# Patient Record
Sex: MALE | Race: ASIAN | NOT HISPANIC OR LATINO | Employment: UNEMPLOYED | URBAN - METROPOLITAN AREA
[De-identification: names, ages, dates, MRNs, and addresses within clinical notes are randomized per-mention and may not be internally consistent; named-entity substitution may affect disease eponyms.]

---

## 2017-01-05 ENCOUNTER — OFFICE VISIT (OUTPATIENT)
Dept: PALLIATIVE MEDICINE | Facility: CLINIC | Age: 52
End: 2017-01-05
Payer: COMMERCIAL

## 2017-01-05 VITALS
SYSTOLIC BLOOD PRESSURE: 137 MMHG | DIASTOLIC BLOOD PRESSURE: 88 MMHG | WEIGHT: 175 LBS | BODY MASS INDEX: 32 KG/M2 | HEART RATE: 60 BPM

## 2017-01-05 DIAGNOSIS — Z51.81 ENCOUNTER FOR THERAPEUTIC DRUG MONITORING: ICD-10-CM

## 2017-01-05 DIAGNOSIS — M06.9 RHEUMATOID ARTHRITIS INVOLVING MULTIPLE SITES, UNSPECIFIED RHEUMATOID FACTOR PRESENCE: ICD-10-CM

## 2017-01-05 DIAGNOSIS — M47.819 FACET ARTHROPATHY: Primary | ICD-10-CM

## 2017-01-05 PROCEDURE — 80307 DRUG TEST PRSMV CHEM ANLYZR: CPT | Performed by: PSYCHIATRY & NEUROLOGY

## 2017-01-05 PROCEDURE — 99000 SPECIMEN HANDLING OFFICE-LAB: CPT | Performed by: PSYCHIATRY & NEUROLOGY

## 2017-01-05 PROCEDURE — 99214 OFFICE O/P EST MOD 30 MIN: CPT | Performed by: PSYCHIATRY & NEUROLOGY

## 2017-01-05 RX ORDER — OXYCODONE HYDROCHLORIDE 5 MG/1
5-10 TABLET ORAL EVERY 4 HOURS PRN
Qty: 150 TABLET | Refills: 0 | Status: SHIPPED | OUTPATIENT
Start: 2017-01-05 | End: 2017-02-07

## 2017-01-05 ASSESSMENT — PAIN SCALES - GENERAL: PAINLEVEL: SEVERE PAIN (7)

## 2017-01-05 NOTE — NURSING NOTE
"    Chief Complaint   Patient presents with     Pain       Initial Wt 79.379 kg (175 lb) Estimated body mass index is 32 kg/(m^2) as calculated from the following:    Height as of 10/25/16: 1.575 m (5' 2\").    Weight as of this encounter: 79.379 kg (175 lb).  BP completed using cuff size: regular    "

## 2017-01-05 NOTE — PROGRESS NOTES
[facility]    Date of visit: 1/5/2017      Chief complaint:   Chief Complaint   Patient presents with     Pain       Interval history:  Lamont Daniels was last seen by me on 7/18/16    Recommendations/plan at the last visit included:  1. Physical Therapy:  Continue pool therapy  2. Clinical Health Psychologist to address issues of relaxation, behavioral change, coping style, and other factors important to improvement.  YES  3. Diagnostic Studies:  none  4. Medication Management:  No changes.  5. Further procedures recommended: can be scheduled for another facet joint injection.  6. Recommendations to PCP:  None  7. Follow up: 2-3 months      Since his last visit, Lamont Daniels reports:  -he is having problems with standing up straight in his back.  -he feels that the pain is the same area, but he can have pain move up and down.  When moves up it can into the shoulder blade, and into the posterior leg L>R  -feels that the injection was helpful but wore off.  -he was started on Humira and he does feel that it helps some  -he is taking the Oxycodone, usually 2-4 tabs/day.  Doesn't feel that 1 tab is helpful, takes 2.  Pain goes down 2-3 numbers  Lasts >2-3 hours      MRI 8/6/14  IMPRESSION  Impression: Multilevel degenerative changes. Small disc protrusion at  L3-4 results in in mild spinal canal narrowing. Moderate left and mild  right neuroforaminal narrowing at L5-S1.      Pain scores:  Pain intensity on average is 6 on a scale of 0-10.     Current pain treatments  Ibuprofen 800 mg- using 3-4 times per week, helpful  Gabapentin 600mg 3 times daily  Baclofen 10mg daily- was prescribed by PCP, doesn't use daily.  Oxycodone 5 mg : takes 2-4/day- ran out last week  meloxicam- hasn't started this    Previous medication treatments included:  Codeine, oxycodone, hydrocodone- given for other issues- helpful  Cymbalta 60mg daily- given for mental health- was given by Dr. Acosta- not been  higher  Baclofen 10mg at bedtime- not helpful, no side effects  Meloxicam 7.5 daily- not helpful  Robaxin 500 mg 1 tablet, 1-3 times a day depending on the day    Other treatments have included:  Lamont Daniels has not been seen at a pain clinic in the past.    PT: has done for various reasons, most recently the low back.  Acupuncture: as done a couple of needles with adjustment  TENs Unit: no  Injections: no    Side Effects: Dry mouth    Medications:  Current Outpatient Prescriptions   Medication Sig Dispense Refill     oxyCODONE (ROXICODONE) 5 MG IR tablet Take 1-2 tablets (5-10 mg) by mouth every 4 hours as needed for moderate to severe pain . Max of 6 tabs per day.  One month supply. 150 tablet 0     allopurinol (ZYLOPRIM) 300 MG tablet TAKE ONE TABLET BY MOUTH ONCE DAILY 90 tablet 3     meloxicam (MOBIC) 7.5 MG tablet TAKE ONE TABLET BY MOUTH ONCE DAILY WITH  BREAKFAST 30 tablet 0     zolpidem (AMBIEN) 5 MG tablet TAKE ONE TABLET BY MOUTH AT BEDTIME AS NEEDED FOR SLEEP 30 tablet 0     evolocumab (REPATHA) 140 MG/ML prefilled autoinjector Inject 1 mL (140 mg) Subcutaneous every 14 days 2 mL 11     baclofen (LIORESAL) 10 MG tablet TAKE ONE TABLET BY MOUTH AT BEDTIME 90 tablet 3     meclizine (ANTIVERT) 25 MG tablet TAKE ONE TABLET BY MOUTH EVERY 6 HOURS AS NEEDED FOR  DIZZINESS 30 tablet 0     clonazePAM (KLONOPIN) 1 MG tablet TAKE ONE-HALF TO ONE TABLET BY MOUTH THREE TIMES DAILY AS NEEDED FOR ANXIETY 90 tablet 0     predniSONE (DELTASONE) 5 MG tablet Take 1 tablet (5 mg) by mouth daily 30 tablet 3     sulfaSALAzine ER (AZULFIDINE EN) 500 MG EC tablet Take 2 tablets (1,000 mg) by mouth 2 times daily 120 tablet 3     adalimumab (HUMIRA PEN) 40 MG/0.8ML pen kit Inject 0.8 mLs (40 mg) Subcutaneous every 14 days 2 each 12     fluvoxaMINE (LUVOX) 50 MG tablet TAKE ONE TABLET BY MOUTH AT BEDTIME 30 tablet 3     metoprolol (TOPROL-XL) 25 MG 24 hr tablet Take 1 tablet (25 mg) by mouth daily 45 tablet 1     methylphenidate  (RITALIN) 10 MG tablet Take 1 tablet morning and noon 60 tablet 0     ezetimibe (ZETIA) 10 MG tablet Take 1 tablet (10 mg) by mouth daily 90 tablet 3     clopidogrel (PLAVIX) 75 MG tablet Take 1 tablet (75 mg) by mouth daily 90 tablet 3     atorvastatin (LIPITOR) 80 MG tablet Take 1 tablet (80 mg) by mouth daily for cholesterol. 90 tablet 3     gemfibrozil (LOPID) 600 MG tablet Take 1 tablet (600 mg) by mouth 2 times daily 180 tablet 3     nitroglycerin (NITROSTAT) 0.4 MG SL tablet Place 1 tablet (0.4 mg) under the tongue every 5 minutes as needed 30 tablet 1     gabapentin (NEURONTIN) 300 MG capsule TAKE TWO CAPSULES BY MOUTH THREE TIMES DAILY 540 capsule 3     aspirin EC 81 MG EC tablet Take 1 tablet (81 mg) by mouth daily (*) 30 tablet 1     hydrocortisone (WESTCORT) 0.2 % cream Use twice daily on the eats for active rash for up to 2 weeks in a row, then take 2 weeks off. 45 g 0     BusPIRone HCl 30 MG TABS TAKE ONE TABLET BY MOUTH TWICE DAILY 180 tablet 3     pantoprazole (PROTONIX) 40 MG enteric coated tablet TAKE ONE TABLET BY MOUTH ONCE DAILY FOR STOMACH 90 tablet 3     tenofovir (VIREAD) 300 MG tablet Take 1 tablet (300 mg) by mouth daily 90 tablet 3     colchicine (COLCRYS) 0.6 MG tablet Take 2 tablets at the first sign of flare, take 1 additional tablet one hour later. 30 tablet 11     Cholecalciferol (VITAMIN D) 2000 UNITS tablet TAKE ONE TABLET BY MOUTH ONCE DAILY 100 tablet 1     triamcinolone (KENALOG) 0.1 % ointment Apply sparingly to the ears and groin twice daily until clear, then once daily until clear. Do not apply to the face. 80 g 1     ketoconazole (NIZORAL) 2 % cream Apply topically 2 times daily Apply to back of scalp and to groin twice daily until rash is clear 30 g 11     meclizine (ANTIVERT) 25 MG tablet TAKE ONE TABLET BY MOUTH EVERY 6 HOURS AS NEEDED FOR DIZZINESS 30 tablet 3     melatonin 3 MG tablet Take 1 tablet (3 mg) by mouth nightly as needed for sleep       order for DME Equipment  being ordered: single end cane 1 Units 0     ORDER FOR DME 1.  CPAP pressure 11 cm/H20 with heated humidity.   2.  Provide mask to fit and CPAP supplies.   3.  Length of need lifetime.   4.  If needed please provide a chin strap              Medical History: any changes in medical history since they were last seen? No    Review of Systems:  The 14 system ROS was reviewed from the intake questionnaire:  fatigue, SOB, back pain  Any bowel or bladder problems:  Mood: depression, suicidal ideation but not active and no intent/plan.  This is chronic for him.  Contracts for safety.    Physical Exam:  Blood pressure 137/88, pulse 60, weight 79.379 kg (175 lb).  General: mildly distressed, awake and alert  Gait: Antalgic and very slow  MSK exam: pain with range of motion of the lumbar spine  Limited range of motion.         Assessment:   1. Chronic low back pain, with strong facet and myofascial features  2. Rheumatoid arthritis  3. Peripheral neuropathy  4. Depression  5. Hep B - currently being treated  6. Gout      Plan:   1. Physical Therapy:  Finished pool therapy, likely had more sessions  2. Clinical Health Psychologist to address issues of relaxation, behavioral change, coping style, and other factors important to improvement.  Will need to discuss at upcoming appointments  3. Diagnostic Studies:  UDS and opioid agreement today  4. Medication Management:    1.  Discussed with him my concerns that he doesn't call for refills or pursue care.  He ran out of meds over a week ago, but didn't call for refills as he would be seen today.  This has happened in the past.   2. Oxycodone refill. He has not been taking to the extent that he can  3. Wants to be able to take baclofen 10mg BID, but I don't feel comfortable with that as he needs to keep that on uninterrupted and if he is not good about doing his refills, this is a problem.  See below  5. Further procedures recommended: can be scheduled for another facet joint  injection.- ok to stay on the Plavix- can be set up in my urgent slot next week.  6. Recommendations to PCP:  I am wondering if there is an option for him to get help with filling meds.  He is consistently running out of meds and not asking for refills on time.  This is affecting his care and function.  He states his PCA doesn't have time to do this.  Are there other options? Could he have monthly pharmacist visit or is there in home help?  I am not willing to prescribe the baclofen until I know there is something in place to make sure he is getting appropriate refills.  7. Follow up: 2-3 months.  Again, I reitterated the importance of calling and getting scheduled immediately.  He can schedule at .    Total time spent was 30 minutes, and more than 50% of face to face time was spent in counseling and/or coordination of care regarding medications, procedures..    Lian Loo MD  [facility]

## 2017-01-05 NOTE — Clinical Note
[facility]    01/05/2017    Patient: Lamont Daniels  YOB: 1965  Medical Record Number: [id number]    Controlled Substance Agreement  I understand that my care provider has prescribed controlled substances (narcotics, tranquilizers, and/or stimulants) to help manage my condition(s).  I am taking this medicine to help me function or work.  I know that this is strong medicine.  It could have serious side effects and even cause a dependency on the drug.  If I stop these medicines suddenly, I could have severe withdrawal symptoms.    The risks, benefits, and side effects of these medication(s) were explained to me.  I agree that:  1. I will take part in other treatments as advised by my provider.  This may be psychiatry or counseling, physical therapy, behavioral therapy, group treatment, or a referral to a pain clinic.  I will reduce or stop my medicine when my provider tells me to do so.   2. I will take my medicines as prescribed.  I will not change the dose or schedule unless my provider tells me to.  There will be no refills if I  run out early.   I may be contacted at any time without warning and asked to complete a drug test or pill count.   3. I will keep all my appointments at the clinic.  If I miss appointments or fail to follow instructions, my provider may stop my medicine.  4. I will not ask other providers to prescribe controlled substances. And I will not accept controlled substances from other people. If I need another prescribed controlled substance for a new reason, I will notify my provider within one business day.  5. If I enroll in the [place] Medical Marijuana program, I will tell my provider.  I will also sign an agreement to share my medical records with my provider.  6. I will use one pharmacy to fill all of my controlled substance prescriptions.  If my prescription is mailed to my pharmacy, it may take 5 to 7 days for my medicine to be ready.  7. I  understand that my provider, clinic care team, and pharmacy can track controlled substance prescriptions from other providers through a central database (prescription monitoring program).  8. I will bring in my list of medications (or my medicine bottles) each time I come to the clinic.  Page 1 of 2      [facility]    01/05/2017    Patient: Lamont Daniels  YOB: 1965  Medical Record Number: [id number]    9. Refills of controlled substances will be made only during office hours.  It is up to me to make sure that I do not run out of my medicines on weekends or holidays.    10. I am responsible for my prescriptions.  If the medicine is lost or stolen, it will not be replaced.   I also agree not to share these medicines with anyone.  11. I agree to not use ANY illegal or recreational drugs.  This includes marijuana, cocaine, bath salts or other drugs.  I agree not to use alcohol unless my provider says I may.  I agree to give urine samples whenever asked.  If I fail to give a urine sample, the provider may stop my medicine.     12. I will tell my nurse or provider right away if I become pregnant or have a new medical problem treated outside of [facility].  13. I understand that this medicine can affect my thinking and judgment.  It may be unsafe for me to drive, use machinery and do dangerous tasks.  I will not do any of these things until I know how the medicine affects me.  If my dose changes, I will wait to see how it affects me.  I will contact my provider if I have concerns about medicine side effects.  I understand that if I do not follow any of the conditions above, my prescriptions or treatment may be stopped.    I agree that my provider, clinic care team, and pharmacy may work with any city, state or federal law enforcement agency that investigates the misuse, sale, or other diversion of my controlled medicine. I will allow my provider to discuss my care with or share a  copy of this agreement with any other treating provider, pharmacy or emergency room where I receive care.  I agree to give up (waive) any right of privacy or confidentiality with respect to these authorizations.   I have read this agreement and have asked questions about anything I did not understand.   ___________________________________    ___________________________  Patient Signature                                                             Date and Time  ___________________________________     ____________________________  Witness                                                                              Date and Time  ___________________________________  Dorothea Loo MD  Page 2 of 2  Opioid Pain Medicines (also known as Narcotics)  What You Need to Know      What are opioids?   Opioids are pain medicines that must be prescribed by a doctor. Examples are:     morphine (MS Contin, Codi)    oxycodone (Oxycontin)    oxycodone and acetaminophen (Percocet)    hydrocodone and acetaminophen (Vicodin, Norco)     fentanyl patch (Duragesic)     hydromorphone (Dilaudid)     methadone     What do opioids do well?   Opioids are best for short-term pain after a surgery or injury. They also work well for cancer pain. Unlike other pain medicines, they do not cause liver or kidney failure or ulcers. They may help some people with long-lasting (chronic) pain.     What do opioids NOT do well?   Opioids never get rid of pain entirely, and they do not work well for most patients with chronic pain. Opioids do not reduce swelling, one of the causes of pain. They also don t work well for nerve pain.     Side effects  Talk to your doctor before you start or decide to keep taking one of these medicines. Side effects include:    Lowers your breathing rate enough that it could cause death    Death due to taking more than the prescribed dose    Serious lifelong opioid use      Dependence is not the same as addiction.  Addiction is when people keep using a substance that harms their body, their mind or their relations with others. If you have a history of drug or alcohol abuse, taking opioids can cause a relapse.  Over time, opioids don t work as well. Most people will need higher and higher doses. The higher the dose, the more serious the side effects. We don t know the long-term effects of opioids.   People who have used opioids for a long time have a lower quality of life, worse depression, higher levels of pain and more visits to doctors.  Overdose from prescription drugs is the second leading cause of death in the [place] The risk of overdose rises when opioids are taken with other drugs such as:    Medicines used for anxiety and panic attacks (such as lorazepam, alprazolam, clonazepam    Other sedatives    Alcohol    Illegal drugs such as heroin  Never share your opioids with others. Be sure to store opioids in a secure place, locked if possible.Young children can easily swallow them and overdose.     Are there other ways to manage pain?  Ways to help reduce pain:    Exercise every day.    Treat health problems that may be causing pain.    Treat mental health problems like depression and anxiety.     Worse depression symptoms; Less pleasure in things you usually enjoy    Feeling tired or sluggish    Slower thoughts or cloudy thinking    Being more sensitive to pain over time; Pain is harder to control.    Trouble sleeping or restless sleep    Changes in hormone levels (for example, less testosterone).     Changes in sex drive or ability to have sex    Long lasting nausea and constipation    Trouble breathing while asleep; This is worse with lung problems like COPD or sleep apnea.    Unsafe driving    Getting sick more often    Itching    Feeling dizzy    Dry mouth    Sweating    Trouble emptying the bladder (peeing). This is worse if you have an enlarged prostate or get urinary tract infections (UTIs).    What else should I  know about opioids?  When someone takes opioids for too long or too often, they become dependent. This means that if you stop or reduce the medicine too quickly, you will have withdrawal symptoms.          Practice good sleep habits.  Try to go to bed and get up at the same time every day.    Stop smoking.  Tobacco use can make pain worse.    Do things that you enjoy.    Find a way to work through pain without drugs.  Try deep breathing, meditation, visual imagery and aromatherapy.    Ask your doctor to help you create a plan to manage your pain.

## 2017-01-05 NOTE — PATIENT INSTRUCTIONS
1. Call to set up SI joint injection- I will put you on for 1/11 at 11:15  2. Do not combine the ibuprofen with meloxicam  3. Make sure you have removed Robaxin (methocarbamol) from your medication list.  4. Baclofen- if you take this regularly, do not stop abruptly.  5. Opioid agreement today  6. Stop at get a urine drug screen done at the lab on the 1st floor  7. You will get a prescription of oxycodone.  You will need to call when you need refills.    Nurse Triage line:  [phone]   Call this number with any questions or concerns. You may leave a detailed message anytime. Calls are typically returned Monday through Friday between 8 AM and 4:30 PM. We usually get back to you within 2 business days depending on the issue/request.       Medication refills:    For non-narcotic medications, call your pharmacy directly to request a refill. The pharmacy will contact the [facility] for authorization. Please allow 3-4 days for these refills to be processed.     For narcotic refills, call the nurse triage line or send a [email or web address] message. Please contact us 7-10 days before your refill is due. The message MUST include the name of the specific medication(s) requested and how you would like to receive the prescription(s). The options are as follows:    Pain Clinic staff can mail the prescription to your pharmacy. Please tell us the name of the pharmacy.    You may pick the prescription up at the Pain Clinic (tell us the location) or during a clinic visit with your pain provider    Pain Clinic staff can deliver the prescription to the [facility] in the clinic building. Please tell us the location.      Scheduling number: [phone].  Call this number to schedule or change appointments.    We believe regular attendance is key to your success in our program.    Any time you are unable to keep your appointment we ask that you call us at least 24 hours in advance to let us know. This will allow us to offer  the appointment time to another patient.

## 2017-01-05 NOTE — MR AVS SNAPSHOT
After Visit Summary   1/5/2017    Lamont Daniels    MRN: [id number]           Patient Information     Date Of Birth          1965        Visit Information        Provider Department      1/5/2017 3:30 PM Dorothea Loo MD [facility]        Today's Diagnoses     Encounter for therapeutic drug monitoring    -  1     Facet arthropathy (H)           Care Instructions    1. Call to set up SI joint injection- I will put you on for 1/11 at 11:15  2. Do not combine the ibuprofen with meloxicam  3. Make sure you have removed Robaxin (methocarbamol) from your medication list.  4. Baclofen- if you take this regularly, do not stop abruptly.  5. Opioid agreement today  6. Stop at get a urine drug screen done at the lab on the 1st floor  7. You will get a prescription of oxycodone.  You will need to call when you need refills.    Nurse Triage line:  [phone]   Call this number with any questions or concerns. You may leave a detailed message anytime. Calls are typically returned Monday through Friday between 8 AM and 4:30 PM. We usually get back to you within 2 business days depending on the issue/request.       Medication refills:    For non-narcotic medications, call your pharmacy directly to request a refill. The pharmacy will contact the [facility] for authorization. Please allow 3-4 days for these refills to be processed.     For narcotic refills, call the nurse triage line or send a Sanguine message. Please contact us 7-10 days before your refill is due. The message MUST include the name of the specific medication(s) requested and how you would like to receive the prescription(s). The options are as follows:    Pain Clinic staff can mail the prescription to your pharmacy. Please tell us the name of the pharmacy.    You may pick the prescription up at the Pain Clinic (tell us the location) or during a clinic visit with your pain provider    Pain Clinic staff can deliver the  prescription to the [place] pharmacy in the clinic building. Please tell us the location.      Scheduling number: [phone].  Call this number to schedule or change appointments.    We believe regular attendance is key to your success in our program.    Any time you are unable to keep your appointment we ask that you call us at least 24 hours in advance to let us know. This will allow us to offer the appointment time to another patient.             Follow-ups after your visit        Your next 10 appointments already scheduled     Jan 17, 2017  3:00 PM   [facility] Sebast[facility]án [facility]    [address] [facility] [address]   [phone]            Jan 19, 2017  4:00 PM   [facility] Visit with Drea Laboy MD   [facility]    [address] [place] [id number]   [phone]            Apr 14, 2017  2:00 PM   Return Visit with Fly Travis MD   [facility]    [address] [place] [address]   [phone]            Aug 11, 2017  1:00 PM   Return Visit with Fly Travis MD   [facility]    [address] [place] [address]   [phone]              Future tests that were ordered for you today     Open Future Orders        Priority Expected Expires Ordered    Drug Screen Comprehensive , Urine with Reported Meds (Pain Care Package) Routine 1/5/2017 1/5/2018 1/5/2017            Who to contact     If you have questions or need follow up information about today's clinic visit or your schedule please contact [facility] directly at [phone].  Normal or non-critical lab and imaging results will be communicated to you by MyChart, letter or phone within 4 business days after the clinic has received the results. If you do  not hear from us within 7 days, please contact the clinic through [facility] or phone. If you have a critical or abnormal lab result, we will notify you by phone as soon as possible.  Submit refill requests through [facility] or call your pharmacy and they will forward the refill request to us. Please allow 3 business days for your refill to be completed.          Additional Information About Your Visit        [employer] Information     [facility] gives you secure access to your electronic health record. If you see a primary care provider, you can also send messages to your care team and make appointments. If you have questions, please call your primary care clinic.  If you do not have a primary care provider, please call [phone] and they will assist you.        Care EveryWhere ID     This is your Care EveryWhere ID. This could be used by other organizations to access your [place] medical records  [id number]        Your Vitals Were     Pulse                   60            Blood Pressure from Last 3 Encounters:   01/05/17 137/88   10/25/16 128/85   10/25/16 121/81    Weight from Last 3 Encounters:   01/05/17 79.379 kg (175 lb)   10/25/16 80.468 kg (177 lb 6.4 oz)   10/25/16 80.468 kg (177 lb 6.4 oz)                 Today's Medication Changes          These changes are accurate as of: 1/5/17  3:53 PM.  If you have any questions, ask your nurse or doctor.               Stop taking these medicines if you haven't already. Please contact your care team if you have questions.     methocarbamol 500 MG tablet   Commonly known as:  ROBAXIN                Where to get your medicines      Some of these will need a paper prescription and others can be bought over the counter.  Ask your nurse if you have questions.     Bring a paper prescription for each of these medications    - oxyCODONE 5 MG IR tablet             Primary Care Provider Office Phone # Fax #    Daivd Chavez [phone]       [facility]  [address]  [facility] [address]        Thank you!     Thank you for choosing [facility]  for your care. Our goal is always to provide you with excellent care. Hearing back from our patients is one way we can continue to improve our services. Please take a few minutes to complete the written survey that you may receive in the mail after your visit with us. Thank you!             Your Updated Medication List - Protect others around you: Learn how to safely use, store and throw away your medicines at www.disposemymeds.org.          This list is accurate as of: 1/5/17  3:53 PM.  Always use your most recent med list.                   Brand Name Dispense Instructions for use    adalimumab 40 MG/0.8ML pen kit    HUMIRA PEN    2 each    Inject 0.8 mLs (40 mg) Subcutaneous every 14 days       allopurinol 300 MG tablet    ZYLOPRIM    90 tablet    TAKE ONE TABLET BY MOUTH ONCE DAILY       aspirin 81 MG EC tablet     30 tablet    Take 1 tablet (81 mg) by mouth daily (*)       atorvastatin 80 MG tablet    LIPITOR    90 tablet    Take 1 tablet (80 mg) by mouth daily for cholesterol.       baclofen 10 MG tablet    LIORESAL    90 tablet    TAKE ONE TABLET BY MOUTH AT BEDTIME       BusPIRone HCl 30 MG Tabs     180 tablet    TAKE ONE TABLET BY MOUTH TWICE DAILY       clonazePAM 1 MG tablet    klonoPIN    90 tablet    TAKE ONE-HALF TO ONE TABLET BY MOUTH THREE TIMES DAILY AS NEEDED FOR ANXIETY       clopidogrel 75 MG tablet    PLAVIX    90 tablet    Take 1 tablet (75 mg) by mouth daily       colchicine 0.6 MG tablet    COLCRYS    30 tablet    Take 2 tablets at the first sign of flare, take 1 additional tablet one hour later.       evolocumab 140 MG/ML prefilled autoinjector    REPATHA    2 mL    Inject 1 mL (140 mg) Subcutaneous every 14 days       ezetimibe 10 MG tablet    ZETIA    90 tablet    Take 1 tablet (10 mg) by mouth daily       fluvoxaMINE 50 MG tablet    LUVOX    30 tablet    TAKE ONE TABLET BY MOUTH  AT BEDTIME       gabapentin 300 MG capsule    NEURONTIN    540 capsule    TAKE TWO CAPSULES BY MOUTH THREE TIMES DAILY       gemfibrozil 600 MG tablet    LOPID    180 tablet    Take 1 tablet (600 mg) by mouth 2 times daily       hydrocortisone 0.2 % cream    WESTCORT    45 g    Use twice daily on the eats for active rash for up to 2 weeks in a row, then take 2 weeks off.       ketoconazole 2 % cream    NIZORAL    30 g    Apply topically 2 times daily Apply to back of scalp and to groin twice daily until rash is clear       * meclizine 25 MG tablet    ANTIVERT    30 tablet    TAKE ONE TABLET BY MOUTH EVERY 6 HOURS AS NEEDED FOR DIZZINESS       * meclizine 25 MG tablet    ANTIVERT    30 tablet    TAKE ONE TABLET BY MOUTH EVERY 6 HOURS AS NEEDED FOR  DIZZINESS       melatonin 3 MG tablet      Take 1 tablet (3 mg) by mouth nightly as needed for sleep       meloxicam 7.5 MG tablet    MOBIC    30 tablet    TAKE ONE TABLET BY MOUTH ONCE DAILY WITH  BREAKFAST       methylphenidate 10 MG tablet    RITALIN    60 tablet    Take 1 tablet morning and noon       metoprolol 25 MG 24 hr tablet    TOPROL-XL    45 tablet    Take 1 tablet (25 mg) by mouth daily       nitroglycerin 0.4 MG sublingual tablet    NITROSTAT    30 tablet    Place 1 tablet (0.4 mg) under the tongue every 5 minutes as needed       order for DME      1.  CPAP pressure 11 cm/H20 with heated humidity.  2.  Provide mask to fit and CPAP supplies.  3.  Length of need lifetime.  4.  If needed please provide a chin strap       order for DME     1 Units    Equipment being ordered: single end cane       oxyCODONE 5 MG IR tablet    ROXICODONE    150 tablet    Take 1-2 tablets (5-10 mg) by mouth every 4 hours as needed for moderate to severe pain . Max of 6 tabs per day.  One month supply.       pantoprazole 40 MG EC tablet    PROTONIX    90 tablet    TAKE ONE TABLET BY MOUTH ONCE DAILY FOR STOMACH       predniSONE 5 MG tablet    DELTASONE    30 tablet    Take 1 tablet (5  mg) by mouth daily       sulfaSALAzine  MG EC tablet    AZULFIDINE EN    120 tablet    Take 2 tablets (1,000 mg) by mouth 2 times daily       tenofovir 300 MG tablet    VIREAD    90 tablet    Take 1 tablet (300 mg) by mouth daily       triamcinolone 0.1 % ointment    KENALOG    80 g    Apply sparingly to the ears and groin twice daily until clear, then once daily until clear. Do not apply to the face.       vitamin D 2000 UNITS tablet     100 tablet    TAKE ONE TABLET BY MOUTH ONCE DAILY       zolpidem 5 MG tablet    AMBIEN    30 tablet    TAKE ONE TABLET BY MOUTH AT BEDTIME AS NEEDED FOR SLEEP       * Notice:  This list has 2 medication(s) that are the same as other medications prescribed for you. Read the directions carefully, and ask your doctor or other care provider to review them with you.

## 2017-01-06 ENCOUNTER — TELEPHONE (OUTPATIENT)
Dept: PALLIATIVE MEDICINE | Facility: CLINIC | Age: 52
End: 2017-01-06

## 2017-01-06 NOTE — TELEPHONE ENCOUNTER
Phone not accepting incoming calls. Sent patient Verical message to schedule Lumbar Facet Joint injection. Per Dr. Loo, ok to do on Plavix.     Autumn Andrew    [facility]

## 2017-01-09 ENCOUNTER — TELEPHONE (OUTPATIENT)
Dept: PSYCHIATRY | Facility: CLINIC | Age: 52
End: 2017-01-09

## 2017-01-09 NOTE — TELEPHONE ENCOUNTER
Pre-screening questions for Radiology Injections:    Injection to be done at which interventional clinic site? [facility]    Procedure ordered by Dr. Loo    Procedure ordered? Lumbar Facet Joint Injection    What insurance would patient like us to bill for this procedure? [employer]      Worker's comp-Any injection DO NOT SCHEDULE and route to Yuridia Vuong.      [employer] insurance - If scheduling an SI joint injection DO NOT SCHEDULE and route Yuridia Vuong.    [employer]- MBB's must be scheduled at LEAST two weeks apart      [employer] - Any injection besides hip/shoulder/knee joint DO NOT SCHEDULE and route to Yuridia Vuong. She will obtain PA and call pt back to schedule procedure or notify pt of denial.     Is an  needed? No     Patient has a drive home? (mandatory) YES:      Is patient taking any blood thinners (plavix, coumadin, jantoven, warfarin, heparin, pradaxa or dabigatran )? Yes - plavix - per KN ok to schedule without hold  (If so, do not schedule, contact RN and/or MD)     Is patient taking any aspirin products? No   (If more than 325mg/day do not schedule; Contact RN/MD. For all non-cervical interventional procedures if patient is taking MORE than 325mg/day, limit aspirin to 81-325mg/day x 1 week. No hold required day of procedure.  For CERVICAL procedures, hold all aspirin products for 6 days.)      Does the patient have a bleeding or clotting disorder? No   (If yes, okay to schedule, but contact RN/MD).  **For any patients with platelet count <100, must be forwarded to provider**    Is patient diabetic?  No  If YES, have them bring their glucometer.    Does patient have an active infection or treated for one within the past week? No     Is patient currently taking any antibiotics?  No   For patients on chronic, preventative, or prophylactic antibiotics, procedures can be scheduled.   For patients on antibiotics for active or recent infection:  Drs.  Annalisa Serna [place]-antibiotic course must have been completed for 4 days  Felipa Jeffries-antibiotic course must have been completed for 7 days    Is patient currently taking any steroid medications? (i.e. Prednisone, Medrol)  No   For patients on steroid medications:  Annalisa De León [place]-steroid course must have been completed for 4 days  Felipa Jeffries-steroid course must have been completed for 7 days  Review with patient:  If you are started on any steroids or antibiotics between now and your appointment, you must contact us because it may affect our ability to perform your procedure     Is patient actively being treated for cancer or immunocompromised, including the spleen having been removed? No  **For Dr. Benz patients without spleens should have the chart sent to her**  (If YES, do NOT schedule and route to RN)    Are you able to get on and off an exam table with minimal or no assistance? Yes  (If NO, do NOT schedule and route to RN)  Are you able to roll over and lay on your stomach with minimal or no assistance? Yes  (If NO, do NOT schedule and route to RN)         Any allergies to contrast dye, iodine, shellfish, or numbing and steroid medications? No  (If so, inform nursing and note in scheduling comments.)    Allergies: Citalopram and Tramadol      Any chance of pregnancy?Not Applicable    Has the patient had a flu shot or any other vaccinations within 7 days before or after the procedure.  No       Does patient have an MRI/CT?  YES:   (SI joint, hip injections, lumbar sympathetic blocks, and stellate ganglion blocks do not require an MRI)    If so, was it done at [facility]? Yes      If not, where was it done? N/A     Was the MRI done w/in the last 3 years?  Yes   If MRI was not done at [facility], [facility] or [facility] do NOT schedule. Route to nursing.  (If pt has disc the injection can be scheduled but pt has to bring disc to appt. If they show up w/out disc the injection  cannot be done)    Reminders (please tell patient if applicable):       Instructed pt to arrive 30 minutes early for IV start if this is for a cervical procedure, ALL sympathetic (stellate ganglion, hypogastric, or lumbar sympathetic block) and all sedation procedures (RFA, spinal cord stimulation trials).  Not Applicable    -IVs are not routinely placed for Fang and [place] cervical case       If NPO for sedation, informed patient that it is okay to take medications with sips of water (except if they are to hold blood thinners).  Not Applicable   *DO take blood pressure medication if it is prescribed*      If this is for a cervical MELISSA, informed patient that aspirin needs to be held for 6 days.   Not Applicable      Do not schedule procedures requiring IV placement in the first appointment of the day or first appointment after lunch         For patients 85 or older we recommend having an adult stay w/ them for the remainder of the day.         Does the patient have any questions?  NO      Yuridia Vuong  [facility]

## 2017-01-09 NOTE — TELEPHONE ENCOUNTER
Reason for call: Client states he needs a Ritalin refill.  Please call with outcome.    Can we leave a detailed message: Yes.

## 2017-01-10 LAB — PAIN DRUG SCR UR W RPTD MEDS: NORMAL

## 2017-01-10 NOTE — TELEPHONE ENCOUNTER
He was given courtesy refills x 3 months in August 2016 and was asked to schedule but did not come in. Patient has not been seen since last summer. He is overdue for a med check by several months. No refills of controlled substance, Ritalin, unless seen in clinic. He may ask his PCP or schedule with Dr. Acosta.    Will ask scheduling to call him to make an appointment.     From 7/21/2016 med check  DSM-5 DIAGNOSIS:  296.33 - Major Depressive Disorder, Recurrent, severe   300.02 - Generalized Anxiety Disorder   300.21 - Panic Disorder with Agoraphobia   ASHD, hep B     Assessment:  His lack of improvement is discouraging. Perhaps a stimulant with help. Recent cardiology work up was ok.    [employer]es[employer] testing; most of antidepressant RXs ok.     Treatment Plan:  Continue current dose of Buspar   Trial of Ritalin (methylphenidate) 5 mg morning and noon. Call for 10 mg as needed (will need to mail it)    Continue Trintellix (vortioxetine) 20 mg per day    Use as much trazodone as tolerated    Klonopin dose per Dr Chavez.   Safety plan was reviewed; to the ER as needed or call after hours crisis line; [phone]  Education and counseling was done regarding use of medications, psychotherapy options  Call for a follow up appointment with Dr. Acosta in 10-12 weeks or so; [phone].        Signed:           Wiley Acosta M.D.

## 2017-01-10 NOTE — PROGRESS NOTES
Pre procedure Diagnosis: facet arthropathy   Post procedure Diagnosis: Same  Procedure performed: Bilateral L4-5 and L5-S1 facet joint injections  Anesthesia: none  Complications: None  Operators: Lian Loo MD and Jaci Paul MD     Indications:   Lamont Daniels is a 50 year old male patient of Dr. Loo who presents for bilateral L4-5 and L5-S1 intra-articular facet joint steroid injections.  They have a history of chronic low back pain.  Exam shows pain along low back with extension and rotation, as well as tenderness over b/l lower paraspinals and they have tried conservative treatment including medications, PT, and acupuncture. We had discussed at his appointment that we could do on Plavix as we aren't going deep into the epidural space, but we did discuss that there are some risks associated with that.  Due to his level of pain and distress, he wanted to proceed and understands the risks.    Options/alternatives, benefits and risks were discussed with the patient including bleeding, infection, flared pain, tissue trauma, exposure to radiation, reaction to medications including seizure, spinal cord injury, paralysis, weakness, numbness and headache.   Questions were answered to his satisfaction and he agrees to proceed. Voluntary informed consent was obtained and signed.     Vitals were reviewed: Yes  Allergies were reviewed:  Yes   Medications were reviewed:  Yes   Pre-procedure pain score: 6/10    Procedure:  After getting informed consent, patient was brought into the procedure suite and was placed in a prone position on the procedure table.   A Pause for the Cause was performed.  Patient was prepped and draped in sterile fashion.     Under AP fluoroscopic guidance the L4-5 and L5-S1 facet joints on the right side were identified, and the C-arm was rotated obliquely to the affected side to open the joint space. A total of 2 ml of 1% lidocaine was injected at the needle entry point and needle tract. Then a 22  gauge 3.5 inch quincke type spinal needle was inserted and advanced under fluoroscopic guidance targeting the superior articular pillar of each joint. Once the needle made a contact with SAP, it was rotated and was then advanced into the joint.    AP fluoroscopic views were obtained to confirm the needle placement. Then,  Omnipaque 300 contrast dye was injected after negative aspiration for heme and CSF in each joint, confirming appropriate placement. The same procedure was repeated on the left side.    A total of 2ml of Omnipaque was used and 8 mL was wasted.     The injection was then accomplished using a solution containing 2.5ml of 0.5% bupivacaine mixed with 10mg of dexamethasone, divided between the 4 joints. The needles were removed.     Hemostasis was achieved, the area was cleaned, and bandaids were placed when appropriate.  The patient tolerated the procedure well, and was taken to the recovery room.    Images were saved to PACS.    Post-procedure pain score: 6/10  Follow-up includes:   -f/u phone call in one week  -f/u with Dr. Cinda Loo MD  [facility]

## 2017-01-11 ENCOUNTER — RADIOLOGY INJECTION OFFICE VISIT (OUTPATIENT)
Dept: PALLIATIVE MEDICINE | Facility: CLINIC | Age: 52
End: 2017-01-11
Payer: COMMERCIAL

## 2017-01-11 ENCOUNTER — TELEPHONE (OUTPATIENT)
Dept: NURSING | Facility: CLINIC | Age: 52
End: 2017-01-11

## 2017-01-11 ENCOUNTER — RADIANT APPOINTMENT (OUTPATIENT)
Dept: RADIOLOGY | Facility: CLINIC | Age: 52
End: 2017-01-11
Attending: PSYCHIATRY & NEUROLOGY
Payer: COMMERCIAL

## 2017-01-11 VITALS — HEART RATE: 56 BPM | SYSTOLIC BLOOD PRESSURE: 131 MMHG | DIASTOLIC BLOOD PRESSURE: 88 MMHG | OXYGEN SATURATION: 99 %

## 2017-01-11 DIAGNOSIS — M47.819 FACET ARTHROPATHY: Primary | ICD-10-CM

## 2017-01-11 DIAGNOSIS — M47.816 FACET ARTHROPATHY, LUMBAR: ICD-10-CM

## 2017-01-11 PROCEDURE — 64494 INJ PARAVERT F JNT L/S 2 LEV: CPT | Mod: 50 | Performed by: PSYCHIATRY & NEUROLOGY

## 2017-01-11 PROCEDURE — 64493 INJ PARAVERT F JNT L/S 1 LEV: CPT | Mod: 50 | Performed by: PSYCHIATRY & NEUROLOGY

## 2017-01-11 ASSESSMENT — PAIN SCALES - GENERAL: PAINLEVEL: MODERATE PAIN (5)

## 2017-01-11 NOTE — TELEPHONE ENCOUNTER
Cornelia left message to call [employer] at [phone]. Spoke to pharmacist Armida who reviewed the package insert for Repatha and said that it does list urticaria 0.4 %. No mention of warm feeling. She will continue to research and if she finds anymore information she will call back.   Will route message to Dr Travis for recommendations.

## 2017-01-11 NOTE — MR AVS SNAPSHOT
After Visit Summary   1/11/2017    Lamont Daniels    MRN: [id number]           Patient Information     Date Of Birth          1965        Visit Information        Provider Department      1/11/2017 11:15 AM Dorothea Loo MD [facility]        Care Instructions    [facility]   Procedure Discharge Instructions    Today you saw:      Dr. Dorothea Loo      You had an: lumbar facet joint injection   Medications used:  Lidocaine   Bupivacaine   Dexamethasone   Omnipaque            Be cautious when walking. Numbness and/or weakness in the lower extremities may occur up to 6-8 hours after the procedure due to effect of the local anesthetic    Do not drive for 6 hours. The effect of the local anesthetic could slow your reflexes.     You may resume your regular activities after 24 hours    Avoid strenuous activity for the first 24 hours    You may shower, however avoid swimming, tub baths or hot tubs for 24 hours following your procedure    You may have a mild to moderate increase in pain for several days following the injection.    It may take up to 14 days for the steroid medication to start working although you may feel the effect as early as a few days after the procedure.       You may use ice packs for 10-15 minutes, 3 to 4 times a day at the injection site for comfort    Do not use heat to painful areas for 6 to 8 hours. This will give the local anesthetic time to wear off and prevent you from accidentally burning your skin.     You may use anti-inflammatory medications (such as Ibuprofen or Aleve or Advil) or Tylenol for pain control if necessary    If you were fasting, you may resume your normal diet and medications after the procedure    If you have diabetes, check your blood sugar more frequently than usual as your blood sugar may be higher than normal for 10-14 days following a steroid injection. Contact your doctor who manages your diabetes if your  blood sugar is higher than usual    If you experience any of the following, call the pain center nursing line during work hours at [phone] or the after hours provider line at [phone]:  -Fever over 100 degree F  -Swelling, bleeding, redness, drainage, warmth at the injection site  -Progressive weakness or numbness in your legs or arms  -Loss of bowel or bladder function  -Unusual headache that is not relieved by Tylenol  -Unusual new onset of pain that is not improving      Phone #s:  Appointment line: [phone];  Nurse line: [phone]            Follow-ups after your visit        Your next 10 appointments already scheduled     Jan 17, 2017 11:00 AM   Return Visit with Wiley Acosta MD   [facility] ([facility])    [address] [place] [address] [phone]            Jan 17, 2017  3:00 PM   Return Visit with Sebastián Jenkins MD   [facility] ([facility])    [address] [place] [id number] [phone]            Jan 19, 2017  4:00 PM   Return Visit with Drea Laboy MD   [facility] ([facility]    [address] [facility] [id number] [phone]            Apr 06, 2017  3:30 PM   Return Visit with Dorothea Loo MD   [facility] ([facility] [id number] [phone]            Apr 14, 2017  2:00 PM   Return Visit with Fly Travis MD   [facility] ([facility]    [address] [facility] [id number] [phone]            Aug 11, 2017  1:00 PM   Return Visit with Fly Travis MD   [facility] ([facility])    [address] [facility] [address] [phone]              Who to contact     If you have questions or need  follow up information about today's clinic visit or your schedule please contact [facility] directly at [phone].  Normal or non-critical lab and imaging results will be communicated to you by MyChart, letter or phone within 4 business days after the clinic has received the results. If you do not hear from us within 7 days, please contact the clinic through AccurIChart or phone. If you have a critical or abnormal lab result, we will notify you by phone as soon as possible.  Submit refill requests through [employer] or call your pharmacy and they will forward the refill request to us. Please allow 3 business days for your refill to be completed.          Additional Information About Your Visit        AccurICharShaker Information     [employer] gives you secure access to your electronic health record. If you see a primary care provider, you can also send messages to your care team and make appointments. If you have questions, please call your primary care clinic.  If you do not have a primary care provider, please call [phone] and they will assist you.        Care EveryWhere ID     This is your Care EveryWhere ID. This could be used by other organizations to access your [facility] medical records  [id number]        Your Vitals Were     Pulse                   59            Blood Pressure from Last 3 Encounters:   01/11/17 120/84   01/05/17 137/88   10/25/16 128/85    Weight from Last 3 Encounters:   01/05/17 79.379 kg (175 lb)   10/25/16 80.468 kg (177 lb 6.4 oz)   10/25/16 80.468 kg (177 lb 6.4 oz)              Today, you had the following     No orders found for display       Primary Care Provider Office Phone # Fax #    David Chavez [phone]       [facility] [address]  [facility] [address]        Thank you!     Thank you for choosing [facility]  for your care. Our goal is always to provide you with excellent care. Hearing back from our patients is one way we  can continue to improve our services. Please take a few minutes to complete the written survey that you may receive in the mail after your visit with us. Thank you!             Your Updated Medication List - Protect others around you: Learn how to safely use, store and throw away your medicines at www.disposemymeds.org.          This list is accurate as of: 1/11/17 11:32 AM.  Always use your most recent med list.                   Brand Name Dispense Instructions for use    adalimumab 40 MG/0.8ML pen kit    HUMIRA PEN    2 each    Inject 0.8 mLs (40 mg) Subcutaneous every 14 days       allopurinol 300 MG tablet    ZYLOPRIM    90 tablet    TAKE ONE TABLET BY MOUTH ONCE DAILY       aspirin 81 MG EC tablet     30 tablet    Take 1 tablet (81 mg) by mouth daily (*)       atorvastatin 80 MG tablet    LIPITOR    90 tablet    Take 1 tablet (80 mg) by mouth daily for cholesterol.       baclofen 10 MG tablet    LIORESAL    90 tablet    TAKE ONE TABLET BY MOUTH AT BEDTIME       BusPIRone HCl 30 MG Tabs     180 tablet    TAKE ONE TABLET BY MOUTH TWICE DAILY       clonazePAM 1 MG tablet    klonoPIN    90 tablet    TAKE ONE-HALF TO ONE TABLET BY MOUTH THREE TIMES DAILY AS NEEDED FOR ANXIETY       clopidogrel 75 MG tablet    PLAVIX    90 tablet    Take 1 tablet (75 mg) by mouth daily       colchicine 0.6 MG tablet    COLCRYS    30 tablet    Take 2 tablets at the first sign of flare, take 1 additional tablet one hour later.       evolocumab 140 MG/ML prefilled autoinjector    REPATHA    2 mL    Inject 1 mL (140 mg) Subcutaneous every 14 days       ezetimibe 10 MG tablet    ZETIA    90 tablet    Take 1 tablet (10 mg) by mouth daily       fluvoxaMINE 50 MG tablet    LUVOX    30 tablet    TAKE ONE TABLET BY MOUTH AT BEDTIME       gabapentin 300 MG capsule    NEURONTIN    540 capsule    TAKE TWO CAPSULES BY MOUTH THREE TIMES DAILY       gemfibrozil 600 MG tablet    LOPID    180 tablet    Take 1 tablet (600 mg) by mouth 2 times daily        hydrocortisone 0.2 % cream    WESTCORT    45 g    Use twice daily on the eats for active rash for up to 2 weeks in a row, then take 2 weeks off.       ketoconazole 2 % cream    NIZORAL    30 g    Apply topically 2 times daily Apply to back of scalp and to groin twice daily until rash is clear       * meclizine 25 MG tablet    ANTIVERT    30 tablet    TAKE ONE TABLET BY MOUTH EVERY 6 HOURS AS NEEDED FOR DIZZINESS       * meclizine 25 MG tablet    ANTIVERT    30 tablet    TAKE ONE TABLET BY MOUTH EVERY 6 HOURS AS NEEDED FOR  DIZZINESS       melatonin 3 MG tablet      Take 1 tablet (3 mg) by mouth nightly as needed for sleep       meloxicam 7.5 MG tablet    MOBIC    30 tablet    TAKE ONE TABLET BY MOUTH ONCE DAILY WITH  BREAKFAST       methylphenidate 10 MG tablet    RITALIN    60 tablet    Take 1 tablet morning and noon       metoprolol 25 MG 24 hr tablet    TOPROL-XL    45 tablet    Take 1 tablet (25 mg) by mouth daily       nitroglycerin 0.4 MG sublingual tablet    NITROSTAT    30 tablet    Place 1 tablet (0.4 mg) under the tongue every 5 minutes as needed       order for DME      1.  CPAP pressure 11 cm/H20 with heated humidity.  2.  Provide mask to fit and CPAP supplies.  3.  Length of need lifetime.  4.  If needed please provide a chin strap       order for DME     1 Units    Equipment being ordered: single end cane       oxyCODONE 5 MG IR tablet    ROXICODONE    150 tablet    Take 1-2 tablets (5-10 mg) by mouth every 4 hours as needed for moderate to severe pain . Max of 6 tabs per day.  One month supply.       pantoprazole 40 MG EC tablet    PROTONIX    90 tablet    TAKE ONE TABLET BY MOUTH ONCE DAILY FOR STOMACH       predniSONE 5 MG tablet    DELTASONE    30 tablet    Take 1 tablet (5 mg) by mouth daily       sulfaSALAzine  MG EC tablet    AZULFIDINE EN    120 tablet    Take 2 tablets (1,000 mg) by mouth 2 times daily       tenofovir 300 MG tablet    VIREAD    90 tablet    Take 1 tablet (300 mg) by  mouth daily       triamcinolone 0.1 % ointment    KENALOG    80 g    Apply sparingly to the ears and groin twice daily until clear, then once daily until clear. Do not apply to the face.       vitamin D 2000 UNITS tablet     100 tablet    TAKE ONE TABLET BY MOUTH ONCE DAILY       zolpidem 5 MG tablet    AMBIEN    30 tablet    TAKE ONE TABLET BY MOUTH AT BEDTIME AS NEEDED FOR SLEEP       * Notice:  This list has 2 medication(s) that are the same as other medications prescribed for you. Read the directions carefully, and ask your doctor or other care provider to review them with you.

## 2017-01-11 NOTE — NURSING NOTE
Discharge Information    IV Discontiued Time:  NA    Amount of Fluid Infused:  NA    Discharge Criteria = When patient returns to baseline or as per MD order    Consciousness:  Pt is fully awake    Circulation:  BP +/- 20% of pre-procedure level    Respiration:  Patient is able to breathe deeply    O2 Sat:  Patient is able to maintain O2 Sat >92% on room air    Activity:  Moves 4 extremities on command    Ambulation:  Patient is able to stand and walk or stand and pivot into wheelchair    Dressing:  Clean/dry or No Dressing    Notes:   Discharge instructions and AVS given to patient    Patient meets criteria for discharge?  YES    Admitted to PCU?  No    Responsible adult present to accompany patient home?  Yes    Signature/Title:    isa bhatia RN Care Coordinator  [facility]

## 2017-01-11 NOTE — TELEPHONE ENCOUNTER
Patient called back. States he did receive the Repatha. He feels competent to give himself an injection because he takes the Humira injections. He understood that the injections are every 14 days. Asked him if he had any questions. He states that for about two days after the injection his entire body felt warm and itchy. Feels well now. Advised him that I would call the nurse associated with Repatha and call him back with their comments and thoughts.   Left message for Cornelia with [facility] at [phone]. She is out of town until Thurs and I asked that she call back.

## 2017-01-11 NOTE — NURSING NOTE
"Chief Complaint   Patient presents with     Pain     Low back pain        Initial /84 mmHg  Pulse 59 Estimated body mass index is 32.00 kg/(m^2) as calculated from the following:    Height as of 10/25/16: 1.575 m (5' 2\").    Weight as of 1/5/17: 79.379 kg (175 lb).  BP completed using cuff size: regular    Injection intake:    If this procedure is requiring IV sedation has patient been NPO for 6  Hours? NA    Is patient on coumadin, plavix or other prescribed blood thinner?   Yes -   Plavix    If patient is on coumadin was it held for 5 days?   NA    If patient is on plavix was it held for 7 days?    No      Does patient take aspirin?  yes    If this is for a cervical procedure and patient is on aspirin has it been held for 6 days?   No, held it for 5 days.    Any allergies to contrast dye, iodine, steroid and/or numbing medications?  NO    Is patient currently taking antibiotics or have an active infection?  NO    Does patient have a ? Yes       Is patient pregnant or breastfeeding?  Not Applicable    Are the vital signs normal?  Yes    Amberly Chawla MA      "

## 2017-01-11 NOTE — PATIENT INSTRUCTIONS
[facility]   Procedure Discharge Instructions    Today you saw:      Dr. Dorothea Loo      You had an: lumbar facet joint injection   Medications used:  Lidocaine   Bupivacaine   Dexamethasone   Omnipaque            Be cautious when walking. Numbness and/or weakness in the lower extremities may occur up to 6-8 hours after the procedure due to effect of the local anesthetic    Do not drive for 6 hours. The effect of the local anesthetic could slow your reflexes.     You may resume your regular activities after 24 hours    Avoid strenuous activity for the first 24 hours    You may shower, however avoid swimming, tub baths or hot tubs for 24 hours following your procedure    You may have a mild to moderate increase in pain for several days following the injection.    It may take up to 14 days for the steroid medication to start working although you may feel the effect as early as a few days after the procedure.       You may use ice packs for 10-15 minutes, 3 to 4 times a day at the injection site for comfort    Do not use heat to painful areas for 6 to 8 hours. This will give the local anesthetic time to wear off and prevent you from accidentally burning your skin.     You may use anti-inflammatory medications (such as Ibuprofen or Aleve or Advil) or Tylenol for pain control if necessary    If you were fasting, you may resume your normal diet and medications after the procedure    If you have diabetes, check your blood sugar more frequently than usual as your blood sugar may be higher than normal for 10-14 days following a steroid injection. Contact your doctor who manages your diabetes if your blood sugar is higher than usual    If you experience any of the following, call the pain center nursing line during work hours at [phone] or the after hours provider line at [phone]:  -Fever over 100 degree F  -Swelling, bleeding, redness, drainage, warmth at the injection site  -Progressive  weakness or numbness in your legs or arms  -Loss of bowel or bladder function  -Unusual headache that is not relieved by Tylenol  -Unusual new onset of pain that is not improving      Phone #s:  Appointment line: [phone];  Nurse line: [phone]

## 2017-01-13 ENCOUNTER — TELEPHONE (OUTPATIENT)
Dept: NURSING | Facility: CLINIC | Age: 52
End: 2017-01-13

## 2017-01-13 DIAGNOSIS — Z95.1 S/P CABG (CORONARY ARTERY BYPASS GRAFT): ICD-10-CM

## 2017-01-13 DIAGNOSIS — E78.5 HYPERLIPIDEMIA LDL GOAL <70: Primary | ICD-10-CM

## 2017-01-13 DIAGNOSIS — I25.810 CORONARY ATHEROSCLEROSIS OF AUTOLOGOUS VEIN BYPASS GRAFT: ICD-10-CM

## 2017-01-17 ENCOUNTER — OFFICE VISIT (OUTPATIENT)
Dept: RHEUMATOLOGY | Facility: CLINIC | Age: 52
End: 2017-01-17
Payer: COMMERCIAL

## 2017-01-17 VITALS
SYSTOLIC BLOOD PRESSURE: 123 MMHG | HEART RATE: 83 BPM | OXYGEN SATURATION: 96 % | BODY MASS INDEX: 33.34 KG/M2 | TEMPERATURE: 98.1 F | WEIGHT: 181.2 LBS | DIASTOLIC BLOOD PRESSURE: 76 MMHG | HEIGHT: 62 IN

## 2017-01-17 DIAGNOSIS — Z79.899 HIGH RISK MEDICATIONS (NOT ANTICOAGULANTS) LONG-TERM USE: ICD-10-CM

## 2017-01-17 DIAGNOSIS — M06.09 RHEUMATOID ARTHRITIS OF MULTIPLE SITES WITH NEGATIVE RHEUMATOID FACTOR (H): Primary | ICD-10-CM

## 2017-01-17 LAB
ALBUMIN SERPL-MCNC: 3.9 G/DL (ref 3.4–5)
ALP SERPL-CCNC: 71 U/L (ref 40–150)
ALT SERPL W P-5'-P-CCNC: 41 U/L (ref 0–70)
AST SERPL W P-5'-P-CCNC: 27 U/L (ref 0–45)
BILIRUB DIRECT SERPL-MCNC: 0.1 MG/DL (ref 0–0.2)
BILIRUB SERPL-MCNC: 0.6 MG/DL (ref 0.2–1.3)
CREAT SERPL-MCNC: 1.01 MG/DL (ref 0.66–1.25)
CRP SERPL-MCNC: <2.9 MG/L (ref 0–8)
DIFFERENTIAL METHOD BLD: NORMAL
EOSINOPHIL # BLD AUTO: 0.1 10E9/L (ref 0–0.7)
EOSINOPHIL NFR BLD AUTO: 2 %
ERYTHROCYTE [DISTWIDTH] IN BLOOD BY AUTOMATED COUNT: 13.5 % (ref 10–15)
ERYTHROCYTE [SEDIMENTATION RATE] IN BLOOD BY WESTERGREN METHOD: 9 MM/H (ref 0–20)
GFR SERPL CREATININE-BSD FRML MDRD: 78 ML/MIN/1.7M2
HCT VFR BLD AUTO: 45.6 % (ref 40–53)
HGB BLD-MCNC: 15.6 G/DL (ref 13.3–17.7)
LYMPHOCYTES # BLD AUTO: 3.6 10E9/L (ref 0.8–5.3)
LYMPHOCYTES NFR BLD AUTO: 54 %
MCH RBC QN AUTO: 30.5 PG (ref 26.5–33)
MCHC RBC AUTO-ENTMCNC: 34.2 G/DL (ref 31.5–36.5)
MCV RBC AUTO: 89 FL (ref 78–100)
MONOCYTES # BLD AUTO: 0.7 10E9/L (ref 0–1.3)
MONOCYTES NFR BLD AUTO: 11 %
NEUTROPHILS # BLD AUTO: 2.2 10E9/L (ref 1.6–8.3)
NEUTROPHILS NFR BLD AUTO: 33 %
PLATELET # BLD AUTO: 231 10E9/L (ref 150–450)
PROT SERPL-MCNC: 7 G/DL (ref 6.8–8.8)
RBC # BLD AUTO: 5.11 10E12/L (ref 4.4–5.9)
VARIANT LYMPHS BLD QL SMEAR: PRESENT
WBC # BLD AUTO: 6.6 10E9/L (ref 4–11)

## 2017-01-17 PROCEDURE — 85652 RBC SED RATE AUTOMATED: CPT | Performed by: INTERNAL MEDICINE

## 2017-01-17 PROCEDURE — 99214 OFFICE O/P EST MOD 30 MIN: CPT | Performed by: INTERNAL MEDICINE

## 2017-01-17 PROCEDURE — 86140 C-REACTIVE PROTEIN: CPT | Performed by: INTERNAL MEDICINE

## 2017-01-17 PROCEDURE — 36415 COLL VENOUS BLD VENIPUNCTURE: CPT | Performed by: INTERNAL MEDICINE

## 2017-01-17 PROCEDURE — 85025 COMPLETE CBC W/AUTO DIFF WBC: CPT | Performed by: INTERNAL MEDICINE

## 2017-01-17 PROCEDURE — 82565 ASSAY OF CREATININE: CPT | Performed by: INTERNAL MEDICINE

## 2017-01-17 PROCEDURE — 80076 HEPATIC FUNCTION PANEL: CPT | Performed by: INTERNAL MEDICINE

## 2017-01-17 RX ORDER — SULFASALAZINE 500 MG/1
1000 TABLET, DELAYED RELEASE ORAL 2 TIMES DAILY
Qty: 120 TABLET | Refills: 3 | Status: SHIPPED | OUTPATIENT
Start: 2017-01-17 | End: 2017-04-18

## 2017-01-17 RX ORDER — PREDNISONE 5 MG/1
5 TABLET ORAL DAILY
Qty: 30 TABLET | Refills: 3 | Status: SHIPPED | OUTPATIENT
Start: 2017-01-17 | End: 2017-04-18

## 2017-01-17 NOTE — Clinical Note
GUIDOI: Mr. Daniels's RA is still active, so I am changing medications.  Overall much better though.    Thanks, Sebastián

## 2017-01-17 NOTE — PROGRESS NOTES
Rheumatology Clinic Visit      Lamont Daniels MRN# [id number]   YOB: 1965 Age: 51 year old      Date of visit: 1/17/17   PCP: Dr. David Chavez  Hepatologist: Dr. Drea Laboy     Chief Complaint   Patient presents with:  Arthritis: RA, patient states he is still having joint pain      Assessment and Plan   1.  Seropositive nonerosive rheumatoid arthritis (RF negative, CCP low positive): Note that he does have low back pain but without evidence of SI joint irregularity on x-ray.  Initially with morning stiffness all day, gelling phenomenon, and synovitis.   Previously on hydroxychloroquine. Currently on SSZ 1000mg BID,  prednisone 5mg daily, and Humira 40mg SQ every 14 days.   Treatment has been in coordination with Dr. Laboy (hepatology) who is treating for hepatitis B. Doing well today except for synovitis of the bilateral wrists/elbows.  Moderate disease activity today.  Therefore, discontinue Humira and start Enbrel.  If no improvement within 3-6 months, then may consider Orencia.  - Continue sulfasalazine 1000mg BID  - Continue prednisone 5 mg daily  - Discontinue Humira 40 mg SQ every 14 days  - Start Enbrel 50 mg SQ every 7 days  - Labs today and 2-3 days prior to the next rheumatology clinic visit: CBC, Creatinine, Hepatic Panel, ESR, CRP    # Etanercept (Enbrel) Risks and Benefits: The risks and benefits of etanercept were discussed in detail and the patient verbalized understanding.  The risks discussed include, but are not limited to, the risk for hypersensitivity, anaphylaxis, anaphylactoid reactions, an increased risk for serious infections leading to hospitalization or death, a possible increased risk for lymphoma and other malignancies, a possible worsening of demyelinating diseases, a possible worsening of heart failure, possible reactivation of hepatitis B, and possible reactivation of latent tuberculosis.  Subcutaneous injections may result in injection site reactions and/or pain at the site  "of injection.  It was discussed that the medication would need to be discontinued if a serious infection develops.  It was discussed that live vaccinations should not be received while using etanercept or within 30 days prior to starting etanercept.  I encouraged reviewing the package insert and asking any questions about the medication.      2. Lower back pain: Lumbar spine MRI in August 2014 showed multilevel degenerative changes and a small disc protrusion at L3/4 with mild spinal canal narrowing; also moderate left and mild right neural foraminal narrowing at L5-S1. He had a nerve conduction study in 2014 that was normal. He has been worked up by neurology in the past without significant neurologic findings. HLA-B27 negative, SI joint x-ray negative. Unlikely to be inflammatory in nature and is now being seen by the pain management clinic.     3. Hepatitis B: Managed by Dr. Laboy (hepatology) and is currently on tenofovir.    4. Hepatic Steatosis: Based on recent liver biopsy.  Seeing Dr. Laboy (hepatology).  Encouraged weight loss.    5. Positive tuberculosis test:   This is noted here for historical significance.  He was evaluated by Dr. Anthony Mendoza, infectious disease. The following telephone note was documented by Dr. Mendoza: \"I tried calling th pt, finally we have the records from [place] . It appears he was treated for latent TB with INH for 1 year in 1980/1981 .Later in 1981 April he was admitted at [facility] in [place], [place] with rt sided pneumonia, TB was suspected but he improved with treatment with Penicillin only. Later he was seen  ( likely ID specialist), and was treated with 6 weeks course of Rifampin and Ethambutol pending sputum AFB culture. His AFB cx were negative based on the report we have.\"  \"He recently had QFT -TB test which was reported positive and his CXR was clear. Given his documented hx of completion of treatment for latent TB as above , I do not see any need " "for further treatment.His tests may remain positive irrespective of treatment status.From my perspective he can be started on DMARDs/Biological as deemed necessary.\"       6. Gout: On allopurinol and managed by PCP.    7. Bone Health: 2015 vitamin D normal.    8. Vaccinations:   - Influenza: encouraged yearly vaccination  - [id number]: up to date  - [id number]: up to date    Mr. Daniels verbalized agreement with and understanding of the rational for the diagnosis and treatment plan.  All questions were answered to best of my ability and the patient's satisfaction. Mr. Daniels was advised to contact the clinic with any questions that may arise after the clinic visit.      Thank you for involving me in the care of the patient    Return to clinic: 3 months      HPI   Lamont Daniels is a 51 year old male with a medical history significant for hypertension, hyperlipidemia, gout, coronary artery disease s/p 6vCABG, vitamin D deficiency, hepatitis B, and RA who presents for f/u of RA.    Today, he reports that he continues to do well with Humira but he has been having worsening pain and swelling of his wrists and elbows in the past 2 months. Hands continue to do well. Morning stiffness for about 2 hours. Pain and stiffness improve with activity and worsened with inactivity. Positive gelling phenomenon. Is able to do his daily activities better since starting RA treatment, especially since starting Humira.    Denies fevers, chills, nausea, vomiting, constipation, diarrhea. No abdominal pain. No current chest pain/pressure now but recently had a cardiac stent placed; no palpitations.  Chronic shortness of breath since cardiac procedures and says that he has been worked up by his cardiologist since then and his PCP this morning. No oral or nasal sores. No rash. No LE swelling.  Mild dry mouth; he has good dentition and does not use frequent sips of water. No dry eyes. No eye pain or redness.     Tobacco:  None   EtOH:  None  Drugs:  " None  Occupation: Retired   Originally from [facility], then lived just north of [place], [place], then lived in [place], [place], then moved to [place] for family    ROS   GEN: No fevers, chills, night sweats; + fatigue   SKIN: No itching, rashes, sores  HEENT: No epistaxis. No oral or nasal ulcers.  CV: See HPI  PULM: See HPI  GI: No nausea, vomiting, constipation, diarrhea. No blood in stool. No abdominal pain.  : No blood in urine.  MSK: See HPI.  NEURO: See HPI  ENDO: No heat/cold intolerance.  EXT: No LE swelling    Active Problem List     Patient Active Problem List   Diagnosis     Coronary atherosclerosis of native coronary artery     Major depressive disorder, recurrent episode, moderate (H)     Anxiety     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)     Hepatitis B infection     Vitamin D deficiency     S/P CABG (coronary artery bypass graft)     Malrotation of intestine     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     Hyperlipidemia LDL goal <70     Insomnia     Gout     Suicidal ideation     Essential hypertension     Rheumatoid arthritis involving multiple sites, unspecified rheumatoid factor presence (H)     High risk medications (not anticoagulants) long-term use     Midline low back pain without sciatica     Bilateral low back pain with sciatica, sciatica laterality unspecified     Elevated liver enzymes     On corticosteroid therapy     Essential hypertension with goal blood pressure less than 140/90     Insomnia, unspecified type     Past Medical History     Past Medical History   Diagnosis Date     HTN (hypertension)      Hyperlipidemia LDL goal < 100      CAD (coronary artery disease)      sees cardiologist Dr. Yang, has had stents and cabg     Moderate major depression (H)      Anxiety      Hepatitis B > 10 years     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) 9/19/2011     Uses CPAP     Vitamin D deficiency      Malrotation of intestine      Congestive heart failure, unspecified 2008     Rheumatoid arthritis flare (H)  1012     Steatohepatitis 12/15     liver biopsy     Gout      Tuberculosis age 13     INH x 9 months      Past Surgical History     Past Surgical History   Procedure Laterality Date     Bypass graft artery coronary  2008     6 vessels     Hc coronary stent percut, ea addtl vessel  2008     3 months after CABG     Colonoscopy  2/8/2013     Procedure: COLONOSCOPY;  Colonoscopy, blood in stool;  Surgeon: Duane, William Charles, MD;  Location: [facility] OR     Tonsillectomy  2010     Nasal/sinus polypectomy  2010     Uvulopalatopharyngoplasty  2010     Abdomen surgery  2014     Gi surgery  2014     Orthopedic surgery  2012     Thoracic surgery  1989     tb     Biopsy  2015     Head & neck surgery  2011     Vascular surgery  2008     Allergy     Allergies   Allergen Reactions     Citalopram Itching     Tramadol Itching     Current Medication List     Current Outpatient Prescriptions   Medication Sig     oxyCODONE (ROXICODONE) 5 MG IR tablet Take 1-2 tablets (5-10 mg) by mouth every 4 hours as needed for moderate to severe pain . Max of 6 tabs per day.  One month supply.     allopurinol (ZYLOPRIM) 300 MG tablet TAKE ONE TABLET BY MOUTH ONCE DAILY     meloxicam (MOBIC) 7.5 MG tablet TAKE ONE TABLET BY MOUTH ONCE DAILY WITH  BREAKFAST     zolpidem (AMBIEN) 5 MG tablet TAKE ONE TABLET BY MOUTH AT BEDTIME AS NEEDED FOR SLEEP     evolocumab (REPATHA) 140 MG/ML prefilled autoinjector Inject 1 mL (140 mg) Subcutaneous every 14 days     baclofen (LIORESAL) 10 MG tablet TAKE ONE TABLET BY MOUTH AT BEDTIME     meclizine (ANTIVERT) 25 MG tablet TAKE ONE TABLET BY MOUTH EVERY 6 HOURS AS NEEDED FOR  DIZZINESS     clonazePAM (KLONOPIN) 1 MG tablet TAKE ONE-HALF TO ONE TABLET BY MOUTH THREE TIMES DAILY AS NEEDED FOR ANXIETY     predniSONE (DELTASONE) 5 MG tablet Take 1 tablet (5 mg) by mouth daily     sulfaSALAzine ER (AZULFIDINE EN) 500 MG EC tablet Take 2 tablets (1,000 mg) by mouth 2 times daily     adalimumab (HUMIRA PEN) 40 MG/0.8ML pen  kit Inject 0.8 mLs (40 mg) Subcutaneous every 14 days     fluvoxaMINE (LUVOX) 50 MG tablet TAKE ONE TABLET BY MOUTH AT BEDTIME     metoprolol (TOPROL-XL) 25 MG 24 hr tablet Take 1 tablet (25 mg) by mouth daily     methylphenidate (RITALIN) 10 MG tablet Take 1 tablet morning and noon     ezetimibe (ZETIA) 10 MG tablet Take 1 tablet (10 mg) by mouth daily     clopidogrel (PLAVIX) 75 MG tablet Take 1 tablet (75 mg) by mouth daily     atorvastatin (LIPITOR) 80 MG tablet Take 1 tablet (80 mg) by mouth daily for cholesterol.     gemfibrozil (LOPID) 600 MG tablet Take 1 tablet (600 mg) by mouth 2 times daily     nitroglycerin (NITROSTAT) 0.4 MG SL tablet Place 1 tablet (0.4 mg) under the tongue every 5 minutes as needed     gabapentin (NEURONTIN) 300 MG capsule TAKE TWO CAPSULES BY MOUTH THREE TIMES DAILY     aspirin EC 81 MG EC tablet Take 1 tablet (81 mg) by mouth daily (*)     hydrocortisone (WESTCORT) 0.2 % cream Use twice daily on the eats for active rash for up to 2 weeks in a row, then take 2 weeks off.     BusPIRone HCl 30 MG TABS TAKE ONE TABLET BY MOUTH TWICE DAILY     pantoprazole (PROTONIX) 40 MG enteric coated tablet TAKE ONE TABLET BY MOUTH ONCE DAILY FOR STOMACH     tenofovir (VIREAD) 300 MG tablet Take 1 tablet (300 mg) by mouth daily     colchicine (COLCRYS) 0.6 MG tablet Take 2 tablets at the first sign of flare, take 1 additional tablet one hour later.     order for DME Equipment being ordered: single end cane     Cholecalciferol (VITAMIN D) 2000 UNITS tablet TAKE ONE TABLET BY MOUTH ONCE DAILY     triamcinolone (KENALOG) 0.1 % ointment Apply sparingly to the ears and groin twice daily until clear, then once daily until clear. Do not apply to the face.     ketoconazole (NIZORAL) 2 % cream Apply topically 2 times daily Apply to back of scalp and to groin twice daily until rash is clear     meclizine (ANTIVERT) 25 MG tablet TAKE ONE TABLET BY MOUTH EVERY 6 HOURS AS NEEDED FOR DIZZINESS     melatonin 3 MG  "tablet Take 1 tablet (3 mg) by mouth nightly as needed for sleep     ORDER FOR DME 1.  CPAP pressure 11 cm/H20 with heated humidity.   2.  Provide mask to fit and CPAP supplies.   3.  Length of need lifetime.   4.  If needed please provide a chin strap          No current facility-administered medications for this visit.     Facility-Administered Medications Ordered in Other Visits   Medication     gadobutrol (GADAVIST) injection 10 mL       Social History   See HPI    Family History     Family History   Problem Relation Age of Onset     C.A.D. Father      Hypertension Mother      Depression No family hx of      Autoimmune Disease No family hx of      CEREBROVASCULAR DISEASE No family hx of      Thyroid Disease No family hx of      Glaucoma No family hx of      Macular Degeneration No family hx of      CANCER Paternal Grandfather      DIABETES Mother      Coronary Artery Disease Father      Hypertension Father      Hypertension Maternal Grandfather      Other Cancer Other      Depression Mother      Depression Father      MENTAL ILLNESS Mother      No family history of autoimmune disease  No family history of psoriasis     No change in family history since the previous clinic visit.     Physical Exam     Temp Readings from Last 3 Encounters:   01/17/17 98.1  F (36.7  C) Oral   10/25/16 97.2  F (36.2  C) Oral   09/28/16 97.7  F (36.5  C) Oral     BP Readings from Last 5 Encounters:   01/17/17 123/76   01/11/17 131/88   01/05/17 137/88   10/25/16 128/85   10/25/16 121/81     Pulse Readings from Last 1 Encounters:   01/17/17 83     Resp Readings from Last 1 Encounters:   10/13/16 16     Estimated body mass index is 33.13 kg/(m^2) as calculated from the following:    Height as of this encounter: 1.575 m (5' 2\").    Weight as of this encounter: 82.192 kg (181 lb 3.2 oz).    GEN: NAD, obese; using a cane to walk  HEENT: MMM. No oral lesions. Anicteric, noninjected sclera  CV: S1, S2. RRR. No m/r/g.  PULM: CTA bilaterally. " No w/c.  MSK: MCPs and PIPs without swelling or tenderness to palpation. Synovial swelling and tenderness to palpation of the bilateral wrists and elbows. Shoulders mildly tender to palpation but without swelling. Hips nontender to direct palpation. Knees, ankles, and feet without swelling or tenderness to palpation. Negative MTP squeeze bilaterally.   NEURO: UE and LE strengths 5/5 and equal bilaterally.   SKIN: No rash. No tophi  EXT: No LE edema  PSYCH: Alert. Appropriate.    Labs   RF/CCP  Recent Labs   Lab Test  11/04/15   1547  08/12/14   1414   CCPABY  27*   --    RHF   --   <20     NIKKI/RNP/Sm/SSA/SSB/dsDNA  Recent Labs   Lab Test  07/07/14   0800   ARACELI  <1.0  Interpretation:  Negative       HLA-B27  Recent Labs   Lab Test 11/04/15   [id number]  SSOP   B1  B27 Neg     CBC  Recent Labs   Lab Test  10/25/16   0851  10/05/16   0954  07/19/16   1128   WBC  11.8*  11.8*  7.2   RBC  5.03  4.98  4.87   HGB  15.2  15.0  14.9   HCT  45.1  44.0  45.0   MCV  90  88  92   RDW  15.6*  14.9  13.5   PLT  267  268  269   MCH  30.2  30.1  30.6   MCHC  33.7  34.1  33.1   NEUTROPHIL  48.8  47.4  45.3   LYMPH  38.0  41.5  38.9   MONOCYTE  11.7  9.7  13.4   EOSINOPHIL  1.0  0.9  1.7   BASOPHIL  0.5  0.5  0.7   ANEU  5.8  5.6  3.3   ALYM  4.5  4.9  2.8   PRACHI  1.4*  1.1  1.0   AEOS  0.1  0.1  0.1   ABAS  0.1  0.1  0.1     CMP  Recent Labs   Lab Test  12/01/16   1429  10/25/16   0851  10/05/16   0954  08/12/16   1451  07/19/16   1128   04/27/16   1448   NA   --   143  144   --    --    --   140   POTASSIUM   --   3.6  3.6   --    --    --   3.8   CHLORIDE   --   110*  111*   --    --    --   108   CO2   --   25  27   --    --    --   24   ANIONGAP   --   8  6   --    --    --   8   GLC   --   78  88  146*   --    --   116*   BUN   --   17  17   --    --    --   13   CR   --   1.01  1.00   --   1.08   < >  1.11   GFRESTIMATED   --   78  79   --   72   < >  70   GFRESTBLACK   --   >90   GFR Calc    >90    American GFR Calc     --   87   < >  85   HEMANT   --   8.8  8.9   --    --    --   8.9   BILITOTAL  0.5  0.3  0.4   --   0.3   < >   --    ALBUMIN  4.0  3.8  3.5   --   4.0   < >   --    PROTTOTAL  7.4  7.3  7.4   --   7.6   < >   --    ALKPHOS  78  82  82   --   86   < >   --    AST  22  30  46*   --   16   < >   --    ALT  41  86*  199*   --   24   < >   --     < > = values in this interval not displayed.     HgA1c  Recent Labs   Lab Test  08/12/16   1451  04/22/16   1318  01/14/16   1541   A1C  5.9  6.0  6.4*     Uric Acid  Recent Labs   Lab Test  11/04/15   1547  08/14/15   1138  09/10/14   0934  08/22/14   1430  08/12/14   1414   URIC  6.1  4.4  6.1  4.7  8.3*     Calcium/VitaminD  Recent Labs   Lab Test  10/25/16   0851  10/05/16   0954  04/27/16   1448   11/04/15   1547   04/01/13   1624  11/13/12   0842   HEMANT  8.8  8.9  8.9   < >   --    < >  9.5  9.5   VITDT   --    --    --    --   43   --   34  19*    < > = values in this interval not displayed.     ESR/CRP  Recent Labs   Lab Test  07/19/16   1128  11/11/15   1616  11/04/15   1547   SED  10  10  11   CRP  <2.9  <2.9  <2.9     CK/Aldolase  Recent Labs   Lab Test  07/07/14   0800  04/01/13   1624  11/13/12   0842   CKT  142  146  144     TSH/T4  Recent Labs   Lab Test  10/25/16   0851  04/22/16   1318  12/09/14   0801   TSH  3.34  1.38  3.62     Lipid Panel  Recent Labs   Lab Test  10/25/16   0851  06/10/16   0850  06/29/15   1405  05/22/14   0848  03/24/14   0936   CHOL  244*  218*  219*  169  195   TRIG  100  323*  372*  379*  301*   HDL  90  36*  33*  33*  39*   LDL  134*  117*  112  60  95   VLDL   --    --   74*  76*  60*   CHOLHDLRATIO   --    --   6.6*  5.1*  5.0   NHDL  154*  182*   --    --    --      Hepatitis B  Recent Labs   Lab Test  12/01/16   1429  10/05/16   0954  01/29/16   1541   04/23/13   0812  01/30/13   0906   AUSAB   --   0.45   --    --    --    --    HBCAB   --    --    --    --   Positive*   --    HEPBANG   --   Reactive*   --    --    Positive*  Positive*   HBQLOG  <1.3  5.1*  Not Calculated   < >   --    --     < > = values in this interval not displayed.     Hepatitis C  Recent Labs   Lab Test  04/07/16   1538  04/23/13   0812   HCVAB  Nonreactive   Assay performance characteristics have not been established for newborns,   infants, and children    Negative     Tuberculosis Screening  Recent Labs   Lab Test  04/07/16   1538   TBRSLT  Positive   The magnitude of the measured IFN-gamma level cannot be correlated to stage or   degree of infection, level of immune responsiveness, or likelihood for   progression to active disease.  *   TBAGN  >10.00  This is a qualitative test.  The TB antigen IU/mL value is required for   documentation on certain government reporting forms but this value should not   be used to monitor disease progression or response to therapy.   Diagnosing or excluding tuberculosis disease, and assessing the probability of   LTBI, require a combination of epidemiological, historical, medical and   diagnostic findings that should be taken into account when interpreting   QuantiFERON TB results.       HIV Screening  Recent Labs   Lab Test  11/04/15   1547   HIAGAB  Nonreactive   HIV-1 p24 Ag & HIV-1/HIV-2 Ab Not Detected       PATH  Recent Labs   Lab Test  12/21/15   1135  12/01/15   1000   PATH  Patient Name: ZOË HARPER  MR#: [id number]  Specimen #: [id number]  Collected: 12/21/2015  Received: 12/21/2015  Reported: 12/22/2015 14:09  Ordering Phy(s): TYRESE DUVALL    SPECIMEN(S):  Liver needle biopsy    FINAL DIAGNOSIS:  Liver, needle biopsy  - Steatohepatitis with mild nonbridging portal fibrosis  - See microscopic description regarding hepatitis B    I have personally reviewed all specimens and or slides, including the  listed special stains, and used them with my medical judgement to  determine the final diagnosis.    Electronically signed out by:    Anshu Billy M.D., [facility]    CLINICAL HISTORY:  49-year-old male with a  "history of hepatitis B.  On December 12  transaminases, alkaline phosphatase and bilirubin were normal.  On July 31 she had very low levels of HBV DNA detected (less than 20  international units per milliliter)    GROSS:  The specimen is received in formalin with proper patient identification,  labeled \"liver tissue\".  The specimen consists of three  tan needle core  biopsies measuring 0.3, 1.2 and 2.0 cm in length and each 0.1 cm in  diameter.  The specimen is wrapped and is entirely submitted in cassette  1. (Dictated by: Haseeb Smith 12/21/2015 12:43 PM)    MICROSCOPIC:  Examination of the trichrome stain demonstrates some fibrous widening of  portal tracts, but without bridging fibrosis or architectural  distortion.  The reticulin stain confirms these findings.  There is no  evidence of regeneration.  The hematoxylin and eosin stained slides  demonstrates a mild patchy portal infiltrate of lymphocytes without  piecemeal necrosis.  The hepatocellular lobule demonstrates  macrovesicular fatty change involving approximately 40% of the  hepatocytes, with ballooning degeneration and hence with features of  steatohepatitis.  Fatty change is not a typical feature of hepatitis B.  Given the presence of steatohepatitis, grading of the patient's  hepatitis B is somewhat questionable, since the portal infiltrate seen  in this case could  be due to steatohepatitis as well as to hepatitis B.  If the inflammation was due to hepatitis B, it would be grade 1, and  given the portal fibrosis it would be stage 1. (Dictated by: Anshu Billy MD 12/22/2015 12:41 PM)    CPT Codes:  A: 48682-PH9, 74478-TLNM, 61191-FUZQ    TESTING LAB LOCATION:  [facility], [address]   [address] [phone]    COLLECTION SITE:  Client: [facility]  Location: [facility] ()    Patient Name: ZOË HARPER  MR#: [id number]  Specimen #: " "[id number]  Collected: 12/1/2015  Received: 12/1/2015  Reported: 12/2/2015 14:40  Ordering Phy(s): HORTENSIA UNDERWOOD    SPECIMEN(S):  Liver needle biopsy    FINAL DIAGNOSIS:  Liver, needle biopsy  - Nondiagnostic specimen  - Skeletal muscle only; liver not included with specimen    I have personally reviewed all specimens and or slides, including the  listed special stains, and used them with my medical judgement to  determine the final diagnosis.    Electronically signed out by:  Anshu Billy M.D., [facility]    CLINICAL HISTORY:  50-year-old male with hepatitis B and multiple other medical problems.  On July 31 he had very low titer of hepatitis B DNA, less than 20  international unit / milliliter.  On November 11 his AST was 34, ALT 94,  alkaline phosphatase 94 and total bilirubin 0.4.    GROSS:  The specimen is received in formalin with proper patient identification,  labeled \"liver\" and consists of two tan-pink tissue fragments m easuring  0.1 and 0.2 cm in greatest dimensions.  The specimen is entirely  submitted in cassette 1. (Dictated by: Haseeb Smith 12/1/2015 11:50  AM)    MICROSCOPIC:  Examination reveals the specimen to consist entirely of skeletal muscle  with no liver tissue.  It is therefore a nondiagnostic specimen.    CPT Codes:  A: 81636-CB6, 56972-WUNZ, 63163-QJPV    TESTING LAB LOCATION:  [facility], [address]   [address]  [phone]    COLLECTION SITE:  Client: [facility]  Location: [facility] (B)           \"HAND BILATERAL THREE OR MORE VIEWS 11/4/2015 4:55 PM   HISTORY: Pain in unspecified joint.  COMPARISON: None.  IMPRESSION  IMPRESSION: Normal.  DANIS ANGLIN MD\"    \"FOOT BILATERAL THREE OR MORE VIEWS 11/4/2015 4:55 PM   HISTORY: Pain in unspecified joint.  COMPARISON: 8/21/2012.  IMPRESSION  IMPRESSION: Small traction spurs are seen off the Achilles tendon and  plantar " "fascial attachment sites bilaterally. Joint spaces are  preserved. Osseous structures are intact. Incidental note is made of  some surgical staples in the soft tissues of the medial distal right  lower extremity.  DANIS ANGLIN MD\"      Immunization History     Immunization History   Administered Date(s) Administered     Influenza (IIV3) 10/11/2011     Influenza Vaccine IM 3yrs+ 4 Valent IIV4 09/27/2015, 09/08/2016     Influenza Vaccine, 3 YRS +, IM (QUADRIVALENT W/PRESERVATIVES) 11/17/2014     Pneumococcal (PCV 13) 04/07/2016     Pneumococcal 23 valent 12/12/2014     TDAP (ADACEL AGES 11-64) 08/30/2011          Chart documentation done in part with Dragon Voice recognition Software. Although reviewed after completion, some word and grammatical error may remain.    Sebastián Jenkins MD  "

## 2017-01-17 NOTE — NURSING NOTE
"Chief Complaint   Patient presents with     Arthritis     RA, patient states he is still having joint pain       Initial /76 mmHg  Pulse 83  Temp(Src) 98.1  F (36.7  C) (Oral)  Ht 1.575 m (5' 2\")  Wt 82.192 kg (181 lb 3.2 oz)  BMI 33.13 kg/m2  SpO2 96% Estimated body mass index is 33.13 kg/(m^2) as calculated from the following:    Height as of this encounter: 1.575 m (5' 2\").    Weight as of this encounter: 82.192 kg (181 lb 3.2 oz).  BP completed using cuff size: regular         RAPID3 (0-30) Cumulative Score            RAPID3 Weighted Score (divide #4 by 3 and that is the weighted score)             "

## 2017-01-17 NOTE — MR AVS SNAPSHOT
After Visit Summary   1/17/2017    Lamont Daniels    MRN: [id number]           Patient Information     Date Of Birth          1965        Visit Information        Provider Department      1/17/2017 3:00 PM Sebastián Jenkins MD [facility]        Today's Diagnoses     Rheumatoid arthritis of multiple sites with negative rheumatoid factor (H)    -  1     High risk medications (not anticoagulants) long-term use           Care Instructions    Continue prednisone  Continue sulfasalazine    Stop Humira    Start Enbrel        Follow-ups after your visit        Your next 10 appointments already scheduled     Jan 19, 2017  4:00 PM   Return Visit with Drea Laboy MD   [facility] ([facility])    [address] [address] [place] [address]   [phone]            Jan 25, 2017 11:20 AM   Return Visit with Wiley Acosta MD   [facility] ([facility])    [address] [place] [address]   [phone]            Apr 06, 2017  3:30 PM   Return Visit with Dorothea Loo MD   [facility] ([facility])    [address] [place] [address]   [phone]            Apr 14, 2017  2:00 PM   Return Visit with Fly Travis MD   [facility] ([facility])    [address] [place] [address]   [phone]            Apr 18, 2017  3:00 PM   Return Visit with Sebastián Jenkins MD   [facility] ([facility])    [address] [facility] [address]   [phone]              Future tests that were ordered for you today     Open Future Orders        Priority Expected Expires Ordered    Hepatic panel Routine 4/13/2017 5/2/2017 1/17/2017    Creatinine Routine 4/13/2017 5/2/2017 1/17/2017    CBC with platelets differential Routine 4/13/2017  "5/2/2017 1/17/2017    Erythrocyte sedimentation rate auto Routine 4/13/2017 5/2/2017 1/17/2017    CRP inflammation Routine 4/13/2017 5/2/2017 1/17/2017            Who to contact     If you have questions or need follow up information about today's clinic visit or your schedule please contact [facility]] [facility] [place] directly at [phone].  Normal or non-critical lab and imaging results will be communicated to you by [employer], letter or phone within 4 business days after the clinic has received the results. If you do not hear from us within 7 days, please contact the clinic through Golf121t or phone. If you have a critical or abnormal lab result, we will notify you by phone as soon as possible.  Submit refill requests through Unity[employer] or call your pharmacy and they will forward the refill request to us. Please allow 3 business days for your refill to be completed.          Additional Information About Your Visit        [email or web address] Information     [employer] gives you secure access to your electronic health record. If you see a primary care provider, you can also send messages to your care team and make appointments. If you have questions, please call your primary care clinic.  If you do not have a primary care provider, please call [phone] and they will assist you.        Care EveryWhere ID     This is your Care EveryWhere ID. This could be used by other organizations to access your [place] medical records  [id number]        Your Vitals Were     Pulse Temperature Height BMI (Body Mass Index) Pulse Oximetry       83 98.1  F (36.7  C) (Oral) 1.575 m (5' 2\") 33.13 kg/m2 96%        Blood Pressure from Last 3 Encounters:   01/17/17 123/76   01/11/17 131/88   01/05/17 137/88    Weight from Last 3 Encounters:   01/17/17 82.192 kg (181 lb 3.2 oz)   01/05/17 79.379 kg (175 lb)   10/25/16 80.468 kg (177 lb 6.4 oz)              We Performed the Following     CBC with platelets differential     Creatinine     CRP " inflammation     Erythrocyte sedimentation rate auto     Hepatic panel          Today's Medication Changes          These changes are accurate as of: 1/17/17  3:21 PM.  If you have any questions, ask your nurse or doctor.               Start taking these medicines.        Dose/Directions    Etanercept 50 MG/ML autoinjector   Commonly known as:  ENBREL SURECLICK   Used for:  Rheumatoid arthritis of multiple sites with negative rheumatoid factor (H), High risk medications (not anticoagulants) long-term use   Started by:  Sebastián Jenkins MD        Dose:  50 mg   Inject 50 mg Subcutaneous once a week   Quantity:  1 kit   Refills:  6         Stop taking these medicines if you haven't already. Please contact your care team if you have questions.     adalimumab 40 MG/0.8ML pen kit   Commonly known as:  HUMIRA PEN   Stopped by:  Sebastián Jenkins MD                Where to get your medicines      These medications were sent to [facility] [address] [place] [address]  [address] [facility] [address]    Hours:  Mon-Fri 8:30am-5:00pm Toll Free [phone] Phone:  [phone]    - Etanercept 50 MG/ML autoinjector      These medications were sent to [facility] [address] [place] [address] [address] [facility]  [address] [facility] [address]     Phone:  [phone]    - predniSONE 5 MG tablet  - sulfaSALAzine  MG EC tablet             Primary Care Provider Office Phone # Fax #    David Chavez [phone]       [address] [address]        Thank you!     Thank you for choosing [facility]  for your care. Our goal is always to provide you with excellent care. Hearing back from our patients is one way we can continue to improve our services. Please take a few minutes to complete the written survey that you may receive in the mail after your visit with us. Thank you!             Your  Updated Medication List - Protect others around you: Learn how to safely use, store and throw away your medicines at www.disposemymeds.org.          This list is accurate as of: 1/17/17  3:21 PM.  Always use your most recent med list.                   Brand Name Dispense Instructions for use    allopurinol 300 MG tablet    ZYLOPRIM    90 tablet    TAKE ONE TABLET BY MOUTH ONCE DAILY       aspirin 81 MG EC tablet     30 tablet    Take 1 tablet (81 mg) by mouth daily (*)       atorvastatin 80 MG tablet    LIPITOR    90 tablet    Take 1 tablet (80 mg) by mouth daily for cholesterol.       baclofen 10 MG tablet    LIORESAL    90 tablet    TAKE ONE TABLET BY MOUTH AT BEDTIME       BusPIRone HCl 30 MG Tabs     180 tablet    TAKE ONE TABLET BY MOUTH TWICE DAILY       clonazePAM 1 MG tablet    klonoPIN    90 tablet    TAKE ONE-HALF TO ONE TABLET BY MOUTH THREE TIMES DAILY AS NEEDED FOR ANXIETY       clopidogrel 75 MG tablet    PLAVIX    90 tablet    Take 1 tablet (75 mg) by mouth daily       colchicine 0.6 MG tablet    COLCRYS    30 tablet    Take 2 tablets at the first sign of flare, take 1 additional tablet one hour later.       Etanercept 50 MG/ML autoinjector    ENBREL SURECLICK    1 kit    Inject 50 mg Subcutaneous once a week       evolocumab 140 MG/ML prefilled autoinjector    REPATHA    2 mL    Inject 1 mL (140 mg) Subcutaneous every 14 days       ezetimibe 10 MG tablet    ZETIA    90 tablet    Take 1 tablet (10 mg) by mouth daily       fluvoxaMINE 50 MG tablet    LUVOX    30 tablet    TAKE ONE TABLET BY MOUTH AT BEDTIME       gabapentin 300 MG capsule    NEURONTIN    540 capsule    TAKE TWO CAPSULES BY MOUTH THREE TIMES DAILY       gemfibrozil 600 MG tablet    LOPID    180 tablet    Take 1 tablet (600 mg) by mouth 2 times daily       hydrocortisone 0.2 % cream    WESTCORT    45 g    Use twice daily on the eats for active rash for up to 2 weeks in a row, then take 2 weeks off.       ketoconazole 2 % cream     NIZORAL    30 g    Apply topically 2 times daily Apply to back of scalp and to groin twice daily until rash is clear       * meclizine 25 MG tablet    ANTIVERT    30 tablet    TAKE ONE TABLET BY MOUTH EVERY 6 HOURS AS NEEDED FOR DIZZINESS       * meclizine 25 MG tablet    ANTIVERT    30 tablet    TAKE ONE TABLET BY MOUTH EVERY 6 HOURS AS NEEDED FOR  DIZZINESS       melatonin 3 MG tablet      Take 1 tablet (3 mg) by mouth nightly as needed for sleep       meloxicam 7.5 MG tablet    MOBIC    30 tablet    TAKE ONE TABLET BY MOUTH ONCE DAILY WITH  BREAKFAST       methylphenidate 10 MG tablet    RITALIN    60 tablet    Take 1 tablet morning and noon       metoprolol 25 MG 24 hr tablet    TOPROL-XL    45 tablet    Take 1 tablet (25 mg) by mouth daily       nitroglycerin 0.4 MG sublingual tablet    NITROSTAT    30 tablet    Place 1 tablet (0.4 mg) under the tongue every 5 minutes as needed       order for DME      1.  CPAP pressure 11 cm/H20 with heated humidity.  2.  Provide mask to fit and CPAP supplies.  3.  Length of need lifetime.  4.  If needed please provide a chin strap       order for DME     1 Units    Equipment being ordered: single end cane       oxyCODONE 5 MG IR tablet    ROXICODONE    150 tablet    Take 1-2 tablets (5-10 mg) by mouth every 4 hours as needed for moderate to severe pain . Max of 6 tabs per day.  One month supply.       pantoprazole 40 MG EC tablet    PROTONIX    90 tablet    TAKE ONE TABLET BY MOUTH ONCE DAILY FOR STOMACH       predniSONE 5 MG tablet    DELTASONE    30 tablet    Take 1 tablet (5 mg) by mouth daily       sulfaSALAzine  MG EC tablet    AZULFIDINE EN    120 tablet    Take 2 tablets (1,000 mg) by mouth 2 times daily       tenofovir 300 MG tablet    VIREAD    90 tablet    Take 1 tablet (300 mg) by mouth daily       triamcinolone 0.1 % ointment    KENALOG    80 g    Apply sparingly to the ears and groin twice daily until clear, then once daily until clear. Do not apply to the  face.       vitamin D 2000 UNITS tablet     100 tablet    TAKE ONE TABLET BY MOUTH ONCE DAILY       zolpidem 5 MG tablet    AMBIEN    30 tablet    TAKE ONE TABLET BY MOUTH AT BEDTIME AS NEEDED FOR SLEEP       * Notice:  This list has 2 medication(s) that are the same as other medications prescribed for you. Read the directions carefully, and ask your doctor or other care provider to review them with you.

## 2017-01-19 ENCOUNTER — TELEPHONE (OUTPATIENT)
Dept: NURSING | Facility: CLINIC | Age: 52
End: 2017-01-19

## 2017-01-19 NOTE — TELEPHONE ENCOUNTER
Called and spoke to patient. Asked how second injection went. He said fine. He denied warm feeling and itchy feeling that he got with first injection. He states he felt nothing this time. Told him that is good news. Asked him to call if he develops any problems.

## 2017-01-25 ENCOUNTER — OFFICE VISIT (OUTPATIENT)
Dept: PSYCHIATRY | Facility: CLINIC | Age: 52
End: 2017-01-25
Payer: COMMERCIAL

## 2017-01-25 DIAGNOSIS — F43.21 ADJUSTMENT DISORDER WITH DEPRESSED MOOD: ICD-10-CM

## 2017-01-25 DIAGNOSIS — F33.2 SEVERE EPISODE OF RECURRENT MAJOR DEPRESSIVE DISORDER, WITHOUT PSYCHOTIC FEATURES (H): Primary | ICD-10-CM

## 2017-01-25 PROCEDURE — 99214 OFFICE O/P EST MOD 30 MIN: CPT | Performed by: PSYCHIATRY & NEUROLOGY

## 2017-01-25 RX ORDER — MIRTAZAPINE 15 MG/1
15 TABLET, FILM COATED ORAL AT BEDTIME
Qty: 30 TABLET | Refills: 5 | Status: SHIPPED | OUTPATIENT
Start: 2017-01-25 | End: 2017-04-25

## 2017-01-25 ASSESSMENT — ANXIETY QUESTIONNAIRES
6. BECOMING EASILY ANNOYED OR IRRITABLE: NEARLY EVERY DAY
3. WORRYING TOO MUCH ABOUT DIFFERENT THINGS: MORE THAN HALF THE DAYS
2. NOT BEING ABLE TO STOP OR CONTROL WORRYING: MORE THAN HALF THE DAYS
5. BEING SO RESTLESS THAT IT IS HARD TO SIT STILL: MORE THAN HALF THE DAYS
1. FEELING NERVOUS, ANXIOUS, OR ON EDGE: MORE THAN HALF THE DAYS
GAD7 TOTAL SCORE: 16
7. FEELING AFRAID AS IF SOMETHING AWFUL MIGHT HAPPEN: MORE THAN HALF THE DAYS

## 2017-01-25 ASSESSMENT — PATIENT HEALTH QUESTIONNAIRE - PHQ9: 5. POOR APPETITE OR OVEREATING: NEARLY EVERY DAY

## 2017-01-25 NOTE — MR AVS SNAPSHOT
MRN:[id number]                      After Visit Summary   1/25/2017    Lamont Daniels    MRN: [id number]           Visit Information        Provider Department      1/25/2017 11:20 AM Wiley Acosta MD [facility]        Your next 10 appointments already scheduled     Apr 06, 2017  3:30 PM   Return Visit with Dorothea Loo MD   [facility] ([facility])    [address] [facility] [address] [phone]            Apr 14, 2017  2:00 PM   Return Visit with Fly Travis MD   [facility] ([facility])    [address] [place] [id number] [phone]            Apr 18, 2017  3:00 PM   Return Visit with Sebastián Jenkins MD   [facility] ([facility])    [address] [facility] [address] [phone]              Care Instructions        Treatment Plan:  Continue current dose of Buspar   Stop Luvox   Trial of Remeron (mirtazepine) 15 mg at bedtime   Safety plan was reviewed; to the ER as needed or call after hours crisis line; [phone]  Education and counseling was done regarding use of medications, psychotherapy options  Call for a follow up appointment with Dr. Acosta in 10-12 weeks or so; [phone].         [email or web address] Information     [employer] gives you secure access to your electronic health record. If you see a primary care provider, you can also send messages to your care team and make appointments. If you have questions, please call your primary care clinic.  If you do not have a primary care provider, please call [phone] and they will assist you.        Care EveryWhere ID     This is your Care EveryWhere ID. This could be used by other organizations to access your [place] medical records  [id number]

## 2017-01-25 NOTE — PROGRESS NOTES
Psychiatric Progress Note    Name: Lamont Daniels  Date: January 25, 2017   Length of Visit: 30 minutes  MRN: [id number]      Current Outpatient Prescriptions   Medication Sig     sulfaSALAzine ER (AZULFIDINE EN) 500 MG EC tablet Take 2 tablets (1,000 mg) by mouth 2 times daily     Etanercept (ENBREL SURECLICK) 50 MG/ML autoinjector Inject 50 mg Subcutaneous once a week     predniSONE (DELTASONE) 5 MG tablet Take 1 tablet (5 mg) by mouth daily     oxyCODONE (ROXICODONE) 5 MG IR tablet Take 1-2 tablets (5-10 mg) by mouth every 4 hours as needed for moderate to severe pain . Max of 6 tabs per day.  One month supply.     allopurinol (ZYLOPRIM) 300 MG tablet TAKE ONE TABLET BY MOUTH ONCE DAILY     meloxicam (MOBIC) 7.5 MG tablet TAKE ONE TABLET BY MOUTH ONCE DAILY WITH  BREAKFAST     zolpidem (AMBIEN) 5 MG tablet TAKE ONE TABLET BY MOUTH AT BEDTIME AS NEEDED FOR SLEEP     evolocumab (REPATHA) 140 MG/ML prefilled autoinjector Inject 1 mL (140 mg) Subcutaneous every 14 days     baclofen (LIORESAL) 10 MG tablet TAKE ONE TABLET BY MOUTH AT BEDTIME     meclizine (ANTIVERT) 25 MG tablet TAKE ONE TABLET BY MOUTH EVERY 6 HOURS AS NEEDED FOR  DIZZINESS     clonazePAM (KLONOPIN) 1 MG tablet TAKE ONE-HALF TO ONE TABLET BY MOUTH THREE TIMES DAILY AS NEEDED FOR ANXIETY     fluvoxaMINE (LUVOX) 50 MG tablet TAKE ONE TABLET BY MOUTH AT BEDTIME     metoprolol (TOPROL-XL) 25 MG 24 hr tablet Take 1 tablet (25 mg) by mouth daily     methylphenidate (RITALIN) 10 MG tablet Take 1 tablet morning and noon     ezetimibe (ZETIA) 10 MG tablet Take 1 tablet (10 mg) by mouth daily     clopidogrel (PLAVIX) 75 MG tablet Take 1 tablet (75 mg) by mouth daily     atorvastatin (LIPITOR) 80 MG tablet Take 1 tablet (80 mg) by mouth daily for cholesterol.     gemfibrozil (LOPID) 600 MG tablet Take 1 tablet (600 mg) by mouth 2 times daily     nitroglycerin (NITROSTAT) 0.4 MG SL tablet Place 1 tablet (0.4 mg) under the tongue every 5 minutes as needed      gabapentin (NEURONTIN) 300 MG capsule TAKE TWO CAPSULES BY MOUTH THREE TIMES DAILY     aspirin EC 81 MG EC tablet Take 1 tablet (81 mg) by mouth daily (*)     hydrocortisone (WESTCORT) 0.2 % cream Use twice daily on the eats for active rash for up to 2 weeks in a row, then take 2 weeks off.     BusPIRone HCl 30 MG TABS TAKE ONE TABLET BY MOUTH TWICE DAILY     pantoprazole (PROTONIX) 40 MG enteric coated tablet TAKE ONE TABLET BY MOUTH ONCE DAILY FOR STOMACH     tenofovir (VIREAD) 300 MG tablet Take 1 tablet (300 mg) by mouth daily     colchicine (COLCRYS) 0.6 MG tablet Take 2 tablets at the first sign of flare, take 1 additional tablet one hour later.     order for DME Equipment being ordered: single end cane     Cholecalciferol (VITAMIN D) 2000 UNITS tablet TAKE ONE TABLET BY MOUTH ONCE DAILY     triamcinolone (KENALOG) 0.1 % ointment Apply sparingly to the ears and groin twice daily until clear, then once daily until clear. Do not apply to the face.     ketoconazole (NIZORAL) 2 % cream Apply topically 2 times daily Apply to back of scalp and to groin twice daily until rash is clear     meclizine (ANTIVERT) 25 MG tablet TAKE ONE TABLET BY MOUTH EVERY 6 HOURS AS NEEDED FOR DIZZINESS     melatonin 3 MG tablet Take 1 tablet (3 mg) by mouth nightly as needed for sleep     ORDER FOR DME 1.  CPAP pressure 11 cm/H20 with heated humidity.   2.  Provide mask to fit and CPAP supplies.   3.  Length of need lifetime.   4.  If needed please provide a chin strap          No current facility-administered medications for this visit.     Facility-Administered Medications Ordered in Other Visits   Medication     gadobutrol (GADAVIST) injection 10 mL     From eval 10/17/12  Past medication trials: (patient was presented with a list to review all currently available antidepressants, mood stabilizers, tranquilizers, hypnotics and antipsychotics)  New Antidepressants:  Zoloft (sertraline), Celexa (citalopram), Cymbalta (duloxetine) and  Cymbalta and Celexa were not effective. On Zoloft now but he still feel depressed.  Sedatives/Hypnotics:  Ambien (zolpidem) and OTC: Benadryl (diphenhydramine)  Tranquilizers:  Xanax (alprazolam) and this is very effective for panic. Having trouble getting a RX for this.  Also has failed Cymbalta and Wellbutrin, Trintellix     Therapist:  Counselor at [facility] (they speak his native language)    PHQ-9:  20 vs 19 vs 21 again     THONG-7:  15 vs 16 vs 19 again vs 18 ( on 3/13/2014)      Interim History:  He is worried about getting short of breath while walking; wondering if it is related to anxiety since cardiologist says his breathing is ok.   Continues to have a lot of somatic concerns.   Ran out of Ritalin in Oct; not sure that is was helping.   Was changed from Paxil (dry mouth) to Luvox in Sept.   Stopped trazodone due to bad dreams     Lorraine Adorno, RN at 1/10/2017  2:59 PM      Status: Signed         Expand All Collapse All    He was given courtesy refills x 3 months in August 2016 and was asked to schedule but did not come in. Patient has not been seen since last summer. He is overdue for a med check by several months. No refills of controlled substance, Ritalin, unless seen in clinic. He may ask his PCP or schedule with Dr. Acosta.     Will ask scheduling to call him to make an appointment.            Assessment: from last visit 7/21/16  His lack of improvement is discouraging. Perhaps a stimulant with help. Recent cardiology work up was ok.   Genesight testing; most of antidepressant RXs ok.     Treatment Plan:  Continue current dose of Buspar   Trial of Ritalin (methylphenidate) 5 mg morning and noon. Call for 10 mg as needed (will need to mail it)   Continue Trintellix (vortioxetine) 20 mg per day   Use as much trazodone as tolerated   Klonopin dose per Dr Chavez.   Safety plan was reviewed; to the ER as needed or call after hours crisis line; [phone]  Education and counseling was done regarding use  "of medications, psychotherapy options  Call for a follow up appointment with Dr. Acosta in 10-12 weeks or so; [phone].      Past Medical History   Diagnosis Date     HTN (hypertension)      Hyperlipidemia LDL goal < 100      CAD (coronary artery disease)      sees cardiologist Dr. Yang, has had stents and cabg     Moderate major depression (H)      Anxiety      Hepatitis B > 10 years     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) 9/19/2011     Uses CPAP     Vitamin D deficiency      Malrotation of intestine      Congestive heart failure, unspecified 2008     Rheumatoid arthritis flare (H) 1012     Steatohepatitis 12/15     liver biopsy     Gout      Tuberculosis age 13     INH x 9 months        10 point ROS is negative except for HAs, aches all over (mostly joints), some shakes       Mental Status Assessment:  Appearance:  Well groomed    Behavior/relationship to examiner/demeanor:  Cooperative and pleasant  Motor activity:  Normal  Gait:  Slowed, using a cane   Speech:  Very quiet, slowed  Mood (subjective report):  \"sad\"  Affect (objective appearance):  Restricted   Thought Process (Associations):  Logical, linear and goal directed  Thought content:  No evidence of suicidal or homicidal ideation,          no overt psychosis and                    patient does not appear to be responding to internal stimuli  Oriented to person, place, date/time   Attention Span and concentration: Intact   Memory:  Short-term memory fair and Long-term memory; fair  Language:  Fluent   Fund of Knowledge/Intelligence:  Average  Use of language: Intact   Abstraction:  Normal  Insight:  Adequate  Judgment:  Adequate for safety    DSM-5 DIAGNOSIS:  296.33 - Major Depressive Disorder, Recurrent, severe   300.02 - Generalized Anxiety Disorder   300.21 - Panic Disorder with Agoraphobia   ASHD, hep B     Assessment:  His non response to meds continues to be frustrating. I would consider nortriptyline, but he has a dry mouth.  I don't think Fetzima would be " tolerated due to GI problems.   Luvox has a lot of drug-drug interactions; should find something else.   Perhaps Remeron would help, but have concerns about wt gain. Plan B will be the Fetzima.      Treatment Plan:  Continue current dose of Buspar   Stop Luvox   Trial of Remeron (mirtazepine) 15 mg at bedtime   Safety plan was reviewed; to the ER as needed or call after hours crisis line; [phone]  Education and counseling was done regarding use of medications, psychotherapy options  Call for a follow up appointment with Dr. Acosta in 10-12 weeks or so; [phone].        Signed: Wiley Acosta M.D.

## 2017-01-25 NOTE — PATIENT INSTRUCTIONS
Treatment Plan:  Continue current dose of Buspar   Stop Luvox   Trial of Remeron (mirtazepine) 15 mg at bedtime   Safety plan was reviewed; to the ER as needed or call after hours crisis line; [phone]  Education and counseling was done regarding use of medications, psychotherapy options  Call for a follow up appointment with Dr. Acosta in 10-12 weeks or so; [phone].

## 2017-01-26 ENCOUNTER — TELEPHONE (OUTPATIENT)
Dept: RHEUMATOLOGY | Facility: CLINIC | Age: 52
End: 2017-01-26

## 2017-01-26 ASSESSMENT — PATIENT HEALTH QUESTIONNAIRE - PHQ9: SUM OF ALL RESPONSES TO PHQ QUESTIONS 1-9: 20

## 2017-01-26 ASSESSMENT — ANXIETY QUESTIONNAIRES: GAD7 TOTAL SCORE: 16

## 2017-01-26 NOTE — TELEPHONE ENCOUNTER
[facility] Prior Authorization Team   Phone: [phone]  Fax: [phone]    Prior Authorization Approval    Authorization Effective Date: 1/18/2017  Authorization Expiration Date: 1/18/2018  Medication: ENBREL 50MG WEEKLY - Approved  Approved Dose/Quantity: weekly  Reference #: [id number]   Insurance Company: [employer] Clinical Review - Phone [phone] Fax [phone]  Expected CoPay:       CoPay Card Available:      Foundation Assistance Needed:    Which Pharmacy is filling the prescription (Not needed for infusion/clinic administered): [facility] - [place], [place] - [facility] [address]  Pharmacy Notified: Yes  Patient Notified: Yes

## 2017-01-26 NOTE — TELEPHONE ENCOUNTER
[facility] Prior Authorization Team   Phone: [phone]  Fax: [phone]    PA Initiation    Medication: ENBREL 50MG WEEKLY  Insurance Company: [id number] Clinical Review - Phone [phone] Fax [phone]  Pharmacy Filling the Rx: [facility] - [place], [place] - [facility] [address]  Filling Pharmacy Phone: [phone]  Filling Pharmacy Fax: [phone]  Start Date: 1/26/2017    SUBMITTED ENBREL PA TO PLAN VIA [facility] KEY#[id number].

## 2017-02-03 DIAGNOSIS — R42 VERTIGO: ICD-10-CM

## 2017-02-03 DIAGNOSIS — F41.9 ANXIETY HYPERVENTILATION: ICD-10-CM

## 2017-02-03 DIAGNOSIS — M54.5 LOW BACK PAIN, UNSPECIFIED BACK PAIN LATERALITY, UNSPECIFIED CHRONICITY, WITH SCIATICA PRESENCE UNSPECIFIED: Primary | ICD-10-CM

## 2017-02-03 DIAGNOSIS — R42 DIZZINESS: ICD-10-CM

## 2017-02-03 DIAGNOSIS — F45.8 ANXIETY HYPERVENTILATION: ICD-10-CM

## 2017-02-03 DIAGNOSIS — G47.00 INSOMNIA, UNSPECIFIED: Primary | ICD-10-CM

## 2017-02-03 RX ORDER — CLONAZEPAM 1 MG/1
TABLET ORAL
Qty: 90 TABLET | Refills: 0 | Status: SHIPPED | OUTPATIENT
Start: 2017-02-03 | End: 2017-03-02

## 2017-02-03 RX ORDER — MECLIZINE HYDROCHLORIDE 25 MG/1
TABLET ORAL
Qty: 30 TABLET | Refills: 0 | Status: SHIPPED | OUTPATIENT
Start: 2017-02-03 | End: 2017-03-02

## 2017-02-03 RX ORDER — ZOLPIDEM TARTRATE 5 MG/1
TABLET ORAL
Qty: 30 TABLET | Refills: 0 | Status: SHIPPED | OUTPATIENT
Start: 2017-02-03 | End: 2017-03-02

## 2017-02-03 NOTE — TELEPHONE ENCOUNTER
zolpidem (AMBIEN) 5 MG tablet      Last Written Prescription Date:  12/30/16  Last Fill Quantity: 30,   # refills: 0  Last Office Visit with [facility], [facility] or [facility] prescribing provider: 10/25/16    Future Office visit:    Next 5 appointments (look out 90 days)     Feb 07, 2017  4:20 PM   Office Visit with David Chavez MD   [facility]    [address] [facility] [address]   [phone]            Apr 06, 2017  3:30 PM   Return Visit with Dorothea Loo MD   [facility] [place] ([facility] [place])    [address] [facility]  [place] [address] [phone]            Apr 14, 2017  2:00 PM   Return Visit with Fly Travis MD   [facility]    [address] [place] [address]   [phone]            Apr 18, 2017  3:00 PM   Return Visit with Sebastián Jenkins MD   [facility] ([facility]    [address] [facility] [address]   [phone]                   Routing refill request to provider for review/approval because:  Drug not on the [facility], [facility] or [facility] refill protocol or controlled substance        meclizine (ANTIVERT) 25 MG tablet      Last Written Prescription Date: 10/25/16  Last Fill Quantity: 90,  # refills: 0   Last Office Visit with [facility], [facility] or [facility] prescribing provider: 10/25/16                                         Next 5 appointments (look out 90 days)     Feb 07, 2017  4:20 PM   Office Visit with David Chavez MD   [facility]    [address] [facility] [address]   [phone]            Apr 06, 2017  3:30 PM   Return Visit with Dorothea Loo MD   [facility] [place] ([facility] [place])    [address] [facility]  [place] [address] [phone]            Apr 14, 2017   2:00 PM   [facility]    [address] [place] [address]   [phone]            Apr 18, 2017  3:00 PM   Return Visit with Sebastián Jenkins MD   [facility] ([facility]    [address] [facility] [address]   [phone]                    clonazePAM (KLONOPIN) 1 MG tablet      Last Written Prescription Date:  11/30/16  Last Fill Quantity: 30,   # refills: 0  Last Office Visit with [facility], [facility] or [facility] prescribing provider: 10/25/16    Future Office visit:    Next 5 appointments (look out 90 days)     Feb 07, 2017  4:20 PM   Office Visit with David Chavez MD   [facility] ([facility])    [address] [facility] [address]   [phone]            Apr 06, 2017  3:30 PM   Return Visit with Dorothea Loo MD   [facility] ([facility] [place])    [address] [place] [address]   [phone]            Apr 14, 2017  2:00 PM   [facility] with Fly [facility] MD   [facility]    [address] [place] [address]   [phone]            Apr 18, 2017  3:00 PM   Return Visit with Sebastián Jenkins MD   [facility] ([facility])    [address] [facility] [address]   [phone]                   Routing refill request to provider for review/approval because:  Drug not on the [facility], [facility] or [facility] refill protocol or controlled substance      Melissa Barnes  [facility]

## 2017-02-03 NOTE — TELEPHONE ENCOUNTER
meloxicam (MOBIC) 7.5 MG tablet      Last Written Prescription Date: 01/03/17  Last Quantity: 30, # refills: 0  Last Office Visit with [facility], [facility] or [facility] prescribing provider: 10/25/16     Next 5 appointments (look out 90 days)     Feb 07, 2017  4:20 PM   Office Visit with David Chavez MD   [facility]    [address] [facility] [address]   [phone]            Apr 06, 2017  3:30 PM   Return Visit with Dorothea Loo MD   [facility] ([facility])    [address] [place] [address]   [phone]            Apr 14, 2017  2:00 PM   Return Visit with Fly Travis MD   [facility]    [address] [place] [address]   [phone]            Apr 18, 2017  3:00 PM   Return Visit with Sebastián Jenkins MD   [facility])    [address] [facility] [address]   [phone]                   CREATININE   Date Value Ref Range Status   01/17/2017 1.01 0.66 - 1.25 mg/dL Final     AST       27   1/17/2017  ALT       41   1/17/2017  BP Readings from Last 3 Encounters:   01/17/17 123/76   01/11/17 131/88   01/05/17 137/88         Melissa Barnes  [facility]

## 2017-02-07 ENCOUNTER — OFFICE VISIT (OUTPATIENT)
Dept: FAMILY MEDICINE | Facility: CLINIC | Age: 52
End: 2017-02-07
Payer: COMMERCIAL

## 2017-02-07 VITALS
HEART RATE: 62 BPM | BODY MASS INDEX: 33.86 KG/M2 | HEIGHT: 62 IN | OXYGEN SATURATION: 98 % | TEMPERATURE: 97.7 F | WEIGHT: 184 LBS | SYSTOLIC BLOOD PRESSURE: 128 MMHG | DIASTOLIC BLOOD PRESSURE: 84 MMHG

## 2017-02-07 DIAGNOSIS — M47.819 FACET ARTHROPATHY: Primary | ICD-10-CM

## 2017-02-07 DIAGNOSIS — R07.89 RIGHT-SIDED CHEST WALL PAIN: Primary | ICD-10-CM

## 2017-02-07 PROCEDURE — 99214 OFFICE O/P EST MOD 30 MIN: CPT | Performed by: FAMILY MEDICINE

## 2017-02-07 RX ORDER — PREDNISONE 20 MG/1
TABLET ORAL
Qty: 20 TABLET | Refills: 0 | Status: SHIPPED
Start: 2017-02-07 | End: 2017-03-14

## 2017-02-07 RX ORDER — MELOXICAM 7.5 MG/1
TABLET ORAL
Qty: 30 TABLET | Refills: 1 | Status: SHIPPED | OUTPATIENT
Start: 2017-02-07 | End: 2017-05-25

## 2017-02-07 RX ORDER — OXYCODONE HYDROCHLORIDE 5 MG/1
5-10 TABLET ORAL EVERY 4 HOURS PRN
Qty: 150 TABLET | Refills: 0 | Status: SHIPPED | OUTPATIENT
Start: 2017-02-07 | End: 2017-03-03

## 2017-02-07 NOTE — TELEPHONE ENCOUNTER
Script was mailed to the [facility].    ROBERTO BarksdaleN, RN  Care Coordinator  [facility] [place]

## 2017-02-07 NOTE — PROGRESS NOTES
"  SUBJECTIVE:                                                    Lamont Daniels is a 51 year old male who presents to clinic today for the following health issues:      ED/UC Followup:    Facility:  [facility]  Date of visit: 1/27/17  Reason for visit: RUQ abdominal pain/chest   Current Status: improved       Problem list and histories reviewed & adjusted, as indicated.  Additional history: as documented    Problem list, Medication list, Allergies, and Medical/Social/Surgical histories reviewed in [facility] and updated as appropriate.    ROS:  Constitutional, HEENT, cardiovascular, pulmonary, GI, , musculoskeletal, neuro, skin, endocrine and psych systems are negative, except as otherwise noted.    OBJECTIVE:                                                    /84 mmHg  Pulse 62  Temp(Src) 97.7  F (36.5  C) (Oral)  Ht 5' 2\" (1.575 m)  Wt 184 lb (83.462 kg)  BMI 33.65 kg/m2  SpO2 98%  Body mass index is 33.65 kg/(m^2).  GENERAL: healthy, alert and no distress  NECK: no adenopathy, no asymmetry, masses, or scars and thyroid normal to palpation  RESP: lungs clear to auscultation - no rales, rhonchi or wheezes  CV: regular rate and rhythm, normal S1 S2, no S3 or S4, no murmur, click or rub, no peripheral edema and peripheral pulses strong  ABDOMEN: soft, nontender, no hepatosplenomegaly, no masses and bowel sounds normal  MS: tenderness along RSB last 1/3rd and mild tenderness of right lower ribs  Diagnostic Test Results:  none      ASSESSMENT/PLAN:                                                      1. Right-sided chest wall pain  MS pain on exam. Patient given higher prednisone dose taper and taper down to usual 5 mg dose to hopefully help with pain. nsaids contraindicated with patient. May use ice/heat. RTC if worsens.  - predniSONE (DELTASONE) 20 MG tablet; Take 3 tabs (60 mg) by mouth daily x 3 days, 2 tabs (40 mg) daily x 3 days, 1 tab (20 mg) daily x 3 days, then 1/2 tab (10 mg) x 3 days, then take " regular 5 mg dose daily therafter.  Dispense: 20 tablet; Refill: 0    See Patient Instructions    David Chavez MD, MD  [facility]

## 2017-02-07 NOTE — TELEPHONE ENCOUNTER
Signed Prescriptions:                        Disp   Refills    oxyCODONE (ROXICODONE) 5 MG IR tablet      150 ta*0        Sig: Take 1-2 tablets (5-10 mg) by mouth every 4 hours as           needed for moderate to severe pain . Max of 6           tabs per day.  One month supply.  Authorizing Provider: KAYLA CHUN MD  [facility]

## 2017-02-07 NOTE — TELEPHONE ENCOUNTER
Patient called in for refill.  Difficult to understand information.    Will ask MA/Nursing team to prep for refill, find last fill day    Lian Loo MD  [facility]

## 2017-02-07 NOTE — TELEPHONE ENCOUNTER
Received call from patient requesting refill(s) of oxyCODONE (ROXICODONE) 5 MG IR tablet    Last picked up from pharmacy on 1/5/17    Pt last seen by prescribing provider on 1/5/17  Next appt scheduled for 4/6/17    Last urine drug screen date 1/5/17  Current opioid agreement on file (completed within the last year) Yes Date of opioid agreement: 1/5/17    Processing (pick one and delete the others):  Mail to [id number] - MARIA ISABEL SIM -    Sascha Isaac MA  [facility]    Will route to nursing pool for review and preparation of prescription(s).

## 2017-02-07 NOTE — TELEPHONE ENCOUNTER
Prescription approved per [facility] Refill Protocol.  Melina Carrizales RN

## 2017-02-07 NOTE — MR AVS SNAPSHOT
After Visit Summary   2/7/2017    Lamont Daniels    MRN: [id number]           Patient Information     Date Of Birth          1965        Visit Information        Provider Department      2/7/2017 4:20 PM David Chavez MD [facility]        Today's Diagnoses     Right-sided chest wall pain    -  1       Care Instructions    How to contact your care team providers:    [facility]  [phone]  Pharmacy [phone]    NATE Laura Dr., Dr., Dr., PA-C    Team RN: Raghu GIBBS and Lorraine MAGALLANES    Clinic hours  M-Th 7am-7pm   Fri 7am-5pm.   Urgent care M-F 11am-9pm,   Sat/sun 9am-5pm.  Pharmacy M-F 8:00am-8pm Sat/sun 9am-5pm.     All password changes, disabled accounts, or ID changes in [facility]MyHealth will be done by our Access Services Department.   If you need help with your account or password, call: [phone]. Clinic staff no longer has the ability to change passwords.                       Follow-ups after your visit        Your next 10 appointments already scheduled     Apr 06, 2017  3:30 PM   Return Visit with Dorothea Loo MD   [facility] ([facility])    [address] [place] [id number] [phone]            Apr 14, 2017  2:00 PM   Return Visit with Fly Travis MD   [facility]    [address] [place] [address] [phone]            Apr 18, 2017  3:00 PM   Return Visit with Sebastián Jenkins MD   [facility] ([facility])    [address] [facility] [address] [phone]              Who to contact     If you have questions or need follow up information about today's clinic visit or your schedule please contact [facility] directly at [phone].  Normal or  "non-critical lab and imaging results will be communicated to you by MyChart, letter or phone within 4 business days after the clinic has received the results. If you do not hear from us within 7 days, please contact the clinic through [facility] or phone. If you have a critical or abnormal lab result, we will notify you by phone as soon as possible.  Submit refill requests through [employer] or call your pharmacy and they will forward the refill request to us. Please allow 3 business days for your refill to be completed.          Additional Information About Your Visit        [employer] Information     [employer] gives you secure access to your electronic health record. If you see a primary care provider, you can also send messages to your care team and make appointments. If you have questions, please call your primary care clinic.  If you do not have a primary care provider, please call [phone] and they will assist you.        Care EveryWhere ID     This is your Care EveryWhere ID. This could be used by other organizations to access your [facility] medical records  [id number]        Your Vitals Were     Pulse Temperature Height BMI (Body Mass Index) Pulse Oximetry       62 97.7  F (36.5  C) (Oral) 5' 2\" (1.575 m) 33.65 kg/m2 98%        Blood Pressure from Last 3 Encounters:   02/07/17 128/84   01/17/17 123/76   01/11/17 131/88    Weight from Last 3 Encounters:   02/07/17 184 lb (83.462 kg)   01/17/17 181 lb 3.2 oz (82.192 kg)   01/05/17 175 lb (79.379 kg)              We Performed the Following     DEPRESSION ACTION PLAN (DAP)          Today's Medication Changes          These changes are accurate as of: 2/7/17  4:51 PM.  If you have any questions, ask your nurse or doctor.               These medicines have changed or have updated prescriptions.        Dose/Directions    * predniSONE 5 MG tablet   Commonly known as:  DELTASONE   This may have changed:  Another medication with the same name was added. Make sure you " understand how and when to take each.   Used for:  Rheumatoid arthritis of multiple sites with negative rheumatoid factor (H), High risk medications (not anticoagulants) long-term use   Changed by:  Sebastián Jenkins MD        Dose:  5 mg   Take 1 tablet (5 mg) by mouth daily   Quantity:  30 tablet   Refills:  3       * predniSONE 20 MG tablet   Commonly known as:  DELTASONE   This may have changed:  You were already taking a medication with the same name, and this prescription was added. Make sure you understand how and when to take each.   Used for:  Right-sided chest wall pain   Changed by:  David Chavez MD        Take 3 tabs (60 mg) by mouth daily x 3 days, 2 tabs (40 mg) daily x 3 days, 1 tab (20 mg) daily x 3 days, then 1/2 tab (10 mg) x 3 days, then take regular 5 mg dose daily therafter.   Quantity:  20 tablet   Refills:  0       * Notice:  This list has 2 medication(s) that are the same as other medications prescribed for you. Read the directions carefully, and ask your doctor or other care provider to review them with you.         Where to get your medicines      These medications were sent to [facility] [address] [place] [address] [address]  [address] [address]     Phone:  [phone]    - predniSONE 20 MG tablet      Some of these will need a paper prescription and others can be bought over the counter.  Ask your nurse if you have questions.     Bring a paper prescription for each of these medications    - oxyCODONE 5 MG IR tablet             Primary Care Provider Office Phone # Fax #    David Chavez [phone]       [place] [address] [facility] [address]        Thank you!     Thank you for choosing [facility]  for your care. Our goal is always to provide you with excellent care. Hearing back from our patients is one way we can continue to improve our services. Please take a few minutes to complete the  written survey that you may receive in the mail after your visit with us. Thank you!             Your Updated Medication List - Protect others around you: Learn how to safely use, store and throw away your medicines at www.disposemymeds.org.          This list is accurate as of: 2/7/17  4:51 PM.  Always use your most recent med list.                   Brand Name Dispense Instructions for use    allopurinol 300 MG tablet    ZYLOPRIM    90 tablet    TAKE ONE TABLET BY MOUTH ONCE DAILY       aspirin 81 MG EC tablet     30 tablet    Take 1 tablet (81 mg) by mouth daily (*)       atorvastatin 80 MG tablet    LIPITOR    90 tablet    Take 1 tablet (80 mg) by mouth daily for cholesterol.       baclofen 10 MG tablet    LIORESAL    90 tablet    TAKE ONE TABLET BY MOUTH AT BEDTIME       BusPIRone HCl 30 MG Tabs     180 tablet    TAKE ONE TABLET BY MOUTH TWICE DAILY       clonazePAM 1 MG tablet    klonoPIN    90 tablet    TAKE ONE-HALF TO ONE TABLET BY MOUTH THREE TIMES DAILY AS NEEDED FOR ANXIETY       clopidogrel 75 MG tablet    PLAVIX    90 tablet    Take 1 tablet (75 mg) by mouth daily       colchicine 0.6 MG tablet    COLCRYS    30 tablet    Take 2 tablets at the first sign of flare, take 1 additional tablet one hour later.       Etanercept 50 MG/ML autoinjector    ENBREL SURECLICK    1 kit    Inject 50 mg Subcutaneous once a week       evolocumab 140 MG/ML prefilled autoinjector    REPATHA    2 mL    Inject 1 mL (140 mg) Subcutaneous every 14 days       ezetimibe 10 MG tablet    ZETIA    90 tablet    Take 1 tablet (10 mg) by mouth daily       gabapentin 300 MG capsule    NEURONTIN    540 capsule    TAKE TWO CAPSULES BY MOUTH THREE TIMES DAILY       gemfibrozil 600 MG tablet    LOPID    180 tablet    Take 1 tablet (600 mg) by mouth 2 times daily       hydrocortisone 0.2 % cream    WESTCORT    45 g    Use twice daily on the eats for active rash for up to 2 weeks in a row, then take 2 weeks off.       ketoconazole 2 % cream     NIZORAL    30 g    Apply topically 2 times daily Apply to back of scalp and to groin twice daily until rash is clear       meclizine 25 MG tablet    ANTIVERT    30 tablet    TAKE ONE TABLET BY MOUTH EVERY 6 HOURS AS NEEDED FOR DIZZINESS       melatonin 3 MG tablet      Take 1 tablet (3 mg) by mouth nightly as needed for sleep       meloxicam 7.5 MG tablet    MOBIC    30 tablet    TAKE ONE TABLET BY MOUTH ONCE DAILY WITH  BREAKFAST       metoprolol 25 MG 24 hr tablet    TOPROL-XL    45 tablet    Take 1 tablet (25 mg) by mouth daily       mirtazapine 15 MG tablet    REMERON    30 tablet    Take 1 tablet (15 mg) by mouth At Bedtime       nitroglycerin 0.4 MG sublingual tablet    NITROSTAT    30 tablet    Place 1 tablet (0.4 mg) under the tongue every 5 minutes as needed       order for DME      1.  CPAP pressure 11 cm/H20 with heated humidity.  2.  Provide mask to fit and CPAP supplies.  3.  Length of need lifetime.  4.  If needed please provide a chin strap       order for DME     1 Units    Equipment being ordered: single end cane       oxyCODONE 5 MG IR tablet    ROXICODONE    150 tablet    Take 1-2 tablets (5-10 mg) by mouth every 4 hours as needed for moderate to severe pain . Max of 6 tabs per day.  One month supply.       pantoprazole 40 MG EC tablet    PROTONIX    90 tablet    TAKE ONE TABLET BY MOUTH ONCE DAILY FOR STOMACH       * predniSONE 5 MG tablet    DELTASONE    30 tablet    Take 1 tablet (5 mg) by mouth daily       * predniSONE 20 MG tablet    DELTASONE    20 tablet    Take 3 tabs (60 mg) by mouth daily x 3 days, 2 tabs (40 mg) daily x 3 days, 1 tab (20 mg) daily x 3 days, then 1/2 tab (10 mg) x 3 days, then take regular 5 mg dose daily therafter.       sulfaSALAzine  MG EC tablet    AZULFIDINE EN    120 tablet    Take 2 tablets (1,000 mg) by mouth 2 times daily       tenofovir 300 MG tablet    VIREAD    90 tablet    Take 1 tablet (300 mg) by mouth daily       triamcinolone 0.1 % ointment     KENALOG    80 g    Apply sparingly to the ears and groin twice daily until clear, then once daily until clear. Do not apply to the face.       vitamin D 2000 UNITS tablet     100 tablet    TAKE ONE TABLET BY MOUTH ONCE DAILY       zolpidem 5 MG tablet    AMBIEN    30 tablet    TAKE ONE TABLET BY MOUTH AT BEDTIME AS NEEDED FOR SLEEP       * Notice:  This list has 2 medication(s) that are the same as other medications prescribed for you. Read the directions carefully, and ask your doctor or other care provider to review them with you.

## 2017-02-07 NOTE — TELEPHONE ENCOUNTER
Medication refill information reviewed.     Due date for Oxycodone is past due    Prescriptions prepped for review.     Will route to provider.     Dorinda Casanova RN, [facility]  [facility] Clinic Care Coordinator

## 2017-02-07 NOTE — NURSING NOTE
"Chief Complaint   Patient presents with     Hospital F/U     RUQ abdominal pain       Initial /84 mmHg  Pulse 62  Temp(Src) 97.7  F (36.5  C) (Oral)  Ht 5' 2\" (1.575 m)  Wt 184 lb (83.462 kg)  BMI 33.65 kg/m2  SpO2 98% Estimated body mass index is 33.65 kg/(m^2) as calculated from the following:    Height as of this encounter: 5' 2\" (1.575 m).    Weight as of this encounter: 184 lb (83.462 kg).  Medication Reconciliation: complete     Anita Maldonado MA      "

## 2017-02-07 NOTE — Clinical Note
My Depression Action Plan  Name: Lamont Daniels   Date of Birth 1965  Date: 2/7/2017    My doctor: David Chavez   My clinic: [address] [address] [phone]          GREEN    ZONE   Good Control    What it looks like:     Things are going generally well. You have normal up s and down s. You may even feel depressed from time to time, but bad moods usually last less than a day.   What you need to do:  1. Continue to care for yourself (see self care plan)  2. Check your depression survival kit and update it as needed  3. Follow your physician s recommendations including any medication.  4. Do not stop taking medication unless you consult with your physician first.           YELLOW         ZONE Getting Worse    What it looks like:     Depression is starting to interfere with your life.     It may be hard to get out of bed; you may be starting to isolate yourself from others.    Symptoms of depression are starting to last most all day and this has happened for several days.     You may have suicidal thoughts but they are not constant.   What you need to do:     1. Call your care team, your response to treatment will improve if you keep your care team informed of your progress. Yellow periods are signs an adjustment may need to be made.     2. Continue your self-care, even if you have to fake it!    3. Talk to someone in your support network    4. Open up your depression survival kit           RED    ZONE Medical Alert - Get Help    What it looks like:     Depression is seriously interfering with your life.     You may experience these or other symptoms: You can t get out of bed most days, can t work or engage in other necessary activities, you have trouble taking care of basic hygiene, or basic responsibilities, thoughts of suicide or death that will not go away, self-injurious behavior.     What you need to do:  1. Call your care team and  request a same-day appointment. If they are not available (weekends or after hours) call your local crisis line, emergency room or 911.      Electronically signed by: Anita Maldonado, February 7, 2017    Depression Self Care Plan / Survival Kit    Self-Care for Depression  Here s the deal. Your body and mind are really not as separate as most people think.  What you do and think affects how you feel and how you feel influences what you do and think. This means if you do things that people who feel good do, it will help you feel better.  Sometimes this is all it takes.  There is also a place for medication and therapy depending on how severe your depression is, so be sure to consult with your medical provider and/ or Behavioral Health Consultant if your symptoms are worsening or not improving.     In order to better manage my stress, I will:    Exercise  Get some form of exercise, every day. This will help reduce pain and release endorphins, the  feel good  chemicals in your brain. This is almost as good as taking antidepressants!  This is not the same as joining a gym and then never going! (they count on that by the way ) It can be as simple as just going for a walk or doing some gardening, anything that will get you moving.      Hygiene   Maintain good hygiene (Get out of bed in the morning, Make your bed, Brush your teeth, Take a shower, and Get dressed like you were going to work, even if you are unemployed).  If your clothes don't fit try to get ones that do.    Diet  I will strive to eat foods that are good for me, drink plenty of water, and avoid excessive sugar, caffeine, alcohol, and other mood-altering substances.  Some foods that are helpful in depression are: complex carbohydrates, B vitamins, flaxseed, fish or fish oil, fresh fruits and vegetables.    Psychotherapy  I agree to participate in Individual Therapy (if recommended).    Medication  If prescribed medications, I agree to take them.  Missing doses can  result in serious side effects.  I understand that drinking alcohol, or other illicit drug use, may cause potential side effects.  I will not stop my medication abruptly without first discussing it with my provider.    Staying Connected With Others  I will stay in touch with my friends, family members, and my primary care provider/team.    Use your imagination  Be creative.  We all have a creative side; it doesn t matter if it s oil painting, sand castles, or mud pies! This will also kick up the endorphins.    Witness Beauty  (AKA stop and smell the roses) Take a look outside, even in mid-winter. Notice colors, textures. Watch the squirrels and birds.     Service to others  Be of service to others.  There is always someone else in need.  By helping others we can  get out of ourselves  and remember the really important things.  This also provides opportunities for practicing all the other parts of the program.    Humor  Laugh and be silly!  Adjust your TV habits for less news and crime-drama and more comedy.    Control your stress  Try breathing deep, massage therapy, biofeedback, and meditation. Find time to relax each day.     My support system    Clinic Contact:  Phone number:    Contact 1:  Phone number:    Contact 2:  Phone number:    Yarsanism/:  Phone number:    Therapist:  Phone number:    Local crisis center:    Phone number:    Other community support:  Phone number:

## 2017-02-07 NOTE — PATIENT INSTRUCTIONS
How to contact your care team providers:    [facility]  [phone]  Pharmacy [phone]    NATE Laura Dr., Dr., Dr., PA-C    Team RN: Raghu MAGALLANES    Clinic hours  M-Th 7am-7pm   Fri 7am-5pm.   Urgent care M-F 11am-9pm,   Sat/sun 9am-5pm.  Pharmacy M-F 8:00am-8pm Sat/sun 9am-5pm.     All password changes, disabled accounts, or ID changes in [facility] will be done by our Access Services Department.   If you need help with your account or password, call: [phone]. Clinic staff no longer has the ability to change passwords.

## 2017-03-02 DIAGNOSIS — R42 VERTIGO: ICD-10-CM

## 2017-03-02 DIAGNOSIS — G47.00 INSOMNIA, UNSPECIFIED: ICD-10-CM

## 2017-03-02 DIAGNOSIS — F41.9 ANXIETY HYPERVENTILATION: ICD-10-CM

## 2017-03-02 DIAGNOSIS — F45.8 ANXIETY HYPERVENTILATION: ICD-10-CM

## 2017-03-02 DIAGNOSIS — M10.9 GOUT WITHOUT TOPHUS: ICD-10-CM

## 2017-03-02 DIAGNOSIS — I10 ESSENTIAL HYPERTENSION WITH GOAL BLOOD PRESSURE LESS THAN 140/90: ICD-10-CM

## 2017-03-03 DIAGNOSIS — M47.819 FACET ARTHROPATHY: ICD-10-CM

## 2017-03-03 RX ORDER — ZOLPIDEM TARTRATE 5 MG/1
TABLET ORAL
Qty: 30 TABLET | Refills: 5 | Status: SHIPPED | OUTPATIENT
Start: 2017-03-03 | End: 2017-10-21

## 2017-03-03 RX ORDER — METOPROLOL TARTRATE 25 MG/1
TABLET, FILM COATED ORAL
Qty: 180 TABLET | Refills: 0 | Status: SHIPPED | OUTPATIENT
Start: 2017-03-03 | End: 2017-03-14

## 2017-03-03 RX ORDER — CLONAZEPAM 1 MG/1
TABLET ORAL
Qty: 90 TABLET | Refills: 0 | Status: SHIPPED | OUTPATIENT
Start: 2017-03-03 | End: 2017-04-05

## 2017-03-03 RX ORDER — COLCHICINE 0.6 MG/1
TABLET ORAL
Qty: 30 TABLET | Refills: 0 | Status: SHIPPED | OUTPATIENT
Start: 2017-03-03 | End: 2017-04-05

## 2017-03-03 RX ORDER — OXYCODONE HYDROCHLORIDE 5 MG/1
5-10 TABLET ORAL EVERY 4 HOURS PRN
Qty: 150 TABLET | Refills: 0 | Status: SHIPPED | OUTPATIENT
Start: 2017-03-03 | End: 2017-04-27

## 2017-03-03 RX ORDER — MECLIZINE HYDROCHLORIDE 25 MG/1
TABLET ORAL
Qty: 30 TABLET | Refills: 0 | Status: SHIPPED | OUTPATIENT
Start: 2017-03-03 | End: 2017-04-05

## 2017-03-03 NOTE — TELEPHONE ENCOUNTER
Signed Prescriptions:                        Disp   Refills    oxyCODONE (ROXICODONE) 5 MG IR tablet      150 ta*0        Sig: Take 1-2 tablets (5-10 mg) by mouth every 4 hours as           needed for moderate to severe pain . Max of 6           tabs per day.  May fill on/after 3/9/17 to start           taking on/after 3/11/17. One month supply.  Authorizing Provider: KAYLA CHUN MD  [facility]

## 2017-03-03 NOTE — TELEPHONE ENCOUNTER
Mailed Rx to the [id number] - [place], [place] - [address]  Sascha Isaac MA  [facility]

## 2017-03-03 NOTE — TELEPHONE ENCOUNTER
Received call from patient requesting refill(s) of oxycodone     Last picked up from pharmacy on 2/9/17  Due date 3/11/17 based on last  date    Pt last seen on 1/5/17  Next appt scheduled for 4/6/17    Last urine drug screen 1/5/17  Current opioid agreement on file Yes Date: 1/5/17    Processing (pick one):  Mail to [facility] pharmacy     Will facilitate refill.    ROBERTO KarimiN, RN-BC  Patient Care Supervisor/Care Coordinator  [facility] [place]

## 2017-03-03 NOTE — TELEPHONE ENCOUNTER
2 written rx faxed to [facility] pharmacy at fax # [phone].  Renard Donohue,  [facility]

## 2017-03-03 NOTE — TELEPHONE ENCOUNTER
Call came in at 6:07 pm 3/2/17.    Pt requesting that the Oxycodone be refilled and sent to his pharmacy. Will route to MA pool to initiate process.  Mirtha mora rn

## 2017-03-03 NOTE — TELEPHONE ENCOUNTER
zolpidem (AMBIEN) 5 MG tablet      Last Written Prescription Date:  2/3/17  Last Fill Quantity: 30,   # refills: 0  Last Office Visit with [facility], [facility] or [facility] prescribing provider: 2/7/17  Future Office visit:    Next 5 appointments (look out 90 days)     Mar 20, 2017  2:45 PM CDT   Return Visit with Ricardo Rae MD, [facility] NURSE ONLY   [facility]    [address] [place] [address]   [phone]            Apr 06, 2017  3:30 PM CDT   Return Visit with Dorothea Loo MD   [facility] ([facility])    [address] [facility] [id number] [phone]            Apr 14, 2017  2:00 PM CDT   Return Visit with Fly Travis MD   [facility] ([facility])    [address] [place] [address]   [phone]            Apr 18, 2017  3:00 PM CDT   Return Visit with Sebastián Jenkins MD   [facility] ([address] [id number] [phone]                   Routing refill request to provider for review/approval because:  Drug not on the [facility], [facility] or [facility] refill protocol or controlled substance      clonazePAM (KLONOPIN) 1 MG tablet      Last Written Prescription Date:  2/3/17  Last Fill Quantity: 90,   # refills: 0  Last Office Visit with [facility], [facility] or [facility] prescribing provider: 2/7/17  Future Office visit:    Next 5 appointments (look out 90 days)     Mar 20, 2017  2:45 PM CDT   Return Visit with Ricardo Rae MD, [facility] O[facility] NURSE ONLY   [facility] ([facility])    [address] [place] [address]   [phone]            Apr 06, 2017  3:30 PM CDT   Return Visit with Dorothea Loo MD   [facility] ([facility])    [address] [facility]  [place]  [address]   [phone]            Apr 14, 2017  2:00 PM CDT   Return Visit with Fly Travis MD   [facility]    [address] [place] [address]   [phone]            Apr 18, 2017  3:00 PM CDT   [facility] with [facility]   [facility]    [address] [facility] [address]   [phone]                   Routing refill request to provider for review/approval because:  Drug not on the [facility], [facility] or [facility] refill protocol or controlled substance      colchicine (COLCRYS) 0.6 MG tablet       Last Written Prescription Date: 2/24/16  Last Fill Quantity: 30, # refills: 11  Last Office Visit with [facility], [facility] or [facility] prescribing provider:  2/7/17   Next 5 appointments (look out 90 days)     Mar 20, 2017  2:45 PM CDT   Return Visit with Ricardo Rae MD, [facility] NURSE ONLY   [facility])    [address] [place] [address]   [phone]            Apr 06, 2017  3:30 PM CDT   Return Visit with Dorothea Loo MD   [facility] ([facility])    [address] [place] [address]   [phone]            Apr 14, 2017  2:00 PM CDT   Return Visit with Fly Travis MD   [facility]    [address] [place] [address]   [phone]            Apr 18, 2017  3:00 PM CDT   Return Visit with Sebastián Jenkins MD   [facility]    [address] [facility] [address]   [phone]                   Uric Acid   Date Value Ref Range Status   11/04/2015 6.1 3.5 - 7.2 mg/dL Final   ]  Creatinine   Date Value Ref Range Status   01/17/2017 1.01 0.66 - 1.25 mg/dL Final   ]  Lab Results   Component Value Date    WBC 6.6  01/17/2017     Lab Results   Component Value Date    RBC 5.11 01/17/2017     Lab Results   Component Value Date    HGB 15.6 01/17/2017     Lab Results   Component Value Date    HCT 45.6 01/17/2017     No components found for: MCT  Lab Results   Component Value Date    MCV 89 01/17/2017     Lab Results   Component Value Date    MCH 30.5 01/17/2017     Lab Results   Component Value Date    MCHC 34.2 01/17/2017     Lab Results   Component Value Date    RDW 13.5 01/17/2017     Lab Results   Component Value Date     01/17/2017     02/22/2008     Lab Results   Component Value Date    AST 27 01/17/2017     Lab Results   Component Value Date    ALT 41 01/17/2017       meclizine (ANTIVERT) 25 MG tablet      Last Written Prescription Date: 2/3/17  Last Fill Quantity: 30,  # refills: 1   Last Office Visit with [facility] or [facility] prescribing provider: 2/7/17                                         Next 5 appointments (look out 90 days)     Mar 20, 2017  2:45 PM CDT   Return Visit with Ricardo Rae MD, [facility] NURSE ONLY   [facility]    [address] [place] [address]   [phone]            Apr 06, 2017  3:30 PM CDT   Return Visit with Dorothea Loo MD   [facility] ([facility])    [address] [place] [address]   [phone]            Apr 14, 2017  2:00 PM CDT   Return Visit with Fly Travis MD   [facility]    [address] [place] [address]   [phone]            Apr 18, 2017  3:00 PM CDT   Return Visit with Sebastián Jenkins MD   [facility]    [address] [facility] [address]   [phone]                    metoprolol (TOPROL-XL) 25 MG 24 hr tablet      Last Written Prescription Date: 9/28/16  Last Fill Quantity: 45, #  refills: 1    Last Office Visit with [facility], [facility] or [facility] prescribing provider:  2/7/17   Future Office Visit:    Next 5 appointments (look out 90 days)     Mar 20, 2017  2:45 PM CDT   Return Visit with Ricardo Rae MD,  OPHTH NURSE ONLY   [facility] ([facility])    [address] [place] [address]   [phone]            Apr 06, 2017  3:30 PM CDT   Return Visit with Dorothea Loo MD   [facility])    [address] [facility] [address]   [phone]            Apr 14, 2017  2:00 PM CDT   Return Visit with Fly Travis MD   [facility] ([facility])    [address] [place] [address]   [phone]            Apr 18, 2017  3:00 PM CDT   Return Visit with Sebastián Jenkins MD   [facility] ([facility])    [address] [facility] [address]   [phone]                    BP Readings from Last 3 Encounters:   02/07/17 128/84   01/17/17 123/76   01/11/17 131/88           Costa Faarax  [facility]

## 2017-03-14 ENCOUNTER — OFFICE VISIT (OUTPATIENT)
Dept: FAMILY MEDICINE | Facility: CLINIC | Age: 52
End: 2017-03-14
Payer: COMMERCIAL

## 2017-03-14 VITALS
BODY MASS INDEX: 31.91 KG/M2 | HEIGHT: 62 IN | DIASTOLIC BLOOD PRESSURE: 82 MMHG | OXYGEN SATURATION: 100 % | WEIGHT: 173.4 LBS | TEMPERATURE: 96.3 F | SYSTOLIC BLOOD PRESSURE: 128 MMHG | HEART RATE: 66 BPM

## 2017-03-14 DIAGNOSIS — I25.10 ATHEROSCLEROSIS OF NATIVE CORONARY ARTERY OF NATIVE HEART WITHOUT ANGINA PECTORIS: ICD-10-CM

## 2017-03-14 DIAGNOSIS — N40.1 BENIGN PROSTATIC HYPERPLASIA WITH LOWER URINARY TRACT SYMPTOMS, UNSPECIFIED MORPHOLOGY: Primary | ICD-10-CM

## 2017-03-14 DIAGNOSIS — M62.830 SPASM OF BACK MUSCLES: ICD-10-CM

## 2017-03-14 LAB — PSA SERPL-ACNC: 0.42 UG/L (ref 0–4)

## 2017-03-14 PROCEDURE — 99214 OFFICE O/P EST MOD 30 MIN: CPT | Performed by: FAMILY MEDICINE

## 2017-03-14 PROCEDURE — G0103 PSA SCREENING: HCPCS | Performed by: FAMILY MEDICINE

## 2017-03-14 RX ORDER — METOPROLOL SUCCINATE 25 MG/1
25 TABLET, EXTENDED RELEASE ORAL DAILY
Qty: 90 TABLET | Refills: 3 | Status: SHIPPED | OUTPATIENT
Start: 2017-03-14 | End: 2018-03-27

## 2017-03-14 RX ORDER — TAMSULOSIN HYDROCHLORIDE 0.4 MG/1
0.4 CAPSULE ORAL DAILY
Qty: 90 CAPSULE | Refills: 3 | Status: SHIPPED | OUTPATIENT
Start: 2017-03-14 | End: 2017-10-03

## 2017-03-14 RX ORDER — BACLOFEN 10 MG/1
10 TABLET ORAL 2 TIMES DAILY PRN
Qty: 180 TABLET | Refills: 3 | Status: SHIPPED | OUTPATIENT
Start: 2017-03-14 | End: 2018-03-13

## 2017-03-14 ASSESSMENT — ANXIETY QUESTIONNAIRES
GAD7 TOTAL SCORE: 19
IF YOU CHECKED OFF ANY PROBLEMS ON THIS QUESTIONNAIRE, HOW DIFFICULT HAVE THESE PROBLEMS MADE IT FOR YOU TO DO YOUR WORK, TAKE CARE OF THINGS AT HOME, OR GET ALONG WITH OTHER PEOPLE: EXTREMELY DIFFICULT
1. FEELING NERVOUS, ANXIOUS, OR ON EDGE: NEARLY EVERY DAY
3. WORRYING TOO MUCH ABOUT DIFFERENT THINGS: MORE THAN HALF THE DAYS
6. BECOMING EASILY ANNOYED OR IRRITABLE: NEARLY EVERY DAY
7. FEELING AFRAID AS IF SOMETHING AWFUL MIGHT HAPPEN: NEARLY EVERY DAY
5. BEING SO RESTLESS THAT IT IS HARD TO SIT STILL: MORE THAN HALF THE DAYS
2. NOT BEING ABLE TO STOP OR CONTROL WORRYING: NEARLY EVERY DAY

## 2017-03-14 ASSESSMENT — PATIENT HEALTH QUESTIONNAIRE - PHQ9: 5. POOR APPETITE OR OVEREATING: NEARLY EVERY DAY

## 2017-03-14 ASSESSMENT — PAIN SCALES - GENERAL: PAINLEVEL: MODERATE PAIN (4)

## 2017-03-14 NOTE — PROGRESS NOTES
SUBJECTIVE:                                                    Lamont Daniels is a 51 year old male who presents to clinic today for the following health issues:      Genitourinary - Male     Onset: one year    Description:   Dysuria (painful urination): no   Hematuria (blood in urine): no   Frequency: no   Are you urinating at night : no   Hesitancy (delay in urine): no   Retention (unable to empty): YES  Decrease in urinary flow: YES  Incontinence: YES    Progression of Symptoms:  worsening    Accompanying Signs & Symptoms:  Fever: no   Back/Flank pain: YES  Urethral discharge: no   Testicle lumps/masses/pain: no   Nausea and/or vomiting: no   Abdominal pain: no    History:   History of frequent UTI's: no   History of kidney stones: YES- years ago  History of hernias: no   Personal or Family history of Prostate problems: no  Sexually active: NA     Precipitating factors:   None    Alleviating factors:  None.         Therapies Tried and outcome: OTC prostate med ( name unknown); Outcome - no relief.      Problem list and histories reviewed & adjusted, as indicated.  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     Hyperlipidemia LDL goal <70     Insomnia     Gout     Suicidal ideation     Essential hypertension     Rheumatoid arthritis involving multiple sites, unspecified rheumatoid factor presence (H)     High risk medications (not anticoagulants) long-term use     Midline low back pain without sciatica     Bilateral low back pain with sciatica, sciatica laterality unspecified     Elevated liver enzymes     On corticosteroid therapy     Essential  hypertension with goal blood pressure less than 140/90     Insomnia, unspecified type     Rheumatoid arthritis of multiple sites with negative rheumatoid factor (H)     Benign prostatic hyperplasia with lower urinary tract symptoms, unspecified morphology     Past Surgical History   Procedure Laterality Date     Bypass graft artery coronary  2008     6 vessels     Hc coronary stent beba guerratl vessel  2008     3 months after CABG     Colonoscopy  2/8/2013     Procedure: COLONOSCOPY;  Colonoscopy, blood in stool;  Surgeon: Duane, William Charles, MD;  Location: [facility]     Tonsillectomy  2010     Nasal/sinus polypectomy  2010     Uvulopalatopharyngoplasty  2010     Abdomen surgery  2014     Gi surgery  2014     Orthopedic surgery  2012     Thoracic surgery  1989     tb     Biopsy  2015     Head & neck surgery  2011     Vascular surgery  2008       Social History   Substance Use Topics     Smoking status: Never Smoker     Smokeless tobacco: Never Used     Alcohol use 0.0 oz/week     0 Standard drinks or equivalent per week      Comment: Drinks bber once a year.      Family History   Problem Relation Age of Onset     C.A.D. Father      Hypertension Mother      Depression No family hx of      Autoimmune Disease No family hx of      CEREBROVASCULAR DISEASE No family hx of      Thyroid Disease No family hx of      Glaucoma No family hx of      Macular Degeneration No family hx of      CANCER Paternal Grandfather      DIABETES Mother      Coronary Artery Disease Father      Hypertension Father      Hypertension Maternal Grandfather      Other Cancer Other      Depression Mother      Depression Father      MENTAL ILLNESS Mother            Reviewed and updated as needed this visit by clinical staff  Tobacco       Reviewed and updated as needed this visit by Provider         ROS:  Constitutional, HEENT, cardiovascular, pulmonary, GI, , musculoskeletal, neuro, skin, endocrine and psych systems are negative, except as  "otherwise noted.    OBJECTIVE:                                                    /82 (BP Location: Left arm, Patient Position: Chair, Cuff Size: Adult Large)  Pulse 66  Temp 96.3  F (35.7  C) (Oral)  Ht 5' 2\" (1.575 m)  Wt 173 lb 6.4 oz (78.7 kg)  SpO2 100%  BMI 31.72 kg/m2  Body mass index is 31.72 kg/(m^2).  GENERAL: healthy, alert and no distress  NECK: no adenopathy, no asymmetry, masses, or scars and thyroid normal to palpation  RESP: lungs clear to auscultation - no rales, rhonchi or wheezes  CV: regular rate and rhythm, normal S1 S2, no S3 or S4, no murmur, click or rub, no peripheral edema and peripheral pulses strong  ABDOMEN: soft, nontender, no hepatosplenomegaly, no masses and bowel sounds normal  MS: no gross musculoskeletal defects noted, no edema    Diagnostic Test Results:  none      ASSESSMENT/PLAN:                                                      1. Benign prostatic hyperplasia with lower urinary tract symptoms, unspecified morphology  Likely bph by history. Check PSA. Trial Flomax daily. Recheck symptoms/signs in 6 months.  - tamsulosin (FLOMAX) 0.4 MG capsule; Take 1 capsule (0.4 mg) by mouth daily  Dispense: 90 capsule; Refill: 3  - PSA, screen    2. Spasm of back muscles  Needed refills.  - baclofen (LIORESAL) 10 MG tablet; Take 1 tablet (10 mg) by mouth 2 times daily as needed for muscle spasms  Dispense: 180 tablet; Refill: 3    3. Atherosclerosis of native coronary artery of native heart without angina pectoris  Needed refills.  - metoprolol (TOPROL-XL) 25 MG 24 hr tablet; Take 1 tablet (25 mg) by mouth daily  Dispense: 90 tablet; Refill: 3    See Patient Instructions    David Chavez MD, MD  [facility]  "

## 2017-03-14 NOTE — MR AVS SNAPSHOT
After Visit Summary   3/14/2017    Lamont Daniels    MRN: [id number]           Patient Information     Date Of Birth          1965        Visit Information        Provider Department      3/14/2017 4:00 PM David Chavez MD [facility]        Today's Diagnoses     Benign prostatic hyperplasia with lower urinary tract symptoms, unspecified morphology    -  1    Spasm of back muscles        Atherosclerosis of native coronary artery of native heart without angina pectoris           Follow-ups after your visit        Your next 10 appointments already scheduled     Mar 20, 2017  2:45 PM CDT   Return Visit with Ricardo Rae MD, [facility] NURSE ONLY   [facility] ([facility])    [address] [place] [address]   [phone]            Apr 06, 2017  3:30 PM CDT   Return Visit with Dorothea Loo MD   [facility] ([facility] ([facility])    [address] [place] [id number] [phone]            Apr 14, 2017  2:00 PM CDT   Return Visit with Fly Travis MD   [facility] ([facility])    [address] [place] [address]   [phone]            Apr 18, 2017  3:00 PM CDT   [facility] Visit with Sebastián Jenkins MD   [facility]    [address] [facility] [id number] [phone]              Who to contact     If you have questions or need follow up information about today's clinic visit or your schedule please contact [facility] directly at [phone].  Normal or non-critical lab and imaging results will be communicated to you by MyChart, letter or phone within 4 business days after the clinic has received the results. If you do not hear from us within 7 days, please contact the clinic through MyChart or phone. If you have a critical or  "abnormal lab result, we will notify you by phone as soon as possible.  Submit refill requests through [employer] or call your pharmacy and they will forward the refill request to us. Please allow 3 business days for your refill to be completed.          Additional Information About Your Visit        TV Compass[employer] Information     [employer] gives you secure access to your electronic health record. If you see a primary care provider, you can also send messages to your care team and make appointments. If you have questions, please call your primary care clinic.  If you do not have a primary care provider, please call [phone] and they will assist you.        Care EveryWhere ID     This is your Care EveryWhere ID. This could be used by other organizations to access your [facility] medical records  [id number]        Your Vitals Were     Pulse Temperature Height Pulse Oximetry BMI (Body Mass Index)       66 96.3  F (35.7  C) (Oral) 5' 2\" (1.575 m) 100% 31.72 kg/m2        Blood Pressure from Last 3 Encounters:   03/14/17 128/82   02/07/17 128/84   01/17/17 123/76    Weight from Last 3 Encounters:   03/14/17 173 lb 6.4 oz (78.7 kg)   02/07/17 184 lb (83.5 kg)   01/17/17 181 lb 3.2 oz (82.2 kg)              We Performed the Following     PSA, screen          Today's Medication Changes          These changes are accurate as of: 3/14/17  4:19 PM.  If you have any questions, ask your nurse or doctor.               Start taking these medicines.        Dose/Directions    tamsulosin 0.4 MG capsule   Commonly known as:  FLOMAX   Used for:  Benign prostatic hyperplasia with lower urinary tract symptoms, unspecified morphology   Started by:  David Chavez MD        Dose:  0.4 mg   Take 1 capsule (0.4 mg) by mouth daily   Quantity:  90 capsule   Refills:  3         These medicines have changed or have updated prescriptions.        Dose/Directions    baclofen 10 MG tablet   Commonly known as:  LIORESAL   This may have changed:  See the new " instructions.   Used for:  Spasm of back muscles   Changed by:  David Chavez MD        Dose:  10 mg   Take 1 tablet (10 mg) by mouth 2 times daily as needed for muscle spasms   Quantity:  180 tablet   Refills:  3         Stop taking these medicines if you haven't already. Please contact your care team if you have questions.     metoprolol 25 MG tablet   Commonly known as:  LOPRESSOR   Stopped by:  David Chavez MD                Where to get your medicines      These medications were sent to [facility] [address] [place] [address] [address] [facility]  [address] [facility] [address]     Phone:  [phone]     baclofen 10 MG tablet    metoprolol 25 MG 24 hr tablet    tamsulosin 0.4 MG capsule                Primary Care Provider Office Phone # Fax #    David Chavez [phone]       [facility] [address]  [facility] [address]        Thank you!     Thank you for choosing [facility]  for your care. Our goal is always to provide you with excellent care. Hearing back from our patients is one way we can continue to improve our services. Please take a few minutes to complete the written survey that you may receive in the mail after your visit with us. Thank you!             Your Updated Medication List - Protect others around you: Learn how to safely use, store and throw away your medicines at www.disposemymeds.org.          This list is accurate as of: 3/14/17  4:19 PM.  Always use your most recent med list.                   Brand Name Dispense Instructions for use    allopurinol 300 MG tablet    ZYLOPRIM    90 tablet    TAKE ONE TABLET BY MOUTH ONCE DAILY       aspirin 81 MG EC tablet     30 tablet    Take 1 tablet (81 mg) by mouth daily (*)       atorvastatin 80 MG tablet    LIPITOR    90 tablet    Take 1 tablet (80 mg) by mouth daily for cholesterol.       baclofen 10 MG tablet    LIORESAL    180 tablet    Take 1 tablet (10 mg) by mouth 2 times  daily as needed for muscle spasms       BusPIRone HCl 30 MG Tabs     180 tablet    TAKE ONE TABLET BY MOUTH TWICE DAILY       clonazePAM 1 MG tablet    klonoPIN    90 tablet    TAKE ONE-HALF TO ONE TABLET BY MOUTH THREE TIMES DAILY AS NEEDED FOR ANXIETY       clopidogrel 75 MG tablet    PLAVIX    90 tablet    Take 1 tablet (75 mg) by mouth daily       colchicine 0.6 MG tablet     30 tablet    TAKE 2 TABLETS BY MOUTH AT THE FIRST SIGN OF FLARE,TAKE 1 ADDITIONAL TABLET ONE HOUR LATER.       Etanercept 50 MG/ML autoinjector    ENBREL SURECLICK    1 kit    Inject 50 mg Subcutaneous once a week       evolocumab 140 MG/ML prefilled autoinjector    REPATHA    2 mL    Inject 1 mL (140 mg) Subcutaneous every 14 days       ezetimibe 10 MG tablet    ZETIA    90 tablet    Take 1 tablet (10 mg) by mouth daily       gabapentin 300 MG capsule    NEURONTIN    540 capsule    TAKE TWO CAPSULES BY MOUTH THREE TIMES DAILY       gemfibrozil 600 MG tablet    LOPID    180 tablet    Take 1 tablet (600 mg) by mouth 2 times daily       hydrocortisone 0.2 % cream    WESTCORT    45 g    Use twice daily on the eats for active rash for up to 2 weeks in a row, then take 2 weeks off.       ketoconazole 2 % cream    NIZORAL    30 g    Apply topically 2 times daily Apply to back of scalp and to groin twice daily until rash is clear       meclizine 25 MG tablet    ANTIVERT    30 tablet    TAKE ONE TABLET BY MOUTH EVERY 6 HOURS AS NEEDED FOR DIZZINESS       melatonin 3 MG tablet      Take 1 tablet (3 mg) by mouth nightly as needed for sleep       meloxicam 7.5 MG tablet    MOBIC    30 tablet    TAKE ONE TABLET BY MOUTH ONCE DAILY WITH  BREAKFAST       metoprolol 25 MG 24 hr tablet    TOPROL-XL    90 tablet    Take 1 tablet (25 mg) by mouth daily       mirtazapine 15 MG tablet    REMERON    30 tablet    Take 1 tablet (15 mg) by mouth At Bedtime       nitroglycerin 0.4 MG sublingual tablet    NITROSTAT    30 tablet    Place 1 tablet (0.4 mg) under the  tongue every 5 minutes as needed       order for DME      1.  CPAP pressure 11 cm/H20 with heated humidity.  2.  Provide mask to fit and CPAP supplies.  3.  Length of need lifetime.  4.  If needed please provide a chin strap       order for DME     1 Units    Equipment being ordered: single end cane       oxyCODONE 5 MG IR tablet    ROXICODONE    150 tablet    Take 1-2 tablets (5-10 mg) by mouth every 4 hours as needed for moderate to severe pain . Max of 6 tabs per day.  May fill on/after 3/9/17 to start taking on/after 3/11/17. One month supply.       pantoprazole 40 MG EC tablet    PROTONIX    90 tablet    TAKE ONE TABLET BY MOUTH ONCE DAILY FOR STOMACH       predniSONE 5 MG tablet    DELTASONE    30 tablet    Take 1 tablet (5 mg) by mouth daily       sulfaSALAzine  MG EC tablet    AZULFIDINE EN    120 tablet    Take 2 tablets (1,000 mg) by mouth 2 times daily       tamsulosin 0.4 MG capsule    FLOMAX    90 capsule    Take 1 capsule (0.4 mg) by mouth daily       tenofovir 300 MG tablet    VIREAD    90 tablet    Take 1 tablet (300 mg) by mouth daily       triamcinolone 0.1 % ointment    KENALOG    80 g    Apply sparingly to the ears and groin twice daily until clear, then once daily until clear. Do not apply to the face.       vitamin D 2000 UNITS tablet     100 tablet    TAKE ONE TABLET BY MOUTH ONCE DAILY       zolpidem 5 MG tablet    AMBIEN    30 tablet    TAKE ONE TABLET BY MOUTH AT BEDTIME AS NEEDED FOR SLEEP

## 2017-03-14 NOTE — LETTER
[facility]  [address] [address]  [phone]             March 15, 2017    Lamont Daniels  [address] [address]            Dear Lamont,     Your prostate test was normal. Enclosed are the results.  Results for orders placed or performed in visit on 03/14/17   PSA, screen   Result Value Ref Range    PSA 0.42 0 - 4 ug/L       Sincerely,   David Chavez MD/bala

## 2017-03-15 ASSESSMENT — ANXIETY QUESTIONNAIRES: GAD7 TOTAL SCORE: 19

## 2017-03-15 ASSESSMENT — PATIENT HEALTH QUESTIONNAIRE - PHQ9: SUM OF ALL RESPONSES TO PHQ QUESTIONS 1-9: 21

## 2017-03-20 ENCOUNTER — OFFICE VISIT (OUTPATIENT)
Dept: OPHTHALMOLOGY | Facility: CLINIC | Age: 52
End: 2017-03-20
Payer: COMMERCIAL

## 2017-03-20 DIAGNOSIS — H40.003 GLAUCOMA SUSPECT OF BOTH EYES: Primary | ICD-10-CM

## 2017-03-20 DIAGNOSIS — H02.66 XANTHELASMA OF EYELID, BILATERAL: ICD-10-CM

## 2017-03-20 DIAGNOSIS — H52.13 MYOPIA OF BOTH EYES: ICD-10-CM

## 2017-03-20 DIAGNOSIS — H02.833 DERMATOCHALASIS OF EYELIDS OF BOTH EYES: ICD-10-CM

## 2017-03-20 DIAGNOSIS — H02.63 XANTHELASMA OF EYELID, BILATERAL: ICD-10-CM

## 2017-03-20 DIAGNOSIS — H02.836 DERMATOCHALASIS OF EYELIDS OF BOTH EYES: ICD-10-CM

## 2017-03-20 PROCEDURE — 92014 COMPRE OPH EXAM EST PT 1/>: CPT | Performed by: OPHTHALMOLOGY

## 2017-03-20 ASSESSMENT — VISUAL ACUITY
OD_CC+: -1
OS_CC: 20/25
OS_CC: 20/15
METHOD: SNELLEN - LINEAR
OD_CC: 20/30
OD_CC: 20/20
CORRECTION_TYPE: GLASSES

## 2017-03-20 ASSESSMENT — REFRACTION_MANIFEST
OS_CYLINDER: +0.25
OS_ADD: +2.00
OD_ADD: +2.00
OS_AXIS: 110
OD_AXIS: 015
OD_CYLINDER: +0.25
OS_SPHERE: -4.50
OD_SPHERE: -4.75

## 2017-03-20 ASSESSMENT — CUP TO DISC RATIO
OS_RATIO: 0.45
OD_RATIO: 0.45

## 2017-03-20 ASSESSMENT — REFRACTION_WEARINGRX
OD_ADD: +1.75
OS_SPHERE: -4.50
OS_CYLINDER: +0.50
OD_AXIS: 014
OS_ADD: +1.75
OS_AXIS: 168
SPECS_TYPE: BIFOCAL
OD_CYLINDER: +0.25
OD_SPHERE: -4.50

## 2017-03-20 ASSESSMENT — SLIT LAMP EXAM - LIDS
COMMENTS: NORMAL
COMMENTS: NORMAL

## 2017-03-20 ASSESSMENT — CONF VISUAL FIELD
OS_NORMAL: 1
OD_NORMAL: 1

## 2017-03-20 ASSESSMENT — EXTERNAL EXAM - RIGHT EYE: OD_EXAM: NORMAL

## 2017-03-20 ASSESSMENT — TONOMETRY
OD_IOP_MMHG: 16
IOP_METHOD: TONOPEN
OS_IOP_MMHG: 14

## 2017-03-20 ASSESSMENT — EXTERNAL EXAM - LEFT EYE: OS_EXAM: NORMAL

## 2017-03-20 NOTE — PROGRESS NOTES
Assessment & Plan   Lamont Daniels is a 51 year old male who presents with:   Review of systems for the eyes was negative other than the pertinent positives and negatives noted in the HPI.  History is obtained from the patient.    Glaucoma suspect of both eyes - Both Eyes  OCT RNFL today shows borderline dropout OD and inferior and nasal dropoout (red) OS.  Recommended Diopsys exam in [place].    Myopia of both eyes - Both Eyes  Observe  Xanthelasma of eyelid, bilateral  Observe  Dermatochalasis of eyelids of both eye  Observe      Return in 1 year for annual exam.    Documentation for today's encounter was performed by Willow SANDHU  Acting as a scribe in my presence. I have reviewed and verified that it is an accurate recording of today's encounter.    Attending Physician Attestation:  Complete documentation of historical and exam elements from today's encounter can be found in the full encounter summary report (not reduplicated in this progress note).  I personally obtained the chief complaint(s) and history of present illness.  I confirmed and edited as necessary the review of systems, past medical/surgical history, family history, social history, and examination findings as documented by others; and I examined the patient myself.  I personally reviewed the relevant tests, images, and reports as documented above.  I formulated and edited as necessary the assessment and plan and discussed the findings and management plan with the patient and family. - Ricardo Rae MD

## 2017-03-20 NOTE — MR AVS SNAPSHOT
After Visit Summary   3/20/2017    Lamont Daniels    MRN: [id number]           Patient Information     Date Of Birth          1965        Visit Information        Provider Department      3/20/2017 2:45 PM Ricardo Rae MD; [facility] NURSE ONLY [facility]        Today's Diagnoses     Glaucoma suspect of both eyes - Both Eyes    -  1    Myopia of both eyes - Both Eyes        Xanthelasma of eyelid, bilateral        Dermatochalasis of eyelids of both eyes           Follow-ups after your visit        Your next 10 appointments already scheduled     Apr 06, 2017  3:30 PM CDT   Return Visit with Dorothea Loo MD   [facility] ([facility])    [address] [facility] [address] [phone]            Apr 14, 2017  2:00 PM CDT   Return Visit with Fly Travis MD   [facility] ([facility])    [address] [place] [id number] [phone]            Apr 18, 2017  3:00 PM CDT   [facility] Visit with Sebastián Jenkins [facility]   [facility]    [address] [facility] [address] [phone]              Who to contact     If you have questions or need follow up information about today's clinic visit or your schedule please contact [facility] directly at [phone].  Normal or non-critical lab and imaging results will be communicated to you by MyChart, letter or phone within 4 business days after the clinic has received the results. If you do not hear from us within 7 days, please contact the clinic through MyChart or phone. If you have a critical or abnormal lab result, we will notify you by phone as soon as possible.  Submit refill requests through [employer] or call your pharmacy and they will forward the refill request to us. Please allow 3 business days for your refill to be completed.           Additional Information About Your Visit        [employer] Information     [employer] gives you secure access to your electronic health record. If you see a primary care provider, you can also send messages to your care team and make appointments. If you have questions, please call your primary care clinic.  If you do not have a primary care provider, please call [phone] and they will assist you.      [employer] is an electronic gateway that provides easy, online access to your medical records. With [employer], you can request a clinic appointment, read your test results, renew a prescription or communicate with your care team.     To access your existing account, please contact your [facility] or call [phone] for assistance.        Care EveryWhere ID     This is your Care EveryWhere ID. This could be used by other organizations to access your [facility] medical records  [id number]         Blood Pressure from Last 3 Encounters:   03/14/17 128/82   02/07/17 128/84   01/17/17 123/76    Weight from Last 3 Encounters:   03/14/17 78.7 kg (173 lb 6.4 oz)   02/07/17 83.5 kg (184 lb)   01/17/17 82.2 kg (181 lb 3.2 oz)              We Performed the Following     EYE EXAM, EST PATIENT,COMPREHESV        Primary Care Provider Office Phone # Fax #    David Chavez [phone]       [facility] [address] [facility] [address]        Thank you!     Thank you for choosing [facility]  for your care. Our goal is always to provide you with excellent care. Hearing back from our patients is one way we can continue to improve our services. Please take a few minutes to complete the written survey that you may receive in the mail after your visit with us. Thank you!             Your Updated Medication List - Protect others around you: Learn how to safely use, store and throw away your medicines at www.disposemymeds.org.          This list is accurate as  of: 3/20/17  4:04 PM.  Always use your most recent med list.                   Brand Name Dispense Instructions for use    allopurinol 300 MG tablet    ZYLOPRIM    90 tablet    TAKE ONE TABLET BY MOUTH ONCE DAILY       aspirin 81 MG EC tablet     30 tablet    Take 1 tablet (81 mg) by mouth daily (*)       atorvastatin 80 MG tablet    LIPITOR    90 tablet    Take 1 tablet (80 mg) by mouth daily for cholesterol.       baclofen 10 MG tablet    LIORESAL    180 tablet    Take 1 tablet (10 mg) by mouth 2 times daily as needed for muscle spasms       BusPIRone HCl 30 MG Tabs     180 tablet    TAKE ONE TABLET BY MOUTH TWICE DAILY       clonazePAM 1 MG tablet    klonoPIN    90 tablet    TAKE ONE-HALF TO ONE TABLET BY MOUTH THREE TIMES DAILY AS NEEDED FOR ANXIETY       clopidogrel 75 MG tablet    PLAVIX    90 tablet    Take 1 tablet (75 mg) by mouth daily       colchicine 0.6 MG tablet     30 tablet    TAKE 2 TABLETS BY MOUTH AT THE FIRST SIGN OF FLARE,TAKE 1 ADDITIONAL TABLET ONE HOUR LATER.       Etanercept 50 MG/ML autoinjector    ENBREL SURECLICK    1 kit    Inject 50 mg Subcutaneous once a week       evolocumab 140 MG/ML prefilled autoinjector    REPATHA    2 mL    Inject 1 mL (140 mg) Subcutaneous every 14 days       ezetimibe 10 MG tablet    ZETIA    90 tablet    Take 1 tablet (10 mg) by mouth daily       gabapentin 300 MG capsule    NEURONTIN    540 capsule    TAKE TWO CAPSULES BY MOUTH THREE TIMES DAILY       gemfibrozil 600 MG tablet    LOPID    180 tablet    Take 1 tablet (600 mg) by mouth 2 times daily       hydrocortisone 0.2 % cream    WESTCORT    45 g    Use twice daily on the eats for active rash for up to 2 weeks in a row, then take 2 weeks off.       ketoconazole 2 % cream    NIZORAL    30 g    Apply topically 2 times daily Apply to back of scalp and to groin twice daily until rash is clear       meclizine 25 MG tablet    ANTIVERT    30 tablet    TAKE ONE TABLET BY MOUTH EVERY 6 HOURS AS NEEDED FOR DIZZINESS        melatonin 3 MG tablet      Take 1 tablet (3 mg) by mouth nightly as needed for sleep       meloxicam 7.5 MG tablet    MOBIC    30 tablet    TAKE ONE TABLET BY MOUTH ONCE DAILY WITH  BREAKFAST       metoprolol 25 MG 24 hr tablet    TOPROL-XL    90 tablet    Take 1 tablet (25 mg) by mouth daily       mirtazapine 15 MG tablet    REMERON    30 tablet    Take 1 tablet (15 mg) by mouth At Bedtime       nitroglycerin 0.4 MG sublingual tablet    NITROSTAT    30 tablet    Place 1 tablet (0.4 mg) under the tongue every 5 minutes as needed       order for DME      1.  CPAP pressure 11 cm/H20 with heated humidity.  2.  Provide mask to fit and CPAP supplies.  3.  Length of need lifetime.  4.  If needed please provide a chin strap       order for DME     1 Units    Equipment being ordered: single end cane       oxyCODONE 5 MG IR tablet    ROXICODONE    150 tablet    Take 1-2 tablets (5-10 mg) by mouth every 4 hours as needed for moderate to severe pain . Max of 6 tabs per day.  May fill on/after 3/9/17 to start taking on/after 3/11/17. One month supply.       pantoprazole 40 MG EC tablet    PROTONIX    90 tablet    TAKE ONE TABLET BY MOUTH ONCE DAILY FOR STOMACH       predniSONE 5 MG tablet    DELTASONE    30 tablet    Take 1 tablet (5 mg) by mouth daily       sulfaSALAzine  MG EC tablet    AZULFIDINE EN    120 tablet    Take 2 tablets (1,000 mg) by mouth 2 times daily       tamsulosin 0.4 MG capsule    FLOMAX    90 capsule    Take 1 capsule (0.4 mg) by mouth daily       tenofovir 300 MG tablet    VIREAD    90 tablet    Take 1 tablet (300 mg) by mouth daily       triamcinolone 0.1 % ointment    KENALOG    80 g    Apply sparingly to the ears and groin twice daily until clear, then once daily until clear. Do not apply to the face.       vitamin D 2000 UNITS tablet     100 tablet    TAKE ONE TABLET BY MOUTH ONCE DAILY       zolpidem 5 MG tablet    AMBIEN    30 tablet    TAKE ONE TABLET BY MOUTH AT BEDTIME AS NEEDED FOR  SLEEP

## 2017-03-20 NOTE — NURSING NOTE
Patient presents with:  COMPREHENSIVE EYE EXAM      Referring Provider:  ESTABLISHED PATIENT  No address on file    HPI    Last Eye Exam:  3/7/16   Informant(s):  EMR   Affected eye(s):  Both   Symptoms:     Blurred vision   Decreased vision   Floaters   Tearing         Do you have eye pain now?:  No      Comments:  Stopped Plaquenil 6 months ago.  Noticed [facility] changes 6 months ago.  Has been feeling pressure in OU x 2 months. Notices in the morning and gets better by the afternoon.

## 2017-04-05 DIAGNOSIS — I25.10 ATHEROSCLEROSIS OF NATIVE CORONARY ARTERY OF NATIVE HEART, ANGINA PRESENCE UNSPECIFIED: ICD-10-CM

## 2017-04-05 DIAGNOSIS — F41.9 ANXIETY: ICD-10-CM

## 2017-04-05 DIAGNOSIS — R42 VERTIGO: ICD-10-CM

## 2017-04-05 DIAGNOSIS — M10.9 GOUT WITHOUT TOPHUS: ICD-10-CM

## 2017-04-05 DIAGNOSIS — F45.8 ANXIETY HYPERVENTILATION: ICD-10-CM

## 2017-04-05 DIAGNOSIS — E78.5 HYPERLIPIDEMIA LDL GOAL <100: ICD-10-CM

## 2017-04-05 DIAGNOSIS — F41.9 ANXIETY HYPERVENTILATION: ICD-10-CM

## 2017-04-05 DIAGNOSIS — F33.1 MAJOR DEPRESSIVE DISORDER, RECURRENT EPISODE, MODERATE (H): ICD-10-CM

## 2017-04-06 NOTE — TELEPHONE ENCOUNTER
meclizine (ANTIVERT) 25 MG tablet      Last Written Prescription Date: 3/3/17  Last Fill Quantity: 30,  # refills: 0   Last Office Visit with [facility], [facility] or [facility] prescribing provider: 3/14/17                                         Next 5 appointments (look out 90 days)     Apr 06, 2017  3:30 PM CDT   Return Visit with Dorothea Loo MD   [facility] ([facility])    [address] [facility] [address]   [phone]            Apr 14, 2017  2:00 PM CDT   Return Visit with Fly Travis MD   [facility] ([facility])    [address] [place] [address] [phone]            Apr 18, 2017  3:00 PM CDT   Return Visit with Sebastián Jenkins MD   [facility] ([facility])    [address] [facility] [id number] [phone]                  colchicine 0.6 MG tablet       Last Written Prescription Date: 3/3/17  Last Fill Quantity: 30, # refills: 0  Last Office Visit with G, [facility] or  Health prescribing provider:  3/14/17   Next 5 appointments (look out 90 days)     Apr 06, 2017  3:30 PM CDT   Return Visit with Dorothea Loo MD   [facility] ([facility])    [address] [facility] [address]   [phone]            Apr 14, 2017  2:00 PM CDT   Return Visit with Fly Travis MD   [facility] ([facility])    [address] [place] [address] [phone]            Apr 18, 2017  3:00 PM CDT   Return Visit with Sebastián Jenkins MD   [facility] ([facility])    [address] [facility] [address]   [phone]                   Uric Acid   Date Value Ref Range Status   11/04/2015 6.1 3.5 - 7.2 mg/dL Final   ]  Creatinine   Date Value Ref Range Status   01/17/2017 1.01 0.66 - 1.25 mg/dL  Final   ]  Lab Results   Component Value Date    WBC 6.6 01/17/2017     Lab Results   Component Value Date    RBC 5.11 01/17/2017     Lab Results   Component Value Date    HGB 15.6 01/17/2017     Lab Results   Component Value Date    HCT 45.6 01/17/2017     No components found for: MCT  Lab Results   Component Value Date    MCV 89 01/17/2017     Lab Results   Component Value Date    MCH 30.5 01/17/2017     Lab Results   Component Value Date    MCHC 34.2 01/17/2017     Lab Results   Component Value Date    RDW 13.5 01/17/2017     Lab Results   Component Value Date     01/17/2017     Lab Results   Component Value Date    AST 27 01/17/2017     Lab Results   Component Value Date    ALT 41 01/17/2017       clonazePAM (KLONOPIN) 1 MG tablet      Last Written Prescription Date:  3/3/17  Last Fill Quantity: 90,   # refills: 0  Last Office Visit with [facility] or  Health prescribing provider: 3/14/17  Future Office visit:    Next 5 appointments (look out 90 days)     Apr 06, 2017  3:30 PM CDT   Return Visit with Dorothea Loo MD   [facility] ([facility])    [address] [place] [address]   [phone]            Apr 14, 2017  2:00 PM CDT   Return Visit with Fly Travis MD   [facility] ([facility])    [address] [place] [address]   [phone]            Apr 18, 2017  3:00 PM CDT   Return Visit with Sebastián Jenkins MD   [facility] ([facility])    [address] [facility] [address]   [phone]                   Routing refill request to provider for review/approval because:  Drug not on the [facility] or  Health refill protocol or controlled substance      fluvoxaMINE (LUVOX) 50 MG tablet (Discontinued)     Last Written Prescription Date: 10/21/16  Last Fill Quantity: 30, # refills: 3  Last Office Visit with [facility] primary care provider:   3/14/17   Next 5 appointments (look out 90 days)     Apr 06, 2017  3:30 PM CDT   Return Visit with Dorothea Loo MD   [facility] ([facility])    [address] [facility] [place] [address]   [phone]            Apr 14, 2017  2:00 PM CDT   Return Visit with Fly Travis MD   [facility] ([facility])    [address] [place] [address]   [phone]            Apr 18, 2017  3:00 PM CDT   Return Visit with Sebastián Jenkins MD   [facility] ([facility])    [address] [facility] [address] [phone]                   Last PHQ-9 score on record=   PHQ-9 SCORE 3/14/2017   Total Score 21       atorvastatin (LIPITOR) 80 MG tablet     Last Written Prescription Date: 8/4/16  Last Fill Quantity: 90, # refills: 3  Last Office Visit with G, P or [facility] prescribing provider: 3/14/17  Next 5 appointments (look out 90 days)     Apr 06, 2017  3:30 PM CDT   Return Visit with Dorothea Loo MD   [facility] ([facility])    [address] [facility] [place] [address]   [phone]            Apr 14, 2017  2:00 PM CDT   Return Visit with Fly Travis MD   [facility] ([facility])    [address] [place] [address]   [phone]            Apr 18, 2017  3:00 PM CDT   Return Visit with Sebastián Jenkins MD   [facility] ([facility])    [address] [facility] [address] [phone]                   Lab Results   Component Value Date    CHOL 244 10/25/2016     Lab Results   Component Value Date    HDL 90 10/25/2016     Lab Results   Component Value Date     10/25/2016     Lab Results   Component Value Date    TRIG 100 10/25/2016     Lab Results   Component Value Date    CHOLHDLRATIO 6.6  06/29/2015             Costa Faarax  [facility]

## 2017-04-07 ENCOUNTER — TELEPHONE (OUTPATIENT)
Dept: PSYCHIATRY | Facility: CLINIC | Age: 52
End: 2017-04-07

## 2017-04-07 NOTE — TELEPHONE ENCOUNTER
Routing refill request to provider for review/approval because:  Drug not on the [facility] refill protocol     BANDAR Levin, Clinical RN Latoya Rider.

## 2017-04-07 NOTE — TELEPHONE ENCOUNTER
Pt's last visit in January. Disability forms rec'd. Will attempt to get pt schedule for most updated information.     Treatment plan from last visit.  Treatment Plan:  Continue current dose of Buspar   Stop Luvox   Trial of Remeron (mirtazepine) 15 mg at bedtime   Safety plan was reviewed; to the ER as needed or call after hours crisis line; [phone]  Education and counseling was done regarding use of medications, psychotherapy options  Call for a follow up appointment with Dr. Acosta in 10-12 weeks or so; [phone].           Signed: Wiley Acosta M.D.

## 2017-04-10 ENCOUNTER — TELEPHONE (OUTPATIENT)
Dept: CARDIOLOGY | Facility: CLINIC | Age: 52
End: 2017-04-10

## 2017-04-10 NOTE — TELEPHONE ENCOUNTER
Called pt to schedule fasting labs per Dr. Travis prior to appointment with Dr. Travis this Friday 04/14/2017. Also need to know if pt still has box the Repatha injection came in. If so, need to ask pt to bring to clinic at time of appointment with Dr. Travis. Pt did not answer. Left detailed message for pt and asked for him to return call to clinic.  Kinsey Chi, CMA

## 2017-04-11 ENCOUNTER — TELEPHONE (OUTPATIENT)
Dept: FAMILY MEDICINE | Facility: CLINIC | Age: 52
End: 2017-04-11

## 2017-04-11 RX ORDER — ATORVASTATIN CALCIUM 80 MG/1
TABLET, FILM COATED ORAL
Qty: 90 TABLET | Refills: 0 | Status: SHIPPED | OUTPATIENT
Start: 2017-04-11 | End: 2017-07-05

## 2017-04-11 RX ORDER — CLONAZEPAM 1 MG/1
TABLET ORAL
Qty: 90 TABLET | Refills: 0 | OUTPATIENT
Start: 2017-04-11

## 2017-04-11 RX ORDER — MECLIZINE HYDROCHLORIDE 25 MG/1
TABLET ORAL
Qty: 30 TABLET | Refills: 0 | Status: SHIPPED | OUTPATIENT
Start: 2017-04-11 | End: 2017-04-26

## 2017-04-11 RX ORDER — COLCHICINE 0.6 MG/1
TABLET ORAL
Qty: 30 TABLET | Refills: 0 | Status: SHIPPED | OUTPATIENT
Start: 2017-04-11 | End: 2017-04-26

## 2017-04-11 RX ORDER — FLUVOXAMINE MALEATE 50 MG
TABLET ORAL
Qty: 30 TABLET | Refills: 0 | OUTPATIENT
Start: 2017-04-11

## 2017-04-11 RX ORDER — CLONAZEPAM 1 MG/1
TABLET ORAL
Qty: 90 TABLET | Refills: 0 | Status: SHIPPED | OUTPATIENT
Start: 2017-04-11 | End: 2017-04-26

## 2017-04-11 NOTE — TELEPHONE ENCOUNTER
Reason for Call:  Other call back    Detailed comments: Charlene from [facility] pharmacy in [place] is requesting a call back from care team in regards to some questions they have from electronic prescriptions that were sent over. Please call Charlene or Jarret    Phone Number Patient can be reached at: [phone]    Best Time: anytime    Can we leave a detailed message on this number? Not Applicable    Call taken on 4/11/2017 at 12:40 PM by Tash Muro

## 2017-04-11 NOTE — TELEPHONE ENCOUNTER
Patient is on gemfibrozil.  There is a Medication interaction between Gemfibrozil and Lipitor.    Also, pharmacy is wondering if Colchicine 0.6 mg is to be taken daily or only with flares.    Routing to provider. Please advise.  Rachelle Mendoza RN

## 2017-04-11 NOTE — TELEPHONE ENCOUNTER
Faxed RX April 11, 2017 to fax number [phone]    Right Fax confirmed at 2:00 PM    DIAMOND Treadwell MA

## 2017-04-12 NOTE — TELEPHONE ENCOUNTER
".Reason for Call:  reporting a drug interaction between gemfibrozil and a \"statin\"    Detailed comments: please call to discuss     Phone Number Patient can be reached at:   phone number:  [phone] Marcelo    Best Time: anytime    Can we leave a detailed message on this number? Not Applicable    Call taken on 4/12/2017 at 8:08 AM by Usha Morel      "

## 2017-04-12 NOTE — TELEPHONE ENCOUNTER
Please let pharmacy know that it is okay to continue with both the gemfibrozil and atorvastatin. Aware of possible interaction. Patient asymptomatic and labs routinely monitored.    David Chavez MD

## 2017-04-12 NOTE — TELEPHONE ENCOUNTER
Second attempt to call pt. Pt did not answer. Left detailed message for pt to schedule/ return call to clinic.  Kinsey Chi, CMA

## 2017-04-13 NOTE — TELEPHONE ENCOUNTER
Third attempt made to pt. Pt did not answer. Left detailed message for pt to return call.  Kinsey Chi, CMA

## 2017-04-14 ENCOUNTER — OFFICE VISIT (OUTPATIENT)
Dept: CARDIOLOGY | Facility: CLINIC | Age: 52
End: 2017-04-14
Payer: COMMERCIAL

## 2017-04-14 VITALS
WEIGHT: 171.1 LBS | OXYGEN SATURATION: 99 % | BODY MASS INDEX: 31.29 KG/M2 | HEART RATE: 70 BPM | SYSTOLIC BLOOD PRESSURE: 131 MMHG | DIASTOLIC BLOOD PRESSURE: 80 MMHG

## 2017-04-14 DIAGNOSIS — E78.01 FAMILIAL HYPERCHOLESTEROLEMIA: Primary | ICD-10-CM

## 2017-04-14 PROCEDURE — 99214 OFFICE O/P EST MOD 30 MIN: CPT | Mod: GC | Performed by: INTERNAL MEDICINE

## 2017-04-14 ASSESSMENT — PAIN SCALES - GENERAL: PAINLEVEL: MODERATE PAIN (5)

## 2017-04-14 NOTE — NURSING NOTE
"Lamont Daniels's goals for this visit include:   Chief Complaint   Patient presents with     RECHECK     CAD       He requests these members of his care team be copied on today's visit information: PCP    PCP: David Chavez    Referring Provider:  No referring provider defined for this encounter.    Chief Complaint   Patient presents with     RECHECK     CAD       Initial /80 (BP Location: Left arm, Patient Position: Chair, Cuff Size: Adult Regular)  Pulse 70  Wt 77.6 kg (171 lb 1.6 oz)  SpO2 99%  BMI 31.29 kg/m2 Estimated body mass index is 31.29 kg/(m^2) as calculated from the following:    Height as of 3/14/17: 1.575 m (5' 2\").    Weight as of this encounter: 77.6 kg (171 lb 1.6 oz).  Medication Reconciliation: complete         Medication Refills: none      Kinsey Chi CMA      "

## 2017-04-14 NOTE — PROGRESS NOTES
[facility] Cardiology Consultation:    Assessment and Plan:     1. Premature CAD, CABG in 2008, PCI to Cx in '08, repeat PCI to Cx stent 2016 (patent sequential LIMA-D3-LAD, sequential SVG-RV marginal-PDA, occluded radial graft to PL branches), normal LV fxn: Cont secondary prevention with asa. Plan to continue plavix long term for now given aggressive ISR and low bleeding risk.      2. Dyspnea: stable. Echo and PFT's are normal, its likely related to anxiety.   3. Dyslipidemia, likely heterozygous FH, metabolic syndrome: On lipitor 80, gemfibrozil, zetia 10. Started Repatha for uncontrolled dyspilidemia despite maximal rx. Instructed on correct dosing regimen. Check FLP next week.   4. JEROD on CPAP    RTC 6 mos.     [facility] Cardiovascular Division    I have seen and examined the patient, reviewed labs and tests. I have discussed my findings and treatment recommendations with the house staff and/or Cardiology fellow and agree with their assessment and plan as outlined in the note.    Fly Travis MD    Cardiac Imaging and Prevention  [facility]  Pager: [phone]    HPI:  Doing well. No intercurrent illness or cardiac episodes. We were able to appeal and get repatha approved. He had an injection site reaction with first dose, but it resolved with subsequent doses. Unfortunately he has been taking it every week, instead of every 2 weeks as instructed.   Feels anxiety is main issue. Dyspnea is at baseline.   1 NTG last month, chest pain is occasional. Denies any orthopnea, pnd, palpitations, syncope/presyncope or edema.    EXAM:  /80 (BP Location: Left arm, Patient Position: Chair, Cuff Size: Adult Regular)  Pulse 70  Wt 77.6 kg (171 lb 1.6 oz)  SpO2 99%  BMI 31.29 kg/m2  GEN/CONSTITUIONAL: Appears comfortable, in no apparent distress   EYES: No icterus  ENT/MOUTH: Normal  JVP:  Not visible  RESPIRATORY: Clear to auscultation  bilaterally   CARDIOVASCULAR: Regular S1 and S2, no murmurs, rubs, or gallops.   ABDOMEN: Soft, non-tender, positive bowel sounds   NEUROLOGIC: Grossly non-focal   PSYCHIATRIC: Normal affect  EXT: No cyanosis, clubbing, edema. Normal pedal pulses.  Skin: No petechiae, purpura or rash    PAST MEDICAL HISTORY:  Past Medical History:   Diagnosis Date     Anxiety      CAD (coronary artery disease)     sees cardiologist Dr. Yang, has had stents and cabg     Congestive heart failure, unspecified 2008     Gout      Hepatitis B > 10 years     HTN (hypertension)      Hyperlipidemia LDL goal < 100      Malrotation of intestine      Moderate major depression (H)      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) 9/19/2011    Uses CPAP     Rheumatoid arthritis flare (H) 1012     Steatohepatitis 12/15    liver biopsy     Tuberculosis age 13    INH x 9 months      Vitamin D deficiency        CURRENT MEDICATIONS:  Current Outpatient Prescriptions   Medication     meclizine (ANTIVERT) 25 MG tablet     colchicine 0.6 MG tablet     clonazePAM (KLONOPIN) 1 MG tablet     atorvastatin (LIPITOR) 80 MG tablet     baclofen (LIORESAL) 10 MG tablet     tamsulosin (FLOMAX) 0.4 MG capsule     metoprolol (TOPROL-XL) 25 MG 24 hr tablet     zolpidem (AMBIEN) 5 MG tablet     oxyCODONE (ROXICODONE) 5 MG IR tablet     meloxicam (MOBIC) 7.5 MG tablet     mirtazapine (REMERON) 15 MG tablet     sulfaSALAzine ER (AZULFIDINE EN) 500 MG EC tablet     Etanercept (ENBREL SURECLICK) 50 MG/ML autoinjector     predniSONE (DELTASONE) 5 MG tablet     allopurinol (ZYLOPRIM) 300 MG tablet     evolocumab (REPATHA) 140 MG/ML prefilled autoinjector     ezetimibe (ZETIA) 10 MG tablet     clopidogrel (PLAVIX) 75 MG tablet     atorvastatin (LIPITOR) 80 MG tablet     gemfibrozil (LOPID) 600 MG tablet     gabapentin (NEURONTIN) 300 MG capsule     aspirin EC 81 MG EC tablet     hydrocortisone (WESTCORT) 0.2 % cream     BusPIRone HCl 30 MG TABS     pantoprazole (PROTONIX) 40 MG enteric coated  tablet     tenofovir (VIREAD) 300 MG tablet     Cholecalciferol (VITAMIN D) 2000 UNITS tablet     triamcinolone (KENALOG) 0.1 % ointment     ketoconazole (NIZORAL) 2 % cream     melatonin 3 MG tablet     nitroglycerin (NITROSTAT) 0.4 MG SL tablet     order for DME     ORDER FOR DME     No current facility-administered medications for this visit.      Facility-Administered Medications Ordered in Other Visits   Medication     gadobutrol (GADAVIST) injection 10 mL       PAST SURGICAL HISTORY:  Past Surgical History:   Procedure Laterality Date     ABDOMEN SURGERY  2014     BIOPSY  2015     BYPASS GRAFT ARTERY CORONARY  2008    6 vessels     COLONOSCOPY  2/8/2013    Procedure: COLONOSCOPY;  Colonoscopy, blood in stool;  Surgeon: Duane, William Charles, MD;  Location: [facility] OR     GI SURGERY  2014     HC CORONARY STENT CIERA SOTO VESSEL  2008    3 months after CABG     HEAD & NECK SURGERY  2011     NASAL/SINUS POLYPECTOMY  2010     ORTHOPEDIC SURGERY  2012     THORACIC SURGERY  1989    tb     TONSILLECTOMY  2010     UVULOPALATOPHARYNGOPLASTY  2010     VASCULAR SURGERY  2008       ALLERGIES     Allergies   Allergen Reactions     Citalopram Itching     Tramadol Itching       FAMILY HISTORY:  Family History   Problem Relation Age of Onset     C.A.D. Father      Coronary Artery Disease Father      Hypertension Father      Depression Father      Hypertension Mother      DIABETES Mother      Depression Mother      MENTAL ILLNESS Mother      Hypertension Maternal Grandfather      CANCER Paternal Grandfather      Other Cancer Other      Autoimmune Disease No family hx of      CEREBROVASCULAR DISEASE No family hx of      Thyroid Disease No family hx of      Glaucoma No family hx of      Macular Degeneration No family hx of        SOCIAL HISTORY:  Social History     Social History     Marital status:      Spouse name: N/A     Number of children: 5     Years of education: 14     Occupational History     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 SSD applied for     Social History Main Topics     Smoking status: Never Smoker     Smokeless tobacco: Never Used     Alcohol use 0.0 oz/week     0 Standard drinks or equivalent per week      Comment: Drinks bber once a year.      Drug use: No     Sexual activity: Yes     Partners: Female     Other Topics Concern     Parent/Sibling W/ Cabg, Mi Or Angioplasty Before 65f 55m? Yes     father     Social History Narrative    . No current SO.         Has 5 children. Youngest child does live with him.         H/o AA degree and previously worked as a [employer]. Currently unemployed.         Denies tobacco use.     Alcohol use: 2x/week with minimal amount of alcohol per time.     Drug use: denies       ROS:   Constitutional: No fever, chills, or sweats. No weight gain/loss   ENT: No visual disturbance, ear ache, epistaxis, sore throat  Allergies/Immunologic: Negative.   Respiratory: No cough, hemoptysia  Cardiovascular: As per HPI  GI: No nausea, vomiting, hematemesis, melena, or hematochezia  : No urinary frequency, dysuria, or hematuria  Integument: Negative  Psychiatric: Negative  Neuro: Negative  Endocrinology: Negative   Musculoskeletal: Negative    ADDITIONAL COMMENTS:     I reviewed the patient's medications:     I reviewed the patient's pertinent clinical laboratory studies:     I reviewed the patient's pertinent imaging studies:   I reviewed the patient's ECG:

## 2017-04-14 NOTE — PATIENT INSTRUCTIONS
The following is a summary of your office visit:    Nurse contact information: Sarah Arce RN  Cardiology Care Coordinator  [phone] Phone  [phone] Fax    Appointments made today: Fasting Lab Appointment in [place] and 6 Month Follow Up with Dr. Travis.        If you have had any blood work, imaging or other testing completed we will be in touch within 1-2 weeks regarding the results. If you have any questions, concerns or need to schedule a follow up, please contact us at [phone]. If you are needing refills please contact your pharmacy. For urgent after hour care please call the [facility] at [phone] or the [facility] at [phone] and ask to speak to the cardiologist on call.    It was a pleasure meeting with you today. Please let us know if there is anything else we can do for you so that we can be sure you are leaving completely satisfied with your care experience.     Your Cardiology Team at [facility]  RN Care Coordinator: Sarah  CMA: Kinsey

## 2017-04-17 ENCOUNTER — TELEPHONE (OUTPATIENT)
Dept: CARDIOLOGY | Facility: CLINIC | Age: 52
End: 2017-04-17

## 2017-04-17 NOTE — TELEPHONE ENCOUNTER
[facility] Call Center    Phone Message    Name of Caller: Lamont    Phone Number: Home number on file [phone] (home)    Best time to return call: ANY    May a detailed message be left on voicemail: yes    Relation to patient: Self    Reason for Call: Other: Patient wants to speak to Sarah Arce regarding injection and medication     Action Taken: Message routed to:  Adult Clinics: Cardiology p [id number]

## 2017-04-18 ENCOUNTER — OFFICE VISIT (OUTPATIENT)
Dept: RHEUMATOLOGY | Facility: CLINIC | Age: 52
End: 2017-04-18
Payer: COMMERCIAL

## 2017-04-18 ENCOUNTER — RADIANT APPOINTMENT (OUTPATIENT)
Dept: GENERAL RADIOLOGY | Facility: CLINIC | Age: 52
End: 2017-04-18
Attending: INTERNAL MEDICINE
Payer: COMMERCIAL

## 2017-04-18 VITALS
HEART RATE: 65 BPM | HEIGHT: 62 IN | BODY MASS INDEX: 30.4 KG/M2 | OXYGEN SATURATION: 99 % | WEIGHT: 165.2 LBS | SYSTOLIC BLOOD PRESSURE: 120 MMHG | DIASTOLIC BLOOD PRESSURE: 84 MMHG

## 2017-04-18 DIAGNOSIS — M06.09 RHEUMATOID ARTHRITIS OF MULTIPLE SITES WITH NEGATIVE RHEUMATOID FACTOR (H): Primary | ICD-10-CM

## 2017-04-18 DIAGNOSIS — M06.09 RHEUMATOID ARTHRITIS OF MULTIPLE SITES WITH NEGATIVE RHEUMATOID FACTOR (H): ICD-10-CM

## 2017-04-18 DIAGNOSIS — Z79.899 HIGH RISK MEDICATIONS (NOT ANTICOAGULANTS) LONG-TERM USE: ICD-10-CM

## 2017-04-18 DIAGNOSIS — E55.9 VITAMIN D DEFICIENCY: ICD-10-CM

## 2017-04-18 LAB
ALBUMIN SERPL-MCNC: 4.3 G/DL (ref 3.4–5)
ALP SERPL-CCNC: 115 U/L (ref 40–150)
ALT SERPL W P-5'-P-CCNC: 44 U/L (ref 0–70)
AST SERPL W P-5'-P-CCNC: 21 U/L (ref 0–45)
BASOPHILS # BLD AUTO: 0 10E9/L (ref 0–0.2)
BASOPHILS NFR BLD AUTO: 0.4 %
BILIRUB DIRECT SERPL-MCNC: 0.1 MG/DL (ref 0–0.2)
BILIRUB SERPL-MCNC: 0.4 MG/DL (ref 0.2–1.3)
CREAT SERPL-MCNC: 0.84 MG/DL (ref 0.66–1.25)
CRP SERPL-MCNC: <2.9 MG/L (ref 0–8)
DIFFERENTIAL METHOD BLD: NORMAL
EOSINOPHIL # BLD AUTO: 0.1 10E9/L (ref 0–0.7)
EOSINOPHIL NFR BLD AUTO: 1 %
ERYTHROCYTE [DISTWIDTH] IN BLOOD BY AUTOMATED COUNT: 14.2 % (ref 10–15)
ERYTHROCYTE [SEDIMENTATION RATE] IN BLOOD BY WESTERGREN METHOD: 13 MM/H (ref 0–20)
GFR SERPL CREATININE-BSD FRML MDRD: NORMAL ML/MIN/1.7M2
HCT VFR BLD AUTO: 44.2 % (ref 40–53)
HGB BLD-MCNC: 15.1 G/DL (ref 13.3–17.7)
LYMPHOCYTES # BLD AUTO: 2.1 10E9/L (ref 0.8–5.3)
LYMPHOCYTES NFR BLD AUTO: 27.7 %
MCH RBC QN AUTO: 30.3 PG (ref 26.5–33)
MCHC RBC AUTO-ENTMCNC: 34.2 G/DL (ref 31.5–36.5)
MCV RBC AUTO: 89 FL (ref 78–100)
MONOCYTES # BLD AUTO: 0.8 10E9/L (ref 0–1.3)
MONOCYTES NFR BLD AUTO: 10.6 %
NEUTROPHILS # BLD AUTO: 4.6 10E9/L (ref 1.6–8.3)
NEUTROPHILS NFR BLD AUTO: 60.3 %
PLATELET # BLD AUTO: 245 10E9/L (ref 150–450)
PROT SERPL-MCNC: 7.9 G/DL (ref 6.8–8.8)
RBC # BLD AUTO: 4.98 10E12/L (ref 4.4–5.9)
WBC # BLD AUTO: 7.7 10E9/L (ref 4–11)

## 2017-04-18 PROCEDURE — 99214 OFFICE O/P EST MOD 30 MIN: CPT | Performed by: INTERNAL MEDICINE

## 2017-04-18 PROCEDURE — 85025 COMPLETE CBC W/AUTO DIFF WBC: CPT | Performed by: INTERNAL MEDICINE

## 2017-04-18 PROCEDURE — 86140 C-REACTIVE PROTEIN: CPT | Performed by: INTERNAL MEDICINE

## 2017-04-18 PROCEDURE — 80076 HEPATIC FUNCTION PANEL: CPT | Performed by: INTERNAL MEDICINE

## 2017-04-18 PROCEDURE — 82565 ASSAY OF CREATININE: CPT | Performed by: INTERNAL MEDICINE

## 2017-04-18 PROCEDURE — 71020 XR CHEST 2 VW: CPT

## 2017-04-18 PROCEDURE — 85652 RBC SED RATE AUTOMATED: CPT | Performed by: INTERNAL MEDICINE

## 2017-04-18 PROCEDURE — 36415 COLL VENOUS BLD VENIPUNCTURE: CPT | Performed by: INTERNAL MEDICINE

## 2017-04-18 RX ORDER — SULFASALAZINE 500 MG/1
1000 TABLET, DELAYED RELEASE ORAL 2 TIMES DAILY
Qty: 120 TABLET | Refills: 3 | Status: SHIPPED | OUTPATIENT
Start: 2017-04-18 | End: 2017-07-18

## 2017-04-18 RX ORDER — PREDNISONE 5 MG/1
5 TABLET ORAL DAILY
Qty: 30 TABLET | Refills: 3 | Status: SHIPPED | OUTPATIENT
Start: 2017-04-18 | End: 2017-07-18

## 2017-04-18 NOTE — NURSING NOTE
"Chief Complaint   Patient presents with     Arthritis     RA, patient states he does not feel any better. Still having pain       Initial /84 (BP Location: Right arm, Patient Position: Chair, Cuff Size: Adult Regular)  Pulse 65  Ht 1.575 m (5' 2\")  Wt 74.9 kg (165 lb 3.2 oz)  SpO2 99%  BMI 30.22 kg/m2 Estimated body mass index is 30.22 kg/(m^2) as calculated from the following:    Height as of this encounter: 1.575 m (5' 2\").    Weight as of this encounter: 74.9 kg (165 lb 3.2 oz).  BP completed using cuff size: regular         RAPID3 (0-30) Cumulative Score            RAPID3 Weighted Score (divide #4 by 3 and that is the weighted score)           "

## 2017-04-18 NOTE — MR AVS SNAPSHOT
After Visit Summary   4/18/2017    Lamont Daniels    MRN: [id number]           Patient Information     Date Of Birth          1965        Visit Information        Provider Department      4/18/2017 3:00 PM Sebastián Jenkins MD [facility] [address]        Today's Diagnoses     Rheumatoid arthritis of multiple sites with negative rheumatoid factor (H)    -  1    High risk medications (not anticoagulants) long-term use        Vitamin D deficiency           Follow-ups after your visit        Your next 10 appointments already scheduled     Apr 22, 2017  9:30 AM CDT   LAB with BK LAB   [facility] [address] ([facility] [address])    [address] [facility] [id number] [phone]           Patient must bring picture ID.  Patient should be prepared to give a urine specimen  Please do not eat 10-12 hours before your appointment if you are coming in fasting for labs on lipids, cholesterol, or glucose (sugar).  Pregnant women should follow their Care Team instructions. Water with medications is okay. Do not drink coffee or other fluids.   If you have concerns about taking  your medications, please ask at office or if scheduling via [employer], send a message by clicking on Secure Messaging, Message Your Care Team.            Apr 25, 2017  2:00 PM CDT   Return Visit with Wiley Acosta MD   [facility] ([facility])    [address] [place] [address]   [phone]            May 23, 2017  3:00 PM CDT   Return Sleep Patient with Milan Huynh MD   [facility] ([facility])    [address] [address] [id number] [phone]            Jun 21, 2017  3:00 PM CDT   Return Visit with Dorothea Loo MD   [facility] [place] ([facility] [place])    [id number] [place] [address]  [place] [address]    [phone]            Jul 13, 2017  1:00 PM CDT   LAB with BK LAB   [facility] ([facility])    [address] [facility] [id number] [phone]           Patient must bring picture ID.  Patient should be prepared to give a urine specimen  Please do not eat 10-12 hours before your appointment if you are coming in fasting for labs on lipids, cholesterol, or glucose (sugar).  Pregnant women should follow their Care Team instructions. Water with medications is okay. Do not drink coffee or other fluids.   If you have concerns about taking  your medications, please ask at office or if scheduling via [employer], send a message by clicking on Secure Messaging, Message Your Care Team.            Jul 18, 2017  1:00 PM CDT   Return Visit with Sebastián Jenkins MD   [facility] ([facility])    [address] [facility] [id number] [phone]            Oct 20, 2017  3:00 PM CDT   Return Visit with Fly Travis MD   [facility]    [address] [place] [id number] [phone]              Future tests that were ordered for you today     Open Future Orders        Priority Expected Expires Ordered    Vitamin D Deficiency Routine 7/13/2017 8/1/2017 4/18/2017    XR Chest 2 Views Routine 4/18/2017 4/18/2018 4/18/2017    CBC with platelets differential Routine 7/13/2017 8/1/2017 4/18/2017    Creatinine Routine 7/13/2017 8/1/2017 4/18/2017    CRP inflammation Routine 7/13/2017 8/1/2017 4/18/2017    Erythrocyte sedimentation rate auto Routine 7/13/2017 8/1/2017 4/18/2017    Hepatic panel Routine 7/13/2017 8/1/2017 4/18/2017            Who to contact     If you have questions or need follow up information about today's clinic visit or your schedule please contact [facility] directly at [phone].  Normal or non-critical lab and  "imaging results will be communicated to you by MyChart, letter or phone within 4 business days after the clinic has received the results. If you do not hear from us within 7 days, please contact the clinic through Bion[employer] or phone. If you have a critical or abnormal lab result, we will notify you by phone as soon as possible.  Submit refill requests through Bionym or call your pharmacy and they will forward the refill request to us. Please allow 3 business days for your refill to be completed.          Additional Information About Your Visit        MediaTroveharwebme Information     [employer] gives you secure access to your electronic health record. If you see a primary care provider, you can also send messages to your care team and make appointments. If you have questions, please call your primary care clinic.  If you do not have a primary care provider, please call [phone] and they will assist you.        Care EveryWhere ID     This is your Care EveryWhere ID. This could be used by other organizations to access your [place] medical records  [id number]        Your Vitals Were     Pulse Height Pulse Oximetry BMI (Body Mass Index)          65 1.575 m (5' 2\") 99% 30.22 kg/m2         Blood Pressure from Last 3 Encounters:   04/18/17 120/84   04/14/17 131/80   03/14/17 128/82    Weight from Last 3 Encounters:   04/18/17 74.9 kg (165 lb 3.2 oz)   04/14/17 77.6 kg (171 lb 1.6 oz)   03/14/17 78.7 kg (173 lb 6.4 oz)              We Performed the Following     CBC with platelets differential     Creatinine     CRP inflammation     Erythrocyte sedimentation rate auto     Hepatic panel          Today's Medication Changes          These changes are accurate as of: 4/18/17  3:35 PM.  If you have any questions, ask your nurse or doctor.               Start taking these medicines.        Dose/Directions    Abatacept 125 MG/ML Soaj   Used for:  Rheumatoid arthritis of multiple sites with negative rheumatoid factor (H)   Started by:  " Sebastián Jenkins MD        Dose:  125 mg   Inject 125 mg Subcutaneous every 7 days   Quantity:  4 Syringe   Refills:  3         Stop taking these medicines if you haven't already. Please contact your care team if you have questions.     Etanercept 50 MG/ML autoinjector   Commonly known as:  ENBREL SURECLICK   Stopped by:  Sebastián Jenkins MD                Where to get your medicines      These medications were sent to [facility] - [facility] [address]  [address] [facility] [address]    Hours:  Mon-Fri 8:30am-5:00pm Toll Free [phone] Phone:  [phone]     Abatacept 125 MG/ML Soaj         These medications [facility] sent to [facility] [address] [place] [address] [address] [facility]  [address] [facility] [address]     Phone:  [phone]     predniSONE 5 MG tablet    sulfaSALAzine  MG EC tablet                Primary Care Provider Office Phone # Fax #    David Chavez [phone]       [facility] [address]  [facility] [address]        Thank you!     Thank you for choosing [facility]  for your care. Our goal is always to provide you with excellent care. Hearing back from our patients is one way we can continue to improve our services. Please take a few minutes to complete the written survey that you may receive in the mail after your visit with us. Thank you!             Your Updated Medication List - Protect others around you: Learn how to safely use, store and throw away your medicines at www.disposemymeds.org.          This list is accurate as of: 4/18/17  3:35 PM.  Always use your most recent med list.                   Brand Name Dispense Instructions for use    Abatacept 125 MG/ML Soaj     4 Syringe    Inject 125 mg Subcutaneous every 7 days       allopurinol 300 MG tablet    ZYLOPRIM    90 tablet    TAKE ONE TABLET BY MOUTH ONCE DAILY       aspirin 81 MG EC tablet     30  tablet    Take 1 tablet (81 mg) by mouth daily (*)       * atorvastatin 80 MG tablet    LIPITOR    90 tablet    Take 1 tablet (80 mg) by mouth daily for cholesterol.       * atorvastatin 80 MG tablet    LIPITOR    90 tablet    TAKE ONE TABLET (80 MG) BY MOUTH ONCE DAILY FOR CHOLESTEROL       baclofen 10 MG tablet    LIORESAL    180 tablet    Take 1 tablet (10 mg) by mouth 2 times daily as needed for muscle spasms       BusPIRone HCl 30 MG Tabs     180 tablet    TAKE ONE TABLET BY MOUTH TWICE DAILY       clonazePAM 1 MG tablet    klonoPIN    90 tablet    TAKE ONE-HALF TO ONE TABLET BY MOUTH THREE TIMES DAILY AS NEEDED FOR ANXIETY       clopidogrel 75 MG tablet    PLAVIX    90 tablet    Take 1 tablet (75 mg) by mouth daily       colchicine 0.6 MG tablet     30 tablet    TAKE TWO TABLETS BY MOUTH AT THE FIRST SIGN OF FLARE, TAKE ONE ADDITIONAL TABLET ONE HOUR LATER.       evolocumab 140 MG/ML prefilled autoinjector    REPATHA    2 mL    Inject 1 mL (140 mg) Subcutaneous every 14 days       ezetimibe 10 MG tablet    ZETIA    90 tablet    Take 1 tablet (10 mg) by mouth daily       gabapentin 300 MG capsule    NEURONTIN    540 capsule    TAKE TWO CAPSULES BY MOUTH THREE TIMES DAILY       gemfibrozil 600 MG tablet    LOPID    180 tablet    Take 1 tablet (600 mg) by mouth 2 times daily       hydrocortisone 0.2 % cream    WESTCORT    45 g    Use twice daily on the eats for active rash for up to 2 weeks in a row, then take 2 weeks off.       ketoconazole 2 % cream    NIZORAL    30 g    Apply topically 2 times daily Apply to back of scalp and to groin twice daily until rash is clear       meclizine 25 MG tablet    ANTIVERT    30 tablet    TAKE ONE TABLET BY MOUTH EVERY 6 HOURS AS NEEDED FOR DIZZINESS       melatonin 3 MG tablet      Take 1 tablet (3 mg) by mouth nightly as needed for sleep       meloxicam 7.5 MG tablet    MOBIC    30 tablet    TAKE ONE TABLET BY MOUTH ONCE DAILY WITH  BREAKFAST       metoprolol 25 MG 24 hr  tablet    TOPROL-XL    90 tablet    Take 1 tablet (25 mg) by mouth daily       mirtazapine 15 MG tablet    REMERON    30 tablet    Take 1 tablet (15 mg) by mouth At Bedtime       nitroglycerin 0.4 MG sublingual tablet    NITROSTAT    30 tablet    Place 1 tablet (0.4 mg) under the tongue every 5 minutes as needed       order for DME      1.  CPAP pressure 11 cm/H20 with heated humidity.  2.  Provide mask to fit and CPAP supplies.  3.  Length of need lifetime.  4.  If needed please provide a chin strap       order for DME     1 Units    Equipment being ordered: single end cane       oxyCODONE 5 MG IR tablet    ROXICODONE    150 tablet    Take 1-2 tablets (5-10 mg) by mouth every 4 hours as needed for moderate to severe pain . Max of 6 tabs per day.  May fill on/after 3/9/17 to start taking on/after 3/11/17. One month supply.       pantoprazole 40 MG EC tablet    PROTONIX    90 tablet    TAKE ONE TABLET BY MOUTH ONCE DAILY FOR STOMACH       predniSONE 5 MG tablet    DELTASONE    30 tablet    Take 1 tablet (5 mg) by mouth daily       sulfaSALAzine  MG EC tablet    AZULFIDINE EN    120 tablet    Take 2 tablets (1,000 mg) by mouth 2 times daily       tamsulosin 0.4 MG capsule    FLOMAX    90 capsule    Take 1 capsule (0.4 mg) by mouth daily       tenofovir 300 MG tablet    VIREAD    90 tablet    Take 1 tablet (300 mg) by mouth daily       triamcinolone 0.1 % ointment    KENALOG    80 g    Apply sparingly to the ears and groin twice daily until clear, then once daily until clear. Do not apply to the face.       vitamin D 2000 UNITS tablet     100 tablet    TAKE ONE TABLET BY MOUTH ONCE DAILY       zolpidem 5 MG tablet    AMBIEN    30 tablet    TAKE ONE TABLET BY MOUTH AT BEDTIME AS NEEDED FOR SLEEP       * Notice:  This list has 2 medication(s) that are the same as other medications prescribed for you. Read the directions carefully, and ask your doctor or other care provider to review them with you.

## 2017-04-18 NOTE — PROGRESS NOTES
Rheumatology Clinic Visit      Lamont Daniels MRN# [id number]   YOB: 1965 Age: 51 year old      Date of visit: 4/18/17   PCP: Dr. David Chavez  Hepatologist: Dr. Drea Laboy     Chief Complaint   Patient presents with:  Arthritis: RA, patient states he does not feel any better. Still having pain      Assessment and Plan   1.  Seropositive nonerosive rheumatoid arthritis (RF negative, CCP low positive): Note that he does have low back pain but without evidence of SI joint irregularity on x-ray.  Initially with morning stiffness all day, gelling phenomenon, and synovitis.   Previously on hydroxychloroquine, Humira (partially effective). Currently on SSZ 1000mg BID,  prednisone 5mg daily, and Enbrel.   Treatment has been in coordination with Dr. Laboy (hepatology) who is treating for hepatitis B.  He is doing okay today - much better than prior to TNF inhibitor therapy but still with significant pain.  We reviewed the option of staying on Enbrel for an additional 3 months, for a total of 6 months, versus changing therapies and because he has not noticed any improvement since changing to Enbrel he prefers to change to Orencia.  Therefore, discontinue Enbrel and start Orencia.    - Continue sulfasalazine 1000mg BID  - Continue prednisone 5 mg daily  - Discontinue Enbrel 50 mg SQ every 7 days  - Start Orencia SQ every 7 days  - Chest X-rays today  - Labs today and 2-3 days prior to the next rheumatology clinic visit: CBC, Creatinine, Hepatic Panel, ESR, CRP    # Abatacept (Orencia) Risks and Benefits: The risks and benefits of abatacept were discussed in detail and the patient verbalized understanding.  The risks discussed include, but are not limited to, the risk for hypersensitivity, anaphylaxis, anaphylactoid reactions, an increased risk for serious infections leading to hospitalization or death, and an increased risk for more frequent respiratory adverse events in patients with COPD.  If subcutaneous injections  "are used, then injection site reactions and/or pain may occur at the site of injection.  The most common side effects were discussed that include headache, upper respiratory tract infections, nasopharyngitis, and nausea.  It was discussed that the medication would need to be discontinued if a serious infection develops.  It was discussed that live vaccinations should not be received while using abatacept or within 30 days prior to starting abatacept.  I encouraged reviewing the package insert and asking any questions about the medication.      2. Lower back pain: Lumbar spine MRI in August 2014 showed multilevel degenerative changes and a small disc protrusion at L3/4 with mild spinal canal narrowing; also moderate left and mild right neural foraminal narrowing at L5-S1. He had a nerve conduction study in 2014 that was normal. He has been worked up by neurology in the past without significant neurologic findings. HLA-B27 negative, SI joint x-ray negative. Unlikely to be inflammatory in nature and is now being seen by the pain management clinic.     3. Hepatitis B: Managed by Dr. Laboy (hepatology) and is currently on tenofovir.    4. Hepatic Steatosis: Based on recent liver biopsy.  Seeing Dr. Laboy (hepatology).  Encouraged weight loss.    5. Positive tuberculosis test:   This is noted here for historical significance.  He was evaluated by Dr. Anthony Mendoza, infectious disease. The following telephone note was documented by Dr. Mendoza: \"I tried calling th pt, finally we have the records from [place] . It appears he was treated for latent TB with INH for 1 year in 1980/1981 .Later in 1981 April he was admitted at [facility] in [place], [place] with rt sided pneumonia, TB was suspected but he improved with treatment with Penicillin only. Later he was seen  ( likely ID specialist), and was treated with 6 weeks course of Rifampin and Ethambutol pending sputum AFB culture. His AFB cx were negative based " "on the report we have.\"  \"He recently had QFT -TB test which was reported positive and his CXR was clear. Given his documented hx of completion of treatment for latent TB as above , I do not see any need for further treatment. His tests may remain positive irrespective of treatment status.From my perspective he can be started on DMARDs/Biological as deemed necessary.\"       6. Gout: On allopurinol and managed by PCP.    7. Bone Health: 2015 vitamin D normal.  Recheck vitamin D with next labs.  Currently on vitamin D 2000 Units daily.   - Labs 2-3 days prior to the next rheumatology clinic visit: vitamin D    8. Vaccinations:   - Influenza: encouraged yearly vaccination  - [id number]: up to date  - [id number]: up to date    Mr. Daniels verbalized agreement with and understanding of the rational for the diagnosis and treatment plan.  All questions were answered to best of my ability and the patient's satisfaction. Mr. Daniels was advised to contact the clinic with any questions that may arise after the clinic visit.      Thank you for involving me in the care of the patient    Return to clinic: 3 months      HPI   Lamont Daniels is a 51 year old male with a medical history significant for hypertension, hyperlipidemia, gout, coronary artery disease s/p 6vCABG, vitamin D deficiency, hepatitis B, and RA who presents for f/u of RA.    Today, he reports that he has noticed no difference between Humira and Enbrel. Morning stiffness lasts all day but is worse for the first 2 hours. Hands have significantly less swelling and he is able to move them again, but they are painful. Pain and stiffness improve with activity. Positive gelling phenomenon.    Denies fevers, chills, nausea, vomiting, constipation, diarrhea. No abdominal pain. No current chest pain/pressure now but recently had a cardiac stent placed; no palpitations.  Chronic shortness of breath since cardiac procedures and says that he has been worked up by his cardiologist since " then and his PCP this morning. No oral or nasal sores. No rash. No LE swelling.  Mild dry mouth; he has good dentition and does not use frequent sips of water. No dry eyes. No eye pain or redness.     Tobacco:  None   EtOH:  None  Drugs:  None  Occupation: Retired   Originally from [facility], then lived just north of [place], [place], then lived in [place], [place], then moved to [place] for family    ROS   GEN: No fevers, chills, night sweats; + fatigue   SKIN: No itching, rashes, sores  HEENT: No epistaxis. No oral or nasal ulcers.  CV: See HPI  PULM: See HPI  GI: No nausea, vomiting, constipation, diarrhea. No blood in stool. No abdominal pain.  : No blood in urine.  MSK: See HPI.  NEURO: See HPI  ENDO: No heat/cold intolerance.  EXT: No LE swelling    Active Problem List     Patient Active Problem List   Diagnosis     Coronary atherosclerosis of native coronary artery     Major depressive disorder, recurrent episode, moderate (H)     Anxiety     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)     Hepatitis B infection     Vitamin D deficiency     S/P CABG (coronary artery bypass graft)     Malrotation of intestine     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     Hyperlipidemia LDL goal <70     Insomnia     Gout     Suicidal ideation     Essential hypertension     Rheumatoid arthritis involving multiple sites, unspecified rheumatoid factor presence (H)     High risk medications (not anticoagulants) long-term use     Midline low back pain without sciatica     Bilateral low back pain with sciatica, sciatica laterality unspecified     Elevated liver enzymes     On corticosteroid therapy     Essential hypertension with goal blood pressure less than 140/90     Insomnia, unspecified type     Rheumatoid arthritis of multiple sites with negative rheumatoid factor (H)     Benign prostatic hyperplasia with lower urinary tract symptoms, unspecified morphology     Past Medical History     Past Medical History:   Diagnosis Date      Anxiety      CAD (coronary artery disease)     sees cardiologist Dr. Yang, has had stents and cabg     Congestive heart failure, unspecified 2008     Gout      Hepatitis B > 10 years     HTN (hypertension)      Hyperlipidemia LDL goal < 100      Malrotation of intestine      Moderate major depression (H)      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) 9/19/2011    Uses CPAP     Rheumatoid arthritis flare (H) 1012     Steatohepatitis 12/15    liver biopsy     Tuberculosis age 13    INH x 9 months      Vitamin D deficiency      Past Surgical History     Past Surgical History:   Procedure Laterality Date     ABDOMEN SURGERY  2014     BIOPSY  2015     BYPASS GRAFT ARTERY CORONARY  2008    6 vessels     COLONOSCOPY  2/8/2013    Procedure: COLONOSCOPY;  Colonoscopy, blood in stool;  Surgeon: Duane, William Charles, MD;  Location: [facility] OR     GI SURGERY  2014     HC CORONARY STENT PERCUT, EA ADDTL VESSEL  2008    3 months after CABG     HEAD & NECK SURGERY  2011     NASAL/SINUS POLYPECTOMY  2010     ORTHOPEDIC SURGERY  2012     THORACIC SURGERY  1989    tb     TONSILLECTOMY  2010     UVULOPALATOPHARYNGOPLASTY  2010     VASCULAR SURGERY  2008     Allergy     Allergies   Allergen Reactions     Citalopram Itching     Tramadol Itching     Current Medication List     Current Outpatient Prescriptions   Medication Sig     meclizine (ANTIVERT) 25 MG tablet TAKE ONE TABLET BY MOUTH EVERY 6 HOURS AS NEEDED FOR DIZZINESS     colchicine 0.6 MG tablet TAKE TWO TABLETS BY MOUTH AT THE FIRST SIGN OF FLARE, TAKE ONE ADDITIONAL TABLET ONE HOUR LATER.     clonazePAM (KLONOPIN) 1 MG tablet TAKE ONE-HALF TO ONE TABLET BY MOUTH THREE TIMES DAILY AS NEEDED FOR ANXIETY     atorvastatin (LIPITOR) 80 MG tablet TAKE ONE TABLET (80 MG) BY MOUTH ONCE DAILY FOR CHOLESTEROL     baclofen (LIORESAL) 10 MG tablet Take 1 tablet (10 mg) by mouth 2 times daily as needed for muscle spasms     tamsulosin (FLOMAX) 0.4 MG capsule Take 1 capsule (0.4 mg) by mouth daily     metoprolol  (TOPROL-XL) 25 MG 24 hr tablet Take 1 tablet (25 mg) by mouth daily     zolpidem (AMBIEN) 5 MG tablet TAKE ONE TABLET BY MOUTH AT BEDTIME AS NEEDED FOR SLEEP     oxyCODONE (ROXICODONE) 5 MG IR tablet Take 1-2 tablets (5-10 mg) by mouth every 4 hours as needed for moderate to severe pain . Max of 6 tabs per day.  May fill on/after 3/9/17 to start taking on/after 3/11/17. One month supply.     meloxicam (MOBIC) 7.5 MG tablet TAKE ONE TABLET BY MOUTH ONCE DAILY WITH  BREAKFAST     mirtazapine (REMERON) 15 MG tablet Take 1 tablet (15 mg) by mouth At Bedtime     sulfaSALAzine ER (AZULFIDINE EN) 500 MG EC tablet Take 2 tablets (1,000 mg) by mouth 2 times daily     Etanercept (ENBREL SURECLICK) 50 MG/ML autoinjector Inject 50 mg Subcutaneous once a week     predniSONE (DELTASONE) 5 MG tablet Take 1 tablet (5 mg) by mouth daily     allopurinol (ZYLOPRIM) 300 MG tablet TAKE ONE TABLET BY MOUTH ONCE DAILY     evolocumab (REPATHA) 140 MG/ML prefilled autoinjector Inject 1 mL (140 mg) Subcutaneous every 14 days     ezetimibe (ZETIA) 10 MG tablet Take 1 tablet (10 mg) by mouth daily     clopidogrel (PLAVIX) 75 MG tablet Take 1 tablet (75 mg) by mouth daily     atorvastatin (LIPITOR) 80 MG tablet Take 1 tablet (80 mg) by mouth daily for cholesterol.     gemfibrozil (LOPID) 600 MG tablet Take 1 tablet (600 mg) by mouth 2 times daily     nitroglycerin (NITROSTAT) 0.4 MG SL tablet Place 1 tablet (0.4 mg) under the tongue every 5 minutes as needed     gabapentin (NEURONTIN) 300 MG capsule TAKE TWO CAPSULES BY MOUTH THREE TIMES DAILY     aspirin EC 81 MG EC tablet Take 1 tablet (81 mg) by mouth daily (*)     hydrocortisone (WESTCORT) 0.2 % cream Use twice daily on the eats for active rash for up to 2 weeks in a row, then take 2 weeks off.     BusPIRone HCl 30 MG TABS TAKE ONE TABLET BY MOUTH TWICE DAILY     pantoprazole (PROTONIX) 40 MG enteric coated tablet TAKE ONE TABLET BY MOUTH ONCE DAILY FOR STOMACH     tenofovir (VIREAD) 300 MG  tablet Take 1 tablet (300 mg) by mouth daily     Cholecalciferol (VITAMIN D) 2000 UNITS tablet TAKE ONE TABLET BY MOUTH ONCE DAILY     triamcinolone (KENALOG) 0.1 % ointment Apply sparingly to the ears and groin twice daily until clear, then once daily until clear. Do not apply to the face.     ketoconazole (NIZORAL) 2 % cream Apply topically 2 times daily Apply to back of scalp and to groin twice daily until rash is clear     melatonin 3 MG tablet Take 1 tablet (3 mg) by mouth nightly as needed for sleep     order for DME Equipment being ordered: single end cane (Patient not taking: Reported on 4/14/2017)     ORDER FOR DME 1.  CPAP pressure 11 cm/H20 with heated humidity.   2.  Provide mask to fit and CPAP supplies.   3.  Length of need lifetime.   4.  If needed please provide a chin strap      (Patient not taking: Reported on 4/14/2017)     No current facility-administered medications for this visit.      Facility-Administered Medications Ordered in Other Visits   Medication     gadobutrol (GADAVIST) injection 10 mL       Social History   See HPI    Family History     Family History   Problem Relation Age of Onset     C.A.D. Father      Coronary Artery Disease Father      Hypertension Father      Depression Father      Hypertension Mother      DIABETES Mother      Depression Mother      MENTAL ILLNESS Mother      Hypertension Maternal Grandfather      CANCER Paternal Grandfather      Other Cancer Other      Autoimmune Disease No family hx of      CEREBROVASCULAR DISEASE No family hx of      Thyroid Disease No family hx of      Glaucoma No family hx of      Macular Degeneration No family hx of      No family history of autoimmune disease  No family history of psoriasis     No change in family history since the previous clinic visit.     Physical Exam     Temp Readings from Last 3 Encounters:   03/14/17 96.3  F (35.7  C) (Oral)   02/07/17 97.7  F (36.5  C) (Oral)   01/17/17 98.1  F (36.7  C) (Oral)     BP Readings  "from Last 5 Encounters:   04/18/17 120/84   04/14/17 131/80   03/14/17 128/82   02/07/17 128/84   01/17/17 123/76     Pulse Readings from Last 1 Encounters:   04/18/17 65     Resp Readings from Last 1 Encounters:   10/13/16 16     Estimated body mass index is 30.22 kg/(m^2) as calculated from the following:    Height as of this encounter: 1.575 m (5' 2\").    Weight as of this encounter: 74.9 kg (165 lb 3.2 oz).    GEN: NAD, obese; using a cane to walk  HEENT: MMM. No oral lesions. Anicteric, noninjected sclera  CV: S1, S2. RRR. No m/r/g.  PULM: CTA bilaterally. No w/c.  MSK: MCPs and PIPs tender to palpation. Synovial swelling and tenderness to palpation of the bilateral wrists and elbows. Shoulders mildly tender to palpation but without swelling. Hips nontender to direct palpation. Knees, ankles, and feet without swelling or tenderness to palpation. Negative MTP squeeze bilaterally.   NEURO: UE and LE strengths 5/5 and equal bilaterally.   SKIN: No rash. No tophi  EXT: No LE edema  PSYCH: Alert. Appropriate.    Labs   RF/CCP  Recent Labs   Lab Test  11/04/15   1547  08/12/14   1414   CCPABY  27*   --    RHF   --   <20     NIKKI/RNP/Sm/SSA/SSB/dsDNA  Recent Labs   Lab Test  07/07/14   0800   ARACELI  <1.0  Interpretation:  Negative       HLA-B27  Recent Labs   Lab Test  11/04/15   0000   [id number]  SSOP   B1  B27 Neg     CBC  Recent Labs   Lab Test  01/17/17   1531  10/25/16   0851  10/05/16   0954  07/19/16   1128   WBC  6.6  11.8*  11.8*  7.2   RBC  5.11  5.03  4.98  4.87   HGB  15.6  15.2  15.0  14.9   HCT  45.6  45.1  44.0  45.0   MCV  89  90  88  92   RDW  13.5  15.6*  14.9  13.5   PLT  231  267  268  269   MCH  30.5  30.2  30.1  30.6   MCHC  34.2  33.7  34.1  33.1   NEUTROPHIL  33.0  48.8  47.4  45.3   LYMPH  54.0  38.0  41.5  38.9   MONOCYTE  11.0  11.7  9.7  13.4   EOSINOPHIL  2.0  1.0  0.9  1.7   BASOPHIL   --   0.5  0.5  0.7   ANEU  2.2  5.8  5.6  3.3   ALYM  3.6  4.5  4.9  2.8   PRACHI  0.7  1.4*  1.1  1.0 "   AEOS  0.1  0.1  0.1  0.1   ABAS   --   0.1  0.1  0.1     CMP  Recent Labs   Lab Test  01/17/17   1531  12/01/16   1429  10/25/16   0851  10/05/16   0954  08/12/16   1451   04/27/16   1448   NA   --    --   143  144   --    --   140   POTASSIUM   --    --   3.6  3.6   --    --   3.8   CHLORIDE   --    --   110*  111*   --    --   108   CO2   --    --   25  27   --    --   24   ANIONGAP   --    --   8  6   --    --   8   GLC   --    --   78  88  146*   --   116*   BUN   --    --   17  17   --    --   13   CR  1.01   --   1.01  1.00   --    < >  1.11   GFRESTIMATED  78   --   78  79   --    < >  70   GFRESTBLACK  >90   GFR Calc     --   >90   GFR Calc    >90   GFR Calc     --    < >  85   HEMANT   --    --   8.8  8.9   --    --   8.9   BILITOTAL  0.6  0.5  0.3  0.4   --    < >   --    ALBUMIN  3.9  4.0  3.8  3.5   --    < >   --    PROTTOTAL  7.0  7.4  7.3  7.4   --    < >   --    ALKPHOS  71  78  82  82   --    < >   --    AST  27  22  30  46*   --    < >   --    ALT  41  41  86*  199*   --    < >   --     < > = values in this interval not displayed.     HgA1c  Recent Labs   Lab Test  08/12/16   1451  04/22/16   1318  01/14/16   1541   A1C  5.9  6.0  6.4*     Uric Acid  Recent Labs   Lab Test  11/04/15   1547  08/14/15   1138  09/10/14   0934  08/22/14   1430  08/12/14   1414   URIC  6.1  4.4  6.1  4.7  8.3*     Iron Studies  Recent Labs   Lab Test  06/10/14   1037   VERONICA  50     Calcium/VitaminD  Recent Labs   Lab Test  10/25/16   0851  10/05/16   0954  04/27/16   1448   11/04/15   1547   04/01/13   1624  11/13/12   0842   HEMANT  8.8  8.9  8.9   < >   --    < >  9.5  9.5   VITDT   --    --    --    --   43   --   34  19*    < > = values in this interval not displayed.     ESR/CRP  Recent Labs   Lab Test  01/17/17   1531  07/19/16   1128  11/11/15   1616   SED  9  10  10   CRP  <2.9  <2.9  <2.9     CK/Aldolase  Recent Labs   Lab Test  07/07/14   0800  04/01/13   1624   11/13/12   0842   CKT  142  146  144     TSH/T4  Recent Labs   Lab Test  10/25/16   0851  04/22/16   1318  12/09/14   0801   TSH  3.34  1.38  3.62     Lipid Panel  Recent Labs   Lab Test  10/25/16   0851  06/10/16   0850  06/29/15   1405  05/22/14   0848  03/24/14   0936   CHOL  244*  218*  219*  169  195   TRIG  100  323*  372*  379*  301*   HDL  90  36*  33*  33*  39*   LDL  134*  117*  112  60  95   VLDL   --    --   74*  76*  60*   CHOLHDLRATIO   --    --   6.6*  5.1*  5.0   NHDL  154*  182*   --    --    --      Hepatitis B  Recent Labs   Lab Test  12/01/16   1429  10/05/16   0954  01/29/16   1541   04/23/13   0812  01/30/13   0906   AUSAB   --   0.45   --    --    --    --    HBCAB   --    --    --    --   Positive*   --    HEPBANG   --   Reactive*   --    --   Positive*  Positive*   HBQLOG  <1.3  5.1*  Not Calculated   < >   --    --     < > = values in this interval not displayed.     Hepatitis C  Recent Labs   Lab Test  04/07/16   1538  04/23/13   0812   HCVAB  Nonreactive   Assay performance characteristics have not been established for newborns,   infants, and children    Negative     Tuberculosis Screening  Recent Labs   Lab Test  04/07/16   1538   TBRSLT  Positive   The magnitude of the measured IFN-gamma level cannot be correlated to stage or   degree of infection, level of immune responsiveness, or likelihood for   progression to active disease.  *   TBAGN  >10.00  This is a qualitative test.  The TB antigen IU/mL value is required for   documentation on certain government reporting forms but this value should not   be used to monitor disease progression or response to therapy.   Diagnosing or excluding tuberculosis disease, and assessing the probability of   LTBI, require a combination of epidemiological, historical, medical and   diagnostic findings that should be taken into account when interpreting   QuantiFERON TB results.       HIV Screening  Recent Labs   Lab Test  11/04/15   1547   HIAGAB   "Nonreactive   HIV-1 p24 Ag & HIV-1/HIV-2 Ab Not Detected       PATH  Recent Labs   Lab Test  12/21/15   1135  12/01/15   1000   PATH  Patient Name: ZOË HARPER  MR#: [id number]  Specimen #: [id number]  Collected: 12/21/2015  Received: 12/21/2015  Reported: 12/22/2015 14:09  Ordering Phy(s): TYRESE DUVALL    SPECIMEN(S):  Liver needle biopsy    FINAL DIAGNOSIS:  Liver, needle biopsy  - Steatohepatitis with mild nonbridging portal fibrosis  - See microscopic description regarding hepatitis B    I have personally reviewed all specimens and or slides, including the  listed special stains, and used them with my medical judgement to  determine the final diagnosis.    Electronically signed out by:    Anshu Billy M.D., [facility]    CLINICAL HISTORY:  49-year-old male with a history of hepatitis B.  On December 12  transaminases, alkaline phosphatase and bilirubin were normal.  On July 31 she had very low levels of HBV DNA detected (less than 20  international units per milliliter)    GROSS:  The specimen is received in formalin with proper patient identification,  labeled \"liver tissue\".  The specimen consists of three  tan needle core  biopsies measuring 0.3, 1.2 and 2.0 cm in length and each 0.1 cm in  diameter.  The specimen is wrapped and is entirely submitted in cassette  1. (Dictated by: Haseeb Smith 12/21/2015 12:43 PM)    MICROSCOPIC:  Examination of the trichrome stain demonstrates some fibrous widening of  portal tracts, but without bridging fibrosis or architectural  distortion.  The reticulin stain confirms these findings.  There is no  evidence of regeneration.  The hematoxylin and eosin stained slides  demonstrates a mild patchy portal infiltrate of lymphocytes without  piecemeal necrosis.  The hepatocellular lobule demonstrates  macrovesicular fatty change involving approximately 40% of the  hepatocytes, with ballooning degeneration and hence with features of  steatohepatitis.  Fatty change is not a " "typical feature of hepatitis B.  Given the presence of steatohepatitis, grading of the patient's  hepatitis B is somewhat questionable, since the portal infiltrate seen  in this case could  be due to steatohepatitis as well as to hepatitis B.  If the inflammation was due to hepatitis B, it would be grade 1, and  given the portal fibrosis it would be stage 1. (Dictated by: Anshu Billy MD 12/22/2015 12:41 PM)    CPT Codes:  A: 55714-MM8, 77623-WTOM, 45271-SXOZ    TESTING LAB LOCATION:  [address]   [address]  [phone]    COLLECTION SITE:  Client: [facility]  Location: [facility] ()    Patient Name: ZOË HARPER  MR#: [id number]  Specimen #: [id number]  Collected: 12/1/2015  Received: 12/1/2015  Reported: 12/2/2015 14:40  Ordering Phy(s): HORTENSIA UNDERWOOD    SPECIMEN(S):  Liver needle biopsy    FINAL DIAGNOSIS:  Liver, needle biopsy  - Nondiagnostic specimen  - Skeletal muscle only; liver not included with specimen    I have personally reviewed all specimens and or slides, including the  listed special stains, and used them with my medical judgement to  determine the final diagnosis.    Electronically signed out by:  Anshu Billy M.D., [facility]    CLINICAL HISTORY:  50-year-old male with hepatitis B and multiple other medical problems.  On July 31 he had very low titer of hepatitis B DNA, less than 20  international unit / milliliter.  On November 11 his AST was 34, ALT 94,  alkaline phosphatase 94 and total bilirubin 0.4.    GROSS:  The specimen is received in formalin with proper patient identification,  labeled \"liver\" and consists of two tan-pink tissue fragments m easuring  0.1 and 0.2 cm in greatest dimensions.  The specimen is entirely  submitted in cassette 1. (Dictated by: Haseeb Smith 12/1/2015 11:50  AM)    MICROSCOPIC:  Examination reveals the specimen to consist entirely " "of skeletal muscle  with no liver tissue.  It is therefore a nondiagnostic specimen.    CPT Codes:  A: 57438-BJ2, 41838-DUWE, 47145-MOCW    TESTING LAB LOCATION:  [facility], [address]   [address]  [phone]    COLLECTION SITE:  Client: [facility] [place]  Location: [facility] (B)       \"HAND BILATERAL THREE OR MORE VIEWS 11/4/2015 4:55 PM   HISTORY: Pain in unspecified joint.  COMPARISON: None.  IMPRESSION  IMPRESSION: Normal.  DANIS ANGLIN MD\"    \"FOOT BILATERAL THREE OR MORE VIEWS 11/4/2015 4:55 PM   HISTORY: Pain in unspecified joint.  COMPARISON: 8/21/2012.  IMPRESSION  IMPRESSION: Small traction spurs are seen off the Achilles tendon and  plantar fascial attachment sites bilaterally. Joint spaces are  preserved. Osseous structures are intact. Incidental note is made of  some surgical staples in the soft tissues of the medial distal right  lower extremity.  DANIS ANGLIN MD\"      Immunization History     Immunization History   Administered Date(s) Administered     Influenza (IIV3) 10/11/2011     Influenza Vaccine IM 3yrs+ 4 Valent IIV4 09/27/2015, 09/08/2016     Influenza Vaccine, 3 YRS +, IM (QUADRIVALENT W/PRESERVATIVES) 11/17/2014     Pneumococcal (PCV 13) 04/07/2016     Pneumococcal 23 valent 12/12/2014     TDAP Vaccine (Adacel) 08/30/2011          Chart documentation done in part with Dragon Voice recognition Software. Although reviewed after completion, some word and grammatical error may remain.    Sebastián Jenkins MD  "

## 2017-04-19 ENCOUNTER — TELEPHONE (OUTPATIENT)
Dept: RHEUMATOLOGY | Facility: CLINIC | Age: 52
End: 2017-04-19

## 2017-04-19 NOTE — TELEPHONE ENCOUNTER
[facility] Prior Authorization Team   Phone: [phone]  Fax: [phone]    Prior Authorization Approval    Authorization Effective Date: 4/25/2017  Authorization Expiration Date: 4/25/2018  Medication: Orencia clickject 125mg/ml-Approved  Approved Dose/Quantity: 4/28ds  Reference #: [id number]   Insurance Company: [employer] - Phone [phone] Fax [phone]  Expected CoPay: 0     CoPay Card Available:    n/a  Foundation Assistance Needed: no    Which Pharmacy is filling the prescription (Not needed for infusion/clinic administered): [facility] [address] [facility]  Pharmacy Notified: Yes  Patient Notified: Yes      PA Initiation    Medication: Orencia clickject 125mg/ml  Insurance Company: [employer] - Phone [phone] Fax [phone]  Pharmacy Filling the Rx: [facility] - [address]  Filling Pharmacy Phone: [phone]  Filling Pharmacy Fax: [phone]  Start Date: 4/19/2017

## 2017-04-20 NOTE — TELEPHONE ENCOUNTER
Called and spoke to patient. He states he is getting his delivery of Repatha late and he is not able to take it on time. He will have his lipid panel checked this Saturday.  Spoke with [facility]. They do have directions of every other week injections. Gets one month supply at a time which is two injections.  Delivered yesterday. Patient called on 3/21 and it was delivered on 3/28. Patient is expected to call and order , it is unable to be mailed automatically. They are able to deliver within a few days of him ordering.  He should call and order on May 11, and every 24 days after that per his [facility] coverage.   Called and gave patient the following dates that he should call and order the Repatha:  May 11  Gely 5  Gely 29  July 24  Aug 17  Sept 11  Oct 5  Oct 30  Nov 23  Dec 18

## 2017-04-21 NOTE — PROGRESS NOTES
"MyChart message sent:  \"Mr. Daniels,    Labs and x-rays are normal.    Have a great weekend!    Sincerely,  Sebastián Jenkins MD  4/21/2017 6:43 AM\""

## 2017-04-22 DIAGNOSIS — I25.810 CORONARY ATHEROSCLEROSIS OF AUTOLOGOUS VEIN BYPASS GRAFT: ICD-10-CM

## 2017-04-22 DIAGNOSIS — Z95.1 S/P CABG (CORONARY ARTERY BYPASS GRAFT): ICD-10-CM

## 2017-04-22 DIAGNOSIS — E78.5 HYPERLIPIDEMIA LDL GOAL <70: ICD-10-CM

## 2017-04-22 PROCEDURE — 36415 COLL VENOUS BLD VENIPUNCTURE: CPT | Performed by: INTERNAL MEDICINE

## 2017-04-22 PROCEDURE — 80061 LIPID PANEL: CPT | Performed by: INTERNAL MEDICINE

## 2017-04-24 ENCOUNTER — TELEPHONE (OUTPATIENT)
Dept: CARDIOLOGY | Facility: CLINIC | Age: 52
End: 2017-04-24

## 2017-04-24 DIAGNOSIS — E78.5 HYPERLIPIDEMIA LDL GOAL <70: Primary | ICD-10-CM

## 2017-04-24 DIAGNOSIS — I25.810 CORONARY ATHEROSCLEROSIS OF AUTOLOGOUS VEIN BYPASS GRAFT: ICD-10-CM

## 2017-04-24 DIAGNOSIS — Z95.1 S/P CABG (CORONARY ARTERY BYPASS GRAFT): ICD-10-CM

## 2017-04-24 LAB
CHOLEST SERPL-MCNC: 125 MG/DL
HDLC SERPL-MCNC: 69 MG/DL
LDLC SERPL CALC-MCNC: 32 MG/DL
NONHDLC SERPL-MCNC: 56 MG/DL
TRIGL SERPL-MCNC: 121 MG/DL

## 2017-04-24 NOTE — NURSING NOTE
Received result note from Dr. Travis recommending repeating lab in 2 months. Input order for future lab per Dr. Travis's recommendations and result sent to Cardiology pool to schedule patient.    Juanita COLE RN, BSN  Pulmonary Care Coordinator

## 2017-04-25 ENCOUNTER — OFFICE VISIT (OUTPATIENT)
Dept: PSYCHIATRY | Facility: CLINIC | Age: 52
End: 2017-04-25
Payer: COMMERCIAL

## 2017-04-25 DIAGNOSIS — F33.2 SEVERE EPISODE OF RECURRENT MAJOR DEPRESSIVE DISORDER, WITHOUT PSYCHOTIC FEATURES (H): ICD-10-CM

## 2017-04-25 DIAGNOSIS — F33.1 MAJOR DEPRESSIVE DISORDER, RECURRENT EPISODE, MODERATE (H): Primary | ICD-10-CM

## 2017-04-25 PROCEDURE — 99214 OFFICE O/P EST MOD 30 MIN: CPT | Performed by: PSYCHIATRY & NEUROLOGY

## 2017-04-25 RX ORDER — MIRTAZAPINE 15 MG/1
15 TABLET, FILM COATED ORAL AT BEDTIME
Qty: 30 TABLET | Refills: 5 | Status: SHIPPED | OUTPATIENT
Start: 2017-04-25 | End: 2018-01-10

## 2017-04-25 RX ORDER — BUSPIRONE HYDROCHLORIDE 30 MG/1
1 TABLET ORAL 2 TIMES DAILY
Qty: 180 TABLET | Refills: 5 | Status: SHIPPED | OUTPATIENT
Start: 2017-04-25 | End: 2018-01-10

## 2017-04-25 ASSESSMENT — PATIENT HEALTH QUESTIONNAIRE - PHQ9: 5. POOR APPETITE OR OVEREATING: MORE THAN HALF THE DAYS

## 2017-04-25 ASSESSMENT — ANXIETY QUESTIONNAIRES
3. WORRYING TOO MUCH ABOUT DIFFERENT THINGS: MORE THAN HALF THE DAYS
7. FEELING AFRAID AS IF SOMETHING AWFUL MIGHT HAPPEN: MORE THAN HALF THE DAYS
5. BEING SO RESTLESS THAT IT IS HARD TO SIT STILL: NEARLY EVERY DAY
1. FEELING NERVOUS, ANXIOUS, OR ON EDGE: NEARLY EVERY DAY
GAD7 TOTAL SCORE: 18
6. BECOMING EASILY ANNOYED OR IRRITABLE: NEARLY EVERY DAY
2. NOT BEING ABLE TO STOP OR CONTROL WORRYING: NEARLY EVERY DAY

## 2017-04-25 NOTE — MR AVS SNAPSHOT
MRN:[id number]                      After Visit Summary   4/25/2017    Lamont Daniels    MRN: [id number]           Visit Information        Provider Department      4/25/2017 2:00 PM Wiley Acosta MD [facility]        Your next 10 appointments already scheduled     May 23, 2017  3:00 PM CDT   Return Sleep Patient with Milan Huynh MD   [facility] ([facility])    [address] [address]   [phone]            Jun 21, 2017  3:00 PM CDT   Return Visit with Dorothea Loo MD   [facility] ([facility])    [address] [facility] [id number] [phone]            Jul 13, 2017  1:00 PM CDT   LAB with [facility]   [facility] ([facility]    [address] [facility] [id number] [phone]           Patient must bring picture ID.  Patient should be prepared to give a urine specimen  Please do not eat 10-12 hours before your appointment if you are coming in fasting for labs on lipids, cholesterol, or glucose (sugar).  Pregnant women should follow their Care Team instructions. Water with medications is okay. Do not drink coffee or other fluids.   If you have concerns about taking  your medications, please ask at office or if scheduling via [employer]hart, send a message by clicking on Secure Messaging, Message Your Care Team.            Jul 18, 2017  1:00 PM CDT   Return Visit with Sebastián Jenkins MD   [facility]    [address] [facility] [address]   [phone]            Oct 20, 2017  3:00 PM CDT   Return Visit with Fly Travis MD   [facility] ([facility])    [address] [place] [address]   [phone]              Care Instructions           Treatment Plan:  Continue current dose of Buspar (buspirone); 30 mg twice a day    Also, continue Remeron (mirtazepine) 15 mg at bedtime   Safety plan was reviewed; to the ER as needed or call after hours crisis line; [phone]  Education and counseling was done regarding use of medications, psychotherapy options  Follow up with David Chavez for medication management and refills.           [facility] Information     [employer] gives you secure access to your electronic health record. If you see a primary care provider, you can also send messages to your care team and make appointments. If you have questions, please call your primary care clinic.  If you do not have a primary care provider, please call [phone] and they will assist you.        Care EveryWhere ID     This is your Care EveryWhere ID. This could be used by other organizations to access your [place] medical records  [id number]

## 2017-04-25 NOTE — PATIENT INSTRUCTIONS
Treatment Plan:  Continue current dose of Buspar (buspirone); 30 mg twice a day    Also, continue Remeron (mirtazepine) 15 mg at bedtime   Safety plan was reviewed; to the ER as needed or call after hours crisis line; [phone]  Education and counseling was done regarding use of medications, psychotherapy options  Follow up with David Chavez for medication management and refills.

## 2017-04-25 NOTE — PROGRESS NOTES
Psychiatric Progress Note    Name: Lamont Daniels  Date: April 25, 2017   Length of Visit: 30 minutes  MRN: [id number]      Current Outpatient Prescriptions   Medication Sig     sulfaSALAzine ER (AZULFIDINE EN) 500 MG EC tablet Take 2 tablets (1,000 mg) by mouth 2 times daily     predniSONE (DELTASONE) 5 MG tablet Take 1 tablet (5 mg) by mouth daily     Abatacept 125 MG/ML SOAJ Inject 125 mg Subcutaneous every 7 days     meclizine (ANTIVERT) 25 MG tablet TAKE ONE TABLET BY MOUTH EVERY 6 HOURS AS NEEDED FOR DIZZINESS     colchicine 0.6 MG tablet TAKE TWO TABLETS BY MOUTH AT THE FIRST SIGN OF FLARE, TAKE ONE ADDITIONAL TABLET ONE HOUR LATER.     clonazePAM (KLONOPIN) 1 MG tablet TAKE ONE-HALF TO ONE TABLET BY MOUTH THREE TIMES DAILY AS NEEDED FOR ANXIETY     atorvastatin (LIPITOR) 80 MG tablet TAKE ONE TABLET (80 MG) BY MOUTH ONCE DAILY FOR CHOLESTEROL     baclofen (LIORESAL) 10 MG tablet Take 1 tablet (10 mg) by mouth 2 times daily as needed for muscle spasms     tamsulosin (FLOMAX) 0.4 MG capsule Take 1 capsule (0.4 mg) by mouth daily     metoprolol (TOPROL-XL) 25 MG 24 hr tablet Take 1 tablet (25 mg) by mouth daily     zolpidem (AMBIEN) 5 MG tablet TAKE ONE TABLET BY MOUTH AT BEDTIME AS NEEDED FOR SLEEP     oxyCODONE (ROXICODONE) 5 MG IR tablet Take 1-2 tablets (5-10 mg) by mouth every 4 hours as needed for moderate to severe pain . Max of 6 tabs per day.  May fill on/after 3/9/17 to start taking on/after 3/11/17. One month supply.     meloxicam (MOBIC) 7.5 MG tablet TAKE ONE TABLET BY MOUTH ONCE DAILY WITH  BREAKFAST     mirtazapine (REMERON) 15 MG tablet Take 1 tablet (15 mg) by mouth At Bedtime     allopurinol (ZYLOPRIM) 300 MG tablet TAKE ONE TABLET BY MOUTH ONCE DAILY     evolocumab (REPATHA) 140 MG/ML prefilled autoinjector Inject 1 mL (140 mg) Subcutaneous every 14 days     ezetimibe (ZETIA) 10 MG tablet Take 1 tablet (10 mg) by mouth daily     clopidogrel (PLAVIX) 75 MG tablet Take 1 tablet (75 mg) by mouth  daily     atorvastatin (LIPITOR) 80 MG tablet Take 1 tablet (80 mg) by mouth daily for cholesterol.     gemfibrozil (LOPID) 600 MG tablet Take 1 tablet (600 mg) by mouth 2 times daily     nitroglycerin (NITROSTAT) 0.4 MG SL tablet Place 1 tablet (0.4 mg) under the tongue every 5 minutes as needed     gabapentin (NEURONTIN) 300 MG capsule TAKE TWO CAPSULES BY MOUTH THREE TIMES DAILY     aspirin EC 81 MG EC tablet Take 1 tablet (81 mg) by mouth daily (*)     hydrocortisone (WESTCORT) 0.2 % cream Use twice daily on the eats for active rash for up to 2 weeks in a row, then take 2 weeks off.     BusPIRone HCl 30 MG TABS TAKE ONE TABLET BY MOUTH TWICE DAILY     pantoprazole (PROTONIX) 40 MG enteric coated tablet TAKE ONE TABLET BY MOUTH ONCE DAILY FOR STOMACH     tenofovir (VIREAD) 300 MG tablet Take 1 tablet (300 mg) by mouth daily     order for DME Equipment being ordered: single end cane (Patient not taking: Reported on 4/14/2017)     Cholecalciferol (VITAMIN D) 2000 UNITS tablet TAKE ONE TABLET BY MOUTH ONCE DAILY     triamcinolone (KENALOG) 0.1 % ointment Apply sparingly to the ears and groin twice daily until clear, then once daily until clear. Do not apply to the face.     ketoconazole (NIZORAL) 2 % cream Apply topically 2 times daily Apply to back of scalp and to groin twice daily until rash is clear     melatonin 3 MG tablet Take 1 tablet (3 mg) by mouth nightly as needed for sleep     ORDER FOR DME 1.  CPAP pressure 11 cm/H20 with heated humidity.   2.  Provide mask to fit and CPAP supplies.   3.  Length of need lifetime.   4.  If needed please provide a chin strap      (Patient not taking: Reported on 4/14/2017)     No current facility-administered medications for this visit.      Facility-Administered Medications Ordered in Other Visits   Medication     gadobutrol (GADAVIST) injection 10 mL     From eval 10/17/12  Past medication trials: (patient was presented with a list to review all currently available  "antidepressants, mood stabilizers, tranquilizers, hypnotics and antipsychotics)  New Antidepressants:  Zoloft (sertraline), Celexa (citalopram), Cymbalta (duloxetine) and Cymbalta and Celexa were not effective. On Zoloft now but he still feel depressed.  Sedatives/Hypnotics:  Ambien (zolpidem) and OTC: Benadryl (diphenhydramine)  Tranquilizers:  Xanax (alprazolam) and this is very effective for panic. Having trouble getting a RX for this.  Also has failed Cymbalta and Wellbutrin, Trintellix     Therapist:  Counselor at [facility] (they speak his native language)    PHQ-9:  PHQ-9 SCORE 1/25/2017 3/14/2017 4/25/2017   Total Score - - -   Total Score 20 21 21        THONG-7:  THONG-7 SCORE 1/25/2017 3/14/2017 4/25/2017   Total Score - - -   Total Score 16 19 18   Total Score - - -        Interim History:  In part, he comes in to help me complete disability forms. Has another SSDI hearing May 12.   Still feeling very depressed and having difficulty functioning; needs PCA to help him with bathing, and can only tolerate once a week. Due to difficulty bending he cannot  things around the house, doesn't feel up to doing it regardless. Spends most of his time in bed, rarely socializes; \"I don't know anybody\", and too much of a struggle to go out.   Remeron added last visit; he doesn't know if it helps (doesn't really remember it).     Assessment:  His non response to meds continues to be frustrating. I would consider nortriptyline, but he has a dry mouth.  I don't think Fetzima would be tolerated due to GI problems.   Luvox has a lot of drug-drug interactions; should find something else.   Perhaps Remeron would help, but have concerns about wt gain. Plan B will be the Fetzima.      Treatment Plan:  Continue current dose of Buspar   Stop Luvox   Trial of Remeron (mirtazepine) 15 mg at bedtime   Safety plan was reviewed; to the ER as needed or call after hours crisis line; [phone]  Education and counseling was done " "regarding use of medications, psychotherapy options  Call for a follow up appointment with Dr. Acosta in 10-12 weeks or so; [phone].      Past Medical History:   Diagnosis Date     Anxiety      CAD (coronary artery disease)     sees cardiologist Dr. Yang, has had stents and cabg     Congestive heart failure, unspecified 2008     Gout      Hepatitis B > 10 years     HTN (hypertension)      Hyperlipidemia LDL goal < 100      Malrotation of intestine      Moderate major depression (H)      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) 9/19/2011    Uses CPAP     Rheumatoid arthritis flare (H) 1012     Steatohepatitis 12/15    liver biopsy     Tuberculosis age 13    INH x 9 months      Vitamin D deficiency        10 point ROS is negative except for HAs, aches all over (mostly joints), some shakes, exertional dyspnea        Mental Status Assessment:  Appearance:  Well groomed    Behavior/relationship to examiner/demeanor:  Cooperative  Motor activity:  Normal  Gait:  Slowed, using a cane   Speech:  Very quiet, slowed  Mood (subjective report):  \"sad\"  Affect (objective appearance):  Restricted   Thought Process (Associations):  Logical, linear and goal directed  Thought content:  No evidence of suicidal or homicidal ideation,          no overt psychosis and                    patient does not appear to be responding to internal stimuli  Oriented to person, place, date/time   Attention Span and concentration: Intact   Memory:  Short-term memory fair and Long-term memory; fair  Language:  Fluent   Fund of Knowledge/Intelligence:  Average  Use of language: Intact   Abstraction:  Thompson   Insight:  Adequate  Judgment:  Adequate for safety    DSM-5 DIAGNOSIS:  296.33 - Major Depressive Disorder, Recurrent, severe   300.02 - Generalized Anxiety Disorder   300.21 - Panic Disorder with Agoraphobia   ASHD, hep B     Assessment:  I expect that he is about as good as we can get him. Can now follow with Dr Chavez, but I am available as needed via Epic. "     Treatment Plan:  Continue current dose of Buspar (buspirone); 30 mg twice a day    Also, continue Remeron (mirtazepine) 15 mg at bedtime   Safety plan was reviewed; to the ER as needed or call after hours crisis line; [phone]  Education and counseling was done regarding use of medications, psychotherapy options  Follow up with David Chavez for medication management and refills.          Signed: Wiley Acosta M.D.

## 2017-04-26 ENCOUNTER — TELEPHONE (OUTPATIENT)
Dept: FAMILY MEDICINE | Facility: CLINIC | Age: 52
End: 2017-04-26

## 2017-04-26 DIAGNOSIS — F41.9 ANXIETY HYPERVENTILATION: ICD-10-CM

## 2017-04-26 DIAGNOSIS — B18.1 CHRONIC VIRAL HEPATITIS B WITHOUT DELTA AGENT AND WITHOUT COMA (H): ICD-10-CM

## 2017-04-26 DIAGNOSIS — F45.8 ANXIETY HYPERVENTILATION: ICD-10-CM

## 2017-04-26 DIAGNOSIS — M10.9 GOUT WITHOUT TOPHUS: ICD-10-CM

## 2017-04-26 DIAGNOSIS — R42 VERTIGO: ICD-10-CM

## 2017-04-26 DIAGNOSIS — M47.819 FACET ARTHROPATHY: ICD-10-CM

## 2017-04-26 RX ORDER — CLONAZEPAM 1 MG/1
TABLET ORAL
Qty: 90 TABLET | Refills: 0 | Status: SHIPPED | OUTPATIENT
Start: 2017-04-26 | End: 2017-10-21

## 2017-04-26 RX ORDER — TENOFOVIR DISOPROXIL FUMARATE 300 MG
TABLET ORAL
Qty: 90 TABLET | Refills: 0 | Status: SHIPPED | OUTPATIENT
Start: 2017-04-26 | End: 2017-05-23

## 2017-04-26 RX ORDER — MECLIZINE HYDROCHLORIDE 25 MG/1
TABLET ORAL
Qty: 30 TABLET | Refills: 0 | Status: SHIPPED | OUTPATIENT
Start: 2017-04-26 | End: 2017-05-04

## 2017-04-26 RX ORDER — COLCHICINE 0.6 MG/1
TABLET ORAL
Qty: 30 TABLET | Refills: 0 | Status: SHIPPED | OUTPATIENT
Start: 2017-04-26 | End: 2017-05-02

## 2017-04-26 ASSESSMENT — PATIENT HEALTH QUESTIONNAIRE - PHQ9: SUM OF ALL RESPONSES TO PHQ QUESTIONS 1-9: 21

## 2017-04-26 ASSESSMENT — ANXIETY QUESTIONNAIRES: GAD7 TOTAL SCORE: 18

## 2017-04-26 NOTE — TELEPHONE ENCOUNTER
Received call from patient requesting refill(s) of oxyCODONE (ROXICODONE) 5 MG IR tablet    Last picked up from pharmacy on 3/10/17    Pt last seen by prescribing provider on 4/6/17  Next appt scheduled for 6/21/17    Last urine drug screen date 1/5/17  Current opioid agreement on file (completed within the last year) Yes Date of opioid agreement: 1/5/17    Processing (pick one and delete the others):  Mail to [facility] [address] [place] [address] [facility]  Sascha Isaac MA  Pain Management Center    Will route to nursing [place] for review and preparation of prescription(s).

## 2017-04-26 NOTE — TELEPHONE ENCOUNTER
Dr. Chavez completed form ([employer] [facility]-Social Security Disability [employer]) for pt and the form has been faxed back to fax # [phone].  Renard Donohue,  For Teams Comfort and Heart

## 2017-04-26 NOTE — TELEPHONE ENCOUNTER
Written rx for clonazepam is faxed to [facility] pharmacy in [place].  Renard Donohue,  For Teams Comfort and Heart

## 2017-04-26 NOTE — TELEPHONE ENCOUNTER
colchicine 0.6 MG tablet       Last Written Prescription Date: 4/11/17  Last Fill Quantity: 30, # refills: 0  Last Office Visit with [facility], [facility] or [facility] prescribing provider:  3/14/17   Next 5 appointments (look out 90 days)     Jun 21, 2017  3:00 PM CDT   Return Visit with Dorothea Loo MD   [facility] ([facility])    [address] [facility] [address]   [phone]            Jul 18, 2017  1:00 PM CDT   Return Visit with Sebastián Jenkins MD   [facility] ([facility])    [address] [facility] [address]   [phone]                   clonazePAM (KLONOPIN) 1 MG tablet      Last Written Prescription Date:  4/11/17  Last Fill Quantity: 30,   # refills: 0  Last Office Visit with [facility], [facility] or [facility] prescribing provider: 3/14/17  Future Office visit:    Next 5 appointments (look out 90 days)     Jun 21, 2017  3:00 PM CDT   Return Visit with Dorothea Loo MD   [facility] ([facility])    [address] [facility] [address]   [phone]            Jul 18, 2017  1:00 PM CDT   Return Visit with Sebastián Jenkins MD   [facility] ([facility])    [address] [facility] [id number] [phone]                   Routing refill request to provider for review/approval because:  Drug not on the [facility], [facility] or [facility] refill protocol or controlled substance    Uric Acid   Date Value Ref Range Status   11/04/2015 6.1 3.5 - 7.2 mg/dL Final   ]  Creatinine   Date Value Ref Range Status   04/18/2017 0.84 0.66 - 1.25 mg/dL Final   ]  Lab Results   Component Value Date    WBC 7.7 04/18/2017     Lab Results   Component Value Date    RBC 4.98 04/18/2017     Lab Results   Component Value Date    HGB 15.1 04/18/2017     Lab Results   Component Value Date    HCT 44.2 04/18/2017     No components found for:  MCT  Lab Results   Component Value Date    MCV 89 04/18/2017     Lab Results   Component Value Date    MCH 30.3 04/18/2017     Lab Results   Component Value Date    MCHC 34.2 04/18/2017     Lab Results   Component Value Date    RDW 14.2 04/18/2017     Lab Results   Component Value Date     04/18/2017     Lab Results   Component Value Date    AST 21 04/18/2017     Lab Results   Component Value Date    ALT 44 04/18/2017         meclizine (ANTIVERT) 25 MG tablet      Last Written Prescription Date: 4/11/17  Last Fill Quantity: 30,  # refills: 0   Last Office Visit with [facility] or [facility] prescribing provider: 3/14/17                                         Next 5 appointments (look out 90 days)     Jun 21, 2017  3:00 PM CDT   Return Visit with Dorothea Loo MD   [facility] ([facility])    [address] [place] [address]   [phone]            Jul 18, 2017  1:00 PM CDT   Return Visit with Sebastián Jenkins MD   [facility] ([facility])    [address] [facility] [address]   [phone]                        Costa Faarax  [facility]

## 2017-04-26 NOTE — TELEPHONE ENCOUNTER
VM left today at 12:47 pm    Reason for call: Medication   If this is a refill request, has the caller requested the refill from the pharmacy already? N/A  Will the patient be using a [facility]? No  Name of the pharmacy and phone number for the current request: [facility] pharmacy in [place]    Name of the medication requested: Oxycodone      Phone Number Pt can be reached at: Home number on file [phone] (home)  Best Time: NA  Can we leave a detailed message on this number? YES'    Silvana RODRIGUEZ    [facility]

## 2017-04-27 ENCOUNTER — TELEPHONE (OUTPATIENT)
Dept: FAMILY MEDICINE | Facility: CLINIC | Age: 52
End: 2017-04-27

## 2017-04-27 NOTE — TELEPHONE ENCOUNTER
Medication refill information reviewed.     Last due:  May fill on/after 3/9/17 to start  taking on/after 3/11/17. One month supply.    Due date:  anytime    Weaning instructions:  N/A    Prescriptions prepped for review.     Maureen Nielsen RN-BSN  [facility]

## 2017-04-27 NOTE — TELEPHONE ENCOUNTER
Plan does not cover colchicine 0.6 MG tablet.  Please call [phone] to initiate Prior Auth or change med.      ID# [id number]      [facility]

## 2017-04-28 RX ORDER — OXYCODONE HYDROCHLORIDE 5 MG/1
5-10 TABLET ORAL EVERY 4 HOURS PRN
Qty: 150 TABLET | Refills: 0 | Status: SHIPPED | OUTPATIENT
Start: 2017-04-28 | End: 2017-05-31

## 2017-04-28 NOTE — TELEPHONE ENCOUNTER
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    Prior Authorization Starts (date): 4/28/2017    Insurance Plan: [employer] [place]  Patient ID: [id number]  Phone: [phone]  Route it to the team comfort  Postponed for 3 business days as protocol.  Saira Nj

## 2017-04-28 NOTE — TELEPHONE ENCOUNTER
Signed Prescriptions:                        Disp   Refills    oxyCODONE (ROXICODONE) 5 MG IR tablet      150 ta*0        Sig: Take 1-2 tablets (5-10 mg) by mouth every 4 hours as           needed for moderate to severe pain . Max of 6           tabs per day.  One month supply.  Authorizing Provider: KAYLA CHUN MD  [facility]

## 2017-04-28 NOTE — TELEPHONE ENCOUNTER
Mailed Rx to the [facility] pharmacy in [facility]. Pt is informed     Sascha Isaac MA  Pain Management Center

## 2017-05-02 NOTE — TELEPHONE ENCOUNTER
Prior Authorization for colchicine 0.6 MG tablet was denied on May 2, 2017.  If denied preferred formulary alternatives: probenecid-colchicine 500 mg-0.5mg tablet.   PA reference #: [id number]  Pharmacy was notified by plan  Patient will be notified by the insurance plan. Letter sent by insurance company and letter forwarded to Dr. Chavez.    If PA denied forward the msg to the order physicians.     To file an appeal please states the medical necessity in your return MSG.   Saira Nj

## 2017-05-02 NOTE — TELEPHONE ENCOUNTER
Noted that colchicine not covered. Will give other medications for flares if needed.    David Chavez MD

## 2017-05-04 ENCOUNTER — RADIANT APPOINTMENT (OUTPATIENT)
Dept: GENERAL RADIOLOGY | Facility: CLINIC | Age: 52
End: 2017-05-04
Attending: FAMILY MEDICINE
Payer: COMMERCIAL

## 2017-05-04 ENCOUNTER — OFFICE VISIT (OUTPATIENT)
Dept: FAMILY MEDICINE | Facility: CLINIC | Age: 52
End: 2017-05-04
Payer: COMMERCIAL

## 2017-05-04 VITALS
DIASTOLIC BLOOD PRESSURE: 76 MMHG | SYSTOLIC BLOOD PRESSURE: 124 MMHG | TEMPERATURE: 96.6 F | HEART RATE: 71 BPM | BODY MASS INDEX: 31.83 KG/M2 | WEIGHT: 173 LBS | HEIGHT: 62 IN | OXYGEN SATURATION: 96 %

## 2017-05-04 DIAGNOSIS — R42 VERTIGO: ICD-10-CM

## 2017-05-04 DIAGNOSIS — M25.561 ACUTE PAIN OF RIGHT KNEE: ICD-10-CM

## 2017-05-04 DIAGNOSIS — M25.561 ACUTE PAIN OF RIGHT KNEE: Primary | ICD-10-CM

## 2017-05-04 PROCEDURE — 99213 OFFICE O/P EST LOW 20 MIN: CPT | Performed by: FAMILY MEDICINE

## 2017-05-04 PROCEDURE — 73562 X-RAY EXAM OF KNEE 3: CPT | Mod: RT

## 2017-05-04 RX ORDER — MECLIZINE HYDROCHLORIDE 25 MG/1
TABLET ORAL
Qty: 30 TABLET | Refills: 0 | Status: SHIPPED | OUTPATIENT
Start: 2017-05-04 | End: 2017-06-01

## 2017-05-04 RX ORDER — CLONAZEPAM 1 MG/1
TABLET ORAL
Qty: 90 TABLET | Refills: 0 | Status: CANCELLED | OUTPATIENT
Start: 2017-05-04

## 2017-05-04 ASSESSMENT — PAIN SCALES - GENERAL: PAINLEVEL: SEVERE PAIN (6)

## 2017-05-04 NOTE — PATIENT INSTRUCTIONS
How to contact your care team providers:    Team Heart/Comfort  [phone]  Pharmacy [phone]    NATE [address] Dr., Dr., PA-C    Team RN: Raghu MAGALLANES    Clinic hours  M-Th 7am-7pm   Fri 7am-5pm.   Urgent care M-F 11am-9pm,   Sat/sun 9am-5pm.  Pharmacy M-F 8:00am-8pm Sat/sun 9am-5pm.     All password changes, disabled accounts, or ID changes in [employer]MyHealth will be done by our Access Services Department.   If you need help with your account or password, call: [phone]. Clinic staff no longer has the ability to change passwords.

## 2017-05-04 NOTE — PROGRESS NOTES
SUBJECTIVE:                                                    Lamont Daniels is a 51 year old male who presents to clinic today for the following health issues:      Joint Pain     Onset: 5/2/17    Description:   Location: right knee  Character: Sharp    Intensity: severe    Progression of Symptoms: same    Accompanying Signs & Symptoms:  Other symptoms: swelling   History:   Previous similar pain: no       Precipitating factors:   Trauma or overuse: no     Alleviating factors:  Improved by: nothing       Therapies Tried and outcome: None.    Problem list and histories reviewed & adjusted, as indicated.  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     Hyperlipidemia LDL goal <70     Insomnia     Gout     Suicidal ideation     Essential hypertension     Rheumatoid arthritis involving multiple sites, unspecified rheumatoid factor presence (H)     High risk medications (not anticoagulants) long-term use     Midline low back pain without sciatica     Bilateral low back pain with sciatica, sciatica laterality unspecified     Elevated liver enzymes     On corticosteroid therapy     Essential hypertension with goal blood pressure less than 140/90     Insomnia, unspecified type     Rheumatoid arthritis of multiple sites with negative rheumatoid factor (H)     Benign prostatic hyperplasia with lower urinary tract symptoms, unspecified morphology     Past Surgical History:   Procedure Laterality Date     ABDOMEN SURGERY  2014     BIOPSY  2015     BYPASS GRAFT ARTERY CORONARY  2008    6 vessels     COLONOSCOPY  2/8/2013    Procedure: COLONOSCOPY;  Colonoscopy,  "blood in stool;  Surgeon: Duane, William Charles, MD;  Location: [facility]     GI SURGERY  2014     HC CORONARY STENT BRITTANY, CIERA ADDTL VESSEL  2008    3 months after CABG     HEAD & NECK SURGERY  2011     NASAL/SINUS POLYPECTOMY  2010     ORTHOPEDIC SURGERY  2012     THORACIC SURGERY  1989    tb     TONSILLECTOMY  2010     UVULOPALATOPHARYNGOPLASTY  2010     VASCULAR SURGERY  2008       Social History   Substance Use Topics     Smoking status: Never Smoker     Smokeless tobacco: Never Used     Alcohol use No     Family History   Problem Relation Age of Onset     C.A.D. Father      Coronary Artery Disease Father      Hypertension Father      Depression Father      Hypertension Mother      DIABETES Mother      Depression Mother      MENTAL ILLNESS Mother      Hypertension Maternal Grandfather      CANCER Paternal Grandfather      Other Cancer Other      Autoimmune Disease No family hx of      CEREBROVASCULAR DISEASE No family hx of      Thyroid Disease No family hx of      Glaucoma No family hx of      Macular Degeneration No family hx of            Reviewed and updated as needed this visit by clinical staff  Tobacco  Allergies  Meds       Reviewed and updated as needed this visit by Provider         ROS:  Constitutional, HEENT, cardiovascular, pulmonary, GI, , musculoskeletal, neuro, skin, endocrine and psych systems are negative, except as otherwise noted.    OBJECTIVE:                                                    /76 (BP Location: Left arm, Patient Position: Chair, Cuff Size: Adult Large)  Pulse 71  Temp 96.6  F (35.9  C) (Oral)  Ht 5' 2\" (1.575 m)  Wt 173 lb (78.5 kg)  SpO2 96%  BMI 31.64 kg/m2  Body mass index is 31.64 kg/(m^2).  GENERAL: healthy, alert and no distress  NECK: no adenopathy, no asymmetry, masses, or scars and thyroid normal to palpation  RESP: lungs clear to auscultation - no rales, rhonchi or wheezes  CV: regular rate and rhythm, normal S1 S2, no S3 or S4, no murmur, click or " rub, no peripheral edema and peripheral pulses strong  ABDOMEN: soft, nontender, no hepatosplenomegaly, no masses and bowel sounds normal  MS: right knee: negative Valentino, negative anterior/posterior drawer, mild tenderness along distal quads at insertion points  Diagnostic Test Results:  none      ASSESSMENT/PLAN:                                                      1. Acute pain of right knee  Quad tendonitis. No internal knee findings on exam. Patellachondromalacia. Referred to Sports Medicine for further evaluation and recommendations.  - XR Knee Right 3 Views; Future  - ORTHO  REFERRAL    2. Vertigo  Needed refills.  - meclizine (ANTIVERT) 25 MG tablet; TAKE ONE TABLET BY MOUTH EVERY 6 HOURS AS NEEDED FOR  DIZZINESS.  Dispense: 30 tablet; Refill: 0    See Patient Instructions    David Chavez MD, MD  [facility]

## 2017-05-04 NOTE — MR AVS SNAPSHOT
After Visit Summary   5/4/2017    Lamont Daniels    MRN: [id number]           Patient Information     Date Of Birth          1965        Visit Information        Provider Department      5/4/2017 3:00 PM David Chavez MD [facility]        Today's Diagnoses     Acute pain of right knee    -  1    Vertigo          Care Instructions    How to contact your care team providers:    [facility]  [phone]  Pharmacy [phone]    NATE Laura Dr., Dr., Dr., PA-C    Team RN: Raghu GIBBS and Lorraine MAGALLANES    Clinic hours  M-Th 7am-7pm   Fri 7am-5pm.   Urgent care M-F 11am-9pm,   Sat/sun 9am-5pm.  Pharmacy M-F 8:00am-8pm Sat/sun 9am-5pm.     All password changes, disabled accounts, or ID changes in [employer]/MyHealth will be done by our Access Services Department.   If you need help with your account or password, call: [phone]. Clinic staff no longer has the ability to change passwords.                       Follow-ups after your visit        Additional Services     ORTHO  REFERRAL       [facility] is referring you to the Orthopedic  Services at [facility].       The  Representative will assist you in the coordination of your Orthopedic and Musculoskeletal Care as prescribed by your physician.    The  Representative will call you within 1 business day to help schedule your appointment, or you may contact the  Representative at:    All areas ~ [phone]     Type of Referral : Non Surgical       Timeframe requested: Routine    Coverage of these services is subject to the terms and limitations of your health insurance plan.  Please call member services at your health plan with any benefit or coverage questions.      If X-rays, CT or MRI's have been performed, please contact the facility where they were done  to arrange for , prior to your scheduled appointment.  Please bring this referral request to your appointment and present it to your specialist.                  Your next 10 appointments already scheduled     May 23, 2017  3:00 PM CDT   Return Sleep Patient with Milan Huynh MD   [facility] ([facility])    [address] [address] [address]   [phone]            Jun 21, 2017  3:00 PM CDT   Return Visit with Dorothea Loo MD   [facility] ([facility])    [address] [facility] [id number] [phone]            Jul 13, 2017  1:00 PM CDT   [facility]    [address] [facility] [address]   [phone]           Patient must bring picture ID.  Patient should be prepared to give a urine specimen  Please do not eat 10-12 hours before your appointment if you are coming in fasting for labs on lipids, cholesterol, or glucose (sugar).  Pregnant women should follow their Care Team instructions. Water with medications is okay. Do not drink coffee or other fluids.   If you have concerns about taking  your medications, please ask at office or if scheduling via [employer], send a message by clicking on Secure Messaging, Message Your Care Team.            Jul 18, 2017  1:00 PM CDT   [facility]    [address] [facility] [address]   [phone]            Oct 20, 2017  3:00 PM CDT   [facility] Visit with Fly Travis MD   [facility]    [address] [address] [place] [id number] [phone]              Who to contact     If you have questions or need follow up information about today's clinic visit or your schedule please  "contact [facility] directly at [phone].  Normal or non-critical lab and imaging results will be communicated to you by MyChart, letter or phone within 4 business days after the clinic has received the results. If you do not hear from us within 7 days, please contact the clinic through [employer] or phone. If you have a critical or abnormal lab result, we will notify you by phone as soon as possible.  Submit refill requests through [employer] or call your pharmacy and they will forward the refill request to us. Please allow 3 business days for your refill to be completed.          Additional Information About Your Visit        [employer] Information     [employer] gives you secure access to your electronic health record. If you see a primary care provider, you can also send messages to your care team and make appointments. If you have questions, please call your primary care clinic.  If you do not have a primary care provider, please call [phone] and they will assist you.        Care EveryWhere ID     This is your Care EveryWhere ID. This could be used by other organizations to access your [facility] medical records  [id number]        Your Vitals Were     Pulse Temperature Height Pulse Oximetry BMI (Body Mass Index)       71 96.6  F (35.9  C) (Oral) 5' 2\" (1.575 m) 96% 31.64 kg/m2        Blood Pressure from Last 3 Encounters:   05/04/17 124/76   04/18/17 120/84   04/14/17 131/80    Weight from Last 3 Encounters:   05/04/17 173 lb (78.5 kg)   04/18/17 165 lb 3.2 oz (74.9 kg)   04/14/17 171 lb 1.6 oz (77.6 kg)              We Performed the Following     ORTHO  REFERRAL          Today's Medication Changes          These changes are accurate as of: 5/4/17  3:16 PM.  If you have any questions, ask your nurse or doctor.               These medicines have changed or have updated prescriptions.        Dose/Directions    meclizine 25 MG tablet   Commonly known as:  ANTIVERT   This may have changed:  " See the new instructions.   Used for:  Vertigo   Changed by:  David Chavez MD        TAKE ONE TABLET BY MOUTH EVERY 6 HOURS AS NEEDED FOR  DIZZINESS.   Quantity:  30 tablet   Refills:  0            Where to get your medicines      These medications were sent to [facility] [place], [place] - [address]  [address] [facility] [address]     Phone:  [phone]     meclizine 25 MG tablet                Primary Care Provider Office Phone # Fax #    David Chavez [phone]       [facility] [address]  [facility] [address]        Thank you!     Thank you for choosing [facility]  for your care. Our goal is always to provide you with excellent care. Hearing back from our patients is one way we can continue to improve our services. Please take a few minutes to complete the written survey that you may receive in the mail after your visit with us. Thank you!             Your Updated Medication List - Protect others around you: Learn how to safely use, store and throw away your medicines at www.disposemymeds.org.          This list is accurate as of: 5/4/17  3:16 PM.  Always use your most recent med list.                   Brand Name Dispense Instructions for use    Abatacept 125 MG/ML Soaj     4 Syringe    Inject 125 mg Subcutaneous every 7 days       allopurinol 300 MG tablet    ZYLOPRIM    90 tablet    TAKE ONE TABLET BY MOUTH ONCE DAILY       aspirin 81 MG EC tablet     30 tablet    Take 1 tablet (81 mg) by mouth daily (*)       atorvastatin 80 MG tablet    LIPITOR    90 tablet    TAKE ONE TABLET (80 MG) BY MOUTH ONCE DAILY FOR CHOLESTEROL       baclofen 10 MG tablet    LIORESAL    180 tablet    Take 1 tablet (10 mg) by mouth 2 times daily as needed for muscle spasms       BusPIRone HCl 30 MG Tabs     180 tablet    Take 1 tablet by mouth 2 times daily       clonazePAM 1 MG tablet    klonoPIN    90 tablet    TAKE ONE-HALF TO ONE  TABLET BY MOUTH THREE TIMES DAILY AS NEEDED FOR ANXIETY       clopidogrel 75 MG tablet    PLAVIX    90 tablet    Take 1 tablet (75 mg) by mouth daily       evolocumab 140 MG/ML prefilled autoinjector    REPATHA    2 mL    Inject 1 mL (140 mg) Subcutaneous every 14 days       ezetimibe 10 MG tablet    ZETIA    90 tablet    Take 1 tablet (10 mg) by mouth daily       gabapentin 300 MG capsule    NEURONTIN    540 capsule    TAKE TWO CAPSULES BY MOUTH THREE TIMES DAILY       gemfibrozil 600 MG tablet    LOPID    180 tablet    Take 1 tablet (600 mg) by mouth 2 times daily       hydrocortisone 0.2 % cream    WESTCORT    45 g    Use twice daily on the eats for active rash for up to 2 weeks in a row, then take 2 weeks off.       ketoconazole 2 % cream    NIZORAL    30 g    Apply topically 2 times daily Apply to back of scalp and to groin twice daily until rash is clear       meclizine 25 MG tablet    ANTIVERT    30 tablet    TAKE ONE TABLET BY MOUTH EVERY 6 HOURS AS NEEDED FOR  DIZZINESS.       melatonin 3 MG tablet      Take 1 tablet (3 mg) by mouth nightly as needed for sleep       meloxicam 7.5 MG tablet    MOBIC    30 tablet    TAKE ONE TABLET BY MOUTH ONCE DAILY WITH  BREAKFAST       metoprolol 25 MG 24 hr tablet    TOPROL-XL    90 tablet    Take 1 tablet (25 mg) by mouth daily       mirtazapine 15 MG tablet    REMERON    30 tablet    Take 1 tablet (15 mg) by mouth At Bedtime       nitroglycerin 0.4 MG sublingual tablet    NITROSTAT    30 tablet    Place 1 tablet (0.4 mg) under the tongue every 5 minutes as needed       order for DME      1.  CPAP pressure 11 cm/H20 with heated humidity.  2.  Provide mask to fit and CPAP supplies.  3.  Length of need lifetime.  4.  If needed please provide a chin strap       order for DME     1 Units    Equipment being ordered: single end cane       oxyCODONE 5 MG IR tablet    ROXICODONE    150 tablet    Take 1-2 tablets (5-10 mg) by mouth every 4 hours as needed for moderate to severe  pain . Max of 6 tabs per day.  One month supply.       pantoprazole 40 MG EC tablet    PROTONIX    90 tablet    TAKE ONE TABLET BY MOUTH ONCE DAILY FOR STOMACH       predniSONE 5 MG tablet    DELTASONE    30 tablet    Take 1 tablet (5 mg) by mouth daily       sulfaSALAzine  MG EC tablet    AZULFIDINE EN    120 tablet    Take 2 tablets (1,000 mg) by mouth 2 times daily       tamsulosin 0.4 MG capsule    FLOMAX    90 capsule    Take 1 capsule (0.4 mg) by mouth daily       triamcinolone 0.1 % ointment    KENALOG    80 g    Apply sparingly to the ears and groin twice daily until clear, then once daily until clear. Do not apply to the face.       VIREAD 300 MG tablet   Generic drug:  tenofovir     90 tablet    TAKE ONE TABLET (300MG) BY MOUTH ONCE DAILY       vitamin D 2000 UNITS tablet     100 tablet    TAKE ONE TABLET BY MOUTH ONCE DAILY       zolpidem 5 MG tablet    AMBIEN    30 tablet    TAKE ONE TABLET BY MOUTH AT BEDTIME AS NEEDED FOR SLEEP

## 2017-05-23 ENCOUNTER — MYC REFILL (OUTPATIENT)
Dept: GASTROENTEROLOGY | Facility: CLINIC | Age: 52
End: 2017-05-23

## 2017-05-23 ENCOUNTER — OFFICE VISIT (OUTPATIENT)
Dept: SLEEP MEDICINE | Facility: CLINIC | Age: 52
End: 2017-05-23
Payer: COMMERCIAL

## 2017-05-23 VITALS
OXYGEN SATURATION: 98 % | WEIGHT: 172 LBS | HEART RATE: 69 BPM | BODY MASS INDEX: 31.65 KG/M2 | DIASTOLIC BLOOD PRESSURE: 87 MMHG | HEIGHT: 62 IN | SYSTOLIC BLOOD PRESSURE: 132 MMHG

## 2017-05-23 DIAGNOSIS — B18.1 CHRONIC VIRAL HEPATITIS B WITHOUT DELTA AGENT AND WITHOUT COMA (H): ICD-10-CM

## 2017-05-23 DIAGNOSIS — G47.33 OSA (OBSTRUCTIVE SLEEP APNEA): Primary | ICD-10-CM

## 2017-05-23 PROCEDURE — 99214 OFFICE O/P EST MOD 30 MIN: CPT | Performed by: INTERNAL MEDICINE

## 2017-05-23 NOTE — MR AVS SNAPSHOT
After Visit Summary   5/23/2017    Lamont Daniels    MRN: [id number]           Patient Information     Date Of Birth          1965        Visit Information        Provider Department      5/23/2017 3:00 PM Milan Huynh MD [facility]        Today's Diagnoses     JEROD (obstructive sleep apnea)    -  1      Care Instructions      Your BMI is Body mass index is 31.46 kg/(m^2).  Weight management is a personal decision.  If you are interested in exploring weight loss strategies, the following discussion covers the approaches that may be successful. Body mass index (BMI) is one way to tell whether you are at a healthy weight, overweight, or obese. It measures your weight in relation to your height.  A BMI of 18.5 to 24.9 is in the healthy range. A person with a BMI of 25 to 29.9 is considered overweight, and someone with a BMI of 30 or greater is considered obese. More than two-thirds of American adults are considered overweight or obese.  Being overweight or obese increases the risk for further weight gain. Excess weight may lead to heart disease and diabetes.  Creating and following plans for healthy eating and physical activity may help you improve your health.  Weight control is part of healthy lifestyle and includes exercise, emotional health, and healthy eating habits. Careful eating habits lifelong are the mainstay of weight control. Though there are significant health benefits from weight loss, long-term weight loss with diet alone may be very difficult to achieve- studies show long-term success with dietary management in less than 10% of people. Attaining a healthy weight may be especially difficult to achieve in those with severe obesity. In some cases, medications, devices and surgical management might be considered.  What can you do?  If you are overweight or obese and are interested in methods for weight loss, you should discuss this with your provider.      Consider reducing daily calorie intake by 500 calories.     Keep a food journal.     Avoiding skipping meals, consider cutting portions instead.    Diet combined with exercise helps maintain muscle while optimizing fat loss. Strength training is particularly important for building and maintaining muscle mass. Exercise helps reduce stress, increase energy, and improves fitness. Increasing exercise without diet control, however, may not burn enough calories to loose weight.       Start walking three days a week 10-20 minutes at a time    Work towards walking thirty minutes five days a week     Eventually, increase the speed of your walking for 1-2 minutes at time    In addition, we recommend that you review healthy lifestyles and methods for weight loss available through the National Institutes of Health patient information sites:  http://win.niddk.nih.gov/publications/index.htm    And look into health and wellness programs that may be available through your health insurance provider, employer, local community center, or [employer] club.    Weight management plan: Patient was referred to their PCP to discuss a diet and exercise plan.            Follow-ups after your visit        Follow-up notes from your care team     Return in 1 month (on 6/23/2017) for PAP follow up.      Your next 10 appointments already scheduled     Jun 21, 2017  3:00 PM CDT   Return Visit with Dorothea Loo MD   [facility] ([facility])    [address] [id number] [phone]            Jun 27, 2017  2:00 PM CDT   Return Sleep Patient with Milan Huynh MD   [facility] ([facility])    [address] [address]   [phone]            Jul 13, 2017  1:00 PM CDT   LAB with BK LAB   [facility]    [address] [facility]  [id number] [phone]           Patient must bring picture ID.  Patient should be prepared to give a urine specimen  Please do not eat 10-12 hours before your appointment if you are coming in fasting for labs on lipids, cholesterol, or glucose (sugar).  Pregnant women should follow their Care Team instructions. Water with medications is okay. Do not drink coffee or other fluids.   If you have concerns about taking  your medications, please ask at office or if scheduling via LUMOback, send a message by clicking on Secure Messaging, Message Your Care Team.            Jul 18, 2017  1:00 PM CDT   Return Visit with Sebastián Jenkins MD   [facility] ([facility])    [address] [facility] [id number] [phone]            Oct 20, 2017  3:00 PM CDT   Return Visit with Fly Travis MD   [facility]    [address] [place] [id number] [phone]              Who to contact     If you have questions or need follow up information about today's clinic visit or your schedule please contact [facility] directly at [phone].  Normal or non-critical lab and imaging results will be communicated to you by [employer], letter or phone within 4 business days after the clinic has received the results. If you do not hear from us within 7 days, please contact the clinic through [employer] or phone. If you have a critical or abnormal lab result, we will notify you by phone as soon as possible.  Submit refill requests through LUMOback or call your pharmacy and they will forward the refill request to us. Please allow 3 business days for your refill to be completed.          Additional Information About Your Visit        LUMOback Information     LUMOback gives you secure access to your electronic health record. If you see a primary care provider, you can also send messages to your care team and make  "appointments. If you have questions, please call your primary care clinic.  If you do not have a primary care provider, please call [phone] and they will assist you.        Care EveryWhere ID     This is your Care EveryWhere ID. This could be used by other organizations to access your [place] medical records  [id number]        Your Vitals Were     Pulse Height Pulse Oximetry BMI (Body Mass Index)          69 1.575 m (5' 2\") 98% 31.46 kg/m2         Blood Pressure from Last 3 Encounters:   05/23/17 132/87   05/04/17 124/76   04/18/17 120/84    Weight from Last 3 Encounters:   05/23/17 78 kg (172 lb)   05/04/17 78.5 kg (173 lb)   04/18/17 74.9 kg (165 lb 3.2 oz)              We Performed the Following     [facility]        Primary Care Provider Office Phone # Fax #    David Chavez [phone]       [facility] [address] [facility] [address]        Thank you!     Thank you for choosing [facility]  for your care. Our goal is always to provide you with excellent care. Hearing back from our patients is one way we can continue to improve our services. Please take a few minutes to complete the written survey that you may receive in the mail after your visit with us. Thank you!             Your Updated Medication List - Protect others around you: Learn how to safely use, store and throw away your medicines at www.disposemymeds.org.          This list is accurate as of: 5/23/17  3:51 PM.  Always use your most recent med list.                   Brand Name Dispense Instructions for use    Abatacept 125 MG/ML Soaj     4 Syringe    Inject 125 mg Subcutaneous every 7 days       allopurinol 300 MG tablet    ZYLOPRIM    90 tablet    TAKE ONE TABLET BY MOUTH ONCE DAILY       aspirin 81 MG EC tablet     30 tablet    Take 1 tablet (81 mg) by mouth daily (*)       atorvastatin 80 MG tablet    LIPITOR    90 tablet    TAKE ONE TABLET (80 MG) BY MOUTH ONCE DAILY FOR " CHOLESTEROL       baclofen 10 MG tablet    LIORESAL    180 tablet    Take 1 tablet (10 mg) by mouth 2 times daily as needed for muscle spasms       BusPIRone HCl 30 MG Tabs     180 tablet    Take 1 tablet by mouth 2 times daily       clonazePAM 1 MG tablet    klonoPIN    90 tablet    TAKE ONE-HALF TO ONE TABLET BY MOUTH THREE TIMES DAILY AS NEEDED FOR ANXIETY       clopidogrel 75 MG tablet    PLAVIX    90 tablet    Take 1 tablet (75 mg) by mouth daily       evolocumab 140 MG/ML prefilled autoinjector    REPATHA    2 mL    Inject 1 mL (140 mg) Subcutaneous every 14 days       ezetimibe 10 MG tablet    ZETIA    90 tablet    Take 1 tablet (10 mg) by mouth daily       gabapentin 300 MG capsule    NEURONTIN    540 capsule    TAKE TWO CAPSULES BY MOUTH THREE TIMES DAILY       gemfibrozil 600 MG tablet    LOPID    180 tablet    Take 1 tablet (600 mg) by mouth 2 times daily       hydrocortisone 0.2 % cream    WESTCORT    45 g    Use twice daily on the eats for active rash for up to 2 weeks in a row, then take 2 weeks off.       ketoconazole 2 % cream    NIZORAL    30 g    Apply topically 2 times daily Apply to back of scalp and to groin twice daily until rash is clear       meclizine 25 MG tablet    ANTIVERT    30 tablet    TAKE ONE TABLET BY MOUTH EVERY 6 HOURS AS NEEDED FOR  DIZZINESS.       melatonin 3 MG tablet      Take 1 tablet (3 mg) by mouth nightly as needed for sleep       meloxicam 7.5 MG tablet    MOBIC    30 tablet    TAKE ONE TABLET BY MOUTH ONCE DAILY WITH  BREAKFAST       metoprolol 25 MG 24 hr tablet    TOPROL-XL    90 tablet    Take 1 tablet (25 mg) by mouth daily       mirtazapine 15 MG tablet    REMERON    30 tablet    Take 1 tablet (15 mg) by mouth At Bedtime       nitroglycerin 0.4 MG sublingual tablet    NITROSTAT    30 tablet    Place 1 tablet (0.4 mg) under the tongue every 5 minutes as needed       order for DME      1.  CPAP pressure 11 cm/H20 with heated humidity.  2.  Provide mask to fit and  CPAP supplies.  3.  Length of need lifetime.  4.  If needed please provide a chin strap       order for DME     1 Units    Equipment being ordered: single end cane       oxyCODONE 5 MG IR tablet    ROXICODONE    150 tablet    Take 1-2 tablets (5-10 mg) by mouth every 4 hours as needed for moderate to severe pain . Max of 6 tabs per day.  One month supply.       pantoprazole 40 MG EC tablet    PROTONIX    90 tablet    TAKE ONE TABLET BY MOUTH ONCE DAILY FOR STOMACH       predniSONE 5 MG tablet    DELTASONE    30 tablet    Take 1 tablet (5 mg) by mouth daily       sulfaSALAzine  MG EC tablet    AZULFIDINE EN    120 tablet    Take 2 tablets (1,000 mg) by mouth 2 times daily       tamsulosin 0.4 MG capsule    FLOMAX    90 capsule    Take 1 capsule (0.4 mg) by mouth daily       triamcinolone 0.1 % ointment    KENALOG    80 g    Apply sparingly to the ears and groin twice daily until clear, then once daily until clear. Do not apply to the face.       VIREAD 300 MG tablet   Generic drug:  tenofovir     90 tablet    TAKE ONE TABLET (300MG) BY MOUTH ONCE DAILY       vitamin D 2000 UNITS tablet     100 tablet    TAKE ONE TABLET BY MOUTH ONCE DAILY       zolpidem 5 MG tablet    AMBIEN    30 tablet    TAKE ONE TABLET BY MOUTH AT BEDTIME AS NEEDED FOR SLEEP

## 2017-05-23 NOTE — PROGRESS NOTES
Obstructive Sleep Apnea- PAP Follow-Up Visit:    Chief Complaint   Patient presents with     CPAP Follow Up       Lamont Daniels comes in today for follow-up of their severe sleep apnea, managed with CPAP.     Overall, the patient rates their experience with PAP as 5 (0 poor, 10 great). The mask is comfortable. The mask is leaking, 2-3 nights per week. They are not snoring with the mask on. They are having gasp arousals.  They are having significant oral/nasal dryness. The pressure settings are comfortable.     Patient uses full-face mask.    Bedtime is typically 11pm. Usually it takes about 120 minutes to fall asleep with the mask on. Wake time is typically 11am.  Patient is using PAP therapy 6 hours per night. The patient is usually getting 8 hours of sleep per night.    Patient does feel rested in the morning.    [place] Sleepiness Scale: 7/24    ResMed   CPAP 12 cmH2O download:  30 total days of use. 0 nonuse days. 97% days with >4 hours use.  Average use 7 hours 14 minutes per day. Median Leak 16.2 L/min. 95%ile Leak 35.4 L/min. AHI 5.2; Primary residual disease is central apneas.    Mask is slipping down off chin at night.  Frustrated about fit.          Past medical/surgical history, family history, social history, medications and allergies were reviewed.      Problem List:  Patient Active Problem List    Diagnosis Date Noted     Benign prostatic hyperplasia with lower urinary tract symptoms, unspecified morphology 03/14/2017     Priority: Medium     Rheumatoid arthritis of multiple sites with negative rheumatoid factor (H) 01/17/2017     Priority: Medium     Insomnia, unspecified type 11/22/2016     Priority: Medium     Essential hypertension with goal blood pressure less than 140/90 05/19/2016     Priority: Medium     On corticosteroid therapy 04/22/2016     Priority: Medium     Elevated liver enzymes 11/30/2015     Priority: Medium     Bilateral low back pain with sciatica, sciatica laterality unspecified  "11/17/2015     Priority: Medium     Rheumatoid arthritis involving multiple sites, unspecified rheumatoid factor presence (H) 11/11/2015     Priority: Medium     High risk medications (not anticoagulants) long-term use 11/11/2015     Priority: Medium     Midline low back pain without sciatica 11/11/2015     Priority: Medium     Essential hypertension 10/22/2015     Priority: Medium     Suicidal ideation 12/08/2014     Gout 08/22/2014     Problem list name updated by automated process. 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  The total sleep time was 129.5 minutes.  During the diagnostic portion of the study the sleep latency was 30.2 minutes after taking Ambien 10 mg.  No REM sleep observed. Sleep Efficiency was 67.2%.  Wake after sleep onset was 27.5.   The patient spent 23.2% of total sleep time in Stage N1, 72.6% in Stage N2, 4.2% in Stages N3 and 0.0% in REM.         Respiration:     Sustained Sleep Associated Hypoventilation -was not monitored.    Sleep Associated Hypoxemia - was present.  Baseline oxygen saturation was 93.6%.  The lowest oxygen saturation was 75.4%.  Snoring was reported as loud.     Events - During the diagnostic portion of the study, the polysomnogram revealed a presence of 5 obstructive apneas resulting in an Apnea index of 30.1 events per hour.  There were 21 hypopneas resulting in a Hypopnea index of 9.7 events per hour.  The combined Apnea/Hypopnea Index was 39.8 events per hour.  The REM " AHI was n/a.  The RERA index was 7.9 per hour.   The RDI was 47.7  .  47.7   7.9 39.8     Treatment PSG  Sleep Architecture:  At 1:22 AM the patient was placed on CPAP treatment and was titrated at pressures ranging from 5 cm/H20 up to 11 cm/H20.  The total recording time of the treatment portion of the study was 334.5 minutes.  The total sleep time was 212.5 minutes.  During the treatment portion of the study the sleep latency was 3.5 minutes.  REM latency was 313.5 minutes.  Sleep Efficiency was 63.5%.  Sleep Maintenance Efficiency was 63.5%.  Total wake time was 122.5 minutes for a total wake percentage of 34.5%. the patient spent 17.2% of total sleep time in Stage N1, 53.2% in Stage N2, 24.9% in Stages N3 and N, and 4.7% in REM.       Respiration:  The optimal pressure was 11.0 with an AHI of 1.3.    Movement Activity:      Limb - During the diagnostic portion of the study, there were 3 limb movements recorded.  Of this total, 0 were classified as PLMs.  Of the PLMs, 0 were associated with arousals.  The Limb Movement index was 1.4 per hour while the PLM index was 0.0 per hour.    Behavior - None    Bruxism - None    Seizure - None      CARDIAC SUMMARY:   During the diagnostic portion of the study, the average pulse rate was 56.1 bpm.  The minimum pulse rate was 39.0 bpm while the maximum pulse rate was 77.0 bpm.    During the treatment portion of the study, the average pulse rate was 52.7 bpm.  The minimum pulse rate was 47.0 bpm while the maximum pulse rate was 73.0 bpm.   Assessment:    Severe JEROD (AHI 40) with adequate positive airway pressure titration including REM supine.    History of UPPP.  Recommendations:    CPAP pressure 11 cmH2O with clinical follow-up within 3 - 4 weeks including compliance measures.    Consider a chin strap       Coronary atherosclerosis of native coronary artery      Major depressive disorder, recurrent episode, moderate (H)      Anxiety         /87  Pulse 69  Ht 1.575 m  "(5' 2\")  Wt 78 kg (172 lb)  SpO2 98%  BMI 31.46 kg/m2    Impression/Plan:  Doing great on CPAP from compliance but not from comfort.  Given mask fit issues, would consider chin strap then changing to nasal mask then lowering pressure.   Alternatively, could try Fitlife full CPAP mask to relieve forehead pressure but would still deal with leak issues.  Last option is to just slightly lower pressure and repeat download in 1 month to see if residual RAZ improves without significant change in AHI.    He prefers last option    Severe Sleep apnea. Tolerating PAP well. Daytime symptoms are stable..     Lamont Daniels will follow up in about 1 month(s).     Twenty-five minutes spent with patient, all of which were spent face-to-face counseling, consulting, coordinating plan of care.            CC:  David Chavez,     "

## 2017-05-23 NOTE — PATIENT INSTRUCTIONS

## 2017-05-23 NOTE — NURSING NOTE
"Chief Complaint   Patient presents with     CPAP Follow Up       Initial /87  Pulse 69  Ht 1.575 m (5' 2\")  Wt 78 kg (172 lb)  SpO2 98%  BMI 31.46 kg/m2 Estimated body mass index is 31.46 kg/(m^2) as calculated from the following:    Height as of this encounter: 1.575 m (5' 2\").    Weight as of this encounter: 78 kg (172 lb).  Medication Reconciliation: complete    "

## 2017-05-24 NOTE — TELEPHONE ENCOUNTER
Message from [employer]:  Nesha Denise LPN Wed May 24, 2017 8:13 AM        ----- Message -----   From: Lamont Daniels   Sent: 2017 5:28 PM   To: Hepatology Nurses-  Subject: Medication Renewal Request     Original authorizing provider: MD Lamont Brown would like a refill of the following medications:  VIREAD 300 MG tablet [Drea Laboy MD]    Preferred pharmacy: [facility] [address] [place] [address]    Comment:  I had been out for about 5 days now. Could you please renew because the last one was .

## 2017-05-25 ENCOUNTER — MYC REFILL (OUTPATIENT)
Dept: FAMILY MEDICINE | Facility: CLINIC | Age: 52
End: 2017-05-25

## 2017-05-25 DIAGNOSIS — M54.5 LOW BACK PAIN, UNSPECIFIED BACK PAIN LATERALITY, UNSPECIFIED CHRONICITY, WITH SCIATICA PRESENCE UNSPECIFIED: ICD-10-CM

## 2017-05-26 RX ORDER — TENOFOVIR DISOPROXIL FUMARATE 300 MG/1
300 TABLET, FILM COATED ORAL DAILY
Qty: 90 TABLET | Refills: 3 | Status: SHIPPED | OUTPATIENT
Start: 2017-05-26 | End: 2017-06-08

## 2017-05-26 NOTE — TELEPHONE ENCOUNTER
Message from [employer]:  Original authorizing provider: MD Lamont Agustin would like a refill of the following medications:  meloxicam (MOBIC) 7.5 MG tablet [Noam Wood MD]    Preferred pharmacy: [address] [facility]    Comment:

## 2017-05-26 NOTE — TELEPHONE ENCOUNTER
meloxicam (MOBIC) 7.5 MG tablet      Last Written Prescription Date: 2/7/17  Last Quantity: 30, # refills: 1  Last Office Visit with [facility], [facility] or [facility] prescribing provider: 5/4/17  Next 5 appointments (look out 90 days)     Jun 21, 2017  3:00 PM CDT   Return Visit with Dorothea Loo MD   [facility] ([facility])    [id number] [place] [id number] [phone]            Jul 18, 2017  1:00 PM CDT   [facility] Visit with Sebastián Jenkins MD   [facility]    [address] [facility] [id number] [phone]                   Creatinine   Date Value Ref Range Status   04/18/2017 0.84 0.66 - 1.25 mg/dL Final     Lab Results   Component Value Date    AST 21 04/18/2017     Lab Results   Component Value Date    ALT 44 04/18/2017     BP Readings from Last 3 Encounters:   05/23/17 132/87   05/04/17 124/76   04/18/17 120/84         Sally Mcarthur  [facility]

## 2017-05-30 DIAGNOSIS — M47.819 FACET ARTHROPATHY: ICD-10-CM

## 2017-05-30 RX ORDER — MELOXICAM 7.5 MG/1
TABLET ORAL
Qty: 30 TABLET | Refills: 2 | Status: SHIPPED | OUTPATIENT
Start: 2017-05-30 | End: 2017-12-11

## 2017-05-30 NOTE — TELEPHONE ENCOUNTER
Prescription approved per [facility].    Joselyn Valladares RN, [facility]

## 2017-05-30 NOTE — TELEPHONE ENCOUNTER
Pt LM at 1424 on 5/30 --     Reason for call:  Medication   If this is a refill request, has the caller requested the refill from the pharmacy already? N/A  Will the patient be using a [facility]? N/A  Name of the pharmacy and phone number for the current request: N/A    Name of the medication requested: oxyCODONE (ROXICODONE) 5 MG IR tablet    Other request: N/A    Phone number to reach patient:  Home number on file [phone] (home)    Best Time:  N/A    Can we leave a detailed message on this number?  YES

## 2017-05-31 RX ORDER — OXYCODONE HYDROCHLORIDE 5 MG/1
5-10 TABLET ORAL EVERY 4 HOURS PRN
Qty: 150 TABLET | Refills: 0 | Status: SHIPPED | OUTPATIENT
Start: 2017-05-31 | End: 2017-06-21

## 2017-05-31 NOTE — TELEPHONE ENCOUNTER
Received call from patient requesting refill(s) of oxyCODONE (ROXICODONE) 5 MG IR tablet    Last picked up from pharmacy on 5/4/17    Pt last seen by prescribing provider on 1/11/17  Next appt scheduled for 6/21/17    Last urine drug screen date 1/5/17  Current opioid agreement on file (completed within the last year) Yes Date of opioid agreement: 1/5/17    Processing (pick one and delete the others):  Mail to [facility] [address] [place] [address] [phone] [facility]  Sascha Isaac MA  [facility]    Will route to nursing [place] for review and preparation of prescription(s).

## 2017-05-31 NOTE — TELEPHONE ENCOUNTER
Medication refill information reviewed.     Due date for oxycodone 5 mg is 6/3/17 based on last  date. Rx was written on 4/28/17.      Prescription prepped for review.     Will route to provider.     KELLY Karimi, RN-BC  Patient Care Supervisor/Care Coordinator  [facility]

## 2017-05-31 NOTE — TELEPHONE ENCOUNTER
Left detail message at [phone] (home). He must come to next appointment scheduled in 6/21/17 to get further refills.  Script for oxycodone was signed and mailed out to [facility] pharmacy.       Amberly Chawla MA

## 2017-05-31 NOTE — TELEPHONE ENCOUNTER
Mr. Hendrickson no showed last appointment.   Therefore he must come to next appointment scheduled in June to get further refills.    Signed Prescriptions:                        Disp   Refills    oxyCODONE (ROXICODONE) 5 MG IR tablet      150 ta*0        Sig: Take 1-2 tablets (5-10 mg) by mouth every 4 hours as           needed for moderate to severe pain . Max of 6           tabs per day.  One month supply. May fill           on/after 6/1 to start taking on/after 6/3  Authorizing Provider: KAYLA CHUN MD  [facility]

## 2017-06-01 DIAGNOSIS — R42 VERTIGO: ICD-10-CM

## 2017-06-01 DIAGNOSIS — K21.9 GASTROESOPHAGEAL REFLUX DISEASE WITHOUT ESOPHAGITIS: ICD-10-CM

## 2017-06-01 NOTE — TELEPHONE ENCOUNTER
meclizine (ANTIVERT) 25 MG tablet      Last Written Prescription Date: 5/4/17  Last Fill Quantity: 30,  # refills: 0   Last Office Visit with G, UMP or  Health prescribing provider: 5/4/17                                         Next 5 appointments (look out 90 days)     Jun 08, 2017  3:40 PM CDT   [facility]    [id number] [place] [address]   [phone]            Jun 21, 2017  3:00 PM CDT   Return Visit with Dorothea Loo MD   [facility] ([facility])    [address] [id number] [phone]            Jul 18, 2017  1:00 PM CDT   Return Visit with Sebastián Jenkins MD   [facility] ([facility])    [address] [id number] [phone]                  pantoprazole (PROTONIX) 40 MG enteric coated tablet      Last Written Prescription Date: 6/1/16  Last Fill Quantity: 90,  # refills: 3   Last Office Visit with G, UMP or  Health prescribing provider: 5/4/17                                         Next 5 appointments (look out 90 days)     Jun 08, 2017  3:40 PM CDT   [facility] Visit with [facility]   [facility]    [id number] [place] [address]   [phone]            Jun 21, 2017  3:00 PM CDT   [facility] Visit with Dor[facility] Loo MD   [facility]    [address] [address] [phone]            Jul 18, 2017  1:00 PM CDT   Return Visit with Sebastián Jenkins MD   [facility] ([facility])    [address] [id number] [phone]                        Costa Faarax  [facility]

## 2017-06-06 RX ORDER — PANTOPRAZOLE SODIUM 40 MG/1
TABLET, DELAYED RELEASE ORAL
Qty: 90 TABLET | Refills: 3 | Status: SHIPPED | OUTPATIENT
Start: 2017-06-06 | End: 2018-03-20

## 2017-06-06 RX ORDER — MECLIZINE HYDROCHLORIDE 25 MG/1
TABLET ORAL
Qty: 30 TABLET | Refills: 10 | Status: SHIPPED | OUTPATIENT
Start: 2017-06-06 | End: 2017-10-21

## 2017-06-06 NOTE — TELEPHONE ENCOUNTER
Prescription approved per [facility] Refill Protocol.  Lorraine Francis RN

## 2017-06-07 ENCOUNTER — TELEPHONE (OUTPATIENT)
Dept: CARDIOLOGY | Facility: CLINIC | Age: 52
End: 2017-06-07

## 2017-06-08 ENCOUNTER — TELEPHONE (OUTPATIENT)
Dept: FAMILY MEDICINE | Facility: CLINIC | Age: 52
End: 2017-06-08

## 2017-06-08 ENCOUNTER — OFFICE VISIT (OUTPATIENT)
Dept: GASTROENTEROLOGY | Facility: CLINIC | Age: 52
End: 2017-06-08
Payer: COMMERCIAL

## 2017-06-08 VITALS
DIASTOLIC BLOOD PRESSURE: 88 MMHG | SYSTOLIC BLOOD PRESSURE: 129 MMHG | BODY MASS INDEX: 29.09 KG/M2 | HEART RATE: 56 BPM | HEIGHT: 64 IN | WEIGHT: 170.4 LBS

## 2017-06-08 DIAGNOSIS — M06.9 RHEUMATOID ARTHRITIS INVOLVING MULTIPLE SITES, UNSPECIFIED RHEUMATOID FACTOR PRESENCE: Primary | ICD-10-CM

## 2017-06-08 DIAGNOSIS — B18.1 CHRONIC VIRAL HEPATITIS B WITHOUT DELTA AGENT AND WITHOUT COMA (H): ICD-10-CM

## 2017-06-08 PROCEDURE — 99213 OFFICE O/P EST LOW 20 MIN: CPT | Performed by: INTERNAL MEDICINE

## 2017-06-08 RX ORDER — TENOFOVIR DISOPROXIL FUMARATE 300 MG/1
300 TABLET, FILM COATED ORAL DAILY
Qty: 90 TABLET | Refills: 3 | Status: SHIPPED | OUTPATIENT
Start: 2017-06-08 | End: 2017-06-15

## 2017-06-08 NOTE — TELEPHONE ENCOUNTER
New pharmacy for patient.    [facility] is requesting a new script for Fast Clix Lancets Qty 204, test BID.    I do not see this is patient's med lists.        [facility]

## 2017-06-08 NOTE — PROGRESS NOTES
Hepatology Clinic note  Lamont Daniels   Date of Birth 1965  Date of Service 6/8/2017         Impression/plan:   Lamont Daniels is a 51 year old male with HTN, HLD, obesity, CAD, and RA, who presents for follow up and management of both chronic HBV infection and biopsy-proven steatohepatitis, is maintained on Tenofovir.    Overall, his liver enzymes and function appear to have improved with better control of HLD.   HBV DNA is below detection level while on tenofovir.   Has minimal fibrosis on biopsy.   Will continue current plan will reassess liver function and screen for HCC in 6 months.     Drea Laboy MD  [facility] Hepatology clinic    -----------------------------------------------------       HPI:   Lamont Daniels is a 51 year old male with HTN, HLD, obesity, CAD, and RA, who presents for follow up and management of both chronic HBV infection and biopsy-proven steatohepatitis, is maintained on Tenofovir.    Please refer to prior clinic note for details of initial presentation.   He's been relatively stable since last visit. Is having some bloating and RUQ pain.   In 4/2017 discontinued Enbrel and start Orencia for uncontrolled RA, in addition has been started on Repatha for HLD with significant improvement.   Continues to have intermittent body aches and joint aches.   Otherwise, denies chest pain, shortness of breath, fevers, chills, bleeding, jaundice, confusion, falls, rash, pruritis. Has stable appetite and bowel habits.         Past Medical History:     Past Medical History:   Diagnosis Date     Anxiety      CAD (coronary artery disease)     sees cardiologist Dr. Yang, has had stents and cabg     Congestive heart failure, unspecified 2008     Gout      Hepatitis B > 10 years     HTN (hypertension)      Hyperlipidemia LDL goal < 100      Malrotation of intestine      Moderate major depression (H)      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) 9/19/2011    Uses CPAP     Rheumatoid arthritis flare (H) 1012      Steatohepatitis 12/15    liver biopsy     Tuberculosis age 13    INH x 9 months      Vitamin D deficiency             Past Surgical History:     Past Surgical History:   Procedure Laterality Date     ABDOMEN SURGERY  2014     BIOPSY  2015     BYPASS GRAFT ARTERY CORONARY  2008    6 vessels     COLONOSCOPY  2/8/2013    Procedure: COLONOSCOPY;  Colonoscopy, blood in stool;  Surgeon: Duane, William Charles, MD;  Location: [facility] OR     GI SURGERY  2014     HC CORONARY STENT BRITTANY, CIERA ADDTL VESSEL  2008    3 months after CABG     HEAD & NECK SURGERY  2011     NASAL/SINUS POLYPECTOMY  2010     ORTHOPEDIC SURGERY  2012     THORACIC SURGERY  1989    tb     TONSILLECTOMY  2010     UVULOPALATOPHARYNGOPLASTY  2010     VASCULAR SURGERY  2008            Medications:     Current Outpatient Prescriptions   Medication     order for DME     meclizine (ANTIVERT) 25 MG tablet     pantoprazole (PROTONIX) 40 MG EC tablet     oxyCODONE (ROXICODONE) 5 MG IR tablet     meloxicam (MOBIC) 7.5 MG tablet     tenofovir (VIREAD) 300 MG tablet     clonazePAM (KLONOPIN) 1 MG tablet     BusPIRone HCl 30 MG TABS     mirtazapine (REMERON) 15 MG tablet     sulfaSALAzine ER (AZULFIDINE EN) 500 MG EC tablet     predniSONE (DELTASONE) 5 MG tablet     Abatacept 125 MG/ML SOAJ     atorvastatin (LIPITOR) 80 MG tablet     baclofen (LIORESAL) 10 MG tablet     tamsulosin (FLOMAX) 0.4 MG capsule     metoprolol (TOPROL-XL) 25 MG 24 hr tablet     zolpidem (AMBIEN) 5 MG tablet     allopurinol (ZYLOPRIM) 300 MG tablet     evolocumab (REPATHA) 140 MG/ML prefilled autoinjector     ezetimibe (ZETIA) 10 MG tablet     clopidogrel (PLAVIX) 75 MG tablet     gemfibrozil (LOPID) 600 MG tablet     nitroglycerin (NITROSTAT) 0.4 MG SL tablet     gabapentin (NEURONTIN) 300 MG capsule     aspirin EC 81 MG EC tablet     hydrocortisone (WESTCORT) 0.2 % cream     order for DME     Cholecalciferol (VITAMIN D) 2000 UNITS tablet     triamcinolone (KENALOG) 0.1 % ointment      ketoconazole (NIZORAL) 2 % cream     melatonin 3 MG tablet     ORDER FOR DME     No current facility-administered medications for this visit.      Facility-Administered Medications Ordered in Other Visits   Medication     gadobutrol (GADAVIST) injection 10 mL            Allergies:     Allergies   Allergen Reactions     Citalopram Itching     Tramadol Itching            Social History:     Social History     Social History     Marital status:      Spouse name: N/A     Number of children: 5     Years of education: 14     Occupational History      Unemployed     2-2011. On long term disability. SSD applied for     Social History Main Topics     Smoking status: Never Smoker     Smokeless tobacco: Never Used     Alcohol use No     Drug use: No     Sexual activity: Yes     Partners: Female     Other Topics Concern     Parent/Sibling W/ Cabg, Mi Or Angioplasty Before 65f 55m? Yes     father     Social History Narrative    . No current SO.         Has 5 children. Youngest child does live with him.         H/o AA degree and previously worked as a [employer]. Currently unemployed.         Denies tobacco use.     Alcohol use: 2x/week with minimal amount of alcohol per time.     Drug use: denies            Family History:     Family History   Problem Relation Age of Onset     C.A.D. Father      Coronary Artery Disease Father      Hypertension Father      Depression Father      Hypertension Mother      DIABETES Mother      Depression Mother      MENTAL ILLNESS Mother      Hypertension Maternal Grandfather      CANCER Paternal Grandfather      Other Cancer Other      Autoimmune Disease No family hx of      CEREBROVASCULAR DISEASE No family hx of      Thyroid Disease No family hx of      Glaucoma No family hx of      Macular Degeneration No family hx of               Review of Systems:   10 points ROS was obtained and highlighted in the HPI, otherwise negative.          Physical Exam:   VS:  BP  "129/88 (BP Location: Left arm, Patient Position: Chair, Cuff Size: Adult Regular)  Pulse 56  Ht 1.615 m (5' 3.58\")  Wt 77.3 kg (170 lb 6.4 oz)  BMI 29.63 kg/m2    Gen: A&Ox3, NAD  HEENT: non-icteric , oropharynx clear  CV: RRR  Lung: CTAB  Abd: soft, NT, ND, spleen is not enlarged, liver is not palpable.   Ext: no edema, intact pulses.   Skin: stigmata of chronic liver disease.   Neuro: no focal deficits, grossly intact, no asterixis   Psych: appropriate mood and affects         Data:   Reviewed in person and significant for:  Lab Results   Component Value Date    WBC 7.7 04/18/2017     Lab Results   Component Value Date    RBC 4.98 04/18/2017     Lab Results   Component Value Date    HGB 15.1 04/18/2017     Lab Results   Component Value Date    HCT 44.2 04/18/2017     No components found for: MCT  Lab Results   Component Value Date    MCV 89 04/18/2017     Lab Results   Component Value Date    MCH 30.3 04/18/2017     Lab Results   Component Value Date    MCHC 34.2 04/18/2017     Lab Results   Component Value Date    RDW 14.2 04/18/2017     Lab Results   Component Value Date     04/18/2017     Last Basic Metabolic Panel:  Lab Results   Component Value Date     10/25/2016      Lab Results   Component Value Date    POTASSIUM 3.6 10/25/2016     Lab Results   Component Value Date    CHLORIDE 110 10/25/2016     Lab Results   Component Value Date    HEMANT 8.8 10/25/2016     Lab Results   Component Value Date    CO2 25 10/25/2016     Lab Results   Component Value Date    BUN 17 10/25/2016     Lab Results   Component Value Date    CR 0.84 04/18/2017     Lab Results   Component Value Date    GLC 78 10/25/2016     Liver Function Studies -   Recent Labs   Lab Test  04/18/17   1539   PROTTOTAL  7.9   ALBUMIN  4.3   BILITOTAL  0.4   ALKPHOS  115   AST  21   ALT  44       "

## 2017-06-08 NOTE — NURSING NOTE
"Lamont Daniels's goals for this visit include:   Chief Complaint   Patient presents with     RECHECK     follow up       He requests these members of his care team be copied on today's visit information: yes-pcp    PCP: David Chavez    Referring Provider:  ESTABLISHED PATIENT  No address on file    Chief Complaint   Patient presents with     RECHECK     follow up       Initial /88 (BP Location: Left arm, Patient Position: Chair, Cuff Size: Adult Regular)  Pulse 56  Ht 1.615 m (5' 3.58\")  Wt 77.3 kg (170 lb 6.4 oz)  BMI 29.63 kg/m2 Estimated body mass index is 29.63 kg/(m^2) as calculated from the following:    Height as of this encounter: 1.615 m (5' 3.58\").    Weight as of this encounter: 77.3 kg (170 lb 6.4 oz).  Medication Reconciliation: complete    "

## 2017-06-09 DIAGNOSIS — Z95.1 S/P CABG (CORONARY ARTERY BYPASS GRAFT): ICD-10-CM

## 2017-06-09 DIAGNOSIS — E78.5 HYPERLIPIDEMIA LDL GOAL <70: ICD-10-CM

## 2017-06-09 DIAGNOSIS — I25.810 CORONARY ATHEROSCLEROSIS OF AUTOLOGOUS VEIN BYPASS GRAFT: ICD-10-CM

## 2017-06-09 LAB
CHOLEST SERPL-MCNC: 118 MG/DL
HDLC SERPL-MCNC: 63 MG/DL
LDLC SERPL CALC-MCNC: 35 MG/DL
NONHDLC SERPL-MCNC: 55 MG/DL
TRIGL SERPL-MCNC: 102 MG/DL

## 2017-06-09 PROCEDURE — 80061 LIPID PANEL: CPT | Performed by: INTERNAL MEDICINE

## 2017-06-09 PROCEDURE — 36415 COLL VENOUS BLD VENIPUNCTURE: CPT | Performed by: INTERNAL MEDICINE

## 2017-06-12 ENCOUNTER — RADIANT APPOINTMENT (OUTPATIENT)
Dept: ULTRASOUND IMAGING | Facility: CLINIC | Age: 52
End: 2017-06-12
Attending: INTERNAL MEDICINE
Payer: COMMERCIAL

## 2017-06-12 DIAGNOSIS — B18.1 CHRONIC VIRAL HEPATITIS B WITHOUT DELTA AGENT AND WITHOUT COMA (H): ICD-10-CM

## 2017-06-12 PROCEDURE — 76700 US EXAM ABDOM COMPLETE: CPT

## 2017-06-12 NOTE — TELEPHONE ENCOUNTER
Pharmacy is now also requesting Accu-chek test strips.     New pharmacy for patient.     [facility] is requesting a new script for Fast Clix Lancets [id number] . test BID.     I do not see this is patient's med lists.      [facility]

## 2017-06-15 ENCOUNTER — TELEPHONE (OUTPATIENT)
Dept: FAMILY MEDICINE | Facility: CLINIC | Age: 52
End: 2017-06-15

## 2017-06-15 DIAGNOSIS — B18.1 CHRONIC VIRAL HEPATITIS B WITHOUT DELTA AGENT AND WITHOUT COMA (H): ICD-10-CM

## 2017-06-15 DIAGNOSIS — M06.9 RHEUMATOID ARTHRITIS INVOLVING MULTIPLE SITES, UNSPECIFIED RHEUMATOID FACTOR PRESENCE: Primary | ICD-10-CM

## 2017-06-15 RX ORDER — TENOFOVIR DISOPROXIL FUMARATE 300 MG/1
300 TABLET, FILM COATED ORAL DAILY
Qty: 90 TABLET | Refills: 3 | Status: SHIPPED | OUTPATIENT
Start: 2017-06-15 | End: 2018-05-31

## 2017-06-15 NOTE — TELEPHONE ENCOUNTER
Reason for Call:  Medication or medication refill:    Do you use a [facility]?  Name of the pharmacy and phone number for the current request:     [facility] [phone]         Name of the medication requested: Pharmacy received 2 scripts for lancets and they  need test strips script    Other request:     Can we leave a detailed message on this number? YES    Phone number patient can be reached at:     Best Time: any    Call taken on 6/15/2017 at 8:50 AM by Donna Parish

## 2017-06-15 NOTE — TELEPHONE ENCOUNTER
Routing refill request to provider for review/approval because:  Drug not active on patient's medication list  Pended medication for provider to review  Lorraine Francis RN

## 2017-06-15 NOTE — TELEPHONE ENCOUNTER
Reason for Call:  Medication or medication refill:    Do you use a [facility]?  Name of the pharmacy and phone number for the current request:    [facility] pharmacy [phone]         Name of the medication requested:   Received prescription for the test strips, but insurance does not cover this.    Needed is a script for One Touch lancets, and another script for One Touch Meter. Please do not put both of these on the same prescription, each one needs its own script.    Other request:     Can we leave a detailed message on this number? YES    Phone number patient can be reached at:     Best Time:     Call taken on 6/15/2017 at 2:37 PM by Donna Parish

## 2017-06-16 NOTE — TELEPHONE ENCOUNTER
2 rx's is faxed to [facility] at fax # [phone].  Renard Donohue,  [facility]

## 2017-06-21 ENCOUNTER — OFFICE VISIT (OUTPATIENT)
Dept: PALLIATIVE MEDICINE | Facility: CLINIC | Age: 52
End: 2017-06-21
Payer: COMMERCIAL

## 2017-06-21 VITALS
BODY MASS INDEX: 29.74 KG/M2 | SYSTOLIC BLOOD PRESSURE: 113 MMHG | HEART RATE: 72 BPM | DIASTOLIC BLOOD PRESSURE: 74 MMHG | WEIGHT: 171 LBS

## 2017-06-21 DIAGNOSIS — G89.29 CHRONIC BILATERAL LOW BACK PAIN WITHOUT SCIATICA: ICD-10-CM

## 2017-06-21 DIAGNOSIS — M47.819 FACET ARTHROPATHY: ICD-10-CM

## 2017-06-21 DIAGNOSIS — M54.50 CHRONIC BILATERAL LOW BACK PAIN WITHOUT SCIATICA: ICD-10-CM

## 2017-06-21 DIAGNOSIS — G47.33 OSA (OBSTRUCTIVE SLEEP APNEA): ICD-10-CM

## 2017-06-21 DIAGNOSIS — M06.9 RHEUMATOID ARTHRITIS INVOLVING MULTIPLE SITES, UNSPECIFIED RHEUMATOID FACTOR PRESENCE: Primary | ICD-10-CM

## 2017-06-21 PROCEDURE — 99214 OFFICE O/P EST MOD 30 MIN: CPT | Performed by: PSYCHIATRY & NEUROLOGY

## 2017-06-21 RX ORDER — OXYCODONE HYDROCHLORIDE 5 MG/1
5-10 TABLET ORAL EVERY 4 HOURS PRN
Qty: 180 TABLET | Refills: 0 | Status: SHIPPED | OUTPATIENT
Start: 2017-06-21 | End: 2017-08-10

## 2017-06-21 ASSESSMENT — PAIN SCALES - GENERAL: PAINLEVEL: SEVERE PAIN (7)

## 2017-06-21 NOTE — PROGRESS NOTES
[facility]    Date of visit: 6/21/2017     Chief complaint:   Chief Complaint   Patient presents with     Pain       Interval history:  Lamont Daniels was last seen by me on 1/5/17    Recommendations/plan at the last visit included:  1. Physical Therapy:  Finished pool therapy, likely had more sessions  2. Clinical Health Psychologist to address issues of relaxation, behavioral change, coping style, and other factors important to improvement.  Will need to discuss at upcoming appointments  3. Diagnostic Studies:  UDS and opioid agreement today  4. Medication Management:    1.  Discussed with him my concerns that he doesn't call for refills or pursue care.  He ran out of meds over a week ago, but didn't call for refills as he would be seen today.  This has happened in the past.   2. Oxycodone refill. He has not been taking to the extent that he can  3. Wants to be able to take baclofen 10mg BID, but I don't feel comfortable with that as he needs to keep that on uninterrupted and if he is not good about doing his refills, this is a problem.  See below  5. Further procedures recommended: can be scheduled for another facet joint injection.- ok to stay on the Plavix- can be set up in my urgent slot next week.  6. Recommendations to PCP:  I am wondering if there is an option for him to get help with filling meds.  He is consistently running out of meds and not asking for refills on time.  This is affecting his care and function.  He states his PCA doesn't have time to do this.  Are there other options? Could he have monthly pharmacist visit or is there in home help?  I am not willing to prescribe the baclofen until I know there is something in place to make sure he is getting appropriate refills.  7. Follow up: 2-3 months.  Again, I reitterated the importance of calling and getting scheduled immediately.  He can schedule at .    Since his last visit, Lamont Daniels  reports:  -Still having pain in low back  -Facet injections helpful but only last one month; does not wish to repeat them  -Per patient, Has been out of Percocet for 2 months now; did not call or [facility] to let us know (this is not the case, as med was picked up on 5/4 and was a 30 day supply.  We verified that he did NOT  his June script)  -States he did not know he can contact via [facility]  -Usually taking anywhere between 2-6 pills/day  -Pain is well controlled as long as he is taking Percocet  -Switched pharmacy to [facility] recently  -going to see sleep medicine soon, uses CPAP    Pain scores:  Pain intensity on average is 5 on a scale of 0-10.     Current pain treatments  Ibuprofen 800 mg- using 3-4 times per week, helpful  Gabapentin 600mg 3 times daily  Baclofen 10mg daily- was prescribed by PCP, doesn't use daily.  Oxycodone 5 mg : takes 2-4/day- ran out 2 months ago; 6 pills max/day  meloxicam- hasn't started this    Previous medication treatments included:  Codeine, oxycodone, hydrocodone- given for other issues- helpful  Cymbalta 60mg daily- given for mental health- was given by Dr. Acosta- not been higher  Baclofen 10mg at bedtime- not helpful, no side effects  Meloxicam 7.5 daily- not helpful  Robaxin 500 mg 1 tablet, 1-3 times a day depending on the day    Other treatments have included:  Lamont Daniels has not been seen at a pain clinic in the past.    PT: has done for various reasons, most recently the low back.  Acupuncture: as done a couple of needles with adjustment  TENs Unit: no  Injections: no    Side Effects: Dry mouth, constipation    Medications:  Current Outpatient Prescriptions   Medication Sig Dispense Refill     order for DME Equipment being ordered: test strips as covered by insurance. USE TO TEST BLOOD SUGARS TWICE DAILY OR AS DIRECTED. 200 each 3     tenofovir (VIREAD) 300 MG tablet Take 1 tablet (300 mg) by mouth daily 90 tablet 3     order for DME OneTouch glucometer. 1 each 0      order for DME OneTouch lancets testing twice daily. 200 each 3     meclizine (ANTIVERT) 25 MG tablet TAKE ONE TABLET BY MOUTH EVERY 6 HOURS AS NEEDED FOR  DIZZINESS 30 tablet 10     pantoprazole (PROTONIX) 40 MG EC tablet TAKE ONE TABLET BY MOUTH ONCE DAILY FOR  STOMACH. 90 tablet 3     oxyCODONE (ROXICODONE) 5 MG IR tablet Take 1-2 tablets (5-10 mg) by mouth every 4 hours as needed for moderate to severe pain . Max of 6 tabs per day.  One month supply. May fill on/after 6/1 to start taking on/after 6/3 150 tablet 0     meloxicam (MOBIC) 7.5 MG tablet TAKE ONE TABLET BY MOUTH ONCE DAILY WITH  BREAKFAST 30 tablet 2     clonazePAM (KLONOPIN) 1 MG tablet TAKE ONE-HALF TO ONE TABLET BY MOUTH THREE TIMES DAILY AS NEEDED FOR ANXIETY 90 tablet 0     BusPIRone HCl 30 MG TABS Take 1 tablet by mouth 2 times daily 180 tablet 5     mirtazapine (REMERON) 15 MG tablet Take 1 tablet (15 mg) by mouth At Bedtime 30 tablet 5     sulfaSALAzine ER (AZULFIDINE EN) 500 MG EC tablet Take 2 tablets (1,000 mg) by mouth 2 times daily 120 tablet 3     predniSONE (DELTASONE) 5 MG tablet Take 1 tablet (5 mg) by mouth daily 30 tablet 3     Abatacept 125 MG/ML SOAJ Inject 125 mg Subcutaneous every 7 days 4 Syringe 3     atorvastatin (LIPITOR) 80 MG tablet TAKE ONE TABLET (80 MG) BY MOUTH ONCE DAILY FOR CHOLESTEROL 90 tablet 0     baclofen (LIORESAL) 10 MG tablet Take 1 tablet (10 mg) by mouth 2 times daily as needed for muscle spasms 180 tablet 3     tamsulosin (FLOMAX) 0.4 MG capsule Take 1 capsule (0.4 mg) by mouth daily 90 capsule 3     metoprolol (TOPROL-XL) 25 MG 24 hr tablet Take 1 tablet (25 mg) by mouth daily 90 tablet 3     zolpidem (AMBIEN) 5 MG tablet TAKE ONE TABLET BY MOUTH AT BEDTIME AS NEEDED FOR SLEEP 30 tablet 5     allopurinol (ZYLOPRIM) 300 MG tablet TAKE ONE TABLET BY MOUTH ONCE DAILY 90 tablet 3     evolocumab (REPATHA) 140 MG/ML prefilled autoinjector Inject 1 mL (140 mg) Subcutaneous every 14 days 2 mL 11     ezetimibe  (ZETIA) 10 MG tablet Take 1 tablet (10 mg) by mouth daily 90 tablet 3     clopidogrel (PLAVIX) 75 MG tablet Take 1 tablet (75 mg) by mouth daily 90 tablet 3     gemfibrozil (LOPID) 600 MG tablet Take 1 tablet (600 mg) by mouth 2 times daily 180 tablet 3     nitroglycerin (NITROSTAT) 0.4 MG SL tablet Place 1 tablet (0.4 mg) under the tongue every 5 minutes as needed 30 tablet 1     gabapentin (NEURONTIN) 300 MG capsule TAKE TWO CAPSULES BY MOUTH THREE TIMES DAILY 540 capsule 3     aspirin EC 81 MG EC tablet Take 1 tablet (81 mg) by mouth daily (*) 30 tablet 1     hydrocortisone (WESTCORT) 0.2 % cream Use twice daily on the eats for active rash for up to 2 weeks in a row, then take 2 weeks off. 45 g 0     order for DME Equipment being ordered: single end cane 1 Units 0     Cholecalciferol (VITAMIN D) 2000 UNITS tablet TAKE ONE TABLET BY MOUTH ONCE DAILY 100 tablet 1     triamcinolone (KENALOG) 0.1 % ointment Apply sparingly to the ears and groin twice daily until clear, then once daily until clear. Do not apply to the face. 80 g 1     ketoconazole (NIZORAL) 2 % cream Apply topically 2 times daily Apply to back of scalp and to groin twice daily until rash is clear 30 g 11     melatonin 3 MG tablet Take 1 tablet (3 mg) by mouth nightly as needed for sleep       ORDER FOR DME 1.  CPAP pressure 11 cm/H20 with heated humidity.   2.  Provide mask to fit and CPAP supplies.   3.  Length of need lifetime.   4.  If needed please provide a chin strap              Medical History: any changes in medical history since they were last seen? No    Review of Systems:  The 14 system ROS was reviewed from the intake questionnaire:  fatigue, SOB, back pain, joint pain, depression, anxiety, stress  Any bowel or bladder problems: None  Mood: depression, suicidal ideation but not active and no intent/plan.  This is chronic for him.  Contracts for safety.    Physical Exam:  Blood pressure 113/74, pulse 72, weight 77.6 kg (171 lb).  General:  mildly distressed, awake and alert  Gait: Antalgic and very slow  MSK exam: Deferred for conversation        Assessment:   1. Chronic low back pain, with strong facet and myofascial features  2. Rheumatoid arthritis  3. Peripheral neuropathy  4. Depression  5. Hep B - currently being treated  6. Gout  7. JEROD, currently treated with CPAP, central apnea ok      Plan:   1. Physical Therapy:  Finished pool therapy, likely had more sessions  2. Clinical Health Psychologist to address issues of relaxation, behavioral change, coping style, and other factors important to improvement.  Will need to discuss at upcoming appointments  3. Diagnostic Studies:  None  4. Medication Management:    1.  Discussed with him my concerns that he doesn't call for refills or pursue care.  He ran out of meds over a week ago, but didn't call for refills as he would be seen today.  This has happened in the past.   He states that he was out for 2.5 months, but the  dates show he has only been out shorter I am concerned about his inability to properly report, managed and seek out help for his meds.  2. Oxycodone 30 day script written; max 6 pills/day.  Told patient to call or [facility] 7-10 days before he is out of pills to get refill.    3. His PCA isn't doing meds.  If he is out earlier than 2 weeks ago, is it possible that meds are being taken and he is out longer than the 2 weeks.  The bottom line that I told patient is that I don't think he can effectively manage his meds, and I'm not sure about people in the home as well.  I would like to talk with his PCP to determine if he is a candidate for having his meds set up.  This is several months of trying to get regular, consistent use of meds that have not been possible.  4. New script provided today, RN called [facility] and had previous script discarded  5. Continue regular use of CPAP  5. Further procedures recommended: Can repeat facet joint injections, or try MBB if facet injections  aren't lasting long   6. Recommendations to PCP:  I am wondering if there is an option for him to get help with filling meds.  He is consistently running out of meds and not asking for refills on time.  This is affecting his care and function.  He states his PCA doesn't have time to do this and doesn't manage meds.  Are there other options? Could he have monthly pharmacist visit or is there in home help?     Sanju Sahni MD  Pain Medicine Fellow  [facility]    I saw and examined the patient with the Pain Fellow/Resident. I have reviewed and agree with the resident's note and plan of care and made changes and corrections directly to the body of the note.    TIME SPENT:  BY FELLOW/RESIDENT ALONE 15 MIN  BY MYSELF AND FELLOW/RESIDENT TOGETHER 15 MIN  BY MYSELF WITHOUT THE FELLOW/RESIDENT 15 MIN    These times included 15 minutes I spent counseling him about his diagnosis and treatment options and coordination of care with the primary team    Lian Loo MD  [facility]

## 2017-06-21 NOTE — PATIENT INSTRUCTIONS
1. I am giving you a new prescription for oxycodone.  2. You can take maximum of 6 tabs per day.  3. The script will last you 30 days.  You need to call or Mychart around day 20-22 to get a refill.  Maybe your PCA can put a reminder in your phone.  4. You need to lock up your meds  5. I am going to talk with Dr. Hernandez about if we can get someone to help you with meds.  6. Schedule a follow up in 2 months.    ----------------------------------------------------------------  Nurse Triage line:  [phone]   Call this number with any questions or concerns. You may leave a detailed message anytime. Calls are typically returned Monday through Friday between 8 AM and 4:30 PM. We usually get back to you within 2 business days depending on the issue/request.       Medication refills:    For non-narcotic medications, call your pharmacy directly to request a refill. The pharmacy will contact the [facility] for authorization. Please allow 3-4 days for these refills to be processed.     For narcotic refills, call the nurse triage line or send a [employer] message. Please contact us 7-10 days before your refill is due. The message MUST include the name of the specific medication(s) requested and how you would like to receive the prescription(s). The options are as follows:    Pain Clinic staff can mail the prescription to your pharmacy. Please tell us the name of the pharmacy.    You may pick the prescription up at the Pain Clinic (tell us the location) or during a clinic visit with your pain provider    Pain Clinic staff can deliver the prescription to the [facility] pharmacy in the clinic building. Please tell us the location.      Scheduling number: [phone].  Call this number to schedule or change appointments.    We believe regular attendance is key to your success in our program.    Any time you are unable to keep your appointment we ask that you call us at least 24 hours in advance to let us know. This will  allow us to offer the appointment time to another patient.

## 2017-06-21 NOTE — Clinical Note
See staff message I sent. I am concerned about management of meds.  Lian Loo MD [facility]

## 2017-06-21 NOTE — MR AVS SNAPSHOT
After Visit Summary   6/21/2017    Lamont Daniels    MRN: [id number]           Patient Information     Date Of Birth          1965        Visit Information        Provider Department      6/21/2017 3:00 PM Dorothea Loo MD [facility]        Today's Diagnoses     Facet arthropathy (H)          Care Instructions    1. I am giving you a new prescription for oxycodone.  2. You can take maximum of 6 tabs per day.  3. The script will last you 30 days.  You need to call or [employer] around day 20-22 to get a refill.  Maybe your PCA can put a reminder in your phone.  4. You need to lock up your meds  5. I am going to talk with Dr. Hernandez about if we can get someone to help you with meds.  6. Schedule a follow up in 2 months.    ----------------------------------------------------------------  Nurse Triage line:  [phone]   Call this number with any questions or concerns. You may leave a detailed message anytime. Calls are typically returned Monday through Friday between 8 AM and 4:30 PM. We usually get back to you within 2 business days depending on the issue/request.       Medication refills:    For non-narcotic medications, call your pharmacy directly to request a refill. The pharmacy will contact the [facility] for authorization. Please allow 3-4 days for these refills to be processed.     For narcotic refills, call the nurse triage line or send a [employer] message. Please contact us 7-10 days before your refill is due. The message MUST include the name of the specific medication(s) requested and how you would like to receive the prescription(s). The options are as follows:    Pain Clinic staff can mail the prescription to your pharmacy. Please tell us the name of the pharmacy.    You may pick the prescription up at the Pain Clinic (tell us the location) or during a clinic visit with your pain provider    Pain Clinic staff can deliver the prescription to the [place]  pharmacy in the clinic building. Please tell us the location.      Scheduling number: [phone].  Call this number to schedule or change appointments.    We believe regular attendance is key to your success in our program.    Any time you are unable to keep your appointment we ask that you call us at least 24 hours in advance to let us know. This will allow us to offer the appointment time to another patient.               Follow-ups after your visit        Your next 10 appointments already scheduled     Jun 27, 2017  2:00 PM CDT   Return Sleep Patient with Milan Huynh MD   [facility] ([facility])    [address] [address] [address]   [phone]            Jul 13, 2017  1:00 PM CDT   LAB with BK LAB   [facility] ([facility])    [address] [facility] [address]   [phone]           Patient must bring picture ID.  Patient should be prepared to give a urine specimen  Please do not eat 10-12 hours before your appointment if you are coming in fasting for labs on lipids, cholesterol, or glucose (sugar).  Pregnant women should follow their Care Team instructions. Water with medications is okay. Do not drink coffee or other fluids.   If you have concerns about taking  your medications, please ask at office or if scheduling via [email or web address], send a message by clicking on Secure Messaging, Message Your Care Team.            Jul 18, 2017  1:00 PM CDT   Return Visit with Sebastián Jenkins MD   [facility] ([facility])    [address] [facility] [address]   [phone]            Oct 20, 2017  3:00 PM CDT   Return Visit with Fly Travis MD   [facility] ([facility])    [address] [place] [address]   [phone]            Dec 04, 2017  9:00 AM CST    ABDOMEN  COMPLETE with MGUS1, MG [facility] ([facility])    [address] [place] [address] [phone]           Please bring a list of your medicines (including vitamins, minerals and over-the-counter drugs). Also, tell your doctor about any allergies you may have. Wear comfortable clothes and leave your valuables at home.  Adults: No eating or drinking for 8 hours before the exam. You may take medicine with a small sip of water.  Children: - Children 6+ years: No food or drink for 6 hours before exam. - Children 1-5 years: No food or drink for 4 hours before exam. - Infants, breast-fed: may have breast milk up to 2 hours before exam. - Infants, formula: may have bottle until 4 hours before exam.  Please call the Imaging Department at your exam site with any questions.            Dec 04, 2017  9:40 AM CST   LAB with LAB FIRST FLOOR [facility] ([facility])    [address] [address] [address] [phone]           Patient must bring picture ID.  Patient should be prepared to give a urine specimen  Please do not eat 10-12 hours before your appointment if you are coming in fasting for labs on lipids, cholesterol, or glucose (sugar).  Pregnant women should follow their Care Team instructions. Water with medications is okay. Do not drink coffee or other fluids.   If you have concerns about taking  your medications, please ask at office or if scheduling via [employer], send a message by clicking on Secure Messaging, Message Your Care Team.            Dec 14, 2017  4:00 PM CST   Return Visit with Drea Laboy MD   [facility] ([facility])    [address] [facility] [address] [phone]              Who to contact     If you have questions or need follow up information about today's clinic visit or your schedule please contact [place] [facility] directly  at [phone].  Normal or non-critical lab and imaging results will be communicated to you by [employer], letter or phone within 4 business days after the clinic has received the results. If you do not hear from us within 7 days, please contact the clinic through [employer] or phone. If you have a critical or abnormal lab result, we will notify you by phone as soon as possible.  Submit refill requests through "[employer]" or call your pharmacy and they will forward the refill request to us. Please allow 3 business days for your refill to be completed.          Additional Information About Your Visit        [employer] Information     "[employer]" gives you secure access to your electronic health record. If you see a primary care provider, you can also send messages to your care team and make appointments. If you have questions, please call your primary care clinic.  If you do not have a primary care provider, please call [phone] and they will assist you.        Care EveryWhere ID     This is your Care EveryWhere ID. This could be used by other organizations to access your [place] medical records  [id number]        Your Vitals Were     Pulse BMI (Body Mass Index)                72 29.74 kg/m2           Blood Pressure from Last 3 Encounters:   06/21/17 113/74   06/08/17 129/88   05/23/17 132/87    Weight from Last 3 Encounters:   06/21/17 77.6 kg (171 lb)   06/08/17 77.3 kg (170 lb 6.4 oz)   05/23/17 78 kg (172 lb)              Today, you had the following     No orders found for display         Today's Medication Changes          These changes are accurate as of: 6/21/17  3:38 PM.  If you have any questions, ask your nurse or doctor.               These medicines have changed or have updated prescriptions.        Dose/Directions    oxyCODONE 5 MG IR tablet   Commonly known as:  ROXICODONE   This may have changed:  additional instructions   Used for:  Facet arthropathy (H)        Dose:  5-10 mg   Take 1-2 tablets (5-10 mg) by  mouth every 4 hours as needed for moderate to severe pain . Max of 6 tabs per day.  30 day supply. May filll to start on/after 6/21   Quantity:  180 tablet   Refills:  0            Where to get your medicines      Some of these will need a paper prescription and others can be bought over the counter.  Ask your nurse if you have questions.     Bring a paper prescription for each of these medications     oxyCODONE 5 MG IR tablet                Primary Care Provider Office Phone # Fax #    David Chavez [phone]       [facility] [address]  [facility] [address]        Equal Access to Services     [facility] : Hadii aad ku hadasho Soomaali, waaxda luqadaha, qaybta kaalmada adeegyada, marcelino katz . So [facility] [phone].    ATENCIÓN: Si habla español, tiene a muse disposición servicios gratuitos de asistencia lingüística. Llame al [phone].    We comply with applicable federal civil rights laws and [place] laws. We do not discriminate on the basis of race, color, national origin, age, disability sex, sexual orientation or gender identity.            Thank you!     Thank you for choosing [facility]  for your care. Our goal is always to provide you with excellent care. Hearing back from our patients is one way we can continue to improve our services. Please take a few minutes to complete the written survey that you may receive in the mail after your visit with us. Thank you!             Your Updated Medication List - Protect others around you: Learn how to safely use, store and throw away your medicines at www.disposemymeds.org.          This list is accurate as of: 6/21/17  3:38 PM.  Always use your most recent med list.                   Brand Name Dispense Instructions for use Diagnosis    Abatacept 125 MG/ML Soaj     4 Syringe    Inject 125 mg Subcutaneous every 7 days    Rheumatoid arthritis of multiple sites with negative rheumatoid  factor (H)       allopurinol 300 MG tablet    ZYLOPRIM    90 tablet    TAKE ONE TABLET BY MOUTH ONCE DAILY    Idiopathic gout, unspecified chronicity, unspecified site       aspirin 81 MG EC tablet     30 tablet    Take 1 tablet (81 mg) by mouth daily (*)    Coronary artery disease involving native coronary artery of native heart without angina pectoris       atorvastatin 80 MG tablet    LIPITOR    90 tablet    TAKE ONE TABLET (80 MG) BY MOUTH ONCE DAILY FOR CHOLESTEROL    Atherosclerosis of native coronary artery of native heart, angina presence unspecified, Hyperlipidemia LDL goal <100       baclofen 10 MG tablet    LIORESAL    180 tablet    Take 1 tablet (10 mg) by mouth 2 times daily as needed for muscle spasms    Spasm of back muscles       BusPIRone HCl 30 MG Tabs     180 tablet    Take 1 tablet by mouth 2 times daily    Major depressive disorder, recurrent episode, moderate (H)       clonazePAM 1 MG tablet    klonoPIN    90 tablet    TAKE ONE-HALF TO ONE TABLET BY MOUTH THREE TIMES DAILY AS NEEDED FOR ANXIETY    Anxiety hyperventilation       clopidogrel 75 MG tablet    PLAVIX    90 tablet    Take 1 tablet (75 mg) by mouth daily    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), Atherosclerosis of native coronary artery of native heart with angina pectoris (H)       evolocumab 140 MG/ML prefilled autoinjector    REPATHA    2 mL    Inject 1 mL (140 mg) Subcutaneous every 14 days    Hyperlipidemia LDL goal <70, S/P CABG (coronary artery bypass graft)       ezetimibe 10 MG tablet    ZETIA    90 tablet    Take 1 tablet (10 mg) by mouth daily    Coronary artery disease involving native coronary artery of native heart without angina pectoris       gabapentin 300 MG capsule    NEURONTIN    540 capsule    TAKE TWO CAPSULES BY MOUTH THREE TIMES DAILY    Lumbar radiculopathy       gemfibrozil 600 MG tablet    LOPID    180 tablet    Take 1 tablet (600 mg) by mouth 2 times daily     Hyperlipidemia LDL goal <70       hydrocortisone 0.2 % cream    WESTCORT    45 g    Use twice daily on the eats for active rash for up to 2 weeks in a row, then take 2 weeks off.    Dermatitis, seborrheic       ketoconazole 2 % cream    NIZORAL    30 g    Apply topically 2 times daily Apply to back of scalp and to groin twice daily until rash is clear    Dermatitis, seborrheic       meclizine 25 MG tablet    ANTIVERT    30 tablet    TAKE ONE TABLET BY MOUTH EVERY 6 HOURS AS NEEDED FOR  DIZZINESS    Vertigo       melatonin 3 MG tablet      Take 1 tablet (3 mg) by mouth nightly as needed for sleep    Delayed sleep phase syndrome       meloxicam 7.5 MG tablet    MOBIC    30 tablet    TAKE ONE TABLET BY MOUTH ONCE DAILY WITH  BREAKFAST    Low back pain, unspecified back pain laterality, unspecified chronicity, with sciatica presence unspecified       metoprolol 25 MG 24 hr tablet    TOPROL-XL    90 tablet    Take 1 tablet (25 mg) by mouth daily    Atherosclerosis of native coronary artery of native heart without angina pectoris       mirtazapine 15 MG tablet    REMERON    30 tablet    Take 1 tablet (15 mg) by mouth At Bedtime    Severe episode of recurrent major depressive disorder, without psychotic features (H)       nitroglycerin 0.4 MG sublingual tablet    NITROSTAT    30 tablet    Place 1 tablet (0.4 mg) under the tongue every 5 minutes as needed    Atherosclerosis of native coronary artery of native heart with angina pectoris (H)       order for DME      1.  CPAP pressure 11 cm/H20 with heated humidity.  2.  Provide mask to fit and CPAP supplies.  3.  Length of need lifetime.  4.  If needed please provide a chin strap    JEROD (obstructive sleep apnea), Anxiety, CAD (coronary artery disease), HTN (hypertension)       * order for DME     1 Units    Equipment being ordered: single end cane    Lumbar radiculopathy, Facet arthropathy (H), Chronic low back pain       * order for DME     200 each    Equipment being  ordered: test strips as covered by insurance. USE TO TEST BLOOD SUGARS TWICE DAILY OR AS DIRECTED.    Rheumatoid arthritis involving multiple sites, unspecified rheumatoid factor presence (H)       * order for DME     1 each    OneTouch glucometer.    Rheumatoid arthritis involving multiple sites, unspecified rheumatoid factor presence (H)       * order for DME     200 each    OneTouch lancets testing twice daily.    Rheumatoid arthritis involving multiple sites, unspecified rheumatoid factor presence (H)       oxyCODONE 5 MG IR tablet    ROXICODONE    180 tablet    Take 1-2 tablets (5-10 mg) by mouth every 4 hours as needed for moderate to severe pain . Max of 6 tabs per day.  30 day supply. May filll to start on/after 6/21    Facet arthropathy (H)       pantoprazole 40 MG EC tablet    PROTONIX    90 tablet    TAKE ONE TABLET BY MOUTH ONCE DAILY FOR  STOMACH.    Gastroesophageal reflux disease without esophagitis       predniSONE 5 MG tablet    DELTASONE    30 tablet    Take 1 tablet (5 mg) by mouth daily    Rheumatoid arthritis of multiple sites with negative rheumatoid factor (H), High risk medications (not anticoagulants) long-term use       sulfaSALAzine  MG EC tablet    AZULFIDINE EN    120 tablet    Take 2 tablets (1,000 mg) by mouth 2 times daily    Rheumatoid arthritis of multiple sites with negative rheumatoid factor (H), High risk medications (not anticoagulants) long-term use       tamsulosin 0.4 MG capsule    FLOMAX    90 capsule    Take 1 capsule (0.4 mg) by mouth daily    Benign prostatic hyperplasia with lower urinary tract symptoms, unspecified morphology       tenofovir 300 MG tablet    VIREAD    90 tablet    Take 1 tablet (300 mg) by mouth daily    Chronic viral hepatitis B without delta agent and without coma (H)       triamcinolone 0.1 % ointment    KENALOG    80 g    Apply sparingly to the ears and groin twice daily until clear, then once daily until clear. Do not apply to the face.     Dermatitis, seborrheic       vitamin D 2000 UNITS tablet     100 tablet    TAKE ONE TABLET BY MOUTH ONCE DAILY    Vitamin D deficiency       zolpidem 5 MG tablet    AMBIEN    30 tablet    TAKE ONE TABLET BY MOUTH AT BEDTIME AS NEEDED FOR SLEEP    Insomnia, unspecified       * Notice:  This list has 4 medication(s) that are the same as other medications prescribed for you. Read the directions carefully, and ask your doctor or other care provider to review them with you.

## 2017-06-21 NOTE — NURSING NOTE
"Chief Complaint   Patient presents with     Pain       Initial /74  Pulse 72  Wt 77.6 kg (171 lb)  BMI 29.74 kg/m2 Estimated body mass index is 29.74 kg/(m^2) as calculated from the following:    Height as of 6/8/17: 1.615 m (5' 3.58\").    Weight as of this encounter: 77.6 kg (171 lb).  Medication Reconciliation: complete     Luma Jama CMA (AAMA)      "

## 2017-06-27 ENCOUNTER — OFFICE VISIT (OUTPATIENT)
Dept: SLEEP MEDICINE | Facility: CLINIC | Age: 52
End: 2017-06-27
Payer: COMMERCIAL

## 2017-06-27 VITALS
DIASTOLIC BLOOD PRESSURE: 76 MMHG | WEIGHT: 171.8 LBS | BODY MASS INDEX: 31.62 KG/M2 | HEIGHT: 62 IN | OXYGEN SATURATION: 96 % | HEART RATE: 77 BPM | SYSTOLIC BLOOD PRESSURE: 111 MMHG

## 2017-06-27 DIAGNOSIS — G47.33 OSA (OBSTRUCTIVE SLEEP APNEA): Primary | ICD-10-CM

## 2017-06-27 DIAGNOSIS — G47.33 OSA (OBSTRUCTIVE SLEEP APNEA): ICD-10-CM

## 2017-06-27 PROCEDURE — 99214 OFFICE O/P EST MOD 30 MIN: CPT | Performed by: INTERNAL MEDICINE

## 2017-06-27 NOTE — PATIENT INSTRUCTIONS

## 2017-06-27 NOTE — MR AVS SNAPSHOT
After Visit Summary   6/27/2017    Lamont Daniels    MRN: [id number]           Patient Information     Date Of Birth          1965        Visit Information        Provider Department      6/27/2017 2:00 PM Milan Huynh MD [facility]        Today's Diagnoses     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)    -  1    JEROD (obstructive sleep apnea)          Care Instructions      Your BMI is Body mass index is 31.42 kg/(m^2).  Weight management is a personal decision.  If you are interested in exploring weight loss strategies, the following discussion covers the approaches that may be successful. Body mass index (BMI) is one way to tell whether you are at a healthy weight, overweight, or obese. It measures your weight in relation to your height.  A BMI of 18.5 to 24.9 is in the healthy range. A person with a BMI of 25 to 29.9 is considered overweight, and someone with a BMI of 30 or greater is considered obese. More than two-thirds of American adults are considered overweight or obese.  Being overweight or obese increases the risk for further weight gain. Excess weight may lead to heart disease and diabetes.  Creating and following plans for healthy eating and physical activity may help you improve your health.  Weight control is part of healthy lifestyle and includes exercise, emotional health, and healthy eating habits. Careful eating habits lifelong are the mainstay of weight control. Though there are significant health benefits from weight loss, long-term weight loss with diet alone may be very difficult to achieve- studies show long-term success with dietary management in less than 10% of people. Attaining a healthy weight may be especially difficult to achieve in those with severe obesity. In some cases, medications, devices and surgical management might be considered.  What can you do?  If you are overweight or obese and are interested in methods for weight loss, you should discuss this with  your provider.     Consider reducing daily calorie intake by 500 calories.     Keep a food journal.     Avoiding skipping meals, consider cutting portions instead.    Diet combined with exercise helps maintain muscle while optimizing fat loss. Strength training is particularly important for building and maintaining muscle mass. Exercise helps reduce stress, increase energy, and improves fitness. Increasing exercise without diet control, however, may not burn enough calories to loose weight.       Start walking three days a week 10-20 minutes at a time    Work towards walking thirty minutes five days a week     Eventually, increase the speed of your walking for 1-2 minutes at time    In addition, we recommend that you review healthy lifestyles and methods for weight loss available through the National Institutes of Health patient information sites:  http://win.niddk.nih.gov/publications/index.htm    And look into health and wellness programs that may be available through your health insurance provider, employer, local community center, or hermilo club.    Weight management plan: Patient was referred to their PCP to discuss a diet and exercise plan.              Follow-ups after your visit        Follow-up notes from your care team     Return in 1 year (on 6/27/2018) for PAP follow up.      Your next 10 appointments already scheduled     Jul 13, 2017  1:00 PM CDT   LAB with BK LAB   [facility] ([facility])    [address] [place] [id number] [phone]           Patient must bring picture ID.  Patient should be prepared to give a urine specimen  Please do not eat 10-12 hours before your appointment if you are coming in fasting for labs on lipids, cholesterol, or glucose (sugar).  Pregnant women should follow their Care Team instructions. Water with medications is okay. Do not drink coffee or other fluids.   If you have concerns about taking  your  medications, please ask at office or if scheduling via Theorem, send a message by clicking on Secure Messaging, Message Your Care Team.            Jul 18, 2017  1:00 PM CDT   Return Visit with Sebastián Jenkins MD   [facility] ([facility])    [address] [address]   [phone]            Sep 20, 2017  3:30 PM CDT   Return Visit with Dorothea oLo MD   [facility] ([facility] [place])    [address]  [place] [id number] [phone]            Oct 20, 2017  3:00 PM CDT   [facility] Visit with Fly Travis MD   [facility]    [address] [place] [id number] [phone]            Dec 04, 2017  9:00 AM CST   US ABDOMEN COMPLETE with MGUS1, [facility] ([facility])    [address] [place] [id number] [phone]           Please bring a list of your medicines (including vitamins, minerals and over-the-counter drugs). Also, tell your doctor about any allergies you may have. Wear comfortable clothes and leave your valuables at home.  Adults: No eating or drinking for 8 hours before the exam. You may take medicine with a small sip of water.  Children: - Children 6+ years: No food or drink for 6 hours before exam. - Children 1-5 years: No food or drink for 4 hours before exam. - Infants, breast-fed: may have breast milk up to 2 hours before exam. - Infants, formula: may have bottle until 4 hours before exam.  Please call the Imaging Department at your exam site with any questions.            Dec 04, 2017  9:40 AM CST   LAB with LAB FIRST FLOOR [facility] ([facility])    [address] [place] [id number] [phone]           Patient must bring picture ID.  Patient should be prepared to give a urine specimen   Please do not eat 10-12 hours before your appointment if you are coming in fasting for labs on lipids, cholesterol, or glucose (sugar).  Pregnant women should follow their Care Team instructions. Water with medications is okay. Do not drink coffee or other fluids.   If you have concerns about taking  your medications, please ask at office or if scheduling via [employer], send a message by clicking on Secure Messaging, Message Your Care Team.            Dec 14, 2017  4:00 PM CST   Return Visit with Drea Laboy MD   [facility] ([facility]    [address] [place] [address] [phone]              Who to contact     If you have questions or need follow up information about today's clinic visit or your schedule please contact [facility] directly at [phone].  Normal or non-critical lab and imaging results will be communicated to you by MyChart, letter or phone within 4 business days after the clinic has received the results. If you do not hear from us within 7 days, please contact the clinic through [employer] or phone. If you have a critical or abnormal lab result, we will notify you by phone as soon as possible.  Submit refill requests through [employer] or call your pharmacy and they will forward the refill request to us. Please allow 3 business days for your refill to be completed.          Additional Information About Your Visit        MyChart Information     [employer]kicker gives you secure access to your electronic health record. If you see a primary care provider, you can also send messages to your care team and make appointments. If you have questions, please call your primary care clinic.  If you do not have a primary care provider, please call [phone] and they will assist you.        Care EveryWhere ID     This is your Care EveryWhere ID. This could be used by other organizations to access your [facility] medical records  [id number]        Your  "Vitals Were     Pulse Height Pulse Oximetry BMI (Body Mass Index)          77 1.575 m (5' 2\") 96% 31.42 kg/m2         Blood Pressure from Last 3 Encounters:   06/27/17 111/76   06/21/17 113/74   06/08/17 129/88    Weight from Last 3 Encounters:   06/27/17 77.9 kg (171 lb 12.8 oz)   06/21/17 77.6 kg (171 lb)   06/08/17 77.3 kg (170 lb 6.4 oz)              Today, you had the following     No orders found for display       Primary Care Provider Office Phone # Fax #    David Chavez [phone]       [facility] [address]  [facility] [address]        Equal Access to Services     VICKEY HERRMANN : Hadii esther novak hadasho Soomaali, waaxda luqadaha, qaybta kaalmada adeegyada, waxay sajiin roberto katz . So [facility] [phone].    ATENCIÓN: Si habla español, tiene a muse disposición servicios gratuitos de asistencia lingüística. Llame al [phone].    We comply with applicable federal civil rights laws and [place] laws. We do not discriminate on the basis of race, color, national origin, age, disability sex, sexual orientation or gender identity.            Thank you!     Thank you for choosing [facility]  for your care. Our goal is always to provide you with excellent care. Hearing back from our patients is one way we can continue to improve our services. Please take a few minutes to complete the written survey that you may receive in the mail after your visit with us. Thank you!             Your Updated Medication List - Protect others around you: Learn how to safely use, store and throw away your medicines at www.disposemymeds.org.          This list is accurate as of: 6/27/17  2:32 PM.  Always use your most recent med list.                   Brand Name Dispense Instructions for use Diagnosis    Abatacept 125 MG/ML Soaj     4 Syringe    Inject 125 mg Subcutaneous every 7 days    Rheumatoid arthritis of multiple sites with negative rheumatoid factor (H)       " allopurinol 300 MG tablet    ZYLOPRIM    90 tablet    TAKE ONE TABLET BY MOUTH ONCE DAILY    Idiopathic gout, unspecified chronicity, unspecified site       aspirin 81 MG EC tablet     30 tablet    Take 1 tablet (81 mg) by mouth daily (*)    Coronary artery disease involving native coronary artery of native heart without angina pectoris       atorvastatin 80 MG tablet    LIPITOR    90 tablet    TAKE ONE TABLET (80 MG) BY MOUTH ONCE DAILY FOR CHOLESTEROL    Atherosclerosis of native coronary artery of native heart, angina presence unspecified, Hyperlipidemia LDL goal <100       baclofen 10 MG tablet    LIORESAL    180 tablet    Take 1 tablet (10 mg) by mouth 2 times daily as needed for muscle spasms    Spasm of back muscles       BusPIRone HCl 30 MG Tabs     180 tablet    Take 1 tablet by mouth 2 times daily    Major depressive disorder, recurrent episode, moderate (H)       clonazePAM 1 MG tablet    klonoPIN    90 tablet    TAKE ONE-HALF TO ONE TABLET BY MOUTH THREE TIMES DAILY AS NEEDED FOR ANXIETY    Anxiety hyperventilation       clopidogrel 75 MG tablet    PLAVIX    90 tablet    Take 1 tablet (75 mg) by mouth daily    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), Atherosclerosis of native coronary artery of native heart with angina pectoris (H)       evolocumab 140 MG/ML prefilled autoinjector    REPATHA    2 mL    Inject 1 mL (140 mg) Subcutaneous every 14 days    Hyperlipidemia LDL goal <70, S/P CABG (coronary artery bypass graft)       ezetimibe 10 MG tablet    ZETIA    90 tablet    Take 1 tablet (10 mg) by mouth daily    Coronary artery disease involving native coronary artery of native heart without angina pectoris       gabapentin 300 MG capsule    NEURONTIN    540 capsule    TAKE TWO CAPSULES BY MOUTH THREE TIMES DAILY    Lumbar radiculopathy       gemfibrozil 600 MG tablet    LOPID    180 tablet    Take 1 tablet (600 mg) by mouth 2 times daily    Hyperlipidemia  LDL goal <70       meclizine 25 MG tablet    ANTIVERT    30 tablet    TAKE ONE TABLET BY MOUTH EVERY 6 HOURS AS NEEDED FOR  DIZZINESS    Vertigo       melatonin 3 MG tablet      Take 1 tablet (3 mg) by mouth nightly as needed for sleep    Delayed sleep phase syndrome       meloxicam 7.5 MG tablet    MOBIC    30 tablet    TAKE ONE TABLET BY MOUTH ONCE DAILY WITH  BREAKFAST    Low back pain, unspecified back pain laterality, unspecified chronicity, with sciatica presence unspecified       metoprolol 25 MG 24 hr tablet    TOPROL-XL    90 tablet    Take 1 tablet (25 mg) by mouth daily    Atherosclerosis of native coronary artery of native heart without angina pectoris       mirtazapine 15 MG tablet    REMERON    30 tablet    Take 1 tablet (15 mg) by mouth At Bedtime    Severe episode of recurrent major depressive disorder, without psychotic features (H)       nitroglycerin 0.4 MG sublingual tablet    NITROSTAT    30 tablet    Place 1 tablet (0.4 mg) under the tongue every 5 minutes as needed    Atherosclerosis of native coronary artery of native heart with angina pectoris (H)       order for DME      1.  CPAP pressure 11 cm/H20 with heated humidity.  2.  Provide mask to fit and CPAP supplies.  3.  Length of need lifetime.  4.  If needed please provide a chin strap    JEROD (obstructive sleep apnea), Anxiety, CAD (coronary artery disease), HTN (hypertension)       * order for DME     1 Units    Equipment being ordered: single end cane    Lumbar radiculopathy, Facet arthropathy, Chronic low back pain       * order for DME     200 each    Equipment being ordered: test strips as covered by insurance. USE TO TEST BLOOD SUGARS TWICE DAILY OR AS DIRECTED.    Rheumatoid arthritis involving multiple sites, unspecified rheumatoid factor presence (H)       * order for DME     1 each    OneTouch glucometer.    Rheumatoid arthritis involving multiple sites, unspecified rheumatoid factor presence (H)       * order for DME     200 each     OneTouch lancets testing twice daily.    Rheumatoid arthritis involving multiple sites, unspecified rheumatoid factor presence (H)       oxyCODONE 5 MG IR tablet    ROXICODONE    180 tablet    Take 1-2 tablets (5-10 mg) by mouth every 4 hours as needed for moderate to severe pain . Max of 6 tabs per day.  30 day supply. May filll to start on/after 6/21    Facet arthropathy       pantoprazole 40 MG EC tablet    PROTONIX    90 tablet    TAKE ONE TABLET BY MOUTH ONCE DAILY FOR  STOMACH.    Gastroesophageal reflux disease without esophagitis       predniSONE 5 MG tablet    DELTASONE    30 tablet    Take 1 tablet (5 mg) by mouth daily    Rheumatoid arthritis of multiple sites with negative rheumatoid factor (H), High risk medications (not anticoagulants) long-term use       sulfaSALAzine  MG EC tablet    AZULFIDINE EN    120 tablet    Take 2 tablets (1,000 mg) by mouth 2 times daily    Rheumatoid arthritis of multiple sites with negative rheumatoid factor (H), High risk medications (not anticoagulants) long-term use       tamsulosin 0.4 MG capsule    FLOMAX    90 capsule    Take 1 capsule (0.4 mg) by mouth daily    Benign prostatic hyperplasia with lower urinary tract symptoms, unspecified morphology       tenofovir 300 MG tablet    VIREAD    90 tablet    Take 1 tablet (300 mg) by mouth daily    Chronic viral hepatitis B without delta agent and without coma (H)       vitamin D 2000 UNITS tablet     100 tablet    TAKE ONE TABLET BY MOUTH ONCE DAILY    Vitamin D deficiency       zolpidem 5 MG tablet    AMBIEN    30 tablet    TAKE ONE TABLET BY MOUTH AT BEDTIME AS NEEDED FOR SLEEP    Insomnia, unspecified       * Notice:  This list has 4 medication(s) that are the same as other medications prescribed for you. Read the directions carefully, and ask your doctor or other care provider to review them with you.

## 2017-06-27 NOTE — NURSING NOTE
"Chief Complaint   Patient presents with     CPAP Follow Up       Initial /76  Pulse 77  Ht 1.575 m (5' 2\")  Wt 77.9 kg (171 lb 12.8 oz)  SpO2 96%  BMI 31.42 kg/m2 Estimated body mass index is 31.42 kg/(m^2) as calculated from the following:    Height as of this encounter: 1.575 m (5' 2\").    Weight as of this encounter: 77.9 kg (171 lb 12.8 oz).  Medication Reconciliation: complete   ESS 11    Angelique Elkins, SAÚL      "

## 2017-06-27 NOTE — PROGRESS NOTES
Obstructive Sleep Apnea- PAP Follow-Up Visit:    Chief Complaint   Patient presents with     CPAP Follow Up       Lamont Daniels comes in today for follow-up of their severe sleep apnea, managed with CPAP.     At last visit, was having elevated Central Apnea Index at 12 cmH2O.  Discussed options and ended up lowering to 11 cmH2O.  RAZ decreased by > 60% and is now at goal.      Can't use nasal mask or pillows due to trouble breathing through nose.    Overall, the patient rates their experience with PAP as 7 (0 poor, 10 great). The mask is not comfortable. The mask is leaking, 2 nights per week. They are not snoring with the mask on. They are not having gasp arousals.  They are having significant oral/nasal dryness. The pressure settings are comfortable.     Patient uses full-face mask.    Bedtime is typically 11 pm. Usually it takes about 60 minutes to fall asleep with the mask on. Wake time is typically 9-10 am.  Patient is using PAP therapy 6 hours per night. The patient is usually getting 7-8 hours of sleep per night.    Patient does not feel rested in the morning.    [place] Sleepiness Scale: 11/24    ResMed   CPAP 11 cmH2O download:  28 total days of use. 2 nonuse days. 87% days with >4 hours use.  Average use 7 hours 53 minutes per day. Median Leak 15.7 L/min. 95%ile Leak 34.5 L/min. AHI 2.1.       Past medical/surgical history, family history, social history, medications and allergies were reviewed.      Problem List:  Patient Active Problem List    Diagnosis Date Noted     Benign prostatic hyperplasia with lower urinary tract symptoms, unspecified morphology 03/14/2017     Priority: Medium     Rheumatoid arthritis of multiple sites with negative rheumatoid factor (H) 01/17/2017     Priority: Medium     Insomnia, unspecified type 11/22/2016     Priority: Medium     Essential hypertension with goal blood pressure less than 140/90 05/19/2016     Priority: Medium     On corticosteroid therapy 04/22/2016      "Priority: Medium     Elevated liver enzymes 11/30/2015     Priority: Medium     Bilateral low back pain with sciatica, sciatica laterality unspecified 11/17/2015     Priority: Medium     Rheumatoid arthritis involving multiple sites, unspecified rheumatoid factor presence (H) 11/11/2015     Priority: Medium     High risk medications (not anticoagulants) long-term use 11/11/2015     Priority: Medium     Midline low back pain without sciatica 11/11/2015     Priority: Medium     Essential hypertension 10/22/2015     Priority: Medium     Suicidal ideation 12/08/2014     Priority: Medium     Gout 08/22/2014     Priority: Medium     Problem list name updated by automated process. 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  The total sleep time was 129.5 minutes.  During the diagnostic portion of the study the sleep latency was 30.2 minutes after taking Ambien 10 mg.  No REM sleep observed. Sleep Efficiency was 67.2%.  Wake after sleep onset was 27.5.   The patient spent 23.2% of total sleep time in Stage N1, 72.6% in Stage N2, 4.2% in Stages N3 and 0.0% in REM.         Respiration:     Sustained Sleep Associated Hypoventilation -was not monitored.    Sleep Associated Hypoxemia - was present.  Baseline oxygen saturation was 93.6%.  The lowest oxygen saturation was " 75.4%.  Snoring was reported as loud.     Events - During the diagnostic portion of the study, the polysomnogram revealed a presence of 5 obstructive apneas resulting in an Apnea index of 30.1 events per hour.  There were 21 hypopneas resulting in a Hypopnea index of 9.7 events per hour.  The combined Apnea/Hypopnea Index was 39.8 events per hour.  The REM AHI was n/a.  The RERA index was 7.9 per hour.   The RDI was 47.7  .  47.7   7.9 39.8     Treatment PSG  Sleep Architecture:  At 1:22 AM the patient was placed on CPAP treatment and was titrated at pressures ranging from 5 cm/H20 up to 11 cm/H20.  The total recording time of the treatment portion of the study was 334.5 minutes.  The total sleep time was 212.5 minutes.  During the treatment portion of the study the sleep latency was 3.5 minutes.  REM latency was 313.5 minutes.  Sleep Efficiency was 63.5%.  Sleep Maintenance Efficiency was 63.5%.  Total wake time was 122.5 minutes for a total wake percentage of 34.5%. the patient spent 17.2% of total sleep time in Stage N1, 53.2% in Stage N2, 24.9% in Stages N3 and N, and 4.7% in REM.       Respiration:  The optimal pressure was 11.0 with an AHI of 1.3.    Movement Activity:      Limb - During the diagnostic portion of the study, there were 3 limb movements recorded.  Of this total, 0 were classified as PLMs.  Of the PLMs, 0 were associated with arousals.  The Limb Movement index was 1.4 per hour while the PLM index was 0.0 per hour.    Behavior - None    Bruxism - None    Seizure - None      CARDIAC SUMMARY:   During the diagnostic portion of the study, the average pulse rate was 56.1 bpm.  The minimum pulse rate was 39.0 bpm while the maximum pulse rate was 77.0 bpm.    During the treatment portion of the study, the average pulse rate was 52.7 bpm.  The minimum pulse rate was 47.0 bpm while the maximum pulse rate was 73.0 bpm.   Assessment:    Severe JEROD (AHI 40) with adequate positive airway pressure titration  "including REM supine.    History of UPPP.  Recommendations:    CPAP pressure 11 cmH2O with clinical follow-up within 3 - 4 weeks including compliance measures.    Consider a chin strap       Coronary atherosclerosis of native coronary artery      Priority: Medium     Major depressive disorder, recurrent episode, moderate (H)      Priority: Medium     Anxiety      Priority: Medium        /76  Pulse 77  Ht 1.575 m (5' 2\")  Wt 77.9 kg (171 lb 12.8 oz)  SpO2 96%  BMI 31.42 kg/m2        Impression/Plan:  1. 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)  Severe Sleep apnea. Tolerating PAP well. Daytime symptoms are improved..   Still having issues with claustrophobia and mask comfort. Leak is elevated.  Dry mouth.  Options:  - Bilevel titration study  - Mask refit  - Alternative mask ([employer])    Patient will consider options and call with decision (if he would like to go with titration study).  Otherwise, f/u in 1 year.    Lamont Daniels will follow up in about 1 year(s).     Twenty-five minutes spent with patient, all of which were spent face-to-face counseling, consulting, coordinating plan of care.            CC:  David Chavez,     "

## 2017-06-30 ENCOUNTER — TELEPHONE (OUTPATIENT)
Dept: RHEUMATOLOGY | Facility: CLINIC | Age: 52
End: 2017-06-30

## 2017-06-30 NOTE — TELEPHONE ENCOUNTER
PA for Orencia submitted through insurance, covermymeds was not responding paper copy sent .  Lian Kirkpatrick CMA 6/30/2017 12:22 PM

## 2017-07-05 ENCOUNTER — MYC REFILL (OUTPATIENT)
Dept: FAMILY MEDICINE | Facility: CLINIC | Age: 52
End: 2017-07-05

## 2017-07-05 DIAGNOSIS — E78.5 HYPERLIPIDEMIA LDL GOAL <100: ICD-10-CM

## 2017-07-05 DIAGNOSIS — I25.10 ATHEROSCLEROSIS OF NATIVE CORONARY ARTERY OF NATIVE HEART, ANGINA PRESENCE UNSPECIFIED: ICD-10-CM

## 2017-07-05 DIAGNOSIS — M54.16 LUMBAR RADICULOPATHY: ICD-10-CM

## 2017-07-05 RX ORDER — GABAPENTIN 300 MG/1
600 CAPSULE ORAL 3 TIMES DAILY
Qty: 540 CAPSULE | Refills: 3 | Status: SHIPPED | OUTPATIENT
Start: 2017-07-05 | End: 2017-08-01

## 2017-07-05 NOTE — TELEPHONE ENCOUNTER
Message from [employer]:  Original authorizing provider: David Chavez MD, MD Lamont Daniels would like a refill of the following medications:  gabapentin (NEURONTIN) 300 MG capsule [David Chavez MD, MD]    Preferred pharmacy: [address] [place] [address]    Comment:  please refill.

## 2017-07-05 NOTE — TELEPHONE ENCOUNTER
Routing refill request to provider for review/approval because:  Drug not on the [facility] refill protocol   Melina Carrizales RN

## 2017-07-05 NOTE — TELEPHONE ENCOUNTER
atorvastatin (LIPITOR) 80 MG tablet     Last Written Prescription Date: 04/11/17  Last Fill Quantity: 90, # refills: 0  Last Office Visit with [facility], [facility] or [place] prescribing provider: 05/04/17  Next 5 appointments (look out 90 days)     Jul 18, 2017  1:00 PM CDT   [facility] Visit with Sebastián Jenkins MD   [facility]    [address] [facility] [address]   [phone]            Sep 20, 2017  3:30 PM CDT   Return Visit with Dorothea Loo MD   [facility] [place] ([facility])    [id number] [place] [address]  [place] [address] [phone]                   Lab Results   Component Value Date    CHOL 118 06/09/2017     Lab Results   Component Value Date    HDL 63 06/09/2017     Lab Results   Component Value Date    LDL 35 06/09/2017     Lab Results   Component Value Date    TRIG 102 06/09/2017     Lab Results   Component Value Date    CHOLHDLRATIO 6.6 06/29/2015           Melissa Barnes  [facility]

## 2017-07-08 NOTE — TELEPHONE ENCOUNTER
Routing refill request to provider for review/approval because:  Drug interaction warning  Melina Carrizales RN

## 2017-07-10 ENCOUNTER — MYC REFILL (OUTPATIENT)
Dept: FAMILY MEDICINE | Facility: CLINIC | Age: 52
End: 2017-07-10

## 2017-07-10 DIAGNOSIS — E78.5 HYPERLIPIDEMIA LDL GOAL <100: ICD-10-CM

## 2017-07-10 DIAGNOSIS — I25.10 ATHEROSCLEROSIS OF NATIVE CORONARY ARTERY OF NATIVE HEART, ANGINA PRESENCE UNSPECIFIED: ICD-10-CM

## 2017-07-10 RX ORDER — ATORVASTATIN CALCIUM 80 MG/1
TABLET, FILM COATED ORAL
Qty: 90 TABLET | Refills: 0 | Status: CANCELLED | OUTPATIENT
Start: 2017-07-10

## 2017-07-10 RX ORDER — ATORVASTATIN CALCIUM 80 MG/1
TABLET, FILM COATED ORAL
Qty: 90 TABLET | Refills: 1 | Status: SHIPPED | OUTPATIENT
Start: 2017-07-10 | End: 2018-06-18

## 2017-07-11 NOTE — TELEPHONE ENCOUNTER
[facility] Prior Authorization Team   Phone: [phone]  Fax: [phone]     Prior Authorization Approval     Authorization Effective Date: 4/25/2017  Authorization Expiration Date: 4/25/2018  Medication: Orencia clickject 125mg/ml-Approved  Approved Dose/Quantity: 4/28ds  Reference #: [id number]   Insurance Company: [employer] - Phone [phone] Fax [phone]  Expected CoPay: 0     CoPay Card Available:    n/a  Foundation Assistance Needed: no    Which Pharmacy is filling the prescription (Not needed for infusion/clinic administered): [facility] - [place], [place] - [facility] [address]  Pharmacy Notified: Yes  Patient Notified: Yes      closing encounter.  Helga Guerrier CMA  7/11/2017 10:51 AM

## 2017-07-13 DIAGNOSIS — M06.09 RHEUMATOID ARTHRITIS OF MULTIPLE SITES WITH NEGATIVE RHEUMATOID FACTOR (H): ICD-10-CM

## 2017-07-13 DIAGNOSIS — E55.9 VITAMIN D DEFICIENCY: ICD-10-CM

## 2017-07-13 DIAGNOSIS — Z79.899 HIGH RISK MEDICATIONS (NOT ANTICOAGULANTS) LONG-TERM USE: ICD-10-CM

## 2017-07-13 LAB
ALBUMIN SERPL-MCNC: 3.8 G/DL (ref 3.4–5)
ALP SERPL-CCNC: 98 U/L (ref 40–150)
ALT SERPL W P-5'-P-CCNC: 48 U/L (ref 0–70)
AST SERPL W P-5'-P-CCNC: 23 U/L (ref 0–45)
BASOPHILS # BLD AUTO: 0 10E9/L (ref 0–0.2)
BASOPHILS NFR BLD AUTO: 0.3 %
BILIRUB DIRECT SERPL-MCNC: 0.1 MG/DL (ref 0–0.2)
BILIRUB SERPL-MCNC: 0.5 MG/DL (ref 0.2–1.3)
CREAT SERPL-MCNC: 0.86 MG/DL (ref 0.66–1.25)
CRP SERPL-MCNC: <2.9 MG/L (ref 0–8)
DIFFERENTIAL METHOD BLD: NORMAL
EOSINOPHIL # BLD AUTO: 0.1 10E9/L (ref 0–0.7)
EOSINOPHIL NFR BLD AUTO: 1.4 %
ERYTHROCYTE [DISTWIDTH] IN BLOOD BY AUTOMATED COUNT: 14.3 % (ref 10–15)
ERYTHROCYTE [SEDIMENTATION RATE] IN BLOOD BY WESTERGREN METHOD: 10 MM/H (ref 0–20)
GFR SERPL CREATININE-BSD FRML MDRD: NORMAL ML/MIN/1.7M2
HCT VFR BLD AUTO: 44.8 % (ref 40–53)
HGB BLD-MCNC: 14.9 G/DL (ref 13.3–17.7)
LYMPHOCYTES # BLD AUTO: 3 10E9/L (ref 0.8–5.3)
LYMPHOCYTES NFR BLD AUTO: 37.7 %
MCH RBC QN AUTO: 30.6 PG (ref 26.5–33)
MCHC RBC AUTO-ENTMCNC: 33.3 G/DL (ref 31.5–36.5)
MCV RBC AUTO: 92 FL (ref 78–100)
MONOCYTES # BLD AUTO: 0.9 10E9/L (ref 0–1.3)
MONOCYTES NFR BLD AUTO: 11.9 %
NEUTROPHILS # BLD AUTO: 3.9 10E9/L (ref 1.6–8.3)
NEUTROPHILS NFR BLD AUTO: 48.7 %
PLATELET # BLD AUTO: 221 10E9/L (ref 150–450)
PROT SERPL-MCNC: 7.4 G/DL (ref 6.8–8.8)
RBC # BLD AUTO: 4.87 10E12/L (ref 4.4–5.9)
WBC # BLD AUTO: 7.9 10E9/L (ref 4–11)

## 2017-07-13 PROCEDURE — 36415 COLL VENOUS BLD VENIPUNCTURE: CPT | Performed by: INTERNAL MEDICINE

## 2017-07-13 PROCEDURE — 82565 ASSAY OF CREATININE: CPT | Performed by: INTERNAL MEDICINE

## 2017-07-13 PROCEDURE — 86140 C-REACTIVE PROTEIN: CPT | Performed by: INTERNAL MEDICINE

## 2017-07-13 PROCEDURE — 80076 HEPATIC FUNCTION PANEL: CPT | Performed by: INTERNAL MEDICINE

## 2017-07-13 PROCEDURE — 85652 RBC SED RATE AUTOMATED: CPT | Performed by: INTERNAL MEDICINE

## 2017-07-13 PROCEDURE — 85025 COMPLETE CBC W/AUTO DIFF WBC: CPT | Performed by: INTERNAL MEDICINE

## 2017-07-13 PROCEDURE — 82306 VITAMIN D 25 HYDROXY: CPT | Performed by: INTERNAL MEDICINE

## 2017-07-14 LAB — DEPRECATED CALCIDIOL+CALCIFEROL SERPL-MC: 34 UG/L (ref 20–75)

## 2017-07-18 ENCOUNTER — OFFICE VISIT (OUTPATIENT)
Dept: RHEUMATOLOGY | Facility: CLINIC | Age: 52
End: 2017-07-18
Payer: COMMERCIAL

## 2017-07-18 VITALS
OXYGEN SATURATION: 97 % | HEIGHT: 62 IN | BODY MASS INDEX: 31.8 KG/M2 | SYSTOLIC BLOOD PRESSURE: 118 MMHG | WEIGHT: 172.8 LBS | DIASTOLIC BLOOD PRESSURE: 78 MMHG | HEART RATE: 58 BPM | TEMPERATURE: 97 F

## 2017-07-18 DIAGNOSIS — R53.83 FATIGUE, UNSPECIFIED TYPE: ICD-10-CM

## 2017-07-18 DIAGNOSIS — M06.09 RHEUMATOID ARTHRITIS OF MULTIPLE SITES WITH NEGATIVE RHEUMATOID FACTOR (H): Primary | ICD-10-CM

## 2017-07-18 DIAGNOSIS — Z79.899 HIGH RISK MEDICATIONS (NOT ANTICOAGULANTS) LONG-TERM USE: ICD-10-CM

## 2017-07-18 PROCEDURE — 99213 OFFICE O/P EST LOW 20 MIN: CPT | Performed by: INTERNAL MEDICINE

## 2017-07-18 PROCEDURE — 86618 LYME DISEASE ANTIBODY: CPT | Performed by: INTERNAL MEDICINE

## 2017-07-18 PROCEDURE — 36415 COLL VENOUS BLD VENIPUNCTURE: CPT | Performed by: INTERNAL MEDICINE

## 2017-07-18 RX ORDER — SULFASALAZINE 500 MG/1
1000 TABLET, DELAYED RELEASE ORAL 2 TIMES DAILY
Qty: 120 TABLET | Refills: 3 | Status: SHIPPED | OUTPATIENT
Start: 2017-07-18 | End: 2017-10-17

## 2017-07-18 RX ORDER — PREDNISONE 5 MG/1
5 TABLET ORAL DAILY
Qty: 30 TABLET | Refills: 3 | Status: SHIPPED | OUTPATIENT
Start: 2017-07-18 | End: 2017-10-17

## 2017-07-18 NOTE — PROGRESS NOTES
Rheumatology Clinic Visit      Lamont Daniels MRN# [id number]   YOB: 1965 Age: 51 year old      Date of visit: 7/18/17   PCP: Dr. David Chavez  Hepatologist: Dr. Drea Laboy     Chief Complaint   Patient presents with:  Arthritis: RA, patient states he has aches and pain all over. Also very tired.      Assessment and Plan   1.  Seropositive nonerosive rheumatoid arthritis (RF negative, CCP low positive): Note that he does have low back pain but without evidence of SI joint irregularity on x-ray.  Initially with morning stiffness all day, gelling phenomenon, and synovitis.   Previously on hydroxychloroquine, Humira (partially effective), Enbrel (partially effective). Currently on SSZ 1000mg BID,  prednisone 5mg daily, and Orencia SQ.  Treatment has been in coordination with Dr. Laboy (hepatology) who is treating for hepatitis B.  RA appears to be well controlled based on physical exam, but he continues to have diffuse joint pain. There may be a component of chronic pain syndrome.    - Continue sulfasalazine 1000mg BID  - Continue prednisone 5 mg daily  - Continue Orencia SQ every 7 days  - Labs 2-3 days prior to the next rheumatology clinic visit: CBC, Creatinine, Hepatic Panel, ESR, CRP    2. Lower back pain: Lumbar spine MRI in August 2014 showed multilevel degenerative changes and a small disc protrusion at L3/4 with mild spinal canal narrowing; also moderate left and mild right neural foraminal narrowing at L5-S1. He had a nerve conduction study in 2014 that was normal. He has been worked up by neurology in the past without significant neurologic findings. HLA-B27 negative, SI joint x-ray negative. Unlikely to be inflammatory in nature and is now being seen by the pain management clinic.     3. Hepatitis B: Managed by Dr. Laboy (hepatology) and is currently on tenofovir.    4. Hepatic Steatosis: Based on recent liver biopsy.  Seeing Dr. Laboy (hepatology).  Encouraged weight loss.    5. Positive  "tuberculosis test:   This is noted here for historical significance.  He was evaluated by Dr. Anthony Mendoza, infectious disease. The following telephone note was documented by Dr. Mendoza: \"I tried calling th pt, finally we have the records from [place] . It appears he was treated for latent TB with INH for 1 year in 1980/1981 .Later in 1981 April he was admitted at [facility] in [place], [place] with rt sided pneumonia, TB was suspected but he improved with treatment with Penicillin only. Later he was seen  ( likely ID specialist), and was treated with 6 weeks course of Rifampin and Ethambutol pending sputum AFB culture. His AFB cx were negative based on the report we have.\"  \"He recently had QFT -TB test which was reported positive and his CXR was clear. Given his documented hx of completion of treatment for latent TB as above , I do not see any need for further treatment. His tests may remain positive irrespective of treatment status. From my perspective he can be started on DMARDs/Biological as deemed necessary.\"       6. Gout: On allopurinol and managed by PCP.    7. Bone Health: 2015 vitamin D normal.    - Continue vitamin D 2000 Units daily.     8. Fatigue: Chronic. Reportedly present for more than 10 years. His rheumatoid arthritis appears to be fairly well controlled. Hepatitis B is treated and his most recent viral detection was negative. Chronic sleep issues and not always able to wear his CPAP machine because of associated dryness; he is following with a sleep specialist. Check Lyme antibody screen today. He is also following with a psychiatrist for depression management. He is going to speak with his PCP about this issue as well.  - Labs today: Lyme antibody screen    Mr. Daniels verbalized agreement with and understanding of the rational for the diagnosis and treatment plan.  All questions were answered to best of my ability and the patient's satisfaction. Mr. Daniels was advised to contact the " clinic with any questions that may arise after the clinic visit.      Thank you for involving me in the care of the patient    Return to clinic: 3 months      HPI   Lamont Daniels is a 51 year old male with a medical history significant for hypertension, hyperlipidemia, gout, coronary artery disease s/p 6vCABG, vitamin D deficiency, hepatitis B, and RA who presents for f/u of RA.    Today, he reports that there has been no change since discontinuing Enbrel and starting around see, with regard to his joint pain. Morning stiffness for no more than 1 hour. He reports that all joints hurt. He also reports having terrible fatigue that has been present for over 10 years but has not improved. He is following with a psychiatrist for depression management and review of that note shows that he has been resistant to many therapies. He is following with hepatology for hepatitis B treatment. He follows with the sleep specialist for sleep apnea.    Denies fevers, chills, nausea, vomiting, constipation, diarrhea. No abdominal pain. Denies chest pain/pressure, palpitations, or shortness of breath.  No oral or nasal sores. No rash. No LE swelling.  Mild dry mouth; he has good dentition and does not use frequent sips of water. No dry eyes. No eye pain or redness.     Tobacco:  None   EtOH:  None  Drugs:  None  Occupation: Retired   Originally from [facility], then lived just north of [place], [place], then lived in [place], [place], then moved to [place] for family    ROS   GEN: No fevers, chills, night sweats; + fatigue   SKIN: No itching, rashes, sores  HEENT: No epistaxis. No oral or nasal ulcers.  CV: See HPI  PULM: See HPI  GI: No nausea, vomiting, constipation, diarrhea. No blood in stool. No abdominal pain.  : No blood in urine.  MSK: See HPI.  NEURO: See HPI  ENDO: No heat/cold intolerance.  EXT: No LE swelling    Active Problem List     Patient Active Problem List   Diagnosis     Coronary atherosclerosis of native coronary  artery     Major depressive disorder, recurrent episode, moderate (H)     Anxiety     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)     Hepatitis B infection     Vitamin D deficiency     S/P CABG (coronary artery bypass graft)     Malrotation of intestine     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     Hyperlipidemia LDL goal <70     Insomnia     Gout     Suicidal ideation     Essential hypertension     Rheumatoid arthritis involving multiple sites, unspecified rheumatoid factor presence (H)     High risk medications (not anticoagulants) long-term use     Midline low back pain without sciatica     Bilateral low back pain with sciatica, sciatica laterality unspecified     Elevated liver enzymes     On corticosteroid therapy     Essential hypertension with goal blood pressure less than 140/90     Insomnia, unspecified type     Rheumatoid arthritis of multiple sites with negative rheumatoid factor (H)     Benign prostatic hyperplasia with lower urinary tract symptoms, unspecified morphology     Past Medical History     Past Medical History:   Diagnosis Date     Anxiety      CAD (coronary artery disease)     sees cardiologist Dr. Yang, has had stents and cabg     Congestive heart failure, unspecified 2008     Gout      Hepatitis B > 10 years     HTN (hypertension)      Hyperlipidemia LDL goal < 100      Malrotation of intestine      Moderate major depression (H)      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) 9/19/2011    Uses CPAP     Rheumatoid arthritis flare (H) 1012     Steatohepatitis 12/15    liver biopsy     Tuberculosis age 13    INH x 9 months      Vitamin D deficiency      Past Surgical History     Past Surgical History:   Procedure Laterality Date     ABDOMEN SURGERY  2014     BIOPSY  2015     BYPASS GRAFT ARTERY CORONARY  2008    6 vessels     COLONOSCOPY  2/8/2013    Procedure: COLONOSCOPY;  Colonoscopy, blood in stool;  Surgeon: Duane, William Charles, MD;  Location:  OR     GI SURGERY  2014      CORONARY STENT CIERA SOTO  VESSEL  2008    3 months after CABG     HEAD & NECK SURGERY  2011     NASAL/SINUS POLYPECTOMY  2010     ORTHOPEDIC SURGERY  2012     THORACIC SURGERY  1989    tb     TONSILLECTOMY  2010     UVULOPALATOPHARYNGOPLASTY  2010     VASCULAR SURGERY  2008     Allergy     Allergies   Allergen Reactions     Citalopram Itching     Tramadol Itching     Current Medication List     Current Outpatient Prescriptions   Medication Sig     atorvastatin (LIPITOR) 80 MG tablet TAKE 1 TABLET BY MOUTH 1 TIME DAILY FOR CHOLESTEROL     gabapentin (NEURONTIN) 300 MG capsule Take 2 capsules (600 mg) by mouth 3 times daily     oxyCODONE (ROXICODONE) 5 MG IR tablet Take 1-2 tablets (5-10 mg) by mouth every 4 hours as needed for moderate to severe pain . Max of 6 tabs per day.  30 day supply. May filll to start on/after 6/21     tenofovir (VIREAD) 300 MG tablet Take 1 tablet (300 mg) by mouth daily     meclizine (ANTIVERT) 25 MG tablet TAKE ONE TABLET BY MOUTH EVERY 6 HOURS AS NEEDED FOR  DIZZINESS     pantoprazole (PROTONIX) 40 MG EC tablet TAKE ONE TABLET BY MOUTH ONCE DAILY FOR  STOMACH.     meloxicam (MOBIC) 7.5 MG tablet TAKE ONE TABLET BY MOUTH ONCE DAILY WITH  BREAKFAST     clonazePAM (KLONOPIN) 1 MG tablet TAKE ONE-HALF TO ONE TABLET BY MOUTH THREE TIMES DAILY AS NEEDED FOR ANXIETY     BusPIRone HCl 30 MG TABS Take 1 tablet by mouth 2 times daily     mirtazapine (REMERON) 15 MG tablet Take 1 tablet (15 mg) by mouth At Bedtime     sulfaSALAzine ER (AZULFIDINE EN) 500 MG EC tablet Take 2 tablets (1,000 mg) by mouth 2 times daily     predniSONE (DELTASONE) 5 MG tablet Take 1 tablet (5 mg) by mouth daily     Abatacept 125 MG/ML SOAJ Inject 125 mg Subcutaneous every 7 days     baclofen (LIORESAL) 10 MG tablet Take 1 tablet (10 mg) by mouth 2 times daily as needed for muscle spasms     tamsulosin (FLOMAX) 0.4 MG capsule Take 1 capsule (0.4 mg) by mouth daily     metoprolol (TOPROL-XL) 25 MG 24 hr tablet Take 1 tablet (25 mg) by mouth daily      zolpidem (AMBIEN) 5 MG tablet TAKE ONE TABLET BY MOUTH AT BEDTIME AS NEEDED FOR SLEEP     allopurinol (ZYLOPRIM) 300 MG tablet TAKE ONE TABLET BY MOUTH ONCE DAILY     evolocumab (REPATHA) 140 MG/ML prefilled autoinjector Inject 1 mL (140 mg) Subcutaneous every 14 days     ezetimibe (ZETIA) 10 MG tablet Take 1 tablet (10 mg) by mouth daily     clopidogrel (PLAVIX) 75 MG tablet Take 1 tablet (75 mg) by mouth daily     gemfibrozil (LOPID) 600 MG tablet Take 1 tablet (600 mg) by mouth 2 times daily     nitroglycerin (NITROSTAT) 0.4 MG SL tablet Place 1 tablet (0.4 mg) under the tongue every 5 minutes as needed     aspirin EC 81 MG EC tablet Take 1 tablet (81 mg) by mouth daily (*)     Cholecalciferol (VITAMIN D) 2000 UNITS tablet TAKE ONE TABLET BY MOUTH ONCE DAILY     melatonin 3 MG tablet Take 1 tablet (3 mg) by mouth nightly as needed for sleep     order for DME Equipment being ordered: test strips as covered by insurance. USE TO TEST BLOOD SUGARS TWICE DAILY OR AS DIRECTED.     order for DME OneTouch glucometer.     order for DME OneTouch lancets testing twice daily.     order for DME Equipment being ordered: single end cane     ORDER FOR DME 1.  CPAP pressure 11 cm/H20 with heated humidity.   2.  Provide mask to fit and CPAP supplies.   3.  Length of need lifetime.   4.  If needed please provide a chin strap          No current facility-administered medications for this visit.      Facility-Administered Medications Ordered in Other Visits   Medication     gadobutrol (GADAVIST) injection 10 mL       Social History   See HPI    Family History     Family History   Problem Relation Age of Onset     C.A.D. Father      Coronary Artery Disease Father      Hypertension Father      Depression Father      Hypertension Mother      DIABETES Mother      Depression Mother      MENTAL ILLNESS Mother      Hypertension Maternal Grandfather      CANCER Paternal Grandfather      Other Cancer Other      Autoimmune Disease No  "family hx of      CEREBROVASCULAR DISEASE No family hx of      Thyroid Disease No family hx of      Glaucoma No family hx of      Macular Degeneration No family hx of      No family history of autoimmune disease  No family history of psoriasis     No change in family history since the previous clinic visit.     Physical Exam     Temp Readings from Last 3 Encounters:   07/18/17 97  F (36.1  C) (Oral)   05/04/17 96.6  F (35.9  C) (Oral)   03/14/17 96.3  F (35.7  C) (Oral)     BP Readings from Last 5 Encounters:   07/18/17 118/78   06/27/17 111/76   06/21/17 113/74   06/08/17 129/88   05/23/17 132/87     Pulse Readings from Last 1 Encounters:   07/18/17 58     Resp Readings from Last 1 Encounters:   10/13/16 16     Estimated body mass index is 31.61 kg/(m^2) as calculated from the following:    Height as of this encounter: 1.575 m (5' 2\").    Weight as of this encounter: 78.4 kg (172 lb 12.8 oz).    GEN: NAD, obese; using a cane to walk  HEENT: MMM. No oral lesions. Anicteric, noninjected sclera  CV: S1, S2. RRR. No m/r/g.  PULM: CTA bilaterally. No w/c.  MSK: Tenderness to palpation of the bilateral second-fourth MCPs and second-fourth PIPs, but the joint spaces are well defined, no synovial swelling. Wrists without swelling or tenderness to palpation. Right elbow mildly tender to palpation on the medial aspect. No swelling or increased warmth of the right elbow. Left elbow without swelling or tenderness to palpation. Shoulders tender to palpation in the most lateral aspect but not with range of motion; no swelling or increased warmth. Hips nontender to direct palpation. Knees, ankles, and feet without swelling or tenderness to palpation. Negative MTP squeeze bilaterally.   NEURO: UE and LE strengths 5/5 and equal bilaterally.   SKIN: No rash. No tophi  EXT: No LE edema  PSYCH: Alert. Appropriate.    Labs   RF/CCP  Recent Labs   Lab Test  11/04/15   1547  08/12/14   1414   CCPABY  27*   --    RHF   --   <20 "     CBC  Recent Labs   Lab Test  07/13/17   1246  04/18/17   1539  01/17/17   1531  10/25/16   0851   WBC  7.9  7.7  6.6  11.8*   RBC  4.87  4.98  5.11  5.03   HGB  14.9  15.1  15.6  15.2   HCT  44.8  44.2  45.6  45.1   MCV  92  89  89  90   RDW  14.3  14.2  13.5  15.6*   PLT  221  245  231  267   MCH  30.6  30.3  30.5  30.2   MCHC  33.3  34.2  34.2  33.7   NEUTROPHIL  48.7  60.3  33.0  48.8   LYMPH  37.7  27.7  54.0  38.0   MONOCYTE  11.9  10.6  11.0  11.7   EOSINOPHIL  1.4  1.0  2.0  1.0   BASOPHIL  0.3  0.4   --   0.5   ANEU  3.9  4.6  2.2  5.8   ALYM  3.0  2.1  3.6  4.5   PRACHI  0.9  0.8  0.7  1.4*   AEOS  0.1  0.1  0.1  0.1   ABAS  0.0  0.0   --   0.1     CMP  Recent Labs   Lab Test  07/13/17   1246  04/18/17   1539  01/17/17   1531   10/25/16   0851  10/05/16   0954  08/12/16   1451   04/27/16   1448   NA   --    --    --    --   143  144   --    --   140   POTASSIUM   --    --    --    --   3.6  3.6   --    --   3.8   CHLORIDE   --    --    --    --   110*  111*   --    --   108   CO2   --    --    --    --   25  27   --    --   24   ANIONGAP   --    --    --    --   8  6   --    --   8   GLC   --    --    --    --   78  88  146*   --   116*   BUN   --    --    --    --   17  17   --    --   13   CR  0.86  0.84  1.01   --   1.01  1.00   --    < >  1.11   GFRESTIMATED  >90  Non  GFR Calc    >90  Non  GFR Calc    78   --   78  79   --    < >  70   GFRESTBLACK  >90   GFR Calc    >90   GFR Calc    >90   GFR Calc     --   >90   GFR Calc    >90   GFR Calc     --    < >  85   HEMANT   --    --    --    --   8.8  8.9   --    --   8.9   BILITOTAL  0.5  0.4  0.6   < >  0.3  0.4   --    < >   --    ALBUMIN  3.8  4.3  3.9   < >  3.8  3.5   --    < >   --    PROTTOTAL  7.4  7.9  7.0   < >  7.3  7.4   --    < >   --    ALKPHOS  98  115  71   < >  82  82   --    < >   --    AST  23  21  27   < >  30  46*   --    < >    --    ALT  48  44  41   < >  86*  199*   --    < >   --     < > = values in this interval not displayed.     Calcium/VitaminD  Recent Labs   Lab Test  07/13/17   1246  10/25/16   0851  10/05/16   0954  04/27/16   1448   11/04/15   1547   04/01/13   1624   HEMANT   --   8.8  8.9  8.9   < >   --    < >  9.5   VITDT  34   --    --    --    --   43   --   34    < > = values in this interval not displayed.     ESR/CRP  Recent Labs   Lab Test  07/13/17   1246  04/18/17   1539  01/17/17   1531   SED  10  13  9   CRP  <2.9  <2.9  <2.9     Lipid Panel  Recent Labs   Lab Test  06/09/17   0751  04/22/17   0919  10/25/16   0851   06/29/15   1405  05/22/14   0848  03/24/14   0936   CHOL  118  125  244*   < >  219*  169  195   TRIG  102  121  100   < >  372*  379*  301*   HDL  63  69  90   < >  33*  33*  39*   LDL  35  32  134*   < >  112  60  95   VLDL   --    --    --    --   74*  76*  60*   CHOLHDLRATIO   --    --    --    --   6.6*  5.1*  5.0   NHDL  55  56  154*   < >   --    --    --     < > = values in this interval not displayed.     Hepatitis B  Recent Labs   Lab Test  12/01/16   1429  10/05/16   0954  01/29/16   1541   04/23/13   0812  01/30/13   0906   AUSAB   --   0.45   --    --    --    --    HBCAB   --    --    --    --   Positive*   --    HEPBANG   --   Reactive*   --    --   Positive*  Positive*   HBQLOG  <1.3  5.1*  Not Calculated   < >   --    --     < > = values in this interval not displayed.     Hepatitis C  Recent Labs   Lab Test  04/07/16   1538  04/23/13   0812   HCVAB  Nonreactive   Assay performance characteristics have not been established for newborns,   infants, and children    Negative     Tuberculosis Screening  Recent Labs   Lab Test  04/07/16   1538   TBRSLT  Positive   The magnitude of the measured IFN-gamma level cannot be correlated to stage or   degree of infection, level of immune responsiveness, or likelihood for   progression to active disease.  *   TBAGN  >10.00  This is a qualitative test.   "The TB antigen IU/mL value is required for   documentation on certain government reporting forms but this value should not   be used to monitor disease progression or response to therapy.   Diagnosing or excluding tuberculosis disease, and assessing the probability of   LTBI, require a combination of epidemiological, historical, medical and   diagnostic findings that should be taken into account when interpreting   QuantiFERON TB results.       HIV Screening  Recent Labs   Lab Test  11/04/15   1547   HIAGAB  Nonreactive   HIV-1 p24 Ag & HIV-1/HIV-2 Ab Not Detected        \"HAND BILATERAL THREE OR MORE VIEWS 11/4/2015 4:55 PM   HISTORY: Pain in unspecified joint.  COMPARISON: None.  IMPRESSION  IMPRESSION: Normal.  DANIS ANGLIN MD\"    \"FOOT BILATERAL THREE OR MORE VIEWS 11/4/2015 4:55 PM   HISTORY: Pain in unspecified joint.  COMPARISON: 8/21/2012.  IMPRESSION  IMPRESSION: Small traction spurs are seen off the Achilles tendon and  plantar fascial attachment sites bilaterally. Joint spaces are  preserved. Osseous structures are intact. Incidental note is made of  some surgical staples in the soft tissues of the medial distal right  lower extremity.  DANIS ANGLIN MD\"      Immunization History     Immunization History   Administered Date(s) Administered     Influenza (IIV3) 10/11/2011     Influenza Vaccine IM 3yrs+ 4 Valent IIV4 09/27/2015, 09/08/2016     Influenza Vaccine, 3 YRS +, IM (QUADRIVALENT W/PRESERVATIVES) 11/17/2014     Pneumococcal (PCV 13) 04/07/2016     Pneumococcal 23 valent 12/12/2014     TDAP Vaccine (Adacel) 08/30/2011          Chart documentation done in part with Dragon Voice recognition Software. Although reviewed after completion, some word and grammatical error may remain.    Sebastián Jenkins MD  "

## 2017-07-18 NOTE — NURSING NOTE
"Chief Complaint   Patient presents with     Arthritis     RA, patient states he has aches and pain all over. Also very tired.       Initial /78 (BP Location: Right arm, Patient Position: Chair, Cuff Size: Adult Regular)  Pulse 58  Temp 97  F (36.1  C) (Oral)  Ht 1.575 m (5' 2\")  Wt 78.4 kg (172 lb 12.8 oz)  SpO2 97%  BMI 31.61 kg/m2 Estimated body mass index is 31.61 kg/(m^2) as calculated from the following:    Height as of this encounter: 1.575 m (5' 2\").    Weight as of this encounter: 78.4 kg (172 lb 12.8 oz).  BP completed using cuff size: regular         RAPID3 (0-30) Cumulative Score            RAPID3 Weighted Score (divide #4 by 3 and that is the weighted score)           "

## 2017-07-18 NOTE — MR AVS SNAPSHOT
After Visit Summary   2017    Lamont Daniels    MRN: [id number]           Patient Information     Date Of Birth          1965        Visit Information        Provider Department      2017 1:00 PM Sebastián Jenkins MD [facility]        Today's Diagnoses     Rheumatoid arthritis of multiple sites with negative rheumatoid factor (H)    -  1    High risk medications (not anticoagulants) long-term use        Fatigue, unspecified type          Care Instructions      Dr. Jenknis s Rheumatology Clinics  Locations Clinic Hours Telephone Number   [facility]  [address] [facility]  [place] [address]     Wednesday: 7:20AM - 4:00PM  Thursday:     7:20AM - 4:00PM     Friday:          7:20AM - 11:00AM       To schedule an appointment with  Dr. Jenkins,  please contact  Specialty [phone]       [facility]  [address] [place] [address] [address]  [place] [address]       Monday:       7:20AM - 4:00PM      [address] [place] [address]       Tuesday:      7:20AM - 4:00PM          Thank you!    Helga Guerrier CMA              Follow-ups after your visit        Your next 10 appointments already scheduled     Sep 20, 2017  3:30 PM CDT   Return Visit with Dorothea Loo MD   [facility] ([facility])    [address] [place] [id number] [phone]            Oct 13, 2017  3:00 PM CDT   LAB with BK LAB   [facility] ([facility])    [address] [facility] [id number] [phone]           Patient must bring picture ID.  Patient should be prepared to give a urine specimen  Please do not eat 10-12 hours before your appointment if you are coming in fasting for labs on lipids, cholesterol, or glucose (sugar).  Pregnant women should follow their Care Team instructions. Water with medications is okay. Do not drink coffee or other  fluids.   If you have concerns about taking  your medications, please ask at office or if scheduling via [employer], send a message by clicking on Secure Messaging, Message Your Care Team.            Oct 17, 2017  3:00 PM CDT   [facility]    [address] [facility] [address]   [phone]            Oct 20, 2017  3:00 PM CDT   [facility] with Fly Travis MD   [facility]    [address] [place] [address]   [phone]            Dec 04, 2017  9:00 AM CST   US ABDOMEN COMPLETE with MGUS1, [facility])    [address] [place] [id number] [phone]           Please bring a list of your medicines (including vitamins, minerals and over-the-counter drugs). Also, tell your doctor about any allergies you may have. Wear comfortable clothes and leave your valuables at home.  Adults: No eating or drinking for 8 hours before the exam. You may take medicine with a small sip of water.  Children: - Children 6+ years: No food or drink for 6 hours before exam. - Children 1-5 years: No food or drink for 4 hours before exam. - Infants, breast-fed: may have breast milk up to 2 hours before exam. - Infants, formula: may have bottle until 4 hours before exam.  Please call the Imaging Department at your exam site with any questions.            Dec 04, 2017  9:40 AM CST   LAB with LAB FIRST FLOOR [facility] ([facility])    [address] [place] [address] [phone]           Patient must bring picture ID.  Patient should be prepared to give a urine specimen  Please do not eat 10-12 hours before your appointment if you are coming in fasting for labs on lipids, cholesterol, or glucose (sugar).  Pregnant women should  follow their Care Team instructions. Water with medications is okay. Do not drink coffee or other fluids.   If you have concerns about taking  your medications, please ask at office or if scheduling via [employer], send a message by clicking on Secure Messaging, Message Your Care Team.            Dec 14, 2017  4:00 PM CST   Return Visit with Drea Laboy MD   [facility] ([facility])    [address] [place] [address] [phone]              Future tests that were ordered for you today     Open Future Orders        Priority Expected Expires Ordered    CBC with platelets differential Routine 10/12/2017 10/31/2017 7/18/2017    Creatinine Routine 10/12/2017 10/31/2017 7/18/2017    CRP inflammation Routine 10/12/2017 10/31/2017 7/18/2017    Erythrocyte sedimentation rate auto Routine 10/12/2017 10/31/2017 7/18/2017    Hepatic panel Routine 10/12/2017 10/31/2017 7/18/2017            Who to contact     If you have questions or need follow up information about today's clinic visit or your schedule please contact [facility] directly at [phone].  Normal or non-critical lab and imaging results will be communicated to you by [employer], letter or phone within 4 business days after the clinic has received the results. If you do not hear from us within 7 days, please contact the clinic through [employer] or phone. If you have a critical or abnormal lab result, we will notify you by phone as soon as possible.  Submit refill requests through [employer] or call your pharmacy and they will forward the refill request to us. Please allow 3 business days for your refill to be completed.          Additional Information About Your Visit        [employer] Information     [employer] gives you secure access to your electronic health record. If you see a primary care provider, you can also send messages to your care team and make appointments. If you have questions, please call  "your primary care clinic.  If you do not have a primary care provider, please call [phone] and they will assist you.        Care EveryWhere ID     This is your Care EveryWhere ID. This could be used by other organizations to access your [facility] medical records  [id number]        Your Vitals Were     Pulse Temperature Height Pulse Oximetry BMI (Body Mass Index)       58 97  F (36.1  C) (Oral) 1.575 m (5' 2\") 97% 31.61 kg/m2        Blood Pressure from Last 3 Encounters:   07/18/17 118/78   06/27/17 111/76   06/21/17 113/74    Weight from Last 3 Encounters:   07/18/17 78.4 kg (172 lb 12.8 oz)   06/27/17 77.9 kg (171 lb 12.8 oz)   06/21/17 77.6 kg (171 lb)              We Performed the Following     Lyme Disease Cherelle with reflex to WB Serum          Where to get your medicines      These medications were sent to [place] [address] [place] [facility] - [address] [address]     Phone:  [phone]     predniSONE 5 MG tablet    sulfaSALAzine  MG EC tablet         These medications were sent to [facility] - [address]  [address] [facility] [address]    Hours:  Mon-Fri 8:30am-5:00pm Toll Free [phone] Phone:  [phone]     Abatacept 125 MG/ML So          Primary Care Provider Office Phone # Fax #    David Chavez [phone]       [facility] [address]  [facility] [address]        Equal Access to Services     CLAUDETTE HERRMANN : Maddy Disla, chastity quinn, yesi kelleyalmakingston hanson, marcelino olivera. So [facility] [phone].    ATENCIÓN: Si habla español, tiene a muse disposición servicios gratuitos de asistencia lingüística. Llame al [phone].    We comply with applicable federal civil rights laws and [place] laws. We do not discriminate on the basis of race, color, national origin, age, disability sex, sexual orientation " or gender identity.            Thank you!     Thank you for choosing [facility]  for your care. Our goal is always to provide you with excellent care. Hearing back from our patients is one way we can continue to improve our services. Please take a few minutes to complete the written survey that you may receive in the mail after your visit with us. Thank you!             Your Updated Medication List - Protect others around you: Learn how to safely use, store and throw away your medicines at www.disposemymeds.org.          This list is accurate as of: 7/18/17  1:25 PM.  Always use your most recent med list.                   Brand Name Dispense Instructions for use Diagnosis    Abatacept 125 MG/ML Soaj     4 Syringe    Inject 125 mg Subcutaneous every 7 days    Rheumatoid arthritis of multiple sites with negative rheumatoid factor (H)       allopurinol 300 MG tablet    ZYLOPRIM    90 tablet    TAKE ONE TABLET BY MOUTH ONCE DAILY    Idiopathic gout, unspecified chronicity, unspecified site       aspirin 81 MG EC tablet     30 tablet    Take 1 tablet (81 mg) by mouth daily (*)    Coronary artery disease involving native coronary artery of native heart without angina pectoris       atorvastatin 80 MG tablet    LIPITOR    90 tablet    TAKE 1 TABLET BY MOUTH 1 TIME DAILY FOR CHOLESTEROL    Atherosclerosis of native coronary artery of native heart, angina presence unspecified, Hyperlipidemia LDL goal <100       baclofen 10 MG tablet    LIORESAL    180 tablet    Take 1 tablet (10 mg) by mouth 2 times daily as needed for muscle spasms    Spasm of back muscles       BusPIRone HCl 30 MG Tabs     180 tablet    Take 1 tablet by mouth 2 times daily    Major depressive disorder, recurrent episode, moderate (H)       clonazePAM 1 MG tablet    klonoPIN    90 tablet    TAKE ONE-HALF TO ONE TABLET BY MOUTH THREE TIMES DAILY AS NEEDED FOR ANXIETY    Anxiety hyperventilation       clopidogrel 75 MG tablet    PLAVIX     90 tablet    Take 1 tablet (75 mg) by mouth daily    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), Atherosclerosis of native coronary artery of native heart with angina pectoris (H)       evolocumab 140 MG/ML prefilled autoinjector    REPATHA    2 mL    Inject 1 mL (140 mg) Subcutaneous every 14 days    Hyperlipidemia LDL goal <70, S/P CABG (coronary artery bypass graft)       ezetimibe 10 MG tablet    ZETIA    90 tablet    Take 1 tablet (10 mg) by mouth daily    Coronary artery disease involving native coronary artery of native heart without angina pectoris       gabapentin 300 MG capsule    NEURONTIN    540 capsule    Take 2 capsules (600 mg) by mouth 3 times daily    Lumbar radiculopathy       gemfibrozil 600 MG tablet    LOPID    180 tablet    Take 1 tablet (600 mg) by mouth 2 times daily    Hyperlipidemia LDL goal <70       meclizine 25 MG tablet    ANTIVERT    30 tablet    TAKE ONE TABLET BY MOUTH EVERY 6 HOURS AS NEEDED FOR  DIZZINESS    Vertigo       melatonin 3 MG tablet      Take 1 tablet (3 mg) by mouth nightly as needed for sleep    Delayed sleep phase syndrome       meloxicam 7.5 MG tablet    MOBIC    30 tablet    TAKE ONE TABLET BY MOUTH ONCE DAILY WITH  BREAKFAST    Low back pain, unspecified back pain laterality, unspecified chronicity, with sciatica presence unspecified       metoprolol 25 MG 24 hr tablet    TOPROL-XL    90 tablet    Take 1 tablet (25 mg) by mouth daily    Atherosclerosis of native coronary artery of native heart without angina pectoris       mirtazapine 15 MG tablet    REMERON    30 tablet    Take 1 tablet (15 mg) by mouth At Bedtime    Severe episode of recurrent major depressive disorder, without psychotic features (H)       nitroGLYcerin 0.4 MG sublingual tablet    NITROSTAT    30 tablet    Place 1 tablet (0.4 mg) under the tongue every 5 minutes as needed    Atherosclerosis of native coronary artery of native heart with angina  pectoris (H)       order for DME      1.  CPAP pressure 11 cm/H20 with heated humidity.  2.  Provide mask to fit and CPAP supplies.  3.  Length of need lifetime.  4.  If needed please provide a chin strap    JEROD (obstructive sleep apnea), Anxiety, CAD (coronary artery disease), HTN (hypertension)       * order for DME     1 Units    Equipment being ordered: single end cane    Lumbar radiculopathy, Facet arthropathy, Chronic low back pain       * order for DME     200 each    Equipment being ordered: test strips as covered by insurance. USE TO TEST BLOOD SUGARS TWICE DAILY OR AS DIRECTED.    Rheumatoid arthritis involving multiple sites, unspecified rheumatoid factor presence (H)       * order for DME     1 each    OneTouch glucometer.    Rheumatoid arthritis involving multiple sites, unspecified rheumatoid factor presence (H)       * order for DME     200 each    OneTouch lancets testing twice daily.    Rheumatoid arthritis involving multiple sites, unspecified rheumatoid factor presence (H)       oxyCODONE 5 MG IR tablet    ROXICODONE    180 tablet    Take 1-2 tablets (5-10 mg) by mouth every 4 hours as needed for moderate to severe pain . Max of 6 tabs per day.  30 day supply. May filll to start on/after 6/21    Facet arthropathy       pantoprazole 40 MG EC tablet    PROTONIX    90 tablet    TAKE ONE TABLET BY MOUTH ONCE DAILY FOR  STOMACH.    Gastroesophageal reflux disease without esophagitis       predniSONE 5 MG tablet    DELTASONE    30 tablet    Take 1 tablet (5 mg) by mouth daily    Rheumatoid arthritis of multiple sites with negative rheumatoid factor (H), High risk medications (not anticoagulants) long-term use       sulfaSALAzine  MG EC tablet    AZULFIDINE EN    120 tablet    Take 2 tablets (1,000 mg) by mouth 2 times daily    Rheumatoid arthritis of multiple sites with negative rheumatoid factor (H), High risk medications (not anticoagulants) long-term use       tamsulosin 0.4 MG capsule     FLOMAX    90 capsule    Take 1 capsule (0.4 mg) by mouth daily    Benign prostatic hyperplasia with lower urinary tract symptoms, unspecified morphology       tenofovir 300 MG tablet    VIREAD    90 tablet    Take 1 tablet (300 mg) by mouth daily    Chronic viral hepatitis B without delta agent and without coma (H)       vitamin D 2000 UNITS tablet     100 tablet    TAKE ONE TABLET BY MOUTH ONCE DAILY    Vitamin D deficiency       zolpidem 5 MG tablet    AMBIEN    30 tablet    TAKE ONE TABLET BY MOUTH AT BEDTIME AS NEEDED FOR SLEEP    Insomnia, unspecified       * Notice:  This list has 4 medication(s) that are the same as other medications prescribed for you. Read the directions carefully, and ask your doctor or other care provider to review them with you.

## 2017-07-18 NOTE — PATIENT INSTRUCTIONS
Dr. Jenkins s Rheumatology Clinics  Locations Clinic Hours Telephone Number   [facility] [place]  [address] [facility] [address]  [place] [address]     Wednesday: 7:20AM - 4:00PM  Thursday:     7:20AM - 4:00PM     Friday:          7:20AM - 11:00AM       To schedule an appointment with  Dr. Jenkins,  please contact  Specialty [phone]       [facility] [place]  [address] [facility] [address]  [place] [address]       Monday:       7:20AM - 4:00PM      [facility] [place]  [address]  [place] [address]       Tuesday:      7:20AM - 4:00PM          Thank you!    Helga Guerrier CMA

## 2017-07-19 LAB — B BURGDOR IGG+IGM SER QL: 0.19 (ref 0–0.89)

## 2017-07-25 ENCOUNTER — MYC REFILL (OUTPATIENT)
Dept: PALLIATIVE MEDICINE | Facility: CLINIC | Age: 52
End: 2017-07-25

## 2017-07-25 DIAGNOSIS — M47.819 FACET ARTHROPATHY: ICD-10-CM

## 2017-07-25 RX ORDER — OXYCODONE HYDROCHLORIDE 5 MG/1
5-10 TABLET ORAL EVERY 4 HOURS PRN
Qty: 180 TABLET | Refills: 0 | Status: CANCELLED | OUTPATIENT
Start: 2017-07-25

## 2017-07-25 NOTE — PROGRESS NOTES
"[employer] message sent:  \"Mr. Daniels,    Lyme antibody screen was negative.    Sincerely,  Sebastián Jenkins MD  7/25/2017 4:46 PM\""

## 2017-07-26 NOTE — TELEPHONE ENCOUNTER
Routed to the nursing pool and to the MA pool to process refill(s).    Maureen Nielsen, RN-BSN  [facility]

## 2017-07-26 NOTE — TELEPHONE ENCOUNTER
Message from [employer]:  Original authorizing provider: Dorothea Loo MD    Lamont IGNACIOLilian Daniels would like a refill of the following medications:  oxyCODONE (ROXICODONE) 5 MG IR tablet [Dorothea Loo MD]    Preferred pharmacy: [address]    Comment:  could you please refill.

## 2017-08-01 ENCOUNTER — OFFICE VISIT (OUTPATIENT)
Dept: FAMILY MEDICINE | Facility: CLINIC | Age: 52
End: 2017-08-01
Payer: COMMERCIAL

## 2017-08-01 VITALS
SYSTOLIC BLOOD PRESSURE: 122 MMHG | OXYGEN SATURATION: 100 % | WEIGHT: 168.3 LBS | HEART RATE: 73 BPM | TEMPERATURE: 97.7 F | BODY MASS INDEX: 30.78 KG/M2 | DIASTOLIC BLOOD PRESSURE: 83 MMHG

## 2017-08-01 DIAGNOSIS — R42 DIZZINESS: Primary | ICD-10-CM

## 2017-08-01 PROCEDURE — 99214 OFFICE O/P EST MOD 30 MIN: CPT | Performed by: FAMILY MEDICINE

## 2017-08-01 NOTE — MR AVS SNAPSHOT
After Visit Summary   8/1/2017    Lamont Daniels    MRN: [id number]           Patient Information     Date Of Birth          1965        Visit Information        Provider Department      8/1/2017 3:20 PM David Chavez MD [facility]        Today's Diagnoses     Dizziness    -  1       Follow-ups after your visit        Your next 10 appointments already scheduled     Sep 20, 2017  3:30 PM CDT   Return Visit with Dorothea Loo MD   [facility] ([facility])    [address] [facility] [id number] [phone]            Oct 13, 2017  3:00 PM CDT   LAB with BK LAB   [facility] ([facility])    [address] [facility] [id number] [phone]           Patient must bring picture ID. Patient should be prepared to give a urine specimen  Please do not eat 10-12 hours before your appointment if you are coming in fasting for labs on lipids, cholesterol, or glucose (sugar). Pregnant women should follow their Care Team instructions. Water with medications is okay. Do not drink coffee or other fluids. If you have concerns about taking  your medications, please ask at office or if scheduling via DataTor[employer]th[employer], send a message by clicking on Secure Messaging, Message Your Care Team.            Oct 17, 2017  3:00 PM CDT   Return Visit with Sebastián Jenkins MD   [facility] ([facility])    [address] [facility] [address] [phone]            Oct 20, 2017  3:00 PM CDT   Return Visit with Fly Travis MD   [facility] ([facility])    [address] [facility] [address] [phone]            Dec 04, 2017  9:00 AM CST   US ABDOMEN COMPLETE with MGUS1 MG US TECH   [facility] ([facility])    [address] [facility] [address]    [phone]           Please bring a list of your medicines (including vitamins, minerals and over-the-counter drugs). Also, tell your doctor about any allergies you may have. Wear comfortable clothes and leave your valuables at home.  Adults: No eating or drinking for 8 hours before the exam. You may take medicine with a small sip of water.  Children: - Children 6+ years: No food or drink for 6 hours before exam. - Children 1-5 years: No food or drink for 4 hours before exam. - Infants, breast-fed: may have breast milk up to 2 hours before exam. - Infants, formula: may have bottle until 4 hours before exam.  Please call the Imaging Department at your exam site with any questions.            Dec 04, 2017  9:40 AM CST   LAB with LAB FIRST FLOOR [facility] ([facility])    [address] [place] [address] [phone]           Patient must bring picture ID. Patient should be prepared to give a urine specimen  Please do not eat 10-12 hours before your appointment if you are coming in fasting for labs on lipids, cholesterol, or glucose (sugar). Pregnant women should follow their Care Team instructions. Water with medications is okay. Do not drink coffee or other fluids. If you have concerns about taking  your medications, please ask at office or if scheduling via beSUCCESS, send a message by clicking on Secure Messaging, Message Your Care Team.            Dec 14, 2017  4:00 PM CST   Return Visit with Drea Laboy MD   [facility] ([facility])    [address] [place] [address] [phone]              Who to contact     If you have questions or need follow up information about today's clinic visit or your schedule please contact [facility] directly at [phone].  Normal or non-critical lab and imaging results will be communicated to you by MyChart, letter or phone within 4 business  days after the clinic has received the results. If you do not hear from us within 7 days, please contact the clinic through [email or web address] or phone. If you have a critical or abnormal lab result, we will notify you by phone as soon as possible.  Submit refill requests through [email or web address] or call your pharmacy and they will forward the refill request to us. Please allow 3 business days for your refill to be completed.          Additional Information About Your Visit        [email or web address] Information     [email or web address] gives you secure access to your electronic health record. If you see a primary care provider, you can also send messages to your care team and make appointments. If you have questions, please call your primary care clinic.  If you do not have a primary care provider, please call [phone] and they will assist you.        Care EveryWhere ID     This is your Care EveryWhere ID. This could be used by other organizations to access your [facility] medical records  [id number]        Your Vitals Were     Pulse Temperature Pulse Oximetry BMI (Body Mass Index)          73 97.7  F (36.5  C) (Oral) 100% 30.78 kg/m2         Blood Pressure from Last 3 Encounters:   08/01/17 122/83   07/18/17 118/78   06/27/17 111/76    Weight from Last 3 Encounters:   08/01/17 168 lb 4.8 oz (76.3 kg)   07/18/17 172 lb 12.8 oz (78.4 kg)   06/27/17 171 lb 12.8 oz (77.9 kg)              Today, you had the following     No orders found for display         Today's Medication Changes          These changes are accurate as of: 8/1/17  3:46 PM.  If you have any questions, ask your nurse or doctor.               Stop taking these medicines if you haven't already. Please contact your care team if you have questions.     gabapentin 300 MG capsule   Commonly known as:  NEURONTIN   Stopped by:  David Chavez MD                    Primary Care Provider Office Phone # Fax #    David Chavez [phone]       [facility] [address]  [place]  [facility] [address]        Equal Access to Services     [facility]: Hadii esther ku hadallieo Solinnea, waaxda luqadaha, qaybta kaalmada valentin, marcelino olivera. So [facility] [phone].    ATENCIÓN: Si habla español, tiene a muse disposición servicios gratuitos de asistencia lingüística. Cy al [phone].    We comply with applicable federal civil rights laws and [place] laws. We do not discriminate on the basis of race, color, national origin, age, disability sex, sexual orientation or gender identity.            Thank you!     Thank you for choosing [facility]  for your care. Our goal is always to provide you with excellent care. Hearing back from our patients is one way we can continue to improve our services. Please take a few minutes to complete the written survey that you may receive in the mail after your visit with us. Thank you!             Your Updated Medication List - Protect others around you: Learn how to safely use, store and throw away your medicines at www.disposemymeds.org.          This list is accurate as of: 8/1/17  3:46 PM.  Always use your most recent med list.                   Brand Name Dispense Instructions for use Diagnosis    Abatacept 125 MG/ML Soaj     4 Syringe    Inject 125 mg Subcutaneous every 7 days    Rheumatoid arthritis of multiple sites with negative rheumatoid factor (H)       allopurinol 300 MG tablet    ZYLOPRIM    90 tablet    TAKE ONE TABLET BY MOUTH ONCE DAILY    Idiopathic gout, unspecified chronicity, unspecified site       aspirin 81 MG EC tablet     30 tablet    Take 1 tablet (81 mg) by mouth daily (*)    Coronary artery disease involving native coronary artery of native heart without angina pectoris       atorvastatin 80 MG tablet    LIPITOR    90 tablet    TAKE 1 TABLET BY MOUTH 1 TIME DAILY FOR CHOLESTEROL    Atherosclerosis of native coronary artery of native heart, angina presence unspecified, Hyperlipidemia LDL goal  <100       baclofen 10 MG tablet    LIORESAL    180 tablet    Take 1 tablet (10 mg) by mouth 2 times daily as needed for muscle spasms    Spasm of back muscles       BusPIRone HCl 30 MG Tabs     180 tablet    Take 1 tablet by mouth 2 times daily    Major depressive disorder, recurrent episode, moderate (H)       clonazePAM 1 MG tablet    klonoPIN    90 tablet    TAKE ONE-HALF TO ONE TABLET BY MOUTH THREE TIMES DAILY AS NEEDED FOR ANXIETY    Anxiety hyperventilation       clopidogrel 75 MG tablet    PLAVIX    90 tablet    Take 1 tablet (75 mg) by mouth daily    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), Atherosclerosis of native coronary artery of native heart with angina pectoris (H)       evolocumab 140 MG/ML prefilled autoinjector    REPATHA    2 mL    Inject 1 mL (140 mg) Subcutaneous every 14 days    Hyperlipidemia LDL goal <70, S/P CABG (coronary artery bypass graft)       ezetimibe 10 MG tablet    ZETIA    90 tablet    Take 1 tablet (10 mg) by mouth daily    Coronary artery disease involving native coronary artery of native heart without angina pectoris       gemfibrozil 600 MG tablet    LOPID    180 tablet    Take 1 tablet (600 mg) by mouth 2 times daily    Hyperlipidemia LDL goal <70       meclizine 25 MG tablet    ANTIVERT    30 tablet    TAKE ONE TABLET BY MOUTH EVERY 6 HOURS AS NEEDED FOR  DIZZINESS    Vertigo       melatonin 3 MG tablet      Take 1 tablet (3 mg) by mouth nightly as needed for sleep    Delayed sleep phase syndrome       meloxicam 7.5 MG tablet    MOBIC    30 tablet    TAKE ONE TABLET BY MOUTH ONCE DAILY WITH  BREAKFAST    Low back pain, unspecified back pain laterality, unspecified chronicity, with sciatica presence unspecified       metoprolol 25 MG 24 hr tablet    TOPROL-XL    90 tablet    Take 1 tablet (25 mg) by mouth daily    Atherosclerosis of native coronary artery of native heart without angina pectoris       mirtazapine 15 MG tablet     REMERON    30 tablet    Take 1 tablet (15 mg) by mouth At Bedtime    Severe episode of recurrent major depressive disorder, without psychotic features (H)       nitroGLYcerin 0.4 MG sublingual tablet    NITROSTAT    30 tablet    Place 1 tablet (0.4 mg) under the tongue every 5 minutes as needed    Atherosclerosis of native coronary artery of native heart with angina pectoris (H)       order for DME      1.  CPAP pressure 11 cm/H20 with heated humidity.  2.  Provide mask to fit and CPAP supplies.  3.  Length of need lifetime.  4.  If needed please provide a chin strap    JEROD (obstructive sleep apnea), Anxiety, CAD (coronary artery disease), HTN (hypertension)       * order for DME     1 Units    Equipment being ordered: single end cane    Lumbar radiculopathy, Facet arthropathy, Chronic low back pain       * order for DME     200 each    Equipment being ordered: test strips as covered by insurance. USE TO TEST BLOOD SUGARS TWICE DAILY OR AS DIRECTED.    Rheumatoid arthritis involving multiple sites, unspecified rheumatoid factor presence (H)       * order for DME     1 each    OneTouch glucometer.    Rheumatoid arthritis involving multiple sites, unspecified rheumatoid factor presence (H)       * order for DME     200 each    OneTouch lancets testing twice daily.    Rheumatoid arthritis involving multiple sites, unspecified rheumatoid factor presence (H)       oxyCODONE 5 MG IR tablet    ROXICODONE    180 tablet    Take 1-2 tablets (5-10 mg) by mouth every 4 hours as needed for moderate to severe pain . Max of 6 tabs per day.  30 day supply. May filll to start on/after 6/21    Facet arthropathy       pantoprazole 40 MG EC tablet    PROTONIX    90 tablet    TAKE ONE TABLET BY MOUTH ONCE DAILY FOR  STOMACH.    Gastroesophageal reflux disease without esophagitis       predniSONE 5 MG tablet    DELTASONE    30 tablet    Take 1 tablet (5 mg) by mouth daily    Rheumatoid arthritis of multiple sites with negative  rheumatoid factor (H), High risk medications (not anticoagulants) long-term use       sulfaSALAzine  MG EC tablet    AZULFIDINE EN    120 tablet    Take 2 tablets (1,000 mg) by mouth 2 times daily    Rheumatoid arthritis of multiple sites with negative rheumatoid factor (H), High risk medications (not anticoagulants) long-term use       tamsulosin 0.4 MG capsule    FLOMAX    90 capsule    Take 1 capsule (0.4 mg) by mouth daily    Benign prostatic hyperplasia with lower urinary tract symptoms, unspecified morphology       tenofovir 300 MG tablet    VIREAD    90 tablet    Take 1 tablet (300 mg) by mouth daily    Chronic viral hepatitis B without delta agent and without coma (H)       vitamin D 2000 UNITS tablet     100 tablet    TAKE ONE TABLET BY MOUTH ONCE DAILY    Vitamin D deficiency       zolpidem 5 MG tablet    AMBIEN    30 tablet    TAKE ONE TABLET BY MOUTH AT BEDTIME AS NEEDED FOR SLEEP    Insomnia, unspecified       * Notice:  This list has 4 medication(s) that are the same as other medications prescribed for you. Read the directions carefully, and ask your doctor or other care provider to review them with you.

## 2017-08-01 NOTE — PROGRESS NOTES
SUBJECTIVE:                                                    Lamont Daniels is a 51 year old male who presents to clinic today for the following health issues:      Dizziness      Duration: 2-3 months    Description   Feeling faint:  YES  Feeling like the surroundings are moving: no   Loss of consciousness or falls: unsure    Intensity:  moderate    Accompanying signs and symptoms:   Nausea/vomitting: no   Palpitations: no   Weakness in arms or legs: YES- legs  Vision or speech changes: YES- vision  Ringing in ears (Tinnitus): no   Hearing loss related to dizziness: no   Other (fevers/chills/sweating/dyspnea): no     History (similar episodes/head trauma/previous evaluation/recent bleeding): feels similar to when stents were placed    Precipitating or alleviating factors (new meds/chemicals): None  Worse with activity/head movement: Only with walking, driving    Therapies tried and outcome: meclizine in the morning helps      Problem list and histories reviewed & adjusted, as indicated.  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     Hyperlipidemia LDL goal <70     Insomnia     Gout     Suicidal ideation     Essential hypertension     Rheumatoid arthritis involving multiple sites, unspecified rheumatoid factor presence (H)     High risk medications (not anticoagulants) long-term use     Midline low back pain without sciatica     Bilateral low back pain with sciatica, sciatica laterality unspecified     Elevated liver enzymes     On corticosteroid therapy     Essential hypertension with goal blood pressure less than 140/90     Insomnia, unspecified  type     Rheumatoid arthritis of multiple sites with negative rheumatoid factor (H)     Benign prostatic hyperplasia with lower urinary tract symptoms, unspecified morphology     Past Surgical History:   Procedure Laterality Date     ABDOMEN SURGERY  2014     BIOPSY  2015     BYPASS GRAFT ARTERY CORONARY  2008    6 vessels     COLONOSCOPY  2/8/2013    Procedure: COLONOSCOPY;  Colonoscopy, blood in stool;  Surgeon: Duane, William Charles, MD;  Location: [facility] OR     GI SURGERY  2014     HC CORONARY STENT PERCUT, EA ADDTL VESSEL  2008    3 months after CABG     HEAD & NECK SURGERY  2011     NASAL/SINUS POLYPECTOMY  2010     ORTHOPEDIC SURGERY  2012     THORACIC SURGERY  1989    tb     TONSILLECTOMY  2010     UVULOPALATOPHARYNGOPLASTY  2010     VASCULAR SURGERY  2008       Social History   Substance Use Topics     Smoking status: Never Smoker     Smokeless tobacco: Never Used     Alcohol use No     Family History   Problem Relation Age of Onset     C.A.D. Father      Coronary Artery Disease Father      Hypertension Father      Depression Father      Hypertension Mother      DIABETES Mother      Depression Mother      MENTAL ILLNESS Mother      Hypertension Maternal Grandfather      CANCER Paternal Grandfather      Other Cancer Other      Autoimmune Disease No family hx of      CEREBROVASCULAR DISEASE No family hx of      Thyroid Disease No family hx of      Glaucoma No family hx of      Macular Degeneration No family hx of              Reviewed and updated as needed this visit by clinical staffTobacco  Allergies  Med Hx  Surg Hx  Fam Hx  Soc Hx      Reviewed and updated as needed this visit by Provider         ROS:  Constitutional, HEENT, cardiovascular, pulmonary, GI, , musculoskeletal, neuro, skin, endocrine and psych systems are negative, except as otherwise noted.      OBJECTIVE:   /83 (BP Location: Left arm, Patient Position: Sitting, Cuff Size: Adult Regular)  Pulse 73  Temp 97.7  F (36.5  C) (Oral)   Wt 168 lb 4.8 oz (76.3 kg)  SpO2 100%  BMI 30.78 kg/m2  Body mass index is 30.78 kg/(m^2).  GENERAL: healthy, alert and no distress  NECK: no adenopathy, no asymmetry, masses, or scars and thyroid normal to palpation  RESP: lungs clear to auscultation - no rales, rhonchi or wheezes  CV: regular rate and rhythm, normal S1 S2, no S3 or S4, no murmur, click or rub, no peripheral edema and peripheral pulses strong  ABDOMEN: soft, nontender, no hepatosplenomegaly, no masses and bowel sounds normal  MS: no gross musculoskeletal defects noted, no edema    Diagnostic Test Results:  none     ASSESSMENT/PLAN:       1. Dizziness  Ongoing. Wondering if this if secondary to gabapentin. Will try to wean off gabapentin. RTC in 1 month for recheck.      See Patient Instructions    David Chavez MD, MD  [facility]

## 2017-08-01 NOTE — NURSING NOTE
"Chief Complaint   Patient presents with     Dizziness       Initial /83 (BP Location: Left arm, Patient Position: Sitting, Cuff Size: Adult Regular)  Pulse 73  Temp 97.7  F (36.5  C) (Oral)  Wt 168 lb 4.8 oz (76.3 kg)  SpO2 100%  BMI 30.78 kg/m2 Estimated body mass index is 30.78 kg/(m^2) as calculated from the following:    Height as of 7/18/17: 5' 2\" (1.575 m).    Weight as of this encounter: 168 lb 4.8 oz (76.3 kg).  Medication Reconciliation: complete       Silvana Pierce MA  3:36 PM 8/1/2017    "

## 2017-08-10 ENCOUNTER — MYC REFILL (OUTPATIENT)
Dept: PALLIATIVE MEDICINE | Facility: CLINIC | Age: 52
End: 2017-08-10

## 2017-08-10 DIAGNOSIS — M47.819 FACET ARTHROPATHY: ICD-10-CM

## 2017-08-11 RX ORDER — OXYCODONE HYDROCHLORIDE 5 MG/1
5-10 TABLET ORAL EVERY 4 HOURS PRN
Qty: 180 TABLET | Refills: 0 | Status: SHIPPED | OUTPATIENT
Start: 2017-08-11 | End: 2017-08-22

## 2017-08-11 NOTE — TELEPHONE ENCOUNTER
Mailed Rx to the [facility] in [place]. Pt is informed VM    Sascha Isaac MA  [facility]

## 2017-08-11 NOTE — TELEPHONE ENCOUNTER
Medication refill information reviewed.     Due date for oxyCODONE (ROXICODONE) 5 MG IR tablet is anytime     Prescriptions prepped for review.     Will route to provider.

## 2017-08-11 NOTE — TELEPHONE ENCOUNTER
Received call from patient requesting refill(s) of oxyCODONE (ROXICODONE) 5 MG IR tablet    Last picked up from pharmacy on 6/25/17    Pt last seen by prescribing provider on 6/21/17  Next appt scheduled for 9/20/17    Last urine drug screen date 1/5/17  Current opioid agreement on file (completed within the last year) Yes Date of opioid agreement: 1/5/17    Mail to [facility] [address] [facility] [place] - [address] [place] [address]  [place] [address]  Phone: [phone] Fax: [phone]    Sascha Isaac MA  [facility]    Will route to nursing pool for review and preparation of prescription(s).

## 2017-08-11 NOTE — TELEPHONE ENCOUNTER
Received call from patient requesting refill(s) of oxyCODONE (ROXICODONE) 5 MG IR tablet    Last picked up from pharmacy on 6/25/17    Pt last seen by prescribing provider on 6/21/17  Next appt scheduled for 9/20/17    Last urine drug screen date 1/5/17  Current opioid agreement on file (completed within the last year) Yes Date of opioid agreement: 1/5/17    Mail to [facility] [address] [facility] - [place] - [address] [place] [address]  [place] [address]  Phone: [phone] Fax: [phone]    Sascha Isaac MA  [facility]    Will route to nursing pool for review and preparation of prescription(s).

## 2017-08-11 NOTE — TELEPHONE ENCOUNTER
Signed Prescriptions:                        Disp   Refills    oxyCODONE (ROXICODONE) 5 MG IR tablet      180 ta*0        Sig: Take 1-2 tablets (5-10 mg) by mouth every 4 hours as           needed for moderate to severe pain . Max of 6           tabs per day.  30 day supply. May filll to start           on/after 8/11/17  Authorizing Provider: KAYLA CHUN MD  [facility]

## 2017-08-11 NOTE — TELEPHONE ENCOUNTER
Message from VulevÃƒÂºhart:  Original authorizing provider: Dorothea Loo MD    Lamont CROW Daniels would like a refill of the following medications:  oxyCODONE (ROXICODONE) 5 MG IR tablet [Dorothea Loo MD]    Preferred pharmacy: [address]    Comment:  this is a second request for a refill. thanks

## 2017-08-15 DIAGNOSIS — I25.10 CORONARY ARTERY DISEASE INVOLVING NATIVE CORONARY ARTERY OF NATIVE HEART WITHOUT ANGINA PECTORIS: ICD-10-CM

## 2017-08-16 RX ORDER — EZETIMIBE 10 MG/1
10 TABLET ORAL DAILY
Qty: 90 TABLET | Refills: 3 | Status: SHIPPED | OUTPATIENT
Start: 2017-08-16 | End: 2017-09-17

## 2017-08-16 NOTE — TELEPHONE ENCOUNTER
Faxed refill request.   Medication requested: ezetimibe  Pharmacy Requested: [facility] [place]   Pt's last office visit: 4/14/17  Next scheduled office visit: 10/20/17  Component      Latest Ref Rng & Units 6/9/2017   Cholesterol      <200 mg/dL 118   Triglycerides      <150 mg/dL 102   HDL Cholesterol      >39 mg/dL 63   LDL Cholesterol Calculated      <100 mg/dL 35   Non HDL Cholesterol      <130 mg/dL 55     Refill authorized per protocol  Sarah Arce RN  Cardiology Care Coordinator  [facility]  Phone: [phone]

## 2017-08-17 ENCOUNTER — OFFICE VISIT (OUTPATIENT)
Dept: FAMILY MEDICINE | Facility: CLINIC | Age: 52
End: 2017-08-17
Payer: COMMERCIAL

## 2017-08-17 VITALS
BODY MASS INDEX: 30.73 KG/M2 | TEMPERATURE: 97.4 F | SYSTOLIC BLOOD PRESSURE: 120 MMHG | WEIGHT: 168 LBS | OXYGEN SATURATION: 97 % | DIASTOLIC BLOOD PRESSURE: 87 MMHG | HEART RATE: 71 BPM

## 2017-08-17 DIAGNOSIS — M54.16 LUMBAR RADICULOPATHY: ICD-10-CM

## 2017-08-17 DIAGNOSIS — R42 DIZZINESS: ICD-10-CM

## 2017-08-17 DIAGNOSIS — R09.81 NASAL CONGESTION: Primary | ICD-10-CM

## 2017-08-17 PROCEDURE — 99214 OFFICE O/P EST MOD 30 MIN: CPT | Performed by: FAMILY MEDICINE

## 2017-08-17 RX ORDER — GABAPENTIN 300 MG/1
600 CAPSULE ORAL 3 TIMES DAILY
Qty: 540 CAPSULE | Refills: 3 | COMMUNITY
Start: 2017-07-05 | End: 2018-10-18

## 2017-08-17 RX ORDER — FLUTICASONE PROPIONATE 50 MCG
2 SPRAY, SUSPENSION (ML) NASAL DAILY
Qty: 1 BOTTLE | Refills: 11 | Status: SHIPPED | OUTPATIENT
Start: 2017-08-17 | End: 2019-07-02

## 2017-08-17 NOTE — PROGRESS NOTES
SUBJECTIVE:   Lamont Daniels is a 51 year old male who presents to clinic today for the following health issues:      Dizziness  Onset: 2 to 3 months - pt saw Dr. Chavez on 8/1/2017     Description:   Do you feel faint:  No  Does it feel like the surroundings (bed, room) are moving: no   Unsteady/off balance: YES  Have you passed out or fallen: no     Intensity: severe, 8/10    Progression of Symptoms:  worsening and constant    Accompanying Signs & Symptoms:  Heart palpitations: no   Nausea, vomiting: no   Weakness in legs: YES this is chronic known condition  Fatigue: YES  Vision or speech changes: YES - slurred speech  Ringing in ears (Tinnitus): no   Hearing Loss: no     History:   Head trauma/concussion hx: no   Previous similar symptoms: no had previous dizziness but that was improved with meclizine and this is not.  Recent bleeding history: no     Precipitating factors:   Worse with activity or head movement: YES - with walking  Any new medications (BP?): no   Alcohol/drug abuse/withdrawal: no     Alleviating factors:   Does staying in a fixed position give relief:  no     Therapies Tried and outcome: na    Has cardiologist and recent testing was normal per patient.  History of chronic nasal congestion with history of pharyngeal and nasal surgery with continued poor results.    Radicular bilateral leg pain worse since stopping gabapentin.    Problem list and histories reviewed & adjusted, as indicated.  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     Hyperlipidemia LDL goal <70     Insomnia     Gout     Suicidal ideation     Essential hypertension      Rheumatoid arthritis involving multiple sites, unspecified rheumatoid factor presence (H)     High risk medications (not anticoagulants) long-term use     Midline low back pain without sciatica     Bilateral low back pain with sciatica, sciatica laterality unspecified     Elevated liver enzymes     On corticosteroid therapy     Essential hypertension with goal blood pressure less than 140/90     Insomnia, unspecified type     Rheumatoid arthritis of multiple sites with negative rheumatoid factor (H)     Benign prostatic hyperplasia with lower urinary tract symptoms, unspecified morphology     Past Surgical History:   Procedure Laterality Date     ABDOMEN SURGERY  2014     BIOPSY  2015     BYPASS GRAFT ARTERY CORONARY  2008    6 vessels     COLONOSCOPY  2/8/2013    Procedure: COLONOSCOPY;  Colonoscopy, blood in stool;  Surgeon: Duane, William Charles, MD;  Location: [facility] OR     GI SURGERY  2014     HC CORONARY STENT PERCUT, EA ADDTL VESSEL  2008    3 months after CABG     HEAD & NECK SURGERY  2011     NASAL/SINUS POLYPECTOMY  2010     ORTHOPEDIC SURGERY  2012     THORACIC SURGERY  1989    tb     TONSILLECTOMY  2010     UVULOPALATOPHARYNGOPLASTY  2010     VASCULAR SURGERY  2008       Social History   Substance Use Topics     Smoking status: Never Smoker     Smokeless tobacco: Never Used     Alcohol use No     Family History   Problem Relation Age of Onset     C.A.D. Father      Coronary Artery Disease Father      Hypertension Father      Depression Father      Hypertension Mother      DIABETES Mother      Depression Mother      MENTAL ILLNESS Mother      Hypertension Maternal Grandfather      CANCER Paternal Grandfather      Other Cancer Paternal Grandfather      Other Cancer Other      Autoimmune Disease No family hx of      CEREBROVASCULAR DISEASE No family hx of      Thyroid Disease No family hx of      Glaucoma No family hx of      Macular Degeneration No family hx of              Reviewed and updated as needed  this visit by clinical staff     Reviewed and updated as needed this visit by Provider         ROS:  Constitutional, HEENT, cardiovascular, pulmonary, GI, , musculoskeletal, neuro, skin, endocrine and psych systems are negative, except as otherwise noted.      OBJECTIVE:   /87 (BP Location: Right arm, Patient Position: Chair, Cuff Size: Adult Regular)  Pulse 71  Temp 97.4  F (36.3  C) (Oral)  Wt 168 lb (76.2 kg)  SpO2 97%  BMI 30.73 kg/m2  Body mass index is 30.73 kg/(m^2).  GENERAL: healthy, alert and no distress  EYES: Eyes grossly normal to inspection, PERRL and conjunctivae and sclerae normal  HENT: normal cephalic/atraumatic, ear canals and TM's normal, nasal mucosa edematous , rhinorrhea clear, oropharynx clear and oral mucous membranes moist  NECK: no adenopathy, no asymmetry, masses, or scars and thyroid normal to palpation  RESP: lungs clear to auscultation - no rales, rhonchi or wheezes  CV: regular rate and rhythm, normal S1 S2, no S3 or S4, no murmur, click or rub, no peripheral edema and peripheral pulses strong  ABDOMEN: soft, nontender, no hepatosplenomegaly, no masses and bowel sounds normal  MS: ambulating with cane and limp  NEURO: no new focal deficits  PSYCH: mentation appears normal and affect normal/bright      ASSESSMENT/PLAN:     1. Nasal congestion  Suspect this is contributing to dizziness - trial of flonase and call if not improved in 3 - 5 days for next steps and would recommend ENT visit.  - OTOLARYNGOLOGY REFERRAL  - fluticasone (FLONASE) 50 MCG/ACT spray; Spray 2 sprays into both nostrils daily  Dispense: 1 Bottle; Refill: 11    2. Dizziness  As above  - OTOLARYNGOLOGY REFERRAL  - fluticasone (FLONASE) 50 MCG/ACT spray; Spray 2 sprays into both nostrils daily  Dispense: 1 Bottle; Refill: 11    3. Lumbar radiculopathy  Restart gabapentin.  - gabapentin (NEURONTIN) 300 MG capsule; Take 2 capsules (600 mg) by mouth 3 times daily  Dispense: 540 capsule; Refill: 3    The uses  and side effects, including black box warnings as appropriate, were discussed in detail.  All patient questions were answered.  The patient was instructed to call immediately if any side effects developed.     Zainab Hernandez MD  [facility]

## 2017-08-17 NOTE — MR AVS SNAPSHOT
After Visit Summary   8/17/2017    Lamont Daniels    MRN: [id number]           Patient Information     Date Of Birth          1965        Visit Information        Provider Department      8/17/2017 2:00 PM Zainab Bernard MD [facility]        Today's Diagnoses     Nasal congestion    -  1    Dizziness        Lumbar radiculopathy          Care Instructions    Based on your medical history and these are the current health maintenance or preventive care services that you are due for (some may have been done at this visit)  There are no preventive care reminders to display for this patient.      At [facility], we strive to deliver an exceptional experience to you, every time we see you.    If you receive a survey in the mail, please send us back your thoughts. We really do value your feedback.    Your care team's suggested websites for health information:  Www.[employer] [place].[employer] : Up to date and easily searchable information on multiple topics.  Www.medlineplus.gov : medication info, interactive tutorials, watch real surgeries online  Www.familydoctor.org : good info from the Academy of Family Physicians  Www.cdc.gov : public health info, travel advisories, epidemics (H1N1)  Www.aap.org : children's health info, normal development, vaccinations  Www.health.[facility].[place].us : [place] dept of health, public health issues in [place], N1N1    How to contact your care team:   Angel Velez/Raymundo [phone]         Pharmacy [phone]    Dr. Coreas, Niki Lucas PA-C, Dr. Ochoa, Danitza LEMUS CNP, Roma Coronado PA-C, Dr. Loya, and MARIAH Rand CNP    Team RNs: Joselyn & Janelle      Clinic hours  M-Th 7 am-7 pm   Fri 7 am-5 pm.   Urgent care M-F 11 am-9 pm,   Sat/Sun 9 am-5 pm.  Pharmacy M-Th 8 am-8 pm Fri 8 am-6 pm  Sat/Sun 9 am-5 pm.     All password changes, disabled accounts, or ID changes in IDInteracthart/MyHealth will be done by our Access  Services Department.    If you need help with your account or password, call: [phone]. Clinic staff no longer has the ability to change passwords.               Follow-ups after your visit        Additional Services     OTOLARYNGOLOGY REFERRAL       Your provider has referred you to: FM[facility]: [facility] [phone]   [email or web address]  [facility] [phone]   [email or web address]    Please be aware that coverage of these services is subject to the terms and limitations of your health insurance plan.  Call member services at your health plan with any benefit or coverage questions.      Please bring the following with you to your appointment:    (1) Any X-Rays, CTs or MRIs which have been performed.  Contact the facility where they were done to arrange for  prior to your scheduled appointment.   (2) List of current medications  (3) This referral request   (4) Any documents/labs given to you for this referral                  Your next 10 appointments already scheduled     Sep 20, 2017  3:30 PM CDT   Return Visit with Dorothea Loo MD   [facility] ([facility])    [address] [place] [id number] [phone]            Oct 13, 2017  3:00 PM CDT   LAB with BK LAB   [facility] ([facility])    [address] [place] [id number] [phone]           Patient must bring picture ID. Patient should be prepared to give a urine specimen  Please do not eat 10-12 hours before your appointment if you are coming in fasting for labs on lipids, cholesterol, or glucose (sugar). Pregnant women should follow their Care Team instructions. Water with medications is okay. Do not drink coffee or other fluids. If you have concerns about taking  your medications, please ask at office or if  scheduling via [employer], send a message by clicking on Secure Messaging, Message Your Care Team.            Oct 17, 2017  3:00 PM CDT   Return Visit with Sebastián Jenkins MD   [facility] ([facility])    [address] [facility] [address]   [phone]            Oct 20, 2017  3:00 PM CDT   Return Visit with Fly Travis MD   [facility] ([facility])    [id number] [place] [id number] [phone]            Dec 04, 2017  9:00 AM CST   US ABDOMEN COMPLETE with MGUS1, [facility] TECH   [facility])    [address] [place] [id number] [phone]           Please bring a list of your medicines (including vitamins, minerals and over-the-counter drugs). Also, tell your doctor about any allergies you may have. Wear comfortable clothes and leave your valuables at home.  Adults: No eating or drinking for 8 hours before the exam. You may take medicine with a small sip of water.  Children: - Children 6+ years: No food or drink for 6 hours before exam. - Children 1-5 years: No food or drink for 4 hours before exam. - Infants, breast-fed: may have breast milk up to 2 hours before exam. - Infants, formula: may have bottle until 4 hours before exam.  Please call the Imaging Department at your exam site with any questions.            Dec 04, 2017  9:40 AM CST   LAB with LAB FIRST FLOOR [facility] ([facility])    [address] [place] [id number] [phone]           Patient must bring picture ID. Patient should be prepared to give a urine specimen  Please do not eat 10-12 hours before your appointment if you are coming in fasting for labs on lipids, cholesterol, or glucose (sugar). Pregnant women should follow their Care Team instructions. Water with medications is okay. Do not drink coffee or  other fluids. If you have concerns about taking  your medications, please ask at office or if scheduling via [employer], send a message by clicking on Secure Messaging, Message Your Care Team.            Dec 14, 2017  4:00 PM CST   Return Visit with Drea Laboy MD   [facility] ([facility])    [address] [place] [address] [phone]              Who to contact     If you have questions or need follow up information about today's clinic visit or your schedule please contact [facility] directly at [phone].  Normal or non-critical lab and imaging results will be communicated to you by MyChart, letter or phone within 4 business days after the clinic has received the results. If you do not hear from us within 7 days, please contact the clinic through [employer] or phone. If you have a critical or abnormal lab result, we will notify you by phone as soon as possible.  Submit refill requests through [employer] or call your pharmacy and they will forward the refill request to us. Please allow 3 business days for your refill to be completed.          Additional Information About Your Visit        [employer] Information     [employer] gives you secure access to your electronic health record. If you see a primary care provider, you can also send messages to your care team and make appointments. If you have questions, please call your primary care clinic.  If you do not have a primary care provider, please call [phone] and they will assist you.        Care EveryWhere ID     This is your Care EveryWhere ID. This could be used by other organizations to access your [place] medical records  [id number]        Your Vitals Were     Pulse Temperature Pulse Oximetry BMI (Body Mass Index)          71 97.4  F (36.3  C) (Oral) 97% 30.73 kg/m2         Blood Pressure from Last 3 Encounters:   08/17/17 120/87   08/01/17 122/83   07/18/17 118/78    Weight from Last 3  Encounters:   08/17/17 168 lb (76.2 kg)   08/01/17 168 lb 4.8 oz (76.3 kg)   07/18/17 172 lb 12.8 oz (78.4 kg)              We Performed the Following     OTOLARYNGOLOGY REFERRAL          Today's Medication Changes          These changes are accurate as of: 8/17/17  2:17 PM.  If you have any questions, ask your nurse or doctor.               Start taking these medicines.        Dose/Directions    fluticasone 50 MCG/ACT spray   Commonly known as:  FLONASE   Used for:  Nasal congestion, Dizziness   Started by:  Zainab Bernard MD        Dose:  2 spray   Spray 2 sprays into both nostrils daily   Quantity:  1 Bottle   Refills:  11            Where to get your medicines      These medications were sent to [place] [address] [place] [facility] [address] [address]     Phone:  [phone]     fluticasone 50 MCG/ACT spray                Primary Care Provider Office Phone # Fax #    David Chavez [phone]       [address]  [facility] [address]        Equal Access to Services     [facility] AH: Hadii aad ku hadasho Soomaali, waaxda luqadaha, qaybta kaalmada adeegyada, waxay sajiin hayadriennen celestina katz . So [facility] [phone].    ATENCIÓN: Si habla español, tiene a muse disposición servicios gratuitos de asistencia lingüística. Llame al [phone].    We comply with applicable federal civil rights laws and [place] laws. We do not discriminate on the basis of race, color, national origin, age, disability sex, sexual orientation or gender identity.            Thank you!     Thank you for choosing [facility]  for your care. Our goal is always to provide you with excellent care. Hearing back from our patients is one way we can continue to improve our services. Please take a few minutes to complete the written survey that you may receive in the mail after your visit with us. Thank you!             Your Updated Medication  List - Protect others around you: Learn how to safely use, store and throw away your medicines at www.disposemymeds.org.          This list is accurate as of: 8/17/17  2:17 PM.  Always use your most recent med list.                   Brand Name Dispense Instructions for use Diagnosis    Abatacept 125 MG/ML Soaj     4 Syringe    Inject 125 mg Subcutaneous every 7 days    Rheumatoid arthritis of multiple sites with negative rheumatoid factor (H)       allopurinol 300 MG tablet    ZYLOPRIM    90 tablet    TAKE ONE TABLET BY MOUTH ONCE DAILY    Idiopathic gout, unspecified chronicity, unspecified site       aspirin 81 MG EC tablet     30 tablet    Take 1 tablet (81 mg) by mouth daily (*)    Coronary artery disease involving native coronary artery of native heart without angina pectoris       atorvastatin 80 MG tablet    LIPITOR    90 tablet    TAKE 1 TABLET BY MOUTH 1 TIME DAILY FOR CHOLESTEROL    Atherosclerosis of native coronary artery of native heart, angina presence unspecified, Hyperlipidemia LDL goal <100       baclofen 10 MG tablet    LIORESAL    180 tablet    Take 1 tablet (10 mg) by mouth 2 times daily as needed for muscle spasms    Spasm of back muscles       BusPIRone HCl 30 MG Tabs     180 tablet    Take 1 tablet by mouth 2 times daily    Major depressive disorder, recurrent episode, moderate (H)       clonazePAM 1 MG tablet    klonoPIN    90 tablet    TAKE ONE-HALF TO ONE TABLET BY MOUTH THREE TIMES DAILY AS NEEDED FOR ANXIETY    Anxiety hyperventilation       clopidogrel 75 MG tablet    PLAVIX    90 tablet    Take 1 tablet (75 mg) by mouth daily    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), Atherosclerosis of native coronary artery of native heart with angina pectoris (H)       evolocumab 140 MG/ML prefilled autoinjector    REPATHA    2 mL    Inject 1 mL (140 mg) Subcutaneous every 14 days    Hyperlipidemia LDL goal <70, S/P CABG (coronary artery bypass  graft)       ezetimibe 10 MG tablet    ZETIA    90 tablet    Take 1 tablet (10 mg) by mouth daily    Coronary artery disease involving native coronary artery of native heart without angina pectoris       fluticasone 50 MCG/ACT spray    FLONASE    1 Bottle    Spray 2 sprays into both nostrils daily    Nasal congestion, Dizziness       gabapentin 300 MG capsule    NEURONTIN    540 capsule    Take 2 capsules (600 mg) by mouth 3 times daily    Lumbar radiculopathy       gemfibrozil 600 MG tablet    LOPID    180 tablet    Take 1 tablet (600 mg) by mouth 2 times daily    Hyperlipidemia LDL goal <70       meclizine 25 MG tablet    ANTIVERT    30 tablet    TAKE ONE TABLET BY MOUTH EVERY 6 HOURS AS NEEDED FOR  DIZZINESS    Vertigo       melatonin 3 MG tablet      Take 1 tablet (3 mg) by mouth nightly as needed for sleep    Delayed sleep phase syndrome       meloxicam 7.5 MG tablet    MOBIC    30 tablet    TAKE ONE TABLET BY MOUTH ONCE DAILY WITH  BREAKFAST    Low back pain, unspecified back pain laterality, unspecified chronicity, with sciatica presence unspecified       metoprolol 25 MG 24 hr tablet    TOPROL-XL    90 tablet    Take 1 tablet (25 mg) by mouth daily    Atherosclerosis of native coronary artery of native heart without angina pectoris       mirtazapine 15 MG tablet    REMERON    30 tablet    Take 1 tablet (15 mg) by mouth At Bedtime    Severe episode of recurrent major depressive disorder, without psychotic features (H)       nitroGLYcerin 0.4 MG sublingual tablet    NITROSTAT    30 tablet    Place 1 tablet (0.4 mg) under the tongue every 5 minutes as needed    Atherosclerosis of native coronary artery of native heart with angina pectoris (H)       order for DME      1.  CPAP pressure 11 cm/H20 with heated humidity.  2.  Provide mask to fit and CPAP supplies.  3.  Length of need lifetime.  4.  If needed please provide a chin strap    JEROD (obstructive sleep apnea), Anxiety, CAD (coronary artery disease), HTN  (hypertension)       * order for DME     1 Units    Equipment being ordered: single end cane    Lumbar radiculopathy, Facet arthropathy, Chronic low back pain       * order for DME     200 each    Equipment being ordered: test strips as covered by insurance. USE TO TEST BLOOD SUGARS TWICE DAILY OR AS DIRECTED.    Rheumatoid arthritis involving multiple sites, unspecified rheumatoid factor presence (H)       * order for DME     1 each    OneTouch glucometer.    Rheumatoid arthritis involving multiple sites, unspecified rheumatoid factor presence (H)       * order for DME     200 each    OneTouch lancets testing twice daily.    Rheumatoid arthritis involving multiple sites, unspecified rheumatoid factor presence (H)       oxyCODONE 5 MG IR tablet    ROXICODONE    180 tablet    Take 1-2 tablets (5-10 mg) by mouth every 4 hours as needed for moderate to severe pain . Max of 6 tabs per day.  30 day supply. May filll to start on/after 8/11/17    Facet arthropathy       pantoprazole 40 MG EC tablet    PROTONIX    90 tablet    TAKE ONE TABLET BY MOUTH ONCE DAILY FOR  STOMACH.    Gastroesophageal reflux disease without esophagitis       predniSONE 5 MG tablet    DELTASONE    30 tablet    Take 1 tablet (5 mg) by mouth daily    Rheumatoid arthritis of multiple sites with negative rheumatoid factor (H), High risk medications (not anticoagulants) long-term use       sulfaSALAzine  MG EC tablet    AZULFIDINE EN    120 tablet    Take 2 tablets (1,000 mg) by mouth 2 times daily    Rheumatoid arthritis of multiple sites with negative rheumatoid factor (H), High risk medications (not anticoagulants) long-term use       tamsulosin 0.4 MG capsule    FLOMAX    90 capsule    Take 1 capsule (0.4 mg) by mouth daily    Benign prostatic hyperplasia with lower urinary tract symptoms, unspecified morphology       tenofovir 300 MG tablet    VIREAD    90 tablet    Take 1 tablet (300 mg) by mouth daily    Chronic viral hepatitis B without  delta agent and without coma (H)       vitamin D 2000 UNITS tablet     100 tablet    TAKE ONE TABLET BY MOUTH ONCE DAILY    Vitamin D deficiency       zolpidem 5 MG tablet    AMBIEN    30 tablet    TAKE ONE TABLET BY MOUTH AT BEDTIME AS NEEDED FOR SLEEP    Insomnia, unspecified       * Notice:  This list has 4 medication(s) that are the same as other medications prescribed for you. Read the directions carefully, and ask your doctor or other care provider to review them with you.

## 2017-08-17 NOTE — PATIENT INSTRUCTIONS
Based on your medical history and these are the current health maintenance or preventive care services that you are due for (some may have been done at this visit)  There are no preventive care reminders to display for this patient.      At [facility], we strive to deliver an exceptional experience to you, every time we see you.    If you receive a survey in the mail, please send us back your thoughts. We really do value your feedback.    Your care team's suggested websites for health information:  Www.[place].org : Up to date and easily searchable information on multiple topics.  Www.medlineplus.gov : medication info, interactive tutorials, watch real surgeries online  Www.familydoctor.org : good info from the Academy of Family Physicians  Www.cdc.gov : public health info, travel advisories, epidemics (H1N1)  Www.aap.org : children's health info, normal development, vaccinations  Www.health.[facility].[place].us : [place] dept of health, public health issues in [place], N1N1    How to contact your care team:   Team [facility] [phone]         Pharmacy [phone]    Dr. Coreas, Niki Lucas PA-C, Dr. Ochoa, Danitza LEMUS CNP, Roma Coronado PA-C, Dr. Loya, and MARIAH Rand CNP    Team RNs: Joselyn Luis      Clinic hours  M-Th 7 am-7 pm   Fri 7 am-5 pm.   Urgent care M-F 11 am-9 pm,   Sat/Sun 9 am-5 pm.  Pharmacy M-Th 8 am-8 pm Fri 8 am-6 pm  Sat/Sun 9 am-5 pm.     All password changes, disabled accounts, or ID changes in [employer]/MyHealth will be done by our Access Services Department.    If you need help with your account or password, call: [phone]. Clinic staff no longer has the ability to change passwords.

## 2017-08-17 NOTE — NURSING NOTE
"Chief Complaint   Patient presents with     Dizziness       Initial /87 (BP Location: Right arm, Patient Position: Chair, Cuff Size: Adult Regular)  Pulse 71  Temp 97.4  F (36.3  C) (Oral)  Wt 168 lb (76.2 kg)  SpO2 97%  BMI 30.73 kg/m2 Estimated body mass index is 30.73 kg/(m^2) as calculated from the following:    Height as of 7/18/17: 5' 2\" (1.575 m).    Weight as of this encounter: 168 lb (76.2 kg).  Medication Reconciliation: complete   An,CMA (AMAA)      "

## 2017-08-21 ENCOUNTER — MYC MEDICAL ADVICE (OUTPATIENT)
Dept: PALLIATIVE MEDICINE | Facility: CLINIC | Age: 52
End: 2017-08-21

## 2017-08-21 DIAGNOSIS — M47.819 FACET ARTHROPATHY: ICD-10-CM

## 2017-08-22 RX ORDER — OXYCODONE HYDROCHLORIDE 5 MG/1
5-10 TABLET ORAL EVERY 4 HOURS PRN
Qty: 180 TABLET | Refills: 0 | Status: SHIPPED | OUTPATIENT
Start: 2017-08-22 | End: 2017-09-20

## 2017-08-22 NOTE — TELEPHONE ENCOUNTER
My chart message from pt:    Dear Doctor Cinda,   [facility], they told me that they have not received any script from your office.  Someone call me last week and told me that she will mail out the script.  it is frustrating not getting med. it has been almost a month.    Called over to [employer] and they checked the mail again today for me. They stated that there was nothing in the system and nothing in the mail today.     Patient originally called in RX request on 7/25/2017 not sure why this request was not finished correctly. Pt sent second request on 8/10/2017. Rx was mailed out on 8/11/2017. It is now 8/22/2017 and RX still has not been received from pharmacy. Will send this to provider and see if we can  out a new RX to pt's pharmacy today.     Dorinda Casanova RN, [facility]  [facility] Clinic Care Coordinator

## 2017-08-22 NOTE — TELEPHONE ENCOUNTER
Script for oxycodone was signed and kept at  [place]  for  to go [facility] [employer].  Track number [id number]. Close encounter.       Amberly Chawla MA

## 2017-08-22 NOTE — TELEPHONE ENCOUNTER
Signed Prescriptions:                        Disp   Refills    oxyCODONE (ROXICODONE) 5 MG IR tablet      180 ta*0        Sig: Take 1-2 tablets (5-10 mg) by mouth every 4 hours as           needed for moderate to severe pain . Max of 6           tabs per day.  30 day supply. May filll to start           on/after 8/11/17  Authorizing Provider: GRANT MAR    Reviewed and signed at the request of Dr Loo.  Printed and processed by MA as requested.    Grant Garcia MD  [facility]

## 2017-08-22 NOTE — TELEPHONE ENCOUNTER
Will ask Dr. Susanna Garcia to sign in [place] to  more efficiently to Aury Seals as I am in [place].    Script prepped and appropriate.    Please apologize to patient.  Remind him to call regularly if something not working appropriate.    Pending Prescriptions:                       Disp   Refills    oxyCODONE (ROXICODONE) 5 MG IR tablet     180 ta*0            Sig: Take 1-2 tablets (5-10 mg) by mouth every 4 hours           as needed for moderate to severe pain . Max of 6           tabs per day.  30 day supply. May filll to start           on/after 8/11/17      Lian Loo MD  [facility]

## 2017-09-05 ENCOUNTER — OFFICE VISIT (OUTPATIENT)
Dept: FAMILY MEDICINE | Facility: CLINIC | Age: 52
End: 2017-09-05
Payer: COMMERCIAL

## 2017-09-05 VITALS
HEIGHT: 62 IN | WEIGHT: 168 LBS | HEART RATE: 62 BPM | BODY MASS INDEX: 30.91 KG/M2 | SYSTOLIC BLOOD PRESSURE: 112 MMHG | DIASTOLIC BLOOD PRESSURE: 78 MMHG | OXYGEN SATURATION: 99 % | TEMPERATURE: 96.5 F

## 2017-09-05 DIAGNOSIS — M10.9 GOUT WITHOUT TOPHUS: ICD-10-CM

## 2017-09-05 DIAGNOSIS — R42 DIZZINESS: Primary | ICD-10-CM

## 2017-09-05 PROCEDURE — 99213 OFFICE O/P EST LOW 20 MIN: CPT | Performed by: FAMILY MEDICINE

## 2017-09-05 RX ORDER — COLCHICINE 0.6 MG/1
TABLET ORAL
Qty: 30 TABLET | Refills: 3 | Status: SHIPPED | OUTPATIENT
Start: 2017-09-05 | End: 2018-03-27

## 2017-09-05 RX ORDER — COLCHICINE 0.6 MG/1
TABLET ORAL
Qty: 30 TABLET | Refills: 0 | Status: CANCELLED | OUTPATIENT
Start: 2017-09-05

## 2017-09-05 ASSESSMENT — PAIN SCALES - GENERAL: PAINLEVEL: MODERATE PAIN (4)

## 2017-09-05 NOTE — NURSING NOTE
"Chief Complaint   Patient presents with     Dizziness     follow up       Initial /78 (BP Location: Left arm, Patient Position: Chair, Cuff Size: Adult Large)  Pulse 62  Temp 96.5  F (35.8  C) (Oral)  Ht 5' 2\" (1.575 m)  Wt 168 lb (76.2 kg)  SpO2 99%  BMI 30.73 kg/m2 Estimated body mass index is 30.73 kg/(m^2) as calculated from the following:    Height as of this encounter: 5' 2\" (1.575 m).    Weight as of this encounter: 168 lb (76.2 kg).  Medication Reconciliation: complete     Anita Maldonado MA      "

## 2017-09-05 NOTE — PATIENT INSTRUCTIONS
How to contact your care team providers:    Team [facility]Comfort  [phone]  Pharmacy [phone]    NATE Broussard Dr., Dr., PA-C, Dr., PA-C    Team RN: Raghu MAGALLANES    Clinic hours  M-Th 7am-7pm   Fri 7am-5pm.   Urgent care M-F 11am-9pm,   Sat/sun 9am-5pm.  Pharmacy M-F 8:00am-8pm Sat/sun 9am-5pm.     All password changes, disabled accounts, or ID changes in [employer]/MyHealth will be done by our Access Services Department.   If you need help with your account or password, call: [phone]. Clinic staff no longer has the ability to change passwords.

## 2017-09-05 NOTE — MR AVS SNAPSHOT
After Visit Summary   9/5/2017    Lamont Daniels    MRN: [id number]           Patient Information     Date Of Birth          1965        Visit Information        Provider Department      9/5/2017 2:20 PM David Chavez MD [facility]        Today's Diagnoses     Dizziness    -  1    Gout without tophus          Care Instructions    How to contact your care team providers:    [facility]Comfort  [phone]  Pharmacy [phone]    NATE Broussard Dr., Dr., PA-C, Dr., PA-C    Team RN: Raghu GIBBS and Lorraine MAGALLANES    Clinic hours  M-Th 7am-7pm   Fri 7am-5pm.   Urgent care M-F 11am-9pm,   Sat/sun 9am-5pm.  Pharmacy M-F 8:00am-8pm Sat/sun 9am-5pm.     All password changes, disabled accounts, or ID changes in [employer]/MyHealth will be done by our Access Services Department.   If you need help with your account or password, call: [phone]. Clinic staff no longer has the ability to change passwords.                         Follow-ups after your visit        Additional Services     NEUROLOGY ADULT REFERRAL       Your provider has referred you for the following:   Consult at [facility] with Dr. Rice [phone]   [email or web address]    Please be aware that coverage of these services is subject to the terms and limitations of your health insurance plan.  Call member services at your health plan with any benefit or coverage questions.      Please bring the following with you to your appointment:    (1) Any X-Rays, CTs or MRIs which have been performed.  Contact the facility where they were done to arrange for  prior to your scheduled appointment.    (2) List of current medications  (3) This referral request   (4) Any documents/labs given to you for this referral                   Your next 10 appointments already scheduled     Sep 20, 2017  3:30 PM CDT   Return Visit with Dorothea Loo MD   [facility] [place] ([facility])    [address] [facility]  [place] [address] [phone]            Oct 13, 2017  3:00 PM CDT   LAB with BK LAB   [facility])    [address] [facility] [id number] [phone]           Patient must bring picture ID. Patient should be prepared to give a urine specimen  Please do not eat 10-12 hours before your appointment if you are coming in fasting for labs on lipids, cholesterol, or glucose (sugar). Pregnant women should follow their Care Team instructions. Water with medications is okay. Do not drink coffee or other fluids. If you have concerns about taking  your medications, please ask at office or if scheduling via [employer], send a message by clicking on Secure Messaging, Message Your Care Team.            Oct 17, 2017  3:00 PM CDT   Return Visit with Sebastián Jenkins MD   [facility] ([facility])    [address] [facility] [id number] [phone]            Oct 20, 2017  3:00 PM CDT   Return Visit with Fly Travis MD   [facility])    [address] [place] [address]   [phone]            Dec 04, 2017  9:00 AM CST   US ABDOMEN COMPLETE with MGUS1, MG US TECH   [facility]    [address] [place] [address] [phone]           Please bring a list of your medicines (including vitamins, minerals and over-the-counter drugs). Also, tell your doctor about any allergies you may have. Wear comfortable clothes and leave your valuables at home.  Adults: No eating or drinking for 8 hours before the exam. You may take medicine with a small sip of water.   Children: - Children 6+ years: No food or drink for 6 hours before exam. - Children 1-5 years: No food or drink for 4 hours before exam. - Infants, breast-fed: may have breast milk up to 2 hours before exam. - Infants, formula: may have bottle until 4 hours before exam.  Please call the Imaging Department at your exam site with any questions.            Dec 04, 2017  9:40 AM CST   LAB with LAB FIRST FLOOR [facility] ([facility])    [address] [place] [address] [phone]           Patient must bring picture ID. Patient should be prepared to give a urine specimen  Please do not eat 10-12 hours before your appointment if you are coming in fasting for labs on lipids, cholesterol, or glucose (sugar). Pregnant women should follow their Care Team instructions. Water with medications is okay. Do not drink coffee or other fluids. If you have concerns about taking  your medications, please ask at office or if scheduling via [employer], send a message by clicking on Secure Messaging, Message Your Care Team.            Dec 14, 2017  4:00 PM CST   Return Visit with Drea Laboy MD   [facility])    [address] [place] [address] [phone]              Who to contact     If you have questions or need follow up information about today's clinic visit or your schedule please contact [facility] directly at [phone].  Normal or non-critical lab and imaging results will be communicated to you by MyChart, letter or phone within 4 business days after the clinic has received the results. If you do not hear from us within 7 days, please contact the clinic through [employer] or phone. If you have a critical or abnormal lab result, we will notify you by phone as soon as possible.  Submit refill requests through [employer] or call your pharmacy and they will forward the refill request to us.  "Please allow 3 business days for your refill to be completed.          Additional Information About Your Visit        [employer] Information     [employer] gives you secure access to your electronic health record. If you see a primary care provider, you can also send messages to your care team and make appointments. If you have questions, please call your primary care clinic.  If you do not have a primary care provider, please call [phone] and they will assist you.        Care EveryWhere ID     This is your Care EveryWhere ID. This could be used by other organizations to access your [place] medical records  [id number]        Your Vitals Were     Pulse Temperature Height Pulse Oximetry BMI (Body Mass Index)       62 96.5  F (35.8  C) (Oral) 5' 2\" (1.575 m) 99% 30.73 kg/m2        Blood Pressure from Last 3 Encounters:   09/05/17 112/78   08/17/17 120/87   08/01/17 122/83    Weight from Last 3 Encounters:   09/05/17 168 lb (76.2 kg)   08/17/17 168 lb (76.2 kg)   08/01/17 168 lb 4.8 oz (76.3 kg)              We Performed the Following     NEUROLOGY ADULT REFERRAL          Today's Medication Changes          These changes are accurate as of: 9/5/17  2:45 PM.  If you have any questions, ask your nurse or doctor.               Start taking these medicines.        Dose/Directions    colchicine 0.6 MG tablet   Commonly known as:  COLCRYS   Used for:  Gout without tophus   Started by:  David Chavez MD        TAKE TWO TABLETS BY MOUTH AT THE FIRST SIGN OF FLARE, TAKE ONE ADDITIONAL TABLET ONE HOUR LATER.   Quantity:  30 tablet   Refills:  3            Where to get your medicines      These medications were sent to [place] [address] [place] [facility] [address] [place] [address] [facility] [address]     Phone:  [phone]     colchicine 0.6 MG tablet                Primary Care Provider Office Phone # Fax #    David Chavez [phone] [address]  [place] [facility] [address]        Equal " Access to Services     [facility]: Hadii esther novak harjinder Solinnea, waaxda luqadaha, qaybta kaalmakingston hanson, marcelino olivera. So [facility] [phone].    ATENCIÓN: Si habla espjulius, tiene a muse disposición servicios gratuitos de asistencia lingüística. Llame al [phone].    We comply with applicable federal civil rights laws and [place] laws. We do not discriminate on the basis of race, color, national origin, age, disability sex, sexual orientation or gender identity.            Thank you!     Thank you for choosing [facility]  for your care. Our goal is always to provide you with excellent care. Hearing back from our patients is one way we can continue to improve our services. Please take a few minutes to complete the written survey that you may receive in the mail after your visit with us. Thank you!             Your Updated Medication List - Protect others around you: Learn how to safely use, store and throw away your medicines at www.disposemymeds.org.          This list is accurate as of: 9/5/17  2:45 PM.  Always use your most recent med list.                   Brand Name Dispense Instructions for use Diagnosis    Abatacept 125 MG/ML Soaj     4 Syringe    Inject 125 mg Subcutaneous every 7 days    Rheumatoid arthritis of multiple sites with negative rheumatoid factor (H)       allopurinol 300 MG tablet    ZYLOPRIM    90 tablet    TAKE ONE TABLET BY MOUTH ONCE DAILY    Idiopathic gout, unspecified chronicity, unspecified site       aspirin 81 MG EC tablet     30 tablet    Take 1 tablet (81 mg) by mouth daily (*)    Coronary artery disease involving native coronary artery of native heart without angina pectoris       atorvastatin 80 MG tablet    LIPITOR    90 tablet    TAKE 1 TABLET BY MOUTH 1 TIME DAILY FOR CHOLESTEROL    Atherosclerosis of native coronary artery of native heart, angina presence unspecified, Hyperlipidemia LDL goal <100       baclofen 10 MG  tablet    LIORESAL    180 tablet    Take 1 tablet (10 mg) by mouth 2 times daily as needed for muscle spasms    Spasm of back muscles       BusPIRone HCl 30 MG Tabs     180 tablet    Take 1 tablet by mouth 2 times daily    Major depressive disorder, recurrent episode, moderate (H)       clonazePAM 1 MG tablet    klonoPIN    90 tablet    TAKE ONE-HALF TO ONE TABLET BY MOUTH THREE TIMES DAILY AS NEEDED FOR ANXIETY    Anxiety hyperventilation       clopidogrel 75 MG tablet    PLAVIX    90 tablet    Take 1 tablet (75 mg) by mouth daily    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    Gout without tophus       evolocumab 140 MG/ML prefilled autoinjector    REPATHA    2 mL    Inject 1 mL (140 mg) Subcutaneous every 14 days    Hyperlipidemia LDL goal <70, S/P CABG (coronary artery bypass graft)       ezetimibe 10 MG tablet    ZETIA    90 tablet    Take 1 tablet (10 mg) by mouth daily    Coronary artery disease involving native coronary artery of native heart without angina pectoris       fluticasone 50 MCG/ACT spray    FLONASE    1 Bottle    Spray 2 sprays into both nostrils daily    Nasal congestion, Dizziness       gabapentin 300 MG capsule    NEURONTIN    540 capsule    Take 2 capsules (600 mg) by mouth 3 times daily    Lumbar radiculopathy       gemfibrozil 600 MG tablet    LOPID    180 tablet    Take 1 tablet (600 mg) by mouth 2 times daily    Hyperlipidemia LDL goal <70       meclizine 25 MG tablet    ANTIVERT    30 tablet    TAKE ONE TABLET BY MOUTH EVERY 6 HOURS AS NEEDED FOR  DIZZINESS    Vertigo       melatonin 3 MG tablet      Take 1 tablet (3 mg) by mouth nightly as needed for sleep    Delayed sleep phase syndrome       meloxicam  7.5 MG tablet    MOBIC    30 tablet    TAKE ONE TABLET BY MOUTH ONCE DAILY WITH  BREAKFAST    Low back pain, unspecified back pain laterality, unspecified chronicity, with sciatica presence unspecified       metoprolol 25 MG 24 hr tablet    TOPROL-XL    90 tablet    Take 1 tablet (25 mg) by mouth daily    Atherosclerosis of native coronary artery of native heart without angina pectoris       mirtazapine 15 MG tablet    REMERON    30 tablet    Take 1 tablet (15 mg) by mouth At Bedtime    Severe episode of recurrent major depressive disorder, without psychotic features (H)       nitroGLYcerin 0.4 MG sublingual tablet    NITROSTAT    30 tablet    Place 1 tablet (0.4 mg) under the tongue every 5 minutes as needed    Atherosclerosis of native coronary artery of native heart with angina pectoris (H)       order for DME      1.  CPAP pressure 11 cm/H20 with heated humidity.  2.  Provide mask to fit and CPAP supplies.  3.  Length of need lifetime.  4.  If needed please provide a chin strap    JEROD (obstructive sleep apnea), Anxiety, CAD (coronary artery disease), HTN (hypertension)       * order for DME     1 Units    Equipment being ordered: single end cane    Lumbar radiculopathy, Facet arthropathy, Chronic low back pain       * order for DME     200 each    Equipment being ordered: test strips as covered by insurance. USE TO TEST BLOOD SUGARS TWICE DAILY OR AS DIRECTED.    Rheumatoid arthritis involving multiple sites, unspecified rheumatoid factor presence (H)       * order for DME     1 each    OneTouch glucometer.    Rheumatoid arthritis involving multiple sites, unspecified rheumatoid factor presence (H)       * order for DME     200 each    OneTouch lancets testing twice daily.    Rheumatoid arthritis involving multiple sites, unspecified rheumatoid factor presence (H)       oxyCODONE 5 MG IR tablet    ROXICODONE    180 tablet    Take 1-2 tablets (5-10 mg) by mouth every 4 hours as needed for moderate to severe pain  . Max of 6 tabs per day.  30 day supply. May filll to start on/after 8/11/17    Facet arthropathy       pantoprazole 40 MG EC tablet    PROTONIX    90 tablet    TAKE ONE TABLET BY MOUTH ONCE DAILY FOR  STOMACH.    Gastroesophageal reflux disease without esophagitis       predniSONE 5 MG tablet    DELTASONE    30 tablet    Take 1 tablet (5 mg) by mouth daily    Rheumatoid arthritis of multiple sites with negative rheumatoid factor (H), High risk medications (not anticoagulants) long-term use       sulfaSALAzine  MG EC tablet    AZULFIDINE EN    120 tablet    Take 2 tablets (1,000 mg) by mouth 2 times daily    Rheumatoid arthritis of multiple sites with negative rheumatoid factor (H), High risk medications (not anticoagulants) long-term use       tamsulosin 0.4 MG capsule    FLOMAX    90 capsule    Take 1 capsule (0.4 mg) by mouth daily    Benign prostatic hyperplasia with lower urinary tract symptoms, unspecified morphology       tenofovir 300 MG tablet    VIREAD    90 tablet    Take 1 tablet (300 mg) by mouth daily    Chronic viral hepatitis B without delta agent and without coma (H)       vitamin D 2000 UNITS tablet     100 tablet    TAKE ONE TABLET BY MOUTH ONCE DAILY    Vitamin D deficiency       zolpidem 5 MG tablet    AMBIEN    30 tablet    TAKE ONE TABLET BY MOUTH AT BEDTIME AS NEEDED FOR SLEEP    Insomnia, unspecified       * Notice:  This list has 4 medication(s) that are the same as other medications prescribed for you. Read the directions carefully, and ask your doctor or other care provider to review them with you.

## 2017-09-05 NOTE — PROGRESS NOTES
SUBJECTIVE:   Lamont Daniels is a 51 year old male who presents to clinic today for the following health issues:      Concern - Dizziness Follow up  Onset: 3-4 months    Description:   Follow up dizziness, no improvement.    Intensity: moderate    Progression of Symptoms:  same    Accompanying Signs & Symptoms:  Burning thru out body, sweats-  New symptoms    Previous history of similar problem:   no    Precipitating factors:   Worsened by: getting/standing up    Alleviating factors:  Improved by: none.    Therapies Tried and outcome: nasal spray- no relief, weaning off gabapentin- no relief, burning in feet when stopped.      Problem list and histories reviewed & adjusted, as indicated.  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     Hyperlipidemia LDL goal <70     Insomnia     Gout     Suicidal ideation     Essential hypertension     Rheumatoid arthritis involving multiple sites, unspecified rheumatoid factor presence (H)     High risk medications (not anticoagulants) long-term use     Midline low back pain without sciatica     Bilateral low back pain with sciatica, sciatica laterality unspecified     Elevated liver enzymes     On corticosteroid therapy     Essential hypertension with goal blood pressure less than 140/90     Insomnia, unspecified type     Rheumatoid arthritis of multiple sites with negative rheumatoid factor (H)     Benign prostatic hyperplasia with lower urinary tract symptoms, unspecified morphology     Past Surgical History:   Procedure Laterality Date     ABDOMEN SURGERY  2014     BIOPSY  2015     BYPASS GRAFT ARTERY CORONARY   "2008    6 vessels     COLONOSCOPY  2/8/2013    Procedure: COLONOSCOPY;  Colonoscopy, blood in stool;  Surgeon: Duane, William Charles, MD;  Location: [facility] OR     GI SURGERY  2014     HC CORONARY STENT CIERA SOTO VESSEL  2008    3 months after CABG     HEAD & NECK SURGERY  2011     NASAL/SINUS POLYPECTOMY  2010     ORTHOPEDIC SURGERY  2012     THORACIC SURGERY  1989    tb     TONSILLECTOMY  2010     UVULOPALATOPHARYNGOPLASTY  2010     VASCULAR SURGERY  2008       Social History   Substance Use Topics     Smoking status: Never Smoker     Smokeless tobacco: Never Used     Alcohol use No     Family History   Problem Relation Age of Onset     C.A.D. Father      Coronary Artery Disease Father      Hypertension Father      Depression Father      Hypertension Mother      DIABETES Mother      Depression Mother      MENTAL ILLNESS Mother      Hypertension Maternal Grandfather      CANCER Paternal Grandfather      Other Cancer Paternal Grandfather      Other Cancer Other      Autoimmune Disease No family hx of      CEREBROVASCULAR DISEASE No family hx of      Thyroid Disease No family hx of      Glaucoma No family hx of      Macular Degeneration No family hx of              Reviewed and updated as needed this visit by clinical staffTobacco  Allergies  Meds       Reviewed and updated as needed this visit by Provider         ROS:  Constitutional, HEENT, cardiovascular, pulmonary, GI, , musculoskeletal, neuro, skin, endocrine and psych systems are negative, except as otherwise noted.      OBJECTIVE:   /78 (BP Location: Left arm, Patient Position: Chair, Cuff Size: Adult Large)  Pulse 62  Temp 96.5  F (35.8  C) (Oral)  Ht 5' 2\" (1.575 m)  Wt 168 lb (76.2 kg)  SpO2 99%  BMI 30.73 kg/m2  Body mass index is 30.73 kg/(m^2).  GENERAL: healthy, alert and no distress  NECK: no adenopathy, no asymmetry, masses, or scars and thyroid normal to palpation  RESP: lungs clear to auscultation - no rales, rhonchi or " "wheezes  CV: regular rate and rhythm, normal S1 S2, no S3 or S4, no murmur, click or rub, no peripheral edema and peripheral pulses strong  ABDOMEN: soft, nontender, no hepatosplenomegaly, no masses and bowel sounds normal  MS: no gross musculoskeletal defects noted, no edema    Diagnostic Test Results:  none     ASSESSMENT/PLAN:       1. Dizziness  Unclear etiology? Thought it was related to gabapentin. Trial weaning off gabapentin but continues to have \"unbalanced feeling\" when walking. Restart gabapentin. No vertigo symptoms. Referred to Neurology for further evaluation and recommendations.  - NEUROLOGY ADULT REFERRAL    2. Gout without tophus  Stable.  - colchicine (COLCRYS) 0.6 MG tablet; TAKE TWO TABLETS BY MOUTH AT THE FIRST SIGN OF FLARE, TAKE ONE ADDITIONAL TABLET ONE HOUR LATER.  Dispense: 30 tablet; Refill: 3    See Patient Instructions    David Chavez MD, MD  [facility]  "

## 2017-09-06 ENCOUNTER — TELEPHONE (OUTPATIENT)
Dept: RHEUMATOLOGY | Facility: CLINIC | Age: 52
End: 2017-09-06

## 2017-09-06 NOTE — TELEPHONE ENCOUNTER
[facility] Prior Authorization Team   Phone: [phone]  Fax: [phone]    PA Initiation    Medication: Orencia clickjet  Insurance Company: [employer] - Phone [phone] Fax [phone]  [facility] the Rx[facility] - [place], [place] - [address]  Filling Pharmacy Phone: [phone]  Filling Pharmacy Fax: [phone]  Start Date: 9/6/2017

## 2017-09-11 NOTE — TELEPHONE ENCOUNTER
[facility] Prior Authorization Team   Phone: [phone]  Fax: [phone]    Prior Authorization Approval    Authorization Effective Date: 9/7/2017  Authorization Expiration Date: 9/8/2018  Medication: Orencia ikerjet-Approved  Approved Dose/Quantity: 4/28ds  Reference #: [id number]   Insurance Company: [employer] - Phone [phone] Fax [phone]  Expected CoPay:     $0  CoPay Card Available:    n/a  Foundation Assistance Needed:    Which Pharmacy is filling the prescription (Not needed for infusion/clinic administered): [facility] - [place], [place] - [facility] [address]  Pharmacy Notified: Yes  Patient Notified: Yes

## 2017-09-14 ENCOUNTER — MYC REFILL (OUTPATIENT)
Dept: FAMILY MEDICINE | Facility: CLINIC | Age: 52
End: 2017-09-14

## 2017-09-14 DIAGNOSIS — I25.119 ATHEROSCLEROSIS OF NATIVE CORONARY ARTERY OF NATIVE HEART WITH ANGINA PECTORIS (H): ICD-10-CM

## 2017-09-14 NOTE — TELEPHONE ENCOUNTER
nitroglycerin (NITROSTAT) 0.4 MG SL tablet      Last Written Prescription Date: 8/3/16  Last Fill Quantity: 30,  # refills: 1   Last Office Visit with [facility], [facility] or [facility] prescribing provider: 9/5/17                                         Next 5 appointments (look out 90 days)     Sep 15, 2017  2:00 PM CDT   [facility] with Suresh Rice MD   [facility]    [address] [facility] [address]   [phone]            Sep 20, 2017  3:30 PM CDT   Return Visit with Dorothea Loo MD   [facility] ([facility])    [id number] [place] [id number] [phone]            Oct 17, 2017  3:00 PM CDT   Return Visit with Sebastián Jenkins MD   [facility]    [address] [facility] [address]   [phone]            Oct 20, 2017  3:00 PM CDT   Return Visit with Fly Travis MD   [facility])    [address] [address] [place] [id number] [phone]

## 2017-09-14 NOTE — TELEPHONE ENCOUNTER
Message from [employer]:  Original authorizing provider: David Chavez MD, MD Lamont Daniels would like a refill of the following medications:  nitroglycerin (NITROSTAT) 0.4 MG SL tablet [David Chavez MD, MD]    Preferred pharmacy: [facility] [address] [address]    Comment:  please fill

## 2017-09-15 ENCOUNTER — OFFICE VISIT (OUTPATIENT)
Dept: NEUROLOGY | Facility: CLINIC | Age: 52
End: 2017-09-15
Payer: COMMERCIAL

## 2017-09-15 VITALS
HEART RATE: 72 BPM | SYSTOLIC BLOOD PRESSURE: 114 MMHG | WEIGHT: 171 LBS | HEIGHT: 62 IN | BODY MASS INDEX: 31.47 KG/M2 | DIASTOLIC BLOOD PRESSURE: 74 MMHG

## 2017-09-15 DIAGNOSIS — R42 DIZZINESS: Primary | ICD-10-CM

## 2017-09-15 DIAGNOSIS — H53.40 LOSS OF PART OF VISUAL FIELD: ICD-10-CM

## 2017-09-15 PROCEDURE — 99215 OFFICE O/P EST HI 40 MIN: CPT | Performed by: PSYCHIATRY & NEUROLOGY

## 2017-09-15 NOTE — NURSING NOTE
"Chief Complaint   Patient presents with     RECHECK     Dizziness follow up, symptoms has been getting worse for the past two months. Dizziness occurs every day, ground feels like it's moving when walking.       Initial /74 (BP Location: Right arm, Patient Position: Sitting, Cuff Size: Adult Regular)  Pulse 72  Ht 1.575 m (5' 2\")  Wt 77.6 kg (171 lb)  BMI 31.28 kg/m2 Estimated body mass index is 31.28 kg/(m^2) as calculated from the following:    Height as of this encounter: 1.575 m (5' 2\").    Weight as of this encounter: 77.6 kg (171 lb).  Medication Reconciliation: complete   Maki Snyder MA      "

## 2017-09-15 NOTE — NURSING NOTE
Orthostatic Vitals     BP Pulse Position Site Cuff Size Time Date    98/64 60 Supine Right Arm Regular 03:09 PM 9/15/2017    102/66 66 Standing Right Arm Regular 03:10 PM 9/15/2017    Maki Snyder MA

## 2017-09-15 NOTE — PATIENT INSTRUCTIONS
AFTER VISIT SUMMARY (AVS)    [facility]: [phone]    Orders Placed This Encounter   Procedures     MR Brain w/o & w Contrast     Sit for few minutes before standing    Avoid dehydration    Diagnostic possibilities reviewed and reasons for work-up explained    Preventive Neurology: Encouraged to keep physically and mentally active with particular emphasis on daily stretching exercises, walking, and healthy eating.    Additional  recommendations after the work-up    To call us for follow-up appointment after the work-up

## 2017-09-15 NOTE — NURSING NOTE
IMAGING: None  BLOOD TEST RESULTS: Not recommended on previous visit  Referral: ROBERT  Old records: ROBERT  EEG: ROBERT Snyder MA

## 2017-09-15 NOTE — PROGRESS NOTES
ESTABLISHED PATIENT NEUROLOGY NOTE    LOCATION: [facility]  DATE OF VISIT: 9/15/2017  NAME: Mr.Kou IGNACIO Daniels  : 1965 (51 year old)  MR #: [id number]    PRIMARY/REFERRING PROVIDER: David Chavez MD    REASON FOR VISIT: Dizziness    HISTORY OF PRESENT ILLNESS: 51-year-old man with the symptoms of dizziness for last 2 months.  Gradual onset.  He relates that to his primary care provider Dr. Chavez was concerned if gabapentin was giving him dizziness.  He reduced gabapentin and stopped it completely.  There is no improvement in dizziness and then again restarted gabapentin.  Detailed description regarding dizziness is that it feels to him that ground below shifting. He is wobbly while walking.  Denies any spinning sensation.  He does feel lightheaded.  He relates that dizziness is present all the time.  He is fine while sitting on a chair.  Standing tends to bring on lightheadedness and dizziness. Denies feeling faint and he has not fainted.  No associated symptoms of chest pain and palpitation, shortness of breath. History of feet paresthesias related to his long-standing diabetes. No history of ear symptoms.  He does have a history of obstructive sleep apnea and had throat surgery for it.  Seen previously since  for multiple symptoms. Last visit in 2016.     Recent Diagnostic Studies:   Imagin  MRI of the Brain without and with contrast     History: Disturbance of skin sensation     Comparison: None available     Technique: Axial T2-weighted images with fat saturation and axial  T1-weighted images were obtained through the skull base without  intravenous contrast.  Axial diffusion-weighted with ADC map and  sagittal T1-weighted images of the whole brain were also obtained  without intravenous contrast. Following intravenous administration of  gadolinium, axial and coronal T1-weighted images with fat saturation  were obtained with focus on the skull base.     Findings:There  are no foci of restricted diffusion to suggest acute  infarction. Susceptibility weighted images reveal evidence for  hemorrhage. Ventricular system is not dilated out of proportion to the  cortical sulci. No extra-axial fluid collections. No mass or mass  effect. There are scattered nonspecific foci of T2 hyperintensity  throughout the periventricular deep white matter.     Contrast-enhanced images reveal no enhancing intracranial lesions and  no pathologic enhancement of the meninges.     Orbits are grossly unremarkable. Visualized paranasal sinuses and  mastoid air cells are clear.     IMPRESSION  Impression:  There are a few scattered nonspecific T2 hyperintense  foci throughout the deep white matter, potentially representing  chronic small vessel ischemic disease.     I have personally reviewed the examination and initial interpretation  and I agree with the findings.     MARY LOU BOYER MD      Allergies   Allergen Reactions     Citalopram Itching     Tramadol Itching       Current Outpatient Prescriptions on File Prior to Visit:  colchicine (COLCRYS) 0.6 MG tablet TAKE TWO TABLETS BY MOUTH AT THE FIRST SIGN OF FLARE, TAKE ONE ADDITIONAL TABLET ONE HOUR LATER.   oxyCODONE (ROXICODONE) 5 MG IR tablet Take 1-2 tablets (5-10 mg) by mouth every 4 hours as needed for moderate to severe pain . Max of 6 tabs per day.  30 day supply. May filll to start on/after 8/11/17   gabapentin (NEURONTIN) 300 MG capsule Take 2 capsules (600 mg) by mouth 3 times daily   fluticasone (FLONASE) 50 MCG/ACT spray Spray 2 sprays into both nostrils daily   ezetimibe (ZETIA) 10 MG tablet Take 1 tablet (10 mg) by mouth daily   Abatacept 125 MG/ML SOAJ Inject 125 mg Subcutaneous every 7 days   predniSONE (DELTASONE) 5 MG tablet Take 1 tablet (5 mg) by mouth daily   sulfaSALAzine ER (AZULFIDINE EN) 500 MG EC tablet Take 2 tablets (1,000 mg) by mouth 2 times daily   order for DME Equipment being ordered: test strips as covered by insurance.  USE TO TEST BLOOD SUGARS TWICE DAILY OR AS DIRECTED.   tenofovir (VIREAD) 300 MG tablet Take 1 tablet (300 mg) by mouth daily   order for DME OneTouch glucometer.   order for DME OneTouch lancets testing twice daily.   meclizine (ANTIVERT) 25 MG tablet TAKE ONE TABLET BY MOUTH EVERY 6 HOURS AS NEEDED FOR  DIZZINESS   pantoprazole (PROTONIX) 40 MG EC tablet TAKE ONE TABLET BY MOUTH ONCE DAILY FOR  STOMACH.   meloxicam (MOBIC) 7.5 MG tablet TAKE ONE TABLET BY MOUTH ONCE DAILY WITH  BREAKFAST   clonazePAM (KLONOPIN) 1 MG tablet TAKE ONE-HALF TO ONE TABLET BY MOUTH THREE TIMES DAILY AS NEEDED FOR ANXIETY   BusPIRone HCl 30 MG TABS Take 1 tablet by mouth 2 times daily   mirtazapine (REMERON) 15 MG tablet Take 1 tablet (15 mg) by mouth At Bedtime   baclofen (LIORESAL) 10 MG tablet Take 1 tablet (10 mg) by mouth 2 times daily as needed for muscle spasms   tamsulosin (FLOMAX) 0.4 MG capsule Take 1 capsule (0.4 mg) by mouth daily   metoprolol (TOPROL-XL) 25 MG 24 hr tablet Take 1 tablet (25 mg) by mouth daily   zolpidem (AMBIEN) 5 MG tablet TAKE ONE TABLET BY MOUTH AT BEDTIME AS NEEDED FOR SLEEP   allopurinol (ZYLOPRIM) 300 MG tablet TAKE ONE TABLET BY MOUTH ONCE DAILY   evolocumab (REPATHA) 140 MG/ML prefilled autoinjector Inject 1 mL (140 mg) Subcutaneous every 14 days   clopidogrel (PLAVIX) 75 MG tablet Take 1 tablet (75 mg) by mouth daily   gemfibrozil (LOPID) 600 MG tablet Take 1 tablet (600 mg) by mouth 2 times daily   nitroglycerin (NITROSTAT) 0.4 MG SL tablet Place 1 tablet (0.4 mg) under the tongue every 5 minutes as needed   aspirin EC 81 MG EC tablet Take 1 tablet (81 mg) by mouth daily (*)   order for DME Equipment being ordered: single end cane   Cholecalciferol (VITAMIN D) 2000 UNITS tablet TAKE ONE TABLET BY MOUTH ONCE DAILY   melatonin 3 MG tablet Take 1 tablet (3 mg) by mouth nightly as needed for sleep   ORDER FOR DME 1.  CPAP pressure 11 cm/H20 with heated humidity. 2.  Provide mask to fit and CPAP  "supplies. 3.  Length of need lifetime. 4.  If needed please provide a chin strap    atorvastatin (LIPITOR) 80 MG tablet TAKE 1 TABLET BY MOUTH 1 TIME DAILY FOR CHOLESTEROL     Current Facility-Administered Medications on File Prior to Visit:  gadobutrol (GADAVIST) injection 10 mL     Past Medical History:   Diagnosis Date     Anxiety      CAD (coronary artery disease)     sees cardiologist Dr. Yang, has had stents and cabg     Congestive heart failure, unspecified 2008     Depressive disorder 2008     Gout      Hepatitis B > 10 years     HTN (hypertension)      Hyperlipidemia LDL goal < 100      Malrotation of intestine      Moderate major depression (H)      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) 9/19/2011    Uses CPAP     Rheumatoid arthritis flare (H) 1012     Steatohepatitis 12/15    liver biopsy     Tuberculosis age 13    INH x 9 months      Vitamin D deficiency      Past Surgical History:   Procedure Laterality Date     ABDOMEN SURGERY  2014     BIOPSY  2015     BYPASS GRAFT ARTERY CORONARY  2008    6 vessels     COLONOSCOPY  2/8/2013    Procedure: COLONOSCOPY;  Colonoscopy, blood in stool;  Surgeon: Duane, William Charles, MD;  Location: [facility] OR     GI SURGERY  2014     HC CORONARY STENT PERCUT, EA ADDTL VESSEL  2008    3 months after CABG     HEAD & NECK SURGERY  2011     NASAL/SINUS POLYPECTOMY  2010     ORTHOPEDIC SURGERY  2012     THORACIC SURGERY  1989    tb     TONSILLECTOMY  2010     UVULOPALATOPHARYNGOPLASTY  2010     VASCULAR SURGERY  2008     PERSONAL & SOCIAL HISTORY Reviewed and documented in [facility]  GENERAL EXAMINATION: /74 (BP Location: Right arm, Patient Position: Sitting, Cuff Size: Adult Regular)  Pulse 72  Ht 1.575 m (5' 2\")  Wt 77.6 kg (171 lb)  BMI 31.28 kg/m2    Limited Neurological Examination:  He had difficulty with field of vision, especially in the upward direction in all quadrants while field of vision tested with confrontation method  Eye movements normal in all directions of gaze  Pupils " equal and symmetrical both eyes  Fundus normal disc margins  Normal strength in both upper and lower limbs although he does have some give way or poor effort  Reflexes equal and symmetrical with right ankle jerk absent  Plantars downgoing  Walks hesitantly and cautiously  Difficulty walking on heels and toes, no definite weakness of ankle muscles  Unsteady with tandem walking    IMPRESSION:   Encounter Diagnoses   Name Primary?     Dizziness Yes     Loss of part of visual field      COMMENTS: Not certain about the definite cause of his dizziness/lightheadedness at present.  I'm also not certain about his difficulty with seeing objects in the upper half of his field of vision on both sides, in both right and left quadrants.  Poor effort while testing the strength of muscles.  Arranged for him to have MRI of the head to look for any structural abnormalities or any changes compared to previous studies of 2014.  We will monitor his symptoms and neurological findings.  Will consider referral to ophthalmologist if his field of vision defect is persisting.    PLANS:   Patient Instructions     AFTER VISIT SUMMARY (AVS)    [facility]: [phone]    Orders Placed This Encounter   Procedures     MR Brain w/o & w Contrast     Sit for few minutes before standing    Avoid dehydration    Diagnostic possibilities reviewed and reasons for work-up explained    Preventive Neurology: Encouraged to keep physically and mentally active with particular emphasis on daily stretching exercises, walking, and healthy eating.    Additional  recommendations after the work-up    To call us for follow-up appointment after the work-up      Thanks to David Chavez MD  for allowing me to participate in Mr. Daniels's care. Please feel free to call me with any questions or concerns.     Total Time: Time with patient 40 minutes, greater than 50% of which was counseling and coordination of care.    *Chart documentation was completed in part  with Dragon voice-recognition software. Even though reviewed, some grammatical, spelling, and word errors may remain.       Suresh Rice MD, [facility]  Neurologist    Cc: David Chavez MD

## 2017-09-15 NOTE — MR AVS SNAPSHOT
After Visit Summary   9/15/2017    Lamont Daniels    MRN: [id number]           Patient Information     Date Of Birth          1965        Visit Information        Provider Department      9/15/2017 2:00 PM Suresh Rice MD [facility]        Today's Diagnoses     Dizziness    -  1    Loss of part of visual field          Care Instructions    AFTER VISIT SUMMARY (AVS)    [facility] CENTER: [phone]    Orders Placed This Encounter   Procedures     MR Brain w/o & w Contrast     Sit for few minutes before standing    Avoid dehydration    Diagnostic possibilities reviewed and reasons for work-up explained    Preventive Neurology: Encouraged to keep physically and mentally active with particular emphasis on daily stretching exercises, walking, and healthy eating.    Additional  recommendations after the work-up    To call us for follow-up appointment after the work-up                        Follow-ups after your visit        Your next 10 appointments already scheduled     Sep 20, 2017  3:30 PM CDT   Return Visit with Dorothea Loo MD   [facility] ([facility])    [address] [place] [id number] [phone]            Oct 13, 2017  3:00 PM CDT   LAB with BK LAB   [facility] ([facility])    [address] [facility] [id number] [phone]           Patient must bring picture ID. Patient should be prepared to give a urine specimen  Please do not eat 10-12 hours before your appointment if you are coming in fasting for labs on lipids, cholesterol, or glucose (sugar). Pregnant women should follow their Care Team instructions. Water with medications is okay. Do not drink coffee or other fluids. If you have concerns about taking  your medications, please ask at office or if scheduling via LaComunity, send a message by clicking on Secure Messaging, Message  Your Care Team.            Oct 17, 2017  3:00 PM CDT   Return Visit with Sebastián Jenkins MD   [facility] ([facility])    [address] [facility] [id number] [phone]            Oct 20, 2017  3:00 PM CDT   Return Visit with Fly Travis MD   [facility])    [id number] [place] [id number] [phone]            Dec 04, 2017  9:00 AM CST   US ABDOMEN COMPLETE with MGUS1, MG US TECH   [facility] ([facility])    [address] [place] [id number] [phone]           Please bring a list of your medicines (including vitamins, minerals and over-the-counter drugs). Also, tell your doctor about any allergies you may have. Wear comfortable clothes and leave your valuables at home.  Adults: No eating or drinking for 8 hours before the exam. You may take medicine with a small sip of water.  Children: - Children 6+ years: No food or drink for 6 hours before exam. - Children 1-5 years: No food or drink for 4 hours before exam. - Infants, breast-fed: may have breast milk up to 2 hours before exam. - Infants, formula: may have bottle until 4 hours before exam.  Please call the Imaging Department at your exam site with any questions.            Dec 04, 2017  9:40 AM CST   LAB with LAB FIRST FLOOR [facility] ([facility])    [address] [place] [id number] [phone]           Patient must bring picture ID. Patient should be prepared to give a urine specimen  Please do not eat 10-12 hours before your appointment if you are coming in fasting for labs on lipids, cholesterol, or glucose (sugar). Pregnant women should follow their Care Team instructions. Water with medications is okay. Do not drink coffee or other fluids. If you have concerns about taking  your medications, please ask at  "office or if scheduling via Tilera, send a message by clicking on Secure Messaging, Message Your Care Team.            Dec 14, 2017  4:00 PM CST   Return Visit with Drea Laboy MD   [facility] ([facility])    [address] [place] [id number] [phone]              Future tests that were ordered for you today     Open Future Orders        Priority Expected Expires Ordered    MR Brain w/o & w Contrast Routine  9/15/2018 9/15/2017            Who to contact     If you have questions or need follow up information about today's clinic visit or your schedule please contact [facility] directly at [phone].  Normal or non-critical lab and imaging results will be communicated to you by Vesselhart, letter or phone within 4 business days after the clinic has received the results. If you do not hear from us within 7 days, please contact the clinic through Vesselhart or phone. If you have a critical or abnormal lab result, we will notify you by phone as soon as possible.  Submit refill requests through Tilera or call your pharmacy and they will forward the refill request to us. Please allow 3 business days for your refill to be completed.          Additional Information About Your Visit        Tilera Information     Tilera gives you secure access to your electronic health record. If you see a primary care provider, you can also send messages to your care team and make appointments. If you have questions, please call your primary care clinic.  If you do not have a primary care provider, please call [phone] and they will assist you.        Care EveryWhere ID     This is your Care EveryWhere ID. This could be used by other organizations to access your [facility] medical records  [id number]        Your Vitals Were     Pulse Height BMI (Body Mass Index)             72 5' 2\" (1.575 m) 31.28 kg/m2          Blood Pressure from Last 3 Encounters: "   09/15/17 114/74   09/05/17 112/78   08/17/17 120/87    Weight from Last 3 Encounters:   09/15/17 171 lb (77.6 kg)   09/05/17 168 lb (76.2 kg)   08/17/17 168 lb (76.2 kg)               Primary Care Provider Office Phone # Fax #    David Chavez [phone]       [address]  [facility] [address]        Equal Access to Services     [facility] MONTEZ : Hadii aad ku hadasho Soomaali, waaxda luqadaha, qaybta kaalmada adeegyada, waxay idiin hayaan adeeg kharash lamarques . So [facility] [phone].    ATENCIÓN: Si habla español, tiene a muse disposición servicios gratuitos de asistencia lingüística. Llame al [phone].    We comply with applicable federal civil rights laws and [place] laws. We do not discriminate on the basis of race, color, national origin, age, disability sex, sexual orientation or gender identity.            Thank you!     Thank you for choosing [facility]  for your care. Our goal is always to provide you with excellent care. Hearing back from our patients is one way we can continue to improve our services. Please take a few minutes to complete the written survey that you may receive in the mail after your visit with us. Thank you!             Your Updated Medication List - Protect others around you: Learn how to safely use, store and throw away your medicines at www.disposemymeds.org.          This list is accurate as of: 9/15/17  3:14 PM.  Always use your most recent med list.                   Brand Name Dispense Instructions for use Diagnosis    Abatacept 125 MG/ML Soaj     4 Syringe    Inject 125 mg Subcutaneous every 7 days    Rheumatoid arthritis of multiple sites with negative rheumatoid factor (H)       allopurinol 300 MG tablet    ZYLOPRIM    90 tablet    TAKE ONE TABLET BY MOUTH ONCE DAILY    Idiopathic gout, unspecified chronicity, unspecified site       aspirin 81 MG EC tablet     30 tablet    Take 1 tablet (81 mg) by mouth daily (*)    Coronary artery  disease involving native coronary artery of native heart without angina pectoris       atorvastatin 80 MG tablet    LIPITOR    90 tablet    TAKE 1 TABLET BY MOUTH 1 TIME DAILY FOR CHOLESTEROL    Atherosclerosis of native coronary artery of native heart, angina presence unspecified, Hyperlipidemia LDL goal <100       baclofen 10 MG tablet    LIORESAL    180 tablet    Take 1 tablet (10 mg) by mouth 2 times daily as needed for muscle spasms    Spasm of back muscles       BusPIRone HCl 30 MG Tabs     180 tablet    Take 1 tablet by mouth 2 times daily    Major depressive disorder, recurrent episode, moderate (H)       clonazePAM 1 MG tablet    klonoPIN    90 tablet    TAKE ONE-HALF TO ONE TABLET BY MOUTH THREE TIMES DAILY AS NEEDED FOR ANXIETY    Anxiety hyperventilation       clopidogrel 75 MG tablet    PLAVIX    90 tablet    Take 1 tablet (75 mg) by mouth daily    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    Gout without tophus       evolocumab 140 MG/ML prefilled autoinjector    REPATHA    2 mL    Inject 1 mL (140 mg) Subcutaneous every 14 days    Hyperlipidemia LDL goal <70, S/P CABG (coronary artery bypass graft)       ezetimibe 10 MG tablet    ZETIA    90 tablet    Take 1 tablet (10 mg) by mouth daily    Coronary artery disease involving native coronary artery of native heart without angina pectoris       fluticasone 50 MCG/ACT spray    FLONASE    1 Bottle    Spray 2 sprays into both nostrils daily    Nasal congestion, Dizziness       gabapentin 300 MG capsule    NEURONTIN    540 capsule    Take 2 capsules (600 mg) by mouth 3 times daily    Lumbar radiculopathy       gemfibrozil 600 MG tablet    LOPID    180 tablet    Take 1 tablet  (600 mg) by mouth 2 times daily    Hyperlipidemia LDL goal <70       meclizine 25 MG tablet    ANTIVERT    30 tablet    TAKE ONE TABLET BY MOUTH EVERY 6 HOURS AS NEEDED FOR  DIZZINESS    Vertigo       melatonin 3 MG tablet      Take 1 tablet (3 mg) by mouth nightly as needed for sleep    Delayed sleep phase syndrome       meloxicam 7.5 MG tablet    MOBIC    30 tablet    TAKE ONE TABLET BY MOUTH ONCE DAILY WITH  BREAKFAST    Low back pain, unspecified back pain laterality, unspecified chronicity, with sciatica presence unspecified       metoprolol 25 MG 24 hr tablet    TOPROL-XL    90 tablet    Take 1 tablet (25 mg) by mouth daily    Atherosclerosis of native coronary artery of native heart without angina pectoris       mirtazapine 15 MG tablet    REMERON    30 tablet    Take 1 tablet (15 mg) by mouth At Bedtime    Severe episode of recurrent major depressive disorder, without psychotic features (H)       nitroGLYcerin 0.4 MG sublingual tablet    NITROSTAT    30 tablet    Place 1 tablet (0.4 mg) under the tongue every 5 minutes as needed    Atherosclerosis of native coronary artery of native heart with angina pectoris (H)       order for DME      1.  CPAP pressure 11 cm/H20 with heated humidity.  2.  Provide mask to fit and CPAP supplies.  3.  Length of need lifetime.  4.  If needed please provide a chin strap    JEROD (obstructive sleep apnea), Anxiety, CAD (coronary artery disease), HTN (hypertension)       * order for DME     1 Units    Equipment being ordered: single end cane    Lumbar radiculopathy, Facet arthropathy, Chronic low back pain       * order for DME     200 each    Equipment being ordered: test strips as covered by insurance. USE TO TEST BLOOD SUGARS TWICE DAILY OR AS DIRECTED.    Rheumatoid arthritis involving multiple sites, unspecified rheumatoid factor presence (H)       * order for DME     1 each    OneTouch glucometer.    Rheumatoid arthritis involving multiple sites, unspecified rheumatoid  factor presence (H)       * order for DME     200 each    OneTouch lancets testing twice daily.    Rheumatoid arthritis involving multiple sites, unspecified rheumatoid factor presence (H)       oxyCODONE 5 MG IR tablet    ROXICODONE    180 tablet    Take 1-2 tablets (5-10 mg) by mouth every 4 hours as needed for moderate to severe pain . Max of 6 tabs per day.  30 day supply. May filll to start on/after 8/11/17    Facet arthropathy       pantoprazole 40 MG EC tablet    PROTONIX    90 tablet    TAKE ONE TABLET BY MOUTH ONCE DAILY FOR  STOMACH.    Gastroesophageal reflux disease without esophagitis       predniSONE 5 MG tablet    DELTASONE    30 tablet    Take 1 tablet (5 mg) by mouth daily    Rheumatoid arthritis of multiple sites with negative rheumatoid factor (H), High risk medications (not anticoagulants) long-term use       sulfaSALAzine  MG EC tablet    AZULFIDINE EN    120 tablet    Take 2 tablets (1,000 mg) by mouth 2 times daily    Rheumatoid arthritis of multiple sites with negative rheumatoid factor (H), High risk medications (not anticoagulants) long-term use       tamsulosin 0.4 MG capsule    FLOMAX    90 capsule    Take 1 capsule (0.4 mg) by mouth daily    Benign prostatic hyperplasia with lower urinary tract symptoms, unspecified morphology       tenofovir 300 MG tablet    VIREAD    90 tablet    Take 1 tablet (300 mg) by mouth daily    Chronic viral hepatitis B without delta agent and without coma (H)       vitamin D 2000 UNITS tablet     100 tablet    TAKE ONE TABLET BY MOUTH ONCE DAILY    Vitamin D deficiency       zolpidem 5 MG tablet    AMBIEN    30 tablet    TAKE ONE TABLET BY MOUTH AT BEDTIME AS NEEDED FOR SLEEP    Insomnia, unspecified       * Notice:  This list has 4 medication(s) that are the same as other medications prescribed for you. Read the directions carefully, and ask your doctor or other care provider to review them with you.

## 2017-09-17 ENCOUNTER — MYC REFILL (OUTPATIENT)
Dept: CARDIOLOGY | Facility: CLINIC | Age: 52
End: 2017-09-17

## 2017-09-17 DIAGNOSIS — I25.10 CORONARY ARTERY DISEASE INVOLVING NATIVE CORONARY ARTERY OF NATIVE HEART WITHOUT ANGINA PECTORIS: ICD-10-CM

## 2017-09-18 RX ORDER — NITROGLYCERIN 0.4 MG/1
0.4 TABLET SUBLINGUAL EVERY 5 MIN PRN
Qty: 30 TABLET | Refills: 1 | Status: SHIPPED | OUTPATIENT
Start: 2017-09-18 | End: 2018-04-04

## 2017-09-18 RX ORDER — EZETIMIBE 10 MG/1
10 TABLET ORAL DAILY
Qty: 90 TABLET | Refills: 3 | Status: SHIPPED | OUTPATIENT
Start: 2017-09-18 | End: 2018-09-12 | Stop reason: ALTCHOICE

## 2017-09-18 NOTE — TELEPHONE ENCOUNTER
Prescription approved per [facility] Refill Protocol.  Rachelle Germain RN.

## 2017-09-20 ENCOUNTER — OFFICE VISIT (OUTPATIENT)
Dept: PALLIATIVE MEDICINE | Facility: CLINIC | Age: 52
End: 2017-09-20
Payer: COMMERCIAL

## 2017-09-20 VITALS
WEIGHT: 172 LBS | DIASTOLIC BLOOD PRESSURE: 85 MMHG | BODY MASS INDEX: 31.46 KG/M2 | SYSTOLIC BLOOD PRESSURE: 127 MMHG | HEART RATE: 67 BPM

## 2017-09-20 DIAGNOSIS — M06.9 RHEUMATOID ARTHRITIS INVOLVING MULTIPLE SITES, UNSPECIFIED RHEUMATOID FACTOR PRESENCE: Primary | ICD-10-CM

## 2017-09-20 DIAGNOSIS — M47.819 FACET ARTHROPATHY: ICD-10-CM

## 2017-09-20 PROCEDURE — 99214 OFFICE O/P EST MOD 30 MIN: CPT | Performed by: PSYCHIATRY & NEUROLOGY

## 2017-09-20 RX ORDER — OXYCODONE HYDROCHLORIDE 5 MG/1
5-10 TABLET ORAL EVERY 4 HOURS PRN
Qty: 180 TABLET | Refills: 0 | Status: SHIPPED | OUTPATIENT
Start: 2017-09-20 | End: 2017-10-11

## 2017-09-20 ASSESSMENT — PAIN SCALES - GENERAL: PAINLEVEL: SEVERE PAIN (7)

## 2017-09-20 NOTE — PATIENT INSTRUCTIONS
1. Do you use any ibuprofen, naprosyn, or other NSAIDS, as you are already taking meloxicam  I would be willing to try voltaren gel though, as limited absorption    2. Continue oxycodone - call 7-10 days in advance  3. Schedule follow up in 3 months.      ----------------------------------------------------------------  Nurse Triage line:  [phone]   Call this number with any questions or concerns. You may leave a detailed message anytime. Calls are typically returned Monday through Friday between 8 AM and 4:30 PM. We usually get back to you within 2 business days depending on the issue/request.       Medication refills:    For non-narcotic medications, call your pharmacy directly to request a refill. The pharmacy will contact the [facility] for authorization. Please allow 3-4 days for these refills to be processed.     For narcotic refills, call the nurse triage line or send a STAR FESTIVAL message. Please contact us 7-10 days before your refill is due. The message MUST include the name of the specific medication(s) requested and how you would like to receive the prescription(s). The options are as follows:    [facility] staff can mail the prescription to your pharmacy. Please tell us the name of the pharmacy.    You may pick the prescription up at the Pain Clinic (tell us the location) or during a clinic visit with your pain provider    Pain Clinic staff can deliver the prescription to the [facility] pharmacy in the clinic building. Please tell us the location.      Scheduling number: [phone].  Call this number to schedule or change appointments.    We believe regular attendance is key to your success in our program.    Any time you are unable to keep your appointment we ask that you call us at least 24 hours in advance to let us know. This will allow us to offer the appointment time to another patient.

## 2017-09-20 NOTE — PROGRESS NOTES
[facility]    Date of visit: 9/20/2017     Chief complaint:   Chief Complaint   Patient presents with     Pain       Interval history:  Lamont Daniels was last seen by me on 6/21/17    Recommendations/plan at the last visit included:  1. Physical Therapy:  Finished pool therapy, likely had more sessions  2. Clinical Health Psychologist to address issues of relaxation, behavioral change, coping style, and other factors important to improvement.  Will need to discuss at upcoming appointments  3. Diagnostic Studies:  None  4. Medication Management:    1.  Discussed with him my concerns that he doesn't call for refills or pursue care.  He ran out of meds over a week ago, but didn't call for refills as he would be seen today.  This has happened in the past.   He states that he was out for 2.5 months, but the  dates show he has only been out shorter I am concerned about his inability to properly report, managed and seek out help for his meds.  2. Oxycodone 30 day script written; max 6 pills/day.  Told patient to call or Mychart 7-10 days before he is out of pills to get refill.    3. His PCA isn't doing meds.  If he is out earlier than 2 weeks ago, is it possible that meds are being taken and he is out longer than the 2 weeks.  The bottom line that I told patient is that I don't think he can effectively manage his meds, and I'm not sure about people in the home as well.  I would like to talk with his PCP to determine if he is a candidate for having his meds set up.  This is several months of trying to get regular, consistent use of meds that have not been possible.  4. New script provided today, RN called [facility] and had previous script discarded  5. Continue regular use of CPAP  5. Further procedures recommended: Can repeat facet joint injections, or try MBB if facet injections aren't lasting long   6. Recommendations to PCP:  I am wondering if there is an option for him to  get help with filling meds.  He is consistently running out of meds and not asking for refills on time.  This is affecting his care and function.  He states his PCA doesn't have time to do this and doesn't manage meds.  Are there other options? Could he have monthly pharmacist visit or is there in home help?     Since his last visit, Lamont Daniels reports:  -Still having pain in low back rates it a 6/10 on average.  -Continues to have diffuse joint pain due to rheumatoid arthritis. He states that this pain is better compared to the past. His rheumatologist switched him from enbrel to orencia in April.  - His last prescription of oxycodone was written on 8/22. He usually takes 4-6 tablets per day. He hasn't been finding this very effective. He usually takes tylenol or ibuprofen along with this and this typically helps more.  -He continues to use gabapentin and the burning sensation in his feet have improved significantly and made this manageable.  -Continues to use a CPAP machine, no changes made with sleep medicine last time he saw them. He still takes ambien nightly.  -No injections recently because he never had prolonged benefit with the procedures.    Pain scores:  Pain intensity on average is 6 on a scale of 0-10.     Current pain treatments  Acetaminophen 250mg 2 tablets: 1-2 times daily  Gabapentin 600mg 3 times daily  Baclofen 10mg daily- was prescribed by PCP, doesn't use daily.  Oxycodone 5 mg : takes 4-6/day  meloxicam- 7.5mg in the morning    Previous medication treatments included:  Codeine, oxycodone, hydrocodone- given for other issues- helpful  Cymbalta 60mg daily- given for mental health- was given by Dr. Acosta- not been higher  Baclofen 10mg at bedtime- not helpful, no side effects  Robaxin 500 mg 1 tablet, 1-3 times a day depending on the day  Ibuprofen 800 mg- using 3-4 times per week, helpful but started meloxicam    Other treatments have included:  Lamont Daniels has not been seen at a pain clinic in the  past.    PT: has done for various reasons, most recently the low back.  Acupuncture: as done a couple of needles with adjustment  TENs Unit: no  Injections: no    Side Effects: None    Medications:  Current Outpatient Prescriptions   Medication Sig Dispense Refill     diclofenac (VOLTAREN) 1 % GEL topical gel Apply 2-4 grams to 2-3 joints, up to four times daily as needed using enclosed dosing card.  Max of 32g/day 200 g 1     nitroGLYcerin (NITROSTAT) 0.4 MG sublingual tablet Place 1 tablet (0.4 mg) under the tongue every 5 minutes as needed 30 tablet 1     ezetimibe (ZETIA) 10 MG tablet Take 1 tablet (10 mg) by mouth daily 90 tablet 3     colchicine (COLCRYS) 0.6 MG tablet TAKE TWO TABLETS BY MOUTH AT THE FIRST SIGN OF FLARE, TAKE ONE ADDITIONAL TABLET ONE HOUR LATER. 30 tablet 3     gabapentin (NEURONTIN) 300 MG capsule Take 2 capsules (600 mg) by mouth 3 times daily 540 capsule 3     fluticasone (FLONASE) 50 MCG/ACT spray Spray 2 sprays into both nostrils daily 1 Bottle 11     atorvastatin (LIPITOR) 80 MG tablet TAKE 1 TABLET BY MOUTH 1 TIME DAILY FOR CHOLESTEROL 90 tablet 1     order for DME Equipment being ordered: test strips as covered by insurance. USE TO TEST BLOOD SUGARS TWICE DAILY OR AS DIRECTED. 200 each 3     tenofovir (VIREAD) 300 MG tablet Take 1 tablet (300 mg) by mouth daily 90 tablet 3     order for DME OneTouch glucometer. 1 each 0     order for DME OneTouch lancets testing twice daily. 200 each 3     pantoprazole (PROTONIX) 40 MG EC tablet TAKE ONE TABLET BY MOUTH ONCE DAILY FOR  STOMACH. 90 tablet 3     BusPIRone HCl 30 MG TABS Take 1 tablet by mouth 2 times daily 180 tablet 5     mirtazapine (REMERON) 15 MG tablet Take 1 tablet (15 mg) by mouth At Bedtime 30 tablet 5     baclofen (LIORESAL) 10 MG tablet Take 1 tablet (10 mg) by mouth 2 times daily as needed for muscle spasms 180 tablet 3     metoprolol (TOPROL-XL) 25 MG 24 hr tablet Take 1 tablet (25 mg) by mouth daily 90 tablet 3      allopurinol (ZYLOPRIM) 300 MG tablet TAKE ONE TABLET BY MOUTH ONCE DAILY 90 tablet 3     aspirin EC 81 MG EC tablet Take 1 tablet (81 mg) by mouth daily (*) 30 tablet 1     order for DME Equipment being ordered: single end cane 1 Units 0     Cholecalciferol (VITAMIN D) 2000 UNITS tablet TAKE ONE TABLET BY MOUTH ONCE DAILY 100 tablet 1     melatonin 3 MG tablet Take 1 tablet (3 mg) by mouth nightly as needed for sleep       ORDER FOR DME 1.  CPAP pressure 11 cm/H20 with heated humidity.   2.  Provide mask to fit and CPAP supplies.   3.  Length of need lifetime.   4.  If needed please provide a chin strap            gemfibrozil (LOPID) 600 MG tablet Take 1 tablet (600 mg) by mouth 2 times daily 180 tablet 3     blood glucose monitoring (ONE TOUCH ULTRA 2) meter device kit TEST 2 TIMES DAILY. 1 kit 0     clonazePAM (KLONOPIN) 1 MG tablet TAKE ONE-HALF TO ONE TABLET BY MOUTH THREE TIMES DAILY AS NEEDED FOR ANXIETY 90 tablet 0     oxyCODONE IR (ROXICODONE) 5 MG tablet Take 1-2 tablets (5-10 mg) by mouth every 4 hours as needed for moderate to severe pain . Max of 6 tabs per day.  30 day supply. May fill on 12/18/17 to start on/after 12/20/17 180 tablet 0     clopidogrel (PLAVIX) 75 MG tablet Take 1 tablet (75 mg) by mouth daily 90 tablet 3     meloxicam (MOBIC) 7.5 MG tablet TAKE 1 TABLET BY MOUTH 1 TIME DAILY WITH BREAKFAST 30 tablet 1     triamcinolone (KENALOG) 0.1 % ointment Apply sparingly to affected area three times daily for 14 days. 80 g 3     evolocumab (REPATHA) 140 MG/ML prefilled autoinjector Inject 1 mL (140 mg) Subcutaneous every 14 days 2 mL 11     zolpidem (AMBIEN) 5 MG tablet TAKE ONE TABLET BY MOUTH AT BEDTIME AS NEEDED FOR SLEEP 30 tablet 5     meclizine (ANTIVERT) 25 MG tablet TAKE ONE TABLET BY MOUTH EVERY 6 HOURS AS NEEDED FOR  DIZZINESS 30 tablet 10     Abatacept 125 MG/ML SOAJ Inject 125 mg Subcutaneous every 7 days 4 Syringe 3     predniSONE (DELTASONE) 5 MG tablet Take 1 tablet (5 mg) by mouth  daily 30 tablet 3     sulfaSALAzine ER (AZULFIDINE EN) 500 MG EC tablet Take 2 tablets (1,000 mg) by mouth 2 times daily 120 tablet 3     hydroxychloroquine (PLAQUENIL) 200 MG tablet Take 1 tablet (200 mg) by mouth daily 30 tablet 3     tamsulosin (FLOMAX) 0.4 MG capsule Take 1 capsule (0.4 mg) by mouth daily 90 capsule 3       Medical History: any changes in medical history since they were last seen? No    Review of Systems:  The 14 system ROS was reviewed from the intake questionnaire:  fatigue, back pain, joint pain, stiffness, depression, anxiety, stress  Any bowel or bladder problems: None  Mood: depression, suicidal ideation but not active and no intent/plan.  This is chronic for him.  Contracts for safety.    Physical Exam:  Blood pressure 127/85, pulse 67, weight 78 kg (172 lb).  General: mildly distressed, awake and alert  Gait: Antalgic and very slow  MSK exam: Deferred for conversation        Assessment:   1. Chronic low back pain, with strong facet and myofascial features  2. Rheumatoid arthritis  3. Peripheral neuropathy  4. Depression  5. Hep B - currently being treated  6. Gout  7. JEROD, currently treated with CPAP, central apnea ok      Plan:   1. Physical Therapy:  Finished pool therapy, likely had more sessions  2. Clinical Health Psychologist to address issues of relaxation, behavioral change, coping style, and other factors important to improvement.  Will need to discuss at upcoming appointments  3. Diagnostic Studies:  None  4. Medication Management:    1. Discussed that he should not take iburpofen since he is on meloxicam and should avoid other NSAIDs as well.  2. Oxycodone 30 day script written; max 6 pills/day.  Told patient to call or Mychart 7-10 days before he is out of pills to get refill. Again discussed compliance with prescriptions and proper management of his medications and refills.  3. Continue regular use of CPAP  5. Further procedures recommended: None at this  time.  6. Recommendations to PCP:  Continue to reinforce to Mr. Daniels that he should diligently manage his prescriptions and call for refills on time. If possible have his PCA manage his medications or have an in-home pharmacy consult if this is feasible.    Miguel Angel Bloom DO  Pain Medicine Fellow  [facility]    I saw and examined the patient with the Pain Fellow/Resident. I have reviewed and agree with the resident's note and plan of care and made changes and corrections directly to the body of the note.    TIME SPENT:  BY FELLOW/RESIDENT ALONE 15 MIN  BY MYSELF AND FELLOW/RESIDENT TOGETHER 10 MIN  BY MYSELF WITHOUT THE FELLOW/RESIDENT 15 MIN    These times included 15 minutes I spent counseling him about his diagnosis and treatment options and coordination of care with the primary team    Lian Loo MD  [facility]

## 2017-09-20 NOTE — MR AVS SNAPSHOT
After Visit Summary   9/20/2017    Lamont Daniels    MRN: [id number]           Patient Information     Date Of Birth          1965        Visit Information        Provider Department      9/20/2017 3:30 PM Dorothea Loo MD [facility]        Today's Diagnoses     Rheumatoid arthritis involving multiple sites, unspecified rheumatoid factor presence (H)    -  1    Facet arthropathy          Care Instructions    1. Do you use any ibuprofen, naprosyn, or other NSAIDS, as you are already taking meloxicam  I would be willing to try voltaren gel though, as limited absorption    2. Continue oxycodone - call 7-10 days in advance  3. Schedule follow up in 3 months.      ----------------------------------------------------------------  Nurse Triage line:  [phone]   Call this number with any questions or concerns. You may leave a detailed message anytime. Calls are typically returned Monday through Friday between 8 AM and 4:30 PM. We usually get back to you within 2 business days depending on the issue/request.       Medication refills:    For non-narcotic medications, call your pharmacy directly to request a refill. The pharmacy will contact the [facility] for authorization. Please allow 3-4 days for these refills to be processed.     For narcotic refills, call the nurse triage line or send a [employer] message. Please contact us 7-10 days before your refill is due. The message MUST include the name of the specific medication(s) requested and how you would like to receive the prescription(s). The options are as follows:    Pain Clinic staff can mail the prescription to your pharmacy. Please tell us the name of the pharmacy.    You may pick the prescription up at the Pain Clinic (tell us the location) or during a clinic visit with your pain provider    Pain Clinic staff can deliver the prescription to the [facility] pharmacy in the clinic building. Please tell us the location.       Scheduling number: [phone].  Call this number to schedule or change appointments.    We believe regular attendance is key to your success in our program.    Any time you are unable to keep your appointment we ask that you call us at least 24 hours in advance to let us know. This will allow us to offer the appointment time to another patient.               Follow-ups after your visit        Your next 10 appointments already scheduled     Sep 22, 2017  4:15 PM CDT   MR BRAIN W/O & W CONTRAST with MGMR1   [facility]    [address] [place] [address] [phone]           Take your medicines as usual, unless your doctor tells you not to. Bring a list of your current medicines to your exam (including vitamins, minerals and over-the-counter drugs).  You will be given intravenous contrast for this exam. To prepare:   The day before your exam, drink extra fluids at least six 8-ounce glasses (unless your doctor tells you to restrict your fluids).   Have a blood test (creatinine test) within 30 days of your exam. Go to your clinic or Diagnostic Imaging Department for this test.  The MRI machine uses a strong magnet. Please wear clothes without metal (snaps, zippers). A sweatsuit works well, or we may give you a hospital gown.  Please remove any body piercings and hair extensions before you arrive. You will also remove watches, jewelry, hairpins, wallets, dentures, partial dental plates and hearing aids. You may wear contact lenses, and you may be able to wear your rings. We have a safe place to keep your personal items, but it is safer to leave them at home.   **IMPORTANT** THE INSTRUCTIONS BELOW ARE ONLY FOR THOSE PATIENTS WHO HAVE BEEN TOLD THEY WILL RECEIVE SEDATION OR GENERAL ANESTHESIA DURING THEIR MRI PROCEDURE:  IF YOU WILL RECEIVE SEDATION (take medicine to help you relax during your exam):   You must get the medicine from your doctor before  you arrive. Bring the medicine to the exam. Do not take it at home.   Arrive one hour early. Bring someone who can take you home after the test. Your medicine will make you sleepy. After the exam, you may not drive, take a bus or take a taxi by yourself.   No eating 8 hours before your exam. You may have clear liquids up until 4 hours before your exam. (Clear liquids include water, clear tea, black coffee and fruit juice without pulp.)  IF YOU WILL RECEIVE ANESTHESIA (be asleep for your exam):   Arrive 1 1/2 hours early. Bring someone who can take you home after the test. You may not drive, take a bus or take a taxi by yourself.   No eating 8 hours before your exam. You may have clear liquids up until 4 hours before your exam. (Clear liquids include water, clear tea, black coffee and fruit juice without pulp.)  Please call the Imaging Department at your exam site with any questions.            Oct 13, 2017  3:00 PM CDT   [facility] with BK LAB   [facility]    [address] [facility] [id number] [phone]           Patient must bring picture ID. Patient should be prepared to give a urine specimen  Please do not eat 10-12 hours before your appointment if you are coming in fasting for labs on lipids, cholesterol, or glucose (sugar). Pregnant women should follow their Care Team instructions. Water with medications is okay. Do not drink coffee or other fluids. If you have concerns about taking  your medications, please ask at office or if scheduling via [employer], send a message by clicking on Secure Messaging, Message Your Care Team.            Oct 17, 2017  3:00 PM CDT   [facility] with Sebast[facility]án Jenkins [facility]   [facility]    [address] [facility] [id number] [phone]            Oct 20, 2017  3:00 PM CDT   Return Visit with Fly Travis MD   [facility]  ([facility])    [address] [facility] [address]   [phone]            Dec 04, 2017  9:00 AM CST   US ABDOMEN COMPLETE with MGUS1, MG [facility] ([facility])    [address] [place] [id number] [phone]           Please bring a list of your medicines (including vitamins, minerals and over-the-counter drugs). Also, tell your doctor about any allergies you may have. Wear comfortable clothes and leave your valuables at home.  Adults: No eating or drinking for 8 hours before the exam. You may take medicine with a small sip of water.  Children: - Children 6+ years: No food or drink for 6 hours before exam. - Children 1-5 years: No food or drink for 4 hours before exam. - Infants, breast-fed: may have breast milk up to 2 hours before exam. - Infants, formula: may have bottle until 4 hours before exam.  Please call the Imaging Department at your exam site with any questions.            Dec 04, 2017  9:40 AM CST   LAB with LAB FIRST FLOOR [facility] ([facility])    [address] [place] [id number] [phone]           Patient must bring picture ID. Patient should be prepared to give a urine specimen  Please do not eat 10-12 hours before your appointment if you are coming in fasting for labs on lipids, cholesterol, or glucose (sugar). Pregnant women should follow their Care Team instructions. Water with medications is okay. Do not drink coffee or other fluids. If you have concerns about taking  your medications, please ask at office or if scheduling via [employer], send a message by clicking on Secure Messaging, Message Your Care Team.            Dec 14, 2017  4:00 PM CST   [facility] Visit with Drea Laboy MD   [facility]    [address] [place] [phone]              Who to contact     If  you have questions or need follow up information about today's clinic visit or your schedule please contact [facility] directly at [phone].  Normal or non-critical lab and imaging results will be communicated to you by MyChart, letter or phone within 4 business days after the clinic has received the results. If you do not hear from us within 7 days, please contact the clinic through [employer] or phone. If you have a critical or abnormal lab result, we will notify you by phone as soon as possible.  Submit refill requests through [employer] or call your pharmacy and they will forward the refill request to us. Please allow 3 business days for your refill to be completed.          Additional Information About Your Visit        [employer] Board of Addiction Medicine (ABAM) Information     [employer] gives you secure access to your electronic health record. If you see a primary care provider, you can also send messages to your care team and make appointments. If you have questions, please call your primary care clinic.  If you do not have a primary care provider, please call [phone] and they will assist you.        Care EveryWhere ID     This is your Care EveryWhere ID. This could be used by other organizations to access your [facility] medical records  [id number]        Your Vitals Were     Pulse BMI (Body Mass Index)                67 31.46 kg/m2           Blood Pressure from Last 3 Encounters:   09/20/17 127/85   09/15/17 114/74   09/05/17 112/78    Weight from Last 3 Encounters:   09/20/17 78 kg (172 lb)   09/15/17 77.6 kg (171 lb)   09/05/17 76.2 kg (168 lb)              Today, you had the following     No orders found for display         Today's Medication Changes          These changes are accurate as of: 9/20/17  3:58 PM.  If you have any questions, ask your nurse or doctor.               Start taking these medicines.        Dose/Directions    diclofenac 1 % Gel topical gel   Commonly known as:  VOLTAREN   Used for:  Facet arthropathy,  Rheumatoid arthritis involving multiple sites, unspecified rheumatoid factor presence (H)        Apply 4 grams to knees or 2 grams to hands four times daily using enclosed dosing card.   Quantity:  100 g   Refills:  1         These medicines have changed or have updated prescriptions.        Dose/Directions    oxyCODONE 5 MG IR tablet   Commonly known as:  ROXICODONE   This may have changed:  additional instructions   Used for:  Facet arthropathy        Dose:  5-10 mg   Take 1-2 tablets (5-10 mg) by mouth every 4 hours as needed for moderate to severe pain . Max of 6 tabs per day.  30 day supply. May fill now to start on/after 9/21   Quantity:  180 tablet   Refills:  0            Where to get your medicines      These medications were sent to [place] [address] [place] [facility] [place] [id number] [place] [address] [facility] [address]     Phone:  [phone]     diclofenac 1 % Gel topical gel         Some of these will need a paper prescription and others can be bought over the counter.  Ask your nurse if you have questions.     Bring a paper prescription for each of these medications     oxyCODONE 5 MG IR tablet                Primary Care Provider Office Phone # Fax #    David Chavez [phone]       [address]  [facility] [address]        Equal Access to Services     VICKEY HERRMANN AH: Hadsirena novak hadasho Soomaali, waaxda luqadaha, qaybta kaalmada adeegyada, marcelino olivera. So [facility] [phone].    ATENCIÓN: Si habla español, tiene a muse disposición servicios gratuitos de asistencia lingüística. Llame al [phone].    We comply with applicable federal civil rights laws and [place] laws. We do not discriminate on the basis of race, color, national origin, age, disability sex, sexual orientation or gender identity.            Thank you!     Thank you for choosing [facility]  for your care. Our goal is always to provide you with excellent care.  Hearing back from our patients is one way we can continue to improve our services. Please take a few minutes to complete the written survey that you may receive in the mail after your visit with us. Thank you!             Your Updated Medication List - Protect others around you: Learn how to safely use, store and throw away your medicines at www.disposemymeds.org.          This list is accurate as of: 9/20/17  3:58 PM.  Always use your most recent med list.                   Brand Name Dispense Instructions for use Diagnosis    Abatacept 125 MG/ML Soaj     4 Syringe    Inject 125 mg Subcutaneous every 7 days    Rheumatoid arthritis of multiple sites with negative rheumatoid factor (H)       allopurinol 300 MG tablet    ZYLOPRIM    90 tablet    TAKE ONE TABLET BY MOUTH ONCE DAILY    Idiopathic gout, unspecified chronicity, unspecified site       aspirin 81 MG EC tablet     30 tablet    Take 1 tablet (81 mg) by mouth daily (*)    Coronary artery disease involving native coronary artery of native heart without angina pectoris       atorvastatin 80 MG tablet    LIPITOR    90 tablet    TAKE 1 TABLET BY MOUTH 1 TIME DAILY FOR CHOLESTEROL    Atherosclerosis of native coronary artery of native heart, angina presence unspecified, Hyperlipidemia LDL goal <100       baclofen 10 MG tablet    LIORESAL    180 tablet    Take 1 tablet (10 mg) by mouth 2 times daily as needed for muscle spasms    Spasm of back muscles       BusPIRone HCl 30 MG Tabs     180 tablet    Take 1 tablet by mouth 2 times daily    Major depressive disorder, recurrent episode, moderate (H)       clonazePAM 1 MG tablet    klonoPIN    90 tablet    TAKE ONE-HALF TO ONE TABLET BY MOUTH THREE TIMES DAILY AS NEEDED FOR ANXIETY    Anxiety hyperventilation       clopidogrel 75 MG tablet    PLAVIX    90 tablet    Take 1 tablet (75 mg) by mouth daily    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    Gout without tophus       diclofenac 1 % Gel topical gel    VOLTAREN    100 g    Apply 4 grams to knees or 2 grams to hands four times daily using enclosed dosing card.    Facet arthropathy, Rheumatoid arthritis involving multiple sites, unspecified rheumatoid factor presence (H)       evolocumab 140 MG/ML prefilled autoinjector    REPATHA    2 mL    Inject 1 mL (140 mg) Subcutaneous every 14 days    Hyperlipidemia LDL goal <70, S/P CABG (coronary artery bypass graft)       ezetimibe 10 MG tablet    ZETIA    90 tablet    Take 1 tablet (10 mg) by mouth daily    Coronary artery disease involving native coronary artery of native heart without angina pectoris       fluticasone 50 MCG/ACT spray    FLONASE    1 Bottle    Spray 2 sprays into both nostrils daily    Nasal congestion, Dizziness       gabapentin 300 MG capsule    NEURONTIN    540 capsule    Take 2 capsules (600 mg) by mouth 3 times daily    Lumbar radiculopathy       gemfibrozil 600 MG tablet    LOPID    180 tablet    Take 1 tablet (600 mg) by mouth 2 times daily    Hyperlipidemia LDL goal <70       meclizine 25 MG tablet    ANTIVERT    30 tablet    TAKE ONE TABLET BY MOUTH EVERY 6 HOURS AS NEEDED FOR  DIZZINESS    Vertigo       melatonin 3 MG tablet      Take 1 tablet (3 mg) by mouth nightly as needed for sleep    Delayed sleep phase syndrome       meloxicam 7.5 MG tablet    MOBIC    30 tablet    TAKE ONE TABLET BY MOUTH ONCE DAILY WITH  BREAKFAST    Low back pain, unspecified back pain laterality, unspecified chronicity, with sciatica presence unspecified       metoprolol 25 MG 24 hr tablet    TOPROL-XL    90 tablet    Take 1 tablet (25 mg) by mouth daily    Atherosclerosis of native coronary artery of native heart without angina pectoris        mirtazapine 15 MG tablet    REMERON    30 tablet    Take 1 tablet (15 mg) by mouth At Bedtime    Severe episode of recurrent major depressive disorder, without psychotic features (H)       nitroGLYcerin 0.4 MG sublingual tablet    NITROSTAT    30 tablet    Place 1 tablet (0.4 mg) under the tongue every 5 minutes as needed    Atherosclerosis of native coronary artery of native heart with angina pectoris (H)       order for DME      1.  CPAP pressure 11 cm/H20 with heated humidity.  2.  Provide mask to fit and CPAP supplies.  3.  Length of need lifetime.  4.  If needed please provide a chin strap    JEROD (obstructive sleep apnea), Anxiety, CAD (coronary artery disease), HTN (hypertension)       * order for DME     1 Units    Equipment being ordered: single end cane    Lumbar radiculopathy, Facet arthropathy, Chronic low back pain       * order for DME     200 each    Equipment being ordered: test strips as covered by insurance. USE TO TEST BLOOD SUGARS TWICE DAILY OR AS DIRECTED.    Rheumatoid arthritis involving multiple sites, unspecified rheumatoid factor presence (H)       * order for DME     1 each    OneTouch glucometer.    Rheumatoid arthritis involving multiple sites, unspecified rheumatoid factor presence (H)       * order for DME     200 each    OneTouch lancets testing twice daily.    Rheumatoid arthritis involving multiple sites, unspecified rheumatoid factor presence (H)       oxyCODONE 5 MG IR tablet    ROXICODONE    180 tablet    Take 1-2 tablets (5-10 mg) by mouth every 4 hours as needed for moderate to severe pain . Max of 6 tabs per day.  30 day supply. May fill now to start on/after 9/21    Facet arthropathy       pantoprazole 40 MG EC tablet    PROTONIX    90 tablet    TAKE ONE TABLET BY MOUTH ONCE DAILY FOR  STOMACH.    Gastroesophageal reflux disease without esophagitis       predniSONE 5 MG tablet    DELTASONE    30 tablet    Take 1 tablet (5 mg) by mouth daily    Rheumatoid arthritis of multiple  sites with negative rheumatoid factor (H), High risk medications (not anticoagulants) long-term use       sulfaSALAzine  MG EC tablet    AZULFIDINE EN    120 tablet    Take 2 tablets (1,000 mg) by mouth 2 times daily    Rheumatoid arthritis of multiple sites with negative rheumatoid factor (H), High risk medications (not anticoagulants) long-term use       tamsulosin 0.4 MG capsule    FLOMAX    90 capsule    Take 1 capsule (0.4 mg) by mouth daily    Benign prostatic hyperplasia with lower urinary tract symptoms, unspecified morphology       tenofovir 300 MG tablet    VIREAD    90 tablet    Take 1 tablet (300 mg) by mouth daily    Chronic viral hepatitis B without delta agent and without coma (H)       vitamin D 2000 UNITS tablet     100 tablet    TAKE ONE TABLET BY MOUTH ONCE DAILY    Vitamin D deficiency       zolpidem 5 MG tablet    AMBIEN    30 tablet    TAKE ONE TABLET BY MOUTH AT BEDTIME AS NEEDED FOR SLEEP    Insomnia, unspecified       * Notice:  This list has 4 medication(s) that are the same as other medications prescribed for you. Read the directions carefully, and ask your doctor or other care provider to review them with you.

## 2017-09-20 NOTE — NURSING NOTE
"Chief Complaint   Patient presents with     Pain       Initial /85  Pulse 67  Wt 78 kg (172 lb)  BMI 31.46 kg/m2 Estimated body mass index is 31.46 kg/(m^2) as calculated from the following:    Height as of 9/15/17: 1.575 m (5' 2\").    Weight as of this encounter: 78 kg (172 lb).  Medication Reconciliation: incomplete     Amberly Chawla MA        "

## 2017-09-22 ENCOUNTER — RADIANT APPOINTMENT (OUTPATIENT)
Dept: MRI IMAGING | Facility: CLINIC | Age: 52
End: 2017-09-22
Attending: PSYCHIATRY & NEUROLOGY
Payer: COMMERCIAL

## 2017-09-22 DIAGNOSIS — H53.40 LOSS OF PART OF VISUAL FIELD: ICD-10-CM

## 2017-09-22 DIAGNOSIS — R42 DIZZINESS: ICD-10-CM

## 2017-09-22 PROCEDURE — 70551 MRI BRAIN STEM W/O DYE: CPT | Performed by: RADIOLOGY

## 2017-09-24 NOTE — PROGRESS NOTES
Dennis Mr. Daniels,     MRI brain does not show any acute abnormality.  Remains unchanged from the previous study.  Follow-up as suggested during your recent office visit.    Thanks,       Suresh Rice MD, [facility]  Neurologist

## 2017-10-03 ENCOUNTER — MYC REFILL (OUTPATIENT)
Dept: FAMILY MEDICINE | Facility: CLINIC | Age: 52
End: 2017-10-03

## 2017-10-03 DIAGNOSIS — R39.12 BENIGN PROSTATIC HYPERPLASIA WITH WEAK URINARY STREAM: Primary | ICD-10-CM

## 2017-10-03 DIAGNOSIS — N40.1 BENIGN PROSTATIC HYPERPLASIA WITH WEAK URINARY STREAM: Primary | ICD-10-CM

## 2017-10-03 NOTE — TELEPHONE ENCOUNTER
tamsulosin         Last Written Prescription Date: 03/14/17  Last Fill Quantity: 90, # refills: 3    Last Office Visit with [facility], [facility] or [facility] prescribing provider:  09/05/17 HO   Future Office Visit:    Next 5 appointments (look out 90 days)     Oct 17, 2017  3:00 PM CDT   Return Visit with Sebastián Jenkins MD   [facility] ([facility])    [address] [facility] [address]   [phone]            Oct 20, 2017  3:00 PM CDT   Return Visit with Fly Travis MD   [facility]    [address] [place] [address]   [phone]            Dec 14, 2017  4:00 PM CST   Return Visit with Drea Laboy MD   [facility])    [address] [place] [address]   [phone]                  BP Readings from Last 3 Encounters:   09/20/17 127/85   09/15/17 114/74   09/05/17 112/78

## 2017-10-03 NOTE — TELEPHONE ENCOUNTER
Message from [employer]:  Original authorizing provider: David Chavez MD, MD Lamont Daniels would like a refill of the following medications:  tamsulosin (FLOMAX) 0.4 MG capsule [David Chavez MD, MD]    Preferred pharmacy: [place] [address] [place] [address]    Comment:  could you please filled.

## 2017-10-06 RX ORDER — TAMSULOSIN HYDROCHLORIDE 0.4 MG/1
0.4 CAPSULE ORAL DAILY
Qty: 90 CAPSULE | Refills: 3 | Status: SHIPPED | OUTPATIENT
Start: 2017-10-06 | End: 2018-11-01

## 2017-10-06 NOTE — TELEPHONE ENCOUNTER
Flomax - Routing refill request to provider for review/approval because:  Diagnosis code used previously no longer in use.  Rachelle Germain RN.

## 2017-10-08 ENCOUNTER — MYC REFILL (OUTPATIENT)
Dept: CARDIOLOGY | Facility: CLINIC | Age: 52
End: 2017-10-08

## 2017-10-08 DIAGNOSIS — E78.5 HYPERLIPIDEMIA LDL GOAL <70: ICD-10-CM

## 2017-10-09 RX ORDER — GEMFIBROZIL 600 MG/1
600 TABLET, FILM COATED ORAL 2 TIMES DAILY
Qty: 180 TABLET | Refills: 3 | Status: SHIPPED | OUTPATIENT
Start: 2017-10-09 | End: 2017-12-26

## 2017-10-09 NOTE — TELEPHONE ENCOUNTER
Message from [employer]:  Original authorizing provider: MD Lamont Gallegos IGNACIOLilian Daniels would like a refill of the following medications:  gemfibrozil (LOPID) 600 MG tablet [Fly Travis MD]    Preferred pharmacy: [address]    Comment:  ran out of refills. please fill. isabella

## 2017-10-09 NOTE — TELEPHONE ENCOUNTER
Faxed refill request.   Medication requested: gemfibrozil  Ph[facility]ed[facility] [place]   Pt's last office visit: 4/14/17  Next scheduled office visit: 10/20/17  Refill authorized per protocol  Sarah Arce RN  Cardiology Care Coordinator  [facility]  Phone: [phone]

## 2017-10-11 ENCOUNTER — MYC REFILL (OUTPATIENT)
Dept: PALLIATIVE MEDICINE | Facility: CLINIC | Age: 52
End: 2017-10-11

## 2017-10-11 DIAGNOSIS — M47.819 FACET ARTHROPATHY: ICD-10-CM

## 2017-10-12 RX ORDER — OXYCODONE HYDROCHLORIDE 5 MG/1
5-10 TABLET ORAL EVERY 4 HOURS PRN
Qty: 180 TABLET | Refills: 0 | Status: SHIPPED | OUTPATIENT
Start: 2017-10-12 | End: 2017-11-15

## 2017-10-12 NOTE — TELEPHONE ENCOUNTER
Message from [employer]:  Original authorizing provider: MD Lamont Ch IGNACIOLilian Daniels would like a refill of the following medications:  oxyCODONE (ROXICODONE) 5 MG IR tablet [Dorothea Loo MD]    Preferred pharmacy: [address]    Comment:  Please refill for this month. Thanks.

## 2017-10-12 NOTE — TELEPHONE ENCOUNTER
LVM. Script was signed and will be mailed out on 10/13/17. Mailing from [facility] to [facility] [place] [facility] [place].       Amberly Chawla MA

## 2017-10-12 NOTE — TELEPHONE ENCOUNTER
Signed Prescriptions:                        Disp   Refills    oxyCODONE (ROXICODONE) 5 MG IR tablet      180 ta*0        Sig: Take 1-2 tablets (5-10 mg) by mouth every 4 hours as           needed for moderate to severe pain . Max of 6           tabs per day.  30 day supply. May fill on           10/19/17 to start on/after 10/21/17  Authorizing Provider: KAYLA CHUN MD  [facility]

## 2017-10-12 NOTE — TELEPHONE ENCOUNTER
Received call from patient requesting refill(s) of oxyCODONE (ROXICODONE) 5 MG IR tablet    Last picked up from pharmacy on 9/20/17    Pt last seen by prescribing provider on 9/20/17  Next appt scheduled for 1/10/18    Last urine drug screen date 1/5/17  Current opioid agreement on file (completed within the last year) Yes Date of opioid agreement: 1/5/17    Mail to [facility] [id number] [place] [facility] [place] - [address] [place] [address]  [place] [address]  Phone: [phone] Fax: [phone]      Sascha Isaac MA  [facility]    Will route to nursing pool for review and preparation of prescription(s).

## 2017-10-12 NOTE — TELEPHONE ENCOUNTER
Medication refill information reviewed.     Due date for oxyCODONE (ROXICODONE) 5 MG IR tablet is 10/21/17     Prescriptions prepped for review.     Will route to provider.     ROBERTO BarksdaleN, RN  Care Coordinator  [facility] [place]

## 2017-10-14 DIAGNOSIS — M06.09 RHEUMATOID ARTHRITIS OF MULTIPLE SITES WITH NEGATIVE RHEUMATOID FACTOR (H): ICD-10-CM

## 2017-10-14 DIAGNOSIS — Z79.899 HIGH RISK MEDICATIONS (NOT ANTICOAGULANTS) LONG-TERM USE: ICD-10-CM

## 2017-10-14 LAB
BASOPHILS # BLD AUTO: 0 10E9/L (ref 0–0.2)
BASOPHILS NFR BLD AUTO: 0.4 %
DIFFERENTIAL METHOD BLD: NORMAL
EOSINOPHIL # BLD AUTO: 0.2 10E9/L (ref 0–0.7)
EOSINOPHIL NFR BLD AUTO: 2.1 %
ERYTHROCYTE [DISTWIDTH] IN BLOOD BY AUTOMATED COUNT: 14.4 % (ref 10–15)
ERYTHROCYTE [SEDIMENTATION RATE] IN BLOOD BY WESTERGREN METHOD: 17 MM/H (ref 0–20)
HCT VFR BLD AUTO: 42.1 % (ref 40–53)
HGB BLD-MCNC: 14.1 G/DL (ref 13.3–17.7)
LYMPHOCYTES # BLD AUTO: 3.5 10E9/L (ref 0.8–5.3)
LYMPHOCYTES NFR BLD AUTO: 33.7 %
MCH RBC QN AUTO: 30.1 PG (ref 26.5–33)
MCHC RBC AUTO-ENTMCNC: 33.5 G/DL (ref 31.5–36.5)
MCV RBC AUTO: 90 FL (ref 78–100)
MONOCYTES # BLD AUTO: 1.3 10E9/L (ref 0–1.3)
MONOCYTES NFR BLD AUTO: 12.4 %
NEUTROPHILS # BLD AUTO: 5.3 10E9/L (ref 1.6–8.3)
NEUTROPHILS NFR BLD AUTO: 51.4 %
PLATELET # BLD AUTO: 282 10E9/L (ref 150–450)
RBC # BLD AUTO: 4.68 10E12/L (ref 4.4–5.9)
WBC # BLD AUTO: 10.3 10E9/L (ref 4–11)

## 2017-10-14 PROCEDURE — 82565 ASSAY OF CREATININE: CPT | Performed by: INTERNAL MEDICINE

## 2017-10-14 PROCEDURE — 85025 COMPLETE CBC W/AUTO DIFF WBC: CPT | Performed by: INTERNAL MEDICINE

## 2017-10-14 PROCEDURE — 85652 RBC SED RATE AUTOMATED: CPT | Performed by: INTERNAL MEDICINE

## 2017-10-14 PROCEDURE — 86140 C-REACTIVE PROTEIN: CPT | Performed by: INTERNAL MEDICINE

## 2017-10-14 PROCEDURE — 80076 HEPATIC FUNCTION PANEL: CPT | Performed by: INTERNAL MEDICINE

## 2017-10-14 PROCEDURE — 36415 COLL VENOUS BLD VENIPUNCTURE: CPT | Performed by: INTERNAL MEDICINE

## 2017-10-16 DIAGNOSIS — M06.09 RHEUMATOID ARTHRITIS OF MULTIPLE SITES WITH NEGATIVE RHEUMATOID FACTOR (H): ICD-10-CM

## 2017-10-16 DIAGNOSIS — Z79.899 HIGH RISK MEDICATIONS (NOT ANTICOAGULANTS) LONG-TERM USE: ICD-10-CM

## 2017-10-16 LAB
ALBUMIN SERPL-MCNC: 3.9 G/DL (ref 3.4–5)
ALP SERPL-CCNC: 113 U/L (ref 40–150)
ALT SERPL W P-5'-P-CCNC: 38 U/L (ref 0–70)
AST SERPL W P-5'-P-CCNC: 18 U/L (ref 0–45)
BILIRUB DIRECT SERPL-MCNC: <0.1 MG/DL (ref 0–0.2)
BILIRUB SERPL-MCNC: 0.2 MG/DL (ref 0.2–1.3)
CREAT SERPL-MCNC: 0.99 MG/DL (ref 0.66–1.25)
CRP SERPL-MCNC: <2.9 MG/L (ref 0–8)
GFR SERPL CREATININE-BSD FRML MDRD: 79 ML/MIN/1.7M2
PROT SERPL-MCNC: 7.4 G/DL (ref 6.8–8.8)

## 2017-10-16 RX ORDER — PREDNISONE 5 MG/1
5 TABLET ORAL DAILY
Qty: 30 TABLET | Refills: 3 | Status: CANCELLED | OUTPATIENT
Start: 2017-10-16

## 2017-10-16 RX ORDER — SULFASALAZINE 500 MG/1
1000 TABLET, DELAYED RELEASE ORAL 2 TIMES DAILY
Qty: 120 TABLET | Refills: 3 | Status: CANCELLED | OUTPATIENT
Start: 2017-10-16

## 2017-10-16 NOTE — TELEPHONE ENCOUNTER
Patient has an appointment on 10/17/2018. Prescriptions will be renewed then.  Helga Guerrier CMA  10/16/2017 10:12 AM

## 2017-10-16 NOTE — TELEPHONE ENCOUNTER
sulfaSALAzine ER (AZULFIDINE EN) 500 MG EC tablet      Last Written Prescription Date:  07/18/17  Last Fill Quantity: 120,   # refills: 3  Last Office Visit with [facility], [facility] or [facility] prescribing provider: 09/05/17    Future Office visit:    Next 5 appointments (look out 90 days)     Oct 17, 2017  3:00 PM CDT   Return Visit with Sebastián Jenkins MD   [facility] ([facility])    [address] [address]   [phone]            Oct 20, 2017  3:00 PM CDT   Return Visit with Fly Travis MD   [facility])    [address] [place] [id number] [phone]            Dec 14, 2017  4:00 PM CST   Return Visit with Drea Laboy MD   [facility])    [address] [place] [address] [phone]            Luis Antonio 10, 2018  3:00 PM CST   Return Visit with Dorothea Loo MD   [facility] ([address] [id number] [phone]                   Routing refill request to provider for review/approval because:  Drug not on the [facility], [facility] or [facility] refill protocol or controlled substance        predniSONE (DELTASONE) 5 MG tablet      Last Written Prescription Date:  07/18/17  Last Fill Quantity: 30,   # refills: 3  Last Office Visit with [facility], [facility] or [facility] prescribing provider: 09/05/17  Future Office visit:    Next 5 appointments (look out 90 days)     Oct 17, 2017  3:00 PM CDT   Return Visit with Sebastián Jenkins MD   [facility] ([facility])    [address] [facility] [address]   [phone]            Oct 20, 2017  3:00 PM CDT   Return Visit with Fly Travis MD   [facility] ([facility])    [address] [place] [address]    [phone]            Dec 14, 2017  4:00 PM CST   Return Visit with Drea Laboy MD   [facility])    [address] [place] [address] [phone]            Luis Antonio 10, 2018  3:00 PM CST   Return Visit with Dorothea Loo MD   [facility] ([facility])    [address] [facility] [id number] [phone]                   Routing refill request to provider for review/approval because:  Drug not on the [facility], [facility] or  Health refill protocol or controlled substance        Melissa Barnes  [facility]

## 2017-10-17 ENCOUNTER — OFFICE VISIT (OUTPATIENT)
Dept: RHEUMATOLOGY | Facility: CLINIC | Age: 52
End: 2017-10-17
Payer: COMMERCIAL

## 2017-10-17 VITALS
HEIGHT: 62 IN | SYSTOLIC BLOOD PRESSURE: 120 MMHG | BODY MASS INDEX: 31.5 KG/M2 | HEART RATE: 59 BPM | DIASTOLIC BLOOD PRESSURE: 78 MMHG | TEMPERATURE: 97.5 F | OXYGEN SATURATION: 98 % | WEIGHT: 171.2 LBS

## 2017-10-17 DIAGNOSIS — M06.09 RHEUMATOID ARTHRITIS OF MULTIPLE SITES WITH NEGATIVE RHEUMATOID FACTOR (H): Primary | ICD-10-CM

## 2017-10-17 DIAGNOSIS — Z79.899 HIGH RISK MEDICATIONS (NOT ANTICOAGULANTS) LONG-TERM USE: ICD-10-CM

## 2017-10-17 PROCEDURE — 99213 OFFICE O/P EST LOW 20 MIN: CPT | Performed by: INTERNAL MEDICINE

## 2017-10-17 RX ORDER — HYDROXYCHLOROQUINE SULFATE 200 MG/1
200 TABLET, FILM COATED ORAL DAILY
Qty: 30 TABLET | Refills: 3 | Status: SHIPPED | OUTPATIENT
Start: 2017-10-17 | End: 2018-02-26

## 2017-10-17 RX ORDER — PREDNISONE 5 MG/1
5 TABLET ORAL DAILY
Qty: 30 TABLET | Refills: 3 | Status: SHIPPED | OUTPATIENT
Start: 2017-10-17 | End: 2018-02-19

## 2017-10-17 RX ORDER — SULFASALAZINE 500 MG/1
1000 TABLET, DELAYED RELEASE ORAL 2 TIMES DAILY
Qty: 120 TABLET | Refills: 3 | Status: SHIPPED | OUTPATIENT
Start: 2017-10-17 | End: 2018-02-20

## 2017-10-17 NOTE — MR AVS SNAPSHOT
After Visit Summary   10/17/2017    Lamont Daniels    MRN: [id number]           Patient Information     Date Of Birth          1965        Visit Information        Provider Department      10/17/2017 3:00 PM Sebastián Jenkins MD [facility]        Today's Diagnoses     Rheumatoid arthritis of multiple sites with negative rheumatoid factor (H)    -  1    High risk medications (not anticoagulants) long-term use          Care Instructions    Dr. Jenkins s Rheumatology Clinics  Locations Clinic Hours Telephone Number     [place]  [address]  [place] [address]     Wednesday: 7:20AM - 4:00PM  Thursday:     7:20AM - 4:00PM     Friday:          7:20AM - 11:00AM       To schedule an appointment with  Dr. Jenkins,  please contact  Specialty [phone]       [place]  [address]  [place] [address]       Monday:       7:20AM - 4:00PM        [facility]  [address] [place] [address]       Tuesday:      7:20AM - 4:00PM          Thank you!    Helga Guerrier CMA              Follow-ups after your visit        Your next 10 appointments already scheduled     Oct 20, 2017  3:00 PM CDT   Return Visit with Fly Travis MD   [facility][note 3[facility]    [address] [place] [address]   [phone]            Dec 04, 2017  9:00 AM CST   US ABDOMEN COMPLETE with MGUS1, MG US TECH   [facility][note 3])    [address] [place] [id number] [phone]           Please bring a list of your medicines (including vitamins, minerals and over-the-counter drugs). Also, tell your doctor about any allergies you may have. Wear comfortable clothes and leave your valuables at home.  Adults: No eating or drinking for 8 hours before the exam. You may take medicine with a small sip of water.  Children: - Children 6+ years:  No food or drink for 6 hours before exam. - Children 1-5 years: No food or drink for 4 hours before exam. - Infants, breast-fed: may have breast milk up to 2 hours before exam. - Infants, formula: may have bottle until 4 hours before exam.  Please call the Imaging Department at your exam site with any questions.            Dec 04, 2017  9:40 AM CST   LAB with LAB FIRST FLOOR [facility] ([facility])    [address] [place] [address] [phone]           Patient must bring picture ID. Patient should be prepared to give a urine specimen  Please do not eat 10-12 hours before your appointment if you are coming in fasting for labs on lipids, cholesterol, or glucose (sugar). Pregnant women should follow their Care Team instructions. Water with medications is okay. Do not drink coffee or other fluids. If you have concerns about taking  your medications, please ask at office or if scheduling via [employer], send a message by clicking on Secure Messaging, Message Your Care Team.            Dec 14, 2017  4:00 PM CST   Return Visit with Drea Laboy MD   [facility]    [address] [place] [address] [phone]            Luis Antonio 10, 2018  3:00 PM CST   Return Visit with Dorothea Loo MD   [facility] ([facility])    [address] [place] [id number] [phone]            Jan 11, 2018  3:00 PM CST   LAB with BK LAB   [facility] ([facility])    [address] [facility] [id number] [phone]           Patient must bring picture ID. Patient should be prepared to give a urine specimen  Please do not eat 10-12 hours before your appointment if you are coming in fasting for labs on lipids, cholesterol, or glucose (sugar). Pregnant women should follow their Care Team instructions. Water with  medications is okay. Do not drink coffee or other fluids. If you have concerns about taking  your medications, please ask at office or if scheduling via [facility], send a message by clicking on Secure Messaging, Message Your Care Team.            Jan 16, 2018  2:40 PM CST   Return Visit with Sebastián Jenkins MD   [facility])    [address] [place] [address]   [phone]              Future tests that were ordered for you today     Open Future Orders        Priority Expected Expires Ordered    CBC with platelets differential Routine 1/11/2018 1/30/2018 10/17/2017    Creatinine Routine 1/11/2018 1/30/2018 10/17/2017    CRP inflammation Routine 1/11/2018 1/30/2018 10/17/2017    Erythrocyte sedimentation rate auto Routine 1/11/2018 1/30/2018 10/17/2017    Hepatic panel Routine 1/11/2018 1/30/2018 10/17/2017            Who to contact     If you have questions or need follow up information about today's clinic visit or your schedule please contact [facility] directly at [phone].  Normal or non-critical lab and imaging results will be communicated to you by Y[employer]art, letter or phone within 4 business days after the clinic has received the results. If you do not hear from us within 7 days, please contact the clinic through [employer] or phone. If you have a critical or abnormal lab result, we will notify you by phone as soon as possible.  Submit refill requests through [facility] or call your pharmacy and they will forward the refill request to us. Please allow 3 business days for your refill to be completed.          Additional Information About Your Visit        [facility] Information     [facility] gives you secure access to your electronic health record. If you see a primary care provider, you can also send messages to your care team and make appointments. If you have questions, please call your primary care clinic.  If you do not have  "a primary care provider, please call [phone] and they will assist you.        Care EveryWhere ID     This is your Care EveryWhere ID. This could be used by other organizations to access your [place] medical records  [id number]        Your Vitals Were     Pulse Temperature Height Pulse Oximetry BMI (Body Mass Index)       59 97.5  F (36.4  C) (Oral) 1.575 m (5' 2\") 98% 31.31 kg/m2        Blood Pressure from Last 3 Encounters:   10/17/17 120/78   09/20/17 127/85   09/15/17 114/74    Weight from Last 3 Encounters:   10/17/17 77.7 kg (171 lb 3.2 oz)   09/20/17 78 kg (172 lb)   09/15/17 77.6 kg (171 lb)                 Today's Medication Changes          These changes are accurate as of: 10/17/17  3:18 PM.  If you have any questions, ask your nurse or doctor.               Start taking these medicines.        Dose/Directions    hydroxychloroquine 200 MG tablet   Commonly known as:  PLAQUENIL   Used for:  Rheumatoid arthritis of multiple sites with negative rheumatoid factor (H), High risk medications (not anticoagulants) long-term use   Started by:  Sebastián Jenkins MD        Dose:  200 mg   Take 1 tablet (200 mg) by mouth daily   Quantity:  30 tablet   Refills:  3            Where to get your medicines      These medications were sent to [place] [address] [place] [facility] - [address] [address]     Phone:  [phone]     hydroxychloroquine 200 MG tablet    predniSONE 5 MG tablet    sulfaSALAzine  MG EC tablet         Call your pharmacy to confirm that your medication is ready for pickup. It may take up to 24 hours for them to receive the prescription. If the prescription is not ready within 3 business days, please contact your clinic or your provider.     We will let you know when these medications are ready. If you don't hear back within 3 business days, please contact us.     Abatacept 125 MG/ML Timmy                Primary Care Provider Office Phone # Fax #    " David Chavez [phone]       [address]  [facility] [address]        Equal Access to Services     VICKEY HERRMANN : Hadsirena aad ku hadmarva Disla, wavikada normqyessica, qashirata kaalmada valentin, marcelino sajimarie hanson lasunshinecharles olivera. So [facility] [phone].    ATENCIÓN: Si habla español, tiene a muse disposición servicios gratuitos de asistencia lingüística. Llame al [phone].    We comply with applicable federal civil rights laws and [place] laws. We do not discriminate on the basis of race, color, national origin, age, disability, sex, sexual orientation, or gender identity.            Thank you!     Thank you for choosing [facility]  for your care. Our goal is always to provide you with excellent care. Hearing back from our patients is one way we can continue to improve our services. Please take a few minutes to complete the written survey that you may receive in the mail after your visit with us. Thank you!             Your Updated Medication List - Protect others around you: Learn how to safely use, store and throw away your medicines at www.disposemymeds.org.          This list is accurate as of: 10/17/17  3:18 PM.  Always use your most recent med list.                   Brand Name Dispense Instructions for use Diagnosis    Abatacept 125 MG/ML Soaj     4 Syringe    Inject 125 mg Subcutaneous every 7 days    Rheumatoid arthritis of multiple sites with negative rheumatoid factor (H)       allopurinol 300 MG tablet    ZYLOPRIM    90 tablet    TAKE ONE TABLET BY MOUTH ONCE DAILY    Idiopathic gout, unspecified chronicity, unspecified site       aspirin 81 MG EC tablet     30 tablet    Take 1 tablet (81 mg) by mouth daily (*)    Coronary artery disease involving native coronary artery of native heart without angina pectoris       atorvastatin 80 MG tablet    LIPITOR    90 tablet    TAKE 1 TABLET BY MOUTH 1 TIME DAILY FOR CHOLESTEROL    Atherosclerosis of native coronary  artery of native heart, angina presence unspecified, Hyperlipidemia LDL goal <100       baclofen 10 MG tablet    LIORESAL    180 tablet    Take 1 tablet (10 mg) by mouth 2 times daily as needed for muscle spasms    Spasm of back muscles       BusPIRone HCl 30 MG Tabs     180 tablet    Take 1 tablet by mouth 2 times daily    Major depressive disorder, recurrent episode, moderate (H)       clonazePAM 1 MG tablet    klonoPIN    90 tablet    TAKE ONE-HALF TO ONE TABLET BY MOUTH THREE TIMES DAILY AS NEEDED FOR ANXIETY    Anxiety hyperventilation       clopidogrel 75 MG tablet    PLAVIX    90 tablet    Take 1 tablet (75 mg) by mouth daily    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    Gout without tophus       diclofenac 1 % Gel topical gel    VOLTAREN    200 g    Apply 2-4 grams to 2-3 joints, up to four times daily as needed using enclosed dosing card.  Max of 32g/day    Facet arthropathy, Rheumatoid arthritis involving multiple sites, unspecified rheumatoid factor presence (H)       evolocumab 140 MG/ML prefilled autoinjector    REPATHA    2 mL    Inject 1 mL (140 mg) Subcutaneous every 14 days    Hyperlipidemia LDL goal <70, S/P CABG (coronary artery bypass graft)       ezetimibe 10 MG tablet    ZETIA    90 tablet    Take 1 tablet (10 mg) by mouth daily    Coronary artery disease involving native coronary artery of native heart without angina pectoris       fluticasone 50 MCG/ACT spray    FLONASE    1 Bottle    Spray 2 sprays into both nostrils daily    Nasal congestion, Dizziness       gabapentin 300 MG capsule    NEURONTIN    540 capsule    Take 2 capsules (600 mg) by mouth 3 times daily    Lumbar radiculopathy       gemfibrozil 600  MG tablet    LOPID    180 tablet    Take 1 tablet (600 mg) by mouth 2 times daily    Hyperlipidemia LDL goal <70       hydroxychloroquine 200 MG tablet    PLAQUENIL    30 tablet    Take 1 tablet (200 mg) by mouth daily    Rheumatoid arthritis of multiple sites with negative rheumatoid factor (H), High risk medications (not anticoagulants) long-term use       meclizine 25 MG tablet    ANTIVERT    30 tablet    TAKE ONE TABLET BY MOUTH EVERY 6 HOURS AS NEEDED FOR  DIZZINESS    Vertigo       melatonin 3 MG tablet      Take 1 tablet (3 mg) by mouth nightly as needed for sleep    Delayed sleep phase syndrome       meloxicam 7.5 MG tablet    MOBIC    30 tablet    TAKE ONE TABLET BY MOUTH ONCE DAILY WITH  BREAKFAST    Low back pain, unspecified back pain laterality, unspecified chronicity, with sciatica presence unspecified       metoprolol 25 MG 24 hr tablet    TOPROL-XL    90 tablet    Take 1 tablet (25 mg) by mouth daily    Atherosclerosis of native coronary artery of native heart without angina pectoris       mirtazapine 15 MG tablet    REMERON    30 tablet    Take 1 tablet (15 mg) by mouth At Bedtime    Severe episode of recurrent major depressive disorder, without psychotic features (H)       nitroGLYcerin 0.4 MG sublingual tablet    NITROSTAT    30 tablet    Place 1 tablet (0.4 mg) under the tongue every 5 minutes as needed    Atherosclerosis of native coronary artery of native heart with angina pectoris (H)       order for DME      1.  CPAP pressure 11 cm/H20 with heated humidity.  2.  Provide mask to fit and CPAP supplies.  3.  Length of need lifetime.  4.  If needed please provide a chin strap    JEROD (obstructive sleep apnea), Anxiety, CAD (coronary artery disease), HTN (hypertension)       * order for DME     1 Units    Equipment being ordered: single end cane    Lumbar radiculopathy, Facet arthropathy, Chronic low back pain       * order for DME     200 each    Equipment being ordered: test strips as covered by  insurance. USE TO TEST BLOOD SUGARS TWICE DAILY OR AS DIRECTED.    Rheumatoid arthritis involving multiple sites, unspecified rheumatoid factor presence (H)       * order for DME     1 each    OneTouch glucometer.    Rheumatoid arthritis involving multiple sites, unspecified rheumatoid factor presence (H)       * order for DME     200 each    OneTouch lancets testing twice daily.    Rheumatoid arthritis involving multiple sites, unspecified rheumatoid factor presence (H)       oxyCODONE 5 MG IR tablet    ROXICODONE    180 tablet    Take 1-2 tablets (5-10 mg) by mouth every 4 hours as needed for moderate to severe pain . Max of 6 tabs per day.  30 day supply. May fill on 10/19/17 to start on/after 10/21/17    Facet arthropathy       pantoprazole 40 MG EC tablet    PROTONIX    90 tablet    TAKE ONE TABLET BY MOUTH ONCE DAILY FOR  STOMACH.    Gastroesophageal reflux disease without esophagitis       predniSONE 5 MG tablet    DELTASONE    30 tablet    Take 1 tablet (5 mg) by mouth daily    Rheumatoid arthritis of multiple sites with negative rheumatoid factor (H), High risk medications (not anticoagulants) long-term use       sulfaSALAzine  MG EC tablet    AZULFIDINE EN    120 tablet    Take 2 tablets (1,000 mg) by mouth 2 times daily    Rheumatoid arthritis of multiple sites with negative rheumatoid factor (H), High risk medications (not anticoagulants) long-term use       tamsulosin 0.4 MG capsule    FLOMAX    90 capsule    Take 1 capsule (0.4 mg) by mouth daily    Benign prostatic hyperplasia with weak urinary stream       tenofovir 300 MG tablet    VIREAD    90 tablet    Take 1 tablet (300 mg) by mouth daily    Chronic viral hepatitis B without delta agent and without coma (H)       vitamin D 2000 UNITS tablet     100 tablet    TAKE ONE TABLET BY MOUTH ONCE DAILY    Vitamin D deficiency       zolpidem 5 MG tablet    AMBIEN    30 tablet    TAKE ONE TABLET BY MOUTH AT BEDTIME AS NEEDED FOR SLEEP    Insomnia,  unspecified       * Notice:  This list has 4 medication(s) that are the same as other medications prescribed for you. Read the directions carefully, and ask your doctor or other care provider to review them with you.

## 2017-10-17 NOTE — PROGRESS NOTES
Rheumatology Clinic Visit      Lamont Daniels MRN# [id number]   YOB: 1965 Age: 51 year old      Date of visit: 10/17/17   PCP: Dr. David Chavez  Hepatologist: Dr. Drea Laboy     Chief Complaint   Patient presents with:  Arthritis: RA, patient states he is feeling a little better, not as much pain and stiffness      Assessment and Plan   1.  Seropositive nonerosive rheumatoid arthritis (RF negative, CCP low positive): Note that he does have low back pain but without evidence of SI joint irregularity on x-ray.  Initially with morning stiffness all day, gelling phenomenon, and synovitis.   Previously on hydroxychloroquine, Humira (partially effective), Enbrel (partially effective). Currently on SSZ 1000mg BID,  prednisone 5mg daily, and Orencia SQ (initially Rx'd 4/18/2017).  Treatment has been in coordination with Dr. Laboy (hepatology) who is treating for hepatitis B.  RA was previously well controlled, but he has mild synovitis on exam today. He tolerated hydroxychloroquine well in the past and will add this to his current medication regimen.  - Continue sulfasalazine 1000mg BID  - Continue prednisone 5 mg daily  - Continue Orencia SQ every 7 days  - Start hydroxychloroquine 200 mg daily; I advised him to resume his ophthalmologic toxicity monitoring exams  - Labs 2-3 days prior to the next rheumatology clinic visit: CBC, Creatinine, Hepatic Panel, ESR, CRP    # Hydroxychloroquine (Plaquenil) Risks and Benefits:  The risks and benefits of hydroxychloroquine were discussed in detail and the patient verbalized understanding; the patient also verbalized agreement to get the required ophthalmologic toxicity monitoring.  The risks discussed include, but are not limited to, the risk for hypersensitivity, anaphylaxis, anaphylactoid reactions, irreversible retinal damage, rare hematologic reactions, and rare cardiomyopathy.   I encouraged reviewing the package insert and asking any questions about the medication.   "    2. Lower back pain: Lumbar spine MRI in August 2014 showed multilevel degenerative changes and a small disc protrusion at L3/4 with mild spinal canal narrowing; also moderate left and mild right neural foraminal narrowing at L5-S1. He had a nerve conduction study in 2014 that was normal. He has been worked up by neurology in the past without significant neurologic findings. HLA-B27 negative, SI joint x-ray negative. Unlikely to be inflammatory in nature and is now being seen by the pain management clinic.     3. Hepatitis B: Managed by Dr. Laboy (hepatology) and is currently on tenofovir.    4. Hepatic Steatosis: Based on recent liver biopsy.  Seeing Dr. Laboy (hepatology).      5. Positive tuberculosis test:   This is noted here for historical significance.  He was evaluated by Dr. Anthony Mendoza, infectious disease. The following telephone note was documented by Dr. Mendoza: \"I tried calling th pt, finally we have the records from [place] . It appears he was treated for latent TB with INH for 1 year in 1980/1981 .Later in 1981 April he was admitted at [facility] in [place], [place] with rt sided pneumonia, TB was suspected but he improved with treatment with Penicillin only. Later he was seen  ( likely ID specialist), and was treated with 6 weeks course of Rifampin and Ethambutol pending sputum AFB culture. His AFB cx were negative based on the report we have.\"  \"He recently had QFT -TB test which was reported positive and his CXR was clear. Given his documented hx of completion of treatment for latent TB as above , I do not see any need for further treatment. His tests may remain positive irrespective of treatment status. From my perspective he can be started on DMARDs/Biological as deemed necessary.\"       6. Gout: On allopurinol and managed by PCP.    7. Bone Health: 2015 vitamin D normal.    - Continue vitamin D 2000 Units daily.     Mr. Daniels verbalized agreement with and understanding of the " rational for the diagnosis and treatment plan.  All questions were answered to best of my ability and the patient's satisfaction. Mr. Daniels was advised to contact the clinic with any questions that may arise after the clinic visit.      Thank you for involving me in the care of the patient    Return to clinic: 3 months      HPI   Lamont Daniels is a 51 year old male with a medical history significant for hypertension, hyperlipidemia, gout, coronary artery disease s/p 6vCABG, vitamin D deficiency, hepatitis B, and RA who presents for f/u of RA.    Today, Mr. Daniels reports doing better than he was last time. Morning stiffness for approximately one hour, but he says that the main issue is gelling phenomenon with any inactivity. The most affected joints including his lower back that is being addressed by the pain clinic, but also in his fingers. His bilateral PIPs are the most affected. He reports that activity improves his hand pain. Activity worsens his back pain. Fatigue has improved slightly.     Denies fevers, chills, nausea, vomiting, constipation, diarrhea. No abdominal pain. Denies chest pain/pressure, palpitations, or shortness of breath.  No oral or nasal sores. No rash. No LE swelling.  Mild dry mouth; he has good dentition and does not feel like he needs to use frequent sips of water. No dry eyes. No eye pain or redness.     Tobacco:  None   EtOH:  None  Drugs:  None  Occupation: Retired   Originally from [facility], then lived just [place], [place], then lived in [place], [place], then moved to [place] for family    ROS   GEN: No fevers, chills, night sweats; + fatigue   SKIN: No itching, rashes, sores  HEENT: No epistaxis. No oral or nasal ulcers.  CV: See HPI  PULM: See HPI  GI: No nausea, vomiting, constipation, diarrhea. No blood in stool. No abdominal pain.  : No blood in urine.  MSK: See HPI.  NEURO: See HPI  EXT: No LE swelling  PSYCH: Negative    Active Problem List     Patient Active Problem  List   Diagnosis     Coronary atherosclerosis of native coronary artery     Major depressive disorder, recurrent episode, moderate (H)     Anxiety     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)     Hepatitis B infection     Vitamin D deficiency     S/P CABG (coronary artery bypass graft)     Malrotation of intestine     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     Hyperlipidemia LDL goal <70     Insomnia     Gout     Suicidal ideation     Essential hypertension     Rheumatoid arthritis involving multiple sites, unspecified rheumatoid factor presence (H)     High risk medications (not anticoagulants) long-term use     Midline low back pain without sciatica     Bilateral low back pain with sciatica, sciatica laterality unspecified     Elevated liver enzymes     On corticosteroid therapy     Essential hypertension with goal blood pressure less than 140/90     Insomnia, unspecified type     Rheumatoid arthritis of multiple sites with negative rheumatoid factor (H)     Benign prostatic hyperplasia with lower urinary tract symptoms, unspecified morphology     Past Medical History     Past Medical History:   Diagnosis Date     Anxiety      CAD (coronary artery disease)     sees cardiologist Dr. Yang, has had stents and cabg     Congestive heart failure, unspecified 2008     Depressive disorder 2008     Gout      Hepatitis B > 10 years     HTN (hypertension)      Hyperlipidemia LDL goal < 100      Malrotation of intestine      Moderate major depression (H)      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) 9/19/2011    Uses CPAP     Rheumatoid arthritis flare (H) 1012     Steatohepatitis 12/15    liver biopsy     Tuberculosis age 13    INH x 9 months      Vitamin D deficiency      Past Surgical History     Past Surgical History:   Procedure Laterality Date     ABDOMEN SURGERY  2014     BIOPSY  2015     BYPASS GRAFT ARTERY CORONARY  2008    6 vessels     COLONOSCOPY  2/8/2013    Procedure: COLONOSCOPY;  Colonoscopy, blood in stool;  Surgeon: Duane,  Jewel Ny MD;  Location: [facility] OR     GI SURGERY  2014      CORONARY STENT CIERA SOTO ADDTL VESSEL  2008    3 months after CABG     HEAD & NECK SURGERY  2011     NASAL/SINUS POLYPECTOMY  2010     ORTHOPEDIC SURGERY  2012     THORACIC SURGERY  1989    tb     TONSILLECTOMY  2010     UVULOPALATOPHARYNGOPLASTY  2010     VASCULAR SURGERY  2008     Allergy     Allergies   Allergen Reactions     Citalopram Itching     Tramadol Itching     Current Medication List     Current Outpatient Prescriptions   Medication Sig     oxyCODONE (ROXICODONE) 5 MG IR tablet Take 1-2 tablets (5-10 mg) by mouth every 4 hours as needed for moderate to severe pain . Max of 6 tabs per day.  30 day supply. May fill on 10/19/17 to start on/after 10/21/17     gemfibrozil (LOPID) 600 MG tablet Take 1 tablet (600 mg) by mouth 2 times daily     tamsulosin (FLOMAX) 0.4 MG capsule Take 1 capsule (0.4 mg) by mouth daily     diclofenac (VOLTAREN) 1 % GEL topical gel Apply 2-4 grams to 2-3 joints, up to four times daily as needed using enclosed dosing card.  Max of 32g/day     nitroGLYcerin (NITROSTAT) 0.4 MG sublingual tablet Place 1 tablet (0.4 mg) under the tongue every 5 minutes as needed     ezetimibe (ZETIA) 10 MG tablet Take 1 tablet (10 mg) by mouth daily     colchicine (COLCRYS) 0.6 MG tablet TAKE TWO TABLETS BY MOUTH AT THE FIRST SIGN OF FLARE, TAKE ONE ADDITIONAL TABLET ONE HOUR LATER.     gabapentin (NEURONTIN) 300 MG capsule Take 2 capsules (600 mg) by mouth 3 times daily     fluticasone (FLONASE) 50 MCG/ACT spray Spray 2 sprays into both nostrils daily     Abatacept 125 MG/ML SOAJ Inject 125 mg Subcutaneous every 7 days     predniSONE (DELTASONE) 5 MG tablet Take 1 tablet (5 mg) by mouth daily     sulfaSALAzine ER (AZULFIDINE EN) 500 MG EC tablet Take 2 tablets (1,000 mg) by mouth 2 times daily     atorvastatin (LIPITOR) 80 MG tablet TAKE 1 TABLET BY MOUTH 1 TIME DAILY FOR CHOLESTEROL     order for DME Equipment being ordered: test  strips as covered by insurance. USE TO TEST BLOOD SUGARS TWICE DAILY OR AS DIRECTED.     tenofovir (VIREAD) 300 MG tablet Take 1 tablet (300 mg) by mouth daily     order for DME OneTouch glucometer.     order for DME OneTouch lancets testing twice daily.     meclizine (ANTIVERT) 25 MG tablet TAKE ONE TABLET BY MOUTH EVERY 6 HOURS AS NEEDED FOR  DIZZINESS     pantoprazole (PROTONIX) 40 MG EC tablet TAKE ONE TABLET BY MOUTH ONCE DAILY FOR  STOMACH.     meloxicam (MOBIC) 7.5 MG tablet TAKE ONE TABLET BY MOUTH ONCE DAILY WITH  BREAKFAST     clonazePAM (KLONOPIN) 1 MG tablet TAKE ONE-HALF TO ONE TABLET BY MOUTH THREE TIMES DAILY AS NEEDED FOR ANXIETY     BusPIRone HCl 30 MG TABS Take 1 tablet by mouth 2 times daily     mirtazapine (REMERON) 15 MG tablet Take 1 tablet (15 mg) by mouth At Bedtime     baclofen (LIORESAL) 10 MG tablet Take 1 tablet (10 mg) by mouth 2 times daily as needed for muscle spasms     metoprolol (TOPROL-XL) 25 MG 24 hr tablet Take 1 tablet (25 mg) by mouth daily     zolpidem (AMBIEN) 5 MG tablet TAKE ONE TABLET BY MOUTH AT BEDTIME AS NEEDED FOR SLEEP     allopurinol (ZYLOPRIM) 300 MG tablet TAKE ONE TABLET BY MOUTH ONCE DAILY     evolocumab (REPATHA) 140 MG/ML prefilled autoinjector Inject 1 mL (140 mg) Subcutaneous every 14 days     clopidogrel (PLAVIX) 75 MG tablet Take 1 tablet (75 mg) by mouth daily     aspirin EC 81 MG EC tablet Take 1 tablet (81 mg) by mouth daily (*)     order for DME Equipment being ordered: single end cane     Cholecalciferol (VITAMIN D) 2000 UNITS tablet TAKE ONE TABLET BY MOUTH ONCE DAILY     melatonin 3 MG tablet Take 1 tablet (3 mg) by mouth nightly as needed for sleep     ORDER FOR DME 1.  CPAP pressure 11 cm/H20 with heated humidity.   2.  Provide mask to fit and CPAP supplies.   3.  Length of need lifetime.   4.  If needed please provide a chin strap          No current facility-administered medications for this visit.      Facility-Administered Medications Ordered  "in Other Visits   Medication     gadobutrol (GADAVIST) injection 10 mL       Social History   See HPI    Family History     Family History   Problem Relation Age of Onset     C.A.D. Father      Coronary Artery Disease Father      Hypertension Father      Depression Father      Hypertension Mother      DIABETES Mother      Depression Mother      MENTAL ILLNESS Mother      Hypertension Maternal Grandfather      CANCER Paternal Grandfather      Other Cancer Paternal Grandfather      Other Cancer Other      Autoimmune Disease No family hx of      CEREBROVASCULAR DISEASE No family hx of      Thyroid Disease No family hx of      Glaucoma No family hx of      Macular Degeneration No family hx of      No family history of autoimmune disease  No family history of psoriasis     No change in family history since the previous clinic visit.     Physical Exam     Temp Readings from Last 3 Encounters:   10/17/17 97.5  F (36.4  C) (Oral)   09/05/17 96.5  F (35.8  C) (Oral)   08/17/17 97.4  F (36.3  C) (Oral)     BP Readings from Last 5 Encounters:   10/17/17 120/78   09/20/17 127/85   09/15/17 114/74   09/05/17 112/78   08/17/17 120/87     Pulse Readings from Last 1 Encounters:   10/17/17 59     Resp Readings from Last 1 Encounters:   10/13/16 16     Estimated body mass index is 31.31 kg/(m^2) as calculated from the following:    Height as of this encounter: 1.575 m (5' 2\").    Weight as of this encounter: 77.7 kg (171 lb 3.2 oz).    GEN: NAD, obese; using a cane to walk  HEENT: MMM. No oral lesions. Anicteric, noninjected sclera  CV: S1, S2. RRR. No m/r/g.  PULM: CTA bilaterally. No w/c.  MSK: Tenderness to palpation without synovial swelling of the bilateral fourth-fifth MCPs. Tenderness to palpation with mild swelling of the bilateral fourth-fifth PIPs. Wrists mildly swollen but without tenderness to palpation or increased warmth. Elbows, shoulders, knees, ankles, and MTPs without swelling or tenderness to palpation. Negative " MTP squeeze.    NEURO: UE and LE strengths 5/5 and equal bilaterally.   SKIN: No rash. No tophi  EXT: No LE edema  PSYCH: Alert. Appropriate.    Labs   RF/CCP  Recent Labs   Lab Test  11/04/15   1547  08/12/14   1414   CCPABY  27*   --    RHF   --   <20     CBC  Recent Labs   Lab Test  10/14/17   0950  07/13/17   1246  04/18/17   1539   WBC  10.3  7.9  7.7   RBC  4.68  4.87  4.98   HGB  14.1  14.9  15.1   HCT  42.1  44.8  44.2   MCV  90  92  89   RDW  14.4  14.3  14.2   PLT  282  221  245   MCH  30.1  30.6  30.3   MCHC  33.5  33.3  34.2   NEUTROPHIL  51.4  48.7  60.3   LYMPH  33.7  37.7  27.7   MONOCYTE  12.4  11.9  10.6   EOSINOPHIL  2.1  1.4  1.0   BASOPHIL  0.4  0.3  0.4   ANEU  5.3  3.9  4.6   ALYM  3.5  3.0  2.1   PRACIH  1.3  0.9  0.8   AEOS  0.2  0.1  0.1   ABAS  0.0  0.0  0.0     CMP  Recent Labs   Lab Test  10/14/17   0950  07/13/17   1246  04/18/17   1539   10/25/16   0851  10/05/16   0954  08/12/16   1451   04/27/16   1448   NA   --    --    --    --   143  144   --    --   140   POTASSIUM   --    --    --    --   3.6  3.6   --    --   3.8   CHLORIDE   --    --    --    --   110*  111*   --    --   108   CO2   --    --    --    --   25  27   --    --   24   ANIONGAP   --    --    --    --   8  6   --    --   8   GLC   --    --    --    --   78  88  146*   --   116*   BUN   --    --    --    --   17  17   --    --   13   CR  0.99  0.86  0.84   < >  1.01  1.00   --    < >  1.11   GFRESTIMATED  79  >90  Non  GFR Calc    >90  Non  GFR Calc     < >  78  79   --    < >  70   GFRESTBLACK  >90  >90  African American GFR Calc    >90   GFR Calc     < >  >90   GFR Calc    >90   GFR Calc     --    < >  85   HEMANT   --    --    --    --   8.8  8.9   --    --   8.9   BILITOTAL  0.2  0.5  0.4   < >  0.3  0.4   --    < >   --    ALBUMIN  3.9  3.8  4.3   < >  3.8  3.5   --    < >   --    PROTTOTAL  7.4  7.4  7.9   < >  7.3  7.4   --    < >   --     ALKPHOS  113  98  115   < >  82  82   --    < >   --    AST  18  23  21   < >  30  46*   --    < >   --    ALT  38  48  44   < >  86*  199*   --    < >   --     < > = values in this interval not displayed.     Calcium/VitaminD  Recent Labs   Lab Test  07/13/17   1246  10/25/16   0851  10/05/16   0954  04/27/16   1448   11/04/15   1547   04/01/13   1624   HEMANT   --   8.8  8.9  8.9   < >   --    < >  9.5   VITDT  34   --    --    --    --   43   --   34    < > = values in this interval not displayed.     ESR/CRP  Recent Labs   Lab Test  10/14/17   0950  07/13/17   1246  04/18/17   1539   SED  17  10  13   CRP  <2.9  <2.9  <2.9     Lipid Panel  Recent Labs   Lab Test  06/09/17   0751  04/22/17   0919  10/25/16   0851   06/29/15   1405  05/22/14   0848  03/24/14   0936   CHOL  118  125  244*   < >  219*  169  195   TRIG  102  121  100   < >  372*  379*  301*   HDL  63  69  90   < >  33*  33*  39*   LDL  35  32  134*   < >  112  60  95   VLDL   --    --    --    --   74*  76*  60*   CHOLHDLRATIO   --    --    --    --   6.6*  5.1*  5.0   NHDL  55  56  154*   < >   --    --    --     < > = values in this interval not displayed.     Hepatitis B  Recent Labs   Lab Test  12/01/16   1429  10/05/16   0954  01/29/16   1541   04/23/13   0812  01/30/13   0906   AUSAB   --   0.45   --    --    --    --    HBCAB   --    --    --    --   Positive*   --    HEPBANG   --   Reactive*   --    --   Positive*  Positive*   HBQLOG  <1.3  5.1*  Not Calculated   < >   --    --     < > = values in this interval not displayed.     Hepatitis C  Recent Labs   Lab Test  04/07/16   1538  04/23/13   0812   HCVAB  Nonreactive   Assay performance characteristics have not been established for newborns,   infants, and children    Negative     Lyme ab screening  Recent Labs   Lab Test  07/18/17   1330   LYMEGM  0.19     Tuberculosis Screening  Recent Labs   Lab Test  04/07/16   1538   TBRSLT  Positive   The magnitude of the measured IFN-gamma level  "cannot be correlated to stage or   degree of infection, level of immune responsiveness, or likelihood for   progression to active disease.  *   TBAGN  >10.00  This is a qualitative test.  The TB antigen IU/mL value is required for   documentation on certain government reporting forms but this value should not   be used to monitor disease progression or response to therapy.   Diagnosing or excluding tuberculosis disease, and assessing the probability of   LTBI, require a combination of epidemiological, historical, medical and   diagnostic findings that should be taken into account when interpreting   QuantiFERON TB results.       HIV Screening  Recent Labs   Lab Test  11/04/15   1547   HIAGAB  Nonreactive   HIV-1 p24 Ag & HIV-1/HIV-2 Ab Not Detected         \"HAND BILATERAL THREE OR MORE VIEWS 11/4/2015 4:55 PM   HISTORY: Pain in unspecified joint.  COMPARISON: None.  IMPRESSION  IMPRESSION: Normal.  DANIS NAGLIN MD\"    \"FOOT BILATERAL THREE OR MORE VIEWS 11/4/2015 4:55 PM   HISTORY: Pain in unspecified joint.  COMPARISON: 8/21/2012.  IMPRESSION  IMPRESSION: Small traction spurs are seen off the Achilles tendon and  plantar fascial attachment sites bilaterally. Joint spaces are  preserved. Osseous structures are intact. Incidental note is made of  some surgical staples in the soft tissues of the medial distal right  lower extremity.  DANIS ANGLIN MD\"      Immunization History     Immunization History   Administered Date(s) Administered     Influenza (IIV3) 10/11/2011     Influenza Vaccine IM 3yrs+ 4 Valent IIV4 09/27/2015, 09/08/2016     Influenza Vaccine, 3 YRS +, IM (QUADRIVALENT W/PRESERVATIVES) 11/17/2014     Pneumococcal (PCV 13) 04/07/2016     Pneumococcal 23 valent 12/12/2014     TDAP Vaccine (Adacel) 08/30/2011          Chart documentation done in part with Dragon Voice recognition Software. Although reviewed after completion, some word and grammatical error may remain.    Sebastián Jenkins MD  "

## 2017-10-17 NOTE — PATIENT INSTRUCTIONS
Dr. Jenkins s Rheumatology Clinics  Locations Clinic Hours Telephone Number     Kristi Donnelly  [address]  [place] [address]     Wednesday: 7:20AM - 4:00PM  Thursday:     7:20AM - 4:00PM     Friday:          7:20AM - 11:00AM       To schedule an appointment with  Dr. Jenkins,  please contact  Specialty [phone]       Kristi [place]  [address]  [place] [address]       Monday:       7:20AM - 4:00PM        Kristi Rider  [address]  [place] [address]       Tuesday:      7:20AM - 4:00PM          Thank you!    Helga Guerrier CMA

## 2017-10-17 NOTE — NURSING NOTE
"Chief Complaint   Patient presents with     Arthritis     RA, patient states he is feeling a little better, not as much pain and stiffness       Initial /78 (BP Location: Left arm, Patient Position: Chair, Cuff Size: Adult Regular)  Pulse 59  Temp 97.5  F (36.4  C) (Oral)  Ht 1.575 m (5' 2\")  Wt 77.7 kg (171 lb 3.2 oz)  SpO2 98%  BMI 31.31 kg/m2 Estimated body mass index is 31.31 kg/(m^2) as calculated from the following:    Height as of this encounter: 1.575 m (5' 2\").    Weight as of this encounter: 77.7 kg (171 lb 3.2 oz).  BP completed using cuff size: small regular         RAPID3 (0-30) Cumulative Score            RAPID3 Weighted Score (divide #4 by 3 and that is the weighted score)           "

## 2017-10-20 ENCOUNTER — OFFICE VISIT (OUTPATIENT)
Dept: CARDIOLOGY | Facility: CLINIC | Age: 52
End: 2017-10-20
Payer: COMMERCIAL

## 2017-10-20 VITALS
BODY MASS INDEX: 31.11 KG/M2 | HEART RATE: 57 BPM | DIASTOLIC BLOOD PRESSURE: 82 MMHG | OXYGEN SATURATION: 98 % | SYSTOLIC BLOOD PRESSURE: 123 MMHG | WEIGHT: 170.1 LBS

## 2017-10-20 DIAGNOSIS — E78.01 FAMILIAL HYPERCHOLESTEROLEMIA: Primary | ICD-10-CM

## 2017-10-20 PROCEDURE — 99214 OFFICE O/P EST MOD 30 MIN: CPT | Mod: GC | Performed by: INTERNAL MEDICINE

## 2017-10-20 ASSESSMENT — PAIN SCALES - GENERAL: PAINLEVEL: SEVERE PAIN (6)

## 2017-10-20 NOTE — NURSING NOTE
"Lamont Daniels's goals for this visit include:   Chief Complaint   Patient presents with     RECHECK     Follow up hypercholesterolemia       He requests these members of his care team be copied on today's visit information: PCP    PCP: David Chavez    Referring Provider:  No referring provider defined for this encounter.    Chief Complaint   Patient presents with     RECHECK     Follow up hypercholesterolemia       Initial /82 (BP Location: Left arm, Patient Position: Chair, Cuff Size: Adult Regular)  Pulse 57  Wt 77.2 kg (170 lb 1.6 oz)  SpO2 98%  BMI 31.11 kg/m2 Estimated body mass index is 31.11 kg/(m^2) as calculated from the following:    Height as of 10/17/17: 1.575 m (5' 2\").    Weight as of this encounter: 77.2 kg (170 lb 1.6 oz).  Medication Reconciliation: complete       Medication Refills: Yes, Repatha, plavix, and ASA      Kinsey Chi CMA        "

## 2017-10-20 NOTE — PATIENT INSTRUCTIONS
The following is a summary of your office visit:    Medications started today:none    Medications stopped today:none    Medication dose change: none    Nurse contact information: Sarah Arce RN  Cardiology Care Coordinator  [phone] Phone  [phone] Fax    Appointments made today: Follow up in June 2018 with NON fasting labs    Patient instructions: none      If you have had any blood work, imaging or other testing completed we will be in touch within 1-2 weeks regarding the results. If you have any questions, concerns or need to schedule a follow up, please contact us at [phone]. If you are needing refills please contact your pharmacy. For urgent after hour care please call the [facility] at [phone] or the [facility] at [phone] and ask to speak to the cardiologist on call.    It was a pleasure meeting with you today. Please let us know if there is anything else we can do for you so that we can be sure you are leaving completely satisfied with your care experience.     Your Cardiology Team at [facility]  RN Care Coordinator: Sarah  CMA: Kinsey

## 2017-10-20 NOTE — MR AVS SNAPSHOT
After Visit Summary   10/20/2017    Lamont Daniels    MRN: [id number]           Patient Information     Date Of Birth          1965        Visit Information        Provider Department      10/20/2017 3:00 PM Fly Travis MD [facility]        Today's Diagnoses     Familial hypercholesterolemia    -  1      Care Instructions      The following is a summary of your office visit:    Medications started today:none    Medications stopped today:none    Medication dose change: none    Nurse contact information: Sarah Arce RN  Cardiology Care Coordinator  [phone] Phone  [phone] Fax    Appointments made today: Follow up in June 2018 with NON fasting labs    Patient instructions: none      If you have had any blood work, imaging or other testing completed we will be in touch within 1-2 weeks regarding the results. If you have any questions, concerns or need to schedule a follow up, please contact us at [phone]. If you are needing refills please contact your pharmacy. For urgent after hour care please call the [facility] at [phone] or the [facility] at [phone] and ask to speak to the cardiologist on call.    It was a pleasure meeting with you today. Please let us know if there is anything else we can do for you so that we can be sure you are leaving completely satisfied with your care experience.     Your Cardiology Team at [facility]  RN Care Coordinator: Sarah Euceda                    Follow-ups after your visit        Your next 10 appointments already scheduled     Dec 04, 2017  9:00 AM CST   US ABDOMEN COMPLETE with [facility], [facility] US TECH   [facility]    [id number] [place] [address] [phone]           Please bring a list of your medicines (including vitamins, minerals and over-the-counter drugs). Also, tell your doctor  about any allergies you may have. Wear comfortable clothes and leave your valuables at home.  Adults: No eating or drinking for 8 hours before the exam. You may take medicine with a small sip of water.  Children: - Children 6+ years: No food or drink for 6 hours before exam. - Children 1-5 years: No food or drink for 4 hours before exam. - Infants, breast-fed: may have breast milk up to 2 hours before exam. - Infants, formula: may have bottle until 4 hours before exam.  Please call the Imaging Department at your exam site with any questions.            Dec 04, 2017  9:40 AM CST   LAB with LAB FIRST FLOOR [facility] ([facility])    [address] [place] [id number] [phone]           Patient must bring picture ID. Patient should be prepared to give a urine specimen  Please do not eat 10-12 hours before your appointment if you are coming in fasting for labs on lipids, cholesterol, or glucose (sugar). Pregnant women should follow their Care Team instructions. Water with medications is okay. Do not drink coffee or other fluids. If you have concerns about taking  your medications, please ask at office or if scheduling via Hyperic, send a message by clicking on Secure Messaging, Message Your Care Team.            Dec 14, 2017  4:00 PM CST   Return Visit with Drea Laboy MD   [facility] ([facility])    [address] [place] [id number] [phone]            Luis Antonio 10, 2018  3:00 PM CST   Return Visit with Dorothea Loo MD   [facility] [place] ([facility] [place])    [id number] [place] [address]  [place] [id number] [phone]            Jan 11, 2018  3:00 PM CST   LAB with BK LAB   [facility] ([facility])    [address] [facility] [id number] [phone]           Patient must bring picture ID. Patient should be prepared  to give a urine specimen  Please do not eat 10-12 hours before your appointment if you are coming in fasting for labs on lipids, cholesterol, or glucose (sugar). Pregnant women should follow their Care Team instructions. Water with medications is okay. Do not drink coffee or other fluids. If you have concerns about taking  your medications, please ask at office or if scheduling via Whereoscope, send a message by clicking on Secure Messaging, Message Your Care Team.            Jan 16, 2018  2:40 PM CST   Return Visit with Sebastián Jenkins MD   [facility] ([facility])    [address] [facility] [address]   [phone]            Jun 22, 2018  3:00 PM CDT   LAB with LAB FIRST FLOOR [facility])    [address] [place] [id number] [phone]           Patient must bring picture ID. Patient should be prepared to give a urine specimen  Please do not eat 10-12 hours before your appointment if you are coming in fasting for labs on lipids, cholesterol, or glucose (sugar). Pregnant women should follow their Care Team instructions. Water with medications is okay. Do not drink coffee or other fluids. If you have concerns about taking  your medications, please ask at office or if scheduling via Whereoscope, send a message by clicking on Secure Messaging, Message Your Care Team.            Jun 22, 2018  3:30 PM CDT   Return Visit with Fly Travis MD   [facility])    [address] [place] [id number] [phone]              Future tests that were ordered for you today     Open Future Orders        Priority Expected Expires Ordered    Lipid panel reflex to direct LDL Routine  10/20/2018 10/20/2017            Who to contact     If you have questions or need follow up information about today's clinic visit or your schedule please contact [facility] directly at [phone].  Normal or non-critical lab and imaging results will be communicated to you by Zubiehart, letter or phone within 4 business days after the clinic has received the results. If you do not hear from us within 7 days, please contact the clinic through Zubiehart or phone. If you have a critical or abnormal lab result, we will notify you by phone as soon as possible.  Submit refill requests through [email or web address] or call your pharmacy and they will forward the refill request to us. Please allow 3 business days for your refill to be completed.          Additional Information About Your Visit        [email or web address] Information     [email or web address] gives you secure access to your electronic health record. If you see a primary care provider, you can also send messages to your care team and make appointments. If you have questions, please call your primary care clinic.  If you do not have a primary care provider, please call [phone] and they will assist you.      [email or web address] is an electronic gateway that provides easy, online access to your medical records. With [email or web address], you can request a clinic appointment, read your test results, renew a prescription or communicate with your care team.     To access your existing account, please contact your [facility] or call [phone] for assistance.        Care EveryWhere ID     This is your Care EveryWhere ID. This could be used by other organizations to access your [place] medical records  [id number]        Your Vitals Were     Pulse Pulse Oximetry BMI (Body Mass Index)             57 98% 31.11 kg/m2          Blood Pressure from Last 3 Encounters:   10/20/17 123/82   10/17/17 120/78   09/20/17 127/85    Weight from Last 3 Encounters:   10/20/17 77.2 kg (170 lb 1.6 oz)   10/17/17 77.7 kg (171 lb 3.2 oz)   09/20/17 78 kg (172 lb)               Primary Care Provider Office Phone # Fax #    David Chavez [phone]        [address]  [place] [address]        Equal Access to Services     VALENTINCLAUDETTE MONTEZ : Hadii aad ku hadallieo Soomaali, waaxda luqadaha, qaybta kaalmada celestinamahadkingston, marcelino guzmansosapati katz . So [facility] [phone].    ATENCIÓN: Si habla español, tiene a muse disposición servicios gratuitos de asistencia lingüística. Llame al [phone].    We comply with applicable federal civil rights laws and [place] laws. We do not discriminate on the basis of race, color, national origin, age, disability, sex, sexual orientation, or gender identity.            Thank you!     Thank you for choosing [facility]  for your care. Our goal is always to provide you with excellent care. Hearing back from our patients is one way we can continue to improve our services. Please take a few minutes to complete the written survey that you may receive in the mail after your visit with us. Thank you!             Your Updated Medication List - Protect others around you: Learn how to safely use, store and throw away your medicines at www.disposemymeds.org.          This list is accurate as of: 10/20/17  3:35 PM.  Always use your most recent med list.                   Brand Name Dispense Instructions for use Diagnosis    Abatacept 125 MG/ML Soaj     4 Syringe    Inject 125 mg Subcutaneous every 7 days    Rheumatoid arthritis of multiple sites with negative rheumatoid factor (H)       allopurinol 300 MG tablet    ZYLOPRIM    90 tablet    TAKE ONE TABLET BY MOUTH ONCE DAILY    Idiopathic gout, unspecified chronicity, unspecified site       aspirin 81 MG EC tablet     30 tablet    Take 1 tablet (81 mg) by mouth daily (*)    Coronary artery disease involving native coronary artery of native heart without angina pectoris       atorvastatin 80 MG tablet    LIPITOR    90 tablet    TAKE 1 TABLET BY MOUTH 1 TIME DAILY FOR CHOLESTEROL    Atherosclerosis of native coronary artery of native heart, angina presence  unspecified, Hyperlipidemia LDL goal <100       baclofen 10 MG tablet    LIORESAL    180 tablet    Take 1 tablet (10 mg) by mouth 2 times daily as needed for muscle spasms    Spasm of back muscles       BusPIRone HCl 30 MG Tabs     180 tablet    Take 1 tablet by mouth 2 times daily    Major depressive disorder, recurrent episode, moderate (H)       clonazePAM 1 MG tablet    klonoPIN    90 tablet    TAKE ONE-HALF TO ONE TABLET BY MOUTH THREE TIMES DAILY AS NEEDED FOR ANXIETY    Anxiety hyperventilation       clopidogrel 75 MG tablet    PLAVIX    90 tablet    Take 1 tablet (75 mg) by mouth daily    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    Gout without tophus       diclofenac 1 % Gel topical gel    VOLTAREN    200 g    Apply 2-4 grams to 2-3 joints, up to four times daily as needed using enclosed dosing card.  Max of 32g/day    Facet arthropathy, Rheumatoid arthritis involving multiple sites, unspecified rheumatoid factor presence (H)       evolocumab 140 MG/ML prefilled autoinjector    REPATHA    2 mL    Inject 1 mL (140 mg) Subcutaneous every 14 days    Hyperlipidemia LDL goal <70, S/P CABG (coronary artery bypass graft)       ezetimibe 10 MG tablet    ZETIA    90 tablet    Take 1 tablet (10 mg) by mouth daily    Coronary artery disease involving native coronary artery of native heart without angina pectoris       fluticasone 50 MCG/ACT spray    FLONASE    1 Bottle    Spray 2 sprays into both nostrils daily    Nasal congestion, Dizziness       gabapentin 300 MG capsule    NEURONTIN    540 capsule    Take 2 capsules (600 mg) by mouth 3 times daily    Lumbar radiculopathy       gemfibrozil 600 MG tablet    LOPID    180 tablet    Take  1 tablet (600 mg) by mouth 2 times daily    Hyperlipidemia LDL goal <70       hydroxychloroquine 200 MG tablet    PLAQUENIL    30 tablet    Take 1 tablet (200 mg) by mouth daily    Rheumatoid arthritis of multiple sites with negative rheumatoid factor (H), High risk medications (not anticoagulants) long-term use       meclizine 25 MG tablet    ANTIVERT    30 tablet    TAKE ONE TABLET BY MOUTH EVERY 6 HOURS AS NEEDED FOR  DIZZINESS    Vertigo       melatonin 3 MG tablet      Take 1 tablet (3 mg) by mouth nightly as needed for sleep    Delayed sleep phase syndrome       meloxicam 7.5 MG tablet    MOBIC    30 tablet    TAKE ONE TABLET BY MOUTH ONCE DAILY WITH  BREAKFAST    Low back pain, unspecified back pain laterality, unspecified chronicity, with sciatica presence unspecified       metoprolol 25 MG 24 hr tablet    TOPROL-XL    90 tablet    Take 1 tablet (25 mg) by mouth daily    Atherosclerosis of native coronary artery of native heart without angina pectoris       mirtazapine 15 MG tablet    REMERON    30 tablet    Take 1 tablet (15 mg) by mouth At Bedtime    Severe episode of recurrent major depressive disorder, without psychotic features (H)       nitroGLYcerin 0.4 MG sublingual tablet    NITROSTAT    30 tablet    Place 1 tablet (0.4 mg) under the tongue every 5 minutes as needed    Atherosclerosis of native coronary artery of native heart with angina pectoris (H)       order for DME      1.  CPAP pressure 11 cm/H20 with heated humidity.  2.  Provide mask to fit and CPAP supplies.  3.  Length of need lifetime.  4.  If needed please provide a chin strap    JEROD (obstructive sleep apnea), Anxiety, CAD (coronary artery disease), HTN (hypertension)       * order for DME     1 Units    Equipment being ordered: single end cane    Lumbar radiculopathy, Facet arthropathy, Chronic low back pain       * order for DME     200 each    Equipment being ordered: test strips as covered by insurance. USE TO TEST BLOOD SUGARS TWICE  DAILY OR AS DIRECTED.    Rheumatoid arthritis involving multiple sites, unspecified rheumatoid factor presence (H)       * order for DME     1 each    OneTouch glucometer.    Rheumatoid arthritis involving multiple sites, unspecified rheumatoid factor presence (H)       * order for DME     200 each    OneTouch lancets testing twice daily.    Rheumatoid arthritis involving multiple sites, unspecified rheumatoid factor presence (H)       oxyCODONE 5 MG IR tablet    ROXICODONE    180 tablet    Take 1-2 tablets (5-10 mg) by mouth every 4 hours as needed for moderate to severe pain . Max of 6 tabs per day.  30 day supply. May fill on 10/19/17 to start on/after 10/21/17    Facet arthropathy       pantoprazole 40 MG EC tablet    PROTONIX    90 tablet    TAKE ONE TABLET BY MOUTH ONCE DAILY FOR  STOMACH.    Gastroesophageal reflux disease without esophagitis       predniSONE 5 MG tablet    DELTASONE    30 tablet    Take 1 tablet (5 mg) by mouth daily    Rheumatoid arthritis of multiple sites with negative rheumatoid factor (H), High risk medications (not anticoagulants) long-term use       sulfaSALAzine  MG EC tablet    AZULFIDINE EN    120 tablet    Take 2 tablets (1,000 mg) by mouth 2 times daily    Rheumatoid arthritis of multiple sites with negative rheumatoid factor (H), High risk medications (not anticoagulants) long-term use       tamsulosin 0.4 MG capsule    FLOMAX    90 capsule    Take 1 capsule (0.4 mg) by mouth daily    Benign prostatic hyperplasia with weak urinary stream       tenofovir 300 MG tablet    VIREAD    90 tablet    Take 1 tablet (300 mg) by mouth daily    Chronic viral hepatitis B without delta agent and without coma (H)       vitamin D 2000 UNITS tablet     100 tablet    TAKE ONE TABLET BY MOUTH ONCE DAILY    Vitamin D deficiency       zolpidem 5 MG tablet    AMBIEN    30 tablet    TAKE ONE TABLET BY MOUTH AT BEDTIME AS NEEDED FOR SLEEP    Insomnia, unspecified       * Notice:  This list has 4  medication(s) that are the same as other medications prescribed for you. Read the directions carefully, and ask your doctor or other care provider to review them with you.

## 2017-10-20 NOTE — PROGRESS NOTES
[facility] Cardiology Consultation:    Assessment and Plan:     1. Premature CAD, CABG in 2008, PCI to Cx in '08, repeat PCI to Cx stent 2016 (patent sequential LIMA-D3-LAD, sequential SVG-RV marginal-PDA, occluded radial graft to PL branches), normal LV fxn: Cont secondary prevention with asa. Plan to continue plavix long term for now given aggressive ISR and low bleeding risk.      2. Dyspnea: stable. Echo and PFT's have been checked and were normal; its likely related to anxiety.   3. Dyslipidemia, likely heterozygous FH, metabolic syndrome: On lipitor 80, gemfibrozil, zetia 10 and also now on Repatha for uncontrolled dyspilidemia despite maximal rx. Lipid profile has improved significantly since initiating Repatha- will continue and recheck in about 6 months  4. JEROD on CPAP    RTC 6 mos. Lipid panel at that time.    Sakina Jenkins    [facility] Cardiovascular Division    I have seen and examined the patient, reviewed labs and tests. I have discussed my findings and treatment recommendations with the house staff and/or Cardiology fellow and agree with their assessment and plan as outlined in the note.    Fly Travis MD    Cardiac Imaging and Prevention  [facility]  Pager: [phone]    HPI:    Lamont Daniels is a 51 year-old male with complex medical history including significant HL with early CAD s/p CABG '08, PCI to Cx in '08, repeat PCI to Cx stent 2016, RA and anxiety who presents for follow-up. He feels that overall he has been 'stable' with regard to his medical problems. His anxiety has been fairly under control on buspar and klonipin. Denies trouble with his cardiac medications.   He has been troubled by difficulty with dizziness, with episodes occurring on a daily basis- usually with standing or with turning his head. He saw neurology last month and had an MRI brain for concurrent evaluation of visual field losses, which turned out to be normal. The  patient thinks that orthostatic vitals were obtained at that visit, and to his knowledge were unremarkable. The feeling of unsteadiness/dizziness that occurs with standing, typically resolves on its own within a few seconds.  He reports that symptoms of dyspnea and occasional chest pain are at baseline. He is limited significantly by joint pain and neuropathy, but is able to get around his house. Denies any orthopnea, pnd, palpitations. No trouble with taking his medications. He has been getting weekly Repatha injections.    EXAM:  /82 (BP Location: Left arm, Patient Position: Chair, Cuff Size: Adult Regular)  Pulse 57  Wt 77.2 kg (170 lb 1.6 oz)  SpO2 98%  BMI 31.11 kg/m2  GEN/CONSTITUIONAL: Appears comfortable, in no apparent distress   EYES: No icterus  ENT/MOUTH: Normal  JVP:  Not visible  RESPIRATORY: Clear to auscultation bilaterally   CARDIOVASCULAR: Regular S1 and S2, no murmurs, rubs, or gallops.   ABDOMEN: Soft, non-tender, positive bowel sounds   NEUROLOGIC: Grossly non-focal   PSYCHIATRIC: Normal affect  EXT: No cyanosis, clubbing, edema. Normal pedal pulses.  Skin: No petechiae, purpura or rash    PAST MEDICAL HISTORY:  Past Medical History:   Diagnosis Date     Anxiety      CAD (coronary artery disease)     sees cardiologist Dr. Yang, has had stents and cabg     Congestive heart failure, unspecified 2008     Depressive disorder 2008     Gout      Hepatitis B > 10 years     HTN (hypertension)      Hyperlipidemia LDL goal < 100      Malrotation of intestine      Moderate major depression (H)      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) 9/19/2011    Uses CPAP     Rheumatoid arthritis flare (H) 1012     Steatohepatitis 12/15    liver biopsy     Tuberculosis age 13    INH x 9 months      Vitamin D deficiency        CURRENT MEDICATIONS:  Current Outpatient Prescriptions   Medication     Abatacept 125 MG/ML SOAJ     predniSONE (DELTASONE) 5 MG tablet     sulfaSALAzine ER (AZULFIDINE EN) 500 MG EC tablet      hydroxychloroquine (PLAQUENIL) 200 MG tablet     oxyCODONE (ROXICODONE) 5 MG IR tablet     gemfibrozil (LOPID) 600 MG tablet     tamsulosin (FLOMAX) 0.4 MG capsule     diclofenac (VOLTAREN) 1 % GEL topical gel     nitroGLYcerin (NITROSTAT) 0.4 MG sublingual tablet     ezetimibe (ZETIA) 10 MG tablet     colchicine (COLCRYS) 0.6 MG tablet     gabapentin (NEURONTIN) 300 MG capsule     fluticasone (FLONASE) 50 MCG/ACT spray     atorvastatin (LIPITOR) 80 MG tablet     tenofovir (VIREAD) 300 MG tablet     meclizine (ANTIVERT) 25 MG tablet     pantoprazole (PROTONIX) 40 MG EC tablet     meloxicam (MOBIC) 7.5 MG tablet     clonazePAM (KLONOPIN) 1 MG tablet     BusPIRone HCl 30 MG TABS     mirtazapine (REMERON) 15 MG tablet     baclofen (LIORESAL) 10 MG tablet     metoprolol (TOPROL-XL) 25 MG 24 hr tablet     zolpidem (AMBIEN) 5 MG tablet     allopurinol (ZYLOPRIM) 300 MG tablet     evolocumab (REPATHA) 140 MG/ML prefilled autoinjector     clopidogrel (PLAVIX) 75 MG tablet     aspirin EC 81 MG EC tablet     Cholecalciferol (VITAMIN D) 2000 UNITS tablet     melatonin 3 MG tablet     order for DME     order for DME     order for DME     order for DME     ORDER FOR DME     No current facility-administered medications for this visit.      Facility-Administered Medications Ordered in Other Visits   Medication     gadobutrol (GADAVIST) injection 10 mL       PAST SURGICAL HISTORY:  Past Surgical History:   Procedure Laterality Date     ABDOMEN SURGERY  2014     BIOPSY  2015     BYPASS GRAFT ARTERY CORONARY  2008    6 vessels     COLONOSCOPY  2/8/2013    Procedure: COLONOSCOPY;  Colonoscopy, blood in stool;  Surgeon: Duane, William Charles, MD;  Location: [facility] OR     GI SURGERY  2014     HC CORONARY STENT CIERA SOTO ADDTL VESSEL  2008    3 months after CABG     HEAD & NECK SURGERY  2011     NASAL/SINUS POLYPECTOMY  2010     ORTHOPEDIC SURGERY  2012     THORACIC SURGERY  1989    tb     TONSILLECTOMY  2010     UVULOPALATOPHARYNGOPLASTY   2010     VASCULAR SURGERY  2008       ALLERGIES     Allergies   Allergen Reactions     Citalopram Itching     Tramadol Itching       FAMILY HISTORY:  Family History   Problem Relation Age of Onset     C.A.D. Father      Coronary Artery Disease Father      Hypertension Father      Depression Father      Hypertension Mother      DIABETES Mother      Depression Mother      MENTAL ILLNESS Mother      Hypertension Maternal Grandfather      CANCER Paternal Grandfather      Other Cancer Paternal Grandfather      Other Cancer Other      Autoimmune Disease No family hx of      CEREBROVASCULAR DISEASE No family hx of      Thyroid Disease No family hx of      Glaucoma No family hx of      Macular Degeneration No family hx of    -oldest of 8 siblings     SOCIAL HISTORY:  Social History     Social History     Marital status:      Spouse name: N/A     Number of children: 5     Years of education: 14     Occupational History      Unemployed     2-2011. On long term disability. SSD applied for     Social History Main Topics     Smoking status: Never Smoker     Smokeless tobacco: Never Used     Alcohol use No     Drug use: No     Sexual activity: Yes     Partners: Female     Other Topics Concern     Parent/Sibling W/ Cabg, Mi Or Angioplasty Before 65f 55m? Yes     father     Social History Narrative    . No current SO.         Has 5 children. Youngest child does live with him.         H/o AA degree and previously worked as a [employer]. Currently unemployed.         Denies tobacco use.     Alcohol use: 2x/week with minimal amount of alcohol per time.     Drug use: denies       ROS:   Constitutional: No fever, chills, or sweats. No weight gain/loss   ENT: No visual disturbance, ear ache, epistaxis, sore throat  Allergies/Immunologic: Negative.   Respiratory: No cough, hemoptysia  Cardiovascular: As per HPI  GI: No nausea, vomiting, hematemesis, melena, or hematochezia  : No urinary frequency,  dysuria, or hematuria  Integument: Negative  Psychiatric: Negative  Neuro: Negative  Endocrinology: Negative   Musculoskeletal: Negative    ADDITIONAL COMMENTS:     I reviewed the patient's medications:     I reviewed the patient's pertinent clinical laboratory studies:     I reviewed the patient's pertinent imaging studies:   I reviewed the patient's ECG:

## 2017-10-21 ENCOUNTER — MYC REFILL (OUTPATIENT)
Dept: FAMILY MEDICINE | Facility: CLINIC | Age: 52
End: 2017-10-21

## 2017-10-21 DIAGNOSIS — R42 VERTIGO: ICD-10-CM

## 2017-10-21 DIAGNOSIS — G47.00 INSOMNIA, UNSPECIFIED TYPE: ICD-10-CM

## 2017-10-21 DIAGNOSIS — F45.8 ANXIETY HYPERVENTILATION: ICD-10-CM

## 2017-10-21 DIAGNOSIS — F41.9 ANXIETY HYPERVENTILATION: ICD-10-CM

## 2017-10-23 NOTE — TELEPHONE ENCOUNTER
[facility]  Original authorizing provider: David Chavez MD, MD Lamont Daniels would like a refill of the following medications:  zolpidem (AMBIEN) 5 MG tablet [David Chavez MD, MD]  clonazePAM (KLONOPIN) 1 MG tablet [David Chavez MD, MD]  meclizine (ANTIVERT) 25 MG tablet [David Chavez MD, MD]    Preferred pharmacy: [address]    Comment:  Please fill these meds please

## 2017-10-24 RX ORDER — CLONAZEPAM 1 MG/1
TABLET ORAL
Qty: 90 TABLET | Refills: 0 | Status: SHIPPED | OUTPATIENT
Start: 2017-10-24 | End: 2017-12-14

## 2017-10-24 RX ORDER — ZOLPIDEM TARTRATE 5 MG/1
TABLET ORAL
Qty: 30 TABLET | Refills: 5 | Status: SHIPPED | OUTPATIENT
Start: 2017-10-24 | End: 2018-04-25

## 2017-10-24 RX ORDER — MECLIZINE HYDROCHLORIDE 25 MG/1
TABLET ORAL
Qty: 30 TABLET | Refills: 10 | Status: SHIPPED | OUTPATIENT
Start: 2017-10-24 | End: 2018-07-26

## 2017-10-24 NOTE — TELEPHONE ENCOUNTER
2 Written rx faxed to the pharmacy, Pharmacy will contact pt when medication is ready for pickup.  Renard Donohue,  For [facility]

## 2017-11-07 DIAGNOSIS — E78.5 HYPERLIPIDEMIA LDL GOAL <70: ICD-10-CM

## 2017-11-07 DIAGNOSIS — Z95.1 S/P CABG (CORONARY ARTERY BYPASS GRAFT): ICD-10-CM

## 2017-11-07 NOTE — TELEPHONE ENCOUNTER
Writer received a refill request from: [facility] in [place] on [address].     Medication: Repatha  Si mg/ML SOAJ 140 - 1 auto injector every other week  Date last written: 2016  Dispensed amount: 2  Refills:   Date last dispensed: 10/09/2017      Pt's last office visit: 10/20/2017  Next scheduled office visit: 2018

## 2017-11-08 ENCOUNTER — TELEPHONE (OUTPATIENT)
Dept: PHARMACY | Facility: OTHER | Age: 52
End: 2017-11-08

## 2017-11-08 ENCOUNTER — MYC MEDICAL ADVICE (OUTPATIENT)
Dept: FAMILY MEDICINE | Facility: CLINIC | Age: 52
End: 2017-11-08

## 2017-11-08 NOTE — TELEPHONE ENCOUNTER
PA Initiation    Medication: Repatha  Ins[facility] - Phone [phone] Fax [phone]  Pharmacy Filling the Rx: [facility] - [place], [place] - [facility] [address]  Filling Pharmacy Phone: [phone]  Filling Pharmacy Fax: [phone]  Start Date: 11/8/2017

## 2017-11-08 NOTE — TELEPHONE ENCOUNTER
Forwarded to Dr Travis to review and sign. Medication is not on refill protocol.      A prior authorization is also needed, will route to the PA team as well.     Prior Authorization Specialty Medication Request    Medication/Dose: Repatha 140 MG/ML prefilled autoinjector    Diagnosis and ICD:   Hyperlipidemia LDL goal <70 [E78.5]  S/P CABG (coronary artery bypass graft) [Z95.1]     New/Renewal/Insurance Change PA: Renewal    Important Lab Values:   Component      Latest Ref Rng & Units 6/9/2017   Cholesterol      <200 mg/dL 118   Triglycerides      <150 mg/dL 102   HDL Cholesterol      >39 mg/dL 63   LDL Cholesterol Calculated      <100 mg/dL 35   Non HDL Cholesterol      <130 mg/dL 55       Previously Tried and Failed Therapies: Pravastatin, Crestor tried and failed. Currently taking Atorvastatin, Gemfibrozil and Exetimibe.     Rationale: for residual dyslipidemia in this very high risk individual with progressive CAD, and on maximal statin therapy with 2 additional lipid agents      If you received a fax notification from an outside Pharmacy;  Pharmacy Name: [facility] on [facility]  Pharmacy #:  Pharmacy Fax:    Joselyn Tyson RN

## 2017-11-15 ENCOUNTER — MYC REFILL (OUTPATIENT)
Dept: PALLIATIVE MEDICINE | Facility: CLINIC | Age: 52
End: 2017-11-15

## 2017-11-15 DIAGNOSIS — M47.819 FACET ARTHROPATHY: ICD-10-CM

## 2017-11-15 NOTE — LETTER
[facility]    11/16/17    Patient: Lamont Daniels  YOB: 1965  Medical Record Number: [id number]                                                                  Controlled Substance Agreement  I understand that my care provider has prescribed controlled substances (narcotics, tranquilizers, and/or stimulants) to help manage my condition(s).  I am taking this medicine to help me function or work.  I know that this is strong medicine.  It could have serious side effects and even cause a dependency on the drug.  If I stop these medicines suddenly, I could have severe withdrawal symptoms.    The risks, benefits, and side effects of these medication(s) were explained to me.  I agree that:  1. I will take part in other treatments as advised by my provider.  This may be psychiatry or counseling, physical therapy, behavioral therapy, group treatment, or a referral to a pain clinic.  I will reduce or stop my medicine when my provider tells me to do so.   2. I will take my medicines as prescribed.  I will not change the dose or schedule unless my provider tells me to.  There will be no refills if I  run out early.   I may be contacted at any time without warning and asked to complete a drug test or pill count.   3. I will keep all my appointments at the clinic.  If I miss appointments or fail to follow instructions, my provider may stop my medicine.  4. I will not ask other providers to prescribe controlled substances. And I will not accept controlled substances from other people. If I need another prescribed controlled substance for a new reason, I will notify my provider within one business day.  5. If I enroll in the [place] Medical Marijuana program, I will tell my provider.  I will also sign an agreement to share my medical records with my provider.  6. I will use one pharmacy to fill all of my controlled substance prescriptions.  If my prescription is mailed to my pharmacy, it  may take 5 to 7 days for my medicine to be ready.  7. I understand that my provider, clinic care team, and pharmacy can track controlled substance prescriptions from other providers through a central database (prescription monitoring program).  8. I will bring in my list of medications (or my medicine bottles) each time I come to the clinic.  REV- 04/2016                                                                                                                                            Page 1 of 2      [facility]    11/16/17    Patient: Lamont Daniels  YOB: 1965  Medical Record Number: [id number]    9. Refills of controlled substances will be made only during office hours.  It is up to me to make sure that I do not run out of my medicines on weekends or holidays.    10. I am responsible for my prescriptions.  If the medicine is lost or stolen, it will not be replaced.   I also agree not to share these medicines with anyone.  11. I agree to not use ANY illegal or recreational drugs.  This includes marijuana, cocaine, bath salts or other drugs.  I agree not to use alcohol unless my provider says I may.  I agree to give urine samples whenever asked.  If I fail to give a urine sample, the provider may stop my medicine.     12. I will tell my nurse or provider right away if I become pregnant or have a new medical problem treated outside of [facility]3].  13. I understand that this medicine can affect my thinking and judgment.  It may be unsafe for me to drive, use machinery and do dangerous tasks.  I will not do any of these things until I know how the medicine affects me.  If my dose changes, I will wait to see how it affects me.  I will contact my provider if I have concerns about medicine side effects.  I understand that if I do not follow any of the conditions above, my prescriptions or treatment may be stopped.    I agree that my provider, clinic care team, and  pharmacy may work with any city, state or federal law enforcement agency that investigates the misuse, sale, or other diversion of my controlled medicine. I will allow my provider to discuss my care with or share a copy of this agreement with any other treating provider, pharmacy or emergency room where I receive care.  I agree to give up (waive) any right of privacy or confidentiality with respect to these authorizations.   I have read this agreement and have asked questions about anything I did not understand.   ___________________________________    ___________________________  Patient Signature                                                           Date and Time  ___________________________________     ____________________________  Witness                                                                            Date and Time  ___________________________________  Dorothea Loo MD  REV-  04/2016                                                                                                                                                                 Page 2 of 2

## 2017-11-16 RX ORDER — OXYCODONE HYDROCHLORIDE 5 MG/1
5-10 TABLET ORAL EVERY 4 HOURS PRN
Qty: 180 TABLET | Refills: 0 | Status: SHIPPED | OUTPATIENT
Start: 2017-11-16 | End: 2017-12-14

## 2017-11-16 NOTE — TELEPHONE ENCOUNTER
Patient requesting refill(s) of oxyCODONE (ROXICODONE) 5 MG IR tablet     Last picked up from pharmacy on 10/23/17     Pt last seen by prescribing provider on 9/20/17  Next appt scheduled for 1/10/18    Last urine drug screen date 1/5/17  Current opioid agreement on file (completed within the last year) Yes Date of opioid agreement: 1/13/17    Processing (pick one)    Mail to [facility] in [place].    Will route to nursing pool for review and preparation of prescription(s).

## 2017-11-16 NOTE — TELEPHONE ENCOUNTER
Medication refill information reviewed.     Due date for oxyCODONE (ROXICODONE) 5 MG IR tablet  is 11/20/17     Prescriptions prepped for review.     Will route to provider.

## 2017-11-16 NOTE — TELEPHONE ENCOUNTER
Message from [facility]:  Original authorizing provider: Dorothea Loo MD    Lamont IGNACIOLilian Daniels would like a refill of the following medications:  oxyCODONE (ROXICODONE) 5 MG IR tablet [Dorothea Loo MD]    Preferred pharmacy: [address]    Comment:  please this month refill.

## 2017-11-16 NOTE — TELEPHONE ENCOUNTER
Routed to MA pool to gather required information for opioid refill.      Joselyn Jha  BSN-RN Care Coordinator  [facility]

## 2017-11-16 NOTE — TELEPHONE ENCOUNTER
Signed Prescriptions:                        Disp   Refills    oxyCODONE IR (ROXICODONE) 5 MG tablet      180 ta*0        Sig: Take 1-2 tablets (5-10 mg) by mouth every 4 hours as           needed for moderate to severe pain . Max of 6           tabs per day.  30 day supply. May fill on           11/18/17 to start on/after 11/20/17  Authorizing Provider: KAYLA CHUN MD  [facility]

## 2017-11-17 NOTE — TELEPHONE ENCOUNTER
Script for oxycodone was signed and mailed out to [facility] in [id number]. Left voice message.     Amberly Chawla MA

## 2017-12-04 ENCOUNTER — RADIANT APPOINTMENT (OUTPATIENT)
Dept: ULTRASOUND IMAGING | Facility: CLINIC | Age: 52
End: 2017-12-04
Attending: INTERNAL MEDICINE
Payer: COMMERCIAL

## 2017-12-04 DIAGNOSIS — E78.01 FAMILIAL HYPERCHOLESTEROLEMIA: ICD-10-CM

## 2017-12-04 DIAGNOSIS — B18.1 CHRONIC VIRAL HEPATITIS B WITHOUT DELTA AGENT AND WITHOUT COMA (H): ICD-10-CM

## 2017-12-04 LAB
ALBUMIN SERPL-MCNC: 3.8 G/DL (ref 3.4–5)
ALP SERPL-CCNC: 82 U/L (ref 40–150)
ALT SERPL W P-5'-P-CCNC: 33 U/L (ref 0–70)
ANION GAP SERPL CALCULATED.3IONS-SCNC: 8 MMOL/L (ref 3–14)
AST SERPL W P-5'-P-CCNC: 17 U/L (ref 0–45)
BILIRUB DIRECT SERPL-MCNC: <0.1 MG/DL (ref 0–0.2)
BILIRUB SERPL-MCNC: 0.4 MG/DL (ref 0.2–1.3)
BUN SERPL-MCNC: 25 MG/DL (ref 7–30)
CALCIUM SERPL-MCNC: 8.8 MG/DL (ref 8.5–10.1)
CHLORIDE SERPL-SCNC: 111 MMOL/L (ref 94–109)
CHOLEST SERPL-MCNC: 108 MG/DL
CO2 SERPL-SCNC: 25 MMOL/L (ref 20–32)
CREAT SERPL-MCNC: 0.87 MG/DL (ref 0.66–1.25)
ERYTHROCYTE [DISTWIDTH] IN BLOOD BY AUTOMATED COUNT: 13.9 % (ref 10–15)
GFR SERPL CREATININE-BSD FRML MDRD: >90 ML/MIN/1.7M2
GLUCOSE SERPL-MCNC: 81 MG/DL (ref 70–99)
HCT VFR BLD AUTO: 42 % (ref 40–53)
HDLC SERPL-MCNC: 73 MG/DL
HGB BLD-MCNC: 13.9 G/DL (ref 13.3–17.7)
INR PPP: 1.02 (ref 0.86–1.14)
LDLC SERPL CALC-MCNC: 18 MG/DL
MCH RBC QN AUTO: 29.8 PG (ref 26.5–33)
MCHC RBC AUTO-ENTMCNC: 33.1 G/DL (ref 31.5–36.5)
MCV RBC AUTO: 90 FL (ref 78–100)
NONHDLC SERPL-MCNC: 35 MG/DL
PLATELET # BLD AUTO: 253 10E9/L (ref 150–450)
POTASSIUM SERPL-SCNC: 3.6 MMOL/L (ref 3.4–5.3)
PROT SERPL-MCNC: 7.4 G/DL (ref 6.8–8.8)
RBC # BLD AUTO: 4.67 10E12/L (ref 4.4–5.9)
SODIUM SERPL-SCNC: 144 MMOL/L (ref 133–144)
TRIGL SERPL-MCNC: 85 MG/DL
WBC # BLD AUTO: 7.8 10E9/L (ref 4–11)

## 2017-12-04 PROCEDURE — 80061 LIPID PANEL: CPT | Performed by: INTERNAL MEDICINE

## 2017-12-04 PROCEDURE — 80048 BASIC METABOLIC PNL TOTAL CA: CPT | Performed by: INTERNAL MEDICINE

## 2017-12-04 PROCEDURE — 85610 PROTHROMBIN TIME: CPT | Performed by: INTERNAL MEDICINE

## 2017-12-04 PROCEDURE — 80076 HEPATIC FUNCTION PANEL: CPT | Performed by: INTERNAL MEDICINE

## 2017-12-04 PROCEDURE — 87517 HEPATITIS B DNA QUANT: CPT | Performed by: INTERNAL MEDICINE

## 2017-12-04 PROCEDURE — 85027 COMPLETE CBC AUTOMATED: CPT | Performed by: INTERNAL MEDICINE

## 2017-12-04 PROCEDURE — 36415 COLL VENOUS BLD VENIPUNCTURE: CPT | Performed by: INTERNAL MEDICINE

## 2017-12-04 PROCEDURE — 76700 US EXAM ABDOM COMPLETE: CPT | Performed by: RADIOLOGY

## 2017-12-05 LAB
HBV DNA SERPL NAA+PROBE-ACNC: NORMAL [IU]/ML
HBV DNA SERPL NAA+PROBE-LOG IU: NORMAL {LOG_IU}/ML

## 2017-12-07 ENCOUNTER — OFFICE VISIT (OUTPATIENT)
Dept: FAMILY MEDICINE | Facility: CLINIC | Age: 52
End: 2017-12-07
Payer: COMMERCIAL

## 2017-12-07 VITALS
SYSTOLIC BLOOD PRESSURE: 124 MMHG | BODY MASS INDEX: 31.47 KG/M2 | DIASTOLIC BLOOD PRESSURE: 83 MMHG | HEART RATE: 70 BPM | OXYGEN SATURATION: 96 % | WEIGHT: 171 LBS | HEIGHT: 62 IN

## 2017-12-07 DIAGNOSIS — R42 VERTIGO: Primary | ICD-10-CM

## 2017-12-07 DIAGNOSIS — R21 RASH: ICD-10-CM

## 2017-12-07 PROCEDURE — 99213 OFFICE O/P EST LOW 20 MIN: CPT | Performed by: FAMILY MEDICINE

## 2017-12-07 RX ORDER — TRIAMCINOLONE ACETONIDE 1 MG/G
OINTMENT TOPICAL
Qty: 80 G | Refills: 3 | Status: SHIPPED | OUTPATIENT
Start: 2017-12-07 | End: 2018-04-04

## 2017-12-07 ASSESSMENT — PAIN SCALES - GENERAL: PAINLEVEL: MODERATE PAIN (5)

## 2017-12-07 NOTE — PROGRESS NOTES
SUBJECTIVE:   Lamont Daniels is a 52 year old male who presents to clinic today for the following health issues:      Concern - Follow up dizziness and rash on back of head  Onset: years    Description:   Follow up dizziness- not better. Would also like rx refill for triamcinolone ointment for itchy rash back of head, ears and groin area.    Intensity: moderate    Progression of Symptoms:  same    Accompanying Signs & Symptoms:  Rash itchy.     Previous history of similar problem:   no    Precipitating factors:   Worsened by: None    Alleviating factors:  Improved by: None. Triamcinolone help with rash.    Therapies Tried and outcome: meclizine- mild relief for dizziness.      Problem list and histories reviewed & adjusted, as indicated.  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     Hyperlipidemia LDL goal <70     Insomnia     Gout     Suicidal ideation     Essential hypertension     Rheumatoid arthritis involving multiple sites, unspecified rheumatoid factor presence (H)     High risk medications (not anticoagulants) long-term use     Midline low back pain without sciatica     Bilateral low back pain with sciatica, sciatica laterality unspecified     Elevated liver enzymes     On corticosteroid therapy     Essential hypertension with goal blood pressure less than 140/90     Insomnia, unspecified type     Rheumatoid arthritis of multiple sites with negative rheumatoid factor (H)     Benign prostatic hyperplasia with lower urinary tract symptoms, unspecified morphology     Past Surgical History:   Procedure Laterality Date     ABDOMEN SURGERY  2014  "    BIOPSY  2015     BYPASS GRAFT ARTERY CORONARY  2008    6 vessels     COLONOSCOPY  2/8/2013    Procedure: COLONOSCOPY;  Colonoscopy, blood in stool;  Surgeon: Duane, William Charles, MD;  Location: [facility] OR     GI SURGERY  2014      CORONARY STENT CIERA SOTO VESSEL  2008    3 months after CABG     HEAD & NECK SURGERY  2011     NASAL/SINUS POLYPECTOMY  2010     ORTHOPEDIC SURGERY  2012     THORACIC SURGERY  1989    tb     TONSILLECTOMY  2010     UVULOPALATOPHARYNGOPLASTY  2010     VASCULAR SURGERY  2008       Social History   Substance Use Topics     Smoking status: Never Smoker     Smokeless tobacco: Never Used     Alcohol use No     Family History   Problem Relation Age of Onset     C.A.D. Father      Coronary Artery Disease Father      Hypertension Father      Depression Father      Hypertension Mother      DIABETES Mother      Depression Mother      MENTAL ILLNESS Mother      Hypertension Maternal Grandfather      CANCER Paternal Grandfather      Other Cancer Paternal Grandfather      Other Cancer Other      Autoimmune Disease No family hx of      CEREBROVASCULAR DISEASE No family hx of      Thyroid Disease No family hx of      Glaucoma No family hx of      Macular Degeneration No family hx of              Reviewed and updated as needed this visit by clinical staffTobacco  Allergies       Reviewed and updated as needed this visit by Provider         ROS:  Constitutional, HEENT, cardiovascular, pulmonary, GI, , musculoskeletal, neuro, skin, endocrine and psych systems are negative, except as otherwise noted.      OBJECTIVE:   /83 (BP Location: Left arm, Patient Position: Chair, Cuff Size: Adult Large)  Pulse 70  Ht 5' 2\" (1.575 m)  Wt 171 lb (77.6 kg)  SpO2 96%  BMI 31.28 kg/m2  Body mass index is 31.28 kg/(m^2).  GENERAL: healthy, alert and no distress  NECK: no adenopathy, no asymmetry, masses, or scars and thyroid normal to palpation  RESP: lungs clear to auscultation - no rales, rhonchi " or wheezes  CV: regular rate and rhythm, normal S1 S2, no S3 or S4, no murmur, click or rub, no peripheral edema and peripheral pulses strong  ABDOMEN: soft, nontender, no hepatosplenomegaly, no masses and bowel sounds normal  MS: no gross musculoskeletal defects noted, no edema    Diagnostic Test Results:  none     ASSESSMENT/PLAN:       1. Vertigo  Referred to ENT for evaluation and recommendations. Recent MRI brain, no concerning findings. Labs wnl's. Cardiac w/u negative. Possible polypharmacy causing spells.   - OTOLARYNGOLOGY REFERRAL    2. Rash  Needed refills.  - triamcinolone (KENALOG) 0.1 % ointment; Apply sparingly to affected area three times daily for 14 days.  Dispense: 80 g; Refill: 3    See Patient Instructions    David Chavez MD, MD  [facility]

## 2017-12-07 NOTE — NURSING NOTE
"Chief Complaint   Patient presents with     Derm Problem     request refill on triamcinolone oint.     Dizziness     recheck- discuss increasing dose of meclizine       Initial /83 (BP Location: Left arm, Patient Position: Chair, Cuff Size: Adult Large)  Pulse 70  Ht 5' 2\" (1.575 m)  Wt 171 lb (77.6 kg)  SpO2 96%  BMI 31.28 kg/m2 Estimated body mass index is 31.28 kg/(m^2) as calculated from the following:    Height as of this encounter: 5' 2\" (1.575 m).    Weight as of this encounter: 171 lb (77.6 kg).  Medication Reconciliation: complete     Anita Maldonado MA      "

## 2017-12-07 NOTE — MR AVS SNAPSHOT
After Visit Summary   12/7/2017    Lamont Daniels    MRN: [id number]           Patient Information     Date Of Birth          1965        Visit Information        Provider Department      12/7/2017 2:40 PM David Chavez MD [facility]        Today's Diagnoses     Vertigo    -  1    Rash          Care Instructions    At [facility], we strive to deliver an exceptional experience to you, every time we see you.  If you receive a survey in the mail, please send us back your thoughts. We really do value your feedback.    Based on your medical history, these are the current health maintenance/preventive care services that you are due for (some may have been done at this visit.)  Health Maintenance Due   Topic Date Due     INFLUENZA VACCINE (SYSTEM ASSIGNED)  09/01/2017     PHQ-9 Q6 MONTHS  10/25/2017         Suggested websites for health information:  Www.WindPipe : Up to date and easily searchable information on multiple topics.  Www.JollyDeck.gov : medication info, interactive tutorials, watch real surgeries online  Www.familydoctor.org : good info from the Academy of Family Physicians  Www.cdc.gov : public health info, travel advisories, epidemics (H1N1)  Www.aap.org : children's health info, normal development, vaccinations  Www.health.state.[place].us : [place] dept of health, public health issues in [place], N1N1    Your care team:                            Family Medicine Internal Medicine   MD Noam Dorado MD Shantel Branch-Fleming, MD Katya Georgiev PA-C Nam Ho, MD Pediatrics   NATE Andre, PAVEL LEMUS CNP   MD Tiffany Sanchez MD Deborah Mielke, MD Kim Thein, APRN CNP      Clinic hours: Monday - Thursday 7 am-7 pm; Fridays 7 am-5 pm.   Urgent care: Monday - Friday 11 am-9 pm; Saturday and Sunday 9 am-5 pm.  Pharmacy : Monday -Thursday 8 am-8 pm; Friday 8 am-6 pm; Saturday and Sunday 9  am-5 pm.     Clinic: [phone]   Pharmacy: [phone]            Follow-ups after your visit        Additional Services     OTOLARYNGOLOGY REFERRAL       Your provider has referred you to: [facility]: [facility] [phone]   [email or web address]    Please be aware that coverage of these services is subject to the terms and limitations of your health insurance plan.  Call member services at your health plan with any benefit or coverage questions.      Please bring the following with you to your appointment:    (1) Any X-Rays, CTs or MRIs which have been performed.  Contact the facility where they were done to arrange for  prior to your scheduled appointment.   (2) List of current medications  (3) This referral request   (4) Any documents/labs given to you for this referral                  Your next 10 appointments already scheduled     Dec 14, 2017  4:00 PM CST   [facility] Visit with Drea Laboy MD   [facility]    [address] [place] [id number] [phone]            Luis Antonio 10, 2018  3:00 PM CST   Return Visit with Dorothea Loo MD   [facility] ([facility])    [address] [place] [phone]            Jan 11, 2018  3:00 PM CST   LAB with BK LAB   [facility]    [address] [place] [phone]           Please do not eat 10-12 hours before your appointment if you are coming in fasting for labs on lipids, cholesterol, or glucose (sugar). This does not apply to pregnant women. Water, hot tea and black coffee (with nothing added) are okay. Do not drink other fluids, diet soda or chew gum.            Jan 16, 2018  2:40 PM CST   Return Visit with Sebastián Jenkins MD   [facility] ([facility])    [address] [facility] [address]   [phone]            Jun 22, 2018  3:00 PM CDT   LAB with LAB FIRST FLOOR [facility] ([facility])    [address] [place] [phone]           Please do not eat 10-12 hours before your appointment if you are coming in fasting for labs on lipids, cholesterol, or glucose (sugar). This does not apply to pregnant women. Water, hot tea and black coffee (with nothing added) are okay. Do not drink other fluids, diet soda or chew gum.            Jun 22, 2018  3:30 PM CDT   Return Visit with Fly Travis MD   [facility] ([facility])    [address] [place] [phone]              Who to contact     If you have questions or need follow up information about today's clinic visit or your schedule please contact [facility] directly at [phone].  Normal or non-critical lab and imaging results will be communicated to you by ExoYouhart, letter or phone within 4 business days after the clinic has received the results. If you do not hear from us within 7 days, please contact the clinic through GateRockett or phone. If you have a critical or abnormal lab result, we will notify you by phone as soon as possible.  Submit refill requests through Query Hunter or call your pharmacy and they will forward the refill request to us. Please allow 3 business days for your refill to be completed.          Additional Information About Your Visit        ExoYouhart Information     Query Hunter gives you secure access to your electronic health record. If you see a primary care provider, you can also send messages to your care team and make appointments. If you have questions, please call your primary care clinic.  If you do not have a primary care provider, please call [phone] and they will assist you.        Care EveryWhere ID      "This is your Care EveryWhere ID. This could be used by other organizations to access your [place] medical records  [id number]        Your Vitals Were     Pulse Height Pulse Oximetry BMI (Body Mass Index)          70 5' 2\" (1.575 m) 96% 31.28 kg/m2         Blood Pressure from Last 3 Encounters:   12/07/17 124/83   10/20/17 123/82   10/17/17 120/78    Weight from Last 3 Encounters:   12/07/17 171 lb (77.6 kg)   10/20/17 170 lb 1.6 oz (77.2 kg)   10/17/17 171 lb 3.2 oz (77.7 kg)              We Performed the Following     OTOLARYNGOLOGY REFERRAL          Today's Medication Changes          These changes are accurate as of: 12/7/17  2:41 PM.  If you have any questions, ask your nurse or doctor.               Start taking these medicines.        Dose/Directions    triamcinolone 0.1 % ointment   Commonly known as:  KENALOG   Used for:  Rash   Started by:  David Chavez MD        Apply sparingly to affected area three times daily for 14 days.   Quantity:  80 g   Refills:  3            Where to get your medicines      These medications were sent to [place] [address] [place] [address] [address]     Phone:  [phone]     triamcinolone 0.1 % ointment                Primary Care Provider Office Phone # Fax #    David Chavez [phone] [address]  [facility] [address]        Equal Access to Services     [facility]: Hadii esther novak hadasho Soomaali, waaxda luqadaha, qaybta kaalmada adeegyada, marcelino olivera. So [facility] [phone].    ATENCIÓN: Si habla español, tiene a muse disposición servicios gratuitos de asistencia lingüística. Llame al [phone].    We comply with applicable federal civil rights laws and [place] laws. We do not discriminate on the basis of race, color, national origin, age, disability, sex, sexual orientation, or gender identity.            Thank you!     Thank you for choosing [facility] " DEREK JOHNSON  for your care. Our goal is always to provide you with excellent care. Hearing back from our patients is one way we can continue to improve our services. Please take a few minutes to complete the written survey that you may receive in the mail after your visit with us. Thank you!             Your Updated Medication List - Protect others around you: Learn how to safely use, store and throw away your medicines at www.disposemymeds.org.          This list is accurate as of: 12/7/17  2:41 PM.  Always use your most recent med list.                   Brand Name Dispense Instructions for use Diagnosis    Abatacept 125 MG/ML Soaj auto-injector    ORENCIA    4 Syringe    Inject 125 mg Subcutaneous every 7 days    Rheumatoid arthritis of multiple sites with negative rheumatoid factor (H)       allopurinol 300 MG tablet    ZYLOPRIM    90 tablet    TAKE ONE TABLET BY MOUTH ONCE DAILY    Idiopathic gout, unspecified chronicity, unspecified site       aspirin 81 MG EC tablet     30 tablet    Take 1 tablet (81 mg) by mouth daily (*)    Coronary artery disease involving native coronary artery of native heart without angina pectoris       atorvastatin 80 MG tablet    LIPITOR    90 tablet    TAKE 1 TABLET BY MOUTH 1 TIME DAILY FOR CHOLESTEROL    Atherosclerosis of native coronary artery of native heart, angina presence unspecified, Hyperlipidemia LDL goal <100       baclofen 10 MG tablet    LIORESAL    180 tablet    Take 1 tablet (10 mg) by mouth 2 times daily as needed for muscle spasms    Spasm of back muscles       BusPIRone HCl 30 MG Tabs     180 tablet    Take 1 tablet by mouth 2 times daily    Major depressive disorder, recurrent episode, moderate (H)       clonazePAM 1 MG tablet    klonoPIN    90 tablet    TAKE ONE-HALF TO ONE TABLET BY MOUTH THREE TIMES DAILY AS NEEDED FOR ANXIETY    Anxiety hyperventilation       clopidogrel 75 MG tablet    PLAVIX    90 tablet    Take 1 tablet (75 mg) by mouth daily     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    Gout without tophus       diclofenac 1 % Gel topical gel    VOLTAREN    200 g    Apply 2-4 grams to 2-3 joints, up to four times daily as needed using enclosed dosing card.  Max of 32g/day    Facet arthropathy, Rheumatoid arthritis involving multiple sites, unspecified rheumatoid factor presence (H)       evolocumab 140 MG/ML prefilled autoinjector    REPATHA    2 mL    Inject 1 mL (140 mg) Subcutaneous every 14 days    Hyperlipidemia LDL goal <70, S/P CABG (coronary artery bypass graft)       ezetimibe 10 MG tablet    ZETIA    90 tablet    Take 1 tablet (10 mg) by mouth daily    Coronary artery disease involving native coronary artery of native heart without angina pectoris       fluticasone 50 MCG/ACT spray    FLONASE    1 Bottle    Spray 2 sprays into both nostrils daily    Nasal congestion, Dizziness       gabapentin 300 MG capsule    NEURONTIN    540 capsule    Take 2 capsules (600 mg) by mouth 3 times daily    Lumbar radiculopathy       gemfibrozil 600 MG tablet    LOPID    180 tablet    Take 1 tablet (600 mg) by mouth 2 times daily    Hyperlipidemia LDL goal <70       hydroxychloroquine 200 MG tablet    PLAQUENIL    30 tablet    Take 1 tablet (200 mg) by mouth daily    Rheumatoid arthritis of multiple sites with negative rheumatoid factor (H), High risk medications (not anticoagulants) long-term use       meclizine 25 MG tablet    ANTIVERT    30 tablet    TAKE ONE TABLET BY MOUTH EVERY 6 HOURS AS NEEDED FOR  DIZZINESS    Vertigo       melatonin 3 MG tablet      Take 1 tablet (3 mg) by mouth nightly as needed for sleep    Delayed sleep phase syndrome       meloxicam 7.5 MG tablet     MOBIC    30 tablet    TAKE ONE TABLET BY MOUTH ONCE DAILY WITH  BREAKFAST    Low back pain, unspecified back pain laterality, unspecified chronicity, with sciatica presence unspecified       metoprolol 25 MG 24 hr tablet    TOPROL-XL    90 tablet    Take 1 tablet (25 mg) by mouth daily    Atherosclerosis of native coronary artery of native heart without angina pectoris       mirtazapine 15 MG tablet    REMERON    30 tablet    Take 1 tablet (15 mg) by mouth At Bedtime    Severe episode of recurrent major depressive disorder, without psychotic features (H)       nitroGLYcerin 0.4 MG sublingual tablet    NITROSTAT    30 tablet    Place 1 tablet (0.4 mg) under the tongue every 5 minutes as needed    Atherosclerosis of native coronary artery of native heart with angina pectoris (H)       order for DME      1.  CPAP pressure 11 cm/H20 with heated humidity.  2.  Provide mask to fit and CPAP supplies.  3.  Length of need lifetime.  4.  If needed please provide a chin strap    JEROD (obstructive sleep apnea), Anxiety, CAD (coronary artery disease), HTN (hypertension)       * order for DME     1 Units    Equipment being ordered: single end cane    Lumbar radiculopathy, Facet arthropathy, Chronic low back pain       * order for DME     200 each    Equipment being ordered: test strips as covered by insurance. USE TO TEST BLOOD SUGARS TWICE DAILY OR AS DIRECTED.    Rheumatoid arthritis involving multiple sites, unspecified rheumatoid factor presence (H)       * order for DME     1 each    OneTouch glucometer.    Rheumatoid arthritis involving multiple sites, unspecified rheumatoid factor presence (H)       * order for DME     200 each    OneTouch lancets testing twice daily.    Rheumatoid arthritis involving multiple sites, unspecified rheumatoid factor presence (H)       oxyCODONE IR 5 MG tablet    ROXICODONE    180 tablet    Take 1-2 tablets (5-10 mg) by mouth every 4 hours as needed for moderate to severe pain . Max of 6 tabs  per day.  30 day supply. May fill on 11/18/17 to start on/after 11/20/17    Facet arthropathy       pantoprazole 40 MG EC tablet    PROTONIX    90 tablet    TAKE ONE TABLET BY MOUTH ONCE DAILY FOR  STOMACH.    Gastroesophageal reflux disease without esophagitis       predniSONE 5 MG tablet    DELTASONE    30 tablet    Take 1 tablet (5 mg) by mouth daily    Rheumatoid arthritis of multiple sites with negative rheumatoid factor (H), High risk medications (not anticoagulants) long-term use       sulfaSALAzine  MG EC tablet    AZULFIDINE EN    120 tablet    Take 2 tablets (1,000 mg) by mouth 2 times daily    Rheumatoid arthritis of multiple sites with negative rheumatoid factor (H), High risk medications (not anticoagulants) long-term use       tamsulosin 0.4 MG capsule    FLOMAX    90 capsule    Take 1 capsule (0.4 mg) by mouth daily    Benign prostatic hyperplasia with weak urinary stream       tenofovir 300 MG tablet    VIREAD    90 tablet    Take 1 tablet (300 mg) by mouth daily    Chronic viral hepatitis B without delta agent and without coma (H)       triamcinolone 0.1 % ointment    KENALOG    80 g    Apply sparingly to affected area three times daily for 14 days.    Rash       vitamin D 2000 UNITS tablet     100 tablet    TAKE ONE TABLET BY MOUTH ONCE DAILY    Vitamin D deficiency       zolpidem 5 MG tablet    AMBIEN    30 tablet    TAKE ONE TABLET BY MOUTH AT BEDTIME AS NEEDED FOR SLEEP    Insomnia, unspecified type       * Notice:  This list has 4 medication(s) that are the same as other medications prescribed for you. Read the directions carefully, and ask your doctor or other care provider to review them with you.

## 2017-12-07 NOTE — PATIENT INSTRUCTIONS
At [facility], we strive to deliver an exceptional experience to you, every time we see you.  If you receive a survey in the mail, please send us back your thoughts. We really do value your feedback.    Based on your medical history, these are the current health maintenance/preventive care services that you are due for (some may have been done at this visit.)  Health Maintenance Due   Topic Date Due     INFLUENZA VACCINE (SYSTEM ASSIGNED)  09/01/2017     PHQ-9 Q6 MONTHS  10/25/2017         Suggested websites for health information:  Www.YesWeAd.Qbaka : Up to date and easily searchable information on multiple topics.  Www.Clearstream.TV.gov : medication info, interactive tutorials, watch real surgeries online  Www.familydoctor.org : good info from the Academy of Family Physicians  Www.cdc.gov : public health info, travel advisories, epidemics (H1N1)  Www.aap.org : children's health info, normal development, vaccinations  Www.health.[facility].[place].us : [place] dept of health, public health issues in [place], N1N1    Your care team:                            Family Medicine Internal Medicine   MD Noam Dorado MD Shantel Branch-Fleming, MD Katya Georgiev PA-C Nam Ho, MD Pediatrics   NATE Andre, PAVEL Huggins APRN CNP   MD Tiffany Sanchez MD Deborah Mielke, MD Kim Thein, APRN CNP      Clinic hours: Monday - Thursday 7 am-7 pm; Fridays 7 am-5 pm.   Urgent care: Monday - Friday 11 am-9 pm; Saturday and Sunday 9 am-5 pm.  Pharmacy : Monday -Thursday 8 am-8 pm; Friday 8 am-6 pm; Saturday and Sunday 9 am-5 pm.     Clinic: [phone]   Pharmacy: [phone]

## 2017-12-11 DIAGNOSIS — M54.5 LOW BACK PAIN, UNSPECIFIED BACK PAIN LATERALITY, UNSPECIFIED CHRONICITY, WITH SCIATICA PRESENCE UNSPECIFIED: ICD-10-CM

## 2017-12-12 NOTE — TELEPHONE ENCOUNTER
Requested Prescriptions   Pending Prescriptions Disp Refills     meloxicam (MOBIC) 7.5 MG tablet [Pharmacy Med Name: MELOXICAM 7.5 MG TABLET] 30 tablet 1    Last Written Prescription Date:  5/30/17  Last Fill Quantity: 30,  # refills: 2   Last Office Visit with FMRAO, OBED or [facility] prescribing provider:  12/7/2017     Future Office Visit:    Next 5 appointments (look out 90 days)     Dec 14, 2017  4:00 PM CST   Return Visit with Drea Laboy MD   [email or web address] ([email or web address]    [address] [address] [place] [address]   [phone]            Luis Antonio 10, 2018  3:00 PM CST   Return Visit with Dorothea Loo MD   [facility] ([facility])    [id number] [place] [phone]   [phone]            Jan 16, 2018  2:40 PM CST   [facility] with Sebast[facility]án Jenkins MD   [facility]    [address] [facility] [address]   [phone]                  Sig: TAKE 1 TABLET BY MOUTH 1 TIME DAILY WITH BREAKFAST    NSAID Medications Passed    12/11/2017  5:38 PM       Passed - Blood pressure under 140/90    BP Readings from Last 3 Encounters:   12/07/17 124/83   10/20/17 123/82   10/17/17 120/78                Passed - Normal ALT on file in past 12 months    Recent Labs   Lab Test  12/04/17   0924   ALT  33            Passed - Normal AST on file in past 12 months    Recent Labs   Lab Test  12/04/17   0924   AST  17            Passed - Recent or future visit with authorizing provider's specialty    Patient had office visit in the last year or has a visit in the next 30 days with authorizing provider.  See chart review.              Passed - Patient is age 6-64 years       Passed - Normal CBC on file in past 12 months    Recent Labs   Lab Test  12/04/17   0924   WBC  7.8   RBC  4.67   HGB  13.9   HCT  42.0   PLT  253            Passed - Normal serum creatinine on file in  past 12 months    Recent Labs   Lab Test  12/04/17   0924   06/24/13   1322   CR  0.87   < >   --    CRPOC   --    --   1.1    < > = values in this interval not displayed.

## 2017-12-13 DIAGNOSIS — Z79.52 ON CORTICOSTEROID THERAPY: Primary | ICD-10-CM

## 2017-12-13 NOTE — TELEPHONE ENCOUNTER
Requested Prescriptions   Pending Prescriptions Disp Refills     blood glucose monitoring (ONE TOUCH ULTRA 2) meter device kit [Pharmacy Med Name: ONE TOUCH ULTRA 2 GLUCOSE SYS]  Last Written Prescription Date:  06/15/17  Last Fill Quantity: 1,  # refills: 0   Last Office Visit with ANA PAULA, OBED or [facility] prescribing provider:  12/07/17   Future Office Visit:    Next 5 appointments (look out 90 days)     Dec 14, 2017  4:00 PM CST   Return Visit with Drea Laboy MD   [facility]    [address] [place] [phone]   [phone]            Luis Antonio 10, 2018  3:00 PM CST   Return Visit with Dorothea Loo MD   [facility] ([facility])    [address] [place] [phone]            Jan 16, 2018  2:40 PM CST   Return Visit with Sebastián Jenkins MD   [facility] ([facility])    [address] [facility] [address]   [phone]                1 kit 0     Sig: TEST 2 TIMES DAILY.    Diabetic Supplies Protocol Passed    12/13/2017 11:00 AM       Passed - Patient is 18 years of age or older       Passed - Patient has had appt within past 6 mos    IV to PO - Antibiotics     None

## 2017-12-14 ENCOUNTER — MYC REFILL (OUTPATIENT)
Dept: FAMILY MEDICINE | Facility: CLINIC | Age: 52
End: 2017-12-14

## 2017-12-14 ENCOUNTER — MYC REFILL (OUTPATIENT)
Dept: PALLIATIVE MEDICINE | Facility: CLINIC | Age: 52
End: 2017-12-14

## 2017-12-14 DIAGNOSIS — I25.719 ATHEROSCLEROSIS OF AUTOLOGOUS VEIN CORONARY ARTERY BYPASS GRAFT WITH ANGINA PECTORIS (H): ICD-10-CM

## 2017-12-14 DIAGNOSIS — I25.119 ATHEROSCLEROSIS OF NATIVE CORONARY ARTERY OF NATIVE HEART WITH ANGINA PECTORIS (H): ICD-10-CM

## 2017-12-14 DIAGNOSIS — F45.8 ANXIETY HYPERVENTILATION: ICD-10-CM

## 2017-12-14 DIAGNOSIS — F41.9 ANXIETY HYPERVENTILATION: ICD-10-CM

## 2017-12-14 DIAGNOSIS — M47.819 FACET ARTHROPATHY: ICD-10-CM

## 2017-12-14 RX ORDER — CLOPIDOGREL BISULFATE 75 MG/1
75 TABLET ORAL DAILY
Qty: 90 TABLET | Refills: 3 | Status: CANCELLED | OUTPATIENT
Start: 2017-12-14

## 2017-12-14 RX ORDER — MELOXICAM 7.5 MG/1
TABLET ORAL
Qty: 30 TABLET | Refills: 1 | Status: SHIPPED | OUTPATIENT
Start: 2017-12-14 | End: 2018-02-02

## 2017-12-15 NOTE — TELEPHONE ENCOUNTER
Requested Prescriptions   Pending Prescriptions Disp Refills     clonazePAM (KLONOPIN) 1 MG tablet    Last Written Prescription Date:  10/24/17  Last Fill Quantity: 90,  # refills: 0   Last Office Visit with [facility], [facility] or [facility] prescribing provider:  10/7/17   Future Office Visit:    Next 5 appointments (look out 90 days)     Jan 02, 2018 12:30 PM CST   Return Visit with Jon Story   [facility] ([facility])    [address] [facility] [address]   [phone]            Luis Antonio 10, 2018  3:00 PM CST   Return Visit with Dorothea Loo MD   [facility] ([facility])    [address] [place] [id number] [phone]            Jan 16, 2018  2:40 PM CST   Return Visit with Sebastián Jenkins MD   [facility] ([facility])    [address] [facility] [address]   [phone]                  90 tablet 0     Sig: TAKE ONE-HALF TO ONE TABLET BY MOUTH THREE TIMES DAILY AS NEEDED FOR ANXIETY    There is no refill protocol information for this order              Costa Faarax  [facility]

## 2017-12-15 NOTE — TELEPHONE ENCOUNTER
Received call from patient requesting refill(s) of oxyCODONE IR (ROXICODONE) 5 MG tablet    Last picked up from pharmacy on 11//2/17    Pt last seen by prescribing provider on 09/20/17  Next appt scheduled for 01/10/18    Last urine drug screen date 01/05/17- note added to next appt  Current opioid agreement on file (completed within the last year) Yes Date of opioid agreement: 01/11/17- note added to next appt    Processing (pick one and delete the others):      Mail to [facility] pharmacy   [address] [place] [id number]    Will route to nursing pool for review and preparation of prescription(s).

## 2017-12-15 NOTE — TELEPHONE ENCOUNTER
Message from [employer]:  Original authorizing provider: Dorothea Loo MD    Lamont Daniels would like a refill of the following medications:  oxyCODONE IR (ROXICODONE) 5 MG tablet [Dorothea Loo MD]    Preferred pharmacy: [address]    Comment:  Please fill this month meds.    Medication renewals requested in this message routed to other providers:  clonazePAM (KLONOPIN) 1 MG tablet [David Chavez MD, MD]

## 2017-12-15 NOTE — TELEPHONE ENCOUNTER
Routed to the nursing pool and to the MA pool to process refill(s).    Maureen Nielsen, RN-BSN  [facility]

## 2017-12-15 NOTE — TELEPHONE ENCOUNTER
Message from [employer]:  Original authorizing provider: David Chavez MD, MD Lamont Daniels would like a refill of the following medications:  clonazePAM (KLONOPIN) 1 MG tablet [David Chavez MD, MD]    Preferred pharmacy: [address]    Comment:  Please fill this month meds.    Medication renewals requested in this message routed to other providers:  oxyCODONE IR (ROXICODONE) 5 MG tablet [Dorothea Loo MD]

## 2017-12-15 NOTE — TELEPHONE ENCOUNTER
Medication refill information reviewed.     Due date for oxyCODONE IR (ROXICODONE) 5 MG tablet is 12/20/17     Prescriptions prepped for review.     Will route to provider.

## 2017-12-17 ENCOUNTER — MYC REFILL (OUTPATIENT)
Dept: CARDIOLOGY | Facility: CLINIC | Age: 52
End: 2017-12-17

## 2017-12-17 DIAGNOSIS — I25.119 ATHEROSCLEROSIS OF NATIVE CORONARY ARTERY OF NATIVE HEART WITH ANGINA PECTORIS (H): ICD-10-CM

## 2017-12-17 DIAGNOSIS — I25.719 ATHEROSCLEROSIS OF AUTOLOGOUS VEIN CORONARY ARTERY BYPASS GRAFT WITH ANGINA PECTORIS (H): ICD-10-CM

## 2017-12-18 RX ORDER — CLOPIDOGREL BISULFATE 75 MG/1
75 TABLET ORAL DAILY
Qty: 90 TABLET | Refills: 3 | Status: SHIPPED | OUTPATIENT
Start: 2017-12-18 | End: 2018-02-02

## 2017-12-18 RX ORDER — OXYCODONE HYDROCHLORIDE 5 MG/1
5-10 TABLET ORAL EVERY 4 HOURS PRN
Qty: 180 TABLET | Refills: 0 | Status: SHIPPED | OUTPATIENT
Start: 2017-12-18 | End: 2018-01-10

## 2017-12-18 NOTE — TELEPHONE ENCOUNTER
Signed Prescriptions:                        Disp   Refills    oxyCODONE IR (ROXICODONE) 5 MG tablet      180 ta*0        Sig: Take 1-2 tablets (5-10 mg) by mouth every 4 hours as           needed for moderate to severe pain . Max of 6           tabs per day.  30 day supply. May fill on           12/18/17 to start on/after 12/20/17  Authorizing Provider: KAYLA CHUN MD  [facility]

## 2017-12-18 NOTE — TELEPHONE ENCOUNTER
SHERI. Script for Oxycodone was signed and mailed out to-    [facility] pharmacy   [address] [place] [id number]      Amberly Chawla MA

## 2017-12-18 NOTE — TELEPHONE ENCOUNTER
Message from [employer]:  Original authorizing provider: MD Lamont Gallegos IGNACIOLilian Daniels would like a refill of the following medications:  clopidogrel (PLAVIX) 75 MG tablet [Fly Travis MD]    Preferred pharmacy: [place] [address] [place] [facility], [place] - [address] [place] [address]    Comment:  please fill 6 times until next doctor appt.

## 2017-12-18 NOTE — TELEPHONE ENCOUNTER
Refill request.   Medication requested: clopidogrel  Pharmacy Requested[facility] [address]   Pt's last office visit: 10/20/17  Next scheduled office visit: 6/22/18  Component      Latest Ref Rng & Units 12/4/2017   WBC      4.0 - 11.0 10e9/L 7.8   RBC Count      4.4 - 5.9 10e12/L 4.67   Hemoglobin      13.3 - 17.7 g/dL 13.9   Hematocrit      40.0 - 53.0 % 42.0   MCV      78 - 100 fl 90   MCH      26.5 - 33.0 pg 29.8   MCHC      31.5 - 36.5 g/dL 33.1   RDW      10.0 - 15.0 % 13.9   Platelet Count      150 - 450 10e9/L 253   Cholesterol      <200 mg/dL 108   Triglycerides      <150 mg/dL 85   HDL Cholesterol      >39 mg/dL 73   LDL Cholesterol Calculated      <100 mg/dL 18   Non HDL Cholesterol      <130 mg/dL 35       Refill authorized per protocol  Sarah Arce RN  Cardiology Care Coordinator  [facility]  Phone: [phone]

## 2017-12-19 RX ORDER — CLONAZEPAM 1 MG/1
TABLET ORAL
Qty: 90 TABLET | Refills: 0 | Status: SHIPPED | OUTPATIENT
Start: 2017-12-19 | End: 2018-02-24

## 2017-12-19 RX ORDER — BLOOD-GLUCOSE METER
EACH MISCELLANEOUS
Qty: 1 KIT | Refills: 0 | Status: SHIPPED | OUTPATIENT
Start: 2017-12-19 | End: 2018-04-23

## 2017-12-19 NOTE — TELEPHONE ENCOUNTER
Provider to advise. I don't see a diagnosis to associate this to.    Leisa Mack RN   [facility]

## 2017-12-19 NOTE — TELEPHONE ENCOUNTER
Written rx faxed to the pharmacy, Pharmacy will contact pt when medication is ready for pickup.  Renard Donohue,  [facility]

## 2017-12-26 ENCOUNTER — MYC REFILL (OUTPATIENT)
Dept: CARDIOLOGY | Facility: CLINIC | Age: 52
End: 2017-12-26

## 2017-12-26 ENCOUNTER — MYC REFILL (OUTPATIENT)
Dept: FAMILY MEDICINE | Facility: CLINIC | Age: 52
End: 2017-12-26

## 2017-12-26 DIAGNOSIS — E78.5 HYPERLIPIDEMIA LDL GOAL <70: ICD-10-CM

## 2017-12-26 DIAGNOSIS — K21.9 GASTROESOPHAGEAL REFLUX DISEASE WITHOUT ESOPHAGITIS: ICD-10-CM

## 2017-12-26 RX ORDER — GEMFIBROZIL 600 MG/1
600 TABLET, FILM COATED ORAL 2 TIMES DAILY
Qty: 180 TABLET | Refills: 3 | Status: SHIPPED | OUTPATIENT
Start: 2017-12-26 | End: 2018-05-10

## 2017-12-26 NOTE — TELEPHONE ENCOUNTER
Message from [facility]:  Original authorizing provider: Fly Travis MD    Lamont Daniels would like a refill of the following medications:  gemfibrozil (LOPID) 600 MG tablet [Fly Travis MD]    Preferred pharmacy: [address]    Comment:      Medication renewals requested in this message routed to other providers:  pantoprazole (PROTONIX) 40 MG EC tablet [David Chavez MD, MD]

## 2017-12-26 NOTE — TELEPHONE ENCOUNTER
Requested Prescriptions   Pending Prescriptions Disp Refills     pantoprazole (PROTONIX) 40 MG EC tablet    Last Written Prescription Date:  6/6/17  Last Fill Quantity: 90,  # refills: 3   Last Office Visit with [facility], P or [facility] prescribing provider:  12/7/17   Future Office Visit:    Next 5 appointments (look out 90 days)     Jan 02, 2018 12:30 PM CST   [facility] Jon Story   [facility]    [address] [facility] [address]   [phone]            Luis Antonio 10, 2018  3:00 PM CST   Return Visit with Dorothea Loo MD   [facility] ([facility])    [id number] [place] [address]   [phone]            Feb 20, 2018  8:40 AM CST   Return Visit with Sebastián Jenkins MD   [facility] ([facility])    [address] [facility] [address]   [phone]                  90 tablet 3    PPI Protocol Passed    12/26/2017 11:17 AM       Passed - Not on Clopidogrel (unless Pantoprazole ordered)       Passed - No diagnosis of osteoporosis on record       Passed - Recent or future visit with authorizing provider's specialty    Patient had office visit in the last year or has a visit in the next 30 days with authorizing provider.  See chart review.              Passed - Patient is age 18 or older              Costa Faarax  [facility]

## 2017-12-26 NOTE — TELEPHONE ENCOUNTER
Pending Prescriptions:                       Disp   Refills    gemfibrozil (LOPID) 600 MG tablet         180 ta*3            Sig: Take 1 tablet (600 mg) by mouth 2 times daily

## 2017-12-26 NOTE — TELEPHONE ENCOUNTER
Message from [facility]:  Original authorizing provider: David Chavez MD, MD Lamont Daniels would like a refill of the following medications:  pantoprazole (PROTONIX) 40 MG EC tablet [David Chavez MD, MD]    Preferred pharmacy: [address]    Comment:      Medication renewals requested in this message routed to other providers:  gemfibrozil (LOPID) 600 MG tablet [Fly Travis MD]

## 2017-12-27 ENCOUNTER — TELEPHONE (OUTPATIENT)
Dept: PALLIATIVE MEDICINE | Facility: CLINIC | Age: 52
End: 2017-12-27

## 2017-12-27 NOTE — TELEPHONE ENCOUNTER
Received fax request from [facility] requesting refill(s) for diclofenac (VOLTAREN) 1 % GEL topical gel    Last refilled on 11/19/17    Pt last seen on 9/20/17  Next appt scheduled for 1/10/18    Will facilitate refill.      Silvana RODRIGUEZ    [facility]

## 2017-12-29 RX ORDER — PANTOPRAZOLE SODIUM 40 MG/1
TABLET, DELAYED RELEASE ORAL
Qty: 90 TABLET | Refills: 3 | OUTPATIENT
Start: 2017-12-29

## 2018-01-09 NOTE — PROGRESS NOTES
[facility]    Date of visit: 1/10/2018    Chief complaint:   Chief Complaint   Patient presents with     Pain       Interval history:  Lamont Daniels was last seen by me on 9/20/2017    Recommendations/plan at the last visit included:  1. Physical Therapy:  Finished pool therapy, likely had more sessions  2. Clinical Health Psychologist to address issues of relaxation, behavioral change, coping style, and other factors important to improvement.  Will need to discuss at upcoming appointments  3. Diagnostic Studies:  None  4. Medication Management:    1. Discussed that he should not take iburpofen since he is on meloxicam and should avoid other NSAIDs as well.  2. Oxycodone 30 day script written; max 6 pills/day.  Told patient to call or Mychart 7-10 days before he is out of pills to get refill. Again discussed compliance with prescriptions and proper management of his medications and refills.  3. Continue regular use of CPAP  5. Further procedures recommended: None at this time.  6. Recommendations to PCP:  Continue to reinforce to Mr. Daniels that he should diligently manage his prescriptions and call for refills on time. If possible have his PCA manage his medications or have an in-home pharmacy consult if this is feasible.    Since his last visit, Lamont Daniels reports:  -does feel that the meds help and has been using more  -he is wondering about going up on the dose.  -he has been taking ENbrel.  Not sure how helpful  -he is wearing his CPAP all of the time, except when sick    Pain scores:  Pain intensity on average is 6 on a scale of 0-10.     Current pain treatments  Acetaminophen 250mg 2 tablets: 1-2 times daily  Gabapentin 600mg 3 times daily  Baclofen 10mg BID prn (more consistently in the pm)- was prescribed by PCP, doesn't use daily.  Oxycodone 5 mg : takes 6/day  meloxicam- 7.5mg in the morning    Previous medication treatments included:  Codeine, oxycodone, hydrocodone-  given for other issues- helpful  Cymbalta 60mg daily- given for mental health- was given by Dr. Acosta- not been higher  Baclofen 10mg at bedtime- not helpful, no side effects  Robaxin 500 mg 1 tablet, 1-3 times a day depending on the day  Ibuprofen 800 mg- using 3-4 times per week, helpful but started meloxicam    Other treatments have included:  Lamont Daniels has not been seen at a pain clinic in the past.    PT: has done for various reasons, most recently the low back.  Acupuncture: as done a couple of needles with adjustment  TENs Unit: no  Injections: no    Side Effects: none    Medications:  Current Outpatient Prescriptions   Medication Sig Dispense Refill     diclofenac (VOLTAREN) 1 % GEL topical gel Apply 2-4 grams to 2-3 joints, up to four times daily as needed using enclosed dosing card.  Max of 32g/day 300 g 11     oxyCODONE IR (ROXICODONE) 5 MG tablet Take 1-2 tablets (5-10 mg) by mouth every 4 hours as needed for moderate to severe pain . Max of 6 tabs per day.  30 day supply. May fill on 1/17/18 to start on/after 1/19/18 180 tablet 0     gemfibrozil (LOPID) 600 MG tablet Take 1 tablet (600 mg) by mouth 2 times daily 180 tablet 3     blood glucose monitoring (ONE TOUCH ULTRA 2) meter device kit TEST 2 TIMES DAILY. 1 kit 0     clonazePAM (KLONOPIN) 1 MG tablet TAKE ONE-HALF TO ONE TABLET BY MOUTH THREE TIMES DAILY AS NEEDED FOR ANXIETY 90 tablet 0     clopidogrel (PLAVIX) 75 MG tablet Take 1 tablet (75 mg) by mouth daily 90 tablet 3     meloxicam (MOBIC) 7.5 MG tablet TAKE 1 TABLET BY MOUTH 1 TIME DAILY WITH BREAKFAST 30 tablet 1     triamcinolone (KENALOG) 0.1 % ointment Apply sparingly to affected area three times daily for 14 days. 80 g 3     evolocumab (REPATHA) 140 MG/ML prefilled autoinjector Inject 1 mL (140 mg) Subcutaneous every 14 days 2 mL 11     zolpidem (AMBIEN) 5 MG tablet TAKE ONE TABLET BY MOUTH AT BEDTIME AS NEEDED FOR SLEEP 30 tablet 5     meclizine (ANTIVERT) 25 MG tablet TAKE ONE TABLET BY  MOUTH EVERY 6 HOURS AS NEEDED FOR  DIZZINESS 30 tablet 10     Abatacept 125 MG/ML SOAJ Inject 125 mg Subcutaneous every 7 days 4 Syringe 3     sulfaSALAzine ER (AZULFIDINE EN) 500 MG EC tablet Take 2 tablets (1,000 mg) by mouth 2 times daily 120 tablet 3     hydroxychloroquine (PLAQUENIL) 200 MG tablet Take 1 tablet (200 mg) by mouth daily 30 tablet 3     tamsulosin (FLOMAX) 0.4 MG capsule Take 1 capsule (0.4 mg) by mouth daily 90 capsule 3     nitroGLYcerin (NITROSTAT) 0.4 MG sublingual tablet Place 1 tablet (0.4 mg) under the tongue every 5 minutes as needed 30 tablet 1     ezetimibe (ZETIA) 10 MG tablet Take 1 tablet (10 mg) by mouth daily 90 tablet 3     colchicine (COLCRYS) 0.6 MG tablet TAKE TWO TABLETS BY MOUTH AT THE FIRST SIGN OF FLARE, TAKE ONE ADDITIONAL TABLET ONE HOUR LATER. 30 tablet 3     fluticasone (FLONASE) 50 MCG/ACT spray Spray 2 sprays into both nostrils daily 1 Bottle 11     atorvastatin (LIPITOR) 80 MG tablet TAKE 1 TABLET BY MOUTH 1 TIME DAILY FOR CHOLESTEROL 90 tablet 1     tenofovir (VIREAD) 300 MG tablet Take 1 tablet (300 mg) by mouth daily 90 tablet 3     pantoprazole (PROTONIX) 40 MG EC tablet TAKE ONE TABLET BY MOUTH ONCE DAILY FOR  STOMACH. 90 tablet 3     baclofen (LIORESAL) 10 MG tablet Take 1 tablet (10 mg) by mouth 2 times daily as needed for muscle spasms 180 tablet 3     metoprolol (TOPROL-XL) 25 MG 24 hr tablet Take 1 tablet (25 mg) by mouth daily 90 tablet 3     allopurinol (ZYLOPRIM) 300 MG tablet TAKE ONE TABLET BY MOUTH ONCE DAILY 90 tablet 3     aspirin EC 81 MG EC tablet Take 1 tablet (81 mg) by mouth daily (*) 30 tablet 1     Cholecalciferol (VITAMIN D) 2000 UNITS tablet TAKE ONE TABLET BY MOUTH ONCE DAILY 100 tablet 1     melatonin 3 MG tablet Take 1 tablet (3 mg) by mouth nightly as needed for sleep       BusPIRone HCl 30 MG TABS Take 1 tablet by mouth 2 times daily 180 tablet 5     mirtazapine (REMERON) 15 MG tablet Take 1 tablet (15 mg) by mouth At Bedtime 30 tablet  5     predniSONE (DELTASONE) 5 MG tablet Take 1 tablet (5 mg) by mouth daily 30 tablet 3     gabapentin (NEURONTIN) 300 MG capsule Take 2 capsules (600 mg) by mouth 3 times daily 540 capsule 3     order for DME Equipment being ordered: test strips as covered by insurance. USE TO TEST BLOOD SUGARS TWICE DAILY OR AS DIRECTED. 200 each 3     order for DME OneTouch glucometer. 1 each 0     order for DME OneTouch lancets testing twice daily. 200 each 3     order for DME Equipment being ordered: single end cane 1 Units 0     ORDER FOR DME 1.  CPAP pressure 11 cm/H20 with heated humidity.   2.  Provide mask to fit and CPAP supplies.   3.  Length of need lifetime.   4.  If needed please provide a chin strap              Medical History: any changes in medical history since they were last seen? no    Review of Systems:  The 14 system ROS was reviewed from the intake questionnaire:  fatigue, back pain, joint pain, stiffness, depression, anxiety, stress  Any bowel or bladder problems: None  Mood: depression, suicidal ideation but not active and no intent/plan.  This is chronic for him.  Contracts for safety.    Physical Exam:  Blood pressure 110/76, pulse 71, weight 79.4 kg (175 lb).  General: mildly distressed, awake and alert  Gait: Antalgic and very slow  MSK exam: Deferred for conversation    Assessment:   1. Chronic low back pain, with strong facet and myofascial features  2. Rheumatoid arthritis  3. Peripheral neuropathy  4. Depression  5. Hep B - currently being treated  6. Gout  7. JEROD, currently treated with CPAP, central apnea ok      Plan:   1. Physical Therapy:  Finished pool therapy, likely had more sessions  2. Clinical Health Psychologist to address issues of relaxation, behavioral change, coping style, and other factors important to improvement.  Will need to discuss at upcoming appointments  3. Diagnostic Studies:  UDS  4. Medication Management:    1. I am concerned about going up on the medication or adding  in long-acting in the setting of his JEROD.  I will reach out to sleep doctor about it, but my concern is when he is not using CPAP and risks that has.  2. Continue regular use of CPAP  5. Further procedures recommended: None at this time.  6. Recommendations to PCP:  2 months    Total time spent was 25 minutes, and more than 50% of face to face time was spent in counseling and/or coordination of care regarding medications.    Lian Loo MD  [facility]

## 2018-01-10 ENCOUNTER — OFFICE VISIT (OUTPATIENT)
Dept: PALLIATIVE MEDICINE | Facility: CLINIC | Age: 53
End: 2018-01-10
Payer: COMMERCIAL

## 2018-01-10 ENCOUNTER — MYC REFILL (OUTPATIENT)
Dept: PSYCHIATRY | Facility: CLINIC | Age: 53
End: 2018-01-10

## 2018-01-10 VITALS
HEART RATE: 71 BPM | DIASTOLIC BLOOD PRESSURE: 76 MMHG | WEIGHT: 175 LBS | BODY MASS INDEX: 32.01 KG/M2 | SYSTOLIC BLOOD PRESSURE: 110 MMHG

## 2018-01-10 DIAGNOSIS — M06.9 RHEUMATOID ARTHRITIS INVOLVING MULTIPLE SITES, UNSPECIFIED RHEUMATOID FACTOR PRESENCE: ICD-10-CM

## 2018-01-10 DIAGNOSIS — Z79.891 ENCOUNTER FOR LONG-TERM OPIATE ANALGESIC USE: Primary | ICD-10-CM

## 2018-01-10 DIAGNOSIS — Z79.891 ENCOUNTER FOR LONG-TERM OPIATE ANALGESIC USE: ICD-10-CM

## 2018-01-10 DIAGNOSIS — F33.1 MAJOR DEPRESSIVE DISORDER, RECURRENT EPISODE, MODERATE (H): ICD-10-CM

## 2018-01-10 DIAGNOSIS — M47.819 FACET ARTHROPATHY: ICD-10-CM

## 2018-01-10 DIAGNOSIS — F33.2 SEVERE EPISODE OF RECURRENT MAJOR DEPRESSIVE DISORDER, WITHOUT PSYCHOTIC FEATURES (H): ICD-10-CM

## 2018-01-10 PROCEDURE — 99214 OFFICE O/P EST MOD 30 MIN: CPT | Performed by: PSYCHIATRY & NEUROLOGY

## 2018-01-10 PROCEDURE — 80307 DRUG TEST PRSMV CHEM ANLYZR: CPT | Mod: 90 | Performed by: PSYCHIATRY & NEUROLOGY

## 2018-01-10 PROCEDURE — 99000 SPECIMEN HANDLING OFFICE-LAB: CPT | Performed by: PSYCHIATRY & NEUROLOGY

## 2018-01-10 RX ORDER — OXYCODONE HYDROCHLORIDE 5 MG/1
5-10 TABLET ORAL EVERY 4 HOURS PRN
Qty: 180 TABLET | Refills: 0 | Status: SHIPPED | OUTPATIENT
Start: 2018-01-10 | End: 2018-02-21

## 2018-01-10 ASSESSMENT — PAIN SCALES - GENERAL: PAINLEVEL: SEVERE PAIN (6)

## 2018-01-10 NOTE — PATIENT INSTRUCTIONS
1. Stop downstairs today for urine drug screen  2. You will continue the oxycodone max of 6 tabs per day  3. I will reach out to your sleep doctor but I have concerns about your sleep apnea  4. Wear your CPAP every single day    ----------------------------------------------------------------  Nurse Triage line:  [phone]   Call this number with any questions or concerns. You may leave a detailed message anytime. Calls are typically returned Monday through Friday between 8 AM and 4:30 PM. We usually get back to you within 2 business days depending on the issue/request.       Medication refills:    For non-narcotic medications, call your pharmacy directly to request a refill. The pharmacy will contact the [facility] for authorization. Please allow 3-4 days for these refills to be processed.     For narcotic refills, call the nurse triage line or send a [employer] message. Please contact us 7-10 days before your refill is due. The message MUST include the name of the specific medication(s) requested and how you would like to receive the prescription(s). The options are as follows:    Pain Clinic staff can mail the prescription to your pharmacy. Please tell us the name of the pharmacy.    You may pick the prescription up at the Pain Clinic (tell us the location) or during a clinic visit with your pain provider    Pain Clinic staff can deliver the prescription to the [facility] pharmacy in the clinic building. Please tell us the location.      Scheduling number: [phone].  Call this number to schedule or change appointments.    We believe regular attendance is key to your success in our program.    Any time you are unable to keep your appointment we ask that you call us at least 24 hours in advance to let us know. This will allow us to offer the appointment time to another patient.

## 2018-01-10 NOTE — MR AVS SNAPSHOT
After Visit Summary   1/10/2018    Lamont Daniels    MRN: [id number]           Patient Information     Date Of Birth          1965        Visit Information        Provider Department      1/10/2018 3:00 PM Dorothea Loo MD [facility]        Today's Diagnoses     Encounter for long-term opiate analgesic use    -  1    Facet arthropathy        Rheumatoid arthritis involving multiple sites, unspecified rheumatoid factor presence (H)          Care Instructions    1. Stop downstairs today for urine drug screen  2. You will continue the oxycodone max of 6 tabs per day  3. I will reach out to your sleep doctor but I have concerns about your sleep apnea  4. Wear your CPAP every single day    ----------------------------------------------------------------  Nurse Triage line:  [phone]   Call this number with any questions or concerns. You may leave a detailed message anytime. Calls are typically returned Monday through Friday between 8 AM and 4:30 PM. We usually get back to you within 2 business days depending on the issue/request.       Medication refills:    For non-narcotic medications, call your pharmacy directly to request a refill. The pharmacy will contact the [facility] for authorization. Please allow 3-4 days for these refills to be processed.     For narcotic refills, call the nurse triage line or send a [employer] message. Please contact us 7-10 days before your refill is due. The message MUST include the name of the specific medication(s) requested and how you would like to receive the prescription(s). The options are as follows:    Pain Clinic staff can mail the prescription to your pharmacy. Please tell us the name of the pharmacy.    You may pick the prescription up at the Pain Clinic (tell us the location) or during a clinic visit with your pain provider    Pain Clinic staff can deliver the prescription to the [facility] pharmacy in the clinic building.  Please tell us the location.      Scheduling number: [phone].  Call this number to schedule or change appointments.    We believe regular attendance is key to your success in our program.    Any time you are unable to keep your appointment we ask that you call us at least 24 hours in advance to let us know. This will allow us to offer the appointment time to another patient.               Follow-ups after your visit        Your next 10 appointments already scheduled     Jan 16, 2018 12:45 PM CST   New Visit with Eris Mcgarry MD   [facility] ([facility])    [address] [address]   [phone]            Feb 13, 2018  3:00 PM CST   LAB with BK LAB   [facility] ([facility])    [address] [place] [address]   [phone]           Please do not eat 10-12 hours before your appointment if you are coming in fasting for labs on lipids, cholesterol, or glucose (sugar). This does not apply to pregnant women. Water, hot tea and black coffee (with nothing added) are okay. Do not drink other fluids, diet soda or chew gum.            Feb 20, 2018  8:40 AM CST   Return Visit with Sebastián Jenkins MD   [facility] ([facility])    [address] [address]   [phone]            Apr 12, 2018  4:00 PM CDT   Return Visit with Drea Laboy MD   [facility])    [address] [facility] [address]   [phone]            Jun 22, 2018  3:00 PM CDT   LAB with LAB FIRST FLOOR [facility])    [address] [facility] [address]   [phone]           Please do not eat 10-12 hours before your appointment if you are coming in fasting for labs on lipids, cholesterol, or glucose (sugar). This does not apply  to pregnant women. Water, hot tea and black coffee (with nothing added) are okay. Do not drink other fluids, diet soda or chew gum.            Jun 22, 2018  3:30 PM CDT   [facility]    [address] [address] [place] [address] [phone]              Future tests that were ordered for you today     Open Future Orders        Priority Expected Expires Ordered    Drug Screen Comprehensive , Urine with Reported Meds (Pain Care Package) Routine  1/10/2019 1/10/2018            Who to contact     If you have questions or need follow up information about today's clinic visit or your schedule please contact [facility] directly at [phone].  Normal or non-critical lab and imaging results will be communicated to you by MyChart, letter or phone within 4 business days after the clinic has received the results. If you do not hear from us within 7 days, please contact the clinic through [employer] or phone. If you have a critical or abnormal lab result, we will notify you by phone as soon as possible.  Submit refill requests through [employer] or call your pharmacy and they will forward the refill request to us. Please allow 3 business days for your refill to be completed.          Additional Information About Your Visit        [employer] Information     [employer] gives you secure access to your electronic health record. If you see a primary care provider, you can also send messages to your care team and make appointments. If you have questions, please call your primary care clinic.  If you do not have a primary care provider, please call [phone] and they will assist you.        Care EveryWhere ID     This is your Care EveryWhere ID. This could be used by other organizations to access your [facility] medical records  [id number]        Your Vitals Were     Pulse BMI (Body Mass Index)                71 32.01 kg/m2            Blood Pressure from Last 3 Encounters:   01/10/18 110/76   12/07/17 124/83   10/20/17 123/82    Weight from Last 3 Encounters:   01/10/18 79.4 kg (175 lb)   12/07/17 77.6 kg (171 lb)   10/20/17 77.2 kg (170 lb 1.6 oz)                 Today's Medication Changes          These changes are accurate as of: 1/10/18  3:32 PM.  If you have any questions, ask your nurse or doctor.               These medicines have changed or have updated prescriptions.        Dose/Directions    oxyCODONE IR 5 MG tablet   Commonly known as:  ROXICODONE   This may have changed:  additional instructions   Used for:  Facet arthropathy        Dose:  5-10 mg   Take 1-2 tablets (5-10 mg) by mouth every 4 hours as needed for moderate to severe pain . Max of 6 tabs per day.  30 day supply. May fill on 1/17/18 to start on/after 1/19/18   Quantity:  180 tablet   Refills:  0            Where to get your medicines      These medications were sent to [place] [address] [place] [facility] - [address] [address]     Phone:  [phone]     diclofenac 1 % Gel topical gel         Some of these will need a paper prescription and others can be bought over the counter.  Ask your nurse if you have questions.     Bring a paper prescription for each of these medications     oxyCODONE IR 5 MG tablet                Primary Care Provider Office Phone # Fax #    David Chavez [phone]       [address]  [facility] [address]        Equal Access to Services     [facility] : Hadsirena mccoyo Solinnea, waaxda luqadaha, qaybta kaalmada valentin, marcelino idimarie olivera. So [facility] [phone].    ATENCIÓN: Si habla español, tiene a muse disposición servicios gratuitos de asistencia lingüística. Llame al [phone].    We comply with applicable federal civil rights laws and [place] laws. We do not discriminate on the basis of race, color, national origin, age, disability, sex,  sexual orientation, or gender identity.            Thank you!     Thank you for choosing [facility]  for your care. Our goal is always to provide you with excellent care. Hearing back from our patients is one way we can continue to improve our services. Please take a few minutes to complete the written survey that you may receive in the mail after your visit with us. Thank you!             Your Updated Medication List - Protect others around you: Learn how to safely use, store and throw away your medicines at www.disposemymeds.org.          This list is accurate as of: 1/10/18  3:32 PM.  Always use your most recent med list.                   Brand Name Dispense Instructions for use Diagnosis    Abatacept 125 MG/ML Soaj auto-injector    ORENCIA    4 Syringe    Inject 125 mg Subcutaneous every 7 days    Rheumatoid arthritis of multiple sites with negative rheumatoid factor (H)       allopurinol 300 MG tablet    ZYLOPRIM    90 tablet    TAKE ONE TABLET BY MOUTH ONCE DAILY    Idiopathic gout, unspecified chronicity, unspecified site       aspirin 81 MG EC tablet     30 tablet    Take 1 tablet (81 mg) by mouth daily (*)    Coronary artery disease involving native coronary artery of native heart without angina pectoris       atorvastatin 80 MG tablet    LIPITOR    90 tablet    TAKE 1 TABLET BY MOUTH 1 TIME DAILY FOR CHOLESTEROL    Atherosclerosis of native coronary artery of native heart, angina presence unspecified, Hyperlipidemia LDL goal <100       baclofen 10 MG tablet    LIORESAL    180 tablet    Take 1 tablet (10 mg) by mouth 2 times daily as needed for muscle spasms    Spasm of back muscles       blood glucose monitoring meter device kit     1 kit    TEST 2 TIMES DAILY.    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    Gout without tophus       diclofenac 1 % Gel topical gel    VOLTAREN    300 g    Apply 2-4 grams to 2-3 joints, up to four times daily as needed using enclosed dosing card.  Max of 32g/day    Facet arthropathy, Rheumatoid arthritis involving multiple sites, unspecified rheumatoid factor presence (H)       evolocumab 140 MG/ML prefilled autoinjector    REPATHA    2 mL    Inject 1 mL (140 mg) Subcutaneous every 14 days    Hyperlipidemia LDL goal <70, S/P CABG (coronary artery bypass graft)       ezetimibe 10 MG tablet    ZETIA    90 tablet    Take 1 tablet (10 mg) by mouth daily    Coronary artery disease involving native coronary artery of native heart without angina pectoris       fluticasone 50 MCG/ACT spray    FLONASE    1 Bottle    Spray 2 sprays into both nostrils daily    Nasal congestion, Dizziness       gabapentin 300 MG capsule    NEURONTIN    540 capsule    Take 2 capsules (600 mg) by mouth 3 times daily    Lumbar radiculopathy       gemfibrozil 600 MG tablet    LOPID    180 tablet    Take 1 tablet (600 mg) by mouth 2 times daily    Hyperlipidemia LDL goal <70       hydroxychloroquine 200 MG tablet    PLAQUENIL    30 tablet    Take 1 tablet (200 mg) by mouth daily    Rheumatoid arthritis of multiple sites with negative rheumatoid factor (H), High risk medications (not anticoagulants) long-term use       meclizine 25 MG tablet    ANTIVERT    30 tablet    TAKE ONE TABLET BY MOUTH EVERY  6 HOURS AS NEEDED FOR  DIZZINESS    Vertigo       melatonin 3 MG tablet      Take 1 tablet (3 mg) by mouth nightly as needed for sleep    Delayed sleep phase syndrome       meloxicam 7.5 MG tablet    MOBIC    30 tablet    TAKE 1 TABLET BY MOUTH 1 TIME DAILY WITH BREAKFAST    Low back pain, unspecified back pain laterality, unspecified chronicity, with sciatica presence unspecified       metoprolol 25 MG 24 hr tablet    TOPROL-XL    90 tablet    Take 1 tablet (25 mg) by mouth daily    Atherosclerosis of native coronary artery of native heart without angina pectoris       mirtazapine 15 MG tablet    REMERON    30 tablet    Take 1 tablet (15 mg) by mouth At Bedtime    Severe episode of recurrent major depressive disorder, without psychotic features (H)       nitroGLYcerin 0.4 MG sublingual tablet    NITROSTAT    30 tablet    Place 1 tablet (0.4 mg) under the tongue every 5 minutes as needed    Atherosclerosis of native coronary artery of native heart with angina pectoris (H)       order for DME      1.  CPAP pressure 11 cm/H20 with heated humidity.  2.  Provide mask to fit and CPAP supplies.  3.  Length of need lifetime.  4.  If needed please provide a chin strap    JEROD (obstructive sleep apnea), Anxiety, CAD (coronary artery disease), HTN (hypertension)       * order for DME     1 Units    Equipment being ordered: single end cane    Lumbar radiculopathy, Facet arthropathy, Chronic low back pain       * order for DME     200 each    Equipment being ordered: test strips as covered by insurance. USE TO TEST BLOOD SUGARS TWICE DAILY OR AS DIRECTED.    Rheumatoid arthritis involving multiple sites, unspecified rheumatoid factor presence (H)       * order for DME     1 each    OneTouch glucometer.    Rheumatoid arthritis involving multiple sites, unspecified rheumatoid factor presence (H)       * order for DME     200 each    OneTouch lancets testing twice daily.    Rheumatoid arthritis involving multiple sites, unspecified  rheumatoid factor presence (H)       oxyCODONE IR 5 MG tablet    ROXICODONE    180 tablet    Take 1-2 tablets (5-10 mg) by mouth every 4 hours as needed for moderate to severe pain . Max of 6 tabs per day.  30 day supply. May fill on 1/17/18 to start on/after 1/19/18    Facet arthropathy       pantoprazole 40 MG EC tablet    PROTONIX    90 tablet    TAKE ONE TABLET BY MOUTH ONCE DAILY FOR  STOMACH.    Gastroesophageal reflux disease without esophagitis       predniSONE 5 MG tablet    DELTASONE    30 tablet    Take 1 tablet (5 mg) by mouth daily    Rheumatoid arthritis of multiple sites with negative rheumatoid factor (H), High risk medications (not anticoagulants) long-term use       sulfaSALAzine  MG EC tablet    AZULFIDINE EN    120 tablet    Take 2 tablets (1,000 mg) by mouth 2 times daily    Rheumatoid arthritis of multiple sites with negative rheumatoid factor (H), High risk medications (not anticoagulants) long-term use       tamsulosin 0.4 MG capsule    FLOMAX    90 capsule    Take 1 capsule (0.4 mg) by mouth daily    Benign prostatic hyperplasia with weak urinary stream       tenofovir 300 MG tablet    VIREAD    90 tablet    Take 1 tablet (300 mg) by mouth daily    Chronic viral hepatitis B without delta agent and without coma (H)       triamcinolone 0.1 % ointment    KENALOG    80 g    Apply sparingly to affected area three times daily for 14 days.    Rash       vitamin D 2000 UNITS tablet     100 tablet    TAKE ONE TABLET BY MOUTH ONCE DAILY    Vitamin D deficiency       zolpidem 5 MG tablet    AMBIEN    30 tablet    TAKE ONE TABLET BY MOUTH AT BEDTIME AS NEEDED FOR SLEEP    Insomnia, unspecified type       * Notice:  This list has 4 medication(s) that are the same as other medications prescribed for you. Read the directions carefully, and ask your doctor or other care provider to review them with you.

## 2018-01-10 NOTE — LETTER
[facility]    01/10/18    Patient: Lamont Daniels  YOB: 1965  Medical Record Number: [id number]                                                                  Controlled Substance Agreement  I understand that my care provider has prescribed controlled substances (narcotics, tranquilizers, and/or stimulants) to help manage my condition(s).  I am taking this medicine to help me function or work.  I know that this is strong medicine.  It could have serious side effects and even cause a dependency on the drug.  If I stop these medicines suddenly, I could have severe withdrawal symptoms.    The risks, benefits, and side effects of these medication(s) were explained to me.  I agree that:  1. I will take part in other treatments as advised by my provider.  This may be psychiatry or counseling, physical therapy, behavioral therapy, group treatment, or a referral to a pain clinic.  I will reduce or stop my medicine when my provider tells me to do so.   2. I will take my medicines as prescribed.  I will not change the dose or schedule unless my provider tells me to.  There will be no refills if I  run out early.   I may be contacted at any time without warning and asked to complete a drug test or pill count.   3. I will keep all my appointments at the clinic.  If I miss appointments or fail to follow instructions, my provider may stop my medicine.  4. I will not ask other providers to prescribe controlled substances. And I will not accept controlled substances from other people. If I need another prescribed controlled substance for a new reason, I will notify my provider within one business day.  5. If I enroll in the [place] Medical Marijuana program, I will tell my provider.  I will also sign an agreement to share my medical records with my provider.  6. I will use one pharmacy to fill all of my controlled substance prescriptions.  If my prescription is mailed to my pharmacy, it  may take 5 to 7 days for my medicine to be ready.  7. I understand that my provider, clinic care team, and pharmacy can track controlled substance prescriptions from other providers through a central database (prescription monitoring program).  8. I will bring in my list of medications (or my medicine bottles) each time I come to the clinic.  REV- 04/2016                                                                                                                                            Page 1 of 2      [facility]    01/10/18    Patient: Lamont Daniels  YOB: 1965  Medical Record Number: [id number]    9. Refills of controlled substances will be made only during office hours.  It is up to me to make sure that I do not run out of my medicines on weekends or holidays.    10. I am responsible for my prescriptions.  If the medicine is lost or stolen, it will not be replaced.   I also agree not to share these medicines with anyone.  11. I agree to not use ANY illegal or recreational drugs.  This includes marijuana, cocaine, bath salts or other drugs.  I agree not to use alcohol unless my provider says I may.  I agree to give urine samples whenever asked.  If I fail to give a urine sample, the provider may stop my medicine.     12. I will tell my nurse or provider right away if I become pregnant or have a new medical problem treated outside of [facility]  13. I understand that this medicine can affect my thinking and judgment.  It may be unsafe for me to drive, use machinery and do dangerous tasks.  I will not do any of these things until I know how the medicine affects me.  If my dose changes, I will wait to see how it affects me.  I will contact my provider if I have concerns about medicine side effects.  I understand that if I do not follow any of the conditions above, my prescriptions or treatment may be stopped.    I agree that my provider, clinic care team, and  pharmacy may work with any city, state or federal law enforcement agency that investigates the misuse, sale, or other diversion of my controlled medicine. I will allow my provider to discuss my care with or share a copy of this agreement with any other treating provider, pharmacy or emergency room where I receive care.  I agree to give up (waive) any right of privacy or confidentiality with respect to these authorizations.   I have read this agreement and have asked questions about anything I did not understand.   ___________________________________    ___________________________  Patient Signature                                                           Date and Time  ___________________________________     ____________________________  Witness                                                                            Date and Time  ___________________________________  Dorothea Loo MD  REV-  04/2016                                                                                                                                                                 Page 2 of 2  Opioid Pain Medicines (also known as Narcotics)  What You Need to Know      What are opioids?   Opioids are pain medicines that must be prescribed by a doctor. Examples are:     morphine (MS Contin, Codi)    oxycodone (Oxycontin)    oxycodone and acetaminophen (Percocet)    hydrocodone and acetaminophen (Vicodin, Norco)     fentanyl patch (Duragesic)     hydromorphone (Dilaudid)     methadone     What do opioids do well?   Opioids are best for short-term pain after a surgery or injury. They also work well for cancer pain. Unlike other pain medicines, they do not cause liver or kidney failure or ulcers. They may help some people with long-lasting (chronic) pain.     What do opioids NOT do well?   Opioids never get rid of pain entirely, and they do not work well for most patients with chronic pain. Opioids do not reduce swelling, one of  the causes of pain. They also don t work well for nerve pain.     Side effects  Talk to your doctor before you start or decide to keep taking one of these medicines. Side effects include:    Lowers your breathing rate enough that it could cause death    Death due to taking more than the prescribed dose    Serious lifelong opioid use      Dependence is not the same as addiction. Addiction is when people keep using a substance that harms their body, their mind or their relations with others. If you have a history of drug or alcohol abuse, taking opioids can cause a relapse.  Over time, opioids don t work as well. Most people will need higher and higher doses. The higher the dose, the more serious the side effects. We don t know the long-term effects of opioids.   People who have used opioids for a long time have a lower quality of life, worse depression, higher levels of pain and more visits to doctors.  Overdose from prescription drugs is the second leading cause of death in the [place] The risk of overdose rises when opioids are taken with other drugs such as:    Medicines used for anxiety and panic attacks (such as lorazepam, alprazolam, clonazepam    Other sedatives    Alcohol    Illegal drugs such as heroin  Never share your opioids with others. Be sure to store opioids in a secure place, locked if possible.Young children can easily swallow them and overdose.     Are there other ways to manage pain?  Ways to help reduce pain:    Exercise every day.    Treat health problems that may be causing pain.    Treat mental health problems like depression and anxiety.     Worse depression symptoms; Less pleasure in things you usually enjoy    Feeling tired or sluggish    Slower thoughts or cloudy thinking    Being more sensitive to pain over time; Pain is harder to control.    Trouble sleeping or restless sleep    Changes in hormone levels (for example, less testosterone).     Changes in sex drive or ability to have  sex    Long lasting nausea and constipation    Trouble breathing while asleep; This is worse with lung problems like COPD or sleep apnea.    Unsafe driving    Getting sick more often    Itching    Feeling dizzy    Dry mouth    Sweating    Trouble emptying the bladder (peeing). This is worse if you have an enlarged prostate or get urinary tract infections (UTIs).    What else should I know about opioids?  When someone takes opioids for too long or too often, they become dependent. This means that if you stop or reduce the medicine too quickly, you will have withdrawal symptoms.          Practice good sleep habits.  Try to go to bed and get up at the same time every day.    Stop smoking.  Tobacco use can make pain worse.    Do things that you enjoy.    Find a way to work through pain without drugs.  Try deep breathing, meditation, visual imagery and aromatherapy.    Ask your doctor to help you create a plan to manage your pain.

## 2018-01-10 NOTE — NURSING NOTE
"Chief Complaint   Patient presents with     Pain       Initial /76  Pulse 71  Wt 79.4 kg (175 lb)  BMI 32.01 kg/m2 Estimated body mass index is 32.01 kg/(m^2) as calculated from the following:    Height as of 12/7/17: 1.575 m (5' 2\").    Weight as of this encounter: 79.4 kg (175 lb).  Medication Reconciliation: tony Jama CMA (AAMA)      "

## 2018-01-11 RX ORDER — MIRTAZAPINE 15 MG/1
15 TABLET, FILM COATED ORAL AT BEDTIME
Qty: 30 TABLET | Refills: 5 | Status: SHIPPED | OUTPATIENT
Start: 2018-01-11 | End: 2018-05-10

## 2018-01-11 RX ORDER — BUSPIRONE HYDROCHLORIDE 30 MG/1
1 TABLET ORAL 2 TIMES DAILY
Qty: 180 TABLET | Refills: 5 | Status: SHIPPED | OUTPATIENT
Start: 2018-01-11 | End: 2019-01-17

## 2018-01-11 NOTE — TELEPHONE ENCOUNTER
Message from [employer]:  Original authorizing provider: MD Lamont Rivera would like a refill of the following medications:  BusPIRone HCl 30 MG TABS [Wiley Acosta MD]  mirtazapine (REMERON) 15 MG tablet [Wiley Acosta MD]    Preferred pharmacy: [place] [id number] [place] [facility] [address] [place] [address]    Comment:  please fill

## 2018-01-11 NOTE — TELEPHONE ENCOUNTER
Will forward refill requested to pt's PCP David Chavez          Last office visit with Dr. Acosta on 4/25/17, see OV Note below as care was referred back to PCP.    Assessment:  I expect that he is about as good as we can get him. Can now follow with Dr Chavez, but I am available as needed via Epic.      Treatment Plan:  Continue current dose of Buspar (buspirone); 30 mg twice a day    Also, continue Remeron (mirtazepine) 15 mg at bedtime   Safety plan was reviewed; to the ER as needed or call after hours crisis line; [phone]  Education and counseling was done regarding use of medications, psychotherapy options  Follow up with David Chavez for medication management and refills.          Arnold Pete RN

## 2018-01-16 ENCOUNTER — OFFICE VISIT (OUTPATIENT)
Dept: AUDIOLOGY | Facility: CLINIC | Age: 53
End: 2018-01-16
Payer: COMMERCIAL

## 2018-01-16 ENCOUNTER — OFFICE VISIT (OUTPATIENT)
Dept: OTOLARYNGOLOGY | Facility: CLINIC | Age: 53
End: 2018-01-16
Payer: COMMERCIAL

## 2018-01-16 VITALS — WEIGHT: 175 LBS | BODY MASS INDEX: 32.2 KG/M2 | HEIGHT: 62 IN | TEMPERATURE: 98 F

## 2018-01-16 DIAGNOSIS — H69.93 DISORDER OF BOTH EUSTACHIAN TUBES: ICD-10-CM

## 2018-01-16 DIAGNOSIS — R42 DIZZINESS: Primary | ICD-10-CM

## 2018-01-16 DIAGNOSIS — R42 DIZZINESS AND GIDDINESS: Primary | ICD-10-CM

## 2018-01-16 LAB — PAIN DRUG SCR UR W RPTD MEDS: NORMAL

## 2018-01-16 PROCEDURE — 99207 ZZC NO CHARGE LOS: CPT | Performed by: AUDIOLOGIST

## 2018-01-16 PROCEDURE — 99203 OFFICE O/P NEW LOW 30 MIN: CPT | Performed by: OTOLARYNGOLOGY

## 2018-01-16 PROCEDURE — 92567 TYMPANOMETRY: CPT | Performed by: AUDIOLOGIST

## 2018-01-16 PROCEDURE — 92557 COMPREHENSIVE HEARING TEST: CPT | Performed by: AUDIOLOGIST

## 2018-01-16 NOTE — PROGRESS NOTES
AUDIOLOGY REPORT:    Patient was referred to Audiology from ENT by Dr. Mcgarry for a hearing examination. Patient reports experiencing dizziness for about one year. He has been seen by his primary care provider as well as a neurologist. Patient reports that he underwent an MRI when he was seen by Neurology, and results were reportedly normal. Patient reports bilateral aural pressure which started at around the same time as the dizziness. He denies hearing loss, tinnitus, and otorrhea.     Testing:    Otoscopy:   Otoscopic exam indicates ears are clear of cerumen bilaterally     Tympanograms:    RIGHT: normal eardrum mobility     LEFT:   normal eardrum mobility    Thresholds:   Pure Tone Thresholds assessed using conventional audiometry with good  reliability from 250-8000 Hz bilaterally using circumaural headphones     RIGHT:  normal hearing sensitivity through 4000 Hz sloping to a mild likely sensorineural hearing loss at 7428-6272 Hz     LEFT:    normal hearing sensitivity at all frequencies tested     Speech Reception Threshold:    RIGHT: 20 dB HL    LEFT:   15 dB HL    Word Recognition Score:     RIGHT: 100% at 50 dB HL using NU-6 recorded word list.    LEFT:   100% at 50 dB HL using NU-6 recorded word list.    Discussed results with the patient.     Patient was returned to ENT for follow up.       Graeme Desai M.A., CCC-A  Licensed Audiologist #[id number]    Leticia Lizama, CCC-A  Licensed Audiologist #[id number]  1/16/2018

## 2018-01-16 NOTE — LETTER
"    1/16/2018         RE: Lamont Daniels  [address]  [facility] [id number]        Dear Colleague,    Thank you for referring your patient, Lamont Daniels, to the [facility]. Please see a copy of my visit note below.    History of Present Illness - Lamont Daniels is a 52 year old male here to see me for the first time for dizziness.    He tells me that this started just about a year ago.  WHenever he would stand up too quickly, his head would feel heavy, \"like his head was going to fall forward.\"  He tells me that this feeling would last for quite a long time.  But if he sits still, there are no symptoms, but as soon as he moves, or moves his head in either direction, it comes back.    There have been a few times when there is a spinning feeling, but the vast majority of the time, it is this heaviness in his head that makes him feel unsteady.  When he feels this way, there is no fullness or ringing in the ears.  And he has not noted any changes in his hearing over the last year.    NO history of any ear disease or any ear surgery.    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     Hyperlipidemia LDL goal <70     Insomnia     Gout     Suicidal ideation     Essential hypertension     Rheumatoid arthritis involving multiple sites, unspecified rheumatoid factor presence (H)     High risk medications (not anticoagulants) long-term use     Midline low back pain without sciatica     Bilateral low back pain with sciatica, sciatica laterality unspecified     Elevated liver enzymes     On corticosteroid therapy     Essential hypertension with goal " blood pressure less than 140/90     Insomnia, unspecified type     Rheumatoid arthritis of multiple sites with negative rheumatoid factor (H)     Benign prostatic hyperplasia with lower urinary tract symptoms, unspecified morphology       Current Medications -   Current Outpatient Prescriptions:      BusPIRone HCl 30 MG TABS, Take 1 tablet by mouth 2 times daily, Disp: 180 tablet, Rfl: 5     mirtazapine (REMERON) 15 MG tablet, Take 1 tablet (15 mg) by mouth At Bedtime, Disp: 30 tablet, Rfl: 5     diclofenac (VOLTAREN) 1 % GEL topical gel, Apply 2-4 grams to 2-3 joints, up to four times daily as needed using enclosed dosing card.  Max of 32g/day, Disp: 300 g, Rfl: 11     oxyCODONE IR (ROXICODONE) 5 MG tablet, Take 1-2 tablets (5-10 mg) by mouth every 4 hours as needed for moderate to severe pain . Max of 6 tabs per day.  30 day supply. May fill on 1/17/18 to start on/after 1/19/18, Disp: 180 tablet, Rfl: 0     gemfibrozil (LOPID) 600 MG tablet, Take 1 tablet (600 mg) by mouth 2 times daily, Disp: 180 tablet, Rfl: 3     blood glucose monitoring (ONE TOUCH ULTRA 2) meter device kit, TEST 2 TIMES DAILY., Disp: 1 kit, Rfl: 0     clonazePAM (KLONOPIN) 1 MG tablet, TAKE ONE-HALF TO ONE TABLET BY MOUTH THREE TIMES DAILY AS NEEDED FOR ANXIETY, Disp: 90 tablet, Rfl: 0     clopidogrel (PLAVIX) 75 MG tablet, Take 1 tablet (75 mg) by mouth daily, Disp: 90 tablet, Rfl: 3     meloxicam (MOBIC) 7.5 MG tablet, TAKE 1 TABLET BY MOUTH 1 TIME DAILY WITH BREAKFAST, Disp: 30 tablet, Rfl: 1     triamcinolone (KENALOG) 0.1 % ointment, Apply sparingly to affected area three times daily for 14 days., Disp: 80 g, Rfl: 3     evolocumab (REPATHA) 140 MG/ML prefilled autoinjector, Inject 1 mL (140 mg) Subcutaneous every 14 days, Disp: 2 mL, Rfl: 11     zolpidem (AMBIEN) 5 MG tablet, TAKE ONE TABLET BY MOUTH AT BEDTIME AS NEEDED FOR SLEEP, Disp: 30 tablet, Rfl: 5     meclizine (ANTIVERT) 25 MG tablet, TAKE ONE TABLET BY MOUTH EVERY 6 HOURS AS  NEEDED FOR  DIZZINESS, Disp: 30 tablet, Rfl: 10     Abatacept 125 MG/ML SOAJ, Inject 125 mg Subcutaneous every 7 days, Disp: 4 Syringe, Rfl: 3     predniSONE (DELTASONE) 5 MG tablet, Take 1 tablet (5 mg) by mouth daily, Disp: 30 tablet, Rfl: 3     sulfaSALAzine ER (AZULFIDINE EN) 500 MG EC tablet, Take 2 tablets (1,000 mg) by mouth 2 times daily, Disp: 120 tablet, Rfl: 3     hydroxychloroquine (PLAQUENIL) 200 MG tablet, Take 1 tablet (200 mg) by mouth daily, Disp: 30 tablet, Rfl: 3     tamsulosin (FLOMAX) 0.4 MG capsule, Take 1 capsule (0.4 mg) by mouth daily, Disp: 90 capsule, Rfl: 3     nitroGLYcerin (NITROSTAT) 0.4 MG sublingual tablet, Place 1 tablet (0.4 mg) under the tongue every 5 minutes as needed, Disp: 30 tablet, Rfl: 1     ezetimibe (ZETIA) 10 MG tablet, Take 1 tablet (10 mg) by mouth daily, Disp: 90 tablet, Rfl: 3     colchicine (COLCRYS) 0.6 MG tablet, TAKE TWO TABLETS BY MOUTH AT THE FIRST SIGN OF FLARE, TAKE ONE ADDITIONAL TABLET ONE HOUR LATER., Disp: 30 tablet, Rfl: 3     gabapentin (NEURONTIN) 300 MG capsule, Take 2 capsules (600 mg) by mouth 3 times daily, Disp: 540 capsule, Rfl: 3     fluticasone (FLONASE) 50 MCG/ACT spray, Spray 2 sprays into both nostrils daily, Disp: 1 Bottle, Rfl: 11     atorvastatin (LIPITOR) 80 MG tablet, TAKE 1 TABLET BY MOUTH 1 TIME DAILY FOR CHOLESTEROL, Disp: 90 tablet, Rfl: 1     order for DME, Equipment being ordered: test strips as covered by insurance. USE TO TEST BLOOD SUGARS TWICE DAILY OR AS DIRECTED., Disp: 200 each, Rfl: 3     tenofovir (VIREAD) 300 MG tablet, Take 1 tablet (300 mg) by mouth daily, Disp: 90 tablet, Rfl: 3     order for DME, OneTouch glucometer., Disp: 1 each, Rfl: 0     order for DME, OneTouch lancets testing twice daily., Disp: 200 each, Rfl: 3     pantoprazole (PROTONIX) 40 MG EC tablet, TAKE ONE TABLET BY MOUTH ONCE DAILY FOR  STOMACH., Disp: 90 tablet, Rfl: 3     baclofen (LIORESAL) 10 MG tablet, Take 1 tablet (10 mg) by mouth 2 times daily  as needed for muscle spasms, Disp: 180 tablet, Rfl: 3     metoprolol (TOPROL-XL) 25 MG 24 hr tablet, Take 1 tablet (25 mg) by mouth daily, Disp: 90 tablet, Rfl: 3     allopurinol (ZYLOPRIM) 300 MG tablet, TAKE ONE TABLET BY MOUTH ONCE DAILY, Disp: 90 tablet, Rfl: 3     aspirin EC 81 MG EC tablet, Take 1 tablet (81 mg) by mouth daily (*), Disp: 30 tablet, Rfl: 1     order for DME, Equipment being ordered: single end cane, Disp: 1 Units, Rfl: 0     Cholecalciferol (VITAMIN D) 2000 UNITS tablet, TAKE ONE TABLET BY MOUTH ONCE DAILY, Disp: 100 tablet, Rfl: 1     melatonin 3 MG tablet, Take 1 tablet (3 mg) by mouth nightly as needed for sleep, Disp: , Rfl:      ORDER FOR DME, 1.  CPAP pressure 11 cm/H20 with heated humidity.  2.  Provide mask to fit and CPAP supplies.  3.  Length of need lifetime.  4.  If needed please provide a chin strap   , Disp: , Rfl:   No current facility-administered medications for this visit.     Facility-Administered Medications Ordered in Other Visits:      gadobutrol (GADAVIST) injection 10 mL, 10 mL, Intravenous, Once, Karlos Rushing MD    Allergies -   Allergies   Allergen Reactions     Citalopram Itching     Tramadol Itching       Social History -   Social History     Social History     Marital status:      Spouse name: N/A     Number of children: 5     Years of education: 14     Occupational History      Unemployed     2-2011. On long term disability. SSD applied for     Social History Main Topics     Smoking status: Never Smoker     Smokeless tobacco: Never Used     Alcohol use No     Drug use: No     Sexual activity: Yes     Partners: Female     Other Topics Concern     Parent/Sibling W/ Cabg, Mi Or Angioplasty Before 65f 55m? Yes     father     Social History Narrative    . No current SO.         Has 5 children. Youngest child does live with him.         H/o AA degree and previously worked as a . Currently unemployed.         Denies tobacco  "use.     Alcohol use: 2x/week with minimal amount of alcohol per time.     Drug use: denies       Family History -   Family History   Problem Relation Age of Onset     C.A.D. Father      Coronary Artery Disease Father      Hypertension Father      Depression Father      Hypertension Mother      DIABETES Mother      Depression Mother      MENTAL ILLNESS Mother      Hypertension Maternal Grandfather      CANCER Paternal Grandfather      Other Cancer Paternal Grandfather      Other Cancer Other      Autoimmune Disease No family hx of      CEREBROVASCULAR DISEASE No family hx of      Thyroid Disease No family hx of      Glaucoma No family hx of      Macular Degeneration No family hx of        Review of Systems - As per HPI and PMHx, otherwise 10+ system review of the head and neck, and general constitution is negative.    Physical Exam  B/P: Data Unavailable, T: 98, P: Data Unavailable, R: Data Unavailable  Vitals: Temp 98  F (36.7  C) (Temporal)  Ht 1.575 m (5' 2\")  Wt 79.4 kg (175 lb)  BMI 32.01 kg/m2  BMI= Body mass index is 32.01 kg/(m^2).    General - The patient is well nourished and well developed, and appears to have good nutritional status.  Alert and oriented to person and place, answers questions and cooperates with examination appropriately.   Head and Face - Normocephalic and atraumatic, with no gross asymmetry noted of the contour of the facial features.  The facial nerve is intact, with strong symmetric movements.  Voice and Breathing - The patient was breathing comfortably without the use of accessory muscles. There was no wheezing, stridor, or stertor.  The patients voice was clear and strong, and had appropriate pitch and quality.  Ears - The tympanic membranes are normal in appearance, bony landmarks are intact.  No retraction, perforation, or masses.  Normal mobility on valsalva maneuver, with no reports of dizziness on insuflation.  No fluid or purulence was seen in the external canal or the " middle ear. No evidence of infection of the middle ear or external canal, cerumen was normal in appearance.  Eyes - Extraocular movements intact, and the pupils were reactive to light.  Sclera were not icteric or injected, conjunctiva were pink and moist.  Mouth - Examination of the oral cavity showed pink, healthy oral mucosa. No lesions or ulcerations noted.  The tongue was mobile and midline, and the dentition were in good condition.    Throat - The walls of the oropharynx were smooth, pink, moist, symmetric, and had no lesions or ulcerations.  The tonsillar pillars and soft palate were symmetric.  The uvula was midline on elevation.    Neck - Normal midline excursion of the laryngotracheal complex during swallowing.  Full range of motion on passive movement.  Palpation of the occipital, submental, submandibular, internal jugular chain, and supraclavicular nodes did not demonstrate any abnormal lymph nodes or masses.  The carotid pulse was palpable bilaterally.  Palpation of the thyroid was soft and smooth, with no nodules or goiter appreciated.  The trachea was mobile and midline.  Nose - External contour is symmetric, no gross deflection or scars.  Nasal mucosa is pink and moist with no abnormal mucus.  The septum was midline and non-obstructive, turbinates of normal size and position.  No polyps, masses, or purulence noted on examination.  ABNORMAL Balance evaluation - The patient was able to stand independently in the room, and had slight swaying with heels together and eyes closed.  On standing heel to toe, however, the patient had a general non-directional swaying with eyes closed.  Rapid eye movements and head thrusting while seated did not cause any dizziness or sense of movement.    Audiologic Studies - An audiogram and tympanogram were performed today as part of the evaluation and personally reviewed. The tympanogram shows a normal Type A curve, with normal canal volume and middle ear pressure.  There  is no sign of eustachian tube dysfunction or middle ear effusion.  The audiogram was also normal.  The sensorineural hearing was age-appropriate, with no evidence of conductive hearing loss or significant asymmetry.      A/P - Lamont Daniels is a 52 year old male  (R42) Dizziness and giddiness  (primary encounter diagnosis)    Given the history and exam, my impression is that this is not a peripheral vestibular dizziness.  More likely to be a postural issue, due to his inflammatory disease.  He is extremely stiff in his entire axial skeleton and his gait is notably slow and limited.  Alternatively, this might be a manifeestation of a vaso vagal response.    I spent time discussing my thoughts, as well as options.  I discussed the recommendation of formal Balance Testing, to better evaluate if this is vestibular vs a central imbalance.  That would also serve to make a plan for balance therapy.    Overall, he seems quite discouraged by his overall situation and health.  He tells me that he will think about the options I gave him and let me know.      Again, thank you for allowing me to participate in the care of your patient.        Sincerely,        Eris Mcgarry MD

## 2018-01-16 NOTE — MR AVS SNAPSHOT
After Visit Summary   1/16/2018    Lamont Daniels    MRN: [id number]           Patient Information     Date Of Birth          1965        Visit Information        Provider Department      1/16/2018 1:00 PM Lubna Hemphill AuD [facility]        Today's Diagnoses     Dizziness    -  1    Disorder of both eustachian tubes           Follow-ups after your visit        Your next 10 appointments already scheduled     Jan 16, 2018  1:00 PM CST   Return Visit with Jon Story   [facility] ([facility])    [address] [address]   [phone]            Feb 13, 2018  3:00 PM CST   LAB with BK LAB   [facility] ([facility])    [address] [address]   [phone]           Please do not eat 10-12 hours before your appointment if you are coming in fasting for labs on lipids, cholesterol, or glucose (sugar). This does not apply to pregnant women. Water, hot tea and black coffee (with nothing added) are okay. Do not drink other fluids, diet soda or chew gum.            Feb 20, 2018  8:40 AM CST   Return Visit with Sebastián Jenkins MD   [facility]    [address] [facility] [address]   [phone]            Apr 11, 2018  3:00 PM CDT   Return Visit with Dorothea Loo MD   [facility] ([facility])    [address] [address]   [phone]            Apr 12, 2018  4:00 PM CDT   Return Visit with Drea Laboy MD   [facility])    [id number] [place] [address] [phone]            Jun 22, 2018  3:00 PM CDT   LAB with LAB FIRST FLOOR [facility] ([facility])    [address] [place] [address] [place]  [id number] [phone]           Please do not eat 10-12 hours before your appointment if you are coming in fasting for labs on lipids, cholesterol, or glucose (sugar). This does not apply to pregnant women. Water, hot tea and black coffee (with nothing added) are okay. Do not drink other fluids, diet soda or chew gum.            Jun 22, 2018  3:30 PM CDT   Return Visit with Fly Travis MD   [facility] ([facility])    [address] [place] [id number] [phone]              Who to contact     If you have questions or need follow up information about today's clinic visit or your schedule please contact [facility] directly at [phone].  Normal or non-critical lab and imaging results will be communicated to you by MyChart, letter or phone within 4 business days after the clinic has received the results. If you do not hear from us within 7 days, please contact the clinic through [employer] or phone. If you have a critical or abnormal lab result, we will notify you by phone as soon as possible.  Submit refill requests through [employer] or call your pharmacy and they will forward the refill request to us. Please allow 3 business days for your refill to be completed.          Additional Information About Your Visit        [employer] Information     [employer] gives you secure access to your electronic health record. If you see a primary care provider, you can also send messages to your care team and make appointments. If you have questions, please call your primary care clinic.  If you do not have a primary care provider, please call [phone] and they will assist you.        Care EveryWhere ID     This is your Care EveryWhere ID. This could be used by other organizations to access your [place] medical records  [id number]         Blood Pressure from Last 3 Encounters:   01/10/18 110/76   12/07/17 124/83   10/20/17 123/82    Weight from  Last 3 Encounters:   01/16/18 175 lb (79.4 kg)   01/10/18 175 lb (79.4 kg)   12/07/17 171 lb (77.6 kg)              We Performed the Following     AUDIOGRAM/TYMPANOGRAM - INTERFACE     COMPREHENSIVE HEARING TEST     TYMPANOMETRY        Primary Care Provider Office Phone # Fax #    David Chavez [phone]       [address]  [facility] [address]        Equal Access to Services     [facility]: Hadii aad ku hadasho Soomaali, waaxda luqadaha, qaybta kaalmada adeegyada, waxay idiin hayaan adeeg kharash la'aan . So [facility] [phone].    ATENCIÓN: Si habla español, tiene a muse disposición servicios gratuitos de asistencia lingüística. [facility] [phone].    We comply with applicable federal civil rights laws and [place] laws. We do not discriminate on the basis of race, color, national origin, age, disability, sex, sexual orientation, or gender identity.            Thank you!     Thank you for choosing [facility]  for your care. Our goal is always to provide you with excellent care. Hearing back from our patients is one way we can continue to improve our services. Please take a few minutes to complete the written survey that you may receive in the mail after your visit with us. Thank you!             Your Updated Medication List - Protect others around you: Learn how to safely use, store and throw away your medicines at www.disposemymeds.org.          This list is accurate as of: 1/16/18 12:53 PM.  Always use your most recent med list.                   Brand Name Dispense Instructions for use Diagnosis    Abatacept 125 MG/ML Soaj auto-injector    ORENCIA    4 Syringe    Inject 125 mg Subcutaneous every 7 days    Rheumatoid arthritis of multiple sites with negative rheumatoid factor (H)       allopurinol 300 MG tablet    ZYLOPRIM    90 tablet    TAKE ONE TABLET BY MOUTH ONCE DAILY    Idiopathic gout, unspecified chronicity, unspecified site       aspirin 81 MG EC tablet      30 tablet    Take 1 tablet (81 mg) by mouth daily (*)    Coronary artery disease involving native coronary artery of native heart without angina pectoris       atorvastatin 80 MG tablet    LIPITOR    90 tablet    TAKE 1 TABLET BY MOUTH 1 TIME DAILY FOR CHOLESTEROL    Atherosclerosis of native coronary artery of native heart, angina presence unspecified, Hyperlipidemia LDL goal <100       baclofen 10 MG tablet    LIORESAL    180 tablet    Take 1 tablet (10 mg) by mouth 2 times daily as needed for muscle spasms    Spasm of back muscles       blood glucose monitoring meter device kit     1 kit    TEST 2 TIMES DAILY.    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    Gout without tophus       diclofenac 1 % Gel topical gel    VOLTAREN    300 g    Apply 2-4 grams to 2-3 joints, up to four times daily as needed using enclosed dosing card.  Max of 32g/day    Facet arthropathy, Rheumatoid arthritis involving multiple sites, unspecified rheumatoid factor presence (H)       evolocumab 140 MG/ML prefilled autoinjector    REPATHA    2 mL    Inject 1 mL (140 mg) Subcutaneous every 14 days    Hyperlipidemia LDL goal <70, S/P CABG (coronary artery bypass graft)       ezetimibe 10 MG tablet    ZETIA    90 tablet    Take 1 tablet  (10 mg) by mouth daily    Coronary artery disease involving native coronary artery of native heart without angina pectoris       fluticasone 50 MCG/ACT spray    FLONASE    1 Bottle    Spray 2 sprays into both nostrils daily    Nasal congestion, Dizziness       gabapentin 300 MG capsule    NEURONTIN    540 capsule    Take 2 capsules (600 mg) by mouth 3 times daily    Lumbar radiculopathy       gemfibrozil 600 MG tablet    LOPID    180 tablet    Take 1 tablet (600 mg) by mouth 2 times daily    Hyperlipidemia LDL goal <70       hydroxychloroquine 200 MG tablet    PLAQUENIL    30 tablet    Take 1 tablet (200 mg) by mouth daily    Rheumatoid arthritis of multiple sites with negative rheumatoid factor (H), High risk medications (not anticoagulants) long-term use       meclizine 25 MG tablet    ANTIVERT    30 tablet    TAKE ONE TABLET BY MOUTH EVERY 6 HOURS AS NEEDED FOR  DIZZINESS    Vertigo       melatonin 3 MG tablet      Take 1 tablet (3 mg) by mouth nightly as needed for sleep    Delayed sleep phase syndrome       meloxicam 7.5 MG tablet    MOBIC    30 tablet    TAKE 1 TABLET BY MOUTH 1 TIME DAILY WITH BREAKFAST    Low back pain, unspecified back pain laterality, unspecified chronicity, with sciatica presence unspecified       metoprolol succinate 25 MG 24 hr tablet    TOPROL-XL    90 tablet    Take 1 tablet (25 mg) by mouth daily    Atherosclerosis of native coronary artery of native heart without angina pectoris       mirtazapine 15 MG tablet    REMERON    30 tablet    Take 1 tablet (15 mg) by mouth At Bedtime    Severe episode of recurrent major depressive disorder, without psychotic features (H)       nitroGLYcerin 0.4 MG sublingual tablet    NITROSTAT    30 tablet    Place 1 tablet (0.4 mg) under the tongue every 5 minutes as needed    Atherosclerosis of native coronary artery of native heart with angina pectoris (H)       order for DME      1.  CPAP pressure 11 cm/H20 with heated humidity.  2.  Provide mask to  fit and CPAP supplies.  3.  Length of need lifetime.  4.  If needed please provide a chin strap    JEROD (obstructive sleep apnea), Anxiety, CAD (coronary artery disease), HTN (hypertension)       * order for DME     1 Units    Equipment being ordered: single end cane    Lumbar radiculopathy, Facet arthropathy, Chronic low back pain       * order for DME     200 each    Equipment being ordered: test strips as covered by insurance. USE TO TEST BLOOD SUGARS TWICE DAILY OR AS DIRECTED.    Rheumatoid arthritis involving multiple sites, unspecified rheumatoid factor presence (H)       * order for DME     1 each    OneTouch glucometer.    Rheumatoid arthritis involving multiple sites, unspecified rheumatoid factor presence (H)       * order for DME     200 each    OneTouch lancets testing twice daily.    Rheumatoid arthritis involving multiple sites, unspecified rheumatoid factor presence (H)       oxyCODONE IR 5 MG tablet    ROXICODONE    180 tablet    Take 1-2 tablets (5-10 mg) by mouth every 4 hours as needed for moderate to severe pain . Max of 6 tabs per day.  30 day supply. May fill on 1/17/18 to start on/after 1/19/18    Facet arthropathy       pantoprazole 40 MG EC tablet    PROTONIX    90 tablet    TAKE ONE TABLET BY MOUTH ONCE DAILY FOR  STOMACH.    Gastroesophageal reflux disease without esophagitis       predniSONE 5 MG tablet    DELTASONE    30 tablet    Take 1 tablet (5 mg) by mouth daily    Rheumatoid arthritis of multiple sites with negative rheumatoid factor (H), High risk medications (not anticoagulants) long-term use       sulfaSALAzine  MG EC tablet    AZULFIDINE EN    120 tablet    Take 2 tablets (1,000 mg) by mouth 2 times daily    Rheumatoid arthritis of multiple sites with negative rheumatoid factor (H), High risk medications (not anticoagulants) long-term use       tamsulosin 0.4 MG capsule    FLOMAX    90 capsule    Take 1 capsule (0.4 mg) by mouth daily    Benign prostatic hyperplasia with  weak urinary stream       tenofovir 300 MG tablet    VIREAD    90 tablet    Take 1 tablet (300 mg) by mouth daily    Chronic viral hepatitis B without delta agent and without coma (H)       triamcinolone 0.1 % ointment    KENALOG    80 g    Apply sparingly to affected area three times daily for 14 days.    Rash       vitamin D 2000 UNITS tablet     100 tablet    TAKE ONE TABLET BY MOUTH ONCE DAILY    Vitamin D deficiency       zolpidem 5 MG tablet    AMBIEN    30 tablet    TAKE ONE TABLET BY MOUTH AT BEDTIME AS NEEDED FOR SLEEP    Insomnia, unspecified type       * Notice:  This list has 4 medication(s) that are the same as other medications prescribed for you. Read the directions carefully, and ask your doctor or other care provider to review them with you.

## 2018-01-16 NOTE — PROGRESS NOTES
"History of Present Illness - Lamont Daniels is a 52 year old male here to see me for the first time for dizziness.    He tells me that this started just about a year ago.  WHenever he would stand up too quickly, his head would feel heavy, \"like his head was going to fall forward.\"  He tells me that this feeling would last for quite a long time.  But if he sits still, there are no symptoms, but as soon as he moves, or moves his head in either direction, it comes back.    There have been a few times when there is a spinning feeling, but the vast majority of the time, it is this heaviness in his head that makes him feel unsteady.  When he feels this way, there is no fullness or ringing in the ears.  And he has not noted any changes in his hearing over the last year.    NO history of any ear disease or any ear surgery.    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     Hyperlipidemia LDL goal <70     Insomnia     Gout     Suicidal ideation     Essential hypertension     Rheumatoid arthritis involving multiple sites, unspecified rheumatoid factor presence (H)     High risk medications (not anticoagulants) long-term use     Midline low back pain without sciatica     Bilateral low back pain with sciatica, sciatica laterality unspecified     Elevated liver enzymes     On corticosteroid therapy     Essential hypertension with goal blood pressure less than 140/90     Insomnia, unspecified type     Rheumatoid arthritis of multiple sites with negative rheumatoid factor (H)     Benign prostatic hyperplasia with lower urinary tract symptoms, unspecified morphology       Current " Medications -   Current Outpatient Prescriptions:      BusPIRone HCl 30 MG TABS, Take 1 tablet by mouth 2 times daily, Disp: 180 tablet, Rfl: 5     mirtazapine (REMERON) 15 MG tablet, Take 1 tablet (15 mg) by mouth At Bedtime, Disp: 30 tablet, Rfl: 5     diclofenac (VOLTAREN) 1 % GEL topical gel, Apply 2-4 grams to 2-3 joints, up to four times daily as needed using enclosed dosing card.  Max of 32g/day, Disp: 300 g, Rfl: 11     oxyCODONE IR (ROXICODONE) 5 MG tablet, Take 1-2 tablets (5-10 mg) by mouth every 4 hours as needed for moderate to severe pain . Max of 6 tabs per day.  30 day supply. May fill on 1/17/18 to start on/after 1/19/18, Disp: 180 tablet, Rfl: 0     gemfibrozil (LOPID) 600 MG tablet, Take 1 tablet (600 mg) by mouth 2 times daily, Disp: 180 tablet, Rfl: 3     blood glucose monitoring (ONE TOUCH ULTRA 2) meter device kit, TEST 2 TIMES DAILY., Disp: 1 kit, Rfl: 0     clonazePAM (KLONOPIN) 1 MG tablet, TAKE ONE-HALF TO ONE TABLET BY MOUTH THREE TIMES DAILY AS NEEDED FOR ANXIETY, Disp: 90 tablet, Rfl: 0     clopidogrel (PLAVIX) 75 MG tablet, Take 1 tablet (75 mg) by mouth daily, Disp: 90 tablet, Rfl: 3     meloxicam (MOBIC) 7.5 MG tablet, TAKE 1 TABLET BY MOUTH 1 TIME DAILY WITH BREAKFAST, Disp: 30 tablet, Rfl: 1     triamcinolone (KENALOG) 0.1 % ointment, Apply sparingly to affected area three times daily for 14 days., Disp: 80 g, Rfl: 3     evolocumab (REPATHA) 140 MG/ML prefilled autoinjector, Inject 1 mL (140 mg) Subcutaneous every 14 days, Disp: 2 mL, Rfl: 11     zolpidem (AMBIEN) 5 MG tablet, TAKE ONE TABLET BY MOUTH AT BEDTIME AS NEEDED FOR SLEEP, Disp: 30 tablet, Rfl: 5     meclizine (ANTIVERT) 25 MG tablet, TAKE ONE TABLET BY MOUTH EVERY 6 HOURS AS NEEDED FOR  DIZZINESS, Disp: 30 tablet, Rfl: 10     Abatacept 125 MG/ML SOAJ, Inject 125 mg Subcutaneous every 7 days, Disp: 4 Syringe, Rfl: 3     predniSONE (DELTASONE) 5 MG tablet, Take 1 tablet (5 mg) by mouth daily, Disp: 30 tablet, Rfl: 3      sulfaSALAzine ER (AZULFIDINE EN) 500 MG EC tablet, Take 2 tablets (1,000 mg) by mouth 2 times daily, Disp: 120 tablet, Rfl: 3     hydroxychloroquine (PLAQUENIL) 200 MG tablet, Take 1 tablet (200 mg) by mouth daily, Disp: 30 tablet, Rfl: 3     tamsulosin (FLOMAX) 0.4 MG capsule, Take 1 capsule (0.4 mg) by mouth daily, Disp: 90 capsule, Rfl: 3     nitroGLYcerin (NITROSTAT) 0.4 MG sublingual tablet, Place 1 tablet (0.4 mg) under the tongue every 5 minutes as needed, Disp: 30 tablet, Rfl: 1     ezetimibe (ZETIA) 10 MG tablet, Take 1 tablet (10 mg) by mouth daily, Disp: 90 tablet, Rfl: 3     colchicine (COLCRYS) 0.6 MG tablet, TAKE TWO TABLETS BY MOUTH AT THE FIRST SIGN OF FLARE, TAKE ONE ADDITIONAL TABLET ONE HOUR LATER., Disp: 30 tablet, Rfl: 3     gabapentin (NEURONTIN) 300 MG capsule, Take 2 capsules (600 mg) by mouth 3 times daily, Disp: 540 capsule, Rfl: 3     fluticasone (FLONASE) 50 MCG/ACT spray, Spray 2 sprays into both nostrils daily, Disp: 1 Bottle, Rfl: 11     atorvastatin (LIPITOR) 80 MG tablet, TAKE 1 TABLET BY MOUTH 1 TIME DAILY FOR CHOLESTEROL, Disp: 90 tablet, Rfl: 1     order for DME, Equipment being ordered: test strips as covered by insurance. USE TO TEST BLOOD SUGARS TWICE DAILY OR AS DIRECTED., Disp: 200 each, Rfl: 3     tenofovir (VIREAD) 300 MG tablet, Take 1 tablet (300 mg) by mouth daily, Disp: 90 tablet, Rfl: 3     order for DME, OneTouch glucometer., Disp: 1 each, Rfl: 0     order for DME, OneTouch lancets testing twice daily., Disp: 200 each, Rfl: 3     pantoprazole (PROTONIX) 40 MG EC tablet, TAKE ONE TABLET BY MOUTH ONCE DAILY FOR  STOMACH., Disp: 90 tablet, Rfl: 3     baclofen (LIORESAL) 10 MG tablet, Take 1 tablet (10 mg) by mouth 2 times daily as needed for muscle spasms, Disp: 180 tablet, Rfl: 3     metoprolol (TOPROL-XL) 25 MG 24 hr tablet, Take 1 tablet (25 mg) by mouth daily, Disp: 90 tablet, Rfl: 3     allopurinol (ZYLOPRIM) 300 MG tablet, TAKE ONE TABLET BY MOUTH ONCE DAILY, Disp:  90 tablet, Rfl: 3     aspirin EC 81 MG EC tablet, Take 1 tablet (81 mg) by mouth daily (*), Disp: 30 tablet, Rfl: 1     order for DME, Equipment being ordered: single end cane, Disp: 1 Units, Rfl: 0     Cholecalciferol (VITAMIN D) 2000 UNITS tablet, TAKE ONE TABLET BY MOUTH ONCE DAILY, Disp: 100 tablet, Rfl: 1     melatonin 3 MG tablet, Take 1 tablet (3 mg) by mouth nightly as needed for sleep, Disp: , Rfl:      ORDER FOR DME, 1.  CPAP pressure 11 cm/H20 with heated humidity.  2.  Provide mask to fit and CPAP supplies.  3.  Length of need lifetime.  4.  If needed please provide a chin strap   , Disp: , Rfl:   No current facility-administered medications for this visit.     Facility-Administered Medications Ordered in Other Visits:      gadobutrol (GADAVIST) injection 10 mL, 10 mL, Intravenous, Once, Karlos Rushing MD    Allergies -   Allergies   Allergen Reactions     Citalopram Itching     Tramadol Itching       Social History -   Social History     Social History     Marital status:      Spouse name: N/A     Number of children: 5     Years of education: 14     Occupational History      Unemployed     2-2011. On long term disability. SSD applied for     Social History Main Topics     Smoking status: Never Smoker     Smokeless tobacco: Never Used     Alcohol use No     Drug use: No     Sexual activity: Yes     Partners: Female     Other Topics Concern     Parent/Sibling W/ Cabg, Mi Or Angioplasty Before 65f 55m? Yes     father     Social History Narrative    . No current SO.         Has 5 children. Youngest child does live with him.         H/o AA degree and previously worked as a [employer]. Currently unemployed.         Denies tobacco use.     Alcohol use: 2x/week with minimal amount of alcohol per time.     Drug use: denies       Family History -   Family History   Problem Relation Age of Onset     C.A.D. Father      Coronary Artery Disease Father      Hypertension Father       "Depression Father      Hypertension Mother      DIABETES Mother      Depression Mother      MENTAL ILLNESS Mother      Hypertension Maternal Grandfather      CANCER Paternal Grandfather      Other Cancer Paternal Grandfather      Other Cancer Other      Autoimmune Disease No family hx of      CEREBROVASCULAR DISEASE No family hx of      Thyroid Disease No family hx of      Glaucoma No family hx of      Macular Degeneration No family hx of        Review of Systems - As per HPI and PMHx, otherwise 10+ system review of the head and neck, and general constitution is negative.    Physical Exam  B/P: Data Unavailable, T: 98, P: Data Unavailable, R: Data Unavailable  Vitals: Temp 98  F (36.7  C) (Temporal)  Ht 1.575 m (5' 2\")  Wt 79.4 kg (175 lb)  BMI 32.01 kg/m2  BMI= Body mass index is 32.01 kg/(m^2).    General - The patient is well nourished and well developed, and appears to have good nutritional status.  Alert and oriented to person and place, answers questions and cooperates with examination appropriately.   Head and Face - Normocephalic and atraumatic, with no gross asymmetry noted of the contour of the facial features.  The facial nerve is intact, with strong symmetric movements.  Voice and Breathing - The patient was breathing comfortably without the use of accessory muscles. There was no wheezing, stridor, or stertor.  The patients voice was clear and strong, and had appropriate pitch and quality.  Ears - The tympanic membranes are normal in appearance, bony landmarks are intact.  No retraction, perforation, or masses.  Normal mobility on valsalva maneuver, with no reports of dizziness on insuflation.  No fluid or purulence was seen in the external canal or the middle ear. No evidence of infection of the middle ear or external canal, cerumen was normal in appearance.  Eyes - Extraocular movements intact, and the pupils were reactive to light.  Sclera were not icteric or injected, conjunctiva were pink and " moist.  Mouth - Examination of the oral cavity showed pink, healthy oral mucosa. No lesions or ulcerations noted.  The tongue was mobile and midline, and the dentition were in good condition.    Throat - The walls of the oropharynx were smooth, pink, moist, symmetric, and had no lesions or ulcerations.  The tonsillar pillars and soft palate were symmetric.  The uvula was midline on elevation.    Neck - Normal midline excursion of the laryngotracheal complex during swallowing.  Full range of motion on passive movement.  Palpation of the occipital, submental, submandibular, internal jugular chain, and supraclavicular nodes did not demonstrate any abnormal lymph nodes or masses.  The carotid pulse was palpable bilaterally.  Palpation of the thyroid was soft and smooth, with no nodules or goiter appreciated.  The trachea was mobile and midline.  Nose - External contour is symmetric, no gross deflection or scars.  Nasal mucosa is pink and moist with no abnormal mucus.  The septum was midline and non-obstructive, turbinates of normal size and position.  No polyps, masses, or purulence noted on examination.  ABNORMAL Balance evaluation - The patient was able to stand independently in the room, and had slight swaying with heels together and eyes closed.  On standing heel to toe, however, the patient had a general non-directional swaying with eyes closed.  Rapid eye movements and head thrusting while seated did not cause any dizziness or sense of movement.    Audiologic Studies - An audiogram and tympanogram were performed today as part of the evaluation and personally reviewed. The tympanogram shows a normal Type A curve, with normal canal volume and middle ear pressure.  There is no sign of eustachian tube dysfunction or middle ear effusion.  The audiogram was also normal.  The sensorineural hearing was age-appropriate, with no evidence of conductive hearing loss or significant asymmetry.      A/P - Lamontromina Daniels is a 52 year  old male  (R42) Dizziness and giddiness  (primary encounter diagnosis)    Given the history and exam, my impression is that this is not a peripheral vestibular dizziness.  More likely to be a postural issue, due to his inflammatory disease.  He is extremely stiff in his entire axial skeleton and his gait is notably slow and limited.  Alternatively, this might be a manifeestation of a vaso vagal response.    I spent time discussing my thoughts, as well as options.  I discussed the recommendation of formal Balance Testing, to better evaluate if this is vestibular vs a central imbalance.  That would also serve to make a plan for balance therapy.    Overall, he seems quite discouraged by his overall situation and health.  He tells me that he will think about the options I gave him and let me know.

## 2018-01-16 NOTE — PATIENT INSTRUCTIONS
Scheduling Information  To schedule your CT/MRI scan, please contact [facility] at [phone] OR [facility] at [phone]    To schedule your Surgery, please contact our Specialty Schedulers at [phone]      ENT Clinic Locations Clinic Hours Telephone Number     [facility]  [address] [place] [address]   Monday:           1:00pm -- 5:00pm    Friday:              8:00am - 12:00pm   To schedule/reschedule an appointment with   Dr. Mcgarry,   please contact our   Specialty Scheduling Department at:     [phone]       [facility]  [address] [place] [address] Tuesday:          8:00am -- 2:00pm         Urgent Care Locations Clinic Hours Telephone Numbers     [facility]  [address] [place] [address]     Monday-Friday:     11:00am - 9:00pm    Saturday-Sunday:  9:00am - 5:00pm   [phone]     [facility]  [address] [place] [address]     Monday-Friday:      5:00pm - 9:00pm     Saturday-Sunday:  9:00am - 5:00pm   [phone]

## 2018-01-16 NOTE — MR AVS SNAPSHOT
After Visit Summary   1/16/2018    Lamont Daniels    MRN: [id number]           Patient Information     Date Of Birth          1965        Visit Information        Provider Department      1/16/2018 12:45 PM Eris Mcgarry MD [facility]        Today's Diagnoses     Dizziness and giddiness    -  1      Care Instructions    Scheduling Information  To schedule your CT/MRI scan, please contact [facility] at [phone] OR [facility] at [phone]    To schedule your Surgery, please contact our Specialty Schedulers at [phone]      ENT Clinic Locations Clinic Hours Telephone Number     [facility]  [address]  [place] [address]   Monday:           1:00pm -- 5:00pm    Friday:              8:00am - 12:00pm   To schedule/reschedule an appointment with   Dr. Mcgarry,   please contact our   Specialty Scheduling Department at:     [phone]       [facility]  [address] [address] Tuesday:          8:00am -- 2:00pm         Urgent Care Locations Clinic Hours Telephone Numbers     [facility]  [address] [place] [address]     Monday-Friday:     11:00am - 9:00pm    Saturday-Sunday:  9:00am - 5:00pm   [phone]     [facility]  [address] [place] [address]     Monday-Friday:      5:00pm - 9:00pm     Saturday-Sunday:  9:00am - 5:00pm   [phone]                 Follow-ups after your visit        Your next 10 appointments already scheduled     Feb 13, 2018  3:00 PM CST   LAB with BK LAB   [facility] ([facility])    [address] [id number] [phone]           Please do not eat 10-12 hours before your appointment if you are coming in fasting for labs on lipids, cholesterol, or glucose (sugar). This does not apply to pregnant women. Water, hot tea and black coffee (with nothing added) are okay. Do not  drink other fluids, diet soda or chew gum.            Feb 20, 2018  8:40 AM CST   Return Visit with Sebastián Jenkins MD   [facility] ([facility])    [address] [facility] [address] [facility] [id number] [phone]            Apr 11, 2018  3:00 PM CDT   Return Visit with Dorothea Loo MD   [facility] ([facility])    [address] [facility] [address] [phone]            Apr 12, 2018  4:00 PM CDT   Return Visit with Drea Laboy MD   [facility] ([facility])    [address] [place] [id number] [phone]            Jun 22, 2018  3:00 PM CDT   LAB with LAB FIRST FLOOR [facility] ([facility])    [address] [place] [id number] [phone]           Please do not eat 10-12 hours before your appointment if you are coming in fasting for labs on lipids, cholesterol, or glucose (sugar). This does not apply to pregnant women. Water, hot tea and black coffee (with nothing added) are okay. Do not drink other fluids, diet soda or chew gum.            Jun 22, 2018  3:30 PM CDT   Return Visit with Fly Travis MD   [facility] ([facility])    [address] [place] [phone]              Who to contact     If you have questions or need follow up information about today's clinic visit or your schedule please contact [facility] directly at [phone].  Normal or non-critical lab and imaging results will be communicated to you by MyChart, letter or phone within 4 business days after the clinic has received the results. If you do not hear from us within 7 days, please contact the clinic through MyChart or phone. If you have a critical or abnormal lab result, we will notify you by phone as soon as possible.  Submit refill  "requests through [employer] or call your pharmacy and they will forward the refill request to us. Please allow 3 business days for your refill to be completed.          Additional Information About Your Visit        [employer] Information     [employer] gives you secure access to your electronic health record. If you see a primary care provider, you can also send messages to your care team and make appointments. If you have questions, please call your primary care clinic.  If you do not have a primary care provider, please call [phone] and they will assist you.        Care EveryWhere ID     This is your Care EveryWhere ID. This could be used by other organizations to access your [facility] medical records  [id number]        Your Vitals Were     Temperature Height BMI (Body Mass Index)             98  F (36.7  C) (Temporal) 1.575 m (5' 2\") 32.01 kg/m2          Blood Pressure from Last 3 Encounters:   01/10/18 110/76   12/07/17 124/83   10/20/17 123/82    Weight from Last 3 Encounters:   01/16/18 79.4 kg (175 lb)   01/10/18 79.4 kg (175 lb)   12/07/17 77.6 kg (171 lb)              Today, you had the following     No orders found for display       Primary Care Provider Office Phone # Fax #    David Chavez [phone]       [address]  [facility] [address]        Equal Access to Services     [facility] : Hadii esther ku hadasho Soomaali, waaxda luqadaha, qaybta kaalmada estelleegyada, marcelino katz . So [facility] [phone].    ATENCIÓN: Si habla español, tiene a muse disposición servicios gratuitos de asistencia lingüística. Cy al [phone].    We comply with applicable federal civil rights laws and [place] laws. We do not discriminate on the basis of race, color, national origin, age, disability, sex, sexual orientation, or gender identity.            Thank you!     Thank you for choosing [facility]  for your care. Our goal is always to provide you with " excellent care. Hearing back from our patients is one way we can continue to improve our services. Please take a few minutes to complete the written survey that you may receive in the mail after your visit with us. Thank you!             Your Updated Medication List - Protect others around you: Learn how to safely use, store and throw away your medicines at www.disposemymeds.org.          This list is accurate as of: 1/16/18 11:59 PM.  Always use your most recent med list.                   Brand Name Dispense Instructions for use Diagnosis    Abatacept 125 MG/ML Soaj auto-injector    ORENCIA    4 Syringe    Inject 125 mg Subcutaneous every 7 days    Rheumatoid arthritis of multiple sites with negative rheumatoid factor (H)       allopurinol 300 MG tablet    ZYLOPRIM    90 tablet    TAKE ONE TABLET BY MOUTH ONCE DAILY    Idiopathic gout, unspecified chronicity, unspecified site       aspirin 81 MG EC tablet     30 tablet    Take 1 tablet (81 mg) by mouth daily (*)    Coronary artery disease involving native coronary artery of native heart without angina pectoris       atorvastatin 80 MG tablet    LIPITOR    90 tablet    TAKE 1 TABLET BY MOUTH 1 TIME DAILY FOR CHOLESTEROL    Atherosclerosis of native coronary artery of native heart, angina presence unspecified, Hyperlipidemia LDL goal <100       baclofen 10 MG tablet    LIORESAL    180 tablet    Take 1 tablet (10 mg) by mouth 2 times daily as needed for muscle spasms    Spasm of back muscles       blood glucose monitoring meter device kit     1 kit    TEST 2 TIMES DAILY.    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    Gout without tophus       diclofenac 1 % Gel topical gel    VOLTAREN    300 g    Apply 2-4 grams to 2-3 joints, up to four times daily as needed using enclosed dosing card.  Max of 32g/day    Facet arthropathy, Rheumatoid arthritis involving multiple sites, unspecified rheumatoid factor presence (H)       evolocumab 140 MG/ML prefilled autoinjector    REPATHA    2 mL    Inject 1 mL (140 mg) Subcutaneous every 14 days    Hyperlipidemia LDL goal <70, S/P CABG (coronary artery bypass graft)       ezetimibe 10 MG tablet    ZETIA    90 tablet    Take 1 tablet (10 mg) by mouth daily    Coronary artery disease involving native coronary artery of native heart without angina pectoris       fluticasone 50 MCG/ACT spray    FLONASE    1 Bottle    Spray 2 sprays into both nostrils daily    Nasal congestion, Dizziness       gabapentin 300 MG capsule    NEURONTIN    540 capsule    Take 2 capsules (600 mg) by mouth 3 times daily    Lumbar radiculopathy       gemfibrozil 600 MG tablet    LOPID    180 tablet    Take 1 tablet (600 mg) by mouth 2 times daily    Hyperlipidemia LDL goal <70       hydroxychloroquine 200 MG tablet    PLAQUENIL    30 tablet    Take 1 tablet (200 mg) by mouth daily    Rheumatoid arthritis of multiple sites with negative rheumatoid factor (H), High risk medications (not anticoagulants) long-term use       meclizine 25 MG tablet    ANTIVERT    30 tablet    TAKE ONE TABLET BY MOUTH EVERY 6 HOURS AS NEEDED FOR  DIZZINESS    Vertigo       melatonin 3 MG tablet      Take 1 tablet (3 mg) by mouth nightly as needed for sleep    Delayed sleep phase syndrome        meloxicam 7.5 MG tablet    MOBIC    30 tablet    TAKE 1 TABLET BY MOUTH 1 TIME DAILY WITH BREAKFAST    Low back pain, unspecified back pain laterality, unspecified chronicity, with sciatica presence unspecified       metoprolol succinate 25 MG 24 hr tablet    TOPROL-XL    90 tablet    Take 1 tablet (25 mg) by mouth daily    Atherosclerosis of native coronary artery of native heart without angina pectoris       mirtazapine 15 MG tablet    REMERON    30 tablet    Take 1 tablet (15 mg) by mouth At Bedtime    Severe episode of recurrent major depressive disorder, without psychotic features (H)       nitroGLYcerin 0.4 MG sublingual tablet    NITROSTAT    30 tablet    Place 1 tablet (0.4 mg) under the tongue every 5 minutes as needed    Atherosclerosis of native coronary artery of native heart with angina pectoris (H)       order for DME      1.  CPAP pressure 11 cm/H20 with heated humidity.  2.  Provide mask to fit and CPAP supplies.  3.  Length of need lifetime.  4.  If needed please provide a chin strap    JEROD (obstructive sleep apnea), Anxiety, CAD (coronary artery disease), HTN (hypertension)       * order for DME     1 Units    Equipment being ordered: single end cane    Lumbar radiculopathy, Facet arthropathy, Chronic low back pain       * order for DME     200 each    Equipment being ordered: test strips as covered by insurance. USE TO TEST BLOOD SUGARS TWICE DAILY OR AS DIRECTED.    Rheumatoid arthritis involving multiple sites, unspecified rheumatoid factor presence (H)       * order for DME     1 each    OneTouch glucometer.    Rheumatoid arthritis involving multiple sites, unspecified rheumatoid factor presence (H)       * order for DME     200 each    OneTouch lancets testing twice daily.    Rheumatoid arthritis involving multiple sites, unspecified rheumatoid factor presence (H)       oxyCODONE IR 5 MG tablet    ROXICODONE    180 tablet    Take 1-2 tablets (5-10 mg) by mouth every 4 hours as needed for  moderate to severe pain . Max of 6 tabs per day.  30 day supply. May fill on 1/17/18 to start on/after 1/19/18    Facet arthropathy       pantoprazole 40 MG EC tablet    PROTONIX    90 tablet    TAKE ONE TABLET BY MOUTH ONCE DAILY FOR  STOMACH.    Gastroesophageal reflux disease without esophagitis       predniSONE 5 MG tablet    DELTASONE    30 tablet    Take 1 tablet (5 mg) by mouth daily    Rheumatoid arthritis of multiple sites with negative rheumatoid factor (H), High risk medications (not anticoagulants) long-term use       sulfaSALAzine  MG EC tablet    AZULFIDINE EN    120 tablet    Take 2 tablets (1,000 mg) by mouth 2 times daily    Rheumatoid arthritis of multiple sites with negative rheumatoid factor (H), High risk medications (not anticoagulants) long-term use       tamsulosin 0.4 MG capsule    FLOMAX    90 capsule    Take 1 capsule (0.4 mg) by mouth daily    Benign prostatic hyperplasia with weak urinary stream       tenofovir 300 MG tablet    VIREAD    90 tablet    Take 1 tablet (300 mg) by mouth daily    Chronic viral hepatitis B without delta agent and without coma (H)       triamcinolone 0.1 % ointment    KENALOG    80 g    Apply sparingly to affected area three times daily for 14 days.    Rash       vitamin D 2000 UNITS tablet     100 tablet    TAKE ONE TABLET BY MOUTH ONCE DAILY    Vitamin D deficiency       zolpidem 5 MG tablet    AMBIEN    30 tablet    TAKE ONE TABLET BY MOUTH AT BEDTIME AS NEEDED FOR SLEEP    Insomnia, unspecified type       * Notice:  This list has 4 medication(s) that are the same as other medications prescribed for you. Read the directions carefully, and ask your doctor or other care provider to review them with you.

## 2018-01-18 ENCOUNTER — MYC REFILL (OUTPATIENT)
Dept: FAMILY MEDICINE | Facility: CLINIC | Age: 53
End: 2018-01-18

## 2018-01-18 DIAGNOSIS — K21.9 GASTROESOPHAGEAL REFLUX DISEASE WITHOUT ESOPHAGITIS: ICD-10-CM

## 2018-01-18 DIAGNOSIS — M54.5 LOW BACK PAIN, UNSPECIFIED BACK PAIN LATERALITY, UNSPECIFIED CHRONICITY, WITH SCIATICA PRESENCE UNSPECIFIED: ICD-10-CM

## 2018-01-19 RX ORDER — PANTOPRAZOLE SODIUM 40 MG/1
TABLET, DELAYED RELEASE ORAL
Qty: 90 TABLET | Refills: 3 | OUTPATIENT
Start: 2018-01-19

## 2018-01-19 RX ORDER — MELOXICAM 7.5 MG/1
TABLET ORAL
Qty: 30 TABLET | Refills: 1 | OUTPATIENT
Start: 2018-01-19

## 2018-01-19 NOTE — TELEPHONE ENCOUNTER
Message from [facility]:  Original authorizing provider: David Chavez MD, MD Lamont Daniels would like a refill of the following medications:  pantoprazole (PROTONIX) 40 MG EC tablet [David Chavez MD, MD]    Preferred pharmacy: [address]    Comment:  please fill    Medication renewals requested in this message routed to other providers:  meloxicam (MOBIC) 7.5 MG tablet [Noam Wood MD]

## 2018-01-19 NOTE — TELEPHONE ENCOUNTER
Message from [employer]:  Original authorizing provider: MD Lamont Agustin IGNACIOLilian Daniels would like a refill of the following medications:  meloxicam (MOBIC) 7.5 MG tablet [Noam Wood MD]    Preferred pharmacy: [address]    Comment:  please fill    Medication renewals requested in this message routed to other providers:  pantoprazole (PROTONIX) 40 MG EC tablet [David Chavez MD, MD]

## 2018-02-02 ENCOUNTER — MYC MEDICAL ADVICE (OUTPATIENT)
Dept: PALLIATIVE MEDICINE | Facility: CLINIC | Age: 53
End: 2018-02-02

## 2018-02-02 ENCOUNTER — MYC REFILL (OUTPATIENT)
Dept: CARDIOLOGY | Facility: CLINIC | Age: 53
End: 2018-02-02

## 2018-02-02 ENCOUNTER — MYC REFILL (OUTPATIENT)
Dept: FAMILY MEDICINE | Facility: CLINIC | Age: 53
End: 2018-02-02

## 2018-02-02 DIAGNOSIS — M47.819 FACET ARTHROPATHY: ICD-10-CM

## 2018-02-02 DIAGNOSIS — I25.119 ATHEROSCLEROSIS OF NATIVE CORONARY ARTERY OF NATIVE HEART WITH ANGINA PECTORIS (H): ICD-10-CM

## 2018-02-02 DIAGNOSIS — M54.5 LOW BACK PAIN, UNSPECIFIED BACK PAIN LATERALITY, UNSPECIFIED CHRONICITY, WITH SCIATICA PRESENCE UNSPECIFIED: ICD-10-CM

## 2018-02-02 DIAGNOSIS — I25.719 ATHEROSCLEROSIS OF AUTOLOGOUS VEIN CORONARY ARTERY BYPASS GRAFT WITH ANGINA PECTORIS (H): ICD-10-CM

## 2018-02-02 RX ORDER — CLOPIDOGREL BISULFATE 75 MG/1
75 TABLET ORAL DAILY
Qty: 90 TABLET | Refills: 3 | Status: SHIPPED | OUTPATIENT
Start: 2018-02-02 | End: 2019-02-22

## 2018-02-02 NOTE — TELEPHONE ENCOUNTER
Message from [employer]:  Original authorizing provider: MD Lamont Agustin would like a refill of the following medications:  meloxicam (MOBIC) 7.5 MG tablet [Noam Wood MD]    Preferred pharmacy: [address]    Comment:  could you please refill because I could not  the meds.

## 2018-02-02 NOTE — TELEPHONE ENCOUNTER
Lamont,  I was reaching out to your sleep doctor, not Dr. Chavez.  I thought I sent a note, but I can't find it-- so I sent another one.  I may have sent the chart before.  I will let you know when I hear back.    Lian Loo MD  [facility]

## 2018-02-02 NOTE — TELEPHONE ENCOUNTER
"Requested Prescriptions   Pending Prescriptions Disp Refills     meloxicam (MOBIC) 7.5 MG tablet  Last Written Prescription Date:  12/14/17  Last Fill Quantity: 30,  # refills: 01   Last Office Visit with ANA PAULA, OB[facility] or [facility] prescribing provider:  12/07/17   Future Office Visit:    Next 5 appointments (look out 90 days)     Feb 20, 2018  8:40 AM CST   Return Visit with Sebastián Jenkins MD   [facility] ([facility])    [address] [facility] [address]   [phone]            Apr 11, 2018  3:00 PM CDT   Return Visit with Dorothea Loo MD   [facility] ([facility])    [address] [place] [id number] [phone]            Apr 12, 2018  4:00 PM CDT   Return Visit with Drea Laboy MD   [facility] ([facility])    [address] [place] [address] [phone]                30 tablet 1    NSAID Medications Passed    2/2/2018  7:26 AM       Passed - Blood pressure under 140/90    BP Readings from Last 3 Encounters:   01/10/18 110/76   12/07/17 124/83   10/20/17 123/82                Passed - Normal ALT on file in past 12 months    Recent Labs   Lab Test  12/04/17   0924   ALT  33            Passed - Normal AST on file in past 12 months    Recent Labs   Lab Test  12/04/17   0924   AST  17            Passed - Recent or future visit with authorizing provider's specialty    Patient had office visit in the last year or has a visit in the next 30 days with authorizing provider.  See \"Patient Info\" tab in inbasket, or \"Choose Columns\" in Meds & Orders section of the refill encounter.            Passed - Patient is age 6-64 years       Passed - Normal CBC on file in past 12 months    Recent Labs   Lab Test  12/04/17   0924   WBC  7.8   RBC  4.67   HGB  13.9   HCT  42.0   PLT  253            Passed - Normal serum creatinine on file in past 12 months    Recent " Labs   Lab Test  12/04/17   0924   06/24/13   1322   CR  0.87   < >   --    CRPOC   --    --   1.1    < > = values in this interval not displayed.

## 2018-02-02 NOTE — TELEPHONE ENCOUNTER
Message from [email or web address]:  Original authorizing provider: MD Lamont Gallegos IGNACIOLilian Daniels would like a refill of the following medications:  clopidogrel (PLAVIX) 75 MG tablet [Fly Travis MD]    Preferred pharmacy: [address], [place] - [address]    Comment:  could you please refill at least 3x

## 2018-02-06 RX ORDER — MELOXICAM 7.5 MG/1
7.5 TABLET ORAL DAILY
Qty: 30 TABLET | Refills: 3 | Status: SHIPPED | OUTPATIENT
Start: 2018-02-06 | End: 2018-06-29

## 2018-02-06 NOTE — TELEPHONE ENCOUNTER
"Requested Prescriptions   Signed Prescriptions Disp Refills     meloxicam (MOBIC) 7.5 MG tablet 30 tablet 3     Sig: Take 1 tablet (7.5 mg) by mouth daily    NSAID Medications Passed    2/2/2018 10:29 AM       Passed - Blood pressure under 140/90    BP Readings from Last 3 Encounters:   01/10/18 110/76   12/07/17 124/83   10/20/17 123/82                Passed - Normal ALT on file in past 12 months    Recent Labs   Lab Test  12/04/17   0924   ALT  33            Passed - Normal AST on file in past 12 months    Recent Labs   Lab Test  12/04/17   0924   AST  17            Passed - Recent or future visit with authorizing provider's specialty    Patient had office visit in the last year or has a visit in the next 30 days with authorizing provider.  See \"Patient Info\" tab in inbasket, or \"Choose Columns\" in Meds & Orders section of the refill encounter.            Passed - Patient is age 6-64 years       Passed - Normal CBC on file in past 12 months    Recent Labs   Lab Test  12/04/17   0924   WBC  7.8   RBC  4.67   HGB  13.9   HCT  42.0   PLT  253            Passed - Normal serum creatinine on file in past 12 months    Recent Labs   Lab Test  12/04/17   0924   06/24/13   1322   CR  0.87   < >   --    CRPOC   --    --   1.1    < > = values in this interval not displayed.             Prescription approved per [facility] Refill Protocol.    Shreya Rascon RN, BSN    "

## 2018-02-09 ENCOUNTER — MYC REFILL (OUTPATIENT)
Dept: FAMILY MEDICINE | Facility: CLINIC | Age: 53
End: 2018-02-09

## 2018-02-09 DIAGNOSIS — F33.1 MAJOR DEPRESSIVE DISORDER, RECURRENT EPISODE, MODERATE (H): ICD-10-CM

## 2018-02-09 RX ORDER — BUSPIRONE HYDROCHLORIDE 30 MG/1
1 TABLET ORAL 2 TIMES DAILY
Qty: 180 TABLET | Refills: 5 | Status: CANCELLED | OUTPATIENT
Start: 2018-02-09

## 2018-02-09 NOTE — TELEPHONE ENCOUNTER
Message from [facility]:  Original authorizing provider: David Chavez MD, MD Lamont Daniels would like a refill of the following medications:  BusPIRone HCl 30 MG TABS [David Chavez MD, MD]    Preferred pharmacy: [facility] [id number] [place] [address]    Comment:

## 2018-02-12 NOTE — TELEPHONE ENCOUNTER
Lamont,  I have heard back from your sleep doctor.  Opioid medications do have the risk of further increasing your sleep apnea problems.  They would need to see you in the sleep clinic to see how well your CPAP is working with the central sleep apnea.  There is a newer device since you got your sleep study that could work better, but would require a new sleep study.  Overall, the thought was you seeing the sleep clinic and perhaps having another sleep study may be beneficial from a risk perspective given your opioid use.    Are you willing to set up an appointment with your sleep doctor?    Dorothea Loo MD

## 2018-02-13 DIAGNOSIS — M06.09 RHEUMATOID ARTHRITIS OF MULTIPLE SITES WITH NEGATIVE RHEUMATOID FACTOR (H): ICD-10-CM

## 2018-02-13 DIAGNOSIS — Z79.899 HIGH RISK MEDICATIONS (NOT ANTICOAGULANTS) LONG-TERM USE: ICD-10-CM

## 2018-02-13 LAB
ALBUMIN SERPL-MCNC: 3.8 G/DL (ref 3.4–5)
ALP SERPL-CCNC: 76 U/L (ref 40–150)
ALT SERPL W P-5'-P-CCNC: 60 U/L (ref 0–70)
AST SERPL W P-5'-P-CCNC: 26 U/L (ref 0–45)
BASOPHILS # BLD AUTO: 0 10E9/L (ref 0–0.2)
BASOPHILS NFR BLD AUTO: 0.5 %
BILIRUB DIRECT SERPL-MCNC: 0.1 MG/DL (ref 0–0.2)
BILIRUB SERPL-MCNC: 0.6 MG/DL (ref 0.2–1.3)
CREAT SERPL-MCNC: 0.89 MG/DL (ref 0.66–1.25)
CRP SERPL-MCNC: <2.9 MG/L (ref 0–8)
DIFFERENTIAL METHOD BLD: NORMAL
EOSINOPHIL # BLD AUTO: 0.1 10E9/L (ref 0–0.7)
EOSINOPHIL NFR BLD AUTO: 2 %
ERYTHROCYTE [DISTWIDTH] IN BLOOD BY AUTOMATED COUNT: 14.2 % (ref 10–15)
ERYTHROCYTE [SEDIMENTATION RATE] IN BLOOD BY WESTERGREN METHOD: 12 MM/H (ref 0–20)
GFR SERPL CREATININE-BSD FRML MDRD: 89 ML/MIN/1.7M2
HCT VFR BLD AUTO: 43.6 % (ref 40–53)
HGB BLD-MCNC: 14.3 G/DL (ref 13.3–17.7)
LYMPHOCYTES # BLD AUTO: 2.1 10E9/L (ref 0.8–5.3)
LYMPHOCYTES NFR BLD AUTO: 32.7 %
MCH RBC QN AUTO: 29.2 PG (ref 26.5–33)
MCHC RBC AUTO-ENTMCNC: 32.8 G/DL (ref 31.5–36.5)
MCV RBC AUTO: 89 FL (ref 78–100)
MONOCYTES # BLD AUTO: 0.7 10E9/L (ref 0–1.3)
MONOCYTES NFR BLD AUTO: 10.3 %
NEUTROPHILS # BLD AUTO: 3.5 10E9/L (ref 1.6–8.3)
NEUTROPHILS NFR BLD AUTO: 54.5 %
PLATELET # BLD AUTO: 217 10E9/L (ref 150–450)
PROT SERPL-MCNC: 7.3 G/DL (ref 6.8–8.8)
RBC # BLD AUTO: 4.89 10E12/L (ref 4.4–5.9)
WBC # BLD AUTO: 6.5 10E9/L (ref 4–11)

## 2018-02-13 PROCEDURE — 36415 COLL VENOUS BLD VENIPUNCTURE: CPT | Performed by: INTERNAL MEDICINE

## 2018-02-13 PROCEDURE — 85025 COMPLETE CBC W/AUTO DIFF WBC: CPT | Performed by: INTERNAL MEDICINE

## 2018-02-13 PROCEDURE — 86140 C-REACTIVE PROTEIN: CPT | Performed by: INTERNAL MEDICINE

## 2018-02-13 PROCEDURE — 82565 ASSAY OF CREATININE: CPT | Performed by: INTERNAL MEDICINE

## 2018-02-13 PROCEDURE — 85652 RBC SED RATE AUTOMATED: CPT | Performed by: INTERNAL MEDICINE

## 2018-02-13 PROCEDURE — 80076 HEPATIC FUNCTION PANEL: CPT | Performed by: INTERNAL MEDICINE

## 2018-02-14 NOTE — TELEPHONE ENCOUNTER
Notified pt via [employer] message regarding refill request and that prescription was sent on 1/11/18 to pharmacy, good for 90 days with refills x 5 by Dr. Chavez.    Licha Gordon, RN  [facility]'

## 2018-02-19 DIAGNOSIS — Z79.899 HIGH RISK MEDICATIONS (NOT ANTICOAGULANTS) LONG-TERM USE: ICD-10-CM

## 2018-02-19 DIAGNOSIS — M06.09 RHEUMATOID ARTHRITIS OF MULTIPLE SITES WITH NEGATIVE RHEUMATOID FACTOR (H): ICD-10-CM

## 2018-02-19 RX ORDER — PREDNISONE 5 MG/1
5 TABLET ORAL DAILY
Qty: 30 TABLET | Refills: 3 | Status: SHIPPED | OUTPATIENT
Start: 2018-02-19 | End: 2018-03-20

## 2018-02-19 NOTE — TELEPHONE ENCOUNTER
Routing refill request to provider for review/approval because:  Drug not on the [facility] refill protocol       Requested Prescriptions   Pending Prescriptions Disp Refills     predniSONE (DELTASONE) 5 MG tablet  Last Written Prescription Date:  10/17/17  Last Fill Quantity: 30,  # refills: 3   Last office visit: 10/17/2017 with prescribing providerFuture Office Visit:   Next 5 appointments (look out 90 days)     Feb 20, 2018  8:40 AM CST   [facility] with Sebastián Jenkins MD   [facility]    [address] [place] [address]   [phone]            Apr 11, 2018  3:00 PM CDT   Return Visit with Dorothea Loo MD   [facility] ([facility] [address] [address] [phone]            Apr 12, 2018  4:00 PM CDT   Return Visit with Drea Laboy MD   [facility] ([facility])    [address] [place] [address]   [phone]                  30 tablet 3     Sig: Take 1 tablet (5 mg) by mouth daily    There is no refill protocol information for this order        Nicki Bautista RN - BC

## 2018-02-20 DIAGNOSIS — M06.09 RHEUMATOID ARTHRITIS OF MULTIPLE SITES WITH NEGATIVE RHEUMATOID FACTOR (H): ICD-10-CM

## 2018-02-20 DIAGNOSIS — Z79.899 HIGH RISK MEDICATIONS (NOT ANTICOAGULANTS) LONG-TERM USE: ICD-10-CM

## 2018-02-21 RX ORDER — OXYCODONE HYDROCHLORIDE 5 MG/1
5-10 TABLET ORAL EVERY 4 HOURS PRN
Qty: 180 TABLET | Refills: 0 | Status: SHIPPED | OUTPATIENT
Start: 2018-02-21 | End: 2018-04-11

## 2018-02-21 NOTE — TELEPHONE ENCOUNTER
Routing refill request to provider for review/approval because:  Drug not on the [facility] refill protocol       Requested Prescriptions   Pending Prescriptions Disp Refills     sulfaSALAzine ER (AZULFIDINE EN) 500 MG EC tablet  Last Written Prescription Date:  10/17/17  Last Fill Quantity: 120,  # refills: 3   Last office visit: 10/17/2017 with prescribing provider:     Future Office Visit:   Next 5 appointments (look out 90 days)     Mar 20, 2018  1:00 PM CDT   Return Visit with Sebastián Jenkins MD   [facility] ([facility])    [address] [facility] [address]   [phone]            Apr 11, 2018  3:00 PM CDT   Return Visit with Dorothea Loo MD   [facility] ([facility])    [address] [place] [id number] [phone]            Apr 12, 2018  4:00 PM CDT   Return Visit with Drea Laboy MD   [facility] ([facility])    [address] [place] [address] [phone]                  120 tablet 3     Sig: Take 2 tablets (1,000 mg) by mouth 2 times daily    There is no refill protocol information for this order        Nicki Bautista RN - BC

## 2018-02-21 NOTE — TELEPHONE ENCOUNTER
Called patient to finalize plans before I am out of office.  Patient has concern about sleep referral, as he sleeps from 4am until about 1.  So his hours as not consistent with sleep study.    I would like him to talk to PCP about weaning off of the clonazepam.  Given his risk with JEROD and opioids, he cannot also be on a benzo.  I will route this as well.    Patient started last oxycodone script on/after 2/6.  New script signed for 3/8.    Signed Prescriptions:                        Disp   Refills    oxyCODONE IR (ROXICODONE) 5 MG tablet      180 ta*0        Sig: Take 1-2 tablets (5-10 mg) by mouth every 4 hours as           needed for moderate to severe pain . Max of 6           tabs per day.  30 day supply. May fill on 3/6 to           start on/after 3/8/18  Authorizing Provider: KAYLA CHUN MD on 2/21/2018 at 5:30 PM    Ok for MA  close after sending script. Script in [facility]PI cart in [facility].

## 2018-02-22 NOTE — TELEPHONE ENCOUNTER
Spoke with patient and he would like Rx mailed to [facility] pharmacy, [facility]. Mailed from [facility] on 02/22/18.

## 2018-02-23 RX ORDER — SULFASALAZINE 500 MG/1
1000 TABLET, DELAYED RELEASE ORAL 2 TIMES DAILY
Qty: 120 TABLET | Refills: 3 | Status: SHIPPED | OUTPATIENT
Start: 2018-02-23 | End: 2018-07-02

## 2018-02-24 DIAGNOSIS — F45.8 ANXIETY HYPERVENTILATION: ICD-10-CM

## 2018-02-24 DIAGNOSIS — F41.9 ANXIETY HYPERVENTILATION: ICD-10-CM

## 2018-02-24 DIAGNOSIS — M10.00 IDIOPATHIC GOUT, UNSPECIFIED CHRONICITY, UNSPECIFIED SITE: ICD-10-CM

## 2018-02-24 NOTE — TELEPHONE ENCOUNTER
Requested Prescriptions   Pending Prescriptions Disp Refills     clonazePAM (KLONOPIN) 1 MG tablet [Pharmacy Med Name: CLONAZEPAM 1 MG TABLET] 90 tablet 0    Last Written Prescription Date:  12/19/17  Last Fill Quantity: 90,  # refills: 0   Last Office Visit with [facility] or [facility] prescribing provider:  12/7/2017     Future Office Visit:    Next 5 appointments (look out 90 days)     Feb 28, 2018  3:00 PM CST   Office Visit with David Chavez MD   [facility]    [address] [facility] [address]   [phone]            Mar 20, 2018  1:00 PM CDT   Return Visit with Sebastián Jenkins MD   [facility] ([facility]    [address] [facility] [address]   [phone]            Apr 11, 2018  3:00 PM CDT   Return Visit with Dorothea Loo MD   [facility] ([facility] [address] [address]   [phone]            Apr 12, 2018  4:00 PM CDT   Return Visit with Drea Laboy MD   [facility]    [address] [place] [address]   [phone]                  Sig: TAKE ONE-HALF TO ONE TABLET BY MOUTH 3 TIMES DAILY AS NEEDED FOR ANXIETY    There is no refill protocol information for this order        allopurinol (ZYLOPRIM) 300 MG tablet [Pharmacy Med Name: ALLOPURINOL 300 MG TABLET] 210 tablet 0    Last Written Prescription Date:  1/3/17  Last Fill Quantity: 90,  # refills: 3   Last Office Visit with [facility] or [facility] prescribing provider:  12/7/2017     Future Office Visit:    Next 5 appointments (look out 90 days)     Feb 28, 2018  3:00 PM CST   Office Visit with David Chavez MD   [facility]    [address] [facility] [address]   [phone]            Mar 20, 2018  1:00 PM CDT   Return Visit  "with Sebastián Jenkins MD   [facility] ([facility])    [address] [facility] [address] [phone]            Apr 11, 2018  3:00 PM CDT   Return Visit with Dorothea Loo MD   [facility] ([facility] [place])    [address]  [place] [phone] [phone]            Apr 12, 2018  4:00 PM CDT   Return Visit with Drea Laboy MD   [facility]    [address] [place] [address] [phone]                  Sig: TAKE 1 TABLET BY MOUTH 1 TIME DAILY    Gout Agents Protocol Failed    2/24/2018  9:37 AM       Failed - Uric acid greater than or equal to 6 on file in past 12 months    Recent Labs   Lab Test  11/04/15   1547   URIC  6.1     If level is 6mg/dL or greater, ok to refill one time and refer to provider.          Passed - CBC on file in past 12 months    Recent Labs   Lab Test  02/13/18   1451   WBC  6.5   RBC  4.89   HGB  14.3   HCT  43.6   PLT  217            Passed - ALT on file in past 12 months    Recent Labs   Lab Test  02/13/18   1451   ALT  60            Passed - Recent or future visit with authorizing provider's specialty    Patient had office visit in the last year or has a visit in the next 30 days with authorizing provider.  See \"Patient Info\" tab in inbasket, or \"Choose Columns\" in Meds & Orders section of the refill encounter.            Passed - Patient is age 18 or older       Passed - Normal serum creatinine on file in the past 12 months    Recent Labs   Lab Test  02/13/18   1451   06/24/13   1322   CR  0.89   < >   --    CRPOC   --    --   1.1    < > = values in this interval not displayed.               "

## 2018-02-26 DIAGNOSIS — M06.09 RHEUMATOID ARTHRITIS OF MULTIPLE SITES WITH NEGATIVE RHEUMATOID FACTOR (H): ICD-10-CM

## 2018-02-26 DIAGNOSIS — Z79.899 HIGH RISK MEDICATIONS (NOT ANTICOAGULANTS) LONG-TERM USE: ICD-10-CM

## 2018-02-26 NOTE — TELEPHONE ENCOUNTER
Routing refill request to provider for review/approval because:  Drug not on the [facility] refill protocol       Requested Prescriptions   Pending Prescriptions Disp Refills     hydroxychloroquine (PLAQUENIL) 200 MG tablet 30 tablet 3     Sig: Take 1 tablet (200 mg) by mouth daily    There is no refill protocol information for this order        Nicki Bautista RN - BC

## 2018-02-27 RX ORDER — CLONAZEPAM 1 MG/1
TABLET ORAL
Qty: 90 TABLET | Refills: 0 | Status: SHIPPED | OUTPATIENT
Start: 2018-02-27 | End: 2018-04-23

## 2018-02-27 RX ORDER — ALLOPURINOL 300 MG/1
TABLET ORAL
Qty: 210 TABLET | Refills: 0 | Status: SHIPPED | OUTPATIENT
Start: 2018-02-27 | End: 2018-12-15

## 2018-02-27 NOTE — TELEPHONE ENCOUNTER
Routing refill request to provider for review/approval because:  At this time sending failed protocol to provider - Allopurinol   Drug not on the [facility] refill protocol - Clonazepam  Lorraine Francis RN

## 2018-02-28 ENCOUNTER — OFFICE VISIT (OUTPATIENT)
Dept: FAMILY MEDICINE | Facility: CLINIC | Age: 53
End: 2018-02-28
Payer: COMMERCIAL

## 2018-02-28 VITALS
DIASTOLIC BLOOD PRESSURE: 80 MMHG | SYSTOLIC BLOOD PRESSURE: 113 MMHG | BODY MASS INDEX: 32.68 KG/M2 | TEMPERATURE: 97.5 F | HEART RATE: 59 BPM | WEIGHT: 177.6 LBS | RESPIRATION RATE: 14 BRPM | HEIGHT: 62 IN | OXYGEN SATURATION: 99 %

## 2018-02-28 DIAGNOSIS — R42 DIZZINESS: Primary | ICD-10-CM

## 2018-02-28 PROCEDURE — 99213 OFFICE O/P EST LOW 20 MIN: CPT | Performed by: FAMILY MEDICINE

## 2018-02-28 RX ORDER — HYDROXYCHLOROQUINE SULFATE 200 MG/1
200 TABLET, FILM COATED ORAL DAILY
Qty: 30 TABLET | Refills: 3 | Status: SHIPPED | OUTPATIENT
Start: 2018-02-28 | End: 2018-07-02

## 2018-02-28 ASSESSMENT — PAIN SCALES - GENERAL: PAINLEVEL: NO PAIN (0)

## 2018-02-28 NOTE — MR AVS SNAPSHOT
After Visit Summary   2/28/2018    Lamont Daniels    MRN: [id number]           Patient Information     Date Of Birth          1965        Visit Information        Provider Department      2/28/2018 3:00 PM David Chavez MD [facility]        Today's Diagnoses     Dizziness    -  1       Follow-ups after your visit        Your next 10 appointments already scheduled     Mar 20, 2018  1:00 PM CDT   [facility]    [address] [facility] [id number] [phone]            Apr 11, 2018  3:00 PM CDT   Return Visit with Dorothea Loo MD   [facility] ([facility])    [address] [place] [phone] [phone]            Apr 12, 2018  4:00 PM CDT   Return Visit with Drea Laboy MD   [facility])    [address] [place] [phone] [phone]            Jun 22, 2018  3:00 PM CDT   LAB with LAB FIRST FLOOR [facility])    [address] [place] [phone] [phone]           Please do not eat 10-12 hours before your appointment if you are coming in fasting for labs on lipids, cholesterol, or glucose (sugar). This does not apply to pregnant women. Water, hot tea and black coffee (with nothing added) are okay. Do not drink other fluids, diet soda or chew gum.            Jun 22, 2018  3:30 PM CDT   Return Visit with Fly Travis MD   [facility])    [address] [place] [phone] [phone]              Who to contact     If you have questions or need follow up information about today's clinic visit or your schedule please contact [facility] directly at [phone].  Normal or non-critical lab  "and imaging results will be communicated to you by MyChart, letter or phone within 4 business days after the clinic has received the results. If you do not hear from us within 7 days, please contact the clinic through Akitat or phone. If you have a critical or abnormal lab result, we will notify you by phone as soon as possible.  Submit refill requests through Solaiemes or call your pharmacy and they will forward the refill request to us. Please allow 3 business days for your refill to be completed.          Additional Information About Your Visit        [employer] Information     [employer] gives you secure access to your electronic health record. If you see a primary care provider, you can also send messages to your care team and make appointments. If you have questions, please call your primary care clinic.  If you do not have a primary care provider, please call [phone] and they will assist you.        Care EveryWhere ID     This is your Care EveryWhere ID. This could be used by other organizations to access your [facility] medical records  [id number]        Your Vitals Were     Pulse Temperature Respirations Height Pulse Oximetry BMI (Body Mass Index)    59 97.5  F (36.4  C) (Oral) 14 5' 2\" (1.575 m) 99% 32.48 kg/m2       Blood Pressure from Last 3 Encounters:   02/28/18 113/80   01/10/18 110/76   12/07/17 124/83    Weight from Last 3 Encounters:   02/28/18 177 lb 9.6 oz (80.6 kg)   01/16/18 175 lb (79.4 kg)   01/10/18 175 lb (79.4 kg)              Today, you had the following     No orders found for display       Primary Care Provider Office Phone # Fax #    David Chavez [phone]       [address]  [facility] [address]        Equal Access to Services     CLAUDETTE HERRMANN AH: Hadii esther grande Solinnea, wavikada luqadaha, qaybta kaalmada adeegyada, marcelino olivera. So [facility] [phone].    ATENCIÓN: Si habla español, tiene a muse disposición servicios gratuitos de " asistencia lingüística. Cy al [phone].    We comply with applicable federal civil rights laws and [place] laws. We do not discriminate on the basis of race, color, national origin, age, disability, sex, sexual orientation, or gender identity.            Thank you!     Thank you for choosing [facility]  for your care. Our goal is always to provide you with excellent care. Hearing back from our patients is one way we can continue to improve our services. Please take a few minutes to complete the written survey that you may receive in the mail after your visit with us. Thank you!             Your Updated Medication List - Protect others around you: Learn how to safely use, store and throw away your medicines at www.disposemymeds.org.          This list is accurate as of 2/28/18  3:19 PM.  Always use your most recent med list.                   Brand Name Dispense Instructions for use Diagnosis    Abatacept 125 MG/ML Soaj auto-injector    ORENCIA    4 Syringe    Inject 125 mg Subcutaneous every 7 days    Rheumatoid arthritis of multiple sites with negative rheumatoid factor (H)       allopurinol 300 MG tablet    ZYLOPRIM    210 tablet    TAKE 1 TABLET BY MOUTH 1 TIME DAILY    Idiopathic gout, unspecified chronicity, unspecified site       aspirin 81 MG EC tablet     30 tablet    Take 1 tablet (81 mg) by mouth daily (*)    Coronary artery disease involving native coronary artery of native heart without angina pectoris       atorvastatin 80 MG tablet    LIPITOR    90 tablet    TAKE 1 TABLET BY MOUTH 1 TIME DAILY FOR CHOLESTEROL    Atherosclerosis of native coronary artery of native heart, angina presence unspecified, Hyperlipidemia LDL goal <100       baclofen 10 MG tablet    LIORESAL    180 tablet    Take 1 tablet (10 mg) by mouth 2 times daily as needed for muscle spasms    Spasm of back muscles       blood glucose monitoring meter device kit     1 kit    TEST 2 TIMES DAILY.    On  corticosteroid therapy       BusPIRone HCl 30 MG Tabs     180 tablet    Take 1 tablet by mouth 2 times daily    Major depressive disorder, recurrent episode, moderate (H)       clonazePAM 1 MG tablet    klonoPIN    90 tablet    TAKE ONE-HALF TO ONE TABLET BY MOUTH 3 TIMES DAILY AS NEEDED FOR ANXIETY    Anxiety hyperventilation       clopidogrel 75 MG tablet    PLAVIX    90 tablet    Take 1 tablet (75 mg) by mouth daily    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    Gout without tophus       diclofenac 1 % Gel topical gel    VOLTAREN    300 g    Apply 2-4 grams to 2-3 joints, up to four times daily as needed using enclosed dosing card.  Max of 32g/day    Facet arthropathy, Rheumatoid arthritis involving multiple sites, unspecified rheumatoid factor presence (H)       evolocumab 140 MG/ML prefilled autoinjector    REPATHA    2 mL    Inject 1 mL (140 mg) Subcutaneous every 14 days    Hyperlipidemia LDL goal <70, S/P CABG (coronary artery bypass graft)       ezetimibe 10 MG tablet    ZETIA    90 tablet    Take 1 tablet (10 mg) by mouth daily    Coronary artery disease involving native coronary artery of native heart without angina pectoris       fluticasone 50 MCG/ACT spray    FLONASE    1 Bottle    Spray 2 sprays into both nostrils daily    Nasal congestion, Dizziness       gabapentin 300 MG capsule    NEURONTIN    540 capsule    Take 2 capsules (600 mg) by mouth 3 times daily    Lumbar radiculopathy       gemfibrozil 600 MG tablet    LOPID    180 tablet    Take 1 tablet (600 mg) by mouth 2 times daily    Hyperlipidemia LDL goal <70       hydroxychloroquine 200 MG tablet    PLAQUENIL    30 tablet    Take 1 tablet (200 mg) by mouth daily     Rheumatoid arthritis of multiple sites with negative rheumatoid factor (H), High risk medications (not anticoagulants) long-term use       meclizine 25 MG tablet    ANTIVERT    30 tablet    TAKE ONE TABLET BY MOUTH EVERY 6 HOURS AS NEEDED FOR  DIZZINESS    Vertigo       melatonin 3 MG tablet      Take 1 tablet (3 mg) by mouth nightly as needed for sleep    Delayed sleep phase syndrome       meloxicam 7.5 MG tablet    MOBIC    30 tablet    Take 1 tablet (7.5 mg) by mouth daily    Low back pain, unspecified back pain laterality, unspecified chronicity, with sciatica presence unspecified       metoprolol succinate 25 MG 24 hr tablet    TOPROL-XL    90 tablet    Take 1 tablet (25 mg) by mouth daily    Atherosclerosis of native coronary artery of native heart without angina pectoris       mirtazapine 15 MG tablet    REMERON    30 tablet    Take 1 tablet (15 mg) by mouth At Bedtime    Severe episode of recurrent major depressive disorder, without psychotic features (H)       nitroGLYcerin 0.4 MG sublingual tablet    NITROSTAT    30 tablet    Place 1 tablet (0.4 mg) under the tongue every 5 minutes as needed    Atherosclerosis of native coronary artery of native heart with angina pectoris (H)       order for DME      1.  CPAP pressure 11 cm/H20 with heated humidity.  2.  Provide mask to fit and CPAP supplies.  3.  Length of need lifetime.  4.  If needed please provide a chin strap    JEROD (obstructive sleep apnea), Anxiety, CAD (coronary artery disease), HTN (hypertension)       * order for DME     1 Units    Equipment being ordered: single end cane    Lumbar radiculopathy, Facet arthropathy, Chronic low back pain       * order for DME     200 each    Equipment being ordered: test strips as covered by insurance. USE TO TEST BLOOD SUGARS TWICE DAILY OR AS DIRECTED.    Rheumatoid arthritis involving multiple sites, unspecified rheumatoid factor presence (H)       * order for DME     1 each    OneTouch glucometer.     Rheumatoid arthritis involving multiple sites, unspecified rheumatoid factor presence (H)       * order for DME     200 each    OneTouch lancets testing twice daily.    Rheumatoid arthritis involving multiple sites, unspecified rheumatoid factor presence (H)       oxyCODONE IR 5 MG tablet    ROXICODONE    180 tablet    Take 1-2 tablets (5-10 mg) by mouth every 4 hours as needed for moderate to severe pain . Max of 6 tabs per day.  30 day supply. May fill on 3/6 to start on/after 3/8/18    Facet arthropathy       pantoprazole 40 MG EC tablet    PROTONIX    90 tablet    TAKE ONE TABLET BY MOUTH ONCE DAILY FOR  STOMACH.    Gastroesophageal reflux disease without esophagitis       predniSONE 5 MG tablet    DELTASONE    30 tablet    Take 1 tablet (5 mg) by mouth daily    Rheumatoid arthritis of multiple sites with negative rheumatoid factor (H), High risk medications (not anticoagulants) long-term use       sulfaSALAzine  MG EC tablet    AZULFIDINE EN    120 tablet    Take 2 tablets (1,000 mg) by mouth 2 times daily    Rheumatoid arthritis of multiple sites with negative rheumatoid factor (H), High risk medications (not anticoagulants) long-term use       tamsulosin 0.4 MG capsule    FLOMAX    90 capsule    Take 1 capsule (0.4 mg) by mouth daily    Benign prostatic hyperplasia with weak urinary stream       tenofovir 300 MG tablet    VIREAD    90 tablet    Take 1 tablet (300 mg) by mouth daily    Chronic viral hepatitis B without delta agent and without coma (H)       triamcinolone 0.1 % ointment    KENALOG    80 g    Apply sparingly to affected area three times daily for 14 days.    Rash       vitamin D 2000 UNITS tablet     100 tablet    TAKE ONE TABLET BY MOUTH ONCE DAILY    Vitamin D deficiency       zolpidem 5 MG tablet    AMBIEN    30 tablet    TAKE ONE TABLET BY MOUTH AT BEDTIME AS NEEDED FOR SLEEP    Insomnia, unspecified type       * Notice:  This list has 4 medication(s) that are the same as other  medications prescribed for you. Read the directions carefully, and ask your doctor or other care provider to review them with you.
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
## 2018-03-13 DIAGNOSIS — M62.830 SPASM OF BACK MUSCLES: ICD-10-CM

## 2018-03-13 NOTE — TELEPHONE ENCOUNTER
Requested Prescriptions   Pending Prescriptions Disp Refills     baclofen (LIORESAL) 10 MG tablet [Pharmacy Med Name: BACLOFEN 10 MG TABLET]    Last Written Prescription Date:  3/14/17  Last Fill Quantity: 180,  # refills: 3   Last Office Visit with ANA PAULA, [facility] or [facility] prescribing provider:  2/28/18   Future Office Visit:    Next 5 appointments (look out 90 days)     Mar 20, 2018  1:00 PM CDT   Return Visit with Sebastián Jenkins MD   [facility]    [address] [facility] [address]   [phone]            Apr 11, 2018  3:00 PM CDT   Return Visit with Dorothea Loo MD   [facility] ([facility] [address] [id number] [phone]            Apr 12, 2018  4:00 PM CDT   [facility] Visit with D[facility] MD   [facility]    [address] [place] [address] [phone]                  180 tablet 2     Sig: TAKE 1 TABLET BY MOUTH 2 TIMES DAILY AS NEEDED FOR MUSCLE SPASM    There is no refill protocol information for this order              Costa Faarax  [facility]

## 2018-03-14 DIAGNOSIS — M06.09 RHEUMATOID ARTHRITIS OF MULTIPLE SITES WITH NEGATIVE RHEUMATOID FACTOR (H): ICD-10-CM

## 2018-03-14 NOTE — TELEPHONE ENCOUNTER
Routing refill request to provider for review/approval because:  Drug not on the [facility] refill protocol   Requested Prescriptions   Pending Prescriptions Disp Refills     Abatacept (ORENCIA) 125 MG/ML SOAJ auto-injector 4 Syringe 3     Sig: Inject 1 mL (125 mg) Subcutaneous every 7 days    There is no refill protocol information for this order        Nori Boston RN

## 2018-03-15 RX ORDER — BACLOFEN 10 MG/1
TABLET ORAL
Qty: 180 TABLET | Refills: 2 | Status: SHIPPED | OUTPATIENT
Start: 2018-03-15 | End: 2019-01-04

## 2018-03-15 NOTE — TELEPHONE ENCOUNTER
Requested Prescriptions   Pending Prescriptions Disp Refills     baclofen (LIORESAL) 10 MG tablet [Pharmacy Med Name: BACLOFEN 10 MG TABLET] 180 tablet 2     Sig: TAKE 1 TABLET BY MOUTH 2 TIMES DAILY AS NEEDED FOR MUSCLE SPASM    There is no refill protocol information for this order        Routing refill request to provider for review/approval because:  Drug not on the [facility] refill protocol     Shreya Rascon RN, BSN

## 2018-03-20 ENCOUNTER — OFFICE VISIT (OUTPATIENT)
Dept: RHEUMATOLOGY | Facility: CLINIC | Age: 53
End: 2018-03-20
Payer: COMMERCIAL

## 2018-03-20 ENCOUNTER — MYC REFILL (OUTPATIENT)
Dept: FAMILY MEDICINE | Facility: CLINIC | Age: 53
End: 2018-03-20

## 2018-03-20 VITALS
HEIGHT: 62 IN | WEIGHT: 174.2 LBS | HEART RATE: 65 BPM | BODY MASS INDEX: 32.06 KG/M2 | OXYGEN SATURATION: 97 % | SYSTOLIC BLOOD PRESSURE: 118 MMHG | DIASTOLIC BLOOD PRESSURE: 79 MMHG

## 2018-03-20 DIAGNOSIS — K21.9 GASTROESOPHAGEAL REFLUX DISEASE WITHOUT ESOPHAGITIS: ICD-10-CM

## 2018-03-20 DIAGNOSIS — Z79.899 HIGH RISK MEDICATIONS (NOT ANTICOAGULANTS) LONG-TERM USE: ICD-10-CM

## 2018-03-20 DIAGNOSIS — M06.09 RHEUMATOID ARTHRITIS OF MULTIPLE SITES WITH NEGATIVE RHEUMATOID FACTOR (H): Primary | ICD-10-CM

## 2018-03-20 PROCEDURE — 99213 OFFICE O/P EST LOW 20 MIN: CPT | Performed by: INTERNAL MEDICINE

## 2018-03-20 RX ORDER — PREDNISONE 1 MG/1
TABLET ORAL
Qty: 270 TABLET | Refills: 0 | Status: SHIPPED | OUTPATIENT
Start: 2018-03-20 | End: 2018-09-27

## 2018-03-20 NOTE — TELEPHONE ENCOUNTER
Message from shoutrt:  Original authorizing provider: David Chavez MD, MD Lamont Daniels would like a refill of the following medications:  pantoprazole (PROTONIX) 40 MG EC tablet [David Chavez MD, MD]    Preferred pharmacy: [address]    Comment:  could you please refill, I think this a second request.

## 2018-03-20 NOTE — PROGRESS NOTES
Rheumatology Clinic Visit      Lamont Daniels MRN# [id number]   YOB: 1965 Age: 52 year old      Date of visit: 3/20/18   PCP: Dr. David Chavez  Hepatologist: Dr. Drea Laboy     Chief Complaint   Patient presents with:  Arthritis: RA, patient states he is feeling the same      Assessment and Plan   1.  Seropositive nonerosive rheumatoid arthritis (RF negative, CCP low positive): Note that he does have low back pain but without evidence of SI joint irregularity on x-ray.  Initially with morning stiffness all day, gelling phenomenon, and synovitis.   Previously on Humira (partially effective), Enbrel (partially effective). Currently on SSZ 1000mg BID,  prednisone 5mg daily, and Orencia SQ (initially Rx'd 4/18/2017), and HCQ 200mg daily.  Treatment has been in coordination with Dr. Laboy (hepatology) who is treating for hepatitis B. Doing well today and will reduce prednisone as noted below.  - Continue sulfasalazine 1000mg BID  - Reduce prednisone to 4 mg daily x30 days, then 3 mg daily ×30 days, then 2 mg daily thereafter  - Continue Orencia SQ every 7 days  - Continue hydroxychloroquine 200 mg daily (he plans to have his eye exam this month or next month)  - Labs 2-3 days prior to the next rheumatology clinic visit: CBC, Creatinine, Hepatic Panel, ESR, CRP    2. Lower back pain: Lumbar spine MRI in August 2014 showed multilevel degenerative changes and a small disc protrusion at L3/4 with mild spinal canal narrowing; also moderate left and mild right neural foraminal narrowing at L5-S1. He had a nerve conduction study in 2014 that was normal. He has been worked up by neurology in the past without significant neurologic findings. HLA-B27 negative, SI joint x-ray negative. Now following in the pain management clinic.     3. Hepatitis B: Managed by Dr. Laboy (hepatology) and is currently on tenofovir.    4. Hepatic Steatosis: Based on recent liver biopsy.  Seeing Dr. Laboy (hepatology).      5. Positive  "tuberculosis test:   This is noted here for historical significance.  He was evaluated by Dr. Anthony Mendoza, infectious disease. The following telephone note was documented by Dr. Mendoza: \"I tried calling th pt, finally we have the records from [place] . It appears he was treated for latent TB with INH for 1 year in 1980/1981 .Later in 1981 April he was admitted at [facility] [place] [place] in [place], [place] with rt sided pneumonia, TB was suspected but he improved with treatment with Penicillin only. Later he was seen  ( likely ID specialist), and was treated with 6 weeks course of Rifampin and Ethambutol pending sputum AFB culture. His AFB cx were negative based on the report we have.\"  \"He recently had QFT -TB test which was reported positive and his CXR was clear. Given his documented hx of completion of treatment for latent TB as above , I do not see any need for further treatment. His tests may remain positive irrespective of treatment status. From my perspective he can be started on DMARDs/Biological as deemed necessary.\"       6. Gout: On allopurinol and managed by PCP.    Mr. Daniels verbalized agreement with and understanding of the rational for the diagnosis and treatment plan.  All questions were answered to best of my ability and the patient's satisfaction. Mr. Daniels was advised to contact the clinic with any questions that may arise after the clinic visit.      Thank you for involving me in the care of the patient    Return to clinic: 3 months      HPI   Lamont aDniels is a 52 year old male with a medical history significant for hypertension, hyperlipidemia, gout, coronary artery disease s/p 6vCABG, vitamin D deficiency, hepatitis B, and RA who presents for f/u of RA.    Today, Mr. Daniels reports that he is doing about the same as it was before.  He says that he has no peripheral joint pain.  Back pain is his biggest issue and he feels like he would do better with higher narcotic dose but the pain management clinic " does not want to give this to him because of the opioid epidemic, per Mr. Daniels.  Morning stiffness for no more than 45-60 minutes.  Positive gelling phenomenon that resolves within seconds.  Fatigue is about the same.     Denies fevers, chills, nausea, vomiting, constipation, diarrhea. No abdominal pain. Denies chest pain/pressure, palpitations, or shortness of breath.  No oral or nasal sores. No rash. No LE swelling.  Mild dry mouth; he has good dentition and does not feel like he needs to use frequent sips of water; unchanged since last evaluation. No dry eyes. No eye pain or redness.     Tobacco:  None   EtOH:  None  Drugs:  None  Occupation: Retired   Originally from [facility], then lived [place] north of [place], [place], then lived in [place], [place], then moved to [place] for family    ROS   GEN: No fevers, chills, night sweats; + fatigue   SKIN: No itching, rashes, sores  HEENT: No epistaxis. No oral or nasal ulcers.  CV: See HPI  PULM: See HPI  GI: No nausea, vomiting, constipation, diarrhea. No blood in stool. No abdominal pain.  : No blood in urine.  MSK: See HPI.  NEURO: See HPI  EXT: No LE swelling  PSYCH: Negative    Active Problem List     Patient Active Problem List   Diagnosis     Coronary atherosclerosis of native coronary artery     Major depressive disorder, recurrent episode, moderate (H)     Anxiety     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)     Hepatitis B infection     Vitamin D deficiency     S/P CABG (coronary artery bypass graft)     Malrotation of intestine     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     Hyperlipidemia LDL goal <70     Insomnia     Gout     Suicidal ideation     Essential hypertension     Rheumatoid arthritis involving multiple sites, unspecified rheumatoid factor presence (H)     High risk medications (not anticoagulants) long-term use     Midline low back pain without sciatica     Bilateral low back pain with sciatica, sciatica laterality unspecified     Elevated liver  enzymes     On corticosteroid therapy     Essential hypertension with goal blood pressure less than 140/90     Insomnia, unspecified type     Rheumatoid arthritis of multiple sites with negative rheumatoid factor (H)     Benign prostatic hyperplasia with lower urinary tract symptoms, unspecified morphology     Past Medical History     Past Medical History:   Diagnosis Date     Anxiety      CAD (coronary artery disease)     sees cardiologist Dr. Yang, has had stents and cabg     Congestive heart failure, unspecified 2008     Depressive disorder 2008     Gout      Hepatitis B > 10 years     HTN (hypertension)      Hyperlipidemia LDL goal < 100      Malrotation of intestine      Moderate major depression (H)      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) 9/19/2011    Uses CPAP     Rheumatoid arthritis flare (H) 1012     Steatohepatitis 12/15    liver biopsy     Tuberculosis age 13    INH x 9 months      Vitamin D deficiency      Past Surgical History     Past Surgical History:   Procedure Laterality Date     ABDOMEN SURGERY  2014     BIOPSY  2015     BYPASS GRAFT ARTERY CORONARY  2008    6 vessels     COLONOSCOPY  2/8/2013    Procedure: COLONOSCOPY;  Colonoscopy, blood in stool;  Surgeon: Duane, William Charles, MD;  Location: [facility] OR     GI SURGERY  2014     HC CORONARY STENT PERCUT, EA ADDTL VESSEL  2008    3 months after CABG     HEAD & NECK SURGERY  2011     NASAL/SINUS POLYPECTOMY  2010     ORTHOPEDIC SURGERY  2012     THORACIC SURGERY  1989    tb     TONSILLECTOMY  2010     UVULOPALATOPHARYNGOPLASTY  2010     VASCULAR SURGERY  2008     Allergy     Allergies   Allergen Reactions     Citalopram Itching     Tramadol Itching     Current Medication List     Current Outpatient Prescriptions   Medication Sig     baclofen (LIORESAL) 10 MG tablet TAKE 1 TABLET BY MOUTH 2 TIMES DAILY AS NEEDED FOR MUSCLE SPASM     Abatacept (ORENCIA) 125 MG/ML SOAJ auto-injector Inject 1 mL (125 mg) Subcutaneous every 7 days     hydroxychloroquine (PLAQUENIL)  200 MG tablet Take 1 tablet (200 mg) by mouth daily     clonazePAM (KLONOPIN) 1 MG tablet TAKE ONE-HALF TO ONE TABLET BY MOUTH 3 TIMES DAILY AS NEEDED FOR ANXIETY     allopurinol (ZYLOPRIM) 300 MG tablet TAKE 1 TABLET BY MOUTH 1 TIME DAILY     sulfaSALAzine ER (AZULFIDINE EN) 500 MG EC tablet Take 2 tablets (1,000 mg) by mouth 2 times daily     oxyCODONE IR (ROXICODONE) 5 MG tablet Take 1-2 tablets (5-10 mg) by mouth every 4 hours as needed for moderate to severe pain . Max of 6 tabs per day.  30 day supply. May fill on 3/6 to start on/after 3/8/18     predniSONE (DELTASONE) 5 MG tablet Take 1 tablet (5 mg) by mouth daily     meloxicam (MOBIC) 7.5 MG tablet Take 1 tablet (7.5 mg) by mouth daily     clopidogrel (PLAVIX) 75 MG tablet Take 1 tablet (75 mg) by mouth daily     BusPIRone HCl 30 MG TABS Take 1 tablet by mouth 2 times daily     mirtazapine (REMERON) 15 MG tablet Take 1 tablet (15 mg) by mouth At Bedtime     diclofenac (VOLTAREN) 1 % GEL topical gel Apply 2-4 grams to 2-3 joints, up to four times daily as needed using enclosed dosing card.  Max of 32g/day     gemfibrozil (LOPID) 600 MG tablet Take 1 tablet (600 mg) by mouth 2 times daily     triamcinolone (KENALOG) 0.1 % ointment Apply sparingly to affected area three times daily for 14 days.     evolocumab (REPATHA) 140 MG/ML prefilled autoinjector Inject 1 mL (140 mg) Subcutaneous every 14 days     zolpidem (AMBIEN) 5 MG tablet TAKE ONE TABLET BY MOUTH AT BEDTIME AS NEEDED FOR SLEEP     meclizine (ANTIVERT) 25 MG tablet TAKE ONE TABLET BY MOUTH EVERY 6 HOURS AS NEEDED FOR  DIZZINESS     tamsulosin (FLOMAX) 0.4 MG capsule Take 1 capsule (0.4 mg) by mouth daily     ezetimibe (ZETIA) 10 MG tablet Take 1 tablet (10 mg) by mouth daily     colchicine (COLCRYS) 0.6 MG tablet TAKE TWO TABLETS BY MOUTH AT THE FIRST SIGN OF FLARE, TAKE ONE ADDITIONAL TABLET ONE HOUR LATER.     gabapentin (NEURONTIN) 300 MG capsule Take 2 capsules (600 mg) by mouth 3 times daily      fluticasone (FLONASE) 50 MCG/ACT spray Spray 2 sprays into both nostrils daily     atorvastatin (LIPITOR) 80 MG tablet TAKE 1 TABLET BY MOUTH 1 TIME DAILY FOR CHOLESTEROL     tenofovir (VIREAD) 300 MG tablet Take 1 tablet (300 mg) by mouth daily     pantoprazole (PROTONIX) 40 MG EC tablet TAKE ONE TABLET BY MOUTH ONCE DAILY FOR  STOMACH.     metoprolol (TOPROL-XL) 25 MG 24 hr tablet Take 1 tablet (25 mg) by mouth daily     aspirin EC 81 MG EC tablet Take 1 tablet (81 mg) by mouth daily (*)     Cholecalciferol (VITAMIN D) 2000 UNITS tablet TAKE ONE TABLET BY MOUTH ONCE DAILY     melatonin 3 MG tablet Take 1 tablet (3 mg) by mouth nightly as needed for sleep     blood glucose monitoring (ONE TOUCH ULTRA 2) meter device kit TEST 2 TIMES DAILY.     nitroGLYcerin (NITROSTAT) 0.4 MG sublingual tablet Place 1 tablet (0.4 mg) under the tongue every 5 minutes as needed     order for DME Equipment being ordered: test strips as covered by insurance. USE TO TEST BLOOD SUGARS TWICE DAILY OR AS DIRECTED.     order for DME OneTouch glucometer.     order for DME OneTouch lancets testing twice daily.     order for DME Equipment being ordered: single end cane     ORDER FOR DME 1.  CPAP pressure 11 cm/H20 with heated humidity.   2.  Provide mask to fit and CPAP supplies.   3.  Length of need lifetime.   4.  If needed please provide a chin strap          No current facility-administered medications for this visit.      Facility-Administered Medications Ordered in Other Visits   Medication     gadobutrol (GADAVIST) injection 10 mL       Social History   See HPI    Family History     Family History   Problem Relation Age of Onset     C.A.D. Father      Coronary Artery Disease Father      Hypertension Father      Depression Father      Hypertension Mother      DIABETES Mother      Depression Mother      MENTAL ILLNESS Mother      Hypertension Maternal Grandfather      CANCER Paternal Grandfather      Other Cancer Paternal Grandfather       "Other Cancer Other      Autoimmune Disease No family hx of      CEREBROVASCULAR DISEASE No family hx of      Thyroid Disease No family hx of      Glaucoma No family hx of      Macular Degeneration No family hx of      No family history of autoimmune disease  No family history of psoriasis     No change in family history since the previous clinic visit.     Physical Exam     Temp Readings from Last 3 Encounters:   02/28/18 97.5  F (36.4  C) (Oral)   01/16/18 98  F (36.7  C) (Temporal)   10/17/17 97.5  F (36.4  C) (Oral)     BP Readings from Last 5 Encounters:   03/20/18 118/79   02/28/18 113/80   01/10/18 110/76   12/07/17 124/83   10/20/17 123/82     Pulse Readings from Last 1 Encounters:   03/20/18 65     Resp Readings from Last 1 Encounters:   02/28/18 14     Estimated body mass index is 31.86 kg/(m^2) as calculated from the following:    Height as of this encounter: 1.575 m (5' 2\").    Weight as of this encounter: 79 kg (174 lb 3.2 oz).    GEN: NAD, obese; using a cane with ambulation  HEENT: MMM. No oral lesions. Anicteric, noninjected sclera  CV: S1, S2. RRR. No m/r/g.  PULM: CTA bilaterally. No w/c.  MSK: MCPs, PIPs, wrists, elbows, shoulders, knees, ankles, and MTPs without swelling or tenderness to palpation. Negative MTP squeeze.  Negative MCP squeeze.  Hips nontender to palpation.   NEURO: UE and LE strengths 5/5 and equal bilaterally.   SKIN: No rash.  EXT: No LE edema  PSYCH: Alert. Appropriate.    Labs   RF/CCP  Recent Labs   Lab Test  11/04/15   1547  08/12/14   1414   CCPABY  27*   --    RHF   --   <20     CBC  Recent Labs   Lab Test  02/13/18   1451  12/04/17   0924  10/14/17   0950  07/13/17   1246   WBC  6.5  7.8  10.3  7.9   RBC  4.89  4.67  4.68  4.87   HGB  14.3  13.9  14.1  14.9   HCT  43.6  42.0  42.1  44.8   MCV  89  90  90  92   RDW  14.2  13.9  14.4  14.3   PLT  217  253  282  221   MCH  29.2  29.8  30.1  30.6   MCHC  32.8  33.1  33.5  33.3   NEUTROPHIL  54.5   --   51.4  48.7   LYMPH  32.7 "   --   33.7  37.7   MONOCYTE  10.3   --   12.4  11.9   EOSINOPHIL  2.0   --   2.1  1.4   BASOPHIL  0.5   --   0.4  0.3   ANEU  3.5   --   5.3  3.9   ALYM  2.1   --   3.5  3.0   PRACHI  0.7   --   1.3  0.9   AEOS  0.1   --   0.2  0.1   ABAS  0.0   --   0.0  0.0     CMP  Recent Labs   Lab Test  02/13/18   1451  12/04/17   0924  10/14/17   0950   10/25/16   0851  10/05/16   0954   NA   --   144   --    --   143  144   POTASSIUM   --   3.6   --    --   3.6  3.6   CHLORIDE   --   111*   --    --   110*  111*   CO2   --   25   --    --   25  27   ANIONGAP   --   8   --    --   8  6   GLC   --   81   --    --   78  88   BUN   --   25   --    --   17  17   CR  0.89  0.87  0.99   < >  1.01  1.00   GFRESTIMATED  89  >90  79   < >  78  79   GFRESTBLACK  >90  >90  >90   < >  >90   GFR Calc    >90   GFR Calc     HEMANT   --   8.8   --    --   8.8  8.9   BILITOTAL  0.6  0.4  0.2   < >  0.3  0.4   ALBUMIN  3.8  3.8  3.9   < >  3.8  3.5   PROTTOTAL  7.3  7.4  7.4   < >  7.3  7.4   ALKPHOS  76  82  113   < >  82  82   AST  26  17  18   < >  30  46*   ALT  60  33  38   < >  86*  199*    < > = values in this interval not displayed.     Calcium/VitaminD  Recent Labs   Lab Test  12/04/17   0924  07/13/17   1246  10/25/16   0851  10/05/16   0954   11/04/15   1547   04/01/13   1624   HEMANT  8.8   --   8.8  8.9   < >   --    < >  9.5   VITDT   --   34   --    --    --   43   --   34    < > = values in this interval not displayed.     ESR/CRP  Recent Labs   Lab Test  02/13/18   1451  10/14/17   0950  07/13/17   1246   SED  12  17  10   CRP  <2.9  <2.9  <2.9     Hepatitis B  Recent Labs   Lab Test  12/04/17   0924  12/01/16   1429  10/05/16   0954   04/23/13   0812  01/30/13   0906  01/23/12   1150   AUSAB   --    --   0.45   --    --    --    --    HBCAB   --    --    --    --   Positive*   --    --    HBCM   --    --    --    --    --    --   Negative   HEPBANG   --    --   Reactive*   --   Positive*  Positive*   "Positive*   HBQLOG  Not Calculated  <1.3  5.1*   < >   --    --    --     < > = values in this interval not displayed.     Hepatitis C  Recent Labs   Lab Test  04/07/16   1538  04/23/13   0812   HCVAB  Nonreactive   Assay performance characteristics have not been established for newborns,   infants, and children    Negative     Lyme ab screening  Recent Labs   Lab Test  07/18/17   1330   LYMEGM  0.19     Tuberculosis Screening  Recent Labs   Lab Test  04/07/16   1538   TBRSLT  Positive   The magnitude of the measured IFN-gamma level cannot be correlated to stage or   degree of infection, level of immune responsiveness, or likelihood for   progression to active disease.  *   TBAGN  >10.00  This is a qualitative test.  The TB antigen IU/mL value is required for   documentation on certain government reporting forms but this value should not   be used to monitor disease progression or response to therapy.   Diagnosing or excluding tuberculosis disease, and assessing the probability of   LTBI, require a combination of epidemiological, historical, medical and   diagnostic findings that should be taken into account when interpreting   QuantiFERON TB results.       HIV Screening  Recent Labs   Lab Test  11/04/15   1547   HIAGAB  Nonreactive   HIV-1 p24 Ag & HIV-1/HIV-2 Ab Not Detected         \"HAND BILATERAL THREE OR MORE VIEWS 11/4/2015 4:55 PM   HISTORY: Pain in unspecified joint.  COMPARISON: None.  IMPRESSION  IMPRESSION: Normal.  DANIS ANGLIN MD\"    \"FOOT BILATERAL THREE OR MORE VIEWS 11/4/2015 4:55 PM   HISTORY: Pain in unspecified joint.  COMPARISON: 8/21/2012.  IMPRESSION  IMPRESSION: Small traction spurs are seen off the Achilles tendon and  plantar fascial attachment sites bilaterally. Joint spaces are  preserved. Osseous structures are intact. Incidental note is made of  some surgical staples in the soft tissues of the medial distal right  lower extremity.  DANIS ANGLIN MD\"      Immunization History "     Immunization History   Administered Date(s) Administered     Influenza (IIV3) PF 10/11/2011, 09/20/2017     Influenza Vaccine IM 3yrs+ 4 Valent IIV4 09/27/2015, 09/08/2016     Influenza Vaccine, 3 YRS +, IM (QUADRIVALENT W/PRESERVATIVES) 11/17/2014     Pneumo Conj 13-V (2010&after) 04/07/2016     Pneumococcal 23 valent 12/12/2014     TDAP Vaccine (Adacel) 08/30/2011          Chart documentation done in part with Dragon Voice recognition Software. Although reviewed after completion, some word and grammatical error may remain.    Sebastián Jenkins MD

## 2018-03-20 NOTE — MR AVS SNAPSHOT
After Visit Summary   3/20/2018    Lamont Daniels    MRN: [id number]           Patient Information     Date Of Birth          1965        Visit Information        Provider Department      3/20/2018 1:00 PM Sebastián Jenkins MD [facility]        Today's Diagnoses     Rheumatoid arthritis of multiple sites with negative rheumatoid factor (H)    -  1    High risk medications (not anticoagulants) long-term use          Care Instructions    Rheumatology    Dr. Sebastián Jenkins         [facility]   (Monday)  [address]  [place] [address]       [facility]   (Tuesday)  [address]  [place] [address]    [facility]   (Wed., Thurs., and Friday)  [address] [facility]  [place] [address]    Phone number: [phone]  Thank you for choosing [facility].  Helga Guerrier CMA            Follow-ups after your visit        Your next 10 appointments already scheduled     Apr 11, 2018  3:00 PM CDT   Return Visit with Dorothea Loo MD   [facility] ([facility])    [address] [facility] [id number] [phone]            Apr 12, 2018  4:00 PM CDT   Return Visit with Drea Laboy MD   [facility])    [address] [place] [address] [phone]            Jun 21, 2018  3:00 PM CDT   LAB with B[facility] LAB   [facility]    [address] [facility] [id number] [phone]           Please do not eat 10-12 hours before your appointment if you are coming in fasting for labs on lipids, cholesterol, or glucose (sugar). This does not apply to pregnant women. Water, hot tea and black coffee (with nothing added) are okay. Do not drink other fluids, diet soda or chew gum.            Jun 22, 2018  3:00 PM CDT   LAB with LAB FIRST FLOOR [facility] ([facility])    [address] [place] [address]   [phone]           Please do not eat 10-12 hours before your appointment if you are coming in fasting for labs on lipids, cholesterol, or glucose (sugar). This does not apply to pregnant women. Water, hot tea and black coffee (with nothing added) are okay. Do not drink other fluids, diet soda or chew gum.            Jun 22, 2018  3:30 PM CDT   Return Visit with Fly Travis MD   [facility]    [address] [place] [address]   [phone]            Jun 26, 2018  1:00 PM CDT   Return Visit with Sebastián Jenkins MD   [facility] ([facility])    [address] [facility] [id number] [phone]              Future tests that were ordered for you today     Open Future Orders        Priority Expected Expires Ordered    CBC with platelets differential Routine 6/14/2018 7/3/2018 3/20/2018    Creatinine Routine 6/14/2018 7/3/2018 3/20/2018    Erythrocyte sedimentation rate auto Routine 6/14/2018 7/3/2018 3/20/2018    CRP inflammation Routine 6/14/2018 7/3/2018 3/20/2018    Hepatic panel Routine 6/14/2018 7/3/2018 3/20/2018            Who to contact     If you have questions or need follow up information about today's clinic visit or your schedule please contact [facility] directly at [phone].  Normal or non-critical lab and imaging results will be communicated to you by MyChart, letter or phone within 4 business days after the clinic has received the results. If you do not hear from us within 7 days, please contact the clinic through MyChart or phone. If you have a critical or abnormal lab result, we will notify you by phone as soon as possible.  Submit refill requests through [facility] [facility] or call your pharmacy and they will forward the refill request to us. Please allow 3 business days for your refill to be  "completed.          Additional Information About Your Visit        [employer] Information     [employer] gives you secure access to your electronic health record. If you see a primary care provider, you can also send messages to your care team and make appointments. If you have questions, please call your primary care clinic.  If you do not have a primary care provider, please call [phone] and they will assist you.        Care EveryWhere ID     This is your Care EveryWhere ID. This could be used by other organizations to access your [facility] medical records  [id number]        Your Vitals Were     Pulse Height Pulse Oximetry BMI (Body Mass Index)          65 1.575 m (5' 2\") 97% 31.86 kg/m2         Blood Pressure from Last 3 Encounters:   03/20/18 118/79   02/28/18 113/80   01/10/18 110/76    Weight from Last 3 Encounters:   03/20/18 79 kg (174 lb 3.2 oz)   02/28/18 80.6 kg (177 lb 9.6 oz)   01/16/18 79.4 kg (175 lb)                 Today's Medication Changes          These changes are accurate as of 3/20/18  1:17 PM.  If you have any questions, ask your nurse or doctor.               These medicines have changed or have updated prescriptions.        Dose/Directions    predniSONE 1 MG tablet   Commonly known as:  DELTASONE   This may have changed:    - medication strength  - how much to take  - how to take this  - when to take this  - additional instructions   Used for:  Rheumatoid arthritis of multiple sites with negative rheumatoid factor (H), High risk medications (not anticoagulants) long-term use   Changed by:  Sebastián Jenkins MD        Prednisone 4mg daily x30days, then 3mg daily for 30days, then 2mg daily thereafter.   Quantity:  270 tablet   Refills:  0            Where to get your medicines      These medications were sent to [place] [id number] [facility] [place] - [address] [place] [address]     Phone:  [phone]     predniSONE 1 MG tablet                Primary Care " Provider Office Phone # Fax #    David Chavez [phone]       [address]  [facility] [address]        Equal Access to Services     VICKEY HERRMANN : Hadii aad ku hadalliemarquis Somayali, waaxda luqadaha, qaybta kaalmada adeegyada, marcelino mejia estelledavid papati olivera. So [facility] [phone].    ATENCIÓN: Si habla español, tiene a muse disposición servicios gratuitos de asistencia lingüística. Llame al [phone].    We comply with applicable federal civil rights laws and [place] laws. We do not discriminate on the basis of race, color, national origin, age, disability, sex, sexual orientation, or gender identity.            Thank you!     Thank you for choosing [facility]  for your care. Our goal is always to provide you with excellent care. Hearing back from our patients is one way we can continue to improve our services. Please take a few minutes to complete the written survey that you may receive in the mail after your visit with us. Thank you!             Your Updated Medication List - Protect others around you: Learn how to safely use, store and throw away your medicines at www.disposemymeds.org.          This list is accurate as of 3/20/18  1:17 PM.  Always use your most recent med list.                   Brand Name Dispense Instructions for use Diagnosis    Abatacept 125 MG/ML Soaj auto-injector    ORENCIA    4 Syringe    Inject 1 mL (125 mg) Subcutaneous every 7 days    Rheumatoid arthritis of multiple sites with negative rheumatoid factor (H)       allopurinol 300 MG tablet    ZYLOPRIM    210 tablet    TAKE 1 TABLET BY MOUTH 1 TIME DAILY    Idiopathic gout, unspecified chronicity, unspecified site       aspirin 81 MG EC tablet     30 tablet    Take 1 tablet (81 mg) by mouth daily (*)    Coronary artery disease involving native coronary artery of native heart without angina pectoris       atorvastatin 80 MG tablet    LIPITOR    90 tablet    TAKE 1 TABLET BY MOUTH 1 TIME  DAILY FOR CHOLESTEROL    Atherosclerosis of native coronary artery of native heart, angina presence unspecified, Hyperlipidemia LDL goal <100       baclofen 10 MG tablet    LIORESAL    180 tablet    TAKE 1 TABLET BY MOUTH 2 TIMES DAILY AS NEEDED FOR MUSCLE SPASM    Spasm of back muscles       blood glucose monitoring meter device kit     1 kit    TEST 2 TIMES DAILY.    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    Gout without tophus       diclofenac 1 % Gel topical gel    VOLTAREN    300 g    Apply 2-4 grams to 2-3 joints, up to four times daily as needed using enclosed dosing card.  Max of 32g/day    Facet arthropathy, Rheumatoid arthritis involving multiple sites, unspecified rheumatoid factor presence (H)       evolocumab 140 MG/ML prefilled autoinjector    REPATHA    2 mL    Inject 1 mL (140 mg) Subcutaneous every 14 days    Hyperlipidemia LDL goal <70, S/P CABG (coronary artery bypass graft)       ezetimibe 10 MG tablet    ZETIA    90 tablet    Take 1 tablet (10 mg) by mouth daily    Coronary artery disease involving native coronary artery of native heart without angina pectoris       fluticasone 50 MCG/ACT spray    FLONASE    1 Bottle    Spray 2 sprays into both nostrils daily    Nasal congestion, Dizziness        gabapentin 300 MG capsule    NEURONTIN    540 capsule    Take 2 capsules (600 mg) by mouth 3 times daily    Lumbar radiculopathy       gemfibrozil 600 MG tablet    LOPID    180 tablet    Take 1 tablet (600 mg) by mouth 2 times daily    Hyperlipidemia LDL goal <70       hydroxychloroquine 200 MG tablet    PLAQUENIL    30 tablet    Take 1 tablet (200 mg) by mouth daily    Rheumatoid arthritis of multiple sites with negative rheumatoid factor (H), High risk medications (not anticoagulants) long-term use       meclizine 25 MG tablet    ANTIVERT    30 tablet    TAKE ONE TABLET BY MOUTH EVERY 6 HOURS AS NEEDED FOR  DIZZINESS    Vertigo       melatonin 3 MG tablet      Take 1 tablet (3 mg) by mouth nightly as needed for sleep    Delayed sleep phase syndrome       meloxicam 7.5 MG tablet    MOBIC    30 tablet    Take 1 tablet (7.5 mg) by mouth daily    Low back pain, unspecified back pain laterality, unspecified chronicity, with sciatica presence unspecified       metoprolol succinate 25 MG 24 hr tablet    TOPROL-XL    90 tablet    Take 1 tablet (25 mg) by mouth daily    Atherosclerosis of native coronary artery of native heart without angina pectoris       mirtazapine 15 MG tablet    REMERON    30 tablet    Take 1 tablet (15 mg) by mouth At Bedtime    Severe episode of recurrent major depressive disorder, without psychotic features (H)       nitroGLYcerin 0.4 MG sublingual tablet    NITROSTAT    30 tablet    Place 1 tablet (0.4 mg) under the tongue every 5 minutes as needed    Atherosclerosis of native coronary artery of native heart with angina pectoris (H)       order for DME      1.  CPAP pressure 11 cm/H20 with heated humidity.  2.  Provide mask to fit and CPAP supplies.  3.  Length of need lifetime.  4.  If needed please provide a chin strap    JEROD (obstructive sleep apnea), Anxiety, CAD (coronary artery disease), HTN (hypertension)       * order for DME     1 Units    Equipment being ordered: single end cane     Lumbar radiculopathy, Facet arthropathy, Chronic low back pain       * order for DME     200 each    Equipment being ordered: test strips as covered by insurance. USE TO TEST BLOOD SUGARS TWICE DAILY OR AS DIRECTED.    Rheumatoid arthritis involving multiple sites, unspecified rheumatoid factor presence (H)       * order for DME     1 each    OneTouch glucometer.    Rheumatoid arthritis involving multiple sites, unspecified rheumatoid factor presence (H)       * order for DME     200 each    OneTouch lancets testing twice daily.    Rheumatoid arthritis involving multiple sites, unspecified rheumatoid factor presence (H)       oxyCODONE IR 5 MG tablet    ROXICODONE    180 tablet    Take 1-2 tablets (5-10 mg) by mouth every 4 hours as needed for moderate to severe pain . Max of 6 tabs per day.  30 day supply. May fill on 3/6 to start on/after 3/8/18    Facet arthropathy       pantoprazole 40 MG EC tablet    PROTONIX    90 tablet    TAKE ONE TABLET BY MOUTH ONCE DAILY FOR  STOMACH.    Gastroesophageal reflux disease without esophagitis       predniSONE 1 MG tablet    DELTASONE    270 tablet    Prednisone 4mg daily x30days, then 3mg daily for 30days, then 2mg daily thereafter.    Rheumatoid arthritis of multiple sites with negative rheumatoid factor (H), High risk medications (not anticoagulants) long-term use       sulfaSALAzine  MG EC tablet    AZULFIDINE EN    120 tablet    Take 2 tablets (1,000 mg) by mouth 2 times daily    Rheumatoid arthritis of multiple sites with negative rheumatoid factor (H), High risk medications (not anticoagulants) long-term use       tamsulosin 0.4 MG capsule    FLOMAX    90 capsule    Take 1 capsule (0.4 mg) by mouth daily    Benign prostatic hyperplasia with weak urinary stream       tenofovir 300 MG tablet    VIREAD    90 tablet    Take 1 tablet (300 mg) by mouth daily    Chronic viral hepatitis B without delta agent and without coma (H)       triamcinolone 0.1 % ointment     KENALOG    80 g    Apply sparingly to affected area three times daily for 14 days.    Rash       vitamin D 2000 UNITS tablet     100 tablet    TAKE ONE TABLET BY MOUTH ONCE DAILY    Vitamin D deficiency       zolpidem 5 MG tablet    AMBIEN    30 tablet    TAKE ONE TABLET BY MOUTH AT BEDTIME AS NEEDED FOR SLEEP    Insomnia, unspecified type       * Notice:  This list has 4 medication(s) that are the same as other medications prescribed for you. Read the directions carefully, and ask your doctor or other care provider to review them with you.

## 2018-03-20 NOTE — TELEPHONE ENCOUNTER
"Requested Prescriptions   Pending Prescriptions Disp Refills     pantoprazole (PROTONIX) 40 MG EC tablet    Last Written Prescription Date:  6/6/17  Last Fill Quantity: 90,  # refills: 3   Last Office Visit with FMRAO, [facility] or [facility] prescribing provider:  2/28/18   Future Office Visit:    Next 5 appointments (look out 90 days)     Apr 11, 2018  3:00 PM CDT   Return Visit with Dorothea Loo MD   [facility] ([facility])    [address] [facility] [address] [phone]            Apr 12, 2018  4:00 PM CDT   Return Visit with Drea Laboy MD   [facility] ([facility])    [address] [place] [address] [phone]                  90 tablet 3    PPI Protocol Passed    3/20/2018  1:49 PM       Passed - Not on Clopidogrel (unless Pantoprazole ordered)       Passed - No diagnosis of osteoporosis on record       Passed - Recent (12 mo) or future (30 days) visit within the authorizing provider's specialty    Patient had office visit in the last 12 months or has a visit in the next 30 days with authorizing provider or within the authorizing provider's specialty.  See \"Patient Info\" tab in inbasket, or \"Choose Columns\" in Meds & Orders section of the refill encounter.           Passed - Patient is age 18 or older              Costa Faarax  [facility]  "

## 2018-03-20 NOTE — NURSING NOTE
"Chief Complaint   Patient presents with     Arthritis     RA, patient states he is feeling the same       Initial /79 (BP Location: Left arm, Patient Position: Chair, Cuff Size: Adult Regular)  Pulse 65  Ht 1.575 m (5' 2\")  Wt 79 kg (174 lb 3.2 oz)  SpO2 97%  BMI 31.86 kg/m2 Estimated body mass index is 31.86 kg/(m^2) as calculated from the following:    Height as of this encounter: 1.575 m (5' 2\").    Weight as of this encounter: 79 kg (174 lb 3.2 oz).  BP completed using cuff size: regular         RAPID3 (0-30) Cumulative Score  20.3          RAPID3 Weighted Score (divide #4 by 3 and that is the weighted score)  6.8         "

## 2018-03-20 NOTE — PATIENT INSTRUCTIONS
Rheumatology    Dr. Sebastián Jenkins         [facility]   (Monday)  [address]  [place] [address]       Health system   (Tuesday)  [address]  [place] [address]    [facility]   (Wed., Thurs., and Friday)  [address] [place] [address]    Phone number: [phone]  Thank you for choosing [facility].  Helga Guerrier CMA

## 2018-03-21 RX ORDER — PANTOPRAZOLE SODIUM 40 MG/1
40 TABLET, DELAYED RELEASE ORAL DAILY
Qty: 90 TABLET | Refills: 3 | Status: SHIPPED | OUTPATIENT
Start: 2018-03-21 | End: 2019-03-20

## 2018-03-21 NOTE — TELEPHONE ENCOUNTER
The following prescription appears to be discontinued in the medication list. Please verify.    Rachelle Mendoza RN

## 2018-03-27 DIAGNOSIS — M10.9 GOUT WITHOUT TOPHUS: ICD-10-CM

## 2018-03-27 DIAGNOSIS — I25.10 ATHEROSCLEROSIS OF NATIVE CORONARY ARTERY OF NATIVE HEART WITHOUT ANGINA PECTORIS: ICD-10-CM

## 2018-03-27 NOTE — TELEPHONE ENCOUNTER
"Requested Prescriptions   Pending Prescriptions Disp Refills     COLCRYS 0.6 MG tablet [Pharmacy Med Name: COLCRYS 0.6 MG TABLET]    Last Written Prescription Date:  9/5/17  Last Fill Quantity: 30,  # refills: 3   Last Office Visit with ANA PAULA, OB[facility] or [facility] prescribing provider:  2/28/18   Future Office Visit:    Next 5 appointments (look out 90 days)     Apr 11, 2018  3:00 PM CDT   Return Visit with Dorothea Loo MD   [facility] ([facility])    [address] [facility] [address]   [phone]            Apr 12, 2018  4:00 PM CDT   Return Visit with Drea Laboy MD   [facility])    [address] [place] [id number] [phone]            Jun 22, 2018  3:30 PM CDT   Return Visit with Fly Travis MD   [facility])    [address] [place] [id number] [phone]                  30 tablet 3     Sig: TAKE 2 TABLETS BY MOUTH AT THE FIRST SIGN OF FLARE, TAKE 1 ADDITIONAL TABLET ONE HOUR LATER.    Gout Agents Protocol Failed    3/27/2018 11:01 AM       Failed - Uric acid greater than or equal to 6 on file in past 12 months    Recent Labs   Lab Test  11/04/15   1547   URIC  6.1     If level is 6mg/dL or greater, ok to refill one time and refer to provider.          Passed - CBC on file in past 12 months    Recent Labs   Lab Test  02/13/18   1451   WBC  6.5   RBC  4.89   HGB  14.3   HCT  43.6   PLT  217            Passed - ALT on file in past 12 months    Recent Labs   Lab Test  02/13/18   1451   ALT  60            Passed - Recent (12 mo) or future (30 days) visit within the authorizing provider's specialty    Patient had office visit in the last 12 months or has a visit in the next 30 days with authorizing provider or within the authorizing provider's specialty.  See \"Patient Info\" tab in inbasket, or \"Choose Columns\" in Meds & " "Orders section of the refill encounter.           Passed - Patient is age 18 or older       Passed - Normal serum creatinine on file in the past 12 months    Recent Labs   Lab Test  02/13/18   1451   06/24/13   1322   CR  0.89   < >   --    CRPOC   --    --   1.1    < > = values in this interval not displayed.             metoprolol succinate (TOPROL-XL) 25 MG 24 hr tablet [Pharmacy Med Name: METOPROLOL SUCC ER 25 MG TAB]    Last Written Prescription Date:  3/14/17  Last Fill Quantity: 90,  # refills: 3   Last Office Visit with FMG, OBED or [facility] prescribing provider:  2/28/18   Future Office Visit:    Next 5 appointments (look out 90 days)     Apr 11, 2018  3:00 PM CDT   Return Visit with Dorothea Loo MD   [facility] ([facility])    [address] [facility] [address]   [phone]            Apr 12, 2018  4:00 PM CDT   Return Visit with Drea Laboy MD   [facility])    [address] [place] [address]   [phone]            Jun 22, 2018  3:30 PM CDT   Return Visit with Fly Travis MD   [facility] ([facility])    [address] [place] [address]   [phone]                  90 tablet 1     Sig: TAKE 1 TABLET BY MOUTH 1 TIME DAILY    Beta-Blockers Protocol Passed    3/27/2018 11:01 AM       Passed - Blood pressure under 140/90 in past 12 months    BP Readings from Last 3 Encounters:   03/20/18 118/79   02/28/18 113/80   01/10/18 110/76                Passed - Patient is age 6 or older       Passed - Recent (12 mo) or future (30 days) visit within the authorizing provider's specialty    Patient had office visit in the last 12 months or has a visit in the next 30 days with authorizing provider or within the authorizing provider's specialty.  See \"Patient Info\" tab in inbasket, or \"Choose Columns\" in Meds & Orders section of the " refill encounter.                  Costa Faarax  [facility]

## 2018-03-29 ENCOUNTER — TELEPHONE (OUTPATIENT)
Dept: FAMILY MEDICINE | Facility: CLINIC | Age: 53
End: 2018-03-29

## 2018-03-29 DIAGNOSIS — M10.9 GOUT WITHOUT TOPHUS: ICD-10-CM

## 2018-03-29 RX ORDER — COLCHICINE 0.6 MG/1
TABLET, FILM COATED ORAL
Qty: 30 TABLET | Refills: 3 | Status: SHIPPED | OUTPATIENT
Start: 2018-03-29 | End: 2018-06-29

## 2018-03-29 RX ORDER — COLCHICINE 0.6 MG/1
TABLET, FILM COATED ORAL
Qty: 30 TABLET | Refills: 3 | Status: SHIPPED | OUTPATIENT
Start: 2018-03-29 | End: 2018-03-29

## 2018-03-29 RX ORDER — METOPROLOL SUCCINATE 25 MG/1
TABLET, EXTENDED RELEASE ORAL
Qty: 90 TABLET | Refills: 1 | Status: SHIPPED | OUTPATIENT
Start: 2018-03-29 | End: 2018-06-22

## 2018-03-29 NOTE — TELEPHONE ENCOUNTER
Routing refill request to provider for review/approval because:  Protocol failed.  Kellie Nicholas RN

## 2018-04-04 DIAGNOSIS — I25.119 ATHEROSCLEROSIS OF NATIVE CORONARY ARTERY OF NATIVE HEART WITH ANGINA PECTORIS (H): ICD-10-CM

## 2018-04-04 DIAGNOSIS — R21 RASH: ICD-10-CM

## 2018-04-04 NOTE — TELEPHONE ENCOUNTER
Requested Prescriptions   Pending Prescriptions Disp Refills     triamcinolone (KENALOG) 0.1 % ointment [Pharmacy Med Name: TRIAMCINOLONE 0.1% OINTMENT]    Last Written Prescription Date:  12/7/17  Last Fill Quantity: 80 g,  # refills: 3   Last Office Visit with [facility], [facility] or [facility] prescribing provider:  2/28/18   Future Office Visit:    Next 5 appointments (look out 90 days)     Apr 11, 2018  3:00 PM CDT   Return Visit with Dorothea Loo MD   [facility] ([facility])    [address] [address]   [phone]            Apr 12, 2018  4:00 PM CDT   Return Visit with Drea Laboy MD   [facility]    [address] [place] [address]   [phone]            May 15, 2018  7:30 AM CDT   Return Visit with Ricardo Rae MD, [facility] NURSE ONLY   [facility])    [address] [place] [address]   [phone]            Jun 22, 2018  3:30 PM CDT   Return Visit with Fly Travis MD   [facility])    [address] [place] [address]   [phone]            Jun 26, 2018  1:00 PM CDT   [facility]    [address] [address] [facility] [address]   [phone]                  80 g 3     Sig: APPLY SPARINGLY TO AFFECTED AREA THREE TIMES DAILY FOR 14 DAYS.    Topical Steroid Protocol Passed    4/4/2018  3:24 PM       Passed - Patient is age 6 or older       Passed - Authorizing prescriber's most recent note related to this medication read.       Passed - High potency steroid not ordered       Passed - Recent (12 mo) or future (30 days) visit within the authorizing provider's specialty    Patient had office visit in the last 12 months or has a visit in the next  "30 days with authorizing provider or within the authorizing provider's specialty.  See \"Patient Info\" tab in inbasket, or \"Choose Columns\" in Meds & Orders section of the refill encounter.            nitroGLYcerin (NITROSTAT) 0.4 MG sublingual tablet [Pharmacy Med Name: NITROGLYCERIN 0.4 MG TABLET SL]    Last Written Prescription Date:  9/18/17  Last Fill Quantity: 30,  # refills: 1   Last Office Visit with G, P or [facility] prescribing provider:  2/28/18   Future Office Visit:    Next 5 appointments (look out 90 days)     Apr 11, 2018  3:00 PM CDT   Return Visit with Dorothea Loo MD   [facility] ([facility])    [address] [place] [address]   [phone]            Apr 12, 2018  4:00 PM CDT   Return Visit with Drea Laboy MD   [facility]    [address] [place] [address] [phone]            May 15, 2018  7:30 AM CDT   Return Visit with Ricardo Rae MD, [facility] NURSE ONLY   [facility] ([facility])    [address] [place] [address]   [phone]            Jun 22, 2018  3:30 PM CDT   Return Visit with Fly Travis MD   [facility] ([facility])    [address] [place] [address]   [phone]            Jun 26, 2018  1:00 PM CDT   Return Visit with Sebastián Jenkins MD   [facility] ([facility])    [address] [facility] [id number] [phone]                  25 tablet 1     Sig: PLACE 1 TABLET UNDER THE TONGUE EVERY 5 MINUTES AS NEEDED    Nitrates Failed    4/4/2018  3:24 PM       Failed - Sublingual nitro order needs review    If refill exceeds 1 bottle per month, please forward request to provider.          Passed - Blood pressure under 140/90 in past 12 months    BP Readings from Last 3 " "Encounters:   03/20/18 118/79   02/28/18 113/80   01/10/18 110/76                Passed - Pt is not on erectile dysfunction medications       Passed - Recent (12 mo) or future (30 days) visit within the authorizing provider's specialty    Patient had office visit in the last 12 months or has a visit in the next 30 days with authorizing provider or within the authorizing provider's specialty.  See \"Patient Info\" tab in inbasket, or \"Choose Columns\" in Meds & Orders section of the refill encounter.           Passed - Patient is age 18 or older              C[facility] Faarax  [facility]  "

## 2018-04-10 RX ORDER — TRIAMCINOLONE ACETONIDE 1 MG/G
OINTMENT TOPICAL
Qty: 80 G | Refills: 0 | Status: SHIPPED | OUTPATIENT
Start: 2018-04-10 | End: 2018-04-23

## 2018-04-10 RX ORDER — NITROGLYCERIN 0.4 MG/1
TABLET SUBLINGUAL
Qty: 25 TABLET | Refills: 1 | Status: SHIPPED | OUTPATIENT
Start: 2018-04-10 | End: 2020-08-21

## 2018-04-11 ENCOUNTER — OFFICE VISIT (OUTPATIENT)
Dept: PALLIATIVE MEDICINE | Facility: CLINIC | Age: 53
End: 2018-04-11
Payer: COMMERCIAL

## 2018-04-11 VITALS
SYSTOLIC BLOOD PRESSURE: 123 MMHG | WEIGHT: 180 LBS | RESPIRATION RATE: 16 BRPM | DIASTOLIC BLOOD PRESSURE: 77 MMHG | BODY MASS INDEX: 32.92 KG/M2 | HEART RATE: 62 BPM

## 2018-04-11 DIAGNOSIS — M47.819 FACET ARTHROPATHY: ICD-10-CM

## 2018-04-11 DIAGNOSIS — M06.9 RHEUMATOID ARTHRITIS INVOLVING MULTIPLE SITES, UNSPECIFIED RHEUMATOID FACTOR PRESENCE: Primary | ICD-10-CM

## 2018-04-11 DIAGNOSIS — G47.31 CENTRAL SLEEP APNEA: ICD-10-CM

## 2018-04-11 DIAGNOSIS — G47.33 OSA (OBSTRUCTIVE SLEEP APNEA): ICD-10-CM

## 2018-04-11 PROCEDURE — 99214 OFFICE O/P EST MOD 30 MIN: CPT | Performed by: PSYCHIATRY & NEUROLOGY

## 2018-04-11 RX ORDER — OXYCODONE HYDROCHLORIDE 5 MG/1
5-10 TABLET ORAL EVERY 4 HOURS PRN
Qty: 180 TABLET | Refills: 0 | Status: SHIPPED | OUTPATIENT
Start: 2018-04-11 | End: 2018-05-10

## 2018-04-11 ASSESSMENT — PAIN SCALES - GENERAL: PAINLEVEL: SEVERE PAIN (7)

## 2018-04-11 NOTE — PATIENT INSTRUCTIONS
1. I am going to reach out to your sleep doctor to see if we can get you an appointment that matches your sleep schedule  2. Continue to wear the CPAP consistently  3. I have prescribed narcan/naloxone-- you need to show your children how to use it.  4. Continue meds as you are doing.  5. Consider medial branch block and radiofrequency ablation for the facet joints (this is where burn the nerves going to the joints)  6. Schedule in 3 months    ----------------------------------------------------------------  Nurse Triage line:  [phone]   Call this number with any questions or concerns. You may leave a detailed message anytime. Calls are typically returned Monday through Friday between 8 AM and 4:30 PM. We usually get back to you within 2 business days depending on the issue/request.       Medication refills:    For non-narcotic medications, call your pharmacy directly to request a refill. The pharmacy will contact the [facility] for authorization. Please allow 3-4 days for these refills to be processed.     For narcotic refills, call the nurse triage line or send a [employer] message. Please contact us 7-10 days before your refill is due. The message MUST include the name of the specific medication(s) requested and how you would like to receive the prescription(s). The options are as follows:    Pain Clinic staff can mail the prescription to your pharmacy. Please tell us the name of the pharmacy.    You may pick the prescription up at the Pain Clinic (tell us the location) or during a clinic visit with your pain provider    Pain Clinic staff can deliver the prescription to the [place] pharmacy in the clinic building. Please tell us the location.      Scheduling number: [phone].  Call this number to schedule or change appointments.    We believe regular attendance is key to your success in our program.    Any time you are unable to keep your appointment we ask that you call us at least 24 hours in  advance to let us know. This will allow us to offer the appointment time to another patient.

## 2018-04-11 NOTE — MR AVS SNAPSHOT
After Visit Summary   4/11/2018    Lamont Daniels    MRN: [id number]           Patient Information     Date Of Birth          1965        Visit Information        Provider Department      4/11/2018 3:00 PM Dorothea Loo MD [facility]        Today's Diagnoses     Rheumatoid arthritis involving multiple sites, unspecified rheumatoid factor presence (H)    -  1    Facet arthropathy          Care Instructions    1. I am going to reach out to your sleep doctor to see if we can get you an appointment that matches your sleep schedule  2. Continue to wear the CPAP consistently  3. I have prescribed narcan/naloxone-- you need to show your children how to use it.  4. Continue meds as you are doing.  5. Consider medial branch block and radiofrequency ablation for the facet joints (this is where burn the nerves going to the joints)  6. Schedule in 3 months    ----------------------------------------------------------------  Nurse Triage line:  [phone]   Call this number with any questions or concerns. You may leave a detailed message anytime. Calls are typically returned Monday through Friday between 8 AM and 4:30 PM. We usually get back to you within 2 business days depending on the issue/request.       Medication refills:    For non-narcotic medications, call your pharmacy directly to request a refill. The pharmacy will contact the [facility] for authorization. Please allow 3-4 days for these refills to be processed.     For narcotic refills, call the nurse triage line or send a [employer] message. Please contact us 7-10 days before your refill is due. The message MUST include the name of the specific medication(s) requested and how you would like to receive the prescription(s). The options are as follows:    Pain Clinic staff can mail the prescription to your pharmacy. Please tell us the name of the pharmacy.    You may pick the prescription up at the Pain Clinic (tell us  the location) or during a clinic visit with your pain provider    Pain Clinic staff can deliver the prescription to the [place] pharmacy in the clinic building. Please tell us the location.      Scheduling number: [phone].  Call this number to schedule or change appointments.    We believe regular attendance is key to your success in our program.    Any time you are unable to keep your appointment we ask that you call us at least 24 hours in advance to let us know. This will allow us to offer the appointment time to another patient.               Follow-ups after your visit        Your next 10 appointments already scheduled     Apr 12, 2018  4:00 PM CDT   [facility] Visit with Drea Laboy MD   [facility]    [address] [place] [address]   [phone]            May 15, 2018  7:30 AM CDT   Return Visit with Ricardo Rae MD, [facility] NURSE ONLY   [facility] ([facility])    [address] [place] [address]   [phone]            Jun 22, 2018  3:00 PM CDT   LAB with LAB FIRST FLOOR [facility] ([facility])    [address] [place] [address]   [phone]           Please do not eat 10-12 hours before your appointment if you are coming in fasting for labs on lipids, cholesterol, or glucose (sugar). This does not apply to pregnant women. Water, hot tea and black coffee (with nothing added) are okay. Do not drink other fluids, diet soda or chew gum.            Jun 22, 2018  3:30 PM CDT   Return Visit with Fly Travis MD   [facility]    [address] [place] [address]   [phone]            Jun 26, 2018  1:00 PM CDT   Return Visit with Sebastián Jenkins MD   [facility]    [address] [place] [address]  [place] [id number] [phone]              Who to contact     If you have questions or need follow up information about today's clinic visit or your schedule please contact [facility] directly at [phone].  Normal or non-critical lab and imaging results will be communicated to you by [employer], letter or phone within 4 business days after the clinic has received the results. If you do not hear from us within 7 days, please contact the clinic through [employer] or phone. If you have a critical or abnormal lab result, we will notify you by phone as soon as possible.  Submit refill requests through [facility] or call your pharmacy and they will forward the refill request to us. Please allow 3 business days for your refill to be completed.          Additional Information About Your Visit        [employer] Information     [facility] gives you secure access to your electronic health record. If you see a primary care provider, you can also send messages to your care team and make appointments. If you have questions, please call your primary care clinic.  If you do not have a primary care provider, please call [phone] and they will assist you.        Care EveryWhere ID     This is your Care EveryWhere ID. This could be used by other organizations to access your [place] medical records  [id number]        Your Vitals Were     Pulse Respirations BMI (Body Mass Index)             62 16 32.92 kg/m2          Blood Pressure from Last 3 Encounters:   04/11/18 123/77   03/20/18 118/79   02/28/18 113/80    Weight from Last 3 Encounters:   04/11/18 81.6 kg (180 lb)   03/20/18 79 kg (174 lb 3.2 oz)   02/28/18 80.6 kg (177 lb 9.6 oz)              Today, you had the following     No orders found for display         Today's Medication Changes          These changes are accurate as of 4/11/18  3:16 PM.  If you have any questions, ask your nurse or doctor.               Start taking these medicines.        Dose/Directions     naloxone nasal spray   Commonly known as:  NARCAN   Used for:  Rheumatoid arthritis involving multiple sites, unspecified rheumatoid factor presence (H)   Started by:  Dorothea Loo MD        Dose:  4 mg   Spray 1 spray (4 mg) into one nostril alternating nostrils as needed for opioid reversal every 2-3 minutes until assistance arrives   Quantity:  0.2 mL   Refills:  0         These medicines have changed or have updated prescriptions.        Dose/Directions    oxyCODONE IR 5 MG tablet   Commonly known as:  ROXICODONE   This may have changed:  additional instructions   Used for:  Facet arthropathy   Changed by:  Dorothea Loo MD        Dose:  5-10 mg   Take 1-2 tablets (5-10 mg) by mouth every 4 hours as needed for moderate to severe pain . Max of 6 tabs per day.  30 day supply. May fill on/after 4/11   Quantity:  180 tablet   Refills:  0            Where to get your medicines      These medications were sent to [place] [address] [place] [address] [address]     Phone:  [phone]     naloxone nasal spray         Some of these will need a paper prescription and others can be bought over the counter.  Ask your nurse if you have questions.     Bring a paper prescription for each of these medications     oxyCODONE IR 5 MG tablet                Primary Care Provider Office Phone # Fax #    David Chavez [phone]       [address]  [facility] [address]        Equal Access to Services     [facility] AH: Hadii esther mccoyo Somayali, waaxda luqadaha, qaybta kaalmada adeegyada, marcelino mejia adedavid olivera. So [facility] [phone].    ATENCIÓN: Si habla español, tiene a muse disposición servicios gratuitos de asistencia lingüística. Llame al [phone].    We comply with applicable federal civil rights laws and [place] laws. We do not discriminate on the basis of race, color, national origin, age, disability, sex,  sexual orientation, or gender identity.            Thank you!     Thank you for choosing [facility]  for your care. Our goal is always to provide you with excellent care. Hearing back from our patients is one way we can continue to improve our services. Please take a few minutes to complete the written survey that you may receive in the mail after your visit with us. Thank you!             Your Updated Medication List - Protect others around you: Learn how to safely use, store and throw away your medicines at www.disposemymeds.org.          This list is accurate as of 4/11/18  3:16 PM.  Always use your most recent med list.                   Brand Name Dispense Instructions for use Diagnosis    Abatacept 125 MG/ML Soaj auto-injector    ORENCIA    4 Syringe    Inject 1 mL (125 mg) Subcutaneous every 7 days    Rheumatoid arthritis of multiple sites with negative rheumatoid factor (H)       allopurinol 300 MG tablet    ZYLOPRIM    210 tablet    TAKE 1 TABLET BY MOUTH 1 TIME DAILY    Idiopathic gout, unspecified chronicity, unspecified site       aspirin 81 MG EC tablet     30 tablet    Take 1 tablet (81 mg) by mouth daily (*)    Coronary artery disease involving native coronary artery of native heart without angina pectoris       atorvastatin 80 MG tablet    LIPITOR    90 tablet    TAKE 1 TABLET BY MOUTH 1 TIME DAILY FOR CHOLESTEROL    Atherosclerosis of native coronary artery of native heart, angina presence unspecified, Hyperlipidemia LDL goal <100       baclofen 10 MG tablet    LIORESAL    180 tablet    TAKE 1 TABLET BY MOUTH 2 TIMES DAILY AS NEEDED FOR MUSCLE SPASM    Spasm of back muscles       blood glucose monitoring meter device kit     1 kit    TEST 2 TIMES DAILY.    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    Gout without tophus       diclofenac 1 % Gel topical gel    VOLTAREN    300 g    Apply 2-4 grams to 2-3 joints, up to four times daily as needed using enclosed dosing card.  Max of 32g/day    Facet arthropathy, Rheumatoid arthritis involving multiple sites, unspecified rheumatoid factor presence (H)       evolocumab 140 MG/ML prefilled autoinjector    REPATHA    2 mL    Inject 1 mL (140 mg) Subcutaneous every 14 days    Hyperlipidemia LDL goal <70, S/P CABG (coronary artery bypass graft)       ezetimibe 10 MG tablet    ZETIA    90 tablet    Take 1 tablet (10 mg) by mouth daily    Coronary artery disease involving native coronary artery of native heart without angina pectoris       fluticasone 50 MCG/ACT spray    FLONASE    1 Bottle    Spray 2 sprays into both nostrils daily    Nasal congestion, Dizziness       gabapentin 300 MG capsule    NEURONTIN    540 capsule    Take 2 capsules (600 mg) by mouth 3 times daily    Lumbar radiculopathy       gemfibrozil 600 MG tablet    LOPID    180 tablet    Take 1 tablet (600 mg) by mouth 2 times daily    Hyperlipidemia LDL goal <70       hydroxychloroquine 200 MG tablet    PLAQUENIL    30 tablet    Take 1 tablet (200 mg) by mouth daily    Rheumatoid arthritis of multiple sites with negative rheumatoid factor (H), High risk medications (not anticoagulants) long-term use       meclizine 25 MG tablet    ANTIVERT    30 tablet    TAKE ONE TABLET BY MOUTH  EVERY 6 HOURS AS NEEDED FOR  DIZZINESS    Vertigo       melatonin 3 MG tablet      Take 1 tablet (3 mg) by mouth nightly as needed for sleep    Delayed sleep phase syndrome       meloxicam 7.5 MG tablet    MOBIC    30 tablet    Take 1 tablet (7.5 mg) by mouth daily    Low back pain, unspecified back pain laterality, unspecified chronicity, with sciatica presence unspecified       metoprolol succinate 25 MG 24 hr tablet    TOPROL-XL    90 tablet    TAKE 1 TABLET BY MOUTH 1 TIME DAILY    Atherosclerosis of native coronary artery of native heart without angina pectoris       mirtazapine 15 MG tablet    REMERON    30 tablet    Take 1 tablet (15 mg) by mouth At Bedtime    Severe episode of recurrent major depressive disorder, without psychotic features (H)       naloxone nasal spray    NARCAN    0.2 mL    Spray 1 spray (4 mg) into one nostril alternating nostrils as needed for opioid reversal every 2-3 minutes until assistance arrives    Rheumatoid arthritis involving multiple sites, unspecified rheumatoid factor presence (H)       nitroGLYcerin 0.4 MG sublingual tablet    NITROSTAT    25 tablet    PLACE 1 TABLET UNDER THE TONGUE EVERY 5 MINUTES AS NEEDED    Atherosclerosis of native coronary artery of native heart with angina pectoris (H)       order for DME      1.  CPAP pressure 11 cm/H20 with heated humidity.  2.  Provide mask to fit and CPAP supplies.  3.  Length of need lifetime.  4.  If needed please provide a chin strap    JEROD (obstructive sleep apnea), Anxiety, CAD (coronary artery disease), HTN (hypertension)       * order for DME     1 Units    Equipment being ordered: single end cane    Lumbar radiculopathy, Facet arthropathy, Chronic low back pain       * order for DME     200 each    Equipment being ordered: test strips as covered by insurance. USE TO TEST BLOOD SUGARS TWICE DAILY OR AS DIRECTED.    Rheumatoid arthritis involving multiple sites, unspecified rheumatoid factor presence (H)       * order for  DME     1 each    OneTouch glucometer.    Rheumatoid arthritis involving multiple sites, unspecified rheumatoid factor presence (H)       * order for DME     200 each    OneTouch lancets testing twice daily.    Rheumatoid arthritis involving multiple sites, unspecified rheumatoid factor presence (H)       oxyCODONE IR 5 MG tablet    ROXICODONE    180 tablet    Take 1-2 tablets (5-10 mg) by mouth every 4 hours as needed for moderate to severe pain . Max of 6 tabs per day.  30 day supply. May fill on/after 4/11    Facet arthropathy       pantoprazole 40 MG EC tablet    PROTONIX    90 tablet    Take 1 tablet (40 mg) by mouth daily    Gastroesophageal reflux disease without esophagitis       predniSONE 1 MG tablet    DELTASONE    270 tablet    Prednisone 4mg daily x30days, then 3mg daily for 30days, then 2mg daily thereafter.    Rheumatoid arthritis of multiple sites with negative rheumatoid factor (H), High risk medications (not anticoagulants) long-term use       sulfaSALAzine  MG EC tablet    AZULFIDINE EN    120 tablet    Take 2 tablets (1,000 mg) by mouth 2 times daily    Rheumatoid arthritis of multiple sites with negative rheumatoid factor (H), High risk medications (not anticoagulants) long-term use       tamsulosin 0.4 MG capsule    FLOMAX    90 capsule    Take 1 capsule (0.4 mg) by mouth daily    Benign prostatic hyperplasia with weak urinary stream       tenofovir 300 MG tablet    VIREAD    90 tablet    Take 1 tablet (300 mg) by mouth daily    Chronic viral hepatitis B without delta agent and without coma (H)       triamcinolone 0.1 % ointment    KENALOG    80 g    APPLY SPARINGLY TO AFFECTED AREA THREE TIMES DAILY FOR 14 DAYS.    Rash       vitamin D 2000 units tablet     100 tablet    TAKE ONE TABLET BY MOUTH ONCE DAILY    Vitamin D deficiency       zolpidem 5 MG tablet    AMBIEN    30 tablet    TAKE ONE TABLET BY MOUTH AT BEDTIME AS NEEDED FOR SLEEP    Insomnia, unspecified type       * Notice:  This  list has 4 medication(s) that are the same as other medications prescribed for you. Read the directions carefully, and ask your doctor or other care provider to review them with you.

## 2018-04-11 NOTE — PROGRESS NOTES
[facility]    Date of visit: 4/11/2018    Chief complaint:   Chief Complaint   Patient presents with     Pain       Interval history:  Lamont Daniels was last seen by me on 1/10/18    Recommendations/plan at the last visit included:  1. Physical Therapy:  Finished pool therapy, likely had more sessions  2. Clinical Health Psychologist to address issues of relaxation, behavioral change, coping style, and other factors important to improvement.  Will need to discuss at upcoming appointments  3. Diagnostic Studies:  UDS  4. Medication Management:    1. I am concerned about going up on the medication or adding in long-acting in the setting of his JEROD.  I will reach out to sleep doctor about it, but my concern is when he is not using CPAP and risks that has.  2. Continue regular use of CPAP  5. Further procedures recommended: None at this time.  6. Recommendations to PCP:  2 months    Since his last visit, Lamont Daniels reports:  -continues to have a lot of pain when he wakes up.  -he falls asleep in the early morning hours, and sleeps somewhat during the day  -no new changes  -wears CPAP consistently  -he doesn't feel that he can do a sleep study given his hours of sleeping    Pain scores:  Pain intensity on average is 7 on a scale of 0-10.     Current pain treatments  Acetaminophen 250mg 2 tablets: 1-2 times daily  Gabapentin 600mg 3 times daily  Baclofen 10mg BID prn (more consistently in the pm)- was prescribed by PCP, doesn't use daily.  Oxycodone 5 mg : takes 6/day  meloxicam- 7.5mg in the morning  voltaren gel- elbow, back    Previous medication treatments included:  Codeine, oxycodone, hydrocodone- given for other issues- helpful  Cymbalta 60mg daily- given for mental health- was given by Dr. Acosta- not been higher  Baclofen 10mg at bedtime- not helpful, no side effects  Robaxin 500 mg 1 tablet, 1-3 times a day depending on the day  Ibuprofen 800 mg- using 3-4 times per week,  helpful but started meloxicam    Other treatments have included:  Lamont Daniels has not been seen at a pain clinic in the past.    PT: has done for various reasons, most recently the low back.  Acupuncture: as done a couple of needles with adjustment  TENs Unit: no  Injections: no    Side Effects: none    Medications:  Current Outpatient Prescriptions   Medication Sig Dispense Refill     triamcinolone (KENALOG) 0.1 % ointment APPLY SPARINGLY TO AFFECTED AREA THREE TIMES DAILY FOR 14 DAYS. 80 g 0     nitroGLYcerin (NITROSTAT) 0.4 MG sublingual tablet PLACE 1 TABLET UNDER THE TONGUE EVERY 5 MINUTES AS NEEDED 25 tablet 1     metoprolol succinate (TOPROL-XL) 25 MG 24 hr tablet TAKE 1 TABLET BY MOUTH 1 TIME DAILY 90 tablet 1     COLCRYS 0.6 MG tablet TAKE 2 TABLETS BY MOUTH AT THE FIRST SIGN OF FLARE, TAKE 1 ADDITIONAL TABLET ONE HOUR LATER. 30 tablet 3     pantoprazole (PROTONIX) 40 MG EC tablet Take 1 tablet (40 mg) by mouth daily 90 tablet 3     predniSONE (DELTASONE) 1 MG tablet Prednisone 4mg daily x30days, then 3mg daily for 30days, then 2mg daily thereafter. 270 tablet 0     baclofen (LIORESAL) 10 MG tablet TAKE 1 TABLET BY MOUTH 2 TIMES DAILY AS NEEDED FOR MUSCLE SPASM 180 tablet 2     Abatacept (ORENCIA) 125 MG/ML SOAJ auto-injector Inject 1 mL (125 mg) Subcutaneous every 7 days 4 Syringe 3     hydroxychloroquine (PLAQUENIL) 200 MG tablet Take 1 tablet (200 mg) by mouth daily 30 tablet 3     clonazePAM (KLONOPIN) 1 MG tablet TAKE ONE-HALF TO ONE TABLET BY MOUTH 3 TIMES DAILY AS NEEDED FOR ANXIETY 90 tablet 0     allopurinol (ZYLOPRIM) 300 MG tablet TAKE 1 TABLET BY MOUTH 1 TIME DAILY 210 tablet 0     sulfaSALAzine ER (AZULFIDINE EN) 500 MG EC tablet Take 2 tablets (1,000 mg) by mouth 2 times daily 120 tablet 3     oxyCODONE IR (ROXICODONE) 5 MG tablet Take 1-2 tablets (5-10 mg) by mouth every 4 hours as needed for moderate to severe pain . Max of 6 tabs per day.  30 day supply. May fill on 3/6 to start on/after  3/8/18 180 tablet 0     meloxicam (MOBIC) 7.5 MG tablet Take 1 tablet (7.5 mg) by mouth daily 30 tablet 3     clopidogrel (PLAVIX) 75 MG tablet Take 1 tablet (75 mg) by mouth daily 90 tablet 3     BusPIRone HCl 30 MG TABS Take 1 tablet by mouth 2 times daily 180 tablet 5     mirtazapine (REMERON) 15 MG tablet Take 1 tablet (15 mg) by mouth At Bedtime 30 tablet 5     diclofenac (VOLTAREN) 1 % GEL topical gel Apply 2-4 grams to 2-3 joints, up to four times daily as needed using enclosed dosing card.  Max of 32g/day 300 g 11     gemfibrozil (LOPID) 600 MG tablet Take 1 tablet (600 mg) by mouth 2 times daily 180 tablet 3     blood glucose monitoring (ONE TOUCH ULTRA 2) meter device kit TEST 2 TIMES DAILY. 1 kit 0     evolocumab (REPATHA) 140 MG/ML prefilled autoinjector Inject 1 mL (140 mg) Subcutaneous every 14 days 2 mL 11     zolpidem (AMBIEN) 5 MG tablet TAKE ONE TABLET BY MOUTH AT BEDTIME AS NEEDED FOR SLEEP 30 tablet 5     meclizine (ANTIVERT) 25 MG tablet TAKE ONE TABLET BY MOUTH EVERY 6 HOURS AS NEEDED FOR  DIZZINESS 30 tablet 10     tamsulosin (FLOMAX) 0.4 MG capsule Take 1 capsule (0.4 mg) by mouth daily 90 capsule 3     ezetimibe (ZETIA) 10 MG tablet Take 1 tablet (10 mg) by mouth daily 90 tablet 3     gabapentin (NEURONTIN) 300 MG capsule Take 2 capsules (600 mg) by mouth 3 times daily 540 capsule 3     fluticasone (FLONASE) 50 MCG/ACT spray Spray 2 sprays into both nostrils daily 1 Bottle 11     atorvastatin (LIPITOR) 80 MG tablet TAKE 1 TABLET BY MOUTH 1 TIME DAILY FOR CHOLESTEROL 90 tablet 1     order for DME Equipment being ordered: test strips as covered by insurance. USE TO TEST BLOOD SUGARS TWICE DAILY OR AS DIRECTED. 200 each 3     tenofovir (VIREAD) 300 MG tablet Take 1 tablet (300 mg) by mouth daily 90 tablet 3     order for DME OneTouch glucometer. 1 each 0     order for DME OneTouch lancets testing twice daily. 200 each 3     aspirin EC 81 MG EC tablet Take 1 tablet (81 mg) by mouth daily (*) 30  tablet 1     order for DME Equipment being ordered: single end cane 1 Units 0     Cholecalciferol (VITAMIN D) 2000 UNITS tablet TAKE ONE TABLET BY MOUTH ONCE DAILY 100 tablet 1     melatonin 3 MG tablet Take 1 tablet (3 mg) by mouth nightly as needed for sleep       ORDER FOR DME 1.  CPAP pressure 11 cm/H20 with heated humidity.   2.  Provide mask to fit and CPAP supplies.   3.  Length of need lifetime.   4.  If needed please provide a chin strap              Medical History: any changes in medical history since they were last seen? no    Review of Systems:  The 14 system ROS was reviewed from the intake questionnaire:  Gout, back pain, stiffnes, fatigue, headache  Any bowel or bladder problems: None  Mood: depression, anxiety    Physical Exam:  Blood pressure 123/77, pulse 62, resp. rate 16, weight 81.6 kg (180 lb).  General: mildly distressed, awake and alert  Gait: Antalgic and very slow  MSK exam: slow sit to stand with pain behaviors.   Pain with range of motion of lumbar spine in any direction.  Pain with extension/rotation and extension more than other directions.    Assessment:   1. Chronic low back pain, with strong facet and myofascial features  2. Rheumatoid arthritis  3. Peripheral neuropathy  4. Depression  5. Hep B - currently being treated  6. Gout  7. JEROD, currently treated with CPAP, central apnea       Plan:   1. Physical Therapy:  Finished pool therapy, likely had more sessions  2. Clinical Health Psychologist to address issues of relaxation, behavioral change, coping style, and other factors important to improvement.  Will need to discuss at upcoming appointments  3. Diagnostic Studies: none  4. Will reach out to sleep doctor to see if they can do a sleep study that matches his sleep schedule.  In the meantime, he will consistently use CPAP  5. Medication Management:    1. I am not willing to use long-acting given his current meds- continue oxycodone.  He understands risks given his current use  of short acting opioids as well.  2. Would consider butrans patch  3. Narcan provided- discussed importance of family knowing how to use it.  6. Further procedures recommended: discussed medial branch block and radiofrequency ablation, he is going to consider at this time  7. Recommendations to PCP:  3 months    Total time spent was 30 minutes, and more than 50% of face to face time was spent in counseling and/or coordination of care regarding medications.    Lian Loo MD  [facility]

## 2018-04-12 ENCOUNTER — OFFICE VISIT (OUTPATIENT)
Dept: GASTROENTEROLOGY | Facility: CLINIC | Age: 53
End: 2018-04-12
Payer: COMMERCIAL

## 2018-04-12 VITALS
WEIGHT: 178.1 LBS | DIASTOLIC BLOOD PRESSURE: 81 MMHG | SYSTOLIC BLOOD PRESSURE: 116 MMHG | HEIGHT: 62 IN | HEART RATE: 68 BPM | BODY MASS INDEX: 32.77 KG/M2 | OXYGEN SATURATION: 96 %

## 2018-04-12 DIAGNOSIS — K75.81 NASH (NONALCOHOLIC STEATOHEPATITIS): Primary | ICD-10-CM

## 2018-04-12 PROCEDURE — 99214 OFFICE O/P EST MOD 30 MIN: CPT | Performed by: INTERNAL MEDICINE

## 2018-04-12 ASSESSMENT — PAIN SCALES - GENERAL: PAINLEVEL: MODERATE PAIN (5)

## 2018-04-12 NOTE — NURSING NOTE
"Lamont Daniels's goals for this visit include:   Chief Complaint   Patient presents with     RECHECK     Hep B     He requests these members of his care team be copied on today's visit information: YES - PCP     Initial /81 (BP Location: Left arm, Patient Position: Chair, Cuff Size: Adult Regular)  Pulse 68  Ht 1.575 m (5' 2\")  Wt 80.8 kg (178 lb 1.6 oz)  SpO2 96%  BMI 32.57 kg/m2 Estimated body mass index is 32.57 kg/(m^2) as calculated from the following:    Height as of this encounter: 1.575 m (5' 2\").    Weight as of this encounter: 80.8 kg (178 lb 1.6 oz).  BP completed using cuff size: regular        "

## 2018-04-12 NOTE — PROGRESS NOTES
Hepatology Clinic note  Lamont Daniels   Date of Birth 1965  Date of Service 4/12/2018         Impression/plan:   Lamont Daniels is a 52 year old male with metabolic syndrome including hypertension hyperlipidemia obesity, rheumatoid arthritis, CAD, chronic hepatitis B, biopsy-proven Aldana.  Is chronically maintained on tenofovir.    Overall he continues to be stable without new problems.  Has mildly elevated ALT, but normal hepatic synthetic function.  His last viral load was undetectable on tenofovir.  He had no fibrosis on his liver biopsy.    - ALDANA, and not so much HBV, is likely the major risk factor for liver-related negative outcomes, as well as cardiovascular negative outcomes.  -Strongly encouraged him to invest in strategies that lead to sustained lifestyle changes, improve metabolism, weight reduction, and increased activity level.  One possibility is health coaching.    -Otherwise would continue current regimen as it is.   - Annual hepatology check ups, including liver panel and HBV DNA.     Drea Laboy MD  [facility]    -----------------------------------------------------       HPI:   Lamont Daniels is a 52 year old male with history of chronic hepatitis B maintained on tenofovir, as well as biopsy-proven Aldana, he presents for follow-up.    Please refer to prior clinic note for details of initial presentation.   Since last clinic visit he has been stable.    Tolerating Orencia well, which has helped some with the arthritis but still has breakthrough joint aches. Has been intermittently needing to increase prednisone dose due to joint issues.  Unfortunately has not been able to implement sustainable    And lasting lifestyle changes to lead to improved metabolic profile.  Has gained some weight, ~ 6 lbs in the last couple months months or so.     Otherwise, denies chest pain, shortness of breath, abdominal pain, nausea, vomiting fevers, chills, bleeding, jaundice,  confusion, falls, rash, pruritis, or abdominal distention. Has stable appetite and bowel habits.  Notes full adherence with medications         Past Medical History:     Past Medical History:   Diagnosis Date     Anxiety      CAD (coronary artery disease)     sees cardiologist Dr. Yang, has had stents and cabg     Congestive heart failure, unspecified 2008     Depressive disorder 2008     Gout      Hepatitis B > 10 years     HTN (hypertension)      Hyperlipidemia LDL goal < 100      Malrotation of intestine      Moderate major depression (H)      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) 9/19/2011    Uses CPAP     Rheumatoid arthritis flare (H) 1012     Steatohepatitis 12/15    liver biopsy     Tuberculosis age 13    INH x 9 months      Vitamin D deficiency             Past Surgical History:     Past Surgical History:   Procedure Laterality Date     ABDOMEN SURGERY  2014     BIOPSY  2015     BYPASS GRAFT ARTERY CORONARY  2008    6 vessels     COLONOSCOPY  2/8/2013    Procedure: COLONOSCOPY;  Colonoscopy, blood in stool;  Surgeon: Duane, William Charles, MD;  Location: [facility] OR     GI SURGERY  2014      CORONARY STENT CIERA SOTOTL VESSEL  2008    3 months after CABG     HEAD & NECK SURGERY  2011     NASAL/SINUS POLYPECTOMY  2010     ORTHOPEDIC SURGERY  2012     THORACIC SURGERY  1989    tb     TONSILLECTOMY  2010     UVULOPALATOPHARYNGOPLASTY  2010     VASCULAR SURGERY  2008            Medications:     Current Outpatient Prescriptions   Medication     naloxone (NARCAN) nasal spray     oxyCODONE IR (ROXICODONE) 5 MG tablet     triamcinolone (KENALOG) 0.1 % ointment     nitroGLYcerin (NITROSTAT) 0.4 MG sublingual tablet     metoprolol succinate (TOPROL-XL) 25 MG 24 hr tablet     COLCRYS 0.6 MG tablet     pantoprazole (PROTONIX) 40 MG EC tablet     predniSONE (DELTASONE) 1 MG tablet     baclofen (LIORESAL) 10 MG tablet     Abatacept (ORENCIA) 125 MG/ML SOAJ auto-injector     hydroxychloroquine (PLAQUENIL) 200 MG tablet     clonazePAM  (KLONOPIN) 1 MG tablet     allopurinol (ZYLOPRIM) 300 MG tablet     sulfaSALAzine ER (AZULFIDINE EN) 500 MG EC tablet     meloxicam (MOBIC) 7.5 MG tablet     clopidogrel (PLAVIX) 75 MG tablet     BusPIRone HCl 30 MG TABS     mirtazapine (REMERON) 15 MG tablet     diclofenac (VOLTAREN) 1 % GEL topical gel     gemfibrozil (LOPID) 600 MG tablet     blood glucose monitoring (ONE TOUCH ULTRA 2) meter device kit     evolocumab (REPATHA) 140 MG/ML prefilled autoinjector     zolpidem (AMBIEN) 5 MG tablet     meclizine (ANTIVERT) 25 MG tablet     tamsulosin (FLOMAX) 0.4 MG capsule     ezetimibe (ZETIA) 10 MG tablet     gabapentin (NEURONTIN) 300 MG capsule     fluticasone (FLONASE) 50 MCG/ACT spray     atorvastatin (LIPITOR) 80 MG tablet     order for DME     tenofovir (VIREAD) 300 MG tablet     order for DME     order for DME     aspirin EC 81 MG EC tablet     order for DME     Cholecalciferol (VITAMIN D) 2000 UNITS tablet     melatonin 3 MG tablet     ORDER FOR DME     No current facility-administered medications for this visit.      Facility-Administered Medications Ordered in Other Visits   Medication     gadobutrol (GADAVIST) injection 10 mL            Allergies:     Allergies   Allergen Reactions     Citalopram Itching     Tramadol Itching            Social History:     Social History     Social History     Marital status:      Spouse name: N/A     Number of children: 5     Years of education: 14     Occupational History      Unemployed     2-2011. On long term disability. SSD applied for     Social History Main Topics     Smoking status: Never Smoker     Smokeless tobacco: Never Used     Alcohol use No     Drug use: No     Sexual activity: Yes     Partners: Female     Other Topics Concern     Parent/Sibling W/ Cabg, Mi Or Angioplasty Before 65f 55m? Yes     father     Social History Narrative    . No current SO.         Has 5 children. Youngest child does live with him.         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         Denies tobacco use.     Alcohol use: 2x/week with minimal amount of alcohol per time.     Drug use: denies            Family History:     Family History   Problem Relation Age of Onset     C.A.D. Father      Coronary Artery Disease Father      Hypertension Father      Depression Father      Hypertension Mother      DIABETES Mother      Depression Mother      MENTAL ILLNESS Mother      Hypertension Maternal Grandfather      CANCER Paternal Grandfather      Other Cancer Paternal Grandfather      Other Cancer Other      Autoimmune Disease No family hx of      CEREBROVASCULAR DISEASE No family hx of      Thyroid Disease No family hx of      Glaucoma No family hx of      Macular Degeneration No family hx of               Review of Systems:   10 points ROS was obtained and highlighted in the HPI, otherwise negative.          Physical Exam:   VS:  /81 (BP Location: Left arm, Patient Position: Chair, Cuff Size: Adult Regular)  Pulse 68  Ht 1.575 m (5' 2\")  Wt 80.8 kg (178 lb 1.6 oz)  SpO2 96%  BMI 32.57 kg/m2      Gen: A&Ox3, NAD  HEENT: non-icteric, oropharynx clear  CV: RRR  Lung: CTAB  Abd: soft, NT, ND    Ext: no edema, intact pulses.   Skin: No stigmata of chronic liver disease.   Neuro: no focal deficits, grossly intact, no asterixis   Psych: appropriate mood and affects         Data:   Reviewed in person and significant for:  Lab Results   Component Value Date    WBC 6.5 02/13/2018     Lab Results   Component Value Date    RBC 4.89 02/13/2018     Lab Results   Component Value Date    HGB 14.3 02/13/2018     Lab Results   Component Value Date    HCT 43.6 02/13/2018     No components found for: MCT  Lab Results   Component Value Date    MCV 89 02/13/2018     Lab Results   Component Value Date    MCH 29.2 02/13/2018     Lab Results   Component Value Date    MCHC 32.8 02/13/2018     Lab Results   Component Value Date    RDW 14.2 " 02/13/2018     Lab Results   Component Value Date     02/13/2018     Last Basic Metabolic Panel:  Lab Results   Component Value Date     12/04/2017      Lab Results   Component Value Date    POTASSIUM 3.6 12/04/2017     Lab Results   Component Value Date    CHLORIDE 111 12/04/2017     Lab Results   Component Value Date    HEMANT 8.8 12/04/2017     Lab Results   Component Value Date    CO2 25 12/04/2017     Lab Results   Component Value Date    BUN 25 12/04/2017     Lab Results   Component Value Date    CR 0.89 02/13/2018     Lab Results   Component Value Date    GLC 81 12/04/2017     Liver Function Studies -   Recent Labs   Lab Test  02/13/18   1451   PROTTOTAL  7.3   ALBUMIN  3.8   BILITOTAL  0.6   ALKPHOS  76   AST  26   ALT  60

## 2018-04-23 DIAGNOSIS — F41.9 ANXIETY HYPERVENTILATION: ICD-10-CM

## 2018-04-23 DIAGNOSIS — F45.8 ANXIETY HYPERVENTILATION: ICD-10-CM

## 2018-04-23 DIAGNOSIS — R21 RASH: ICD-10-CM

## 2018-04-23 DIAGNOSIS — Z79.52 ON CORTICOSTEROID THERAPY: ICD-10-CM

## 2018-04-23 DIAGNOSIS — G47.00 INSOMNIA, UNSPECIFIED TYPE: ICD-10-CM

## 2018-04-23 NOTE — TELEPHONE ENCOUNTER
"Requested Prescriptions   Pending Prescriptions Disp Refills     triamcinolone (KENALOG) 0.1 % ointment [Pharmacy Med Name: TRIAMCINOLONE 0.1% OINTMENT] 80 g 0    Last Written Prescription Date:  4/10/18  Last Fill Quantity: 80,  # refills: 0   Last Office Visit with [facility], P or [facility] prescribing provider:  2/28/2018     Future Office Visit:    Next 5 appointments (look out 90 days)     May 15, 2018  7:30 AM CDT   Return Visit with Ricardo Rae MD, [facility] O[facility] NURSE ONLY   [facility] ([facility])    [address] [place] [address]   [phone]            Jun 22, 2018  3:30 PM CDT   Return Visit with Fly Travis MD   [facility] ([facility])    [address] [place] [address]   [phone]            Jun 26, 2018  1:00 PM CDT   Return Visit with Sebastián Jenkins MD   [facility]    [address] [facility] [address]   [phone]            Jul 11, 2018  2:30 PM CDT   Return Visit with Dorothea Loo MD   [facility] ([facility])    [address] [place] [address]   [phone]                  Sig: APPLY SPARINGLY TO AFFECTED AREA THREE TIMES DAILY FOR 14 DAYS.    Topical Steroid Protocol Passed    4/23/2018  4:00 PM       Passed - Patient is age 6 or older       Passed - Authorizing prescriber's most recent note related to this medication read.       Passed - High potency steroid not ordered       Passed - Recent (12 mo) or future (30 days) visit within the authorizing provider's specialty    Patient had office visit in the last 12 months or has a visit in the next 30 days with authorizing provider or within the authorizing provider's specialty.  See \"Patient Info\" tab in inbasket, or \"Choose Columns\" in Meds & Orders section of the refill encounter.            " clonazePAM (KLONOPIN) 1 MG tablet [Pharmacy Med Name: CLONAZEPAM 1 MG TABLET] 90 tablet 0    Last Written Prescription Date:  2/27/18  Last Fill Quantity: 90,  # refills: 0   Last Office Visit with [facility], [facility] or [facility] prescribing provider:  2/28/2018     Future Office Visit:    Next 5 appointments (look out 90 days)     May 15, 2018  7:30 AM CDT   Return Visit with Ricardo Rae MD, [facility] OPHTH NURSE ONLY   [facility] ([facility])    [address] [place] [address]   [phone]            Jun 22, 2018  3:30 PM CDT   Return Visit with Fly Travis MD   [facility])    [address] [place] [address]   [phone]            Jun 26, 2018  1:00 PM CDT   Return Visit with Sebastián Jenkins MD   [facility] ([facility])    [address] [place] [address]   [phone]            Jul 11, 2018  2:30 PM CDT   Return Visit with Dorothea Loo MD   [facility] ([address] [address]   [phone]                  Sig: TAKE ONE-HALF TO ONE TABLET BY MOUTH 3 TIMES DAILY AS NEEDED FOR ANXIETY    There is no refill protocol information for this order        blood glucose monitoring (ONE TOUCH ULTRA 2) meter device kit [Pharmacy Med Name: ONE TOUCH ULTRA 2 GLUCOSE SYS] 1 kit 0    Last Written Prescription Date:  12/19/17  Last Fill Quantity: 1,  # refills: 0     Last Office Visit with [facility], [facility] or [facility] prescribing provider:  2/28/2018     Future Office Visit:    Next 5 appointments (look out 90 days)     May 15, 2018  7:30 AM CDT   Return Visit with Ricardo Rae MD, [facility] OPHTH NURSE ONLY   [facility] ([facility])    [address] [place] [address]   [phone]            Jun 22, 2018  3:30 PM  "CDT   Return Visit with Fly Travis MD   [facility] ([facility])    [address] [facility] [address]   [phone]            Jun 26, 2018  1:00 PM CDT   Return Visit with Sebastián Jenkins MD   [facility] ([facility])    [address] [facility] [address]   [phone]            Jul 11, 2018  2:30 PM CDT   Return Visit with Dorothea Loo MD   [facility] ([facility])    [address] [facility] [address] [phone]                  Sig: TEST 2 TIMES DAILY.    Diabetic Supplies Protocol Passed    4/23/2018  4:00 PM       Passed - Patient is 18 years of age or older       Passed - Recent (6 mo) or future (30 days) visit within the authorizing provider's specialty    Patient had office visit in the last 6 months or has a visit in the next 30 days with authorizing provider.  See \"Patient Info\" tab in inbasket, or \"Choose Columns\" in Meds & Orders section of the refill encounter.              "

## 2018-04-25 NOTE — TELEPHONE ENCOUNTER
zolpidem (AMBIEN) 5 MG tablet      Last Written Prescription Date:  10/24/17  Last Fill Quantity: 30,   # refills: 5  Last Office Visit: 02/28/18  Future Office visit:    Next 5 appointments (look out 90 days)     May 15, 2018  7:30 AM CDT   Return Visit with Ricardo Rae MD, [facility] OPHTH NURSE ONLY   [facility] ([facility])    [address] [place] [address]   [phone]            Jun 22, 2018  3:30 PM CDT   Return Visit with Fly Travis MD   [facility] ([facility])    [address] [place] [address]   [phone]            Jun 26, 2018  1:00 PM CDT   Return Visit with Sebastián Jenkins MD   [facility] ([facility])    [address] [facility] [address]   [phone]            Jul 11, 2018  2:30 PM CDT   Return Visit with Dorothea Loo MD   [facility] [place] ([facility] [place])    [address] [facility]  [place] [address] [phone]                   Routing refill request to provider for review/approval because:  Drug not on the [facility], [facility] or [facility] refill protocol or controlled substance

## 2018-04-26 RX ORDER — ZOLPIDEM TARTRATE 5 MG/1
TABLET ORAL
Qty: 30 TABLET | Refills: 5 | Status: SHIPPED | OUTPATIENT
Start: 2018-04-26 | End: 2018-11-01

## 2018-04-26 RX ORDER — TRIAMCINOLONE ACETONIDE 1 MG/G
OINTMENT TOPICAL
Qty: 80 G | Refills: 0 | Status: SHIPPED | OUTPATIENT
Start: 2018-04-26 | End: 2018-06-21

## 2018-04-26 RX ORDER — CLONAZEPAM 1 MG/1
TABLET ORAL
Qty: 90 TABLET | Refills: 0 | Status: SHIPPED | OUTPATIENT
Start: 2018-04-26 | End: 2018-07-14

## 2018-04-26 RX ORDER — BLOOD-GLUCOSE METER
EACH MISCELLANEOUS
Qty: 1 KIT | Refills: 0 | Status: SHIPPED | OUTPATIENT
Start: 2018-04-26 | End: 2018-09-28

## 2018-04-26 NOTE — TELEPHONE ENCOUNTER
Routing refill request to provider for review/approval because:  Drug not on the [facility] refill protocol   Kellie Nicholas RN

## 2018-04-26 NOTE — TELEPHONE ENCOUNTER
Faxed Rxs for Klonopin and Ambien  To [facility], [place], [phone],  Right fax confirmed at 2:19 pm today.  Cintia Sorensen MA/  [employer]

## 2018-05-10 ENCOUNTER — MYC REFILL (OUTPATIENT)
Dept: FAMILY MEDICINE | Facility: CLINIC | Age: 53
End: 2018-05-10

## 2018-05-10 ENCOUNTER — MYC REFILL (OUTPATIENT)
Dept: CARDIOLOGY | Facility: CLINIC | Age: 53
End: 2018-05-10

## 2018-05-10 ENCOUNTER — MYC REFILL (OUTPATIENT)
Dept: PALLIATIVE MEDICINE | Facility: CLINIC | Age: 53
End: 2018-05-10

## 2018-05-10 DIAGNOSIS — M47.819 FACET ARTHROPATHY: ICD-10-CM

## 2018-05-10 DIAGNOSIS — E78.5 HYPERLIPIDEMIA LDL GOAL <70: ICD-10-CM

## 2018-05-10 DIAGNOSIS — F33.2 SEVERE EPISODE OF RECURRENT MAJOR DEPRESSIVE DISORDER, WITHOUT PSYCHOTIC FEATURES (H): ICD-10-CM

## 2018-05-10 RX ORDER — GEMFIBROZIL 600 MG/1
600 TABLET, FILM COATED ORAL 2 TIMES DAILY
Qty: 180 TABLET | Refills: 3 | Status: SHIPPED | OUTPATIENT
Start: 2018-05-10 | End: 2019-05-25

## 2018-05-10 NOTE — TELEPHONE ENCOUNTER
Message from [facility]:  Original authorizing provider: Fly Travis MD    Lamont Daniels would like a refill of the following medications:  gemfibrozil (LOPID) 600 MG tablet [Fly Travis MD]    Preferred pharmacy: [address]    Comment:      Medication renewals requested in this message routed to other providers:  oxyCODONE IR (ROXICODONE) 5 MG tablet [Dorothea Loo MD]  mirtazapine (REMERON) 15 MG tablet [David Chavez MD, MD]

## 2018-05-10 NOTE — TELEPHONE ENCOUNTER
"Requested Prescriptions   Pending Prescriptions Disp Refills     mirtazapine (REMERON) 15 MG tablet  Last Written Prescription Date:  01/11/18  Last Fill Quantity: 30,  # refills: 5   Last Office Visit with [facility], P or [facility] prescribing provider:  02/28/18   Future Office Visit:    Next 5 appointments (look out 90 days)     May 15, 2018  7:30 AM CDT   Return Visit with Ricardo Rae MD, [facility]URSE [facility]   [facility]    [address] [place] [address]   [phone]            Jun 22, 2018  3:30 PM CDT   Return Visit with Fly Travis MD   [facility]    [address] [place] [address]   [phone]            Jun 26, 2018  1:00 PM CDT   [facility] Visit with Sebastián Jenkins MD   [facility]    [address] [facility] [address]   [phone]            Jul 11, 2018  2:30 PM CDT   Return Visit with Dorothea Loo MD   [facility] [address] [address]   [phone]                30 tablet 5     Sig: Take 1 tablet (15 mg) by mouth At Bedtime    Atypical Antidepressants Protocol Failed    5/10/2018 11:20 AM       Failed - Patient has PHQ-9 score less than 5 in past 6 months.    Please review last PHQ-9 score.          Passed - Patient is age 18 or older       Passed - Recent (6 mo) or future (30 days) visit within the authorizing provider's specialty    Patient had office visit in the last 6 months or has a visit in the next 30 days with authorizing provider or within the authorizing provider's specialty.  See \"Patient Info\" tab in inbasket, or \"Choose Columns\" in Meds & Orders section of the refill encounter.              "

## 2018-05-10 NOTE — TELEPHONE ENCOUNTER
Received call from patient requesting refill(s) of oxyCODONE IR (ROXICODONE) 5 MG tablet    Last picked up from pharmacy on 4/12/18    Pt last seen by prescribing provider on 4/11/18  Next appt scheduled for      checked in the past 6 months? Yes If no, print current report and give to RN    Last urine drug screen date 1/10/18  Current opioid agreement on file (completed within the last year) Yes Date of opioid agreement: 1/10/18    Processing (pick one and delete the others):      [facility] [address] [facility] - [address] [address]  Phone: [phone] Fax: [phone]      Sascha Isaac MA  [facility]      Will route to nursing pool for review and preparation of prescription(s).

## 2018-05-10 NOTE — TELEPHONE ENCOUNTER
Message from [facility]:  Original authorizing provider: Dorothea Loo MD    Lamont Daniels would like a refill of the following medications:  oxyCODONE IR (ROXICODONE) 5 MG tablet [Dorothea Loo MD]    Preferred pharmacy: [address]    Comment:      Medication renewals requested in this message routed to other providers:  gemfibrozil (LOPID) 600 MG tablet [Fly Travis MD]  mirtazapine (REMERON) 15 MG tablet [David Chavez MD, MD]

## 2018-05-10 NOTE — TELEPHONE ENCOUNTER
Message from [employer]:  Original authorizing provider: David Chavez MD, MD Lamont Daniels would like a refill of the following medications:  mirtazapine (REMERON) 15 MG tablet [David Chavez MD, MD]    Preferred pharmacy: [address]    Comment:      Medication renewals requested in this message routed to other providers:  gemfibrozil (LOPID) 600 MG tablet [Fly Travis MD]  oxyCODONE IR (ROXICODONE) 5 MG tablet [Dorothea Loo MD]

## 2018-05-11 RX ORDER — OXYCODONE HYDROCHLORIDE 5 MG/1
5-10 TABLET ORAL EVERY 4 HOURS PRN
Qty: 180 TABLET | Refills: 0 | Status: SHIPPED | OUTPATIENT
Start: 2018-05-11 | End: 2018-06-21

## 2018-05-11 NOTE — TELEPHONE ENCOUNTER
Medication refill information reviewed. Called patient to be sure he had enough medication for this prescription to be mailed. Patients script occasionally lasts longer than 30 days and is always mailed.    Due date for  oxyCODONE IR (ROXICODONE) 5 MG tablet is 5/11/18     Prescriptions prepped for review.     Will route to provider.

## 2018-05-11 NOTE — TELEPHONE ENCOUNTER
Signed Prescriptions:                        Disp   Refills    oxyCODONE IR (ROXICODONE) 5 MG tablet      180 ta*0        Sig: Take 1-2 tablets (5-10 mg) by mouth every 4 hours as           needed for moderate to severe pain . Max of 6           tabs per day.  30 day supply. May fill on/after           5/11/18 to start on 5/12/18  Authorizing Provider: KAYLA CHUN MD  [facility]

## 2018-05-11 NOTE — TELEPHONE ENCOUNTER
Script mailed to pharmacy. Patient notified.      [facility] [address] [facility]   [address] [place] [address]  Phone: [phone] Fax: [phone]    KELLY Barksdale, RN  Care Coordinator  [facility]

## 2018-05-15 ENCOUNTER — OFFICE VISIT (OUTPATIENT)
Dept: OPHTHALMOLOGY | Facility: CLINIC | Age: 53
End: 2018-05-15
Payer: COMMERCIAL

## 2018-05-15 DIAGNOSIS — H52.13 MYOPIA WITH PRESBYOPIA OF BOTH EYES: ICD-10-CM

## 2018-05-15 DIAGNOSIS — H40.003 GLAUCOMA SUSPECT OF BOTH EYES: Primary | ICD-10-CM

## 2018-05-15 DIAGNOSIS — H02.66 XANTHELASMA OF EYELID, BILATERAL: ICD-10-CM

## 2018-05-15 DIAGNOSIS — H02.833 DERMATOCHALASIS OF EYELIDS OF BOTH EYES: ICD-10-CM

## 2018-05-15 DIAGNOSIS — H02.63 XANTHELASMA OF EYELID, BILATERAL: ICD-10-CM

## 2018-05-15 DIAGNOSIS — H02.836 DERMATOCHALASIS OF EYELIDS OF BOTH EYES: ICD-10-CM

## 2018-05-15 DIAGNOSIS — H52.4 MYOPIA WITH PRESBYOPIA OF BOTH EYES: ICD-10-CM

## 2018-05-15 PROCEDURE — 76514 ECHO EXAM OF EYE THICKNESS: CPT | Performed by: OPHTHALMOLOGY

## 2018-05-15 PROCEDURE — 92014 COMPRE OPH EXAM EST PT 1/>: CPT | Performed by: OPHTHALMOLOGY

## 2018-05-15 PROCEDURE — 92083 EXTENDED VISUAL FIELD XM: CPT | Performed by: OPHTHALMOLOGY

## 2018-05-15 PROCEDURE — 92133 CPTRZD OPH DX IMG PST SGM ON: CPT | Performed by: OPHTHALMOLOGY

## 2018-05-15 PROCEDURE — 92015 DETERMINE REFRACTIVE STATE: CPT | Performed by: OPHTHALMOLOGY

## 2018-05-15 RX ORDER — MIRTAZAPINE 15 MG/1
15 TABLET, FILM COATED ORAL AT BEDTIME
Qty: 30 TABLET | Refills: 5 | Status: SHIPPED | OUTPATIENT
Start: 2018-05-15 | End: 2018-11-28

## 2018-05-15 ASSESSMENT — REFRACTION_WEARINGRX
OD_CYLINDER: +0.25
OS_ADD: +1.75
OD_AXIS: 175
OD_ADD: +1.75
OS_CYLINDER: +0.25
SPECS_TYPE: BIFOCAL
OS_AXIS: 095
OD_SPHERE: -4.50
OS_SPHERE: -4.75

## 2018-05-15 ASSESSMENT — TONOMETRY
OS_IOP_MMHG: 17
OD_IOP_MMHG: 15
IOP_METHOD: TONOPEN

## 2018-05-15 ASSESSMENT — REFRACTION_MANIFEST
OD_CYLINDER: SPHERE
OS_SPHERE: -4.75
OD_ADD: +2.50
OS_ADD: +2.50
OD_SPHERE: -4.00
OS_CYLINDER: SPHERE

## 2018-05-15 ASSESSMENT — EXTERNAL EXAM - LEFT EYE: OS_EXAM: NORMAL

## 2018-05-15 ASSESSMENT — VISUAL ACUITY
CORRECTION_TYPE: GLASSES
OS_CC: 20/40
METHOD: SNELLEN - LINEAR
OD_CC: 20/60
OS_CC: 20/25
OD_CC: 20/20

## 2018-05-15 ASSESSMENT — CONF VISUAL FIELD
OS_NORMAL: 1
OD_NORMAL: 1

## 2018-05-15 ASSESSMENT — CUP TO DISC RATIO
OD_RATIO: 0.45
OS_RATIO: 0.45

## 2018-05-15 ASSESSMENT — PACHYMETRY
OD_CT(UM): 538
OS_CT(UM): 541

## 2018-05-15 ASSESSMENT — SLIT LAMP EXAM - LIDS
COMMENTS: NORMAL
COMMENTS: NORMAL

## 2018-05-15 ASSESSMENT — EXTERNAL EXAM - RIGHT EYE: OD_EXAM: NORMAL

## 2018-05-15 NOTE — MR AVS SNAPSHOT
After Visit Summary   5/15/2018    Lamont Daniels    MRN: [id number]           Patient Information     Date Of Birth          1965        Visit Information        Provider Department      5/15/2018 7:30 AM Ricardo Rae MD;  [facility] NURSE ONLY [facility]        Today's Diagnoses     Glaucoma suspect of both eyes - Both Eyes    -  1    Myopia with presbyopia of both eyes        Xanthelasma of eyelid, bilateral        Dermatochalasis of eyelids of both eyes           Follow-ups after your visit        Follow-up notes from your care team     Return in about 6 months (around 11/15/2018) for Plaquenil toxicity screening, Visual field and OCT.      Your next 10 appointments already scheduled     May 30, 2018  7:30 AM CDT   New Visit with Sandra Borja MD   [facility])    [address] [place] [address]   [phone]            Jun 22, 2018  3:00 PM CDT   LAB with LAB FIRST FLOOR [facility])    [address] [place] [address]   [phone]           Please do not eat 10-12 hours before your appointment if you are coming in fasting for labs on lipids, cholesterol, or glucose (sugar). This does not apply to pregnant women. Water, hot tea and black coffee (with nothing added) are okay. Do not drink other fluids, diet soda or chew gum.            Jun 22, 2018  3:30 PM CDT   [facility] Visit with Fly Travis MD   [facility]    [address] [place] [address]   [phone]            Jun 26, 2018  1:00 PM CDT   Return Visit with Sebastián Jenkins MD   [facility] ([facility])    [address] [place] [address]   [phone]            Jul 11, 2018  2:30 PM CDT   Return Visit with Dorothea Loo MD   [place]  [facility])    [address] [facility]  [place] [address] [phone]            Nov 12, 2018  7:30 AM CST   Return Visit with Ricardo Rae MD, [facility] NURSE ONLY   [facility])    [address] [place] [address] [phone]              Who to contact     If you have questions or need follow up information about today's clinic visit or your schedule please contact [facility] directly at [phone].  Normal or non-critical lab and imaging results will be communicated to you by [employer], letter or phone within 4 business days after the clinic has received the results. If you do not hear from us within 7 days, please contact the clinic through [employer] or phone. If you have a critical or abnormal lab result, we will notify you by phone as soon as possible.  Submit refill requests through [facility] or call your pharmacy and they will forward the refill request to us. Please allow 3 business days for your refill to be completed.          Additional Information About Your Visit        [facility] Information     [facility] gives you secure access to your electronic health record. If you see a primary care provider, you can also send messages to your care team and make appointments. If you have questions, please call your primary care clinic.  If you do not have a primary care provider, please call [phone] and they will assist you.      [facility] is an electronic gateway that provides easy, online access to your medical records. With [facility], you can request a clinic appointment, read your test results, renew a prescription or communicate with your care team.     To access your existing account, please contact your [facility] or call [phone] for assistance.        Care EveryWhere ID     This is your Care EveryWhere ID. This could be used by other  organizations to access your [facility] medical records  [id number]         Blood Pressure from Last 3 Encounters:   04/12/18 116/81   04/11/18 123/77   03/20/18 118/79    Weight from Last 3 Encounters:   04/12/18 80.8 kg (178 lb 1.6 oz)   04/11/18 81.6 kg (180 lb)   03/20/18 79 kg (174 lb 3.2 oz)              We Performed the Following     HVF 24-2 OU     SD-OCT Macula Optovue OU (both eyes)        Primary Care Provider Office Phone # Fax #    David Chavez [phone]       [address]  [facility] [address]        Equal Access to Services     CLAUDETTE HERRMANN : Hadii aad ku hadasho Somayali, waaxda luqadaha, qaybta kaalmada adeegyada, waxmarlin katz . So [facility] [phone].    ATENCIÓN: Si habla español, tiene a muse disposición servicios gratuitos de asistencia lingüística. [facility] [phone].    We comply with applicable federal civil rights laws and [place] laws. We do not discriminate on the basis of race, color, national origin, age, disability, sex, sexual orientation, or gender identity.            Thank you!     Thank you for choosing [facility]  for your care. Our goal is always to provide you with excellent care. Hearing back from our patients is one way we can continue to improve our services. Please take a few minutes to complete the written survey that you may receive in the mail after your visit with us. Thank you!             Your Updated Medication List - Protect others around you: Learn how to safely use, store and throw away your medicines at www.disposemymeds.org.          This list is accurate as of 5/15/18  8:06 AM.  Always use your most recent med list.                   Brand Name Dispense Instructions for use Diagnosis    Abatacept 125 MG/ML Soaj auto-injector    ORENCIA    4 Syringe    Inject 1 mL (125 mg) Subcutaneous every 7 days    Rheumatoid arthritis of multiple sites with negative rheumatoid factor (H)       allopurinol  300 MG tablet    ZYLOPRIM    210 tablet    TAKE 1 TABLET BY MOUTH 1 TIME DAILY    Idiopathic gout, unspecified chronicity, unspecified site       aspirin 81 MG EC tablet     30 tablet    Take 1 tablet (81 mg) by mouth daily (*)    Coronary artery disease involving native coronary artery of native heart without angina pectoris       atorvastatin 80 MG tablet    LIPITOR    90 tablet    TAKE 1 TABLET BY MOUTH 1 TIME DAILY FOR CHOLESTEROL    Atherosclerosis of native coronary artery of native heart, angina presence unspecified, Hyperlipidemia LDL goal <100       baclofen 10 MG tablet    LIORESAL    180 tablet    TAKE 1 TABLET BY MOUTH 2 TIMES DAILY AS NEEDED FOR MUSCLE SPASM    Spasm of back muscles       blood glucose monitoring meter device kit     1 kit    TEST 2 TIMES DAILY.    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    Gout without tophus       diclofenac 1 % Gel topical gel    VOLTAREN    300 g    Apply 2-4 grams to 2-3 joints, up to four times daily as needed using enclosed dosing card.  Max of 32g/day    Facet arthropathy, Rheumatoid arthritis involving multiple sites, unspecified rheumatoid factor presence (H)       evolocumab 140 MG/ML prefilled autoinjector    REPATHA    2 mL    Inject 1 mL (140  mg) Subcutaneous every 14 days    Hyperlipidemia LDL goal <70, S/P CABG (coronary artery bypass graft)       ezetimibe 10 MG tablet    ZETIA    90 tablet    Take 1 tablet (10 mg) by mouth daily    Coronary artery disease involving native coronary artery of native heart without angina pectoris       fluticasone 50 MCG/ACT spray    FLONASE    1 Bottle    Spray 2 sprays into both nostrils daily    Nasal congestion, Dizziness       gabapentin 300 MG capsule    NEURONTIN    540 capsule    Take 2 capsules (600 mg) by mouth 3 times daily    Lumbar radiculopathy       gemfibrozil 600 MG tablet    LOPID    180 tablet    Take 1 tablet (600 mg) by mouth 2 times daily    Hyperlipidemia LDL goal <70       hydroxychloroquine 200 MG tablet    PLAQUENIL    30 tablet    Take 1 tablet (200 mg) by mouth daily    Rheumatoid arthritis of multiple sites with negative rheumatoid factor (H), High risk medications (not anticoagulants) long-term use       meclizine 25 MG tablet    ANTIVERT    30 tablet    TAKE ONE TABLET BY MOUTH EVERY 6 HOURS AS NEEDED FOR  DIZZINESS    Vertigo       melatonin 3 MG tablet      Take 1 tablet (3 mg) by mouth nightly as needed for sleep    Delayed sleep phase syndrome       meloxicam 7.5 MG tablet    MOBIC    30 tablet    Take 1 tablet (7.5 mg) by mouth daily    Low back pain, unspecified back pain laterality, unspecified chronicity, with sciatica presence unspecified       metoprolol succinate 25 MG 24 hr tablet    TOPROL-XL    90 tablet    TAKE 1 TABLET BY MOUTH 1 TIME DAILY    Atherosclerosis of native coronary artery of native heart without angina pectoris       mirtazapine 15 MG tablet    REMERON    30 tablet    Take 1 tablet (15 mg) by mouth At Bedtime    Severe episode of recurrent major depressive disorder, without psychotic features (H)       naloxone nasal spray    NARCAN    0.2 mL    Spray 1 spray (4 mg) into one nostril alternating nostrils as needed for opioid reversal every 2-3 minutes until  assistance arrives    Rheumatoid arthritis involving multiple sites, unspecified rheumatoid factor presence (H)       nitroGLYcerin 0.4 MG sublingual tablet    NITROSTAT    25 tablet    PLACE 1 TABLET UNDER THE TONGUE EVERY 5 MINUTES AS NEEDED    Atherosclerosis of native coronary artery of native heart with angina pectoris (H)       order for DME      1.  CPAP pressure 11 cm/H20 with heated humidity.  2.  Provide mask to fit and CPAP supplies.  3.  Length of need lifetime.  4.  If needed please provide a chin strap    JEROD (obstructive sleep apnea), Anxiety, CAD (coronary artery disease), HTN (hypertension)       * order for DME     1 Units    Equipment being ordered: single end cane    Lumbar radiculopathy, Facet arthropathy, Chronic low back pain       * order for DME     200 each    Equipment being ordered: test strips as covered by insurance. USE TO TEST BLOOD SUGARS TWICE DAILY OR AS DIRECTED.    Rheumatoid arthritis involving multiple sites, unspecified rheumatoid factor presence (H)       * order for DME     1 each    OneTouch glucometer.    Rheumatoid arthritis involving multiple sites, unspecified rheumatoid factor presence (H)       * order for DME     200 each    OneTouch lancets testing twice daily.    Rheumatoid arthritis involving multiple sites, unspecified rheumatoid factor presence (H)       oxyCODONE IR 5 MG tablet    ROXICODONE    180 tablet    Take 1-2 tablets (5-10 mg) by mouth every 4 hours as needed for moderate to severe pain . Max of 6 tabs per day.  30 day supply. May fill on/after 5/11/18 to start on 5/12/18    Facet arthropathy       pantoprazole 40 MG EC tablet    PROTONIX    90 tablet    Take 1 tablet (40 mg) by mouth daily    Gastroesophageal reflux disease without esophagitis       predniSONE 1 MG tablet    DELTASONE    270 tablet    Prednisone 4mg daily x30days, then 3mg daily for 30days, then 2mg daily thereafter.    Rheumatoid arthritis of multiple sites with negative rheumatoid  factor (H), High risk medications (not anticoagulants) long-term use       sulfaSALAzine  MG EC tablet    AZULFIDINE EN    120 tablet    Take 2 tablets (1,000 mg) by mouth 2 times daily    Rheumatoid arthritis of multiple sites with negative rheumatoid factor (H), High risk medications (not anticoagulants) long-term use       tamsulosin 0.4 MG capsule    FLOMAX    90 capsule    Take 1 capsule (0.4 mg) by mouth daily    Benign prostatic hyperplasia with weak urinary stream       tenofovir 300 MG tablet    VIREAD    90 tablet    Take 1 tablet (300 mg) by mouth daily    Chronic viral hepatitis B without delta agent and without coma (H)       triamcinolone 0.1 % ointment    KENALOG    80 g    APPLY SPARINGLY TO AFFECTED AREA THREE TIMES DAILY FOR 14 DAYS.    Rash       vitamin D 2000 units tablet     100 tablet    TAKE ONE TABLET BY MOUTH ONCE DAILY    Vitamin D deficiency       zolpidem 5 MG tablet    AMBIEN    30 tablet    TAKE ONE TABLET BY MOUTH AT BEDTIME AS NEEDED FOR SLEEP    Insomnia, unspecified type       * Notice:  This list has 4 medication(s) that are the same as other medications prescribed for you. Read the directions carefully, and ask your doctor or other care provider to review them with you.

## 2018-05-15 NOTE — TELEPHONE ENCOUNTER
Routing refill request to provider for review/approval because:  Labs out of range:  PHQ9  Kellie Nicholas RN

## 2018-05-15 NOTE — PROGRESS NOTES
Assessment & Plan   Lamont Daniels is a 52 year old male who presents with:   Review of systems for the eyes was negative other than the pertinent positives and negatives noted in the HPI.  History is obtained from the patient.    Chief Complaint   Patient presents with     Glaucoma Follow Up     no drops, does feel a lot of pressure in the morning but goes away after while     Annual Eye Exam     date of last exam 3/20/17, did not get new glasses after exam last year and is having some trouble with near vision       Lab Results   Component Value Date    A1C 5.9 08/12/2016    A1C 6.0 04/22/2016    A1C 6.4 01/14/2016         Glaucoma suspect of both eyes - Both Eyes  - Stable exam today, continue yearly exams  - SD-OCT Macula Optovue OU (both eyes)  - HVF 24-2 OU  - US OPHTHALMIC DIAG, PACHYMETRY    Myopia with presbyopia of both eyes  - Rx per MR for glasses, increase reading add  - REFRACTION    Xanthelasma of eyelid, bilateral  - Refer to Oculoplastics for removal if desired    Dermatochalasis of eyelids of both eyes  - Refer to Oculoplastics for removal if desired    Dry eye  - Use ATs four or more times daily as needed    Return in 6 months  for plaquenil baseline testing HVF, OCT    Documentation for today's encounter was performed by Willow [employer]. [facility]. Acting as a scribe in my presence. I have reviewed and verified that it is an accurate recording of today's encounter.    Attending Physician Attestation:  Complete documentation of historical and exam elements from today's encounter can be found in the full encounter summary report (not reduplicated in this progress note).  I personally obtained the chief complaint(s) and history of present illness.  I confirmed and edited as necessary the review of systems, past medical/surgical history, family history, social history, and examination findings as documented by others; and I examined the patient myself.  I personally reviewed the relevant tests, images, and  reports as documented above.  I formulated and edited as necessary the assessment and plan and discussed the findings and management plan with the patient and family. - Ricardo Rae MD

## 2018-05-15 NOTE — NURSING NOTE
Patient presents with:  Glaucoma Follow Up: no drops, does feel a lot of pressure in the morning but goes away after while  Annual Eye Exam: date of last exam 3/20/17      Referring Provider:  David Chavez MD  [address] [place] [address]    [facility]    Last Eye Exam:  3/20/17   Informant(s):  [facility]   Symptoms:     Tearing         Do you have eye pain now?:  No      Comments:     Glaucoma Follow Up: no drops, does feel a lot of pressure in the morning but goes away after while  Annual Eye Exam: date of last exam 3/20/17                 Willow MCCULLOUGH. OSC

## 2018-05-17 ENCOUNTER — PRE VISIT (OUTPATIENT)
Dept: OPHTHALMOLOGY | Facility: CLINIC | Age: 53
End: 2018-05-17

## 2018-05-17 NOTE — TELEPHONE ENCOUNTER
For the evaluation of   Chief Complaint   Patient presents with     Previsit     appt w/ Dr Borja     Dermatochalasis Evaluation     refered by Dr Rae       When was your last eye exam w/ Dilation? 2 week(s)  Do you wear glasses? Yes Please bring them with you to your appointment.  Do you wear contacts? No  How many hours per day to you wear your lenses? not applicable  Please bring the boxes with you to your appointment.      HPI:  Location:   OU     Symptoms:   lid problem drooping  Pertinent Negatives:   Negative for vision changes or eye problems  Severity:   moderate  Duration:   years    Timing:   gradual onset  Other:     POH:  1- Glaucoma suspect  2- Myopia/ Presbyopia  3- Xanthelasma OU  4- Dry eye      Do you use any eye drops? Yes  For what condition(s)? Drye eye    Ocular Meds:  ATs       ROS:    Review Of Systems  Eyes: as above  Endocrine:  negative    Allergies:    Allergies   Allergen Reactions     Citalopram Itching     Tramadol Itching       Systemic Medications:   Current Outpatient Prescriptions   Medication     Abatacept (ORENCIA) 125 MG/ML SOAJ auto-injector     allopurinol (ZYLOPRIM) 300 MG tablet     aspirin EC 81 MG EC tablet     atorvastatin (LIPITOR) 80 MG tablet     baclofen (LIORESAL) 10 MG tablet     blood glucose monitoring (ONE TOUCH ULTRA 2) meter device kit     BusPIRone HCl 30 MG TABS     Cholecalciferol (VITAMIN D) 2000 UNITS tablet     clonazePAM (KLONOPIN) 1 MG tablet     clopidogrel (PLAVIX) 75 MG tablet     COLCRYS 0.6 MG tablet     diclofenac (VOLTAREN) 1 % GEL topical gel     evolocumab (REPATHA) 140 MG/ML prefilled autoinjector     ezetimibe (ZETIA) 10 MG tablet     fluticasone (FLONASE) 50 MCG/ACT spray     gabapentin (NEURONTIN) 300 MG capsule     gemfibrozil (LOPID) 600 MG tablet     hydroxychloroquine (PLAQUENIL) 200 MG tablet     meclizine (ANTIVERT) 25 MG tablet     melatonin 3 MG tablet     meloxicam (MOBIC) 7.5 MG tablet     metoprolol succinate  (TOPROL-XL) 25 MG 24 hr tablet     mirtazapine (REMERON) 15 MG tablet     naloxone (NARCAN) nasal spray     nitroGLYcerin (NITROSTAT) 0.4 MG sublingual tablet     ORDER FOR DME     order for DME     order for DME     order for DME     order for DME     oxyCODONE IR (ROXICODONE) 5 MG tablet     pantoprazole (PROTONIX) 40 MG EC tablet     predniSONE (DELTASONE) 1 MG tablet     sulfaSALAzine ER (AZULFIDINE EN) 500 MG EC tablet     tamsulosin (FLOMAX) 0.4 MG capsule     tenofovir (VIREAD) 300 MG tablet     triamcinolone (KENALOG) 0.1 % ointment     zolpidem (AMBIEN) 5 MG tablet     No current facility-administered medications for this visit.      Facility-Administered Medications Ordered in Other Visits   Medication     gadobutrol (GADAVIST) injection 10 mL       Family History   Problem Relation Age of Onset     C.A.D. Father      Coronary Artery Disease Father      Hypertension Father      Depression Father      Hypertension Mother      DIABETES Mother      Depression Mother      MENTAL ILLNESS Mother      Hypertension Maternal Grandfather      CANCER Paternal Grandfather      Other Cancer Paternal Grandfather      Other Cancer Other      Autoimmune Disease No family hx of      CEREBROVASCULAR DISEASE No family hx of      Thyroid Disease No family hx of      Glaucoma No family hx of      Macular Degeneration No family hx of        Social History   Substance Use Topics     Smoking status: Never Smoker     Smokeless tobacco: Never Used     Alcohol use No       Willow SANDHU OSC

## 2018-05-30 ENCOUNTER — OFFICE VISIT (OUTPATIENT)
Dept: OPHTHALMOLOGY | Facility: CLINIC | Age: 53
End: 2018-05-30
Payer: COMMERCIAL

## 2018-05-30 DIAGNOSIS — H02.66 XANTHELASMA OF EYELID, BILATERAL: ICD-10-CM

## 2018-05-30 DIAGNOSIS — H02.833 DERMATOCHALASIS OF EYELIDS OF BOTH EYES: Primary | ICD-10-CM

## 2018-05-30 DIAGNOSIS — H02.413 MECHANICAL PTOSIS OF EYELID OF BOTH EYES: ICD-10-CM

## 2018-05-30 DIAGNOSIS — H57.819 BROW PTOSIS: ICD-10-CM

## 2018-05-30 DIAGNOSIS — H02.836 DERMATOCHALASIS OF EYELIDS OF BOTH EYES: Primary | ICD-10-CM

## 2018-05-30 DIAGNOSIS — H02.63 XANTHELASMA OF EYELID, BILATERAL: ICD-10-CM

## 2018-05-30 PROCEDURE — 99213 OFFICE O/P EST LOW 20 MIN: CPT | Mod: GC | Performed by: OPHTHALMOLOGY

## 2018-05-30 PROCEDURE — 92081 LIMITED VISUAL FIELD XM: CPT | Performed by: OPHTHALMOLOGY

## 2018-05-30 PROCEDURE — 92285 EXTERNAL OCULAR PHOTOGRAPHY: CPT | Performed by: OPHTHALMOLOGY

## 2018-05-30 ASSESSMENT — EXTERNAL EXAM - LEFT EYE: OS_EXAM: BROW PTOSIS, WITH FRONTALIS RELAXED, BROW IS BELOW SUPERIOR ORBITAL RIM AND LATERALLY INLINE WITH UPPER LASHES

## 2018-05-30 ASSESSMENT — MARGIN REFLEX DISTANCE
OS_MRD1: 1
OD_MRD1: 1

## 2018-05-30 ASSESSMENT — LAGOPHTHALMOS
OD_LAGOPHTHALMOS: 0
OS_LAGOPHTHALMOS: 0

## 2018-05-30 ASSESSMENT — VISUAL ACUITY
OS_CC: 20/30
CORRECTION_TYPE: GLASSES
OD_CC: 20/25
METHOD: SNELLEN - LINEAR

## 2018-05-30 ASSESSMENT — SLIT LAMP EXAM - LIDS: COMMENTS: HEAVY DERMATOCHALASIS RESTING ON LASHES, TRUE PTOSIS

## 2018-05-30 ASSESSMENT — TONOMETRY
OS_IOP_MMHG: 16
OD_IOP_MMHG: 16
IOP_METHOD: ICARE

## 2018-05-30 ASSESSMENT — LEVATOR FUNCTION
OD_LEVATOR: 15
OS_LEVATOR: 15

## 2018-05-30 ASSESSMENT — EXTERNAL EXAM - RIGHT EYE: OD_EXAM: BROW PTOSIS, WITH FRONTALIS RELAXED, BROW IS BELOW SUPERIOR ORBITAL RIM AND LATERALLY INLINE WITH UPPER LASHES

## 2018-05-30 NOTE — NURSING NOTE
Patient presents with:  Dermatochalasis Evaluation: ref by dr negro Xanthelasma of eyelid, bilateral and  Dermatochalasis of eyelids of both eyes      Referring Provider:  Ricardo Negro MD  [facility]  [address] [place] [address]    HPI    Last Eye Exam:  5/1/15   Informant(s):  pt   Symptoms:     Decreased vision   Difficulty watching television   Difficulty with driving   Tearing      Frequency:  Constant       Do you have eye pain now?:  No      Comments:  Ref by Dr Negro for   Xanthelasma of eyelid, bilateral  Dermatochalasis of eyelids of both eyes    Pt states because of droopy eye lids it is difficult to see far away and peripherally. States that he does not see things approaching him from the side.  Pt feels pressure in the am when he wakes up, not sure if the pressure is coming from lid or inside eye  Pt uses art tears in am             Purnima Montiel, COA

## 2018-05-30 NOTE — MR AVS SNAPSHOT
After Visit Summary   5/30/2018    Lamont Daniels    MRN: [id number]           Patient Information     Date Of Birth          1965        Visit Information        Provider Department      5/30/2018 7:30 AM Sandra Borja MD [facility]        Today's Diagnoses     Dermatochalasis of eyelids of both eyes - Both Eyes    -  1    Mechanical ptosis of eyelid of both eyes - Both Eyes        Brow ptosis - Both Eyes        Xanthelasma of eyelid, bilateral           Follow-ups after your visit        Your next 10 appointments already scheduled     Jun 22, 2018  3:00 PM CDT   LAB with LAB FIRST FLOOR [facility] ([facility])    [id number] [place] [id number] [phone]           Please do not eat 10-12 hours before your appointment if you are coming in fasting for labs on lipids, cholesterol, or glucose (sugar). This does not apply to pregnant women. Water, hot tea and black coffee (with nothing added) are okay. Do not drink other fluids, diet soda or chew gum.            Jun 22, 2018  3:30 PM CDT   Return Visit with Fly Travis MD   [facility] ([facility])    [address] [place] [address]   [phone]            Jun 26, 2018  1:00 PM CDT   Return Visit with Sebastián Jenkins MD   [facility] ([facility])    [address] [address]   [phone]            Jul 11, 2018  2:30 PM CDT   Return Visit with Dorothea Loo MD   [facility] [place] ([facility] [place])    [address]  [place] [id number] [phone]            Nov 12, 2018  7:30 AM CST   Return Visit with Ricardo Rae MD,  OPH NURSE ONLY   [facility])    [address] [place] [address]   [phone]              Who to  contact     If you have questions or need follow up information about today's clinic visit or your schedule please contact [facility] directly at [phone].  Normal or non-critical lab and imaging results will be communicated to you by [employer], letter or phone within 4 business days after the clinic has received the results. If you do not hear from us within 7 days, please contact the clinic through [employer] or phone. If you have a critical or abnormal lab result, we will notify you by phone as soon as possible.  Submit refill requests through [employer] or call your pharmacy and they will forward the refill request to us. Please allow 3 business days for your refill to be completed.          Additional Information About Your Visit        [employer] Information     [employer] gives you secure access to your electronic health record. If you see a primary care provider, you can also send messages to your care team and make appointments. If you have questions, please call your primary care clinic.  If you do not have a primary care provider, please call [phone] and they will assist you.      [employer] is an electronic gateway that provides easy, online access to your medical records. With [employer], you can request a clinic appointment, read your test results, renew a prescription or communicate with your care team.     To access your existing account, please contact your [facility] or call [phone] for assistance.        Care EveryWhere ID     This is your Care EveryWhere ID. This could be used by other organizations to access your [place] medical records  [id number]         Blood Pressure from Last 3 Encounters:   04/12/18 116/81   04/11/18 123/77   03/20/18 118/79    Weight from Last 3 Encounters:   04/12/18 80.8 kg (178 lb 1.6 oz)   04/11/18 81.6 kg (180 lb)   03/20/18 79 kg (174 lb 3.2 oz)              We Performed the Following     External Photos OU (both eyes)      Kinetic Ptosis OU     Nurys-Operative Worksheet        Primary Care Provider Office Phone # Fax #    David Chavez [phone]       [address]  [facility] [address]        Equal Access to Services     VICKEY HERRMANN : Hadii esther ku hadallieo Soomaali, waaxda luqadaha, qaybta kaalmada adeegyada, waxmarlin sajiin tienn estelledavid hanson sterling olivera. So [facility] [phone].    ATENCIÓN: Si habla español, tiene a muse disposición servicios gratuitos de asistencia lingüística. Llame al [phone].    We comply with applicable federal civil rights laws and [place] laws. We do not discriminate on the basis of race, color, national origin, age, disability, sex, sexual orientation, or gender identity.            Thank you!     Thank you for choosing [facility]  for your care. Our goal is always to provide you with excellent care. Hearing back from our patients is one way we can continue to improve our services. Please take a few minutes to complete the written survey that you may receive in the mail after your visit with us. Thank you!             Your Updated Medication List - Protect others around you: Learn how to safely use, store and throw away your medicines at www.disposemymeds.org.          This list is accurate as of 5/30/18  8:50 AM.  Always use your most recent med list.                   Brand Name Dispense Instructions for use Diagnosis    Abatacept 125 MG/ML Soaj auto-injector    ORENCIA    4 Syringe    Inject 1 mL (125 mg) Subcutaneous every 7 days    Rheumatoid arthritis of multiple sites with negative rheumatoid factor (H)       allopurinol 300 MG tablet    ZYLOPRIM    210 tablet    TAKE 1 TABLET BY MOUTH 1 TIME DAILY    Idiopathic gout, unspecified chronicity, unspecified site       aspirin 81 MG EC tablet     30 tablet    Take 1 tablet (81 mg) by mouth daily (*)    Coronary artery disease involving native coronary artery of native heart without angina pectoris       atorvastatin  80 MG tablet    LIPITOR    90 tablet    TAKE 1 TABLET BY MOUTH 1 TIME DAILY FOR CHOLESTEROL    Atherosclerosis of native coronary artery of native heart, angina presence unspecified, Hyperlipidemia LDL goal <100       baclofen 10 MG tablet    LIORESAL    180 tablet    TAKE 1 TABLET BY MOUTH 2 TIMES DAILY AS NEEDED FOR MUSCLE SPASM    Spasm of back muscles       blood glucose monitoring meter device kit     1 kit    TEST 2 TIMES DAILY.    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    Gout without tophus       diclofenac 1 % Gel topical gel    VOLTAREN    300 g    Apply 2-4 grams to 2-3 joints, up to four times daily as needed using enclosed dosing card.  Max of 32g/day    Facet arthropathy, Rheumatoid arthritis involving multiple sites, unspecified rheumatoid factor presence (H)       evolocumab 140 MG/ML prefilled autoinjector    REPATHA    2 mL    Inject 1 mL (140 mg) Subcutaneous every 14 days    Hyperlipidemia LDL goal <70, S/P CABG (coronary artery bypass graft)       ezetimibe 10 MG tablet    ZETIA    90 tablet    Take 1 tablet (10 mg) by mouth daily    Coronary artery disease involving native coronary artery of native heart without angina pectoris       fluticasone 50 MCG/ACT spray    FLONASE    1  Bottle    Spray 2 sprays into both nostrils daily    Nasal congestion, Dizziness       gabapentin 300 MG capsule    NEURONTIN    540 capsule    Take 2 capsules (600 mg) by mouth 3 times daily    Lumbar radiculopathy       gemfibrozil 600 MG tablet    LOPID    180 tablet    Take 1 tablet (600 mg) by mouth 2 times daily    Hyperlipidemia LDL goal <70       hydroxychloroquine 200 MG tablet    PLAQUENIL    30 tablet    Take 1 tablet (200 mg) by mouth daily    Rheumatoid arthritis of multiple sites with negative rheumatoid factor (H), High risk medications (not anticoagulants) long-term use       meclizine 25 MG tablet    ANTIVERT    30 tablet    TAKE ONE TABLET BY MOUTH EVERY 6 HOURS AS NEEDED FOR  DIZZINESS    Vertigo       melatonin 3 MG tablet      Take 1 tablet (3 mg) by mouth nightly as needed for sleep    Delayed sleep phase syndrome       meloxicam 7.5 MG tablet    MOBIC    30 tablet    Take 1 tablet (7.5 mg) by mouth daily    Low back pain, unspecified back pain laterality, unspecified chronicity, with sciatica presence unspecified       metoprolol succinate 25 MG 24 hr tablet    TOPROL-XL    90 tablet    TAKE 1 TABLET BY MOUTH 1 TIME DAILY    Atherosclerosis of native coronary artery of native heart without angina pectoris       mirtazapine 15 MG tablet    REMERON    30 tablet    Take 1 tablet (15 mg) by mouth At Bedtime    Severe episode of recurrent major depressive disorder, without psychotic features (H)       naloxone nasal spray    NARCAN    0.2 mL    Spray 1 spray (4 mg) into one nostril alternating nostrils as needed for opioid reversal every 2-3 minutes until assistance arrives    Rheumatoid arthritis involving multiple sites, unspecified rheumatoid factor presence (H)       nitroGLYcerin 0.4 MG sublingual tablet    NITROSTAT    25 tablet    PLACE 1 TABLET UNDER THE TONGUE EVERY 5 MINUTES AS NEEDED    Atherosclerosis of native coronary artery of native heart with angina pectoris (H)       order for DME       1.  CPAP pressure 11 cm/H20 with heated humidity.  2.  Provide mask to fit and CPAP supplies.  3.  Length of need lifetime.  4.  If needed please provide a chin strap    JEROD (obstructive sleep apnea), Anxiety, CAD (coronary artery disease), HTN (hypertension)       * order for DME     1 Units    Equipment being ordered: single end cane    Lumbar radiculopathy, Facet arthropathy, Chronic low back pain       * order for DME     200 each    Equipment being ordered: test strips as covered by insurance. USE TO TEST BLOOD SUGARS TWICE DAILY OR AS DIRECTED.    Rheumatoid arthritis involving multiple sites, unspecified rheumatoid factor presence (H)       * order for DME     1 each    OneTouch glucometer.    Rheumatoid arthritis involving multiple sites, unspecified rheumatoid factor presence (H)       * order for DME     200 each    OneTouch lancets testing twice daily.    Rheumatoid arthritis involving multiple sites, unspecified rheumatoid factor presence (H)       oxyCODONE IR 5 MG tablet    ROXICODONE    180 tablet    Take 1-2 tablets (5-10 mg) by mouth every 4 hours as needed for moderate to severe pain . Max of 6 tabs per day.  30 day supply. May fill on/after 5/11/18 to start on 5/12/18    Facet arthropathy       pantoprazole 40 MG EC tablet    PROTONIX    90 tablet    Take 1 tablet (40 mg) by mouth daily    Gastroesophageal reflux disease without esophagitis       predniSONE 1 MG tablet    DELTASONE    270 tablet    Prednisone 4mg daily x30days, then 3mg daily for 30days, then 2mg daily thereafter.    Rheumatoid arthritis of multiple sites with negative rheumatoid factor (H), High risk medications (not anticoagulants) long-term use       sulfaSALAzine  MG EC tablet    AZULFIDINE EN    120 tablet    Take 2 tablets (1,000 mg) by mouth 2 times daily    Rheumatoid arthritis of multiple sites with negative rheumatoid factor (H), High risk medications (not anticoagulants) long-term use       tamsulosin 0.4 MG  capsule    FLOMAX    90 capsule    Take 1 capsule (0.4 mg) by mouth daily    Benign prostatic hyperplasia with weak urinary stream       tenofovir 300 MG tablet    VIREAD    90 tablet    Take 1 tablet (300 mg) by mouth daily    Chronic viral hepatitis B without delta agent and without coma (H)       triamcinolone 0.1 % ointment    KENALOG    80 g    APPLY SPARINGLY TO AFFECTED AREA THREE TIMES DAILY FOR 14 DAYS.    Rash       vitamin D 2000 units tablet     100 tablet    TAKE ONE TABLET BY MOUTH ONCE DAILY    Vitamin D deficiency       zolpidem 5 MG tablet    AMBIEN    30 tablet    TAKE ONE TABLET BY MOUTH AT BEDTIME AS NEEDED FOR SLEEP    Insomnia, unspecified type       * Notice:  This list has 4 medication(s) that are the same as other medications prescribed for you. Read the directions carefully, and ask your doctor or other care provider to review them with you.

## 2018-05-30 NOTE — PROGRESS NOTES
[facility] New Patient    Patient: Lamont Daniels MRN# [id number]   YOB: 1965 Age: 52 year old   Date of Visit: May 30, 2018    CC: Droopy eyelids obstructing vision.  Chief Complaints and History of Present Illnesses   Patient presents with     Dermatochalasis Evaluation     ref by dr negro Xanthelasma of eyelid, bilateral and  Dermatochalasis of eyelids of both eyes                 HPI:     Lamont Daniels is a 52 year old male who has noted gradual onset of droopy eyelids over the past years. The droopy eyelid is interfering with activities of daily living including driving, and reading. The patient denies double vision, variability of the eyelid position, or dry eye symptoms. The heavy upper eyelids are also interfering with his ability to perform glaucoma tests. He also has enlarging yellowish lesions on the inner corner of all four eyelids which have been gradually enlarging. He does have elevated cholesterol.     EXAM:   MRD1: 1, 1  Dermatochalasis with excess skin touching eyelashes   Brow ptosis with brow resting below superior orbital rim     VISUAL FIELD:  Right eye untaped:10 degrees Right eye taped:55 degrees  Left eye untaped:10 degrees Left eye taped:60 degrees    Assessment & Plan     Lamont Daniels is a 52 year old male with the following diagnoses:   1. Dermatochalasis of eyelids of both eyes - Both Eyes    2. Mechanical ptosis of eyelid of both eyes - Both Eyes    3. Brow ptosis - Both Eyes         Due to significant brow ptosis, blepharoplasty alone would be unsuccesfull in addressing the lateral hooding which is obstructing vision. Blepharoplasty alone would result in sewing very thin eyelid skin to thick sub-brow skin, and even with that, fail to address lateral hooding which is obstructing vision.    PLAN: Bilateral upper blepharoplasty (skin, lid crease), internal browpexy, peña muscle conjunctival resection ptosis 9 both eyes, excise enlarging lesions both upper eyelids.       ANTICOAGULATION:  Aspirin  Plavix    Can hold prior to surgery          PHOTOS DEMONSTRATE:  Significant dermatochalasis with lids resting on eyelashes and obstructing visual axis  Myogenic blepharoptosis  Brow ptosis with thicker brow skin and hairs below the lateral superior orbital rim    Anabelle Rubio MD  Ophthalmic Plastic and Reconstructive Surgery Fellow    Complete documentation of historical and exam elements from today's encounter can be found in the full encounter summary report (not reduplicated in this progress note). I personally obtained the chief complaint(s) and history of present illness.  I confirmed and edited as necessary the review of systems, past medical/surgical history, family history, social history, and examination findings as documented by others; and I examined the patient myself. I personally reviewed the relevant tests, images, and reports as documented above. I formulated and edited as necessary the assessment and plan and discussed the findings and management plan with the patient and family. - Sandra Borja MD

## 2018-05-31 DIAGNOSIS — B18.1 CHRONIC VIRAL HEPATITIS B WITHOUT DELTA AGENT AND WITHOUT COMA (H): ICD-10-CM

## 2018-06-01 ENCOUNTER — OFFICE VISIT (OUTPATIENT)
Dept: FAMILY MEDICINE | Facility: CLINIC | Age: 53
End: 2018-06-01
Payer: COMMERCIAL

## 2018-06-01 VITALS
SYSTOLIC BLOOD PRESSURE: 106 MMHG | WEIGHT: 175 LBS | OXYGEN SATURATION: 95 % | HEART RATE: 73 BPM | BODY MASS INDEX: 32.2 KG/M2 | TEMPERATURE: 98.6 F | HEIGHT: 62 IN | DIASTOLIC BLOOD PRESSURE: 72 MMHG

## 2018-06-01 DIAGNOSIS — Z01.818 PREOP GENERAL PHYSICAL EXAM: Primary | ICD-10-CM

## 2018-06-01 PROCEDURE — 99215 OFFICE O/P EST HI 40 MIN: CPT | Performed by: FAMILY MEDICINE

## 2018-06-01 PROCEDURE — 93000 ELECTROCARDIOGRAM COMPLETE: CPT | Performed by: FAMILY MEDICINE

## 2018-06-01 RX ORDER — TENOFOVIR DISOPROXIL FUMARATE 300 MG/1
TABLET, FILM COATED ORAL
Qty: 90 TABLET | Refills: 3 | Status: SHIPPED | OUTPATIENT
Start: 2018-06-01 | End: 2019-06-12

## 2018-06-01 ASSESSMENT — ANXIETY QUESTIONNAIRES
2. NOT BEING ABLE TO STOP OR CONTROL WORRYING: MORE THAN HALF THE DAYS
3. WORRYING TOO MUCH ABOUT DIFFERENT THINGS: NEARLY EVERY DAY
5. BEING SO RESTLESS THAT IT IS HARD TO SIT STILL: MORE THAN HALF THE DAYS
6. BECOMING EASILY ANNOYED OR IRRITABLE: MORE THAN HALF THE DAYS
1. FEELING NERVOUS, ANXIOUS, OR ON EDGE: MORE THAN HALF THE DAYS
IF YOU CHECKED OFF ANY PROBLEMS ON THIS QUESTIONNAIRE, HOW DIFFICULT HAVE THESE PROBLEMS MADE IT FOR YOU TO DO YOUR WORK, TAKE CARE OF THINGS AT HOME, OR GET ALONG WITH OTHER PEOPLE: EXTREMELY DIFFICULT
7. FEELING AFRAID AS IF SOMETHING AWFUL MIGHT HAPPEN: MORE THAN HALF THE DAYS
GAD7 TOTAL SCORE: 15

## 2018-06-01 ASSESSMENT — PATIENT HEALTH QUESTIONNAIRE - PHQ9: 5. POOR APPETITE OR OVEREATING: MORE THAN HALF THE DAYS

## 2018-06-01 ASSESSMENT — PAIN SCALES - GENERAL: PAINLEVEL: SEVERE PAIN (6)

## 2018-06-01 NOTE — LETTER
My Depression Action Plan  Name: Lamont Daniels   Date of Birth 1965  Date: 6/1/2018    My doctor: David Chavez   My clinic: [address] [address] [phone]          GREEN    ZONE   Good Control    What it looks like:     Things are going generally well. You have normal up s and down s. You may even feel depressed from time to time, but bad moods usually last less than a day.   What you need to do:  1. Continue to care for yourself (see self care plan)  2. Check your depression survival kit and update it as needed  3. Follow your physician s recommendations including any medication.  4. Do not stop taking medication unless you consult with your physician first.           YELLOW         ZONE Getting Worse    What it looks like:     Depression is starting to interfere with your life.     It may be hard to get out of bed; you may be starting to isolate yourself from others.    Symptoms of depression are starting to last most all day and this has happened for several days.     You may have suicidal thoughts but they are not constant.   What you need to do:     1. Call your care team, your response to treatment will improve if you keep your care team informed of your progress. Yellow periods are signs an adjustment may need to be made.     2. Continue your self-care, even if you have to fake it!    3. Talk to someone in your support network    4. Open up your depression survival kit           RED    ZONE Medical Alert - Get Help    What it looks like:     Depression is seriously interfering with your life.     You may experience these or other symptoms: You can t get out of bed most days, can t work or engage in other necessary activities, you have trouble taking care of basic hygiene, or basic responsibilities, thoughts of suicide or death that will not go away, self-injurious behavior.     What you need to do:  1. Call your care team and  request a same-day appointment. If they are not available (weekends or after hours) call your local crisis line, emergency room or 911.            Depression Self Care Plan / Survival Kit    Self-Care for Depression  Here s the deal. Your body and mind are really not as separate as most people think.  What you do and think affects how you feel and how you feel influences what you do and think. This means if you do things that people who feel good do, it will help you feel better.  Sometimes this is all it takes.  There is also a place for medication and therapy depending on how severe your depression is, so be sure to consult with your medical provider and/ or Behavioral Health Consultant if your symptoms are worsening or not improving.     In order to better manage my stress, I will:    Exercise  Get some form of exercise, every day. This will help reduce pain and release endorphins, the  feel good  chemicals in your brain. This is almost as good as taking antidepressants!  This is not the same as joining a gym and then never going! (they count on that by the way ) It can be as simple as just going for a walk or doing some gardening, anything that will get you moving.      Hygiene   Maintain good hygiene (Get out of bed in the morning, Make your bed, Brush your teeth, Take a shower, and Get dressed like you were going to work, even if you are unemployed).  If your clothes don't fit try to get ones that do.    Diet  I will strive to eat foods that are good for me, drink plenty of water, and avoid excessive sugar, caffeine, alcohol, and other mood-altering substances.  Some foods that are helpful in depression are: complex carbohydrates, B vitamins, flaxseed, fish or fish oil, fresh fruits and vegetables.    Psychotherapy  I agree to participate in Individual Therapy (if recommended).    Medication  If prescribed medications, I agree to take them.  Missing doses can result in serious side effects.  I understand that  drinking alcohol, or other illicit drug use, may cause potential side effects.  I will not stop my medication abruptly without first discussing it with my provider.    Staying Connected With Others  I will stay in touch with my friends, family members, and my primary care provider/team.    Use your imagination  Be creative.  We all have a creative side; it doesn t matter if it s oil painting, sand castles, or mud pies! This will also kick up the endorphins.    Witness Beauty  (AKA stop and smell the roses) Take a look outside, even in mid-winter. Notice colors, textures. Watch the squirrels and birds.     Service to others  Be of service to others.  There is always someone else in need.  By helping others we can  get out of ourselves  and remember the really important things.  This also provides opportunities for practicing all the other parts of the program.    Humor  Laugh and be silly!  Adjust your TV habits for less news and crime-drama and more comedy.    Control your stress  Try breathing deep, massage therapy, biofeedback, and meditation. Find time to relax each day.     My support system    Clinic Contact:  Phone number:    Contact 1:  Phone number:    Contact 2:  Phone number:    Yarsani/:  Phone number:    Therapist:  Phone number:    Local crisis center:    Phone number:    Other community support:  Phone number:

## 2018-06-01 NOTE — PROGRESS NOTES
[address] [address]  [phone]  Dept: [phone]    PRE-OP EVALUATION:  Today's date: 2018    Lamont Daniels (: 1965) presents for pre-operative evaluation assessment as requested by Dr. Borja .  He requires evaluation and anesthesia risk assessment prior to undergoing surgery/procedure for treatment of bilateral ptosis.    Proposed Surgery/ Procedure: COMBINED REPAIR PTOSIS WITH BLEPHAROPLASTY BILATERAL  Date of Surgery/ Procedure: 18  Time of Surgery/ Procedure: 12.:35 pm  Hospital/Surgical Facility:  [facility]   Fax number for surgical facility: does not have  Primary Physician: David Chavez  Type of Anesthesia Anticipated: Local with MAC    Patient has a Health Care Directive or Living Will:  NO    1. YES - DO YOU HAVE A HISTORY OF HEART ATTACK, STROKE, STENT, BYPASS OR SURGERY ON AN ARTERY IN THE HEAD, NECK, HEART OR LEG?   2. YES - DO YOU EVER HAVE ANY PAIN OR DISCOMFORT IN YOUR CHEST?   3. NO - Do you have a history of  Heart Failure?  4. YES - ARE YOUR TROUBLED BY SHORTNESS OF BREATH WHEN WALKING ON THE LEVEL, UP A SLIGHT HILL OR AT NIGHT?   5. NO - Do you currently have a cold, bronchitis or other respiratory infection?  6. NO - Do you have a cough, shortness of breath or wheezing?  7. YES - DO YOU SOMETIMES GET PAINS IN THE CALVES OF YOUR LEGS WHEN YOU WALK?   8. NO - Do you or anyone in your family have previous history of blood clots?  9. NO - Do you or does anyone in your family have a serious bleeding problem such as prolonged bleeding following surgeries or cuts?  10. NO - Have you ever had problems with anemia or been told to take iron pills?  11. NO - Have you had any abnormal blood loss such as black, tarry or bloody stools, or abnormal vaginal bleeding?  12. NO - Have you ever had a blood transfusion?  13. NO - Have you or any of your relatives ever had problems with anesthesia?  14. YES - DO YOU HAVE SLEEP  APNEA, EXCESSIVE SNORING OR DAYTIME DROWSINESS?   15. NO - Do you have any prosthetic heart valves?  16. NO - Do you have prosthetic joints?  17. NO - Is there any chance that you may be pregnant?      HPI:     HPI related to upcoming procedure: vision problems with bilateral eyelid ptosis      See problem list for active medical problems.  Problems all longstanding and stable, except as noted/documented.  See ROS for pertinent symptoms related to these conditions.                                                                                                                                                          .    MEDICAL HISTORY:     Patient Active Problem List    Diagnosis Date Noted     Benign prostatic hyperplasia with lower urinary tract symptoms, unspecified morphology 03/14/2017     Priority: Medium     Rheumatoid arthritis of multiple sites with negative rheumatoid factor (H) 01/17/2017     Priority: Medium     Insomnia, unspecified type 11/22/2016     Priority: Medium     Essential hypertension with goal blood pressure less than 140/90 05/19/2016     Priority: Medium     On corticosteroid therapy 04/22/2016     Priority: Medium     Elevated liver enzymes 11/30/2015     Priority: Medium     Bilateral low back pain with sciatica, sciatica laterality unspecified 11/17/2015     Priority: Medium     Rheumatoid arthritis involving multiple sites, unspecified rheumatoid factor presence (H) 11/11/2015     Priority: Medium     High risk medications (not anticoagulants) long-term use 11/11/2015     Priority: Medium     Midline low back pain without sciatica 11/11/2015     Priority: Medium     Essential hypertension 10/22/2015     Priority: Medium     Suicidal ideation 12/08/2014     Priority: Medium     Gout 08/22/2014     Priority: Medium     Problem list name updated by automated process. Provider to review       Insomnia 08/05/2014     Priority: Medium     Hyperlipidemia LDL goal <70 03/21/2014     Priority:  "Medium     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 08/05/2013     Priority: Medium     Malrotation of intestine 06/27/2013     Priority: Medium     S/P CABG (coronary artery bypass graft) 08/21/2012     Priority: Medium     Bypass 6  \" Coronary\" vessels at the age of 42        Vitamin D deficiency 05/14/2012     Priority: Medium     Hepatitis B infection 12/26/2011     Priority: Medium     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) 09/19/2011     Priority: Medium     Sleep Study 9/13/11  Sleep Architecture:  The total recording time of the diagnostic portion of the study was 192.7 minutes.  The total sleep time was 129.5 minutes.  During the diagnostic portion of the study the sleep latency was 30.2 minutes after taking Ambien 10 mg.  No REM sleep observed. Sleep Efficiency was 67.2%.  Wake after sleep onset was 27.5.   The patient spent 23.2% of total sleep time in Stage N1, 72.6% in Stage N2, 4.2% in Stages N3 and 0.0% in REM.         Respiration:     Sustained Sleep Associated Hypoventilation -was not monitored.    Sleep Associated Hypoxemia - was present.  Baseline oxygen saturation was 93.6%.  The lowest oxygen saturation was 75.4%.  Snoring was reported as loud.     Events - During the diagnostic portion of the study, the polysomnogram revealed a presence of 5 obstructive apneas resulting in an Apnea index of 30.1 events per hour.  There were 21 hypopneas resulting in a Hypopnea index of 9.7 events per hour.  The combined Apnea/Hypopnea Index was 39.8 events per hour.  The REM AHI was n/a.  The RERA index was 7.9 per hour.   The RDI was 47.7  .  47.7   7.9 39.8     Treatment PSG  Sleep Architecture:  At 1:22 AM the patient was placed on CPAP treatment and was titrated at pressures ranging from 5 cm/H20 up to 11 cm/H20.  The total recording time of the treatment portion of the study was 334.5 minutes.  The total sleep time was 212.5 minutes.  During the treatment portion of the study the sleep latency was 3.5 " minutes.  REM latency was 313.5 minutes.  Sleep Efficiency was 63.5%.  Sleep Maintenance Efficiency was 63.5%.  Total wake time was 122.5 minutes for a total wake percentage of 34.5%. the patient spent 17.2% of total sleep time in Stage N1, 53.2% in Stage N2, 24.9% in Stages N3 and N, and 4.7% in REM.       Respiration:  The optimal pressure was 11.0 with an AHI of 1.3.    Movement Activity:      Limb - During the diagnostic portion of the study, there were 3 limb movements recorded.  Of this total, 0 were classified as PLMs.  Of the PLMs, 0 were associated with arousals.  The Limb Movement index was 1.4 per hour while the PLM index was 0.0 per hour.    Behavior - None    Bruxism - None    Seizure - None      CARDIAC SUMMARY:   During the diagnostic portion of the study, the average pulse rate was 56.1 bpm.  The minimum pulse rate was 39.0 bpm while the maximum pulse rate was 77.0 bpm.    During the treatment portion of the study, the average pulse rate was 52.7 bpm.  The minimum pulse rate was 47.0 bpm while the maximum pulse rate was 73.0 bpm.   Assessment:    Severe JEROD (AHI 40) with adequate positive airway pressure titration including REM supine.    History of UPPP.  Recommendations:    CPAP pressure 11 cmH2O with clinical follow-up within 3 - 4 weeks including compliance measures.    Consider a chin strap       Coronary atherosclerosis of native coronary artery      Priority: Medium     Major depressive disorder, recurrent episode, moderate (H)      Priority: Medium     Anxiety      Priority: Medium      Past Medical History:   Diagnosis Date     Anxiety      CAD (coronary artery disease)     sees cardiologist Dr. Yang, has had stents and cabg     Congestive heart failure, unspecified 2008     Depressive disorder 2008     Gout      Hepatitis B > 10 years     HTN (hypertension)      Hyperlipidemia LDL goal < 100      Malrotation of intestine      Moderate major depression (H)      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)  9/19/2011    Uses CPAP     Rheumatoid arthritis flare (H) 1012     Steatohepatitis 12/15    liver biopsy     Tuberculosis age 13    INH x 9 months      Vitamin D deficiency      Past Surgical History:   Procedure Laterality Date     ABDOMEN SURGERY  2014     BIOPSY  2015     BYPASS GRAFT ARTERY CORONARY  2008    6 vessels     COLONOSCOPY  2/8/2013    Procedure: COLONOSCOPY;  Colonoscopy, blood in stool;  Surgeon: Duane, William Charles, MD;  Location: [facility] OR     GI SURGERY  2014     HC CORONARY STENT PERCUT, EA ADDTL VESSEL  2008    3 months after CABG     HEAD & NECK SURGERY  2011     NASAL/SINUS POLYPECTOMY  2010     ORTHOPEDIC SURGERY  2012     THORACIC SURGERY  1989    tb     TONSILLECTOMY  2010     UVULOPALATOPHARYNGOPLASTY  2010     VASCULAR SURGERY  2008     Current Outpatient Prescriptions   Medication Sig Dispense Refill     Abatacept (ORENCIA) 125 MG/ML SOAJ auto-injector Inject 1 mL (125 mg) Subcutaneous every 7 days 4 Syringe 3     allopurinol (ZYLOPRIM) 300 MG tablet TAKE 1 TABLET BY MOUTH 1 TIME DAILY 210 tablet 0     aspirin EC 81 MG EC tablet Take 1 tablet (81 mg) by mouth daily (*) 30 tablet 1     atorvastatin (LIPITOR) 80 MG tablet TAKE 1 TABLET BY MOUTH 1 TIME DAILY FOR CHOLESTEROL 90 tablet 1     baclofen (LIORESAL) 10 MG tablet TAKE 1 TABLET BY MOUTH 2 TIMES DAILY AS NEEDED FOR MUSCLE SPASM 180 tablet 2     blood glucose monitoring (ONE TOUCH ULTRA 2) meter device kit TEST 2 TIMES DAILY. 1 kit 0     BusPIRone HCl 30 MG TABS Take 1 tablet by mouth 2 times daily 180 tablet 5     Cholecalciferol (VITAMIN D) 2000 UNITS tablet TAKE ONE TABLET BY MOUTH ONCE DAILY 100 tablet 1     clonazePAM (KLONOPIN) 1 MG tablet TAKE ONE-HALF TO ONE TABLET BY MOUTH 3 TIMES DAILY AS NEEDED FOR ANXIETY 90 tablet 0     clopidogrel (PLAVIX) 75 MG tablet Take 1 tablet (75 mg) by mouth daily 90 tablet 3     COLCRYS 0.6 MG tablet TAKE 2 TABLETS BY MOUTH AT THE FIRST SIGN OF FLARE, TAKE 1 ADDITIONAL TABLET ONE HOUR LATER. 30  tablet 3     diclofenac (VOLTAREN) 1 % GEL topical gel Apply 2-4 grams to 2-3 joints, up to four times daily as needed using enclosed dosing card.  Max of 32g/day 300 g 11     evolocumab (REPATHA) 140 MG/ML prefilled autoinjector Inject 1 mL (140 mg) Subcutaneous every 14 days 2 mL 11     ezetimibe (ZETIA) 10 MG tablet Take 1 tablet (10 mg) by mouth daily 90 tablet 3     fluticasone (FLONASE) 50 MCG/ACT spray Spray 2 sprays into both nostrils daily 1 Bottle 11     gabapentin (NEURONTIN) 300 MG capsule Take 2 capsules (600 mg) by mouth 3 times daily 540 capsule 3     gemfibrozil (LOPID) 600 MG tablet Take 1 tablet (600 mg) by mouth 2 times daily 180 tablet 3     hydroxychloroquine (PLAQUENIL) 200 MG tablet Take 1 tablet (200 mg) by mouth daily 30 tablet 3     meclizine (ANTIVERT) 25 MG tablet TAKE ONE TABLET BY MOUTH EVERY 6 HOURS AS NEEDED FOR  DIZZINESS 30 tablet 10     melatonin 3 MG tablet Take 1 tablet (3 mg) by mouth nightly as needed for sleep       meloxicam (MOBIC) 7.5 MG tablet Take 1 tablet (7.5 mg) by mouth daily 30 tablet 3     metoprolol succinate (TOPROL-XL) 25 MG 24 hr tablet TAKE 1 TABLET BY MOUTH 1 TIME DAILY 90 tablet 1     mirtazapine (REMERON) 15 MG tablet Take 1 tablet (15 mg) by mouth At Bedtime 30 tablet 5     nitroGLYcerin (NITROSTAT) 0.4 MG sublingual tablet PLACE 1 TABLET UNDER THE TONGUE EVERY 5 MINUTES AS NEEDED 25 tablet 1     order for DME Equipment being ordered: test strips as covered by insurance. USE TO TEST BLOOD SUGARS TWICE DAILY OR AS DIRECTED. 200 each 3     order for DME OneTouch glucometer. 1 each 0     order for DME OneTouch lancets testing twice daily. 200 each 3     order for DME Equipment being ordered: single end cane 1 Units 0     ORDER FOR DME 1.  CPAP pressure 11 cm/H20 with heated humidity.   2.  Provide mask to fit and CPAP supplies.   3.  Length of need lifetime.   4.  If needed please provide a chin strap            oxyCODONE IR (ROXICODONE) 5 MG tablet Take 1-2  "tablets (5-10 mg) by mouth every 4 hours as needed for moderate to severe pain . Max of 6 tabs per day.  30 day supply. May fill on/after 5/11/18 to start on 5/12/18 180 tablet 0     pantoprazole (PROTONIX) 40 MG EC tablet Take 1 tablet (40 mg) by mouth daily 90 tablet 3     predniSONE (DELTASONE) 1 MG tablet Prednisone 4mg daily x30days, then 3mg daily for 30days, then 2mg daily thereafter. 270 tablet 0     sulfaSALAzine ER (AZULFIDINE EN) 500 MG EC tablet Take 2 tablets (1,000 mg) by mouth 2 times daily 120 tablet 3     tamsulosin (FLOMAX) 0.4 MG capsule Take 1 capsule (0.4 mg) by mouth daily 90 capsule 3     tenofovir (VIREAD) 300 MG tablet TAKE ONE TABLET BY MOUTH EVERY DAY 90 tablet 3     triamcinolone (KENALOG) 0.1 % ointment APPLY SPARINGLY TO AFFECTED AREA THREE TIMES DAILY FOR 14 DAYS. 80 g 0     zolpidem (AMBIEN) 5 MG tablet TAKE ONE TABLET BY MOUTH AT BEDTIME AS NEEDED FOR SLEEP 30 tablet 5     naloxone (NARCAN) nasal spray Spray 1 spray (4 mg) into one nostril alternating nostrils as needed for opioid reversal every 2-3 minutes until assistance arrives (Patient not taking: Reported on 6/1/2018) 0.2 mL 0     OTC products: None, except as noted above    Allergies   Allergen Reactions     Citalopram Itching     Tramadol Itching      Latex Allergy: NO    Social History   Substance Use Topics     Smoking status: Never Smoker     Smokeless tobacco: Never Used     Alcohol use No     History   Drug Use No       REVIEW OF SYSTEMS:   CONSTITUTIONAL: NEGATIVE for fever, chills, change in weight  ENT/MOUTH: NEGATIVE for ear, mouth and throat problems  RESP: NEGATIVE for significant cough or SOB  CV: NEGATIVE for chest pain, palpitations or peripheral edema    EXAM:   /72 (BP Location: Left arm, Patient Position: Chair, Cuff Size: Adult Large)  Pulse 73  Temp 98.6  F (37  C) (Oral)  Ht 5' 2\" (1.575 m)  Wt 175 lb (79.4 kg)  SpO2 95%  BMI 32.01 kg/m2  GENERAL APPEARANCE: healthy, alert and no " distress  HENT: ear canals and TM's normal and nose and mouth without ulcers or lesions  RESP: lungs clear to auscultation - no rales, rhonchi or wheezes  CV: regular rate and rhythm, normal S1 S2, no S3 or S4 and no murmur, click or rub   ABDOMEN: soft, nontender, no HSM or masses and bowel sounds normal  NEURO: Normal strength and tone, sensory exam grossly normal, mentation intact and speech normal    DIAGNOSTICS:   EKG: appears normal, NSR, no acute st-t changes c/w ischemia, q's III, avf.    Recent Labs   Lab Test  02/13/18   1451  12/04/17   0924   10/25/16   0851  10/05/16   0954  08/12/16   1451   04/22/16   1318   HGB  14.3  13.9   < >  15.2  15.0   --    < >   --    PLT  217  253   < >  267  268   --    < >   --    INR   --   1.02   --    --   0.96   --    --    --    NA   --   144   --   143  144   --    < >   --    POTASSIUM   --   3.6   --   3.6  3.6   --    < >   --    CR  0.89  0.87   < >  1.01  1.00   --    < >   --    A1C   --    --    --    --    --   5.9   --   6.0    < > = values in this interval not displayed.        IMPRESSION:   Reason for surgery/procedure: blepharoplasty  Diagnosis/reason for consult: preop clearance    The proposed surgical procedure is considered LOW risk.    REVISED CARDIAC RISK INDEX  The patient has the following serious cardiovascular risks for perioperative complications such as (MI, PE, VFib and 3  AV Block):  Coronary Artery Disease (MI, positive stress test, angina, Qs on EKG)  INTERPRETATION: 1 risks: Class II (low risk - 0.9% complication rate)    The patient has the following additional risks for perioperative complications:  No identified additional risks      ICD-10-CM    1. Preop general physical exam Z01.818 EKG 12-lead complete w/read - Clinics       RECOMMENDATIONS:     --Patient is to take all scheduled medications on the day of surgery EXCEPT for modifications listed below.    Anticoagulant or Antiplatelet Medication Use  ASPIRIN: Discontinue ASA 7-10  days prior to procedure to reduce bleeding risk.  It should be resumed post-operatively.  PLAVIX: No contraindication to stopping plavix.  Stop 7-10 days prior to surgery        APPROVAL GIVEN to proceed with proposed procedure, without further diagnostic evaluation       Signed Electronically by: David Chavez MD, MD    Copy of this evaluation report is provided to requesting physician.    [facility] Preop Guidelines    Revised Cardiac Risk Index

## 2018-06-01 NOTE — PATIENT INSTRUCTIONS
Before Your Surgery      Call your surgeon if there is any change in your health. This includes signs of a cold or flu (such as a sore throat, runny nose, cough, rash or fever).    Do not smoke, drink alcohol or take over the counter medicine (unless your surgeon or primary care doctor tells you to) for the 24 hours before and after surgery.    If you take prescribed drugs: Follow your doctor s orders about which medicines to take and which to stop until after surgery.    Eating and drinking prior to surgery: follow the instructions from your surgeon    Take a shower or bath the night before surgery. Use the soap your surgeon gave you to gently clean your skin. If you do not have soap from your surgeon, use your regular soap. Do not shave or scrub the surgery site.  Wear clean pajamas and have clean sheets on your bed.     At [facility], we strive to deliver an exceptional experience to you, every time we see you.  If you receive a survey in the mail, please send us back your thoughts. We really do value your feedback.    Based on your medical history, these are the current health maintenance/preventive care services that you are due for (some may have been done at this visit.)  Health Maintenance Due   Topic Date Due     PHQ-9 Q6 MONTHS  10/25/2017     DEPRESSION ACTION PLAN Q1 YR  02/07/2018         Suggested websites for health information:  Www.Whyd.org : Up to date and easily searchable information on multiple topics.  Www.medlineplus.gov : medication info, interactive tutorials, watch real surgeries online  Www.familydoctor.org : good info from the Academy of Family Physicians  Www.cdc.gov : public health info, travel advisories, epidemics (H1N1)  Www.aap.org : children's health info, normal development, vaccinations  Www.health.state.[place].us : [place] dept of health, public health issues in [place], N1N1    Your care team:                            Family Medicine Internal Medicine   Sivakumar  MD Noam Summers MD Shantel Branch-Fleming, MD Katya Georgiev PA-C Nam Ho, MD Pediatrics   NATE Andre, MD Tiffany Diggs CNP, MD Deborah Mielke, MD Kim Thein, APRN [facility]      Clinic hours: Monday - Thursday 7 am-7 pm; Fridays 7 am-5 pm.   Urgent care: Monday - Friday 11 am-9 pm; Saturday and Sunday 9 am-5 pm.  Pharmacy : Monday -Thursday 8 am-8 pm; Friday 8 am-6 pm; Saturday and Sunday 9 am-5 pm.     Clinic: [phone]   Pharmacy: [phone]

## 2018-06-01 NOTE — MR AVS SNAPSHOT
After Visit Summary   6/1/2018    Lamont Daniels    MRN: [id number]           Patient Information     Date Of Birth          1965        Visit Information        Provider Department      6/1/2018 11:20 AM David Chavez MD [facility]        Today's Diagnoses     Preop general physical exam    -  1      Care Instructions      Before Your Surgery      Call your surgeon if there is any change in your health. This includes signs of a cold or flu (such as a sore throat, runny nose, cough, rash or fever).    Do not smoke, drink alcohol or take over the counter medicine (unless your surgeon or primary care doctor tells you to) for the 24 hours before and after surgery.    If you take prescribed drugs: Follow your doctor s orders about which medicines to take and which to stop until after surgery.    Eating and drinking prior to surgery: follow the instructions from your surgeon    Take a shower or bath the night before surgery. Use the soap your surgeon gave you to gently clean your skin. If you do not have soap from your surgeon, use your regular soap. Do not shave or scrub the surgery site.  Wear clean pajamas and have clean sheets on your bed.     At [facility], we strive to deliver an exceptional experience to you, every time we see you.  If you receive a survey in the mail, please send us back your thoughts. We really do value your feedback.    Based on your medical history, these are the current health maintenance/preventive care services that you are due for (some may have been done at this visit.)  Health Maintenance Due   Topic Date Due     PHQ-9 Q6 MONTHS  10/25/2017     DEPRESSION ACTION PLAN Q1 YR  02/07/2018         Suggested websites for health information:  Www.[employer].org : Up to date and easily searchable information on multiple topics.  Www.medlineplus.gov : medication info, interactive tutorials, watch real surgeries online  Www.familydoctor.org :  good info from the Academy of Family Physicians  Www.cdc.gov : public health info, travel advisories, epidemics (H1N1)  Www.aap.org : children's health info, normal development, vaccinations  Www.health.[facility].[place].us : [place] dept of health, public health issues in [place], N1N1    Your care team:                            Family Medicine Internal Medicine   MD Noam Dorado MD Shantel Branch-Fleming, MD Katya Georgiev PA-C Nam Ho, MD Pediatrics   NATE Andre, PAVEL Huggins APRN MD Tiffany Beaver MD Deborah Mielke, MD Kim Thein, APRN CNP      Clinic hours: Monday - Thursday 7 am-7 pm; Fridays 7 am-5 pm.   Urgent care: Monday - Friday 11 am-9 pm; Saturday and Sunday 9 am-5 pm.  Pharmacy : Monday -Thursday 8 am-8 pm; Friday 8 am-6 pm; Saturday and Sunday 9 am-5 pm.     Clinic: [phone]   Pharmacy: [phone]            Follow-ups after your visit        Your next 10 appointments already scheduled     Jun 22, 2018  3:00 PM CDT   LAB with LAB FIRST FLOOR [facility] ([facility])    [address] [facility] [address] [phone]           Please do not eat 10-12 hours before your appointment if you are coming in fasting for labs on lipids, cholesterol, or glucose (sugar). This does not apply to pregnant women. Water, hot tea and black coffee (with nothing added) are okay. Do not drink other fluids, diet soda or chew gum.            Jun 22, 2018  3:30 PM CDT   Return Visit with Fly Travis MD   [facility] ([facility])    [address] [facility] [address] [phone]            Jun 25, 2018   Procedure with Sandra Borja MD   [facility] (--)    [address] [facility] [address] [phone]            Jun 26, 2018  1:00 PM CDT   Return Visit with Sebastián Jenkins MD   [facility]  [place] ([facility])    [address] [facility] [address]   [phone]            Jul 11, 2018  2:00 PM CDT   Return Visit with Sandra Borja MD   [facility] ([facility])    [address] [place] [address]   [phone]            Jul 11, 2018  2:30 PM CDT   Return Visit with Dorothea Loo MD   [facility] ([facility])    [address] [facility] [address]   [phone]            Nov 12, 2018  7:30 AM CST   Return Visit with Ricardo Rae MD, [facility] NURSE ONLY   [facility] ([facility])    [address] [place] [address] [phone]              Who to contact     If you have questions or need follow up information about today's clinic visit or your schedule please contact [facility] directly at [phone].  Normal or non-critical lab and imaging results will be communicated to you by MyChart, letter or phone within 4 business days after the clinic has received the results. If you do not hear from us within 7 days, please contact the clinic through [employer] or phone. If you have a critical or abnormal lab result, we will notify you by phone as soon as possible.  Submit refill requests through [employer] or call your pharmacy and they will forward the refill request to us. Please allow 3 business days for your refill to be completed.          Additional Information About Your Visit        [employer] Information     [employer] gives you secure access to your electronic health record. If you see a primary care provider, you can also send messages to your care team and make appointments. If you have questions, please call your primary care clinic.  If you do not have a primary care provider, please call [phone] and they will assist you.        Care EveryWhere ID     This is your Care  "EveryWhere ID. This could be used by other organizations to access your [facility] medical records  [id number]        Your Vitals Were     Pulse Temperature Height Pulse Oximetry BMI (Body Mass Index)       73 98.6  F (37  C) (Oral) 5' 2\" (1.575 m) 95% 32.01 kg/m2        Blood Pressure from Last 3 Encounters:   06/01/18 106/72   04/12/18 116/81   04/11/18 123/77    Weight from Last 3 Encounters:   06/01/18 175 lb (79.4 kg)   04/12/18 178 lb 1.6 oz (80.8 kg)   04/11/18 180 lb (81.6 kg)              We Performed the Following     DEPRESSION ACTION PLAN (DAP)     EKG 12-lead complete w/read - Clinics        Primary Care Provider Office Phone # Fax #    David Chavez [phone]       [address] [facility] [address]        Equal Access to Services     [facility] : Hadii aad ku hadasho Soomaali, waaxda luqadaha, qaybta kaalmada adeegyada, waxay idiin hayadriennen celestina katz . So [facility] [phone].    ATENCIÓN: Si habla español, tiene a muse disposición servicios gratuitos de asistencia lingüística. Llame al [phone].    We comply with applicable federal civil rights laws and [place] laws. We do not discriminate on the basis of race, color, national origin, age, disability, sex, sexual orientation, or gender identity.            Thank you!     Thank you for choosing [facility]  for your care. Our goal is always to provide you with excellent care. Hearing back from our patients is one way we can continue to improve our services. Please take a few minutes to complete the written survey that you may receive in the mail after your visit with us. Thank you!             Your Updated Medication List - Protect others around you: Learn how to safely use, store and throw away your medicines at www.disposemymeds.org.          This list is accurate as of 6/1/18 11:51 AM.  Always use your most recent med list.                   Brand Name Dispense Instructions for use Diagnosis    " Abatacept 125 MG/ML Soaj auto-injector    ORENCIA    4 Syringe    Inject 1 mL (125 mg) Subcutaneous every 7 days    Rheumatoid arthritis of multiple sites with negative rheumatoid factor (H)       allopurinol 300 MG tablet    ZYLOPRIM    210 tablet    TAKE 1 TABLET BY MOUTH 1 TIME DAILY    Idiopathic gout, unspecified chronicity, unspecified site       aspirin 81 MG EC tablet     30 tablet    Take 1 tablet (81 mg) by mouth daily (*)    Coronary artery disease involving native coronary artery of native heart without angina pectoris       atorvastatin 80 MG tablet    LIPITOR    90 tablet    TAKE 1 TABLET BY MOUTH 1 TIME DAILY FOR CHOLESTEROL    Atherosclerosis of native coronary artery of native heart, angina presence unspecified, Hyperlipidemia LDL goal <100       baclofen 10 MG tablet    LIORESAL    180 tablet    TAKE 1 TABLET BY MOUTH 2 TIMES DAILY AS NEEDED FOR MUSCLE SPASM    Spasm of back muscles       blood glucose monitoring meter device kit     1 kit    TEST 2 TIMES DAILY.    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    Gout without tophus       diclofenac 1 % Gel topical gel    VOLTAREN    300 g    Apply 2-4 grams to 2-3 joints, up to four times daily as needed using enclosed dosing card.  Max of  32g/day    Facet arthropathy, Rheumatoid arthritis involving multiple sites, unspecified rheumatoid factor presence (H)       evolocumab 140 MG/ML prefilled autoinjector    REPATHA    2 mL    Inject 1 mL (140 mg) Subcutaneous every 14 days    Hyperlipidemia LDL goal <70, S/P CABG (coronary artery bypass graft)       ezetimibe 10 MG tablet    ZETIA    90 tablet    Take 1 tablet (10 mg) by mouth daily    Coronary artery disease involving native coronary artery of native heart without angina pectoris       fluticasone 50 MCG/ACT spray    FLONASE    1 Bottle    Spray 2 sprays into both nostrils daily    Nasal congestion, Dizziness       gabapentin 300 MG capsule    NEURONTIN    540 capsule    Take 2 capsules (600 mg) by mouth 3 times daily    Lumbar radiculopathy       gemfibrozil 600 MG tablet    LOPID    180 tablet    Take 1 tablet (600 mg) by mouth 2 times daily    Hyperlipidemia LDL goal <70       hydroxychloroquine 200 MG tablet    PLAQUENIL    30 tablet    Take 1 tablet (200 mg) by mouth daily    Rheumatoid arthritis of multiple sites with negative rheumatoid factor (H), High risk medications (not anticoagulants) long-term use       meclizine 25 MG tablet    ANTIVERT    30 tablet    TAKE ONE TABLET BY MOUTH EVERY 6 HOURS AS NEEDED FOR  DIZZINESS    Vertigo       melatonin 3 MG tablet      Take 1 tablet (3 mg) by mouth nightly as needed for sleep    Delayed sleep phase syndrome       meloxicam 7.5 MG tablet    MOBIC    30 tablet    Take 1 tablet (7.5 mg) by mouth daily    Low back pain, unspecified back pain laterality, unspecified chronicity, with sciatica presence unspecified       metoprolol succinate 25 MG 24 hr tablet    TOPROL-XL    90 tablet    TAKE 1 TABLET BY MOUTH 1 TIME DAILY    Atherosclerosis of native coronary artery of native heart without angina pectoris       mirtazapine 15 MG tablet    REMERON    30 tablet    Take 1 tablet (15 mg) by mouth At Bedtime    Severe episode of recurrent major depressive  disorder, without psychotic features (H)       naloxone nasal spray    NARCAN    0.2 mL    Spray 1 spray (4 mg) into one nostril alternating nostrils as needed for opioid reversal every 2-3 minutes until assistance arrives    Rheumatoid arthritis involving multiple sites, unspecified rheumatoid factor presence (H)       nitroGLYcerin 0.4 MG sublingual tablet    NITROSTAT    25 tablet    PLACE 1 TABLET UNDER THE TONGUE EVERY 5 MINUTES AS NEEDED    Atherosclerosis of native coronary artery of native heart with angina pectoris (H)       order for DME      1.  CPAP pressure 11 cm/H20 with heated humidity.  2.  Provide mask to fit and CPAP supplies.  3.  Length of need lifetime.  4.  If needed please provide a chin strap    JEROD (obstructive sleep apnea), Anxiety, CAD (coronary artery disease), HTN (hypertension)       * order for DME     1 Units    Equipment being ordered: single end cane    Lumbar radiculopathy, Facet arthropathy, Chronic low back pain       * order for DME     200 each    Equipment being ordered: test strips as covered by insurance. USE TO TEST BLOOD SUGARS TWICE DAILY OR AS DIRECTED.    Rheumatoid arthritis involving multiple sites, unspecified rheumatoid factor presence (H)       * order for DME     1 each    OneTouch glucometer.    Rheumatoid arthritis involving multiple sites, unspecified rheumatoid factor presence (H)       * order for DME     200 each    OneTouch lancets testing twice daily.    Rheumatoid arthritis involving multiple sites, unspecified rheumatoid factor presence (H)       oxyCODONE IR 5 MG tablet    ROXICODONE    180 tablet    Take 1-2 tablets (5-10 mg) by mouth every 4 hours as needed for moderate to severe pain . Max of 6 tabs per day.  30 day supply. May fill on/after 5/11/18 to start on 5/12/18    Facet arthropathy       pantoprazole 40 MG EC tablet    PROTONIX    90 tablet    Take 1 tablet (40 mg) by mouth daily    Gastroesophageal reflux disease without esophagitis        predniSONE 1 MG tablet    DELTASONE    270 tablet    Prednisone 4mg daily x30days, then 3mg daily for 30days, then 2mg daily thereafter.    Rheumatoid arthritis of multiple sites with negative rheumatoid factor (H), High risk medications (not anticoagulants) long-term use       sulfaSALAzine  MG EC tablet    AZULFIDINE EN    120 tablet    Take 2 tablets (1,000 mg) by mouth 2 times daily    Rheumatoid arthritis of multiple sites with negative rheumatoid factor (H), High risk medications (not anticoagulants) long-term use       tamsulosin 0.4 MG capsule    FLOMAX    90 capsule    Take 1 capsule (0.4 mg) by mouth daily    Benign prostatic hyperplasia with weak urinary stream       tenofovir 300 MG tablet    VIREAD    90 tablet    TAKE ONE TABLET BY MOUTH EVERY DAY    Chronic viral hepatitis B without delta agent and without coma (H)       triamcinolone 0.1 % ointment    KENALOG    80 g    APPLY SPARINGLY TO AFFECTED AREA THREE TIMES DAILY FOR 14 DAYS.    Rash       vitamin D 2000 units tablet     100 tablet    TAKE ONE TABLET BY MOUTH ONCE DAILY    Vitamin D deficiency       zolpidem 5 MG tablet    AMBIEN    30 tablet    TAKE ONE TABLET BY MOUTH AT BEDTIME AS NEEDED FOR SLEEP    Insomnia, unspecified type       * Notice:  This list has 4 medication(s) that are the same as other medications prescribed for you. Read the directions carefully, and ask your doctor or other care provider to review them with you.

## 2018-06-02 ASSESSMENT — PATIENT HEALTH QUESTIONNAIRE - PHQ9: SUM OF ALL RESPONSES TO PHQ QUESTIONS 1-9: 18

## 2018-06-02 ASSESSMENT — ANXIETY QUESTIONNAIRES: GAD7 TOTAL SCORE: 15

## 2018-06-18 DIAGNOSIS — I25.10 ATHEROSCLEROSIS OF NATIVE CORONARY ARTERY OF NATIVE HEART, ANGINA PRESENCE UNSPECIFIED: ICD-10-CM

## 2018-06-18 DIAGNOSIS — E78.5 HYPERLIPIDEMIA LDL GOAL <100: ICD-10-CM

## 2018-06-18 NOTE — TELEPHONE ENCOUNTER
"Requested Prescriptions   Pending Prescriptions Disp Refills     atorvastatin (LIPITOR) 80 MG tablet [Pharmacy Med Name: ATORVASTATIN 80 MG TABLET]  Last Written Prescription Date:  07/10/17  Last Fill Quantity: 90,  # refills: 1   Last Office Visit with FMRAO, OBED or [facility] prescribing provider:  06/01/18   Future Office Visit:    Next 5 appointments (look out 90 days)     Jun 22, 2018  3:30 PM CDT   Return Visit with Fly Travis MD   [facility])    [address] [place] [address]   [phone]            Jun 26, 2018  1:00 PM CDT   Return Visit with Sebastián Jenkins MD   [facility]    [address] [place] [address]   [phone]            Jul 11, 2018  2:00 PM CDT   Return Visit with Sandra Borja MD   [facility] ([facility])    [address] [place] [address]   [phone]            Jul 11, 2018  2:30 PM CDT   Return Visit with Dorothea Loo MD   [facility] ([facility] [address] [address]   [phone]                90 tablet 0     Sig: TAKE 1 TABLET BY MOUTH 1 TIME DAILY    Statins Protocol Passed    6/18/2018 11:08 AM       Passed - LDL on file in past 12 months    Recent Labs   Lab Test  12/04/17   0924   LDL  18            Passed - No abnormal creatine kinase in past 12 months    Recent Labs   Lab Test  07/07/14   0800   CKT  142               Passed - Recent (12 mo) or future (30 days) visit within the authorizing provider's specialty    Patient had office visit in the last 12 months or has a visit in the next 30 days with authorizing provider or within the authorizing provider's specialty.  See \"Patient Info\" tab in inbasket, or \"Choose Columns\" in Meds & Orders section of the refill encounter.           Passed - Patient is " age 18 or older

## 2018-06-19 ENCOUNTER — DOCUMENTATION ONLY (OUTPATIENT)
Dept: LAB | Facility: CLINIC | Age: 53
End: 2018-06-19

## 2018-06-19 DIAGNOSIS — I25.810 CORONARY ATHEROSCLEROSIS OF AUTOLOGOUS VEIN BYPASS GRAFT: ICD-10-CM

## 2018-06-19 DIAGNOSIS — E78.5 HYPERLIPIDEMIA LDL GOAL <70: Primary | ICD-10-CM

## 2018-06-19 RX ORDER — ATORVASTATIN CALCIUM 80 MG/1
TABLET, FILM COATED ORAL
Qty: 90 TABLET | Refills: 0 | Status: SHIPPED | OUTPATIENT
Start: 2018-06-19 | End: 2018-10-26

## 2018-06-19 NOTE — TELEPHONE ENCOUNTER
Routing refill request to provider for review/approval because:  A break in medication  Anabel Andrew RN

## 2018-06-21 ENCOUNTER — MYC REFILL (OUTPATIENT)
Dept: FAMILY MEDICINE | Facility: CLINIC | Age: 53
End: 2018-06-21

## 2018-06-21 ENCOUNTER — MYC REFILL (OUTPATIENT)
Dept: PALLIATIVE MEDICINE | Facility: CLINIC | Age: 53
End: 2018-06-21

## 2018-06-21 DIAGNOSIS — R21 RASH: ICD-10-CM

## 2018-06-21 DIAGNOSIS — M47.819 FACET ARTHROPATHY: ICD-10-CM

## 2018-06-21 NOTE — TELEPHONE ENCOUNTER
Message from [facility]:  Original authorizing provider: David Chavez MD, MD Lamont Daniels would like a refill of the following medications:  triamcinolone (KENALOG) 0.1 % ointment [David Chavez MD, MD]    Preferred pharmacy: [address]    Comment:  please fill    Medication renewals requested in this message routed to other providers:  oxyCODONE IR (ROXICODONE) 5 MG tablet [Dorothea Loo MD]

## 2018-06-21 NOTE — TELEPHONE ENCOUNTER
"Requested Prescriptions   Pending Prescriptions Disp Refills     triamcinolone (KENALOG) 0.1 % ointment  Last Written Prescription Date:  04/26/18  Last Fill Quantity: 80g,  # refills: 0   Last Office Visit with G, P or [facility] prescribing provider:  06/01/18   Future Office Visit:    Next 5 appointments (look out 90 days)     Jun 22, 2018  3:30 PM CDT   [facility]    [address] [place] [address]   [phone]            Jun 26, 2018  1:00 PM CDT   Return Visit with Sebastián Jenkins MD   [facility] ([facility]    [address] [place] [address]   [phone]            Jul 11, 2018  2:00 PM CDT   Return Visit with Sandra Borja MD   [facility]    [address] [place] [address]   [phone]            Jul 11, 2018  2:30 PM CDT   Return Visit with Dorothea Loo MD   [facility] ([facility] [address] [address]   [phone]                80 g 0    Topical Steroid Protocol Passed    6/21/2018 11:07 AM       Passed - Patient is age 6 or older       Passed - Authorizing prescriber's most recent note related to this medication read.       Passed - High potency steroid not ordered       Passed - Recent (12 mo) or future (30 days) visit within the authorizing provider's specialty    Patient had office visit in the last 12 months or has a visit in the next 30 days with authorizing provider or within the authorizing provider's specialty.  See \"Patient Info\" tab in inbasket, or \"Choose Columns\" in Meds & Orders section of the refill encounter.              "

## 2018-06-22 ENCOUNTER — ANESTHESIA EVENT (OUTPATIENT)
Dept: SURGERY | Facility: AMBULATORY SURGERY CENTER | Age: 53
End: 2018-06-22

## 2018-06-22 ENCOUNTER — OFFICE VISIT (OUTPATIENT)
Dept: CARDIOLOGY | Facility: CLINIC | Age: 53
End: 2018-06-22
Payer: COMMERCIAL

## 2018-06-22 VITALS
WEIGHT: 174.6 LBS | HEART RATE: 66 BPM | OXYGEN SATURATION: 96 % | DIASTOLIC BLOOD PRESSURE: 71 MMHG | BODY MASS INDEX: 30.94 KG/M2 | SYSTOLIC BLOOD PRESSURE: 108 MMHG | HEIGHT: 63 IN

## 2018-06-22 DIAGNOSIS — Z79.899 HIGH RISK MEDICATIONS (NOT ANTICOAGULANTS) LONG-TERM USE: ICD-10-CM

## 2018-06-22 DIAGNOSIS — I25.810 CORONARY ARTERY DISEASE INVOLVING AUTOLOGOUS ARTERY CORONARY BYPASS GRAFT WITHOUT ANGINA PECTORIS: Primary | ICD-10-CM

## 2018-06-22 DIAGNOSIS — M06.09 RHEUMATOID ARTHRITIS OF MULTIPLE SITES WITH NEGATIVE RHEUMATOID FACTOR (H): ICD-10-CM

## 2018-06-22 DIAGNOSIS — E78.5 HYPERLIPIDEMIA LDL GOAL <70: ICD-10-CM

## 2018-06-22 DIAGNOSIS — I25.810 CORONARY ATHEROSCLEROSIS OF AUTOLOGOUS VEIN BYPASS GRAFT: ICD-10-CM

## 2018-06-22 LAB
ALBUMIN SERPL-MCNC: 3.9 G/DL (ref 3.4–5)
ALP SERPL-CCNC: 92 U/L (ref 40–150)
ALT SERPL W P-5'-P-CCNC: 35 U/L (ref 0–70)
AST SERPL W P-5'-P-CCNC: 27 U/L (ref 0–45)
BASOPHILS # BLD AUTO: 0 10E9/L (ref 0–0.2)
BASOPHILS NFR BLD AUTO: 0.5 %
BILIRUB DIRECT SERPL-MCNC: 0.1 MG/DL (ref 0–0.2)
BILIRUB SERPL-MCNC: 0.5 MG/DL (ref 0.2–1.3)
CHOLEST SERPL-MCNC: 129 MG/DL
CREAT SERPL-MCNC: 0.98 MG/DL (ref 0.66–1.25)
CRP SERPL-MCNC: <2.9 MG/L (ref 0–8)
DIFFERENTIAL METHOD BLD: NORMAL
EOSINOPHIL # BLD AUTO: 0.1 10E9/L (ref 0–0.7)
EOSINOPHIL NFR BLD AUTO: 1.8 %
ERYTHROCYTE [DISTWIDTH] IN BLOOD BY AUTOMATED COUNT: 13.8 % (ref 10–15)
ERYTHROCYTE [SEDIMENTATION RATE] IN BLOOD BY WESTERGREN METHOD: 13 MM/H (ref 0–20)
GFR SERPL CREATININE-BSD FRML MDRD: 80 ML/MIN/1.7M2
HCT VFR BLD AUTO: 43.4 % (ref 40–53)
HDLC SERPL-MCNC: 64 MG/DL
HGB BLD-MCNC: 14.4 G/DL (ref 13.3–17.7)
IMM GRANULOCYTES # BLD: 0 10E9/L (ref 0–0.4)
IMM GRANULOCYTES NFR BLD: 0.3 %
LDLC SERPL CALC-MCNC: 42 MG/DL
LYMPHOCYTES # BLD AUTO: 1.5 10E9/L (ref 0.8–5.3)
LYMPHOCYTES NFR BLD AUTO: 20.4 %
MCH RBC QN AUTO: 29.8 PG (ref 26.5–33)
MCHC RBC AUTO-ENTMCNC: 33.2 G/DL (ref 31.5–36.5)
MCV RBC AUTO: 90 FL (ref 78–100)
MONOCYTES # BLD AUTO: 0.7 10E9/L (ref 0–1.3)
MONOCYTES NFR BLD AUTO: 9.7 %
NEUTROPHILS # BLD AUTO: 4.9 10E9/L (ref 1.6–8.3)
NEUTROPHILS NFR BLD AUTO: 67.3 %
NONHDLC SERPL-MCNC: 65 MG/DL
PLATELET # BLD AUTO: 284 10E9/L (ref 150–450)
PROT SERPL-MCNC: 7.6 G/DL (ref 6.8–8.8)
RBC # BLD AUTO: 4.83 10E12/L (ref 4.4–5.9)
TRIGL SERPL-MCNC: 113 MG/DL
WBC # BLD AUTO: 7.3 10E9/L (ref 4–11)

## 2018-06-22 PROCEDURE — 85025 COMPLETE CBC W/AUTO DIFF WBC: CPT | Performed by: INTERNAL MEDICINE

## 2018-06-22 PROCEDURE — 80061 LIPID PANEL: CPT | Performed by: INTERNAL MEDICINE

## 2018-06-22 PROCEDURE — 80076 HEPATIC FUNCTION PANEL: CPT | Performed by: INTERNAL MEDICINE

## 2018-06-22 PROCEDURE — 99214 OFFICE O/P EST MOD 30 MIN: CPT | Performed by: INTERNAL MEDICINE

## 2018-06-22 PROCEDURE — 36415 COLL VENOUS BLD VENIPUNCTURE: CPT | Performed by: INTERNAL MEDICINE

## 2018-06-22 PROCEDURE — 86140 C-REACTIVE PROTEIN: CPT | Performed by: INTERNAL MEDICINE

## 2018-06-22 PROCEDURE — 85652 RBC SED RATE AUTOMATED: CPT | Performed by: INTERNAL MEDICINE

## 2018-06-22 PROCEDURE — 82565 ASSAY OF CREATININE: CPT | Performed by: INTERNAL MEDICINE

## 2018-06-22 RX ORDER — TRIAMCINOLONE ACETONIDE 1 MG/G
OINTMENT TOPICAL 3 TIMES DAILY
Qty: 80 G | Refills: 0 | Status: SHIPPED | OUTPATIENT
Start: 2018-06-22 | End: 2018-08-06

## 2018-06-22 NOTE — NURSING NOTE
"Lamont Daniels's goals for this visit include: F/U  He requests these members of his care team be copied on today's visit information: yes    PCP: David Chavez    Referring Provider:  David Chavez MD  [facility] [address]  [place] [address]    /71  Pulse 66  Ht 1.6 m (5' 3\")  Wt 79.2 kg (174 lb 9.7 oz)  SpO2 96%  BMI 30.93 kg/m2          "

## 2018-06-22 NOTE — MR AVS SNAPSHOT
After Visit Summary   6/22/2018    Lamont Daniels    MRN: [id number]           Patient Information     Date Of Birth          1965        Visit Information        Provider Department      6/22/2018 3:30 PM Fly Travis MD [facility]        Today's Diagnoses     Coronary artery disease involving autologous artery coronary bypass graft without angina pectoris    -  1      Care Instructions    Stop metoprolol.  Echo next available  Return in one year with FASTING LIPID PROFILE          Follow-ups after your visit        Your next 10 appointments already scheduled     Jun 25, 2018   Procedure with Sandra Borja MD   [facility] (--)    [address] [place], [place] [address] [address]   [phone]            Jun 26, 2018  1:00 PM CDT   Return Visit with Sebastián Jenkins MD   [facility]    [address] [facility] [id number] [phone]            Jun 28, 2018  1:30 PM CDT   Ech Complete with MGECHR1, MG ECHO TECH   [facility] ([facility])    [address] [place] [address] [phone]           1. Please bring or wear a comfortable two-piece outfit. 2. You may eat, drink and take your normal medicines. 3. For any questions that cannot be answered, please contact the ordering physician            Jul 11, 2018  2:00 PM CDT   Return Visit with Sandra Borja MD   [facility] ([facility])    [address] [place] [address]   [phone]            Jul 11, 2018  2:30 PM CDT   Return Visit with Dorothea Loo MD   [facility] [place] ([facility] [place])    [address] [place] [address]  [place] [address] [phone]            Nov 12, 2018  7:30 AM CST   Return Visit with Ricardo Rae MD, MG OPHTH NURSE ONLY   [facility]  [facility])    [address] [place] [address]   [phone]            Jun 21, 2019  8:30 AM CDT   LAB with LAB FIRST FLOOR [facility] ([facility])    [address] [place] [address]   [phone]           Please do not eat 10-12 hours before your appointment if you are coming in fasting for labs on lipids, cholesterol, or glucose (sugar). This does not apply to pregnant women. Water, hot tea and black coffee (with nothing added) are okay. Do not drink other fluids, diet soda or chew gum.            Jun 21, 2019  9:00 AM CDT   Return Visit with Fly Travis MD   [facility] ([facility])    [address] [place] [address]   [phone]              Future tests that were ordered for you today     Open Future Orders        Priority Expected Expires Ordered    Echocardiogram Complete Routine  6/22/2019 6/22/2018            Who to contact     If you have questions or need follow up information about today's clinic visit or your schedule please contact [facility] directly at [phone].  Normal or non-critical lab and imaging results will be communicated to you by ColosseoEAShart, letter or phone within 4 business days after the clinic has received the results. If you do not hear from us within 7 days, please contact the clinic through ColosseoEAShart or phone. If you have a critical or abnormal lab result, we will notify you by phone as soon as possible.  Submit refill requests through GetShopApp or call your pharmacy and they will forward the refill request to us. Please allow 3 business days for your refill to be completed.          Additional Information About Your Visit        GetShopApp Information     GetShopApp gives you secure access to your electronic health record. If you see a primary care provider, you can also send messages to your care team and make appointments. If  "you have questions, please call your primary care clinic.  If you do not have a primary care provider, please call [phone] and they will assist you.      Survios is an electronic gateway that provides easy, online access to your medical records. With Survios, you can request a clinic appointment, read your test results, renew a prescription or communicate with your care team.     To access your existing account, please contact your [facility] or call [phone] for assistance.        Care EveryWhere ID     This is your Care EveryWhere ID. This could be used by other organizations to access your [facility] medical records  [id number]        Your Vitals Were     Pulse Height Pulse Oximetry BMI (Body Mass Index)          66 1.6 m (5' 3\") 96% 30.93 kg/m2         Blood Pressure from Last 3 Encounters:   06/22/18 108/71   06/01/18 106/72   04/12/18 116/81    Weight from Last 3 Encounters:   06/22/18 79.2 kg (174 lb 9.7 oz)   06/01/18 79.4 kg (175 lb)   04/12/18 80.8 kg (178 lb 1.6 oz)                 Today's Medication Changes          These changes are accurate as of 6/22/18  3:34 PM.  If you have any questions, ask your nurse or doctor.               Stop taking these medicines if you haven't already. Please contact your care team if you have questions.     metoprolol succinate 25 MG 24 hr tablet   Commonly known as:  TOPROL-XL   Stopped by:  Fly Travis MD                    Primary Care Provider Office Phone # Fax #    David Chavez [phone] [address]  [facility] [address]        Equal Access to Services     [facility]: Hadii esther Disla, chastity quinn, qamarcelino gonsalez . So [facility] [phone].    ATENCIÓN: Si habla español, tiene a muse disposición servicios gratuitos de asistencia lingüística. Llame al [phone].    We comply with applicable federal civil rights laws " and [place] laws. We do not discriminate on the basis of race, color, national origin, age, disability, sex, sexual orientation, or gender identity.            Thank you!     Thank you for choosing [facility]  for your care. Our goal is always to provide you with excellent care. Hearing back from our patients is one way we can continue to improve our services. Please take a few minutes to complete the written survey that you may receive in the mail after your visit with us. Thank you!             Your Updated Medication List - Protect others around you: Learn how to safely use, store and throw away your medicines at www.disposemymeds.org.          This list is accurate as of 6/22/18  3:34 PM.  Always use your most recent med list.                   Brand Name Dispense Instructions for use Diagnosis    Abatacept 125 MG/ML Soaj auto-injector    ORENCIA    4 Syringe    Inject 1 mL (125 mg) Subcutaneous every 7 days    Rheumatoid arthritis of multiple sites with negative rheumatoid factor (H)       allopurinol 300 MG tablet    ZYLOPRIM    210 tablet    TAKE 1 TABLET BY MOUTH 1 TIME DAILY    Idiopathic gout, unspecified chronicity, unspecified site       aspirin 81 MG EC tablet     30 tablet    Take 1 tablet (81 mg) by mouth daily (*)    Coronary artery disease involving native coronary artery of native heart without angina pectoris       atorvastatin 80 MG tablet    LIPITOR    90 tablet    TAKE 1 TABLET BY MOUTH 1 TIME DAILY    Atherosclerosis of native coronary artery of native heart, angina presence unspecified, Hyperlipidemia LDL goal <100       baclofen 10 MG tablet    LIORESAL    180 tablet    TAKE 1 TABLET BY MOUTH 2 TIMES DAILY AS NEEDED FOR MUSCLE SPASM    Spasm of back muscles       blood glucose monitoring meter device kit     1 kit    TEST 2 TIMES DAILY.    On corticosteroid therapy       BusPIRone HCl 30 MG Tabs     180 tablet    Take 1 tablet by mouth 2 times daily    Major depressive  disorder, recurrent episode, moderate (H)       clonazePAM 1 MG tablet    klonoPIN    90 tablet    TAKE ONE-HALF TO ONE TABLET BY MOUTH 3 TIMES DAILY AS NEEDED FOR ANXIETY    Anxiety hyperventilation       clopidogrel 75 MG tablet    PLAVIX    90 tablet    Take 1 tablet (75 mg) by mouth daily    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    Gout without tophus       diclofenac 1 % Gel topical gel    VOLTAREN    300 g    Apply 2-4 grams to 2-3 joints, up to four times daily as needed using enclosed dosing card.  Max of 32g/day    Facet arthropathy, Rheumatoid arthritis involving multiple sites, unspecified rheumatoid factor presence (H)       evolocumab 140 MG/ML prefilled autoinjector    REPATHA    2 mL    Inject 1 mL (140 mg) Subcutaneous every 14 days    Hyperlipidemia LDL goal <70, S/P CABG (coronary artery bypass graft)       ezetimibe 10 MG tablet    ZETIA    90 tablet    Take 1 tablet (10 mg) by mouth daily    Coronary artery disease involving native coronary artery of native heart without angina pectoris       fluticasone 50 MCG/ACT spray    FLONASE    1 Bottle    Spray 2 sprays into both nostrils daily    Nasal congestion, Dizziness       gabapentin 300 MG capsule    NEURONTIN    540 capsule    Take 2 capsules (600 mg) by mouth 3 times daily    Lumbar radiculopathy       gemfibrozil 600 MG tablet    LOPID    180 tablet    Take 1 tablet (600 mg) by mouth 2 times daily    Hyperlipidemia LDL goal <70       hydroxychloroquine 200 MG tablet    PLAQUENIL    30 tablet    Take 1 tablet (200 mg) by mouth daily    Rheumatoid arthritis of multiple sites with negative rheumatoid factor (H), High risk medications (not  anticoagulants) long-term use       meclizine 25 MG tablet    ANTIVERT    30 tablet    TAKE ONE TABLET BY MOUTH EVERY 6 HOURS AS NEEDED FOR  DIZZINESS    Vertigo       melatonin 3 MG tablet      Take 1 tablet (3 mg) by mouth nightly as needed for sleep    Delayed sleep phase syndrome       meloxicam 7.5 MG tablet    MOBIC    30 tablet    Take 1 tablet (7.5 mg) by mouth daily    Low back pain, unspecified back pain laterality, unspecified chronicity, with sciatica presence unspecified       mirtazapine 15 MG tablet    REMERON    30 tablet    Take 1 tablet (15 mg) by mouth At Bedtime    Severe episode of recurrent major depressive disorder, without psychotic features (H)       naloxone nasal spray    NARCAN    0.2 mL    Spray 1 spray (4 mg) into one nostril alternating nostrils as needed for opioid reversal every 2-3 minutes until assistance arrives    Rheumatoid arthritis involving multiple sites, unspecified rheumatoid factor presence (H)       nitroGLYcerin 0.4 MG sublingual tablet    NITROSTAT    25 tablet    PLACE 1 TABLET UNDER THE TONGUE EVERY 5 MINUTES AS NEEDED    Atherosclerosis of native coronary artery of native heart with angina pectoris (H)       order for DME      1.  CPAP pressure 11 cm/H20 with heated humidity.  2.  Provide mask to fit and CPAP supplies.  3.  Length of need lifetime.  4.  If needed please provide a chin strap    JEROD (obstructive sleep apnea), Anxiety, CAD (coronary artery disease), HTN (hypertension)       * order for DME     1 Units    Equipment being ordered: single end cane    Lumbar radiculopathy, Facet arthropathy, Chronic low back pain       * order for DME     200 each    Equipment being ordered: test strips as covered by insurance. USE TO TEST BLOOD SUGARS TWICE DAILY OR AS DIRECTED.    Rheumatoid arthritis involving multiple sites, unspecified rheumatoid factor presence (H)       * order for DME     1 each    OneTouch glucometer.    Rheumatoid arthritis involving multiple  sites, unspecified rheumatoid factor presence (H)       * order for DME     200 each    OneTouch lancets testing twice daily.    Rheumatoid arthritis involving multiple sites, unspecified rheumatoid factor presence (H)       oxyCODONE IR 5 MG tablet    ROXICODONE    180 tablet    Take 1-2 tablets (5-10 mg) by mouth every 4 hours as needed for moderate to severe pain . Max of 6 tabs per day.  30 day supply. May fill on/after 5/11/18 to start on 5/12/18    Facet arthropathy       pantoprazole 40 MG EC tablet    PROTONIX    90 tablet    Take 1 tablet (40 mg) by mouth daily    Gastroesophageal reflux disease without esophagitis       predniSONE 1 MG tablet    DELTASONE    270 tablet    Prednisone 4mg daily x30days, then 3mg daily for 30days, then 2mg daily thereafter.    Rheumatoid arthritis of multiple sites with negative rheumatoid factor (H), High risk medications (not anticoagulants) long-term use       sulfaSALAzine  MG EC tablet    AZULFIDINE EN    120 tablet    Take 2 tablets (1,000 mg) by mouth 2 times daily    Rheumatoid arthritis of multiple sites with negative rheumatoid factor (H), High risk medications (not anticoagulants) long-term use       tamsulosin 0.4 MG capsule    FLOMAX    90 capsule    Take 1 capsule (0.4 mg) by mouth daily    Benign prostatic hyperplasia with weak urinary stream       tenofovir 300 MG tablet    VIREAD    90 tablet    TAKE ONE TABLET BY MOUTH EVERY DAY    Chronic viral hepatitis B without delta agent and without coma (H)       triamcinolone 0.1 % ointment    KENALOG    80 g    Apply topically 3 times daily    Rash       vitamin D 2000 units tablet     100 tablet    TAKE ONE TABLET BY MOUTH ONCE DAILY    Vitamin D deficiency       zolpidem 5 MG tablet    AMBIEN    30 tablet    TAKE ONE TABLET BY MOUTH AT BEDTIME AS NEEDED FOR SLEEP    Insomnia, unspecified type       * Notice:  This list has 4 medication(s) that are the same as other medications prescribed for you. Read the  directions carefully, and ask your doctor or other care provider to review them with you.

## 2018-06-22 NOTE — PROGRESS NOTES
"[facility] Cardiology Consultation:    Assessment and Plan:     1. Premature CAD, CABG in 2008, PCI to Cx in '08, repeat PCI to Cx stent 2016 (patent sequential LIMA-D3-LAD, sequential SVG-RV marginal-PDA, occluded radial graft to PL branches), normal LV fxn: Cont secondary prevention with asa. Plan to continue plavix long term for now given aggressive ISR and low bleeding risk.      2. Dyspnea: stable. Echo and PFT's have been checked and were normal; its likely related to anxiety.  Given new lower extremity edema, will check echo to assess LV function.  3. Dyslipidemia, likely heterozygous FH, metabolic syndrome: On lipitor 80, gemfibrozil, zetia 10 and Repatha for uncontrolled dyspilidemia despite maximal rx. Lipid profile has improved significantly since initiating Repatha- will continue.  Lipid profile checked today looks excellent with a LDL of 42.  Continue to recheck annually  4. JEROD on CPAP  5. Orthostatic dizziness: stop metoprolol for now.    RTC 1 yr with SHOBHA Travis MD    Cardiac Imaging and Prevention  [facility]  Pager: [phone]    HPI:    Lamont Daniels is a 51 year-old male with complex medical history including significant HL with early CAD s/p CABG '08, PCI to Cx in '08, repeat PCI to Cx stent 2016, RA and anxiety who presents for follow-up.  Denies trouble with his cardiac medications. No bleeding issues.   He reports that symptoms of dyspnea and occasional chest pain are at baseline. He is limited significantly by joint pain and neuropathy, but is able to get around his house. Denies any orthopnea, pnd, palpitations. He has been getting 2/month Repatha injections.  He can it continues to have orthostatic dizziness, no syncope.  Labs today show an LDL of 42.    EXAM:  /71  Pulse 66  Ht 1.6 m (5' 3\")  Wt 79.2 kg (174 lb 9.7 oz)  SpO2 96%  BMI 30.93 kg/m2  GEN/CONSTITUIONAL: Appears comfortable, in no apparent distress   EYES: No " icterus  ENT/MOUTH: Normal  JVP:  Not visible  RESPIRATORY: Clear to auscultation bilaterally   CARDIOVASCULAR: Regular S1 and S2, no murmurs, rubs, or gallops.   ABDOMEN: Soft, non-tender, positive bowel sounds   NEUROLOGIC: Grossly non-focal   PSYCHIATRIC: Normal affect  EXT: No cyanosis, clubbing, with 1+ LE edema. Normal pedal pulses.  Skin: No petechiae, purpura or rash    PAST MEDICAL HISTORY:  Past Medical History:   Diagnosis Date     Anxiety      CAD (coronary artery disease)     CABG, PCI     Congestive heart failure, unspecified 2008     Depressive disorder 2008     Gout      Hepatitis B > 10 years     HTN (hypertension)      Hyperlipidemia LDL goal < 100      Malrotation of intestine      Moderate major depression (H)      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) 9/19/2011    Uses CPAP     Rheumatoid arthritis flare (H) 1012     Steatohepatitis 12/15    liver biopsy     Tuberculosis age 13    INH x 9 months      Vitamin D deficiency        CURRENT MEDICATIONS:  Current Outpatient Prescriptions   Medication     Abatacept (ORENCIA) 125 MG/ML SOAJ auto-injector     allopurinol (ZYLOPRIM) 300 MG tablet     aspirin EC 81 MG EC tablet     atorvastatin (LIPITOR) 80 MG tablet     baclofen (LIORESAL) 10 MG tablet     blood glucose monitoring (ONE TOUCH ULTRA 2) meter device kit     BusPIRone HCl 30 MG TABS     Cholecalciferol (VITAMIN D) 2000 UNITS tablet     clonazePAM (KLONOPIN) 1 MG tablet     clopidogrel (PLAVIX) 75 MG tablet     COLCRYS 0.6 MG tablet     diclofenac (VOLTAREN) 1 % GEL topical gel     evolocumab (REPATHA) 140 MG/ML prefilled autoinjector     ezetimibe (ZETIA) 10 MG tablet     fluticasone (FLONASE) 50 MCG/ACT spray     gabapentin (NEURONTIN) 300 MG capsule     gemfibrozil (LOPID) 600 MG tablet     hydroxychloroquine (PLAQUENIL) 200 MG tablet     meclizine (ANTIVERT) 25 MG tablet     melatonin 3 MG tablet     meloxicam (MOBIC) 7.5 MG tablet     metoprolol succinate (TOPROL-XL) 25 MG 24 hr tablet     mirtazapine  (REMERON) 15 MG tablet     naloxone (NARCAN) nasal spray     nitroGLYcerin (NITROSTAT) 0.4 MG sublingual tablet     order for DME     order for DME     order for DME     order for DME     ORDER FOR DME     oxyCODONE IR (ROXICODONE) 5 MG tablet     pantoprazole (PROTONIX) 40 MG EC tablet     predniSONE (DELTASONE) 1 MG tablet     sulfaSALAzine ER (AZULFIDINE EN) 500 MG EC tablet     tamsulosin (FLOMAX) 0.4 MG capsule     tenofovir (VIREAD) 300 MG tablet     triamcinolone (KENALOG) 0.1 % ointment     zolpidem (AMBIEN) 5 MG tablet     No current facility-administered medications for this visit.      Facility-Administered Medications Ordered in Other Visits   Medication     gadobutrol (GADAVIST) injection 10 mL       PAST SURGICAL HISTORY:  Past Surgical History:   Procedure Laterality Date     ABDOMEN SURGERY  2014     BIOPSY  2015     BYPASS GRAFT ARTERY CORONARY  2008    6 vessels     COLONOSCOPY  2/8/2013    Procedure: COLONOSCOPY;  Colonoscopy, blood in stool;  Surgeon: Duane, William Charles, MD;  Location: [facility] OR     GI SURGERY  2014      CORONARY STENT CIERA SOTO VESSEL  2008    3 months after CABG     HEAD & NECK SURGERY  2011     NASAL/SINUS POLYPECTOMY  2010     ORTHOPEDIC SURGERY  2012     THORACIC SURGERY  1989    tb     TONSILLECTOMY  2010     UVULOPALATOPHARYNGOPLASTY  2010     VASCULAR SURGERY  2008       ALLERGIES     Allergies   Allergen Reactions     Citalopram Itching     Tramadol Itching       FAMILY HISTORY:  Family History   Problem Relation Age of Onset     C.A.D. Father      Coronary Artery Disease Father      Hypertension Father      Depression Father      Hypertension Mother      Diabetes Mother      Depression Mother      Mental Illness Mother      Hypertension Maternal Grandfather      Cancer Paternal Grandfather      Other Cancer Paternal Grandfather      Other Cancer Other      Autoimmune Disease No family hx of      Cerebrovascular Disease No family hx of      Thyroid Disease No  family hx of      Glaucoma No family hx of      Macular Degeneration No family hx of    -oldest of 8 siblings     SOCIAL HISTORY:  Social History     Social History     Marital status:      Spouse name: N/A     Number of children: 5     Years of education: 14     Occupational History      Unemployed     2-2011. On long term disability. SSD applied for     Social History Main Topics     Smoking status: Never Smoker     Smokeless tobacco: Never Used     Alcohol use No     Drug use: No     Sexual activity: Yes     Partners: Female     Other Topics Concern     Parent/Sibling W/ Cabg, Mi Or Angioplasty Before 65f 55m? Yes     father     Social History Narrative    . No current SO.         Has 5 children. Youngest child does live with him.         H/o AA degree and previously worked as a [employer]. Currently unemployed.         Denies tobacco use.     Alcohol use: 2x/week with minimal amount of alcohol per time.     Drug use: denies       ROS:   Constitutional: No fever, chills, or sweats. No weight gain/loss   ENT: No visual disturbance, ear ache, epistaxis, sore throat  Allergies/Immunologic: Negative.   Respiratory: No cough, hemoptysia  Cardiovascular: As per HPI  GI: No nausea, vomiting, hematemesis, melena, or hematochezia  : No urinary frequency, dysuria, or hematuria  Integument: Negative  Psychiatric: Negative  Neuro: Negative  Endocrinology: Negative   Musculoskeletal: Negative    ADDITIONAL COMMENTS:     I reviewed the patient's medications:     I reviewed the patient's pertinent clinical laboratory studies:     I reviewed the patient's pertinent imaging studies:   I reviewed the patient's ECG:

## 2018-06-22 NOTE — TELEPHONE ENCOUNTER
Prescription approved per [facility] Refill Protocol.  Kellie Nicholas RN

## 2018-06-25 ENCOUNTER — SURGERY (OUTPATIENT)
Age: 53
End: 2018-06-25
Payer: COMMERCIAL

## 2018-06-25 ENCOUNTER — ANESTHESIA (OUTPATIENT)
Dept: SURGERY | Facility: AMBULATORY SURGERY CENTER | Age: 53
End: 2018-06-25
Payer: COMMERCIAL

## 2018-06-25 ENCOUNTER — HOSPITAL ENCOUNTER (OUTPATIENT)
Facility: AMBULATORY SURGERY CENTER | Age: 53
Discharge: HOME | End: 2018-06-25
Attending: OPHTHALMOLOGY | Admitting: OPHTHALMOLOGY
Payer: COMMERCIAL

## 2018-06-25 VITALS
TEMPERATURE: 97.2 F | DIASTOLIC BLOOD PRESSURE: 75 MMHG | OXYGEN SATURATION: 97 % | RESPIRATION RATE: 16 BRPM | SYSTOLIC BLOOD PRESSURE: 120 MMHG

## 2018-06-25 DIAGNOSIS — Z98.890 POSTOPERATIVE EYE STATE: Primary | ICD-10-CM

## 2018-06-25 PROCEDURE — 67900 REPAIR BROW DEFECT: CPT | Mod: LT

## 2018-06-25 PROCEDURE — 15823 BLEPHARP UPR EYELID XCSV SKN: CPT | Mod: 50 | Performed by: OPHTHALMOLOGY

## 2018-06-25 PROCEDURE — 67908 REPAIR EYELID DEFECT: CPT | Mod: E1

## 2018-06-25 PROCEDURE — 67900 REPAIR BROW DEFECT: CPT | Mod: 50 | Performed by: OPHTHALMOLOGY

## 2018-06-25 PROCEDURE — 88305 TISSUE EXAM BY PATHOLOGIST: CPT | Performed by: OPHTHALMOLOGY

## 2018-06-25 PROCEDURE — 11442 EXC FACE-MM B9+MARG 1.1-2 CM: CPT | Mod: 51 | Performed by: OPHTHALMOLOGY

## 2018-06-25 PROCEDURE — G8907 PT DOC NO EVENTS ON DISCHARG: HCPCS

## 2018-06-25 PROCEDURE — 11442 EXC FACE-MM B9+MARG 1.1-2 CM: CPT

## 2018-06-25 PROCEDURE — G8918 PT W/O PREOP ORDER IV AB PRO: HCPCS

## 2018-06-25 RX ORDER — NALOXONE HYDROCHLORIDE 0.4 MG/ML
.1-.4 INJECTION, SOLUTION INTRAMUSCULAR; INTRAVENOUS; SUBCUTANEOUS
Status: DISCONTINUED | OUTPATIENT
Start: 2018-06-25 | End: 2018-06-26 | Stop reason: HOSPADM

## 2018-06-25 RX ORDER — LIDOCAINE 40 MG/G
CREAM TOPICAL
Status: DISCONTINUED | OUTPATIENT
Start: 2018-06-25 | End: 2018-06-26 | Stop reason: HOSPADM

## 2018-06-25 RX ORDER — SODIUM CHLORIDE, SODIUM LACTATE, POTASSIUM CHLORIDE, CALCIUM CHLORIDE 600; 310; 30; 20 MG/100ML; MG/100ML; MG/100ML; MG/100ML
INJECTION, SOLUTION INTRAVENOUS CONTINUOUS
Status: DISCONTINUED | OUTPATIENT
Start: 2018-06-25 | End: 2018-06-26 | Stop reason: HOSPADM

## 2018-06-25 RX ORDER — ONDANSETRON 4 MG/1
4 TABLET, ORALLY DISINTEGRATING ORAL EVERY 30 MIN PRN
Status: DISCONTINUED | OUTPATIENT
Start: 2018-06-25 | End: 2018-06-26 | Stop reason: HOSPADM

## 2018-06-25 RX ORDER — OXYCODONE HYDROCHLORIDE 5 MG/1
5-10 TABLET ORAL EVERY 4 HOURS PRN
Qty: 180 TABLET | Refills: 0 | Status: SHIPPED | OUTPATIENT
Start: 2018-06-25 | End: 2019-03-14

## 2018-06-25 RX ORDER — SODIUM CHLORIDE, SODIUM LACTATE, POTASSIUM CHLORIDE, CALCIUM CHLORIDE 600; 310; 30; 20 MG/100ML; MG/100ML; MG/100ML; MG/100ML
500 INJECTION, SOLUTION INTRAVENOUS CONTINUOUS
Status: DISCONTINUED | OUTPATIENT
Start: 2018-06-25 | End: 2018-06-26 | Stop reason: HOSPADM

## 2018-06-25 RX ORDER — PROPOFOL 10 MG/ML
INJECTION, EMULSION INTRAVENOUS PRN
Status: DISCONTINUED | OUTPATIENT
Start: 2018-06-25 | End: 2018-06-25

## 2018-06-25 RX ORDER — ONDANSETRON 2 MG/ML
4 INJECTION INTRAMUSCULAR; INTRAVENOUS EVERY 30 MIN PRN
Status: DISCONTINUED | OUTPATIENT
Start: 2018-06-25 | End: 2018-06-26 | Stop reason: HOSPADM

## 2018-06-25 RX ORDER — NEOMYCIN SULFATE, POLYMYXIN B SULFATE AND DEXAMETHASONE 3.5; 10000; 1 MG/ML; [USP'U]/ML; MG/ML
1-2 SUSPENSION/ DROPS OPHTHALMIC 3 TIMES DAILY
Qty: 1 BOTTLE | Refills: 0 | Status: SHIPPED | OUTPATIENT
Start: 2018-06-25 | End: 2019-05-14

## 2018-06-25 RX ORDER — FENTANYL CITRATE 50 UG/ML
25-50 INJECTION, SOLUTION INTRAMUSCULAR; INTRAVENOUS EVERY 5 MIN PRN
Status: DISCONTINUED | OUTPATIENT
Start: 2018-06-25 | End: 2018-06-26 | Stop reason: HOSPADM

## 2018-06-25 RX ORDER — LIDOCAINE HYDROCHLORIDE 20 MG/ML
INJECTION, SOLUTION INFILTRATION; PERINEURAL PRN
Status: DISCONTINUED | OUTPATIENT
Start: 2018-06-25 | End: 2018-06-25

## 2018-06-25 RX ORDER — ERYTHROMYCIN 5 MG/G
OINTMENT OPHTHALMIC
Qty: 3.5 G | Refills: 0 | Status: SHIPPED | OUTPATIENT
Start: 2018-06-25 | End: 2019-07-22

## 2018-06-25 RX ORDER — TETRACAINE HYDROCHLORIDE 5 MG/ML
SOLUTION OPHTHALMIC PRN
Status: DISCONTINUED | OUTPATIENT
Start: 2018-06-25 | End: 2018-06-25 | Stop reason: HOSPADM

## 2018-06-25 RX ORDER — PROPOFOL 10 MG/ML
INJECTION, EMULSION INTRAVENOUS CONTINUOUS PRN
Status: DISCONTINUED | OUTPATIENT
Start: 2018-06-25 | End: 2018-06-25

## 2018-06-25 RX ORDER — ERYTHROMYCIN 5 MG/G
OINTMENT OPHTHALMIC PRN
Status: DISCONTINUED | OUTPATIENT
Start: 2018-06-25 | End: 2018-06-25 | Stop reason: HOSPADM

## 2018-06-25 RX ADMIN — PROPOFOL 20 MG: 10 INJECTION, EMULSION INTRAVENOUS at 12:15

## 2018-06-25 RX ADMIN — PROPOFOL 50 MG: 10 INJECTION, EMULSION INTRAVENOUS at 12:11

## 2018-06-25 RX ADMIN — PROPOFOL 75 MCG/KG/MIN: 10 INJECTION, EMULSION INTRAVENOUS at 12:15

## 2018-06-25 RX ADMIN — PROPOFOL 30 MG: 10 INJECTION, EMULSION INTRAVENOUS at 12:12

## 2018-06-25 RX ADMIN — PROPOFOL 30 MG: 10 INJECTION, EMULSION INTRAVENOUS at 12:13

## 2018-06-25 RX ADMIN — PROPOFOL 30 MG: 10 INJECTION, EMULSION INTRAVENOUS at 12:34

## 2018-06-25 RX ADMIN — ERYTHROMYCIN 1 INCH: 5 OINTMENT OPHTHALMIC at 12:41

## 2018-06-25 RX ADMIN — Medication 10 ML: at 12:13

## 2018-06-25 RX ADMIN — SODIUM CHLORIDE, SODIUM LACTATE, POTASSIUM CHLORIDE, CALCIUM CHLORIDE 500 ML: 600; 310; 30; 20 INJECTION, SOLUTION INTRAVENOUS at 11:53

## 2018-06-25 RX ADMIN — TETRACAINE HYDROCHLORIDE 1 DROP: 5 SOLUTION OPHTHALMIC at 12:10

## 2018-06-25 RX ADMIN — LIDOCAINE HYDROCHLORIDE 40 MG: 20 INJECTION, SOLUTION INFILTRATION; PERINEURAL at 12:11

## 2018-06-25 ASSESSMENT — LIFESTYLE VARIABLES: TOBACCO_USE: 0

## 2018-06-25 ASSESSMENT — COPD QUESTIONNAIRES: COPD: 0

## 2018-06-25 NOTE — DISCHARGE INSTRUCTIONS
[facility]  Same-Day Surgery   Adult Discharge Orders & Instructions   For 24 hours after surgery  1. Get plenty of rest.  A responsible adult must stay with you for at least 24 hours after you leave the hospital.   2. Do not drive or use heavy equipment.  If you have weakness or tingling, don't drive or use heavy equipment until this feeling goes away.  3. Do not drink alcohol.  4. Avoid strenuous or risky activities.  Ask for help when climbing stairs.   5. You may feel lightheaded.  IF so, sit for a few minutes before standing.  Have someone help you get up.   6. If you have nausea (feel sick to your stomach): Drink only clear liquids such as apple juice, ginger ale, broth or 7-Up.  Rest may also help.  Be sure to drink enough fluids.  Move to a regular diet as you feel able.  7. You may have a slight fever. Call the doctor if your fever is over 100 F (37.7 C) (taken under the tongue) or lasts longer than 24 hours.  8. You may have a dry mouth, a sore throat, muscle aches or trouble sleeping.  These should go away after 24 hours.  9. Do not make important or legal decisions.   Call your doctor for any of the followin.  Signs of infection (fever, growing tenderness at the surgery site, a large amount of drainage or bleeding, severe pain, foul-smelling drainage, redness, swelling).    2. It has been over 8 to 10 hours since surgery and you are still not able to urinate (pass water).    3.  Headache for over 24 hours.      To contact Dr Borja call:  [phone] - Day  [phone] - After Hours, ask for the Opthomologist on call      Post-operative Instructions  Ophthalmic Plastic and Reconstructive Surgery    Sandra Borja M.D.     All instructions apply to the operated eye(s) or eyelid(s).    Wound care and personal care  ? If a patch or bandage has been placed, please leave this in place until seen by your physician. Ensure that the bandage does not get wet when you take  a shower.  ? Apply ice compresses 15 minutes on 15 minutes off while awake for 2 days, then switch to warm water compresses 4 times a day until seen by your physician. For warm packs you can place a cup of dry uncooked rice in a clean cotton sock. Then place sock in microwave 30 seconds to one minute. Next place the warm sock into a plastic bag and wrap the bag with clean warm wet washcloth and place over operated eye.    ? You may shower or wash your hair the day after surgery. Do not bathe or go swimming for 1 week to prevent contamination of your wounds.  ? Do not apply make-up to the eyes or eyelids for 2 weeks after surgery.  ? Expect some swelling, bruising, black eye (even into the lower eyelids and cheeks). Also expect serum caking, crusting and discharge from the eye and/or incisions. A small amount of surface bleeding is normal for the first 48 hours.  ? Your eye(s) and eyelid(s) may be painful and tender. This is normal after surgery.  Use the pain medication as prescribed. If your pain does not improve despite the  medication, contact the office.    Contact information and follow-up  ? Return to the Eye Clinic for a follow-up appointment with your physician as  scheduled. If no appointment has been scheduled:   - [facility] eye clinic: [phone] for an appointment with Dr. Borja within 1 to 2 weeks from your date of surgery.   -  [facility] eye clinic: [phone] for an appointment with Dr. Borja within 1 to 2 weeks from your date of surgery.     ? For severe pain, bleeding, or loss of vision, call the [facility] at [phone] or [facility] at [phone].     After hours or on weekends and holidays, call [phone] and ask to speak with the ophthalmologist on call.    An on call person can be reached after hours for concerns. The on call doctor should not call in medication refill requests after hours or on weekends,  so please plan accordingly. An effort has been made to provide adequate pain medications following every surgery, and refills will not be provided in most instances. Narcotic pain medications cannot be called in.     Activity restrictions and driving  ? Avoid heavy lifting, bending, exercise or strenuous activity for 1 week after surgery.  You may resume other activities and return to work as tolerated.  ? You may not resume driving until have you stopped using narcotic pain medications (such as Norco, Percocet, Tylenol #3).    Medications  ? Restart all your regular home medications and eye drops. If you take Plavix or Aspirin on a regular basis,   wait for 72 hours after your surgery before restarting these in order to decrease the risk of bleeding complications.  ? Avoid aspirin and aspirin-like medications (Motrin, Aleve, Ibuprofen, Bing-  [place] etc) for 72 hours to reduce the risk of bleeding. You may take Tylenol (acetaminophen) for pain.  ? In addition to your home medications, take the following post-operative medications as prescribed by your physician.    ? Apply antibiotic ointment to all sutures three times a day, and into the operated eye(s) at night.  ? Instill eye drops 3 times a day for 10 days.   ? Take pain pills as currently scheduled.    ? WARNING: All the prescription pain medications listed above contain Tylenol  (acetaminophen). You must not take more than 4,000 mg of acetaminophen per  24-hour period. This is equivalent to 6 tablets of Darvocet, 12 tablets of Norco, Percocet or Tylenol #3. If you take other over-the-counter medications containing acetaminophen, you must take the amount of acetaminophen into account and reduce the number of prescribed pain pills accordingly.  ? The prescribed medications may make you drowsy. You must not drive a car,  operate heavy machinery or drink alcohol while taking them.  ? The prescribed pain medications may cause constipation and nausea. Take  them  with some food to prevent a stomach upset. If you continue to experience nausea,  call your physician.

## 2018-06-25 NOTE — IP AVS SNAPSHOT
[facility]    [address] [place] [address]    Phone:  [phone]                                       After Visit Summary   6/25/2018    Lamont Daniels    MRN: [id number]           After Visit Summary Signature Page     I have received my discharge instructions, and my questions have been answered. I have discussed any challenges I see with this plan with the nurse or doctor.    ..........................................................................................................................................  Patient/Patient Representative Signature      ..........................................................................................................................................  Patient Representative Print Name and Relationship to Patient    ..................................................               ................................................  Date                                            Time    ..........................................................................................................................................  Reviewed by Signature/Title    ...................................................              ..............................................  Date                                                            Time

## 2018-06-25 NOTE — OP NOTE
Procedure Date: 06/25/2018      PREOPERATIVE DIAGNOSES:     1.  Both upper eyelid dermatochalasia.   2.  Bilateral brow ptosis.   3.  Bilateral upper eyelid ptosis.   4.  Both upper eyelid enlarging lesions.      POSTOPERATIVE DIAGNOSES:     1.  Both upper eyelid dermatochalasia.   2.  Bilateral brow ptosis.   3.  Bilateral upper eyelid ptosis.   4.  Both upper eyelid enlarging lesions.      PROCEDURES PERFORMED:     1.  Both upper eyelid blepharoplasty.   2.  Bilateral internal browpexy.   3.  Bilateral upper eyelid ptosis repair by Genao muscle conjunctival resection 8 mm bilaterally.   4.  Excision of both upper eyelid enlarging lesions.      ANESTHESIA:  Monitored anesthesia care with local infiltration of 50/50 mixture of 2% lidocaine with epinephrine and 0.5% Marcaine.      SURGEON:  Sandra Borja MD      ASSISTANT:  Kale Sofia MD       COMPLICATIONS:  None.      BLOOD LOSS:  3 mL.      SPECIMENS:  Both upper eyelid lesions sent in formalin to pathology.      INDICATIONS FOR PROCEDURE:  Mr. Daniels presented with both upper eyelid dermatochalasis, ptosis and brow ptosis obstructing his superior visual field and interfering with his activities of daily living.  We discussed the risks, benefits and alternatives to the proposed procedure and informed consent was obtained.  Additionally, he did have enlarging yellowish lesions of both upper eyelids.  It was decided that excisional biopsy was warranted for these as well.      DESCRIPTION OF PROCEDURE:  Mr. Daniels was brought to the operating room and placed supine on the operating table.  IV sedation was given.  The upper eyelid crease and excess skin was marked out with a marking pen and infiltrated with local anesthetic.  The desired brow arch was marked in the preoperative area.  Additionally, the enlarging eyelid lesions which were in the nasal upper eyelids bilaterally, were marked out in an ellipse fashion and also infiltrated with local anesthetic.         Attention was directed to the left side.  The skin was incised following the marked line around the blepharoplasty incision.  The eyelid lesion was also incised with a 15 blade.  The skin flap was excised from the upper eyelid with high-temperature cautery.  Hemostasis was obtained.  The lesion was excised and placed in formalin.  There was a deep dermal component to the lesion as well and this was excised as well.  Hemostasis was obtained.  The orbital septum was opened and the nasal and central fat pads were debulked.  Dissection was carried in the suborbicularis plane over the orbital rim in the region of the desired brow elevation.  Dissection allowed mobilization of the tail of the brow nicely.  A 4-0 Prolene suture was passed through the marking into the incision.  A suture was used to engage the periosteum in line with the marking stitch, 1 cm above the orbital rim.  The suture was then passed in a horizontal fashion through the orbicularis in the area of the marking stitch and the suture was internalized.  This was tied down nicely securing the tail of the brow in an improved position.  Next, a 4-0 silk suture was placed through the eyelid margin.  The eyelid was everted over Desmarres retractor.  A 6-0 silk suture was threaded half the desired distance from the superior tarsus.  The suture was used to elevate the conjunctiva and Genao muscle.  A Putterman clamp was placed over the elevated tissue.  A 6-0 plain gut suture was threaded 1 mm below the clamp in a horizontal serpentine fashion from lateral to medial and then medial to lateral.  The clamped tissue was excised with a 15 blade.  The suture was externalized and tied in a permanent fashion.  The 4-0 silk suture was removed.  The skin was closed with a running 6-0 plain gut suture.  The defect from the excised lesion was carried into the blepharoplasty incision and closed.  The final defect size was about 1 cm x 5 mm.  This was closed with  interrupted 6-0 plain gut sutures.  Erythromycin ophthalmic ointment was applied to the incision.        Attention was directed to the opposite side where an identical procedure was performed.  He tolerated the procedure well and left the operating room in stable condition.         MISA KU MD             D: 2018   T: 2018   MT: NORTH      Name:     ZOË HARPER   MRN:      [id number]        Account:        [id number]   :      1965           Procedure Date: 2018      Document: [id number]

## 2018-06-25 NOTE — IP AVS SNAPSHOT
MRN:[id number]                      After Visit Summary   6/25/2018    Lamont Daniels    MRN: [id number]           Thank you!     Thank you for choosing [facility] for your care. Our goal is always to provide you with excellent care. Hearing back from our patients is one way we can continue to improve our services. Please take a few minutes to complete the written survey that you may receive in the mail after you visit with us. Thank you!        Patient Information     Date Of Birth          1965        About your hospital stay     You were admitted on:  June 25, 2018 [place]  [facility]    You were discharged on:  June 25, 2018       Who to Call     For medical emergencies, please call 911.  For non-urgent questions about your medical care, please call your primary care provider or clinic, [phone]  For questions related to your surgery, please call your surgery clinic        Attending Provider     Provider Specialty    Sandra Borja MD Ophthalmology       Primary Care Provider Office Phone # Fax #    David Chavez [phone]      After Care Instructions     Discharge Medication Instructions       Do NOT take aspirin or medications containing NSAIDS for 72 hours after procedure.            Ice to affected area       Apply cold pack for 15 minutes on, 15 minutes off, for 48 hours while awake.                  Your next 10 appointments already scheduled     Jun 26, 2018  1:00 PM CDT   Return Visit with Sebastián Jenkins MD   [facility] ([facility])    [address] [facility] [address] [phone]            Jun 28, 2018  1:30 PM CDT   Ech Complete with MGECHR1, MG ECHO TECH   [facility] ([facility])    [address] [place] [address] [phone]           1. Please bring or wear a comfortable two-piece outfit. 2. You may eat,  drink and take your normal medicines. 3. For any questions that cannot be answered, please contact the ordering physician            Jul 11, 2018 10:00 AM CDT   Return Visit with Sandra Borja MD   [facility])    [address] [place] [address]   [phone]            Jul 11, 2018  2:30 PM CDT   Return Visit with Dorothea Loo MD   [facility] ([facility])    [address] [facility] [address]   [phone]            Nov 12, 2018  7:30 AM CST   Return Visit with Ricardo Rae MD, [facility] NURSE ONLY   [facility])    [address] [place] [address]   [phone]            Jun 21, 2019  8:30 AM CDT   LAB with LAB FIRST FLOOR [facility])    [address] [place] [address]   [phone]           Please do not eat 10-12 hours before your appointment if you are coming in fasting for labs on lipids, cholesterol, or glucose (sugar). This does not apply to pregnant women. Water, hot tea and black coffee (with nothing added) are okay. Do not drink other fluids, diet soda or chew gum.            Jun 21, 2019  9:00 AM CDT   Return Visit with Fly Travis MD   [facility])    [address] [place] [address]   [phone]              Further instructions from your care team       [facility]  Same-Day Surgery   Adult Discharge Orders & Instructions   For 24 hours after surgery  1. Get plenty of rest.  A responsible adult must stay with you for at least 24 hours after you leave the hospital.   2. Do not drive or use heavy equipment.  If you have weakness or tingling, don't drive or use heavy equipment until this feeling goes away.  3. Do not drink  alcohol.  4. Avoid strenuous or risky activities.  Ask for help when climbing stairs.   5. You may feel lightheaded.  IF so, sit for a few minutes before standing.  Have someone help you get up.   6. If you have nausea (feel sick to your stomach): Drink only clear liquids such as apple juice, ginger ale, broth or 7-Up.  Rest may also help.  Be sure to drink enough fluids.  Move to a regular diet as you feel able.  7. You may have a slight fever. Call the doctor if your fever is over 100 F (37.7 C) (taken under the tongue) or lasts longer than 24 hours.  8. You may have a dry mouth, a sore throat, muscle aches or trouble sleeping.  These should go away after 24 hours.  9. Do not make important or legal decisions.   Call your doctor for any of the followin.  Signs of infection (fever, growing tenderness at the surgery site, a large amount of drainage or bleeding, severe pain, foul-smelling drainage, redness, swelling).    2. It has been over 8 to 10 hours since surgery and you are still not able to urinate (pass water).    3.  Headache for over 24 hours.      To contact Dr Borja call:  [phone] - Day  [phone] - After Hours, ask for the Opthomologist on call      Post-operative Instructions  Ophthalmic Plastic and Reconstructive Surgery    Sandra Borja M.D.     All instructions apply to the operated eye(s) or eyelid(s).    Wound care and personal care  ? If a patch or bandage has been placed, please leave this in place until seen by your physician. Ensure that the bandage does not get wet when you take a shower.  ? Apply ice compresses 15 minutes on 15 minutes off while awake for 2 days, then switch to warm water compresses 4 times a day until seen by your physician. For warm packs you can place a cup of dry uncooked rice in a clean cotton sock. Then place sock in microwave 30 seconds to one minute. Next place the warm sock into a plastic bag and wrap the bag with clean warm wet washcloth and  place over operated eye.    ? You may shower or wash your hair the day after surgery. Do not bathe or go swimming for 1 week to prevent contamination of your wounds.  ? Do not apply make-up to the eyes or eyelids for 2 weeks after surgery.  ? Expect some swelling, bruising, black eye (even into the lower eyelids and cheeks). Also expect serum caking, crusting and discharge from the eye and/or incisions. A small amount of surface bleeding is normal for the first 48 hours.  ? Your eye(s) and eyelid(s) may be painful and tender. This is normal after surgery.  Use the pain medication as prescribed. If your pain does not improve despite the  medication, contact the office.    Contact information and follow-up  ? Return to the Eye Clinic for a follow-up appointment with your physician as  scheduled. If no appointment has been scheduled:   - [facility] eye clinic: [phone] for an appointment with Dr. Borja within 1 to 2 weeks from your date of surgery.   -  [facility] eye clinic: [phone] for an appointment with Dr. Borja within 1 to 2 weeks from your date of surgery.     ? For severe pain, bleeding, or loss of vision, call the [facility] at [phone] or [facility] at [phone].     After hours or on weekends and holidays, call [phone] and ask to speak with the ophthalmologist on call.    An on call person can be reached after hours for concerns. The on call doctor should not call in medication refill requests after hours or on weekends, so please plan accordingly. An effort has been made to provide adequate pain medications following every surgery, and refills will not be provided in most instances. Narcotic pain medications cannot be called in.     Activity restrictions and driving  ? Avoid heavy lifting, bending, exercise or strenuous activity for 1 week after surgery.  You may resume other activities and return to work as  tolerated.  ? You may not resume driving until have you stopped using narcotic pain medications (such as Norco, Percocet, Tylenol #3).    Medications  ? Restart all your regular home medications and eye drops. If you take Plavix or Aspirin on a regular basis,   wait for 72 hours after your surgery before restarting these in order to decrease the risk of bleeding complications.  ? Avoid aspirin and aspirin-like medications (Motrin, Aleve, Ibuprofen, Bing-  Ona etc) for 72 hours to reduce the risk of bleeding. You may take Tylenol (acetaminophen) for pain.  ? In addition to your home medications, take the following post-operative medications as prescribed by your physician.    ? Apply antibiotic ointment to all sutures three times a day, and into the operated eye(s) at night.  ? Instill eye drops 3 times a day for 10 days.   ? Take pain pills as currently scheduled.    ? WARNING: All the prescription pain medications listed above contain Tylenol  (acetaminophen). You must not take more than 4,000 mg of acetaminophen per  24-hour period. This is equivalent to 6 tablets of Darvocet, 12 tablets of Norco, Percocet or Tylenol #3. If you take other over-the-counter medications containing acetaminophen, you must take the amount of acetaminophen into account and reduce the number of prescribed pain pills accordingly.  ? The prescribed medications may make you drowsy. You must not drive a car,  operate heavy machinery or drink alcohol while taking them.  ? The prescribed pain medications may cause constipation and nausea. Take them  with some food to prevent a stomach upset. If you continue to experience nausea,  call your physician.    Pending Results     No orders found from 6/23/2018 to 6/26/2018.            Admission Information     Date & Time Provider Department Dept. Phone    6/25/2018 Sandra Borja MD [facility] [phone]      Your Vitals Were     Blood Pressure Temperature Respirations  Pulse Oximetry          105/59 97.5  F (36.4  C) (Temporal) 14 94%        [facility] Information     [facility] gives you secure access to your electronic health record. If you see a primary care provider, you can also send messages to your care team and make appointments. If you have questions, please call your primary care clinic.  If you do not have a primary care provider, please call [phone] and they will assist you.        Care EveryWhere ID     This is your Care EveryWhere ID. This could be used by other organizations to access your [facility] medical records  [id number]        Equal Access to Services     [facility]: Hadii esther Disla, wasilva quinn, yesi hanson, marcelino katz . So [facility] [phone].    ATENCIÓN: Si habla español, tiene a muse disposición servicios gratuitos de asistencia lingüística. Llame al [phone].    We comply with applicable federal civil rights laws and [place] laws. We do not discriminate on the basis of race, color, national origin, age, disability, sex, sexual orientation, or gender identity.               Review of your medicines      UNREVIEWED medicines. Ask your doctor about these medicines        Dose / Directions    Abatacept 125 MG/ML Soaj auto-injector   Commonly known as:  ORENCIA   Used for:  Rheumatoid arthritis of multiple sites with negative rheumatoid factor (H)        Dose:  125 mg   Inject 1 mL (125 mg) Subcutaneous every 7 days   Quantity:  4 Syringe   Refills:  3       allopurinol 300 MG tablet   Commonly known as:  ZYLOPRIM   Used for:  Idiopathic gout, unspecified chronicity, unspecified site        TAKE 1 TABLET BY MOUTH 1 TIME DAILY   Quantity:  210 tablet   Refills:  0       aspirin 81 MG EC tablet   Used for:  Coronary artery disease involving native coronary artery of native heart without angina pectoris        Dose:  81 mg   Take 1 tablet (81 mg) by mouth daily (*)   Quantity:  30 tablet    Refills:  1       atorvastatin 80 MG tablet   Commonly known as:  LIPITOR   Used for:  Atherosclerosis of native coronary artery of native heart, angina presence unspecified, Hyperlipidemia LDL goal <100        TAKE 1 TABLET BY MOUTH 1 TIME DAILY   Quantity:  90 tablet   Refills:  0       baclofen 10 MG tablet   Commonly known as:  LIORESAL   Used for:  Spasm of back muscles        TAKE 1 TABLET BY MOUTH 2 TIMES DAILY AS NEEDED FOR MUSCLE SPASM   Quantity:  180 tablet   Refills:  2       BusPIRone HCl 30 MG Tabs   Used for:  Major depressive disorder, recurrent episode, moderate (H)        Dose:  1 tablet   Take 1 tablet by mouth 2 times daily   Quantity:  180 tablet   Refills:  5       clonazePAM 1 MG tablet   Commonly known as:  klonoPIN   Used for:  Anxiety hyperventilation        TAKE ONE-HALF TO ONE TABLET BY MOUTH 3 TIMES DAILY AS NEEDED FOR ANXIETY   Quantity:  90 tablet   Refills:  0       clopidogrel 75 MG tablet   Commonly known as:  PLAVIX   Used for:  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   Quantity:  30 tablet   Refills:  3       diclofenac 1 % Gel topical gel   Commonly known as:  VOLTAREN   Used for:  Facet arthropathy, Rheumatoid arthritis involving multiple sites, unspecified rheumatoid factor presence (H)        Apply 2-4 grams to 2-3 joints, up to four times daily as needed using enclosed dosing card.  Max of 32g/day   Quantity:  300 g   Refills:  11       evolocumab 140 MG/ML prefilled autoinjector   Commonly known as:  REPATHA   Used for:  Hyperlipidemia LDL goal <70, S/P CABG  (coronary artery bypass graft)        Dose:  140 mg   Inject 1 mL (140 mg) Subcutaneous every 14 days   Quantity:  2 mL   Refills:  11       ezetimibe 10 MG tablet   Commonly known as:  ZETIA   Used for:  Coronary artery disease involving native coronary artery of native heart without angina pectoris        Dose:  10 mg   Take 1 tablet (10 mg) by mouth daily   Quantity:  90 tablet   Refills:  3       fluticasone 50 MCG/ACT spray   Commonly known as:  FLONASE   Used for:  Nasal congestion, Dizziness        Dose:  2 spray   Spray 2 sprays into both nostrils daily   Quantity:  1 Bottle   Refills:  11       gabapentin 300 MG capsule   Commonly known as:  NEURONTIN   Used for:  Lumbar radiculopathy        Dose:  600 mg   Take 2 capsules (600 mg) by mouth 3 times daily   Quantity:  540 capsule   Refills:  3       gemfibrozil 600 MG tablet   Commonly known as:  LOPID   Used for:  Hyperlipidemia LDL goal <70        Dose:  600 mg   Take 1 tablet (600 mg) by mouth 2 times daily   Quantity:  180 tablet   Refills:  3       hydroxychloroquine 200 MG tablet   Commonly known as:  PLAQUENIL   Used for:  Rheumatoid arthritis of multiple sites with negative rheumatoid factor (H), High risk medications (not anticoagulants) long-term use        Dose:  200 mg   Take 1 tablet (200 mg) by mouth daily   Quantity:  30 tablet   Refills:  3       meclizine 25 MG tablet   Commonly known as:  ANTIVERT   Used for:  Vertigo        TAKE ONE TABLET BY MOUTH EVERY 6 HOURS AS NEEDED FOR  DIZZINESS   Quantity:  30 tablet   Refills:  10       melatonin 3 MG tablet   Used for:  Delayed sleep phase syndrome        Dose:  3 mg   Take 1 tablet (3 mg) by mouth nightly as needed for sleep   Refills:  0       meloxicam 7.5 MG tablet   Commonly known as:  MOBIC   Used for:  Low back pain, unspecified back pain laterality, unspecified chronicity, with sciatica presence unspecified        Dose:  7.5 mg   Take 1 tablet (7.5 mg) by mouth daily   Quantity:  30  tablet   Refills:  3       mirtazapine 15 MG tablet   Commonly known as:  REMERON   Used for:  Severe episode of recurrent major depressive disorder, without psychotic features (H)        Dose:  15 mg   Take 1 tablet (15 mg) by mouth At Bedtime   Quantity:  30 tablet   Refills:  5       naloxone nasal spray   Commonly known as:  NARCAN   Used for:  Rheumatoid arthritis involving multiple sites, unspecified rheumatoid factor presence (H)        Dose:  4 mg   Spray 1 spray (4 mg) into one nostril alternating nostrils as needed for opioid reversal every 2-3 minutes until assistance arrives   Quantity:  0.2 mL   Refills:  0       nitroGLYcerin 0.4 MG sublingual tablet   Commonly known as:  NITROSTAT   Used for:  Atherosclerosis of native coronary artery of native heart with angina pectoris (H)        PLACE 1 TABLET UNDER THE TONGUE EVERY 5 MINUTES AS NEEDED   Quantity:  25 tablet   Refills:  1       oxyCODONE IR 5 MG tablet   Commonly known as:  ROXICODONE   Used for:  Facet arthropathy        Dose:  5-10 mg   Take 1-2 tablets (5-10 mg) by mouth every 4 hours as needed for moderate to severe pain . Max of 6 tabs per day.  30 day supply. May fill on/after 5/11/18 to start on 5/12/18   Quantity:  180 tablet   Refills:  0       pantoprazole 40 MG EC tablet   Commonly known as:  PROTONIX   Used for:  Gastroesophageal reflux disease without esophagitis        Dose:  40 mg   Take 1 tablet (40 mg) by mouth daily   Quantity:  90 tablet   Refills:  3       predniSONE 1 MG tablet   Commonly known as:  DELTASONE   Used for:  Rheumatoid arthritis of multiple sites with negative rheumatoid factor (H), High risk medications (not anticoagulants) long-term use        Prednisone 4mg daily x30days, then 3mg daily for 30days, then 2mg daily thereafter.   Quantity:  270 tablet   Refills:  0       sulfaSALAzine  MG EC tablet   Commonly known as:  AZULFIDINE EN   Used for:  Rheumatoid arthritis of multiple sites with negative  rheumatoid factor (H), High risk medications (not anticoagulants) long-term use        Dose:  1000 mg   Take 2 tablets (1,000 mg) by mouth 2 times daily   Quantity:  120 tablet   Refills:  3       tamsulosin 0.4 MG capsule   Commonly known as:  FLOMAX   Used for:  Benign prostatic hyperplasia with weak urinary stream        Dose:  0.4 mg   Take 1 capsule (0.4 mg) by mouth daily   Quantity:  90 capsule   Refills:  3       tenofovir 300 MG tablet   Commonly known as:  VIREAD   Used for:  Chronic viral hepatitis B without delta agent and without coma (H)        TAKE ONE TABLET BY MOUTH EVERY DAY   Quantity:  90 tablet   Refills:  3       triamcinolone 0.1 % ointment   Commonly known as:  KENALOG   Used for:  Rash        Apply topically 3 times daily   Quantity:  80 g   Refills:  0       TYLENOL PO        Refills:  0       vitamin D 2000 units tablet   Used for:  Vitamin D deficiency        TAKE ONE TABLET BY MOUTH ONCE DAILY   Quantity:  100 tablet   Refills:  1       zolpidem 5 MG tablet   Commonly known as:  AMBIEN   Used for:  Insomnia, unspecified type        TAKE ONE TABLET BY MOUTH AT BEDTIME AS NEEDED FOR SLEEP   Quantity:  30 tablet   Refills:  5         START taking        Dose / Directions    erythromycin ophthalmic ointment   Commonly known as:  ROMYCIN   Used for:  Postoperative eye state        Apply small amount to incision sites three times daily and to inner lower lid of operative eye(s) at bedtime for 7 days.   Quantity:  3.5 g   Refills:  0       neomycin-polymyxin-dexamethasone 3.5-40694-3.1 Susp ophthalmic susp   Commonly known as:  MAXITROL   Used for:  Postoperative eye state        Dose:  1-2 drop   Place 1-2 drops into both eyes 3 times daily   Quantity:  1 Bottle   Refills:  0         CONTINUE these medicines which have NOT CHANGED        Dose / Directions    blood glucose monitoring meter device kit   Used for:  On corticosteroid therapy        TEST 2 TIMES DAILY.   Quantity:  1 kit   Refills:   0       order for DME   Used for:  JEROD (obstructive sleep apnea), Anxiety, CAD (coronary artery disease), HTN (hypertension)        1.  CPAP pressure 11 cm/H20 with heated humidity.  2.  Provide mask to fit and CPAP supplies.  3.  Length of need lifetime.  4.  If needed please provide a chin strap   Refills:  0       * order for DME   Used for:  Lumbar radiculopathy, Facet arthropathy, Chronic low back pain        Equipment being ordered: single end cane   Quantity:  1 Units   Refills:  0       * order for DME   Used for:  Rheumatoid arthritis involving multiple sites, unspecified rheumatoid factor presence (H)        Equipment being ordered: test strips as covered by insurance. USE TO TEST BLOOD SUGARS TWICE DAILY OR AS DIRECTED.   Quantity:  200 each   Refills:  3       * order for DME   Used for:  Rheumatoid arthritis involving multiple sites, unspecified rheumatoid factor presence (H)        OneTouch glucometer.   Quantity:  1 each   Refills:  0       * order for DME   Used for:  Rheumatoid arthritis involving multiple sites, unspecified rheumatoid factor presence (H)        OneTouch lancets testing twice daily.   Quantity:  200 each   Refills:  3       * Notice:  This list has 4 medication(s) that are the same as other medications prescribed for you. Read the directions carefully, and ask your doctor or other care provider to review them with you.         Where to get your medicines      These medications were sent to [facility] [place], [place] - [address], [address]  [address], [address], [facility] [address]     Phone:  [phone]     erythromycin ophthalmic ointment    neomycin-polymyxin-dexamethasone 3.5-43720-2.1 Susp ophthalmic susp                Protect others around you: Learn how to safely use, store and throw away your medicines at www.disposemymeds.org.             Medication List: This is a list of all your medications and when to take them. Check marks  below indicate your daily home schedule. Keep this list as a reference.      Medications           Morning Afternoon Evening Bedtime As Needed    Abatacept 125 MG/ML Soaj auto-injector   Commonly known as:  ORENCIA   Inject 1 mL (125 mg) Subcutaneous every 7 days                                allopurinol 300 MG tablet   Commonly known as:  ZYLOPRIM   TAKE 1 TABLET BY MOUTH 1 TIME DAILY                                aspirin 81 MG EC tablet   Take 1 tablet (81 mg) by mouth daily (*)                                atorvastatin 80 MG tablet   Commonly known as:  LIPITOR   TAKE 1 TABLET BY MOUTH 1 TIME DAILY                                baclofen 10 MG tablet   Commonly known as:  LIORESAL   TAKE 1 TABLET BY MOUTH 2 TIMES DAILY AS NEEDED FOR MUSCLE SPASM                                blood glucose monitoring meter device kit   TEST 2 TIMES DAILY.                                BusPIRone HCl 30 MG Tabs   Take 1 tablet by mouth 2 times daily                                clonazePAM 1 MG tablet   Commonly known as:  klonoPIN   TAKE ONE-HALF TO ONE TABLET BY MOUTH 3 TIMES DAILY AS NEEDED FOR ANXIETY                                clopidogrel 75 MG tablet   Commonly known as:  PLAVIX   Take 1 tablet (75 mg) by mouth daily                                COLCRYS 0.6 MG tablet   TAKE 2 TABLETS BY MOUTH AT THE FIRST SIGN OF FLARE, TAKE 1 ADDITIONAL TABLET ONE HOUR LATER.   Generic drug:  colchicine                                diclofenac 1 % Gel topical gel   Commonly known as:  VOLTAREN   Apply 2-4 grams to 2-3 joints, up to four times daily as needed using enclosed dosing card.  Max of 32g/day                                erythromycin ophthalmic ointment   Commonly known as:  ROMYCIN   Apply small amount to incision sites three times daily and to inner lower lid of operative eye(s) at bedtime for 7 days.   Last time this was given:  1 inch on 6/25/2018 12:41 PM                                evolocumab 140 MG/ML  prefilled autoinjector   Commonly known as:  REPATHA   Inject 1 mL (140 mg) Subcutaneous every 14 days                                ezetimibe 10 MG tablet   Commonly known as:  ZETIA   Take 1 tablet (10 mg) by mouth daily                                fluticasone 50 MCG/ACT spray   Commonly known as:  FLONASE   Spray 2 sprays into both nostrils daily                                gabapentin 300 MG capsule   Commonly known as:  NEURONTIN   Take 2 capsules (600 mg) by mouth 3 times daily                                gemfibrozil 600 MG tablet   Commonly known as:  LOPID   Take 1 tablet (600 mg) by mouth 2 times daily                                hydroxychloroquine 200 MG tablet   Commonly known as:  PLAQUENIL   Take 1 tablet (200 mg) by mouth daily                                meclizine 25 MG tablet   Commonly known as:  ANTIVERT   TAKE ONE TABLET BY MOUTH EVERY 6 HOURS AS NEEDED FOR  DIZZINESS                                melatonin 3 MG tablet   Take 1 tablet (3 mg) by mouth nightly as needed for sleep                                meloxicam 7.5 MG tablet   Commonly known as:  MOBIC   Take 1 tablet (7.5 mg) by mouth daily                                mirtazapine 15 MG tablet   Commonly known as:  REMERON   Take 1 tablet (15 mg) by mouth At Bedtime                                naloxone nasal spray   Commonly known as:  NARCAN   Spray 1 spray (4 mg) into one nostril alternating nostrils as needed for opioid reversal every 2-3 minutes until assistance arrives                                neomycin-polymyxin-dexamethasone 3.5-49253-2.1 Susp ophthalmic susp   Commonly known as:  MAXITROL   Place 1-2 drops into both eyes 3 times daily                                nitroGLYcerin 0.4 MG sublingual tablet   Commonly known as:  NITROSTAT   PLACE 1 TABLET UNDER THE TONGUE EVERY 5 MINUTES AS NEEDED                                order for DME   1.  CPAP pressure 11 cm/H20 with heated humidity.  2.  Provide mask  to fit and CPAP supplies.  3.  Length of need lifetime.  4.  If needed please provide a chin strap                                * order for DME   Equipment being ordered: single end cane                                * order for DME   Equipment being ordered: test strips as covered by insurance. USE TO TEST BLOOD SUGARS TWICE DAILY OR AS DIRECTED.                                * order for DME   OneTouch glucometer.                                * order for DME   OneTouch lancets testing twice daily.                                oxyCODONE IR 5 MG tablet   Commonly known as:  ROXICODONE   Take 1-2 tablets (5-10 mg) by mouth every 4 hours as needed for moderate to severe pain . Max of 6 tabs per day.  30 day supply. May fill on/after 5/11/18 to start on 5/12/18                                pantoprazole 40 MG EC tablet   Commonly known as:  PROTONIX   Take 1 tablet (40 mg) by mouth daily                                predniSONE 1 MG tablet   Commonly known as:  DELTASONE   Prednisone 4mg daily x30days, then 3mg daily for 30days, then 2mg daily thereafter.                                sulfaSALAzine  MG EC tablet   Commonly known as:  AZULFIDINE EN   Take 2 tablets (1,000 mg) by mouth 2 times daily                                tamsulosin 0.4 MG capsule   Commonly known as:  FLOMAX   Take 1 capsule (0.4 mg) by mouth daily                                tenofovir 300 MG tablet   Commonly known as:  VIREAD   TAKE ONE TABLET BY MOUTH EVERY DAY                                triamcinolone 0.1 % ointment   Commonly known as:  KENALOG   Apply topically 3 times daily                                TYLENOL PO                                vitamin D 2000 units tablet   TAKE ONE TABLET BY MOUTH ONCE DAILY                                zolpidem 5 MG tablet   Commonly known as:  AMBIEN   TAKE ONE TABLET BY MOUTH AT BEDTIME AS NEEDED FOR SLEEP                                * Notice:  This list has 4 medication(s)  that are the same as other medications prescribed for you. Read the directions carefully, and ask your doctor or other care provider to review them with you.

## 2018-06-25 NOTE — ANESTHESIA CARE TRANSFER NOTE
Patient: Lamont Daniels    Procedure(s):  Bilateral upper eyelid blepharoplasty, ptosis repair and brow ptosis repair - Wound Class: I-Clean   - Wound Class: I-Clean    Diagnosis: Bilateral droopy eyelids  Diagnosis Additional Information: No value filed.    Anesthesia Type:   MAC     Note:  Airway :Room Air  Patient transferred to:Phase II  Comments: Patient awake, alert, and oriented. SpO2 96%.  No apparent anesthesia complications.       Vitals: (Last set prior to Anesthesia Care Transfer)    CRNA VITALS  6/25/2018 1237 - 6/25/2018 1311      6/25/2018             Pulse: 75    SpO2: 92 %                Electronically Signed By: MARIAH Silveira CRNA  June 25, 2018  1:11 PM

## 2018-06-25 NOTE — TELEPHONE ENCOUNTER
Medication refill information reviewed.     Due date for oxyCODONE IR (ROXICODONE) 5 MG tablet is anytime     Prescriptions prepped for review.     Will route to provider.

## 2018-06-25 NOTE — ANESTHESIA POSTPROCEDURE EVALUATION
Patient: Lamont Daniels    Procedure(s):  Bilateral upper eyelid blepharoplasty, ptosis repair and brow ptosis repair - Wound Class: I-Clean   - Wound Class: I-Clean    Diagnosis:Bilateral droopy eyelids  Diagnosis Additional Information: No value filed.    Anesthesia Type:  MAC    Note:  Anesthesia Post Evaluation    Patient location during evaluation: Phase 2  Patient participation: Able to fully participate in evaluation  Level of consciousness: awake and alert  Pain management: adequate  Airway patency: patent  Cardiovascular status: acceptable  Respiratory status: acceptable  Hydration status: acceptable  PONV: none     Anesthetic complications: None          Last vitals:  Vitals:    06/25/18 1309 06/25/18 1324 06/25/18 1334   BP: 105/59 109/74 120/75   Resp: 14 14 16   Temp: 97.5  F (36.4  C)  97.2  F (36.2  C)   SpO2: 94% 97% 97%         Electronically Signed By: Freddie Hurtado MD  June 25, 2018  3:06 PM

## 2018-06-25 NOTE — TELEPHONE ENCOUNTER
Received call from patient requesting refill(s) of oxyCODONE IR (ROXICODONE) 5 MG tablet    Last picked up from pharmacy on 5/14/18    Pt last seen by prescribing provider on 4/11/18  Next appt scheduled for 7/11/18     checked in the past 6 months? Yes If no, print current report and give to RN    Last urine drug screen date 1/10/18  Current opioid agreement on file (completed within the last year) Yes Date of opioid agreement: 1/10/18    Processing (pick one and delete the others):  Mail to       [facility] [address] [facility] - [address] [address]  Phone: [phone] Fax: [phone]    aSscha Isaac MA  [facility]    Will route to nursing pool for review and preparation of prescription(s).

## 2018-06-25 NOTE — ANESTHESIA PREPROCEDURE EVALUATION
Lamont Daniels is a 52 year old male with a PMH of  Bilateral droopy eyelids who is scheduled for Procedure(s):  Bilateral upper eyelid blepharoplasty, ptosis repair and brow ptosis repair - Wound Class: I-Clean   - Wound Class: I-Clean    NPO Status: Adequate.  > 6 hours solids, > 2 hours clear liquids.       Past Surgical History:   Procedure Laterality Date     ABDOMEN SURGERY  2014     BIOPSY  2015     BYPASS GRAFT ARTERY CORONARY  2008    6 vessels     COLONOSCOPY  2/8/2013    Procedure: COLONOSCOPY;  Colonoscopy, blood in stool;  Surgeon: Duane, William Charles, MD;  Location: [facility] OR     GI SURGERY  2014     HC CORONARY STENT CECILIAUT, CIERA ADDTL VESSEL  2008    3 months after CABG     HEAD & NECK SURGERY  2011     NASAL/SINUS POLYPECTOMY  2010     ORTHOPEDIC SURGERY  2012     THORACIC SURGERY  1989    tb     TONSILLECTOMY  2010     UVULOPALATOPHARYNGOPLASTY  2010     VASCULAR SURGERY  2008       Anesthesia Evaluation     . Pt has had prior anesthetic. Type: General    No history of anesthetic complications          ROS/MED HX    ENT/Pulmonary:     (+)sleep apnea, uses CPAP , . .   (-) tobacco use, asthma and COPD   Neurologic:      (-) CVA, TIA and Neuropathy   Cardiovascular:     (+) Dyslipidemia, hypertension--CAD, -CABG-date: 10 years ago, stent,last in 2016 distal LAD  . : . . . :. . Previous cardiac testing Echodate:results:7/2016:     Interpretation Summary     Left ventricular function, chamber size, wall motion, and wall thickness are  normal.The EF is 60-65%.  Right ventricular function, chamber size, wall motion, and thickness are  normal.  The inferior vena cava was normal in size with preserved respiratory  variability.  No findings to suggest CHF.date: results: date: results: date: results:         (-) angina, irregular heartbeat/palpitations and angina   METS/Exercise Tolerance:     Hematologic:        (-) anemia   Musculoskeletal:   (+) arthritis, , , -       GI/Hepatic:     (+) hepatitis type B,       (-) GERD and liver disease   Renal/Genitourinary:      (-) renal disease   Endo:      (-) Type I DM, Type II DM and thyroid disease   Psychiatric:         Infectious Disease:  - neg infectious disease ROS       Malignancy:         Other:                     Physical Exam  Normal systems: cardiovascular, pulmonary and dental    Airway   Mallampati: II  TM distance: >3 FB  Neck ROM: full    Dental     Cardiovascular   Rhythm and rate: regular and normal      Pulmonary    breath sounds clear to auscultation                    Anesthesia Plan      History & Physical Review  History and physical reviewed and following examination; no interval change.    ASA Status:  3 .        Plan for MAC (with GA backup) with Intravenous induction. Maintenance will be TIVA.  Reason for MAC:  Procedure to face, neck, head or breast  PONV prophylaxis:  Ondansetron       Postoperative Care  Postoperative pain management:  IV analgesics.      Consents  Anesthetic plan, risks, benefits and alternatives discussed with:  Patient (Including possibility of intraoperative awareness or recall.)..        Freddie Hurtado MD  12:06 PM June 25, 2018                       .

## 2018-06-25 NOTE — TELEPHONE ENCOUNTER
Message from [email or web address]:  Original authorizing provider: Dorothea Loo MD    Lamont Daniels would like a refill of the following medications:  oxyCODONE IR (ROXICODONE) 5 MG tablet [Dorothea Loo MD]    Preferred pharmacy: [address]    Comment:  please fill    Medication renewals requested in this message routed to other providers:  triamcinolone (KENALOG) 0.1 % ointment [David Chavez MD, MD]

## 2018-06-25 NOTE — BRIEF OP NOTE
[facility] Brief Operative Note    Pre-operative diagnosis: Bilateral droopy eyelids   Post-operative diagnosis Same   Procedure: Procedure(s):  Bilateral upper eyelid blepharoplasty, ptosis repair and brow ptosis repair - Wound Class: I-Clean   - Wound Class: I-Clean   Surgeon: Sandra Borja    Assistants(s):    Estimated blood loss: 3 mL   Specimens:   ID Type Source Tests Collected by Time Destination   A : Bilateral upper eyelid lesions Tissue Upper Eyelid SURGICAL PATHOLOGY EXAM Sandra Borja MD 6/25/2018 12:31 PM         Findings: As expected

## 2018-06-25 NOTE — TELEPHONE ENCOUNTER
Signed Prescriptions:                        Disp   Refills    oxyCODONE IR (ROXICODONE) 5 MG tablet      180 ta*0        Sig: Take 1-2 tablets (5-10 mg) by mouth every 4 hours as           needed for moderate to severe pain . Max of 6           tabs per day.  30 day supply. May fill on/after           6/25/18 to start on 6/25/18  Authorizing Provider: KAYLA CHUN MD  [facility]

## 2018-06-25 NOTE — TELEPHONE ENCOUNTER
Called patient. [facility]  [address] [place] [id number]    Amberly Chawla MA

## 2018-06-27 NOTE — PROGRESS NOTES
POD1  A telephone call was made to Lamont Daniels to make sure the post operative course has been uneventful and that all questions were answered. There was no answer and a telephone message was left.    [facility]    [facility]: [phone]

## 2018-06-28 LAB — COPATH REPORT: NORMAL

## 2018-06-29 DIAGNOSIS — M06.9 RHEUMATOID ARTHRITIS INVOLVING MULTIPLE SITES, UNSPECIFIED RHEUMATOID FACTOR PRESENCE: ICD-10-CM

## 2018-06-29 DIAGNOSIS — M54.5 LOW BACK PAIN, UNSPECIFIED BACK PAIN LATERALITY, UNSPECIFIED CHRONICITY, WITH SCIATICA PRESENCE UNSPECIFIED: ICD-10-CM

## 2018-06-29 DIAGNOSIS — M10.9 GOUT WITHOUT TOPHUS: ICD-10-CM

## 2018-06-29 NOTE — TELEPHONE ENCOUNTER
"Requested Prescriptions   Pending Prescriptions Disp Refills     COLCRYS 0.6 MG tablet [Pharmacy Med Name: COLCRYS 0.6 MG TABLET]  Last Written Prescription Date:  03/29/18  Last Fill Quantity: 30,  # refills: 3   Last Office Visit with FMRAO, OBED or [facility] prescribing provider:  06/01/18   Future Office Visit:    Next 5 appointments (look out 90 days)     Jul 11, 2018 10:00 AM CDT   Return Visit with Sandra Borja MD   [facility] ([facility])    [address] [place] [id number] [phone]            Jul 11, 2018  2:30 PM CDT   Return Visit with Dorothea Loo MD   [facility] ([facility])    [address] [place] [id number] [phone]                30 tablet 3     Sig: TAKE 2 TABLETS BY MOUTH AT THE FIRST SIGN OF FLARE, TAKE 1 ADDITIONAL TABLET ONE HOUR LATER.    Gout Agents Protocol Failed    6/29/2018  1:18 PM       Failed - Has Uric Acid on file in past 12 months and value is less than 6    Recent Labs   Lab Test  11/04/15   1547   URIC  6.1     If level is 6mg/dL or greater, ok to refill one time and refer to provider.          Passed - CBC on file in past 12 months    Recent Labs   Lab Test  06/22/18   1448   WBC  7.3   RBC  4.83   HGB  14.4   HCT  43.4   PLT  284       For [facility] ONLY: [id number] = WBC, [id number] = RBC         Passed - ALT on file in past 12 months    Recent Labs   Lab Test  06/22/18   1448   ALT  35            Passed - Recent (12 mo) or future (30 days) visit within the authorizing provider's specialty    Patient had office visit in the last 12 months or has a visit in the next 30 days with authorizing provider or within the authorizing provider's specialty.  See \"Patient Info\" tab in inbasket, or \"Choose Columns\" in Meds & Orders section of the refill encounter.           Passed - Patient is age 18 or older       Passed - Normal serum creatinine on file in the past 12 months    " "Recent Labs   Lab Test  06/22/18   1448   06/24/13   1322   CR  0.98   < >   --    CRPOC   --    --   1.1    < > = values in this interval not displayed.             meloxicam (MOBIC) 7.5 MG tablet [Pharmacy Med Name: MELOXICAM 7.5 MG TABLET]  Last Written Prescription Date:  02/06/18  Last Fill Quantity: 30,  # refills: 3   Last Office Visit with G, P or [facility] prescribing provider:  06/01/18   Future Office Visit:    Next 5 appointments (look out 90 days)     Jul 11, 2018 10:00 AM CDT   Return Visit with Sandra Borja MD   [facility] ([facility])    [address] [place] [id number] [phone]            Jul 11, 2018  2:30 PM CDT   Return Visit with Dorothea Loo MD   [facility] ([facility])    [id number] [place] [id number] [phone]                30 tablet 3     Sig: TAKE 1 TABLET (7.5 MG) BY MOUTH DAILY    NSAID Medications Passed    6/29/2018  1:18 PM       Passed - Blood pressure under 140/90 in past 12 months    BP Readings from Last 3 Encounters:   06/25/18 120/75   06/22/18 108/71   06/01/18 106/72                Passed - Normal ALT on file in past 12 months    Recent Labs   Lab Test  06/22/18   1448   ALT  35            Passed - Normal AST on file in past 12 months    Recent Labs   Lab Test  06/22/18   1448   AST  27            Passed - Recent (12 mo) or future (30 days) visit within the authorizing provider's specialty    Patient had office visit in the last 12 months or has a visit in the next 30 days with authorizing provider or within the authorizing provider's specialty.  See \"Patient Info\" tab in inbasket, or \"Choose Columns\" in Meds & Orders section of the refill encounter.           Passed - Patient is age 6-64 years       Passed - Normal CBC on file in past 12 months    Recent Labs   Lab Test  06/22/18   1448   WBC  7.3   RBC  4.83   HGB  14.4   HCT  43.4   PLT  284 "       For [facility] ONLY: [id number] = WBC, [id number] = RBC         Passed - Normal serum creatinine on file in past 12 months    Recent Labs   Lab Test  06/22/18   1448   06/24/13   1322   CR  0.98   < >   --    CRPOC   --    --   1.1    < > = values in this interval not displayed.

## 2018-06-29 NOTE — TELEPHONE ENCOUNTER
Requested Prescriptions   Pending Prescriptions Disp Refills     order for DME    Last Written Prescription Date:  6/15/17  Last Fill Quantity: 200,  # refills: 3   Last Office Visit with FMRAO, OBED or [facility] prescribing provider:  6/1/18   Future Office Visit:    Next 5 appointments (look out 90 days)     Jul 11, 2018 10:00 AM CDT   [facility]    [address] [place] [id number] [phone]            Jul 11, 2018  2:30 PM CDT   Return Visit with Dorothea Loo MD   [facility] ([facility])    [id number] [place] [phone]                  200 each 3     Sig: Equipment being ordered: test strips as covered by insurance. USE TO TEST BLOOD SUGARS TWICE DAILY OR AS DIRECTED.    There is no refill protocol information for this order              Costa Faarax  [facility]

## 2018-07-02 ENCOUNTER — RADIANT APPOINTMENT (OUTPATIENT)
Dept: CARDIOLOGY | Facility: CLINIC | Age: 53
End: 2018-07-02
Attending: INTERNAL MEDICINE
Payer: COMMERCIAL

## 2018-07-02 DIAGNOSIS — M06.09 RHEUMATOID ARTHRITIS OF MULTIPLE SITES WITH NEGATIVE RHEUMATOID FACTOR (H): ICD-10-CM

## 2018-07-02 DIAGNOSIS — I25.810 CORONARY ARTERY DISEASE INVOLVING AUTOLOGOUS ARTERY CORONARY BYPASS GRAFT WITHOUT ANGINA PECTORIS: ICD-10-CM

## 2018-07-02 DIAGNOSIS — Z79.899 HIGH RISK MEDICATIONS (NOT ANTICOAGULANTS) LONG-TERM USE: ICD-10-CM

## 2018-07-02 PROCEDURE — 93306 TTE W/DOPPLER COMPLETE: CPT

## 2018-07-02 RX ORDER — MELOXICAM 7.5 MG/1
TABLET ORAL
Qty: 30 TABLET | Refills: 3 | Status: SHIPPED | OUTPATIENT
Start: 2018-07-02 | End: 2018-10-18

## 2018-07-02 RX ORDER — COLCHICINE 0.6 MG/1
TABLET, FILM COATED ORAL
Qty: 30 TABLET | Refills: 0 | Status: SHIPPED | OUTPATIENT
Start: 2018-07-02 | End: 2018-07-14

## 2018-07-02 RX ORDER — HYDROXYCHLOROQUINE SULFATE 200 MG/1
200 TABLET, FILM COATED ORAL DAILY
Qty: 30 TABLET | Refills: 3 | Status: SHIPPED | OUTPATIENT
Start: 2018-07-02 | End: 2018-11-01

## 2018-07-02 RX ORDER — SULFASALAZINE 500 MG/1
1000 TABLET, DELAYED RELEASE ORAL 2 TIMES DAILY
Qty: 120 TABLET | Refills: 3 | Status: SHIPPED | OUTPATIENT
Start: 2018-07-02 | End: 2018-11-01

## 2018-07-02 NOTE — TELEPHONE ENCOUNTER
Approved on e month supply. Will defer to Dr Chavez if uric acid necessary for further refills  Robbin Trevino PA-C

## 2018-07-02 NOTE — TELEPHONE ENCOUNTER
Requested Prescriptions   Pending Prescriptions Disp Refills     sulfaSALAzine ER (AZULFIDINE EN) 500 MG EC tablet 120 tablet 3     Sig: Take 2 tablets (1,000 mg) by mouth 2 times daily    There is no refill protocol information for this order        Last Written Prescription Date:  2/23/2018  Last Fill Quantity: 120,  # refills: 3   Last office visit: 6/1/2018 with prescribing provider:  Scott   Future Office Visit:   Next 5 appointments (look out 90 days)     Jul 11, 2018 10:00 AM CDT   Return Visit with Sandra Borja MD   [facility]    [address] [place] [phone]   [phone]            Jul 11, 2018  2:30 PM CDT   Return Visit with Dorothea Loo MD   [facility] ([facility])    [address] [facility] [id number] [phone]

## 2018-07-02 NOTE — TELEPHONE ENCOUNTER
Requested Prescriptions   Pending Prescriptions Disp Refills     order for  each 3     Sig: Equipment being ordered: test strips as covered by insurance. USE TO TEST BLOOD SUGARS TWICE DAILY OR AS DIRECTED.    There is no refill protocol information for this order      Routing refill request to provider for review/approval because:  Drug not on the [facility] [place] refill protocol   Anabel Andrew RN

## 2018-07-02 NOTE — TELEPHONE ENCOUNTER
Routing refill request to provider for review/approval because:COLCRYS 0.6 MG tablet  Labs not current:  Uric acid level    Prescription approved per [facility] Refill Protocol-Mainor Andrew RN

## 2018-07-11 ENCOUNTER — OFFICE VISIT (OUTPATIENT)
Dept: OPHTHALMOLOGY | Facility: CLINIC | Age: 53
End: 2018-07-11
Payer: COMMERCIAL

## 2018-07-11 ENCOUNTER — OFFICE VISIT (OUTPATIENT)
Dept: PALLIATIVE MEDICINE | Facility: CLINIC | Age: 53
End: 2018-07-11
Payer: COMMERCIAL

## 2018-07-11 VITALS
DIASTOLIC BLOOD PRESSURE: 81 MMHG | SYSTOLIC BLOOD PRESSURE: 120 MMHG | HEART RATE: 76 BPM | BODY MASS INDEX: 32.39 KG/M2 | HEIGHT: 62 IN | WEIGHT: 176 LBS

## 2018-07-11 DIAGNOSIS — H02.836 DERMATOCHALASIS OF EYELIDS OF BOTH EYES: Primary | ICD-10-CM

## 2018-07-11 DIAGNOSIS — H02.833 DERMATOCHALASIS OF EYELIDS OF BOTH EYES: Primary | ICD-10-CM

## 2018-07-11 DIAGNOSIS — H02.413 MECHANICAL PTOSIS OF EYELID OF BOTH EYES: ICD-10-CM

## 2018-07-11 DIAGNOSIS — G47.33 OSA (OBSTRUCTIVE SLEEP APNEA): ICD-10-CM

## 2018-07-11 DIAGNOSIS — M06.9 RHEUMATOID ARTHRITIS INVOLVING MULTIPLE SITES, UNSPECIFIED RHEUMATOID FACTOR PRESENCE: Primary | ICD-10-CM

## 2018-07-11 DIAGNOSIS — H57.819 BROW PTOSIS: ICD-10-CM

## 2018-07-11 PROCEDURE — 99214 OFFICE O/P EST MOD 30 MIN: CPT | Performed by: PSYCHIATRY & NEUROLOGY

## 2018-07-11 PROCEDURE — 99024 POSTOP FOLLOW-UP VISIT: CPT | Performed by: OPHTHALMOLOGY

## 2018-07-11 RX ORDER — BUPRENORPHINE 10 UG/H
1 PATCH TRANSDERMAL
Qty: 4 PATCH | Refills: 1 | Status: SHIPPED | OUTPATIENT
Start: 2018-07-11 | End: 2018-08-04

## 2018-07-11 ASSESSMENT — TONOMETRY
IOP_METHOD: ICARE
OS_IOP_MMHG: 14
OD_IOP_MMHG: 13

## 2018-07-11 ASSESSMENT — VISUAL ACUITY
OS_CC: 20/25
CORRECTION_TYPE: GLASSES
OD_CC: 20/40
METHOD: SNELLEN - LINEAR

## 2018-07-11 ASSESSMENT — PAIN SCALES - GENERAL: PAINLEVEL: SEVERE PAIN (6)

## 2018-07-11 NOTE — NURSING NOTE
Patient presents with:  Post Op (Ophthalmology) Both Eyes: S/P Bilateral upper eyelid blepharoplasty, ptosis repair and brow ptosis repair 06/25/2018      Referring Provider:  No referring provider defined for this encounter.    HPI    Informant(s):  pt   Symptoms:              Comments:  S/P Bilateral upper eyelid blepharoplasty, ptosis repair and brow ptosis repair 06/25/2018  Patient states his vision is blurry and eyes are itchy  Using EEC elisabeth tid last dose 07/01/2018  WC bid             Purnima Montiel, COA

## 2018-07-11 NOTE — PROGRESS NOTES
[facility]    Date of visit: 7/11/18    Chief complaint:   Chief Complaint   Patient presents with     Pain       Interval history:  Lamont Daniels was last seen by me on 4/11/18    Recommendations/plan at the last visit included:  1. Physical Therapy:  Finished pool therapy, likely had more sessions  2. Clinical Health Psychologist to address issues of relaxation, behavioral change, coping style, and other factors important to improvement.  Will need to discuss at upcoming appointments  3. Diagnostic Studies: none  4. Will reach out to sleep doctor to see if they can do a sleep study that matches his sleep schedule.  In the meantime, he will consistently use CPAP  5. Medication Management:    1. I am not willing to use long-acting given his current meds- continue oxycodone.  He understands risks given his current use of short acting opioids as well.  2. Would consider butrans patch  3. Narcan provided- discussed importance of family knowing how to use it.  6. Further procedures recommended: discussed medial branch block and radiofrequency ablation, he is going to consider at this time  7. Recommendations to PCP:  3 months    Since his last visit, Lamont Daniels reports:  -he is willing to do a sleep study given they can support him atypical hours.  -no significant changes to his pain  -he is willing to change to Butrans    Pain scores:  Pain intensity on average is 7 on a scale of 0-10.     Current pain treatments  Acetaminophen 250mg 2 tablets: 1-2 times daily prn  Gabapentin 600mg 3 times daily  Baclofen 10mg BID prn (more consistently in the pm)- was prescribed by PCP, doesn't use daily.  Oxycodone 5 mg : takes 6/day  meloxicam- 7.5mg in the morning  voltaren gel- elbow, back    Previous medication treatments included:  Codeine, oxycodone, hydrocodone- given for other issues- helpful  Cymbalta 60mg daily- given for mental health- was given by Dr. Acosta- not been  higher  Baclofen 10mg at bedtime- not helpful, no side effects  Robaxin 500 mg 1 tablet, 1-3 times a day depending on the day  Ibuprofen 800 mg- using 3-4 times per week, helpful but started meloxicam    Other treatments have included:  Lamont Daniels has not been seen at a pain clinic in the past.    PT: has done for various reasons, most recently the low back.  Acupuncture: as done a couple of needles with adjustment  TENs Unit: no  Injections: no    Side Effects: none    Medications:  Current Outpatient Prescriptions   Medication Sig Dispense Refill     Acetaminophen (TYLENOL PO)        allopurinol (ZYLOPRIM) 300 MG tablet TAKE 1 TABLET BY MOUTH 1 TIME DAILY 210 tablet 0     aspirin EC 81 MG EC tablet Take 1 tablet (81 mg) by mouth daily (*) 30 tablet 1     atorvastatin (LIPITOR) 80 MG tablet TAKE 1 TABLET BY MOUTH 1 TIME DAILY 90 tablet 0     baclofen (LIORESAL) 10 MG tablet TAKE 1 TABLET BY MOUTH 2 TIMES DAILY AS NEEDED FOR MUSCLE SPASM 180 tablet 2     blood glucose monitoring (ONE TOUCH ULTRA 2) meter device kit TEST 2 TIMES DAILY. 1 kit 0     BusPIRone HCl 30 MG TABS Take 1 tablet by mouth 2 times daily 180 tablet 5     Cholecalciferol (VITAMIN D) 2000 UNITS tablet TAKE ONE TABLET BY MOUTH ONCE DAILY 100 tablet 1     clopidogrel (PLAVIX) 75 MG tablet Take 1 tablet (75 mg) by mouth daily 90 tablet 3     diclofenac (VOLTAREN) 1 % GEL topical gel Apply 2-4 grams to 2-3 joints, up to four times daily as needed using enclosed dosing card.  Max of 32g/day 300 g 11     erythromycin (ROMYCIN) ophthalmic ointment Apply small amount to incision sites three times daily and to inner lower lid of operative eye(s) at bedtime for 7 days. 3.5 g 0     evolocumab (REPATHA) 140 MG/ML prefilled autoinjector Inject 1 mL (140 mg) Subcutaneous every 14 days 2 mL 11     ezetimibe (ZETIA) 10 MG tablet Take 1 tablet (10 mg) by mouth daily 90 tablet 3     fluticasone (FLONASE) 50 MCG/ACT spray Spray 2 sprays into both nostrils daily 1  Bottle 11     gabapentin (NEURONTIN) 300 MG capsule Take 2 capsules (600 mg) by mouth 3 times daily 540 capsule 3     gemfibrozil (LOPID) 600 MG tablet Take 1 tablet (600 mg) by mouth 2 times daily 180 tablet 3     hydroxychloroquine (PLAQUENIL) 200 MG tablet Take 1 tablet (200 mg) by mouth daily 30 tablet 3     melatonin 3 MG tablet Take 1 tablet (3 mg) by mouth nightly as needed for sleep       meloxicam (MOBIC) 7.5 MG tablet TAKE 1 TABLET (7.5 MG) BY MOUTH DAILY 30 tablet 3     mirtazapine (REMERON) 15 MG tablet Take 1 tablet (15 mg) by mouth At Bedtime 30 tablet 5     naloxone (NARCAN) nasal spray Spray 1 spray (4 mg) into one nostril alternating nostrils as needed for opioid reversal every 2-3 minutes until assistance arrives 0.2 mL 0     neomycin-polymyxin-dexamethasone (MAXITROL) 3.5-56064-0.1 SUSP ophthalmic susp Place 1-2 drops into both eyes 3 times daily 1 Bottle 0     nitroGLYcerin (NITROSTAT) 0.4 MG sublingual tablet PLACE 1 TABLET UNDER THE TONGUE EVERY 5 MINUTES AS NEEDED 25 tablet 1     order for DME Equipment being ordered: test strips as covered by insurance. USE TO TEST BLOOD SUGARS TWICE DAILY OR AS DIRECTED. 200 each 3     order for DME OneTouch glucometer. 1 each 0     order for DME OneTouch lancets testing twice daily. 200 each 3     order for DME Equipment being ordered: single end cane 1 Units 0     ORDER FOR DME 1.  CPAP pressure 11 cm/H20 with heated humidity.   2.  Provide mask to fit and CPAP supplies.   3.  Length of need lifetime.   4.  If needed please provide a chin strap            oxyCODONE IR (ROXICODONE) 5 MG tablet Take 1-2 tablets (5-10 mg) by mouth every 4 hours as needed for moderate to severe pain . Max of 6 tabs per day.  30 day supply. May fill on/after 6/25/18 to start on 6/25/18 180 tablet 0     pantoprazole (PROTONIX) 40 MG EC tablet Take 1 tablet (40 mg) by mouth daily 90 tablet 3     predniSONE (DELTASONE) 1 MG tablet Prednisone 4mg daily x30days, then 3mg daily for  "30days, then 2mg daily thereafter. 270 tablet 0     sulfaSALAzine ER (AZULFIDINE EN) 500 MG EC tablet Take 2 tablets (1,000 mg) by mouth 2 times daily 120 tablet 3     tamsulosin (FLOMAX) 0.4 MG capsule Take 1 capsule (0.4 mg) by mouth daily 90 capsule 3     tenofovir (VIREAD) 300 MG tablet TAKE ONE TABLET BY MOUTH EVERY DAY 90 tablet 3     zolpidem (AMBIEN) 5 MG tablet TAKE ONE TABLET BY MOUTH AT BEDTIME AS NEEDED FOR SLEEP 30 tablet 5     Abatacept (ORENCIA) 125 MG/ML SOAJ auto-injector Inject 1 mL (125 mg) Subcutaneous every 7 days 4 Syringe 3     blood glucose monitoring (ONE TOUCH DELICA) lancets TEST 2 TIMES DAILY. 200 each 11     buprenorphine (BUTRANS) 15 MCG/HR Wk patch Place 1 patch onto the skin every 7 days Fill on/after 8/9 to start on/after 8/11 4 patch 0     clonazePAM (KLONOPIN) 1 MG tablet TAKE ONE -HALF TO ONE TABLET BY MOUTH 3 TIMES DAILY AS NEEDED 90 tablet 0     COLCRYS 0.6 MG tablet TAKE 2 TABLETS BY MOUTH AT THE FIRST SIGN OF FLARE, TAKE 1 ADDITIONAL TABLET ONE HOUR LATER. 30 tablet 0     gabapentin (NEURONTIN) 300 MG capsule TAKE TWO CAPSULES BY MOUTH 3 TIMES DAILY 540 capsule 2     meclizine (ANTIVERT) 25 MG tablet TAKE 1 TABLET BY MOUTH EVERY 6 HOURS AS NEEDED FOR DIZZINESS 30 tablet 10     triamcinolone (KENALOG) 0.1 % ointment APPLY TOPICALLY TO AFFECTED AREA(S) 3 TIMES DAILY 80 g 0       Medical History: any changes in medical history since they were last seen? no    Review of Systems:  The 14 system ROS was reviewed from the intake questionnaire:  Gout, back pain, stiffnes, fatigue, headache  Any bowel or bladder problems: None  Mood: depression, anxiety    Physical Exam:  Blood pressure 120/81, pulse 76, height 1.575 m (5' 2\"), weight 79.8 kg (176 lb).  General: mildly distressed, awake and alert  Gait: Antalgic and very slow  MSK exam: Deferred for conversation    Assessment:   1. Chronic low back pain, with strong facet and myofascial features  2. Rheumatoid arthritis  3. Peripheral " neuropathy  4. Depression  5. Hep B - currently being treated  6. Gout  7. JEROD, currently treated with CPAP, central apnea ok      Plan:   1. Physical Therapy:  Finished pool therapy, likely had more sessions  2. Clinical Health Psychologist to address issues of relaxation, behavioral change, coping style, and other factors important to improvement.  Will need to discuss at upcoming appointments  3. Diagnostic Studies:  UDS  4. Medication Management:    1. Stop oxycodone, start Butrans.  Risks and benefits were discussed.   2. Note will be sent to sleep doctor to let them know of change in the medication, as this could impact JEROD  5. Further procedures recommended: None at this time.  6. Recommendations to PCP:  3 months    Total time spent was 30 minutes, and more than 50% of face to face time was spent in counseling and/or coordination of care regarding medications.    Lian Loo MD  [facility]

## 2018-07-11 NOTE — PROGRESS NOTES
Lamont Daniels is status post excision of xanthelasma (path reviewed), both upper eyelid blepharoplasty ibp, and bilateral peña muscle conjunctival resection ptosis repair.  Incision(s) healing well.  The lid(s)  are  in excellent position.    I have recommended:  *bland lubricating ointment (eg vaseline or aquaphor) to the incision site BID.  * Massage along the incision 2-3 x daily.   * Warm soaks 3-4x daily until all edema and ecchymoses resolve  * Return to clinic in 2 months     Attending Physician Attestation:  Complete documentation of historical and exam elements from today's encounter can be found in the full encounter summary report (not reduplicated in this progress note).  I personally obtained the chief complaint(s) and history of present illness.  I confirmed and edited as necessary the review of systems, past medical/surgical history, family history, social history, and examination findings as documented by others; and I examined the patient myself.  I personally reviewed the relevant tests, images, and reports as documented above.  I formulated and edited as necessary the assessment and plan and discussed the findings and management plan with the patient and family. - Sandra Borja MD

## 2018-07-11 NOTE — MR AVS SNAPSHOT
After Visit Summary   7/11/2018    Lamont Daniels    MRN: [id number]           Patient Information     Date Of Birth          1965        Visit Information        Provider Department      7/11/2018 10:00 AM Sandra Borja MD [facility]        Today's Diagnoses     Dermatochalasis of eyelids of both eyes - Both Eyes    -  1    Mechanical ptosis of eyelid of both eyes - Both Eyes        Brow ptosis - Both Eyes           Follow-ups after your visit        Follow-up notes from your care team     Return in about 2 months (around 9/11/2018).      Your next 10 appointments already scheduled     Jul 11, 2018  2:30 PM CDT   Return Visit with Dorothea Loo MD   [facility] ([facility])    [address] [facility] [address]   [phone]            Sep 12, 2018 11:00 AM CDT   Return Visit with Sandra Borja MD   [facility])    [address] [place] [address]   [phone]            Nov 12, 2018  7:30 AM CST   Return Visit with Ricardo Rae MD, [facility] NURSE ONLY   [facility])    [address] [place] [address]   [phone]            Nov 13, 2018  8:20 AM CST   Return Visit with Sebastián Jenkins MD   [facility] ([facility])    [address] [facility] [address]   [phone]            Jun 21, 2019  8:30 AM CDT   LAB with LAB FIRST FLOOR [facility] ([facility])    [address] [place] [address]   [phone]           Please do not eat 10-12 hours before your appointment if you are coming in fasting for labs on lipids, cholesterol, or glucose (sugar). This does not apply to pregnant women. Water, hot tea and black coffee (with nothing added) are okay. Do not drink other  fluids, diet soda or chew gum.            Jun 21, 2019  9:00 AM CDT   Return Visit with Fly Travis MD   [facility]    [address] [place] [address] [phone]              Who to contact     If you have questions or need follow up information about today's clinic visit or your schedule please contact [facility] directly at [phone].  Normal or non-critical lab and imaging results will be communicated to you by Clearwirehart, letter or phone within 4 business days after the clinic has received the results. If you do not hear from us within 7 days, please contact the clinic through Clearwirehart or phone. If you have a critical or abnormal lab result, we will notify you by phone as soon as possible.  Submit refill requests through Agitar or call your pharmacy and they will forward the refill request to us. Please allow 3 business days for your refill to be completed.          Additional Information About Your Visit        Agitar Information     Agitar gives you secure access to your electronic health record. If you see a primary care provider, you can also send messages to your care team and make appointments. If you have questions, please call your primary care clinic.  If you do not have a primary care provider, please call [phone] and they will assist you.      Agitar is an electronic gateway that provides easy, online access to your medical records. With Agitar, you can request a clinic appointment, read your test results, renew a prescription or communicate with your care team.     To access your existing account, please contact your [facility] or call [phone] for assistance.        Care EveryWhere ID     This is your Care EveryWhere ID. This could be used by other organizations to access your [place] medical records  [id number]         Blood Pressure from Last 3 Encounters:    06/25/18 120/75   06/22/18 108/71   06/01/18 106/72    Weight from Last 3 Encounters:   06/22/18 79.2 kg (174 lb 9.7 oz)   06/01/18 79.4 kg (175 lb)   04/12/18 80.8 kg (178 lb 1.6 oz)              Today, you had the following     No orders found for display       Primary Care Provider Office Phone # Fax #    David Chavez [phone] [address] [place]  [place] [facility] [address]        Equal Access to Services     [facility]: Hadii aad ku hadasho Soomaali, waaxda luqadaha, qaybta kaalmada adeegyada, marcelino katz . So [facility] [phone].    ATENCIÓN: Si habla español, tiene a muse disposición servicios gratuitos de asistencia lingüística. Ll[facility] [phone].    We comply with applicable federal civil rights laws and [place] laws. We do not discriminate on the basis of race, color, national origin, age, disability, sex, sexual orientation, or gender identity.            Thank you!     Thank you for choosing [facility]  for your care. Our goal is always to provide you with excellent care. Hearing back from our patients is one way we can continue to improve our services. Please take a few minutes to complete the written survey that you may receive in the mail after your visit with us. Thank you!             Your Updated Medication List - Protect others around you: Learn how to safely use, store and throw away your medicines at www.disposemymeds.org.          This list is accurate as of 7/11/18 10:45 AM.  Always use your most recent med list.                   Brand Name Dispense Instructions for use Diagnosis    Abatacept 125 MG/ML Soaj auto-injector    ORENCIA    4 Syringe    Inject 1 mL (125 mg) Subcutaneous every 7 days    Rheumatoid arthritis of multiple sites with negative rheumatoid factor (H)       allopurinol 300 MG tablet    ZYLOPRIM    210 tablet    TAKE 1 TABLET BY MOUTH 1 TIME DAILY    Idiopathic gout, unspecified chronicity, unspecified site        aspirin 81 MG EC tablet     30 tablet    Take 1 tablet (81 mg) by mouth daily (*)    Coronary artery disease involving native coronary artery of native heart without angina pectoris       atorvastatin 80 MG tablet    LIPITOR    90 tablet    TAKE 1 TABLET BY MOUTH 1 TIME DAILY    Atherosclerosis of native coronary artery of native heart, angina presence unspecified, Hyperlipidemia LDL goal <100       baclofen 10 MG tablet    LIORESAL    180 tablet    TAKE 1 TABLET BY MOUTH 2 TIMES DAILY AS NEEDED FOR MUSCLE SPASM    Spasm of back muscles       blood glucose monitoring meter device kit     1 kit    TEST 2 TIMES DAILY.    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    Gout without tophus       diclofenac 1 % Gel topical gel    VOLTAREN    300 g    Apply 2-4 grams to 2-3 joints, up to four times daily as needed using enclosed dosing card.  Max of 32g/day    Facet arthropathy, Rheumatoid arthritis involving multiple sites, unspecified rheumatoid factor presence (H)       erythromycin ophthalmic ointment    ROMYCIN    3.5 g    Apply small amount to incision sites three times daily and to inner lower lid of operative eye(s) at bedtime for 7 days.    Postoperative eye state       evolocumab 140 MG/ML  prefilled autoinjector    REPATHA    2 mL    Inject 1 mL (140 mg) Subcutaneous every 14 days    Hyperlipidemia LDL goal <70, S/P CABG (coronary artery bypass graft)       ezetimibe 10 MG tablet    ZETIA    90 tablet    Take 1 tablet (10 mg) by mouth daily    Coronary artery disease involving native coronary artery of native heart without angina pectoris       fluticasone 50 MCG/ACT spray    FLONASE    1 Bottle    Spray 2 sprays into both nostrils daily    Nasal congestion, Dizziness       gabapentin 300 MG capsule    NEURONTIN    540 capsule    Take 2 capsules (600 mg) by mouth 3 times daily    Lumbar radiculopathy       gemfibrozil 600 MG tablet    LOPID    180 tablet    Take 1 tablet (600 mg) by mouth 2 times daily    Hyperlipidemia LDL goal <70       hydroxychloroquine 200 MG tablet    PLAQUENIL    30 tablet    Take 1 tablet (200 mg) by mouth daily    Rheumatoid arthritis of multiple sites with negative rheumatoid factor (H), High risk medications (not anticoagulants) long-term use       meclizine 25 MG tablet    ANTIVERT    30 tablet    TAKE ONE TABLET BY MOUTH EVERY 6 HOURS AS NEEDED FOR  DIZZINESS    Vertigo       melatonin 3 MG tablet      Take 1 tablet (3 mg) by mouth nightly as needed for sleep    Delayed sleep phase syndrome       meloxicam 7.5 MG tablet    MOBIC    30 tablet    TAKE 1 TABLET (7.5 MG) BY MOUTH DAILY    Low back pain, unspecified back pain laterality, unspecified chronicity, with sciatica presence unspecified       mirtazapine 15 MG tablet    REMERON    30 tablet    Take 1 tablet (15 mg) by mouth At Bedtime    Severe episode of recurrent major depressive disorder, without psychotic features (H)       naloxone nasal spray    NARCAN    0.2 mL    Spray 1 spray (4 mg) into one nostril alternating nostrils as needed for opioid reversal every 2-3 minutes until assistance arrives    Rheumatoid arthritis involving multiple sites, unspecified rheumatoid factor presence (H)        neomycin-polymyxin-dexamethasone 3.5-55068-5.1 Susp ophthalmic susp    MAXITROL    1 Bottle    Place 1-2 drops into both eyes 3 times daily    Postoperative eye state       nitroGLYcerin 0.4 MG sublingual tablet    NITROSTAT    25 tablet    PLACE 1 TABLET UNDER THE TONGUE EVERY 5 MINUTES AS NEEDED    Atherosclerosis of native coronary artery of native heart with angina pectoris (H)       order for DME      1.  CPAP pressure 11 cm/H20 with heated humidity.  2.  Provide mask to fit and CPAP supplies.  3.  Length of need lifetime.  4.  If needed please provide a chin strap    JEROD (obstructive sleep apnea), Anxiety, CAD (coronary artery disease), HTN (hypertension)       * order for DME     1 Units    Equipment being ordered: single end cane    Lumbar radiculopathy, Facet arthropathy, Chronic low back pain       * order for DME     1 each    OneTouch glucometer.    Rheumatoid arthritis involving multiple sites, unspecified rheumatoid factor presence (H)       * order for DME     200 each    OneTouch lancets testing twice daily.    Rheumatoid arthritis involving multiple sites, unspecified rheumatoid factor presence (H)       order for DME     200 each    Equipment being ordered: test strips as covered by insurance. USE TO TEST BLOOD SUGARS TWICE DAILY OR AS DIRECTED.    Rheumatoid arthritis involving multiple sites, unspecified rheumatoid factor presence (H)       oxyCODONE IR 5 MG tablet    ROXICODONE    180 tablet    Take 1-2 tablets (5-10 mg) by mouth every 4 hours as needed for moderate to severe pain . Max of 6 tabs per day.  30 day supply. May fill on/after 6/25/18 to start on 6/25/18    Facet arthropathy       pantoprazole 40 MG EC tablet    PROTONIX    90 tablet    Take 1 tablet (40 mg) by mouth daily    Gastroesophageal reflux disease without esophagitis       predniSONE 1 MG tablet    DELTASONE    270 tablet    Prednisone 4mg daily x30days, then 3mg daily for 30days, then 2mg daily thereafter.    Rheumatoid  arthritis of multiple sites with negative rheumatoid factor (H), High risk medications (not anticoagulants) long-term use       sulfaSALAzine  MG EC tablet    AZULFIDINE EN    120 tablet    Take 2 tablets (1,000 mg) by mouth 2 times daily    Rheumatoid arthritis of multiple sites with negative rheumatoid factor (H), High risk medications (not anticoagulants) long-term use       tamsulosin 0.4 MG capsule    FLOMAX    90 capsule    Take 1 capsule (0.4 mg) by mouth daily    Benign prostatic hyperplasia with weak urinary stream       tenofovir 300 MG tablet    VIREAD    90 tablet    TAKE ONE TABLET BY MOUTH EVERY DAY    Chronic viral hepatitis B without delta agent and without coma (H)       triamcinolone 0.1 % ointment    KENALOG    80 g    Apply topically 3 times daily    Rash       TYLENOL PO           vitamin D 2000 units tablet     100 tablet    TAKE ONE TABLET BY MOUTH ONCE DAILY    Vitamin D deficiency       zolpidem 5 MG tablet    AMBIEN    30 tablet    TAKE ONE TABLET BY MOUTH AT BEDTIME AS NEEDED FOR SLEEP    Insomnia, unspecified type       * Notice:  This list has 3 medication(s) that are the same as other medications prescribed for you. Read the directions carefully, and ask your doctor or other care provider to review them with you.

## 2018-07-11 NOTE — MR AVS SNAPSHOT
After Visit Summary   7/11/2018    Lamnot Daniels    MRN: [id number]           Patient Information     Date Of Birth          1965        Visit Information        Provider Department      7/11/2018 2:30 PM Dorothea Loo MD [facility] [place]    1. I am going to order a Butrans patch.  You would stop your oxycodone when you start the Butrans patch.  Start this medication before your oxycodone script is out, so that we have a backup plan.  Reach out to me if not helpful.  Lock up the oxycodone.  Please contact me after 1-2 weeks.    2. I will send a note to your sleep doctor.  If you haven't heard in 1-2 weeks, then reach out to them.   3. Schedule follow up in 3 months.    Administration of Butrans    Butrans is for transdermal use (on intact skin) only. Each Butrans patch is intended to be worn for 7 days  Instruct patients not to use Butrans if the pouch seal is broken or the patch is cut, damaged, or changed in any way and not to cut Butrans  Instruct patients to apply immediately after removal from the individually sealed pouch  Apply Butrans to the upper outer arm, upper chest, upper back or the side of the chest. These 4 sites (each present on both sides of the body) provide 8 possible application sites. Rotate Butrans among the 8 described skin sites. After Butrans removal, wait a minimum of 21 days before reapplying to the same skin site            For the use of 2 patches, patients should be instructed to remove their current patch, and apply the 2 new patches adjacent to one another at a different application site    Apply Butrans to a hairless or nearly hairless skin site. If none are available, the hair at the site should be clipped, not shaven. Do not apply Butrans to irritated skin. If the application site must be cleaned, clean the site with water only. Do not use soaps, alcohol, oils, lotions, or abrasive devices. Allow the skin to dry before  applying Butrans    Incidental exposure of the Butrans patch to water, such as while bathing or showering, is acceptable based on experience during clinical studies    If problems with adhesion of Butrans occur, the edges may be taped with first aid tape. If problems with lack of adhesion continue, the patch may be covered with waterproof or semipermeable adhesive dressings suitable for 7 days of wear. If Butrans falls off during the 7-day dosing interval, dispose of the transdermal system properly and place a new Butrans patch on at a different skin site. When changing the system, instruct patients to remove Butrans and dispose of it properly    If the buprenorphine-containing adhesive matrix accidentally contacts the skin, instruct patients or caregivers to wash the area with water and not to use soap, alcohol, or other solvents to remove the adhesive because they may enhance the absorption of the drug  See the Instructions for Use for step-by-step instructions for applying Butrans  Proper disposal    Patients should refer to the Instructions for Use for proper disposal of Butrans. Dispose of used and unused patches by following the instructions on the Patch-Disposal Unit that is packaged with the Butrans patches  Alternatively, patients can dispose of used patches by folding the adhesive side of the patch to itself, then flushing the patch down the toilet immediately upon removal. Unused patches should be removed from their pouches, the protective liners removed, the patches folded so that the adhesive side of the patch adheres to itself, and immediately flushed down the toilet  Patients should dispose of any patches remaining from a prescription as soon as they are no longer needed    Butrans is supplied in cartons containing 4 individually packaged systems and   a pouch containing 4 Patch-Disposal Units         Nurse line: [phone]  Appt line:  [phone]            Follow-ups after your visit        Your  next 10 appointments already scheduled     Sep 12, 2018 11:00 AM CDT   [facility]    [id number] [place] [address]   [phone]            Nov 12, 2018  7:30 AM CST   Return Visit with Ricardo Rae MD, [facility] NURSE ONLY   [facility]    [address] [place] [address]   [phone]            Nov 13, 2018  8:20 AM CST   [facility] with Sebastián Jenkins MD   [facility]    [address] [facility] [address]   [phone]            Jun 21, 2019  8:30 AM CDT   LAB with LAB FIRST FLOOR [facility] ([facility])    [address] [place] [address]   [phone]           Please do not eat 10-12 hours before your appointment if you are coming in fasting for labs on lipids, cholesterol, or glucose (sugar). This does not apply to pregnant women. Water, hot tea and black coffee (with nothing added) are okay. Do not drink other fluids, diet soda or chew gum.            Jun 21, 2019  9:00 AM CDT   Return Visit with Fly Travis MD   [facility]    [address] [place] [address]   [phone]              Who to contact     If you have questions or need follow up information about today's clinic visit or your schedule please contact [facility] directly at [phone].  Normal or non-critical lab and imaging results will be communicated to you by MyChart, letter or phone within 4 business days after the clinic has received the results. If you do not hear from us within 7 days, please contact the clinic through MyChart or phone. If you have a critical or abnormal lab result, we will notify you by phone as soon as possible.  Submit  "refill requests through [employer] or call your pharmacy and they will forward the refill request to us. Please allow 3 business days for your refill to be completed.          Additional Information About Your Visit        [facility] Information     [employer] gives you secure access to your electronic health record. If you see a primary care provider, you can also send messages to your care team and make appointments. If you have questions, please call your primary care clinic.  If you do not have a primary care provider, please call [phone] and they will assist you.        Care EveryWhere ID     This is your Care EveryWhere ID. This could be used by other organizations to access your [facility] medical records  [id number]        Your Vitals Were     Pulse Height BMI (Body Mass Index)             76 1.575 m (5' 2\") 32.19 kg/m2          Blood Pressure from Last 3 Encounters:   07/11/18 120/81   06/25/18 120/75   06/22/18 108/71    Weight from Last 3 Encounters:   07/11/18 79.8 kg (176 lb)   06/22/18 79.2 kg (174 lb 9.7 oz)   06/01/18 79.4 kg (175 lb)              Today, you had the following     No orders found for display       Primary Care Provider Office Phone # Fax #    David Chavez [phone]       [address]  [facility] [address]        Equal Access to Services     [facility] AH: Hadii aad ku hadasho Soomaali, waaxda luqadaha, qaybta kaalmada adeegyada, marcelino olivera. So [facility] [phone].    ATENCIÓN: Si habla español, tiene a muse disposición servicios gratuitos de asistencia lingüística. Llsofia al [phone].    We comply with applicable federal civil rights laws and [place] laws. We do not discriminate on the basis of race, color, national origin, age, disability, sex, sexual orientation, or gender identity.            Thank you!     Thank you for choosing [facility]  for your care. Our goal is always to provide you with excellent care. Hearing " back from our patients is one way we can continue to improve our services. Please take a few minutes to complete the written survey that you may receive in the mail after your visit with us. Thank you!             Your Updated Medication List - Protect others around you: Learn how to safely use, store and throw away your medicines at www.disposemymeds.org.          This list is accurate as of 7/11/18  2:59 PM.  Always use your most recent med list.                   Brand Name Dispense Instructions for use Diagnosis    Abatacept 125 MG/ML Soaj auto-injector    ORENCIA    4 Syringe    Inject 1 mL (125 mg) Subcutaneous every 7 days    Rheumatoid arthritis of multiple sites with negative rheumatoid factor (H)       allopurinol 300 MG tablet    ZYLOPRIM    210 tablet    TAKE 1 TABLET BY MOUTH 1 TIME DAILY    Idiopathic gout, unspecified chronicity, unspecified site       aspirin 81 MG EC tablet     30 tablet    Take 1 tablet (81 mg) by mouth daily (*)    Coronary artery disease involving native coronary artery of native heart without angina pectoris       atorvastatin 80 MG tablet    LIPITOR    90 tablet    TAKE 1 TABLET BY MOUTH 1 TIME DAILY    Atherosclerosis of native coronary artery of native heart, angina presence unspecified, Hyperlipidemia LDL goal <100       baclofen 10 MG tablet    LIORESAL    180 tablet    TAKE 1 TABLET BY MOUTH 2 TIMES DAILY AS NEEDED FOR MUSCLE SPASM    Spasm of back muscles       blood glucose monitoring meter device kit     1 kit    TEST 2 TIMES DAILY.    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    Gout without tophus       diclofenac 1 % Gel topical gel    VOLTAREN    300 g    Apply 2-4 grams to 2-3 joints, up to four times daily as needed using enclosed dosing card.  Max of 32g/day    Facet arthropathy, Rheumatoid arthritis involving multiple sites, unspecified rheumatoid factor presence (H)       erythromycin ophthalmic ointment    ROMYCIN    3.5 g    Apply small amount to incision sites three times daily and to inner lower lid of operative eye(s) at bedtime for 7 days.    Postoperative eye state       evolocumab 140 MG/ML prefilled autoinjector    REPATHA    2 mL    Inject 1 mL (140 mg) Subcutaneous every 14 days    Hyperlipidemia LDL goal <70, S/P CABG (coronary artery bypass graft)       ezetimibe 10 MG tablet    ZETIA    90 tablet    Take 1 tablet (10 mg) by mouth daily    Coronary artery disease involving native coronary artery of native heart without angina pectoris       fluticasone 50 MCG/ACT spray    FLONASE    1 Bottle    Spray 2 sprays into both nostrils daily    Nasal congestion, Dizziness       gabapentin 300 MG capsule    NEURONTIN    540 capsule    Take 2 capsules (600 mg) by mouth 3 times daily    Lumbar radiculopathy       gemfibrozil 600 MG tablet    LOPID    180 tablet    Take 1 tablet (600 mg) by mouth 2 times daily    Hyperlipidemia LDL goal <70       hydroxychloroquine 200 MG tablet    PLAQUENIL    30 tablet    Take 1 tablet (200 mg) by mouth daily    Rheumatoid arthritis of multiple sites with negative rheumatoid factor (H), High risk medications (not anticoagulants) long-term use       meclizine 25 MG tablet    ANTIVERT    30 tablet    TAKE ONE  TABLET BY MOUTH EVERY 6 HOURS AS NEEDED FOR  DIZZINESS    Vertigo       melatonin 3 MG tablet      Take 1 tablet (3 mg) by mouth nightly as needed for sleep    Delayed sleep phase syndrome       meloxicam 7.5 MG tablet    MOBIC    30 tablet    TAKE 1 TABLET (7.5 MG) BY MOUTH DAILY    Low back pain, unspecified back pain laterality, unspecified chronicity, with sciatica presence unspecified       mirtazapine 15 MG tablet    REMERON    30 tablet    Take 1 tablet (15 mg) by mouth At Bedtime    Severe episode of recurrent major depressive disorder, without psychotic features (H)       naloxone nasal spray    NARCAN    0.2 mL    Spray 1 spray (4 mg) into one nostril alternating nostrils as needed for opioid reversal every 2-3 minutes until assistance arrives    Rheumatoid arthritis involving multiple sites, unspecified rheumatoid factor presence (H)       neomycin-polymyxin-dexamethasone 3.5-55490-7.1 Susp ophthalmic susp    MAXITROL    1 Bottle    Place 1-2 drops into both eyes 3 times daily    Postoperative eye state       nitroGLYcerin 0.4 MG sublingual tablet    NITROSTAT    25 tablet    PLACE 1 TABLET UNDER THE TONGUE EVERY 5 MINUTES AS NEEDED    Atherosclerosis of native coronary artery of native heart with angina pectoris (H)       order for DME      1.  CPAP pressure 11 cm/H20 with heated humidity.  2.  Provide mask to fit and CPAP supplies.  3.  Length of need lifetime.  4.  If needed please provide a chin strap    JEROD (obstructive sleep apnea), Anxiety, CAD (coronary artery disease), HTN (hypertension)       * order for DME     1 Units    Equipment being ordered: single end cane    Lumbar radiculopathy, Facet arthropathy, Chronic low back pain       * order for DME     1 each    OneTouch glucometer.    Rheumatoid arthritis involving multiple sites, unspecified rheumatoid factor presence (H)       * order for DME     200 each    OneTouch lancets testing twice daily.    Rheumatoid arthritis involving multiple  sites, unspecified rheumatoid factor presence (H)       order for DME     200 each    Equipment being ordered: test strips as covered by insurance. USE TO TEST BLOOD SUGARS TWICE DAILY OR AS DIRECTED.    Rheumatoid arthritis involving multiple sites, unspecified rheumatoid factor presence (H)       oxyCODONE IR 5 MG tablet    ROXICODONE    180 tablet    Take 1-2 tablets (5-10 mg) by mouth every 4 hours as needed for moderate to severe pain . Max of 6 tabs per day.  30 day supply. May fill on/after 6/25/18 to start on 6/25/18    Facet arthropathy       pantoprazole 40 MG EC tablet    PROTONIX    90 tablet    Take 1 tablet (40 mg) by mouth daily    Gastroesophageal reflux disease without esophagitis       predniSONE 1 MG tablet    DELTASONE    270 tablet    Prednisone 4mg daily x30days, then 3mg daily for 30days, then 2mg daily thereafter.    Rheumatoid arthritis of multiple sites with negative rheumatoid factor (H), High risk medications (not anticoagulants) long-term use       sulfaSALAzine  MG EC tablet    AZULFIDINE EN    120 tablet    Take 2 tablets (1,000 mg) by mouth 2 times daily    Rheumatoid arthritis of multiple sites with negative rheumatoid factor (H), High risk medications (not anticoagulants) long-term use       tamsulosin 0.4 MG capsule    FLOMAX    90 capsule    Take 1 capsule (0.4 mg) by mouth daily    Benign prostatic hyperplasia with weak urinary stream       tenofovir 300 MG tablet    VIREAD    90 tablet    TAKE ONE TABLET BY MOUTH EVERY DAY    Chronic viral hepatitis B without delta agent and without coma (H)       triamcinolone 0.1 % ointment    KENALOG    80 g    Apply topically 3 times daily    Rash       TYLENOL PO           vitamin D 2000 units tablet     100 tablet    TAKE ONE TABLET BY MOUTH ONCE DAILY    Vitamin D deficiency       zolpidem 5 MG tablet    AMBIEN    30 tablet    TAKE ONE TABLET BY MOUTH AT BEDTIME AS NEEDED FOR SLEEP    Insomnia, unspecified type       * Notice:  This  list has 3 medication(s) that are the same as other medications prescribed for you. Read the directions carefully, and ask your doctor or other care provider to review them with you.

## 2018-07-11 NOTE — PATIENT INSTRUCTIONS
1. I am going to order a Butrans patch.  You would stop your oxycodone when you start the Butrans patch.  Start this medication before your oxycodone script is out, so that we have a backup plan.  Reach out to me if not helpful.  Lock up the oxycodone.  Please contact me after 1-2 weeks.    2. I will send a note to your sleep doctor.  If you haven't heard in 1-2 weeks, then reach out to them.   3. Schedule follow up in 3 months.    Administration of Butrans    Butrans is for transdermal use (on intact skin) only. Each Butrans patch is intended to be worn for 7 days  Instruct patients not to use Butrans if the pouch seal is broken or the patch is cut, damaged, or changed in any way and not to cut Butrans  Instruct patients to apply immediately after removal from the individually sealed pouch  Apply Butrans to the upper outer arm, upper chest, upper back or the side of the chest. These 4 sites (each present on both sides of the body) provide 8 possible application sites. Rotate Butrans among the 8 described skin sites. After Butrans removal, wait a minimum of 21 days before reapplying to the same skin site            For the use of 2 patches, patients should be instructed to remove their current patch, and apply the 2 new patches adjacent to one another at a different application site    Apply Butrans to a hairless or nearly hairless skin site. If none are available, the hair at the site should be clipped, not shaven. Do not apply Butrans to irritated skin. If the application site must be cleaned, clean the site with water only. Do not use soaps, alcohol, oils, lotions, or abrasive devices. Allow the skin to dry before applying Butrans    Incidental exposure of the Butrans patch to water, such as while bathing or showering, is acceptable based on experience during clinical studies    If problems with adhesion of Butrans occur, the edges may be taped with first aid tape. If problems with lack of adhesion continue, the  patch may be covered with waterproof or semipermeable adhesive dressings suitable for 7 days of wear. If Butrans falls off during the 7-day dosing interval, dispose of the transdermal system properly and place a new Butrans patch on at a different skin site. When changing the system, instruct patients to remove Butrans and dispose of it properly    If the buprenorphine-containing adhesive matrix accidentally contacts the skin, instruct patients or caregivers to wash the area with water and not to use soap, alcohol, or other solvents to remove the adhesive because they may enhance the absorption of the drug  See the Instructions for Use for step-by-step instructions for applying Butrans  Proper disposal    Patients should refer to the Instructions for Use for proper disposal of Butrans. Dispose of used and unused patches by following the instructions on the Patch-Disposal Unit that is packaged with the Butrans patches  Alternatively, patients can dispose of used patches by folding the adhesive side of the patch to itself, then flushing the patch down the toilet immediately upon removal. Unused patches should be removed from their pouches, the protective liners removed, the patches folded so that the adhesive side of the patch adheres to itself, and immediately flushed down the toilet  Patients should dispose of any patches remaining from a prescription as soon as they are no longer needed    Butrans is supplied in cartons containing 4 individually packaged systems and   a pouch containing 4 Patch-Disposal Units         Nurse line: [phone]  Appt line:  [phone]

## 2018-07-13 ENCOUNTER — MYC MEDICAL ADVICE (OUTPATIENT)
Dept: PALLIATIVE MEDICINE | Facility: CLINIC | Age: 53
End: 2018-07-13

## 2018-07-13 ENCOUNTER — TELEPHONE (OUTPATIENT)
Dept: SLEEP MEDICINE | Facility: CLINIC | Age: 53
End: 2018-07-13

## 2018-07-13 DIAGNOSIS — M06.09 RHEUMATOID ARTHRITIS OF MULTIPLE SITES WITH NEGATIVE RHEUMATOID FACTOR (H): ICD-10-CM

## 2018-07-13 DIAGNOSIS — G89.4 CHRONIC PAIN SYNDROME: ICD-10-CM

## 2018-07-13 DIAGNOSIS — Z79.52 ON CORTICOSTEROID THERAPY: ICD-10-CM

## 2018-07-13 DIAGNOSIS — G47.31 CSA (CENTRAL SLEEP APNEA): ICD-10-CM

## 2018-07-13 DIAGNOSIS — R74.8 ELEVATED LIVER ENZYMES: ICD-10-CM

## 2018-07-13 DIAGNOSIS — G47.33 OSA (OBSTRUCTIVE SLEEP APNEA): Primary | ICD-10-CM

## 2018-07-13 NOTE — TELEPHONE ENCOUNTER
Left message on machine to call back to schedule cpap f/u and titration study.  April St Sharma [facility] 7/13/2018 11:09 AM

## 2018-07-13 NOTE — TELEPHONE ENCOUNTER
Dr. Loo reached out about concerns over Obstructive Sleep Apnea and chronic long-acting opioids.    Discussed risks around worsening of known Central Sleep Apnea.    Regarding treatments if he has opioid-induced Central Sleep Apnea + Obstructive Sleep Apnea, there are two main devices used:  1. CPAP is sometimes effective despite central sleep apnea - the thought is that CPAP stabilizes the airway so small fluctuations in respiratory drive don't produce airway instability or arousals and recurrent central apneas are minimized.  Effective maybe around 55-60% of the time.  2. Adaptive Servo-Ventilation - This can be more effective than CPAP but still only has around a 70 - 75% success rate.     If we needed to switch to Adaptive Servo-Ventilation, the process would be the followin. Have him come in to sleep clinic and check how things are going, specifically check if he has a high number of central apneas.  2. Set up a sleep study to demonstrate central apneas on CPAP.  If present during the sleep study, they switch him over to Adaptive Servo-Ventilation.  3. If Adaptive Servo-Ventilation is effective we prescribe that type of device.      PLAN (PLEASE CALL PATIENT TO ARRANGE):  1. Schedule for follow up in mid-to-late August for CPAP check up (with detailed download).    2. Schedule titration Polysomnography for late August to early Sept (after CPAP f/u).  - If at f/u has significant Central Sleep Apnea can plan for Adaptive Servo-Ventilation titration  - If at f/u doesn't have significant Central Sleep Apnea can cancel titration Polysomnography.    Milan Huynh MD, Sleep Physician  2018

## 2018-07-13 NOTE — TELEPHONE ENCOUNTER
Medication:   Orencia  Last written on:   3/15/18  Quantity:   4 syringes    Refills:   3  [facility]    Last office visit:   3/20/18  Cancelled by patient: 6/26/18  Next office visit:   11/13/18  Last labs:   6/22/18    Helga Guerrier CMA  7/13/2018 7:42 AM

## 2018-07-13 NOTE — TELEPHONE ENCOUNTER
"Requested Prescriptions   Pending Prescriptions Disp Refills     blood glucose monitoring (ONE TOUCH DELICA) lancets [Pharmacy Med Name: ONE TOUCH DELICA 30G LANCETS] 200 each 2     Sig: TEST 2 TIMES DAILY.    Diabetic Supplies Protocol Passed    7/13/2018  3:16 PM       Passed - Patient is 18 years of age or older       Passed - Recent (6 mo) or future (30 days) visit within the authorizing provider's specialty    Patient had office visit in the last 6 months or has a visit in the next 30 days with authorizing provider.  See \"Patient Info\" tab in inbasket, or \"Choose Columns\" in Meds & Orders section of the refill encounter.            Prescription approved per [facility] Refill Protocol.    Shreya Rascon RN, BSN    "

## 2018-07-14 DIAGNOSIS — M10.9 GOUT WITHOUT TOPHUS: ICD-10-CM

## 2018-07-14 DIAGNOSIS — F45.8 ANXIETY HYPERVENTILATION: ICD-10-CM

## 2018-07-14 DIAGNOSIS — F41.9 ANXIETY HYPERVENTILATION: ICD-10-CM

## 2018-07-14 NOTE — TELEPHONE ENCOUNTER
Requested Prescriptions   Pending Prescriptions Disp Refills     clonazePAM (KLONOPIN) 1 MG tablet [Pharmacy Med Name: CLONAZEPAM 1 MG TABLET] 90 tablet 0     Sig: TAKE ONE -HALF TO ONE TABLET BY MOUTH 3 TIMES DAILY AS NEEDED    There is no refill protocol information for this order        Last Written Prescription Date:  4/26/18  Last Fill Quantity: 90,  # refills: 0   Last Office Visit with [facility] or [facility] prescribing provider:  6/1/2018     Future Office Visit:    Next 5 appointments (look out 90 days)     Sep 12, 2018 11:00 AM CDT   [facility] Visit with Sandra [facility] MD   [facility]    [address] [place] [address] [phone]            Oct 10, 2018  3:30 PM CDT   Return Visit with Dorothea Loo MD   [facility])    [address] [facility] [id number] [phone]

## 2018-07-16 NOTE — TELEPHONE ENCOUNTER
Routing refill request to provider for review/approval because:  Drug not on the [facility] refill protocol   Anabel Andrew RN

## 2018-07-17 RX ORDER — COLCHICINE 0.6 MG/1
TABLET, FILM COATED ORAL
Qty: 30 TABLET | Refills: 0 | Status: SHIPPED | OUTPATIENT
Start: 2018-07-17 | End: 2018-08-06

## 2018-07-17 RX ORDER — CLONAZEPAM 1 MG/1
TABLET ORAL
Qty: 90 TABLET | Refills: 0 | Status: SHIPPED | OUTPATIENT
Start: 2018-07-17 | End: 2018-09-12

## 2018-07-17 NOTE — TELEPHONE ENCOUNTER
"Routing refill request to provider for review/approval because:  Labs not current:  Uric acid      Requested Prescriptions   Pending Prescriptions Disp Refills     COLCRYS 0.6 MG tablet [Pharmacy Med Name: COLCRYS 0.6 MG TABLET] 30 tablet 0     Sig: TAKE 2 TABLETS BY MOUTH AT THE FIRST SIGN OF FLARE, TAKE 1 ADDITIONAL TABLET ONE HOUR LATER.    Gout Agents Protocol Failed    7/17/2018  2:18 PM       Failed - Has Uric Acid on file in past 12 months and value is less than 6    Recent Labs   Lab Test  11/04/15   1547   URIC  6.1     If level is 6mg/dL or greater, ok to refill one time and refer to provider.          Passed - CBC on file in past 12 months    Recent Labs   Lab Test  06/22/18   1448   WBC  7.3   RBC  4.83   HGB  14.4   HCT  43.4   PLT  284       For [facility] ONLY: [id number] = WBC, [id number] = RBC         Passed - ALT on file in past 12 months    Recent Labs   Lab Test  06/22/18   1448   ALT  35            Passed - Recent (12 mo) or future (30 days) visit within the authorizing provider's specialty    Patient had office visit in the last 12 months or has a visit in the next 30 days with authorizing provider or within the authorizing provider's specialty.  See \"Patient Info\" tab in inbasket, or \"Choose Columns\" in Meds & Orders section of the refill encounter.           Passed - Patient is age 18 or older       Passed - Normal serum creatinine on file in the past 12 months    Recent Labs   Lab Test  06/22/18   1448   06/24/13   1322   CR  0.98   < >   --    CRPOC   --    --   1.1    < > = values in this interval not displayed.             Shreya Rascon RN, BSN    "

## 2018-07-18 ENCOUNTER — MYC REFILL (OUTPATIENT)
Dept: FAMILY MEDICINE | Facility: CLINIC | Age: 53
End: 2018-07-18

## 2018-07-18 DIAGNOSIS — Z79.899 HIGH RISK MEDICATIONS (NOT ANTICOAGULANTS) LONG-TERM USE: ICD-10-CM

## 2018-07-18 DIAGNOSIS — M06.09 RHEUMATOID ARTHRITIS OF MULTIPLE SITES WITH NEGATIVE RHEUMATOID FACTOR (H): ICD-10-CM

## 2018-07-19 NOTE — TELEPHONE ENCOUNTER
Patient should have refills at this pharmacy    hydroxychloroquine (PLAQUENIL) 200 MG tablet 30 tablet 3 7/2/2018

## 2018-07-23 ENCOUNTER — TELEPHONE (OUTPATIENT)
Dept: CARDIOLOGY | Facility: CLINIC | Age: 53
End: 2018-07-23

## 2018-07-23 RX ORDER — HYDROXYCHLOROQUINE SULFATE 200 MG/1
200 TABLET, FILM COATED ORAL DAILY
Qty: 30 TABLET | Refills: 3
Start: 2018-07-23

## 2018-07-23 NOTE — TELEPHONE ENCOUNTER
30 day supply with 3 refills sent on 7/2/2018. Should have refills on file at pharmacy. Too soon to refill. Notified patient via [facility] to check with pharmacy.    Shreya Rascon RN, BSN

## 2018-07-23 NOTE — TELEPHONE ENCOUNTER
[facility] Prior Authorization Team   Phone: [phone]  Fax: [phone]    Prior Authorization Approval    Authorization Effective Date: 6/23/2018  Authorization Expiration Date: 7/23/2019  Medication: Repatha - PA Approved  Approved Dose/Quantity: 2 per 28 days  Reference #: Key: [id number]   Insurance Company: [employer] - Phone [phone] Fax [phone]  Expected CoPay: $0.00     CoPay Card Available: No   Foundation Assistance Needed: N/A  Which Pharmacy is filling the prescription (Not needed for infusion/clinic administered): [facility] - [place], [place] - [facility] [address]  Pharmacy Notified: Yes  Patient Notified: Yes

## 2018-07-23 NOTE — TELEPHONE ENCOUNTER
[facility] Prior Authorization Team   Phone: [phone]  Fax: [phone]    PA Initiation    Medication: Repatha - PA Pending  Insurance Company: [employer] - Phone [phone] Fax [phone]  Pharmacy Filling the Rx: [facility] - [place], [place] - [address]  Filling Pharmacy Phone: [phone]  Filling Pharmacy Fax: [phone]  Start Date: 7/23/2018

## 2018-07-26 DIAGNOSIS — R42 VERTIGO: ICD-10-CM

## 2018-07-26 NOTE — TELEPHONE ENCOUNTER
"Requested Prescriptions   Pending Prescriptions Disp Refills     meclizine (ANTIVERT) 25 MG tablet [Pharmacy Med Name: MECLIZINE 25 MG TABLET]    Last Written Prescription Date:  10/24/17  Last Fill Quantity: 30,  # refills: 10   Last Office Visit with [facility], [facility] or [facility] prescribing provider:  6/1/18   Future Office Visit:    Next 5 appointments (look out 90 days)     Sep 12, 2018 11:00 AM CDT   [facility] with [facility] MD   [facility]    [address] [place] [id number]   [phone]            Oct 10, 2018  3:30 PM CDT   Return Visit with Dorothea Loo MD   [facility] ([facility])    [address] [place] [id number] [phone]                  30 tablet 10     Sig: TAKE 1 TABLET BY MOUTH EVERY 6 HOURS AS NEEDED FOR DIZZINESS     Antivertigo/Antiemetic Agents Passed    7/26/2018  2:44 PM       Passed - Recent (12 mo) or future (30 days) visit within the authorizing provider's specialty    Patient had office visit in the last 12 months or has a visit in the next 30 days with authorizing provider or within the authorizing provider's specialty.  See \"Patient Info\" tab in inbasket, or \"Choose Columns\" in Meds & Orders section of the refill encounter.           Passed - Patient is 18 years of age or older              Costa Faarax  [facility]  "

## 2018-07-31 RX ORDER — MECLIZINE HYDROCHLORIDE 25 MG/1
TABLET ORAL
Qty: 30 TABLET | Refills: 10 | Status: SHIPPED | OUTPATIENT
Start: 2018-07-31 | End: 2019-03-31

## 2018-07-31 NOTE — TELEPHONE ENCOUNTER
Prescription approved per [facility] Refill Protocol.    Joselyn Valladares RN, [facility]

## 2018-08-02 ENCOUNTER — MYC REFILL (OUTPATIENT)
Dept: FAMILY MEDICINE | Facility: CLINIC | Age: 53
End: 2018-08-02

## 2018-08-02 DIAGNOSIS — F33.2 SEVERE EPISODE OF RECURRENT MAJOR DEPRESSIVE DISORDER, WITHOUT PSYCHOTIC FEATURES (H): ICD-10-CM

## 2018-08-02 RX ORDER — MIRTAZAPINE 15 MG/1
15 TABLET, FILM COATED ORAL AT BEDTIME
Qty: 30 TABLET | Refills: 5 | Status: CANCELLED | OUTPATIENT
Start: 2018-08-02

## 2018-08-04 ENCOUNTER — MYC REFILL (OUTPATIENT)
Dept: PALLIATIVE MEDICINE | Facility: CLINIC | Age: 53
End: 2018-08-04

## 2018-08-04 DIAGNOSIS — M06.9 RHEUMATOID ARTHRITIS INVOLVING MULTIPLE SITES, UNSPECIFIED RHEUMATOID FACTOR PRESENCE: ICD-10-CM

## 2018-08-06 DIAGNOSIS — R21 RASH: ICD-10-CM

## 2018-08-06 DIAGNOSIS — M10.9 GOUT WITHOUT TOPHUS: ICD-10-CM

## 2018-08-06 DIAGNOSIS — M54.16 LUMBAR RADICULOPATHY: ICD-10-CM

## 2018-08-06 RX ORDER — BUPRENORPHINE 15 UG/H
1 PATCH TRANSDERMAL
Qty: 4 PATCH | Refills: 0 | Status: SHIPPED | OUTPATIENT
Start: 2018-08-06 | End: 2018-08-31

## 2018-08-06 RX ORDER — BUPRENORPHINE 10 UG/H
1 PATCH TRANSDERMAL
Qty: 4 PATCH | Refills: 1 | Status: SHIPPED | OUTPATIENT
Start: 2018-08-06 | End: 2018-08-06

## 2018-08-06 NOTE — TELEPHONE ENCOUNTER
Medication Detail      Disp Refills Start End LISANDRO   mirtazapine (REMERON) 15 MG tablet 30 tablet 5 5/15/2018  No   Sig - Route: Take 1 tablet (15 mg) by mouth At Bedtime - Oral   Class: E-Prescribe   Order: [id number]   E-Prescribing Status: Receipt confirmed by pharmacy (5/15/2018 10:48 AM CDT)   Printout Tracking   External Result Report   Pharmacy   [facility] [id number] [place] [facility] - [address]     Per chart review, a 6 month supply sent in 05/2018, patient should have enough Rx until October 2018.     Team, please contact patient and instruct them to directly contact their pharmacy for refills.     Kim Diez RN

## 2018-08-06 NOTE — TELEPHONE ENCOUNTER
Received call from patient requesting refill(s) of buprenorphine (BUTRANS) 10 MCG/HR WK patch    Last picked up from pharmacy on 7/12/18    Pt last seen by prescribing provider on 7/11/18  Next appt scheduled for 10/10/18     checked in the past 6 months? Yes If no, print current report and give to RN    Last urine drug screen date 1/10/18  Current opioid agreement on file (completed within the last year) Yes Date of opioid agreement: 1/10/18    Processing (pick one and delete the others):    [id number] [address] [address]  Phone: [phone] Fax: [phone]    Sascha Isaac MA  [facility]      Will route to nursing pool for review and preparation of prescription(s).

## 2018-08-06 NOTE — TELEPHONE ENCOUNTER
"Requested Prescriptions   Pending Prescriptions Disp Refills     COLCRYS 0.6 MG tablet [Pharmacy Med Name: COLCRYS 0.6 MG TABLET] 30 tablet 0    Last Written Prescription Date:  7/17/18  Last Fill Quantity: 30,  # refills: 0   Last Office Visit with [facility], [facility] or [facility] prescribing provider:  6/1/2018     Future Office Visit:    Next 5 appointments (look out 90 days)     Sep 12, 2018 11:00 AM CDT   Return Visit with Sandra [facility] MD   [facility]    [address] [place] [address]   [phone]            Oct 10, 2018  3:30 PM CDT   Return Visit with Dorothea Loo MD   [facility] ([facility])    [address] [place] [id number] [phone]                  Sig: TAKE 2 TABLETS BY MOUTH AT THE FIRST SIGN OF FLARE, TAKE 1 ADDITIONAL TABLET ONE HOUR LATER.    Gout Agents Protocol Failed    8/6/2018  2:44 PM       Failed - Has Uric Acid on file in past 12 months and value is less than 6    Recent Labs   Lab Test  11/04/15   1547   URIC  6.1     If level is 6mg/dL or greater, ok to refill one time and refer to provider.          Passed - CBC on file in past 12 months    Recent Labs   Lab Test  06/22/18   1448   WBC  7.3   RBC  4.83   HGB  14.4   HCT  43.4   PLT  284       For [facility] ONLY: [id number] = WBC, [id number] = RBC         Passed - ALT on file in past 12 months    Recent Labs   Lab Test  06/22/18   1448   ALT  35            Passed - Recent (12 mo) or future (30 days) visit within the authorizing provider's specialty    Patient had office visit in the last 12 months or has a visit in the next 30 days with authorizing provider or within the authorizing provider's specialty.  See \"Patient Info\" tab in inbasket, or \"Choose Columns\" in Meds & Orders section of the refill encounter.           Passed - Patient is age 18 or older       Passed - Normal serum creatinine on file in the past 12 months    " "Recent Labs   Lab Test  06/22/18   1448   06/24/13   1322   CR  0.98   < >   --    CRPOC   --    --   1.1    < > = values in this interval not displayed.             triamcinolone (KENALOG) 0.1 % ointment [Pharmacy Med Name: TRIAMCINOLONE 0.1% OINTMENT] 80 g 0    Last Written Prescription Date:  6/22/18  Last Fill Quantity: 18g,  # refills: 0   Last Office Visit with [facility], [facility] or  [facility] prescribing provider:  6/1/2018     Future Office Visit:    Next 5 appointments (look out 90 days)     Sep 12, 2018 11:00 AM CDT   Return Visit with Sandra Borja MD   [facility])    [id number] [place] [id number] [phone]            Oct 10, 2018  3:30 PM CDT   Return Visit with Dorothea Loo MD   [facility] ([place] [facility]    [id number] [place] [id number] [phone]                  Sig: APPLY TOPICALLY TO AFFECTED AREA(S) 3 TIMES DAILY    Topical Steroids and Nonsteroidals Protocol Passed    8/6/2018  2:44 PM       Passed - Patient is age 6 or older       Passed - Authorizing prescriber's most recent note related to this medication read.    If refill request is for ophthalmic use, please forward request to provider for approval.         Passed - High potency steroid not ordered       Passed - Recent (12 mo) or future (30 days) visit within the authorizing provider's specialty    Patient had office visit in the last 12 months or has a visit in the next 30 days with authorizing provider or within the authorizing provider's specialty.  See \"Patient Info\" tab in inbasket, or \"Choose Columns\" in Meds & Orders section of the refill encounter.            gabapentin (NEURONTIN) 300 MG capsule [Pharmacy Med Name: GABAPENTIN 300 MG CAPSULE] 540 capsule 2    Last Written Prescription Date:  7/5/17  Last Fill Quantity: 540,  # refills: 3   Last Office Visit with [facility], [facility] or  [facility] prescribing provider:  " 6/1/2018     Future Office Visit:    Next 5 appointments (look out 90 days)     Sep 12, 2018 11:00 AM CDT   Return Visit with Sandra [facility]ja MD   [facility]    [id number] [place] [phone]   [phone]            Oct 10, 2018  3:30 PM CDT   Return Visit with Dorothea Loo MD   [facility] ([place] [facility])    [address] [facility] [address]   [phone]                  Sig: TAKE TWO CAPSULES BY MOUTH 3 TIMES DAILY    There is no refill protocol information for this order

## 2018-08-06 NOTE — TELEPHONE ENCOUNTER
Signed Prescriptions:                        Disp   Refills    buprenorphine (BUTRANS) 15 MCG/HR Wk patch 4 patch0        Sig: Place 1 patch onto the skin every 7 days Fill           on/after 8/9 to start on/after 8/11  Authorizing Provider: KAYLA CHUN    Changed dose as requested by patient.      Lian Chun MD  [facility]

## 2018-08-06 NOTE — TELEPHONE ENCOUNTER
Medication refill information reviewed. Please to Fax: [phone]    Due date for buprenorphine (BUTRANS) 10 MCG/HR WK patch is 8/11/18     Prescriptions prepped for review.     Will route to provider.

## 2018-08-06 NOTE — TELEPHONE ENCOUNTER
Message from [place]:  Original authorizing provider: Dorothea Loo MD    Lamont CROW Daniels would like a refill of the following medications:  buprenorphine (BUTRANS) 10 MCG/HR WK patch [Dorothea Loo MD]    Preferred pharmacy: [place] [address] [place] [address]    Comment:  could you please increase the dosage, I still have lots of pain. The next patch is due this Friday.

## 2018-08-07 NOTE — TELEPHONE ENCOUNTER
[facility]. Right[facility] confirmed. Patient was notified via my cart.     Amberly Chawla MA

## 2018-08-08 RX ORDER — GABAPENTIN 300 MG/1
CAPSULE ORAL
Qty: 540 CAPSULE | Refills: 2 | Status: SHIPPED | OUTPATIENT
Start: 2018-08-08 | End: 2019-03-11

## 2018-08-08 RX ORDER — COLCHICINE 0.6 MG/1
TABLET, FILM COATED ORAL
Qty: 30 TABLET | Refills: 0 | Status: SHIPPED | OUTPATIENT
Start: 2018-08-08 | End: 2018-10-01

## 2018-08-08 RX ORDER — TRIAMCINOLONE ACETONIDE 1 MG/G
OINTMENT TOPICAL
Qty: 80 G | Refills: 0 | Status: SHIPPED | OUTPATIENT
Start: 2018-08-08 | End: 2018-09-12

## 2018-08-21 ENCOUNTER — OFFICE VISIT (OUTPATIENT)
Dept: SLEEP MEDICINE | Facility: CLINIC | Age: 53
End: 2018-08-21
Payer: COMMERCIAL

## 2018-08-21 VITALS
HEART RATE: 64 BPM | HEIGHT: 62 IN | RESPIRATION RATE: 16 BRPM | OXYGEN SATURATION: 97 % | DIASTOLIC BLOOD PRESSURE: 83 MMHG | WEIGHT: 178.4 LBS | TEMPERATURE: 97.6 F | SYSTOLIC BLOOD PRESSURE: 129 MMHG | BODY MASS INDEX: 32.83 KG/M2

## 2018-08-21 DIAGNOSIS — G47.31 CSA (CENTRAL SLEEP APNEA): ICD-10-CM

## 2018-08-21 DIAGNOSIS — G89.4 CHRONIC PAIN SYNDROME: Primary | ICD-10-CM

## 2018-08-21 DIAGNOSIS — G47.33 OSA (OBSTRUCTIVE SLEEP APNEA): ICD-10-CM

## 2018-08-21 PROCEDURE — 99214 OFFICE O/P EST MOD 30 MIN: CPT | Mod: 24 | Performed by: INTERNAL MEDICINE

## 2018-08-21 NOTE — NURSING NOTE
"Chief Complaint   Patient presents with     CPAP Follow Up       Initial /83  Pulse 64  Temp 97.6  F (36.4  C) (Tympanic)  Resp 16  Ht 1.575 m (5' 2\")  Wt 80.9 kg (178 lb 6.4 oz)  SpO2 97%  BMI 32.63 kg/m2 Estimated body mass index is 32.63 kg/(m^2) as calculated from the following:    Height as of this encounter: 1.575 m (5' 2\").    Weight as of this encounter: 80.9 kg (178 lb 6.4 oz).    Medication Reconciliation: complete    ESS: 7    Liliana HernandezSpears  [facility] - Specialist      "

## 2018-08-21 NOTE — PROGRESS NOTES
[facility] to let patient know we canceled his sleep study on 9/5 per Dr. Huynh. Also he needs to make a cpap f/u appt  With Dr. Huynh in December.

## 2018-08-21 NOTE — PROGRESS NOTES
"Patient comes in today with ResMed S9 CPAP device fixed at CPAP 11 cmH2O pressure.  He has a history of severe Obstructive Sleep Apnea and chronic pain on management through pain clinic.     Overall, he rates the experience with PAP as 7 (0 poor, 10 great). The mask is comfortable.    The mask is leaking at his side of mouth.  The mask is leaking 1 nights per week.  He is not snoring with the mask on. He is not having gasp arousals.  He is having significant oral/nasal dryness. The pressure is comfortable.     His PAP interface is Full Face Mask.    Bedtime is typically 1200. Usually it takes about 30 min minutes to fall asleep with the mask on. Wake time is typically 1100.  Patient is using PAP therapy 6 hours per night. The patient is usually getting 8 hours of sleep per night.    He does not feel rested in the morning.    Total score - [place]: 7 (8/21/2018  9:48 AM)    I was able to get some information directly from the device itself.  In the last 30 days, he's used it every day and an average of 8.8 hours per day.  Last night he used machine for 6.2 hours, had \":o)\" mask fit, and residual AHI of 0.8 events per hour.      Was finally able to get download off machine -  ResMed   CPAP 11 cmH2O 90 day usage data:  86% of days with > 4 hours of use. 10/90 days with no use.   Average use 8 hours 47 minutes per day.   95%ile Leak 36.9 L/min.   AHI 2.6 events per hour.   Central Apnea Index 1.7 events per hour      Assessment and Plan:  1. JEROD (obstructive sleep apnea)  Obstructive sleep apnea appears well managed with current CPAP.  Keep replacing supplies regularly.    2. Chronic pain syndrome  Managed expertly by pain clinic and Dr. Loo.  Appears medication change was favorable in terms of respiratory problems.    3. CSA (central sleep apnea)  Appears resolved. RAZ < 5 per hour with Butrans compared to significant elevation last year on oxycodone.  No indication for further management changes.    Total time of " care was 25 minutes, with > 50% of time spent on counseling and coordination of care.       Milan Huynh MD, Sleep Physician  10:42 AM, 8/21/2018

## 2018-08-21 NOTE — Clinical Note
Dr. Loo,   Looks like the Butrans change did the trick!  No more Central Sleep Apnea so I would be in favor of cancelling the sleep study. Just wanted to confirm you were ok with the plan and that you didn't have additional concerns.   Milan

## 2018-08-21 NOTE — MR AVS SNAPSHOT
After Visit Summary   8/21/2018    Lamont Daniels    MRN: [id number]           Patient Information     Date Of Birth          1965        Visit Information        Provider Department      8/21/2018 10:00 AM Milan Huynh MD [facility]        Today's Diagnoses     Chronic pain syndrome    -  1    JEROD (obstructive sleep apnea)        CSA (central sleep apnea)          Care Instructions      Your BMI is Body mass index is 32.63 kg/(m^2).  Weight management is a personal decision.  If you are interested in exploring weight loss strategies, the following discussion covers the approaches that may be successful. Body mass index (BMI) is one way to tell whether you are at a healthy weight, overweight, or obese. It measures your weight in relation to your height.  A BMI of 18.5 to 24.9 is in the healthy range. A person with a BMI of 25 to 29.9 is considered overweight, and someone with a BMI of 30 or greater is considered obese. More than two-thirds of American adults are considered overweight or obese.  Being overweight or obese increases the risk for further weight gain. Excess weight may lead to heart disease and diabetes.  Creating and following plans for healthy eating and physical activity may help you improve your health.  Weight control is part of healthy lifestyle and includes exercise, emotional health, and healthy eating habits. Careful eating habits lifelong are the mainstay of weight control. Though there are significant health benefits from weight loss, long-term weight loss with diet alone may be very difficult to achieve- studies show long-term success with dietary management in less than 10% of people. Attaining a healthy weight may be especially difficult to achieve in those with severe obesity. In some cases, medications, devices and surgical management might be considered.  What can you do?  If you are overweight or obese and are interested in methods for  weight loss, you should discuss this with your provider.     Consider reducing daily calorie intake by 500 calories.     Keep a food journal.     Avoiding skipping meals, consider cutting portions instead.    Diet combined with exercise helps maintain muscle while optimizing fat loss. Strength training is particularly important for building and maintaining muscle mass. Exercise helps reduce stress, increase energy, and improves fitness. Increasing exercise without diet control, however, may not burn enough calories to loose weight.       Start walking three days a week 10-20 minutes at a time    Work towards walking thirty minutes five days a week     Eventually, increase the speed of your walking for 1-2 minutes at time    In addition, we recommend that you review healthy lifestyles and methods for weight loss available through the National Institutes of Health patient information sites:  http://win.niddk.nih.gov/publications/index.htm    And look into health and wellness programs that may be available through your health insurance provider, employer, local community center, or [employer]    Weight management plan: Patient was referred to their PCP to discuss a diet and exercise plan.              Follow-ups after your visit        Your next 10 appointments already scheduled     Sep 05, 2018  8:00 PM CDT   PSG Titration with BK BED 1   [facility] ([facility])    [address] [address]   [phone]            Sep 12, 2018 11:00 AM CDT   Return Visit with Sandra Borja MD   [facility]    [address] [place] [address]   [phone]            Oct 10, 2018  3:30 PM CDT   Return Visit with Dorothea Loo MD   [facility] ([facility])    [address] [facility] [address]   [phone]            Nov 12, 2018   7:30 AM CST   Return Visit with Ricardo Rae MD, [facility] NURSE ONLY   [facility] ([facility])    [address] [place] [address]   [phone]            Nov 13, 2018  8:20 AM CST   [facility]    [address] [facility] [address]   [phone]            Jun 21, 2019  8:30 AM CDT   LAB with LAB FIRST FLOOR Critical access hospital ([facility])    [address] [place] [address]   [phone]           Please do not eat 10-12 hours before your appointment if you are coming in fasting for labs on lipids, cholesterol, or glucose (sugar). This does not apply to pregnant women. Water, hot tea and black coffee (with nothing added) are okay. Do not drink other fluids, diet soda or chew gum.            Jun 21, 2019  9:00 AM CDT   Return Visit with Fly Travis MD   [facility] ([facility])    [address] [place] [address] [phone]              Who to contact     If you have questions or need follow up information about today's clinic visit or your schedule please contact [facility] directly at [phone].  Normal or non-critical lab and imaging results will be communicated to you by MyChart, letter or phone within 4 business days after the clinic has received the results. If you do not hear from us within 7 days, please contact the clinic through [facility] or phone. If you have a critical or abnormal lab result, we will notify you by phone as soon as possible.  Submit refill requests through [facility]SO[facility] or call your pharmacy and they will forward the refill request to us. Please allow 3 business days for your refill to be completed.          Additional Information About Your Visit        [facility] Information     TS[employer] gives you  "secure access to your electronic health record. If you see a primary care provider, you can also send messages to your care team and make appointments. If you have questions, please call your primary care clinic.  If you do not have a primary care provider, please call [phone] and they will assist you.        Care EveryWhere ID     This is your Care EveryWhere ID. This could be used by other organizations to access your [facility] medical records  [id number]        Your Vitals Were     Pulse Temperature Respirations Height Pulse Oximetry BMI (Body Mass Index)    64 97.6  F (36.4  C) (Tympanic) 16 1.575 m (5' 2\") 97% 32.63 kg/m2       Blood Pressure from Last 3 Encounters:   08/21/18 129/83   07/11/18 120/81   06/25/18 120/75    Weight from Last 3 Encounters:   08/21/18 80.9 kg (178 lb 6.4 oz)   07/11/18 79.8 kg (176 lb)   06/22/18 79.2 kg (174 lb 9.7 oz)              Today, you had the following     No orders found for display       Primary Care Provider Office Phone # Fax #    David Chavez [phone]       [address] [place]  [facility] [address]        Equal Access to Services     VICKEY HERRMANN : Hadii aad ku hadasho Soomaali, waaxda luqadaha, qaybta kaalmada adeegyada, waxay idiin hayadriennen celestina olivera. So [facility] [phone].    ATENCIÓN: Si habla español, tiene a muse disposición servicios gratuitos de asistencia lingüística. Llame al [phone].    We comply with applicable federal civil rights laws and [place] laws. We do not discriminate on the basis of race, color, national origin, age, disability, sex, sexual orientation, or gender identity.            Thank you!     Thank you for choosing [facility]  for your care. Our goal is always to provide you with excellent care. Hearing back from our patients is one way we can continue to improve our services. Please take a few minutes to complete the written survey that you may receive in the mail after your visit with " us. Thank you!             Your Updated Medication List - Protect others around you: Learn how to safely use, store and throw away your medicines at www.disposemymeds.org.          This list is accurate as of 8/21/18 10:48 AM.  Always use your most recent med list.                   Brand Name Dispense Instructions for use Diagnosis    Abatacept 125 MG/ML Soaj auto-injector    ORENCIA    4 Syringe    Inject 1 mL (125 mg) Subcutaneous every 7 days    Rheumatoid arthritis of multiple sites with negative rheumatoid factor (H)       allopurinol 300 MG tablet    ZYLOPRIM    210 tablet    TAKE 1 TABLET BY MOUTH 1 TIME DAILY    Idiopathic gout, unspecified chronicity, unspecified site       aspirin 81 MG EC tablet     30 tablet    Take 1 tablet (81 mg) by mouth daily (*)    Coronary artery disease involving native coronary artery of native heart without angina pectoris       atorvastatin 80 MG tablet    LIPITOR    90 tablet    TAKE 1 TABLET BY MOUTH 1 TIME DAILY    Atherosclerosis of native coronary artery of native heart, angina presence unspecified, Hyperlipidemia LDL goal <100       baclofen 10 MG tablet    LIORESAL    180 tablet    TAKE 1 TABLET BY MOUTH 2 TIMES DAILY AS NEEDED FOR MUSCLE SPASM    Spasm of back muscles       blood glucose monitoring lancets     200 each    TEST 2 TIMES DAILY.    On corticosteroid therapy, Elevated liver enzymes       blood glucose monitoring meter device kit     1 kit    TEST 2 TIMES DAILY.    On corticosteroid therapy       buprenorphine 15 MCG/HR Wk patch    BUTRANS    4 patch    Place 1 patch onto the skin every 7 days Fill on/after 8/9 to start on/after 8/11    Rheumatoid arthritis involving multiple sites, unspecified rheumatoid factor presence (H)       BusPIRone HCl 30 MG Tabs     180 tablet    Take 1 tablet by mouth 2 times daily    Major depressive disorder, recurrent episode, moderate (H)       clonazePAM 1 MG tablet    klonoPIN    90 tablet    TAKE ONE -HALF TO ONE TABLET BY  MOUTH 3 TIMES DAILY AS NEEDED    Anxiety hyperventilation       clopidogrel 75 MG tablet    PLAVIX    90 tablet    Take 1 tablet (75 mg) by mouth daily    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    Gout without tophus       diclofenac 1 % Gel topical gel    VOLTAREN    300 g    Apply 2-4 grams to 2-3 joints, up to four times daily as needed using enclosed dosing card.  Max of 32g/day    Facet arthropathy, Rheumatoid arthritis involving multiple sites, unspecified rheumatoid factor presence (H)       erythromycin ophthalmic ointment    ROMYCIN    3.5 g    Apply small amount to incision sites three times daily and to inner lower lid of operative eye(s) at bedtime for 7 days.    Postoperative eye state       evolocumab 140 MG/ML prefilled autoinjector    REPATHA    2 mL    Inject 1 mL (140 mg) Subcutaneous every 14 days    Hyperlipidemia LDL goal <70, S/P CABG (coronary artery bypass graft)       ezetimibe 10 MG tablet    ZETIA    90 tablet    Take 1 tablet (10 mg) by mouth daily    Coronary artery disease involving native coronary artery of native heart without angina pectoris       fluticasone 50 MCG/ACT spray    FLONASE    1 Bottle    Spray 2 sprays into both nostrils daily    Nasal congestion, Dizziness       * gabapentin 300 MG capsule    NEURONTIN    540 capsule    Take 2 capsules (600 mg) by mouth 3 times daily    Lumbar radiculopathy       * gabapentin 300 MG capsule    NEURONTIN    540 capsule    TAKE TWO CAPSULES BY MOUTH 3 TIMES DAILY    Lumbar radiculopathy       gemfibrozil 600 MG tablet    LOPID    180 tablet    Take 1 tablet (600 mg) by mouth 2 times daily    Hyperlipidemia LDL goal <70       hydroxychloroquine  200 MG tablet    PLAQUENIL    30 tablet    Take 1 tablet (200 mg) by mouth daily    Rheumatoid arthritis of multiple sites with negative rheumatoid factor (H), High risk medications (not anticoagulants) long-term use       meclizine 25 MG tablet    ANTIVERT    30 tablet    TAKE 1 TABLET BY MOUTH EVERY 6 HOURS AS NEEDED FOR DIZZINESS    Vertigo       melatonin 3 MG tablet      Take 1 tablet (3 mg) by mouth nightly as needed for sleep    Delayed sleep phase syndrome       meloxicam 7.5 MG tablet    MOBIC    30 tablet    TAKE 1 TABLET (7.5 MG) BY MOUTH DAILY    Low back pain, unspecified back pain laterality, unspecified chronicity, with sciatica presence unspecified       mirtazapine 15 MG tablet    REMERON    30 tablet    Take 1 tablet (15 mg) by mouth At Bedtime    Severe episode of recurrent major depressive disorder, without psychotic features (H)       naloxone nasal spray    NARCAN    0.2 mL    Spray 1 spray (4 mg) into one nostril alternating nostrils as needed for opioid reversal every 2-3 minutes until assistance arrives    Rheumatoid arthritis involving multiple sites, unspecified rheumatoid factor presence (H)       neomycin-polymyxin-dexamethasone 3.5-56103-0.1 Susp ophthalmic susp    MAXITROL    1 Bottle    Place 1-2 drops into both eyes 3 times daily    Postoperative eye state       nitroGLYcerin 0.4 MG sublingual tablet    NITROSTAT    25 tablet    PLACE 1 TABLET UNDER THE TONGUE EVERY 5 MINUTES AS NEEDED    Atherosclerosis of native coronary artery of native heart with angina pectoris (H)       order for DME      1.  CPAP pressure 11 cm/H20 with heated humidity.  2.  Provide mask to fit and CPAP supplies.  3.  Length of need lifetime.  4.  If needed please provide a chin strap    JEROD (obstructive sleep apnea), Anxiety, CAD (coronary artery disease), HTN (hypertension)       * order for DME     1 Units    Equipment being ordered: single end cane    Lumbar radiculopathy, Facet arthropathy, Chronic low  back pain       * order for DME     1 each    OneTouch glucometer.    Rheumatoid arthritis involving multiple sites, unspecified rheumatoid factor presence (H)       * order for DME     200 each    OneTouch lancets testing twice daily.    Rheumatoid arthritis involving multiple sites, unspecified rheumatoid factor presence (H)       order for DME     200 each    Equipment being ordered: test strips as covered by insurance. USE TO TEST BLOOD SUGARS TWICE DAILY OR AS DIRECTED.    Rheumatoid arthritis involving multiple sites, unspecified rheumatoid factor presence (H)       oxyCODONE IR 5 MG tablet    ROXICODONE    180 tablet    Take 1-2 tablets (5-10 mg) by mouth every 4 hours as needed for moderate to severe pain . Max of 6 tabs per day.  30 day supply. May fill on/after 6/25/18 to start on 6/25/18    Facet arthropathy       pantoprazole 40 MG EC tablet    PROTONIX    90 tablet    Take 1 tablet (40 mg) by mouth daily    Gastroesophageal reflux disease without esophagitis       predniSONE 1 MG tablet    DELTASONE    270 tablet    Prednisone 4mg daily x30days, then 3mg daily for 30days, then 2mg daily thereafter.    Rheumatoid arthritis of multiple sites with negative rheumatoid factor (H), High risk medications (not anticoagulants) long-term use       sulfaSALAzine  MG EC tablet    AZULFIDINE EN    120 tablet    Take 2 tablets (1,000 mg) by mouth 2 times daily    Rheumatoid arthritis of multiple sites with negative rheumatoid factor (H), High risk medications (not anticoagulants) long-term use       tamsulosin 0.4 MG capsule    FLOMAX    90 capsule    Take 1 capsule (0.4 mg) by mouth daily    Benign prostatic hyperplasia with weak urinary stream       tenofovir 300 MG tablet    VIREAD    90 tablet    TAKE ONE TABLET BY MOUTH EVERY DAY    Chronic viral hepatitis B without delta agent and without coma (H)       triamcinolone 0.1 % ointment    KENALOG    80 g    APPLY TOPICALLY TO AFFECTED AREA(S) 3 TIMES DAILY     Rash       TYLENOL PO           vitamin D 2000 units tablet     100 tablet    TAKE ONE TABLET BY MOUTH ONCE DAILY    Vitamin D deficiency       zolpidem 5 MG tablet    AMBIEN    30 tablet    TAKE ONE TABLET BY MOUTH AT BEDTIME AS NEEDED FOR SLEEP    Insomnia, unspecified type       * Notice:  This list has 5 medication(s) that are the same as other medications prescribed for you. Read the directions carefully, and ask your doctor or other care provider to review them with you.

## 2018-08-21 NOTE — PROGRESS NOTES
Doing well.  Will cancel Polysomnography for now.  Dr. Loo is increasing butrans dose so will need to set up follow up with PAP download in mid-December.  Milan Huynh MD, Sleep Physician  12:53 PM, 8/21/2018

## 2018-08-21 NOTE — PATIENT INSTRUCTIONS

## 2018-08-27 ENCOUNTER — TELEPHONE (OUTPATIENT)
Dept: RHEUMATOLOGY | Facility: CLINIC | Age: 53
End: 2018-08-27

## 2018-08-27 NOTE — TELEPHONE ENCOUNTER
[facility] Prior Authorization Team   Phone: [phone]  Fax: [phone]    PA Initiation    Medication: Orencia Clickject 125mg/ml  Insurance Company: [employer] [phone] Fax [phone]  Pharmacy Filling the Rx: [facility] - [address]  Filling Pharmacy Phone: [phone]  Filling Pharmacy Fax: [phone]  Start Date: 8/27/2018        Prior Authorization Approval    Authorization Effective Date: 7/28/2018  Authorization Expiration Date: 8/27/2019  Medication: Orencia Clickject 125mg/ml  Approved Dose/Quantity: UD  Reference #: [id number]   Insurance Company: [employer] [phone] Fax [phone]  Expected CoPay: *proactive* pa. unable to run test claim-refill too soon     CoPay Card Available:      Foundation Assistance Needed:    Which Pharmacy is filling the prescription (Not needed for infusion/clinic administered): [facility] - [address]  Pharmacy Notified: Yes  Patient Notified: Yes

## 2018-08-31 ENCOUNTER — MYC REFILL (OUTPATIENT)
Dept: PALLIATIVE MEDICINE | Facility: CLINIC | Age: 53
End: 2018-08-31

## 2018-08-31 DIAGNOSIS — M06.9 RHEUMATOID ARTHRITIS INVOLVING MULTIPLE SITES, UNSPECIFIED RHEUMATOID FACTOR PRESENCE: ICD-10-CM

## 2018-08-31 NOTE — TELEPHONE ENCOUNTER
Prior Authorization Approval    Authorization Effective Date: 7/28/2018  Authorization Expiration Date: 8/27/2019  Medication: Orencia Clickject 125mg/ml - APPROVED  Approved Dose/Quantity:   Reference #: [id number]   Insurance Company: [employer] - Phone [phone] Fax [phone]  Expected CoPay: *proactive* pa. unable to run test claim-refill too soon     CoPay Card Available:      Foundation Assistance Needed:    Which Pharmacy is filling the prescription (Not needed for infusion/clinic administered): [facility] - [place], [place] - [facility] [address]   Pharmacy Notified: Yes  Patient Notified: Yes

## 2018-08-31 NOTE — TELEPHONE ENCOUNTER
Message from [facility]:  Original authorizing provider: Dorothea Loo MD    Lamont IGNACIOLilian Daniels would like a refill of the following medications:  buprenorphine (BUTRANS) 15 MCG/HR Wk patch [Dorothea Loo MD]    Preferred pharmacy: [place] [id number] [place] [address]    Comment:  Please fill the patches for next month.

## 2018-08-31 NOTE — TELEPHONE ENCOUNTER
Medication refill information reviewed.     Due date for Butrans is 9/7/18 (28 day supplies)     Prescription prepped for review.     Will route to provider.     ROBERTO KarimiN, RN-BC  Patient Care Supervisor/Care Coordinator  [facility]

## 2018-08-31 NOTE — TELEPHONE ENCOUNTER
Message from MyChart:  Original authorizing provider: Dorothea Loo MD    Lamont CROW Daniels would like a refill of the following medications:  buprenorphine (BUTRANS) 15 MCG/HR Wk patch [Dorothea Loo MD]    Preferred pharmacy: [facility] [id number] [place] [address]    Comment:  Please fill the patches for next month.    Reanna MEJIAN-RN Care Coordinator  [facility]

## 2018-08-31 NOTE — TELEPHONE ENCOUNTER
Received call from patient requesting refill(s) of buprenorphine (BUTRANS) 15 MCG/HR Wk patch    Last picked up from pharmacy on 8/12/18    Pt last seen by prescribing provider on 7/11/18  Next appt scheduled for 10/10/18     checked in the past 6 months? Yes If no, print current report and give to RN    Last urine drug screen date 1/10/18  Current opioid agreement on file (completed within the last year) Yes Date of opioid agreement: 1/10/18    Processing (pick one and delete the others):  Fax to [facility] in [facility]     Sascha Isaac MA  [facility]    Will route to nursing pool for review and preparation of prescription(s).

## 2018-09-04 RX ORDER — BUPRENORPHINE 15 UG/H
1 PATCH TRANSDERMAL
Qty: 4 PATCH | Refills: 0 | Status: SHIPPED | OUTPATIENT
Start: 2018-09-04 | End: 2018-10-02

## 2018-09-04 NOTE — TELEPHONE ENCOUNTER
Faxed to pharmacy.    Angelique Bacon, ROBERTON, RN  Care Coordinator  [facility]

## 2018-09-04 NOTE — TELEPHONE ENCOUNTER
Forwarding to Dr. Lian Loo as this is her patient.    Ameena LEMUS RN CNP, FNP  [facility] [place]

## 2018-09-04 NOTE — TELEPHONE ENCOUNTER
Ok to be faxed    Signed Prescriptions:                        Disp   Refills    buprenorphine (BUTRANS) 15 MCG/HR Wk patch 4 patch0        Sig: Place 1 patch onto the skin every 7 days Fill           on/after 9/5 to start on/after 9/7/18  Authorizing Provider: KAYLA CHUN MD  [facility]

## 2018-09-06 ENCOUNTER — TELEPHONE (OUTPATIENT)
Dept: CARDIOLOGY | Facility: CLINIC | Age: 53
End: 2018-09-06

## 2018-09-06 NOTE — TELEPHONE ENCOUNTER
[facility] Prior Authorization Team   Phone: [phone]  Fax: [phone]    PA Initiation    Medication: Repatha - [place] Pending   Insurance Company: [place] Medicaid ([facility]) - Phone [phone] Fax [phone]  Pharmacy Filling the Rx: [facility] - [place], [place] - [address]  Filling Pharmacy Phone: [phone]  Filling Pharmacy Fax: [phone]  Start Date: 9/6/2018

## 2018-09-07 NOTE — TELEPHONE ENCOUNTER
Prior Authorization Approval    Authorization Effective Date: 9/6/2018  Authorization Expiration Date: 9/30/2018  Medication: Repatha - [place] Approved  Approved Dose/Quantity: 2ml per 28 days  Reference #: Key: [id number]   Insurance Company: [place] Medicaid ([facility]) - Phone [phone] Fax [phone]  Expected CoPay: $3.00     CoPay Card Available: No   Foundation Assistance Needed: N/A  Which Pharmacy is filling the prescription (Not needed for infusion/clinic administered): [facility] - [place], [place] - [facility] [address]  Pharmacy Notified: Yes  Patient Notified: Yes

## 2018-09-10 ENCOUNTER — TELEPHONE (OUTPATIENT)
Dept: RHEUMATOLOGY | Facility: CLINIC | Age: 53
End: 2018-09-10

## 2018-09-10 NOTE — TELEPHONE ENCOUNTER
[facility] Prior Authorization Team   Phone: [phone]  Fax: [phone]    PA Initiation    Medication: Orencia Clickject 125mg/ml   Insurance Company: [place] Medicaid ([facility]) - Phone [phone] Fax [phone]  Pharmacy Filling the Rx: [facility] - [place], [place] - [address]  Filling Pharmacy Phone: [phone]  Filling Pharmacy Fax: [phone]  Start Date: 9/10/2018

## 2018-09-10 NOTE — TELEPHONE ENCOUNTER
Prior Authorization Approval    Authorization Effective Date: 9/10/2018  Authorization Expiration Date: 9/30/2018  Medication: Orencia Clickject 125mg/ml   Approved Dose/Quantity: ud  Reference #: [id number]   Insurance Company: [place] Medicaid ([facility]) - Phone [phone] Fax [phone]  Expected CoPay: $3     CoPay Card Available: No   Foundation Assistance Needed:    Which Pharmacy is filling the prescription (Not needed for infusion/clinic administered): [facility] - [place] [address] [address]  Pharmacy Notified: Yes  Patient Notified: Yes

## 2018-09-11 ENCOUNTER — TELEPHONE (OUTPATIENT)
Dept: PALLIATIVE MEDICINE | Facility: CLINIC | Age: 53
End: 2018-09-11

## 2018-09-11 NOTE — TELEPHONE ENCOUNTER
Prior Authorization Retail Medication Request    Medication/Dose: buprenorphine (BUTRANS) 15 MCG/HR Wk patch  ICD code (if different than what is on RX):    Previously Tried and Failed:    Rationale:      Insurance Name:  [place] Medicaid ([facility])  Insurance ID:  [id number]      Pharmacy Information:    [id number]   [address] [place] [address]  Phone: [phone] Fax: [phone]

## 2018-09-12 ENCOUNTER — TELEPHONE (OUTPATIENT)
Dept: CARDIOLOGY | Facility: CLINIC | Age: 53
End: 2018-09-12

## 2018-09-12 ENCOUNTER — OFFICE VISIT (OUTPATIENT)
Dept: OPHTHALMOLOGY | Facility: CLINIC | Age: 53
End: 2018-09-12
Payer: MEDICAID

## 2018-09-12 DIAGNOSIS — F45.8 ANXIETY HYPERVENTILATION: ICD-10-CM

## 2018-09-12 DIAGNOSIS — H02.833 DERMATOCHALASIS OF EYELIDS OF BOTH EYES: Primary | ICD-10-CM

## 2018-09-12 DIAGNOSIS — H02.836 DERMATOCHALASIS OF EYELIDS OF BOTH EYES: Primary | ICD-10-CM

## 2018-09-12 DIAGNOSIS — I25.810 CORONARY ARTERY DISEASE INVOLVING AUTOLOGOUS ARTERY CORONARY BYPASS GRAFT WITHOUT ANGINA PECTORIS: Primary | ICD-10-CM

## 2018-09-12 DIAGNOSIS — F41.9 ANXIETY HYPERVENTILATION: ICD-10-CM

## 2018-09-12 DIAGNOSIS — R21 RASH: ICD-10-CM

## 2018-09-12 DIAGNOSIS — H57.819 BROW PTOSIS: ICD-10-CM

## 2018-09-12 PROCEDURE — 99024 POSTOP FOLLOW-UP VISIT: CPT | Performed by: OPHTHALMOLOGY

## 2018-09-12 RX ORDER — EZETIMIBE 10 MG/1
10 TABLET ORAL DAILY
Qty: 90 TABLET | Refills: 3 | Status: SHIPPED | OUTPATIENT
Start: 2018-09-12 | End: 2018-09-19

## 2018-09-12 NOTE — MR AVS SNAPSHOT
After Visit Summary   9/12/2018    Lamont Daniels    MRN: [id number]           Patient Information     Date Of Birth          1965        Visit Information        Provider Department      9/12/2018 11:00 AM Sandra Borja MD [facility]         Follow-ups after your visit        Your next 10 appointments already scheduled     Oct 10, 2018  3:30 PM CDT   Return Visit with Dorothea Loo MD   [facility] ([facility])    [address] [facility] [address]   [phone]            Oct 31, 2018 10:30 AM CDT   Return Visit with Sandra Borja MD   [facility])    [address] [place] [address]   [phone]            Nov 12, 2018  7:30 AM CST   Return Visit with Ricardo Rae MD, [facility] NURSE ONLY   [facility])    [address] [place] [address]   [phone]            Nov 13, 2018  8:20 AM CST   [facility]    [address] [facility] [address]   [phone]            Jun 21, 2019  8:30 AM CDT   LAB with LAB FIRST FLOOR [facility])    [address] [place] [address]   [phone]           Please do not eat 10-12 hours before your appointment if you are coming in fasting for labs on lipids, cholesterol, or glucose (sugar). This does not apply to pregnant women. Water, hot tea and black coffee (with nothing added) are okay. Do not drink other fluids, diet soda or chew gum.            Jun 21, 2019  9:00 AM CDT   Return Visit with Fly Travis MD   [facility])    [address] [place] [address]   [phone]              Who to contact      If you have questions or need follow up information about today's clinic visit or your schedule please contact [facility] directly at [phone].  Normal or non-critical lab and imaging results will be communicated to you by [employer] aka [employer], letter or phone within 4 business days after the clinic has received the results. If you do not hear from us within 7 days, please contact the clinic through [employer] aka [employer] or phone. If you have a critical or abnormal lab result, we will notify you by phone as soon as possible.  Submit refill requests through [employer] or call your pharmacy and they will forward the refill request to us. Please allow 3 business days for your refill to be completed.          Additional Information About Your Visit        [employer]itas Information     [employer]itas gives you secure access to your electronic health record. If you see a primary care provider, you can also send messages to your care team and make appointments. If you have questions, please call your primary care clinic.  If you do not have a primary care provider, please call [phone] and they will assist you.      [employer] is an electronic gateway that provides easy, online access to your medical records. With [employer], you can request a clinic appointment, read your test results, renew a prescription or communicate with your care team.     To access your existing account, please contact your [facility] or call [phone] for assistance.        Care EveryWhere ID     This is your Care EveryWhere ID. This could be used by other organizations to access your [place] medical records  [id number]         Blood Pressure from Last 3 Encounters:   08/21/18 129/83   07/11/18 120/81   06/25/18 120/75    Weight from Last 3 Encounters:   08/21/18 80.9 kg (178 lb 6.4 oz)   07/11/18 79.8 kg (176 lb)   06/22/18 79.2 kg (174 lb 9.7 oz)              Today, you had the following     No orders found for display        Primary Care Provider Office Phone # Fax #    David Chavez [phone]       [address]  [place] [address]        Equal Access to Services     VICKEY HERRMANN : Hadii aad ku hadallieo Soomaali, waaxda luqadaha, qaybta kaalmada adeegyada, marcelino greenyinka olivera. So [facility] [phone].    ATENCIÓN: Si habla español, tiene a muse disposición servicios gratuitos de asistencia lingüística. Llame al [phone].    We comply with applicable federal civil rights laws and [place] laws. We do not discriminate on the basis of race, color, national origin, age, disability, sex, sexual orientation, or gender identity.            Thank you!     Thank you for choosing [facility]  for your care. Our goal is always to provide you with excellent care. Hearing back from our patients is one way we can continue to improve our services. Please take a few minutes to complete the written survey that you may receive in the mail after your visit with us. Thank you!             Your Updated Medication List - Protect others around you: Learn how to safely use, store and throw away your medicines at www.disposemymeds.org.          This list is accurate as of 9/12/18 11:17 AM.  Always use your most recent med list.                   Brand Name Dispense Instructions for use Diagnosis    Abatacept 125 MG/ML Soaj auto-injector    ORENCIA    4 Syringe    Inject 1 mL (125 mg) Subcutaneous every 7 days    Rheumatoid arthritis of multiple sites with negative rheumatoid factor (H)       allopurinol 300 MG tablet    ZYLOPRIM    210 tablet    TAKE 1 TABLET BY MOUTH 1 TIME DAILY    Idiopathic gout, unspecified chronicity, unspecified site       aspirin 81 MG EC tablet     30 tablet    Take 1 tablet (81 mg) by mouth daily (*)    Coronary artery disease involving native coronary artery of native heart without angina pectoris       atorvastatin 80 MG tablet    LIPITOR    90 tablet    TAKE 1 TABLET BY  MOUTH 1 TIME DAILY    Atherosclerosis of native coronary artery of native heart, angina presence unspecified, Hyperlipidemia LDL goal <100       baclofen 10 MG tablet    LIORESAL    180 tablet    TAKE 1 TABLET BY MOUTH 2 TIMES DAILY AS NEEDED FOR MUSCLE SPASM    Spasm of back muscles       blood glucose monitoring lancets     200 each    TEST 2 TIMES DAILY.    On corticosteroid therapy, Elevated liver enzymes       blood glucose monitoring meter device kit     1 kit    TEST 2 TIMES DAILY.    On corticosteroid therapy       buprenorphine 15 MCG/HR Wk patch    BUTRANS    4 patch    Place 1 patch onto the skin every 7 days Fill on/after 9/5 to start on/after 9/7/18    Rheumatoid arthritis involving multiple sites, unspecified rheumatoid factor presence (H)       BusPIRone HCl 30 MG Tabs     180 tablet    Take 1 tablet by mouth 2 times daily    Major depressive disorder, recurrent episode, moderate (H)       clonazePAM 1 MG tablet    klonoPIN    90 tablet    TAKE ONE -HALF TO ONE TABLET BY MOUTH 3 TIMES DAILY AS NEEDED    Anxiety hyperventilation       clopidogrel 75 MG tablet    PLAVIX    90 tablet    Take 1 tablet (75 mg) by mouth daily    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    Gout without tophus       diclofenac 1 % Gel topical gel    VOLTAREN    300 g    Apply 2-4 grams to 2-3 joints, up to four times daily as needed using enclosed dosing card.  Max of 32g/day    Facet arthropathy, Rheumatoid arthritis involving multiple sites, unspecified rheumatoid factor presence (H)       erythromycin ophthalmic ointment    ROMYCIN    3.5 g    Apply small amount to incision sites three times daily and to inner lower lid of  operative eye(s) at bedtime for 7 days.    Postoperative eye state       evolocumab 140 MG/ML prefilled autoinjector    REPATHA    2 mL    Inject 1 mL (140 mg) Subcutaneous every 14 days    Hyperlipidemia LDL goal <70, S/P CABG (coronary artery bypass graft)       ezetimibe 10 MG tablet    ZETIA    90 tablet    Take 1 tablet (10 mg) by mouth daily    Coronary artery disease involving native coronary artery of native heart without angina pectoris       fluticasone 50 MCG/ACT spray    FLONASE    1 Bottle    Spray 2 sprays into both nostrils daily    Nasal congestion, Dizziness       * gabapentin 300 MG capsule    NEURONTIN    540 capsule    Take 2 capsules (600 mg) by mouth 3 times daily    Lumbar radiculopathy       * gabapentin 300 MG capsule    NEURONTIN    540 capsule    TAKE TWO CAPSULES BY MOUTH 3 TIMES DAILY    Lumbar radiculopathy       gemfibrozil 600 MG tablet    LOPID    180 tablet    Take 1 tablet (600 mg) by mouth 2 times daily    Hyperlipidemia LDL goal <70       hydroxychloroquine 200 MG tablet    PLAQUENIL    30 tablet    Take 1 tablet (200 mg) by mouth daily    Rheumatoid arthritis of multiple sites with negative rheumatoid factor (H), High risk medications (not anticoagulants) long-term use       meclizine 25 MG tablet    ANTIVERT    30 tablet    TAKE 1 TABLET BY MOUTH EVERY 6 HOURS AS NEEDED FOR DIZZINESS    Vertigo       melatonin 3 MG tablet      Take 1 tablet (3 mg) by mouth nightly as needed for sleep    Delayed sleep phase syndrome       meloxicam 7.5 MG tablet    MOBIC    30 tablet    TAKE 1 TABLET (7.5 MG) BY MOUTH DAILY    Low back pain, unspecified back pain laterality, unspecified chronicity, with sciatica presence unspecified       mirtazapine 15 MG tablet    REMERON    30 tablet    Take 1 tablet (15 mg) by mouth At Bedtime    Severe episode of recurrent major depressive disorder, without psychotic features (H)       naloxone nasal spray    NARCAN    0.2 mL    Spray 1 spray (4 mg) into  one nostril alternating nostrils as needed for opioid reversal every 2-3 minutes until assistance arrives    Rheumatoid arthritis involving multiple sites, unspecified rheumatoid factor presence (H)       neomycin-polymyxin-dexamethasone 3.5-69474-1.1 Susp ophthalmic susp    MAXITROL    1 Bottle    Place 1-2 drops into both eyes 3 times daily    Postoperative eye state       nitroGLYcerin 0.4 MG sublingual tablet    NITROSTAT    25 tablet    PLACE 1 TABLET UNDER THE TONGUE EVERY 5 MINUTES AS NEEDED    Atherosclerosis of native coronary artery of native heart with angina pectoris (H)       order for DME      1.  CPAP pressure 11 cm/H20 with heated humidity.  2.  Provide mask to fit and CPAP supplies.  3.  Length of need lifetime.  4.  If needed please provide a chin strap    JEROD (obstructive sleep apnea), Anxiety, CAD (coronary artery disease), HTN (hypertension)       * order for DME     1 Units    Equipment being ordered: single end cane    Lumbar radiculopathy, Facet arthropathy, Chronic low back pain       * order for DME     1 each    OneTouch glucometer.    Rheumatoid arthritis involving multiple sites, unspecified rheumatoid factor presence (H)       * order for DME     200 each    OneTouch lancets testing twice daily.    Rheumatoid arthritis involving multiple sites, unspecified rheumatoid factor presence (H)       order for DME     200 each    Equipment being ordered: test strips as covered by insurance. USE TO TEST BLOOD SUGARS TWICE DAILY OR AS DIRECTED.    Rheumatoid arthritis involving multiple sites, unspecified rheumatoid factor presence (H)       oxyCODONE IR 5 MG tablet    ROXICODONE    180 tablet    Take 1-2 tablets (5-10 mg) by mouth every 4 hours as needed for moderate to severe pain . Max of 6 tabs per day.  30 day supply. May fill on/after 6/25/18 to start on 6/25/18    Facet arthropathy       pantoprazole 40 MG EC tablet    PROTONIX    90 tablet    Take 1 tablet (40 mg) by mouth daily     Gastroesophageal reflux disease without esophagitis       predniSONE 1 MG tablet    DELTASONE    270 tablet    Prednisone 4mg daily x30days, then 3mg daily for 30days, then 2mg daily thereafter.    Rheumatoid arthritis of multiple sites with negative rheumatoid factor (H), High risk medications (not anticoagulants) long-term use       sulfaSALAzine  MG EC tablet    AZULFIDINE EN    120 tablet    Take 2 tablets (1,000 mg) by mouth 2 times daily    Rheumatoid arthritis of multiple sites with negative rheumatoid factor (H), High risk medications (not anticoagulants) long-term use       tamsulosin 0.4 MG capsule    FLOMAX    90 capsule    Take 1 capsule (0.4 mg) by mouth daily    Benign prostatic hyperplasia with weak urinary stream       tenofovir 300 MG tablet    VIREAD    90 tablet    TAKE ONE TABLET BY MOUTH EVERY DAY    Chronic viral hepatitis B without delta agent and without coma (H)       triamcinolone 0.1 % ointment    KENALOG    80 g    APPLY TOPICALLY TO AFFECTED AREA(S) 3 TIMES DAILY    Rash       TYLENOL PO           vitamin D 2000 units tablet     100 tablet    TAKE ONE TABLET BY MOUTH ONCE DAILY    Vitamin D deficiency       zolpidem 5 MG tablet    AMBIEN    30 tablet    TAKE ONE TABLET BY MOUTH AT BEDTIME AS NEEDED FOR SLEEP    Insomnia, unspecified type       * Notice:  This list has 5 medication(s) that are the same as other medications prescribed for you. Read the directions carefully, and ask your doctor or other care provider to review them with you.

## 2018-09-12 NOTE — TELEPHONE ENCOUNTER
Central Prior Authorization Team   Phone: [phone]    PA Initiation    Medication: Butrans 15 MCG/HR Patch  Insurance Company: [place] Medicaid ([facility]) - Phone [phone] Fax [phone]  Pharmacy Filling the Rx: [facility] [address] [place] [address] [address] [facility]  Filling Pharmacy Phone: [phone]  Filling Pharmacy Fax: [phone]  Start Date: 9/12/2018

## 2018-09-12 NOTE — TELEPHONE ENCOUNTER
Health Call Center    Phone Message    May a detailed message be left on voicemail: yes    Reason for Call: Medication Question or concern regarding medication   Prescription Clarification  Name of Medication: ezetimibe (ZETIA) 10 MG tablet [83949] (Order [id number])    Prescribing Provider: Dr. Travis   Pharmacy: [facility]   What on the order needs clarification? Ashley is calling to discuss ezetimibe (ZETIA) 10 MG tablet [[id number]] (Order [id number]). States insurance is requesting the actual Zetia brand. Verbal is okay. Please advise [phone]          Action Taken: Message routed to:  Adult Clinics: Cardiology p [phone]

## 2018-09-12 NOTE — TELEPHONE ENCOUNTER
Prior Authorization Approval    Authorization Effective Date: 9/10/2018  Authorization Expiration Date: 9/30/2018  Medication: Butrans 15 MCG/HR Patch-APPROVED  Approved Dose/Quantity:    Reference #: [id number]   Insurance Company: [place] Medicaid ([employer]) - Phone [phone] Fax [phone]  Expected CoPay:       CoPay Card Available:      Foundation Assistance Needed:    Which Pharmacy is filling the prescription (Not needed for infusion/clinic administered): [facility] [address] [place] [address] [address] [facility]  Pharmacy Notified: Yes  Patient Notified: Yes    APPROVED FOR ONE MONTH.  PATIENT WILL BE ON [facility] 10/1/2018.

## 2018-09-12 NOTE — TELEPHONE ENCOUNTER
Requested Prescriptions   Pending Prescriptions Disp Refills     clonazePAM (KLONOPIN) 1 MG tablet [Pharmacy Med Name: CLONAZEPAM 1 MG TABLET]        Last Written Prescription Date:  07/17/18  Last Fill Quantity: 90,   # refills: 0  Last Office Visit: 06/01/18-  Future Office visit:    Next 5 appointments (look out 90 days)     Oct 10, 2018  3:30 PM CDT   Return Visit [facility] Dor[facility]a Loo MD   [facility]    [address] [facility] [address]   [phone]            Oct 31, 2018 10:30 AM CDT   Return Visit with Sandra Borja MD   [facility]    [address] [place] [address]   [phone]            Nov 12, 2018  7:30 AM CST   Return Visit with Ricardo Rae MD, [facility] NURSE ONLY   [facility]    [address] [place] [address]   [phone]            Nov 13, 2018  8:20 AM CST   [facility] with Sebastián Jenkins MD   [facility]    [address] [place] [address]   [phone]                   Routing refill request to provider for review/approval because:  Drug not on the [facility] or [facility] refill protocol or controlled substance 90 tablet 0     Sig: TAKE 1/2 TO 1 TABLET BY MOUTH 3 TIMES DAILY AS NEEDED    There is no refill protocol information for this order        triamcinolone (KENALOG) 0.1 % ointment [Pharmacy Med Name: TRIAMCINOLONE 0.1% OINTMENT]  Last Written Prescription Date:  08/08/18  Last Fill Quantity: 80g,  # refills: 0   Last Office Visit with [facility], [facility] or [facility] prescribing provider:  06/01/18-   Future Office Visit:    Next 5 appointments (look out 90 days)     Oct 10, 2018  3:30 PM CDT   Return Visit with Dorothea Loo MD   [facility])    [address] [facility]  "[facility] [place] [address]   [phone]            Oct 31, 2018 10:30 AM CDT   Return Visit with Sandra Borja MD   [facility]    [address] [place] [address]   [phone]            Nov 12, 2018  7:30 AM CST   Return Visit with Ricardo Rae MD, [facility] NURSE ONLY   [facility] ([facility])    [address] [place] [address]   [phone]            Nov 13, 2018  8:20 AM CST   Return Visit with Sebastián Jenkins MD   [facility]    [address] [place] [address]   [phone]                80 g 0     Sig: APPLY TOPICALLY TO AFFECTED AREA(S) 3 TIMES DAILY    Topical Steroids and Nonsteroidals Protocol Passed    9/12/2018 12:02 PM       Passed - Patient is age 6 or older       Passed - Authorizing prescriber's most recent note related to this medication read.    If refill request is for ophthalmic use, please forward request to provider for approval.         Passed - High potency steroid not ordered       Passed - Recent (12 mo) or future (30 days) visit within the authorizing provider's specialty    Patient had office visit in the last 12 months or has a visit in the next 30 days with authorizing provider or within the authorizing provider's specialty.  See \"Patient Info\" tab in inbasket, or \"Choose Columns\" in Meds & Orders section of the refill encounter.              "

## 2018-09-12 NOTE — NURSING NOTE
Patient presents with:  Post Op (Ophthalmology) Both Eyes: patient states right lid is still drooping S/P Bilateral upper eyelid blepharoplasty, ptosis repair and brow ptosis repair DOS: 06/25/2018      Referring Provider:  No referring provider defined for this encounter.    HPI    Informant(s):  pt   Affected eye(s):  Right   Location:  Upper   Symptoms:              Comments:  S/P Bilateral upper eyelid blepharoplasty, ptosis repair and brow ptosis repair DOS: 06/25/2018  Patient concerned right upper lid is still drooping  Using warm compresses in the am and art tears denies use of elisabeth             Purnima Montiel, COA

## 2018-09-12 NOTE — PROGRESS NOTES
Chief Complaints and History of Present Illnesses   Patient presents with     Post Op (Ophthalmology) Both Eyes     patient states right lid is still drooping S/P Bilateral upper eyelid blepharoplasty, ptosis repair and brow ptosis repair DOS: 06/25/2018     Overall doing well post blepharoplasty and ptosis repair. Notes still having some obstruction of vision in superior field on the right more than left.     Assessment & Plan     Lamont Daniels is a 52 year old male with the following diagnoses:   1. Dermatochalasis of eyelids of both eyes - Both Eyes    2. Brow ptosis - Both Eyes       He has significant brow ptosis but no good place to hide an incision.   Still a bit inflamed use vaseline on the incision three times a day   return to clinic in 6 weeks if still overhanging obtain new visual field.     Also would like quote for excision of lower eyelid xanthelasma.          Attending Physician Attestation:  Complete documentation of historical and exam elements from today's encounter can be found in the full encounter summary report (not reduplicated in this progress note).  I personally obtained the chief complaint(s) and history of present illness.  I confirmed and edited as necessary the review of systems, past medical/surgical history, family history, social history, and examination findings as documented by others; and I examined the patient myself.  I personally reviewed the relevant tests, images, and reports as documented above.  I formulated and edited as necessary the assessment and plan and discussed the findings and management plan with the patient and family. - Sandra Borja MD

## 2018-09-13 RX ORDER — CLONAZEPAM 1 MG/1
TABLET ORAL
Qty: 90 TABLET | Refills: 0 | Status: SHIPPED | OUTPATIENT
Start: 2018-09-13 | End: 2018-10-26

## 2018-09-13 RX ORDER — TRIAMCINOLONE ACETONIDE 1 MG/G
OINTMENT TOPICAL
Qty: 80 G | Refills: 3 | Status: SHIPPED | OUTPATIENT
Start: 2018-09-13 | End: 2019-03-31

## 2018-09-13 NOTE — TELEPHONE ENCOUNTER
For clonazepam:  Routing refill request to provider for review/approval because:  Drug not on the [facility] refill protocol     For triamcinolone:  Prescription approved per [facility] Refill Protocol.    Shreya Rascon RN, BSN

## 2018-09-19 ENCOUNTER — MYC REFILL (OUTPATIENT)
Dept: CARDIOLOGY | Facility: CLINIC | Age: 53
End: 2018-09-19

## 2018-09-19 DIAGNOSIS — I25.810 CORONARY ARTERY DISEASE INVOLVING AUTOLOGOUS ARTERY CORONARY BYPASS GRAFT WITHOUT ANGINA PECTORIS: ICD-10-CM

## 2018-09-19 RX ORDER — EZETIMIBE 10 MG/1
10 TABLET ORAL DAILY
Qty: 90 TABLET | Refills: 3 | Status: SHIPPED | OUTPATIENT
Start: 2018-09-19 | End: 2019-03-31

## 2018-09-19 NOTE — TELEPHONE ENCOUNTER
Message from [employer]:  Original authorizing provider: MD Lamont Gallegos IGNACIOLilian Daniels would like a refill of the following medications:  ezetimibe (ZETIA) 10 MG tablet [Fly Travis MD]    Preferred pharmacy: [place] [address] [place] [address]    Comment:  could you please fill with brand name.

## 2018-09-24 NOTE — TELEPHONE ENCOUNTER
"[facility] is calling to request either a verbal consent allowing them to fill the \"Brand only\" Rx for ezetimibe (ZETIA) 10 MG tablet [[id number]] (Order [id number]) or to write on the Rx \"Brand Only\" and then refax for insurance coverage. Please advise.      "

## 2018-09-24 NOTE — TELEPHONE ENCOUNTER
The words Brand Only need to be used. They do not need a new Rx sent. Please advise [phone].

## 2018-09-28 ENCOUNTER — TELEPHONE (OUTPATIENT)
Dept: FAMILY MEDICINE | Facility: CLINIC | Age: 53
End: 2018-09-28

## 2018-09-28 DIAGNOSIS — R74.8 ELEVATED LIVER ENZYMES: ICD-10-CM

## 2018-09-28 DIAGNOSIS — Z79.52 ON CORTICOSTEROID THERAPY: ICD-10-CM

## 2018-09-28 NOTE — TELEPHONE ENCOUNTER
"Fax received from [employer] that prior auth is needed for the One Touch Ultra Blue Test Strips. Per Dr Meyer, patient may use other glucometer or test strips.    Called [facility] in [email or web address] and they tried running another brand. The \"Accuchek Compact\" is covered. Pharmacy will need a new Rx for glucometer, lancets and test strips.    Jeremiah Hendrickson CMA    "

## 2018-09-28 NOTE — TELEPHONE ENCOUNTER
Requested Prescriptions   Signed Prescriptions Disp Refills     blood glucose monitoring (NO BRAND SPECIFIED) test strip 100 strip 11     Sig: Use to test blood sugars 2 times daily or as directed    There is no refill protocol information for this order        blood glucose monitoring (NO BRAND SPECIFIED) meter device kit 1 kit 0     Sig: Use to test blood sugar 2 times daily or as directed.    There is no refill protocol information for this order        blood glucose (NO BRAND SPECIFIED) lancets standard 100 each 11     Sig: Use to test blood sugar 2 times daily or as directed.    There is no refill protocol information for this order        Prescription approved per [facility] Refill Protocol.    Shreya Rascon RN, BSN

## 2018-10-01 ENCOUNTER — MYC REFILL (OUTPATIENT)
Dept: PALLIATIVE MEDICINE | Facility: CLINIC | Age: 53
End: 2018-10-01

## 2018-10-01 DIAGNOSIS — M06.9 RHEUMATOID ARTHRITIS INVOLVING MULTIPLE SITES, UNSPECIFIED RHEUMATOID FACTOR PRESENCE: ICD-10-CM

## 2018-10-01 DIAGNOSIS — M10.9 GOUT WITHOUT TOPHUS: ICD-10-CM

## 2018-10-01 RX ORDER — BUPRENORPHINE 15 UG/H
1 PATCH TRANSDERMAL
Qty: 4 PATCH | Refills: 0 | Status: CANCELLED | OUTPATIENT
Start: 2018-10-01

## 2018-10-01 NOTE — TELEPHONE ENCOUNTER
Will route to MA pool for assistance with gathering opioid refill information.    Jenna Hunt, BSN, RN-BC  Patient Care Supervisor/Care Coordinator  [facility]

## 2018-10-02 RX ORDER — BUPRENORPHINE 15 UG/H
1 PATCH TRANSDERMAL
Qty: 4 PATCH | Refills: 0 | Status: SHIPPED | OUTPATIENT
Start: 2018-10-11 | End: 2018-10-10 | Stop reason: DRUGHIGH

## 2018-10-02 NOTE — TELEPHONE ENCOUNTER
Requested Prescriptions   Pending Prescriptions Disp Refills     COLCRYS 0.6 MG tablet [Pharmacy Med Name: COLCRYS 0.6 MG TABLET] 30 tablet 0    Last Written Prescription Date:  8/8/18  Last Fill Quantity: 30,  # refills: 0   Last Office Visit with FMRAO, OBED or [facility] prescribing provider:  6/1/2018     Future Office Visit:    Next 5 appointments (look out 90 days)     Oct 10, 2018  3:30 PM CDT   Return Visit with Dorothea Loo MD   [facility] ([facility])    [address] [facility] [address] [facility] [address] [address]   [phone]            Oct 31, 2018 10:30 AM CDT   Return Visit with Sandra Borja MD   [facility] ([facility])    [address] [place] [address]   [phone]            Nov 12, 2018  7:30 AM CST   Return Visit with Ricardo Rae MD, [facility] NURSE ONLY   [facility] ([facility])    [address] [place] [address]   [phone]            Nov 13, 2018  8:20 AM CST   Return Visit with Sebastián Jenkins MD   [facility] ([facility])    [address] [facility] [address]   [phone]                  Sig: TAKE 2 TABLETS BY MOUTH AT THE FIRST SIGN OF FLARE, TAKE 1 ADDITIONAL TABLET ONE HOUR LATER.    Gout Agents Protocol Failed    10/1/2018  4:06 PM       Failed - Has Uric Acid on file in past 12 months and value is less than 6    Recent Labs   Lab Test  11/04/15   1547   URIC  6.1     If level is 6mg/dL or greater, ok to refill one time and refer to provider.          Passed - CBC on file in past 12 months    Recent Labs   Lab Test  06/22/18   1448   WBC  7.3   RBC  4.83   HGB  14.4   HCT  43.4   PLT  284       For [facility] ONLY: LYSG187 = WBC, PDJD379 = RBC         Passed - ALT on file in past 12 months    Recent Labs   Lab Test  06/22/18   1448   ALT  35            Passed - Recent (12 mo) or future  "(30 days) visit within the authorizing provider's specialty    Patient had office visit in the last 12 months or has a visit in the next 30 days with authorizing provider or within the authorizing provider's specialty.  See \"Patient Info\" tab in inbasket, or \"Choose Columns\" in Meds & Orders section of the refill encounter.           Passed - Patient is age 18 or older       Passed - Normal serum creatinine on file in the past 12 months    Recent Labs   Lab Test  06/22/18   1448   06/24/13   1322   CR  0.98   < >   --    CRPOC   --    --   1.1    < > = values in this interval not displayed.               "

## 2018-10-02 NOTE — TELEPHONE ENCOUNTER
Signed Prescriptions:                        Disp   Refills    buprenorphine (BUTRANS) 15 MCG/HR Wk patch 4 patch0        Sig: Place 1 patch onto the skin every 7 days Fill           on/after 10/09/18 to start on/after 10/11/18  Authorizing Provider: KAYLA CHUN MD  [facility]

## 2018-10-02 NOTE — TELEPHONE ENCOUNTER
Routing to provider to review script prepped per below    Butrans 15mcg/hr, #4, refill:No (28day supply)  Sig:Fill on/after 10/09/18 to start on/after 10/11/18  Last picked up 09/13/18  Due: 10/11/18    Joselyn MEJIAN, RN Care Coordinator  [facility]

## 2018-10-03 DIAGNOSIS — Z95.1 S/P CABG (CORONARY ARTERY BYPASS GRAFT): ICD-10-CM

## 2018-10-03 DIAGNOSIS — E78.5 HYPERLIPIDEMIA LDL GOAL <70: ICD-10-CM

## 2018-10-03 RX ORDER — COLCHICINE 0.6 MG/1
TABLET, FILM COATED ORAL
Qty: 30 TABLET | Refills: 0 | Status: SHIPPED | OUTPATIENT
Start: 2018-10-03 | End: 2018-10-26

## 2018-10-03 NOTE — TELEPHONE ENCOUNTER
Routing refill request to provider for review/approval because:  Labs not current:  Uric acid     Joselyn Valladares RN, [facility]

## 2018-10-04 NOTE — TELEPHONE ENCOUNTER
Faxed refill request.   Medication requested: Repatha  Pharmacy Requested: [facility]   Pt's last office visit: 6/22/18  Next scheduled office visit: 6/21/19  Component      Latest Ref Rng & Units 6/22/2018   Cholesterol      <200 mg/dL 129   Triglycerides      <150 mg/dL 113   HDL Cholesterol      >39 mg/dL 64   LDL Cholesterol Calculated      <100 mg/dL 42   Non HDL Cholesterol      <130 mg/dL 65   Refill authorized per protocol  Sarah Arce RN  Cardiology Care Coordinator  [facility]  Phone: [phone]

## 2018-10-05 ENCOUNTER — TELEPHONE (OUTPATIENT)
Dept: RHEUMATOLOGY | Facility: CLINIC | Age: 53
End: 2018-10-05

## 2018-10-05 NOTE — TELEPHONE ENCOUNTER
[facility] Prior Authorization Team   Phone: [phone]  Fax: [phone]    PA Initiation    Medication: Orencia clickjeck 125mg/ml  Insurance Company: Jose - Phone [phone] Fax [phone]  [facility] [place], [place] - [address]  Filling Pharmacy Phone: [phone]  Filling Pharmacy Fax: [phone]  Start Date: 10/5/2018

## 2018-10-08 ENCOUNTER — TELEPHONE (OUTPATIENT)
Dept: CARDIOLOGY | Facility: CLINIC | Age: 53
End: 2018-10-08

## 2018-10-08 NOTE — TELEPHONE ENCOUNTER
[facility] Prior Authorization Team   Phone: [phone]  Fax: [phone]    PA Initiation    Medication: Repatha - PA Pending  Insurance Company: [employer] - Phone [phone] Fax [phone]  Pharmacy Filling the Rx: [facility] - [place], [place] - [address]  Filling Pharmacy Phone: [phone]  Filling Pharmacy Fax: [phone]  Start Date: 10/8/2018

## 2018-10-08 NOTE — TELEPHONE ENCOUNTER
Prior Authorization Approval    Authorization Effective Date: 12/30/2017  Authorization Expiration Date: 12/30/2018  Medication: Orencia clickjeck 125mg/ml  Approved Dose/Quantity: ud  Reference #: [id number]   Insurance Company: Jose - Phone [phone] Fax [phone]  Expected CoPay: $3.70     CoPay Card Available: No   Foundation Assistance Needed:    Which Pharmacy is filling the prescription (Not needed for infusion/clinic administered): [facility] - [place] [address]  Pharmacy Notified: Yes  Patient Notified: Yes

## 2018-10-08 NOTE — TELEPHONE ENCOUNTER
Prior Authorization Approval    Authorization Effective Date: 12/30/2017  Authorization Expiration Date: 12/31/2018  Medication: Repatha - PA Approved  Approved Dose/Quantity: 2ml per 28 days  Reference #: [id number]   Insurance Company: Jose - Phone [phone] Fax [phone]  Expected CoPay: $3.70     CoPay Card Available: No   Foundation Assistance Needed: N/A  Which Pharmacy is filling the prescription (Not needed for infusion/clinic administered): [facility] - [place] [address]  Pharmacy Notified: Yes  Patient Notified: Yes

## 2018-10-10 ENCOUNTER — OFFICE VISIT (OUTPATIENT)
Dept: PALLIATIVE MEDICINE | Facility: CLINIC | Age: 53
End: 2018-10-10
Payer: MEDICARE

## 2018-10-10 VITALS
WEIGHT: 176 LBS | HEIGHT: 62 IN | HEART RATE: 64 BPM | DIASTOLIC BLOOD PRESSURE: 87 MMHG | BODY MASS INDEX: 32.39 KG/M2 | SYSTOLIC BLOOD PRESSURE: 127 MMHG

## 2018-10-10 DIAGNOSIS — G47.33 OSA (OBSTRUCTIVE SLEEP APNEA): Primary | ICD-10-CM

## 2018-10-10 DIAGNOSIS — F41.9 ANXIETY: Chronic | ICD-10-CM

## 2018-10-10 DIAGNOSIS — M06.9 RHEUMATOID ARTHRITIS INVOLVING MULTIPLE SITES, UNSPECIFIED RHEUMATOID FACTOR PRESENCE: ICD-10-CM

## 2018-10-10 PROCEDURE — 99214 OFFICE O/P EST MOD 30 MIN: CPT | Performed by: PSYCHIATRY & NEUROLOGY

## 2018-10-10 RX ORDER — BUPRENORPHINE 20 UG/H
1 PATCH TRANSDERMAL
Qty: 4 PATCH | Refills: 2 | Status: SHIPPED | OUTPATIENT
Start: 2018-10-10 | End: 2019-02-04

## 2018-10-10 ASSESSMENT — PAIN SCALES - GENERAL: PAINLEVEL: SEVERE PAIN (6)

## 2018-10-10 NOTE — MR AVS SNAPSHOT
After Visit Summary   10/10/2018    Lamont Daniels    MRN: [id number]           Patient Information     Date Of Birth          1965        Visit Information        Provider Department      10/10/2018 3:30 PM Dorothea Loo MD [facility]        Today's Diagnoses     Rheumatoid arthritis involving multiple sites, unspecified rheumatoid factor presence (H)          Care Instructions    1. We are going to change the butrans patch dose to 20mcg/hr.  We will call and cancel the 15mcg/hr patch, and give you the 20mcg/hr.  I will give 2 refills  2. After 1 month, see your sleep doctor for an evaluation.  3. Update in 2-4 by phone or InContext Solutionst.  4. After being on the new dose for 3-4 weeks, then you can decrease or stop the meloxicam.  If it is helping and stopping the medicine makes your pain worse, then you can go back on and let me know.  We are trying to decrease your risk from medications.    5. Follow up in 3 months.      ----------------------------------------------------------------  Clinic Number:  [phone]   Call this number with any questions about your care and for scheduling assistance. Calls are returned Monday through Friday between 8 AM and 4:30 PM. We usually get back to you within 2 business days depending on the issue/request.       Medication refills:    For non-narcotic medications, call your pharmacy directly to request a refill. The pharmacy will contact the [facility] for authorization. Please allow 3-4 days for these refills to be processed.     For narcotic refills, call the clinic number or send a Pod Inns message. Please contact us 7-10 days before your refill is due. The message MUST include the name of the specific medication(s) requested and how you would like to receive the prescription(s). The options are as follows:    Pain Clinic staff can mail the prescription to your pharmacy. Please tell us the name of the pharmacy.    You may pick the  prescription up at the Pain Clinic (tell us the location) or during a clinic visit with your pain provider    Pain Clinic staff can deliver the prescription to the [place] pharmacy in the clinic building. Please tell us the location.      We believe regular attendance is key to your success in our program.    Any time you are unable to keep your appointment we ask that you call us at least 24 hours in advance to let us know. This will allow us to offer the appointment time to another patient.               Follow-ups after your visit        Your next 10 appointments already scheduled     Oct 31, 2018 10:30 AM CDT   Return Visit with Sandra Borja MD   [facility])    [address] [place] [address]   [phone]            Nov 12, 2018  7:30 AM CST   Return Visit with Ricardo Rae MD, [facility] NURSE ONLY   [facility])    [address] [place] [address]   [phone]            Nov 13, 2018  8:20 AM CST   Return Visit with Sebastián Jenkins MD   [facility] ([facility])    [address] [place] [address]   [phone]            Jun 21, 2019  8:30 AM CDT   LAB with LAB FIRST FLOOR [facility])    [address] [place] [address]   [phone]           Please do not eat 10-12 hours before your appointment if you are coming in fasting for labs on lipids, cholesterol, or glucose (sugar). This does not apply to pregnant women. Water, hot tea and black coffee (with nothing added) are okay. Do not drink other fluids, diet soda or chew gum.            Jun 21, 2019  9:00 AM CDT   Return Visit with Fly Travis MD   [facility])    [address] [place] [address]   [phone]             "  Who to contact     If you have questions or need follow up information about today's clinic visit or your schedule please contact [facility] directly at [phone].  Normal or non-critical lab and imaging results will be communicated to you by EdPuzzlehart, letter or phone within 4 business days after the clinic has received the results. If you do not hear from us within 7 days, please contact the clinic through EdPuzzlehart or phone. If you have a critical or abnormal lab result, we will notify you by phone as soon as possible.  Submit refill requests through Dokogeo or call your pharmacy and they will forward the refill request to us. Please allow 3 business days for your refill to be completed.          Additional Information About Your Visit        Dokogeo Information     Dokogeo gives you secure access to your electronic health record. If you see a primary care provider, you can also send messages to your care team and make appointments. If you have questions, please call your primary care clinic.  If you do not have a primary care provider, please call [phone] and they will assist you.        Care EveryWhere ID     This is your Care EveryWhere ID. This could be used by other organizations to access your [place] medical records  [id number]        Your Vitals Were     Pulse Height BMI (Body Mass Index)             64 1.575 m (5' 2\") 32.19 kg/m2          Blood Pressure from Last 3 Encounters:   10/10/18 127/87   08/21/18 129/83   07/11/18 120/81    Weight from Last 3 Encounters:   10/10/18 79.8 kg (176 lb)   08/21/18 80.9 kg (178 lb 6.4 oz)   07/11/18 79.8 kg (176 lb)              Today, you had the following     No orders found for display         Today's Medication Changes          These changes are accurate as of 10/10/18  3:46 PM.  If you have any questions, ask your nurse or doctor.               These medicines have changed or have updated prescriptions.        Dose/Directions    * " buprenorphine 20 MCG/HR WK patch   Commonly known as:  BUTRANS   This may have changed:  You were already taking a medication with the same name, and this prescription was added. Make sure you understand how and when to take each.   Used for:  Rheumatoid arthritis involving multiple sites, unspecified rheumatoid factor presence (H)   Changed by:  Dortohea Loo MD        Dose:  1 patch   Place 1 patch onto the skin every 7 days Fill on/after 10/10/18 to start on/after 10/11/18   Quantity:  4 patch   Refills:  2       * buprenorphine 15 MCG/HR Wk patch   Commonly known as:  BUTRANS   This may have changed:  Another medication with the same name was added. Make sure you understand how and when to take each.   Used for:  Rheumatoid arthritis involving multiple sites, unspecified rheumatoid factor presence (H)   Changed by:  Dorothea Loo MD        Dose:  1 patch   Start taking on:  10/11/2018   Place 1 patch onto the skin every 7 days Fill on/after 10/09/18 to start on/after 10/11/18   Quantity:  4 patch   Refills:  0       * Notice:  This list has 2 medication(s) that are the same as other medications prescribed for you. Read the directions carefully, and ask your doctor or other care provider to review them with you.         Where to get your medicines      Some of these will need a paper prescription and others can be bought over the counter.  Ask your nurse if you have questions.     Bring a paper prescription for each of these medications     buprenorphine 20 MCG/HR WK patch                Primary Care Provider Office Phone # Fax #    David Chavez [phone]       [address]  [facility] [address]        Equal Access to Services     [facility]: Hadii esther grande Solinnea, waaxda luqadaha, qaybta kaalmada marcelino hanson. So [facility] [phone].    ATENCIÓN: Si habla español, tiene a muse disposición servicios gratuitos de asistencia  lingüística. Cy al [phone].    We comply with applicable federal civil rights laws and [place] laws. We do not discriminate on the basis of race, color, national origin, age, disability, sex, sexual orientation, or gender identity.            Thank you!     Thank you for choosing [facility]  for your care. Our goal is always to provide you with excellent care. Hearing back from our patients is one way we can continue to improve our services. Please take a few minutes to complete the written survey that you may receive in the mail after your visit with us. Thank you!             Your Updated Medication List - Protect others around you: Learn how to safely use, store and throw away your medicines at www.disposemymeds.org.          This list is accurate as of 10/10/18  3:46 PM.  Always use your most recent med list.                   Brand Name Dispense Instructions for use Diagnosis    Abatacept 125 MG/ML Soaj auto-injector    ORENCIA    4 Syringe    Inject 1 mL (125 mg) Subcutaneous every 7 days    Rheumatoid arthritis of multiple sites with negative rheumatoid factor (H)       allopurinol 300 MG tablet    ZYLOPRIM    210 tablet    TAKE 1 TABLET BY MOUTH 1 TIME DAILY    Idiopathic gout, unspecified chronicity, unspecified site       aspirin 81 MG EC tablet     30 tablet    Take 1 tablet (81 mg) by mouth daily (*)    Coronary artery disease involving native coronary artery of native heart without angina pectoris       atorvastatin 80 MG tablet    LIPITOR    90 tablet    TAKE 1 TABLET BY MOUTH 1 TIME DAILY    Atherosclerosis of native coronary artery of native heart, angina presence unspecified, Hyperlipidemia LDL goal <100       baclofen 10 MG tablet    LIORESAL    180 tablet    TAKE 1 TABLET BY MOUTH 2 TIMES DAILY AS NEEDED FOR MUSCLE SPASM    Spasm of back muscles       blood glucose lancets standard    no brand specified    100 each    Use to test blood sugar 2 times daily or as directed.     On corticosteroid therapy, Elevated liver enzymes       blood glucose monitoring meter device kit    no brand specified    1 kit    Use to test blood sugar 2 times daily or as directed.    On corticosteroid therapy, Elevated liver enzymes       blood glucose monitoring test strip    no brand specified    100 strip    Use to test blood sugars 2 times daily or as directed    On corticosteroid therapy, Elevated liver enzymes       * buprenorphine 20 MCG/HR WK patch    BUTRANS    4 patch    Place 1 patch onto the skin every 7 days Fill on/after 10/10/18 to start on/after 10/11/18    Rheumatoid arthritis involving multiple sites, unspecified rheumatoid factor presence (H)       * buprenorphine 15 MCG/HR Wk patch   Start taking on:  10/11/2018    BUTRANS    4 patch    Place 1 patch onto the skin every 7 days Fill on/after 10/09/18 to start on/after 10/11/18    Rheumatoid arthritis involving multiple sites, unspecified rheumatoid factor presence (H)       BusPIRone HCl 30 MG Tabs     180 tablet    Take 1 tablet by mouth 2 times daily    Major depressive disorder, recurrent episode, moderate (H)       clonazePAM 1 MG tablet    klonoPIN    90 tablet    TAKE 1/2 TO 1 TABLET BY MOUTH 3 TIMES DAILY AS NEEDED    Anxiety hyperventilation       clopidogrel 75 MG tablet    PLAVIX    90 tablet    Take 1 tablet (75 mg) by mouth daily    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    Gout without tophus       diclofenac 1 % Gel topical gel    VOLTAREN    300 g    Apply 2-4 grams to 2-3 joints, up to four times daily as needed using enclosed dosing card.  Max of 32g/day    Facet arthropathy, Rheumatoid arthritis involving multiple sites,  unspecified rheumatoid factor presence (H)       erythromycin ophthalmic ointment    ROMYCIN    3.5 g    Apply small amount to incision sites three times daily and to inner lower lid of operative eye(s) at bedtime for 7 days.    Postoperative eye state       evolocumab 140 MG/ML prefilled autoinjector    REPATHA    2 mL    Inject 1 mL (140 mg) Subcutaneous every 14 days    Hyperlipidemia LDL goal <70, S/P CABG (coronary artery bypass graft)       ezetimibe 10 MG tablet    ZETIA    90 tablet    Take 1 tablet (10 mg) by mouth daily    Coronary artery disease involving autologous artery coronary bypass graft without angina pectoris       fluticasone 50 MCG/ACT spray    FLONASE    1 Bottle    Spray 2 sprays into both nostrils daily    Nasal congestion, Dizziness       * gabapentin 300 MG capsule    NEURONTIN    540 capsule    Take 2 capsules (600 mg) by mouth 3 times daily    Lumbar radiculopathy       * gabapentin 300 MG capsule    NEURONTIN    540 capsule    TAKE TWO CAPSULES BY MOUTH 3 TIMES DAILY    Lumbar radiculopathy       gemfibrozil 600 MG tablet    LOPID    180 tablet    Take 1 tablet (600 mg) by mouth 2 times daily    Hyperlipidemia LDL goal <70       hydroxychloroquine 200 MG tablet    PLAQUENIL    30 tablet    Take 1 tablet (200 mg) by mouth daily    Rheumatoid arthritis of multiple sites with negative rheumatoid factor (H), High risk medications (not anticoagulants) long-term use       meclizine 25 MG tablet    ANTIVERT    30 tablet    TAKE 1 TABLET BY MOUTH EVERY 6 HOURS AS NEEDED FOR DIZZINESS    Vertigo       melatonin 3 MG tablet      Take 1 tablet (3 mg) by mouth nightly as needed for sleep    Delayed sleep phase syndrome       meloxicam 7.5 MG tablet    MOBIC    30 tablet    TAKE 1 TABLET (7.5 MG) BY MOUTH DAILY    Low back pain, unspecified back pain laterality, unspecified chronicity, with sciatica presence unspecified       mirtazapine 15 MG tablet    REMERON    30 tablet    Take 1 tablet (15 mg)  by mouth At Bedtime    Severe episode of recurrent major depressive disorder, without psychotic features (H)       naloxone nasal spray    NARCAN    0.2 mL    Spray 1 spray (4 mg) into one nostril alternating nostrils as needed for opioid reversal every 2-3 minutes until assistance arrives    Rheumatoid arthritis involving multiple sites, unspecified rheumatoid factor presence (H)       neomycin-polymyxin-dexamethasone 3.5-00425-2.1 Susp ophthalmic susp    MAXITROL    1 Bottle    Place 1-2 drops into both eyes 3 times daily    Postoperative eye state       nitroGLYcerin 0.4 MG sublingual tablet    NITROSTAT    25 tablet    PLACE 1 TABLET UNDER THE TONGUE EVERY 5 MINUTES AS NEEDED    Atherosclerosis of native coronary artery of native heart with angina pectoris (H)       order for DME      1.  CPAP pressure 11 cm/H20 with heated humidity.  2.  Provide mask to fit and CPAP supplies.  3.  Length of need lifetime.  4.  If needed please provide a chin strap    JEROD (obstructive sleep apnea), Anxiety, CAD (coronary artery disease), HTN (hypertension)       order for DME     1 Units    Equipment being ordered: single end cane    Lumbar radiculopathy, Facet arthropathy, Chronic low back pain       oxyCODONE IR 5 MG tablet    ROXICODONE    180 tablet    Take 1-2 tablets (5-10 mg) by mouth every 4 hours as needed for moderate to severe pain . Max of 6 tabs per day.  30 day supply. May fill on/after 6/25/18 to start on 6/25/18    Facet arthropathy       pantoprazole 40 MG EC tablet    PROTONIX    90 tablet    Take 1 tablet (40 mg) by mouth daily    Gastroesophageal reflux disease without esophagitis       predniSONE 1 MG tablet    DELTASONE    180 tablet    Take 2 tablets (2 mg) by mouth daily    Rheumatoid arthritis of multiple sites with negative rheumatoid factor (H), High risk medications (not anticoagulants) long-term use       sulfaSALAzine  MG EC tablet    AZULFIDINE EN    120 tablet    Take 2 tablets (1,000 mg) by  mouth 2 times daily    Rheumatoid arthritis of multiple sites with negative rheumatoid factor (H), High risk medications (not anticoagulants) long-term use       tamsulosin 0.4 MG capsule    FLOMAX    90 capsule    Take 1 capsule (0.4 mg) by mouth daily    Benign prostatic hyperplasia with weak urinary stream       tenofovir 300 MG tablet    VIREAD    90 tablet    TAKE ONE TABLET BY MOUTH EVERY DAY    Chronic viral hepatitis B without delta agent and without coma (H)       triamcinolone 0.1 % ointment    KENALOG    80 g    APPLY TOPICALLY TO AFFECTED AREA(S) 3 TIMES DAILY    Rash       TYLENOL PO           vitamin D 2000 units tablet     100 tablet    TAKE ONE TABLET BY MOUTH ONCE DAILY    Vitamin D deficiency       zolpidem 5 MG tablet    AMBIEN    30 tablet    TAKE ONE TABLET BY MOUTH AT BEDTIME AS NEEDED FOR SLEEP    Insomnia, unspecified type       * Notice:  This list has 4 medication(s) that are the same as other medications prescribed for you. Read the directions carefully, and ask your doctor or other care provider to review them with you.

## 2018-10-10 NOTE — PROGRESS NOTES
[facility]    Date of visit: 10/11/18    Chief complaint:   Chief Complaint   Patient presents with     Pain       Interval history:  Lamont Daniels was last seen by me on 7/11/18    Recommendations/plan at the last visit included:  1. Physical Therapy:  Finished pool therapy, likely had more sessions  2. Clinical Health Psychologist to address issues of relaxation, behavioral change, coping style, and other factors important to improvement.  Will need to discuss at upcoming appointments  3. Diagnostic Studies:  UDS  4. Medication Management:    1. Stop oxycodone, start Butrans.  Risks and benefits were discussed.   2. Note will be sent to sleep doctor to let them know of change in the medication, as this could impact JEROD  5. Further procedures recommended: None at this time.  6. Recommendations to PCP:  3 months      Since his last visit, Lamont Daniels reports:  -overall, he feels that Butrans has been good for him  -likes having consistent medication flow  -still has 7/10 pain on average, and then one more severe day per week.  He will take acetaminophen (tylenol) for this.    Pain scores:  Pain intensity on average is 7 on a scale of 0-10.     Current pain treatments  butrans 15mcg/hr  Acetaminophen 250mg 2 tablets:taking 1-2 times per week.  Gabapentin 600mg 3 times daily  Baclofen 10mg BID prn (more consistently in the pm)- was prescribed by PCP, doesn't use daily.    meloxicam- 7.5mg in the morning- not sure how helpful  voltaren gel- elbow, back    Previous medication treatments included:  Codeine, oxycodone, hydrocodone- given for other issues- helpful  Cymbalta 60mg daily- given for mental health- was given by Dr. Acosta- not been higher  Baclofen 10mg at bedtime- not helpful, no side effects  Robaxin 500 mg 1 tablet, 1-3 times a day depending on the day  Ibuprofen 800 mg- using 3-4 times per week, helpful but started meloxicam  Oxycodone 5 mg : takes 6/day  Other  treatments have included:  Lamont Daniels has not been seen at a pain clinic in the past.    PT: has done for various reasons, most recently the low back.  Acupuncture: as done a couple of needles with adjustment  TENs Unit: no  Injections: no    Side Effects: none    Medications:  Current Outpatient Prescriptions   Medication Sig Dispense Refill     Abatacept (ORENCIA) 125 MG/ML SOAJ auto-injector Inject 1 mL (125 mg) Subcutaneous every 7 days 4 Syringe 3     Acetaminophen (TYLENOL PO)        allopurinol (ZYLOPRIM) 300 MG tablet TAKE 1 TABLET BY MOUTH 1 TIME DAILY 210 tablet 0     aspirin EC 81 MG EC tablet Take 1 tablet (81 mg) by mouth daily (*) 30 tablet 1     atorvastatin (LIPITOR) 80 MG tablet TAKE 1 TABLET BY MOUTH 1 TIME DAILY 90 tablet 0     baclofen (LIORESAL) 10 MG tablet TAKE 1 TABLET BY MOUTH 2 TIMES DAILY AS NEEDED FOR MUSCLE SPASM 180 tablet 2     blood glucose (NO BRAND SPECIFIED) lancets standard Use to test blood sugar 2 times daily or as directed. 100 each 11     blood glucose monitoring (NO BRAND SPECIFIED) meter device kit Use to test blood sugar 2 times daily or as directed. 1 kit 0     blood glucose monitoring (NO BRAND SPECIFIED) test strip Use to test blood sugars 2 times daily or as directed 100 strip 11     buprenorphine (BUTRANS) 20 MCG/HR WK patch Place 1 patch onto the skin every 7 days Fill on/after 10/10/18 to start on/after 10/11/18 4 patch 2     BusPIRone HCl 30 MG TABS Take 1 tablet by mouth 2 times daily 180 tablet 5     Cholecalciferol (VITAMIN D) 2000 UNITS tablet TAKE ONE TABLET BY MOUTH ONCE DAILY 100 tablet 1     clonazePAM (KLONOPIN) 1 MG tablet TAKE 1/2 TO 1 TABLET BY MOUTH 3 TIMES DAILY AS NEEDED 90 tablet 0     clopidogrel (PLAVIX) 75 MG tablet Take 1 tablet (75 mg) by mouth daily 90 tablet 3     COLCRYS 0.6 MG tablet TAKE 2 TABLETS BY MOUTH AT THE FIRST SIGN OF FLARE, TAKE 1 ADDITIONAL TABLET ONE HOUR LATER. 30 tablet 0     diclofenac (VOLTAREN) 1 % GEL topical gel Apply 2-4  grams to 2-3 joints, up to four times daily as needed using enclosed dosing card.  Max of 32g/day 300 g 11     erythromycin (ROMYCIN) ophthalmic ointment Apply small amount to incision sites three times daily and to inner lower lid of operative eye(s) at bedtime for 7 days. 3.5 g 0     evolocumab (REPATHA) 140 MG/ML prefilled autoinjector Inject 1 mL (140 mg) Subcutaneous every 14 days 2 mL 11     ezetimibe (ZETIA) 10 MG tablet Take 1 tablet (10 mg) by mouth daily 90 tablet 3     fluticasone (FLONASE) 50 MCG/ACT spray Spray 2 sprays into both nostrils daily 1 Bottle 11     gabapentin (NEURONTIN) 300 MG capsule TAKE TWO CAPSULES BY MOUTH 3 TIMES DAILY 540 capsule 2     gabapentin (NEURONTIN) 300 MG capsule Take 2 capsules (600 mg) by mouth 3 times daily 540 capsule 3     gemfibrozil (LOPID) 600 MG tablet Take 1 tablet (600 mg) by mouth 2 times daily 180 tablet 3     hydroxychloroquine (PLAQUENIL) 200 MG tablet Take 1 tablet (200 mg) by mouth daily 30 tablet 3     meclizine (ANTIVERT) 25 MG tablet TAKE 1 TABLET BY MOUTH EVERY 6 HOURS AS NEEDED FOR DIZZINESS 30 tablet 10     melatonin 3 MG tablet Take 1 tablet (3 mg) by mouth nightly as needed for sleep       meloxicam (MOBIC) 7.5 MG tablet TAKE 1 TABLET (7.5 MG) BY MOUTH DAILY 30 tablet 3     mirtazapine (REMERON) 15 MG tablet Take 1 tablet (15 mg) by mouth At Bedtime 30 tablet 5     naloxone (NARCAN) nasal spray Spray 1 spray (4 mg) into one nostril alternating nostrils as needed for opioid reversal every 2-3 minutes until assistance arrives 0.2 mL 0     neomycin-polymyxin-dexamethasone (MAXITROL) 3.5-31906-4.1 SUSP ophthalmic susp Place 1-2 drops into both eyes 3 times daily 1 Bottle 0     nitroGLYcerin (NITROSTAT) 0.4 MG sublingual tablet PLACE 1 TABLET UNDER THE TONGUE EVERY 5 MINUTES AS NEEDED 25 tablet 1     order for DME Equipment being ordered: single end cane 1 Units 0     ORDER FOR DME 1.  CPAP pressure 11 cm/H20 with heated humidity.   2.  Provide mask to  "fit and CPAP supplies.   3.  Length of need lifetime.   4.  If needed please provide a chin strap            oxyCODONE IR (ROXICODONE) 5 MG tablet Take 1-2 tablets (5-10 mg) by mouth every 4 hours as needed for moderate to severe pain . Max of 6 tabs per day.  30 day supply. May fill on/after 6/25/18 to start on 6/25/18 180 tablet 0     pantoprazole (PROTONIX) 40 MG EC tablet Take 1 tablet (40 mg) by mouth daily 90 tablet 3     predniSONE (DELTASONE) 1 MG tablet Take 2 tablets (2 mg) by mouth daily 180 tablet 0     sulfaSALAzine ER (AZULFIDINE EN) 500 MG EC tablet Take 2 tablets (1,000 mg) by mouth 2 times daily 120 tablet 3     tamsulosin (FLOMAX) 0.4 MG capsule Take 1 capsule (0.4 mg) by mouth daily 90 capsule 3     tenofovir (VIREAD) 300 MG tablet TAKE ONE TABLET BY MOUTH EVERY DAY 90 tablet 3     triamcinolone (KENALOG) 0.1 % ointment APPLY TOPICALLY TO AFFECTED AREA(S) 3 TIMES DAILY 80 g 3     zolpidem (AMBIEN) 5 MG tablet TAKE ONE TABLET BY MOUTH AT BEDTIME AS NEEDED FOR SLEEP 30 tablet 5       Medical History: any changes in medical history since they were last seen? eye lid surgery    Review of Systems:  The 14 system ROS was reviewed from the intake questionnaire:  none  Any bowel or bladder problems: None  Mood: depression, anxiety    Physical Exam:  Blood pressure 127/87, pulse 64, height 1.575 m (5' 2\"), weight 79.8 kg (176 lb).  General: mildly distressed, awake and alert  Gait: Antalgic and very slow  MSK exam:   Improved movement with less stiffness.  Better movement from sitting to standing. Continued restriction of range of motion     Assessment:   1. Chronic low back pain, with strong facet and myofascial features  2. Rheumatoid arthritis  3. Peripheral neuropathy  4. Depression  5. Hep B - currently being treated  6. Gout  7. JEROD, currently treated with CPAP, central apnea ok      Plan:   1. Physical Therapy:  Finished pool therapy  2. Clinical Health Psychologist to address issues of relaxation, " behavioral change, coping style, and other factors important to improvement.  Will need to discuss at upcoming appointments  3. Diagnostic Studies: none  4. Medication Management:    1. Still make final increase of Butrans to 20mcg/hr.  After being on this dose, will have him meet with sleep provider to see if his apnea remains improved from previous readings on oxycodone  2. Will call to cancel 15mcg/hr script at pharmacy  3. Risk is improved, but he is still on clonazepam donsistently.  Will send this note to PCP to see if any other options for managing his anxiety with a decrease in benzos.  Does have narcan.  4. As we are not sure meloxicam is helpful and does increase heart attack and stroke risk, will have him stop after 3-4 weeks of being on higher dose of medication.  5. Further procedures recommended: None at this time.  6. Recommendations to PCP:  3 months    Total time spent was 25 minutes, and more than 50% of face to face time was spent in counseling and/or coordination of care regarding medications.    Lian Loo MD  [facility]

## 2018-10-10 NOTE — PATIENT INSTRUCTIONS
1. We are going to change the butrans patch dose to 20mcg/hr.  We will call and cancel the 15mcg/hr patch, and give you the 20mcg/hr.  I will give 2 refills  2. After 1 month, see your sleep doctor for an evaluation.  3. Update in 2-4 by phone or [facility].  4. After being on the new dose for 3-4 weeks, then you can decrease or stop the meloxicam.  If it is helping and stopping the medicine makes your pain worse, then you can go back on and let me know.  We are trying to decrease your risk from medications.    5. Follow up in 3 months.      ----------------------------------------------------------------  Clinic Number:  [phone]   Call this number with any questions about your care and for scheduling assistance. Calls are returned Monday through Friday between 8 AM and 4:30 PM. We usually get back to you within 2 business days depending on the issue/request.       Medication refills:    For non-narcotic medications, call your pharmacy directly to request a refill. The pharmacy will contact the [facility] for authorization. Please allow 3-4 days for these refills to be processed.     For narcotic refills, call the clinic number or send a [employer] message. Please contact us 7-10 days before your refill is due. The message MUST include the name of the specific medication(s) requested and how you would like to receive the prescription(s). The options are as follows:    Pain Clinic staff can mail the prescription to your pharmacy. Please tell us the name of the pharmacy.    You may pick the prescription up at the Pain Clinic (tell us the location) or during a clinic visit with your pain provider    Pain Clinic staff can deliver the prescription to the [facility] in the clinic building. Please tell us the location.      We believe regular attendance is key to your success in our program.    Any time you are unable to keep your appointment we ask that you call us at least 24 hours in advance to let us  know. This will allow us to offer the appointment time to another patient.

## 2018-10-18 ENCOUNTER — OFFICE VISIT (OUTPATIENT)
Dept: FAMILY MEDICINE | Facility: CLINIC | Age: 53
End: 2018-10-18
Payer: MEDICARE

## 2018-10-18 VITALS
OXYGEN SATURATION: 96 % | SYSTOLIC BLOOD PRESSURE: 121 MMHG | HEIGHT: 62 IN | WEIGHT: 173 LBS | BODY MASS INDEX: 31.83 KG/M2 | DIASTOLIC BLOOD PRESSURE: 85 MMHG | TEMPERATURE: 97.5 F | HEART RATE: 69 BPM | RESPIRATION RATE: 16 BRPM

## 2018-10-18 DIAGNOSIS — F41.9 ANXIETY: Chronic | ICD-10-CM

## 2018-10-18 DIAGNOSIS — F33.1 MAJOR DEPRESSIVE DISORDER, RECURRENT EPISODE, MODERATE (H): Primary | ICD-10-CM

## 2018-10-18 PROCEDURE — 99214 OFFICE O/P EST MOD 30 MIN: CPT | Performed by: FAMILY MEDICINE

## 2018-10-18 RX ORDER — DESVENLAFAXINE 25 MG/1
25 TABLET, EXTENDED RELEASE ORAL DAILY
Qty: 30 TABLET | Refills: 1 | Status: SHIPPED | OUTPATIENT
Start: 2018-10-18 | End: 2018-12-15

## 2018-10-18 ASSESSMENT — PAIN SCALES - GENERAL: PAINLEVEL: NO PAIN (0)

## 2018-10-18 NOTE — MR AVS SNAPSHOT
After Visit Summary   10/18/2018    Lamont Daniels    MRN: [id number]           Patient Information     Date Of Birth          1965        Visit Information        Provider Department      10/18/2018 9:40 AM David Chavez MD [facility]        Today's Diagnoses     Anxiety    -  1      Care Instructions    At [facility], we strive to deliver an exceptional experience to you, every time we see you.  If you receive a survey in the mail, please send us back your thoughts. We really do value your feedback.    Based on your medical history, these are the current health maintenance/preventive care services that you are due for (some may have been done at this visit.)  Health Maintenance Due   Topic Date Due     INFLUENZA VACCINE (1) 09/01/2018         Suggested websites for health information:  Www.[employer] : Up to date and easily searchable information on multiple topics.  Www.[employer].gov : medication info, interactive tutorials, watch real surgeries online  Www.familydoctor.org : good info from the Academy of Family Physicians  Www.cdc.gov : public health info, travel advisories, epidemics (H1N1)  Www.aap.org : children's health info, normal development, vaccinations  Www.health.[facility].[place].us : [place] dept of health, public health issues in [place], N1N1    Your care team:                            Family Medicine Internal Medicine   MD Noam Dorado MD Shantel Branch-Fleming, MD Katya Georgiev PA-C Nam Ho, MD Pediatrics   Robbin Trevino PAISAK Lazaro, MD Tiffany Diggs CNP, MD Deborah Mielke, MD Kim Thein, APRN CNP      Clinic hours: Monday - Thursday 7 am-7 pm; Fridays 7 am-5 pm.   Urgent care: Monday - Friday 11 am-9 pm; Saturday and Sunday 9 am-5 pm.  Pharmacy : Monday -Thursday 8 am-8 pm; Friday 8 am-6 pm; Saturday and Sunday 9 am-5 pm.     Clinic: [phone]   Pharmacy: [phone]            Follow-ups after your visit        Follow-up notes from your care team     Return in about 4 weeks (around 11/15/2018) for depression/anxiety .      Your next 10 appointments already scheduled     Oct 18, 2018  9:40 AM CDT   Office Visit with David Chavez MD   [facility] ([facility])    [address] [facility] [address]   [phone]           Bring a current list of meds and any records pertaining to this visit. For Physicals, please bring immunization records and any forms needing to be filled out. Please arrive 10 minutes early to complete paperwork.            Oct 31, 2018 10:30 AM CDT   Return Visit with Sandra Borja MD   [facility]    [address] [place] [address]   [phone]            Nov 12, 2018  7:30 AM CST   Return Visit with Ricardo Rae MD, [facility] NURSE ONLY   [facility])    [address] [place] [address]   [phone]            Nov 13, 2018  8:20 AM CST   Return Visit with Sebastián Jenkins MD   [facility] ([facility])    [address] [place] [address]   [phone]            Jan 07, 2019 11:00 AM CST   Return Visit with Dorothea Loo MD   [facility] ([facility])    [address] [place] [address]   [phone]            Jun 21, 2019  8:30 AM CDT   LAB with LAB FIRST FLOOR [facility])    [address] [place] [address]   [phone]           Please do not eat 10-12 hours before your appointment if you are coming in fasting for labs on lipids, cholesterol, or glucose (sugar). This does not apply to pregnant women. Water, hot tea and black coffee (with nothing added) are okay. Do not  "drink other fluids, diet soda or chew gum.            Jun 21, 2019  9:00 AM CDT   Return Visit with Fly Travis MD   [facility]    [address] [place] [address] [phone]              Who to contact     If you have questions or need follow up information about today's clinic visit or your schedule please contact [facility] DEREK Lamar directly at [phone].  Normal or non-critical lab and imaging results will be communicated to you by [employer], letter or phone within 4 business days after the clinic has received the results. If you do not hear from us within 7 days, please contact the clinic through [employer] or phone. If you have a critical or abnormal lab result, we will notify you by phone as soon as possible.  Submit refill requests through [employer] or call your pharmacy and they will forward the refill request to us. Please allow 3 business days for your refill to be completed.          Additional Information About Your Visit        [employer] Information     [employer] gives you secure access to your electronic health record. If you see a primary care provider, you can also send messages to your care team and make appointments. If you have questions, please call your primary care clinic.  If you do not have a primary care provider, please call [phone] and they will assist you.        Care EveryWhere ID     This is your Care EveryWhere ID. This could be used by other organizations to access your [facility] medical records  [id number]        Your Vitals Were     Pulse Temperature Respirations Height Pulse Oximetry BMI (Body Mass Index)    69 97.5  F (36.4  C) (Oral) 16 5' 2\" (1.575 m) 96% 31.64 kg/m2       Blood Pressure from Last 3 Encounters:   10/18/18 121/85   10/10/18 127/87   08/21/18 129/83    Weight from Last 3 Encounters:   10/18/18 173 lb (78.5 kg)   10/10/18 176 lb (79.8 kg)   08/21/18 178 lb 6.4 oz (80.9 kg)    "           Today, you had the following     No orders found for display         Today's Medication Changes          These changes are accurate as of 10/18/18  9:31 AM.  If you have any questions, ask your nurse or doctor.               Start taking these medicines.        Dose/Directions    desvenlafaxine succinate 25 MG 24 hr tablet   Commonly known as:  PRISTIQ   Used for:  Anxiety   Started by:  David Chavez MD        Dose:  25 mg   Take 1 tablet (25 mg) by mouth daily   Quantity:  30 tablet   Refills:  1         Stop taking these medicines if you haven't already. Please contact your care team if you have questions.     meloxicam 7.5 MG tablet   Commonly known as:  MOBIC   Stopped by:  David Chavez MD                Where to get your medicines      These medications were sent to [place] [address] [place] [facility] [address] [address]     Phone:  [phone]     desvenlafaxine succinate 25 MG 24 hr tablet                Primary Care Provider Office Phone # Fax #    David Chavez [phone] [address]  [facility] [address]        Equal Access to Services     [facility]: Hadii aad ku hadasho Soomaali, waaxda luqadaha, qaybta kaalmada adeegyada, marcelino katz . So [facility] [phone].    ATENCIÓN: Si habla español, tiene a muse disposición servicios gratuitos de asistencia lingüística. [facility] [phone].    We comply with applicable federal civil rights laws and [place] laws. We do not discriminate on the basis of race, color, national origin, age, disability, sex, sexual orientation, or gender identity.            Thank you!     Thank you for choosing [facility]  for your care. Our goal is always to provide you with excellent care. Hearing back from our patients is one way we can continue to improve our services. Please take a few minutes to complete the written survey that you may receive in the  mail after your visit with us. Thank you!             Your Updated Medication List - Protect others around you: Learn how to safely use, store and throw away your medicines at www.disposemymeds.org.          This list is accurate as of 10/18/18  9:31 AM.  Always use your most recent med list.                   Brand Name Dispense Instructions for use Diagnosis    Abatacept 125 MG/ML Soaj auto-injector    ORENCIA    4 Syringe    Inject 1 mL (125 mg) Subcutaneous every 7 days    Rheumatoid arthritis of multiple sites with negative rheumatoid factor (H)       allopurinol 300 MG tablet    ZYLOPRIM    210 tablet    TAKE 1 TABLET BY MOUTH 1 TIME DAILY    Idiopathic gout, unspecified chronicity, unspecified site       aspirin 81 MG EC tablet     30 tablet    Take 1 tablet (81 mg) by mouth daily (*)    Coronary artery disease involving native coronary artery of native heart without angina pectoris       atorvastatin 80 MG tablet    LIPITOR    90 tablet    TAKE 1 TABLET BY MOUTH 1 TIME DAILY    Atherosclerosis of native coronary artery of native heart, angina presence unspecified, Hyperlipidemia LDL goal <100       baclofen 10 MG tablet    LIORESAL    180 tablet    TAKE 1 TABLET BY MOUTH 2 TIMES DAILY AS NEEDED FOR MUSCLE SPASM    Spasm of back muscles       blood glucose lancets standard    no brand specified    100 each    Use to test blood sugar 2 times daily or as directed.    On corticosteroid therapy, Elevated liver enzymes       blood glucose monitoring meter device kit    no brand specified    1 kit    Use to test blood sugar 2 times daily or as directed.    On corticosteroid therapy, Elevated liver enzymes       blood glucose monitoring test strip    no brand specified    100 strip    Use to test blood sugars 2 times daily or as directed    On corticosteroid therapy, Elevated liver enzymes       buprenorphine 20 MCG/HR WK patch    BUTRANS    4 patch    Place 1 patch onto the skin every 7 days Fill on/after 10/10/18  to start on/after 10/11/18    Rheumatoid arthritis involving multiple sites, unspecified rheumatoid factor presence (H)       BusPIRone HCl 30 MG Tabs     180 tablet    Take 1 tablet by mouth 2 times daily    Major depressive disorder, recurrent episode, moderate (H)       clonazePAM 1 MG tablet    klonoPIN    90 tablet    TAKE 1/2 TO 1 TABLET BY MOUTH 3 TIMES DAILY AS NEEDED    Anxiety hyperventilation       clopidogrel 75 MG tablet    PLAVIX    90 tablet    Take 1 tablet (75 mg) by mouth daily    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    Gout without tophus       desvenlafaxine succinate 25 MG 24 hr tablet    PRISTIQ    30 tablet    Take 1 tablet (25 mg) by mouth daily    Anxiety       diclofenac 1 % Gel topical gel    VOLTAREN    300 g    Apply 2-4 grams to 2-3 joints, up to four times daily as needed using enclosed dosing card.  Max of 32g/day    Facet arthropathy, Rheumatoid arthritis involving multiple sites, unspecified rheumatoid factor presence (H)       erythromycin ophthalmic ointment    ROMYCIN    3.5 g    Apply small amount to incision sites three times daily and to inner lower lid of operative eye(s) at bedtime for 7 days.    Postoperative eye state       evolocumab 140 MG/ML prefilled autoinjector    REPATHA    2 mL    Inject 1 mL (140 mg) Subcutaneous every 14 days    Hyperlipidemia LDL goal <70, S/P CABG (coronary artery bypass graft)       ezetimibe 10 MG tablet    ZETIA    90 tablet    Take 1 tablet (10 mg) by mouth daily    Coronary artery disease involving autologous artery coronary bypass graft without angina pectoris       fluticasone 50 MCG/ACT spray    FLONASE    1 Bottle    Spray 2 sprays into  both nostrils daily    Nasal congestion, Dizziness       gabapentin 300 MG capsule    NEURONTIN    540 capsule    TAKE TWO CAPSULES BY MOUTH 3 TIMES DAILY    Lumbar radiculopathy       gemfibrozil 600 MG tablet    LOPID    180 tablet    Take 1 tablet (600 mg) by mouth 2 times daily    Hyperlipidemia LDL goal <70       hydroxychloroquine 200 MG tablet    PLAQUENIL    30 tablet    Take 1 tablet (200 mg) by mouth daily    Rheumatoid arthritis of multiple sites with negative rheumatoid factor (H), High risk medications (not anticoagulants) long-term use       meclizine 25 MG tablet    ANTIVERT    30 tablet    TAKE 1 TABLET BY MOUTH EVERY 6 HOURS AS NEEDED FOR DIZZINESS    Vertigo       melatonin 3 MG tablet      Take 1 tablet (3 mg) by mouth nightly as needed for sleep    Delayed sleep phase syndrome       mirtazapine 15 MG tablet    REMERON    30 tablet    Take 1 tablet (15 mg) by mouth At Bedtime    Severe episode of recurrent major depressive disorder, without psychotic features (H)       naloxone nasal spray    NARCAN    0.2 mL    Spray 1 spray (4 mg) into one nostril alternating nostrils as needed for opioid reversal every 2-3 minutes until assistance arrives    Rheumatoid arthritis involving multiple sites, unspecified rheumatoid factor presence (H)       neomycin-polymyxin-dexamethasone 3.5-53700-8.1 Susp ophthalmic susp    MAXITROL    1 Bottle    Place 1-2 drops into both eyes 3 times daily    Postoperative eye state       nitroGLYcerin 0.4 MG sublingual tablet    NITROSTAT    25 tablet    PLACE 1 TABLET UNDER THE TONGUE EVERY 5 MINUTES AS NEEDED    Atherosclerosis of native coronary artery of native heart with angina pectoris (H)       order for DME      1.  CPAP pressure 11 cm/H20 with heated humidity.  2.  Provide mask to fit and CPAP supplies.  3.  Length of need lifetime.  4.  If needed please provide a chin strap    JEROD (obstructive sleep apnea), Anxiety, CAD (coronary artery disease), HTN (hypertension)        order for DME     1 Units    Equipment being ordered: single end cane    Lumbar radiculopathy, Facet arthropathy, Chronic low back pain       oxyCODONE IR 5 MG tablet    ROXICODONE    180 tablet    Take 1-2 tablets (5-10 mg) by mouth every 4 hours as needed for moderate to severe pain . Max of 6 tabs per day.  30 day supply. May fill on/after 6/25/18 to start on 6/25/18    Facet arthropathy       pantoprazole 40 MG EC tablet    PROTONIX    90 tablet    Take 1 tablet (40 mg) by mouth daily    Gastroesophageal reflux disease without esophagitis       predniSONE 1 MG tablet    DELTASONE    180 tablet    Take 2 tablets (2 mg) by mouth daily    Rheumatoid arthritis of multiple sites with negative rheumatoid factor (H), High risk medications (not anticoagulants) long-term use       sulfaSALAzine  MG EC tablet    AZULFIDINE EN    120 tablet    Take 2 tablets (1,000 mg) by mouth 2 times daily    Rheumatoid arthritis of multiple sites with negative rheumatoid factor (H), High risk medications (not anticoagulants) long-term use       tamsulosin 0.4 MG capsule    FLOMAX    90 capsule    Take 1 capsule (0.4 mg) by mouth daily    Benign prostatic hyperplasia with weak urinary stream       tenofovir 300 MG tablet    VIREAD    90 tablet    TAKE ONE TABLET BY MOUTH EVERY DAY    Chronic viral hepatitis B without delta agent and without coma (H)       triamcinolone 0.1 % ointment    KENALOG    80 g    APPLY TOPICALLY TO AFFECTED AREA(S) 3 TIMES DAILY    Rash       TYLENOL PO           vitamin D 2000 units tablet     100 tablet    TAKE ONE TABLET BY MOUTH ONCE DAILY    Vitamin D deficiency       zolpidem 5 MG tablet    AMBIEN    30 tablet    TAKE ONE TABLET BY MOUTH AT BEDTIME AS NEEDED FOR SLEEP    Insomnia, unspecified type

## 2018-10-18 NOTE — PROGRESS NOTES
SUBJECTIVE:   Lamont Daniels is a 52 year old male who presents to clinic today for the following health issues:      Depression Followup    Status since last visit: same    See PHQ-9 for current symptoms.  Other associated symptoms: None    Complicating factors:   Significant life event:  No   Current substance abuse:  None  Anxiety or Panic symptoms:  YES    PHQ 3/14/2017 4/25/2017 6/1/2018   PHQ-9 Total Score 21 21 18   Q9: Suicide Ideation More than half the days More than half the days More than half the days       PHQ-9  English  PHQ-9   Any Language  Suicide Assessment Five-step Evaluation and Treatment (SAFE-T)    Amount of exercise or physical activity: None    Problems taking medications regularly: No    Medication side effects: none    Diet: regular (no restrictions)    Problem list and histories reviewed & adjusted, as indicated.  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     Hyperlipidemia LDL goal <70     Insomnia     Gout     Suicidal ideation     Essential hypertension     Rheumatoid arthritis involving multiple sites, unspecified rheumatoid factor presence (H)     High risk medications (not anticoagulants) long-term use     Midline low back pain without sciatica     Bilateral low back pain with sciatica, sciatica laterality unspecified     Elevated liver enzymes     On corticosteroid therapy     Essential hypertension with goal blood pressure less than 140/90     Insomnia, unspecified type     Rheumatoid arthritis of multiple sites with negative rheumatoid factor (H)     Benign prostatic hyperplasia with lower urinary tract symptoms,  unspecified morphology     Past Surgical History:   Procedure Laterality Date     ABDOMEN SURGERY  2014     BIOPSY  2015     BYPASS GRAFT ARTERY CORONARY  2008    6 vessels     COLONOSCOPY  2/8/2013    Procedure: COLONOSCOPY;  Colonoscopy, blood in stool;  Surgeon: Duane, William Charles, MD;  Location:  OR     COMBINED REPAIR PTOSIS WITH BLEPHAROPLASTY BILATERAL Bilateral 6/25/2018    Procedure: COMBINED REPAIR PTOSIS WITH BLEPHAROPLASTY BILATERAL;  Bilateral upper eyelid blepharoplasty, ptosis repair and brow ptosis repair;  Surgeon: Sandra Borja MD;  Location:  OR     GI SURGERY  2014     HC CORONARY STENT PERCUT, EA ADDTL VESSEL  2008    3 months after CABG     HEAD & NECK SURGERY  2011     NASAL/SINUS POLYPECTOMY  2010     ORTHOPEDIC SURGERY  2012     REPAIR PTOSIS       REPAIR PTOSIS BROW BILATERAL Bilateral 6/25/2018    Procedure: REPAIR PTOSIS BROW BILATERAL;;  Surgeon: Sandra Borja MD;  Location:  OR     THORACIC SURGERY  1989    tb     TONSILLECTOMY  2010     UVULOPALATOPHARYNGOPLASTY  2010     VASCULAR SURGERY  2008       Social History   Substance Use Topics     Smoking status: Never Smoker     Smokeless tobacco: Never Used     Alcohol use No     Family History   Problem Relation Age of Onset     C.A.D. Father      Coronary Artery Disease Father      Hypertension Father      Depression Father      Hypertension Mother      Diabetes Mother      Depression Mother      Mental Illness Mother      Hypertension Maternal Grandfather      Cancer Paternal Grandfather      Other Cancer Paternal Grandfather      Other Cancer Other      Autoimmune Disease No family hx of      Cerebrovascular Disease No family hx of      Thyroid Disease No family hx of      Glaucoma No family hx of      Macular Degeneration No family hx of            Reviewed and updated as needed this visit by clinical staff  Tobacco  Allergies  Meds  Med Hx  Surg Hx  Fam Hx  Soc Hx      Reviewed and updated as needed this  "visit by Provider         ROS:  Constitutional, HEENT, cardiovascular, pulmonary, GI, , musculoskeletal, neuro, skin, endocrine and psych systems are negative, except as otherwise noted.    OBJECTIVE:     /85 (BP Location: Left arm, Patient Position: Chair, Cuff Size: Adult Large)  Pulse 69  Temp 97.5  F (36.4  C) (Oral)  Resp 16  Ht 5' 2\" (1.575 m)  Wt 173 lb (78.5 kg)  SpO2 96%  BMI 31.64 kg/m2  Body mass index is 31.64 kg/(m^2).  GENERAL: healthy, alert and no distress  NECK: no adenopathy, no asymmetry, masses, or scars and thyroid normal to palpation  RESP: lungs clear to auscultation - no rales, rhonchi or wheezes  CV: regular rate and rhythm, normal S1 S2, no S3 or S4, no murmur, click or rub, no peripheral edema and peripheral pulses strong  ABDOMEN: soft, nontender, no hepatosplenomegaly, no masses and bowel sounds normal  MS: no gross musculoskeletal defects noted, no edema    Diagnostic Test Results:  none     ASSESSMENT/PLAN:     1. Major depressive disorder, recurrent episode, moderate (H)  Not controlled. Trial Pristiq 25 mg daily. RTC in 1 month for recheck. Discussed potential side effects.  - desvenlafaxine succinate (PRISTIQ) 25 MG 24 hr tablet; Take 1 tablet (25 mg) by mouth daily  Dispense: 30 tablet; Refill: 1    2. Anxiety  Trial Pristiq.  - desvenlafaxine succinate (PRISTIQ) 25 MG 24 hr tablet; Take 1 tablet (25 mg) by mouth daily  Dispense: 30 tablet; Refill: 1    Regular exercise  See Patient Instructions    David Chavez MD, MD  [facility]  "

## 2018-10-18 NOTE — PATIENT INSTRUCTIONS
At [facility], we strive to deliver an exceptional experience to you, every time we see you.  If you receive a survey in the mail, please send us back your thoughts. We really do value your feedback.    Based on your medical history, these are the current health maintenance/preventive care services that you are due for (some may have been done at this visit.)  Health Maintenance Due   Topic Date Due     INFLUENZA VACCINE (1) 09/01/2018         Suggested websites for health information:  Www.[employer].org : Up to date and easily searchable information on multiple topics.  Www.medlineplus.gov : medication info, interactive tutorials, watch real surgeries online  Www.familydoctor.org : good info from the Academy of Family Physicians  Www.cdc.gov : public health info, travel advisories, epidemics (H1N1)  Www.aap.org : children's health info, normal development, vaccinations  Www.health.[facility].[place].us : [place] dept of health, public health issues in [place], N1N1    Your care team:                            Family Medicine Internal Medicine   MD Noam Dorado MD Shantel Branch-Fleming, MD Katya Georgiev PA-C Nam Ho, MD Pediatrics   NATE Andre, CNP MD Tiffany Perla CNP, MD Deborah Mielke, MD Kim Thein, APRN CNP      Clinic hours: Monday - Thursday 7 am-7 pm; Fridays 7 am-5 pm.   Urgent care: Monday - Friday 11 am-9 pm; Saturday and Sunday 9 am-5 pm.  Pharmacy : Monday -Thursday 8 am-8 pm; Friday 8 am-6 pm; Saturday and Sunday 9 am-5 pm.     Clinic: [phone]   Pharmacy: [phone]

## 2018-10-26 DIAGNOSIS — E78.5 HYPERLIPIDEMIA LDL GOAL <100: ICD-10-CM

## 2018-10-26 DIAGNOSIS — F41.9 ANXIETY HYPERVENTILATION: ICD-10-CM

## 2018-10-26 DIAGNOSIS — I25.10 ATHEROSCLEROSIS OF NATIVE CORONARY ARTERY OF NATIVE HEART, ANGINA PRESENCE UNSPECIFIED: ICD-10-CM

## 2018-10-26 DIAGNOSIS — F45.8 ANXIETY HYPERVENTILATION: ICD-10-CM

## 2018-10-26 DIAGNOSIS — M10.9 GOUT WITHOUT TOPHUS: ICD-10-CM

## 2018-10-27 NOTE — TELEPHONE ENCOUNTER
Requested Prescriptions   Pending Prescriptions Disp Refills     COLCRYS 0.6 MG tablet [Pharmacy Med Name: COLCRYS 0.6 MG TABLET] 30 tablet 0    Last Written Prescription Date:  10/3/18  Last Fill Quantity: 30,  # refills: 0   Last Office Visit with FMRAO, OBED or [facility] prescribing provider:  10/18/2018     Future Office Visit:    Next 5 appointments (look out 90 days)     Oct 31, 2018 10:30 AM CDT   Return Visit with Sandra Borja MD   [facility] ([facility])    [address] [place] [address]   [phone]            Nov 12, 2018  7:30 AM CST   Return Visit with Ricardo Rae MD, MG [facility] NURSE ONLY   [facility] ([facility])    [address] [place] [address]   [phone]            Nov 13, 2018  8:20 AM CST   Return Visit with Sebastián Jenkins MD   [facility] ([facility])    [address] [facility] [address]   [phone]            Jan 07, 2019 11:00 AM CST   Return Visit with Dorothea Loo MD   [facility] ([facility])    [address] [place] [id number] [phone]                  Sig: TAKE 2 TABLETS BY MOUTH AT THE FIRST SIGN OF FLARE, TAKE 1 ADDITIONAL TABLET ONE HOUR LATER.    Gout Agents Protocol Failed    10/26/2018  8:35 PM       Failed - Has Uric Acid on file in past 12 months and value is less than 6    Recent Labs   Lab Test  11/04/15   1547   URIC  6.1     If level is 6mg/dL or greater, ok to refill one time and refer to provider.          Passed - CBC on file in past 12 months    Recent Labs   Lab Test  06/22/18   1448   WBC  7.3   RBC  4.83   HGB  14.4   HCT  43.4   PLT  284                Passed - ALT on file in past 12 months    Recent Labs   Lab Test  06/22/18   1448   ALT  35            Passed - Recent (12 mo) or future (30 days) visit within the authorizing  "provider's specialty    Patient had office visit in the last 12 months or has a visit in the next 30 days with authorizing provider or within the authorizing provider's specialty.  See \"Patient Info\" tab in inbasket, or \"Choose Columns\" in Meds & Orders section of the refill encounter.             Passed - Patient is age 18 or older       Passed - Normal serum creatinine on file in the past 12 months    Recent Labs   Lab Test  06/22/18   1448   06/24/13   1322   CR  0.98   < >   --    CRPOC   --    --   1.1    < > = values in this interval not displayed.             clonazePAM (KLONOPIN) 1 MG tablet [Pharmacy Med Name: CLONAZEPAM 1 MG TABLET] 90 tablet 0    Last Written Prescription Date:  9/13/18  Last Fill Quantity: 90,  # refills: 0   Last Office Visit with G, P or [facility] prescribing provider:  10/18/2018     Future Office Visit:    Next 5 appointments (look out 90 days)     Oct 31, 2018 10:30 AM CDT   Return Visit with Sandra Borja MD   [facility])    [address] [place] [address]   [phone]            Nov 12, 2018  7:30 AM CST   Return Visit with Ricardo Rae MD, MG [facility] NURSE ONLY   [facility] ([facility])    [address] [place] [address]   [phone]            Nov 13, 2018  8:20 AM CST   Return Visit with Sebastián Jenkins MD   [facility] ([facility])    [address] [facility] [address]   [phone]            Jan 07, 2019 11:00 AM CST   Return Visit with Dorothea Loo MD   [facility] ([address] [id number] [phone]                  Sig: TAKE 0.5 TO 1 TABLET BY MOUTH 3 TIMES DAILY AS NEEDED    There is no refill protocol information for this order        atorvastatin (LIPITOR) 80 MG tablet [Pharmacy Med Name: " "ATORVASTATIN 80 MG TABLET] 90 tablet 0    Last Written Prescription Date:  6/19/18  Last Fill Quantity: 90,  # refills: 0   Last Office Visit with FMG, [facility] or [facility] prescribing provider:  10/18/2018     Future Office Visit:    Next 5 appointments (look out 90 days)     Oct 31, 2018 10:30 AM CDT   Return Visit with Sandra Borja MD   [facility] ([facility])    [address] [place] [address]   [phone]            Nov 12, 2018  7:30 AM CST   Return Visit with Ricardo Rae MD, MG [facility] NURSE ONLY   [facility] ([facility])    [address] [place] [address]   [phone]            Nov 13, 2018  8:20 AM CST   Return Visit with Sebastián Jenkins MD   [facility] ([facility])    [address] [facility] [address]   [phone]            Jan 07, 2019 11:00 AM CST   Return Visit with Dorothea Loo MD   [facility] ([facility])    [address] [place] [id number] [phone]                  Sig: TAKE 1 TABLET BY MOUTH 1 TIME DAILY    Statins Protocol Passed    10/26/2018  8:35 PM       Passed - LDL on file in past 12 months    Recent Labs   Lab Test  06/22/18   1448   LDL  42            Passed - No abnormal creatine kinase in past 12 months    Recent Labs   Lab Test  07/07/14   0800   CKT  142               Passed - Recent (12 mo) or future (30 days) visit within the authorizing provider's specialty    Patient had office visit in the last 12 months or has a visit in the next 30 days with authorizing provider or within the authorizing provider's specialty.  See \"Patient Info\" tab in inbasket, or \"Choose Columns\" in Meds & Orders section of the refill encounter.             Passed - Patient is age 18 or older          "

## 2018-10-29 NOTE — TELEPHONE ENCOUNTER
For clonazepam:  Routing refill request to provider for review/approval because:  Drug not on the [facility] refill protocol     For colcrys:  Routing refill request to provider for review/approval because:  Labs not current:  Uric acid    For atorvastatin:  Routing refill request to provider for review/approval because:  Drug interaction warning          Shreya Rascon RN, BSN

## 2018-10-30 RX ORDER — CLONAZEPAM 1 MG/1
TABLET ORAL
Qty: 90 TABLET | Refills: 0 | Status: SHIPPED | OUTPATIENT
Start: 2018-10-30 | End: 2019-01-04

## 2018-10-30 RX ORDER — ATORVASTATIN CALCIUM 80 MG/1
TABLET, FILM COATED ORAL
Qty: 90 TABLET | Refills: 1 | Status: SHIPPED | OUTPATIENT
Start: 2018-10-30 | End: 2019-04-27

## 2018-10-30 RX ORDER — COLCHICINE 0.6 MG/1
TABLET, FILM COATED ORAL
Qty: 30 TABLET | Refills: 0 | Status: SHIPPED | OUTPATIENT
Start: 2018-10-30 | End: 2018-11-27

## 2018-10-31 ENCOUNTER — OFFICE VISIT (OUTPATIENT)
Dept: OPHTHALMOLOGY | Facility: CLINIC | Age: 53
End: 2018-10-31
Payer: MEDICARE

## 2018-10-31 ENCOUNTER — HOSPITAL ENCOUNTER (OUTPATIENT)
Facility: AMBULATORY SURGERY CENTER | Age: 53
End: 2018-10-31
Attending: OPHTHALMOLOGY | Admitting: OPHTHALMOLOGY
Payer: MEDICARE

## 2018-10-31 DIAGNOSIS — H02.834 DERMATOCHALASIS OF BOTH UPPER EYELIDS: Primary | ICD-10-CM

## 2018-10-31 DIAGNOSIS — H57.819 BROW PTOSIS: ICD-10-CM

## 2018-10-31 DIAGNOSIS — H02.831 DERMATOCHALASIS OF BOTH UPPER EYELIDS: Primary | ICD-10-CM

## 2018-10-31 PROCEDURE — 99213 OFFICE O/P EST LOW 20 MIN: CPT | Performed by: OPHTHALMOLOGY

## 2018-10-31 PROCEDURE — 92081 LIMITED VISUAL FIELD XM: CPT | Performed by: OPHTHALMOLOGY

## 2018-10-31 ASSESSMENT — SLIT LAMP EXAM - LIDS
COMMENTS: HEAVY DERMATOCHALASIS RESTING ON LASHES
COMMENTS: HEAVY DERMATOCHALASIS RESTING ON LASHES

## 2018-10-31 ASSESSMENT — VISUAL ACUITY
OS_CC+: -1
OS_CC: 20/20
CORRECTION_TYPE: GLASSES
METHOD: SNELLEN - LINEAR
OD_CC: 20/30

## 2018-10-31 ASSESSMENT — EXTERNAL EXAM - RIGHT EYE: OD_EXAM: BROW PTOSIS, WITH FRONTALIS RELAXED, BROW IS BELOW SUPERIOR ORBITAL RIM AND LATERALLY INLINE WITH UPPER LASHES

## 2018-10-31 ASSESSMENT — EXTERNAL EXAM - LEFT EYE: OS_EXAM: BROW PTOSIS, WITH FRONTALIS RELAXED, BROW IS BELOW SUPERIOR ORBITAL RIM AND LATERALLY INLINE WITH UPPER LASHES

## 2018-10-31 NOTE — NURSING NOTE
Patient presents with:  Follow Up For: obstruction of vision and lid drooping after surgery S/P Bilateral upper eyelid blepharoplasty, ptosis repair and brow ptosis repair DOS: 06/25/2018 - patient notes that lids are still red and dry      Referring Provider:  No referring provider defined for this encounter.    HPI    Symptoms:              Comments:  uses vaseline on the incision prn and notes dryness and skin redness on lower lids                  Purnima Montiel, COA

## 2018-10-31 NOTE — PROGRESS NOTES
Patient: Lamont Daniels MRN# [id number]   YOB: 1965 Age: 52 year old   Date of Visit: Oct 31, 2018    CC: Droopy eyelids obstructing vision.  Chief Complaints and History of Present Illnesses   Patient presents with     Follow Up For     obstruction of vision and lid drooping after surgery S/P Bilateral upper eyelid blepharoplasty, ptosis repair and brow ptosis repair DOS: 06/25/2018 - patient notes that lids are still red and dry                 HPI:     Lamont Daniels is a 52 year old male who has noted gradual onset of droopy eyelids over the past years. He underwent blepharoplasty and ptosis repair, but has persistent heaviness which is obstructing his vision. He notes some improvement, but still finds himself manually elevating his lids to see better. The droopy eyelid is interfering with activities of daily living including driving, and reading. The patient denies double vision, variability of the eyelid position, or dry eye symptoms.     EXAM:     MRD1: 1 mm both eyes   Dermatochalasis with excess skin touching eyelashes but only 22 mm of skin between the brow and the lash line   Brow ptosis with brow resting below superior orbital rim being the biggest component    VISUAL FIELD:  Right eye untaped:0 degrees Right eye taped:60 degrees  Left eye untaped:0 degrees Left eye taped:60 degrees    Assessment & Plan     Lamont Daniels is a 52 year old male with the following diagnoses:   1. Dermatochalasis of both upper eyelids    2. Brow ptosis - Both Eyes       Bilateral direct brow ptosis repair, significantly more laterally.   Discussed what can be expected. He doesn't have great rhytides to hide the incisions, but he is ok with the scar if it improves his vision.     Due to significant brow ptosis, blepharoplasty alone would be unsuccesfull in addressing the lateral hooding which is obstructing vision. Blepharoplasty alone would result in sewing very thin eyelid skin to thick sub-brow skin, and even with  that, fail to address lateral hooding which is obstructing vision.      ANTICOAGULATION:  Will ask his cardiologist if ok to hold plavix (5 days) and aspirin (10 days) again.          PHOTOS DEMONSTRATE:       Significant dermatochalasis with lids resting on eyelashes and obstructing visual axis    Brow ptosis with thicker brow skin and hairs below the lateral superior orbital rim    Attending Physician Attestation:  Complete documentation of historical and exam elements from today's encounter can be found in the full encounter summary report (not reduplicated in this progress note).  I personally obtained the chief complaint(s) and history of present illness.  I confirmed and edited as necessary the review of systems, past medical/surgical history, family history, social history, and examination findings as documented by others; and I examined the patient myself.  I personally reviewed the relevant tests, images, and reports as documented above.  I formulated and edited as necessary the assessment and plan and discussed the findings and management plan with the patient and family. - Sandra Borja MD        Today with Lamont Daniels, I reviewed the indications, risks, benefits, and alternatives of the proposed surgical procedure including, but not limited to, failure obtain the desired result  and need for additional surgery, bleeding, infection, loss of vision, loss of the eye, and the remote possibility of permanent damage to any organ system or death with the use of anesthesia.  I provided multiple opportunities for the questions, answered all questions to the best of my ability, and confirmed that my answers and my discussion were understood.

## 2018-10-31 NOTE — MR AVS SNAPSHOT
After Visit Summary   10/31/2018    Lamont Daniels    MRN: [id number]           Patient Information     Date Of Birth          1965        Visit Information        Provider Department      10/31/2018 10:30 AM Sandra Borja MD [facility]        Today's Diagnoses     Dermatochalasis of both upper eyelids    -  1    Brow ptosis - Both Eyes           Follow-ups after your visit        Follow-up notes from your care team     Return for Surgery.      Your next 10 appointments already scheduled     Nov 12, 2018  7:30 AM CST   Return Visit with Ricardo Rae MD,  OPH NURSE ONLY   [facility])    [address] [place] [address]   [phone]            Nov 13, 2018  8:20 AM CST   Return Visit with Sebastián Jenkins MD   [facility] ([facility])    [address] [place] [address]   [phone]            Jan 07, 2019 11:00 AM CST   Return Visit with Dorothea Loo MD   [facility] ([facility])    [address] [place] [id number] [phone]            Jun 21, 2019  8:30 AM CDT   LAB with LAB FIRST FLOOR [facility] ([facility])    [address] [place] [address]   [phone]           Please do not eat 10-12 hours before your appointment if you are coming in fasting for labs on lipids, cholesterol, or glucose (sugar). This does not apply to pregnant women. Water, hot tea and black coffee (with nothing added) are okay. Do not drink other fluids, diet soda or chew gum.            Jun 21, 2019  9:00 AM CDT   Return Visit with Fly Travis MD   [facility] ([facility])    [address] [place] [address]   [phone]              Who to contact     If you have questions or need  follow up information about today's clinic visit or your schedule please contact [facility] directly at [phone].  Normal or non-critical lab and imaging results will be communicated to you by [employer], letter or phone within 4 business days after the clinic has received the results. If you do not hear from us within 7 days, please contact the clinic through [employer] or phone. If you have a critical or abnormal lab result, we will notify you by phone as soon as possible.  Submit refill requests through [employer] or call your pharmacy and they will forward the refill request to us. Please allow 3 business days for your refill to be completed.          Additional Information About Your Visit        [employer] Information     [employer] gives you secure access to your electronic health record. If you see a primary care provider, you can also send messages to your care team and make appointments. If you have questions, please call your primary care clinic.  If you do not have a primary care provider, please call [phone] and they will assist you.      [employer] is an electronic gateway that provides easy, online access to your medical records. With [employer], you can request a clinic appointment, read your test results, renew a prescription or communicate with your care team.     To access your existing account, please contact your [facility] or call [phone] for assistance.        Care EveryWhere ID     This is your Care EveryWhere ID. This could be used by other organizations to access your [facility] medical records  [id number]         Blood Pressure from Last 3 Encounters:   10/18/18 121/85   10/10/18 127/87   08/21/18 129/83    Weight from Last 3 Encounters:   10/18/18 78.5 kg (173 lb)   10/10/18 79.8 kg (176 lb)   08/21/18 80.9 kg (178 lb 6.4 oz)              We Performed the Following     Nurys-Operative Worksheet (Plastics)        Primary Care Provider Office Phone #  Fax #    David Chavez [phone]       [address]  [facility] [address]        Equal Access to Services     VICKEY HERRMANN : Hadii esther novak dariuso Somayali, waaxda luqadaha, qaybta kaalmada adedebra, marcelino sajiin hayaacharles mccabedavid hanson sterling olivera. [facility] [phone].    ATENCIÓN: Si habla español, tiene a muse disposición servicios gratuitos de asistencia lingüística. Llame al [phone].    We comply with applicable federal civil rights laws and [place] laws. We do not discriminate on the basis of race, color, national origin, age, disability, sex, sexual orientation, or gender identity.            Thank you!     Thank you for choosing [facility]  for your care. Our goal is always to provide you with excellent care. Hearing back from our patients is one way we can continue to improve our services. Please take a few minutes to complete the written survey that you may receive in the mail after your visit with us. Thank you!             Your Updated Medication List - Protect others around you: Learn how to safely use, store and throw away your medicines at www.disposemymeds.org.          This list is accurate as of 10/31/18 12:40 PM.  Always use your most recent med list.                   Brand Name Dispense Instructions for use Diagnosis    Abatacept 125 MG/ML Soaj auto-injector    ORENCIA    4 Syringe    Inject 1 mL (125 mg) Subcutaneous every 7 days    Rheumatoid arthritis of multiple sites with negative rheumatoid factor (H)       allopurinol 300 MG tablet    ZYLOPRIM    210 tablet    TAKE 1 TABLET BY MOUTH 1 TIME DAILY    Idiopathic gout, unspecified chronicity, unspecified site       aspirin 81 MG EC tablet     30 tablet    Take 1 tablet (81 mg) by mouth daily (*)    Coronary artery disease involving native coronary artery of native heart without angina pectoris       atorvastatin 80 MG tablet    LIPITOR    90 tablet    TAKE 1 TABLET BY MOUTH 1 TIME DAILY    Atherosclerosis  of native coronary artery of native heart, angina presence unspecified, Hyperlipidemia LDL goal <100       baclofen 10 MG tablet    LIORESAL    180 tablet    TAKE 1 TABLET BY MOUTH 2 TIMES DAILY AS NEEDED FOR MUSCLE SPASM    Spasm of back muscles       blood glucose lancets standard    no brand specified    100 each    Use to test blood sugar 2 times daily or as directed.    On corticosteroid therapy, Elevated liver enzymes       blood glucose monitoring meter device kit    no brand specified    1 kit    Use to test blood sugar 2 times daily or as directed.    On corticosteroid therapy, Elevated liver enzymes       blood glucose monitoring test strip    no brand specified    100 strip    Use to test blood sugars 2 times daily or as directed    On corticosteroid therapy, Elevated liver enzymes       buprenorphine 20 MCG/HR WK patch    BUTRANS    4 patch    Place 1 patch onto the skin every 7 days Fill on/after 10/10/18 to start on/after 10/11/18    Rheumatoid arthritis involving multiple sites, unspecified rheumatoid factor presence (H)       BusPIRone HCl 30 MG Tabs     180 tablet    Take 1 tablet by mouth 2 times daily    Major depressive disorder, recurrent episode, moderate (H)       clonazePAM 1 MG tablet    klonoPIN    90 tablet    TAKE 0.5 TO 1 TABLET BY MOUTH 3 TIMES DAILY AS NEEDED    Anxiety hyperventilation       clopidogrel 75 MG tablet    PLAVIX    90 tablet    Take 1 tablet (75 mg) by mouth daily    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    Gout without tophus       desvenlafaxine succinate 25 MG 24 hr tablet    PRISTIQ    30 tablet    Take 1 tablet (25 mg) by mouth daily    Anxiety, Major depressive  disorder, recurrent episode, moderate (H)       diclofenac 1 % Gel topical gel    VOLTAREN    300 g    Apply 2-4 grams to 2-3 joints, up to four times daily as needed using enclosed dosing card.  Max of 32g/day    Facet arthropathy, Rheumatoid arthritis involving multiple sites, unspecified rheumatoid factor presence (H)       erythromycin ophthalmic ointment    ROMYCIN    3.5 g    Apply small amount to incision sites three times daily and to inner lower lid of operative eye(s) at bedtime for 7 days.    Postoperative eye state       evolocumab 140 MG/ML prefilled autoinjector    REPATHA    2 mL    Inject 1 mL (140 mg) Subcutaneous every 14 days    Hyperlipidemia LDL goal <70, S/P CABG (coronary artery bypass graft)       ezetimibe 10 MG tablet    ZETIA    90 tablet    Take 1 tablet (10 mg) by mouth daily    Coronary artery disease involving autologous artery coronary bypass graft without angina pectoris       fluticasone 50 MCG/ACT spray    FLONASE    1 Bottle    Spray 2 sprays into both nostrils daily    Nasal congestion, Dizziness       gabapentin 300 MG capsule    NEURONTIN    540 capsule    TAKE TWO CAPSULES BY MOUTH 3 TIMES DAILY    Lumbar radiculopathy       gemfibrozil 600 MG tablet    LOPID    180 tablet    Take 1 tablet (600 mg) by mouth 2 times daily    Hyperlipidemia LDL goal <70       hydroxychloroquine 200 MG tablet    PLAQUENIL    30 tablet    Take 1 tablet (200 mg) by mouth daily    Rheumatoid arthritis of multiple sites with negative rheumatoid factor (H), High risk medications (not anticoagulants) long-term use       meclizine 25 MG tablet    ANTIVERT    30 tablet    TAKE 1 TABLET BY MOUTH EVERY 6 HOURS AS NEEDED FOR DIZZINESS    Vertigo       melatonin 3 MG tablet      Take 1 tablet (3 mg) by mouth nightly as needed for sleep    Delayed sleep phase syndrome       mirtazapine 15 MG tablet    REMERON    30 tablet    Take 1 tablet (15 mg) by mouth At Bedtime    Severe episode of recurrent major  depressive disorder, without psychotic features (H)       naloxone nasal spray    NARCAN    0.2 mL    Spray 1 spray (4 mg) into one nostril alternating nostrils as needed for opioid reversal every 2-3 minutes until assistance arrives    Rheumatoid arthritis involving multiple sites, unspecified rheumatoid factor presence (H)       neomycin-polymyxin-dexamethasone 3.5-25165-6.1 Susp ophthalmic susp    MAXITROL    1 Bottle    Place 1-2 drops into both eyes 3 times daily    Postoperative eye state       nitroGLYcerin 0.4 MG sublingual tablet    NITROSTAT    25 tablet    PLACE 1 TABLET UNDER THE TONGUE EVERY 5 MINUTES AS NEEDED    Atherosclerosis of native coronary artery of native heart with angina pectoris (H)       order for DME      1.  CPAP pressure 11 cm/H20 with heated humidity.  2.  Provide mask to fit and CPAP supplies.  3.  Length of need lifetime.  4.  If needed please provide a chin strap    JEROD (obstructive sleep apnea), Anxiety, CAD (coronary artery disease), HTN (hypertension)       order for DME     1 Units    Equipment being ordered: single end cane    Lumbar radiculopathy, Facet arthropathy, Chronic low back pain       oxyCODONE IR 5 MG tablet    ROXICODONE    180 tablet    Take 1-2 tablets (5-10 mg) by mouth every 4 hours as needed for moderate to severe pain . Max of 6 tabs per day.  30 day supply. May fill on/after 6/25/18 to start on 6/25/18    Facet arthropathy       pantoprazole 40 MG EC tablet    PROTONIX    90 tablet    Take 1 tablet (40 mg) by mouth daily    Gastroesophageal reflux disease without esophagitis       predniSONE 1 MG tablet    DELTASONE    180 tablet    Take 2 tablets (2 mg) by mouth daily    Rheumatoid arthritis of multiple sites with negative rheumatoid factor (H), High risk medications (not anticoagulants) long-term use       sulfaSALAzine  MG EC tablet    AZULFIDINE EN    120 tablet    Take 2 tablets (1,000 mg) by mouth 2 times daily    Rheumatoid arthritis of multiple  sites with negative rheumatoid factor (H), High risk medications (not anticoagulants) long-term use       tamsulosin 0.4 MG capsule    FLOMAX    90 capsule    Take 1 capsule (0.4 mg) by mouth daily    Benign prostatic hyperplasia with weak urinary stream       tenofovir 300 MG tablet    VIREAD    90 tablet    TAKE ONE TABLET BY MOUTH EVERY DAY    Chronic viral hepatitis B without delta agent and without coma (H)       triamcinolone 0.1 % ointment    KENALOG    80 g    APPLY TOPICALLY TO AFFECTED AREA(S) 3 TIMES DAILY    Rash       TYLENOL PO           vitamin D 2000 units tablet     100 tablet    TAKE ONE TABLET BY MOUTH ONCE DAILY    Vitamin D deficiency       zolpidem 5 MG tablet    AMBIEN    30 tablet    TAKE ONE TABLET BY MOUTH AT BEDTIME AS NEEDED FOR SLEEP    Insomnia, unspecified type

## 2018-11-01 DIAGNOSIS — N40.1 BENIGN PROSTATIC HYPERPLASIA WITH WEAK URINARY STREAM: ICD-10-CM

## 2018-11-01 DIAGNOSIS — F41.9 ANXIETY HYPERVENTILATION: ICD-10-CM

## 2018-11-01 DIAGNOSIS — M06.09 RHEUMATOID ARTHRITIS OF MULTIPLE SITES WITH NEGATIVE RHEUMATOID FACTOR (H): ICD-10-CM

## 2018-11-01 DIAGNOSIS — Z79.899 HIGH RISK MEDICATIONS (NOT ANTICOAGULANTS) LONG-TERM USE: ICD-10-CM

## 2018-11-01 DIAGNOSIS — G47.00 INSOMNIA, UNSPECIFIED TYPE: ICD-10-CM

## 2018-11-01 DIAGNOSIS — R39.12 BENIGN PROSTATIC HYPERPLASIA WITH WEAK URINARY STREAM: ICD-10-CM

## 2018-11-01 DIAGNOSIS — F45.8 ANXIETY HYPERVENTILATION: ICD-10-CM

## 2018-11-01 NOTE — TELEPHONE ENCOUNTER
Requested Prescriptions   Pending Prescriptions Disp Refills     hydroxychloroquine (PLAQUENIL) 200 MG tablet [Pharmacy Med Name: HYDROXYCHLOROQUINE 200 MG TAB]        Last Written Prescription Date:  07/02/18  Last Fill Quantity: 30,   # refills: 3  Last Office Visit: 10/18/18-  Future Office visit:    Next 5 appointments (look out 90 days)     Nov 12, 2018  7:30 AM CST   Return Visit with Ricardo Rae MD, [facility] OPHTH NURSE ONLY   [facility]    [address] [place] [address]   [phone]            Nov 13, 2018  8:20 AM CST   Return Visit with Sebastián Jenkins MD   [facility] ([address] [address]   [phone]            Jan 07, 2019 11:00 AM CST   Return Visit with Dorothea Loo MD   [facility] ([address] [address] [phone]                   Routing refill request to provider for review/approval because:  Drug not on the G, P or [facility] refill protocol or controlled substance 30 tablet 3     Sig: TAKE 1 TABLET (200 MG) BY MOUTH DAILY    There is no refill protocol information for this order        sulfaSALAzine ER (AZULFIDINE EN) 500 MG EC tablet [Pharmacy Med Name: SULFASALAZINE DR/ MG TAB]        Last Written Prescription Date:  07/02/18  Last Fill Quantity: 120,   # refills: 3  Last Office Visit: 10/18/18-  Future Office visit:    Next 5 appointments (look out 90 days)     Nov 12, 2018  7:30 AM CST   Return Visit with Ricardo Rae MD, MG OPHTH NURSE ONLY   [facility]    [address] [place] [address]   [phone]            Nov 13, 2018  8:20 AM CST   Return Visit with Sebastián Jenkins MD   [facility] ([facility])    [address] [facility]  [facility] [address]   [phone]            Jan 07, 2019 11:00 AM CST   Return Visit with Dorothea Loo MD   [facility] [place] ([facility] [place])    [address] [facility]  [place] [address]   [phone]                   Routing refill request to provider for review/approval because:  Drug not on the [facility], [facility] or  Health refill protocol or controlled substance 120 tablet 3     Sig: TAKE 2 TABLETS (1,000 MG) BY MOUTH 2 TIMES DAILY    There is no refill protocol information for this order

## 2018-11-01 NOTE — TELEPHONE ENCOUNTER
Requested Prescriptions   Pending Prescriptions Disp Refills     clonazePAM (KLONOPIN) 1 MG tablet [Pharmacy Med Name: CLONAZEPAM 1 MG TABLET]  Sent:  clonazePAM (KLONOPIN) 1 MG tablet 90 tablet 0 10/30/2018      90 tablet 0     Sig: TAKE 0.5 TO 1 TABLET BY MOUTH 3 TIMES DAILY AS NEEDED    There is no refill protocol information for this order        zolpidem (AMBIEN) 5 MG tablet [Pharmacy Med Name: ZOLPIDEM TARTRATE 5 MG TABLET]        Last Written Prescription Date:  04/26/18  Last Fill Quantity: 30,   # refills: 5  Last Office Visit: 10/18/18-  Future Office visit:    Next 5 appointments (look out 90 days)     Nov 12, 2018  7:30 AM CST   Return Visit with Ricardo Rae MD, [facility] NURSE ONLY   [facility] ([facility])    [address] [place] [address]   [phone]            Nov 13, 2018  8:20 AM CST   Return Visit with Sebastián Jenkins MD   [facility]    [address] [facility] [address]   [phone]            Jan 07, 2019 11:00 AM CST   Return Visit with Dorothea Loo MD   [facility] ([facility])    [address] [place] [address]   [phone]                   Routing refill request to provider for review/approval because:  Drug not on the G, P or [facility] refill protocol or controlled substance 30 tablet      Sig: TAKE 1 TALET BY MOUTH AT BEDTIME AS NEEDED FOR SLEEP.    There is no refill protocol information for this order

## 2018-11-01 NOTE — TELEPHONE ENCOUNTER
"Requested Prescriptions   Pending Prescriptions Disp Refills     tamsulosin (FLOMAX) 0.4 MG capsule [Pharmacy Med Name: TAMSULOSIN HCL 0.4 MG CAPSULE]  Last Written Prescription Date:  10/06/17  Last Fill Quantity: 90,  # refills: 3   Last Office Visit with ANA PAULA, [facility] or [facility] prescribing provider:  10/18/18-Ho   Future Office Visit:    Next 5 appointments (look out 90 days)     Nov 12, 2018  7:30 AM CST   Return Visit with Ricardo Rae MD, MG OPH NURSE ONLY   [facility]    [address] [place] [address]   [phone]            Nov 13, 2018  8:20 AM CST   Return Visit with Sebastián Jenkins MD   [facility] ([facility])    [address] [facility] [address]   [phone]            Jan 07, 2019 11:00 AM CST   Return Visit with Dorothea Loo MD   [facility] ([facility])    [address] [place] [address]   [phone]                90 capsule 2     Sig: TAKE ONE CAPSULE BY MOUTH EVERY DAY    Alpha Blockers Passed    11/1/2018  9:09 AM       Passed - Blood pressure under 140/90 in past 12 months    BP Readings from Last 3 Encounters:   10/18/18 121/85   10/10/18 127/87   08/21/18 129/83                Passed - Recent (12 mo) or future (30 days) visit within the authorizing provider's specialty    Patient had office visit in the last 12 months or has a visit in the next 30 days with authorizing provider or within the authorizing provider's specialty.  See \"Patient Info\" tab in inbasket, or \"Choose Columns\" in Meds & Orders section of the refill encounter.             Passed - Patient does not have Tadalafil, Vardenafil, or Sildenafil on their medication list       Passed - Patient is 18 years of age or older          "

## 2018-11-02 RX ORDER — ZOLPIDEM TARTRATE 5 MG/1
TABLET ORAL
Qty: 30 TABLET | Refills: 5 | Status: SHIPPED | OUTPATIENT
Start: 2018-11-02 | End: 2019-03-31

## 2018-11-02 RX ORDER — HYDROXYCHLOROQUINE SULFATE 200 MG/1
TABLET, FILM COATED ORAL
Qty: 30 TABLET | Refills: 3 | Status: SHIPPED | OUTPATIENT
Start: 2018-11-02 | End: 2018-12-10

## 2018-11-02 RX ORDER — TAMSULOSIN HYDROCHLORIDE 0.4 MG/1
CAPSULE ORAL
Qty: 90 CAPSULE | Refills: 3 | Status: SHIPPED | OUTPATIENT
Start: 2018-11-02 | End: 2019-10-08

## 2018-11-02 RX ORDER — SULFASALAZINE 500 MG/1
TABLET, DELAYED RELEASE ORAL
Qty: 120 TABLET | Refills: 3 | Status: SHIPPED | OUTPATIENT
Start: 2018-11-02 | End: 2018-12-10

## 2018-11-02 RX ORDER — CLONAZEPAM 1 MG/1
TABLET ORAL
Qty: 90 TABLET | Refills: 0 | OUTPATIENT
Start: 2018-11-02

## 2018-11-02 NOTE — TELEPHONE ENCOUNTER
Routing refill request to provider for review/approval because:  Drug not on the [facility] refill protocol   Lorraine Francis RN

## 2018-11-02 NOTE — TELEPHONE ENCOUNTER
Not due for clonazepam. Rx recently sent on 10/30/18.   Routing refill request to provider for review/approval because:  Drug not on the [facility] refill protocol - Constance Francis RN

## 2018-11-02 NOTE — TELEPHONE ENCOUNTER
Prescription approved per [facility] Refill Protocol.  Lorraine Francis RN

## 2018-11-03 DIAGNOSIS — F33.2 SEVERE EPISODE OF RECURRENT MAJOR DEPRESSIVE DISORDER, WITHOUT PSYCHOTIC FEATURES (H): ICD-10-CM

## 2018-11-03 NOTE — TELEPHONE ENCOUNTER
"Requested Prescriptions   Pending Prescriptions Disp Refills     mirtazapine (REMERON) 15 MG tablet [Pharmacy Med Name: MIRTAZAPINE 15 MG TABLET] 30 tablet 5    Last Written Prescription Date:  5/15/18  Last Fill Quantity: 30,  # refills: 5   Last Office Visit with [facility], [facility] or [facility] prescribing provider:  4/25/2017     Future Office Visit:    Next 5 appointments (look out 90 days)     Nov 12, 2018  7:30 AM CST   Return Visit with Ricardo Rae MD, [facility] NURSE ONLY   [facility]    [address] [place] [address]   [phone]            Nov 13, 2018  8:20 AM CST   Return Visit with Sebastián Jenkins MD   [facility])    [address] [facility] [address]   [phone]            Jan 07, 2019 11:00 AM CST   Return Visit with Dorothea Loo MD   [facility] ([facility])    [address] [place] [address]   [phone]                  Sig: TAKE 1 TABLET BY MOUTH AT BEDTIME    Atypical Antidepressants Protocol Failed    11/3/2018  1:33 AM       Failed - Patient has PHQ-9 score less than 5 in past 6 months.    Please review last PHQ-9 score.          Passed - Patient is age 18 or older       Passed - Recent (6 mo) or future (30 days) visit within the authorizing provider's specialty    Patient had office visit in the last 6 months or has a visit in the next 30 days with authorizing provider or within the authorizing provider's specialty.  See \"Patient Info\" tab in inbasket, or \"Choose Columns\" in Meds & Orders section of the refill encounter.              "

## 2018-11-05 NOTE — TELEPHONE ENCOUNTER
PHQ-9 SCORE 3/14/2017 4/25/2017 6/1/2018   Total Score - - -   Total Score 21 21 18     Routing refill request to provider for review/approval because:  PHQ-9 is 5 or more. Per protocol, RN cannot refill if PHQ-9 is over 4.      Shreya Rascon RN, BSN

## 2018-11-06 RX ORDER — MIRTAZAPINE 15 MG/1
TABLET, FILM COATED ORAL
Qty: 30 TABLET | Refills: 5 | Status: SHIPPED | OUTPATIENT
Start: 2018-11-06 | End: 2019-07-02

## 2018-11-08 ENCOUNTER — TELEPHONE (OUTPATIENT)
Dept: SLEEP MEDICINE | Facility: CLINIC | Age: 53
End: 2018-11-08

## 2018-11-08 NOTE — TELEPHONE ENCOUNTER
GRAHAMM to call back to set up appt with Dr. Huynh per Dr. ARACELIS Randle MA on 11/8/2018 at 11:30 AM

## 2018-11-09 DIAGNOSIS — F45.8 ANXIETY HYPERVENTILATION: ICD-10-CM

## 2018-11-09 DIAGNOSIS — F41.9 ANXIETY HYPERVENTILATION: ICD-10-CM

## 2018-11-09 NOTE — TELEPHONE ENCOUNTER
Requested Prescriptions   Pending Prescriptions Disp Refills     clonazePAM (KLONOPIN) 1 MG tablet [Pharmacy Med Name: CLONAZEPAM 1 MG TABLET]        Last Written Prescription Date:  10/30/18  Last Fill Quantity: 90,   # refills: 0  Last Office Visit: 10/18/18-  Future Office visit:    Next 5 appointments (look out 90 days)     Nov 12, 2018  7:30 AM CST   Return Visit with Ricardo Rae MD, MG OPHTH NURSE ONLY   [facility] ([facility])    [address] [place] [address]   [phone]            Nov 13, 2018  8:20 AM CST   Return Visit with Sebastián Jenkins MD   [facility]    [address] [facility] [address]   [phone]            Jan 07, 2019 11:00 AM CST   Return Visit with Dorothea Loo MD   [facility] [place] ([facility])    [address] [place] [id number] [phone]                   Routing refill request to provider for review/approval because:  Drug not on the G, P or [facility] refill protocol or controlled substance 90 tablet 0     Sig: TAKE 0.5 TO 1 TABLET BY MOUTH 3 TIMES DAILY AS NEEDED    There is no refill protocol information for this order

## 2018-11-12 ENCOUNTER — OFFICE VISIT (OUTPATIENT)
Dept: OPHTHALMOLOGY | Facility: CLINIC | Age: 53
End: 2018-11-12
Payer: MEDICARE

## 2018-11-12 DIAGNOSIS — M06.09 RHEUMATOID ARTHRITIS OF MULTIPLE SITES WITH NEGATIVE RHEUMATOID FACTOR (H): ICD-10-CM

## 2018-11-12 DIAGNOSIS — Z79.899 HIGH RISK MEDICATION USE: Primary | ICD-10-CM

## 2018-11-12 PROCEDURE — 92083 EXTENDED VISUAL FIELD XM: CPT | Performed by: OPHTHALMOLOGY

## 2018-11-12 PROCEDURE — 92014 COMPRE OPH EXAM EST PT 1/>: CPT | Performed by: OPHTHALMOLOGY

## 2018-11-12 PROCEDURE — 92134 CPTRZ OPH DX IMG PST SGM RTA: CPT | Performed by: OPHTHALMOLOGY

## 2018-11-12 ASSESSMENT — REFRACTION_WEARINGRX
OD_ADD: +1.75
OD_CYLINDER: +0.25
OD_AXIS: 175
OS_CYLINDER: +0.25
OD_SPHERE: -4.50
SPECS_TYPE: BIFOCAL
OS_SPHERE: -4.75
OS_AXIS: 095
OS_ADD: +1.75

## 2018-11-12 ASSESSMENT — PACHYMETRY
OD_CT(UM): 538
OS_CT(UM): 541

## 2018-11-12 ASSESSMENT — SLIT LAMP EXAM - LIDS
COMMENTS: NORMAL
COMMENTS: NORMAL

## 2018-11-12 ASSESSMENT — TONOMETRY
OD_IOP_MMHG: 14
IOP_METHOD: TONOPEN
OS_IOP_MMHG: 14

## 2018-11-12 ASSESSMENT — VISUAL ACUITY
OS_CC: 20/20
METHOD: SNELLEN - LINEAR
CORRECTION_TYPE: GLASSES
OD_CC: 20/25

## 2018-11-12 ASSESSMENT — CUP TO DISC RATIO
OS_RATIO: 0.45
OD_RATIO: 0.45

## 2018-11-12 ASSESSMENT — EXTERNAL EXAM - RIGHT EYE: OD_EXAM: NORMAL

## 2018-11-12 ASSESSMENT — EXTERNAL EXAM - LEFT EYE: OS_EXAM: NORMAL

## 2018-11-12 ASSESSMENT — CONF VISUAL FIELD
OS_NORMAL: 1
OD_NORMAL: 1

## 2018-11-12 NOTE — NURSING NOTE
Patient presents with:  Plaquenil: Pt currently taking 200 mg plaquinel per day.       Referring Provider:  No referring provider defined for this encounter.    HPI    Last Eye Exam:  5/15/18   Informant(s):  EMR   Symptoms:           Do you have eye pain now?:  No      Comments:  H/X glaucoma suspect.  Pt denies any gtts.               Meryl Ríos COT

## 2018-11-12 NOTE — TELEPHONE ENCOUNTER
Requested Prescriptions   Pending Prescriptions Disp Refills     Abatacept (ORENCIA) 125 MG/ML SOAJ auto-injector        Last Written Prescription Date:  07/16/18  Last Fill Quantity: 4,   # refills: 3  Last Office Visit: 10/08/18-  Future Office visit:    Next 5 appointments (look out 90 days)     Nov 13, 2018  8:20 AM CST   Return Visit with Sebastián Jenkins MD   [facility] ([facility])    [address] [place] [address]   [phone]            Jan 07, 2019 11:00 AM CST   Return Visit with Dorothea Loo MD   [facility] ([facility] [address] [address] [phone]                   Routing refill request to provider for review/approval because:  Drug not on the [facility], [facility] or  Health refill protocol or controlled substance 4 Syringe 3     Sig: Inject 1 mL (125 mg) Subcutaneous every 7 days    There is no refill protocol information for this order

## 2018-11-12 NOTE — PROGRESS NOTES
Assessment & Plan   Lamont Daniels is a 53 year old male who presents with:   Review of systems for the eyes was negative other than the pertinent positives and negatives noted in the HPI.    High risk medication use  - SD-OCT Macula Optovue OU (both eyes)  - HVF 10-2 OU  - EYE EXAM, EST PATIENT,COMPREHESV    Return in 6 months for repeat OCT macula      Attending Physician Attestation:  Complete documentation of historical and exam elements from today's encounter can be found in the full encounter summary report (not reduplicated in this progress note).  I personally obtained the chief complaint(s) and history of present illness.  I confirmed and edited as necessary the review of systems, past medical/surgical history, family history, social history, and examination findings as documented by others; and I examined the patient myself.  I personally reviewed the relevant tests, images, and reports as documented above.  I formulated and edited as necessary the assessment and plan and discussed the findings and management plan with the patient and family. - Ricardo Rae MD

## 2018-11-12 NOTE — MR AVS SNAPSHOT
After Visit Summary   11/12/2018    Lamont Daniels    MRN: [id number]           Patient Information     Date Of Birth          1965        Visit Information        Provider Department      11/12/2018 7:30 AM Ricardo Rae MD; MG OPHTH NURSE ONLY [facility]        Today's Diagnoses     High risk medication use    -  1       Follow-ups after your visit        Your next 10 appointments already scheduled     Nov 13, 2018  8:20 AM CST   Return Visit with Sebastián Jenkins MD   [facility] ([facility])    [address] [address]   [phone]            Dec 19, 2018   Procedure with Sandra Borja MD   [place] (--)    [address] [facility] [address]   [phone]            Jan 07, 2019 11:00 AM CST   Return Visit with Dorothea Loo MD   [facility] ([facility])    [address] [place] [address]   [phone]            May 14, 2019 10:00 AM CDT   Return Visit with Ricardo Rae MD, MG OPHTH NURSE ONLY   [facility])    [address] [facility] [address]   [phone]            Jun 21, 2019  8:30 AM CDT   LAB with LAB FIRST FLOOR [facility] ([facility])    [address] [facility] [address] [phone]           Please do not eat 10-12 hours before your appointment if you are coming in fasting for labs on lipids, cholesterol, or glucose (sugar). This does not apply to pregnant women. Water, hot tea and black coffee (with nothing added) are okay. Do not drink other fluids, diet soda or chew gum.            Jun 21, 2019  9:00 AM CDT   Return Visit with Fly Travis MD   [facility] ([facility])    [address] [facility] [address]    [phone]              Who to contact     If you have questions or need follow up information about today's clinic visit or your schedule please contact [facility] directly at [phone].  Normal or non-critical lab and imaging results will be communicated to you by Semmxhart, letter or phone within 4 business days after the clinic has received the results. If you do not hear from us within 7 days, please contact the clinic through MyChart or phone. If you have a critical or abnormal lab result, we will notify you by phone as soon as possible.  Submit refill requests through [employer] or call your pharmacy and they will forward the refill request to us. Please allow 3 business days for your refill to be completed.          Additional Information About Your Visit        [employer] Information     [employer] gives you secure access to your electronic health record. If you see a primary care provider, you can also send messages to your care team and make appointments. If you have questions, please call your primary care clinic.  If you do not have a primary care provider, please call [phone] and they will assist you.      [employer] is an electronic gateway that provides easy, online access to your medical records. With [employer], you can request a clinic appointment, read your test results, renew a prescription or communicate with your care team.     To access your existing account, please contact your [facility] or call [phone] for assistance.        Care EveryWhere ID     This is your Care EveryWhere ID. This could be used by other organizations to access your [facility] medical records  [id number]         Blood Pressure from Last 3 Encounters:   10/18/18 121/85   10/10/18 127/87   08/21/18 129/83    Weight from Last 3 Encounters:   10/18/18 78.5 kg (173 lb)   10/10/18 79.8 kg (176 lb)   08/21/18 80.9 kg (178 lb 6.4 oz)              We Performed the Following      HVF 10-2 OU     SD-OCT Macula Optovue OU (both eyes)        Primary Care Provider Office Phone # Fax #    David Chavez [phone] [address]  [facility] [address]        Equal Access to Services     VICKEY HERRMANN : Hadii aad ku hadasho Soomaali, waaxda luqadaha, qaybta kaalmada adeegyada, waxay idiin hayaan adeeg khjose lamarques . So [facility] [phone].    ATENCIÓN: Si habla español, tiene a muse disposición servicios gratuitos de asistencia lingüística. Llame al [phone].    We comply with applicable federal civil rights laws and [place] laws. We do not discriminate on the basis of race, color, national origin, age, disability, sex, sexual orientation, or gender identity.            Thank you!     Thank you for choosing [facility]  for your care. Our goal is always to provide you with excellent care. Hearing back from our patients is one way we can continue to improve our services. Please take a few minutes to complete the written survey that you may receive in the mail after your visit with us. Thank you!             Your Updated Medication List - Protect others around you: Learn how to safely use, store and throw away your medicines at www.disposemymeds.org.          This list is accurate as of 11/12/18  8:24 AM.  Always use your most recent med list.                   Brand Name Dispense Instructions for use Diagnosis    Abatacept 125 MG/ML Soaj auto-injector    ORENCIA    4 Syringe    Inject 1 mL (125 mg) Subcutaneous every 7 days    Rheumatoid arthritis of multiple sites with negative rheumatoid factor (H)       allopurinol 300 MG tablet    ZYLOPRIM    210 tablet    TAKE 1 TABLET BY MOUTH 1 TIME DAILY    Idiopathic gout, unspecified chronicity, unspecified site       aspirin 81 MG EC tablet     30 tablet    Take 1 tablet (81 mg) by mouth daily (*)    Coronary artery disease involving native coronary artery of native heart without angina pectoris        atorvastatin 80 MG tablet    LIPITOR    90 tablet    TAKE 1 TABLET BY MOUTH 1 TIME DAILY    Atherosclerosis of native coronary artery of native heart, angina presence unspecified, Hyperlipidemia LDL goal <100       baclofen 10 MG tablet    LIORESAL    180 tablet    TAKE 1 TABLET BY MOUTH 2 TIMES DAILY AS NEEDED FOR MUSCLE SPASM    Spasm of back muscles       blood glucose lancets standard    no brand specified    100 each    Use to test blood sugar 2 times daily or as directed.    On corticosteroid therapy, Elevated liver enzymes       blood glucose monitoring meter device kit    no brand specified    1 kit    Use to test blood sugar 2 times daily or as directed.    On corticosteroid therapy, Elevated liver enzymes       blood glucose monitoring test strip    no brand specified    100 strip    Use to test blood sugars 2 times daily or as directed    On corticosteroid therapy, Elevated liver enzymes       buprenorphine 20 MCG/HR WK patch    BUTRANS    4 patch    Place 1 patch onto the skin every 7 days Fill on/after 10/10/18 to start on/after 10/11/18    Rheumatoid arthritis involving multiple sites, unspecified rheumatoid factor presence (H)       BusPIRone HCl 30 MG Tabs     180 tablet    Take 1 tablet by mouth 2 times daily    Major depressive disorder, recurrent episode, moderate (H)       clonazePAM 1 MG tablet    klonoPIN    90 tablet    TAKE 0.5 TO 1 TABLET BY MOUTH 3 TIMES DAILY AS NEEDED    Anxiety hyperventilation       clopidogrel 75 MG tablet    PLAVIX    90 tablet    Take 1 tablet (75 mg) by mouth daily    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    Gout without tophus       desvenlafaxine succinate 25 MG 24 hr  tablet    PRISTIQ    30 tablet    Take 1 tablet (25 mg) by mouth daily    Anxiety, Major depressive disorder, recurrent episode, moderate (H)       diclofenac 1 % Gel topical gel    VOLTAREN    300 g    Apply 2-4 grams to 2-3 joints, up to four times daily as needed using enclosed dosing card.  Max of 32g/day    Facet arthropathy, Rheumatoid arthritis involving multiple sites, unspecified rheumatoid factor presence (H)       erythromycin ophthalmic ointment    ROMYCIN    3.5 g    Apply small amount to incision sites three times daily and to inner lower lid of operative eye(s) at bedtime for 7 days.    Postoperative eye state       evolocumab 140 MG/ML prefilled autoinjector    REPATHA    2 mL    Inject 1 mL (140 mg) Subcutaneous every 14 days    Hyperlipidemia LDL goal <70, S/P CABG (coronary artery bypass graft)       ezetimibe 10 MG tablet    ZETIA    90 tablet    Take 1 tablet (10 mg) by mouth daily    Coronary artery disease involving autologous artery coronary bypass graft without angina pectoris       fluticasone 50 MCG/ACT spray    FLONASE    1 Bottle    Spray 2 sprays into both nostrils daily    Nasal congestion, Dizziness       gabapentin 300 MG capsule    NEURONTIN    540 capsule    TAKE TWO CAPSULES BY MOUTH 3 TIMES DAILY    Lumbar radiculopathy       gemfibrozil 600 MG tablet    LOPID    180 tablet    Take 1 tablet (600 mg) by mouth 2 times daily    Hyperlipidemia LDL goal <70       hydroxychloroquine 200 MG tablet    PLAQUENIL    30 tablet    TAKE 1 TABLET (200 MG) BY MOUTH DAILY    Rheumatoid arthritis of multiple sites with negative rheumatoid factor (H), High risk medications (not anticoagulants) long-term use       meclizine 25 MG tablet    ANTIVERT    30 tablet    TAKE 1 TABLET BY MOUTH EVERY 6 HOURS AS NEEDED FOR DIZZINESS    Vertigo       melatonin 3 MG tablet      Take 1 tablet (3 mg) by mouth nightly as needed for sleep    Delayed sleep phase syndrome       * mirtazapine 15 MG tablet    REMERON     30 tablet    Take 1 tablet (15 mg) by mouth At Bedtime    Severe episode of recurrent major depressive disorder, without psychotic features (H)       * mirtazapine 15 MG tablet    REMERON    30 tablet    TAKE 1 TABLET BY MOUTH AT BEDTIME    Severe episode of recurrent major depressive disorder, without psychotic features (H)       naloxone nasal spray    NARCAN    0.2 mL    Spray 1 spray (4 mg) into one nostril alternating nostrils as needed for opioid reversal every 2-3 minutes until assistance arrives    Rheumatoid arthritis involving multiple sites, unspecified rheumatoid factor presence (H)       neomycin-polymyxin-dexamethasone [id number] Susp ophthalmic susp    MAXITROL    1 Bottle    Place 1-2 drops into both eyes 3 times daily    Postoperative eye state       nitroGLYcerin 0.4 MG sublingual tablet    NITROSTAT    25 tablet    PLACE 1 TABLET UNDER THE TONGUE EVERY 5 MINUTES AS NEEDED    Atherosclerosis of native coronary artery of native heart with angina pectoris (H)       order for DME      1.  CPAP pressure 11 cm/H20 with heated humidity.  2.  Provide mask to fit and CPAP supplies.  3.  Length of need lifetime.  4.  If needed please provide a chin strap    JEROD (obstructive sleep apnea), Anxiety, CAD (coronary artery disease), HTN (hypertension)       order for DME     1 Units    Equipment being ordered: single end cane    Lumbar radiculopathy, Facet arthropathy, Chronic low back pain       oxyCODONE IR 5 MG tablet    ROXICODONE    180 tablet    Take 1-2 tablets (5-10 mg) by mouth every 4 hours as needed for moderate to severe pain . Max of 6 tabs per day.  30 day supply. May fill on/after 6/25/18 to start on 6/25/18    Facet arthropathy       pantoprazole 40 MG EC tablet    PROTONIX    90 tablet    Take 1 tablet (40 mg) by mouth daily    Gastroesophageal reflux disease without esophagitis       predniSONE 1 MG tablet    DELTASONE    180 tablet    Take 2 tablets (2 mg) by mouth daily    Rheumatoid  arthritis of multiple sites with negative rheumatoid factor (H), High risk medications (not anticoagulants) long-term use       sulfaSALAzine  MG EC tablet    AZULFIDINE EN    120 tablet    TAKE 2 TABLETS (1,000 MG) BY MOUTH 2 TIMES DAILY    Rheumatoid arthritis of multiple sites with negative rheumatoid factor (H), High risk medications (not anticoagulants) long-term use       tamsulosin 0.4 MG capsule    FLOMAX    90 capsule    TAKE ONE CAPSULE BY MOUTH EVERY DAY    Benign prostatic hyperplasia with weak urinary stream       tenofovir 300 MG tablet    VIREAD    90 tablet    TAKE ONE TABLET BY MOUTH EVERY DAY    Chronic viral hepatitis B without delta agent and without coma (H)       triamcinolone 0.1 % ointment    KENALOG    80 g    APPLY TOPICALLY TO AFFECTED AREA(S) 3 TIMES DAILY    Rash       TYLENOL PO           vitamin D 2000 units tablet     100 tablet    TAKE ONE TABLET BY MOUTH ONCE DAILY    Vitamin D deficiency       zolpidem 5 MG tablet    AMBIEN    30 tablet    TAKE 1 TALET BY MOUTH AT BEDTIME AS NEEDED FOR SLEEP.    Insomnia, unspecified type       * Notice:  This list has 2 medication(s) that are the same as other medications prescribed for you. Read the directions carefully, and ask your doctor or other care provider to review them with you.

## 2018-11-13 RX ORDER — CLONAZEPAM 1 MG/1
TABLET ORAL
Qty: 90 TABLET | Refills: 0 | OUTPATIENT
Start: 2018-11-13

## 2018-11-20 ENCOUNTER — OFFICE VISIT (OUTPATIENT)
Dept: FAMILY MEDICINE | Facility: CLINIC | Age: 53
End: 2018-11-20
Payer: MEDICARE

## 2018-11-20 VITALS
TEMPERATURE: 98.4 F | HEIGHT: 62 IN | SYSTOLIC BLOOD PRESSURE: 130 MMHG | HEART RATE: 71 BPM | BODY MASS INDEX: 32.94 KG/M2 | WEIGHT: 179 LBS | OXYGEN SATURATION: 97 % | RESPIRATION RATE: 20 BRPM | DIASTOLIC BLOOD PRESSURE: 86 MMHG

## 2018-11-20 DIAGNOSIS — Z01.818 PREOP GENERAL PHYSICAL EXAM: Primary | ICD-10-CM

## 2018-11-20 LAB
ANION GAP SERPL CALCULATED.3IONS-SCNC: 6 MMOL/L (ref 3–14)
BUN SERPL-MCNC: 16 MG/DL (ref 7–30)
CALCIUM SERPL-MCNC: 9.4 MG/DL (ref 8.5–10.1)
CHLORIDE SERPL-SCNC: 110 MMOL/L (ref 94–109)
CO2 SERPL-SCNC: 25 MMOL/L (ref 20–32)
CREAT SERPL-MCNC: 0.92 MG/DL (ref 0.66–1.25)
ERYTHROCYTE [DISTWIDTH] IN BLOOD BY AUTOMATED COUNT: 14 % (ref 10–15)
GFR SERPL CREATININE-BSD FRML MDRD: 86 ML/MIN/1.7M2
GLUCOSE SERPL-MCNC: 82 MG/DL (ref 70–99)
HCT VFR BLD AUTO: 44.3 % (ref 40–53)
HGB BLD-MCNC: 14.8 G/DL (ref 13.3–17.7)
MCH RBC QN AUTO: 28.7 PG (ref 26.5–33)
MCHC RBC AUTO-ENTMCNC: 33.4 G/DL (ref 31.5–36.5)
MCV RBC AUTO: 86 FL (ref 78–100)
PLATELET # BLD AUTO: 285 10E9/L (ref 150–450)
POTASSIUM SERPL-SCNC: 3.7 MMOL/L (ref 3.4–5.3)
RBC # BLD AUTO: 5.15 10E12/L (ref 4.4–5.9)
SODIUM SERPL-SCNC: 141 MMOL/L (ref 133–144)
WBC # BLD AUTO: 10 10E9/L (ref 4–11)

## 2018-11-20 PROCEDURE — 85027 COMPLETE CBC AUTOMATED: CPT | Performed by: FAMILY MEDICINE

## 2018-11-20 PROCEDURE — 93000 ELECTROCARDIOGRAM COMPLETE: CPT | Performed by: FAMILY MEDICINE

## 2018-11-20 PROCEDURE — 80048 BASIC METABOLIC PNL TOTAL CA: CPT | Performed by: FAMILY MEDICINE

## 2018-11-20 PROCEDURE — 99214 OFFICE O/P EST MOD 30 MIN: CPT | Performed by: FAMILY MEDICINE

## 2018-11-20 PROCEDURE — 36415 COLL VENOUS BLD VENIPUNCTURE: CPT | Performed by: FAMILY MEDICINE

## 2018-11-20 ASSESSMENT — PAIN SCALES - GENERAL: PAINLEVEL: MILD PAIN (3)

## 2018-11-20 NOTE — PROGRESS NOTES
[address] [address]  [phone]  Dept: [phone]    PRE-OP EVALUATION:  Today's date: 2018    Lamont Daniels (: 1965) presents for pre-operative evaluation assessment as requested by Dr. Borja.  He requires evaluation and anesthesia risk assessment prior to undergoing surgery/procedure for treatment of bilateral dermatochalasis of upper eyelids.    Proposed Surgery/ Procedure: REPAIR PTOSIS BILATERAL UPPER EYELID  Date of Surgery/ Procedure: 18  Time of Surgery/ Procedure: 2 pm  Hospital/Surgical Facility: [facility]  Fax number for surgical facility: does not have  Primary Physician: David Chavez  Type of Anesthesia Anticipated: General    Patient has a Health Care Directive or Living Will:  NO    1. YES - DO YOU HAVE A HISTORY OF HEART ATTACK, STROKE, STENT, BYPASS OR SURGERY ON AN ARTERY IN THE HEAD, NECK, HEART OR LEG?   2. YES - DO YOU EVER HAVE ANY PAIN OR DISCOMFORT IN YOUR CHEST?   3. NO - Do you have a history of  Heart Failure?  4. YES - ARE YOUR TROUBLED BY SHORTNESS OF BREATH WHEN WALKING ON THE LEVEL, UP A SLIGHT HILL OR AT NIGHT?   5. NO - Do you currently have a cold, bronchitis or other respiratory infection?  6. NO - Do you have a cough, shortness of breath or wheezing?  7. NO - Do you sometimes get pains in the calves of your legs when you walk?  8. NO - Do you or anyone in your family have previous history of blood clots?  9. NO - Do you or does anyone in your family have a serious bleeding problem such as prolonged bleeding following surgeries or cuts?  10. YES - HAVE YOU EVER HAD PROBLEMS WITH ANEMIA OR BEEN TOLD TO TAKE IRON PILLS?   11. NO - Have you had any abnormal blood loss such as black, tarry or bloody stools, or abnormal vaginal bleeding?  12. NO - Have you ever had a blood transfusion?  13. NO - Have you or any of your relatives ever had problems with anesthesia?  14. YES - DO YOU HAVE  SLEEP APNEA, EXCESSIVE SNORING OR DAYTIME DROWSINESS?   15. NO - Do you have any prosthetic heart valves?  16. NO - Do you have prosthetic joints?  17. NO - Is there any chance that you may be pregnant?      HPI:     HPI related to upcoming procedure: h/o symptomatic upper lid ptosis bilaterally      See problem list for active medical problems.  Problems all longstanding and stable, except as noted/documented.  See ROS for pertinent symptoms related to these conditions.                                                                                                                                                          .    MEDICAL HISTORY:     Patient Active Problem List    Diagnosis Date Noted     Benign prostatic hyperplasia with lower urinary tract symptoms, unspecified morphology 03/14/2017     Priority: Medium     Rheumatoid arthritis of multiple sites with negative rheumatoid factor (H) 01/17/2017     Priority: Medium     Insomnia, unspecified type 11/22/2016     Priority: Medium     Essential hypertension with goal blood pressure less than 140/90 05/19/2016     Priority: Medium     On corticosteroid therapy 04/22/2016     Priority: Medium     Elevated liver enzymes 11/30/2015     Priority: Medium     Bilateral low back pain with sciatica, sciatica laterality unspecified 11/17/2015     Priority: Medium     Rheumatoid arthritis involving multiple sites, unspecified rheumatoid factor presence (H) 11/11/2015     Priority: Medium     High risk medications (not anticoagulants) long-term use 11/11/2015     Priority: Medium     Midline low back pain without sciatica 11/11/2015     Priority: Medium     Essential hypertension 10/22/2015     Priority: Medium     Suicidal ideation 12/08/2014     Priority: Medium     Gout 08/22/2014     Priority: Medium     Problem list name updated by automated process. Provider to review       Insomnia 08/05/2014     Priority: Medium     Hyperlipidemia LDL goal <70 03/21/2014      "Priority: Medium     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 08/05/2013     Priority: Medium     Malrotation of intestine 06/27/2013     Priority: Medium     S/P CABG (coronary artery bypass graft) 08/21/2012     Priority: Medium     Bypass 6  \" Coronary\" vessels at the age of 42        Vitamin D deficiency 05/14/2012     Priority: Medium     Hepatitis B infection 12/26/2011     Priority: Medium     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) 09/19/2011     Priority: Medium     Sleep Study 9/13/11  Sleep Architecture:  The total recording time of the diagnostic portion of the study was 192.7 minutes.  The total sleep time was 129.5 minutes.  During the diagnostic portion of the study the sleep latency was 30.2 minutes after taking Ambien 10 mg.  No REM sleep observed. Sleep Efficiency was 67.2%.  Wake after sleep onset was 27.5.   The patient spent 23.2% of total sleep time in Stage N1, 72.6% in Stage N2, 4.2% in Stages N3 and 0.0% in REM.         Respiration:     Sustained Sleep Associated Hypoventilation -was not monitored.    Sleep Associated Hypoxemia - was present.  Baseline oxygen saturation was 93.6%.  The lowest oxygen saturation was 75.4%.  Snoring was reported as loud.     Events - During the diagnostic portion of the study, the polysomnogram revealed a presence of 5 obstructive apneas resulting in an Apnea index of 30.1 events per hour.  There were 21 hypopneas resulting in a Hypopnea index of 9.7 events per hour.  The combined Apnea/Hypopnea Index was 39.8 events per hour.  The REM AHI was n/a.  The RERA index was 7.9 per hour.   The RDI was 47.7  .  47.7   7.9 39.8     Treatment PSG  Sleep Architecture:  At 1:22 AM the patient was placed on CPAP treatment and was titrated at pressures ranging from 5 cm/H20 up to 11 cm/H20.  The total recording time of the treatment portion of the study was 334.5 minutes.  The total sleep time was 212.5 minutes.  During the treatment portion of the study the sleep latency was " 3.5 minutes.  REM latency was 313.5 minutes.  Sleep Efficiency was 63.5%.  Sleep Maintenance Efficiency was 63.5%.  Total wake time was 122.5 minutes for a total wake percentage of 34.5%. the patient spent 17.2% of total sleep time in Stage N1, 53.2% in Stage N2, 24.9% in Stages N3 and N, and 4.7% in REM.       Respiration:  The optimal pressure was 11.0 with an AHI of 1.3.    Movement Activity:      Limb - During the diagnostic portion of the study, there were 3 limb movements recorded.  Of this total, 0 were classified as PLMs.  Of the PLMs, 0 were associated with arousals.  The Limb Movement index was 1.4 per hour while the PLM index was 0.0 per hour.    Behavior - None    Bruxism - None    Seizure - None      CARDIAC SUMMARY:   During the diagnostic portion of the study, the average pulse rate was 56.1 bpm.  The minimum pulse rate was 39.0 bpm while the maximum pulse rate was 77.0 bpm.    During the treatment portion of the study, the average pulse rate was 52.7 bpm.  The minimum pulse rate was 47.0 bpm while the maximum pulse rate was 73.0 bpm.   Assessment:    Severe JEROD (AHI 40) with adequate positive airway pressure titration including REM supine.    History of UPPP.  Recommendations:    CPAP pressure 11 cmH2O with clinical follow-up within 3 - 4 weeks including compliance measures.    Consider a chin strap       Coronary atherosclerosis of native coronary artery      Priority: Medium     Major depressive disorder, recurrent episode, moderate (H)      Priority: Medium     Anxiety      Priority: Medium      Past Medical History:   Diagnosis Date     Anxiety      CAD (coronary artery disease)     CABG, PCI     Congestive heart failure, unspecified 2008     Depressive disorder 2008     Gout      Hepatitis B > 10 years     HTN (hypertension)      Hyperlipidemia LDL goal < 100      Malrotation of intestine      Moderate major depression (H)      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) 9/19/2011    Uses CPAP     Rheumatoid arthritis  flare (H) 1012     Steatohepatitis 12/15    liver biopsy     Tuberculosis age 13    INH x 9 months      Vitamin D deficiency      Past Surgical History:   Procedure Laterality Date     ABDOMEN SURGERY  2014     BIOPSY  2015     BYPASS GRAFT ARTERY CORONARY  2008    6 vessels     COLONOSCOPY  2/8/2013    Procedure: COLONOSCOPY;  Colonoscopy, blood in stool;  Surgeon: Duane, William Charles, MD;  Location: [facility] OR     COMBINED REPAIR PTOSIS WITH BLEPHAROPLASTY BILATERAL Bilateral 6/25/2018    Procedure: COMBINED REPAIR PTOSIS WITH BLEPHAROPLASTY BILATERAL;  Bilateral upper eyelid blepharoplasty, ptosis repair and brow ptosis repair;  Surgeon: Sandra Borja MD;  Location: [facility] OR     GI SURGERY  2014     HC CORONARY STENT PERCUT, EA ADDTL VESSEL  2008    3 months after CABG     HEAD & NECK SURGERY  2011     NASAL/SINUS POLYPECTOMY  2010     ORTHOPEDIC SURGERY  2012     REPAIR PTOSIS       REPAIR PTOSIS BROW BILATERAL Bilateral 6/25/2018    Procedure: REPAIR PTOSIS BROW BILATERAL;;  Surgeon: Sandra Borja MD;  Location: [facility] OR     THORACIC SURGERY  1989    tb     TONSILLECTOMY  2010     UVULOPALATOPHARYNGOPLASTY  2010     VASCULAR SURGERY  2008     Current Outpatient Prescriptions   Medication Sig Dispense Refill     Abatacept (ORENCIA) 125 MG/ML SOAJ auto-injector Inject 1 mL (125 mg) Subcutaneous every 7 days 4 Syringe 3     Acetaminophen (TYLENOL PO)        allopurinol (ZYLOPRIM) 300 MG tablet TAKE 1 TABLET BY MOUTH 1 TIME DAILY 210 tablet 0     aspirin EC 81 MG EC tablet Take 1 tablet (81 mg) by mouth daily (*) 30 tablet 1     atorvastatin (LIPITOR) 80 MG tablet TAKE 1 TABLET BY MOUTH 1 TIME DAILY 90 tablet 1     baclofen (LIORESAL) 10 MG tablet TAKE 1 TABLET BY MOUTH 2 TIMES DAILY AS NEEDED FOR MUSCLE SPASM 180 tablet 2     blood glucose (NO BRAND SPECIFIED) lancets standard Use to test blood sugar 2 times daily or as directed. 100 each 11     blood glucose monitoring (NO BRAND SPECIFIED) meter device kit Use  to test blood sugar 2 times daily or as directed. 1 kit 0     blood glucose monitoring (NO BRAND SPECIFIED) test strip Use to test blood sugars 2 times daily or as directed 100 strip 11     buprenorphine (BUTRANS) 20 MCG/HR WK patch Place 1 patch onto the skin every 7 days Fill on/after 10/10/18 to start on/after 10/11/18 4 patch 2     BusPIRone HCl 30 MG TABS Take 1 tablet by mouth 2 times daily 180 tablet 5     Cholecalciferol (VITAMIN D) 2000 UNITS tablet TAKE ONE TABLET BY MOUTH ONCE DAILY 100 tablet 1     clonazePAM (KLONOPIN) 1 MG tablet TAKE 0.5 TO 1 TABLET BY MOUTH 3 TIMES DAILY AS NEEDED 90 tablet 0     clopidogrel (PLAVIX) 75 MG tablet Take 1 tablet (75 mg) by mouth daily 90 tablet 3     COLCRYS 0.6 MG tablet TAKE 2 TABLETS BY MOUTH AT THE FIRST SIGN OF FLARE, TAKE 1 ADDITIONAL TABLET ONE HOUR LATER. 30 tablet 0     desvenlafaxine succinate (PRISTIQ) 25 MG 24 hr tablet Take 1 tablet (25 mg) by mouth daily 30 tablet 1     diclofenac (VOLTAREN) 1 % GEL topical gel Apply 2-4 grams to 2-3 joints, up to four times daily as needed using enclosed dosing card.  Max of 32g/day 300 g 11     erythromycin (ROMYCIN) ophthalmic ointment Apply small amount to incision sites three times daily and to inner lower lid of operative eye(s) at bedtime for 7 days. 3.5 g 0     evolocumab (REPATHA) 140 MG/ML prefilled autoinjector Inject 1 mL (140 mg) Subcutaneous every 14 days 2 mL 11     ezetimibe (ZETIA) 10 MG tablet Take 1 tablet (10 mg) by mouth daily 90 tablet 3     fluticasone (FLONASE) 50 MCG/ACT spray Spray 2 sprays into both nostrils daily 1 Bottle 11     gabapentin (NEURONTIN) 300 MG capsule TAKE TWO CAPSULES BY MOUTH 3 TIMES DAILY 540 capsule 2     gemfibrozil (LOPID) 600 MG tablet Take 1 tablet (600 mg) by mouth 2 times daily 180 tablet 3     hydroxychloroquine (PLAQUENIL) 200 MG tablet TAKE 1 TABLET (200 MG) BY MOUTH DAILY 30 tablet 3     meclizine (ANTIVERT) 25 MG tablet TAKE 1 TABLET BY MOUTH EVERY 6 HOURS AS  NEEDED FOR DIZZINESS 30 tablet 10     melatonin 3 MG tablet Take 1 tablet (3 mg) by mouth nightly as needed for sleep       mirtazapine (REMERON) 15 MG tablet TAKE 1 TABLET BY MOUTH AT BEDTIME 30 tablet 5     mirtazapine (REMERON) 15 MG tablet Take 1 tablet (15 mg) by mouth At Bedtime 30 tablet 5     naloxone (NARCAN) nasal spray Spray 1 spray (4 mg) into one nostril alternating nostrils as needed for opioid reversal every 2-3 minutes until assistance arrives 0.2 mL 0     neomycin-polymyxin-dexamethasone (MAXITROL) 3.5-05711-7.1 SUSP ophthalmic susp Place 1-2 drops into both eyes 3 times daily 1 Bottle 0     nitroGLYcerin (NITROSTAT) 0.4 MG sublingual tablet PLACE 1 TABLET UNDER THE TONGUE EVERY 5 MINUTES AS NEEDED 25 tablet 1     order for DME Equipment being ordered: single end cane 1 Units 0     ORDER FOR DME 1.  CPAP pressure 11 cm/H20 with heated humidity.   2.  Provide mask to fit and CPAP supplies.   3.  Length of need lifetime.   4.  If needed please provide a chin strap            oxyCODONE IR (ROXICODONE) 5 MG tablet Take 1-2 tablets (5-10 mg) by mouth every 4 hours as needed for moderate to severe pain . Max of 6 tabs per day.  30 day supply. May fill on/after 6/25/18 to start on 6/25/18 180 tablet 0     pantoprazole (PROTONIX) 40 MG EC tablet Take 1 tablet (40 mg) by mouth daily 90 tablet 3     predniSONE (DELTASONE) 1 MG tablet Take 2 tablets (2 mg) by mouth daily 180 tablet 0     sulfaSALAzine ER (AZULFIDINE EN) 500 MG EC tablet TAKE 2 TABLETS (1,000 MG) BY MOUTH 2 TIMES DAILY 120 tablet 3     tamsulosin (FLOMAX) 0.4 MG capsule TAKE ONE CAPSULE BY MOUTH EVERY DAY 90 capsule 3     tenofovir (VIREAD) 300 MG tablet TAKE ONE TABLET BY MOUTH EVERY DAY 90 tablet 3     triamcinolone (KENALOG) 0.1 % ointment APPLY TOPICALLY TO AFFECTED AREA(S) 3 TIMES DAILY 80 g 3     zolpidem (AMBIEN) 5 MG tablet TAKE 1 TALET BY MOUTH AT BEDTIME AS NEEDED FOR SLEEP. 30 tablet 5     OTC products: None, except as noted  "above    Allergies   Allergen Reactions     Citalopram Itching     Tramadol Itching      Latex Allergy: NO    Social History   Substance Use Topics     Smoking status: Never Smoker     Smokeless tobacco: Never Used     Alcohol use No     History   Drug Use No       REVIEW OF SYSTEMS:   CONSTITUTIONAL: NEGATIVE for fever, chills, change in weight  ENT/MOUTH: NEGATIVE for ear, mouth and throat problems  RESP: NEGATIVE for significant cough or SOB  CV: NEGATIVE for chest pain, palpitations or peripheral edema    EXAM:   /86 (BP Location: Left arm, Patient Position: Chair, Cuff Size: Adult Large)  Pulse 71  Temp 98.4  F (36.9  C) (Oral)  Resp 20  Ht 5' 2\" (1.575 m)  Wt 179 lb (81.2 kg)  SpO2 97%  BMI 32.74 kg/m2  GENERAL APPEARANCE: healthy, alert and no distress  HENT: ear canals and TM's normal and nose and mouth without ulcers or lesions  RESP: lungs clear to auscultation - no rales, rhonchi or wheezes  CV: regular rate and rhythm, normal S1 S2, no S3 or S4 and no murmur, click or rub   ABDOMEN: soft, nontender, no HSM or masses and bowel sounds normal  NEURO: Normal strength and tone, sensory exam grossly normal, mentation intact and speech normal    DIAGNOSTICS:   EKG: NSR, no acute st-t changes c/w ischemia, q's III, avf.  CBC and bmp pending.    Recent Labs   Lab Test  06/22/18   1448  02/13/18   1451  12/04/17   0924   10/25/16   0851  10/05/16   0954  08/12/16   1451   04/22/16   1318   HGB  14.4  14.3  13.9   < >  15.2  15.0   --    < >   --    PLT  284  217  253   < >  267  268   --    < >   --    INR   --    --   1.02   --    --   0.96   --    --    --    NA   --    --   144   --   143  144   --    < >   --    POTASSIUM   --    --   3.6   --   3.6  3.6   --    < >   --    CR  0.98  0.89  0.87   < >  1.01  1.00   --    < >   --    A1C   --    --    --    --    --    --   5.9   --   6.0    < > = values in this interval not displayed.        IMPRESSION:   Reason for surgery/procedure: REPAIR PTOSIS " BILATERAL UPPER EYELID  Diagnosis/reason for consult: preop clearance    The proposed surgical procedure is considered LOW risk.    REVISED CARDIAC RISK INDEX  The patient has the following serious cardiovascular risks for perioperative complications such as (MI, PE, VFib and 3  AV Block):  Coronary Artery Disease (MI, positive stress test, angina, Qs on EKG)  INTERPRETATION: 1 risks: Class II (low risk - 0.9% complication rate)    The patient has the following additional risks for perioperative complications:  No identified additional risks      ICD-10-CM    1. Preop general physical exam Z01.818 EKG 12-lead complete w/read - Clinics     CBC with platelets     Basic metabolic panel       RECOMMENDATIONS:       --Patient is to take all scheduled medications on the day of surgery EXCEPT for modifications listed below.    Anticoagulant or Antiplatelet Medication Use  ASPIRIN: Discontinue ASA 7-10 days prior to procedure to reduce bleeding risk.  It should be resumed post-operatively.  PLAVIX: No contraindication to stopping plavix.  Stop 7-10 days prior to surgery        APPROVAL GIVEN to proceed with proposed procedure, without further diagnostic evaluation       Signed Electronically by: David Chavez MD, MD    Copy of this evaluation report is provided to requesting physician.    [facility] Preop Guidelines    Revised Cardiac Risk Index

## 2018-11-20 NOTE — MR AVS SNAPSHOT
After Visit Summary   11/20/2018    Lamont Daniels    MRN: [id number]           Patient Information     Date Of Birth          1965        Visit Information        Provider Department      11/20/2018 8:40 AM David Chavez MD [facility]        Today's Diagnoses     Preop general physical exam    -  1      Care Instructions      Before Your Surgery      Call your surgeon if there is any change in your health. This includes signs of a cold or flu (such as a sore throat, runny nose, cough, rash or fever).    Do not smoke, drink alcohol or take over the counter medicine (unless your surgeon or primary care doctor tells you to) for the 24 hours before and after surgery.    If you take prescribed drugs: Follow your doctor s orders about which medicines to take and which to stop until after surgery.    Eating and drinking prior to surgery: follow the instructions from your surgeon    Take a shower or bath the night before surgery. Use the soap your surgeon gave you to gently clean your skin. If you do not have soap from your surgeon, use your regular soap. Do not shave or scrub the surgery site.  Wear clean pajamas and have clean sheets on your bed.           Follow-ups after your visit        Follow-up notes from your care team     Return in about 3 months (around 2/20/2019).      Your next 10 appointments already scheduled     Nov 20, 2018  8:40 AM CST   Pre-Op physical with David Chavez MD   [facility] ([facility])    [address] [place] [address]   [phone]            Nov 28, 2018  9:20 AM CST   Return Sleep Patient with Russell Matthews MD   [facility] ([facility])    [address] [address]   [phone]            Dec 10, 2018 11:00 AM CST   Return Visit with Sebastián Jenkins MD   [facility] [place] ([facility] [place])    [address]  [facility]  [place] [address]   [phone]            Dec 19, 2018   Procedure with Sandra Borja MD   [facility] (--)    [address] [place] [address] [place] [address]   [phone]            Jan 07, 2019 11:00 AM CST   Return Visit with Dorothea Loo MD   [facility] ([facility])    [address] [facility]  [place] [address]   [phone]            May 14, 2019 10:00 AM CDT   Return Visit with Ricardo Rae MD, [facility] NURSE ONLY   [facility] ([facility])    [address] [place] [address] [phone]            Jun 21, 2019  8:30 AM CDT   LAB with LAB FIRST FLOOR [facility] ([facility])    [address] [place] [address] [phone]           Please do not eat 10-12 hours before your appointment if you are coming in fasting for labs on lipids, cholesterol, or glucose (sugar). This does not apply to pregnant women. Water, hot tea and black coffee (with nothing added) are okay. Do not drink other fluids, diet soda or chew gum.            Jun 21, 2019  9:00 AM CDT   Return Visit with Fly Travis MD   [facility] ([facility])    [address] [place] [address] [phone]              Who to contact     If you have questions or need follow up information about today's clinic visit or your schedule please contact [facility] directly at [phone].  Normal or non-critical lab and imaging results will be communicated to you by MyChart, letter or phone within 4 business days after the clinic has received the results. If you do not hear from us within 7 days, please contact the clinic through MyChart or phone. If you have a critical or abnormal lab result, we will notify you by phone as soon as possible.  Submit refill requests  "through [employer] or call your pharmacy and they will forward the refill request to us. Please allow 3 business days for your refill to be completed.          Additional Information About Your Visit        [employer] Information     [employer] gives you secure access to your electronic health record. If you see a primary care provider, you can also send messages to your care team and make appointments. If you have questions, please call your primary care clinic.  If you do not have a primary care provider, please call [phone] and they will assist you.        Care EveryWhere ID     This is your Care EveryWhere ID. This could be used by other organizations to access your [place] medical records  [id number]        Your Vitals Were     Pulse Temperature Respirations Height Pulse Oximetry BMI (Body Mass Index)    71 98.4  F (36.9  C) (Oral) 20 5' 2\" (1.575 m) 97% 32.74 kg/m2       Blood Pressure from Last 3 Encounters:   11/20/18 130/86   10/18/18 121/85   10/10/18 127/87    Weight from Last 3 Encounters:   11/20/18 179 lb (81.2 kg)   10/18/18 173 lb (78.5 kg)   10/10/18 176 lb (79.8 kg)              We Performed the Following     Basic metabolic panel     CBC with platelets     EKG 12-lead complete w/read - Clinics        Primary Care Provider Office Phone # Fax #    David Chavez [phone]       [address] [address] [facility] [address]        Equal Access to Services     [facility]: Hadii aad ku hadasho Soomaali, waaxda luqadaha, qaybta kaalmada adeegyada, waxay orlando mejia adedavid katz . So [facility] [phone].    ATENCIÓN: Si habla español, tiene a muse disposición servicios gratuitos de asistencia lingüística. Cy al [phone].    We comply with applicable federal civil rights laws and [place] laws. We do not discriminate on the basis of race, color, national origin, age, disability, sex, sexual orientation, or gender identity.            Thank you!     Thank you for choosing [facility]  for your care. Our goal is always to provide you with excellent care. Hearing back from our patients is one way we can continue to improve our services. Please take a few minutes to complete the written survey that you may receive in the mail after your visit with us. Thank you!             Your Updated Medication List - Protect others around you: Learn how to safely use, store and throw away your medicines at www.disposemymeds.org.          This list is accurate as of 11/20/18  8:35 AM.  Always use your most recent med list.                   Brand Name Dispense Instructions for use Diagnosis    Abatacept 125 MG/ML Soaj auto-injector    ORENCIA    4 Syringe    Inject 1 mL (125 mg) Subcutaneous every 7 days    Rheumatoid arthritis of multiple sites with negative rheumatoid factor (H)       allopurinol 300 MG tablet    ZYLOPRIM    210 tablet    TAKE 1 TABLET BY MOUTH 1 TIME DAILY    Idiopathic gout, unspecified chronicity, unspecified site       aspirin 81 MG EC tablet     30 tablet    Take 1 tablet (81 mg) by mouth daily (*)    Coronary artery disease involving native coronary artery of native heart without angina pectoris       atorvastatin 80 MG tablet    LIPITOR    90 tablet    TAKE 1 TABLET BY MOUTH 1 TIME DAILY    Atherosclerosis of native coronary artery of native heart, angina presence unspecified, Hyperlipidemia LDL goal <100       baclofen 10 MG tablet    LIORESAL    180 tablet    TAKE 1 TABLET BY MOUTH 2 TIMES DAILY AS NEEDED FOR MUSCLE SPASM    Spasm of back muscles       blood glucose lancets standard    no brand specified    100 each    Use to test blood sugar 2 times daily or as directed.    On corticosteroid therapy, Elevated liver enzymes       blood glucose monitoring meter device kit    no brand specified    1 kit    Use to test blood sugar 2 times daily or as directed.    On corticosteroid therapy, Elevated liver enzymes       blood glucose monitoring test strip    no brand  specified    100 strip    Use to test blood sugars 2 times daily or as directed    On corticosteroid therapy, Elevated liver enzymes       buprenorphine 20 MCG/HR WK patch    BUTRANS    4 patch    Place 1 patch onto the skin every 7 days Fill on/after 10/10/18 to start on/after 10/11/18    Rheumatoid arthritis involving multiple sites, unspecified rheumatoid factor presence (H)       busPIRone HCl 30 MG tablet    BUSPAR    180 tablet    Take 1 tablet by mouth 2 times daily    Major depressive disorder, recurrent episode, moderate (H)       clonazePAM 1 MG tablet    klonoPIN    90 tablet    TAKE 0.5 TO 1 TABLET BY MOUTH 3 TIMES DAILY AS NEEDED    Anxiety hyperventilation       clopidogrel 75 MG tablet    PLAVIX    90 tablet    Take 1 tablet (75 mg) by mouth daily    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    Gout without tophus       desvenlafaxine succinate 25 MG 24 hr tablet    PRISTIQ    30 tablet    Take 1 tablet (25 mg) by mouth daily    Anxiety, Major depressive disorder, recurrent episode, moderate (H)       diclofenac 1 % Gel topical gel    VOLTAREN    300 g    Apply 2-4 grams to 2-3 joints, up to four times daily as needed using enclosed dosing card.  Max of 32g/day    Facet arthropathy, Rheumatoid arthritis involving multiple sites, unspecified rheumatoid factor presence (H)       erythromycin ophthalmic ointment    ROMYCIN    3.5 g    Apply small amount to incision sites three times daily and to inner lower lid of operative eye(s) at bedtime for 7 days.    Postoperative eye state       evolocumab 140 MG/ML prefilled autoinjector    REPATHA    2 mL    Inject 1 mL (140 mg) Subcutaneous every 14 days     Hyperlipidemia LDL goal <70, S/P CABG (coronary artery bypass graft)       ezetimibe 10 MG tablet    ZETIA    90 tablet    Take 1 tablet (10 mg) by mouth daily    Coronary artery disease involving autologous artery coronary bypass graft without angina pectoris       fluticasone 50 MCG/ACT spray    FLONASE    1 Bottle    Spray 2 sprays into both nostrils daily    Nasal congestion, Dizziness       gabapentin 300 MG capsule    NEURONTIN    540 capsule    TAKE TWO CAPSULES BY MOUTH 3 TIMES DAILY    Lumbar radiculopathy       gemfibrozil 600 MG tablet    LOPID    180 tablet    Take 1 tablet (600 mg) by mouth 2 times daily    Hyperlipidemia LDL goal <70       hydroxychloroquine 200 MG tablet    PLAQUENIL    30 tablet    TAKE 1 TABLET (200 MG) BY MOUTH DAILY    Rheumatoid arthritis of multiple sites with negative rheumatoid factor (H), High risk medications (not anticoagulants) long-term use       meclizine 25 MG tablet    ANTIVERT    30 tablet    TAKE 1 TABLET BY MOUTH EVERY 6 HOURS AS NEEDED FOR DIZZINESS    Vertigo       melatonin 3 MG tablet      Take 1 tablet (3 mg) by mouth nightly as needed for sleep    Delayed sleep phase syndrome       * mirtazapine 15 MG tablet    REMERON    30 tablet    Take 1 tablet (15 mg) by mouth At Bedtime    Severe episode of recurrent major depressive disorder, without psychotic features (H)       * mirtazapine 15 MG tablet    REMERON    30 tablet    TAKE 1 TABLET BY MOUTH AT BEDTIME    Severe episode of recurrent major depressive disorder, without psychotic features (H)       naloxone 4 MG/0.1ML nasal spray    NARCAN    0.2 mL    Spray 1 spray (4 mg) into one nostril alternating nostrils as needed for opioid reversal every 2-3 minutes until assistance arrives    Rheumatoid arthritis involving multiple sites, unspecified rheumatoid factor presence (H)       neomycin-polymyxin-dexamethasone [id number] Susp ophthalmic susp    MAXITROL    1 Bottle    Place 1-2 drops into both eyes 3 times  daily    Postoperative eye state       nitroGLYcerin 0.4 MG sublingual tablet    NITROSTAT    25 tablet    PLACE 1 TABLET UNDER THE TONGUE EVERY 5 MINUTES AS NEEDED    Atherosclerosis of native coronary artery of native heart with angina pectoris (H)       order for DME      1.  CPAP pressure 11 cm/H20 with heated humidity.  2.  Provide mask to fit and CPAP supplies.  3.  Length of need lifetime.  4.  If needed please provide a chin strap    JEROD (obstructive sleep apnea), Anxiety, CAD (coronary artery disease), HTN (hypertension)       order for DME     1 Units    Equipment being ordered: single end cane    Lumbar radiculopathy, Facet arthropathy, Chronic low back pain       oxyCODONE IR 5 MG tablet    ROXICODONE    180 tablet    Take 1-2 tablets (5-10 mg) by mouth every 4 hours as needed for moderate to severe pain . Max of 6 tabs per day.  30 day supply. May fill on/after 6/25/18 to start on 6/25/18    Facet arthropathy       pantoprazole 40 MG EC tablet    PROTONIX    90 tablet    Take 1 tablet (40 mg) by mouth daily    Gastroesophageal reflux disease without esophagitis       predniSONE 1 MG tablet    DELTASONE    180 tablet    Take 2 tablets (2 mg) by mouth daily    Rheumatoid arthritis of multiple sites with negative rheumatoid factor (H), High risk medications (not anticoagulants) long-term use       sulfaSALAzine  MG EC tablet    AZULFIDINE EN    120 tablet    TAKE 2 TABLETS (1,000 MG) BY MOUTH 2 TIMES DAILY    Rheumatoid arthritis of multiple sites with negative rheumatoid factor (H), High risk medications (not anticoagulants) long-term use       tamsulosin 0.4 MG capsule    FLOMAX    90 capsule    TAKE ONE CAPSULE BY MOUTH EVERY DAY    Benign prostatic hyperplasia with weak urinary stream       tenofovir 300 MG tablet    VIREAD    90 tablet    TAKE ONE TABLET BY MOUTH EVERY DAY    Chronic viral hepatitis B without delta agent and without coma (H)       triamcinolone 0.1 % ointment    KENALOG    80 g     APPLY TOPICALLY TO AFFECTED AREA(S) 3 TIMES DAILY    Rash       TYLENOL PO           vitamin D3 2000 units tablet    CHOLECALCIFEROL    100 tablet    TAKE ONE TABLET BY MOUTH ONCE DAILY    Vitamin D deficiency       zolpidem 5 MG tablet    AMBIEN    30 tablet    TAKE 1 TALET BY MOUTH AT BEDTIME AS NEEDED FOR SLEEP.    Insomnia, unspecified type       * Notice:  This list has 2 medication(s) that are the same as other medications prescribed for you. Read the directions carefully, and ask your doctor or other care provider to review them with you.

## 2018-11-27 DIAGNOSIS — M10.9 GOUT WITHOUT TOPHUS: ICD-10-CM

## 2018-11-27 NOTE — TELEPHONE ENCOUNTER
"Requested Prescriptions   Pending Prescriptions Disp Refills     COLCRYS 0.6 MG tablet [Pharmacy Med Name: COLCRYS 0.6 MG TABLET]  Last Written Prescription Date:  10/30/18  Last Fill Quantity: 30,  # refills: 0   Last Office Visit with [facility], [facility] or [facility] prescribing provider:  11/20/18-Ho   Future Office Visit:    Next 5 appointments (look out 90 days)     Dec 10, 2018 11:00 AM CST   Return Visit with Sebastián Jenkins MD   [facility]    [address] [facility] [address]   [phone]            Jan 07, 2019 11:00 AM CST   Return Visit with Dorothea Loo MD   [facility] ([facility])    [address] [place] [address]   [phone]                30 tablet 0     Sig: TAKE 2 TABLETS BY MOUTH AT THE FIRST SIGN OF FLARE, TAKE 1 ADDITIONAL TABLET ONE HOUR LATER.    Gout Agents Protocol Failed    11/27/2018  1:56 AM       Failed - Has Uric Acid on file in past 12 months and value is less than 6    Recent Labs   Lab Test  11/04/15   1547   URIC  6.1     If level is 6mg/dL or greater, ok to refill one time and refer to provider.          Passed - CBC on file in past 12 months    Recent Labs   Lab Test  11/20/18   0844   WBC  10.0   RBC  5.15   HGB  14.8   HCT  44.3   PLT  285                Passed - ALT on file in past 12 months    Recent Labs   Lab Test  06/22/18   1448   ALT  35            Passed - Recent (12 mo) or future (30 days) visit within the authorizing provider's specialty    Patient had office visit in the last 12 months or has a visit in the next 30 days with authorizing provider or within the authorizing provider's specialty.  See \"Patient Info\" tab in inbasket, or \"Choose Columns\" in Meds & Orders section of the refill encounter.             Passed - Patient is age 18 or older       Passed - Normal serum creatinine on file in the past 12 months    Recent Labs   Lab Test  11/20/18   0844   " 06/24/13   1322   CR  0.92   < >   --    CRPOC   --    --   1.1    < > = values in this interval not displayed.

## 2018-11-28 ENCOUNTER — OFFICE VISIT (OUTPATIENT)
Dept: SLEEP MEDICINE | Facility: CLINIC | Age: 53
End: 2018-11-28
Payer: MEDICARE

## 2018-11-28 VITALS
OXYGEN SATURATION: 97 % | DIASTOLIC BLOOD PRESSURE: 89 MMHG | HEART RATE: 66 BPM | SYSTOLIC BLOOD PRESSURE: 130 MMHG | HEIGHT: 62 IN | WEIGHT: 182 LBS | BODY MASS INDEX: 33.49 KG/M2

## 2018-11-28 DIAGNOSIS — G47.33 OSA (OBSTRUCTIVE SLEEP APNEA): ICD-10-CM

## 2018-11-28 PROCEDURE — 99213 OFFICE O/P EST LOW 20 MIN: CPT | Performed by: INTERNAL MEDICINE

## 2018-11-28 RX ORDER — COLCHICINE 0.6 MG/1
TABLET, FILM COATED ORAL
Qty: 30 TABLET | Refills: 0 | Status: SHIPPED | OUTPATIENT
Start: 2018-11-28 | End: 2019-01-04

## 2018-11-28 NOTE — MR AVS SNAPSHOT
After Visit Summary   11/28/2018    Lamont Daniels    MRN: [id number]           Patient Information     Date Of Birth          1965        Visit Information        Provider Department      11/28/2018 10:30 AM Milan Huynh MD [facility]        Today's Diagnoses     JEROD (obstructive sleep apnea)          Care Instructions      Your BMI is Body mass index is 33.29 kg/(m^2).  Weight management is a personal decision.  If you are interested in exploring weight loss strategies, the following discussion covers the approaches that may be successful. Body mass index (BMI) is one way to tell whether you are at a healthy weight, overweight, or obese. It measures your weight in relation to your height.  A BMI of 18.5 to 24.9 is in the healthy range. A person with a BMI of 25 to 29.9 is considered overweight, and someone with a BMI of 30 or greater is considered obese. More than two-thirds of American adults are considered overweight or obese.  Being overweight or obese increases the risk for further weight gain. Excess weight may lead to heart disease and diabetes.  Creating and following plans for healthy eating and physical activity may help you improve your health.  Weight control is part of healthy lifestyle and includes exercise, emotional health, and healthy eating habits. Careful eating habits lifelong are the mainstay of weight control. Though there are significant health benefits from weight loss, long-term weight loss with diet alone may be very difficult to achieve- studies show long-term success with dietary management in less than 10% of people. Attaining a healthy weight may be especially difficult to achieve in those with severe obesity. In some cases, medications, devices and surgical management might be considered.  What can you do?  If you are overweight or obese and are interested in methods for weight loss, you should discuss this with your provider.     Consider  reducing daily calorie intake by 500 calories.     Keep a food journal.     Avoiding skipping meals, consider cutting portions instead.    Diet combined with exercise helps maintain muscle while optimizing fat loss. Strength training is particularly important for building and maintaining muscle mass. Exercise helps reduce stress, increase energy, and improves fitness. Increasing exercise without diet control, however, may not burn enough calories to loose weight.       Start walking three days a week 10-20 minutes at a time    Work towards walking thirty minutes five days a week     Eventually, increase the speed of your walking for 1-2 minutes at time    In addition, we recommend that you review healthy lifestyles and methods for weight loss available through the National Institutes of Health patient information sites:  http://win.niddk.nih.gov/publications/index.htm    And look into health and wellness programs that may be available through your health insurance provider, employer, local community center, or [employer] club.    Weight management plan: Patient was referred to their PCP to discuss a diet and exercise plan.              Follow-ups after your visit        Follow-up notes from your care team     Return in about 1 year (around 11/28/2019).      Your next 10 appointments already scheduled     Dec 10, 2018 11:00 AM CST   [facility]án [facility]    [address] [address]   [phone]            Dec 19, 2018   Procedure with Sandra Borja MD   [facility])    [address]  [place] [address]   [phone]            Jan 07, 2019 11:00 AM CST   Return Visit with Dorothea Loo MD   [facility] ([facility])    [address] [address]   [phone]            May 14, 2019 10:00 AM CDT   Return Visit with Ricardo De Santiago  MD [place] NURSE ONLY   [facility] ([facility])    [address] [place] [id number] [phone]            Jun 21, 2019  8:30 AM CDT   LAB with LAB FIRST FLOOR [facility] ([facility])    [address] [place] [id number] [phone]           Please do not eat 10-12 hours before your appointment if you are coming in fasting for labs on lipids, cholesterol, or glucose (sugar). This does not apply to pregnant women. Water, hot tea and black coffee (with nothing added) are okay. Do not drink other fluids, diet soda or chew gum.            Jun 21, 2019  9:00 AM CDT   Return Visit with Fly Travis MD   [facility] ([facility])    [address] [place] [id number] [phone]              Who to contact     If you have questions or need follow up information about today's clinic visit or your schedule please contact [facility] directly at [phone].  Normal or non-critical lab and imaging results will be communicated to you by MyChart, letter or phone within 4 business days after the clinic has received the results. If you do not hear from us within 7 days, please contact the clinic through [employer] or phone. If you have a critical or abnormal lab result, we will notify you by phone as soon as possible.  Submit refill requests through [employer] or call your pharmacy and they will forward the refill request to us. Please allow 3 business days for your refill to be completed.          Additional Information About Your Visit        [employer] Information     [employer] gives you secure access to your electronic health record. If you see a primary care provider, you can also send messages to your care team and make appointments. If you have questions, please call your primary care clinic.  If you do not have a primary care provider,  "please call [phone] and they will assist you.        Care EveryWhere ID     This is your Care EveryWhere ID. This could be used by other organizations to access your [facility] medical records  [id number]        Your Vitals Were     Pulse Height Pulse Oximetry BMI (Body Mass Index)          66 1.575 m (5' 2\") 97% 33.29 kg/m2         Blood Pressure from Last 3 Encounters:   11/28/18 130/89   11/20/18 130/86   10/18/18 121/85    Weight from Last 3 Encounters:   11/28/18 82.6 kg (182 lb)   11/20/18 81.2 kg (179 lb)   10/18/18 78.5 kg (173 lb)              Today, you had the following     No orders found for display         Today's Medication Changes          These changes are accurate as of 11/28/18 10:43 AM.  If you have any questions, ask your nurse or doctor.               These medicines have changed or have updated prescriptions.        Dose/Directions    mirtazapine 15 MG tablet   Commonly known as:  REMERON   This may have changed:  Another medication with the same name was removed. Continue taking this medication, and follow the directions you see here.   Used for:  Severe episode of recurrent major depressive disorder, without psychotic features (H)   Changed by:  Milan Huynh MD        TAKE 1 TABLET BY MOUTH AT BEDTIME   Quantity:  30 tablet   Refills:  5                Primary Care Provider Office Phone # Fax #    David Chavez [phone]       [address] [facility] [address]        Equal Access to Services     [facility] AH: Hadii aad ku hadasho Soomaali, waaxda luqadaha, qaybta kaalmada adeegyada, waxay orlando katz . So [facility] [phone].    ATENCIÓN: Si habla español, tiene a muse disposición servicios gratuitos de asistencia lingüística. Llame al [phone].    We comply with applicable federal civil rights laws and [place] laws. We do not discriminate on the basis of race, color, national origin, age, disability, sex, sexual orientation, " or gender identity.            Thank you!     Thank you for choosing [facility]  for your care. Our goal is always to provide you with excellent care. Hearing back from our patients is one way we can continue to improve our services. Please take a few minutes to complete the written survey that you may receive in the mail after your visit with us. Thank you!             Your Updated Medication List - Protect others around you: Learn how to safely use, store and throw away your medicines at www.disposemymeds.org.          This list is accurate as of 11/28/18 10:43 AM.  Always use your most recent med list.                   Brand Name Dispense Instructions for use Diagnosis    Abatacept 125 MG/ML Soaj auto-injector    ORENCIA    4 Syringe    Inject 1 mL (125 mg) Subcutaneous every 7 days    Rheumatoid arthritis of multiple sites with negative rheumatoid factor (H)       allopurinol 300 MG tablet    ZYLOPRIM    210 tablet    TAKE 1 TABLET BY MOUTH 1 TIME DAILY    Idiopathic gout, unspecified chronicity, unspecified site       aspirin 81 MG EC tablet    ASA    30 tablet    Take 1 tablet (81 mg) by mouth daily (*)    Coronary artery disease involving native coronary artery of native heart without angina pectoris       atorvastatin 80 MG tablet    LIPITOR    90 tablet    TAKE 1 TABLET BY MOUTH 1 TIME DAILY    Atherosclerosis of native coronary artery of native heart, angina presence unspecified, Hyperlipidemia LDL goal <100       baclofen 10 MG tablet    LIORESAL    180 tablet    TAKE 1 TABLET BY MOUTH 2 TIMES DAILY AS NEEDED FOR MUSCLE SPASM    Spasm of back muscles       blood glucose lancets standard    NO BRAND SPECIFIED    100 each    Use to test blood sugar 2 times daily or as directed.    On corticosteroid therapy, Elevated liver enzymes       blood glucose monitoring meter device kit    NO BRAND SPECIFIED    1 kit    Use to test blood sugar 2 times daily or as directed.    On corticosteroid  therapy, Elevated liver enzymes       blood glucose monitoring test strip    NO BRAND SPECIFIED    100 strip    Use to test blood sugars 2 times daily or as directed    On corticosteroid therapy, Elevated liver enzymes       buprenorphine 20 MCG/HR WK patch    BUTRANS    4 patch    Place 1 patch onto the skin every 7 days Fill on/after 10/10/18 to start on/after 10/11/18    Rheumatoid arthritis involving multiple sites, unspecified rheumatoid factor presence (H)       busPIRone HCl 30 MG tablet    BUSPAR    180 tablet    Take 1 tablet by mouth 2 times daily    Major depressive disorder, recurrent episode, moderate (H)       clonazePAM 1 MG tablet    klonoPIN    90 tablet    TAKE 0.5 TO 1 TABLET BY MOUTH 3 TIMES DAILY AS NEEDED    Anxiety hyperventilation       clopidogrel 75 MG tablet    PLAVIX    90 tablet    Take 1 tablet (75 mg) by mouth daily    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    Gout without tophus       desvenlafaxine succinate 25 MG 24 hr tablet    PRISTIQ    30 tablet    Take 1 tablet (25 mg) by mouth daily    Anxiety, Major depressive disorder, recurrent episode, moderate (H)       diclofenac 1 % topical gel    VOLTAREN    300 g    Apply 2-4 grams to 2-3 joints, up to four times daily as needed using enclosed dosing card.  Max of 32g/day    Facet arthropathy, Rheumatoid arthritis involving multiple sites, unspecified rheumatoid factor presence (H)       erythromycin 5 MG/GM ophthalmic ointment    ROMYCIN    3.5 g    Apply small amount to incision sites three times daily and to inner lower lid of operative eye(s) at bedtime for 7 days.    Postoperative eye state       evolocumab 140 MG/ML prefilled  autoinjector    REPATHA    2 mL    Inject 1 mL (140 mg) Subcutaneous every 14 days    Hyperlipidemia LDL goal <70, S/P CABG (coronary artery bypass graft)       ezetimibe 10 MG tablet    ZETIA    90 tablet    Take 1 tablet (10 mg) by mouth daily    Coronary artery disease involving autologous artery coronary bypass graft without angina pectoris       fluticasone 50 MCG/ACT nasal spray    FLONASE    1 Bottle    Spray 2 sprays into both nostrils daily    Nasal congestion, Dizziness       gabapentin 300 MG capsule    NEURONTIN    540 capsule    TAKE TWO CAPSULES BY MOUTH 3 TIMES DAILY    Lumbar radiculopathy       gemfibrozil 600 MG tablet    LOPID    180 tablet    Take 1 tablet (600 mg) by mouth 2 times daily    Hyperlipidemia LDL goal <70       hydroxychloroquine 200 MG tablet    PLAQUENIL    30 tablet    TAKE 1 TABLET (200 MG) BY MOUTH DAILY    Rheumatoid arthritis of multiple sites with negative rheumatoid factor (H), High risk medications (not anticoagulants) long-term use       meclizine 25 MG tablet    ANTIVERT    30 tablet    TAKE 1 TABLET BY MOUTH EVERY 6 HOURS AS NEEDED FOR DIZZINESS    Vertigo       melatonin 3 MG tablet      Take 1 tablet (3 mg) by mouth nightly as needed for sleep    Delayed sleep phase syndrome       mirtazapine 15 MG tablet    REMERON    30 tablet    TAKE 1 TABLET BY MOUTH AT BEDTIME    Severe episode of recurrent major depressive disorder, without psychotic features (H)       naloxone 4 MG/0.1ML nasal spray    NARCAN    0.2 mL    Spray 1 spray (4 mg) into one nostril alternating nostrils as needed for opioid reversal every 2-3 minutes until assistance arrives    Rheumatoid arthritis involving multiple sites, unspecified rheumatoid factor presence (H)       neomycin-polymyxin-dexamethasone [id number] Susp ophthalmic susp    MAXITROL    1 Bottle    Place 1-2 drops into both eyes 3 times daily    Postoperative eye state       nitroGLYcerin 0.4 MG sublingual tablet    NITROSTAT    25  tablet    PLACE 1 TABLET UNDER THE TONGUE EVERY 5 MINUTES AS NEEDED    Atherosclerosis of native coronary artery of native heart with angina pectoris (H)       order for DME      1.  CPAP pressure 11 cm/H20 with heated humidity.  2.  Provide mask to fit and CPAP supplies.  3.  Length of need lifetime.  4.  If needed please provide a chin strap    JEROD (obstructive sleep apnea), Anxiety, CAD (coronary artery disease), HTN (hypertension)       order for DME     1 Units    Equipment being ordered: single end cane    Lumbar radiculopathy, Facet arthropathy, Chronic low back pain       oxyCODONE 5 MG tablet    ROXICODONE    180 tablet    Take 1-2 tablets (5-10 mg) by mouth every 4 hours as needed for moderate to severe pain . Max of 6 tabs per day.  30 day supply. May fill on/after 6/25/18 to start on 6/25/18    Facet arthropathy       pantoprazole 40 MG EC tablet    PROTONIX    90 tablet    Take 1 tablet (40 mg) by mouth daily    Gastroesophageal reflux disease without esophagitis       predniSONE 1 MG tablet    DELTASONE    180 tablet    Take 2 tablets (2 mg) by mouth daily    Rheumatoid arthritis of multiple sites with negative rheumatoid factor (H), High risk medications (not anticoagulants) long-term use       sulfaSALAzine  MG EC tablet    AZULFIDINE EN    120 tablet    TAKE 2 TABLETS (1,000 MG) BY MOUTH 2 TIMES DAILY    Rheumatoid arthritis of multiple sites with negative rheumatoid factor (H), High risk medications (not anticoagulants) long-term use       tamsulosin 0.4 MG capsule    FLOMAX    90 capsule    TAKE ONE CAPSULE BY MOUTH EVERY DAY    Benign prostatic hyperplasia with weak urinary stream       tenofovir 300 MG tablet    VIREAD    90 tablet    TAKE ONE TABLET BY MOUTH EVERY DAY    Chronic viral hepatitis B without delta agent and without coma (H)       triamcinolone 0.1 % external ointment    KENALOG    80 g    APPLY TOPICALLY TO AFFECTED AREA(S) 3 TIMES DAILY    Rash       TYLENOL PO            vitamin D3 2000 units tablet    CHOLECALCIFEROL    100 tablet    TAKE ONE TABLET BY MOUTH ONCE DAILY    Vitamin D deficiency       zolpidem 5 MG tablet    AMBIEN    30 tablet    TAKE 1 TALET BY MOUTH AT BEDTIME AS NEEDED FOR SLEEP.    Insomnia, unspecified type

## 2018-11-28 NOTE — PATIENT INSTRUCTIONS

## 2018-11-28 NOTE — PROGRESS NOTES
Obstructive Sleep Apnea - PAP Follow-Up Visit:    Chief Complaint   Patient presents with     CPAP Follow Up     Lamont Daniels comes in today for follow-up of their severe sleep apnea, managed with CPAP.   History of severe Obstructive Sleep Apnea and had developed severe Central Sleep Apnea on Oxycodone.  Pain provider, Dr. Loo, transitioned patient to Butrans and central apneas improved as of 8/21/2018 visit.     He is now at full dose of Butrans as of mid-October with slight, but insignificant increase in residual AHI.    He is still on a heavily sedating regimen with Mirtazapine, Gabapentin, Clonazepam, Melatonin and Zolpidem at bedtime, with prn use of diphenhydramine.    No specialty comments available.    Overall, he rates the experience with PAP as 8 (0 poor, 10 great). The mask is comfortable.    The mask is leaking at his side to side.  The mask is leaking 7 nights per week.  He is not snoring with the mask on. He is not having gasp arousals.  He is having significant oral/nasal dryness. The pressure is comfortable.     His PAP interface is Full Face Mask.    Bedtime is typically 0130. Usually it takes about 30 min minutes to fall asleep with the mask on. Wake time is typically 1100.  Patient is using PAP therapy 7 hours per night. The patient is usually getting 8 hours of sleep per night.    He does feel rested in the morning.    Total score - [place]: 9 (11/28/2018 10:00 AM)    RHEA Total Score: 14    ResMed   CPAP 11 cmH2O 30 day usage data:  100% of days with > 4 hours of use. 0/30 days with no use.   Average use 8 hours 42 minutes per day.   95%ile Leak 26.2 L/min.   AHI 3.5 events per hour (up from 0.9/hr back in August).    Past medical/surgical history, family history, social history, medications and allergies were reviewed.      Problem List:  Patient Active Problem List    Diagnosis Date Noted     Benign prostatic hyperplasia with lower urinary tract symptoms, unspecified morphology 03/14/2017     " Priority: Medium     Rheumatoid arthritis of multiple sites with negative rheumatoid factor (H) 01/17/2017     Priority: Medium     Insomnia, unspecified type 11/22/2016     Priority: Medium     Essential hypertension with goal blood pressure less than 140/90 05/19/2016     Priority: Medium     On corticosteroid therapy 04/22/2016     Priority: Medium     Elevated liver enzymes 11/30/2015     Priority: Medium     Bilateral low back pain with sciatica, sciatica laterality unspecified 11/17/2015     Priority: Medium     Rheumatoid arthritis involving multiple sites, unspecified rheumatoid factor presence (H) 11/11/2015     Priority: Medium     High risk medications (not anticoagulants) long-term use 11/11/2015     Priority: Medium     Midline low back pain without sciatica 11/11/2015     Priority: Medium     Essential hypertension 10/22/2015     Priority: Medium     Suicidal ideation 12/08/2014     Priority: Medium     Gout 08/22/2014     Priority: Medium     Problem list name updated by automated process. 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  The total sleep time was 129.5 minutes.  During the diagnostic portion of the study the sleep latency was 30.2 minutes after taking Ambien 10 mg.  No REM sleep observed. Sleep Efficiency was 67.2%.  Wake after " sleep onset was 27.5.   The patient spent 23.2% of total sleep time in Stage N1, 72.6% in Stage N2, 4.2% in Stages N3 and 0.0% in REM.         Respiration:     Sustained Sleep Associated Hypoventilation -was not monitored.    Sleep Associated Hypoxemia - was present.  Baseline oxygen saturation was 93.6%.  The lowest oxygen saturation was 75.4%.  Snoring was reported as loud.     Events - During the diagnostic portion of the study, the polysomnogram revealed a presence of 5 obstructive apneas resulting in an Apnea index of 30.1 events per hour.  There were 21 hypopneas resulting in a Hypopnea index of 9.7 events per hour.  The combined Apnea/Hypopnea Index was 39.8 events per hour.  The REM AHI was n/a.  The RERA index was 7.9 per hour.   The RDI was 47.7  .  47.7   7.9 39.8     Treatment PSG  Sleep Architecture:  At 1:22 AM the patient was placed on CPAP treatment and was titrated at pressures ranging from 5 cm/H20 up to 11 cm/H20.  The total recording time of the treatment portion of the study was 334.5 minutes.  The total sleep time was 212.5 minutes.  During the treatment portion of the study the sleep latency was 3.5 minutes.  REM latency was 313.5 minutes.  Sleep Efficiency was 63.5%.  Sleep Maintenance Efficiency was 63.5%.  Total wake time was 122.5 minutes for a total wake percentage of 34.5%. the patient spent 17.2% of total sleep time in Stage N1, 53.2% in Stage N2, 24.9% in Stages N3 and N, and 4.7% in REM.       Respiration:  The optimal pressure was 11.0 with an AHI of 1.3.    Movement Activity:      Limb - During the diagnostic portion of the study, there were 3 limb movements recorded.  Of this total, 0 were classified as PLMs.  Of the PLMs, 0 were associated with arousals.  The Limb Movement index was 1.4 per hour while the PLM index was 0.0 per hour.    Behavior - None    Bruxism - None    Seizure - None      CARDIAC SUMMARY:   During the diagnostic portion of the study, the average pulse rate was  56.1 bpm.  The minimum pulse rate was 39.0 bpm while the maximum pulse rate was 77.0 bpm.    During the treatment portion of the study, the average pulse rate was 52.7 bpm.  The minimum pulse rate was 47.0 bpm while the maximum pulse rate was 73.0 bpm.   Assessment:    Severe JEROD (AHI 40) with adequate positive airway pressure titration including REM supine.    History of UPPP.  Recommendations:    CPAP pressure 11 cmH2O with clinical follow-up within 3 - 4 weeks including compliance measures.    Consider a chin strap       Coronary atherosclerosis of native coronary artery      Priority: Medium     Major depressive disorder, recurrent episode, moderate (H)      Priority: Medium     Anxiety      Priority: Medium        There were no vitals taken for this visit.    Impression/Plan:  1. JEROD (obstructive sleep apnea)   2. Chronic pain   Currently doing well on CPAP at fixed pressure for the treatment of Severe Sleep apnea. His central sleep apnea has resolved off Oxycodone with minimal residual events on butrans. Tolerating PAP well. Daytime symptoms are stable.     Given comorbid use of benzodiazepines, he is at significantly higher risk for respiratory compromise and was counseled as such.  Consider alternative anxiolytic therapy.    Continue present therapy and Lamont Daniels will follow up in about 1 year(s).     Fifteen minutes spent with patient, all of which were spent face-to-face counseling, consulting, coordinating plan of care.      CC:  David Chavez,

## 2018-11-28 NOTE — TELEPHONE ENCOUNTER
Routing refill request to provider for review/approval because:  Labs not current:  Uric acid level past due, per refill protocol    Licha Gordon RN  [facility]

## 2018-11-28 NOTE — NURSING NOTE
"Chief Complaint   Patient presents with     CPAP Follow Up       Initial /89  Pulse 66  Ht 1.575 m (5' 2\")  Wt 82.6 kg (182 lb)  SpO2 97%  BMI 33.29 kg/m2 Estimated body mass index is 33.29 kg/(m^2) as calculated from the following:    Height as of this encounter: 1.575 m (5' 2\").    Weight as of this encounter: 82.6 kg (182 lb).    Medication Reconciliation: complete      "

## 2018-11-28 NOTE — Clinical Note
Hello,   Looks like 20 of the butrans isn't causing any breathing problems so he is stable on this dose from a sleep apnea/breathing standpoint.  I agree with your concerns about comorbid benzo use and higher risk for respiratory compromise.     ThanksMilan

## 2018-12-07 ENCOUNTER — MYC REFILL (OUTPATIENT)
Dept: PALLIATIVE MEDICINE | Facility: CLINIC | Age: 53
End: 2018-12-07

## 2018-12-07 DIAGNOSIS — M06.9 RHEUMATOID ARTHRITIS INVOLVING MULTIPLE SITES, UNSPECIFIED RHEUMATOID FACTOR PRESENCE: ICD-10-CM

## 2018-12-07 NOTE — TELEPHONE ENCOUNTER
Will route to MA pool for assistance with gathering opioid refill information.    Patient included the following with his message that will need further investigation:     dear: Doctor Cinda   look like my insurance will not cover the patch anymore, could you write to them or could you refill Oxycontin.     Jenna Hunt, BSN, RN-BC  Patient Care Supervisor/Care Coordinator  [facility]

## 2018-12-07 NOTE — TELEPHONE ENCOUNTER
Message from [employer]:  Original authorizing provider: Dorothea Loo MD    Lamont CROW Daniels would like a refill of the following medications:  buprenorphine (BUTRANS) 20 MCG/HR WK patch [Dorothea Loo MD]    Preferred pharmacy: [address]    Comment:  dear: Doctor Cinda look like my insurance will not cover the patch anymore, could you write to them or could you refill Oxycontin.

## 2018-12-10 ENCOUNTER — OFFICE VISIT (OUTPATIENT)
Dept: RHEUMATOLOGY | Facility: CLINIC | Age: 53
End: 2018-12-10
Payer: MEDICARE

## 2018-12-10 VITALS
DIASTOLIC BLOOD PRESSURE: 84 MMHG | SYSTOLIC BLOOD PRESSURE: 125 MMHG | BODY MASS INDEX: 33.29 KG/M2 | OXYGEN SATURATION: 96 % | WEIGHT: 182 LBS | TEMPERATURE: 98.3 F | HEART RATE: 67 BPM

## 2018-12-10 DIAGNOSIS — Z79.899 HIGH RISK MEDICATIONS (NOT ANTICOAGULANTS) LONG-TERM USE: ICD-10-CM

## 2018-12-10 DIAGNOSIS — M06.09 RHEUMATOID ARTHRITIS OF MULTIPLE SITES WITH NEGATIVE RHEUMATOID FACTOR (H): Primary | ICD-10-CM

## 2018-12-10 LAB
ALBUMIN SERPL-MCNC: 4.1 G/DL (ref 3.4–5)
ALP SERPL-CCNC: 87 U/L (ref 40–150)
ALT SERPL W P-5'-P-CCNC: 43 U/L (ref 0–70)
AST SERPL W P-5'-P-CCNC: 27 U/L (ref 0–45)
BILIRUB DIRECT SERPL-MCNC: 0.1 MG/DL (ref 0–0.2)
BILIRUB SERPL-MCNC: 0.4 MG/DL (ref 0.2–1.3)
PROT SERPL-MCNC: 7.9 G/DL (ref 6.8–8.8)

## 2018-12-10 PROCEDURE — 99213 OFFICE O/P EST LOW 20 MIN: CPT | Performed by: INTERNAL MEDICINE

## 2018-12-10 PROCEDURE — 36415 COLL VENOUS BLD VENIPUNCTURE: CPT | Performed by: INTERNAL MEDICINE

## 2018-12-10 PROCEDURE — 80076 HEPATIC FUNCTION PANEL: CPT | Performed by: INTERNAL MEDICINE

## 2018-12-10 RX ORDER — HYDROXYCHLOROQUINE SULFATE 200 MG/1
200 TABLET, FILM COATED ORAL DAILY
Qty: 30 TABLET | Refills: 5 | Status: SHIPPED | OUTPATIENT
Start: 2018-12-10 | End: 2019-03-26

## 2018-12-10 RX ORDER — SULFASALAZINE 500 MG/1
1000 TABLET, DELAYED RELEASE ORAL 2 TIMES DAILY
Qty: 360 TABLET | Refills: 1 | Status: SHIPPED | OUTPATIENT
Start: 2018-12-10 | End: 2019-03-26

## 2018-12-10 ASSESSMENT — PAIN SCALES - GENERAL: PAINLEVEL: SEVERE PAIN (6)

## 2018-12-10 NOTE — PROGRESS NOTES
"Rheumatology Clinic Visit      Lamont Daniels MRN# [id number]   YOB: 1965 Age: 53 year old      Date of visit: 12/10/18   PCP: Dr. David Chavez  Hepatologist: Dr. Drea Laboy     Chief Complaint   Patient presents with:  Arthritis: 9 month f/u RA. \"so far so good\". stiffness and joint pain - bilateral knees/elbows/fingers -throughout the day    Assessment and Plan   1.  Seropositive nonerosive rheumatoid arthritis (RF negative, CCP low positive): Note that he does have low back pain but without evidence of SI joint irregularity on x-ray.  Initially with morning stiffness all day, gelling phenomenon, and synovitis.   Previously on Humira (partially effective), Enbrel (partially effective). MTX avoided per Dr. Laboy recommendation. Currently on SSZ 1000mg BID,  prednisone 5mg daily, and Orencia SQ (initially Rx'd 4/18/2017), and HCQ 200mg daily.  Treatment has been in coordination with Dr. Laboy (hepatology) who is treating for hepatitis B, but since Dr Laboy has left he will need a new hepatologist. Doing well today and will reduce prednisone as noted below.  - Continue sulfasalazine 1000mg BID  - Discontinue prednisone 2 mg daily   - Continue Orencia SQ every 7 days  - Continue hydroxychloroquine 200 mg daily (11/12/2018 eye exam by Dr. Rae at [facility]; planning to repeat in 6mo)  - Labs 2-3 days prior to the next rheumatology clinic visit: CBC, Creatinine, Hepatic Panel, ESR, CRP    2. Lower back pain: Lumbar spine MRI in August 2014 showed multilevel degenerative changes and a small disc protrusion at L3/4 with mild spinal canal narrowing; also moderate left and mild right neural foraminal narrowing at L5-S1. He had a nerve conduction study in 2014 that was normal. He has been worked up by neurology in the past without significant neurologic findings. HLA-B27 negative, SI joint x-ray negative. Now following in the pain management clinic.     3. Hepatitis B: Managed by Dr. Laboy (hepatology) and is " "currently on tenofovir. Advised that he establish with new provider since Dr. Laboy has left.     4. Hepatic Steatosis: Based on recent liver biopsy.  Seeing hepatology.      5. Positive tuberculosis test:   This is noted here for historical significance.  He was evaluated by Dr. Anthony Mendoza, infectious disease. The following telephone note was documented by Dr. Mendoza: \"I tried calling th pt, finally we have the records from [place] . It appears he was treated for latent TB with INH for 1 year in 1980/1981 .Later in 1981 April he was admitted at [facility] in [place], [place] with rt sided pneumonia, TB was suspected but he improved with treatment with Penicillin only. Later he was seen  ( likely ID specialist), and was treated with 6 weeks course of Rifampin and Ethambutol pending sputum AFB culture. His AFB cx were negative based on the report we have.\"  \"He recently had QFT -TB test which was reported positive and his CXR was clear. Given his documented hx of completion of treatment for latent TB as above , I do not see any need for further treatment. His tests may remain positive irrespective of treatment status. From my perspective he can be started on DMARDs/Biological as deemed necessary.\"       6. Gout: On allopurinol and managed by PCP.    7. Depression: loss of interest in activities; poor concentration; low energy.  Advised that he speak with Dr. Chavez (PCP) or his psychiatrist.     Mr. Daniels verbalized agreement with and understanding of the rational for the diagnosis and treatment plan.  All questions were answered to best of my ability and the patient's satisfaction. Mr. Daniels was advised to contact the clinic with any questions that may arise after the clinic visit.      Thank you for involving me in the care of the patient    Return to clinic: 3-4 months      HPI   Lamont Daniels is a 53 year old male with a medical history significant for hypertension, hyperlipidemia, gout, coronary artery " disease s/p 6vCABG, vitamin D deficiency, hepatitis B, and RA who presents for f/u of RA.    Last seen in March 2018.    Today, Mr. Daniels reports that he is doing okay.  Still with some joint pain in his fingers, elbows, and knees that is the same throughout the day.  Morning stiffness lasts throughout the day, but sometimes only lasting for about 30 minutes.  Joint pain sometimes improves with activity and sometimes worsens with inactivity.  Has noticed no change in joint symptoms with prednisone dose reduction.      Has lost interest in various activities that he used to find enjoyable so he says that he is mostly inactive throughout the day.  On depression medications but does not feel like they are helping.  Some difficulty concentrating.  Energy is poor.    Denies fevers, chills, nausea, vomiting, constipation, diarrhea. No abdominal pain. Denies chest pain/pressure, palpitations, or shortness of breath.  No oral or nasal sores. No rash. No LE swelling.  Mild dry mouth; he has good dentition and does not feel like he needs to use frequent sips of water; unchanged since last evaluation. No dry eyes. No eye pain or redness.     Tobacco:  None   EtOH:  None  Drugs:  None  Occupation: Retired   Originally from [facility], then lived [place] north of [place], [place], then lived in [place], [place], then moved to [place] for family    ROS   GEN: No fevers, chills, night sweats; + fatigue   SKIN: No itching, rashes, sores  HEENT: No epistaxis. No oral or nasal ulcers.  CV: See HPI  PULM: See HPI  GI: No nausea, vomiting, constipation, diarrhea. No blood in stool. No abdominal pain.  : No blood in urine.  MSK: See HPI.  NEURO: See HPI  EXT: No LE swelling  PSYCH: Negative    Active Problem List     Patient Active Problem List   Diagnosis     Coronary atherosclerosis of native coronary artery     Major depressive disorder, recurrent episode, moderate (H)     Anxiety     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)     Hepatitis B infection      Vitamin D deficiency     S/P CABG (coronary artery bypass graft)     Malrotation of intestine     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     Hyperlipidemia LDL goal <70     Insomnia     Gout     Suicidal ideation     Essential hypertension     Rheumatoid arthritis involving multiple sites, unspecified rheumatoid factor presence (H)     High risk medications (not anticoagulants) long-term use     Midline low back pain without sciatica     Bilateral low back pain with sciatica, sciatica laterality unspecified     Elevated liver enzymes     On corticosteroid therapy     Essential hypertension with goal blood pressure less than 140/90     Insomnia, unspecified type     Rheumatoid arthritis of multiple sites with negative rheumatoid factor (H)     Benign prostatic hyperplasia with lower urinary tract symptoms, unspecified morphology     Past Medical History     Past Medical History:   Diagnosis Date     Anxiety      CAD (coronary artery disease)     CABG, PCI     Congestive heart failure, unspecified 2008     Depressive disorder 2008     Gout      Hepatitis B > 10 years     HTN (hypertension)      Hyperlipidemia LDL goal < 100      Malrotation of intestine      Moderate major depression (H)      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) 9/19/2011    Uses CPAP     Rheumatoid arthritis flare (H) 1012     Steatohepatitis 12/15    liver biopsy     Tuberculosis age 13    INH x 9 months      Vitamin D deficiency      Past Surgical History     Past Surgical History:   Procedure Laterality Date     ABDOMEN SURGERY  2014     BIOPSY  2015     BYPASS GRAFT ARTERY CORONARY  2008    6 vessels     COLONOSCOPY  2/8/2013    Procedure: COLONOSCOPY;  Colonoscopy, blood in stool;  Surgeon: Duane, William Charles, MD;  Location: [facility] OR     COMBINED REPAIR PTOSIS WITH BLEPHAROPLASTY BILATERAL Bilateral 6/25/2018    Procedure: COMBINED REPAIR PTOSIS WITH BLEPHAROPLASTY BILATERAL;  Bilateral upper eyelid blepharoplasty, ptosis repair and brow  ptosis repair;  Surgeon: Sandra Borja MD;  Location: [facility] OR     GI SURGERY  2014     HC CORONARY STENT PERCUT, EA ADDTL VESSEL  2008    3 months after CABG     HEAD & NECK SURGERY  2011     NASAL/SINUS POLYPECTOMY  2010     ORTHOPEDIC SURGERY  2012     REPAIR PTOSIS       REPAIR PTOSIS BROW BILATERAL Bilateral 6/25/2018    Procedure: REPAIR PTOSIS BROW BILATERAL;;  Surgeon: Sandra Borja MD;  Location: [facility] OR     THORACIC SURGERY  1989    tb     TONSILLECTOMY  2010     UVULOPALATOPHARYNGOPLASTY  2010     VASCULAR SURGERY  2008     Allergy     Allergies   Allergen Reactions     Citalopram Itching     Tramadol Itching     Current Medication List     Current Outpatient Medications   Medication Sig     Abatacept (ORENCIA) 125 MG/ML SOAJ auto-injector Inject 1 mL (125 mg) Subcutaneous every 7 days     Acetaminophen (TYLENOL PO)      allopurinol (ZYLOPRIM) 300 MG tablet TAKE 1 TABLET BY MOUTH 1 TIME DAILY     aspirin EC 81 MG EC tablet Take 1 tablet (81 mg) by mouth daily (*)     atorvastatin (LIPITOR) 80 MG tablet TAKE 1 TABLET BY MOUTH 1 TIME DAILY     baclofen (LIORESAL) 10 MG tablet TAKE 1 TABLET BY MOUTH 2 TIMES DAILY AS NEEDED FOR MUSCLE SPASM     blood glucose (NO BRAND SPECIFIED) lancets standard Use to test blood sugar 2 times daily or as directed.     blood glucose monitoring (NO BRAND SPECIFIED) meter device kit Use to test blood sugar 2 times daily or as directed.     blood glucose monitoring (NO BRAND SPECIFIED) test strip Use to test blood sugars 2 times daily or as directed     buprenorphine (BUTRANS) 20 MCG/HR WK patch Place 1 patch onto the skin every 7 days Fill on/after 10/10/18 to start on/after 10/11/18     BusPIRone HCl 30 MG TABS Take 1 tablet by mouth 2 times daily     Cholecalciferol (VITAMIN D) 2000 UNITS tablet TAKE ONE TABLET BY MOUTH ONCE DAILY     clonazePAM (KLONOPIN) 1 MG tablet TAKE 0.5 TO 1 TABLET BY MOUTH 3 TIMES DAILY AS NEEDED     clopidogrel (PLAVIX) 75 MG tablet Take 1  tablet (75 mg) by mouth daily     COLCRYS 0.6 MG tablet TAKE 2 TABLETS BY MOUTH AT THE FIRST SIGN OF FLARE, TAKE 1 ADDITIONAL TABLET ONE HOUR LATER.     desvenlafaxine succinate (PRISTIQ) 25 MG 24 hr tablet Take 1 tablet (25 mg) by mouth daily     diclofenac (VOLTAREN) 1 % GEL topical gel Apply 2-4 grams to 2-3 joints, up to four times daily as needed using enclosed dosing card.  Max of 32g/day     evolocumab (REPATHA) 140 MG/ML prefilled autoinjector Inject 1 mL (140 mg) Subcutaneous every 14 days     ezetimibe (ZETIA) 10 MG tablet Take 1 tablet (10 mg) by mouth daily     fluticasone (FLONASE) 50 MCG/ACT spray Spray 2 sprays into both nostrils daily     gabapentin (NEURONTIN) 300 MG capsule TAKE TWO CAPSULES BY MOUTH 3 TIMES DAILY     gemfibrozil (LOPID) 600 MG tablet Take 1 tablet (600 mg) by mouth 2 times daily     hydroxychloroquine (PLAQUENIL) 200 MG tablet TAKE 1 TABLET (200 MG) BY MOUTH DAILY     meclizine (ANTIVERT) 25 MG tablet TAKE 1 TABLET BY MOUTH EVERY 6 HOURS AS NEEDED FOR DIZZINESS     melatonin 3 MG tablet Take 1 tablet (3 mg) by mouth nightly as needed for sleep     mirtazapine (REMERON) 15 MG tablet TAKE 1 TABLET BY MOUTH AT BEDTIME     naloxone (NARCAN) nasal spray Spray 1 spray (4 mg) into one nostril alternating nostrils as needed for opioid reversal every 2-3 minutes until assistance arrives     nitroGLYcerin (NITROSTAT) 0.4 MG sublingual tablet PLACE 1 TABLET UNDER THE TONGUE EVERY 5 MINUTES AS NEEDED     order for DME Equipment being ordered: single end cane     ORDER FOR DME 1.  CPAP pressure 11 cm/H20 with heated humidity.   2.  Provide mask to fit and CPAP supplies.   3.  Length of need lifetime.   4.  If needed please provide a chin strap          pantoprazole (PROTONIX) 40 MG EC tablet Take 1 tablet (40 mg) by mouth daily     predniSONE (DELTASONE) 1 MG tablet Take 2 tablets (2 mg) by mouth daily     sulfaSALAzine ER (AZULFIDINE EN) 500 MG EC tablet TAKE 2 TABLETS (1,000 MG) BY MOUTH 2  TIMES DAILY     tamsulosin (FLOMAX) 0.4 MG capsule TAKE ONE CAPSULE BY MOUTH EVERY DAY     tenofovir (VIREAD) 300 MG tablet TAKE ONE TABLET BY MOUTH EVERY DAY     triamcinolone (KENALOG) 0.1 % ointment APPLY TOPICALLY TO AFFECTED AREA(S) 3 TIMES DAILY     zolpidem (AMBIEN) 5 MG tablet TAKE 1 TALET BY MOUTH AT BEDTIME AS NEEDED FOR SLEEP.     erythromycin (ROMYCIN) ophthalmic ointment Apply small amount to incision sites three times daily and to inner lower lid of operative eye(s) at bedtime for 7 days. (Patient not taking: Reported on 12/10/2018)     neomycin-polymyxin-dexamethasone (MAXITROL) 3.5-55771-3.1 SUSP ophthalmic susp Place 1-2 drops into both eyes 3 times daily (Patient not taking: Reported on 12/10/2018)     oxyCODONE IR (ROXICODONE) 5 MG tablet Take 1-2 tablets (5-10 mg) by mouth every 4 hours as needed for moderate to severe pain . Max of 6 tabs per day.  30 day supply. May fill on/after 6/25/18 to start on 6/25/18 (Patient not taking: Reported on 12/10/2018)     No current facility-administered medications for this visit.      Facility-Administered Medications Ordered in Other Visits   Medication     gadobutrol (GADAVIST) injection 10 mL       Social History   See HPI    Family History     Family History   Problem Relation Age of Onset     C.A.D. Father      Coronary Artery Disease Father      Hypertension Father      Depression Father      Hypertension Mother      Diabetes Mother      Depression Mother      Mental Illness Mother      Hypertension Maternal Grandfather      Cancer Paternal Grandfather      Other Cancer Paternal Grandfather      Other Cancer Other      Autoimmune Disease No family hx of      Cerebrovascular Disease No family hx of      Thyroid Disease No family hx of      Glaucoma No family hx of      Macular Degeneration No family hx of      No family history of autoimmune disease  No family history of psoriasis     No change in family history since the previous clinic visit.  "    Physical Exam     Temp Readings from Last 3 Encounters:   12/10/18 98.3  F (36.8  C) (Oral)   11/20/18 98.4  F (36.9  C) (Oral)   10/18/18 97.5  F (36.4  C) (Oral)     BP Readings from Last 5 Encounters:   12/10/18 125/84   11/28/18 130/89   11/20/18 130/86   10/18/18 121/85   10/10/18 127/87     Pulse Readings from Last 1 Encounters:   12/10/18 67     Resp Readings from Last 1 Encounters:   11/20/18 20     Estimated body mass index is 33.29 kg/m  as calculated from the following:    Height as of 11/28/18: 1.575 m (5' 2\").    Weight as of this encounter: 82.6 kg (182 lb).    GEN: NAD; using a cane with ambulation  HEENT: MMM. No oral lesions. Anicteric, noninjected sclera  CV: S1, S2. RRR. No m/r/g.  PULM: CTA bilaterally. No w/c.  MSK: MCPs, PIPs, wrists, elbows, shoulders, knees, ankles, and MTPs without swelling or tenderness to palpation.  Hips nontender to palpation.   NEURO: UE and LE strengths 5/5 and equal bilaterally.   SKIN: No rash.  EXT: No LE edema  PSYCH: Alert. Appropriate.    Labs   RF/CCP  Recent Labs   Lab Test 11/04/15  1547 08/12/14  1414   CCPABY 27*  --    RHF  --  <20     CBC  Recent Labs   Lab Test 11/20/18  0844 06/22/18  1448 02/13/18  1451  10/14/17  0950   WBC 10.0 7.3 6.5   < > 10.3   RBC 5.15 4.83 4.89   < > 4.68   HGB 14.8 14.4 14.3   < > 14.1   HCT 44.3 43.4 43.6   < > 42.1   MCV 86 90 89   < > 90   RDW 14.0 13.8 14.2   < > 14.4    284 217   < > 282   MCH 28.7 29.8 29.2   < > 30.1   MCHC 33.4 33.2 32.8   < > 33.5   NEUTROPHIL  --  67.3 54.5  --  51.4   LYMPH  --  20.4 32.7  --  33.7   MONOCYTE  --  9.7 10.3  --  12.4   EOSINOPHIL  --  1.8 2.0  --  2.1   BASOPHIL  --  0.5 0.5  --  0.4   ANEU  --  4.9 3.5  --  5.3   ALYM  --  1.5 2.1  --  3.5   PRACHI  --  0.7 0.7  --  1.3   AEOS  --  0.1 0.1  --  0.2   ABAS  --  0.0 0.0  --  0.0    < > = values in this interval not displayed.     CMP  Recent Labs   Lab Test 11/20/18  0844 06/22/18  1448 02/13/18  1451 12/04/17  0924  " 10/25/16  0851     --   --  144  --  143   POTASSIUM 3.7  --   --  3.6  --  3.6   CHLORIDE 110*  --   --  111*  --  110*   CO2 25  --   --  25  --  25   ANIONGAP 6  --   --  8  --  8   GLC 82  --   --  81  --  78   BUN 16  --   --  25  --  17   CR 0.92 0.98 0.89 0.87   < > 1.01   GFRESTIMATED 86 80 89 >90   < > 78   GFRESTBLACK >90 >90 >90 >90   < > >90  African American GFR Calc     HEMANT 9.4  --   --  8.8  --  8.8   BILITOTAL  --  0.5 0.6 0.4   < > 0.3   ALBUMIN  --  3.9 3.8 3.8   < > 3.8   PROTTOTAL  --  7.6 7.3 7.4   < > 7.3   ALKPHOS  --  92 76 82   < > 82   AST  --  27 26 17   < > 30   ALT  --  35 60 33   < > 86*    < > = values in this interval not displayed.     Calcium/VitaminD  Recent Labs   Lab Test 11/20/18  0844 12/04/17  0924 07/13/17  1246 10/25/16  0851  11/04/15  1547  04/01/13  1624   HEMANT 9.4 8.8  --  8.8   < >  --    < > 9.5   VITDT  --   --  34  --   --  43  --  34    < > = values in this interval not displayed.     ESR/CRP  Recent Labs   Lab Test 06/22/18  1448 02/13/18  1451 10/14/17  0950   SED 13 12 17   CRP <2.9 <2.9 <2.9     Lipid Panel  Recent Labs   Lab Test 06/22/18  1448 12/04/17  0924 06/09/17  0751  06/29/15  1405 05/22/14  0848 03/24/14  0936   CHOL 129 108 118   < > 219* 169 195   TRIG 113 85 102   < > 372* 379* 301*   HDL 64 73 63   < > 33* 33* 39*   LDL 42 18 35   < > 112 60 95   VLDL  --   --   --   --  74* 76* 60*   CHOLHDLRATIO  --   --   --   --  6.6* 5.1* 5.0   NHDL 65 35 55   < >  --   --   --     < > = values in this interval not displayed.     Hepatitis B  Recent Labs   Lab Test 12/04/17  0924 12/01/16  1429 10/05/16  0954  04/23/13  0812 01/30/13  0906 01/23/12  1150   AUSAB  --   --  0.45  --   --   --   --    HBCAB  --   --   --   --  Positive*  --   --    HBCM  --   --   --   --   --   --  Negative   HEPBANG  --   --  Reactive*  --  Positive* Positive* Positive*   HBQLOG Not Calculated <1.3 5.1*   < >  --   --   --     < > = values in this interval not displayed.  "    Hepatitis C  Recent Labs   Lab Test 04/07/16  1538 04/23/13  0812   HCVAB Nonreactive   Assay performance characteristics have not been established for newborns,   infants, and children   Negative     Lyme ab screening  Recent Labs   Lab Test 07/18/17  1330   LYMEGM 0.19     Tuberculosis Screening  Recent Labs   Lab Test 04/07/16  1538   TBRSLT Positive   The magnitude of the measured IFN-gamma level cannot be correlated to stage or   degree of infection, level of immune responsiveness, or likelihood for   progression to active disease.  *   TBAGN >10.00  This is a qualitative test.  The TB antigen IU/mL value is required for   documentation on certain government reporting forms but this value should not   be used to monitor disease progression or response to therapy.   Diagnosing or excluding tuberculosis disease, and assessing the probability of   LTBI, require a combination of epidemiological, historical, medical and   diagnostic findings that should be taken into account when interpreting   QuantiFERON TB results.       HIV Screening  Recent Labs   Lab Test 11/04/15  1547   HIAGAB Nonreactive   HIV-1 p24 Ag & HIV-1/HIV-2 Ab Not Detected         \"HAND BILATERAL THREE OR MORE VIEWS 11/4/2015 4:55 PM   HISTORY: Pain in unspecified joint.  COMPARISON: None.  IMPRESSION  IMPRESSION: Normal.  DANIS ANGLIN MD\"    \"FOOT BILATERAL THREE OR MORE VIEWS 11/4/2015 4:55 PM   HISTORY: Pain in unspecified joint.  COMPARISON: 8/21/2012.  IMPRESSION  IMPRESSION: Small traction spurs are seen off the Achilles tendon and  plantar fascial attachment sites bilaterally. Joint spaces are  preserved. Osseous structures are intact. Incidental note is made of  some surgical staples in the soft tissues of the medial distal right  lower extremity.  DANIS ANGLIN MD\"      Immunization History     Immunization History   Administered Date(s) Administered     Influenza (IIV3) PF 10/11/2011, 09/20/2017, 09/01/2018     Influenza Vaccine IM " 3yrs+ 4 Valent IIV4 09/27/2015, 09/08/2016     Influenza Vaccine, 3 YRS +, IM (QUADRIVALENT W/PRESERVATIVES) 11/17/2014     Pneumo Conj 13-V (2010&after) 04/07/2016     Pneumococcal 23 valent 12/12/2014     TDAP Vaccine (Adacel) 08/30/2011          Chart documentation done in part with Dragon Voice recognition Software. Although reviewed after completion, some word and grammatical error may remain.    Sebastián Jenkins MD

## 2018-12-10 NOTE — PATIENT INSTRUCTIONS
Please speak with Dr. Chavez about adjusting depression medications. Depression is still affecting quality of life and may improve with changes of therapy.    Since Dr. Laboy (liver specialist) left, please call to make another appointment with hepatology to continue hepatitis B treatment.

## 2018-12-10 NOTE — TELEPHONE ENCOUNTER
Received call from patient requesting refill(s) of oxyCODONE IR (ROXICODONE) 5 MG tablet and buprenorphine (BUTRANS) 20 MCG/HR WK       Last picked up from pharmacy on 06/27/18 Oxycodone   11/09/18 Butrans    Pt last seen by prescribing provider on 10/10/18  Next appt scheduled for 01/07/19     checked in the past 6 months? Yes If no, print current report and give to RN    Last urine drug screen date 01/10/18  Current opioid agreement on file (completed within the last year) Yes Date of opioid agreement: 01/10/18    Processing (pick one and delete the others):      Mail to [facility] pharmacy       Will route to nursing pool for review and preparation of prescription(s).

## 2018-12-10 NOTE — TELEPHONE ENCOUNTER
Note from patient pasted below:  -----------------------------  Comment:  dear: Doctor Cinda look like my insurance will not cover the patch anymore, could you write to them or could you refill Oxycontin.  --------------------------------------    Spoke to pharmacy and because patient switched insurances he will need another PA for his butrans patch. Requested she fax over a request for PA right away. Left both MyChart and  requesting call back from patient to see where he is at in his regimen.   ------------------------------------    From: Angelique Bacon, RN      Created: 12/11/2018 11:02 AM        Dennis Miguel, I left you a voice mail requesting a call back at [phone] to gather more information. You can provide it here as well if its easier for you.    1. Do you still have a Butrans patch on? If not when did you stop using the patch?  2. If so when are you due to change it?   3. Do you have any patches left?    We will work to get your medication covered for you but I need to know how many days of medication you have left.    KELLY Barksdale, RN  Care Coordinator  [facility]        Medication refill information reviewed.     Due date for    buprenorphine (BUTRANS) 20 MCG/HR WK  is 12/10/18     KELLY Barksdale, RN  Care Coordinator  [facility]

## 2018-12-11 ENCOUNTER — TELEPHONE (OUTPATIENT)
Dept: PALLIATIVE MEDICINE | Facility: CLINIC | Age: 53
End: 2018-12-11

## 2018-12-11 DIAGNOSIS — M06.9 RHEUMATOID ARTHRITIS INVOLVING MULTIPLE SITES, UNSPECIFIED RHEUMATOID FACTOR PRESENCE: ICD-10-CM

## 2018-12-11 DIAGNOSIS — M47.819 FACET ARTHROPATHY: ICD-10-CM

## 2018-12-11 RX ORDER — BUPRENORPHINE 20 UG/H
1 PATCH TRANSDERMAL
Qty: 4 PATCH | Refills: 2 | Status: CANCELLED | OUTPATIENT
Start: 2018-12-11

## 2018-12-11 NOTE — TELEPHONE ENCOUNTER
Received fax request from [facility]  pharmacy requesting refill(s) for diclofenac (VOLTAREN) 1 % GEL topical gel    Last refilled on 10/11/18    Pt last seen on 10/0/18  Next appt scheduled for 1/7/19    Sascha Isaac MA  [facility]      Will facilitate refill.

## 2018-12-11 NOTE — TELEPHONE ENCOUNTER
This PA request was submitted to the insurance by the Central Prior Authorization Team.  If you have any questions in regards to this request, we can be reached at [phone].     Thanks    PA Initiation    Medication: buprenorphine (BUTRANS) 20 MCG/HR WK patch  Insurance Company: Jose - Phone [phone] Fax [phone]  Pharmacy Filling the Rx: [facility] [id number] [place] [address]  Filling Pharmacy Phone: [phone]  Filling Pharmacy Fax:    Start Date: 12/11/2018

## 2018-12-11 NOTE — TELEPHONE ENCOUNTER
Routing to Dr Loo as an FYI.    Angelique Bacon, ROBERTON, RN  Care Coordinator  [facility] [place]

## 2018-12-11 NOTE — TELEPHONE ENCOUNTER
Prior Authorization Specialty Medication Request    Medication/Dose: buprenorphine (BUTRANS) 20 MCG/HR WK patch  ICD code (if different than what is on RX):    Rheumatoid arthritis involving multiple sites, unspecified rheumatoid factor presence (H) [M06.9]   Previously Tried and Failed:      Important Lab Values:   Rationale:     Insurance Name: [employer]  Insurance ID: [id number]  Insurance Phone Number: [phone]    Pharmacy Information (if different than what is on RX)  Name:  [facility]  Phone:  [phone]    Sascha Isaac MA  [facility]

## 2018-12-11 NOTE — TELEPHONE ENCOUNTER
Signed Prescriptions:                        Disp   Refills    diclofenac (VOLTAREN) 1 % topical gel      300 g  11       Sig: Apply 2-4 grams to 2-3 joints, up to four times daily           as needed using enclosed dosing card.  Max of           32g/day  Authorizing Provider: KAYLA CHUN MD  [facility]

## 2018-12-13 NOTE — TELEPHONE ENCOUNTER
Prior Authorization Approval    Authorization Effective Date: 12/30/2017  Authorization Expiration Date: 12/31/2019  Medication: buprenorphine (BUTRANS) 20 MCG/HR WK patch - APPROVED   Approved Dose/Quantity:  Reference #:     Insurance Company: [employer] - Phone [phone] Fax [phone]  Expected CoPay:       CoPay Card Available:      Foundation Assistance Needed:    Which Pharmacy is filling the prescription (Not needed for infusion/clinic administered): [facility] [id number] [place] [address]  Pharmacy Notified: Yes  Patient Notified: Yes

## 2018-12-13 NOTE — TELEPHONE ENCOUNTER
Medication: buprenorphine (BUTRANS) 20 MCG/HR WK patch - APPROVED  Pharmacy will notify patient.    ROBERTO BarksdaleN, RN  Care Coordinator  [facility] [place]

## 2018-12-14 ENCOUNTER — TELEPHONE (OUTPATIENT)
Dept: RHEUMATOLOGY | Facility: CLINIC | Age: 53
End: 2018-12-14

## 2018-12-14 DIAGNOSIS — M06.09 RHEUMATOID ARTHRITIS OF MULTIPLE SITES WITH NEGATIVE RHEUMATOID FACTOR (H): ICD-10-CM

## 2018-12-14 DIAGNOSIS — Z79.899 HIGH RISK MEDICATIONS (NOT ANTICOAGULANTS) LONG-TERM USE: ICD-10-CM

## 2018-12-14 RX ORDER — PREDNISONE 1 MG/1
2 TABLET ORAL DAILY
Qty: 180 TABLET | Refills: 0 | Status: SHIPPED | OUTPATIENT
Start: 2018-12-14 | End: 2019-03-14

## 2018-12-14 NOTE — TELEPHONE ENCOUNTER
prednisone      Last Written Prescription Date:    Last Fill Quantity: ,   # refills:   Last Office Visit: 11/20/2018  Future Office visit:    Next 5 appointments (look out 90 days)    Jan 07, 2019 11:00 AM CST  Return Visit with Dorothea Loo MD  [facility] ([facility]) [address] [facility] [address]  [phone]   Jan 09, 2019  3:00 PM CST  Return Visit with Sandra Borja MD  [facility] [address] [place] [id number] [phone]           Routing refill request to provider for review/approval because:  Drug not active on patients list

## 2018-12-14 NOTE — TELEPHONE ENCOUNTER
Rheumatology team: Please call to notify Mr. Daniels that prednisone has been refilled.  Sebastián Jenkins MD  12/14/2018 11:54 AM

## 2018-12-14 NOTE — TELEPHONE ENCOUNTER
Called and informed patient that prednisone was refilled.    Jf Packer RN....12/14/2018 1:45 PM

## 2018-12-17 ENCOUNTER — TELEPHONE (OUTPATIENT)
Dept: CARDIOLOGY | Facility: OTHER | Age: 53
End: 2018-12-17

## 2018-12-17 NOTE — TELEPHONE ENCOUNTER
[facility] Prior Authorization Team   Phone: [phone]  Fax: [phone]    PA Initiation    Medication: Repatha - PA Pending   Insurance Company: [employer] - Phone [phone] Fax [phone]  Pharmacy Filling the Rx: [facility] - [place], [place] - [address]  Filling Pharmacy Phone: [phone]  Filling Pharmacy Fax: [phone]  Start Date: 12/17/2018

## 2018-12-20 NOTE — TELEPHONE ENCOUNTER
Prior Authorization already approved per insurnance    Medication: Repatha  Insurance Company: [employer] - Phone [phone] Fax [phone]  Expected CoPay:      Pharmacy Filling the Rx: [facility] - [place], [place] - [facility] [address]  Pharmacy Notified: Yes  Patient Notified: Yes

## 2018-12-28 RX ORDER — ACETAMINOPHEN 325 MG/1
975 TABLET ORAL ONCE
Status: CANCELLED | OUTPATIENT
Start: 2018-12-28 | End: 2018-12-28

## 2018-12-28 RX ORDER — PHYSOSTIGMINE SALICYLATE 1 MG/ML
1.2 INJECTION INTRAVENOUS
Status: CANCELLED | OUTPATIENT
Start: 2018-12-28

## 2018-12-28 RX ORDER — FENTANYL CITRATE 50 UG/ML
25-50 INJECTION, SOLUTION INTRAMUSCULAR; INTRAVENOUS
Status: CANCELLED | OUTPATIENT
Start: 2018-12-28

## 2018-12-28 RX ORDER — ONDANSETRON 2 MG/ML
4 INJECTION INTRAMUSCULAR; INTRAVENOUS EVERY 30 MIN PRN
Status: CANCELLED | OUTPATIENT
Start: 2018-12-28

## 2018-12-28 RX ORDER — ONDANSETRON 4 MG/1
4 TABLET, ORALLY DISINTEGRATING ORAL EVERY 30 MIN PRN
Status: CANCELLED | OUTPATIENT
Start: 2018-12-28

## 2018-12-28 RX ORDER — DEXAMETHASONE SODIUM PHOSPHATE 4 MG/ML
4 INJECTION, SOLUTION INTRA-ARTICULAR; INTRALESIONAL; INTRAMUSCULAR; INTRAVENOUS; SOFT TISSUE EVERY 10 MIN PRN
Status: CANCELLED | OUTPATIENT
Start: 2018-12-28

## 2018-12-28 RX ORDER — ALBUTEROL SULFATE 0.83 MG/ML
2.5 SOLUTION RESPIRATORY (INHALATION) EVERY 4 HOURS PRN
Status: CANCELLED | OUTPATIENT
Start: 2018-12-28

## 2018-12-28 RX ORDER — NALOXONE HYDROCHLORIDE 0.4 MG/ML
.1-.4 INJECTION, SOLUTION INTRAMUSCULAR; INTRAVENOUS; SUBCUTANEOUS
Status: CANCELLED | OUTPATIENT
Start: 2018-12-28 | End: 2018-12-29

## 2018-12-28 RX ORDER — MEPERIDINE HYDROCHLORIDE 25 MG/ML
12.5 INJECTION INTRAMUSCULAR; INTRAVENOUS; SUBCUTANEOUS
Status: CANCELLED | OUTPATIENT
Start: 2018-12-28

## 2018-12-28 RX ORDER — SODIUM CHLORIDE, SODIUM LACTATE, POTASSIUM CHLORIDE, CALCIUM CHLORIDE 600; 310; 30; 20 MG/100ML; MG/100ML; MG/100ML; MG/100ML
INJECTION, SOLUTION INTRAVENOUS CONTINUOUS
Status: CANCELLED | OUTPATIENT
Start: 2018-12-28

## 2018-12-28 RX ORDER — HYDRALAZINE HYDROCHLORIDE 20 MG/ML
2.5-5 INJECTION INTRAMUSCULAR; INTRAVENOUS EVERY 10 MIN PRN
Status: CANCELLED | OUTPATIENT
Start: 2018-12-28

## 2019-01-04 DIAGNOSIS — F45.8 ANXIETY HYPERVENTILATION: ICD-10-CM

## 2019-01-04 DIAGNOSIS — F41.9 ANXIETY HYPERVENTILATION: ICD-10-CM

## 2019-01-04 DIAGNOSIS — M62.830 SPASM OF BACK MUSCLES: ICD-10-CM

## 2019-01-04 DIAGNOSIS — M10.9 GOUT WITHOUT TOPHUS: ICD-10-CM

## 2019-01-04 RX ORDER — CLONAZEPAM 1 MG/1
TABLET ORAL
Qty: 90 TABLET | Refills: 0 | Status: SHIPPED | OUTPATIENT
Start: 2019-01-04 | End: 2019-03-14

## 2019-01-04 RX ORDER — BACLOFEN 10 MG/1
TABLET ORAL
Qty: 180 TABLET | Refills: 2 | Status: SHIPPED | OUTPATIENT
Start: 2019-01-04 | End: 2019-09-25

## 2019-01-04 RX ORDER — COLCHICINE 0.6 MG/1
TABLET, FILM COATED ORAL
Qty: 30 TABLET | Refills: 0 | Status: SHIPPED | OUTPATIENT
Start: 2019-01-04 | End: 2019-02-05

## 2019-01-04 NOTE — TELEPHONE ENCOUNTER
Routing refill request to provider for review/approval because:  Drug not on the [facility] refill protocol - Klonopin and Baclofen  Assess frequency of use - Jessika Francis RN

## 2019-01-04 NOTE — TELEPHONE ENCOUNTER
"Requested Prescriptions   Pending Prescriptions Disp Refills     COLCRYS 0.6 MG tablet [Pharmacy Med Name: COLCRYS 0.6 MG TABLET] 30 tablet 0    Last Written Prescription Date:  11/28/18  Last Fill Quantity: 30,  # refills: 0   Last Office Visit with [facility], [facility] or [facility] prescribing provider:  11/20/18-Ho   Future Office Visit:    Next 5 appointments (look out 90 days)    Jan 07, 2019 11:00 AM CST  Return Visit with Dorothea Loo MD  [facility] ([facility]) [address] [place] [address]  [phone]   Mar 26, 2019  9:20 AM CDT  Return Visit with Sebastián Jenkins MD  [facility] ([facility]) [address] [facility] [address]  [phone]        Sig: TAKE 2 TABLETS BY MOUTH AT THE FIRST SIGN OF FLARE, TAKE 1 ADDITIONAL TABLET ONE HOUR LATER.    Gout Agents Protocol Failed - 1/4/2019  3:17 AM       Failed - Has Uric Acid on file in past 12 months and value is less than 6    Recent Labs   Lab Test 11/04/15  1547   URIC 6.1     If level is 6mg/dL or greater, ok to refill one time and refer to provider.          Passed - CBC on file in past 12 months    Recent Labs   Lab Test 11/20/18  0844   WBC 10.0   RBC 5.15   HGB 14.8   HCT 44.3                   Passed - ALT on file in past 12 months    Recent Labs   Lab Test 12/10/18  1146   ALT 43            Passed - Recent (12 mo) or future (30 days) visit within the authorizing provider's specialty    Patient had office visit in the last 12 months or has a visit in the next 30 days with authorizing provider or within the authorizing provider's specialty.  See \"Patient Info\" tab in inbasket, or \"Choose Columns\" in Meds & Orders section of the refill encounter.             Passed - Patient is age 18 or older       Passed - Normal serum creatinine on file in the past 12 months    Recent Labs   Lab Test 11/20/18  0844  06/24/13  1322   CR 0.92   < >  --  "   CRPOC  --   --  1.1    < > = values in this interval not displayed.             baclofen (LIORESAL) 10 MG tablet [Pharmacy Med Name: BACLOFEN 10 MG TABLET]        Last Written Prescription Date:  03/15/18  Last Fill Quantity: 180,   # refills: 2  Last Office Visit: 11/20/18-Ho  Future Office visit:    Next 5 appointments (look out 90 days)    Jan 07, 2019 11:00 AM CST  Return Visit with Dorothea Loo MD  [facility] ([facility]) [address] [facility] [address]  [phone]   Mar 26, 2019  9:20 AM CDT  [facility]  [facility] [address] [facility] [id number] [phone]           Routing refill request to provider for review/approval because:  Drug not on the [facility], [facility] or [facility] refill protocol or controlled substance 180 tablet 2     Sig: TAKE 1 TABLET BY MOUTH 2 TIMES DAILY AS NEEDED FOR MUSCLE SPASM    There is no refill protocol information for this order        clonazePAM (KLONOPIN) 1 MG tablet [Pharmacy Med Name: CLONAZEPAM 1 MG TABLET]        Last Written Prescription Date:  10/30/18  Last Fill Quantity: 90,   # refills: 0  Last Office Visit: 11/20/18-  Future Office visit:    Next 5 appointments (look out 90 days)    Jan 07, 2019 11:00 AM CST  Return Visit with Dorothea Loo MD  [facility] ([facility]) [address] [facility] [address]  [phone]   Mar 26, 2019  9:20 AM CDT  Return Visit with Sebastián Jenkins MD  [facility] [address] [facility] [id number] [phone]           Routing refill request to provider for review/approval because:  Drug not on the [facility], [facility] or [facility] refill protocol or controlled substance 90 tablet 0     Sig: TAKE 0.5 TO 1 TABLET BY MOUTH 3 TIMES DAILY AS NEEDED    There is no refill  protocol information for this order

## 2019-01-17 DIAGNOSIS — F33.1 MAJOR DEPRESSIVE DISORDER, RECURRENT EPISODE, MODERATE (H): ICD-10-CM

## 2019-01-17 NOTE — TELEPHONE ENCOUNTER
"Requested Prescriptions   Pending Prescriptions Disp Refills     busPIRone HCl (BUSPAR) 30 MG tablet [Pharmacy Med Name: BUSPIRONE HCL 30 MG TABLET] 180 tablet 0      Last Written Prescription Date:  01/11/18  Last Fill Quantity: 180,  # refills: 5   Last Office Visit with [facility], [facility] or [facility] prescribing provider:  11/20/18-Ho   Future Office Visit:    Next 5 appointments (look out 90 days)    Mar 26, 2019  9:20 AM CDT  Return Visit with Sebastián Jenkins MD  [facility]) [address] [place] [id number] [phone]        Sig: TAKE 1 TABLET BY MOUTH 2 TIMES DAILY    Atypical Antidepressants Protocol Failed - 1/17/2019  3:50 AM       Failed - Patient has PHQ-9 score less than 5 in past 6 months.    Please review last PHQ-9 score.          Passed - Medication active on med list       Passed - Patient is age 18 or older       Passed - Recent (6 mo) or future (30 days) visit within the authorizing provider's specialty    Patient had office visit in the last 6 months or has a visit in the next 30 days with authorizing provider or within the authorizing provider's specialty.  See \"Patient Info\" tab in inbasket, or \"Choose Columns\" in Meds & Orders section of the refill encounter.              "

## 2019-01-18 RX ORDER — BUSPIRONE HYDROCHLORIDE 30 MG/1
TABLET ORAL 2 TIMES DAILY
Qty: 180 TABLET | Refills: 0 | Status: SHIPPED | OUTPATIENT
Start: 2019-01-18 | End: 2019-04-22

## 2019-01-18 NOTE — TELEPHONE ENCOUNTER
Routing refill request to provider for review/approval because:      Atypical Antidepressants Protocol Failed due to not having the following:   Patient has PHQ-9 score less than 5 in past 6 months.     Anabel Andrew RN

## 2019-02-04 ENCOUNTER — MYC REFILL (OUTPATIENT)
Dept: PALLIATIVE MEDICINE | Facility: CLINIC | Age: 54
End: 2019-02-04

## 2019-02-04 DIAGNOSIS — M06.9 RHEUMATOID ARTHRITIS INVOLVING MULTIPLE SITES, UNSPECIFIED RHEUMATOID FACTOR PRESENCE: ICD-10-CM

## 2019-02-04 RX ORDER — BUPRENORPHINE 20 UG/H
1 PATCH TRANSDERMAL
Qty: 4 PATCH | Refills: 2 | Status: SHIPPED | OUTPATIENT
Start: 2019-02-04 | End: 2019-04-04

## 2019-02-04 RX ORDER — BUPRENORPHINE 20 UG/H
1 PATCH TRANSDERMAL
Qty: 4 PATCH | Refills: 2 | Status: CANCELLED | OUTPATIENT
Start: 2019-02-04

## 2019-02-04 NOTE — TELEPHONE ENCOUNTER
Medication refill information reviewed.     Due date for buprenorphine (BUTRANS) 20 MCG/HR WK patch is 2/4/19     Prescriptions prepped for review.     Will route to provider.     Scarlett Pete RN  Care Coordinator   [facility] [place]

## 2019-02-04 NOTE — TELEPHONE ENCOUNTER
Received call from patient requesting refill(s) of buprenorphine (BUTRANS) 20 MCG/HR WK patch    Last picked up from pharmacy on 12/20/18  Pt last seen by prescribing provider on 10/10/18  Next appt scheduled for none     checked in the past 6 months? Yes If no, print current report and give to RN    Last urine drug screen date 1/10/18  Current opioid agreement on file (completed within the last year) Yes Date of opioid agreement: 1/10/18    Processing (pick one and delete the others):      Sascha Isaac MA  [facility]    Will route to nursing pool for review and preparation of prescription(s).

## 2019-02-04 NOTE — TELEPHONE ENCOUNTER
Script Eprescribed to pharmacy    Will send this to MA team to notify patient.    Signed Prescriptions:                        Disp   Refills    buprenorphine (BUTRANS) 20 MCG/HR WK patch 4 patch2        Sig: Place 1 patch onto the skin every 7 days Fill           on/after 2/4/19 to start on/after 2/4/19  Authorizing Provider: KAYLA CHUN MD  [facility]

## 2019-02-05 DIAGNOSIS — M10.9 GOUT WITHOUT TOPHUS: ICD-10-CM

## 2019-02-05 NOTE — TELEPHONE ENCOUNTER
"Requested Prescriptions   Pending Prescriptions Disp Refills     COLCRYS 0.6 MG tablet [Pharmacy Med Name: COLCRYS 0.6 MG TABLET] 30 tablet 0      Last Written Prescription Date:  01/04/19  Last Fill Quantity: 30,  # refills: 0   Last Office Visit with RAO, [facility] or [facility] prescribing provider:  11/20/18-Ho   Future Office Visit:    Next 5 appointments (look out 90 days)    Mar 26, 2019  9:20 AM CDT  [facility] [address] [place] [address] [phone]        Sig: TAKE 2 TABLETS BY MOUTH AT THE FIRST SIGN OF FLARE, TAKE 1 ADDITIONAL TABLET ONE HOUR LATER.    Gout Agents Protocol Failed - 2/5/2019  1:20 AM       Failed - Has Uric Acid on file in past 12 months and value is less than 6    Recent Labs   Lab Test 11/04/15  1547   URIC 6.1     If level is 6mg/dL or greater, ok to refill one time and refer to provider.          Passed - CBC on file in past 12 months    Recent Labs   Lab Test 11/20/18  0844   WBC 10.0   RBC 5.15   HGB 14.8   HCT 44.3                   Passed - ALT on file in past 12 months    Recent Labs   Lab Test 12/10/18  1146   ALT 43            Passed - Recent (12 mo) or future (30 days) visit within the authorizing provider's specialty    Patient had office visit in the last 12 months or has a visit in the next 30 days with authorizing provider or within the authorizing provider's specialty.  See \"Patient Info\" tab in inbasket, or \"Choose Columns\" in Meds & Orders section of the refill encounter.             Passed - Medication is active on med list       Passed - Patient is age 18 or older       Passed - Normal serum creatinine on file in the past 12 months    Recent Labs   Lab Test 11/20/18  0844  06/24/13  1322   CR 0.92   < >  --    CRPOC  --   --  1.1    < > = values in this interval not displayed.               "

## 2019-02-07 RX ORDER — COLCHICINE 0.6 MG/1
TABLET, FILM COATED ORAL
Qty: 30 TABLET | Refills: 0 | Status: SHIPPED | OUTPATIENT
Start: 2019-02-07 | End: 2019-03-15

## 2019-02-07 NOTE — TELEPHONE ENCOUNTER
Routing refill request to provider for review/approval because:  Labs not current:  Uric acid level not current, per protocol    Licha Gordon RN  [facility]

## 2019-02-22 DIAGNOSIS — I25.719 ATHEROSCLEROSIS OF AUTOLOGOUS VEIN CORONARY ARTERY BYPASS GRAFT WITH ANGINA PECTORIS (H): ICD-10-CM

## 2019-02-22 DIAGNOSIS — I25.119 ATHEROSCLEROSIS OF NATIVE CORONARY ARTERY OF NATIVE HEART WITH ANGINA PECTORIS (H): ICD-10-CM

## 2019-02-22 RX ORDER — CLOPIDOGREL BISULFATE 75 MG/1
75 TABLET ORAL DAILY
Qty: 90 TABLET | Refills: 3 | Status: SHIPPED | OUTPATIENT
Start: 2019-02-22 | End: 2020-02-06

## 2019-02-22 NOTE — TELEPHONE ENCOUNTER
Requested Prescriptions   Pending Prescriptions Disp Refills     clopidogrel (PLAVIX) 75 MG tablet 90 tablet 3     Sig: Take 1 tablet (75 mg) by mouth daily    There is no refill protocol information for this order        Last appt:6/22/18  Next appt:6/21/19  Ok to refill per protocol  VIVIANA Ordaz

## 2019-03-04 ENCOUNTER — TELEPHONE (OUTPATIENT)
Dept: GASTROENTEROLOGY | Facility: CLINIC | Age: 54
End: 2019-03-04

## 2019-03-04 NOTE — TELEPHONE ENCOUNTER
[facility] Call Center    Phone Message    May a detailed message be left on voicemail: yes    Reason for Call: Other: Lamont calling to schedule his 6 month follow up appointment.  He is a patient of Dr Laboy.  Please call him back with the name of an appropriate provider for scheduling     Action Taken: Message routed to:  [facility]

## 2019-03-11 ENCOUNTER — OFFICE VISIT (OUTPATIENT)
Dept: PALLIATIVE MEDICINE | Facility: CLINIC | Age: 54
End: 2019-03-11
Payer: MEDICARE

## 2019-03-11 ENCOUNTER — DOCUMENTATION ONLY (OUTPATIENT)
Dept: LAB | Facility: CLINIC | Age: 54
End: 2019-03-11

## 2019-03-11 VITALS — DIASTOLIC BLOOD PRESSURE: 85 MMHG | SYSTOLIC BLOOD PRESSURE: 123 MMHG | HEART RATE: 73 BPM

## 2019-03-11 DIAGNOSIS — G47.33 OSA (OBSTRUCTIVE SLEEP APNEA): ICD-10-CM

## 2019-03-11 DIAGNOSIS — Z79.899 HIGH RISK MEDICATIONS (NOT ANTICOAGULANTS) LONG-TERM USE: ICD-10-CM

## 2019-03-11 DIAGNOSIS — M06.09 RHEUMATOID ARTHRITIS OF MULTIPLE SITES WITH NEGATIVE RHEUMATOID FACTOR (H): ICD-10-CM

## 2019-03-11 DIAGNOSIS — F33.1 MAJOR DEPRESSIVE DISORDER, RECURRENT EPISODE, MODERATE (H): ICD-10-CM

## 2019-03-11 DIAGNOSIS — Z79.891 ENCOUNTER FOR MONITORING OPIOID MAINTENANCE THERAPY: ICD-10-CM

## 2019-03-11 DIAGNOSIS — B18.1 CHRONIC VIRAL HEPATITIS B WITHOUT DELTA AGENT AND WITHOUT COMA (H): Primary | ICD-10-CM

## 2019-03-11 DIAGNOSIS — M06.9 RHEUMATOID ARTHRITIS INVOLVING MULTIPLE SITES, UNSPECIFIED RHEUMATOID FACTOR PRESENCE: Primary | ICD-10-CM

## 2019-03-11 DIAGNOSIS — Z51.81 ENCOUNTER FOR MONITORING OPIOID MAINTENANCE THERAPY: ICD-10-CM

## 2019-03-11 LAB
ALBUMIN SERPL-MCNC: 4.2 G/DL (ref 3.4–5)
ALP SERPL-CCNC: 86 U/L (ref 40–150)
ALT SERPL W P-5'-P-CCNC: 48 U/L (ref 0–70)
AST SERPL W P-5'-P-CCNC: 28 U/L (ref 0–45)
BASOPHILS # BLD AUTO: 0 10E9/L (ref 0–0.2)
BASOPHILS NFR BLD AUTO: 0.4 %
BILIRUB DIRECT SERPL-MCNC: 0.2 MG/DL (ref 0–0.2)
BILIRUB SERPL-MCNC: 0.6 MG/DL (ref 0.2–1.3)
CREAT SERPL-MCNC: 0.98 MG/DL (ref 0.66–1.25)
CRP SERPL-MCNC: <2.9 MG/L (ref 0–8)
DIFFERENTIAL METHOD BLD: NORMAL
EOSINOPHIL # BLD AUTO: 0.1 10E9/L (ref 0–0.7)
EOSINOPHIL NFR BLD AUTO: 1.9 %
ERYTHROCYTE [DISTWIDTH] IN BLOOD BY AUTOMATED COUNT: 14.6 % (ref 10–15)
ERYTHROCYTE [SEDIMENTATION RATE] IN BLOOD BY WESTERGREN METHOD: 10 MM/H (ref 0–20)
GFR SERPL CREATININE-BSD FRML MDRD: 87 ML/MIN/{1.73_M2}
HCT VFR BLD AUTO: 48 % (ref 40–53)
HGB BLD-MCNC: 16.1 G/DL (ref 13.3–17.7)
LYMPHOCYTES # BLD AUTO: 3.3 10E9/L (ref 0.8–5.3)
LYMPHOCYTES NFR BLD AUTO: 45.7 %
MCH RBC QN AUTO: 29.3 PG (ref 26.5–33)
MCHC RBC AUTO-ENTMCNC: 33.5 G/DL (ref 31.5–36.5)
MCV RBC AUTO: 87 FL (ref 78–100)
MONOCYTES # BLD AUTO: 1 10E9/L (ref 0–1.3)
MONOCYTES NFR BLD AUTO: 13.4 %
NEUTROPHILS # BLD AUTO: 2.8 10E9/L (ref 1.6–8.3)
NEUTROPHILS NFR BLD AUTO: 38.6 %
PLATELET # BLD AUTO: 309 10E9/L (ref 150–450)
PROT SERPL-MCNC: 7.9 G/DL (ref 6.8–8.8)
RBC # BLD AUTO: 5.5 10E12/L (ref 4.4–5.9)
WBC # BLD AUTO: 7.2 10E9/L (ref 4–11)

## 2019-03-11 PROCEDURE — 36415 COLL VENOUS BLD VENIPUNCTURE: CPT | Performed by: INTERNAL MEDICINE

## 2019-03-11 PROCEDURE — 80307 DRUG TEST PRSMV CHEM ANLYZR: CPT | Mod: 90 | Performed by: PSYCHIATRY & NEUROLOGY

## 2019-03-11 PROCEDURE — 86140 C-REACTIVE PROTEIN: CPT | Performed by: INTERNAL MEDICINE

## 2019-03-11 PROCEDURE — 80076 HEPATIC FUNCTION PANEL: CPT | Performed by: INTERNAL MEDICINE

## 2019-03-11 PROCEDURE — 85652 RBC SED RATE AUTOMATED: CPT | Performed by: INTERNAL MEDICINE

## 2019-03-11 PROCEDURE — 85025 COMPLETE CBC W/AUTO DIFF WBC: CPT | Performed by: INTERNAL MEDICINE

## 2019-03-11 PROCEDURE — 82565 ASSAY OF CREATININE: CPT | Performed by: INTERNAL MEDICINE

## 2019-03-11 PROCEDURE — 99000 SPECIMEN HANDLING OFFICE-LAB: CPT | Performed by: PSYCHIATRY & NEUROLOGY

## 2019-03-11 PROCEDURE — 99213 OFFICE O/P EST LOW 20 MIN: CPT | Performed by: PSYCHIATRY & NEUROLOGY

## 2019-03-11 RX ORDER — GABAPENTIN 300 MG/1
600 CAPSULE ORAL 3 TIMES DAILY
Qty: 540 CAPSULE | Refills: 2 | Status: SHIPPED | OUTPATIENT
Start: 2019-03-11 | End: 2020-02-24

## 2019-03-11 ASSESSMENT — PAIN SCALES - GENERAL: PAINLEVEL: SEVERE PAIN (6)

## 2019-03-11 NOTE — PROGRESS NOTES
[facility]    Date of visit: 3/11/19    Chief complaint:   Chief Complaint   Patient presents with     Pain       Interval history:  Lamont Daniels was last seen by me on 10/11/18    Recommendations/plan at the last visit included:  1. Physical Therapy:  Finished pool therapy  2. Clinical Health Psychologist to address issues of relaxation, behavioral change, coping style, and other factors important to improvement.  Will need to discuss at upcoming appointments  3. Diagnostic Studies: none  4. Medication Management:    1. Still make final increase of Butrans to 20mcg/hr.  After being on this dose, will have him meet with sleep provider to see if his apnea remains improved from previous readings on oxycodone  2. Will call to cancel 15mcg/hr script at pharmacy  3. Risk is improved, but he is still on clonazepam donsistently.  Will send this note to PCP to see if any other options for managing his anxiety with a decrease in benzos.  Does have narcan.  4. As we are not sure meloxicam is helpful and does increase heart attack and stroke risk, will have him stop after 3-4 weeks of being on higher dose of medication.  5. Further procedures recommended: None at this time.  6. Recommendations to PCP:  3 months    Since his last visit, Lamont Daniels reports:  -Takes clonazepam 1 mg BID for anxiety. If he tried taking less like once daily, his anxiety worsens. Started originally by psychiatry. Now prescribed by primary.  -Takes zolpidem almost every night, but does not always find it helpful.  -Has naloxone spray.  -His pain is adequately managed with Butrans 20 mcg. He continues to have widespread joint pain, but it is tolerable.  -In process of finding a new hepatologist. The one he was seeing moved out of the area.    Pain scores:  Pain intensity on average is 6 on a scale of 0-10.     Current pain treatments  butrans 20mcg/hr  Acetaminophen 500 mg 2 tabs prn, averages about once a  week  Gabapentin 600mg 3 times daily  Baclofen 10mg BID   Diclofenac gel prn  Ambien 5 mg -not every night    Previous medication treatments included:  Codeine, oxycodone, hydrocodone- given for other issues- helpful  Cymbalta 60mg daily- given for mental health- was given by Dr. Acosta- not been higher  Baclofen 10mg at bedtime- not helpful, no side effects  Robaxin 500 mg 1 tablet, 1-3 times a day depending on the day  Ibuprofen 800 mg- using 3-4 times per week, helpful but started meloxicam  Meloxicam  Oxycodone 5 mg : takes 6/day    Other treatments have included:  Lamont Daniels has not been seen at a pain clinic in the past.    PT: has done for various reasons, most recently the low back.  Acupuncture: as done a couple of needles with adjustment  TENs Unit: no  Injections: no    Side Effects: None    Medications:  Current Outpatient Medications   Medication Sig Dispense Refill     Abatacept (ORENCIA) 125 MG/ML SOAJ auto-injector Inject 1 mL (125 mg) Subcutaneous every 7 days 4 Syringe 3     Acetaminophen (TYLENOL PO)        allopurinol (ZYLOPRIM) 300 MG tablet Take 1 tablet (300 mg) by mouth daily 90 tablet 3     aspirin EC 81 MG EC tablet Take 1 tablet (81 mg) by mouth daily (*) 30 tablet 1     atorvastatin (LIPITOR) 80 MG tablet TAKE 1 TABLET BY MOUTH 1 TIME DAILY 90 tablet 1     baclofen (LIORESAL) 10 MG tablet TAKE 1 TABLET BY MOUTH 2 TIMES DAILY AS NEEDED FOR MUSCLE SPASM 180 tablet 2     blood glucose (NO BRAND SPECIFIED) lancets standard Use to test blood sugar 2 times daily or as directed. 100 each 11     blood glucose monitoring (NO BRAND SPECIFIED) meter device kit Use to test blood sugar 2 times daily or as directed. 1 kit 0     blood glucose monitoring (NO BRAND SPECIFIED) test strip Use to test blood sugars 2 times daily or as directed 100 strip 11     buprenorphine (BUTRANS) 20 MCG/HR WK patch Place 1 patch onto the skin every 7 days Fill on/after 2/4/19 to start on/after 2/4/19 4 patch 2     busPIRone  HCl (BUSPAR) 30 MG tablet TAKE 1 TABLET BY MOUTH 2 TIMES DAILY 180 tablet 0     Cholecalciferol (VITAMIN D) 2000 UNITS tablet TAKE ONE TABLET BY MOUTH ONCE DAILY 100 tablet 1     clonazePAM (KLONOPIN) 1 MG tablet TAKE 0.5 TO 1 TABLET BY MOUTH 3 TIMES DAILY AS NEEDED 90 tablet 0     clopidogrel (PLAVIX) 75 MG tablet Take 1 tablet (75 mg) by mouth daily 90 tablet 3     COLCRYS 0.6 MG tablet TAKE 2 TABLETS BY MOUTH AT THE FIRST SIGN OF FLARE, TAKE 1 ADDITIONAL TABLET ONE HOUR LATER. 30 tablet 0     desvenlafaxine succinate (PRISTIQ) 25 MG 24 hr tablet TAKE 1 TABLET BY MOUTH EVERY DAY 90 tablet 1     diclofenac (VOLTAREN) 1 % topical gel Apply 2-4 grams to 2-3 joints, up to four times daily as needed using enclosed dosing card.  Max of 32g/day 300 g 11     evolocumab (REPATHA) 140 MG/ML prefilled autoinjector Inject 1 mL (140 mg) Subcutaneous every 14 days 2 mL 11     ezetimibe (ZETIA) 10 MG tablet Take 1 tablet (10 mg) by mouth daily 90 tablet 3     fluticasone (FLONASE) 50 MCG/ACT spray Spray 2 sprays into both nostrils daily 1 Bottle 11     gabapentin (NEURONTIN) 300 MG capsule TAKE TWO CAPSULES BY MOUTH 3 TIMES DAILY 540 capsule 2     gemfibrozil (LOPID) 600 MG tablet Take 1 tablet (600 mg) by mouth 2 times daily 180 tablet 3     hydroxychloroquine (PLAQUENIL) 200 MG tablet Take 1 tablet (200 mg) by mouth daily 30 tablet 5     meclizine (ANTIVERT) 25 MG tablet TAKE 1 TABLET BY MOUTH EVERY 6 HOURS AS NEEDED FOR DIZZINESS 30 tablet 10     melatonin 3 MG tablet Take 1 tablet (3 mg) by mouth nightly as needed for sleep       mirtazapine (REMERON) 15 MG tablet TAKE 1 TABLET BY MOUTH AT BEDTIME 30 tablet 5     naloxone (NARCAN) nasal spray Spray 1 spray (4 mg) into one nostril alternating nostrils as needed for opioid reversal every 2-3 minutes until assistance arrives 0.2 mL 0     nitroGLYcerin (NITROSTAT) 0.4 MG sublingual tablet PLACE 1 TABLET UNDER THE TONGUE EVERY 5 MINUTES AS NEEDED 25 tablet 1     order for DME  Equipment being ordered: single end cane 1 Units 0     ORDER FOR DME 1.  CPAP pressure 11 cm/H20 with heated humidity.   2.  Provide mask to fit and CPAP supplies.   3.  Length of need lifetime.   4.  If needed please provide a chin strap            pantoprazole (PROTONIX) 40 MG EC tablet Take 1 tablet (40 mg) by mouth daily 90 tablet 3     predniSONE (DELTASONE) 1 MG tablet Take 2 mg by mouth daily. 180 tablet 0     sulfaSALAzine ER (AZULFIDINE EN) 500 MG EC tablet Take 2 tablets (1,000 mg) by mouth 2 times daily 360 tablet 1     tamsulosin (FLOMAX) 0.4 MG capsule TAKE ONE CAPSULE BY MOUTH EVERY DAY 90 capsule 3     tenofovir (VIREAD) 300 MG tablet TAKE ONE TABLET BY MOUTH EVERY DAY 90 tablet 3     triamcinolone (KENALOG) 0.1 % ointment APPLY TOPICALLY TO AFFECTED AREA(S) 3 TIMES DAILY 80 g 3     zolpidem (AMBIEN) 5 MG tablet TAKE 1 TALET BY MOUTH AT BEDTIME AS NEEDED FOR SLEEP. 30 tablet 5     erythromycin (ROMYCIN) ophthalmic ointment Apply small amount to incision sites three times daily and to inner lower lid of operative eye(s) at bedtime for 7 days. (Patient not taking: Reported on 12/10/2018) 3.5 g 0     neomycin-polymyxin-dexamethasone (MAXITROL) 3.5-51634-3.1 SUSP ophthalmic susp Place 1-2 drops into both eyes 3 times daily (Patient not taking: Reported on 12/10/2018) 1 Bottle 0     oxyCODONE IR (ROXICODONE) 5 MG tablet Take 1-2 tablets (5-10 mg) by mouth every 4 hours as needed for moderate to severe pain . Max of 6 tabs per day.  30 day supply. May fill on/after 6/25/18 to start on 6/25/18 (Patient not taking: Reported on 12/10/2018) 180 tablet 0       Medical History: any changes in medical history since they were last seen? none    Review of Systems:  The 14 system ROS was reviewed from the intake questionnaire:  Joint pain  Any bowel or bladder problems: None  Mood: depression- stable.  Looking for new psychologist    Physical Exam:  Blood pressure 123/85, pulse 73.  General: mildly distressed, awake  and alert  Gait: Antalgic, slow, use of single point cane    MSK exam: Tenderness of MCPs and PIPs bilaterally.  No significant swelling at the joint, or redness.  Pain with range of motion of hands.  No clubbing.  Pain with sit to stand.    Assessment:   1. Chronic low back pain, with strong facet and myofascial features  2. Rheumatoid arthritis  3. Peripheral neuropathy  4. Depression, anxiety  5. Hep B - on meds, in search of new hepatologist  6. Gout  7. JEROD, currently treated with CPAP, central apnea ok    Plan:   1. Physical Therapy:  Completed pool therapy in past.  2. Clinical Health Psychologist to address issues of relaxation, behavioral change, coping style, and other factors important to improvement.  He is looking for a new therapist due to insurance changes.  3. Diagnostic Studies: UDS and opioid agreement today  4. Medication Management:    1. May continue Butrans 20 mcg/hr wk patches.  2. Risk is improved, and JEROD is better based on CPAP readings from sleep specialist, but he is still takes clonazepam 1 mg BID and also takes ambien.  He said he is willing to try a lower dose of clonazepam, so will send this to PCP to assist with this, as they are prescribing benzo.  This will help to further lower his risk. He has narcan at home. Reviewed proper usage of this with him today. Advised he needs to remind family of this.  3. Advised that if his sleep medication (Ambien) isn't very helpful, that I would not combine with other meds.    5. Further procedures recommended: None at this time.  We discussed back facet injections but he isn't wanting at this time  6. Recommendations to PCP:  See above re: clonazepam  7. Follow up: 3 months      Basilio Nogueira DO  Pain Medicine Fellow    I saw and examined the patient with the Pain Fellow/Resident. I have reviewed and agree with the resident's note and plan of care and made changes and corrections directly to the body of the note.    TIME SPENT:  BY FELLOW/RESIDENT  ALONE 15 MIN  BY MYSELF AND FELLOW/RESIDENT TOGETHER 0 MIN  BY MYSELF WITHOUT THE FELLOW/RESIDENT 15 MIN    These times included 10 minutes I spent counseling him about his diagnosis and treatment options and coordination of care with the primary team    Lian Loo MD  [facility]

## 2019-03-11 NOTE — PROGRESS NOTES
Patient has a lab appointment on 3/20/2019. Per the lab schedule scrubbing protocol they are not due for anything. Please review chart and send orders or let patient know appointment is not needed.    Thank you,  Cheri Frye

## 2019-03-11 NOTE — Clinical Note
Please review- hoping you will make changes to clonazepam dosing with next refill.Lian Loo MD[facility]

## 2019-03-11 NOTE — LETTER
[facility]  03/11/19    Patient: Lamont Daniels  YOB: 1965  Medical Record Number: [id number]                                                                  Opioid / Opioid Plus Controlled Substance Agreement    I understand that my care provider has prescribed an opioid (narcotic) controlled substance to help manage my condition(s). I am taking this medicine to help me function or work. I know this is strong medicine, and that it can cause serious side effects. Opioid medicine can be sedating, addicting and may cause a dependency on the drug. They can affect my ability to drive or think, and cause depression. They need to be taken exactly as prescribed. Combining opioids with certain medicines or chemicals (such as cocaine, sedatives and tranquilizers, sleeping pills, meth) can be dangerous or even fatal. Also, if I stop opioids suddenly, I may have severe withdrawal symptoms. Last, I understand that opioids do not work for all types of pain nor for all patients. If not helpful, I may be asked to stop them.        The risks, benefits, and side effects of these medicine(s) were explained to me. I agree that:    1. I will take part in other treatments as advised by my care team. This may be psychiatry or counseling, physical therapy, behavioral therapy, group treatment or a referral to a pain clinic. I will reduce or stop my medicine when my care team tells me to do so.  2. I will take my medicines as prescribed. I will not change the dose or schedule unless my care team tells me to. There will be no refills if I  run out early.   I may be contactedwithout warning and asked to complete a urine drug test or pill count at any time.   3. I will keep all my appointments, and understand this is part of the monitoring of opioids. My care team may require an office visit for EVERY opioid/controlled substance refill. If I miss appointments or don t follow instructions, my care team  may stop my medicine.  4. I will not ask other providers to prescribe controlled substances, and I will not accept controlled substances from other people. If I need another prescribed controlled substance for a new reason, I will tell my care team within 1 business day.  5. I will use one pharmacy to fill all of my controlled substance prescriptions, and it is up to me to make sure that I do not run out of my medicines on weekends or holidays. If my care team is willing to refill my opioid prescription without a visit, I must request refills only during office hours, refills may take up to 3 days to process, and it may take up to 5 to 7 days for my medicine to be mailed and ready at my pharmacy. Prescriptions will not be mailed anywhere except my pharmacy.        [id number]  Rev 12/18         Registration to scan to EHR                             Page 1 of 2               Controlled Substance Agreement Opioid        [facility]  03/11/19  Patient: Lamont Daniels  YOB: 1965  Medical Record Number: [id number]                                                                  6. I am responsible for my prescriptions. If the medicine/prescription is lost or stolen, it will not be replaced. I also agree not to share controlled substance medicines with anyone.  7. I agree to not use ANY illegal or recreational drugs. This includes marijuana, cocaine, bath salts or other drugs. I agree not to use alcohol unless my care team says I may.          I agree to give urine samples whenever asked. If I don t give a urine sample, the care team may stop my medicine.    8. If I enroll in the [place] Medical Marijuana program, I will tell my care team. I will also sign an agreement to share my medical records with my care team.   9. I will bring in my list of medicines (or my medicine bottles) each time I come to the clinic.   10. I will tell my care team right away if I become pregnant or have a new  medical problem treated outside of my regular clinic.  11. I understand that this medicine can affect my thinking and judgment. It may be unsafe for me to drive, use machinery and do dangerous tasks. I will not do any of these things until I know how the medicine affects me. If my dose changes, I will wait to see how it affects me. I will contact my care team if I have concerns about medicine side effects.    I understand that if I do not follow any of the conditions above, my prescriptions or treatment may be stopped.      I agree that my provider, clinic care team, and pharmacy may work with any city, state or federal law enforcement agency that investigates the misuse, sale, or other diversion of my controlled medicine. I will allow my provider to discuss my care with or share a copy of this agreement with any other treating provider, pharmacy or emergency room where I receive care. I agree to give up (waive) any right of privacy or confidentiality with respect to these consents.     I have read this agreement and have asked questions about anything I did not understand.      ________________________________________________________________________  Patient signature - Date/Time -  Lamont Daniels                                      ________________________________________________________________________  Witness signature                                                            ________________________________________________________________________  Provider signature - Dorothea Loo MD      [id number]  Rev 12/18         Registration to scan to EHR                         Page 2 of 2                   Controlled Substance Agreement Opioid           Page 1 of 2  Opioid Pain Medicines (also known as Narcotics)  What You Need to Know    What are opioids?   Opioids are pain medicines that must be prescribed by a doctor.  They are also known as narcotics.    Examples are:     morphine (MS Contin,  Codi)    oxycodone (Oxycontin)    oxycodone and acetaminophen (Percocet)    hydrocodone and acetaminophen (Vicodin, Norco)     fentanyl patch (Duragesic)     hydromorphone (Dilaudid)     methadone     What do opioids do well?   Opioids are best for short-term pain after a surgery or injury. They also work well for cancer pain. Unlike other pain medicines, they do not cause liver or kidney failure or ulcers. They may help some people with long-lasting (chronic) pain.     What do opioids NOT do well?   Opioids never get rid of pain entirely, and they do not work well for most patients with chronic pain. Opioids do not reduce swelling, one of the causes of pain. They also don t work well for nerve pain.                           For informational purposes only.  Not to replace the advice of your care provider.  Copyright [facility]. All right reserved. [employer] 076061-Ofx 02/18.      Page 2 of 2    Risks and side effects   Talk to your doctor before you start or decide to keep taking one of these medicines. Side effects include:    Lowering your breathing rate enough to cause death    Overdose, including death, especially if taking higher than prescribed doses    Long-term opioid use    Worse depression symptoms; less pleasure in things you usually enjoy    Feeling tired or sluggish    Slower thoughts or cloudy thinking    Being more sensitive to pain over time; pain is harder to control    Trouble sleeping or restless sleep    Changes in hormone levels (for example, less testosterone)    Changes in sex drive or ability to have sex    Constipation    Unsafe driving    Itching and sweating    Feeling dizzy    Nausea, vomiting and dry mouth    What else should I know about opioids?  When someone takes opioids for too long or too often, they become dependent. This means that if you stop or reduce the medicine too quickly, you will have withdrawal symptoms.    Dependence is not the same as addiction.  Addiction is when people keep using a substance that harms their body, their mind or their relations with others. If you have a history of drug or alcohol abuse, taking opioids can cause a relapse.    Over time, opioids don t work as well. Most people will need higher and higher doses. The higher the dose, the more serious the side effects. We don t know the long-term effects of opioids.      Prescribed opioids aren't the best way to manage chronic pain    Other ways to manage pain include:      Ibuprofen or acetaminophen.  You should always try this first.      Treat health problems that may be causing pain.      acupuncture or massage, deep breathing, meditation, visual imagery, aromatherapy.      Use heat or ice at the pain site      Physical therapy and exercise      Stop smoking      See a counselor or therapist                                                  People who have used opioids for a long time may have a lower quality of life, worse depression, higher levels of pain and more visits to doctors.    Never share your opioids with others. Be sure to store opioids in a secure place, locked if possible.Young children can easily swallow them and overdose.     You can overdose on opioids.  Signs of overdose include decrease or loss of consciousness, slowed breathing, trouble waking and blue lips.  If someone is worried about overdose, they should call 911.    If you are at risk for overdose, you may get naloxone (Narcan, a medicine that reverses the effects of opioids.  If you overdose, a friend or family member can give you Narcan while waiting for the ambulance.  They need to know the signs of overdose and how to give Narcan.    While you're taking opioids:    Don't use alcohol or street drugs. Taking them together can cause death.    Don't take any of these medicines unless your doctor says its okay.  Taking these with opioids can cause death.    Benzodiazepines (such as lorazepam         or  diazepam)    Muscle relaxers (such as cyclobenzaprine)    sleeping pills    other opioids    Safe disposal of opioids  Find your area drug take-back program, your pharmacy mail-back program, buy a special disposal bag (such as Deterra) from your pharmacy or flush them down the toilet.  Use the guidelines at:  www.fda.gov/drugs/resourcesforyou

## 2019-03-11 NOTE — PATIENT INSTRUCTIONS
1. Stop at the lab to do a drug screen.  2. We discussed the risk of ambient, as well as clonazepam.  Talk to your primary care doctor about lowering the dose.    ----------------------------------------------------------------  Clinic Number:  [phone]   Call this number with any questions about your care and for scheduling assistance. Calls are returned Monday through Friday between 8 AM and 4:30 PM. We usually get back to you within 2 business days depending on the issue/request.       Medication refills:    For non-narcotic medications, call your pharmacy directly to request a refill. The pharmacy will contact the [facility] for authorization. Please allow 3-4 days for these refills to be processed.     For narcotic refills, call the clinic number or send a WeShow message. Please contact us 7-10 days before your refill is due. The message MUST include the name of the specific medication(s) requested and how you would like to receive the prescription(s). The options are as follows:    Pain Clinic staff can mail the prescription to your pharmacy. Please tell us the name of the pharmacy.    You may pick the prescription up at the Pain Clinic (tell us the location) or during a clinic visit with your pain provider    Pain Clinic staff can deliver the prescription to the [facility] pharmacy in the clinic building. Please tell us the location.      We believe regular attendance is key to your success in our program.    Any time you are unable to keep your appointment we ask that you call us at least 24 hours in advance to let us know. This will allow us to offer the appointment time to another patient.

## 2019-03-12 NOTE — PROGRESS NOTES
Rheumatology team: Please call to notify Mr. Daniels that because he had labs done on 3/11/2019 he does not need another blood draw for me on 3/20/2019; and if that lab appointment is only for me then it may be cancelled.     Sebastián Jenkins MD  3/12/2019 5:38 PM

## 2019-03-13 NOTE — PROGRESS NOTES
Attempted to contact Pt, l/m in detail re: notifying Mr. Daniels that because he had labs done on 3/11/2019 he does not need another blood draw for Dr. Jenkins on 3/20/2019; and if that lab appointment is only for Dr. Jenkins then it may be cancelled.  Having Pt return call to clinic @ [phone] should he have additional questions or concerns.    Florencia Siegel CMA  3/13/2019  8:12 AM

## 2019-03-14 ENCOUNTER — OFFICE VISIT (OUTPATIENT)
Dept: FAMILY MEDICINE | Facility: CLINIC | Age: 54
End: 2019-03-14
Payer: MEDICARE

## 2019-03-14 VITALS
RESPIRATION RATE: 16 BRPM | SYSTOLIC BLOOD PRESSURE: 122 MMHG | HEART RATE: 72 BPM | OXYGEN SATURATION: 94 % | BODY MASS INDEX: 32.57 KG/M2 | HEIGHT: 62 IN | WEIGHT: 177 LBS | DIASTOLIC BLOOD PRESSURE: 82 MMHG | TEMPERATURE: 97.8 F

## 2019-03-14 DIAGNOSIS — F45.8 ANXIETY HYPERVENTILATION: ICD-10-CM

## 2019-03-14 DIAGNOSIS — F41.9 ANXIETY HYPERVENTILATION: ICD-10-CM

## 2019-03-14 DIAGNOSIS — I10 ESSENTIAL HYPERTENSION WITH GOAL BLOOD PRESSURE LESS THAN 140/90: Primary | ICD-10-CM

## 2019-03-14 DIAGNOSIS — E78.5 HYPERLIPIDEMIA LDL GOAL <70: ICD-10-CM

## 2019-03-14 LAB
CHOLEST SERPL-MCNC: 152 MG/DL
HDLC SERPL-MCNC: 51 MG/DL
LDLC SERPL CALC-MCNC: 76 MG/DL
NONHDLC SERPL-MCNC: 101 MG/DL
PAIN DRUG SCR UR W RPTD MEDS: NORMAL
TRIGL SERPL-MCNC: 126 MG/DL

## 2019-03-14 PROCEDURE — 36415 COLL VENOUS BLD VENIPUNCTURE: CPT | Performed by: FAMILY MEDICINE

## 2019-03-14 PROCEDURE — 99214 OFFICE O/P EST MOD 30 MIN: CPT | Performed by: FAMILY MEDICINE

## 2019-03-14 PROCEDURE — 80061 LIPID PANEL: CPT | Performed by: FAMILY MEDICINE

## 2019-03-14 RX ORDER — CLONAZEPAM 1 MG/1
.5-1 TABLET ORAL 2 TIMES DAILY PRN
Qty: 90 TABLET | Refills: 0 | Status: SHIPPED | OUTPATIENT
Start: 2019-03-14 | End: 2019-05-25

## 2019-03-14 ASSESSMENT — ANXIETY QUESTIONNAIRES
6. BECOMING EASILY ANNOYED OR IRRITABLE: MORE THAN HALF THE DAYS
GAD7 TOTAL SCORE: 10
IF YOU CHECKED OFF ANY PROBLEMS ON THIS QUESTIONNAIRE, HOW DIFFICULT HAVE THESE PROBLEMS MADE IT FOR YOU TO DO YOUR WORK, TAKE CARE OF THINGS AT HOME, OR GET ALONG WITH OTHER PEOPLE: VERY DIFFICULT
2. NOT BEING ABLE TO STOP OR CONTROL WORRYING: SEVERAL DAYS
7. FEELING AFRAID AS IF SOMETHING AWFUL MIGHT HAPPEN: SEVERAL DAYS
3. WORRYING TOO MUCH ABOUT DIFFERENT THINGS: MORE THAN HALF THE DAYS
1. FEELING NERVOUS, ANXIOUS, OR ON EDGE: MORE THAN HALF THE DAYS
5. BEING SO RESTLESS THAT IT IS HARD TO SIT STILL: SEVERAL DAYS

## 2019-03-14 ASSESSMENT — PAIN SCALES - GENERAL: PAINLEVEL: MODERATE PAIN (4)

## 2019-03-14 ASSESSMENT — PATIENT HEALTH QUESTIONNAIRE - PHQ9
SUM OF ALL RESPONSES TO PHQ QUESTIONS 1-9: 12
5. POOR APPETITE OR OVEREATING: SEVERAL DAYS

## 2019-03-14 ASSESSMENT — MIFFLIN-ST. JEOR: SCORE: 1527.12

## 2019-03-14 NOTE — PROGRESS NOTES
SUBJECTIVE:   Lamont Daniels is a 53 year old male who presents to clinic today for the following health issues:      Hyperlipidemia Follow-Up      Rate your low fat/cholesterol diet?: good    Taking statin?  Yes, muscle aches, possibly from other cause    Other lipid medications/supplements?:  none    Hypertension Follow-up      Outpatient blood pressures are not being checked.    Low Salt Diet: low salt      Amount of exercise or physical activity: None    Problems taking medications regularly: No    Medication side effects: none    Diet: low salt and low fat/cholesterol      Problem list and histories reviewed & adjusted, as indicated.  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     Hyperlipidemia LDL goal <70     Insomnia     Gout     Suicidal ideation     Essential hypertension     Rheumatoid arthritis involving multiple sites, unspecified rheumatoid factor presence (H)     High risk medications (not anticoagulants) long-term use     Midline low back pain without sciatica     Bilateral low back pain with sciatica, sciatica laterality unspecified     Elevated liver enzymes     On corticosteroid therapy     Essential hypertension with goal blood pressure less than 140/90     Insomnia, unspecified type     Rheumatoid arthritis of multiple sites with negative rheumatoid factor (H)     Benign prostatic hyperplasia with lower urinary tract symptoms, unspecified morphology     Past Surgical History:   Procedure Laterality Date     ABDOMEN SURGERY  2014     BIOPSY  2015     BYPASS GRAFT ARTERY CORONARY  2008    6 vessels     COLONOSCOPY  2/8/2013     Procedure: COLONOSCOPY;  Colonoscopy, blood in stool;  Surgeon: Duane, William Charles, MD;  Location: [facility] OR     COMBINED REPAIR PTOSIS WITH BLEPHAROPLASTY BILATERAL Bilateral 6/25/2018    Procedure: COMBINED REPAIR PTOSIS WITH BLEPHAROPLASTY BILATERAL;  Bilateral upper eyelid blepharoplasty, ptosis repair and brow ptosis repair;  Surgeon: Sandra Borja MD;  Location: [facility] OR     GI SURGERY  2014     HC CORONARY STENT PERCUT, EA ADDTL VESSEL  2008    3 months after CABG     HEAD & NECK SURGERY  2011     NASAL/SINUS POLYPECTOMY  2010     ORTHOPEDIC SURGERY  2012     REPAIR PTOSIS       REPAIR PTOSIS BROW BILATERAL Bilateral 6/25/2018    Procedure: REPAIR PTOSIS BROW BILATERAL;;  Surgeon: Sandra Borja MD;  Location: [facility] OR     THORACIC SURGERY  1989    tb     TONSILLECTOMY  2010     UVULOPALATOPHARYNGOPLASTY  2010     VASCULAR SURGERY  2008       Social History     Tobacco Use     Smoking status: Never Smoker     Smokeless tobacco: Never Used   Substance Use Topics     Alcohol use: No     Alcohol/week: 0.0 oz     Family History   Problem Relation Age of Onset     C.A.D. Father      Coronary Artery Disease Father      Hypertension Father      Depression Father      Hypertension Mother      Diabetes Mother      Depression Mother      Mental Illness Mother      Hypertension Maternal Grandfather      Cancer Paternal Grandfather      Other Cancer Paternal Grandfather      Other Cancer Other      Autoimmune Disease No family hx of      Cerebrovascular Disease No family hx of      Thyroid Disease No family hx of      Glaucoma No family hx of      Macular Degeneration No family hx of            Reviewed and updated as needed this visit by clinical staff  Tobacco  Allergies  Meds  Med Hx  Surg Hx  Fam Hx  Soc Hx      Reviewed and updated as needed this visit by Provider         ROS:  Constitutional, HEENT, cardiovascular, pulmonary, GI, , musculoskeletal, neuro, skin, endocrine and psych systems are negative,  "except as otherwise noted.    OBJECTIVE:     /82 (BP Location: Left arm, Patient Position: Chair, Cuff Size: Adult Large)   Pulse 72   Temp 97.8  F (36.6  C) (Oral)   Resp 16   Ht 1.575 m (5' 2\")   Wt 80.3 kg (177 lb)   SpO2 94%   BMI 32.37 kg/m    Body mass index is 32.37 kg/m .  GENERAL: healthy, alert and no distress  NECK: no adenopathy, no asymmetry, masses, or scars and thyroid normal to palpation  RESP: lungs clear to auscultation - no rales, rhonchi or wheezes  CV: regular rate and rhythm, normal S1 S2, no S3 or S4, no murmur, click or rub, no peripheral edema and peripheral pulses strong  ABDOMEN: soft, nontender, no hepatosplenomegaly, no masses and bowel sounds normal  MS: no gross musculoskeletal defects noted, no edema    Diagnostic Test Results:  none     ASSESSMENT/PLAN:     1. Essential hypertension with goal blood pressure less than 140/90  Controlled. Reviewed low salt diet. Continue with current medications. RTC in 6 months.    2. Hyperlipidemia LDL goal <70  Controlled. Recheck today.  - Lipid Profile (Chol, Trig, HDL, LDL calc)    3. Anxiety hyperventilation  Discussed potential interaction with opiates. Patient will use only for severe panic attacks.  - clonazePAM (KLONOPIN) 1 MG tablet; Take 0.5-1 tablets (0.5-1 mg) by mouth 2 times daily as needed for anxiety  Dispense: 90 tablet; Refill: 0    See Patient Instructions    David Chavez MD, MD  [facility]  "

## 2019-03-14 NOTE — PATIENT INSTRUCTIONS
At [facility], we strive to deliver an exceptional experience to you, every time we see you.  If you receive a survey in the mail, please send us back your thoughts. We really do value your feedback.    Based on your medical history, these are the current health maintenance/preventive care services that you are due for (some may have been done at this visit.)  Health Maintenance Due   Topic Date Due     ZOSTER IMMUNIZATION (1 of 2) 11/11/2015     PHQ-9 Q6 MONTHS  12/01/2018         Suggested websites for health information:  Www.[facility].org : Up to date and easily searchable information on multiple topics.  Www.MyCoop.gov : medication info, interactive tutorials, watch real surgeries online  Www.familydoctor.org : good info from the Academy of Family Physicians  Www.cdc.gov : public health info, travel advisories, epidemics (H1N1)  Www.aap.org : children's health info, normal development, vaccinations  Www.health.[facility].[place].us : [place] dept of health, public health issues in [place], N1N1    Your care team:                            Family Medicine Internal Medicine   MD Noam Dorado MD Shantel Branch-Fleming, MD Katya Georgiev PA-C Nam Ho, MD Pediatrics   NATE Andre, PAVEL LEMUS CNP   MD Tiffany Sanchez MD Deborah Mielke, MD Kim Thein, APRN CNP      Clinic hours: Monday - Thursday 7 am-7 pm; Fridays 7 am-5 pm.   Urgent care: Monday - Friday 11 am-9 pm; Saturday and Sunday 9 am-5 pm.  Pharmacy : Monday -Thursday 8 am-8 pm; Friday 8 am-6 pm; Saturday and Sunday 9 am-5 pm.     Clinic: [phone]   Pharmacy: [phone]

## 2019-03-15 DIAGNOSIS — M10.9 GOUT WITHOUT TOPHUS: ICD-10-CM

## 2019-03-15 ASSESSMENT — ANXIETY QUESTIONNAIRES: GAD7 TOTAL SCORE: 10

## 2019-03-16 NOTE — TELEPHONE ENCOUNTER
"Requested Prescriptions   Pending Prescriptions Disp Refills     COLCRYS 0.6 MG tablet [Pharmacy Med Name: COLCRYS 0.6 MG TABLET] 30 tablet 0    Last Written Prescription Date:  2/7/19  Last Fill Quantity: 30,  # refills: 0   Last Office Visit with ANA PAULA, [facility] or [facility] prescribing provider:  3/14/19   Future Office Visit:    Next 5 appointments (look out 90 days)    Mar 26, 2019  9:20 AM CDT  [facility] [address] [facility] [address]  [phone]   May 14, 2019 10:00 AM CDT  Return Visit with Ricardo Rae MD, [facility] NURSE ONLY  [facility]) [address] [address] [place] [address]  [phone]   Venancio 10, 2019  9:00 AM CDT  Return Visit with Dorothea Loo MD  [facility] ([facility]) [address] [place] [address]  [phone]          Sig: TAKE 2 TABLETS BY MOUTH AT THE FIRST SIGN OF FLARE, TAKE 1 ADDITIONAL TABLET ONE HOUR LATER.    Gout Agents Protocol Failed - 3/15/2019  4:47 PM       Failed - Has Uric Acid on file in past 12 months and value is less than 6    Recent Labs   Lab Test 11/04/15  1547   URIC 6.1     If level is 6mg/dL or greater, ok to refill one time and refer to provider.          Passed - CBC on file in past 12 months    Recent Labs   Lab Test 03/11/19  0841   WBC 7.2   RBC 5.50   HGB 16.1   HCT 48.0                   Passed - ALT on file in past 12 months    Recent Labs   Lab Test 03/11/19  0841   ALT 48            Passed - Recent (12 mo) or future (30 days) visit within the authorizing provider's specialty    Patient had office visit in the last 12 months or has a visit in the next 30 days with authorizing provider or within the authorizing provider's specialty.  See \"Patient Info\" tab in inbasket, or \"Choose Columns\" in Meds & Orders section of the " refill encounter.             Passed - Medication is active on med list       Passed - Patient is age 18 or older       Passed - Normal serum creatinine on file in the past 12 months    Recent Labs   Lab Test 03/11/19  0841  06/24/13  1322   CR 0.98   < >  --    CRPOC  --   --  1.1    < > = values in this interval not displayed.

## 2019-03-18 NOTE — TELEPHONE ENCOUNTER
Routing refill request to provider for review/approval because:    Does not have the following:Uric Acid on file in past 12 months and value is less than 6    Anabel Andrew RN

## 2019-03-19 ENCOUNTER — OFFICE VISIT (OUTPATIENT)
Dept: SLEEP MEDICINE | Facility: CLINIC | Age: 54
End: 2019-03-19
Payer: MEDICARE

## 2019-03-19 VITALS
SYSTOLIC BLOOD PRESSURE: 122 MMHG | WEIGHT: 178 LBS | BODY MASS INDEX: 32.76 KG/M2 | OXYGEN SATURATION: 96 % | HEART RATE: 65 BPM | HEIGHT: 62 IN | DIASTOLIC BLOOD PRESSURE: 87 MMHG

## 2019-03-19 DIAGNOSIS — G47.33 OSA (OBSTRUCTIVE SLEEP APNEA): ICD-10-CM

## 2019-03-19 PROCEDURE — 99213 OFFICE O/P EST LOW 20 MIN: CPT | Performed by: INTERNAL MEDICINE

## 2019-03-19 RX ORDER — COLCHICINE 0.6 MG/1
TABLET, FILM COATED ORAL
Qty: 30 TABLET | Refills: 0 | Status: SHIPPED | OUTPATIENT
Start: 2019-03-19 | End: 2020-08-21

## 2019-03-19 ASSESSMENT — MIFFLIN-ST. JEOR: SCORE: 1531.65

## 2019-03-19 NOTE — NURSING NOTE
"Chief Complaint   Patient presents with     CPAP Follow Up       Initial /87   Pulse 65   Ht 1.575 m (5' 2\")   Wt 80.7 kg (178 lb)   SpO2 96%   BMI 32.56 kg/m   Estimated body mass index is 32.56 kg/m  as calculated from the following:    Height as of this encounter: 1.575 m (5' 2\").    Weight as of this encounter: 80.7 kg (178 lb).    Medication Reconciliation: complete      "

## 2019-03-19 NOTE — PATIENT INSTRUCTIONS
Your BMI is Body mass index is 32.56 kg/m .  Weight management is a personal decision.  If you are interested in exploring weight loss strategies, the following discussion covers the approaches that may be successful. Body mass index (BMI) is one way to tell whether you are at a healthy weight, overweight, or obese. It measures your weight in relation to your height.  A BMI of 18.5 to 24.9 is in the healthy range. A person with a BMI of 25 to 29.9 is considered overweight, and someone with a BMI of 30 or greater is considered obese. More than two-thirds of American adults are considered overweight or obese.  Being overweight or obese increases the risk for further weight gain. Excess weight may lead to heart disease and diabetes.  Creating and following plans for healthy eating and physical activity may help you improve your health.  Weight control is part of healthy lifestyle and includes exercise, emotional health, and healthy eating habits. Careful eating habits lifelong are the mainstay of weight control. Though there are significant health benefits from weight loss, long-term weight loss with diet alone may be very difficult to achieve- studies show long-term success with dietary management in less than 10% of people. Attaining a healthy weight may be especially difficult to achieve in those with severe obesity. In some cases, medications, devices and surgical management might be considered.  What can you do?  If you are overweight or obese and are interested in methods for weight loss, you should discuss this with your provider.     Consider reducing daily calorie intake by 500 calories.     Keep a food journal.     Avoiding skipping meals, consider cutting portions instead.    Diet combined with exercise helps maintain muscle while optimizing fat loss. Strength training is particularly important for building and maintaining muscle mass. Exercise helps reduce stress, increase energy, and improves fitness.  Increasing exercise without diet control, however, may not burn enough calories to loose weight.       Start walking three days a week 10-20 minutes at a time    Work towards walking thirty minutes five days a week     Eventually, increase the speed of your walking for 1-2 minutes at time    In addition, we recommend that you review healthy lifestyles and methods for weight loss available through the National Institutes of Health patient information sites:  http://win.niddk.nih.gov/publications/index.htm    And look into health and wellness programs that may be available through your health insurance provider, employer, local community center, or hermilo club.    Weight management plan: Patient was referred to their PCP to discuss a diet and exercise plan.

## 2019-03-19 NOTE — PROGRESS NOTES
Obstructive Sleep Apnea - PAP Follow-Up Visit:    Chief Complaint   Patient presents with     CPAP Follow Up       Lamont Daniels comes in today for follow-up of their severe sleep apnea, managed with CPAP.     No specialty comments available.    Overall, he rates the experience with PAP as 8 (0 poor, 10 great). The mask is comfortable.    The mask is leaking sometimes.  The mask is leaking 6 nights per week.  He is not snoring with the mask on. He is not having gasp arousals.  He is having significant oral/nasal dryness. The pressure is comfortable.   His PAP interface is Full Face Mask.    Bedtime is typically 0100. Usually it takes about 90 min minutes to fall asleep with the mask on. Wake time is typically 1000.  Patient is using PAP therapy 6.5 hours per night. The patient is usually getting 7 hours of sleep per night.  He does feel rested in the morning.    Total score - [place]: 9 (3/19/2019  9:00 AM)  RHEA Total Score: 12    ResMed   CPAP 11 cmH2O 30 day usage data:  100% of days with > 4 hours of use. 0/30 days with no use.   Average use 8 hours 33 minutes per day.   95%ile Leak 4.3 L/min.   AHI 4.1 events per hour.     Past medical/surgical history, family history, social history, medications and allergies were reviewed.      Problem List:  Patient Active Problem List    Diagnosis Date Noted     Benign prostatic hyperplasia with lower urinary tract symptoms, unspecified morphology 03/14/2017     Priority: Medium     Rheumatoid arthritis of multiple sites with negative rheumatoid factor (H) 01/17/2017     Priority: Medium     Insomnia, unspecified type 11/22/2016     Priority: Medium     Essential hypertension with goal blood pressure less than 140/90 05/19/2016     Priority: Medium     On corticosteroid therapy 04/22/2016     Priority: Medium     Elevated liver enzymes 11/30/2015     Priority: Medium     Bilateral low back pain with sciatica, sciatica laterality unspecified 11/17/2015     Priority: Medium      "Rheumatoid arthritis involving multiple sites, unspecified rheumatoid factor presence (H) 11/11/2015     Priority: Medium     High risk medications (not anticoagulants) long-term use 11/11/2015     Priority: Medium     Midline low back pain without sciatica 11/11/2015     Priority: Medium     Essential hypertension 10/22/2015     Priority: Medium     Suicidal ideation 12/08/2014     Priority: Medium     Gout 08/22/2014     Priority: Medium     Problem list name updated by automated process. 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  The total sleep time was 129.5 minutes.  During the diagnostic portion of the study the sleep latency was 30.2 minutes after taking Ambien 10 mg.  No REM sleep observed. Sleep Efficiency was 67.2%.  Wake after sleep onset was 27.5.   The patient spent 23.2% of total sleep time in Stage N1, 72.6% in Stage N2, 4.2% in Stages N3 and 0.0% in REM.         Respiration:     Sustained Sleep Associated Hypoventilation -was not monitored.    Sleep Associated Hypoxemia - was present.  Baseline oxygen saturation was 93.6%.  The lowest oxygen saturation was 75.4%.  Snoring was reported as loud.     Events - During the diagnostic portion of the study, the polysomnogram revealed a presence of 5 obstructive apneas resulting in an Apnea index of " 30.1 events per hour.  There were 21 hypopneas resulting in a Hypopnea index of 9.7 events per hour.  The combined Apnea/Hypopnea Index was 39.8 events per hour.  The REM AHI was n/a.  The RERA index was 7.9 per hour.   The RDI was 47.7  .  47.7   7.9 39.8     Treatment PSG  Sleep Architecture:  At 1:22 AM the patient was placed on CPAP treatment and was titrated at pressures ranging from 5 cm/H20 up to 11 cm/H20.  The total recording time of the treatment portion of the study was 334.5 minutes.  The total sleep time was 212.5 minutes.  During the treatment portion of the study the sleep latency was 3.5 minutes.  REM latency was 313.5 minutes.  Sleep Efficiency was 63.5%.  Sleep Maintenance Efficiency was 63.5%.  Total wake time was 122.5 minutes for a total wake percentage of 34.5%. the patient spent 17.2% of total sleep time in Stage N1, 53.2% in Stage N2, 24.9% in Stages N3 and N, and 4.7% in REM.       Respiration:  The optimal pressure was 11.0 with an AHI of 1.3.    Movement Activity:      Limb - During the diagnostic portion of the study, there were 3 limb movements recorded.  Of this total, 0 were classified as PLMs.  Of the PLMs, 0 were associated with arousals.  The Limb Movement index was 1.4 per hour while the PLM index was 0.0 per hour.    Behavior - None    Bruxism - None    Seizure - None      CARDIAC SUMMARY:   During the diagnostic portion of the study, the average pulse rate was 56.1 bpm.  The minimum pulse rate was 39.0 bpm while the maximum pulse rate was 77.0 bpm.    During the treatment portion of the study, the average pulse rate was 52.7 bpm.  The minimum pulse rate was 47.0 bpm while the maximum pulse rate was 73.0 bpm.   Assessment:    Severe JEROD (AHI 40) with adequate positive airway pressure titration including REM supine.    History of UPPP.  Recommendations:    CPAP pressure 11 cmH2O with clinical follow-up within 3 - 4 weeks including compliance measures.    Consider a chin strap        "Coronary atherosclerosis of native coronary artery      Priority: Medium     Major depressive disorder, recurrent episode, moderate (H)      Priority: Medium     Anxiety      Priority: Medium     /87   Pulse 65   Ht 1.575 m (5' 2\")   Wt 80.7 kg (178 lb)   SpO2 96%   BMI 32.56 kg/m      Impression/Plan:  1. JEROD (obstructive sleep apnea)  Severe Sleep apnea. Tolerating PAP well. Daytime symptoms are improved.     Lamont Daniels will follow up in about 1 year(s).     Fifteen minutes spent with patient, all of which were spent face-to-face counseling, consulting, coordinating plan of care.      CC:  David Chavez,     "

## 2019-03-19 NOTE — Clinical Note
Things look great on his CPAP at current [place] dose.  Overall residual sleep apnea at 4.1 events per hour (goal < 5/hr) with 50% central and 50% obstructive so everything looks fine.  Let me know if you ever make dose adjustments in the future as we can remotely check how things are going and send you an update.Xavier Yates

## 2019-03-20 DIAGNOSIS — K21.9 GASTROESOPHAGEAL REFLUX DISEASE WITHOUT ESOPHAGITIS: ICD-10-CM

## 2019-03-20 RX ORDER — PANTOPRAZOLE SODIUM 40 MG/1
40 TABLET, DELAYED RELEASE ORAL DAILY
Qty: 90 TABLET | Refills: 1 | Status: SHIPPED | OUTPATIENT
Start: 2019-03-20 | End: 2019-08-02

## 2019-03-20 NOTE — TELEPHONE ENCOUNTER
Prescription approved per [facility] Refill Protocol.    Joselyn Valladares RN, [facility]

## 2019-03-20 NOTE — TELEPHONE ENCOUNTER
"Requested Prescriptions   Pending Prescriptions Disp Refills     pantoprazole (PROTONIX) 40 MG EC tablet [Pharmacy Med Name: PANTOPRAZOLE SOD DR 40 MG TAB] 90 tablet 3    Last Written Prescription Date:  03/21/18  Last Fill Quantity: 90,  # refills: 3   Last office visit: 3/14/2019 with prescribing provider:  Scott  Future Office Visit:   Next 5 appointments (look out 90 days)    Mar 26, 2019  9:20 AM CDT  [facility] [address] [facility] [address]  [phone]   May 14, 2019 10:00 AM CDT  Return Visit with Ricardo Rae MD, [facility] NURSE ONLY  [email or web address] ([email or web address] [address] [place] [id number]  [phone]   Venancio 10, 2019  9:00 AM CDT  Return Visit with Dorothea Loo MD  [facility] ([facility]) [address] [place] [id number] [phone]          Sig: TAKE 1 TABLET (40 MG) BY MOUTH DAILY    PPI Protocol Passed - 3/20/2019  1:14 AM       Passed - Not on Clopidogrel (unless Pantoprazole ordered)       Passed - No diagnosis of osteoporosis on record       Passed - Recent (12 mo) or future (30 days) visit within the authorizing provider's specialty    Patient had office visit in the last 12 months or has a visit in the next 30 days with authorizing provider or within the authorizing provider's specialty.  See \"Patient Info\" tab in inbasket, or \"Choose Columns\" in Meds & Orders section of the refill encounter.             Passed - Medication is active on med list       Passed - Patient is age 18 or older          "

## 2019-03-26 ENCOUNTER — OFFICE VISIT (OUTPATIENT)
Dept: RHEUMATOLOGY | Facility: CLINIC | Age: 54
End: 2019-03-26
Payer: MEDICARE

## 2019-03-26 VITALS
HEART RATE: 67 BPM | DIASTOLIC BLOOD PRESSURE: 80 MMHG | HEIGHT: 62 IN | BODY MASS INDEX: 32.9 KG/M2 | OXYGEN SATURATION: 97 % | WEIGHT: 178.8 LBS | RESPIRATION RATE: 18 BRPM | SYSTOLIC BLOOD PRESSURE: 121 MMHG

## 2019-03-26 DIAGNOSIS — B18.1 CHRONIC HEPATITIS B (H): ICD-10-CM

## 2019-03-26 DIAGNOSIS — G89.4 CHRONIC PAIN SYNDROME: ICD-10-CM

## 2019-03-26 DIAGNOSIS — M06.09 RHEUMATOID ARTHRITIS OF MULTIPLE SITES WITH NEGATIVE RHEUMATOID FACTOR (H): Primary | ICD-10-CM

## 2019-03-26 DIAGNOSIS — Z79.899 HIGH RISK MEDICATIONS (NOT ANTICOAGULANTS) LONG-TERM USE: ICD-10-CM

## 2019-03-26 PROCEDURE — 99214 OFFICE O/P EST MOD 30 MIN: CPT | Performed by: INTERNAL MEDICINE

## 2019-03-26 RX ORDER — PREDNISONE 2.5 MG/1
2.5 TABLET ORAL DAILY
Qty: 90 TABLET | Refills: 1 | Status: SHIPPED | OUTPATIENT
Start: 2019-03-26 | End: 2019-05-03

## 2019-03-26 RX ORDER — HYDROXYCHLOROQUINE SULFATE 200 MG/1
200 TABLET, FILM COATED ORAL DAILY
Qty: 90 TABLET | Refills: 1 | Status: SHIPPED | OUTPATIENT
Start: 2019-03-26 | End: 2019-07-02

## 2019-03-26 RX ORDER — SULFASALAZINE 500 MG/1
1000 TABLET, DELAYED RELEASE ORAL 2 TIMES DAILY
Qty: 360 TABLET | Refills: 1 | Status: SHIPPED | OUTPATIENT
Start: 2019-03-26 | End: 2019-07-02

## 2019-03-26 ASSESSMENT — MIFFLIN-ST. JEOR: SCORE: 1535.28

## 2019-03-26 ASSESSMENT — PAIN SCALES - GENERAL: PAINLEVEL: SEVERE PAIN (7)

## 2019-03-26 NOTE — PROGRESS NOTES
Rheumatology Clinic Visit      Lamont Daniels MRN# [id number]   YOB: 1965 Age: 53 year old      Date of visit: 3/26/19   PCP: Dr. David Chavez  Hepatologist: Dr. Drea Laboy     Chief Complaint   Patient presents with:  RECHECK: 3 month f/u RA. Patient is unsure how he is doing. He doesn't think the medication is working.    Assessment and Plan   1.  Seropositive nonerosive rheumatoid arthritis (RF negative, CCP low positive): Note that he does have low back pain but without evidence of SI joint irregularity on x-ray.  Initially with morning stiffness all day, gelling phenomenon, and synovitis.   Previously on Humira (partially effective), Enbrel (partially effective). MTX avoided per Dr. Laboy recommendation. Currently on SSZ 1000mg BID, Orencia SQ (initially Rx'd 4/18/2017), and HCQ 200mg daily.  Treatment has been in coordination with hepatology as he requires HBV tx while on bDMARD.  Doing worse since stopping prednisone 2mg daily per patient report, but no synovitis on exam today; will restart low dose prednisone since he was having benefit from it.  Note that most of his symptoms at this time are chronic pain syndrome related; see #3.   - Continue sulfasalazine 1000mg BID  - Start prednisone 2.5 mg daily   - Continue Orencia SQ every 7 days  - Continue hydroxychloroquine 200 mg daily (11/12/2018 eye exam by Dr. Rae at [facility]; planning to repeat in 6mo)  - Labs in 3 months: CBC, Creatinine, Hepatic Panel, ESR, CRP    2. Lower back pain: Lumbar spine MRI in August 2014 showed multilevel degenerative changes and a small disc protrusion at L3/4 with mild spinal canal narrowing; also moderate left and mild right neural foraminal narrowing at L5-S1. He had a nerve conduction study in 2014 that was normal. He has been worked up by neurology in the past without significant neurologic findings. HLA-B27 negative, SI joint x-ray negative. Now following in the pain management clinic.     3. Myofascial pain  "syndrome / chronic pain syndrome: diffuse pain consistent with this.  Advised that he speak with his pain management provider.  May consider non-pharmaceutical treatments such as acupuncture, massage, and cognitive behavioral therapy.  Advised increasing low impact physical activity.    4. Hepatitis B: Managed by Dr. Laboy (hepatology) and is currently on tenofovir. Advised that he establish with new provider since Dr. Laboy has left.     5. Hepatic Steatosis: Based on previous liver biopsy.  Seeing hepatology.      6. Positive tuberculosis test:   This is noted here for historical significance.  He was evaluated by Dr. Anthony Mendoza, infectious disease. The following telephone note was documented by Dr. Mendoza: \"I tried calling th pt, finally we have the records from [place] . It appears he was treated for latent TB with INH for 1 year in 1980/1981 .Later in 1981 April he was admitted at [facility] [place], [place] with rt sided pneumonia, TB was suspected but he improved with treatment with Penicillin only. Later he was seen  ( likely ID specialist), and was treated with 6 weeks course of Rifampin and Ethambutol pending sputum AFB culture. His AFB cx were negative based on the report we have.\"  \"He recently had QFT -TB test which was reported positive and his CXR was clear. Given his documented hx of completion of treatment for latent TB as above , I do not see any need for further treatment. His tests may remain positive irrespective of treatment status. From my perspective he can be started on DMARDs/Biological as deemed necessary.\"       7. Gout: On allopurinol and managed by PCP.    8. Depression: loss of interest in activities; poor concentration; low energy.  Advised that he speak with Dr. Chavez (PCP) or his psychiatrist.     Mr. Daniels verbalized agreement with and understanding of the rational for the diagnosis and treatment plan.  All questions were answered to best of my ability and the " patient's satisfaction. Mr. Daniels was advised to contact the clinic with any questions that may arise after the clinic visit.      Thank you for involving me in the care of the patient    Return to clinic: 3-4 months      HPI   Lamont Daniels is a 53 year old male with a medical history significant for hypertension, hyperlipidemia, gout, coronary artery disease s/p 6vCABG, vitamin D deficiency, hepatitis B, and RA who presents for f/u of RA.    Last seen in March 2018.    Today, Mr. Daniels reports that he is not doing as well today as he was at his last clinic visit.  He feels like stopping prednisone caused some of his joint pain to return.  Morning stiffness for about 15-20 minutes.  No joint swelling.  Generally feels better and worse with activity.  Positive gelling phenomenon.   Following with pain clinic for his chronic back pain.  On gabapentin with some improvement.  Planning to see a new hepatologist soon.    Denies fevers, chills, nausea, vomiting, constipation, diarrhea. No abdominal pain. Denies chest pain/pressure, palpitations, or shortness of breath.  No oral or nasal sores. No rash. No LE swelling.  Mild dry mouth; he has good dentition and does not feel like he needs to use frequent sips of water; unchanged since last evaluation. No dry eyes. No eye pain or redness.     Tobacco:  None   EtOH:  None  Drugs:  None  Occupation: Retired   Originally from [facility], then lived [place] of [place], [place], then lived in [place], [place], then moved to [place] for family    ROS   GEN: No fevers, chills, night sweats; + fatigue   SKIN: No itching, rashes, sores  HEENT: No epistaxis. No oral or nasal ulcers.  CV: See HPI  PULM: See HPI  GI: No nausea, vomiting, constipation, diarrhea. No blood in stool. No abdominal pain.  : No blood in urine.  MSK: See HPI.  NEURO: See HPI  EXT: No LE swelling  PSYCH: Negative    Active Problem List     Patient Active Problem List   Diagnosis     Coronary atherosclerosis of  native coronary artery     Major depressive disorder, recurrent episode, moderate (H)     Anxiety     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)     Hepatitis B infection     Vitamin D deficiency     S/P CABG (coronary artery bypass graft)     Malrotation of intestine     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     Hyperlipidemia LDL goal <70     Insomnia     Gout     Suicidal ideation     Essential hypertension     Rheumatoid arthritis involving multiple sites, unspecified rheumatoid factor presence (H)     High risk medications (not anticoagulants) long-term use     Midline low back pain without sciatica     Bilateral low back pain with sciatica, sciatica laterality unspecified     Elevated liver enzymes     On corticosteroid therapy     Essential hypertension with goal blood pressure less than 140/90     Insomnia, unspecified type     Rheumatoid arthritis of multiple sites with negative rheumatoid factor (H)     Benign prostatic hyperplasia with lower urinary tract symptoms, unspecified morphology     Past Medical History     Past Medical History:   Diagnosis Date     Anxiety      CAD (coronary artery disease)     CABG, PCI     Congestive heart failure, unspecified 2008     Depressive disorder 2008     Gout      Hepatitis B > 10 years     HTN (hypertension)      Hyperlipidemia LDL goal < 100      Malrotation of intestine      Moderate major depression (H)      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) 9/19/2011    Uses CPAP     Rheumatoid arthritis flare (H) 1012     Steatohepatitis 12/15    liver biopsy     Tuberculosis age 13    INH x 9 months      Vitamin D deficiency      Past Surgical History     Past Surgical History:   Procedure Laterality Date     ABDOMEN SURGERY  2014     BIOPSY  2015     BYPASS GRAFT ARTERY CORONARY  2008    6 vessels     COLONOSCOPY  2/8/2013    Procedure: COLONOSCOPY;  Colonoscopy, blood in stool;  Surgeon: Duane, William Charles, MD;  Location: [facility] OR     COMBINED REPAIR PTOSIS WITH BLEPHAROPLASTY BILATERAL  Bilateral 6/25/2018    Procedure: COMBINED REPAIR PTOSIS WITH BLEPHAROPLASTY BILATERAL;  Bilateral upper eyelid blepharoplasty, ptosis repair and brow ptosis repair;  Surgeon: Sandra Borja MD;  Location: [facility] OR     GI SURGERY  2014     HC CORONARY STENT PERCUT, CIERA ADDTL VESSEL  2008    3 months after CABG     HEAD & NECK SURGERY  2011     NASAL/SINUS POLYPECTOMY  2010     ORTHOPEDIC SURGERY  2012     REPAIR PTOSIS       REPAIR PTOSIS BROW BILATERAL Bilateral 6/25/2018    Procedure: REPAIR PTOSIS BROW BILATERAL;;  Surgeon: Sandra Borja MD;  Location: [facility] OR     THORACIC SURGERY  1989    tb     TONSILLECTOMY  2010     UVULOPALATOPHARYNGOPLASTY  2010     VASCULAR SURGERY  2008     Allergy     Allergies   Allergen Reactions     Citalopram Itching     Tramadol Itching     Current Medication List     Current Outpatient Medications   Medication Sig     Abatacept (ORENCIA) 125 MG/ML SOAJ auto-injector Inject 1 mL (125 mg) Subcutaneous every 7 days     Acetaminophen (TYLENOL PO)      allopurinol (ZYLOPRIM) 300 MG tablet Take 1 tablet (300 mg) by mouth daily     aspirin EC 81 MG EC tablet Take 1 tablet (81 mg) by mouth daily (*)     atorvastatin (LIPITOR) 80 MG tablet TAKE 1 TABLET BY MOUTH 1 TIME DAILY     baclofen (LIORESAL) 10 MG tablet TAKE 1 TABLET BY MOUTH 2 TIMES DAILY AS NEEDED FOR MUSCLE SPASM     blood glucose (NO BRAND SPECIFIED) lancets standard Use to test blood sugar 2 times daily or as directed.     blood glucose monitoring (NO BRAND SPECIFIED) meter device kit Use to test blood sugar 2 times daily or as directed.     blood glucose monitoring (NO BRAND SPECIFIED) test strip Use to test blood sugars 2 times daily or as directed     buprenorphine (BUTRANS) 20 MCG/HR WK patch Place 1 patch onto the skin every 7 days Fill on/after 2/4/19 to start on/after 2/4/19     busPIRone HCl (BUSPAR) 30 MG tablet TAKE 1 TABLET BY MOUTH 2 TIMES DAILY     Cholecalciferol (VITAMIN D) 2000 UNITS tablet TAKE ONE TABLET BY  MOUTH ONCE DAILY     clonazePAM (KLONOPIN) 1 MG tablet Take 0.5-1 tablets (0.5-1 mg) by mouth 2 times daily as needed for anxiety     clopidogrel (PLAVIX) 75 MG tablet Take 1 tablet (75 mg) by mouth daily     COLCRYS 0.6 MG tablet TAKE 2 TABLETS BY MOUTH AT THE FIRST SIGN OF FLARE, TAKE 1 ADDITIONAL TABLET ONE HOUR LATER.     desvenlafaxine succinate (PRISTIQ) 25 MG 24 hr tablet TAKE 1 TABLET BY MOUTH EVERY DAY     diclofenac (VOLTAREN) 1 % topical gel Apply 2-4 grams to 2-3 joints, up to four times daily as needed using enclosed dosing card.  Max of 32g/day     erythromycin (ROMYCIN) ophthalmic ointment Apply small amount to incision sites three times daily and to inner lower lid of operative eye(s) at bedtime for 7 days.     evolocumab (REPATHA) 140 MG/ML prefilled autoinjector Inject 1 mL (140 mg) Subcutaneous every 14 days     ezetimibe (ZETIA) 10 MG tablet Take 1 tablet (10 mg) by mouth daily     fluticasone (FLONASE) 50 MCG/ACT spray Spray 2 sprays into both nostrils daily     gabapentin (NEURONTIN) 300 MG capsule Take 2 capsules (600 mg) by mouth 3 times daily . 3 month supply     gemfibrozil (LOPID) 600 MG tablet Take 1 tablet (600 mg) by mouth 2 times daily     hydroxychloroquine (PLAQUENIL) 200 MG tablet Take 1 tablet (200 mg) by mouth daily     meclizine (ANTIVERT) 25 MG tablet TAKE 1 TABLET BY MOUTH EVERY 6 HOURS AS NEEDED FOR DIZZINESS     melatonin 3 MG tablet Take 1 tablet (3 mg) by mouth nightly as needed for sleep     mirtazapine (REMERON) 15 MG tablet TAKE 1 TABLET BY MOUTH AT BEDTIME     naloxone (NARCAN) nasal spray Spray 1 spray (4 mg) into one nostril alternating nostrils as needed for opioid reversal every 2-3 minutes until assistance arrives     neomycin-polymyxin-dexamethasone (MAXITROL) 3.5-90455-2.1 SUSP ophthalmic susp Place 1-2 drops into both eyes 3 times daily     nitroGLYcerin (NITROSTAT) 0.4 MG sublingual tablet PLACE 1 TABLET UNDER THE TONGUE EVERY 5 MINUTES AS NEEDED     order for  DME Equipment being ordered: single end cane     ORDER FOR DME 1.  CPAP pressure 11 cm/H20 with heated humidity.   2.  Provide mask to fit and CPAP supplies.   3.  Length of need lifetime.   4.  If needed please provide a chin strap          pantoprazole (PROTONIX) 40 MG EC tablet TAKE 1 TABLET (40 MG) BY MOUTH DAILY     sulfaSALAzine ER (AZULFIDINE EN) 500 MG EC tablet Take 2 tablets (1,000 mg) by mouth 2 times daily     tamsulosin (FLOMAX) 0.4 MG capsule TAKE ONE CAPSULE BY MOUTH EVERY DAY     tenofovir (VIREAD) 300 MG tablet TAKE ONE TABLET BY MOUTH EVERY DAY     triamcinolone (KENALOG) 0.1 % ointment APPLY TOPICALLY TO AFFECTED AREA(S) 3 TIMES DAILY     zolpidem (AMBIEN) 5 MG tablet TAKE 1 TALET BY MOUTH AT BEDTIME AS NEEDED FOR SLEEP.     No current facility-administered medications for this visit.      Facility-Administered Medications Ordered in Other Visits   Medication     gadobutrol (GADAVIST) injection 10 mL       Social History   See HPI    Family History     Family History   Problem Relation Age of Onset     C.A.D. Father      Coronary Artery Disease Father      Hypertension Father      Depression Father      Hypertension Mother      Diabetes Mother      Depression Mother      Mental Illness Mother      Hypertension Maternal Grandfather      Cancer Paternal Grandfather      Other Cancer Paternal Grandfather      Other Cancer Other      Autoimmune Disease No family hx of      Cerebrovascular Disease No family hx of      Thyroid Disease No family hx of      Glaucoma No family hx of      Macular Degeneration No family hx of      No family history of autoimmune disease  No family history of psoriasis     No change in family history since the previous clinic visit.     Physical Exam     Temp Readings from Last 3 Encounters:   03/14/19 97.8  F (36.6  C) (Oral)   12/10/18 98.3  F (36.8  C) (Oral)   11/20/18 98.4  F (36.9  C) (Oral)     BP Readings from Last 5 Encounters:   03/26/19 121/80   03/19/19 122/87  "  03/14/19 122/82   03/11/19 123/85   12/10/18 125/84     Pulse Readings from Last 1 Encounters:   03/26/19 67     Resp Readings from Last 1 Encounters:   03/26/19 18     Estimated body mass index is 32.7 kg/m  as calculated from the following:    Height as of this encounter: 1.575 m (5' 2\").    Weight as of this encounter: 81.1 kg (178 lb 12.8 oz).    GEN: NAD  HEENT: MMM. No oral lesions. Anicteric, noninjected sclera  CV: S1, S2. RRR. No m/r/g.  PULM: CTA bilaterally. No w/c.  MSK: MCPs, PIPs, wrists, elbows, shoulders, knees, ankles, and MTPs without swelling or tenderness to palpation.  Hips nontender to palpation.  All fibromyalgia tender points positive.  NEURO: UE and LE strengths 5/5 and equal bilaterally.   SKIN: No rash.  EXT: No LE edema  PSYCH: Alert. Appropriate.    Labs   RF/CCP  Recent Labs   Lab Test 11/04/15  1547 08/12/14  1414   CCPABY 27*  --    RHF  --  <20     CBC  Recent Labs   Lab Test 03/11/19  0841 11/20/18  0844 06/22/18  1448 02/13/18  1451   WBC 7.2 10.0 7.3 6.5   RBC 5.50 5.15 4.83 4.89   HGB 16.1 14.8 14.4 14.3   HCT 48.0 44.3 43.4 43.6   MCV 87 86 90 89   RDW 14.6 14.0 13.8 14.2    285 284 217   MCH 29.3 28.7 29.8 29.2   MCHC 33.5 33.4 33.2 32.8   NEUTROPHIL 38.6  --  67.3 54.5   LYMPH 45.7  --  20.4 32.7   MONOCYTE 13.4  --  9.7 10.3   EOSINOPHIL 1.9  --  1.8 2.0   BASOPHIL 0.4  --  0.5 0.5   ANEU 2.8  --  4.9 3.5   ALYM 3.3  --  1.5 2.1   PRACHI 1.0  --  0.7 0.7   AEOS 0.1  --  0.1 0.1   ABAS 0.0  --  0.0 0.0     CMP  Recent Labs   Lab Test 03/11/19  0841 12/10/18  1146 11/20/18  0844 06/22/18  1448  12/04/17  0924  10/25/16  0851   NA  --   --  141  --   --  144  --  143   POTASSIUM  --   --  3.7  --   --  3.6  --  3.6   CHLORIDE  --   --  110*  --   --  111*  --  110*   CO2  --   --  25  --   --  25  --  25   ANIONGAP  --   --  6  --   --  8  --  8   GLC  --   --  82  --   --  81  --  78   BUN  --   --  16  --   --  25  --  17   CR 0.98  --  0.92 0.98   < > 0.87   < > 1.01 "   GFRESTIMATED 87  --  86 80   < > >90   < > 78   GFRESTBLACK >90  --  >90 >90   < > >90   < > >90  African American GFR Calc     HEMANT  --   --  9.4  --   --  8.8  --  8.8   BILITOTAL 0.6 0.4  --  0.5   < > 0.4   < > 0.3   ALBUMIN 4.2 4.1  --  3.9   < > 3.8   < > 3.8   PROTTOTAL 7.9 7.9  --  7.6   < > 7.4   < > 7.3   ALKPHOS 86 87  --  92   < > 82   < > 82   AST 28 27  --  27   < > 17   < > 30   ALT 48 43  --  35   < > 33   < > 86*    < > = values in this interval not displayed.     Calcium/VitaminD  Recent Labs   Lab Test 11/20/18  0844 12/04/17  0924 07/13/17  1246 10/25/16  0851  11/04/15  1547  04/01/13  1624   HEMANT 9.4 8.8  --  8.8   < >  --    < > 9.5   VITDT  --   --  34  --   --  43  --  34    < > = values in this interval not displayed.     ESR/CRP  Recent Labs   Lab Test 03/11/19  0841 06/22/18  1448 02/13/18  1451   SED 10 13 12   CRP <2.9 <2.9 <2.9     Lipid Panel  Recent Labs   Lab Test 03/14/19  1006 06/22/18  1448 12/04/17  0924  06/29/15  1405 05/22/14  0848 03/24/14  0936   CHOL 152 129 108   < > 219* 169 195   TRIG 126 113 85   < > 372* 379* 301*   HDL 51 64 73   < > 33* 33* 39*   LDL 76 42 18   < > 112 60 95   VLDL  --   --   --   --  74* 76* 60*   CHOLHDLRATIO  --   --   --   --  6.6* 5.1* 5.0   NHDL 101 65 35   < >  --   --   --     < > = values in this interval not displayed.     Hepatitis B  Recent Labs   Lab Test 12/04/17  0924 12/01/16  1429 10/05/16  0954  04/23/13  0812 01/30/13  0906 01/23/12  1150   AUSAB  --   --  0.45  --   --   --   --    HBCAB  --   --   --   --  Positive*  --   --    HBCM  --   --   --   --   --   --  Negative   HEPBANG  --   --  Reactive*  --  Positive* Positive* Positive*   HBQLOG Not Calculated <1.3 5.1*   < >  --   --   --     < > = values in this interval not displayed.     Hepatitis C  Recent Labs   Lab Test 04/07/16  1538 04/23/13 0812   HCVAB Nonreactive   Assay performance characteristics have not been established for newborns,   infants, and children    "Negative     Lyme ab screening  Recent Labs   Lab Test 07/18/17  1330   LYMEGM 0.19     Tuberculosis Screening  Recent Labs   Lab Test 04/07/16  1538   TBRSLT Positive   The magnitude of the measured IFN-gamma level cannot be correlated to stage or   degree of infection, level of immune responsiveness, or likelihood for   progression to active disease.  *   TBAGN >10.00  This is a qualitative test.  The TB antigen IU/mL value is required for   documentation on certain government reporting forms but this value should not   be used to monitor disease progression or response to therapy.   Diagnosing or excluding tuberculosis disease, and assessing the probability of   LTBI, require a combination of epidemiological, historical, medical and   diagnostic findings that should be taken into account when interpreting   QuantiFERON TB results.       HIV Screening  Recent Labs   Lab Test 11/04/15  1547   HIAGAB Nonreactive   HIV-1 p24 Ag & HIV-1/HIV-2 Ab Not Detected       \"HAND BILATERAL THREE OR MORE VIEWS 11/4/2015 4:55 PM   HISTORY: Pain in unspecified joint.  COMPARISON: None.  IMPRESSION  IMPRESSION: Normal.  DANIS ANGLIN MD\"    \"FOOT BILATERAL THREE OR MORE VIEWS 11/4/2015 4:55 PM   HISTORY: Pain in unspecified joint.  COMPARISON: 8/21/2012.  IMPRESSION  IMPRESSION: Small traction spurs are seen off the Achilles tendon and  plantar fascial attachment sites bilaterally. Joint spaces are  preserved. Osseous structures are intact. Incidental note is made of  some surgical staples in the soft tissues of the medial distal right  lower extremity.  DANIS ANGLIN MD\"      Immunization History     Immunization History   Administered Date(s) Administered     Influenza (IIV3) PF 10/11/2011, 09/20/2017, 09/01/2018     Influenza Vaccine IM 3yrs+ 4 Valent IIV4 09/27/2015, 09/08/2016     Influenza Vaccine, 3 YRS +, IM (QUADRIVALENT W/PRESERVATIVES) 11/17/2014     Pneumo Conj 13-V (2010&after) 04/07/2016     Pneumococcal 23 valent " 12/12/2014     TDAP Vaccine (Adacel) 08/30/2011          Chart documentation done in part with Dragon Voice recognition Software. Although reviewed after completion, some word and grammatical error may remain.    Sebastián Jenkins MD

## 2019-03-26 NOTE — NURSING NOTE
RAPID3 (0-30) Cumulative Score  19          RAPID3 Weighted Score (divide #4 by 3 and that is the weighted score)  6.3

## 2019-03-31 ENCOUNTER — MYC REFILL (OUTPATIENT)
Dept: FAMILY MEDICINE | Facility: CLINIC | Age: 54
End: 2019-03-31

## 2019-03-31 ENCOUNTER — MYC REFILL (OUTPATIENT)
Dept: CARDIOLOGY | Facility: CLINIC | Age: 54
End: 2019-03-31

## 2019-03-31 DIAGNOSIS — G47.00 INSOMNIA, UNSPECIFIED TYPE: ICD-10-CM

## 2019-03-31 DIAGNOSIS — R21 RASH: ICD-10-CM

## 2019-03-31 DIAGNOSIS — K21.9 GASTROESOPHAGEAL REFLUX DISEASE WITHOUT ESOPHAGITIS: ICD-10-CM

## 2019-03-31 DIAGNOSIS — R42 VERTIGO: ICD-10-CM

## 2019-03-31 DIAGNOSIS — I25.810 CORONARY ARTERY DISEASE INVOLVING AUTOLOGOUS ARTERY CORONARY BYPASS GRAFT WITHOUT ANGINA PECTORIS: ICD-10-CM

## 2019-03-31 DIAGNOSIS — B18.1 CHRONIC VIRAL HEPATITIS B WITHOUT DELTA AGENT AND WITHOUT COMA (H): Primary | ICD-10-CM

## 2019-03-31 NOTE — TELEPHONE ENCOUNTER
"Requested Prescriptions   Pending Prescriptions Disp Refills     meclizine (ANTIVERT) 25 MG tablet [Pharmacy Med Name: MECLIZINE 25 MG TABLET] 30 tablet 10        Last Written Prescription Date:  7/31/18  Last Fill Quantity: 30,  # refills: 10   Last Office Visit with [facility] or [facility] prescribing provider:  3/14/19   Future Office Visit:    Next 5 appointments (look out 90 days)    May 14, 2019 10:00 AM CDT  [facility] Visit with Ricardo [facility]ae MD, [facility] [address] [place] [id number]  [phone]   Venancio 10, 2019  9:00 AM CDT  Return Visit with Dorothea Loo MD  [facility] ([facility]) [address] [facility] [address]  [phone]   Jun 21, 2019  9:00 AM CDT  [facility] Visit with Fly Travis MD  [facility] [address] [place] [address]  [phone]          Sig: TAKE 1 TABLET BY MOUTH EVERY 6 HOURS AS NEEDED FOR DIZZINESS     Antivertigo/Antiemetic Agents Passed - 3/31/2019 12:22 AM       Passed - Recent (12 mo) or future (30 days) visit within the authorizing provider's specialty    Patient had office visit in the last 12 months or has a visit in the next 30 days with authorizing provider or within the authorizing provider's specialty.  See \"Patient Info\" tab in inbasket, or \"Choose Columns\" in Meds & Orders section of the refill encounter.             Passed - Medication is active on med list       Passed - Patient is 18 years of age or older        triamcinolone (KENALOG) 0.1 % external ointment [Pharmacy Med Name: TRIAMCINOLONE 0.1% OINTMENT] 80 g 3          Last Written Prescription Date:  9/13/18  Last Fill Quantity: 80 g,  # refills: 3   Last Office Visit with [facility] or  [facility] prescribing provider:  3/14/19   Future Office Visit:    Next 5 appointments (look out 90 days)  " "  May 14, 2019 10:00 AM CDT  Return Visit with Ricardo Rae MD, [facility] NURSE [facility]  [facility] [address] [place] [id number]  [phone]   Venancio 10, 2019  9:00 AM CDT  Return Visit with Dorothea Loo MD  [facility] ([facility]) [address] [facility]  [place] [id number] [phone]   Jun 21, 2019  9:00 AM CDT  Return Visit with Fly Travis MD  [facility] ([facility]) [address] [address] [place] [phone]  [phone]          Sig: APPLY TOPICALLY TO AFFECTED AREA(S) 3 TIMES DAILY    Topical Steroids and Nonsteroidals Protocol Passed - 3/31/2019 12:22 AM       Passed - Patient is age 6 or older       Passed - Authorizing prescriber's most recent note related to this medication read.    If refill request is for ophthalmic use, please forward request to provider for approval.         Passed - High potency steroid not ordered       Passed - Recent (12 mo) or future (30 days) visit within the authorizing provider's specialty    Patient had office visit in the last 12 months or has a visit in the next 30 days with authorizing provider or within the authorizing provider's specialty.  See \"Patient Info\" tab in inbasket, or \"Choose Columns\" in Meds & Orders section of the refill encounter.             Passed - Medication is active on med list              Costa Faarax  [facility]  "

## 2019-04-01 ENCOUNTER — OFFICE VISIT (OUTPATIENT)
Dept: GASTROENTEROLOGY | Facility: CLINIC | Age: 54
End: 2019-04-01
Attending: INTERNAL MEDICINE
Payer: MEDICARE

## 2019-04-01 VITALS
HEIGHT: 62 IN | BODY MASS INDEX: 32.94 KG/M2 | TEMPERATURE: 98.2 F | HEART RATE: 64 BPM | WEIGHT: 179 LBS | OXYGEN SATURATION: 96 % | DIASTOLIC BLOOD PRESSURE: 82 MMHG | SYSTOLIC BLOOD PRESSURE: 127 MMHG

## 2019-04-01 DIAGNOSIS — B18.1 CHRONIC VIRAL HEPATITIS B WITHOUT DELTA AGENT AND WITHOUT COMA (H): ICD-10-CM

## 2019-04-01 DIAGNOSIS — B18.1 CHRONIC VIRAL HEPATITIS B WITHOUT DELTA AGENT AND WITHOUT COMA (H): Primary | ICD-10-CM

## 2019-04-01 DIAGNOSIS — Z79.899 HIGH RISK MEDICATION USE: ICD-10-CM

## 2019-04-01 DIAGNOSIS — K74.00 HEPATIC FIBROSIS: ICD-10-CM

## 2019-04-01 DIAGNOSIS — Z12.9 SCREENING FOR CANCER: ICD-10-CM

## 2019-04-01 DIAGNOSIS — K75.81 NASH (NONALCOHOLIC STEATOHEPATITIS): ICD-10-CM

## 2019-04-01 LAB
ALBUMIN SERPL-MCNC: 4 G/DL (ref 3.4–5)
ALP SERPL-CCNC: 95 U/L (ref 40–150)
ALT SERPL W P-5'-P-CCNC: 41 U/L (ref 0–70)
ANION GAP SERPL CALCULATED.3IONS-SCNC: 8 MMOL/L (ref 3–14)
AST SERPL W P-5'-P-CCNC: 30 U/L (ref 0–45)
BILIRUB DIRECT SERPL-MCNC: 0.1 MG/DL (ref 0–0.2)
BILIRUB SERPL-MCNC: 0.4 MG/DL (ref 0.2–1.3)
BUN SERPL-MCNC: 16 MG/DL (ref 7–30)
CALCIUM SERPL-MCNC: 9.6 MG/DL (ref 8.5–10.1)
CHLORIDE SERPL-SCNC: 110 MMOL/L (ref 94–109)
CO2 SERPL-SCNC: 23 MMOL/L (ref 20–32)
CREAT SERPL-MCNC: 0.89 MG/DL (ref 0.66–1.25)
ERYTHROCYTE [DISTWIDTH] IN BLOOD BY AUTOMATED COUNT: 14 % (ref 10–15)
GFR SERPL CREATININE-BSD FRML MDRD: >90 ML/MIN/{1.73_M2}
GLUCOSE SERPL-MCNC: 99 MG/DL (ref 70–99)
HCT VFR BLD AUTO: 44.4 % (ref 40–53)
HGB BLD-MCNC: 15.3 G/DL (ref 13.3–17.7)
INR PPP: 1.11 (ref 0.86–1.14)
MCH RBC QN AUTO: 30.2 PG (ref 26.5–33)
MCHC RBC AUTO-ENTMCNC: 34.5 G/DL (ref 31.5–36.5)
MCV RBC AUTO: 88 FL (ref 78–100)
PLATELET # BLD AUTO: 329 10E9/L (ref 150–450)
POTASSIUM SERPL-SCNC: 3.6 MMOL/L (ref 3.4–5.3)
PROT SERPL-MCNC: 7.7 G/DL (ref 6.8–8.8)
RBC # BLD AUTO: 5.07 10E12/L (ref 4.4–5.9)
SODIUM SERPL-SCNC: 140 MMOL/L (ref 133–144)
WBC # BLD AUTO: 7.3 10E9/L (ref 4–11)

## 2019-04-01 PROCEDURE — 80076 HEPATIC FUNCTION PANEL: CPT | Performed by: INTERNAL MEDICINE

## 2019-04-01 PROCEDURE — 80048 BASIC METABOLIC PNL TOTAL CA: CPT | Performed by: INTERNAL MEDICINE

## 2019-04-01 PROCEDURE — 36415 COLL VENOUS BLD VENIPUNCTURE: CPT | Performed by: INTERNAL MEDICINE

## 2019-04-01 PROCEDURE — 85027 COMPLETE CBC AUTOMATED: CPT | Performed by: INTERNAL MEDICINE

## 2019-04-01 PROCEDURE — 86692 HEPATITIS DELTA AGENT ANTBDY: CPT | Performed by: INTERNAL MEDICINE

## 2019-04-01 PROCEDURE — 85610 PROTHROMBIN TIME: CPT | Performed by: INTERNAL MEDICINE

## 2019-04-01 PROCEDURE — G0463 HOSPITAL OUTPT CLINIC VISIT: HCPCS | Mod: ZF

## 2019-04-01 PROCEDURE — 87517 HEPATITIS B DNA QUANT: CPT | Performed by: INTERNAL MEDICINE

## 2019-04-01 RX ORDER — EZETIMIBE 10 MG/1
10 TABLET ORAL DAILY
Qty: 90 TABLET | Refills: 3 | Status: SHIPPED | OUTPATIENT
Start: 2019-04-01 | End: 2020-06-19

## 2019-04-01 RX ORDER — PANTOPRAZOLE SODIUM 40 MG/1
40 TABLET, DELAYED RELEASE ORAL DAILY
Qty: 90 TABLET | Refills: 1 | OUTPATIENT
Start: 2019-04-01

## 2019-04-01 RX ORDER — MECLIZINE HYDROCHLORIDE 25 MG/1
TABLET ORAL
Qty: 30 TABLET | Refills: 3 | Status: SHIPPED | OUTPATIENT
Start: 2019-04-01 | End: 2019-11-26

## 2019-04-01 RX ORDER — TRIAMCINOLONE ACETONIDE 1 MG/G
OINTMENT TOPICAL
Qty: 80 G | Refills: 3 | OUTPATIENT
Start: 2019-04-01

## 2019-04-01 RX ORDER — TRIAMCINOLONE ACETONIDE 1 MG/G
OINTMENT TOPICAL
Qty: 80 G | Refills: 1 | Status: SHIPPED | OUTPATIENT
Start: 2019-04-01 | End: 2019-05-25

## 2019-04-01 ASSESSMENT — MIFFLIN-ST. JEOR: SCORE: 1536.19

## 2019-04-01 ASSESSMENT — PAIN SCALES - GENERAL: PAINLEVEL: SEVERE PAIN (6)

## 2019-04-01 NOTE — PATIENT INSTRUCTIONS
- Please get an ultrasound in [place] at your earliest convenience    - Please get another ultrasound and labs done in [place] in 6 months    - Return to clinic in 1 year

## 2019-04-01 NOTE — LETTER
4/1/2019      RE: Lamont Daniels  [address] [address]       Date of Service: 4/1/2019     Subjective:            Lamont Daniels is a 53 year old male presenting for evaluation of liver disease    History of Present Illness   Lamont Daniels is a 53 year old male with past medical history of hypertension, dyslipidemia, coronary artery disease, mild obesity with resultant nonalcoholic steatohepatitis and chronic hepatitis B who presents for routine evaluation and follow-up.    Since last seen he reports doing well.  He has not had any presentations to the emergency department or admissions to the hospital.  He denies issues with abdominal pain.  He notes that he does have limitations in his ability to ambulate secondary to chronic low back pain, and he does use a cane for assistance.    He continues to take his tenofovir daily, without difficulty and denies any overt side effects.    In the past, hematology providers have had concerns that his abnormal liver tests are related mostly to nonalcoholic steatohepatitis rather than his chronic hepatitis B as he has had a essentially viral suppression on the tenofovir.    He continues to work closely with his primary care providers in regards to the management of his medical issues.    Past Medical History:  Past Medical History:   Diagnosis Date     Anxiety      CAD (coronary artery disease)     CABG, PCI     Congestive heart failure, unspecified 2008     Depressive disorder 2008     Gout      Hepatitis B > 10 years     HTN (hypertension)      Hyperlipidemia LDL goal < 100      Malrotation of intestine      Moderate major depression (H)      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) 9/19/2011    Uses CPAP     Rheumatoid arthritis flare (H) 1012     Steatohepatitis 12/15    liver biopsy     Tuberculosis age 13    INH x 9 months      Vitamin D deficiency        Surgical History:  Past Surgical History:   Procedure Laterality Date     ABDOMEN SURGERY  2014     BIOPSY  2015     BYPASS  GRAFT ARTERY CORONARY  2008    6 vessels     COLONOSCOPY  2/8/2013    Procedure: COLONOSCOPY;  Colonoscopy, blood in stool;  Surgeon: Duane, William Charles, MD;  Location: [facility] OR     COMBINED REPAIR PTOSIS WITH BLEPHAROPLASTY BILATERAL Bilateral 6/25/2018    Procedure: COMBINED REPAIR PTOSIS WITH BLEPHAROPLASTY BILATERAL;  Bilateral upper eyelid blepharoplasty, ptosis repair and brow ptosis repair;  Surgeon: Sandra Borja MD;  Location:  OR     GI SURGERY  2014     HC CORONARY STENT PERCUT, EA ADDTL VESSEL  2008    3 months after CABG     HEAD & NECK SURGERY  2011     NASAL/SINUS POLYPECTOMY  2010     ORTHOPEDIC SURGERY  2012     REPAIR PTOSIS       REPAIR PTOSIS BROW BILATERAL Bilateral 6/25/2018    Procedure: REPAIR PTOSIS BROW BILATERAL;;  Surgeon: Sandra Borja MD;  Location:  OR     THORACIC SURGERY  1989    tb     TONSILLECTOMY  2010     UVULOPALATOPHARYNGOPLASTY  2010     VASCULAR SURGERY  2008       Social History:  Social History     Socioeconomic History     Marital status:      Spouse name: Not on file     Number of children: 5     Years of education: 14     Highest education level: Not on file   Occupational History     Occupation: [employer]     Employer: UNEMPLOYED     Comment: 2-2011. On long term disability. SSD applied for   Social Needs     Financial resource strain: Not on file     Food insecurity:     Worry: Not on file     Inability: Not on file     Transportation needs:     Medical: Not on file     Non-medical: Not on file   Tobacco Use     Smoking status: Never Smoker     Smokeless tobacco: Never Used   Substance and Sexual Activity     Alcohol use: No     Alcohol/week: 0.0 oz     Drug use: No     Sexual activity: Yes     Partners: Female   Lifestyle     Physical activity:     Days per week: Not on file     Minutes per session: Not on file     Stress: Not on file   Relationships     Social connections:     Talks on phone: Not on file     Gets together: Not on file      Attends Qu[employer] service: Not on file     Active member of club or organization: Not on file     Attends meetings of clubs or organizations: Not on file     Relationship status: Not on file     Intimate partner violence:     Fear of current or ex partner: Not on file     Emotionally abused: Not on file     Physically abused: Not on file     Forced sexual activity: Not on file   Other Topics Concern     Parent/sibling w/ CABG, MI or angioplasty before 65F 55M? Yes     Comment: father   Social History Narrative    . No current SO.         Has 5 children. Youngest child does live with him.         H/o AA degree and previously worked as a [employer]. Currently unemployed.         Denies tobacco use.     Alcohol use: 2x/week with minimal amount of alcohol per time.     Drug use: denies       Family History:  Family History   Problem Relation Age of Onset     C.A.D. Father      Coronary Artery Disease Father      Hypertension Father      Depression Father      Hypertension Mother      Diabetes Mother      Depression Mother      Mental Illness Mother      Hypertension Maternal Grandfather      Cancer Paternal Grandfather      Other Cancer Paternal Grandfather      Other Cancer Other      Autoimmune Disease No family hx of      Cerebrovascular Disease No family hx of      Thyroid Disease No family hx of      Glaucoma No family hx of      Macular Degeneration No family hx of             Medications:  Current Outpatient Medications   Medication     Abatacept (ORENCIA) 125 MG/ML SOAJ auto-injector     Acetaminophen (TYLENOL PO)     allopurinol (ZYLOPRIM) 300 MG tablet     aspirin EC 81 MG EC tablet     atorvastatin (LIPITOR) 80 MG tablet     baclofen (LIORESAL) 10 MG tablet     blood glucose (NO BRAND SPECIFIED) lancets standard     blood glucose monitoring (NO BRAND SPECIFIED) meter device kit     blood glucose monitoring (NO BRAND SPECIFIED) test strip     buprenorphine (BUTRANS) 20 MCG/HR WK patch     busPIRone  "HCl (BUSPAR) 30 MG tablet     Cholecalciferol (VITAMIN D) 2000 UNITS tablet     clonazePAM (KLONOPIN) 1 MG tablet     clopidogrel (PLAVIX) 75 MG tablet     COLCRYS 0.6 MG tablet     desvenlafaxine succinate (PRISTIQ) 25 MG 24 hr tablet     diclofenac (VOLTAREN) 1 % topical gel     erythromycin (ROMYCIN) ophthalmic ointment     evolocumab (REPATHA) 140 MG/ML prefilled autoinjector     ezetimibe (ZETIA) 10 MG tablet     fluticasone (FLONASE) 50 MCG/ACT spray     gabapentin (NEURONTIN) 300 MG capsule     gemfibrozil (LOPID) 600 MG tablet     hydroxychloroquine (PLAQUENIL) 200 MG tablet     meclizine (ANTIVERT) 25 MG tablet     melatonin 3 MG tablet     mirtazapine (REMERON) 15 MG tablet     naloxone (NARCAN) nasal spray     neomycin-polymyxin-dexamethasone (MAXITROL) 3.5-58710-6.1 SUSP ophthalmic susp     nitroGLYcerin (NITROSTAT) 0.4 MG sublingual tablet     order for DME     ORDER FOR DME     pantoprazole (PROTONIX) 40 MG EC tablet     predniSONE (DELTASONE) 2.5 MG tablet     sulfaSALAzine ER (AZULFIDINE EN) 500 MG EC tablet     tamsulosin (FLOMAX) 0.4 MG capsule     tenofovir (VIREAD) 300 MG tablet     triamcinolone (KENALOG) 0.1 % ointment     zolpidem (AMBIEN) 5 MG tablet     No current facility-administered medications for this visit.      Facility-Administered Medications Ordered in Other Visits   Medication     gadobutrol (GADAVIST) injection 10 mL       Review of Systems  A complete 10 point review of systems was asked and answered in the negative unless specifically commented upon in the HPI    Objective:           Vitals:    04/01/19 0956   BP: 127/82   Pulse: 64   Temp: 98.2  F (36.8  C)   TempSrc: Oral   SpO2: 96%   Weight: 81.2 kg (179 lb)   Height: 1.575 m (5' 2\")     Body mass index is 32.74 kg/m .     Physical Exam    Vitals reviewed.   Constitutional: Well-developed, well-nourished, in no apparent distress.    HEENT: Normocephalic. no scleral icterus. Moist oral mucosa. Dentition normal  Neck/Lymph: " Normal ROM, supple. No thyromegaly.  No lymphadenopathy  Cardiac:  Regular rate and rhythm.  No overt murmurs  Respiratory: Clear to auscultation bilaterally.  No wheezes or rales  GI:  Abdomen soft, non-distended, non-tender. BS present. no shifting dullness. No overt hepatosplenomegaly.     Skin:  Skin is warm and dry. No rash noted.  no jaundice. no spider nevi noted.  no palmar erythema  Peripheral Vascular: no lower extremity edema. 2+ pulses in all extremities  Musculoskeletal:  ROM intact, normal muscle bulk    Psychiatric: Normal mood and affect. Behavior is normal.  Neuro:  no asterixis, no tremor    Labs and Diagnostic tests:  Lab Results   Component Value Date     2019    POTASSIUM 3.6 2019    CHLORIDE 110 2019    CO2 23 2019    BUN 16 2019    CR 0.89 2019     Lab Results   Component Value Date    BILITOTAL 0.4 2019    ALT 41 2019    AST 30 2019    ALKPHOS 95 2019     Lab Results   Component Value Date    ALBUMIN 4.0 2019    PROTTOTAL 7.7 2019      Lab Results   Component Value Date    WBC 7.3 2019    HGB 15.3 2019    MCV 88 2019     2019     Lab Results   Component Value Date    INR 1.11 2019       Imagin2017  Liver: The liver is diffusely hyperechoic, attenuating the ultrasound  beam. No focal mass. It measures 15.6 cm in the sagittal dimension.  The main portal vein is patent with antegrade flow, measuring 1 cm in  diameter.   Gallbladder: The gallbladder is well distended and of normal  morphology. There is no wall thickening, pericholecystic fluid,  sonographic Bah's sign nor evidence for cholelithiasis.  Bile Ducts: Both the intra- and extrahepatic biliary system are of  normal caliber.  The common bile duct measures 2 mm in diameter.  Pancreas: Visualized portions of the head and body of the pancreas are  unremarkable.   Kidneys: Both kidneys are of normal echotexture,  without mass nor  hydronephrosis.   The craniocaudal dimensions are: right- 12 cm, left-  11.8 cm.  Spleen: The spleen i measures 11.5 cm in sagittal dimension.  Aorta and IVC:  The visualized portions are nondilated. There are  atherosclerotic plaque in the aorta.   Fluid: No evidence of ascites or pleural effusions.    Procedures:  Liver biopsy: 12/21/2015  FINAL DIAGNOSIS:   Liver, needle biopsy   - Steatohepatitis with mild nonbridging portal fibrosis   - See microscopic description regarding hepatitis B   MICROSCOPIC:   Examination of the trichrome stain demonstrates some fibrous widening of   portal tracts, but without bridging fibrosis or architectural   distortion.  The reticulin stain confirms these findings.  There is no   evidence of regeneration.  The hematoxylin and eosin stained slides   demonstrates a mild patchy portal infiltrate of lymphocytes without   piecemeal necrosis.  The hepatocellular lobule demonstrates   macrovesicular fatty change involving approximately 40% of the   hepatocytes, with ballooning degeneration and hence with features of   steatohepatitis.  Fatty change is not a typical feature of hepatitis B.   Given the presence of steatohepatitis, grading of the patient's   hepatitis B is somewhat questionable, since the portal infiltrate seen   in this case could be due to steatohepatitis as well as to hepatitis B.   If the inflammation was due to hepatitis B, it would be grade 1, and   given the portal fibrosis it would be stage 1    Colonoscopy: 2/8/2013  - normal  - repeat in 10 years    Assessment and Plan:    Chronic Hepatitis B:    -The patient was noted to have E antigen negative, E antibody positive chronic hepatitis B on tenofovir therapy  -His virus level has been chronically well suppressed on his current regimen  -We will repeat hepatitis B DNA today  -We will continue to follow hepatitis B DNA levels every 6 months    Tenofovir Use:  -Tenofovir his medication for chronic  control and suppression of chronic hepatitis B  -He does have the potential side effects of causing lactic acidosis as well as causing chronic kidney injury with resultant Fanconi syndrome  -We will check urine studies with protein to creatinine ratio, urine calcium, and urine phosphorus once or twice a year    Non-Alcoholic Fatty Liver Disease  - I had a long discussion with the patient about nonalcoholic fatty liver disease (NAFLD).  We discussed how fat deposits in the liver, how this leads to inflammation, and how chronic inflammation (ALDANA) can ultimately lead to scarring and cirrhosis.  The long-term complications of fatty liver disease were discussed with the patient, including the increased risk of cardiovascular disease complications,the risk of developing diabetes (if not already), and variable progression to cirrhosis and end stage liver disease.  - At this time, the only way to differentiate between benign steatosis vs. nonalcoholic steatohepatitis (ALDANA) is to perform a liver biopsy.  The liver biopsy would be important for diagnostic, as well as a prognostic and management perspective - If the patient only has benign steatosis, they will have low risk of progression and no treatment will be needed at that time except for lifestyle modifications.  - It is important to note that liver tests alone as a screening test for ALDANA are not sensitive, as up to 20-30% of patients can have ALDANA with fibrosis despite having normal liver chemistries.   - Regarding the management of fatty liver disease, I did discuss with the patient about an appropriate diet, exercise and weight loss plan.  The patient should exercise regularly 3-4 times per week, with an average of about 30-45 minutes per day. The patient should be on low-carbohydrate, low-calorie diet (1139-8567 calories per day). The weight loss plan is to lose 7-10% of body weight in the next three to six months' time.  It has shown that patients who exercise  "regularly can have improvement of insulin resistance and resolution of fatty liver disease, even if they are not able to lose weight.   - Diabetes management is crucial with ideal goal Hgb A1c goal of =/< 7%. If possible addition of insulin sensitizing agents like metformin will be helpful.    - Management of elevated cholesterol is also important in this population.  The use of \"statins\" (HMG-CoA reductase medications) are an effective means of therapy and are not contra-indicated in those with abnormal liver tests OR those with cirrhosis.  The value of these medications in this population far outweigh the minor risks of abnormal liver tests.  Goal LDL in those with ALDANA are < 100 mg/dL  - Consider the utility of liberalizing coffee consumption as some data that this may slow progression and reverse effects of ALDANA-related fibrosis.  - We plan to order liver tests to be checked every 3-6 months to assess for dynamic changes, as a potential marker of another cause of chronic liver disease.    Chronic Hepatitis B Education:  - Patient was educated as to mode of spread of virus  - Encouraged to avoid sharing personal items such as: toothbrushes, nail clippers, razors.  If shared, they should all be cleaned thoroughly.  - Recommend all family members be screened for hepatitis B and vaccinated if they are not infected.    Screening for Hepatocellular Carcinoma:  -We have ordered an abdominal ultrasound for screening to be done within the next 2 weeks  -We have ordered another abdominal ultrasound to be performed in 6 months time and again at his next clinic visit with us in 1 year    Follow Up:  1 year     Thank you very much for the opportunity to participate in the care of this patient.  If you have any further questions, please don't hesitate to contact our office.    Thomas M. Leventhal, M.D.   of Medicine  Advanced & Transplant Hepatology  [facility]      Kennedy GIBBS " Leventhal, MD

## 2019-04-01 NOTE — LETTER
4/1/2019       RE: Lamont Daniels  [address]  [facility] [address]     Dear Colleague,    Thank you for referring your patient, Lamont Daniels, to the [facility] HEPATOLOGY at [facility]. Please see a copy of my visit note below.    Date of Service: 4/1/2019     Subjective:            Lamont Daniels is a 53 year old male presenting for evaluation of liver disease    History of Present Illness   Lamont Daniels is a 53 year old male with past medical history of hypertension, dyslipidemia, coronary artery disease, mild obesity with resultant nonalcoholic steatohepatitis and chronic hepatitis B who presents for routine evaluation and follow-up.    Since last seen he reports doing well.  He has not had any presentations to the emergency department or admissions to the hospital.  He denies issues with abdominal pain.  He notes that he does have limitations in his ability to ambulate secondary to chronic low back pain, and he does use a cane for assistance.    He continues to take his tenofovir daily, without difficulty and denies any overt side effects.    In the past, hematology providers have had concerns that his abnormal liver tests are related mostly to nonalcoholic steatohepatitis rather than his chronic hepatitis B as he has had a essentially viral suppression on the tenofovir.    He continues to work closely with his primary care providers in regards to the management of his medical issues.    Past Medical History:  Past Medical History:   Diagnosis Date     Anxiety      CAD (coronary artery disease)     CABG, PCI     Congestive heart failure, unspecified 2008     Depressive disorder 2008     Gout      Hepatitis B > 10 years     HTN (hypertension)      Hyperlipidemia LDL goal < 100      Malrotation of intestine      Moderate major depression (H)      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) 9/19/2011    Uses CPAP     Rheumatoid arthritis flare (H) 1012     Steatohepatitis 12/15    liver biopsy      Tuberculosis age 13    INH x 9 months      Vitamin D deficiency        Surgical History:  Past Surgical History:   Procedure Laterality Date     ABDOMEN SURGERY  2014     BIOPSY  2015     BYPASS GRAFT ARTERY CORONARY  2008    6 vessels     COLONOSCOPY  2/8/2013    Procedure: COLONOSCOPY;  Colonoscopy, blood in stool;  Surgeon: Duane, William Charles, MD;  Location:  OR     COMBINED REPAIR PTOSIS WITH BLEPHAROPLASTY BILATERAL Bilateral 6/25/2018    Procedure: COMBINED REPAIR PTOSIS WITH BLEPHAROPLASTY BILATERAL;  Bilateral upper eyelid blepharoplasty, ptosis repair and brow ptosis repair;  Surgeon: Sandra Borja MD;  Location:  OR     GI SURGERY  2014     HC CORONARY STENT PERCUT, EA ADDTL VESSEL  2008    3 months after CABG     HEAD & NECK SURGERY  2011     NASAL/SINUS POLYPECTOMY  2010     ORTHOPEDIC SURGERY  2012     REPAIR PTOSIS       REPAIR PTOSIS BROW BILATERAL Bilateral 6/25/2018    Procedure: REPAIR PTOSIS BROW BILATERAL;;  Surgeon: Sandra Borja MD;  Location:  OR     THORACIC SURGERY  1989    tb     TONSILLECTOMY  2010     UVULOPALATOPHARYNGOPLASTY  2010     VASCULAR SURGERY  2008       Social History:  Social History     Socioeconomic History     Marital status:      Spouse name: Not on file     Number of children: 5     Years of education: 14     Highest education level: Not on file   Occupational History     Occupation: [employer]     Employer: UNEMPLOYED     Comment: 2-2011. On long term disability. SSD applied for   Social Needs     Financial resource strain: Not on file     Food insecurity:     Worry: Not on file     Inability: Not on file     Transportation needs:     Medical: Not on file     Non-medical: Not on file   Tobacco Use     Smoking status: Never Smoker     Smokeless tobacco: Never Used   Substance and Sexual Activity     Alcohol use: No     Alcohol/week: 0.0 oz     Drug use: No     Sexual activity: Yes     Partners: Female   Lifestyle     Physical activity:      Days per week: Not on file     Minutes per session: Not on file     Stress: Not on file   Relationships     Social connections:     Talks on phone: Not on file     Gets together: Not on file     Attends [employer] service: Not on file     Active member of club or organization: Not on file     Attends meetings of clubs or organizations: Not on file     Relationship status: Not on file     Intimate partner violence:     Fear of current or ex partner: Not on file     Emotionally abused: Not on file     Physically abused: Not on file     Forced sexual activity: Not on file   Other Topics Concern     Parent/sibling w/ CABG, MI or angioplasty before 65F 55M? Yes     Comment: father   Social History Narrative    . No current SO.         Has 5 children. Youngest child does live with him.         H/o AA degree and previously worked as a [employer]. Currently unemployed.         Denies tobacco use.     Alcohol use: 2x/week with minimal amount of alcohol per time.     Drug use: denies       Family History:  Family History   Problem Relation Age of Onset     C.A.D. Father      Coronary Artery Disease Father      Hypertension Father      Depression Father      Hypertension Mother      Diabetes Mother      Depression Mother      Mental Illness Mother      Hypertension Maternal Grandfather      Cancer Paternal Grandfather      Other Cancer Paternal Grandfather      Other Cancer Other      Autoimmune Disease No family hx of      Cerebrovascular Disease No family hx of      Thyroid Disease No family hx of      Glaucoma No family hx of      Macular Degeneration No family hx of             Medications:  Current Outpatient Medications   Medication     Abatacept (ORENCIA) 125 MG/ML SOAJ auto-injector     Acetaminophen (TYLENOL PO)     allopurinol (ZYLOPRIM) 300 MG tablet     aspirin EC 81 MG EC tablet     atorvastatin (LIPITOR) 80 MG tablet     baclofen (LIORESAL) 10 MG tablet     blood glucose (NO BRAND SPECIFIED) lancets  "standard     blood glucose monitoring (NO BRAND SPECIFIED) meter device kit     blood glucose monitoring (NO BRAND SPECIFIED) test strip     buprenorphine (BUTRANS) 20 MCG/HR WK patch     busPIRone HCl (BUSPAR) 30 MG tablet     Cholecalciferol (VITAMIN D) 2000 UNITS tablet     clonazePAM (KLONOPIN) 1 MG tablet     clopidogrel (PLAVIX) 75 MG tablet     COLCRYS 0.6 MG tablet     desvenlafaxine succinate (PRISTIQ) 25 MG 24 hr tablet     diclofenac (VOLTAREN) 1 % topical gel     erythromycin (ROMYCIN) ophthalmic ointment     evolocumab (REPATHA) 140 MG/ML prefilled autoinjector     ezetimibe (ZETIA) 10 MG tablet     fluticasone (FLONASE) 50 MCG/ACT spray     gabapentin (NEURONTIN) 300 MG capsule     gemfibrozil (LOPID) 600 MG tablet     hydroxychloroquine (PLAQUENIL) 200 MG tablet     meclizine (ANTIVERT) 25 MG tablet     melatonin 3 MG tablet     mirtazapine (REMERON) 15 MG tablet     naloxone (NARCAN) nasal spray     neomycin-polymyxin-dexamethasone (MAXITROL) 3.5-54242-4.1 SUSP ophthalmic susp     nitroGLYcerin (NITROSTAT) 0.4 MG sublingual tablet     order for DME     ORDER FOR DME     pantoprazole (PROTONIX) 40 MG EC tablet     predniSONE (DELTASONE) 2.5 MG tablet     sulfaSALAzine ER (AZULFIDINE EN) 500 MG EC tablet     tamsulosin (FLOMAX) 0.4 MG capsule     tenofovir (VIREAD) 300 MG tablet     triamcinolone (KENALOG) 0.1 % ointment     zolpidem (AMBIEN) 5 MG tablet     No current facility-administered medications for this visit.      Facility-Administered Medications Ordered in Other Visits   Medication     gadobutrol (GADAVIST) injection 10 mL       Review of Systems  A complete 10 point review of systems was asked and answered in the negative unless specifically commented upon in the HPI    Objective:           Vitals:    04/01/19 0956   BP: 127/82   Pulse: 64   Temp: 98.2  F (36.8  C)   TempSrc: Oral   SpO2: 96%   Weight: 81.2 kg (179 lb)   Height: 1.575 m (5' 2\")     Body mass index is 32.74 kg/m . "     Physical Exam    Vitals reviewed.   Constitutional: Well-developed, well-nourished, in no apparent distress.    HEENT: Normocephalic. no scleral icterus. Moist oral mucosa. Dentition normal  Neck/Lymph: Normal ROM, supple. No thyromegaly.  No lymphadenopathy  Cardiac:  Regular rate and rhythm.  No overt murmurs  Respiratory: Clear to auscultation bilaterally.  No wheezes or rales  GI:  Abdomen soft, non-distended, non-tender. BS present. no shifting dullness. No overt hepatosplenomegaly.     Skin:  Skin is warm and dry. No rash noted.  no jaundice. no spider nevi noted.  no palmar erythema  Peripheral Vascular: no lower extremity edema. 2+ pulses in all extremities  Musculoskeletal:  ROM intact, normal muscle bulk    Psychiatric: Normal mood and affect. Behavior is normal.  Neuro:  no asterixis, no tremor    Labs and Diagnostic tests:  Lab Results   Component Value Date     2019    POTASSIUM 3.6 2019    CHLORIDE 110 2019    CO2 23 2019    BUN 16 2019    CR 0.89 2019     Lab Results   Component Value Date    BILITOTAL 0.4 2019    ALT 41 2019    AST 30 2019    ALKPHOS 95 2019     Lab Results   Component Value Date    ALBUMIN 4.0 2019    PROTTOTAL 7.7 2019      Lab Results   Component Value Date    WBC 7.3 2019    HGB 15.3 2019    MCV 88 2019     2019     Lab Results   Component Value Date    INR 1.11 2019       Imagin2017  Liver: The liver is diffusely hyperechoic, attenuating the ultrasound  beam. No focal mass. It measures 15.6 cm in the sagittal dimension.  The main portal vein is patent with antegrade flow, measuring 1 cm in  diameter.   Gallbladder: The gallbladder is well distended and of normal  morphology. There is no wall thickening, pericholecystic fluid,  sonographic Bah's sign nor evidence for cholelithiasis.  Bile Ducts: Both the intra- and extrahepatic biliary system are  of  normal caliber.  The common bile duct measures 2 mm in diameter.  Pancreas: Visualized portions of the head and body of the pancreas are  unremarkable.   Kidneys: Both kidneys are of normal echotexture, without mass nor  hydronephrosis.   The craniocaudal dimensions are: right- 12 cm, left-  11.8 cm.  Spleen: The spleen i measures 11.5 cm in sagittal dimension.  Aorta and IVC:  The visualized portions are nondilated. There are  atherosclerotic plaque in the aorta.   Fluid: No evidence of ascites or pleural effusions.    Procedures:  Liver biopsy: 12/21/2015  FINAL DIAGNOSIS:   Liver, needle biopsy   - Steatohepatitis with mild nonbridging portal fibrosis   - See microscopic description regarding hepatitis B   MICROSCOPIC:   Examination of the trichrome stain demonstrates some fibrous widening of   portal tracts, but without bridging fibrosis or architectural   distortion.  The reticulin stain confirms these findings.  There is no   evidence of regeneration.  The hematoxylin and eosin stained slides   demonstrates a mild patchy portal infiltrate of lymphocytes without   piecemeal necrosis.  The hepatocellular lobule demonstrates   macrovesicular fatty change involving approximately 40% of the   hepatocytes, with ballooning degeneration and hence with features of   steatohepatitis.  Fatty change is not a typical feature of hepatitis B.   Given the presence of steatohepatitis, grading of the patient's   hepatitis B is somewhat questionable, since the portal infiltrate seen   in this case could be due to steatohepatitis as well as to hepatitis B.   If the inflammation was due to hepatitis B, it would be grade 1, and   given the portal fibrosis it would be stage 1    Colonoscopy: 2/8/2013  - normal  - repeat in 10 years    Assessment and Plan:    Chronic Hepatitis B:    -The patient was noted to have E antigen negative, E antibody positive chronic hepatitis B on tenofovir therapy  -His virus level has been  chronically well suppressed on his current regimen  -We will repeat hepatitis B DNA today  -We will continue to follow hepatitis B DNA levels every 6 months    Tenofovir Use:  -Tenofovir his medication for chronic control and suppression of chronic hepatitis B  -He does have the potential side effects of causing lactic acidosis as well as causing chronic kidney injury with resultant Fanconi syndrome  -We will check urine studies with protein to creatinine ratio, urine calcium, and urine phosphorus once or twice a year    Non-Alcoholic Fatty Liver Disease  - I had a long discussion with the patient about nonalcoholic fatty liver disease (NAFLD).  We discussed how fat deposits in the liver, how this leads to inflammation, and how chronic inflammation (ALDANA) can ultimately lead to scarring and cirrhosis.  The long-term complications of fatty liver disease were discussed with the patient, including the increased risk of cardiovascular disease complications,the risk of developing diabetes (if not already), and variable progression to cirrhosis and end stage liver disease.  - At this time, the only way to differentiate between benign steatosis vs. nonalcoholic steatohepatitis (ALDANA) is to perform a liver biopsy.  The liver biopsy would be important for diagnostic, as well as a prognostic and management perspective - If the patient only has benign steatosis, they will have low risk of progression and no treatment will be needed at that time except for lifestyle modifications.  - It is important to note that liver tests alone as a screening test for ALDANA are not sensitive, as up to 20-30% of patients can have ALDANA with fibrosis despite having normal liver chemistries.   - Regarding the management of fatty liver disease, I did discuss with the patient about an appropriate diet, exercise and weight loss plan.  The patient should exercise regularly 3-4 times per week, with an average of about 30-45 minutes per day. The patient  "should be on low-carbohydrate, low-calorie diet (5152-1300 calories per day). The weight loss plan is to lose 7-10% of body weight in the next three to six months' time.  It has shown that patients who exercise regularly can have improvement of insulin resistance and resolution of fatty liver disease, even if they are not able to lose weight.   - Diabetes management is crucial with ideal goal Hgb A1c goal of =/< 7%. If possible addition of insulin sensitizing agents like metformin will be helpful.    - Management of elevated cholesterol is also important in this population.  The use of \"statins\" (HMG-CoA reductase medications) are an effective means of therapy and are not contra-indicated in those with abnormal liver tests OR those with cirrhosis.  The value of these medications in this population far outweigh the minor risks of abnormal liver tests.  Goal LDL in those with ALDANA are < 100 mg/dL  - Consider the utility of liberalizing coffee consumption as some data that this may slow progression and reverse effects of ALDANA-related fibrosis.  - We plan to order liver tests to be checked every 3-6 months to assess for dynamic changes, as a potential marker of another cause of chronic liver disease.    Chronic Hepatitis B Education:  - Patient was educated as to mode of spread of virus  - Encouraged to avoid sharing personal items such as: toothbrushes, nail clippers, razors.  If shared, they should all be cleaned thoroughly.  - Recommend all family members be screened for hepatitis B and vaccinated if they are not infected.    Screening for Hepatocellular Carcinoma:  -We have ordered an abdominal ultrasound for screening to be done within the next 2 weeks  -We have ordered another abdominal ultrasound to be performed in 6 months time and again at his next clinic visit with us in 1 year    Follow Up:  1 year     Thank you very much for the opportunity to participate in the care of this patient.  If you have any further " questions, please don't hesitate to contact our office.    Thomas M. Leventhal, M.D.   of Medicine  Advanced & Transplant Hepatology  [facility]      Again, thank you for allowing me to participate in the care of your patient.      Sincerely,    Thomas M. Leventhal, MD

## 2019-04-01 NOTE — TELEPHONE ENCOUNTER
Prescription approved per [facility] Refill Protocol-meclizine, triamcinolone  Anabel Andrew RN

## 2019-04-01 NOTE — TELEPHONE ENCOUNTER
Medication denied patient has requested medication to soon-Pantoprazole     Duplicate request on triamcinolone    Anabel Andrew RN

## 2019-04-01 NOTE — TELEPHONE ENCOUNTER
Requested Prescriptions   Pending Prescriptions Disp Refills     zolpidem (AMBIEN) 5 MG tablet 30 tablet 5    There is no refill protocol information for this order        Routing refill request to provider for review/approval because:  Drug not on the [facility] refill protocol       Shreya Rascon RN, BSN

## 2019-04-01 NOTE — NURSING NOTE
"Chief Complaint   Patient presents with     RECHECK     Hepatitis B infection     /82   Pulse 64   Temp 98.2  F (36.8  C) (Oral)   Ht 1.575 m (5' 2\")   Wt 81.2 kg (179 lb)   SpO2 96%   BMI 32.74 kg/m    Stephanie Mares MA    "

## 2019-04-01 NOTE — TELEPHONE ENCOUNTER
"Requested Prescriptions   Pending Prescriptions Disp Refills     ezetimibe (ZETIA) 10 MG tablet 90 tablet 3     Sig: Take 1 tablet (10 mg) by mouth daily    Antihyperlipidemic agents Passed - 3/31/2019 12:29 AM       Passed - Lipid panel on file in past 12 mos    Recent Labs   Lab Test 03/14/19  1006  06/29/15  1405   CHOL 152   < > 219*   TRIG 126   < > 372*   HDL 51   < > 33*   LDL 76   < > 112   NHDL 101   < >  --    VLDL  --   --  74*   CHOLHDLRATIO  --   --  6.6*    < > = values in this interval not displayed.              Passed - Normal serum ALT on record in past 12 mos    Recent Labs   Lab Test 03/11/19  0841   ALT 48            Passed - Recent (12 mo) or future (30 days) visit within the authorizing provider's specialty    Patient had office visit in the last 12 months or has a visit in the next 30 days with authorizing provider or within the authorizing provider's specialty.  See \"Patient Info\" tab in inbasket, or \"Choose Columns\" in Meds & Orders section of the refill encounter.             Passed - Medication is active on med list       Passed - Patient is age 18 years or older        Last appointment: 6/22/18    Refilled per protocol    VIVIANA Ordaz    "

## 2019-04-02 LAB
HBV DNA SERPL NAA+PROBE-ACNC: NORMAL [IU]/ML
HBV DNA SERPL NAA+PROBE-LOG IU: NORMAL {LOG_IU}/ML

## 2019-04-02 RX ORDER — ZOLPIDEM TARTRATE 5 MG/1
5 TABLET ORAL
Qty: 30 TABLET | Refills: 5 | Status: SHIPPED | OUTPATIENT
Start: 2019-04-02 | End: 2019-11-26

## 2019-04-03 ENCOUNTER — MYC REFILL (OUTPATIENT)
Dept: PALLIATIVE MEDICINE | Facility: CLINIC | Age: 54
End: 2019-04-03

## 2019-04-03 DIAGNOSIS — M06.9 RHEUMATOID ARTHRITIS INVOLVING MULTIPLE SITES, UNSPECIFIED RHEUMATOID FACTOR PRESENCE: ICD-10-CM

## 2019-04-03 LAB — HDV AB SER QL: NEGATIVE

## 2019-04-03 RX ORDER — BUPRENORPHINE 20 UG/H
1 PATCH TRANSDERMAL
Qty: 4 PATCH | Refills: 2 | Status: CANCELLED | OUTPATIENT
Start: 2019-04-03

## 2019-04-03 NOTE — TELEPHONE ENCOUNTER
[employer] message from patient on 4/3 at 1316:      Lamont Daniels would like a refill of the following medications:         buprenorphine (BUTRANS) 20 MCG/HR WK patch [Dorothea Loo MD]       Patient Comment: could you get me more than one refill please     Preferred pharmacy: [facility] [id number] [facility] - [place] [address]   -    Will route to MA pool for assistance with gathering opioid refill information.    ROBERTO KarimiN, RN-BC  Patient Care Supervisor/Care Coordinator  [facility] [place]

## 2019-04-04 ENCOUNTER — ANCILLARY PROCEDURE (OUTPATIENT)
Dept: ULTRASOUND IMAGING | Facility: CLINIC | Age: 54
End: 2019-04-04
Attending: INTERNAL MEDICINE
Payer: MEDICARE

## 2019-04-04 DIAGNOSIS — B18.1 CHRONIC VIRAL HEPATITIS B WITHOUT DELTA AGENT AND WITHOUT COMA (H): ICD-10-CM

## 2019-04-04 DIAGNOSIS — K74.00 HEPATIC FIBROSIS: ICD-10-CM

## 2019-04-04 PROCEDURE — 76700 US EXAM ABDOM COMPLETE: CPT | Mod: 59 | Performed by: RADIOLOGY

## 2019-04-04 PROCEDURE — 93975 VASCULAR STUDY: CPT | Performed by: RADIOLOGY

## 2019-04-04 RX ORDER — BUPRENORPHINE 20 UG/H
1 PATCH TRANSDERMAL
Qty: 4 PATCH | Refills: 2 | Status: SHIPPED | OUTPATIENT
Start: 2019-04-04 | End: 2019-05-28

## 2019-04-04 RX ORDER — BUPRENORPHINE 20 UG/H
1 PATCH TRANSDERMAL
Qty: 4 PATCH | Refills: 2 | Status: SHIPPED | OUTPATIENT
Start: 2019-04-04 | End: 2019-04-04

## 2019-04-04 RX ORDER — BUPRENORPHINE 20 UG/H
1 PATCH TRANSDERMAL
Qty: 4 PATCH | Refills: 2 | Status: CANCELLED | OUTPATIENT
Start: 2019-04-04

## 2019-04-04 NOTE — TELEPHONE ENCOUNTER
Script Eprescribed to pharmacy    Will send this to MA team to notify patient.    Signed Prescriptions:                        Disp   Refills    buprenorphine (BUTRANS) 20 MCG/HR WK patch 4 patch2        Sig: Place 1 patch onto the skin every 7 days Fill           on/after 4/4/19 to start on/after 4/5/19  Authorizing Provider: KAYLA CHUN MD  [facility]

## 2019-04-04 NOTE — TELEPHONE ENCOUNTER
Patient requesting refill(s) of buprenorphine (BUTRANS) 20 MCG/HR WK patch    Last picked up from pharmacy on 03/08/19    Pt last seen by prescribing provider on 3/11/19  Next appt scheduled for 6/10/19     checked in the past 6 months? Yes If no, print current report and give to RN    Last urine drug screen date 03/11/19  Current opioid agreement on file (completed within the last year) Yes Date of opioid agreement: 3/11/19  Processing (pick one and delete the others):      E-prescribe to [facility] [id number] [place] pharmacy    Will route to nursing pool for review and preparation of prescription(s).

## 2019-04-04 NOTE — TELEPHONE ENCOUNTER
Medication refill information reviewed.     Due date for butrans patch is 4/5/19; 28 day supplies     Prescription prepped for review.     Will route to provider.     ROBERTO KarimiN, RN-BC  Patient Care Supervisor/Care Coordinator  [facility] [place]

## 2019-04-07 ENCOUNTER — MYC REFILL (OUTPATIENT)
Dept: PALLIATIVE MEDICINE | Facility: CLINIC | Age: 54
End: 2019-04-07

## 2019-04-07 DIAGNOSIS — M06.9 RHEUMATOID ARTHRITIS INVOLVING MULTIPLE SITES, UNSPECIFIED RHEUMATOID FACTOR PRESENCE: ICD-10-CM

## 2019-04-07 RX ORDER — BUPRENORPHINE 20 UG/H
1 PATCH TRANSDERMAL
Qty: 4 PATCH | Refills: 2 | Status: CANCELLED | OUTPATIENT
Start: 2019-04-07

## 2019-04-10 NOTE — TELEPHONE ENCOUNTER
[employer] message from patient on 4/7 at 1608:      Lamont Daniels would like a refill of the following medications:         buprenorphine (BUTRANS) 20 MCG/HR WK patch [Dorothea Loo MD]       Patient Comment: Dear Doctor Amy, for the last month and this month, I recieve the generic of  Butrans.  They keep coming off after 3 days.  I have to put duck tap on top to keep it in place.  Could you help me to get the brand name please?     Preferred pharmacy: [facility] [id number] [place] [facility] [address]    -------  Will route to MA pool for assistance with gathering opioid refill information.    Jenna Hunt, ROBERTON, RN-BC  Patient Care Supervisor/Care Coordinator  [facility] [place]

## 2019-04-11 NOTE — TELEPHONE ENCOUNTER
From: Angelique Bacon, RN      Created: 4/11/2019 10:24 AM        Dennis Miguel. Can you please tell me how many patches you currently have and what day you will put your next patch on? It looks like you picked this prescription up on 4/4/19. I'm not sure if insurance will cover name brand but we can try.    KELLY Barksdale, RN  Care Coordinator  [facility] [place]      ----- Message -----  From: Lamont Daniels  Sent: 4/7/2019  4:08 PM CDT  To: Dorothea Loo MD     Patient Comment: Dear Doctor Amy, for the last month and this month, I recieve the generic of  Butrans.  They keep coming off after 3 days.  I have to put duck tap on top to keep it in place.  Could you help me to get the brand name please?

## 2019-04-12 RX ORDER — BUPRENORPHINE 20 UG/H
1 PATCH, EXTENDED RELEASE TRANSDERMAL
Qty: 4 PATCH | Refills: 1 | Status: SHIPPED | OUTPATIENT
Start: 2019-04-12 | End: 2019-05-28

## 2019-04-12 NOTE — TELEPHONE ENCOUNTER
Patient requesting name brand only as he is having trouble with adhesive sticking for generic. Will pend script with LISANDRO and route request to provider to review. If this is filled and covered will cancel generic refills at pharmacy.    KELLY Barksdale, RN  Care Coordinator  [facility] [place]

## 2019-04-12 NOTE — TELEPHONE ENCOUNTER
[employer] message from patient on 4/11 at 0946:    I have 3 left.   -------------  Will route to nurse [place] for processing.    ROBERTO KarimiN, RN-BC  Patient Care Supervisor/Care Coordinator  [facility] [place]

## 2019-04-12 NOTE — TELEPHONE ENCOUNTER
Script Eprescribed to pharmacy    Will send this to MA team to notify patient.    Signed Prescriptions:                        Disp   Refills    BUTRANS 20 MCG/HR WK patch                 4 patch1        Sig: Place 1 patch onto the skin every 7 days . Brand name           only  Authorizing Provider: KAYLA CHUN MD  [facility]

## 2019-04-22 DIAGNOSIS — F33.1 MAJOR DEPRESSIVE DISORDER, RECURRENT EPISODE, MODERATE (H): ICD-10-CM

## 2019-04-23 RX ORDER — BUSPIRONE HYDROCHLORIDE 30 MG/1
TABLET ORAL 2 TIMES DAILY
Qty: 180 TABLET | Refills: 0 | Status: SHIPPED | OUTPATIENT
Start: 2019-04-23 | End: 2019-07-16

## 2019-04-23 NOTE — TELEPHONE ENCOUNTER
"Requested Prescriptions   Pending Prescriptions Disp Refills     busPIRone HCl (BUSPAR) 30 MG tablet [Pharmacy Med Name: BUSPIRONE HCL 30 MG TABLET]  Last Written Prescription Date:  1/18/19  Last Fill Quantity: 180,  # refills: 0   Last Office Visit with [facility], [facility] or [facility] prescribing provider:  3/14/19   Future Office Visit:    Next 5 appointments (look out 90 days)    May 14, 2019 10:00 AM CDT  Return Visit with Ricardo Rae MD, [facility] NURSE ONLY  [facility] ([facility]) [address] [place] [address]  [phone]   Venancio 10, 2019  9:00 AM CDT  Return Visit with Dorothea Loo MD  [facility] ([facility]) [address] [place] [address]  [phone]   Jun 21, 2019  9:00 AM CDT  Return Visit with Fly Travis MD  [facility]) [address] [place] [address]  [phone]   Jul 02, 2019  8:40 AM CDT  Return Visit with Sebastián Jenkins MD  [facility] ([facility]) [address] [facility] [address]  [phone]          180 tablet 0     Sig: TAKE 1 TABLET BY MOUTH 2 TIMES DAILY       Atypical Antidepressants Protocol Failed - 4/22/2019  6:04 PM        Failed - Patient has PHQ-9 score less than 5 in past 6 months.     Please review last PHQ-9 score.           Passed - Medication active on med list        Passed - Patient is age 18 or older        Passed - Recent (6 mo) or future (30 days) visit within the authorizing provider's specialty     Patient had office visit in the last 6 months or has a visit in the next 30 days with authorizing provider or within the authorizing provider's specialty.  See \"Patient Info\" tab in inbasket, or \"Choose Columns\" in Meds & Orders section of the refill encounter.              "

## 2019-04-23 NOTE — TELEPHONE ENCOUNTER
PHQ-9 SCORE 4/25/2017 6/1/2018 3/14/2019   PHQ-9 Total Score - - -   PHQ-9 Total Score 21 18 12     Routing refill request to provider for review/approval because:  PHQ-9 is 5 or more. Per protocol, RN cannot refill if PHQ-9 is over 4.        Shreya Rascon RN, BSN

## 2019-04-27 DIAGNOSIS — E78.5 HYPERLIPIDEMIA LDL GOAL <100: ICD-10-CM

## 2019-04-27 DIAGNOSIS — I25.10 ATHEROSCLEROSIS OF NATIVE CORONARY ARTERY OF NATIVE HEART, ANGINA PRESENCE UNSPECIFIED: ICD-10-CM

## 2019-04-29 NOTE — TELEPHONE ENCOUNTER
Routing refill request to provider for review/approval because:  Drug interaction warning            Shreya Rascon RN, BSN

## 2019-04-30 DIAGNOSIS — M06.09 RHEUMATOID ARTHRITIS OF MULTIPLE SITES WITH NEGATIVE RHEUMATOID FACTOR (H): ICD-10-CM

## 2019-04-30 RX ORDER — ATORVASTATIN CALCIUM 80 MG/1
TABLET, FILM COATED ORAL
Qty: 90 TABLET | Refills: 3 | Status: SHIPPED | OUTPATIENT
Start: 2019-04-30 | End: 2020-04-08

## 2019-05-01 NOTE — TELEPHONE ENCOUNTER
Called pharmacy on file  And spoke with Tomasz whom stated a request from Dr Jenkins came on the 27th and 30th of April with a dose of 1 mg taken 2 daily.  I explained Dr Jenkins sent a 2.5 mg to be taken daily 90 day supply with 1 refill on 03/26/19.  Will contact Dr. Jenkins to clarify.    Florencia Siegel, SAÚL  5/1/2019  9:48 AM

## 2019-05-03 RX ORDER — PREDNISONE 2.5 MG/1
2.5 TABLET ORAL DAILY
Qty: 90 TABLET | Refills: 1 | Status: SHIPPED | OUTPATIENT
Start: 2019-05-03 | End: 2019-07-02

## 2019-05-03 NOTE — TELEPHONE ENCOUNTER
Rheumatology team: Please call to notify Mr. Daniels that prednisone has been resent.  No change to the prednisone dose of 2.5mg daily; but the pharmacy needed clarification.    Sebastián Jenkins MD  5/3/2019 9:32 AM

## 2019-05-06 NOTE — TELEPHONE ENCOUNTER
Attempted to contact Pt, l/m in detail notifying Mr. Daniels that prednisone has been resent.  No change to the prednisone dose of 2.5mg daily; but the pharmacy needed clarification. Having Pt return call to clinic @ [phone] should he have additional questions or concerns.    Florencia Siegel CMA  5/6/2019  10:18 AM

## 2019-05-14 ENCOUNTER — OFFICE VISIT (OUTPATIENT)
Dept: OPHTHALMOLOGY | Facility: CLINIC | Age: 54
End: 2019-05-14
Payer: MEDICARE

## 2019-05-14 DIAGNOSIS — H40.003 GLAUCOMA SUSPECT OF BOTH EYES: Primary | ICD-10-CM

## 2019-05-14 DIAGNOSIS — H40.003 GLAUCOMA SUSPECT OF BOTH EYES: ICD-10-CM

## 2019-05-14 PROCEDURE — 92133 CPTRZD OPH DX IMG PST SGM ON: CPT | Performed by: OPHTHALMOLOGY

## 2019-05-14 PROCEDURE — 92014 COMPRE OPH EXAM EST PT 1/>: CPT | Performed by: OPHTHALMOLOGY

## 2019-05-14 ASSESSMENT — VISUAL ACUITY
OD_CC: J2
OS_CC: J2
OS_CC: 20/20
METHOD: SNELLEN - LINEAR
CORRECTION_TYPE: GLASSES
OD_CC: 20/20

## 2019-05-14 ASSESSMENT — REFRACTION_MANIFEST
OD_SPHERE: -4.75
OS_ADD: +2.25
OD_CYLINDER: +0.50
OS_CYLINDER: +0.25
OS_SPHERE: -5.00
OS_AXIS: 102
OD_AXIS: 151
OD_ADD: +2.25

## 2019-05-14 ASSESSMENT — REFRACTION_WEARINGRX
OD_AXIS: 175
OS_CYLINDER: +0.25
OS_AXIS: 095
OS_ADD: +1.75
OS_SPHERE: -4.75
OD_ADD: +1.75
OD_CYLINDER: +0.25
OD_SPHERE: -4.50
SPECS_TYPE: BIFOCAL

## 2019-05-14 ASSESSMENT — SLIT LAMP EXAM - LIDS
COMMENTS: NORMAL
COMMENTS: NORMAL

## 2019-05-14 ASSESSMENT — CONF VISUAL FIELD
METHOD: COUNTING FINGERS
OD_NORMAL: 1
OS_NORMAL: 1

## 2019-05-14 ASSESSMENT — CUP TO DISC RATIO
OS_RATIO: 0.45
OD_RATIO: 0.45

## 2019-05-14 ASSESSMENT — TONOMETRY
IOP_METHOD: ICARE
OS_IOP_MMHG: 15
OD_IOP_MMHG: 16

## 2019-05-14 ASSESSMENT — EXTERNAL EXAM - LEFT EYE: OS_EXAM: NORMAL

## 2019-05-14 ASSESSMENT — EXTERNAL EXAM - RIGHT EYE: OD_EXAM: NORMAL

## 2019-05-14 NOTE — NURSING NOTE
Patient presents with:  Annual Eye Exam: No visual or eye comfort complaints.  Glaucoma: No glaucoma gtts      Referring Provider:  No referring provider defined for this encounter.        Purnima Montiel, COA

## 2019-05-25 ENCOUNTER — MYC REFILL (OUTPATIENT)
Dept: PALLIATIVE MEDICINE | Facility: CLINIC | Age: 54
End: 2019-05-25

## 2019-05-25 ENCOUNTER — MYC REFILL (OUTPATIENT)
Dept: FAMILY MEDICINE | Facility: CLINIC | Age: 54
End: 2019-05-25

## 2019-05-25 ENCOUNTER — MYC REFILL (OUTPATIENT)
Dept: CARDIOLOGY | Facility: CLINIC | Age: 54
End: 2019-05-25

## 2019-05-25 DIAGNOSIS — R21 RASH: ICD-10-CM

## 2019-05-25 DIAGNOSIS — K21.9 GASTROESOPHAGEAL REFLUX DISEASE WITHOUT ESOPHAGITIS: ICD-10-CM

## 2019-05-25 DIAGNOSIS — E78.5 HYPERLIPIDEMIA LDL GOAL <70: ICD-10-CM

## 2019-05-25 DIAGNOSIS — F41.9 ANXIETY HYPERVENTILATION: ICD-10-CM

## 2019-05-25 DIAGNOSIS — M06.9 RHEUMATOID ARTHRITIS INVOLVING MULTIPLE SITES, UNSPECIFIED RHEUMATOID FACTOR PRESENCE: ICD-10-CM

## 2019-05-25 DIAGNOSIS — F45.8 ANXIETY HYPERVENTILATION: ICD-10-CM

## 2019-05-25 DIAGNOSIS — M47.819 FACET ARTHROPATHY: ICD-10-CM

## 2019-05-25 RX ORDER — BUPRENORPHINE 20 UG/H
1 PATCH, EXTENDED RELEASE TRANSDERMAL
Qty: 4 PATCH | Refills: 1 | Status: CANCELLED | OUTPATIENT
Start: 2019-05-25

## 2019-05-28 ENCOUNTER — TELEPHONE (OUTPATIENT)
Dept: PALLIATIVE MEDICINE | Facility: CLINIC | Age: 54
End: 2019-05-28

## 2019-05-28 DIAGNOSIS — M17.11 PRIMARY OSTEOARTHRITIS OF RIGHT KNEE: Primary | ICD-10-CM

## 2019-05-28 DIAGNOSIS — M06.9 RHEUMATOID ARTHRITIS INVOLVING MULTIPLE SITES, UNSPECIFIED RHEUMATOID FACTOR PRESENCE: ICD-10-CM

## 2019-05-28 DIAGNOSIS — M47.819 FACET ARTHROPATHY: ICD-10-CM

## 2019-05-28 RX ORDER — GEMFIBROZIL 600 MG/1
600 TABLET, FILM COATED ORAL 2 TIMES DAILY
Qty: 180 TABLET | Refills: 3 | Status: SHIPPED | OUTPATIENT
Start: 2019-05-28 | End: 2020-05-14

## 2019-05-28 RX ORDER — BUPRENORPHINE 20 UG/H
1 PATCH, EXTENDED RELEASE TRANSDERMAL
Qty: 4 PATCH | Refills: 1 | Status: SHIPPED | OUTPATIENT
Start: 2019-05-28 | End: 2019-06-07

## 2019-05-28 NOTE — TELEPHONE ENCOUNTER
Script Eprescribed to pharmacy    Will send this to MA team to notify patient.    Signed Prescriptions:                        Disp   Refills    BUTRANS 20 MCG/HR WK patch                 4 patch1        Sig: Place 1 patch onto the skin every 7 days . Brand name           only to start 6/2/19  Authorizing Provider: KAYLA CHUN    diclofenac (VOLTAREN) 1 % topical gel      300 g  11       Sig: Apply 2-4 grams to 2-3 joints, up to four times daily           as needed using enclosed dosing card.  Max of           32g/day  Authorizing Provider: KAYLA CHUN MD  [facility]

## 2019-05-28 NOTE — TELEPHONE ENCOUNTER
Prior Authorization Retail Medication Request    Medication/Dose: diclofenac 1% gel    ICD code (if different than what is on RX):    Previously Tried and Failed:    Rationale:      PA phone: [phone]  Thinkful/priorauthportal: TRX code: [id number]    Insurance Name:  [facility]  Insurance ID:  [id number]    Pharmacy Information (if different than what is on RX)  Name:    Phone:    -----------------  Will route to central PA team for processing.    ROBERTO KarimiN, RN-BC  Patient Care Supervisor/Care Coordinator  [facility] [place]

## 2019-05-28 NOTE — TELEPHONE ENCOUNTER
Medication refill information reviewed.     Due date for BUTRANS 20 MCG/HR WK patch is 6/3/19     Prescriptions prepped for review.     Will route to provider.

## 2019-05-28 NOTE — TELEPHONE ENCOUNTER
"Requested Prescriptions   Pending Prescriptions Disp Refills     gemfibrozil (LOPID) 600 MG tablet 180 tablet 3     Sig: Take 1 tablet (600 mg) by mouth 2 times daily       Fibrates Passed - 5/25/2019  2:28 AM        Passed - Lipid panel on file in past 12 months     Recent Labs   Lab Test 03/14/19  1006  06/29/15  1405   CHOL 152   < > 219*   TRIG 126   < > 372*   HDL 51   < > 33*   LDL 76   < > 112   NHDL 101   < >  --    VLDL  --   --  74*   CHOLHDLRATIO  --   --  6.6*    < > = values in this interval not displayed.               Passed - No abnormal creatine kinase in past 12 months     Recent Labs   Lab Test 07/07/14  0800                   Passed - Recent (12 mo) or future (30 days) visit within the authorizing provider's specialty     Patient had office visit in the last 12 months or has a visit in the next 30 days with authorizing provider or within the authorizing provider's specialty.  See \"Patient Info\" tab in inbasket, or \"Choose Columns\" in Meds & Orders section of the refill encounter.              Passed - Medication is active on med list        Passed - Patient is age 18 or older          Refilled per protocol    VIVIANA Ordaz    "

## 2019-05-28 NOTE — TELEPHONE ENCOUNTER
Patient requesting refill(s) of BUTRANS 20 MCG/HR WK patch  Last picked up from pharmacy on 5/5/19    diclofenac (VOLTAREN) 1 % topical gel  Last refill: 12/11/18    Pt last seen by prescribing provider on 3/11/19  Next appt scheduled for 6/10/19     checked in the past 6 months? Yes If no, print current report and give to RN    Last urine drug screen date 3/11/19    Current opioid agreement on file (completed within the last year) Yes Date of opioid agreement: 3/11/19    Processing (pick one and delete the others):      E-prescribe to [facility] [address] [place] [address] pharmacy    Will route to nursing pool for review and preparation of prescription(s).

## 2019-05-29 NOTE — TELEPHONE ENCOUNTER
"Requested Prescriptions   Pending Prescriptions Disp Refills     clonazePAM (KLONOPIN) 1 MG tablet 90 tablet 0     Sig: Take 0.5-1 tablets (0.5-1 mg) by mouth 2 times daily as needed for anxiety       There is no refill protocol information for this order        pantoprazole (PROTONIX) 40 MG EC tablet 90 tablet 1     Sig: Take 1 tablet (40 mg) by mouth daily       PPI Protocol Passed - 5/25/2019  2:28 AM        Passed - Not on Clopidogrel (unless Pantoprazole ordered)        Passed - No diagnosis of osteoporosis on record        Passed - Recent (12 mo) or future (30 days) visit within the authorizing provider's specialty     Patient had office visit in the last 12 months or has a visit in the next 30 days with authorizing provider or within the authorizing provider's specialty.  See \"Patient Info\" tab in inbasket, or \"Choose Columns\" in Meds & Orders section of the refill encounter.              Passed - Medication is active on med list        Passed - Patient is age 18 or older        triamcinolone (KENALOG) 0.1 % external ointment 80 g 1       Topical Steroids and Nonsteroidals Protocol Passed - 5/25/2019  2:28 AM        Passed - Patient is age 6 or older        Passed - Authorizing prescriber's most recent note related to this medication read.     If refill request is for ophthalmic use, please forward request to provider for approval.          Passed - High potency steroid not ordered        Passed - Recent (12 mo) or future (30 days) visit within the authorizing provider's specialty     Patient had office visit in the last 12 months or has a visit in the next 30 days with authorizing provider or within the authorizing provider's specialty.  See \"Patient Info\" tab in inbasket, or \"Choose Columns\" in Meds & Orders section of the refill encounter.              Passed - Medication is active on med list          clonazePAM (KLONOPIN) 1 MG tablet      Last Written Prescription Date:  3/14/19  Last Fill Quantity: 90,  "  # refills: 0  Last Office Visit: 3/14/19  Future Office visit:    Next 5 appointments (look out 90 days)    Venancio 10, 2019  9:00 AM CDT  Return Visit with Dorothea Loo MD  [facility] ([facility]) [address] [facility] [address]  [phone]   Jun 21, 2019  9:00 AM CDT  Return Visit with Fly Travis MD  [facility]) [address] [place] [address]  [phone]   Jul 02, 2019  8:40 AM CDT  Return Visit with Sebastián Jenkins MD  [facility]) [address] [facility] [address]  [phone]           Routing refill request to provider for review/approval because:  Drug not on the G, P or [facility] refill protocol or controlled substance    pantoprazole (PROTONIX) 40 MG EC tablet  Last Written Prescription Date:  3/20/19  Last Fill Quantity: 90,  # refills: 1   Last office visit: 3/14/2019 with prescribing provider:  David Chavez   Future Office Visit:   Next 5 appointments (look out 90 days)    Venancio 10, 2019  9:00 AM CDT  Return Visit with Dorothea Loo MD  [facility] ([facility]) [address] [facility]  [place] [address]  [phone]   Jun 21, 2019  9:00 AM CDT  Return Visit with Fly Travis MD  [facility] ([facility]) [address] [address] [place] [address]  [phone]   Jul 02, 2019  8:40 AM CDT  Return Visit with Sebastián Jenkins MD  [facility] ([facility]) [address] [facility] [address]  [phone]         triamcinolone (KENALOG) 0.1 % external ointment  Last Written Prescription Date:  4/1/19  Last Fill Quantity: 80g,  # refills: 1   Last office visit: 3/14/2019 with prescribing provider:  David Chavez   Future Office Visit:   Next 5 appointments (look out  90 days)    Venancio 10, 2019  9:00 AM CDT  Return Visit with Dorothea Loo MD  [facility] [place] ([facility]) [address] [facility]  [place] [address] [phone]   Jun 21, 2019  9:00 AM CDT  Return Visit with Fly Travis MD  [facility]) [address] [place] [address]  [phone]   Jul 02, 2019  8:40 AM CDT  Return Visit with Sebastián Jenkins MD  [facility]) [address] [facility] [address]  [phone]

## 2019-05-30 RX ORDER — TRIAMCINOLONE ACETONIDE 1 MG/G
OINTMENT TOPICAL
Qty: 80 G | Refills: 1 | Status: SHIPPED | OUTPATIENT
Start: 2019-05-30 | End: 2019-06-11

## 2019-05-30 RX ORDER — PANTOPRAZOLE SODIUM 40 MG/1
40 TABLET, DELAYED RELEASE ORAL DAILY
Qty: 90 TABLET | Refills: 1
Start: 2019-05-30

## 2019-05-30 NOTE — TELEPHONE ENCOUNTER
For clonazepam:  Routing refill request to provider for review/approval because:  Drug not on the [facility] refill protocol     For pantoprazole:  90 day supply with 1 refills sent on 3/20/19. Should have refills on file at pharmacy. Too soon to refill.     For triamcinolone:  Prescription approved per [facility] Refill Protocol.        Shreya Rascon RN, BSN, PHN

## 2019-05-30 NOTE — TELEPHONE ENCOUNTER
PA Initiation    Medication: diclofenac 1% gel - INITIATED  Insurance Company: [employer] - Phone [phone] Fax [phone]  Pharmacy Filling the Rx: [id number] [address]RA[address] - [address] [place] [address]  Filling Pharmacy Phone: [phone]  Filling Pharmacy Fax:    Start Date: 5/30/2019

## 2019-05-30 NOTE — TELEPHONE ENCOUNTER
PRIOR AUTHORIZATION DENIED    Medication: diclofenac 1% gel - DENIED    Denial Date: 5/30/2019    Denial Rational: Medication is not FDA approved for treatment of rheumatoid arthritis.    Appeal Information:

## 2019-05-31 ENCOUNTER — OFFICE VISIT (OUTPATIENT)
Dept: DERMATOLOGY | Facility: CLINIC | Age: 54
End: 2019-05-31
Payer: MEDICARE

## 2019-05-31 DIAGNOSIS — L82.1 SK (SEBORRHEIC KERATOSIS): ICD-10-CM

## 2019-05-31 DIAGNOSIS — R21 RASH: ICD-10-CM

## 2019-05-31 DIAGNOSIS — D22.9 NEVUS: Primary | ICD-10-CM

## 2019-05-31 PROCEDURE — 11104 PUNCH BX SKIN SINGLE LESION: CPT | Performed by: DERMATOLOGY

## 2019-05-31 PROCEDURE — 11310 SHAVE SKIN LESION 0.5 CM/<: CPT | Mod: 59 | Performed by: DERMATOLOGY

## 2019-05-31 PROCEDURE — 11300 SHAVE SKIN LESION 0.5 CM/<: CPT | Mod: 59 | Performed by: DERMATOLOGY

## 2019-05-31 PROCEDURE — 88312 SPECIAL STAINS GROUP 1: CPT | Mod: TC | Performed by: DERMATOLOGY

## 2019-05-31 PROCEDURE — 88305 TISSUE EXAM BY PATHOLOGIST: CPT | Mod: TC | Performed by: DERMATOLOGY

## 2019-05-31 RX ORDER — CLONAZEPAM 1 MG/1
.5-1 TABLET ORAL 2 TIMES DAILY PRN
Qty: 90 TABLET | Refills: 0 | Status: SHIPPED | OUTPATIENT
Start: 2019-05-31 | End: 2019-10-08

## 2019-05-31 ASSESSMENT — PAIN SCALES - GENERAL: PAINLEVEL: NO PAIN (0)

## 2019-05-31 NOTE — PATIENT INSTRUCTIONS

## 2019-05-31 NOTE — PROGRESS NOTES
[facility] Dermatology Note      Dermatology Problem List:  1 Seb derm  -improves with triamcinolone and ketoconazole  2. RA on plaquenil, orencia and sulfasalazine with theum  Encounter Date: May 31, 2019    CC:  Chief Complaint   Patient presents with     Rash     itchy rash around eyes, ears, groin area, posterior scalp and face using triamcinolone ointment previously seen Dr. Isaac         History of Present Illness:  Mr. Lamont Daniels is a 53 year old male who presents for a rash. He has never been seen in our dermatology clinic before. He reports that he has an itchy rash around the eyes, ears, groin area, posterior scalp and the face. He was previously seen by Dr. Isaac in 2016. He has used triamcinolone, ketoconazole and Westcort, but not seen any improvement. The rash on the scalp and years has been there few years, the groin rash comes and goes but has also been there for years. The medication seems to help only temporarily. Also reports of a mole on his back and right cheek that is itchy. No other medical problems. No other concerns.     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     Hyperlipidemia LDL goal <70     Insomnia     Gout     Suicidal ideation     Essential hypertension     Rheumatoid arthritis involving multiple sites, unspecified rheumatoid factor presence (H)     High risk medications (not anticoagulants) long-term use     Midline low back pain without sciatica     Bilateral low back pain with sciatica, sciatica laterality unspecified     Elevated liver enzymes     On corticosteroid therapy     Essential hypertension with  goal blood pressure less than 140/90     Insomnia, unspecified type     Rheumatoid arthritis of multiple sites with negative rheumatoid factor (H)     Benign prostatic hyperplasia with lower urinary tract symptoms, unspecified morphology     Chronic pain syndrome     Past Medical History:   Diagnosis Date     Anxiety      CAD (coronary artery disease)     CABG, PCI     Congestive heart failure, unspecified 2008     Depressive disorder 2008     Gout      Hepatitis B > 10 years     HTN (hypertension)      Hyperlipidemia LDL goal < 100      Malrotation of intestine      Moderate major depression (H)      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) 9/19/2011    Uses CPAP     Rheumatoid arthritis flare (H) 1012     Steatohepatitis 12/15    liver biopsy     Tuberculosis age 13    INH x 9 months      Vitamin D deficiency      Past Surgical History:   Procedure Laterality Date     ABDOMEN SURGERY  2014     BIOPSY  2015     BYPASS GRAFT ARTERY CORONARY  2008    6 vessels     COLONOSCOPY  2/8/2013    Procedure: COLONOSCOPY;  Colonoscopy, blood in stool;  Surgeon: Duane, William Charles, MD;  Location:  OR     COMBINED REPAIR PTOSIS WITH BLEPHAROPLASTY BILATERAL Bilateral 6/25/2018    Procedure: COMBINED REPAIR PTOSIS WITH BLEPHAROPLASTY BILATERAL;  Bilateral upper eyelid blepharoplasty, ptosis repair and brow ptosis repair;  Surgeon: Sandra Borja MD;  Location:  OR     GI SURGERY  2014     HC CORONARY STENT PERCUT, EA ADDTL VESSEL  2008    3 months after CABG     HEAD & NECK SURGERY  2011     NASAL/SINUS POLYPECTOMY  2010     ORTHOPEDIC SURGERY  2012     REPAIR PTOSIS       REPAIR PTOSIS BROW BILATERAL Bilateral 6/25/2018    Procedure: REPAIR PTOSIS BROW BILATERAL;;  Surgeon: Sandra Borja MD;  Location:  OR     THORACIC SURGERY  1989    tb     TONSILLECTOMY  2010     UVULOPALATOPHARYNGOPLASTY  2010     VASCULAR SURGERY  2008       Social History:  Patient reports that he has never smoked. He has never used smokeless tobacco. He  reports that he does not drink alcohol or use drugs.   No personal history of skin cancer.     Family History:  Family History   Problem Relation Age of Onset     C.A.D. Father      Coronary Artery Disease Father      Hypertension Father      Depression Father      Hypertension Mother      Diabetes Mother      Depression Mother      Mental Illness Mother      Hypertension Maternal Grandfather      Cancer Paternal Grandfather      Other Cancer Paternal Grandfather      Other Cancer Other      Autoimmune Disease No family hx of      Cerebrovascular Disease No family hx of      Thyroid Disease No family hx of      Glaucoma No family hx of      Macular Degeneration No family hx of        Medications:  Current Outpatient Medications   Medication Sig Dispense Refill     Abatacept (ORENCIA) 125 MG/ML SOAJ auto-injector Inject 1 mL (125 mg) Subcutaneous every 7 days 4 mL 6     Acetaminophen (TYLENOL PO)        allopurinol (ZYLOPRIM) 300 MG tablet Take 1 tablet (300 mg) by mouth daily 90 tablet 3     aspirin EC 81 MG EC tablet Take 1 tablet (81 mg) by mouth daily (*) 30 tablet 1     atorvastatin (LIPITOR) 80 MG tablet TAKE 1 TABLET BY MOUTH 1 TIME DAILY 90 tablet 3     baclofen (LIORESAL) 10 MG tablet TAKE 1 TABLET BY MOUTH 2 TIMES DAILY AS NEEDED FOR MUSCLE SPASM 180 tablet 2     blood glucose (NO BRAND SPECIFIED) lancets standard Use to test blood sugar 2 times daily or as directed. 100 each 11     blood glucose monitoring (NO BRAND SPECIFIED) meter device kit Use to test blood sugar 2 times daily or as directed. 1 kit 0     blood glucose monitoring (NO BRAND SPECIFIED) test strip Use to test blood sugars 2 times daily or as directed 100 strip 11     busPIRone HCl (BUSPAR) 30 MG tablet TAKE 1 TABLET BY MOUTH 2 TIMES DAILY 180 tablet 0     BUTRANS 20 MCG/HR WK patch Place 1 patch onto the skin every 7 days . Brand name only to start 6/2/19 4 patch 1     Cholecalciferol (VITAMIN D) 2000 UNITS tablet TAKE ONE TABLET BY MOUTH  ONCE DAILY 100 tablet 1     clonazePAM (KLONOPIN) 1 MG tablet Take 0.5-1 tablets (0.5-1 mg) by mouth 2 times daily as needed for anxiety 90 tablet 0     clopidogrel (PLAVIX) 75 MG tablet Take 1 tablet (75 mg) by mouth daily 90 tablet 3     COLCRYS 0.6 MG tablet TAKE 2 TABLETS BY MOUTH AT THE FIRST SIGN OF FLARE, TAKE 1 ADDITIONAL TABLET ONE HOUR LATER. 30 tablet 0     desvenlafaxine succinate (PRISTIQ) 25 MG 24 hr tablet TAKE 1 TABLET BY MOUTH EVERY DAY 90 tablet 1     diclofenac (VOLTAREN) 1 % topical gel Apply 2-4 grams to 2-3 joints, up to four times daily as needed using enclosed dosing card.  Max of 32g/day 300 g 11     erythromycin (ROMYCIN) ophthalmic ointment Apply small amount to incision sites three times daily and to inner lower lid of operative eye(s) at bedtime for 7 days. 3.5 g 0     evolocumab (REPATHA) 140 MG/ML prefilled autoinjector Inject 1 mL (140 mg) Subcutaneous every 14 days 2 mL 11     ezetimibe (ZETIA) 10 MG tablet Take 1 tablet (10 mg) by mouth daily 90 tablet 3     fluticasone (FLONASE) 50 MCG/ACT spray Spray 2 sprays into both nostrils daily 1 Bottle 11     gabapentin (NEURONTIN) 300 MG capsule Take 2 capsules (600 mg) by mouth 3 times daily . 3 month supply 540 capsule 2     gemfibrozil (LOPID) 600 MG tablet Take 1 tablet (600 mg) by mouth 2 times daily 180 tablet 3     hydroxychloroquine (PLAQUENIL) 200 MG tablet Take 1 tablet (200 mg) by mouth daily 90 tablet 1     meclizine (ANTIVERT) 25 MG tablet TAKE 1 TABLET BY MOUTH EVERY 6 HOURS AS NEEDED FOR DIZZINESS 30 tablet 3     melatonin 3 MG tablet Take 1 tablet (3 mg) by mouth nightly as needed for sleep       mirtazapine (REMERON) 15 MG tablet TAKE 1 TABLET BY MOUTH AT BEDTIME 30 tablet 5     naloxone (NARCAN) nasal spray Spray 1 spray (4 mg) into one nostril alternating nostrils as needed for opioid reversal every 2-3 minutes until assistance arrives 0.2 mL 0     nitroGLYcerin (NITROSTAT) 0.4 MG sublingual tablet PLACE 1 TABLET UNDER  THE TONGUE EVERY 5 MINUTES AS NEEDED 25 tablet 1     order for DME Equipment being ordered: single end cane 1 Units 0     ORDER FOR DME 1.  CPAP pressure 11 cm/H20 with heated humidity.   2.  Provide mask to fit and CPAP supplies.   3.  Length of need lifetime.   4.  If needed please provide a chin strap            pantoprazole (PROTONIX) 40 MG EC tablet TAKE 1 TABLET (40 MG) BY MOUTH DAILY 90 tablet 1     predniSONE (DELTASONE) 2.5 MG tablet Take 2.5 mg by mouth daily. 90 tablet 1     sulfaSALAzine ER (AZULFIDINE EN) 500 MG EC tablet Take 2 tablets (1,000 mg) by mouth 2 times daily 360 tablet 1     tamsulosin (FLOMAX) 0.4 MG capsule TAKE ONE CAPSULE BY MOUTH EVERY DAY 90 capsule 3     tenofovir (VIREAD) 300 MG tablet TAKE ONE TABLET BY MOUTH EVERY DAY 90 tablet 3     triamcinolone (KENALOG) 0.1 % external ointment APPLY TOPICALLY TO AFFECTED AREA(S) 3 TIMES DAILY 80 g 1     zolpidem (AMBIEN) 5 MG tablet Take 1 tablet (5 mg) by mouth nightly as needed for sleep 30 tablet 5       Allergies   Allergen Reactions     Citalopram Itching     Tramadol Itching       Review of Systems:  -Skin: as per HPI  -Constitutional: The patient is feeling generally well.  -no sores in mouth    Physical exam:  Vitals: There were no vitals taken for this visit.  GEN: This is a well developed, well-nourished male in no acute distress, in a pleasant mood.    SKIN: Total skin excluding the undergarment areas was performed. The exam included the head/face, neck, both arms, chest, back, abdomen, both legs, digits and/or nails.   - 3.5 by 1 eryth plaque with scale   - Pink scaly erythematous plaques on the left ear   - fleshy papule on the mid back   - Fleshy papule on the right cheek,  - There are waxy stuck on tan to brown papules on the trunk and extremities.  - Multiple regular brown pigmented macules and papules are identified on the trunk and extremities.   - Scaly erythematous patches on the scrotum   - No other lesions of concern on  areas examined.       Impression/Plan:  1. Rash on the occipital scalp and scrotum, DDx Seborrheic dermatitis vs. psoriasis vs. discoid lupus vs. CTCL versus other   Punch biopsy:  After discussion of benefits and risks including but not limited to bleeding/bruising, pain/swelling, infection, scar, incomplete removal, nerve damage/numbness, recurrence, and non-diagnostic biopsy, written consent, verbal consent and photographs were obtained. Time-out was performed. The area was cleaned with isopropyl alcohol. 0.5mL of 1% lidocaine with epinephrine was injected to obtain adequate anesthesia of the lesion on the occipital scalp. A 4 mm punch biopsy was performed.  4-0 prolene sutures were utilized to approximate the epidermal edges.  White petroleum jelly/VaselineTM and a bandage was applied to the wound.  Explicit verbal and written wound care instructions were provided.  The patient left the Dermatology Clinic in good condition. The patient was counseled to follow up for suture removal in approximately 5-7 days.      2. NUB, fleshy papule on the mid back , DDx nevus, pt wants removed for symptoms    Hx of catching and itching, patient wants removed    Shave  :  After discussion of benefits and risks including but not limited to bleeding/bruising, pain/swelling, infection, scar, incomplete removal, nerve damage/numbness, recurrence, and non-diagnostic biopsy, written consent, verbal consent and photographs were obtained. Time-out was performed. The area was cleaned with isopropyl alcohol. 0.5mL of 1% lidocaine with epinephrine was injected to obtain adequate anesthesia of the lesion on the mid back. A shave  removal was performed. Hemostasis was achieved with aluminium chloride. Vaseline and a sterile dressing were applied. The patient tolerated the procedure and no complications were noted. The patient was provided with verbal and written post care instructions.       3. NUB, fleshy papule on the right cheek, DDx  nevus or sk, pt wants removed for symptoms    Shave  :  After discussion of benefits and risks including but not limited to bleeding/bruising, pain/swelling, infection, scar, incomplete removal, nerve damage/numbness, recurrence, and non-diagnostic biopsy, written consent, verbal consent and photographs were obtained. Time-out was performed. The area was cleaned with isopropyl alcohol. 0.5mL of 1% lidocaine with epinephrine was injected to obtain adequate anesthesia of the lesion on the right cheek. A shave  was performed. Hemostasis was achieved with aluminium chloride. Vaseline and a sterile dressing were applied. The patient tolerated the procedure and no complications were noted. The patient was provided with verbal and written post care instructions.       4. Seborrheic Keratoses and multiple clinically benign nevi     5. Subcutaneous nodule on the left medial forearm, most likely cyst or lipoma or other    Schedule removal with Dr. Riley for excision, pt wants removed      Follow-up in 3 weeks, earlier for new or changing lesions.       Staff Involved:  Scribe/Staff    Scribe Disclosure  I, Talita Mendieta, am serving as a scribe to document services personally performed by Dr. Alyssa Roche MD, based on data collection and the provider's statements to me.     Provider Disclosure:   The documentation recorded by the scribe accurately reflects the services I personally performed and the decisions made by me.    Alyssa Roche MD    Department of Dermatology  [facility]: Phone: [phone], Fax:[phone]  [facility]: Phone: [phone], Fax: [phone]

## 2019-05-31 NOTE — TELEPHONE ENCOUNTER
PRIOR AUTHORIZATION DENIED. - if you would like to appeal, please provide detailed letter and routed to [facility] [[id number]].  Medication: diclofenac 1% gel    Amberly Chawla MA

## 2019-05-31 NOTE — LETTER
5/31/2019         RE: Lamont Daniels  [address]  [facility] [address]        Dear Colleague,    Thank you for referring your patient, Lamont Daniels, to the [facility]. Please see a copy of my visit note below.    [facility] Dermatology Note      Dermatology Problem List:  1 Seb derm  -improves with triamcinolone and ketoconazole  2. RA on plaquenil, orencia and sulfasalazine with theum  Encounter Date: May 31, 2019    CC:  Chief Complaint   Patient presents with     Rash     itchy rash around eyes, ears, groin area, posterior scalp and face using triamcinolone ointment previously seen Dr. Isaac         History of Present Illness:  Mr. Lamont Daniels is a 53 year old male who presents for a rash. He has never been seen in our dermatology clinic before. He reports that he has an itchy rash around the eyes, ears, groin area, posterior scalp and the face. He was previously seen by Dr. Isaac in 2016. He has used triamcinolone, ketoconazole and Westcort, but not seen any improvement. The rash on the scalp and years has been there few years, the groin rash comes and goes but has also been there for years. The medication seems to help only temporarily. Also reports of a mole on his back and right cheek that is itchy. No other medical problems. No other concerns.     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     Hyperlipidemia LDL goal <70     Insomnia     Gout     Suicidal ideation     Essential hypertension     Rheumatoid arthritis involving multiple sites, unspecified rheumatoid factor presence (H)     High risk medications  (not anticoagulants) long-term use     Midline low back pain without sciatica     Bilateral low back pain with sciatica, sciatica laterality unspecified     Elevated liver enzymes     On corticosteroid therapy     Essential hypertension with goal blood pressure less than 140/90     Insomnia, unspecified type     Rheumatoid arthritis of multiple sites with negative rheumatoid factor (H)     Benign prostatic hyperplasia with lower urinary tract symptoms, unspecified morphology     Chronic pain syndrome     Past Medical History:   Diagnosis Date     Anxiety      CAD (coronary artery disease)     CABG, PCI     Congestive heart failure, unspecified 2008     Depressive disorder 2008     Gout      Hepatitis B > 10 years     HTN (hypertension)      Hyperlipidemia LDL goal < 100      Malrotation of intestine      Moderate major depression (H)      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) 9/19/2011    Uses CPAP     Rheumatoid arthritis flare (H) 1012     Steatohepatitis 12/15    liver biopsy     Tuberculosis age 13    INH x 9 months      Vitamin D deficiency      Past Surgical History:   Procedure Laterality Date     ABDOMEN SURGERY  2014     BIOPSY  2015     BYPASS GRAFT ARTERY CORONARY  2008    6 vessels     COLONOSCOPY  2/8/2013    Procedure: COLONOSCOPY;  Colonoscopy, blood in stool;  Surgeon: Duane, William Charles, MD;  Location: [facility] OR     COMBINED REPAIR PTOSIS WITH BLEPHAROPLASTY BILATERAL Bilateral 6/25/2018    Procedure: COMBINED REPAIR PTOSIS WITH BLEPHAROPLASTY BILATERAL;  Bilateral upper eyelid blepharoplasty, ptosis repair and brow ptosis repair;  Surgeon: Sandra Borja MD;  Location: [facility] OR     GI SURGERY  2014     HC CORONARY STENT PERCUT, EA ADDTL VESSEL  2008    3 months after CABG     HEAD & NECK SURGERY  2011     NASAL/SINUS POLYPECTOMY  2010     ORTHOPEDIC SURGERY  2012     REPAIR PTOSIS       REPAIR PTOSIS BROW BILATERAL Bilateral 6/25/2018    Procedure: REPAIR PTOSIS BROW BILATERAL;;  Surgeon: Sandra Borja MD;   Location: [facility] OR     THORACIC SURGERY  1989    tb     TONSILLECTOMY  2010     UVULOPALATOPHARYNGOPLASTY  2010     VASCULAR SURGERY  2008       Social History:  Patient reports that he has never smoked. He has never used smokeless tobacco. He reports that he does not drink alcohol or use drugs.   No personal history of skin cancer.     Family History:  Family History   Problem Relation Age of Onset     C.A.D. Father      Coronary Artery Disease Father      Hypertension Father      Depression Father      Hypertension Mother      Diabetes Mother      Depression Mother      Mental Illness Mother      Hypertension Maternal Grandfather      Cancer Paternal Grandfather      Other Cancer Paternal Grandfather      Other Cancer Other      Autoimmune Disease No family hx of      Cerebrovascular Disease No family hx of      Thyroid Disease No family hx of      Glaucoma No family hx of      Macular Degeneration No family hx of        Medications:  Current Outpatient Medications   Medication Sig Dispense Refill     Abatacept (ORENCIA) 125 MG/ML SOAJ auto-injector Inject 1 mL (125 mg) Subcutaneous every 7 days 4 mL 6     Acetaminophen (TYLENOL PO)        allopurinol (ZYLOPRIM) 300 MG tablet Take 1 tablet (300 mg) by mouth daily 90 tablet 3     aspirin EC 81 MG EC tablet Take 1 tablet (81 mg) by mouth daily (*) 30 tablet 1     atorvastatin (LIPITOR) 80 MG tablet TAKE 1 TABLET BY MOUTH 1 TIME DAILY 90 tablet 3     baclofen (LIORESAL) 10 MG tablet TAKE 1 TABLET BY MOUTH 2 TIMES DAILY AS NEEDED FOR MUSCLE SPASM 180 tablet 2     blood glucose (NO BRAND SPECIFIED) lancets standard Use to test blood sugar 2 times daily or as directed. 100 each 11     blood glucose monitoring (NO BRAND SPECIFIED) meter device kit Use to test blood sugar 2 times daily or as directed. 1 kit 0     blood glucose monitoring (NO BRAND SPECIFIED) test strip Use to test blood sugars 2 times daily or as directed 100 strip 11     busPIRone HCl (BUSPAR) 30 MG tablet  TAKE 1 TABLET BY MOUTH 2 TIMES DAILY 180 tablet 0     BUTRANS 20 MCG/HR WK patch Place 1 patch onto the skin every 7 days . Brand name only to start 6/2/19 4 patch 1     Cholecalciferol (VITAMIN D) 2000 UNITS tablet TAKE ONE TABLET BY MOUTH ONCE DAILY 100 tablet 1     clonazePAM (KLONOPIN) 1 MG tablet Take 0.5-1 tablets (0.5-1 mg) by mouth 2 times daily as needed for anxiety 90 tablet 0     clopidogrel (PLAVIX) 75 MG tablet Take 1 tablet (75 mg) by mouth daily 90 tablet 3     COLCRYS 0.6 MG tablet TAKE 2 TABLETS BY MOUTH AT THE FIRST SIGN OF FLARE, TAKE 1 ADDITIONAL TABLET ONE HOUR LATER. 30 tablet 0     desvenlafaxine succinate (PRISTIQ) 25 MG 24 hr tablet TAKE 1 TABLET BY MOUTH EVERY DAY 90 tablet 1     diclofenac (VOLTAREN) 1 % topical gel Apply 2-4 grams to 2-3 joints, up to four times daily as needed using enclosed dosing card.  Max of 32g/day 300 g 11     erythromycin (ROMYCIN) ophthalmic ointment Apply small amount to incision sites three times daily and to inner lower lid of operative eye(s) at bedtime for 7 days. 3.5 g 0     evolocumab (REPATHA) 140 MG/ML prefilled autoinjector Inject 1 mL (140 mg) Subcutaneous every 14 days 2 mL 11     ezetimibe (ZETIA) 10 MG tablet Take 1 tablet (10 mg) by mouth daily 90 tablet 3     fluticasone (FLONASE) 50 MCG/ACT spray Spray 2 sprays into both nostrils daily 1 Bottle 11     gabapentin (NEURONTIN) 300 MG capsule Take 2 capsules (600 mg) by mouth 3 times daily . 3 month supply 540 capsule 2     gemfibrozil (LOPID) 600 MG tablet Take 1 tablet (600 mg) by mouth 2 times daily 180 tablet 3     hydroxychloroquine (PLAQUENIL) 200 MG tablet Take 1 tablet (200 mg) by mouth daily 90 tablet 1     meclizine (ANTIVERT) 25 MG tablet TAKE 1 TABLET BY MOUTH EVERY 6 HOURS AS NEEDED FOR DIZZINESS 30 tablet 3     melatonin 3 MG tablet Take 1 tablet (3 mg) by mouth nightly as needed for sleep       mirtazapine (REMERON) 15 MG tablet TAKE 1 TABLET BY MOUTH AT BEDTIME 30 tablet 5      naloxone (NARCAN) nasal spray Spray 1 spray (4 mg) into one nostril alternating nostrils as needed for opioid reversal every 2-3 minutes until assistance arrives 0.2 mL 0     nitroGLYcerin (NITROSTAT) 0.4 MG sublingual tablet PLACE 1 TABLET UNDER THE TONGUE EVERY 5 MINUTES AS NEEDED 25 tablet 1     order for DME Equipment being ordered: single end cane 1 Units 0     ORDER FOR DME 1.  CPAP pressure 11 cm/H20 with heated humidity.   2.  Provide mask to fit and CPAP supplies.   3.  Length of need lifetime.   4.  If needed please provide a chin strap            pantoprazole (PROTONIX) 40 MG EC tablet TAKE 1 TABLET (40 MG) BY MOUTH DAILY 90 tablet 1     predniSONE (DELTASONE) 2.5 MG tablet Take 2.5 mg by mouth daily. 90 tablet 1     sulfaSALAzine ER (AZULFIDINE EN) 500 MG EC tablet Take 2 tablets (1,000 mg) by mouth 2 times daily 360 tablet 1     tamsulosin (FLOMAX) 0.4 MG capsule TAKE ONE CAPSULE BY MOUTH EVERY DAY 90 capsule 3     tenofovir (VIREAD) 300 MG tablet TAKE ONE TABLET BY MOUTH EVERY DAY 90 tablet 3     triamcinolone (KENALOG) 0.1 % external ointment APPLY TOPICALLY TO AFFECTED AREA(S) 3 TIMES DAILY 80 g 1     zolpidem (AMBIEN) 5 MG tablet Take 1 tablet (5 mg) by mouth nightly as needed for sleep 30 tablet 5       Allergies   Allergen Reactions     Citalopram Itching     Tramadol Itching       Review of Systems:  -Skin: as per HPI  -Constitutional: The patient is feeling generally well.  -no sores in mouth    Physical exam:  Vitals: There were no vitals taken for this visit.  GEN: This is a well developed, well-nourished male in no acute distress, in a pleasant mood.    SKIN: Total skin excluding the undergarment areas was performed. The exam included the head/face, neck, both arms, chest, back, abdomen, both legs, digits and/or nails.   - 3.5 by 1 eryth plaque with scale   - Pink scaly erythematous plaques on the left ear   - fleshy papule on the mid back   - Fleshy papule on the right cheek,  - There are  waxy stuck on tan to brown papules on the trunk and extremities.  - Multiple regular brown pigmented macules and papules are identified on the trunk and extremities.   - Scaly erythematous patches on the scrotum   - No other lesions of concern on areas examined.       Impression/Plan:  1. Rash on the occipital scalp and scrotum, DDx Seborrheic dermatitis vs. psoriasis vs. discoid lupus vs. CTCL versus other   Punch biopsy:  After discussion of benefits and risks including but not limited to bleeding/bruising, pain/swelling, infection, scar, incomplete removal, nerve damage/numbness, recurrence, and non-diagnostic biopsy, written consent, verbal consent and photographs were obtained. Time-out was performed. The area was cleaned with isopropyl alcohol. 0.5mL of 1% lidocaine with epinephrine was injected to obtain adequate anesthesia of the lesion on the occipital scalp. A 4 mm punch biopsy was performed.  4-0 prolene sutures were utilized to approximate the epidermal edges.  White petroleum jelly/VaselineTM and a bandage was applied to the wound.  Explicit verbal and written wound care instructions were provided.  The patient left the Dermatology Clinic in good condition. The patient was counseled to follow up for suture removal in approximately 5-7 days.      2. NUB, fleshy papule on the mid back , DDx nevus, pt wants removed for symptoms    Hx of catching and itching, patient wants removed    Shave  :  After discussion of benefits and risks including but not limited to bleeding/bruising, pain/swelling, infection, scar, incomplete removal, nerve damage/numbness, recurrence, and non-diagnostic biopsy, written consent, verbal consent and photographs were obtained. Time-out was performed. The area was cleaned with isopropyl alcohol. 0.5mL of 1% lidocaine with epinephrine was injected to obtain adequate anesthesia of the lesion on the mid back. A shave  removal was performed. Hemostasis was achieved with aluminium  chloride. Vaseline and a sterile dressing were applied. The patient tolerated the procedure and no complications were noted. The patient was provided with verbal and written post care instructions.       3. NUB, fleshy papule on the right cheek, DDx nevus or sk, pt wants removed for symptoms    Shave  :  After discussion of benefits and risks including but not limited to bleeding/bruising, pain/swelling, infection, scar, incomplete removal, nerve damage/numbness, recurrence, and non-diagnostic biopsy, written consent, verbal consent and photographs were obtained. Time-out was performed. The area was cleaned with isopropyl alcohol. 0.5mL of 1% lidocaine with epinephrine was injected to obtain adequate anesthesia of the lesion on the right cheek. A shave  was performed. Hemostasis was achieved with aluminium chloride. Vaseline and a sterile dressing were applied. The patient tolerated the procedure and no complications were noted. The patient was provided with verbal and written post care instructions.       4. Seborrheic Keratoses and multiple clinically benign nevi     5. Subcutaneous nodule on the left medial forearm, most likely cyst or lipoma or other    Schedule removal with Dr. Riley for excision, pt wants removed      Follow-up in 3 weeks, earlier for new or changing lesions.       Staff Involved:  Scribe/Staff    Scribe Disclosure  I, Talita Mendieta, am serving as a scribe to document services personally performed by Dr. Alyssa Roche MD, based on data collection and the provider's statements to me.     Provider Disclosure:   The documentation recorded by the scribe accurately reflects the services I personally performed and the decisions made by me.    Alyssa Roche MD    Department of Dermatology  [facility]: Phone: [phone], Fax:[phone]  [facility]: Phone:  [phone], Fax: [phone]          Again, thank you for allowing me to participate in the care of your patient.        Sincerely,        Alyssa Roche MD

## 2019-06-03 ENCOUNTER — MYC REFILL (OUTPATIENT)
Dept: FAMILY MEDICINE | Facility: CLINIC | Age: 54
End: 2019-06-03

## 2019-06-03 DIAGNOSIS — F41.9 ANXIETY: Chronic | ICD-10-CM

## 2019-06-03 DIAGNOSIS — F33.1 MAJOR DEPRESSIVE DISORDER, RECURRENT EPISODE, MODERATE (H): ICD-10-CM

## 2019-06-03 NOTE — TELEPHONE ENCOUNTER
Requested Prescriptions   Pending Prescriptions Disp Refills     desvenlafaxine succinate (PRISTIQ) 25 MG 24 hr tablet  Last Written Prescription Date:  12/19/18  Last Fill Quantity: 90,  # refills: 1   Last Office Visit with [facility], [facility] or [facility] prescribing provider:  03/14/19-Ho   Future Office Visit:    Next 5 appointments (look out 90 days)    Venancio 10, 2019  9:00 AM CDT  Return Visit with Dorothea Loo MD  [facility] ([facility]) [address] [facility] [id number] [phone]   Jun 11, 2019  9:00 AM CDT  Nurse Only with NURSE ONLY [facility]  [facility] ([facility]) [id number] [place] [id number] [phone]   Jun 21, 2019  9:00 AM CDT  Return Visit with Fly Travis MD  [facility]) [address] [place] [id number] [phone]   Jul 02, 2019  8:40 AM CDT  Return Visit with Sebastián Jenkins MD  [facility] ([facility]) [address] [facility] [id number] [phone]        90 tablet 1     Sig: Take 1 tablet (25 mg) by mouth daily       Serotonin-Norepinephrine Reuptake Inhibitors  Failed - 6/3/2019  9:47 AM        Failed - PHQ-9 score of less than 5 in past 6 months     Please review last PHQ-9 score.           Passed - Blood pressure under 140/90 in past 12 months     BP Readings from Last 3 Encounters:   04/01/19 127/82   03/26/19 121/80   03/19/19 122/87                 Passed - Medication is active on med list        Passed - Patient is age 18 or older        Passed - Normal serum creatinine on file in past 12 months     Recent Labs   Lab Test 04/01/19  0851  06/24/13  1322   CR 0.89   < >  --    CRPOC  --   --  1.1    < > = values in this interval not displayed.             Passed - Recent (6 mo) or future (30 days) visit within the authorizing provider's specialty      "Patient had office visit in the last 6 months or has a visit in the next 30 days with authorizing provider or within the authorizing provider's specialty.  See \"Patient Info\" tab in inbasket, or \"Choose Columns\" in Meds & Orders section of the refill encounter.              "

## 2019-06-03 NOTE — TELEPHONE ENCOUNTER
Is there a way to know what the covered indications are?  He has been using this and it is helpful, so I am willing to appeal, but I need to know if perhaps he meets criteria but I just put it in incorrectly.  I am wondering if it is a different label for arthritis.    Lian Loo MD  [facility]

## 2019-06-04 NOTE — PROGRESS NOTES
Assessment & Plan   Lamont Daniels is a 53 year old male who presents with:   Review of systems for the eyes was negative other than the pertinent positives and negatives noted in the HPI.    Glaucoma suspect of both eyes - Both Eyes      Attending Physician Attestation:  Complete documentation of historical and exam elements from today's encounter can be found in the full encounter summary report (not reduplicated in this progress note).  I personally obtained the chief complaint(s) and history of present illness.  I confirmed and edited as necessary the review of systems, past medical/surgical history, family history, social history, and examination findings as documented by others; and I examined the patient myself.  I personally reviewed the relevant tests, images, and reports as documented above.  I formulated and edited as necessary the assessment and plan and discussed the findings and management plan with the patient and family. - Ricardo Rae MD

## 2019-06-05 LAB — COPATH REPORT: NORMAL

## 2019-06-05 NOTE — TELEPHONE ENCOUNTER
PHQ-9 SCORE 4/25/2017 6/1/2018 3/14/2019   PHQ-9 Total Score - - -   PHQ-9 Total Score 21 18 12     Routing refill request to provider for review/approval because:  PHQ-9 is 5 or more. Per protocol, RN cannot refill if PHQ-9 is over 4.        Shreya Rascon RN, BSN, PHN

## 2019-06-05 NOTE — TELEPHONE ENCOUNTER
Diclofenac gel is covered for the diagnosis of osteoarthritis. (It is not covered for hip or shoulder joints) Covered joints include: wrist, hand, knee, ankle, feet, elbows.

## 2019-06-06 RX ORDER — DESVENLAFAXINE 25 MG/1
25 TABLET, EXTENDED RELEASE ORAL DAILY
Qty: 90 TABLET | Refills: 1 | Status: SHIPPED | OUTPATIENT
Start: 2019-06-06 | End: 2019-10-08

## 2019-06-06 NOTE — TELEPHONE ENCOUNTER
CTS Progress Note       LOS: 2 days   Patient Care Team:  Steff Tellez MD as PCP - General (Internal Medicine)  Barbara Villarreal APRN as Nurse Practitioner (Cardiology)    Chief Complaint: Peripheral arterial disease (CMS/HCC)    Vital Signs:  Temp:  [97.7 °F (36.5 °C)-98.3 °F (36.8 °C)] 98.3 °F (36.8 °C)  Heart Rate:  [56-74] 60  Resp:  [14-20] 14  BP: (101-144)/() 124/59    Physical Exam: Feet warm and viable       Results:   Results from last 7 days   Lab Units 06/05/19  0251   WBC 10*3/mm3 6.68   HEMOGLOBIN g/dL 11.5*   HEMATOCRIT % 35.8   PLATELETS 10*3/mm3 203     Results from last 7 days   Lab Units 06/06/19  0433   SODIUM mmol/L 124*   POTASSIUM mmol/L 3.5   CHLORIDE mmol/L 86*   CO2 mmol/L 26.0   BUN mg/dL 38*   CREATININE mg/dL 0.98   GLUCOSE mg/dL 100*   CALCIUM mg/dL 8.5*           Imaging Results (last 24 hours)     ** No results found for the last 24 hours. **          Assessment      Peripheral arterial disease (CMS/HCC)    Former smoker    COPD (chronic obstructive pulmonary disease) (CMS/HCC)    A-fib (CMS/HCC)    The feet are warm and viable.  The patient is still very unsteady on her feet and is only been ambulating in her room.  At this point, I anticipate her discharge home on Saturday when she is more mobile.  Hyponatremia per intensivist.    Plan   Ambulate with physical therapy      Please note that portions of this note were completed with a voice recognition program. Efforts were made to edit the dictations, but occasionally words are mistranscribed.    Keyur Maki MD  06/06/19  6:39 AM       rx in basket for fax.    David Chavez MD

## 2019-06-07 ENCOUNTER — TELEPHONE (OUTPATIENT)
Dept: PALLIATIVE MEDICINE | Facility: CLINIC | Age: 54
End: 2019-06-07

## 2019-06-07 DIAGNOSIS — M06.9 RHEUMATOID ARTHRITIS INVOLVING MULTIPLE SITES, UNSPECIFIED RHEUMATOID FACTOR PRESENCE: Primary | ICD-10-CM

## 2019-06-07 RX ORDER — BUPRENORPHINE 5 UG/H
1 PATCH TRANSDERMAL
Qty: 4 PATCH | Refills: 0 | Status: SHIPPED | OUTPATIENT
Start: 2019-06-07 | End: 2019-07-22 | Stop reason: DRUGHIGH

## 2019-06-07 RX ORDER — BUPRENORPHINE 15 UG/H
1 PATCH TRANSDERMAL
Qty: 4 PATCH | Refills: 0 | Status: SHIPPED | OUTPATIENT
Start: 2019-06-07 | End: 2019-07-22 | Stop reason: DRUGHIGH

## 2019-06-07 NOTE — TELEPHONE ENCOUNTER
Prior Authorization Retail Medication Request    Medication/Dose: buprenorphine (BUTRANS) 15 MCG/HR Wk patch  ICD code (if different than what is on RX):    Previously Tried and Failed:    Rationale:  Dose adjustment. Patient requires Brand Name due to generic patches fall off    Insurance Name:  JOHN  Insurance ID:  [id number]  PHONE: [phone]      Pharmacy Information       [facility] [address] [place] [facility]  Phone: [phone] Fax: [phone]

## 2019-06-07 NOTE — TELEPHONE ENCOUNTER
PA Initiation    Medication: diclofenac 1% gel - INITIATED  Insurance Company: Jose - Phone [phone] Fax [phone]  Pharmacy Filling the Rx: [id number] [place] [address] - [address] [place] [address]  Filling Pharmacy Phone: [phone]  Filling Pharmacy Fax:    Start Date: 6/7/2019

## 2019-06-07 NOTE — TELEPHONE ENCOUNTER
Called patient for different issue.  He let me know that his pharmacy did not have butrans 20mcg name brand on supply.  He has been out ~5 days. This is consistent with timing of last refill.  He states he needs brand name as the generic falls off.    Called pharmacy.  They verify that new script was not given.  Spoke with pharmacist who has not been working on this, who doesn't understand delay or lack of communication with patient or clinic.  Patient's has family at the pharmacy who have been managing this, and 5 days passed without medication because of trying to get brand name.    Pharmacist and myself spoke about my concerns about this situation, including family involved and letting him be off for 5 days rather than coming up with a different plan.  He will be addressing on his side.    In addition, I spoke with patient about my concerns.  In addition, he should have reached out to me.    Plan:  1. He is [facility] [place] [facility] 5mcg/hr.  He will do 1 week 5mcg, then next week he will start 10mcg (2 of the 5mcg/hr patches.    2. I will start 15mcg/hr patch in 2 weeks- will [facility] [place]. He will remain on this for 1 month.  3. Following month, would increase to 20mcg.    Signed Prescriptions:                        Disp   Refills    buprenorphine (BUTRANS) 5 MCG/HR WK patch  4 patch0        Sig: Place 1 patch onto the skin every 7 days  Authorizing Provider: KAYLA CHUN    buprenorphine (BUTRANS) 15 MCG/HR Wk patch 4 patch0        Sig: Place 1 patch onto the skin every 7 days . Name brand           only.  Fill 6/19 to start 6/21  Authorizing Provider: KAYLA CHUN

## 2019-06-07 NOTE — TELEPHONE ENCOUNTER
New order placed for diclofenac gel with diagnosis of knee osteoarthirtis.    Signed Prescriptions:                        Disp   Refills    diclofenac (VOLTAREN) 1 % topical gel      300 g  11       Sig: Apply 4 grams to bilateral knees, up to four times           daily as needed using enclosed dosing card.  Max           of 32g/day  Authorizing Provider: KAYLA CHUN MD  [facility]

## 2019-06-10 ENCOUNTER — TELEPHONE (OUTPATIENT)
Dept: OPHTHALMOLOGY | Facility: CLINIC | Age: 54
End: 2019-06-10

## 2019-06-10 ENCOUNTER — OFFICE VISIT (OUTPATIENT)
Dept: PALLIATIVE MEDICINE | Facility: CLINIC | Age: 54
End: 2019-06-10
Payer: MEDICARE

## 2019-06-10 VITALS
BODY MASS INDEX: 32.74 KG/M2 | HEART RATE: 66 BPM | DIASTOLIC BLOOD PRESSURE: 87 MMHG | SYSTOLIC BLOOD PRESSURE: 130 MMHG | WEIGHT: 179 LBS

## 2019-06-10 DIAGNOSIS — G47.33 OSA (OBSTRUCTIVE SLEEP APNEA): ICD-10-CM

## 2019-06-10 DIAGNOSIS — F41.9 ANXIETY: ICD-10-CM

## 2019-06-10 DIAGNOSIS — M06.9 RHEUMATOID ARTHRITIS INVOLVING MULTIPLE SITES, UNSPECIFIED RHEUMATOID FACTOR PRESENCE: Primary | ICD-10-CM

## 2019-06-10 PROCEDURE — 99214 OFFICE O/P EST MOD 30 MIN: CPT | Performed by: PSYCHIATRY & NEUROLOGY

## 2019-06-10 ASSESSMENT — PAIN SCALES - GENERAL: PAINLEVEL: MODERATE PAIN (5)

## 2019-06-10 NOTE — TELEPHONE ENCOUNTER
Prior Authorization Approval    Authorization Effective Date: 12/30/2018  Authorization Expiration Date: 12/31/2019  Medication: Butrans- APPROVED  Approved Dose/Quantity: 4 PER 28 DAYS  Reference #:     Insurance Company: Jose - Phone [phone] Fax [phone]  Expected CoPay:       CoPay Card Available:      Foundation Assistance Needed:    Which Pharmacy is filling the prescription (Not needed for infusion/clinic administered): [facility] [address] [place] [address]  Pharmacy Notified: Yes  Patient Notified: Yes  **Instructed pharmacy to notify patient when script is ready to /ship.**

## 2019-06-10 NOTE — PATIENT INSTRUCTIONS
Butrans titration:  Week 1: 5mcg patch  Week 2: two of the 5mcg patch= 10mcg  Week 3:  NEW patch- you will get a script for 15mcg patch- I have put in brand name only.  You should call ahead and make sure they have this on time.      Tips:  1- call us 7-10 days in advance for refill  2. When we call you to say it is sent in, then reach out to pharmacy to make sure they order it      Follow up in 3 months.  I want to hear from you if any trouble getting up on the dose.  Cut back on the clonazepam as much as possible.  ----------------------------------------------------------------  Clinic Number:  [phone]   Call this number with any questions about your care and for scheduling assistance. Calls are returned Monday through Friday between 8 AM and 4:30 PM. We usually get back to you within 2 business days depending on the issue/request.       Medication refills:    For non-opioid medications, call your pharmacy directly to request a refill. The pharmacy will contact the [facility] for authorization. Please allow 3-4 days for these refills to be processed.     For opioid refills, call the clinic number or send a [employer] message. Please contact us 7-10 days before your refill is due. The message MUST include the name of the specific medication(s) requested and how you would like to receive the prescription(s). The options are as follows:    Pain Clinic staff can mail the prescription to your pharmacy. Please tell us the name of the pharmacy.    You may pick the prescription up at the Pain Clinic (tell us the location) or during a clinic visit with your pain provider    Pain Clinic staff can deliver the prescription to the [place] pharmacy in the clinic building. Please tell us the location.      We believe regular attendance is key to your success in our program.    Any time you are unable to keep your appointment we ask that you call us at least 24 hours in advance to let us know. This will allow us  to offer the appointment time to another patient.

## 2019-06-10 NOTE — TELEPHONE ENCOUNTER
Prior Authorization Approval    Authorization Effective Date: 6/8/2019  Authorization Expiration Date: 12/31/2019  Medication: diclofenac 1% gel - APPROVED  Approved Dose/Quantity: 300g per 10 days  Reference #: [id number]   Insurance Company: [employer] - Phone [phone] Fax [phone]  Expected CoPay:       CoPay Card Available:      Foundation Assistance Needed:    Which Pharmacy is filling the prescription (Not needed for infusion/clinic administered): [facility] [id number] [place] [address]  Pharmacy Notified: Yes  Patient Notified: Yes    Received call from Liz at [facility]. Appeal was approved. Medication is approved until 12/31/19.

## 2019-06-10 NOTE — TELEPHONE ENCOUNTER
Faxed current [facility] to [facility] on 06/10/2019      [facility]    Phone Message    May a detailed message be left on voicemail: no    Reason for Call: Other: [facility] requesting a fax of his prescription.  They were unable to get a clear copy from this phone. Anlaisa Please fax to [phone]    Action Taken: Message routed to:  Adult Clinics: Eye p [id number]     Sakina Almazan  Primary Care   [facility]

## 2019-06-10 NOTE — PROGRESS NOTES
"[facility]    Date of visit: 6/10/2019     Chief complaint:   Chief Complaint   Patient presents with     Pain       Interval history:  Lamont Daniels was last seen by me on 3/11/19    Recommendations/plan at the last visit included:  1. Physical Therapy:  Completed pool therapy in past.  2. Clinical Health Psychologist to address issues of relaxation, behavioral change, coping style, and other factors important to improvement.  He is looking for a new therapist due to insurance changes.  3. Diagnostic Studies: UDS and opioid agreement today  4. Medication Management:    1. May continue Butrans 20 mcg/hr wk patches.  2. Risk is improved, and JEROD is better based on CPAP readings from sleep specialist, but he is still takes clonazepam 1 mg BID and also takes ambien.  He said he is willing to try a lower dose of clonazepam, so will send this to PCP to assist with this, as they are prescribing benzo.  This will help to further lower his risk. He has narcan at home. Reviewed proper usage of this with him today. Advised he needs to remind family of this.  3. Advised that if his sleep medication (Ambien) isn't very helpful, that I would not combine with other meds.    5. Further procedures recommended: None at this time.  We discussed back facet injections but he isn't wanting at this time  6. Recommendations to PCP:  See above re: clonazepam  7. Follow up: 3 months    Since his last visit, Lamont Daniels reports:  -He is currently taking Butrans 5mcg. He will increase to 10 mcg on Friday, June 21st.  -Reports stress level is high with reduced dose and increased pain.  -Has required increasing his clonazepam from 1 mg BID to 1mg TID for anxiety. He has alerted his primary care provider that he increased this due to worsening pain/anxiety. Started originally by psychiatry. Now prescribed by primary, Dr. Chavez.  -Pain is located \"all over in his joints\" worse in his low back.  -Continues to " take zolpidem almost every night.  -Has naloxone spray.    Pain scores:  Pain intensity on average is 6 on a scale of 0-10.     Current pain treatment  butrans 5mcg/hr  Acetaminophen 500 mg 2 tabs prn, increased to 2-3x per week due to decrease dose of Butrans  Gabapentin 600mg 3 times daily  Baclofen 10mg BID   Diclofenac gel prn  Ambien 5 mg -not every night    Previous medication treatments included:  Codeine, oxycodone, hydrocodone- given for other issues- helpful  Cymbalta 60mg daily- given for mental health- was given by Dr. Acosta- not been higher  Baclofen 10mg at bedtime- not helpful, no side effects  Robaxin 500 mg 1 tablet, 1-3 times a day depending on the day  Ibuprofen 800 mg- using 3-4 times per week, helpful but started meloxicam  Meloxicam  Oxycodone 5 mg : takes 6/day    Other treatments have included:  Lamont Daniels has not been seen at a pain clinic in the past.    PT: has done for various reasons, most recently the low back.  Acupuncture: as done a couple of needles with adjustment  TENs Unit: no  Injections: no    Side Effects: no    Medications:  Current Outpatient Medications   Medication Sig Dispense Refill     Abatacept (ORENCIA) 125 MG/ML SOAJ auto-injector Inject 1 mL (125 mg) Subcutaneous every 7 days 4 mL 6     Acetaminophen (TYLENOL PO)        allopurinol (ZYLOPRIM) 300 MG tablet Take 1 tablet (300 mg) by mouth daily 90 tablet 3     aspirin EC 81 MG EC tablet Take 1 tablet (81 mg) by mouth daily (*) 30 tablet 1     atorvastatin (LIPITOR) 80 MG tablet TAKE 1 TABLET BY MOUTH 1 TIME DAILY 90 tablet 3     baclofen (LIORESAL) 10 MG tablet TAKE 1 TABLET BY MOUTH 2 TIMES DAILY AS NEEDED FOR MUSCLE SPASM 180 tablet 2     blood glucose (NO BRAND SPECIFIED) lancets standard Use to test blood sugar 2 times daily or as directed. 100 each 11     blood glucose monitoring (NO BRAND SPECIFIED) meter device kit Use to test blood sugar 2 times daily or as directed. 1 kit 0     blood glucose monitoring (NO  BRAND SPECIFIED) test strip Use to test blood sugars 2 times daily or as directed 100 strip 11     buprenorphine (BUTRANS) 15 MCG/HR Wk patch Place 1 patch onto the skin every 7 days . Name brand only.  Fill 6/19 to start 6/21 4 patch 0     buprenorphine (BUTRANS) 5 MCG/HR WK patch Place 1 patch onto the skin every 7 days 4 patch 0     busPIRone HCl (BUSPAR) 30 MG tablet TAKE 1 TABLET BY MOUTH 2 TIMES DAILY 180 tablet 0     Cholecalciferol (VITAMIN D) 2000 UNITS tablet TAKE ONE TABLET BY MOUTH ONCE DAILY 100 tablet 1     clonazePAM (KLONOPIN) 1 MG tablet Take 0.5-1 tablets (0.5-1 mg) by mouth 2 times daily as needed for anxiety 90 tablet 0     clopidogrel (PLAVIX) 75 MG tablet Take 1 tablet (75 mg) by mouth daily 90 tablet 3     COLCRYS 0.6 MG tablet TAKE 2 TABLETS BY MOUTH AT THE FIRST SIGN OF FLARE, TAKE 1 ADDITIONAL TABLET ONE HOUR LATER. 30 tablet 0     desvenlafaxine succinate (PRISTIQ) 25 MG 24 hr tablet Take 1 tablet (25 mg) by mouth daily 90 tablet 1     diclofenac (VOLTAREN) 1 % topical gel Apply 4 grams to bilateral knees, up to four times daily as needed using enclosed dosing card.  Max of 32g/day 300 g 11     erythromycin (ROMYCIN) ophthalmic ointment Apply small amount to incision sites three times daily and to inner lower lid of operative eye(s) at bedtime for 7 days. 3.5 g 0     evolocumab (REPATHA) 140 MG/ML prefilled autoinjector Inject 1 mL (140 mg) Subcutaneous every 14 days 2 mL 11     ezetimibe (ZETIA) 10 MG tablet Take 1 tablet (10 mg) by mouth daily 90 tablet 3     fluticasone (FLONASE) 50 MCG/ACT spray Spray 2 sprays into both nostrils daily 1 Bottle 11     gabapentin (NEURONTIN) 300 MG capsule Take 2 capsules (600 mg) by mouth 3 times daily . 3 month supply 540 capsule 2     gemfibrozil (LOPID) 600 MG tablet Take 1 tablet (600 mg) by mouth 2 times daily 180 tablet 3     hydroxychloroquine (PLAQUENIL) 200 MG tablet Take 1 tablet (200 mg) by mouth daily 90 tablet 1     meclizine (ANTIVERT) 25  MG tablet TAKE 1 TABLET BY MOUTH EVERY 6 HOURS AS NEEDED FOR DIZZINESS 30 tablet 3     melatonin 3 MG tablet Take 1 tablet (3 mg) by mouth nightly as needed for sleep       mirtazapine (REMERON) 15 MG tablet TAKE 1 TABLET BY MOUTH AT BEDTIME 30 tablet 5     naloxone (NARCAN) nasal spray Spray 1 spray (4 mg) into one nostril alternating nostrils as needed for opioid reversal every 2-3 minutes until assistance arrives 0.2 mL 0     nitroGLYcerin (NITROSTAT) 0.4 MG sublingual tablet PLACE 1 TABLET UNDER THE TONGUE EVERY 5 MINUTES AS NEEDED 25 tablet 1     order for DME Equipment being ordered: single end cane 1 Units 0     ORDER FOR DME 1.  CPAP pressure 11 cm/H20 with heated humidity.   2.  Provide mask to fit and CPAP supplies.   3.  Length of need lifetime.   4.  If needed please provide a chin strap            pantoprazole (PROTONIX) 40 MG EC tablet TAKE 1 TABLET (40 MG) BY MOUTH DAILY 90 tablet 1     predniSONE (DELTASONE) 2.5 MG tablet Take 2.5 mg by mouth daily. 90 tablet 1     sulfaSALAzine ER (AZULFIDINE EN) 500 MG EC tablet Take 2 tablets (1,000 mg) by mouth 2 times daily 360 tablet 1     tamsulosin (FLOMAX) 0.4 MG capsule TAKE ONE CAPSULE BY MOUTH EVERY DAY 90 capsule 3     tenofovir (VIREAD) 300 MG tablet TAKE ONE TABLET BY MOUTH EVERY DAY 90 tablet 3     triamcinolone (KENALOG) 0.1 % external ointment APPLY TOPICALLY TO AFFECTED AREA(S) 3 TIMES DAILY 80 g 1     zolpidem (AMBIEN) 5 MG tablet Take 1 tablet (5 mg) by mouth nightly as needed for sleep 30 tablet 5       Medical History: any changes in medical history since they were last seen? None    Review of Systems:  The 14 system ROS was reviewed from the intake questionnaire: Stress, Anxiety, Joint Pain, Stiffness, arthritis, fatigue  Any bowel or bladder problems: None  Mood: depression - stable.  He continues to look for a psychologist.    Physical Exam:  Blood pressure 130/87, pulse 66, weight 81.2 kg (179 lb).  General: mildly distressed, awake and  alert  Gait: Antalgic, slow, use of single point cane  MSK exam: Diffuse tenderness to his bilateral MCP and Wrist. No significant swelling at the joint, or redness.    Pain with sit to standing. Tenderness to palpation in the lower lumbar segments at L4-5, L5-S1 bilaterally    Assessment:   1. Chronic low back pain, with strong facet and myofascial features  2. Rheumatoid arthritis  3. Peripheral neuropathy  4. Depression, anxiety  5. Hep B - on meds, in search of new hepatologist  6. Gout  7. JEROD, currently treated with CPAP, central apnea ok    Plan: =  1. Physical Therapy:  Completed pool therapy in past.  2. Clinical Health Psychologist to address issues of relaxation, behavioral change, coping style, and other factors important to improvement.  He is looking for a new therapist due to insurance changes. Offered additional resources to assist with finding a psychologist but patient declined.  3. Diagnostic Studies: None  4. Medication Management:    1. Continue Butrans 5 mcg/hr wk patches with titration plan to eventually reach previous 20 mcg/hr week dosing. He will call us 7-10 days in advance for a refill. The patient was advised to reach out to pharmacy to ensure they order it.  2. Risk is improved, and JEROD is better based on CPAP readings from sleep specialist, but he is still takes clonazepam.  Again discussed this with him today. He has narcan at home. Reviewed proper usage of this with him today. Advised he needs to remind family of this.  5. Further procedures recommended: None at this time.  Previously discussed back facet injections but he isn't wanting this at this time  6. Recommendations to PCP: See above re: clonazepam  7. Follow up: 3 months    The patient was seen and discussed with staff, Dr. Cinda Dupont, DO  Pain Medicine Fellow, PGY-5    I saw and examined the patient with the Pain Fellow/Resident. I have reviewed and agree with the resident's note and plan of care and  made changes and corrections directly to the body of the note.    TIME SPENT:  BY FELLOW/RESIDENT ALONE 15 MIN  BY MYSELF AND FELLOW/RESIDENT TOGETHER 0 MIN  BY MYSELF WITHOUT THE FELLOW/RESIDENT 25 MIN    These times included 15 minutes I spent counseling him about his diagnosis and treatment options and coordination of care with the primary team    Lian Loo MD  [facility] [place] [facility]

## 2019-06-10 NOTE — TELEPHONE ENCOUNTER
PA Initiation    Medication: Butrans- INITIATED  Insurance Company: [employer] - Phone [phone] Fax [phone]  Pharmacy Filling the Rx: [id number] - [address]RA[address] - [address] [place] [address]  Filling Pharmacy Phone: [phone]  Filling Pharmacy Fax:    Start Date: 6/10/2019

## 2019-06-11 ENCOUNTER — TELEPHONE (OUTPATIENT)
Dept: OPHTHALMOLOGY | Facility: CLINIC | Age: 54
End: 2019-06-11

## 2019-06-11 ENCOUNTER — ALLIED HEALTH/NURSE VISIT (OUTPATIENT)
Dept: NURSING | Facility: CLINIC | Age: 54
End: 2019-06-11
Payer: MEDICARE

## 2019-06-11 DIAGNOSIS — L40.9 PSORIASIS: Primary | ICD-10-CM

## 2019-06-11 DIAGNOSIS — Z48.02 VISIT FOR SUTURE REMOVAL: ICD-10-CM

## 2019-06-11 DIAGNOSIS — R21 RASH: ICD-10-CM

## 2019-06-11 PROCEDURE — 99207 ZZC NO CHARGE NURSE ONLY: CPT | Performed by: DERMATOLOGY

## 2019-06-11 RX ORDER — CLOBETASOL PROPIONATE 0.5 MG/ML
SOLUTION TOPICAL
Qty: 60 ML | Refills: 2 | Status: SHIPPED | OUTPATIENT
Start: 2019-06-11 | End: 2019-07-12

## 2019-06-11 RX ORDER — TRIAMCINOLONE ACETONIDE 1 MG/G
OINTMENT TOPICAL
Qty: 80 G | Refills: 1 | Status: SHIPPED | OUTPATIENT
Start: 2019-06-11

## 2019-06-11 RX ORDER — HYDROCORTISONE VALERATE CREAM 2 MG/G
CREAM TOPICAL
Qty: 60 G | Refills: 0 | Status: SHIPPED | OUTPATIENT
Start: 2019-06-11 | End: 2019-07-12

## 2019-06-11 NOTE — TELEPHONE ENCOUNTER
Health Call Center    Phone Message    May a detailed message be left on voicemail: yes    Reason for Call: Form or Letter   Type or form/letter needing completion: Pre op  Provider: Dr. Flores  Date form needed: asap  Once completed: Fax form to: Dr. Chavez in [facility]      Action Taken: Message routed to:  Adult Clinics: Orthopedics p [phone]

## 2019-06-11 NOTE — PATIENT INSTRUCTIONS
Back - neurofibroma.  Collection of nerves.  Harmless.  No further treatment is needed.    Right cheek - harmless skin growth.  No further treatment is needed.    Scalp - consitent with psoriasis. I will send a new prescription for clobetasol solution.  Apply this twice daily to your scalp for up to 2 weeks.  Repeat as needed for flares.  I will provide 2 refills.    It's okay to use triamcinolone already prescribed by Dr. Chavez for your trunk and extremities twice daily for up to 2 weeks for flares.    For genitals, start westcort cream, apply twice daily for up to 2 weeks.  There will be no refills of this. If it is too expensive we can send in a prescription for hydrocortisone 2.5% cream instead.

## 2019-06-11 NOTE — NURSING NOTE
Dermatology Suture Removal Record  S: Lamont presents to the clinic today for  removal of sutures.  The patient has had the sutures in place for 12 days.    There has been no history of infection or drainage.    O: 3 sutures are seen located on the occipital scalp.  The wound is healing well with no signs of infection.    A: Suture removal.    P:  All sutures were easily removed today.  Vaseline applied  Routine wound care discussed.  The patient will follow up in one month.  Joann Rod RN

## 2019-06-13 NOTE — TELEPHONE ENCOUNTER
Phone call to pt, explained that Dr Chavez will use their preop forms, no additional forms are needed.  Pt verbalized understanding.  Gisela Mena RN

## 2019-06-17 ENCOUNTER — MYC REFILL (OUTPATIENT)
Dept: GASTROENTEROLOGY | Facility: CLINIC | Age: 54
End: 2019-06-17

## 2019-06-17 DIAGNOSIS — B18.1 CHRONIC VIRAL HEPATITIS B WITHOUT DELTA AGENT AND WITHOUT COMA (H): ICD-10-CM

## 2019-06-17 RX ORDER — TENOFOVIR DISOPROXIL FUMARATE 300 MG/1
300 TABLET, FILM COATED ORAL DAILY
Qty: 90 TABLET | Refills: 3 | OUTPATIENT
Start: 2019-06-17

## 2019-06-20 ENCOUNTER — MYC REFILL (OUTPATIENT)
Dept: PALLIATIVE MEDICINE | Facility: CLINIC | Age: 54
End: 2019-06-20

## 2019-06-20 ENCOUNTER — OFFICE VISIT (OUTPATIENT)
Dept: FAMILY MEDICINE | Facility: CLINIC | Age: 54
End: 2019-06-20
Payer: COMMERCIAL

## 2019-06-20 VITALS
DIASTOLIC BLOOD PRESSURE: 73 MMHG | OXYGEN SATURATION: 97 % | RESPIRATION RATE: 16 BRPM | BODY MASS INDEX: 31.28 KG/M2 | SYSTOLIC BLOOD PRESSURE: 123 MMHG | HEIGHT: 62 IN | HEART RATE: 62 BPM | WEIGHT: 170 LBS | TEMPERATURE: 97.7 F

## 2019-06-20 DIAGNOSIS — Z01.818 PREOP GENERAL PHYSICAL EXAM: Primary | ICD-10-CM

## 2019-06-20 DIAGNOSIS — M06.9 RHEUMATOID ARTHRITIS INVOLVING MULTIPLE SITES, UNSPECIFIED RHEUMATOID FACTOR PRESENCE: ICD-10-CM

## 2019-06-20 PROCEDURE — 99214 OFFICE O/P EST MOD 30 MIN: CPT | Performed by: FAMILY MEDICINE

## 2019-06-20 RX ORDER — BUPRENORPHINE 15 UG/H
1 PATCH TRANSDERMAL
Qty: 4 PATCH | Refills: 0 | Status: CANCELLED | OUTPATIENT
Start: 2019-06-20

## 2019-06-20 ASSESSMENT — PAIN SCALES - GENERAL: PAINLEVEL: MODERATE PAIN (5)

## 2019-06-20 ASSESSMENT — MIFFLIN-ST. JEOR: SCORE: 1495.36

## 2019-06-20 NOTE — PATIENT INSTRUCTIONS
At [facility], we strive to deliver an exceptional experience to you, every time we see you.  If you receive a survey in the mail, please send us back your thoughts. We really do value your feedback.    Based on your medical history, these are the current health maintenance/preventive care services that you are due for (some may have been done at this visit.)  Health Maintenance Due   Topic Date Due     ZOSTER IMMUNIZATION (1 of 2) 11/11/2015         Suggested websites for health information:  Www.[employer].org : Up to date and easily searchable information on multiple topics.  Www.QuadROI.gov : medication info, interactive tutorials, watch real surgeries online  Www.familydoctor.org : good info from the Academy of Family Physicians  Www.cdc.gov : public health info, travel advisories, epidemics (H1N1)  Www.aap.org : children's health info, normal development, vaccinations  Www.health.[facility].[place].us : [place] dept of health, public health issues in [place], N1N1    Your care team:                            Family Medicine Internal Medicine   MD Noam Dorado MD Shantel Branch-Fleming, MD Katya Georgiev PA-C Nam Ho, MD Pediatrics   Robbin Trevino PAISAK Lazaro, CNP MD Tiffany Perla CNP, MD Deborah Mielke, MD Kim Thein, APRN CNP      Clinic hours: Monday - Thursday 7 am-7 pm; Fridays 7 am-5 pm.   Urgent care: Monday - Friday 11 am-9 pm; Saturday and Sunday 9 am-5 pm.  Pharmacy : Monday -Thursday 8 am-8 pm; Friday 8 am-6 pm; Saturday and Sunday 9 am-5 pm.     Clinic: [phone]   Pharmacy: [phone]    Before Your Surgery      Call your surgeon if there is any change in your health. This includes signs of a cold or flu (such as a sore throat, runny nose, cough, rash or fever).    Do not smoke, drink alcohol or take over the counter medicine (unless your surgeon or primary care doctor tells you to) for the 24 hours before  and after surgery.    If you take prescribed drugs: Follow your doctor s orders about which medicines to take and which to stop until after surgery.    Eating and drinking prior to surgery: follow the instructions from your surgeon    Take a shower or bath the night before surgery. Use the soap your surgeon gave you to gently clean your skin. If you do not have soap from your surgeon, use your regular soap. Do not shave or scrub the surgery site.  Wear clean pajamas and have clean sheets on your bed.

## 2019-06-20 NOTE — TELEPHONE ENCOUNTER
[employer] message from patient on 6/20 at 1351:    Lamont Daniels would like a refill of the following medications:         buprenorphine (BUTRANS) 15 MCG/HR Wk patch [Dorothea Loo MD]       Patient Comment: Could you please sent the [facility] to pharmacy for next month     Preferred pharmacy: [facility] [id number] [facility] [employer] - [place] [address]   ------------    Will route to MA pool for assistance with gathering opioid refill information.    Jenna Hunt, ROBERTON, RN-BC  Patient Care Supervisor/Care Coordinator  [facility] [place]

## 2019-06-20 NOTE — PROGRESS NOTES
[address] [address]  [phone]  Dept: [phone]    PRE-OP EVALUATION:  Today's date: 2019    Lamont Daniels (: 1965) presents for pre-operative evaluation assessment as requested by Dr. Borja.  He requires evaluation and anesthesia risk assessment prior to undergoing surgery/procedure for treatment of upper eyelids .    Proposed Surgery/ Procedure: repair of both upper eyelids   Date of Surgery/ Procedure: 2019  Time of Surgery/ Procedure: 9:10 AM   Hospital/Surgical Facility: [facility]   Fax number for surgical facility:   Primary Physician: David Chavez  Type of Anesthesia Anticipated: to be determined    Patient has a Health Care Directive or Living Will:  NO    1. YES - DO YOU HAVE A HISTORY OF HEART ATTACK, STROKE, STENT, BYPASS OR SURGERY ON AN ARTERY IN THE HEAD, NECK, HEART OR LEG?   2. YES - DO YOU EVER HAVE ANY PAIN OR DISCOMFORT IN YOUR CHEST?   3. NO - Do you have a history of  Heart Failure?  4. YES - ARE YOUR TROUBLED BY SHORTNESS OF BREATH WHEN WALKING ON THE LEVEL, UP A SLIGHT HILL OR AT NIGHT?   5. NO - Do you currently have a cold, bronchitis or other respiratory infection?  6. NO - Do you have a cough, shortness of breath or wheezing?  7. YES - DO YOU SOMETIMES GET PAINS IN THE CALVES OF YOUR LEGS WHEN YOU WALK?   8. NO - Do you or anyone in your family have previous history of blood clots?  9. NO - Do you or does anyone in your family have a serious bleeding problem such as prolonged bleeding following surgeries or cuts?  10. NO - Have you ever had problems with anemia or been told to take iron pills?  11. NO - Have you had any abnormal blood loss such as black, tarry or bloody stools, or abnormal vaginal bleeding?  12. NO - Have you ever had a blood transfusion?  13. NO - Have you or any of your relatives ever had problems with anesthesia?  14. YES - DO YOU HAVE SLEEP APNEA, EXCESSIVE SNORING OR  DAYTIME DROWSINESS?   15. NO - Do you have any prosthetic heart valves?  16. NO - Do you have prosthetic joints?  17. NO - Is there any chance that you may be pregnant?      HPI:     HPI related to upcoming procedure: h/o ptosis of eyes causing vision problems.      See problem list for active medical problems.  Problems all longstanding and stable, except as noted/documented.  See ROS for pertinent symptoms related to these conditions.      MEDICAL HISTORY:     Patient Active Problem List    Diagnosis Date Noted     Chronic pain syndrome 03/26/2019     Priority: Medium     Benign prostatic hyperplasia with lower urinary tract symptoms, unspecified morphology 03/14/2017     Priority: Medium     Rheumatoid arthritis of multiple sites with negative rheumatoid factor (H) 01/17/2017     Priority: Medium     Insomnia, unspecified type 11/22/2016     Priority: Medium     Essential hypertension with goal blood pressure less than 140/90 05/19/2016     Priority: Medium     On corticosteroid therapy 04/22/2016     Priority: Medium     Elevated liver enzymes 11/30/2015     Priority: Medium     Bilateral low back pain with sciatica, sciatica laterality unspecified 11/17/2015     Priority: Medium     Rheumatoid arthritis involving multiple sites, unspecified rheumatoid factor presence (H) 11/11/2015     Priority: Medium     High risk medications (not anticoagulants) long-term use 11/11/2015     Priority: Medium     Midline low back pain without sciatica 11/11/2015     Priority: Medium     Essential hypertension 10/22/2015     Priority: Medium     Suicidal ideation 12/08/2014     Priority: Medium     Gout 08/22/2014     Priority: Medium     Problem list name updated by automated process. Provider to review       Insomnia 08/05/2014     Priority: Medium     Hyperlipidemia LDL goal <70 03/21/2014     Priority: Medium     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 08/05/2013     Priority: Medium     Malrotation of  "intestine 06/27/2013     Priority: Medium     S/P CABG (coronary artery bypass graft) 08/21/2012     Priority: Medium     Bypass 6  \" Coronary\" vessels at the age of 42        Vitamin D deficiency 05/14/2012     Priority: Medium     Hepatitis B infection 12/26/2011     Priority: Medium     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) 09/19/2011     Priority: Medium     Sleep Study 9/13/11  Sleep Architecture:  The total recording time of the diagnostic portion of the study was 192.7 minutes.  The total sleep time was 129.5 minutes.  During the diagnostic portion of the study the sleep latency was 30.2 minutes after taking Ambien 10 mg.  No REM sleep observed. Sleep Efficiency was 67.2%.  Wake after sleep onset was 27.5.   The patient spent 23.2% of total sleep time in Stage N1, 72.6% in Stage N2, 4.2% in Stages N3 and 0.0% in REM.         Respiration:     Sustained Sleep Associated Hypoventilation -was not monitored.    Sleep Associated Hypoxemia - was present.  Baseline oxygen saturation was 93.6%.  The lowest oxygen saturation was 75.4%.  Snoring was reported as loud.     Events - During the diagnostic portion of the study, the polysomnogram revealed a presence of 5 obstructive apneas resulting in an Apnea index of 30.1 events per hour.  There were 21 hypopneas resulting in a Hypopnea index of 9.7 events per hour.  The combined Apnea/Hypopnea Index was 39.8 events per hour.  The REM AHI was n/a.  The RERA index was 7.9 per hour.   The RDI was 47.7  .  47.7   7.9 39.8     Treatment PSG  Sleep Architecture:  At 1:22 AM the patient was placed on CPAP treatment and was titrated at pressures ranging from 5 cm/H20 up to 11 cm/H20.  The total recording time of the treatment portion of the study was 334.5 minutes.  The total sleep time was 212.5 minutes.  During the treatment portion of the study the sleep latency was 3.5 minutes.  REM latency was 313.5 minutes.  Sleep Efficiency was 63.5%.  Sleep Maintenance Efficiency was 63.5%.  Total wake " time was 122.5 minutes for a total wake percentage of 34.5%. the patient spent 17.2% of total sleep time in Stage N1, 53.2% in Stage N2, 24.9% in Stages N3 and N, and 4.7% in REM.       Respiration:  The optimal pressure was 11.0 with an AHI of 1.3.    Movement Activity:      Limb - During the diagnostic portion of the study, there were 3 limb movements recorded.  Of this total, 0 were classified as PLMs.  Of the PLMs, 0 were associated with arousals.  The Limb Movement index was 1.4 per hour while the PLM index was 0.0 per hour.    Behavior - None    Bruxism - None    Seizure - None      CARDIAC SUMMARY:   During the diagnostic portion of the study, the average pulse rate was 56.1 bpm.  The minimum pulse rate was 39.0 bpm while the maximum pulse rate was 77.0 bpm.    During the treatment portion of the study, the average pulse rate was 52.7 bpm.  The minimum pulse rate was 47.0 bpm while the maximum pulse rate was 73.0 bpm.   Assessment:    Severe JEROD (AHI 40) with adequate positive airway pressure titration including REM supine.    History of UPPP.  Recommendations:    CPAP pressure 11 cmH2O with clinical follow-up within 3 - 4 weeks including compliance measures.    Consider a chin strap       Coronary atherosclerosis of native coronary artery      Priority: Medium     Major depressive disorder, recurrent episode, moderate (H)      Priority: Medium     Anxiety      Priority: Medium      Past Medical History:   Diagnosis Date     Anxiety      CAD (coronary artery disease)     CABG, PCI     Congestive heart failure, unspecified 2008     Depressive disorder 2008     Gout      Hepatitis B > 10 years     HTN (hypertension)      Hyperlipidemia LDL goal < 100      Malrotation of intestine      Moderate major depression (H)      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) 9/19/2011    Uses CPAP     Rheumatoid arthritis flare (H) 1012     Steatohepatitis 12/15    liver biopsy     Tuberculosis age 13    INH x 9 months      Vitamin D deficiency       Past Surgical History:   Procedure Laterality Date     ABDOMEN SURGERY  2014     BIOPSY  2015     BYPASS GRAFT ARTERY CORONARY  2008    6 vessels     COLONOSCOPY  2/8/2013    Procedure: COLONOSCOPY;  Colonoscopy, blood in stool;  Surgeon: Duane, William Charles, MD;  Location: [facility] OR     COMBINED REPAIR PTOSIS WITH BLEPHAROPLASTY BILATERAL Bilateral 6/25/2018    Procedure: COMBINED REPAIR PTOSIS WITH BLEPHAROPLASTY BILATERAL;  Bilateral upper eyelid blepharoplasty, ptosis repair and brow ptosis repair;  Surgeon: Sandra Borja MD;  Location: [facility] OR     GI SURGERY  2014     HC CORONARY STENT PERCUT, EA ADDTL VESSEL  2008    3 months after CABG     HEAD & NECK SURGERY  2011     NASAL/SINUS POLYPECTOMY  2010     ORTHOPEDIC SURGERY  2012     REPAIR PTOSIS       REPAIR PTOSIS BROW BILATERAL Bilateral 6/25/2018    Procedure: REPAIR PTOSIS BROW BILATERAL;;  Surgeon: Sandra Borja MD;  Location: [facility] OR     THORACIC SURGERY  1989    tb     TONSILLECTOMY  2010     UVULOPALATOPHARYNGOPLASTY  2010     VASCULAR SURGERY  2008     Current Outpatient Medications   Medication Sig Dispense Refill     Abatacept (ORENCIA) 125 MG/ML SOAJ auto-injector Inject 1 mL (125 mg) Subcutaneous every 7 days 4 mL 6     Acetaminophen (TYLENOL PO)        allopurinol (ZYLOPRIM) 300 MG tablet Take 1 tablet (300 mg) by mouth daily 90 tablet 3     aspirin EC 81 MG EC tablet Take 1 tablet (81 mg) by mouth daily (*) 30 tablet 1     atorvastatin (LIPITOR) 80 MG tablet TAKE 1 TABLET BY MOUTH 1 TIME DAILY 90 tablet 3     baclofen (LIORESAL) 10 MG tablet TAKE 1 TABLET BY MOUTH 2 TIMES DAILY AS NEEDED FOR MUSCLE SPASM 180 tablet 2     blood glucose (NO BRAND SPECIFIED) lancets standard Use to test blood sugar 2 times daily or as directed. 100 each 11     blood glucose monitoring (NO BRAND SPECIFIED) meter device kit Use to test blood sugar 2 times daily or as directed. 1 kit 0     blood glucose monitoring (NO BRAND SPECIFIED) test strip Use to test  blood sugars 2 times daily or as directed 100 strip 11     buprenorphine (BUTRANS) 15 MCG/HR Wk patch Place 1 patch onto the skin every 7 days . Name brand only.  Fill 6/19 to start 6/21 4 patch 0     buprenorphine (BUTRANS) 5 MCG/HR WK patch Place 1 patch onto the skin every 7 days 4 patch 0     busPIRone HCl (BUSPAR) 30 MG tablet TAKE 1 TABLET BY MOUTH 2 TIMES DAILY 180 tablet 0     Cholecalciferol (VITAMIN D) 2000 UNITS tablet TAKE ONE TABLET BY MOUTH ONCE DAILY 100 tablet 1     clobetasol (TEMOVATE) 0.05 % external solution Apply twice daily to scalp for up to 2 weeks for flares. 60 mL 2     clonazePAM (KLONOPIN) 1 MG tablet Take 0.5-1 tablets (0.5-1 mg) by mouth 2 times daily as needed for anxiety 90 tablet 0     clopidogrel (PLAVIX) 75 MG tablet Take 1 tablet (75 mg) by mouth daily 90 tablet 3     COLCRYS 0.6 MG tablet TAKE 2 TABLETS BY MOUTH AT THE FIRST SIGN OF FLARE, TAKE 1 ADDITIONAL TABLET ONE HOUR LATER. 30 tablet 0     desvenlafaxine succinate (PRISTIQ) 25 MG 24 hr tablet Take 1 tablet (25 mg) by mouth daily 90 tablet 1     diclofenac (VOLTAREN) 1 % topical gel Apply 4 grams to bilateral knees, up to four times daily as needed using enclosed dosing card.  Max of 32g/day 300 g 11     erythromycin (ROMYCIN) ophthalmic ointment Apply small amount to incision sites three times daily and to inner lower lid of operative eye(s) at bedtime for 7 days. 3.5 g 0     evolocumab (REPATHA) 140 MG/ML prefilled autoinjector Inject 1 mL (140 mg) Subcutaneous every 14 days 2 mL 11     ezetimibe (ZETIA) 10 MG tablet Take 1 tablet (10 mg) by mouth daily 90 tablet 3     fluticasone (FLONASE) 50 MCG/ACT spray Spray 2 sprays into both nostrils daily 1 Bottle 11     gabapentin (NEURONTIN) 300 MG capsule Take 2 capsules (600 mg) by mouth 3 times daily . 3 month supply 540 capsule 2     gemfibrozil (LOPID) 600 MG tablet Take 1 tablet (600 mg) by mouth 2 times daily 180 tablet 3     hydrocortisone (WESTCORT) 0.2 % external cream  For genitals, apply twice daily for up to 2 weeks 60 g 0     hydroxychloroquine (PLAQUENIL) 200 MG tablet Take 1 tablet (200 mg) by mouth daily 90 tablet 1     meclizine (ANTIVERT) 25 MG tablet TAKE 1 TABLET BY MOUTH EVERY 6 HOURS AS NEEDED FOR DIZZINESS 30 tablet 3     melatonin 3 MG tablet Take 1 tablet (3 mg) by mouth nightly as needed for sleep       mirtazapine (REMERON) 15 MG tablet TAKE 1 TABLET BY MOUTH AT BEDTIME 30 tablet 5     naloxone (NARCAN) nasal spray Spray 1 spray (4 mg) into one nostril alternating nostrils as needed for opioid reversal every 2-3 minutes until assistance arrives 0.2 mL 0     nitroGLYcerin (NITROSTAT) 0.4 MG sublingual tablet PLACE 1 TABLET UNDER THE TONGUE EVERY 5 MINUTES AS NEEDED 25 tablet 1     order for DME Equipment being ordered: single end cane 1 Units 0     ORDER FOR DME 1.  CPAP pressure 11 cm/H20 with heated humidity.   2.  Provide mask to fit and CPAP supplies.   3.  Length of need lifetime.   4.  If needed please provide a chin strap            pantoprazole (PROTONIX) 40 MG EC tablet TAKE 1 TABLET (40 MG) BY MOUTH DAILY 90 tablet 1     predniSONE (DELTASONE) 2.5 MG tablet Take 2.5 mg by mouth daily. 90 tablet 1     sulfaSALAzine ER (AZULFIDINE EN) 500 MG EC tablet Take 2 tablets (1,000 mg) by mouth 2 times daily 360 tablet 1     tamsulosin (FLOMAX) 0.4 MG capsule TAKE ONE CAPSULE BY MOUTH EVERY DAY 90 capsule 3     tenofovir (VIREAD) 300 MG tablet Take 1 tablet (300 mg) by mouth daily 90 tablet 3     triamcinolone (KENALOG) 0.1 % external ointment Apply to trunk and extremities twice daily for up to 2 weeks for flares 80 g 1     zolpidem (AMBIEN) 5 MG tablet Take 1 tablet (5 mg) by mouth nightly as needed for sleep 30 tablet 5     OTC products: Aspirin    Allergies   Allergen Reactions     Citalopram Itching     Tramadol Itching      Latex Allergy: NO    Social History     Tobacco Use     Smoking status: Never Smoker     Smokeless tobacco: Never Used   Substance Use  "Topics     Alcohol use: No     Alcohol/week: 0.0 oz     History   Drug Use No       REVIEW OF SYSTEMS:   CONSTITUTIONAL: NEGATIVE for fever, chills, change in weight  ENT/MOUTH: NEGATIVE for ear, mouth and throat problems  RESP: NEGATIVE for significant cough or SOB  CV: NEGATIVE for chest pain, palpitations or peripheral edema    EXAM:   /73 (BP Location: Left arm, Patient Position: Chair, Cuff Size: Adult Regular)   Pulse 62   Temp 97.7  F (36.5  C) (Oral)   Resp 16   Ht 1.575 m (5' 2\")   Wt 77.1 kg (170 lb)   SpO2 97%   BMI 31.09 kg/m    GENERAL APPEARANCE: healthy, alert and no distress  HENT: ear canals and TM's normal and nose and mouth without ulcers or lesions  RESP: lungs clear to auscultation - no rales, rhonchi or wheezes  CV: regular rate and rhythm, normal S1 S2, no S3 or S4 and no murmur, click or rub   ABDOMEN: soft, nontender, no HSM or masses and bowel sounds normal  NEURO: Normal strength and tone, sensory exam grossly normal, mentation intact and speech normal    DIAGNOSTICS:       Recent Labs   Lab Test 04/01/19  0851 03/11/19  0841 11/20/18  0844  12/04/17  0924  08/12/16  1451  04/22/16  1318   HGB 15.3 16.1 14.8   < > 13.9   < >  --    < >  --     309 285   < > 253   < >  --    < >  --    INR 1.11  --   --   --  1.02   < >  --   --   --      --  141  --  144   < >  --    < >  --    POTASSIUM 3.6  --  3.7  --  3.6   < >  --    < >  --    CR 0.89 0.98 0.92   < > 0.87   < >  --    < >  --    A1C  --   --   --   --   --   --  5.9  --  6.0    < > = values in this interval not displayed.        IMPRESSION:   Reason for surgery/procedure: REPAIR, PTOSIS, BOTH UPPER EYELIDS  Diagnosis/reason for consult: preop clearance    The proposed surgical procedure is considered LOW risk.    REVISED CARDIAC RISK INDEX  The patient has the following serious cardiovascular risks for perioperative complications such as (MI, PE, VFib and 3  AV Block):  Coronary Artery Disease (MI, positive " stress test, angina, Qs on EKG)  INTERPRETATION: 1 risks: Class II (low risk - 0.9% complication rate)    The patient has the following additional risks for perioperative complications:  No identified additional risks      ICD-10-CM    1. Preop general physical exam Z01.818        RECOMMENDATIONS:       --Patient is to take all scheduled medications on the day of surgery EXCEPT for modifications listed below.    Anticoagulant or Antiplatelet Medication Use  ASPIRIN: Discontinue ASA 7-10 days prior to procedure to reduce bleeding risk.  It should be resumed post-operatively.  PLAVIX: No contraindication to stopping plavix.  Stop 7-10 days prior to surgery        APPROVAL GIVEN to proceed with proposed procedure, without further diagnostic evaluation       Signed Electronically by: David Chavez MD  Addendum:   Patient's surgery reschedule for 7/22/2019. Okay with surgery on 7/22/2019. David Chavez MD    Copy of this evaluation report is provided to requesting physician.    [place] Preop Guidelines    Revised Cardiac Risk Index

## 2019-06-21 ENCOUNTER — OFFICE VISIT (OUTPATIENT)
Dept: CARDIOLOGY | Facility: CLINIC | Age: 54
End: 2019-06-21
Payer: COMMERCIAL

## 2019-06-21 VITALS
BODY MASS INDEX: 31.28 KG/M2 | HEART RATE: 58 BPM | WEIGHT: 171 LBS | DIASTOLIC BLOOD PRESSURE: 79 MMHG | SYSTOLIC BLOOD PRESSURE: 115 MMHG | OXYGEN SATURATION: 98 %

## 2019-06-21 DIAGNOSIS — M06.09 RHEUMATOID ARTHRITIS OF MULTIPLE SITES WITH NEGATIVE RHEUMATOID FACTOR (H): ICD-10-CM

## 2019-06-21 DIAGNOSIS — E78.49 FAMILIAL HYPERLIPIDEMIA: ICD-10-CM

## 2019-06-21 DIAGNOSIS — E78.5 HYPERLIPIDEMIA LDL GOAL <70: Primary | ICD-10-CM

## 2019-06-21 LAB
ALBUMIN SERPL-MCNC: 3.9 G/DL (ref 3.4–5)
ALP SERPL-CCNC: 103 U/L (ref 40–150)
ALT SERPL W P-5'-P-CCNC: 84 U/L (ref 0–70)
AST SERPL W P-5'-P-CCNC: 47 U/L (ref 0–45)
BASOPHILS # BLD AUTO: 0.1 10E9/L (ref 0–0.2)
BASOPHILS NFR BLD AUTO: 0.8 %
BILIRUB DIRECT SERPL-MCNC: 0.2 MG/DL (ref 0–0.2)
BILIRUB SERPL-MCNC: 0.5 MG/DL (ref 0.2–1.3)
CREAT SERPL-MCNC: 0.9 MG/DL (ref 0.66–1.25)
CRP SERPL-MCNC: <2.9 MG/L (ref 0–8)
DIFFERENTIAL METHOD BLD: NORMAL
EOSINOPHIL # BLD AUTO: 0.2 10E9/L (ref 0–0.7)
EOSINOPHIL NFR BLD AUTO: 2.2 %
ERYTHROCYTE [DISTWIDTH] IN BLOOD BY AUTOMATED COUNT: 14.9 % (ref 10–15)
ERYTHROCYTE [SEDIMENTATION RATE] IN BLOOD BY WESTERGREN METHOD: 13 MM/H (ref 0–20)
GFR SERPL CREATININE-BSD FRML MDRD: >90 ML/MIN/{1.73_M2}
HCT VFR BLD AUTO: 42.1 % (ref 40–53)
HGB BLD-MCNC: 14.2 G/DL (ref 13.3–17.7)
IMM GRANULOCYTES # BLD: 0 10E9/L (ref 0–0.4)
IMM GRANULOCYTES NFR BLD: 0.4 %
LYMPHOCYTES # BLD AUTO: 2.5 10E9/L (ref 0.8–5.3)
LYMPHOCYTES NFR BLD AUTO: 32.4 %
MCH RBC QN AUTO: 29.6 PG (ref 26.5–33)
MCHC RBC AUTO-ENTMCNC: 33.7 G/DL (ref 31.5–36.5)
MCV RBC AUTO: 88 FL (ref 78–100)
MONOCYTES # BLD AUTO: 1 10E9/L (ref 0–1.3)
MONOCYTES NFR BLD AUTO: 12.9 %
NEUTROPHILS # BLD AUTO: 4 10E9/L (ref 1.6–8.3)
NEUTROPHILS NFR BLD AUTO: 51.3 %
PLATELET # BLD AUTO: 289 10E9/L (ref 150–450)
PROT SERPL-MCNC: 7.5 G/DL (ref 6.8–8.8)
RBC # BLD AUTO: 4.79 10E12/L (ref 4.4–5.9)
WBC # BLD AUTO: 7.7 10E9/L (ref 4–11)

## 2019-06-21 PROCEDURE — 85025 COMPLETE CBC W/AUTO DIFF WBC: CPT | Performed by: INTERNAL MEDICINE

## 2019-06-21 PROCEDURE — 36415 COLL VENOUS BLD VENIPUNCTURE: CPT | Performed by: INTERNAL MEDICINE

## 2019-06-21 PROCEDURE — 82565 ASSAY OF CREATININE: CPT | Performed by: INTERNAL MEDICINE

## 2019-06-21 PROCEDURE — 86140 C-REACTIVE PROTEIN: CPT | Performed by: INTERNAL MEDICINE

## 2019-06-21 PROCEDURE — 85652 RBC SED RATE AUTOMATED: CPT | Performed by: INTERNAL MEDICINE

## 2019-06-21 PROCEDURE — 99214 OFFICE O/P EST MOD 30 MIN: CPT | Performed by: INTERNAL MEDICINE

## 2019-06-21 PROCEDURE — 80076 HEPATIC FUNCTION PANEL: CPT | Performed by: INTERNAL MEDICINE

## 2019-06-21 ASSESSMENT — PAIN SCALES - GENERAL: PAINLEVEL: MODERATE PAIN (5)

## 2019-06-21 NOTE — TELEPHONE ENCOUNTER
Reviewed chart. On 6/7, Dr. Loo wrote the following prescription:    buprenorphine (BUTRANS) 15 MCG/HR Wk patch 4 patch 0 6/7/2019  --   Sig - Route: Place 1 patch onto the skin every 7 days . Name brand only.  Fill 6/19 to start 6/21 - Transdermal   Sent to pharmacy as: buprenorphine (BUTRANS) 15 MCG/HR Wk patch   Class: E-Prescribe   Notes to Pharmacy: Moved up titration to 1 week per dose (5mcg and 10mcg using other script). Starting this 15mcg/hr dose during Week 3. Advised him he would stay on this dose for 1 month.     On 6/7 there was a plan made for the patient to increase back to his usual dose of 20 mcg:    Plan:  1. He is picking up script today from [facility] in [place] for 5mcg/hr.  He will do 1 week 5mcg, then next week he will start 10mcg (2 of the 5mcg/hr patches.     2. I will start 15mcg/hr patch in 2 weeks- will send to his normal [facility] in [place]. He will remain on this for 1 month.   3. Following month, would increase to 20mcg.   ------------  The 15 mcg patch is at the pharmacy.     Will call pt.     ROBERTO KarimiN, RN-BC  Patient Care Supervisor/Care Coordinator  [facility]

## 2019-06-21 NOTE — NURSING NOTE
Lamont Daniels's goals for this visit include:   Chief Complaint   Patient presents with     RECHECK     coronary artery disease       He requests these members of his care team be copied on today's visit information: no    PCP: David Chavez    Referring Provider:  David Chavez MD  [id number] [place] [address]    /79 (BP Location: Left arm, Patient Position: Sitting, Cuff Size: Adult Regular)   Pulse 58   Wt 77.6 kg (171 lb)   SpO2 98%   BMI 31.28 kg/m      Do you need any medication refills at today's visit? No    Ilene Anderson LPN

## 2019-06-21 NOTE — PROGRESS NOTES
[facility] Cardiology Consultation:    Assessment and Plan:     1. Premature CAD, CABG in 2008, PCI to Cx in '08, repeat PCI to Cx stent 2016 (patent sequential LIMA-D3-LAD, sequential SVG-RV marginal-PDA, occluded radial graft to PL branches), normal LV fxn: Cont secondary prevention with asa. Plan to continue plavix long term for now given aggressive ISR and low bleeding risk.   Plavix can be stopped 7 days prior to any planned surgery or procedure like his upcoming eyelid surgery, and resumed afterwards.  2. Dyspnea: stable. Echo and PFT's have been checked and were normal; its likely related to anxiety.    3. Dyslipidemia, definite heterozygous FH ([place] [facility] score -10), metabolic syndrome: On lipitor 80, gemfibrozil, zetia 10 and Repatha for uncontrolled dyspilidemia despite maximal rx. Lipid profile has improved significantly since initiating Repatha.  Lipid profile checked earlier this year shows LDL has increased from 40s to 70s - will recheck today.  Continue to recheck annually.   Recommend genetic testing, and he is agreeable.  Will refer to our genetic counselor, Gisela Hill.  Also recommended that all his kids should have lipid profiles checked.  4. JREOD on CPAP  5. Orthostatic dizziness: Continues to have mild symptoms. No improvement on stopping metoprolol.    RTC 1 yr with SHOBHA Travis MD    Cardiac Imaging and Prevention  [facility]  Pager: [phone]    HPI:    Complex medical history including dyslipidemia, definite heterozygous FH with early CAD s/p CABG '08, PCI to Cx in '08, repeat PCI to Cx stent 2016, RA and anxiety who presents for follow-up.  He has had a good year, with no medical events or hospitalization for CAD.  He says that he has been compliant with all his medications, including Repatha.  My medical assistant had suggested pharmacist consultation to help with his medications, but he thinks he does not need it.  I  checked an echo last year for evaluation of lower extremity edema.  The echo was normal.  I also stopped his metoprolol to see if it helps with his episodic positional dizziness.  He does not feel like that helped.  Overall, he is in good spirits today.  He feels all his symptoms are at baseline.  He has not had to take any sublingual nitroglycerin.  He has upcoming repeat eyelid surgery, and is wondering what to do with his Plavix.  Denies any chest pain or pressure, shortness of breath/dyspnea, orthopnea, pnd, palpitations, syncope.        EXAM:  /79 (BP Location: Left arm, Patient Position: Sitting, Cuff Size: Adult Regular)   Pulse 58   Wt 77.6 kg (171 lb)   SpO2 98%   BMI 31.28 kg/m    GEN/CONSTITUIONAL: Appears comfortable, in no apparent distress   EYES: No icterus  ENT/MOUTH: Normal  JVP:  Not visible  RESPIRATORY: Clear to auscultation bilaterally   CARDIOVASCULAR: Regular S1 and S2, no murmurs, rubs, or gallops.   ABDOMEN: Soft, non-tender, positive bowel sounds   NEUROLOGIC: Grossly non-focal   PSYCHIATRIC: Normal affect  EXT: No cyanosis, clubbing, with trace LE edema. Normal pedal pulses.  Skin: No petechiae, purpura or rash    PAST MEDICAL HISTORY:  Past Medical History:   Diagnosis Date     Anxiety      CAD (coronary artery disease)     CABG, PCI     Congestive heart failure, unspecified 2008     Depressive disorder 2008     Gout      Hepatitis B > 10 years     HTN (hypertension)      Hyperlipidemia LDL goal < 100      Malrotation of intestine      Moderate major depression (H)      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) 9/19/2011    Uses CPAP     Rheumatoid arthritis flare (H) 1012     Steatohepatitis 12/15    liver biopsy     Tuberculosis age 13    INH x 9 months      Vitamin D deficiency        CURRENT MEDICATIONS:  Current Outpatient Medications   Medication     Abatacept (ORENCIA) 125 MG/ML SOAJ auto-injector     Acetaminophen (TYLENOL PO)     allopurinol (ZYLOPRIM) 300 MG tablet     aspirin EC 81 MG EC  tablet     atorvastatin (LIPITOR) 80 MG tablet     baclofen (LIORESAL) 10 MG tablet     blood glucose (NO BRAND SPECIFIED) lancets standard     blood glucose monitoring (NO BRAND SPECIFIED) meter device kit     blood glucose monitoring (NO BRAND SPECIFIED) test strip     buprenorphine (BUTRANS) 15 MCG/HR Wk patch     buprenorphine (BUTRANS) 5 MCG/HR WK patch     busPIRone HCl (BUSPAR) 30 MG tablet     Cholecalciferol (VITAMIN D) 2000 UNITS tablet     clobetasol (TEMOVATE) 0.05 % external solution     clonazePAM (KLONOPIN) 1 MG tablet     clopidogrel (PLAVIX) 75 MG tablet     COLCRYS 0.6 MG tablet     desvenlafaxine succinate (PRISTIQ) 25 MG 24 hr tablet     diclofenac (VOLTAREN) 1 % topical gel     erythromycin (ROMYCIN) ophthalmic ointment     evolocumab (REPATHA) 140 MG/ML prefilled autoinjector     ezetimibe (ZETIA) 10 MG tablet     fluticasone (FLONASE) 50 MCG/ACT spray     gabapentin (NEURONTIN) 300 MG capsule     gemfibrozil (LOPID) 600 MG tablet     hydrocortisone (WESTCORT) 0.2 % external cream     hydroxychloroquine (PLAQUENIL) 200 MG tablet     meclizine (ANTIVERT) 25 MG tablet     melatonin 3 MG tablet     mirtazapine (REMERON) 15 MG tablet     naloxone (NARCAN) nasal spray     nitroGLYcerin (NITROSTAT) 0.4 MG sublingual tablet     order for DME     ORDER FOR DME     pantoprazole (PROTONIX) 40 MG EC tablet     predniSONE (DELTASONE) 2.5 MG tablet     sulfaSALAzine ER (AZULFIDINE EN) 500 MG EC tablet     tamsulosin (FLOMAX) 0.4 MG capsule     tenofovir (VIREAD) 300 MG tablet     triamcinolone (KENALOG) 0.1 % external ointment     zolpidem (AMBIEN) 5 MG tablet     No current facility-administered medications for this visit.      Facility-Administered Medications Ordered in Other Visits   Medication     gadobutrol (GADAVIST) injection 10 mL       PAST SURGICAL HISTORY:  Past Surgical History:   Procedure Laterality Date     ABDOMEN SURGERY  2014     BIOPSY  2015     BYPASS GRAFT ARTERY CORONARY  2008    6  vessels     COLONOSCOPY  2/8/2013    Procedure: COLONOSCOPY;  Colonoscopy, blood in stool;  Surgeon: Duane, William Charles, MD;  Location: [facility] OR     COMBINED REPAIR PTOSIS WITH BLEPHAROPLASTY BILATERAL Bilateral 6/25/2018    Procedure: COMBINED REPAIR PTOSIS WITH BLEPHAROPLASTY BILATERAL;  Bilateral upper eyelid blepharoplasty, ptosis repair and brow ptosis repair;  Surgeon: Sandra Borja MD;  Location: [facility] OR     GI SURGERY  2014     HC CORONARY STENT PERCUT, EA ADDTL VESSEL  2008    3 months after CABG     HEAD & NECK SURGERY  2011     NASAL/SINUS POLYPECTOMY  2010     ORTHOPEDIC SURGERY  2012     REPAIR PTOSIS       REPAIR PTOSIS BROW BILATERAL Bilateral 6/25/2018    Procedure: REPAIR PTOSIS BROW BILATERAL;;  Surgeon: Sandra Borja MD;  Location: [facility] OR     THORACIC SURGERY  1989    tb     TONSILLECTOMY  2010     UVULOPALATOPHARYNGOPLASTY  2010     VASCULAR SURGERY  2008       ALLERGIES     Allergies   Allergen Reactions     Citalopram Itching     Tramadol Itching       FAMILY HISTORY:  Family History   Problem Relation Age of Onset     C.A.D. Father      Coronary Artery Disease Father      Hypertension Father      Depression Father      Hypertension Mother      Diabetes Mother      Depression Mother      Mental Illness Mother      Hypertension Maternal Grandfather      Cancer Paternal Grandfather      Other Cancer Paternal Grandfather      Other Cancer Other      Autoimmune Disease No family hx of      Cerebrovascular Disease No family hx of      Thyroid Disease No family hx of      Glaucoma No family hx of      Macular Degeneration No family hx of    -oldest of 8 siblings     SOCIAL HISTORY:  Social History     Socioeconomic History     Marital status:      Spouse name: Not on file     Number of children: 5     Years of education: 14     Highest education level: Not on file   Occupational History     Occupation: [employer]     Employer: UNEMPLOYED     Comment: 2-2011. On long term  disability. SSD applied for   Social Needs     Financial resource strain: Not on file     Food insecurity:     Worry: Not on file     Inability: Not on file     Transportation needs:     Medical: Not on file     Non-medical: Not on file   Tobacco Use     Smoking status: Never Smoker     Smokeless tobacco: Never Used   Substance and Sexual Activity     Alcohol use: No     Alcohol/week: 0.0 oz     Drug use: No     Sexual activity: Yes     Partners: Female   Lifestyle     Physical activity:     Days per week: Not on file     Minutes per session: Not on file     Stress: Not on file   Relationships     Social connections:     Talks on phone: Not on file     Gets together: Not on file     Attends Zoroastrian service: Not on file     Active member of club or organization: Not on file     Attends meetings of clubs or organizations: Not on file     Relationship status: Not on file     Intimate partner violence:     Fear of current or ex partner: Not on file     Emotionally abused: Not on file     Physically abused: Not on file     Forced sexual activity: Not on file   Other Topics Concern     Parent/sibling w/ CABG, MI or angioplasty before 65F 55M? Yes     Comment: father   Social History Narrative    . No current SO.         Has 5 children. Youngest child does live with him.         H/o AA degree and previously worked as a [email or web address]. Currently unemployed.         Denies tobacco use.     Alcohol use: 2x/week with minimal amount of alcohol per time.     Drug use: denies       ROS:   Constitutional: No fever, chills, or sweats. No weight gain/loss   ENT: No visual disturbance, ear ache, epistaxis, sore throat  Allergies/Immunologic: Negative.   Respiratory: No cough, hemoptysia  Cardiovascular: As per HPI  GI: No nausea, vomiting, hematemesis, melena, or hematochezia  : No urinary frequency, dysuria, or hematuria  Integument: Negative  Psychiatric: Negative  Neuro: Negative  Endocrinology: Negative    Musculoskeletal: Negative    ADDITIONAL COMMENTS:     I reviewed the patient's medications:     I reviewed the patient's pertinent clinical laboratory studies:     I reviewed the patient's pertinent imaging studies:   I reviewed the patient's ECG:       Echo 07/2018 Interpretation Summary     Global and regional left ventricular function is normal with an EF of 60-65%.  Right ventricular function, chamber size, wall motion, and thickness are  normal.  The inferior vena cava is normal.     Dilated coronary sinus consistent with Left SVC.     There has been no change.    Cath 2016 Conclusions     1. Severe in stent restenosis in the LCX distal Stent.   2. PCI of the instent restenosis in the LCX stent.   3. Patent LIMA to LAD/Diagonal.   4. Patent SVG to RPDA/RV marginal

## 2019-06-21 NOTE — NURSING NOTE
Lamont Daniels comes into clinic today at the request of Dr. Roche Ordering Provider for suture removal.    This service provided today was under the supervising provider of the day Dr. Roche, who was available if needed.    Joann Rod RN

## 2019-06-21 NOTE — TELEPHONE ENCOUNTER
Received [employer] message from patient requesting refill(s) of     Buprenorphine (BUTRANS) 15 MCG/HR Wk patch  Spoke with pharmacy the Buprenorphine (BUTRANS) 15 MCG/HR Wk patch is being held, patient has to request the fill.       E-prescribe to   [place] [id number] [facility] [place] [facility], [place] - [address] [place] [address]  [address] [place] [address]  Phone: [phone] Fax: [phone]      Will route to nursing pool for review and preparation of prescription(s).     -----     Patient Comment: Could you please sent the 20mcg to pharmacy for next month     Nahomy Church CMA  [facility]  [place]

## 2019-06-21 NOTE — PATIENT INSTRUCTIONS
The following is a summary of your office visit:    Medications started today:NONE    Medications stopped today:NONE    Medication dose change: NONE    Nurse contact information: Sarah Arce RN  Cardiology Care Coordinator  [phone] Phone  [phone] Fax    Appointments made today:    ~ one year return appointment with Dr. Travis    ~Referal to Genetic Counselor at [facility]    Patient instructions: Per Dr. Travis OK to hold Plavix 7 days prior to surgery.   Surgeon needs to OK when to start back on Plavix      If you have had any blood work, imaging or other testing completed we will be in touch within 1-2 weeks regarding the results. If you have any questions, concerns or need to schedule a follow up, please contact us at [phone]. If you are needing refills please contact your pharmacy. For urgent after hour care please call the [facility] at [phone] or the [facility] at [phone] and ask to speak to the cardiologist on call.    It was a pleasure meeting with you today. Please let us know if there is anything else we can do for you so that we can be sure you are leaving completely satisfied with your care experience.     Your Cardiology Team at [facility]  RN Care Coordinator: Sarah HAYS: Lian

## 2019-06-21 NOTE — TELEPHONE ENCOUNTER
Called pt and clarified that he needs to be on the 15 mcg for a month before increasing to the 20 mcg patch. Let him know that there is a prescription for the 15mcg patch at the pharmacy and that he just needs to call there to have them fill it for him.     Pt stated understanding.     [employer] message sent to pt:  Dennis Miguel,     I thought it may be helpful to remind you of the plan that was put in place on June 7th when you talked with Dr. Loo about how to restart the Butrans patches.     1. He is picking up script today (6/7) [facility] [place] [facility] 5mcg/hr.  He will do 1 week 5mcg, then next week he will start 10mcg (2 of the 5mcg/hr patches.)     2. I will start 15mcg/hr patch in 2 weeks (to start on 6/21/19) - [facility] [place]. He will remain on this for 1 month.    3. Following month, would increase to 20mcg. This will start on 7/19/19    Please let us know if you have any other questions.     Take care,     Jenna Hunt, ROBERTON, RN-BC  Patient Care Supervisor/Care Coordinator  [facility]

## 2019-06-23 DIAGNOSIS — M54.5 LOW BACK PAIN, UNSPECIFIED BACK PAIN LATERALITY, UNSPECIFIED CHRONICITY, WITH SCIATICA PRESENCE UNSPECIFIED: ICD-10-CM

## 2019-06-23 NOTE — TELEPHONE ENCOUNTER
meloxicam (MOBIC) 7.5 MG tablet  Last Written Prescription Date:    Last Fill Quantity: ,   # refills:   Last Office Visit: 06/21/19   Future Office visit:    Next 5 appointments (look out 90 days)    Jul 02, 2019  8:40 AM CDT  Return Visit with Sebastián Jenkins MD  [facility] ([facility]) [address] [facility] [address]  [phone]   Jul 12, 2019 10:45 AM CDT  Return Visit with Alyssa Roche MD  [facility] [address] [place] [address]  [phone]   Sep 09, 2019  9:00 AM CDT  Return Visit with Dorothea Loo MD  [facility] ([facility]) [address] [place] [address]  [phone]           Routing refill request to provider for review/approval because:  Drug not on the [facility], [facility] or [facility] refill protocol or controlled substance

## 2019-06-25 RX ORDER — MELOXICAM 7.5 MG/1
TABLET ORAL
Qty: 30 TABLET | Refills: 3 | Status: SHIPPED | OUTPATIENT
Start: 2019-06-25 | End: 2019-07-02

## 2019-06-25 NOTE — TELEPHONE ENCOUNTER
Routing refill request to provider for review/approval because:  Drug not active on patient's medication list    Kim Walker RN, BSN

## 2019-06-26 DIAGNOSIS — Z79.899 HIGH RISK MEDICATIONS (NOT ANTICOAGULANTS) LONG-TERM USE: ICD-10-CM

## 2019-06-26 DIAGNOSIS — M06.09 RHEUMATOID ARTHRITIS OF MULTIPLE SITES WITH NEGATIVE RHEUMATOID FACTOR (H): ICD-10-CM

## 2019-06-27 RX ORDER — SULFASALAZINE 500 MG/1
1000 TABLET, DELAYED RELEASE ORAL 2 TIMES DAILY
Qty: 120 TABLET | Refills: 3
Start: 2019-06-27

## 2019-06-27 NOTE — TELEPHONE ENCOUNTER
90 day supply with 1 refills sent on 3/26/19. Should have refill on file at pharmacy. Too soon to refill.         Shreya Rascon RN, BSN, PHN

## 2019-07-02 ENCOUNTER — OFFICE VISIT (OUTPATIENT)
Dept: RHEUMATOLOGY | Facility: CLINIC | Age: 54
End: 2019-07-02
Payer: COMMERCIAL

## 2019-07-02 ENCOUNTER — TELEPHONE (OUTPATIENT)
Dept: RHEUMATOLOGY | Facility: CLINIC | Age: 54
End: 2019-07-02

## 2019-07-02 VITALS
HEART RATE: 66 BPM | OXYGEN SATURATION: 94 % | WEIGHT: 170.2 LBS | HEIGHT: 62 IN | DIASTOLIC BLOOD PRESSURE: 86 MMHG | SYSTOLIC BLOOD PRESSURE: 132 MMHG | BODY MASS INDEX: 31.32 KG/M2

## 2019-07-02 DIAGNOSIS — M06.09 RHEUMATOID ARTHRITIS OF MULTIPLE SITES WITH NEGATIVE RHEUMATOID FACTOR (H): Primary | ICD-10-CM

## 2019-07-02 DIAGNOSIS — B18.1 CHRONIC HEPATITIS B (H): ICD-10-CM

## 2019-07-02 DIAGNOSIS — Z79.899 HIGH RISK MEDICATIONS (NOT ANTICOAGULANTS) LONG-TERM USE: ICD-10-CM

## 2019-07-02 DIAGNOSIS — R79.89 ELEVATED LFTS: ICD-10-CM

## 2019-07-02 LAB
ALBUMIN SERPL-MCNC: 4.1 G/DL (ref 3.4–5)
ALP SERPL-CCNC: 112 U/L (ref 40–150)
ALT SERPL W P-5'-P-CCNC: 40 U/L (ref 0–70)
AST SERPL W P-5'-P-CCNC: 20 U/L (ref 0–45)
BILIRUB DIRECT SERPL-MCNC: 0.1 MG/DL (ref 0–0.2)
BILIRUB SERPL-MCNC: 0.5 MG/DL (ref 0.2–1.3)
GLUCOSE SERPL-MCNC: 102 MG/DL (ref 70–99)
PROT SERPL-MCNC: 7.7 G/DL (ref 6.8–8.8)

## 2019-07-02 PROCEDURE — 99214 OFFICE O/P EST MOD 30 MIN: CPT | Performed by: INTERNAL MEDICINE

## 2019-07-02 PROCEDURE — 80076 HEPATIC FUNCTION PANEL: CPT | Performed by: INTERNAL MEDICINE

## 2019-07-02 PROCEDURE — 82947 ASSAY GLUCOSE BLOOD QUANT: CPT | Performed by: INTERNAL MEDICINE

## 2019-07-02 PROCEDURE — 36415 COLL VENOUS BLD VENIPUNCTURE: CPT | Performed by: INTERNAL MEDICINE

## 2019-07-02 RX ORDER — PREDNISONE 2.5 MG/1
2.5 TABLET ORAL DAILY
Qty: 90 TABLET | Refills: 1 | Status: SHIPPED | OUTPATIENT
Start: 2019-07-02 | End: 2019-10-22

## 2019-07-02 RX ORDER — SULFASALAZINE 500 MG/1
1500 TABLET, DELAYED RELEASE ORAL 2 TIMES DAILY
Qty: 540 TABLET | Refills: 1 | Status: SHIPPED | OUTPATIENT
Start: 2019-07-02 | End: 2019-11-26

## 2019-07-02 RX ORDER — SULFASALAZINE 500 MG/1
1000 TABLET, DELAYED RELEASE ORAL 2 TIMES DAILY
Qty: 360 TABLET | Refills: 1 | Status: CANCELLED | OUTPATIENT
Start: 2019-07-02

## 2019-07-02 RX ORDER — HYDROXYCHLOROQUINE SULFATE 200 MG/1
200 TABLET, FILM COATED ORAL DAILY
Qty: 90 TABLET | Refills: 1 | Status: SHIPPED | OUTPATIENT
Start: 2019-07-02 | End: 2019-11-26

## 2019-07-02 ASSESSMENT — MIFFLIN-ST. JEOR: SCORE: 1496.27

## 2019-07-02 NOTE — TELEPHONE ENCOUNTER
Prior Authorization Approval    Authorization Effective Date: 7/2/2019  Authorization Expiration Date: 7/1/2022  Medication: orencia pa approved  Approved Dose/Quantity: 4/28ds  Reference #: [id number]   Insurance Company: [employer] - Phone [phone] Fax [phone]  Expected CoPay: $0     CoPay Card Available: No    Foundation Assistance Needed: yes  Which Pharmacy is filling the prescription (Not needed for infusion/clinic administered): [facility] - [facility] [address]  Pharmacy Notified: Yes  Patient Notified: No

## 2019-07-02 NOTE — PATIENT INSTRUCTIONS
Rheumatology    Dr. Sebastián Jenkins         [facility]   (Monday)  [address]  [place] [address]       [facility]   (Tuesday)  [address]  [place] [address]    [facility]   (Wed., Thurs., and Friday)  [address] [place] [address]    Phone number: [phone]  Thank you for choosing [facility].  Helga Guerrier CMA

## 2019-07-02 NOTE — PROGRESS NOTES
Rheumatology Clinic Visit      Lamont Daniels MRN# [id number]   YOB: 1965 Age: 53 year old      Date of visit: 7/02/19   PCP: Dr. David Chavez  Hepatologist: Dr. Drea Laboy     Chief Complaint   Patient presents with:  Arthritis: RA. Patient states he is feeling the same as last visit, no change at all    Assessment and Plan   1.  Seropositive nonerosive rheumatoid arthritis (RF negative, CCP low positive): Note that he does have low back pain but without evidence of SI joint irregularity on x-ray.  Initially with morning stiffness all day, gelling phenomenon, and synovitis.   Previously on Humira (partially effective), Enbrel (partially effective). MTX avoided per Dr. Laboy recommendation. Currently on SSZ 1000mg BID, Orencia SQ (initially Rx'd 4/18/2017), prednisone 2.5mg daily, and HCQ 200mg daily.  Treatment has been in coordination with hepatology as he requires HBV tx while on bDMARD.   Some improvement with re-adding prednisone 2.5mg daily.  Increase SSZ for better control (LFTs checked today and normal now)  - Increase sulfasalazine from 1000mg BID to 1500mg BID  - Continue prednisone 2.5 mg daily   - Continue Orencia SQ every 7 days  - Continue hydroxychloroquine 200 mg daily (11/12/2018 eye exam by Dr. Rae at [facility]; another eye exam 5/14/2019 but the clinic not has not yet been completed)  - Labs today: glucose, Hepatic Panel  - Labs monthly [place]: CBC, Cr, Hepatic Panel  - Labs in 3 months: CBC, Creatinine, Hepatic Panel, ESR, CRP    2. Lower back pain: Lumbar spine MRI in August 2014 showed multilevel degenerative changes and a small disc protrusion at L3/4 with mild spinal canal narrowing; also moderate left and mild right neural foraminal narrowing at L5-S1. He had a nerve conduction study in 2014 that was normal. He has been worked up by neurology in the past without significant neurologic findings. HLA-B27 negative, SI joint x-ray negative. Now following in the pain management  "clinic.     3. Myofascial pain syndrome / chronic pain syndrome: diffuse pain consistent with this.  Management per pain clinic and/or PCP.    4. Chronic Hepatitis B: Managed by Dr. Laboy (hepatology) previously, and now Dr. Thomas Leventhal at the [facility]. Currently on tenofovir.   - Labs today because of elevated LFTs: hepatic panel    5. Hepatic Steatosis: Based on previous liver biopsy.  Seeing hepatology.      6. Positive tuberculosis test:   This is noted here for historical significance.  He was evaluated by Dr. Anthony Mendoza, infectious disease. The following telephone note was documented by Dr. Mendoza: \"I tried calling th pt, finally we have the records from [place] . It appears he was treated for latent TB with INH for 1 year in 1980/1981 .Later in 1981 April he was admitted at [facility] in [place], [place] with rt sided pneumonia, TB was suspected but he improved with treatment with Penicillin only. Later he was seen  ( likely ID specialist), and was treated with 6 weeks course of Rifampin and Ethambutol pending sputum AFB culture. His AFB cx were negative based on the report we have.\"  \"He recently had QFT -TB test which was reported positive and his CXR was clear. Given his documented hx of completion of treatment for latent TB as above , I do not see any need for further treatment. His tests may remain positive irrespective of treatment status. From my perspective he can be started on DMARDs/Biological as deemed necessary.\"       7. Gout: On allopurinol and managed by PCP.      Mr. Daniels verbalized agreement with and understanding of the rational for the diagnosis and treatment plan.  All questions were answered to best of my ability and the patient's satisfaction. Mr. Daniels was advised to contact the clinic with any questions that may arise after the clinic visit.      Thank you for involving me in the care of the patient    Return to clinic: 3-4 months      HPI   Lamont Daniels " is a 53 year old male with a medical history significant for hypertension, hyperlipidemia, gout, coronary artery disease s/p 6vCABG, vitamin D deficiency, hepatitis B, and RA who presents for f/u of RA.      Today, Mr. Daniels reports that he is about as good as he was previously.  He then notes that he is better since starting prednisone again at 2.5 mg daily.  Morning stiffness for about 10-15 minutes.  No joint swelling.  Feels better when he is more active and worse when he sits down for a long period of time.  Positive gelling phenomenon.  Following with the pain clinic for his chronic back pain.  Tolerating medications well.    Denies fevers, chills, nausea, vomiting, constipation, diarrhea. No abdominal pain. Denies chest pain/pressure, palpitations, or shortness of breath.  No oral or nasal sores. No rash. No LE swelling.  Mild dry mouth; he has good dentition and does not feel like he needs to use frequent sips of water; unchanged since last evaluation. No dry eyes. No eye pain or redness.     Tobacco:  None   EtOH:  None  Drugs:  None  Occupation: Retired   Originally from [facility], then lived just [place], [place], then lived in [place], [place], then moved to [place] for family    ROS   GEN: No fevers, chills, night sweats; + fatigue   SKIN: No itching, rashes, sores  HEENT: No epistaxis. No oral or nasal ulcers.  CV: See HPI  PULM: See HPI  GI: No nausea, vomiting, constipation, diarrhea. No blood in stool. No abdominal pain.  : No blood in urine.  MSK: See HPI.  NEURO: See HPI  EXT: No LE swelling  PSYCH: Negative    Active Problem List     Patient Active Problem List   Diagnosis     Coronary atherosclerosis of native coronary artery     Major depressive disorder, recurrent episode, moderate (H)     Anxiety     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)     Hepatitis B infection     Vitamin D deficiency     S/P CABG (coronary artery bypass graft)     Malrotation of intestine     Coronary atherosclerosis of  autologous vein bypass graft     Hyperlipidemia LDL goal <70     Insomnia     Gout     Suicidal ideation     Essential hypertension     Rheumatoid arthritis involving multiple sites, unspecified rheumatoid factor presence (H)     High risk medications (not anticoagulants) long-term use     Midline low back pain without sciatica     Bilateral low back pain with sciatica, sciatica laterality unspecified     Elevated liver enzymes     On corticosteroid therapy     Essential hypertension with goal blood pressure less than 140/90     Insomnia, unspecified type     Rheumatoid arthritis of multiple sites with negative rheumatoid factor (H)     Benign prostatic hyperplasia with lower urinary tract symptoms, unspecified morphology     Chronic pain syndrome     Past Medical History     Past Medical History:   Diagnosis Date     Anxiety      CAD (coronary artery disease)     CABG, PCI     Congestive heart failure, unspecified 2008     Depressive disorder 2008     Gout      Hepatitis B > 10 years     HTN (hypertension)      Hyperlipidemia LDL goal < 100      Malrotation of intestine      Moderate major depression (H)      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) 9/19/2011    Uses CPAP     Rheumatoid arthritis flare (H) 1012     Steatohepatitis 12/15    liver biopsy     Tuberculosis age 13    INH x 9 months      Vitamin D deficiency      Past Surgical History     Past Surgical History:   Procedure Laterality Date     ABDOMEN SURGERY  2014     BIOPSY  2015     BYPASS GRAFT ARTERY CORONARY  2008    6 vessels     COLONOSCOPY  2/8/2013    Procedure: COLONOSCOPY;  Colonoscopy, blood in stool;  Surgeon: Duane, William Charles, MD;  Location:  OR     COMBINED REPAIR PTOSIS WITH BLEPHAROPLASTY BILATERAL Bilateral 6/25/2018    Procedure: COMBINED REPAIR PTOSIS WITH BLEPHAROPLASTY BILATERAL;  Bilateral upper eyelid blepharoplasty, ptosis repair and brow ptosis repair;  Surgeon: Sandra Borja MD;  Location:  OR     GI SURGERY  2014     [facility]  STENT CIERA SOTO ADDTL VESSEL  2008    3 months after CABG     HEAD & NECK SURGERY  2011     NASAL/SINUS POLYPECTOMY  2010     ORTHOPEDIC SURGERY  2012     REPAIR PTOSIS       REPAIR PTOSIS BROW BILATERAL Bilateral 6/25/2018    Procedure: REPAIR PTOSIS BROW BILATERAL;;  Surgeon: Sandra Borja MD;  Location: [facility] OR     THORACIC SURGERY  1989    tb     TONSILLECTOMY  2010     UVULOPALATOPHARYNGOPLASTY  2010     VASCULAR SURGERY  2008     Allergy     Allergies   Allergen Reactions     Citalopram Itching     Tramadol Itching     Current Medication List     Current Outpatient Medications   Medication Sig     Abatacept (ORENCIA) 125 MG/ML SOAJ auto-injector Inject 1 mL (125 mg) Subcutaneous every 7 days     Acetaminophen (TYLENOL PO)      allopurinol (ZYLOPRIM) 300 MG tablet Take 1 tablet (300 mg) by mouth daily     aspirin EC 81 MG EC tablet Take 1 tablet (81 mg) by mouth daily (*)     atorvastatin (LIPITOR) 80 MG tablet TAKE 1 TABLET BY MOUTH 1 TIME DAILY     baclofen (LIORESAL) 10 MG tablet TAKE 1 TABLET BY MOUTH 2 TIMES DAILY AS NEEDED FOR MUSCLE SPASM     buprenorphine (BUTRANS) 15 MCG/HR Wk patch Place 1 patch onto the skin every 7 days . Name brand only.  Fill 6/19 to start 6/21     busPIRone HCl (BUSPAR) 30 MG tablet TAKE 1 TABLET BY MOUTH 2 TIMES DAILY     Cholecalciferol (VITAMIN D) 2000 UNITS tablet TAKE ONE TABLET BY MOUTH ONCE DAILY     clobetasol (TEMOVATE) 0.05 % external solution Apply twice daily to scalp for up to 2 weeks for flares.     clonazePAM (KLONOPIN) 1 MG tablet Take 0.5-1 tablets (0.5-1 mg) by mouth 2 times daily as needed for anxiety     clopidogrel (PLAVIX) 75 MG tablet Take 1 tablet (75 mg) by mouth daily     desvenlafaxine succinate (PRISTIQ) 25 MG 24 hr tablet Take 1 tablet (25 mg) by mouth daily     diclofenac (VOLTAREN) 1 % topical gel Apply 4 grams to bilateral knees, up to four times daily as needed using enclosed dosing card.  Max of 32g/day     evolocumab (REPATHA) 140 MG/ML  prefilled autoinjector Inject 1 mL (140 mg) Subcutaneous every 14 days     ezetimibe (ZETIA) 10 MG tablet Take 1 tablet (10 mg) by mouth daily     gabapentin (NEURONTIN) 300 MG capsule Take 2 capsules (600 mg) by mouth 3 times daily . 3 month supply     gemfibrozil (LOPID) 600 MG tablet Take 1 tablet (600 mg) by mouth 2 times daily     hydroxychloroquine (PLAQUENIL) 200 MG tablet Take 1 tablet (200 mg) by mouth daily     meclizine (ANTIVERT) 25 MG tablet TAKE 1 TABLET BY MOUTH EVERY 6 HOURS AS NEEDED FOR DIZZINESS     melatonin 3 MG tablet Take 1 tablet (3 mg) by mouth nightly as needed for sleep     pantoprazole (PROTONIX) 40 MG EC tablet TAKE 1 TABLET (40 MG) BY MOUTH DAILY     predniSONE (DELTASONE) 2.5 MG tablet Take 2.5 mg by mouth daily.     sulfaSALAzine ER (AZULFIDINE EN) 500 MG EC tablet Take 2 tablets (1,000 mg) by mouth 2 times daily     tamsulosin (FLOMAX) 0.4 MG capsule TAKE ONE CAPSULE BY MOUTH EVERY DAY     tenofovir (VIREAD) 300 MG tablet Take 1 tablet (300 mg) by mouth daily     zolpidem (AMBIEN) 5 MG tablet Take 1 tablet (5 mg) by mouth nightly as needed for sleep     blood glucose (NO BRAND SPECIFIED) lancets standard Use to test blood sugar 2 times daily or as directed.     blood glucose monitoring (NO BRAND SPECIFIED) meter device kit Use to test blood sugar 2 times daily or as directed.     blood glucose monitoring (NO BRAND SPECIFIED) test strip Use to test blood sugars 2 times daily or as directed     buprenorphine (BUTRANS) 5 MCG/HR WK patch Place 1 patch onto the skin every 7 days (Patient not taking: Reported on 7/2/2019)     COLCRYS 0.6 MG tablet TAKE 2 TABLETS BY MOUTH AT THE FIRST SIGN OF FLARE, TAKE 1 ADDITIONAL TABLET ONE HOUR LATER. (Patient not taking: Reported on 6/21/2019)     erythromycin (ROMYCIN) ophthalmic ointment Apply small amount to incision sites three times daily and to inner lower lid of operative eye(s) at bedtime for 7 days. (Patient not taking: Reported on 6/21/2019)      fluticasone (FLONASE) 50 MCG/ACT spray Spray 2 sprays into both nostrils daily (Patient not taking: Reported on 6/21/2019)     hydrocortisone (WESTCORT) 0.2 % external cream For genitals, apply twice daily for up to 2 weeks     meloxicam (MOBIC) 7.5 MG tablet TAKE 1 TABLET (7.5 MG) BY MOUTH DAILY (Patient not taking: Reported on 7/2/2019)     mirtazapine (REMERON) 15 MG tablet TAKE 1 TABLET BY MOUTH AT BEDTIME (Patient not taking: Reported on 6/21/2019)     naloxone (NARCAN) nasal spray Spray 1 spray (4 mg) into one nostril alternating nostrils as needed for opioid reversal every 2-3 minutes until assistance arrives (Patient not taking: Reported on 6/21/2019)     nitroGLYcerin (NITROSTAT) 0.4 MG sublingual tablet PLACE 1 TABLET UNDER THE TONGUE EVERY 5 MINUTES AS NEEDED (Patient not taking: Reported on 6/21/2019)     order for DME Equipment being ordered: single end cane     ORDER FOR DME 1.  CPAP pressure 11 cm/H20 with heated humidity.   2.  Provide mask to fit and CPAP supplies.   3.  Length of need lifetime.   4.  If needed please provide a chin strap          triamcinolone (KENALOG) 0.1 % external ointment Apply to trunk and extremities twice daily for up to 2 weeks for flares     No current facility-administered medications for this visit.      Facility-Administered Medications Ordered in Other Visits   Medication     gadobutrol (GADAVIST) injection 10 mL       Social History   See HPI    Family History     Family History   Problem Relation Age of Onset     C.A.D. Father      Coronary Artery Disease Father      Hypertension Father      Depression Father      Hypertension Mother      Diabetes Mother      Depression Mother      Mental Illness Mother      Hypertension Maternal Grandfather      Cancer Paternal Grandfather      Other Cancer Paternal Grandfather      Other Cancer Other      Autoimmune Disease No family hx of      Cerebrovascular Disease No family hx of      Thyroid Disease No family hx of       "Glaucoma No family hx of      Macular Degeneration No family hx of      No family history of autoimmune disease  No family history of psoriasis     No change in family history since the previous clinic visit.     Physical Exam     Temp Readings from Last 3 Encounters:   06/20/19 97.7  F (36.5  C) (Oral)   04/01/19 98.2  F (36.8  C) (Oral)   03/14/19 97.8  F (36.6  C) (Oral)     BP Readings from Last 5 Encounters:   07/02/19 132/86   06/21/19 115/79   06/20/19 123/73   06/10/19 130/87   04/01/19 127/82     Pulse Readings from Last 1 Encounters:   07/02/19 66     Resp Readings from Last 1 Encounters:   06/20/19 16     Estimated body mass index is 31.13 kg/m  as calculated from the following:    Height as of this encounter: 1.575 m (5' 2\").    Weight as of this encounter: 77.2 kg (170 lb 3.2 oz).    GEN: NAD  HEENT: MMM. No oral lesions. Anicteric, noninjected sclera  CV: S1, S2. RRR. No m/r/g.  PULM: CTA bilaterally. No w/c.  MSK: MCPs diffusely tender to palpation but without clear swelling or increased warmth.  PIPs, wrists, elbows, shoulders, knees, ankles, and MTPs without swelling or tenderness to palpation.  Hips nontender to palpation.  All fibromyalgia tender points positive.  NEURO: UE and LE strengths 5/5 and equal bilaterally.   SKIN: No rash.  EXT: No LE edema  PSYCH: Alert. Appropriate.    Labs   RF/CCP  Recent Labs   Lab Test 11/04/15  1547 08/12/14  1414   CCPABY 27*  --    RHF  --  <20     CBC  Recent Labs   Lab Test 06/21/19  0817 04/01/19  0851 03/11/19  0841  06/22/18  1448   WBC 7.7 7.3 7.2   < > 7.3   RBC 4.79 5.07 5.50   < > 4.83   HGB 14.2 15.3 16.1   < > 14.4   HCT 42.1 44.4 48.0   < > 43.4   MCV 88 88 87   < > 90   RDW 14.9 14.0 14.6   < > 13.8    329 309   < > 284   MCH 29.6 30.2 29.3   < > 29.8   MCHC 33.7 34.5 33.5   < > 33.2   NEUTROPHIL 51.3  --  38.6  --  67.3   LYMPH 32.4  --  45.7  --  20.4   MONOCYTE 12.9  --  13.4  --  9.7   EOSINOPHIL 2.2  --  1.9  --  1.8   BASOPHIL 0.8  --  " 0.4  --  0.5   ANEU 4.0  --  2.8  --  4.9   ALYM 2.5  --  3.3  --  1.5   PRACHI 1.0  --  1.0  --  0.7   AEOS 0.2  --  0.1  --  0.1   ABAS 0.1  --  0.0  --  0.0    < > = values in this interval not displayed.     CMP  Recent Labs   Lab Test 06/21/19  0817 04/01/19  0851 03/11/19  0841  11/20/18  0844  12/04/17 0924   NA  --  140  --   --  141  --  144   POTASSIUM  --  3.6  --   --  3.7  --  3.6   CHLORIDE  --  110*  --   --  110*  --  111*   CO2  --  23  --   --  25  --  25   ANIONGAP  --  8  --   --  6  --  8   GLC  --  99  --   --  82  --  81   BUN  --  16  --   --  16  --  25   CR 0.90 0.89 0.98  --  0.92   < > 0.87   GFRESTIMATED >90 >90 87  --  86   < > >90   GFRESTBLACK >90 >90 >90  --  >90   < > >90   HEMANT  --  9.6  --   --  9.4  --  8.8   BILITOTAL 0.5 0.4 0.6   < >  --    < > 0.4   ALBUMIN 3.9 4.0 4.2   < >  --    < > 3.8   PROTTOTAL 7.5 7.7 7.9   < >  --    < > 7.4   ALKPHOS 103 95 86   < >  --    < > 82   AST 47* 30 28   < >  --    < > 17   ALT 84* 41 48   < >  --    < > 33    < > = values in this interval not displayed.     Calcium/VitaminD  Recent Labs   Lab Test 04/01/19  0851 11/20/18  0844 12/04/17  0924 07/13/17  1246  11/04/15  1547  04/01/13  1624   HEMANT 9.6 9.4 8.8  --    < >  --    < > 9.5   VITDT  --   --   --  34  --  43  --  34    < > = values in this interval not displayed.     ESR/CRP  Recent Labs   Lab Test 06/21/19  0817 03/11/19  0841 06/22/18  1448   SED 13 10 13   CRP <2.9 <2.9 <2.9     Lipid Panel  Recent Labs   Lab Test 03/14/19  1006 06/22/18  1448 12/04/17  0924  06/29/15  1405 05/22/14  0848 03/24/14  0936   CHOL 152 129 108   < > 219* 169 195   TRIG 126 113 85   < > 372* 379* 301*   HDL 51 64 73   < > 33* 33* 39*   LDL 76 42 18   < > 112 60 95   VLDL  --   --   --   --  74* 76* 60*   CHOLHDLRATIO  --   --   --   --  6.6* 5.1* 5.0   NHDL 101 65 35   < >  --   --   --     < > = values in this interval not displayed.     Hepatitis B  Recent Labs   Lab Test 04/01/19  0851 12/04/17  0924  "12/01/16  1429 10/05/16  0954  04/23/13  0812 01/30/13  0906 01/23/12  1150   AUSAB  --   --   --  0.45  --   --   --   --    HBCAB  --   --   --   --   --  Positive*  --   --    HBCM  --   --   --   --   --   --   --  Negative   HEPBANG  --   --   --  Reactive*  --  Positive* Positive* Positive*   HBQLOG Not Calculated Not Calculated <1.3 5.1*   < >  --   --   --     < > = values in this interval not displayed.     Hepatitis C  Recent Labs   Lab Test 04/07/16  1538 04/23/13  0812   HCVAB Nonreactive   Assay performance characteristics have not been established for newborns,   infants, and children   Negative     Lyme ab screening  Recent Labs   Lab Test 07/18/17  1330   LYMEGM 0.19     Tuberculosis Screening  Recent Labs   Lab Test 04/07/16  1538   TBRSLT Positive   The magnitude of the measured IFN-gamma level cannot be correlated to stage or   degree of infection, level of immune responsiveness, or likelihood for   progression to active disease.  *   TBAGN >10.00  This is a qualitative test.  The TB antigen IU/mL value is required for   documentation on certain government reporting forms but this value should not   be used to monitor disease progression or response to therapy.   Diagnosing or excluding tuberculosis disease, and assessing the probability of   LTBI, require a combination of epidemiological, historical, medical and   diagnostic findings that should be taken into account when interpreting   QuantiFERON TB results.       HIV Screening  Recent Labs   Lab Test 11/04/15  1547   HIAGAB Nonreactive   HIV-1 p24 Ag & HIV-1/HIV-2 Ab Not Detected         \"HAND BILATERAL THREE OR MORE VIEWS 11/4/2015 4:55 PM   HISTORY: Pain in unspecified joint.  COMPARISON: None.  IMPRESSION  IMPRESSION: Normal.  DANIS ANGLIN MD\"    \"FOOT BILATERAL THREE OR MORE VIEWS 11/4/2015 4:55 PM   HISTORY: Pain in unspecified joint.  COMPARISON: 8/21/2012.  IMPRESSION  IMPRESSION: Small traction spurs are seen off the Achilles tendon " "and  plantar fascial attachment sites bilaterally. Joint spaces are  preserved. Osseous structures are intact. Incidental note is made of  some surgical staples in the soft tissues of the medial distal right  lower extremity.  DANIS ANGLIN MD\"      Immunization History     Immunization History   Administered Date(s) Administered     Influenza (IIV3) PF 10/11/2011, 09/20/2017, 09/01/2018     Influenza Vaccine IM 3yrs+ 4 Valent IIV4 09/27/2015, 09/08/2016     Influenza Vaccine, 3 YRS +, IM (QUADRIVALENT W/PRESERVATIVES) 11/17/2014     Pneumo Conj 13-V (2010&after) 04/07/2016     Pneumococcal 23 valent 12/12/2014     TDAP Vaccine (Adacel) 08/30/2011          Chart documentation done in part with Dragon Voice recognition Software. Although reviewed after completion, some word and grammatical error may remain.    Sebastián Jenkins MD  "

## 2019-07-02 NOTE — TELEPHONE ENCOUNTER
Rheumatology team: Please call to notify Mr. Daniels that glucose was elevated at 102.  The hepatic panel was normal, with resolution of the previously elevated liver enzymes.  Therefore, increase sulfasalazine to 1500 mg (3 tablets) twice daily.  Labs are needed monthly for the next 3 months.    Sebastián Jenkins MD  7/2/2019 6:07 PM

## 2019-07-03 ENCOUNTER — TELEPHONE (OUTPATIENT)
Dept: RHEUMATOLOGY | Facility: CLINIC | Age: 54
End: 2019-07-03

## 2019-07-03 NOTE — RESULT ENCOUNTER NOTE
"[employer] message sent:  \"Mr. Daniels,    Glucose was elevated at 102 (just above the normal range).  Liver enzymes normalized, so I increased the sulfasalazine dose to 1500mg twice daily as we discussed in clinic.  Labs are needed monthly for the next 3 months.    Sincerely,  Sebastián Jenkins MD  7/3/2019 1:12 AM\""

## 2019-07-03 NOTE — TELEPHONE ENCOUNTER
Called patient and informed him of the message below:       Rheumatology team: Please call to notify Mr. Daniels that glucose was elevated at 102.  The hepatic panel was normal, with resolution of the previously elevated liver enzymes.  Therefore, increase sulfasalazine to 1500 mg (3 tablets) twice daily.  Labs are needed monthly for the next 3 months.           Patient verbalized understanding.    Jf Packer RN....7/3/2019 2:52 PM

## 2019-07-05 NOTE — TELEPHONE ENCOUNTER
Closing encounter, this has been done. See 7/2/2019 encounter.  Helga Guerrier [facility] Rheumat[facility]  7/5/2019 8:08 AM

## 2019-07-12 ENCOUNTER — OFFICE VISIT (OUTPATIENT)
Dept: DERMATOLOGY | Facility: CLINIC | Age: 54
End: 2019-07-12
Payer: COMMERCIAL

## 2019-07-12 DIAGNOSIS — L40.9 PSORIASIS: Primary | ICD-10-CM

## 2019-07-12 PROCEDURE — 99213 OFFICE O/P EST LOW 20 MIN: CPT | Performed by: DERMATOLOGY

## 2019-07-12 RX ORDER — HYDROCORTISONE VALERATE CREAM 2 MG/G
CREAM TOPICAL
Qty: 60 G | Refills: 4 | Status: SHIPPED | OUTPATIENT
Start: 2019-07-12 | End: 2019-11-26

## 2019-07-12 RX ORDER — CLOBETASOL PROPIONATE 0.5 MG/ML
SOLUTION TOPICAL
Qty: 60 ML | Refills: 3 | Status: SHIPPED | OUTPATIENT
Start: 2019-07-12 | End: 2019-11-26

## 2019-07-12 ASSESSMENT — PAIN SCALES - GENERAL: PAINLEVEL: NO PAIN (0)

## 2019-07-12 NOTE — NURSING NOTE
Lamont Daniels's goals for this visit include:   Chief Complaint   Patient presents with     RECHECK     Lamont is returning for a psoriasis recheck     He requests these members of his care team be copied on today's visit information:     PCP: David Chavez    Referring Provider:  No referring provider defined for this encounter.    There were no vitals taken for this visit.    Do you need any medication refills at today's visit? No

## 2019-07-12 NOTE — LETTER
7/12/2019         RE: Lamont Daniels  [address]  [facility] [address]        Dear Colleague,    Thank you for referring your patient, Lamont Daniels, to the [facility]. Please see a copy of my visit note below.    [facility] Dermatology Note      Dermatology Problem List:  1. Psoriasis   -occipital scalp, s/p biopsy 5/31/19  -clobetasol for scalp  -for face and genitals westcort  -has RA, follows with rheum  2. Seborrheic dermatitis   -improves with triamcinolone and ketoconazole  3. RA on plaquenil, orencia and sulfasalazine with rheum    Encounter Date: Jul 12, 2019    CC:  Chief Complaint   Patient presents with     RECHECK     Lamont is returning for a psoriasis recheck         History of Present Illness:  Mr. Lamont Daniels is a 53 year old male who presents as a follow up for psoriasis. He was last seen on 5/31/19 when a biopsy was taken of the occipital scalp, results consistent with psoriasis. Biopsy on the mid back was a neurofibroma and biopsy on the right cheek was a verrucous keratosis. Today he reports that he has been doing well with the Westcort topical on the scrotum, but his cheeks are crusty. Reports that his ears are still dry with black bumps. His back has been doing better.        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     Hyperlipidemia LDL goal <70     Insomnia     Gout     Suicidal ideation     Essential hypertension     Rheumatoid arthritis involving multiple sites, unspecified rheumatoid factor presence (H)     High risk medications (not anticoagulants) long-term use     Midline low back pain  without sciatica     Bilateral low back pain with sciatica, sciatica laterality unspecified     Elevated liver enzymes     On corticosteroid therapy     Essential hypertension with goal blood pressure less than 140/90     Insomnia, unspecified type     Rheumatoid arthritis of multiple sites with negative rheumatoid factor (H)     Benign prostatic hyperplasia with lower urinary tract symptoms, unspecified morphology     Chronic pain syndrome     Past Medical History:   Diagnosis Date     Anxiety      CAD (coronary artery disease)     CABG, PCI     Congestive heart failure, unspecified 2008     Depressive disorder 2008     Gout      Hepatitis B > 10 years     HTN (hypertension)      Hyperlipidemia LDL goal < 100      Malrotation of intestine      Moderate major depression (H)      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) 9/19/2011    Uses CPAP     Rheumatoid arthritis flare (H) 1012     Steatohepatitis 12/15    liver biopsy     Tuberculosis age 13    INH x 9 months      Vitamin D deficiency      Past Surgical History:   Procedure Laterality Date     ABDOMEN SURGERY  2014     BIOPSY  2015     BYPASS GRAFT ARTERY CORONARY  2008    6 vessels     COLONOSCOPY  2/8/2013    Procedure: COLONOSCOPY;  Colonoscopy, blood in stool;  Surgeon: Duane, William Charles, MD;  Location:  OR     COMBINED REPAIR PTOSIS WITH BLEPHAROPLASTY BILATERAL Bilateral 6/25/2018    Procedure: COMBINED REPAIR PTOSIS WITH BLEPHAROPLASTY BILATERAL;  Bilateral upper eyelid blepharoplasty, ptosis repair and brow ptosis repair;  Surgeon: Sandra Borja MD;  Location:  OR     GI SURGERY  2014     HC CORONARY STENT PERCUT, EA ADDTL VESSEL  2008    3 months after CABG     HEAD & NECK SURGERY  2011     NASAL/SINUS POLYPECTOMY  2010     ORTHOPEDIC SURGERY  2012     REPAIR PTOSIS       REPAIR PTOSIS BROW BILATERAL Bilateral 6/25/2018    Procedure: REPAIR PTOSIS BROW BILATERAL;;  Surgeon: Sandra Borja MD;  Location:  OR     THORACIC SURGERY  1989    tb      TONSILLECTOMY  2010     UVULOPALATOPHARYNGOPLASTY  2010     VASCULAR SURGERY  2008       Social History:  Patient reports that he has never smoked. He has never used smokeless tobacco. He reports that he does not drink alcohol or use drugs.   No personal history of skin cancer.     Family History:  Family History   Problem Relation Age of Onset     C.A.D. Father      Coronary Artery Disease Father      Hypertension Father      Depression Father      Hypertension Mother      Diabetes Mother      Depression Mother      Mental Illness Mother      Hypertension Maternal Grandfather      Cancer Paternal Grandfather      Other Cancer Paternal Grandfather      Other Cancer Other      Autoimmune Disease No family hx of      Cerebrovascular Disease No family hx of      Thyroid Disease No family hx of      Glaucoma No family hx of      Macular Degeneration No family hx of        Medications:  Current Outpatient Medications   Medication Sig Dispense Refill     Abatacept (ORENCIA) 125 MG/ML SOAJ auto-injector Inject 1 mL (125 mg) Subcutaneous every 7 days 4 mL 6     Acetaminophen (TYLENOL PO)        allopurinol (ZYLOPRIM) 300 MG tablet Take 1 tablet (300 mg) by mouth daily 90 tablet 3     aspirin EC 81 MG EC tablet Take 1 tablet (81 mg) by mouth daily (*) 30 tablet 1     atorvastatin (LIPITOR) 80 MG tablet TAKE 1 TABLET BY MOUTH 1 TIME DAILY 90 tablet 3     baclofen (LIORESAL) 10 MG tablet TAKE 1 TABLET BY MOUTH 2 TIMES DAILY AS NEEDED FOR MUSCLE SPASM 180 tablet 2     blood glucose (NO BRAND SPECIFIED) lancets standard Use to test blood sugar 2 times daily or as directed. 100 each 11     blood glucose monitoring (NO BRAND SPECIFIED) meter device kit Use to test blood sugar 2 times daily or as directed. 1 kit 0     buprenorphine (BUTRANS) 15 MCG/HR Wk patch Place 1 patch onto the skin every 7 days . Name brand only.  Fill 6/19 to start 6/21 4 patch 0     busPIRone HCl (BUSPAR) 30 MG tablet TAKE 1 TABLET BY MOUTH 2 TIMES DAILY  180 tablet 0     Cholecalciferol (VITAMIN D) 2000 UNITS tablet TAKE ONE TABLET BY MOUTH ONCE DAILY 100 tablet 1     clobetasol (TEMOVATE) 0.05 % external solution Apply twice daily to scalp for up to 2 weeks for flares. 60 mL 2     clonazePAM (KLONOPIN) 1 MG tablet Take 0.5-1 tablets (0.5-1 mg) by mouth 2 times daily as needed for anxiety 90 tablet 0     clopidogrel (PLAVIX) 75 MG tablet Take 1 tablet (75 mg) by mouth daily 90 tablet 3     COLCRYS 0.6 MG tablet TAKE 2 TABLETS BY MOUTH AT THE FIRST SIGN OF FLARE, TAKE 1 ADDITIONAL TABLET ONE HOUR LATER. 30 tablet 0     desvenlafaxine succinate (PRISTIQ) 25 MG 24 hr tablet Take 1 tablet (25 mg) by mouth daily 90 tablet 1     diclofenac (VOLTAREN) 1 % topical gel Apply 4 grams to bilateral knees, up to four times daily as needed using enclosed dosing card.  Max of 32g/day 300 g 11     erythromycin (ROMYCIN) ophthalmic ointment Apply small amount to incision sites three times daily and to inner lower lid of operative eye(s) at bedtime for 7 days. 3.5 g 0     evolocumab (REPATHA) 140 MG/ML prefilled autoinjector Inject 1 mL (140 mg) Subcutaneous every 14 days 2 mL 11     ezetimibe (ZETIA) 10 MG tablet Take 1 tablet (10 mg) by mouth daily 90 tablet 3     gabapentin (NEURONTIN) 300 MG capsule Take 2 capsules (600 mg) by mouth 3 times daily . 3 month supply 540 capsule 2     hydrocortisone (WESTCORT) 0.2 % external cream For genitals, apply twice daily for up to 2 weeks 60 g 0     hydroxychloroquine (PLAQUENIL) 200 MG tablet Take 1 tablet (200 mg) by mouth daily 90 tablet 1     meclizine (ANTIVERT) 25 MG tablet TAKE 1 TABLET BY MOUTH EVERY 6 HOURS AS NEEDED FOR DIZZINESS 30 tablet 3     melatonin 3 MG tablet Take 1 tablet (3 mg) by mouth nightly as needed for sleep       naloxone (NARCAN) nasal spray Spray 1 spray (4 mg) into one nostril alternating nostrils as needed for opioid reversal every 2-3 minutes until assistance arrives 0.2 mL 0     nitroGLYcerin (NITROSTAT) 0.4  MG sublingual tablet PLACE 1 TABLET UNDER THE TONGUE EVERY 5 MINUTES AS NEEDED 25 tablet 1     order for DME Equipment being ordered: single end cane 1 Units 0     ORDER FOR DME 1.  CPAP pressure 11 cm/H20 with heated humidity.   2.  Provide mask to fit and CPAP supplies.   3.  Length of need lifetime.   4.  If needed please provide a chin strap            pantoprazole (PROTONIX) 40 MG EC tablet TAKE 1 TABLET (40 MG) BY MOUTH DAILY 90 tablet 1     predniSONE (DELTASONE) 2.5 MG tablet Take 1 tablet (2.5 mg) by mouth daily 90 tablet 1     sulfaSALAzine ER (AZULFIDINE EN) 500 MG EC tablet Take 3 tablets (1,500 mg) by mouth 2 times daily 540 tablet 1     tamsulosin (FLOMAX) 0.4 MG capsule TAKE ONE CAPSULE BY MOUTH EVERY DAY 90 capsule 3     tenofovir (VIREAD) 300 MG tablet Take 1 tablet (300 mg) by mouth daily 90 tablet 3     triamcinolone (KENALOG) 0.1 % external ointment Apply to trunk and extremities twice daily for up to 2 weeks for flares 80 g 1     zolpidem (AMBIEN) 5 MG tablet Take 1 tablet (5 mg) by mouth nightly as needed for sleep 30 tablet 5     blood glucose monitoring (NO BRAND SPECIFIED) test strip Use to test blood sugars 2 times daily or as directed (Patient not taking: Reported on 7/12/2019) 100 strip 11     buprenorphine (BUTRANS) 5 MCG/HR WK patch Place 1 patch onto the skin every 7 days (Patient not taking: Reported on 7/2/2019) 4 patch 0     gemfibrozil (LOPID) 600 MG tablet Take 1 tablet (600 mg) by mouth 2 times daily (Patient not taking: Reported on 7/12/2019) 180 tablet 3       Allergies   Allergen Reactions     Citalopram Itching     Tramadol Itching       Review of Systems:  -Skin: as per HPI  -Constitutional: The patient is feeling generally well.  -Admits to joint pain due to rheumatoid arthritis     Physical exam:  Vitals: There were no vitals taken for this visit.  GEN: This is a well developed, well-nourished male in no acute distress, in a pleasant mood.    SKIN: Focused examination of  the face, forearm and scalp was performed.   - hypopigmented patches on the occipital scalp   - Hypopigmentation and scaling on the frontal scalp  - Pink scaly macule on the right temple   - Active on the left and right conchal bowl   - Subcutaneous nodule on the left medial forearm  - No other lesions of concern on areas examined.       Impression/Plan:  1.   Psoriasis, occipital scalp, ears and scrotum    Continue Westcort - apply to the scrotum when active for up to two weeks. Ok to apply to the face     Continue clobetasol solution for the head and ears for up to 2 weeks in a row    Discussed association of psoriasis with weight, arthritis, cardiovascular disease, depression, alcohol and smoking. Recommend regular follow-up with PCP.AAD handout provided.     2. Subcutaneous nodule on the left medial forearm, most likely cyst or lipoma or other    Pt recommended  removal with Dr. Riley- refuses today    Last full skin exam 5.31.2019  Follow-up in 6 months, earlier for new or changing lesions.       Staff Involved:  Scribe/Staff    Scribe Disclosure  I, Talita Mendieta, am serving as a scribe to document services personally performed by Dr. Alyssa Roche MD, based on data collection and the provider's statements to me.     Provider Disclosure:   The documentation recorded by the scribe accurately reflects the services I personally performed and the decisions made by me.    Alyssa Roche MD    Department of Dermatology  [facility]: Phone: [phone], Fax:[phone]  [facility]: Phone: [phone], Fax: [phone]          Again, thank you for allowing me to participate in the care of your patient.        Sincerely,        Alyssa Roche MD

## 2019-07-12 NOTE — PROGRESS NOTES
[facility] Dermatology Note      Dermatology Problem List:  1. Psoriasis   -occipital scalp, s/p biopsy 5/31/19  -clobetasol for scalp  -for face and genitals westcort  -has RA, follows with rheum  2. Seborrheic dermatitis   -improves with triamcinolone and ketoconazole  3. RA on plaquenil, orencia and sulfasalazine with rheum    Encounter Date: Jul 12, 2019    CC:  Chief Complaint   Patient presents with     RECHECK     Lamont is returning for a psoriasis recheck         History of Present Illness:  Mr. Lamont Daniels is a 53 year old male who presents as a follow up for psoriasis. He was last seen on 5/31/19 when a biopsy was taken of the occipital scalp, results consistent with psoriasis. Biopsy on the mid back was a neurofibroma and biopsy on the right cheek was a verrucous keratosis. Today he reports that he has been doing well with the Westcort topical on the scrotum, but his cheeks are crusty. Reports that his ears are still dry with black bumps. His back has been doing better.        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     Hyperlipidemia LDL goal <70     Insomnia     Gout     Suicidal ideation     Essential hypertension     Rheumatoid arthritis involving multiple sites, unspecified rheumatoid factor presence (H)     High risk medications (not anticoagulants) long-term use     Midline low back pain without sciatica     Bilateral low back pain with sciatica, sciatica laterality unspecified     Elevated liver enzymes     On corticosteroid therapy     Essential hypertension with goal blood pressure less than 140/90     Insomnia,  unspecified type     Rheumatoid arthritis of multiple sites with negative rheumatoid factor (H)     Benign prostatic hyperplasia with lower urinary tract symptoms, unspecified morphology     Chronic pain syndrome     Past Medical History:   Diagnosis Date     Anxiety      CAD (coronary artery disease)     CABG, PCI     Congestive heart failure, unspecified 2008     Depressive disorder 2008     Gout      Hepatitis B > 10 years     HTN (hypertension)      Hyperlipidemia LDL goal < 100      Malrotation of intestine      Moderate major depression (H)      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) 9/19/2011    Uses CPAP     Rheumatoid arthritis flare (H) 1012     Steatohepatitis 12/15    liver biopsy     Tuberculosis age 13    INH x 9 months      Vitamin D deficiency      Past Surgical History:   Procedure Laterality Date     ABDOMEN SURGERY  2014     BIOPSY  2015     BYPASS GRAFT ARTERY CORONARY  2008    6 vessels     COLONOSCOPY  2/8/2013    Procedure: COLONOSCOPY;  Colonoscopy, blood in stool;  Surgeon: Duane, William Charles, MD;  Location:  OR     COMBINED REPAIR PTOSIS WITH BLEPHAROPLASTY BILATERAL Bilateral 6/25/2018    Procedure: COMBINED REPAIR PTOSIS WITH BLEPHAROPLASTY BILATERAL;  Bilateral upper eyelid blepharoplasty, ptosis repair and brow ptosis repair;  Surgeon: Sandra Borja MD;  Location:  OR     GI SURGERY  2014     HC CORONARY STENT PERCUT, EA ADDTL VESSEL  2008    3 months after CABG     HEAD & NECK SURGERY  2011     NASAL/SINUS POLYPECTOMY  2010     ORTHOPEDIC SURGERY  2012     REPAIR PTOSIS       REPAIR PTOSIS BROW BILATERAL Bilateral 6/25/2018    Procedure: REPAIR PTOSIS BROW BILATERAL;;  Surgeon: Sandra Borja MD;  Location:  OR     THORACIC SURGERY  1989    tb     TONSILLECTOMY  2010     UVULOPALATOPHARYNGOPLASTY  2010     VASCULAR SURGERY  2008       Social History:  Patient reports that he has never smoked. He has never used smokeless tobacco. He reports that he does not drink alcohol or use drugs.    No personal history of skin cancer.     Family History:  Family History   Problem Relation Age of Onset     C.A.D. Father      Coronary Artery Disease Father      Hypertension Father      Depression Father      Hypertension Mother      Diabetes Mother      Depression Mother      Mental Illness Mother      Hypertension Maternal Grandfather      Cancer Paternal Grandfather      Other Cancer Paternal Grandfather      Other Cancer Other      Autoimmune Disease No family hx of      Cerebrovascular Disease No family hx of      Thyroid Disease No family hx of      Glaucoma No family hx of      Macular Degeneration No family hx of        Medications:  Current Outpatient Medications   Medication Sig Dispense Refill     Abatacept (ORENCIA) 125 MG/ML SOAJ auto-injector Inject 1 mL (125 mg) Subcutaneous every 7 days 4 mL 6     Acetaminophen (TYLENOL PO)        allopurinol (ZYLOPRIM) 300 MG tablet Take 1 tablet (300 mg) by mouth daily 90 tablet 3     aspirin EC 81 MG EC tablet Take 1 tablet (81 mg) by mouth daily (*) 30 tablet 1     atorvastatin (LIPITOR) 80 MG tablet TAKE 1 TABLET BY MOUTH 1 TIME DAILY 90 tablet 3     baclofen (LIORESAL) 10 MG tablet TAKE 1 TABLET BY MOUTH 2 TIMES DAILY AS NEEDED FOR MUSCLE SPASM 180 tablet 2     blood glucose (NO BRAND SPECIFIED) lancets standard Use to test blood sugar 2 times daily or as directed. 100 each 11     blood glucose monitoring (NO BRAND SPECIFIED) meter device kit Use to test blood sugar 2 times daily or as directed. 1 kit 0     buprenorphine (BUTRANS) 15 MCG/HR Wk patch Place 1 patch onto the skin every 7 days . Name brand only.  Fill 6/19 to start 6/21 4 patch 0     busPIRone HCl (BUSPAR) 30 MG tablet TAKE 1 TABLET BY MOUTH 2 TIMES DAILY 180 tablet 0     Cholecalciferol (VITAMIN D) 2000 UNITS tablet TAKE ONE TABLET BY MOUTH ONCE DAILY 100 tablet 1     clobetasol (TEMOVATE) 0.05 % external solution Apply twice daily to scalp for up to 2 weeks for flares. 60 mL 2     clonazePAM  (KLONOPIN) 1 MG tablet Take 0.5-1 tablets (0.5-1 mg) by mouth 2 times daily as needed for anxiety 90 tablet 0     clopidogrel (PLAVIX) 75 MG tablet Take 1 tablet (75 mg) by mouth daily 90 tablet 3     COLCRYS 0.6 MG tablet TAKE 2 TABLETS BY MOUTH AT THE FIRST SIGN OF FLARE, TAKE 1 ADDITIONAL TABLET ONE HOUR LATER. 30 tablet 0     desvenlafaxine succinate (PRISTIQ) 25 MG 24 hr tablet Take 1 tablet (25 mg) by mouth daily 90 tablet 1     diclofenac (VOLTAREN) 1 % topical gel Apply 4 grams to bilateral knees, up to four times daily as needed using enclosed dosing card.  Max of 32g/day 300 g 11     erythromycin (ROMYCIN) ophthalmic ointment Apply small amount to incision sites three times daily and to inner lower lid of operative eye(s) at bedtime for 7 days. 3.5 g 0     evolocumab (REPATHA) 140 MG/ML prefilled autoinjector Inject 1 mL (140 mg) Subcutaneous every 14 days 2 mL 11     ezetimibe (ZETIA) 10 MG tablet Take 1 tablet (10 mg) by mouth daily 90 tablet 3     gabapentin (NEURONTIN) 300 MG capsule Take 2 capsules (600 mg) by mouth 3 times daily . 3 month supply 540 capsule 2     hydrocortisone (WESTCORT) 0.2 % external cream For genitals, apply twice daily for up to 2 weeks 60 g 0     hydroxychloroquine (PLAQUENIL) 200 MG tablet Take 1 tablet (200 mg) by mouth daily 90 tablet 1     meclizine (ANTIVERT) 25 MG tablet TAKE 1 TABLET BY MOUTH EVERY 6 HOURS AS NEEDED FOR DIZZINESS 30 tablet 3     melatonin 3 MG tablet Take 1 tablet (3 mg) by mouth nightly as needed for sleep       naloxone (NARCAN) nasal spray Spray 1 spray (4 mg) into one nostril alternating nostrils as needed for opioid reversal every 2-3 minutes until assistance arrives 0.2 mL 0     nitroGLYcerin (NITROSTAT) 0.4 MG sublingual tablet PLACE 1 TABLET UNDER THE TONGUE EVERY 5 MINUTES AS NEEDED 25 tablet 1     order for DME Equipment being ordered: single end cane 1 Units 0     ORDER FOR DME 1.  CPAP pressure 11 cm/H20 with heated humidity.   2.  Provide  mask to fit and CPAP supplies.   3.  Length of need lifetime.   4.  If needed please provide a chin strap            pantoprazole (PROTONIX) 40 MG EC tablet TAKE 1 TABLET (40 MG) BY MOUTH DAILY 90 tablet 1     predniSONE (DELTASONE) 2.5 MG tablet Take 1 tablet (2.5 mg) by mouth daily 90 tablet 1     sulfaSALAzine ER (AZULFIDINE EN) 500 MG EC tablet Take 3 tablets (1,500 mg) by mouth 2 times daily 540 tablet 1     tamsulosin (FLOMAX) 0.4 MG capsule TAKE ONE CAPSULE BY MOUTH EVERY DAY 90 capsule 3     tenofovir (VIREAD) 300 MG tablet Take 1 tablet (300 mg) by mouth daily 90 tablet 3     triamcinolone (KENALOG) 0.1 % external ointment Apply to trunk and extremities twice daily for up to 2 weeks for flares 80 g 1     zolpidem (AMBIEN) 5 MG tablet Take 1 tablet (5 mg) by mouth nightly as needed for sleep 30 tablet 5     blood glucose monitoring (NO BRAND SPECIFIED) test strip Use to test blood sugars 2 times daily or as directed (Patient not taking: Reported on 7/12/2019) 100 strip 11     buprenorphine (BUTRANS) 5 MCG/HR WK patch Place 1 patch onto the skin every 7 days (Patient not taking: Reported on 7/2/2019) 4 patch 0     gemfibrozil (LOPID) 600 MG tablet Take 1 tablet (600 mg) by mouth 2 times daily (Patient not taking: Reported on 7/12/2019) 180 tablet 3       Allergies   Allergen Reactions     Citalopram Itching     Tramadol Itching       Review of Systems:  -Skin: as per HPI  -Constitutional: The patient is feeling generally well.  -Admits to joint pain due to rheumatoid arthritis     Physical exam:  Vitals: There were no vitals taken for this visit.  GEN: This is a well developed, well-nourished male in no acute distress, in a pleasant mood.    SKIN: Focused examination of the face, forearm and scalp was performed.   - hypopigmented patches on the occipital scalp   - Hypopigmentation and scaling on the frontal scalp  - Pink scaly macule on the right temple   - Active on the left and right conchal bowl   -  Subcutaneous nodule on the left medial forearm  - No other lesions of concern on areas examined.       Impression/Plan:  1.   Psoriasis, occipital scalp, ears and scrotum    Continue Westcort - apply to the scrotum when active for up to two weeks. Ok to apply to the face     Continue clobetasol solution for the head and ears for up to 2 weeks in a row    Discussed association of psoriasis with weight, arthritis, cardiovascular disease, depression, alcohol and smoking. Recommend regular follow-up with PCP.AAD handout provided.     2. Subcutaneous nodule on the left medial forearm, most likely cyst or lipoma or other    Pt recommended  removal with Dr. Riley- refuses today    Last full skin exam 5.31.2019  Follow-up in 6 months, earlier for new or changing lesions.       Staff Involved:  Scribe/Staff    Scribe Disclosure  I, Talita Mendieta, am serving as a scribe to document services personally performed by Dr. Alyssa Roche MD, based on data collection and the provider's statements to me.     Provider Disclosure:   The documentation recorded by the scribe accurately reflects the services I personally performed and the decisions made by me.    Alyssa Roche MD    Department of Dermatology  [facility]: Phone: [phone], Fax:[phone]  [facility]: Phone: [phone], Fax: [phone]

## 2019-07-16 DIAGNOSIS — F33.1 MAJOR DEPRESSIVE DISORDER, RECURRENT EPISODE, MODERATE (H): ICD-10-CM

## 2019-07-16 NOTE — TELEPHONE ENCOUNTER
"Requested Prescriptions   Pending Prescriptions Disp Refills     busPIRone HCl (BUSPAR) 30 MG tablet [Pharmacy Med Name: BUSPIRONE HCL 30 MG TABLET]  Last Written Prescription Date:  04/23/19  Last Fill Quantity: 180,  # refills: 0   Last Office Visit with ANA PAULA, OB[facility] or [facility] prescribing provider:  06/20/19-Ho   Future Office Visit:    Next 5 appointments (look out 90 days)    Aug 07, 2019 11:15 AM CDT  Return Visit with Sandra Borja MD  [facility] ([facility] [address] [place] [id number]  [phone]   Sep 09, 2019  9:00 AM CDT  Return Visit with Dorothea Loo MD  [facility] ([facility]) [address] [place] [id number]  [phone]        180 tablet 0     Sig: TAKE 1 TABLET BY MOUTH 2 TIMES DAILY       Atypical Antidepressants Protocol Failed - 7/16/2019  8:35 AM        Failed - Patient has PHQ-9 score less than 5 in past 6 months.     Please review last PHQ-9 score.           Passed - Medication active on med list        Passed - Patient is age 18 or older        Passed - Recent (6 mo) or future (30 days) visit within the authorizing provider's specialty     Patient had office visit in the last 6 months or has a visit in the next 30 days with authorizing provider or within the authorizing provider's specialty.  See \"Patient Info\" tab in inbasket, or \"Choose Columns\" in Meds & Orders section of the refill encounter.              "

## 2019-07-17 NOTE — TELEPHONE ENCOUNTER
Routing refill request to provider for review/approval because:  Labs out of range:  PHQ-9    ROBERTO WilkinsN, RN, PHN

## 2019-07-18 RX ORDER — BUSPIRONE HYDROCHLORIDE 30 MG/1
TABLET ORAL 2 TIMES DAILY
Qty: 180 TABLET | Refills: 0 | Status: SHIPPED | OUTPATIENT
Start: 2019-07-18 | End: 2019-10-08

## 2019-07-19 ENCOUNTER — ANESTHESIA EVENT (OUTPATIENT)
Dept: SURGERY | Facility: AMBULATORY SURGERY CENTER | Age: 54
End: 2019-07-19

## 2019-07-21 DIAGNOSIS — K21.9 GASTROESOPHAGEAL REFLUX DISEASE WITHOUT ESOPHAGITIS: ICD-10-CM

## 2019-07-21 NOTE — TELEPHONE ENCOUNTER
"Requested Prescriptions   Pending Prescriptions Disp Refills     pantoprazole (PROTONIX) 40 MG EC tablet [Pharmacy Med Name: PANTOPRAZOLE SOD DR 40 MG TAB] 30 tablet 5     Sig: TAKE 1 TABLET (40 MG) BY MOUTH DAILY       Last Written Prescription Date:  3/20/19  Last Fill Quantity: 90,  # refills: 1   Last Office Visit with [facility], [facility] or [facility] prescribing provider:  6/20/19   Future Office Visit:    Next 5 appointments (look out 90 days)    Aug 07, 2019 11:15 AM CDT  Return Visit with Sandra Borja MD  [facility] ([facility]) [id number] [place] [id number]  [phone]   Sep 09, 2019  9:00 AM CDT  Return Visit with Dorothea Loo MD  [facility] ([facility]) [address] [place] [id number] [phone]             PPI Protocol Passed - 7/21/2019  8:31 AM        Passed - Not on Clopidogrel (unless Pantoprazole ordered)        Passed - No diagnosis of osteoporosis on record        Passed - Recent (12 mo) or future (30 days) visit within the authorizing provider's specialty     Patient had office visit in the last 12 months or has a visit in the next 30 days with authorizing provider or within the authorizing provider's specialty.  See \"Patient Info\" tab in inbasket, or \"Choose Columns\" in Meds & Orders section of the refill encounter.              Passed - Medication is active on med list        Passed - Patient is age 18 or older              Costa Faarax  [facility]  "

## 2019-07-22 ENCOUNTER — TELEPHONE (OUTPATIENT)
Dept: PALLIATIVE MEDICINE | Facility: CLINIC | Age: 54
End: 2019-07-22

## 2019-07-22 ENCOUNTER — MYC REFILL (OUTPATIENT)
Dept: PALLIATIVE MEDICINE | Facility: CLINIC | Age: 54
End: 2019-07-22

## 2019-07-22 ENCOUNTER — SURGERY (OUTPATIENT)
Age: 54
End: 2019-07-22
Payer: COMMERCIAL

## 2019-07-22 ENCOUNTER — ANESTHESIA (OUTPATIENT)
Dept: SURGERY | Facility: AMBULATORY SURGERY CENTER | Age: 54
End: 2019-07-22
Payer: COMMERCIAL

## 2019-07-22 ENCOUNTER — HOSPITAL ENCOUNTER (OUTPATIENT)
Facility: AMBULATORY SURGERY CENTER | Age: 54
Discharge: HOME | End: 2019-07-22
Attending: OPHTHALMOLOGY | Admitting: OPHTHALMOLOGY
Payer: COMMERCIAL

## 2019-07-22 VITALS
OXYGEN SATURATION: 95 % | RESPIRATION RATE: 16 BRPM | SYSTOLIC BLOOD PRESSURE: 143 MMHG | TEMPERATURE: 97.5 F | DIASTOLIC BLOOD PRESSURE: 88 MMHG

## 2019-07-22 DIAGNOSIS — Z98.890 POSTOPERATIVE EYE STATE: Primary | ICD-10-CM

## 2019-07-22 DIAGNOSIS — M06.9 RHEUMATOID ARTHRITIS INVOLVING MULTIPLE SITES, UNSPECIFIED RHEUMATOID FACTOR PRESENCE: ICD-10-CM

## 2019-07-22 PROCEDURE — G8907 PT DOC NO EVENTS ON DISCHARG: HCPCS

## 2019-07-22 PROCEDURE — 67900 REPAIR BROW DEFECT: CPT | Mod: 50 | Performed by: OPHTHALMOLOGY

## 2019-07-22 PROCEDURE — G8918 PT W/O PREOP ORDER IV AB PRO: HCPCS

## 2019-07-22 PROCEDURE — 67900 REPAIR BROW DEFECT: CPT | Mod: RT

## 2019-07-22 RX ORDER — PHYSOSTIGMINE SALICYLATE 1 MG/ML
1.2 INJECTION INTRAVENOUS
Status: DISCONTINUED | OUTPATIENT
Start: 2019-07-22 | End: 2019-07-23 | Stop reason: HOSPADM

## 2019-07-22 RX ORDER — ONDANSETRON 2 MG/ML
4 INJECTION INTRAMUSCULAR; INTRAVENOUS EVERY 30 MIN PRN
Status: DISCONTINUED | OUTPATIENT
Start: 2019-07-22 | End: 2019-07-23 | Stop reason: HOSPADM

## 2019-07-22 RX ORDER — BACITRACIN ZINC 500 [USP'U]/G
OINTMENT TOPICAL 3 TIMES DAILY
Qty: 30 G | Refills: 3 | Status: SHIPPED | OUTPATIENT
Start: 2019-07-22

## 2019-07-22 RX ORDER — MEPERIDINE HYDROCHLORIDE 25 MG/ML
12.5 INJECTION INTRAMUSCULAR; INTRAVENOUS; SUBCUTANEOUS
Status: DISCONTINUED | OUTPATIENT
Start: 2019-07-22 | End: 2019-07-23 | Stop reason: HOSPADM

## 2019-07-22 RX ORDER — FENTANYL CITRATE 50 UG/ML
25-50 INJECTION, SOLUTION INTRAMUSCULAR; INTRAVENOUS
Status: DISCONTINUED | OUTPATIENT
Start: 2019-07-22 | End: 2019-07-23 | Stop reason: HOSPADM

## 2019-07-22 RX ORDER — DEXAMETHASONE SODIUM PHOSPHATE 4 MG/ML
4 INJECTION, SOLUTION INTRA-ARTICULAR; INTRALESIONAL; INTRAMUSCULAR; INTRAVENOUS; SOFT TISSUE EVERY 10 MIN PRN
Status: DISCONTINUED | OUTPATIENT
Start: 2019-07-22 | End: 2019-07-23 | Stop reason: HOSPADM

## 2019-07-22 RX ORDER — SODIUM CHLORIDE, SODIUM LACTATE, POTASSIUM CHLORIDE, CALCIUM CHLORIDE 600; 310; 30; 20 MG/100ML; MG/100ML; MG/100ML; MG/100ML
INJECTION, SOLUTION INTRAVENOUS CONTINUOUS
Status: DISCONTINUED | OUTPATIENT
Start: 2019-07-22 | End: 2019-07-23 | Stop reason: HOSPADM

## 2019-07-22 RX ORDER — NALOXONE HYDROCHLORIDE 0.4 MG/ML
.1-.4 INJECTION, SOLUTION INTRAMUSCULAR; INTRAVENOUS; SUBCUTANEOUS
Status: DISCONTINUED | OUTPATIENT
Start: 2019-07-22 | End: 2019-07-23 | Stop reason: HOSPADM

## 2019-07-22 RX ORDER — ALBUTEROL SULFATE 0.83 MG/ML
2.5 SOLUTION RESPIRATORY (INHALATION) EVERY 4 HOURS PRN
Status: DISCONTINUED | OUTPATIENT
Start: 2019-07-22 | End: 2019-07-23 | Stop reason: HOSPADM

## 2019-07-22 RX ORDER — ERYTHROMYCIN 5 MG/G
OINTMENT OPHTHALMIC PRN
Status: DISCONTINUED | OUTPATIENT
Start: 2019-07-22 | End: 2019-07-22 | Stop reason: HOSPADM

## 2019-07-22 RX ORDER — BUPRENORPHINE 20 UG/H
1 PATCH TRANSDERMAL
Qty: 4 PATCH | Refills: 0 | Status: SHIPPED | OUTPATIENT
Start: 2019-07-22 | End: 2019-08-22

## 2019-07-22 RX ORDER — PROPOFOL 10 MG/ML
INJECTION, EMULSION INTRAVENOUS PRN
Status: DISCONTINUED | OUTPATIENT
Start: 2019-07-22 | End: 2019-07-22

## 2019-07-22 RX ORDER — SODIUM CHLORIDE, SODIUM LACTATE, POTASSIUM CHLORIDE, CALCIUM CHLORIDE 600; 310; 30; 20 MG/100ML; MG/100ML; MG/100ML; MG/100ML
500 INJECTION, SOLUTION INTRAVENOUS CONTINUOUS
Status: DISCONTINUED | OUTPATIENT
Start: 2019-07-22 | End: 2019-07-23 | Stop reason: HOSPADM

## 2019-07-22 RX ORDER — ONDANSETRON 4 MG/1
4 TABLET, ORALLY DISINTEGRATING ORAL EVERY 30 MIN PRN
Status: DISCONTINUED | OUTPATIENT
Start: 2019-07-22 | End: 2019-07-23 | Stop reason: HOSPADM

## 2019-07-22 RX ORDER — KETAMINE HYDROCHLORIDE 10 MG/ML
INJECTION, SOLUTION INTRAMUSCULAR; INTRAVENOUS PRN
Status: DISCONTINUED | OUTPATIENT
Start: 2019-07-22 | End: 2019-07-22

## 2019-07-22 RX ORDER — LIDOCAINE 40 MG/G
CREAM TOPICAL
Status: DISCONTINUED | OUTPATIENT
Start: 2019-07-22 | End: 2019-07-23 | Stop reason: HOSPADM

## 2019-07-22 RX ORDER — ACETAMINOPHEN 325 MG/1
975 TABLET ORAL ONCE
Status: COMPLETED | OUTPATIENT
Start: 2019-07-22 | End: 2019-07-22

## 2019-07-22 RX ORDER — OXYCODONE HYDROCHLORIDE 5 MG/1
5 TABLET ORAL ONCE
Status: COMPLETED | OUTPATIENT
Start: 2019-07-22 | End: 2019-07-22

## 2019-07-22 RX ORDER — HYDRALAZINE HYDROCHLORIDE 20 MG/ML
2.5-5 INJECTION INTRAMUSCULAR; INTRAVENOUS EVERY 10 MIN PRN
Status: DISCONTINUED | OUTPATIENT
Start: 2019-07-22 | End: 2019-07-23 | Stop reason: HOSPADM

## 2019-07-22 RX ORDER — BUPRENORPHINE 15 UG/H
1 PATCH TRANSDERMAL
Qty: 4 PATCH | Refills: 0 | Status: CANCELLED | OUTPATIENT
Start: 2019-07-22

## 2019-07-22 RX ORDER — METOPROLOL TARTRATE 1 MG/ML
1-2 INJECTION, SOLUTION INTRAVENOUS EVERY 5 MIN PRN
Status: DISCONTINUED | OUTPATIENT
Start: 2019-07-22 | End: 2019-07-23 | Stop reason: HOSPADM

## 2019-07-22 RX ORDER — TETRACAINE HYDROCHLORIDE 5 MG/ML
SOLUTION OPHTHALMIC PRN
Status: DISCONTINUED | OUTPATIENT
Start: 2019-07-22 | End: 2019-07-22 | Stop reason: HOSPADM

## 2019-07-22 RX ADMIN — PROPOFOL 50 MG: 10 INJECTION, EMULSION INTRAVENOUS at 09:04

## 2019-07-22 RX ADMIN — ERYTHROMYCIN 1 G: 5 OINTMENT OPHTHALMIC at 09:37

## 2019-07-22 RX ADMIN — OXYCODONE HYDROCHLORIDE 5 MG: 5 TABLET ORAL at 10:08

## 2019-07-22 RX ADMIN — Medication 6.5 ML: at 09:16

## 2019-07-22 RX ADMIN — PROPOFOL 20 MG: 10 INJECTION, EMULSION INTRAVENOUS at 09:26

## 2019-07-22 RX ADMIN — SODIUM CHLORIDE, SODIUM LACTATE, POTASSIUM CHLORIDE, CALCIUM CHLORIDE: 600; 310; 30; 20 INJECTION, SOLUTION INTRAVENOUS at 08:57

## 2019-07-22 RX ADMIN — SODIUM CHLORIDE, SODIUM LACTATE, POTASSIUM CHLORIDE, CALCIUM CHLORIDE 500 ML: 600; 310; 30; 20 INJECTION, SOLUTION INTRAVENOUS at 08:41

## 2019-07-22 RX ADMIN — PROPOFOL 20 MG: 10 INJECTION, EMULSION INTRAVENOUS at 09:24

## 2019-07-22 RX ADMIN — Medication 2.5 ML: at 09:34

## 2019-07-22 RX ADMIN — ACETAMINOPHEN 975 MG: 325 TABLET ORAL at 08:26

## 2019-07-22 RX ADMIN — TETRACAINE HYDROCHLORIDE 1 DROP: 5 SOLUTION OPHTHALMIC at 09:16

## 2019-07-22 RX ADMIN — PROPOFOL 20 MG: 10 INJECTION, EMULSION INTRAVENOUS at 09:18

## 2019-07-22 RX ADMIN — KETAMINE HYDROCHLORIDE 20 MG: 10 INJECTION, SOLUTION INTRAMUSCULAR; INTRAVENOUS at 09:01

## 2019-07-22 NOTE — OP NOTE
PREOPERATIVE DIAGNOSIS: Bilateral brow ptosis.     POSTOPERATIVE DIAGNOSIS: Bilateral brow ptosis.     PROCEDURE PERFORMED: Bilateral direct brow lift.     SURGEON: Sandra Borja MD    ASSISTANT: Jose Song MD     ANESTHESIA: Monitored with local infiltration of a 50/50 mixture of 2% lidocaine with epinephrine and 0.5% Marcaine.     COMPLICATIONS: None.     ESTIMATED BLOOD LOSS: Less than 5 cc.     INDICATIONS: Lamont Daniels  presented with bilateral brow ptosis with the eyebrows resting below the orbital rim and obstructing the superior and lateral visual fields. After the risks, benefits and alternatives to the proposed procedure were explained to the patient Informed Consent was obtained.     DESCRIPTION OF PROCEDURE: Lamont Daniels  was marked in the preoperative area. The first crease above the brow was makred, and the brow was lifted to its expected height, allowed to descend, and this point was marked in the preoperative area with the patient upright. An ellipse was drawn encompassing these marks on each side. Lamont Daniels  was brought to the operating room and placed supine on the operating table. IV sedation was given. The brow areas were infiltrated with a local anesthetic mixture, and the patient was prepped and draped in the typical sterile ophthalmic fashion. Attention was directed to the right side. The skin was incised following the marked lines. Skin and subcutaneous tissue were excised with a Juan C scissors. Hemostasis was obtained with monopolar cautery. The deep tissues were closed with interrupted buried 5-0 Monocryl sutures securing the dermis. The skin was closed with a running 5-0 nylon suture. Attention was directed to the left side where the same procedure was performed. Antibiotic ointment was applied. The patient tolerated the procedures well. Lamont Daniels  left the operating room in stable condition.     Sandra Borja MD

## 2019-07-22 NOTE — ANESTHESIA PREPROCEDURE EVALUATION
Anesthesia Pre-Procedure Evaluation    Patient: Lamont Daniels   MRN:     [id number] Gender:   male   Age:    53 year old :      1965        Preoperative Diagnosis: BILATERAL BROW PTOSIS   Procedure(s):  REPAIR, PTOSIS, BOTH UPPER EYELIDS     Past Medical History:   Diagnosis Date     Anxiety      CAD (coronary artery disease)     CABG, PCI     Congestive heart failure, unspecified      Depressive disorder      Gout      Hepatitis B > 10 years     HTN (hypertension)      Hyperlipidemia LDL goal < 100      Malrotation of intestine      Moderate major depression (H)      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) 2011    Uses CPAP     Rheumatoid arthritis flare (H) 1012     Steatohepatitis 12/15    liver biopsy     Tuberculosis age 13    INH x 9 months      Vitamin D deficiency       Past Surgical History:   Procedure Laterality Date     ABDOMEN SURGERY  2014     BIOPSY       BYPASS GRAFT ARTERY CORONARY  2008    6 vessels     COLONOSCOPY  2013    Procedure: COLONOSCOPY;  Colonoscopy, blood in stool;  Surgeon: Duane, William Charles, MD;  Location:  OR     COMBINED REPAIR PTOSIS WITH BLEPHAROPLASTY BILATERAL Bilateral 2018    Procedure: COMBINED REPAIR PTOSIS WITH BLEPHAROPLASTY BILATERAL;  Bilateral upper eyelid blepharoplasty, ptosis repair and brow ptosis repair;  Surgeon: Sandra Borja MD;  Location:  OR     GI SURGERY       HC CORONARY STENT PERCUT, EA ADDTL VESSEL  2008    3 months after CABG     HEAD & NECK SURGERY       NASAL/SINUS POLYPECTOMY       ORTHOPEDIC SURGERY       REPAIR PTOSIS       REPAIR PTOSIS BROW BILATERAL Bilateral 2018    Procedure: REPAIR PTOSIS BROW BILATERAL;;  Surgeon: Sandra Borja MD;  Location:  OR     THORACIC SURGERY      tb     TONSILLECTOMY  2010     UVULOPALATOPHARYNGOPLASTY  2010     VASCULAR SURGERY            Anesthesia Evaluation     . Pt has had prior anesthetic. Type: General    No history of anesthetic  complications          ROS/MED HX    ENT/Pulmonary:     (+)sleep apnea, uses CPAP , . .    Neurologic:  - neg neurologic ROS     Cardiovascular:     (+) Dyslipidemia, hypertension--CAD, -CABG-. : . CHF Last EF: 65% . . :. .       METS/Exercise Tolerance:     Hematologic:  - neg hematologic  ROS       Musculoskeletal:  - neg musculoskeletal ROS       GI/Hepatic:     (+) hepatitis type B,       Renal/Genitourinary:  - ROS Renal section negative       Endo:  - neg endo ROS       Psychiatric:     (+) psychiatric history depression      Infectious Disease:  - neg infectious disease ROS       Malignancy:      - no malignancy   Other:    - neg other ROS                     PHYSICAL EXAM:   Mental Status/Neuro: A/A/O   Airway: Facies: Feasible  Mallampati: I  Mouth/Opening: Full  TM distance: > 6 cm  Neck ROM: Full   Respiratory: Auscultation: CTAB     Resp. Rate: Normal     Resp. Effort: Normal      CV: Rhythm: Regular  Rate: Age appropriate  Heart: Normal Sounds   Comments:      Dental: Normal                  Lab Results   Component Value Date    WBC 7.7 06/21/2019    HGB 14.2 06/21/2019    HCT 42.1 06/21/2019     06/21/2019    CRP <2.9 06/21/2019    SED 13 06/21/2019     04/01/2019    POTASSIUM 3.6 04/01/2019    CHLORIDE 110 (H) 04/01/2019    CO2 23 04/01/2019    BUN 16 04/01/2019    CR 0.90 06/21/2019     (H) 07/02/2019    HEMANT 9.6 04/01/2019    PHOS 3.7 04/27/2016    MAG 2.3 04/27/2016    ALBUMIN 4.1 07/02/2019    PROTTOTAL 7.7 07/02/2019    ALT 40 07/02/2019    AST 20 07/02/2019    ALKPHOS 112 07/02/2019    BILITOTAL 0.5 07/02/2019    INR 1.11 04/01/2019    TSH 3.34 10/25/2016       Preop Vitals  BP Readings from Last 3 Encounters:   07/22/19 127/80   07/02/19 132/86   06/21/19 115/79    Pulse Readings from Last 3 Encounters:   07/02/19 66   06/21/19 58   06/20/19 62      Resp Readings from Last 3 Encounters:   07/22/19 18   06/20/19 16   03/26/19 18    SpO2 Readings from Last 3 Encounters:  "  07/22/19 97%   07/02/19 94%   06/21/19 98%      Temp Readings from Last 1 Encounters:   07/22/19 97.5  F (36.4  C) (Temporal)    Ht Readings from Last 1 Encounters:   07/02/19 1.575 m (5' 2\")      Wt Readings from Last 1 Encounters:   07/02/19 77.2 kg (170 lb 3.2 oz)    Estimated body mass index is 31.13 kg/m  as calculated from the following:    Height as of 7/2/19: 1.575 m (5' 2\").    Weight as of 7/2/19: 77.2 kg (170 lb 3.2 oz).     LDA:  Peripheral IV 07/22/19 Left Hand (Active)   Site Assessment WDL 7/22/2019  8:40 AM   Line Status Infusing 7/22/2019  8:40 AM   Phlebitis Scale 0-->no symptoms 7/22/2019  8:40 AM   Infiltration Scale 0 7/22/2019  8:40 AM   Infiltration Site Treatment Method  None 7/22/2019  8:40 AM   Extravasation? No 7/22/2019  8:40 AM   Dressing Intervention New dressing  7/22/2019  8:40 AM   Number of days: 0            Assessment:   ASA SCORE: 3    Will be NPO appropriate at: 7/22/2019 9:11 AM   Documentation: H&P complete; Preop Testing complete; Consents complete   Proceeding: Proceed without further delay  Tobacco Use:  NO Active use of Tobacco/UNKNOWN Tobacco use status     Plan:   Anes. Type:  MAC   Pre-Induction: Midazolam IV; Acetaminophen PO   Induction:  IV (Standard)   Airway: Native Airway   Access/Monitoring: PIV   Maintenance: Propofol; IV   Emergence: Procedure Site   Logistics: Same Day Surgery     Postop Pain/Sedation Strategy:  Standard-Options: Opioids PRN     PONV Management:  Adult Risk Factors:, Non-Smoker, Postop Opioids  Prevention: Propofol Infusion; Ondansetron     CONSENT: Direct conversation   Plan and risks discussed with: Patient   Blood Products: Consent Deferred (Minimal Blood Loss)                         Wiley Haro MD  "

## 2019-07-22 NOTE — TELEPHONE ENCOUNTER
Received call from patient requesting refill(s) of buprenorphine (BUTRANS) 15 MCG/HR Wk patch      Last picked up from pharmacy on 6/21/19    Pt last seen by prescribing provider on 6/10/19  Next appt scheduled for 9/9/19     checked in the past 6 months? Yes If no, print current report and give to RN    Last urine drug screen date 3/11/19  Current opioid agreement on file (completed within the last year) Yes Date of opioid agreement: 3/11/19    E-prescribe to    [facility] [id number] [facility] - [place], [place] - [address] [place] [address]  [place] [address]  Phone: [phone] Fax: [phone]

## 2019-07-22 NOTE — DISCHARGE INSTRUCTIONS
Post-operative Instructions  Ophthalmic Plastic and Reconstructive Surgery    Sandra Borja M.D.     All instructions apply to the operated eye(s) or eyelid(s).    Wound care and personal care    ? Apply ice compresses 15 minutes of every hour while awake for 2 days. As long as there is no further bleeding, after two days, switch to warm water compresses for five minutes, four times a day until seen by your physician. Instead of warm water compresses, you can use a cup of dry uncooked rice in a clean cotton sock. Place sock in microwave for 30 seconds to one minute. Next place the warm sock in a plastic bag, and place it over a clean warm wet washcloth over operated eye.    ? You may shower or wash your hair the day after surgery. Do not go swimming for at least 2 weeks to prevent contamination of your wounds.  ? Do not apply make-up to the eyes or eyelids for 2 weeks after surgery.  ? Expect some swelling, bruising, black eye (even into the lower eyelids and cheeks). Also expect serum caking, crusting and discharge from the eye and/or incisions. A small amount of surface bleeding, and depending on the type of surgery, bleeding from the inside of the eyelid, is normal for the first 48 hours.  ? Avoid straining, bending at the waist, or lifting more than 15 pounds for 10 days. Activities that raise your blood pressure can worsen swelling, cause bleeding, and breaking of sutures. Like wise, sleeping with your head slightly elevated for the first several days can help swelling resolve more quickly.   ? Do continue to ambulate (walk) as you normally would - being sedentary after surgery can cause blood clots.   ? Your eye(s) and eyelid(s) may be painful and tender. This is normal after surgery.      Contact information and follow-up  ? Return to the Eye Clinic for a follow-up appointment with your physician as scheduled. If no appointment has been scheduled:   - [facility] eye clinic: [phone] for  an appointment with Dr. Borja within 1 to 2 weeks from your date of surgery.   -  [facility] eye clinic: [phone] for an appointment with Dr. Borja within 1 to 2 weeks from your date of surgery.     ? For severe pain, bleeding, or loss of vision, call the [facility] at [phone] or [facility] at [phone].     After hours or on weekends and holidays, call [phone] and ask to speak with the ophthalmologist on call.    An on call person can be reached after hours for concerns. The on call doctor should not call in medication refill requests after hours or on weekends, so please plan accordingly. An effort has been made to provide adequate pain medications following every surgery, and refills will not be provided in most instances. Narcotic pain medications cannot be called in.     Activity restrictions and driving  ? Avoid heavy lifting, bending, exercise or strenuous activity for 1 week after surgery.  You may resume other activities and return to work as tolerated.  ? You may not resume driving if you are using narcotic pain medications (such as Norco, Percocet, Tylenol #3).    Medications  ? Restart all regular home medications and eye drops. If you take Plavix or  Aspirin on a regular basis, wait for 72 hours after your surgery before restarting these in order to decrease the risk of bleeding complications.  ? Avoid aspirin and aspirin-like medications (Motrin, Aleve, Ibuprofen, Bing-Damascus etc) for 72 hours to reduce the risk of bleeding. You may take Tylenol (acetaminophen) for pain.  ? In addition to your home medications, take the following post-operative medications as prescribed by your physician.    ? Apply antibiotic ointment to all sutures three times a day  The newly prescribed ointment is NOT made for the eye. If you have eye irritation, use artificial tear drops or gel to help keep your eyes lubricated and comfortable.        [facility]  Same-Day Surgery   Adult Discharge Orders & Instructions   For 24 hours after surgery  1. Get plenty of rest.  A responsible adult must stay with you for at least 24 hours after you leave the hospital.   2. Do not drive or use heavy equipment.  If you have weakness or tingling, don't drive or use heavy equipment until this feeling goes away.  3. Do not drink alcohol.  4. Avoid strenuous or risky activities.  Ask for help when climbing stairs.   5. You may feel lightheaded.  IF so, sit for a few minutes before standing.  Have someone help you get up.   6. If you have nausea (feel sick to your stomach): Drink only clear liquids such as apple juice, ginger ale, broth or 7-Up.  Rest may also help.  Be sure to drink enough fluids.  Move to a regular diet as you feel able.  7. You may have a slight fever. Call the doctor if your fever is over 100 F (37.7 C) (taken under the tongue) or lasts longer than 24 hours.  8. You may have a dry mouth, a sore throat, muscle aches or trouble sleeping.  These should go away after 24 hours.  9. Do not make important or legal decisions.   Call your doctor for any of the followin.  Signs of infection (fever, growing tenderness at the surgery site, a large amount of drainage or bleeding, severe pain, foul-smelling drainage, redness, swelling).    2. It has been over 8 to 10 hours since surgery and you are still not able to urinate (pass water).    3.  Headache for over 24 hours.

## 2019-07-22 NOTE — TELEPHONE ENCOUNTER
Central Prior Authorization Team   Phone: [phone]    PA Initiation    Medication: Butrans patch  Insurance Company: [employer] - Phone [phone] Fax [phone]  Pharmacy Filling the Rx: [id number] - [place] - [address] [place] [address]  Filling Pharmacy Phone: [phone]  Filling Pharmacy Fax:    Start Date: 7/22/2019

## 2019-07-22 NOTE — TELEPHONE ENCOUNTER
Prior Authorization Retail Medication Request    Medication/Dose: Butrans patch  ICD code (if different than what is on RX):    Previously Tried and Failed:    Rationale:      [facility] pharmacist called and states that they had worked really hard to get PAs for butrans and now the pt's insurance has changed to [employer] this month. Getting rejections stating that the patient is opioid naive. Discussed that the pt's dose was increased from 15 to 20 mcg patch this month as well. Pharmacist states that he has ordered the 20 mcg patch and he will have it tomorrow.      Insurance: [facility]  ID [id number]  Phone: [phone]    Pharmacy Information (if different than what is on RX)  Name:    Phone:    --------------  Will route to central PA team for processing.     Jenna Hunt, ROBERTON, RN-BC  Patient Care Supervisor/Care Coordinator  [facility]

## 2019-07-22 NOTE — BRIEF OP NOTE
[facility]    Brief Operative Note    Pre-operative diagnosis: BILATERAL BROW PTOSIS   Post-operative diagnosis Same  Procedure: Procedure(s):  REPAIR, PTOSIS, BOTH UPPER brows  Surgeon: Surgeon(s) and Role:     * Sandra Borja MD - Primary   Assistant:  Abisai Song MD  Anesthesia: Combined MAC with Local   Estimated blood loss: Minimal  Drains: None  Specimens: * No specimens in log *  Findings:   None.  Complications: None.  Implants:  * No implants in log *

## 2019-07-22 NOTE — TELEPHONE ENCOUNTER
Per pharmacy, pt's insurance has changed and a new PA needed. Separate encounter opened for PA processing.    Called pt. He reports that he applied his last 15 mcg patch on Saturday 7/20. Let him know that we need to do another PA since his insurance just changed and we will keep him updated.      ROBERTO KarimiN, RN-BC  Patient Care Supervisor/Care Coordinator  [facility]

## 2019-07-22 NOTE — ANESTHESIA CARE TRANSFER NOTE
Patient: Lamont Daniels    Procedure(s):  REPAIR, PTOSIS, BOTH UPPER brows    Diagnosis: BILATERAL BROW PTOSIS  Diagnosis Additional Information: No value filed.    Anesthesia Type:   No value filed.     Note:  Airway :Room Air  Patient transferred to:Phase II  Comments: Care of Transfer report given to RN.  Spont Respirations. Responds   Easy.Handoff Report: Identifed the Patient, Identified the Reponsible Provider, Reviewed the pertinent medical history, Discussed the surgical course, Reviewed Intra-OP anesthesia mangement and issues during anesthesia, Set expectations for post-procedure period and Allowed opportunity for questions and acknowledgement of understanding      Vitals: (Last set prior to Anesthesia Care Transfer)    CRNA VITALS  7/22/2019 0912 - 7/22/2019 0943      7/22/2019             Pulse:  70    SpO2:  95 %                Electronically Signed By: MARIAH ALDRIDGE CRNA  July 22, 2019  9:43 AM

## 2019-07-22 NOTE — TELEPHONE ENCOUNTER
Will ask nursing to call.  This script appears to be due today, but they will not have the new dose in immediately, as they may need to order.    Verify when he is due.  If he is due now, then we really should go back to the 15mcg/hr patch.    If he has a couple days, then we could finish our going BACK to the 20mcg/hr patch.      This all comes down to asking for meds 7 days in advance, which he has NOT been doing.    Lian Loo MD  [facility]

## 2019-07-22 NOTE — ANESTHESIA POSTPROCEDURE EVALUATION
Anesthesia POST Procedure Evaluation    Patient: Lamont Daniels   MRN:     [id number] Gender:   male   Age:    53 year old :      1965        Preoperative Diagnosis: BILATERAL BROW PTOSIS   Procedure(s):  REPAIR, PTOSIS, BOTH UPPER brows   Postop Comments: No value filed.       Anesthesia Type:  MAC  MAC    Reportable Event: NO     PAIN: Uncomplicated   Sign Out status: Comfortable, Well controlled pain     PONV: No PONV   Sign Out status:  No Nausea or Vomiting     Neuro/Psych: Uneventful perioperative course   Sign Out Status: Preoperative baseline; Age appropriate mentation     Airway/Resp.: Uneventful perioperative course   Sign Out Status: Non labored breathing, age appropriate RR; Resp. Status within EXPECTED Parameters     CV: Uneventful perioperative course   Sign Out status: Appropriate BP and perfusion indices; Appropriate HR/Rhythm     Disposition:   Sign Out in:  Phase II  Disposition:  Home  Recovery Course: Uneventful  Follow-Up: Not required           Last Anesthesia Record Vitals:  CRNA VITALS  2019 0912 - 2019 1012      2019             Pulse:  70    SpO2:  95 %          Last PACU Vitals:  Vitals Value Taken Time   /72 2019  9:45 AM   Temp     Pulse     Resp 16 2019  9:45 AM   SpO2 95 % 2019  9:45 AM   Temp src     NIBP 105/67 2019  9:39 AM   Pulse 70 2019  9:41 AM   SpO2 95 % 2019  9:41 AM   Resp     Temp     Ht Rate     Temp 2           Electronically Signed By: Wiley Haro MD, 2019, 1:38 PM

## 2019-07-23 RX ORDER — PANTOPRAZOLE SODIUM 40 MG/1
40 TABLET, DELAYED RELEASE ORAL DAILY
Qty: 30 TABLET | Refills: 5
Start: 2019-07-23

## 2019-07-23 NOTE — TELEPHONE ENCOUNTER
Called urgent appeal number and managed over the phone.  Should have a result in ~72 hours.  New patch is due 7/27    The urgent appeal phone number is [phone].    Lian Loo MD  [facility]

## 2019-07-23 NOTE — TELEPHONE ENCOUNTER
PRIOR AUTHORIZATION DENIED    Medication: Butrans patch    Denial Date: 7/23/2019    Denial Rational: Patient has to try/fail Fentanyl patches first.        Appeal Information: If provider would like to appeal we will need a detailed letter of medical necessity to start the process. Then re-route this request back to the PA pool.    The first approval letter I received was for the quantity limit. I called insurance to see how it was possible to approve the quantity limit but not the actual medication and they said it was the system that did that. Quantity is approved but medication is not. If you'd like to appeal, please attach letter of medical necessity to patient's chart and route back to me/PA pool. Thanks!

## 2019-07-23 NOTE — TELEPHONE ENCOUNTER
Central Prior Authorization Team   Phone: [phone]    Prior Authorization Approval    Authorization Effective Date: 6/22/2019  Authorization Expiration Date: 7/21/2020  Medication: Butrans patch  Approved Dose/Quantity:   Reference #: [id number]   Insurance Company: [employer] - Phone [phone] Fax [phone]  Expected CoPay:       CoPay Card Available:      Foundation Assistance Needed:    Which Pharmacy is filling the prescription (Not needed for infusion/clinic administered): [facility] [id number] [place] [address]  Pharmacy Notified: Yes  Patient Notified: Yes  **Instructed pharmacy to notify patient when script is ready to /ship.**

## 2019-07-23 NOTE — TELEPHONE ENCOUNTER
90 day supply with 1 refills sent on 3/20/19. Should have refills on file at pharmacy or enough to last until 9/2019. Too soon to refill.         Shreya Rascon RN, BSN, PHN

## 2019-07-24 ENCOUNTER — TELEPHONE (OUTPATIENT)
Dept: CARDIOLOGY | Facility: OTHER | Age: 54
End: 2019-07-24

## 2019-07-24 NOTE — TELEPHONE ENCOUNTER
[place] Patches Appeal Approved per Paige. She will fax over all infor.      Chuckie SAAB    [facility] [place]

## 2019-07-24 NOTE — TELEPHONE ENCOUNTER
PA Initiation    Medication: Repatha -pending  Insurance Company: [employer] - Phone [phone] Fax [phone]  Pharmacy Filling the Rx: [facility] - [place], [place] - [facility] [address]  Filling Pharmacy Phone: [phone]  Filling Pharmacy Fax:    Start Date: 7/24/2019

## 2019-07-31 DIAGNOSIS — K21.9 GASTROESOPHAGEAL REFLUX DISEASE WITHOUT ESOPHAGITIS: ICD-10-CM

## 2019-07-31 NOTE — TELEPHONE ENCOUNTER
"Requested Prescriptions   Pending Prescriptions Disp Refills     pantoprazole (PROTONIX) 40 MG EC tablet 90 tablet 1     Sig: Take 1 tablet (40 mg) by mouth daily         Last Written Prescription Date:  3/20/19  Last Fill Quantity: 90,  # refills: 1   Last Office Visit with [facility], [facility] or [facility] prescribing provider:  6/20/19   Future Office Visit:    Next 5 appointments (look out 90 days)    Aug 07, 2019 11:15 AM CDT  [facility] Visit with [facility] MD  [facility] [address] [place] [address]  [phone]   Sep 09, 2019  9:00 AM CDT  Return Visit with Dorothea Loo MD  [facility] ([facility]) [address] [place] [address]  [phone]   Oct 22, 2019  8:40 AM CDT  [facility] [address] [facility] [address]  [phone]             PPI Protocol Passed - 7/31/2019 10:04 AM        Passed - Not on Clopidogrel (unless Pantoprazole ordered)        Passed - No diagnosis of osteoporosis on record        Passed - Recent (12 mo) or future (30 days) visit within the authorizing provider's specialty     Patient had office visit in the last 12 months or has a visit in the next 30 days with authorizing provider or within the authorizing provider's specialty.  See \"Patient Info\" tab in inbasket, or \"Choose Columns\" in Meds & Orders section of the refill encounter.              Passed - Medication is active on med list        Passed - Patient is age 18 or older            Costa Faarax  [facility]    "

## 2019-08-01 NOTE — TELEPHONE ENCOUNTER
Prior Authorization Approval    Authorization Effective Date: 6/24/2019  Authorization Expiration Date: 7/24/2020  Medication: Repatha -approved  Approved Dose/Quantity: 2 for 28 days  Reference #: [id number]   Insurance Company: [employer] - Phone [phone] Fax [phone]  Expected CoPay:       CoPay Card Available:      Foundation Assistance Needed:    Which Pharmacy is filling the prescription (Not needed for infusion/clinic administered): [facility] - [place], [place] - [facility] [address]  Pharmacy Notified: Yes  Patient Notified: Yes

## 2019-08-02 RX ORDER — PANTOPRAZOLE SODIUM 40 MG/1
40 TABLET, DELAYED RELEASE ORAL DAILY
Qty: 90 TABLET | Refills: 2 | Status: SHIPPED | OUTPATIENT
Start: 2019-08-02 | End: 2020-05-12

## 2019-08-07 ENCOUNTER — OFFICE VISIT (OUTPATIENT)
Dept: OPHTHALMOLOGY | Facility: CLINIC | Age: 54
End: 2019-08-07
Payer: COMMERCIAL

## 2019-08-07 DIAGNOSIS — H57.813 BROW PTOSIS, BILATERAL: Primary | ICD-10-CM

## 2019-08-07 PROCEDURE — 99024 POSTOP FOLLOW-UP VISIT: CPT | Performed by: OPHTHALMOLOGY

## 2019-08-07 NOTE — PROGRESS NOTES
Chief Complaint(s) and History of Present Illness(es)     No visit information to display        Lamont Daniels is status post bilateral direct brow.  Incision(s) healing well.  The lid(s)  are  in excellent position.  Sutures removed.   I have recommended:  Vaseline and massage three times a day   * Return to clinic in 2 months     Attending Physician Attestation:  Complete documentation of historical and exam elements from today's encounter can be found in the full encounter summary report (not reduplicated in this progress note).  I personally obtained the chief complaint(s) and history of present illness.  I confirmed and edited as necessary the review of systems, past medical/surgical history, family history, social history, and examination findings as documented by others; and I examined the patient myself.  I personally reviewed the relevant tests, images, and reports as documented above.  I formulated and edited as necessary the assessment and plan and discussed the findings and management plan with the patient and family. - Sandra Borja MD

## 2019-08-08 ENCOUNTER — TELEPHONE (OUTPATIENT)
Dept: OPHTHALMOLOGY | Facility: CLINIC | Age: 54
End: 2019-08-08

## 2019-08-08 NOTE — TELEPHONE ENCOUNTER
8/8 called and left voicemail. Instructed patient to call [phone] to schedule a Return  Visit in about 2 months (around 10/7/2019).    Luisana Portillo   Procedure    Ortho/Sports Med/Ent/Eye   [facility]   [phone]

## 2019-08-12 ENCOUNTER — TELEPHONE (OUTPATIENT)
Dept: CARDIOLOGY | Facility: CLINIC | Age: 54
End: 2019-08-12

## 2019-08-12 ENCOUNTER — TELEPHONE (OUTPATIENT)
Dept: OPHTHALMOLOGY | Facility: CLINIC | Age: 54
End: 2019-08-12

## 2019-08-12 NOTE — TELEPHONE ENCOUNTER
8/12 called and left voicemail. Instructed patient to call [phone] to schedule a Return  Visit in about 2 months (around 10/7/2019).     Luisana Portillo     Procedure    Ortho/Sports Med/Ent/Eye   [facility]   [phone]

## 2019-08-12 NOTE — TELEPHONE ENCOUNTER
1st attempt- to call pt to schedule a GC appointment with Gisela Hill.      Marilou Benavidez, CMA

## 2019-08-19 ENCOUNTER — TELEPHONE (OUTPATIENT)
Dept: OPHTHALMOLOGY | Facility: CLINIC | Age: 54
End: 2019-08-19

## 2019-08-19 NOTE — TELEPHONE ENCOUNTER
8/19 called and left voicemail. Instructed patient to call [phone] to schedule a follow up with Dr. Flores around 10/7/19.    Luisana Portillo   Procedure    Ortho/Sports Med/Ent/Eye   [facility]   [phone]

## 2019-08-19 NOTE — TELEPHONE ENCOUNTER
No call back from patient regarding scheduling a referral, will wait to hear back from patient to schedule.      Marilou cardoza, [facility]

## 2019-08-21 ENCOUNTER — MYC REFILL (OUTPATIENT)
Dept: PALLIATIVE MEDICINE | Facility: CLINIC | Age: 54
End: 2019-08-21

## 2019-08-21 DIAGNOSIS — M06.9 RHEUMATOID ARTHRITIS INVOLVING MULTIPLE SITES, UNSPECIFIED RHEUMATOID FACTOR PRESENCE: ICD-10-CM

## 2019-08-21 RX ORDER — BUPRENORPHINE 20 UG/H
1 PATCH TRANSDERMAL
Qty: 4 PATCH | Refills: 0 | Status: CANCELLED | OUTPATIENT
Start: 2019-08-21

## 2019-08-21 NOTE — TELEPHONE ENCOUNTER
Patient requesting refill(s) of buprenorphine (BUTRANS) 20 MCG/HR WK patch     Last picked up from pharmacy on 07/24/19     Pt last seen by prescribing provider on 6/10/19  Next appt scheduled for 9/9/19     checked in the past 6 months? Yes If no, print current report and give to RN    Last urine drug screen date 3/11/19  Current opioid agreement on file (completed within the last year) Yes Date of opioid agreement: 3/11/19    Processing (pick one and delete the others):      E-prescribe to [facility] [id number] [place] pharmacy      Will route to nursing pool for review and preparation of prescription(s).

## 2019-08-22 RX ORDER — BUPRENORPHINE 20 UG/H
1 PATCH TRANSDERMAL
Qty: 4 PATCH | Refills: 0 | Status: SHIPPED | OUTPATIENT
Start: 2019-08-22 | End: 2019-09-17

## 2019-08-22 NOTE — TELEPHONE ENCOUNTER
Medication refill information reviewed.     Due date for buprenorphine (BUTRANS) 20 MCG/HR WK patch  is 8/23/19     Prescriptions prepped for review.     Will route to provider.

## 2019-08-22 NOTE — TELEPHONE ENCOUNTER
Script Eprescribed to pharmacy    Will send this to MA team to notify patient.    Signed Prescriptions:                        Disp   Refills    buprenorphine (BUTRANS) 20 MCG/HR WK patch 4 patch0        Sig: Place 1 patch onto the skin every 7 days . Name brand           only.  Fill 8/22/19 to start 8/23/19  Authorizing Provider: KAYLA CHUN MD  [facility]

## 2019-09-16 ENCOUNTER — MYC REFILL (OUTPATIENT)
Dept: PALLIATIVE MEDICINE | Facility: CLINIC | Age: 54
End: 2019-09-16

## 2019-09-16 ENCOUNTER — MYC REFILL (OUTPATIENT)
Dept: DERMATOLOGY | Facility: CLINIC | Age: 54
End: 2019-09-16

## 2019-09-16 DIAGNOSIS — E78.5 HYPERLIPIDEMIA LDL GOAL <70: ICD-10-CM

## 2019-09-16 DIAGNOSIS — M06.9 RHEUMATOID ARTHRITIS INVOLVING MULTIPLE SITES, UNSPECIFIED RHEUMATOID FACTOR PRESENCE: ICD-10-CM

## 2019-09-16 DIAGNOSIS — L40.9 PSORIASIS: ICD-10-CM

## 2019-09-16 DIAGNOSIS — Z95.1 S/P CABG (CORONARY ARTERY BYPASS GRAFT): ICD-10-CM

## 2019-09-16 RX ORDER — BUPRENORPHINE 20 UG/H
1 PATCH TRANSDERMAL
Qty: 4 PATCH | Refills: 0 | Status: CANCELLED | OUTPATIENT
Start: 2019-09-16

## 2019-09-16 RX ORDER — HYDROCORTISONE VALERATE CREAM 2 MG/G
CREAM TOPICAL
Qty: 60 G | Refills: 4 | Status: CANCELLED | OUTPATIENT
Start: 2019-09-16

## 2019-09-16 RX ORDER — TRIAMCINOLONE ACETONIDE 1 MG/G
OINTMENT TOPICAL
Qty: 80 G | Refills: 1 | Status: CANCELLED | OUTPATIENT
Start: 2019-09-16

## 2019-09-16 RX ORDER — CLOBETASOL PROPIONATE 0.5 MG/ML
SOLUTION TOPICAL
Qty: 60 ML | Refills: 3 | Status: CANCELLED | OUTPATIENT
Start: 2019-09-16

## 2019-09-16 NOTE — TELEPHONE ENCOUNTER
Last Written Prescription Date:  10/4/2018  Last Fill Quantity: 2 mL,  # refills: 11   Last office visit: 6/21/2019 with prescribing provider:  Dr. Travis   Future Office Visit:   Next 5 appointments (look out 90 days)    Oct 02, 2019  8:45 AM CDT  Return Visit with Sandra Borja MD  [facility] [address] [place] [address]  [phone]   Oct 14, 2019  8:30 AM CDT  Return Visit with Dorothea Loo MD  [facility] ([facility]) [address] [place] [id number] [phone]   Oct 22, 2019  8:40 AM CDT  Return Visit with Sebastián Jenkins MD  [facility] ([facility]) [address] [facility] [address]  [phone]         Refilled, recent lipids done. Does need a lipid drawn, will call patient.   VIVIANA Ordaz

## 2019-09-16 NOTE — TELEPHONE ENCOUNTER
The request doesn't match the note    Celestino montenegro and clarify if he understands plan and which areas to use what

## 2019-09-16 NOTE — TELEPHONE ENCOUNTER
Patient requesting refill of triamcinolone ointment.  RN unable to refill as medication wasn't discussed at last 2 visits.  It was prescribed for psoriasis and seborrheic dermatitis.  Forwarding to MD to review and advise.    Per 7/12/19 office visit with Dr. Roche:  Impression/Plan:  1.   Psoriasis, occipital scalp, ears and scrotum    Continue Westcort - apply to the scrotum when active for up to two weeks. Ok to apply to the face     Continue clobetasol solution for the head and ears for up to 2 weeks in a row    Discussed association of psoriasis with weight, arthritis, cardiovascular disease, depression, alcohol and smoking. Recommend regular follow-up with PCP.AAD handout provided.      2. Subcutaneous nodule on the left medial forearm, most likely cyst or lipoma or other    Pt recommended  removal with Dr. Riley- refuses today     Last full skin exam 5.31.2019  Follow-up in 6 months, earlier for new or changing lesions.

## 2019-09-17 RX ORDER — BUPRENORPHINE 20 UG/H
1 PATCH TRANSDERMAL
Qty: 4 PATCH | Refills: 0 | Status: SHIPPED | OUTPATIENT
Start: 2019-09-17 | End: 2019-10-14

## 2019-09-17 NOTE — TELEPHONE ENCOUNTER
Received call from patient requesting refill(s) of buprenorphine (BUTRANS) 20 MCG/HR WK patch      Last picked up from pharmacy on 8/22/2019    Pt last seen by prescribing provider on 6/10/2019  Next appt scheduled for 10/14/2019     checked in the past 6 months? Yes If no, print current report and give to RN    Last urine drug screen date 3/11/2019  Current opioid agreement on file (completed within the last year) Yes Date of opioid agreement: 3/11/2019    E-prescribe to    [facility] [address] [place] [facility] [address]  Phone: [phone] Fax: [phone]    Will route to nursing pool for review and preparation of prescription(s).

## 2019-09-17 NOTE — TELEPHONE ENCOUNTER
Left message for pt he is due for fasting lab for lipids. And to call if he would like to have help with scheduling an appt for lab.     Lian Bob CMA on 9/17/2019 at 10:08 AM

## 2019-09-17 NOTE — TELEPHONE ENCOUNTER
Medication refill information reviewed.     Due date for buprenorphine (BUTRANS) 20 MCG/HR WK patch is 9/20/19     Prescriptions prepped for review.     Will route to provider.     Arnold Pete, RN  Care Coordinator   [facility]

## 2019-09-17 NOTE — TELEPHONE ENCOUNTER
VM left for patient to notify him about Rx approval and fill date     Alejandro Gan CMA   [facility]  September 17, 2019

## 2019-09-17 NOTE — TELEPHONE ENCOUNTER
Script Eprescribed to pharmacy    Will send this to MA team to notify patient.    Signed Prescriptions:                        Disp   Refills    buprenorphine (BUTRANS) 20 MCG/HR WK patch 4 patch0        Sig: Place 1 patch onto the skin every 7 days . Name brand           only.  Fill 9/28/19 to start 9/20/19  Authorizing Provider: KAYLA CHUN MD  [facility]

## 2019-09-17 NOTE — TELEPHONE ENCOUNTER
Pt called to inquire about the use of this medication., No answer.  does not identify pt. Left general message for pt to call the [facility] clinic back at [phone]......Gloria Berg RN

## 2019-09-23 NOTE — TELEPHONE ENCOUNTER
Left message for pt advising him a fasting lab is due and he will need to have it done to continue to receive refills.    Lian Bob CMA on 9/23/2019 at 8:57 AM

## 2019-09-25 DIAGNOSIS — M62.830 SPASM OF BACK MUSCLES: ICD-10-CM

## 2019-09-25 NOTE — TELEPHONE ENCOUNTER
Requested Prescriptions   Pending Prescriptions Disp Refills     baclofen (LIORESAL) 10 MG tablet [Pharmacy Med Name: BACLOFEN 10 MG TABLET]        Last Written Prescription Date:  01/04/19  Last Fill Quantity: 180,   # refills: 2  Last Office Visit: 06/20/19-  Future Office visit:    Next 5 appointments (look out 90 days)    Oct 02, 2019  8:45 AM CDT  Return Visit with Sandra Borja MD  [facility]) [address] [place] [address]  [phone]   Oct 14, 2019  8:30 AM CDT  Return Visit with Dorothea Loo MD  [facility] ([facility]) [address] [place] [phone]   Oct 22, 2019  8:40 AM CDT  [facility] Visit with Sebast[facility]án Jenkins [facility]  [facility] [address] [facility] [address]  [phone]           Routing refill request to provider for review/approval because:  Drug not on the G, P or [facility] refill protocol or controlled substance 180 tablet 2     Sig: TAKE 1 TABLET BY MOUTH 2 TIMES DAILY AS NEEDED FOR MUSCLE SPASM       There is no refill protocol information for this order

## 2019-09-27 RX ORDER — BACLOFEN 10 MG/1
TABLET ORAL
Qty: 180 TABLET | Refills: 2 | Status: SHIPPED | OUTPATIENT
Start: 2019-09-27 | End: 2020-05-19

## 2019-09-27 NOTE — TELEPHONE ENCOUNTER
Routing refill request to provider for review/approval because:  Drug not on the [facility] refill protocol -   Lorraine Francis RN

## 2019-10-07 ENCOUNTER — ANCILLARY PROCEDURE (OUTPATIENT)
Dept: ULTRASOUND IMAGING | Facility: CLINIC | Age: 54
End: 2019-10-07
Attending: INTERNAL MEDICINE
Payer: COMMERCIAL

## 2019-10-07 DIAGNOSIS — K74.00 HEPATIC FIBROSIS: ICD-10-CM

## 2019-10-07 DIAGNOSIS — B18.1 CHRONIC VIRAL HEPATITIS B WITHOUT DELTA AGENT AND WITHOUT COMA (H): ICD-10-CM

## 2019-10-07 DIAGNOSIS — Z79.899 HIGH RISK MEDICATIONS (NOT ANTICOAGULANTS) LONG-TERM USE: ICD-10-CM

## 2019-10-07 LAB
AFP SERPL-MCNC: 2.6 UG/L (ref 0–8)
ALBUMIN SERPL-MCNC: 4.1 G/DL (ref 3.4–5)
ALP SERPL-CCNC: 112 U/L (ref 40–150)
ALT SERPL W P-5'-P-CCNC: 47 U/L (ref 0–70)
ANION GAP SERPL CALCULATED.3IONS-SCNC: 4 MMOL/L (ref 3–14)
AST SERPL W P-5'-P-CCNC: 32 U/L (ref 0–45)
BASOPHILS # BLD AUTO: 0.1 10E9/L (ref 0–0.2)
BASOPHILS NFR BLD AUTO: 0.9 %
BILIRUB DIRECT SERPL-MCNC: 0.1 MG/DL (ref 0–0.2)
BILIRUB SERPL-MCNC: 0.4 MG/DL (ref 0.2–1.3)
BUN SERPL-MCNC: 19 MG/DL (ref 7–30)
CALCIUM SERPL-MCNC: 9.2 MG/DL (ref 8.5–10.1)
CALCIUM UR-MCNC: 9.1 MG/DL
CALCIUM/CREAT UR: 0.08 G/G CR
CHLORIDE SERPL-SCNC: 110 MMOL/L (ref 94–109)
CO2 SERPL-SCNC: 26 MMOL/L (ref 20–32)
CREAT SERPL-MCNC: 0.93 MG/DL (ref 0.66–1.25)
CREAT UR-MCNC: 108 MG/DL
CRP SERPL-MCNC: <2.9 MG/L (ref 0–8)
DIFFERENTIAL METHOD BLD: NORMAL
EOSINOPHIL # BLD AUTO: 0.2 10E9/L (ref 0–0.7)
EOSINOPHIL NFR BLD AUTO: 2 %
ERYTHROCYTE [DISTWIDTH] IN BLOOD BY AUTOMATED COUNT: 13.7 % (ref 10–15)
ERYTHROCYTE [SEDIMENTATION RATE] IN BLOOD BY WESTERGREN METHOD: 11 MM/H (ref 0–20)
GFR SERPL CREATININE-BSD FRML MDRD: >90 ML/MIN/{1.73_M2}
GLUCOSE SERPL-MCNC: 103 MG/DL (ref 70–99)
HCT VFR BLD AUTO: 45.4 % (ref 40–53)
HGB BLD-MCNC: 15.1 G/DL (ref 13.3–17.7)
IMM GRANULOCYTES # BLD: 0 10E9/L (ref 0–0.4)
IMM GRANULOCYTES NFR BLD: 0.3 %
INR PPP: 0.93 (ref 0.86–1.14)
LYMPHOCYTES # BLD AUTO: 2.7 10E9/L (ref 0.8–5.3)
LYMPHOCYTES NFR BLD AUTO: 29.4 %
MCH RBC QN AUTO: 29.3 PG (ref 26.5–33)
MCHC RBC AUTO-ENTMCNC: 33.3 G/DL (ref 31.5–36.5)
MCV RBC AUTO: 88 FL (ref 78–100)
MONOCYTES # BLD AUTO: 1.3 10E9/L (ref 0–1.3)
MONOCYTES NFR BLD AUTO: 13.9 %
NEUTROPHILS # BLD AUTO: 5 10E9/L (ref 1.6–8.3)
NEUTROPHILS NFR BLD AUTO: 53.5 %
PHOSPHATE 24H UR-MCNC: 0.41 G/G CR
PHOSPHATE UR-MCNC: 44.6 MG/DL
PLATELET # BLD AUTO: 289 10E9/L (ref 150–450)
POTASSIUM SERPL-SCNC: 3.8 MMOL/L (ref 3.4–5.3)
PROT SERPL-MCNC: 7.8 G/DL (ref 6.8–8.8)
RBC # BLD AUTO: 5.15 10E12/L (ref 4.4–5.9)
SODIUM SERPL-SCNC: 140 MMOL/L (ref 133–144)
WBC # BLD AUTO: 9.3 10E9/L (ref 4–11)

## 2019-10-07 PROCEDURE — 84105 ASSAY OF URINE PHOSPHORUS: CPT | Performed by: INTERNAL MEDICINE

## 2019-10-07 PROCEDURE — 93975 VASCULAR STUDY: CPT | Performed by: RADIOLOGY

## 2019-10-07 PROCEDURE — 82105 ALPHA-FETOPROTEIN SERUM: CPT | Performed by: INTERNAL MEDICINE

## 2019-10-07 PROCEDURE — 76700 US EXAM ABDOM COMPLETE: CPT | Mod: 59 | Performed by: RADIOLOGY

## 2019-10-07 PROCEDURE — 85610 PROTHROMBIN TIME: CPT | Performed by: INTERNAL MEDICINE

## 2019-10-07 PROCEDURE — 85652 RBC SED RATE AUTOMATED: CPT | Performed by: INTERNAL MEDICINE

## 2019-10-07 PROCEDURE — 82340 ASSAY OF CALCIUM IN URINE: CPT | Performed by: INTERNAL MEDICINE

## 2019-10-07 PROCEDURE — 87517 HEPATITIS B DNA QUANT: CPT | Performed by: INTERNAL MEDICINE

## 2019-10-07 PROCEDURE — 80048 BASIC METABOLIC PNL TOTAL CA: CPT | Performed by: INTERNAL MEDICINE

## 2019-10-07 PROCEDURE — 36415 COLL VENOUS BLD VENIPUNCTURE: CPT | Performed by: INTERNAL MEDICINE

## 2019-10-07 PROCEDURE — 85025 COMPLETE CBC W/AUTO DIFF WBC: CPT | Performed by: INTERNAL MEDICINE

## 2019-10-07 PROCEDURE — 80076 HEPATIC FUNCTION PANEL: CPT | Performed by: INTERNAL MEDICINE

## 2019-10-07 PROCEDURE — 86140 C-REACTIVE PROTEIN: CPT | Performed by: INTERNAL MEDICINE

## 2019-10-08 ENCOUNTER — OFFICE VISIT (OUTPATIENT)
Dept: FAMILY MEDICINE | Facility: CLINIC | Age: 54
End: 2019-10-08
Payer: COMMERCIAL

## 2019-10-08 VITALS
TEMPERATURE: 97.6 F | SYSTOLIC BLOOD PRESSURE: 117 MMHG | HEIGHT: 62 IN | HEART RATE: 65 BPM | DIASTOLIC BLOOD PRESSURE: 81 MMHG | WEIGHT: 173 LBS | BODY MASS INDEX: 31.83 KG/M2 | OXYGEN SATURATION: 96 %

## 2019-10-08 DIAGNOSIS — R32 URINARY INCONTINENCE, UNSPECIFIED TYPE: ICD-10-CM

## 2019-10-08 DIAGNOSIS — R39.12 BENIGN PROSTATIC HYPERPLASIA WITH WEAK URINARY STREAM: ICD-10-CM

## 2019-10-08 DIAGNOSIS — F45.8 ANXIETY HYPERVENTILATION: ICD-10-CM

## 2019-10-08 DIAGNOSIS — M06.09 RHEUMATOID ARTHRITIS OF MULTIPLE SITES WITH NEGATIVE RHEUMATOID FACTOR (H): ICD-10-CM

## 2019-10-08 DIAGNOSIS — F41.9 ANXIETY: Chronic | ICD-10-CM

## 2019-10-08 DIAGNOSIS — F33.1 MAJOR DEPRESSIVE DISORDER, RECURRENT EPISODE, MODERATE (H): Primary | ICD-10-CM

## 2019-10-08 DIAGNOSIS — N40.1 BENIGN PROSTATIC HYPERPLASIA WITH WEAK URINARY STREAM: ICD-10-CM

## 2019-10-08 DIAGNOSIS — F41.9 ANXIETY HYPERVENTILATION: ICD-10-CM

## 2019-10-08 LAB
HBV DNA SERPL NAA+PROBE-ACNC: NORMAL [IU]/ML
HBV DNA SERPL NAA+PROBE-LOG IU: NORMAL {LOG_IU}/ML

## 2019-10-08 PROCEDURE — 99214 OFFICE O/P EST MOD 30 MIN: CPT | Performed by: FAMILY MEDICINE

## 2019-10-08 PROCEDURE — 96127 BRIEF EMOTIONAL/BEHAV ASSMT: CPT | Performed by: FAMILY MEDICINE

## 2019-10-08 RX ORDER — DIAPER,BRIEF,ADULT, DISPOSABLE
EACH MISCELLANEOUS
Qty: 60 EACH | Refills: 11 | Status: SHIPPED | OUTPATIENT
Start: 2019-10-08

## 2019-10-08 RX ORDER — CLONAZEPAM 1 MG/1
.5-1 TABLET ORAL 2 TIMES DAILY PRN
Qty: 90 TABLET | Refills: 0 | Status: SHIPPED | OUTPATIENT
Start: 2019-10-08 | End: 2019-11-26

## 2019-10-08 RX ORDER — DESVENLAFAXINE 25 MG/1
25 TABLET, EXTENDED RELEASE ORAL DAILY
Qty: 90 TABLET | Refills: 3 | Status: SHIPPED | OUTPATIENT
Start: 2019-10-08 | End: 2020-01-14

## 2019-10-08 RX ORDER — TAMSULOSIN HYDROCHLORIDE 0.4 MG/1
0.8 CAPSULE ORAL DAILY
Qty: 180 CAPSULE | Refills: 3 | Status: SHIPPED | OUTPATIENT
Start: 2019-10-08 | End: 2020-01-14

## 2019-10-08 RX ORDER — BUSPIRONE HYDROCHLORIDE 30 MG/1
30 TABLET ORAL 2 TIMES DAILY
Qty: 180 TABLET | Refills: 3 | Status: SHIPPED | OUTPATIENT
Start: 2019-10-08 | End: 2020-09-15

## 2019-10-08 ASSESSMENT — PATIENT HEALTH QUESTIONNAIRE - PHQ9
5. POOR APPETITE OR OVEREATING: MORE THAN HALF THE DAYS
SUM OF ALL RESPONSES TO PHQ QUESTIONS 1-9: 16

## 2019-10-08 ASSESSMENT — ANXIETY QUESTIONNAIRES
6. BECOMING EASILY ANNOYED OR IRRITABLE: NEARLY EVERY DAY
IF YOU CHECKED OFF ANY PROBLEMS ON THIS QUESTIONNAIRE, HOW DIFFICULT HAVE THESE PROBLEMS MADE IT FOR YOU TO DO YOUR WORK, TAKE CARE OF THINGS AT HOME, OR GET ALONG WITH OTHER PEOPLE: EXTREMELY DIFFICULT
5. BEING SO RESTLESS THAT IT IS HARD TO SIT STILL: MORE THAN HALF THE DAYS
1. FEELING NERVOUS, ANXIOUS, OR ON EDGE: NEARLY EVERY DAY
7. FEELING AFRAID AS IF SOMETHING AWFUL MIGHT HAPPEN: MORE THAN HALF THE DAYS
GAD7 TOTAL SCORE: 18
2. NOT BEING ABLE TO STOP OR CONTROL WORRYING: NEARLY EVERY DAY
3. WORRYING TOO MUCH ABOUT DIFFERENT THINGS: NEARLY EVERY DAY

## 2019-10-08 ASSESSMENT — MIFFLIN-ST. JEOR: SCORE: 1508.97

## 2019-10-08 ASSESSMENT — PAIN SCALES - GENERAL: PAINLEVEL: SEVERE PAIN (6)

## 2019-10-08 NOTE — PATIENT INSTRUCTIONS
At [facility], we strive to deliver an exceptional experience to you, every time we see you.  If you receive a survey in the mail, please send us back your thoughts. We really do value your feedback.    Your care team:                            Family Medicine Internal Medicine   MD Noam Dorado MD Shantel Branch-Fleming, MD Katya Georgiev PA-C Megan Hill, APRN PAVEL Chavez MD Pediatrics   Robbin Trevino, NATE Lazaro, MD Danitza Hatch APRN CNP   MD Tiffany Sanchez MD Deborah Mielke, MD Mirlande Pedraza, APRN [facility]      Clinic hours: Monday - Thursday 7 am-7 pm; Fridays 7 am-5 pm.   Urgent care: Monday - Friday 11 am-9 pm; Saturday and Sunday 9 am-5 pm.  Pharmacy : Monday -Thursday 8 am-8 pm; Friday 8 am-6 pm; Saturday and Sunday 9 am-5 pm.     Clinic: [phone]   Pharmacy: [phone]

## 2019-10-08 NOTE — PROGRESS NOTES
Subjective     Lamont Daniels is a 53 year old male who presents to clinic today for the following health issues:    HPI   Depression and Anxiety Follow-Up    How are you doing with your depression since your last visit? Improved     How are you doing with your anxiety since your last visit?  Worsened     Are you having other symptoms that might be associated with depression or anxiety? No    Have you had a significant life event? No     Do you have any concerns with your use of alcohol or other drugs? No       Social History     Tobacco Use     Smoking status: Never Smoker     Smokeless tobacco: Never Used   Substance Use Topics     Alcohol use: No     Alcohol/week: 0.0 standard drinks     Drug use: No     PHQ 4/25/2017 6/1/2018 3/14/2019   PHQ-9 Total Score 21 18 12   Q9: Thoughts of better off dead/self-harm past 2 weeks More than half the days More than half the days Not at all     THONG-7 SCORE 4/25/2017 6/1/2018 3/14/2019   Total Score - - -   Total Score 18 15 10   Total Score - - -     No flowsheet data found.      Suicide Assessment Five-step Evaluation and Treatment (SAFE-T)      How many servings of fruits and vegetables do you eat daily?  4 or more    On average, how many sweetened beverages do you drink each day (soda, juice, sweet tea, etc)?   0-1    How many days per week do you miss taking your medication? 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     Hyperlipidemia LDL goal <70     Insomnia     Gout     Suicidal ideation     Essential hypertension     Rheumatoid arthritis involving multiple sites, unspecified rheumatoid factor presence (H)     High risk medications (not  anticoagulants) long-term use     Midline low back pain without sciatica     Bilateral low back pain with sciatica, sciatica laterality unspecified     Elevated liver enzymes     On corticosteroid therapy     Essential hypertension with goal blood pressure less than 140/90     Insomnia, unspecified type     Rheumatoid arthritis of multiple sites with negative rheumatoid factor (H)     Benign prostatic hyperplasia with lower urinary tract symptoms, unspecified morphology     Chronic pain syndrome     Past Surgical History:   Procedure Laterality Date     ABDOMEN SURGERY  2014     BIOPSY  2015     BYPASS GRAFT ARTERY CORONARY  2008    6 vessels     COLONOSCOPY  2/8/2013    Procedure: COLONOSCOPY;  Colonoscopy, blood in stool;  Surgeon: Duane, William Charles, MD;  Location:  OR     COMBINED REPAIR PTOSIS WITH BLEPHAROPLASTY BILATERAL Bilateral 6/25/2018    Procedure: COMBINED REPAIR PTOSIS WITH BLEPHAROPLASTY BILATERAL;  Bilateral upper eyelid blepharoplasty, ptosis repair and brow ptosis repair;  Surgeon: Sandra Borja MD;  Location:  OR     GI SURGERY  2014     HC CORONARY STENT PERCUT, EA ADDTL VESSEL  2008    3 months after CABG     HEAD & NECK SURGERY  2011     NASAL/SINUS POLYPECTOMY  2010     ORTHOPEDIC SURGERY  2012     REPAIR PTOSIS       REPAIR PTOSIS BILATERAL Bilateral 7/22/2019    Procedure: REPAIR, PTOSIS, BOTH UPPER brows;  Surgeon: Sandra Borja MD;  Location:  OR     REPAIR PTOSIS BROW BILATERAL Bilateral 6/25/2018    Procedure: REPAIR PTOSIS BROW BILATERAL;;  Surgeon: Sandra Borja MD;  Location:  OR     THORACIC SURGERY  1989    tb     TONSILLECTOMY  2010     UVULOPALATOPHARYNGOPLASTY  2010     VASCULAR SURGERY  2008       Social History     Tobacco Use     Smoking status: Never Smoker     Smokeless tobacco: Never Used   Substance Use Topics     Alcohol use: No     Alcohol/week: 0.0 standard drinks     Family History   Problem Relation Age of Onset     C.A.D. Father       "Coronary Artery Disease Father      Hypertension Father      Depression Father      Hypertension Mother      Diabetes Mother      Depression Mother      Mental Illness Mother      Hypertension Maternal Grandfather      Cancer Paternal Grandfather      Other Cancer Paternal Grandfather      Other Cancer Other      Autoimmune Disease No family hx of      Cerebrovascular Disease No family hx of      Thyroid Disease No family hx of      Glaucoma No family hx of      Macular Degeneration No family hx of            Reviewed and updated as needed this visit by Provider         Review of Systems   ROS COMP: Constitutional, HEENT, cardiovascular, pulmonary, GI, , musculoskeletal, neuro, skin, endocrine and psych systems are negative, except as otherwise noted.      Objective    /81 (BP Location: Left arm, Patient Position: Chair, Cuff Size: Adult Large)   Pulse 65   Temp 97.6  F (36.4  C) (Oral)   Ht 1.575 m (5' 2\")   Wt 78.5 kg (173 lb)   SpO2 96%   BMI 31.64 kg/m    Body mass index is 31.64 kg/m .  Physical Exam   GENERAL: healthy, alert and no distress  NECK: no adenopathy, no asymmetry, masses, or scars and thyroid normal to palpation  RESP: lungs clear to auscultation - no rales, rhonchi or wheezes  CV: regular rate and rhythm, normal S1 S2, no S3 or S4, no murmur, click or rub, no peripheral edema and peripheral pulses strong  ABDOMEN: soft, nontender, no hepatosplenomegaly, no masses and bowel sounds normal  MS: no gross musculoskeletal defects noted, no edema    Diagnostic Test Results:  Labs reviewed in Epic        Assessment & Plan     (F33.1) Major depressive disorder, recurrent episode, moderate (H)  (primary encounter diagnosis)  Comment:   Plan: busPIRone HCl (BUSPAR) 30 MG tablet,         desvenlafaxine succinate (PRISTIQ) 25 MG 24 hr         tablet        Stable. RTC in 6 months.    (F41.9) Anxiety  Comment:   Plan: desvenlafaxine succinate (PRISTIQ) 25 MG 24 hr         tablet        " "Stable.    (F41.9,  F45.8) Anxiety hyperventilation  Comment:   Plan: clonazePAM (KLONOPIN) 1 MG tablet        Stable.    (N40.1,  R39.12) Benign prostatic hyperplasia with weak urinary stream  Comment:   Plan: tamsulosin (FLOMAX) 0.4 MG capsule            (M06.09) Rheumatoid arthritis of multiple sites with negative rheumatoid factor (H)  Comment:   Plan: order for DME        Needs chair lift to help get in and out of chair. Patient has history of rheumatoid arthritis causing multiple joint pain. Patient able to walk. Patient has difficulty getting out of regular chairs due to joint pain. Patient has tried physical therapy in the past without results. Patient currently getting treated for rheumatoid arthritis but continues to have chronic pain.    (R32) Urinary incontinence, unspecified type  Comment:   Plan: Incontinence Supply Disposable (DEPEND         UNDERWEAR LARGE) MISC        Patient having accidents 2-3 times per week.    BMI:   Estimated body mass index is 31.64 kg/m  as calculated from the following:    Height as of this encounter: 1.575 m (5' 2\").    Weight as of this encounter: 78.5 kg (173 lb).           See Patient Instructions    Return in about 6 months (around 4/8/2020) for Physical Exam.    David Chavez MD, MD  [facility]      "

## 2019-10-09 ASSESSMENT — ANXIETY QUESTIONNAIRES: GAD7 TOTAL SCORE: 18

## 2019-10-14 ENCOUNTER — OFFICE VISIT (OUTPATIENT)
Dept: PALLIATIVE MEDICINE | Facility: CLINIC | Age: 54
End: 2019-10-14
Payer: COMMERCIAL

## 2019-10-14 VITALS
SYSTOLIC BLOOD PRESSURE: 130 MMHG | BODY MASS INDEX: 32.01 KG/M2 | HEART RATE: 67 BPM | DIASTOLIC BLOOD PRESSURE: 86 MMHG | WEIGHT: 175 LBS

## 2019-10-14 DIAGNOSIS — M06.9 RHEUMATOID ARTHRITIS INVOLVING MULTIPLE SITES, UNSPECIFIED RHEUMATOID FACTOR PRESENCE: Primary | ICD-10-CM

## 2019-10-14 DIAGNOSIS — M53.3 SI (SACROILIAC) JOINT DYSFUNCTION: ICD-10-CM

## 2019-10-14 DIAGNOSIS — G47.33 OSA (OBSTRUCTIVE SLEEP APNEA): ICD-10-CM

## 2019-10-14 DIAGNOSIS — F41.9 ANXIETY: ICD-10-CM

## 2019-10-14 PROCEDURE — 99213 OFFICE O/P EST LOW 20 MIN: CPT | Performed by: PSYCHIATRY & NEUROLOGY

## 2019-10-14 RX ORDER — BUPRENORPHINE 20 UG/H
1 PATCH TRANSDERMAL
Qty: 4 PATCH | Refills: 1 | Status: SHIPPED | OUTPATIENT
Start: 2019-10-14 | End: 2019-11-18

## 2019-10-14 ASSESSMENT — PAIN SCALES - GENERAL: PAINLEVEL: SEVERE PAIN (6)

## 2019-10-14 NOTE — PATIENT INSTRUCTIONS
1. Consider SI joint injections  2. Schedule with your psychologist before next appt  3. Follow up in 3 months.  4. Refill sent in.    ----------------------------------------------------------------  Clinic Number:  [phone]     Call with any questions about your care and for scheduling assistance.     Calls are returned Monday through Friday between 8 AM and 4:30 PM. We usually get back to you within 2 business days depending on the issue/request.    If we are prescribing your medications:    For opioid medication refills, call the clinic or send a [employer] message 7 days in advance.  Please include:    Name of requested medication    Name of the pharmacy.    For non-opioid medications, call your pharmacy directly to request a refill. Please allow 3-4 days to be processed.     Per [place] State Law:    All controlled substance prescriptions must be filled within 30 days of being written.      For those controlled substances allowing refills, pickup must occur within 30 days of last fill.      We believe regular attendance is key to your success in our program!      Any time you are unable to keep your appointment we ask that you call us at least 24 hours in advance to cancel.This will allow us to offer the appointment time to another patient.     Multiple missed appointments may lead to dismissal from the clinic.

## 2019-10-14 NOTE — PROGRESS NOTES
"                          [facility]    Date of visit: 10/14/2019      Chief complaint:   Chief Complaint   Patient presents with     Pain       Interval history:  Lamont Daniels was last seen by me on 6/10/19    Recommendations/plan at the last visit included:  1. Physical Therapy:  Completed pool therapy in past.  2. Clinical Health Psychologist to address issues of relaxation, behavioral change, coping style, and other factors important to improvement.  He is looking for a new therapist due to insurance changes. Offered additional resources to assist with finding a psychologist but patient declined.  3. Diagnostic Studies: None  4. Medication Management:    1. Continue Butrans 5 mcg/hr wk patches with titration plan to eventually reach previous 20 mcg/hr week dosing. He will call us 7-10 days in advance for a refill. The patient was advised to reach out to pharmacy to ensure they order it.  2. Risk is improved, and JEROD is better based on CPAP readings from sleep specialist, but he is still takes clonazepam.  Again discussed this with him today. He has narcan at home. Reviewed proper usage of this with him today. Advised he needs to remind family of this.  5. Further procedures recommended: None at this time.  Previously discussed back facet injections but he isn't wanting this at this time  6. Recommendations to PCP: See above re: clonazepam  7. Follow up: 3 months    Since his last visit, Lamont Daniels reports:  -He is currently on the Butrans 20mcg/hr patch. Denies any side effects. Feels it is helping with the pain symptoms and states that he feels his anxiety is also lower. He inquired about increasing his butrans dose, then stated that he felt that it will be good \"when the opioid thing is over\" so he could potentially go back to his other opioids.  -He reports the gabapentin is helping his lower extremity burning and discomfort  -Reports pain is the same as before but diminished, at numerous " "joints and mainly in his low back. Denies significant radiation but notices it is mainly at his knee and ankle joints.  -Currently taking clonazepam 1mg once a day (has been weaning down due to recommendations from his primary care). He does not want to wean off completely because he feels he needs it \"when the urge comes on, when I am in stressful enviornment.\"   -He reports continuing treatment for his Hepatitis B. Is also on prednisone 2.5mg daily chronic use.  -Denies any current treatment with psychologist/psychiatrist.   -Reports his previous pain medications seem to have worked better, but he feels he is stable at current dose.  -Has naloxone spray.      Pain scores:  Pain intensity on average is 6 on a scale of 0-10.     Current pain treatment  butrans 20mcg/hr  Acetaminophen 500 mg PRN (not taking daily)  Gabapentin 600mg 3 times daily  Baclofen 10mg BID   Diclofenac gel PRN (not taking daily and uses at joints)  Ambien 5 mg - reports taking most nights  Clonazepam 1mg once a day     Previous medication treatments included:  Codeine, oxycodone, hydrocodone- given for other issues- helpful  Cymbalta 60mg daily- given for mental health- was given by Dr. Acosta- not been higher  Baclofen 10mg at bedtime- not helpful, no side effects  Robaxin 500 mg 1 tablet, 1-3 times a day depending on the day  Ibuprofen 800 mg- using 3-4 times per week, helpful but started meloxicam  Meloxicam  Oxycodone 5 mg : takes 6/day    Other treatments have included:  Lamont Daniels has not been seen at a pain clinic in the past.    PT: has done for various reasons, most recently the low back.  Acupuncture: as done a couple of needles with adjustment  TENs Unit: no  Injections: no    Side Effects: No    Medications:  Current Outpatient Medications   Medication Sig Dispense Refill     Abatacept (ORENCIA) 125 MG/ML SOAJ auto-injector Inject 1 mL (125 mg) Subcutaneous every 7 days 4 mL 6     Acetaminophen (TYLENOL PO)        allopurinol " (ZYLOPRIM) 300 MG tablet Take 1 tablet (300 mg) by mouth daily 90 tablet 3     aspirin EC 81 MG EC tablet Take 1 tablet (81 mg) by mouth daily (*) 30 tablet 1     atorvastatin (LIPITOR) 80 MG tablet TAKE 1 TABLET BY MOUTH 1 TIME DAILY 90 tablet 3     bacitracin 500 UNIT/GM external ointment Apply topically 3 times daily 30 g 3     baclofen (LIORESAL) 10 MG tablet TAKE 1 TABLET BY MOUTH 2 TIMES DAILY AS NEEDED FOR MUSCLE SPASM 180 tablet 2     blood glucose (NO BRAND SPECIFIED) lancets standard Use to test blood sugar 2 times daily or as directed. 100 each 11     blood glucose monitoring (NO BRAND SPECIFIED) meter device kit Use to test blood sugar 2 times daily or as directed. 1 kit 0     blood glucose monitoring (NO BRAND SPECIFIED) test strip Use to test blood sugars 2 times daily or as directed 100 strip 11     buprenorphine (BUTRANS) 20 MCG/HR WK patch Place 1 patch onto the skin every 7 days . Name brand only.  Fill 9/28/19 to start 9/20/19 4 patch 0     busPIRone HCl (BUSPAR) 30 MG tablet Take 1 tablet (30 mg) by mouth 2 times daily 180 tablet 3     Cholecalciferol (VITAMIN D) 2000 UNITS tablet TAKE ONE TABLET BY MOUTH ONCE DAILY 100 tablet 1     clobetasol (TEMOVATE) 0.05 % external solution Apply twice daily to scalp for up to 2 weeks for flares. 60 mL 3     clonazePAM (KLONOPIN) 1 MG tablet Take 0.5-1 tablets (0.5-1 mg) by mouth 2 times daily as needed for anxiety 90 tablet 0     clopidogrel (PLAVIX) 75 MG tablet Take 1 tablet (75 mg) by mouth daily 90 tablet 3     COLCRYS 0.6 MG tablet TAKE 2 TABLETS BY MOUTH AT THE FIRST SIGN OF FLARE, TAKE 1 ADDITIONAL TABLET ONE HOUR LATER. 30 tablet 0     desvenlafaxine succinate (PRISTIQ) 25 MG 24 hr tablet Take 1 tablet (25 mg) by mouth daily 90 tablet 3     diclofenac (VOLTAREN) 1 % topical gel Apply 4 grams to bilateral knees, up to four times daily as needed using enclosed dosing card.  Max of 32g/day 300 g 11     evolocumab (REPATHA) 140 MG/ML prefilled  autoinjector Inject 1 mL (140 mg) Subcutaneous every 14 days 2 mL 11     ezetimibe (ZETIA) 10 MG tablet Take 1 tablet (10 mg) by mouth daily 90 tablet 3     gabapentin (NEURONTIN) 300 MG capsule Take 2 capsules (600 mg) by mouth 3 times daily . 3 month supply 540 capsule 2     gemfibrozil (LOPID) 600 MG tablet Take 1 tablet (600 mg) by mouth 2 times daily 180 tablet 3     hydrocortisone (WESTCORT) 0.2 % external cream For genitals, apply twice daily for up to 2 weeks 60 g 4     hydroxychloroquine (PLAQUENIL) 200 MG tablet Take 1 tablet (200 mg) by mouth daily 90 tablet 1     Incontinence Supply Disposable (DEPEND UNDERWEAR LARGE) MISC Use twice daily. 60 each 11     meclizine (ANTIVERT) 25 MG tablet TAKE 1 TABLET BY MOUTH EVERY 6 HOURS AS NEEDED FOR DIZZINESS 30 tablet 3     melatonin 3 MG tablet Take 1 tablet (3 mg) by mouth nightly as needed for sleep       naloxone (NARCAN) nasal spray Spray 1 spray (4 mg) into one nostril alternating nostrils as needed for opioid reversal every 2-3 minutes until assistance arrives 0.2 mL 0     nitroGLYcerin (NITROSTAT) 0.4 MG sublingual tablet PLACE 1 TABLET UNDER THE TONGUE EVERY 5 MINUTES AS NEEDED 25 tablet 1     order for DME Equipment being ordered: chair lift 1 each 0     order for DME Equipment being ordered: single end cane 1 Units 0     ORDER FOR DME 1.  CPAP pressure 11 cm/H20 with heated humidity.   2.  Provide mask to fit and CPAP supplies.   3.  Length of need lifetime.   4.  If needed please provide a chin strap            pantoprazole (PROTONIX) 40 MG EC tablet Take 1 tablet (40 mg) by mouth daily 90 tablet 2     predniSONE (DELTASONE) 2.5 MG tablet Take 1 tablet (2.5 mg) by mouth daily 90 tablet 1     sulfaSALAzine ER (AZULFIDINE EN) 500 MG EC tablet Take 3 tablets (1,500 mg) by mouth 2 times daily 540 tablet 1     tamsulosin (FLOMAX) 0.4 MG capsule Take 2 capsules (0.8 mg) by mouth daily 180 capsule 3     tenofovir (VIREAD) 300 MG tablet Take 1 tablet (300 mg)  by mouth daily 90 tablet 3     triamcinolone (KENALOG) 0.1 % external ointment Apply to trunk and extremities twice daily for up to 2 weeks for flares 80 g 1     zolpidem (AMBIEN) 5 MG tablet Take 1 tablet (5 mg) by mouth nightly as needed for sleep 30 tablet 5       Medical History: any changes in medical history since they were last seen? None    Review of Systems:  The 14 system ROS was reviewed from the intake questionnaire: Stress, Anxiety, Joint Pain, Stiffness, arthritis, fatigue  Any bowel or bladder problems: None  Mood: depression - stable.  He denies any counseling or psychiatric treatment currently.     Physical Exam:  Blood pressure 130/86, pulse 67, weight 79.4 kg (175 lb).  General: flat affect, awake and alert  Gait: Antalgic, slow, use of single point cane  MSK exam: Diffuse tenderness to his bilateral MCP and Wrist. No significant swelling at the joint, or redness.    Pain with sit to standing. Mild tenderness to palpation in the lower lumbar segments L5/S1. Point tenderness at bilateral PSIS. Positive OSIRIS bilaterally. Negative Seated slump, SLR.    THE 4 A's OF OPIOID MAINTENANCE ANALGESIA    Analgesia: good.  He had better with oxycodone    Activity: better with med, still limited    Adverse effects: none    Adherence to Rx protocol: good    [place] Board of Pharmacy Data Base Reviewed:    YES; no concerns 10/14/19  Last UDS and opioid agreement  3/11/19    Assessment:   1. Chronic low back pain, with strong facet and myofascial features  2. Rheumatoid arthritis  3. Peripheral neuropathy  4. Depression, anxiety  5. Hep B - on meds  6. Gout  7. JEROD, currently treated with CPAP, central apnea ok    Plan:   1. Physical Therapy:  Completed pool therapy in past. Not interested in PT at this time.  2. Clinical Health Psychologist to address issues of relaxation, behavioral change, coping style, and other factors important to improvement. I reitterrated that he needs to work on this now, as he  "complains about time to do this, but it is been a couple visits where he said he was going to get in.  Discussed role of mental health in pain.  3. Diagnostic Studies: None  4. Medication Management:    1. Continue Butrans 20 mcg/hr wk patches at goal dose. We discussed that use of Butrans is due to risks with other medications, and not to do with current opioid \"crisis\".  He has sleep apnea that significantly improved going from full agonist to Butrans, and I do not plan to go back.  2. Discussed need to reintroduce other modalities back into his care, and not focusing on medication alone.  This includes HEP, mental health, and consideration of procedures.  5. Further procedures recommended: Potentially bilateral SI Joint injections.  He continues to express minimal interest in injections/procedures.   6. Recommendations to PCP: none  7. Follow up: 3 months    The patient was seen and discussed with staff, Dr. Cinda Benjamin MD  10/14/2019 8:41 AM  Pain Medicine Fellow, PGY-5    I saw and examined the patient with the Pain Fellow/Resident. I have reviewed and agree with the resident's note and plan of care and made changes and corrections directly to the body of the note.    TIME SPENT:  BY FELLOW/RESIDENT ALONE 15 MIN  BY MYSELF AND FELLOW/RESIDENT TOGETHER 0 MIN  BY MYSELF WITHOUT THE FELLOW/RESIDENT 15 MIN    These times included 10 minutes I spent counseling him about his diagnosis and treatment options and coordination of care with the primary team    Lian Loo MD  [facility]                                 "

## 2019-10-15 ENCOUNTER — TELEPHONE (OUTPATIENT)
Dept: FAMILY MEDICINE | Facility: CLINIC | Age: 54
End: 2019-10-15

## 2019-10-15 DIAGNOSIS — M54.40 CHRONIC BILATERAL LOW BACK PAIN WITH SCIATICA, SCIATICA LATERALITY UNSPECIFIED: Primary | ICD-10-CM

## 2019-10-15 DIAGNOSIS — G89.29 CHRONIC BILATERAL LOW BACK PAIN WITH SCIATICA, SCIATICA LATERALITY UNSPECIFIED: Primary | ICD-10-CM

## 2019-10-15 DIAGNOSIS — M54.50 LOW BACK PAIN: ICD-10-CM

## 2019-10-15 RX ORDER — MELOXICAM 7.5 MG/1
TABLET ORAL
Qty: 90 TABLET | Refills: 3 | Status: SHIPPED | OUTPATIENT
Start: 2019-10-15 | End: 2020-10-27

## 2019-10-15 NOTE — TELEPHONE ENCOUNTER
Routing refill request to provider for review/approval because:  Drug not active on patient's medication list    Joselyn Valladares RN, [facility]

## 2019-10-15 NOTE — TELEPHONE ENCOUNTER
meloxicam (MOBIC) 7.5 MG tablet (Discontinued)      Last Written Prescription Date:  06/25/19  Last Fill Quantity: 30,   # refills: 3  Last Office Visit: 10/08/19-  Future Office visit:    Next 5 appointments (look out 90 days)    Oct 16, 2019  3:00 PM CDT  Return Visit with Sandra Borja MD  [facility] [address] [place] [id number]  [phone]   Oct 22, 2019  8:40 AM CDT  Return Visit with Sebastián Jenkins MD  [facility] [address] [facility] [address]  [phone]   Jan 13, 2020  8:00 AM CST  Return Visit with Dorothea Loo MD  [facility] [place] ([facility] [place]) [address] [facility]  [place] [address]  [phone]           Routing refill request to provider for review/approval because:  Drug not active on patient's medication list

## 2019-10-16 ENCOUNTER — OFFICE VISIT (OUTPATIENT)
Dept: OPHTHALMOLOGY | Facility: CLINIC | Age: 54
End: 2019-10-16
Payer: COMMERCIAL

## 2019-10-16 DIAGNOSIS — H57.813 BROW PTOSIS, BILATERAL: Primary | ICD-10-CM

## 2019-10-16 PROCEDURE — 99024 POSTOP FOLLOW-UP VISIT: CPT | Performed by: OPHTHALMOLOGY

## 2019-10-16 ASSESSMENT — VISUAL ACUITY
CORRECTION_TYPE: GLASSES
METHOD: SNELLEN - LINEAR
OD_CC: 20/20-
OS_CC: 20/20

## 2019-10-16 NOTE — PROGRESS NOTES
Chief Complaint(s) and History of Present Illness(es)     Post Op (Ophthalmology) Both Eyes     Laterality: both eyes    Onset: 3 months ago    Pain scale: 0/10              Comments     S/p REPAIR, PTOSIS, BOTH UPPER brows Bilateral Combined MAC with Local   1/22/2019  No concerns about eyes or lids, no eye pain or discomfort   Cande NIKKI Freeman, COT 3:10 PM 10/16/2019                  Lamont Daniels is about 2 months status post Direct brow.  He is doing well.  Incision looks great   He will follow up with me on an as needed basis. F/u [place] glaucoma suspect    Complete documentation of historical and exam elements from today's encounter can be found in the full encounter summary report (not reduplicated in this progress note). I personally obtained the chief complaint(s) and history of present illness.  I confirmed and edited as necessary the review of systems, past medical/surgical history, family history, social history, and examination findings as documented by others; and I examined the patient myself. I personally reviewed the relevant tests, images, and reports as documented above. I formulated and edited as necessary the assessment and plan and discussed the findings and management plan with the patient and family. - Sandra Borja MD

## 2019-10-17 ENCOUNTER — TELEPHONE (OUTPATIENT)
Dept: FAMILY MEDICINE | Facility: CLINIC | Age: 54
End: 2019-10-17

## 2019-10-17 NOTE — TELEPHONE ENCOUNTER
Reason for Call:  Other prescription    Detailed comments: Please Fax order to [phone] [facility] for lift chair and incontinence pads. Chart notes and DX to get this supply.    Phone Number can be reached at: [place]  [facility] [employer] [phone]    Best Time: any    Can we leave a detailed message on this number? YES    Call taken on 10/17/2019 at 4:04 PM by Hannah Arteaga

## 2019-10-18 NOTE — TELEPHONE ENCOUNTER
Printed 2 DME orders and clinic note dated 10/8/19, and demographic face sheet and faxed to [facility] as requested below.  Renard Donohue,  [facility]

## 2019-10-20 DIAGNOSIS — N40.1 BENIGN PROSTATIC HYPERPLASIA WITH WEAK URINARY STREAM: ICD-10-CM

## 2019-10-20 DIAGNOSIS — R39.12 BENIGN PROSTATIC HYPERPLASIA WITH WEAK URINARY STREAM: ICD-10-CM

## 2019-10-21 RX ORDER — TAMSULOSIN HYDROCHLORIDE 0.4 MG/1
CAPSULE ORAL
Qty: 90 CAPSULE | Refills: 3 | OUTPATIENT
Start: 2019-10-21

## 2019-10-22 ENCOUNTER — OFFICE VISIT (OUTPATIENT)
Dept: RHEUMATOLOGY | Facility: CLINIC | Age: 54
End: 2019-10-22
Payer: COMMERCIAL

## 2019-10-22 VITALS
BODY MASS INDEX: 32.54 KG/M2 | WEIGHT: 176.8 LBS | DIASTOLIC BLOOD PRESSURE: 90 MMHG | HEART RATE: 67 BPM | SYSTOLIC BLOOD PRESSURE: 142 MMHG | OXYGEN SATURATION: 95 % | HEIGHT: 62 IN

## 2019-10-22 DIAGNOSIS — M06.09 RHEUMATOID ARTHRITIS OF MULTIPLE SITES WITH NEGATIVE RHEUMATOID FACTOR (H): Primary | ICD-10-CM

## 2019-10-22 DIAGNOSIS — Z79.899 HIGH RISK MEDICATION USE: ICD-10-CM

## 2019-10-22 PROCEDURE — 99214 OFFICE O/P EST MOD 30 MIN: CPT | Performed by: INTERNAL MEDICINE

## 2019-10-22 RX ORDER — PREDNISONE 5 MG/1
TABLET ORAL
Qty: 44 TABLET | Refills: 0 | Status: SHIPPED | OUTPATIENT
Start: 2019-10-22 | End: 2019-11-18

## 2019-10-22 ASSESSMENT — MIFFLIN-ST. JEOR: SCORE: 1526.21

## 2019-10-22 NOTE — PATIENT INSTRUCTIONS
Rheumatology    Dr. Sebastián Jenkins         [place] [facility]   (Monday)  [address]  [place] [address]       [facility]   (Tuesday)  [address] [place] [address]    [facility]   (Wed., Thurs., and Friday)  [address] [place] [address]    Phone number: [phone]  Thank you for choosing [facility].  SAÚL Chou RMA

## 2019-10-22 NOTE — NURSING NOTE
RAPID3 (0-30) Cumulative Score  24.2          RAPID3 Weighted Score (divide #4 by 3 and that is the weighted score)  8.06

## 2019-10-22 NOTE — PROGRESS NOTES
Rheumatology Clinic Visit      Lamont Daniels MRN# [id number]   YOB: 1965 Age: 53 year old      Date of visit: 10/22/19   PCP: Dr. David Chavez  Hepatologist: Dr. Drea Laboy     Chief Complaint   Patient presents with:  RECHECK: RA    Assessment and Plan   1.  Seropositive nonerosive rheumatoid arthritis (RF negative, CCP low positive): Note that he does have low back pain but without evidence of SI joint irregularity on x-ray.  Initially with morning stiffness all day, gelling phenomenon, and synovitis.   Previously on Humira (partially effective), Enbrel (partially effective). MTX avoided per Dr. Laboy recommendation. Currently on SSZ 1500mg BID, Orencia SQ (initially Rx'd 4/18/2017), prednisone 2.5mg daily, and HCQ 200mg daily.  Treatment has been in coordination with hepatology as he requires HBV tx while on bDMARD. Currently without synovitis on exam and inflammatory markers were normal but he describes inflammatory symptoms involving his MCPs and PIPs; will use a prednisone course, that is of shorter duration of the higher doses because of mild hyperglycemia, and then reevaluate in late November shortly after the prednisone course is back to baseline 2.5 mg daily  - Continue sulfasalazine 1500mg BID  - Continue Orencia SQ every 7 days  - Continue hydroxychloroquine 200 mg daily (11/12/2018 eye exam by Dr. Rae at [facility]; another eye exam 5/14/2019 but the clinic not has not yet been completed)  - Start Prednisone 20mg daily x2days, then 15mg daily x2days, then 10mg daily [id number], then 5mg daily [id number], then resume baseline 2.5mg daily   - Labs in late Nov, the day prior to follow-up: CBC, CMP, ESR, CRP    # Prednisone Risks and Benefits: The risks and benefits of prednisone were discussed in detail and the patient verbalized understanding.  The risks discussed include, but are not limited to, weight gain, fluid retention, impaired wound healing, hyperglycemia, adrenal suppression, GI upset, peptic  "ulcer, hepatotoxicity, aseptic necrosis of the femoral and humeral heads, osteoporosis, myopathy, tendon rupture (particularly Achilles tendon), ocular changes including an increased intraocular pressure.  I encouraged reviewing the package insert and asking any questions about the medication.      2. Lower back pain: Lumbar spine MRI in August 2014 showed multilevel degenerative changes and a small disc protrusion at L3/4 with mild spinal canal narrowing; also moderate left and mild right neural foraminal narrowing at L5-S1. He had a nerve conduction study in 2014 that was normal. He has been worked up by neurology in the past without significant neurologic findings. HLA-B27 negative, SI joint x-ray negative. Now following in the pain management clinic.     3. Myofascial pain syndrome / chronic pain syndrome: diffuse pain consistent with this.  Management per pain clinic and/or PCP.    4. Chronic Hepatitis B: Managed by Dr. Laboy (hepatology) previously, and now Dr. Thomas Leventhal at the [facility]. Currently on tenofovir.     5. Hepatic Steatosis: Based on previous liver biopsy.  Seeing hepatology.      6. Positive tuberculosis test:   This is noted here for historical significance.  He was evaluated by Dr. Anthony Mendoza, infectious disease. The following telephone note was documented by Dr. Mendoza: \"I tried calling th pt, finally we have the records from [place] . It appears he was treated for latent TB with INH for 1 year in 1980/1981 .Later in 1981 April he was admitted at [facility] in [place], [place] with rt sided pneumonia, TB was suspected but he improved with treatment with Penicillin only. Later he was seen  ( likely ID specialist), and was treated with 6 weeks course of Rifampin and Ethambutol pending sputum AFB culture. His AFB cx were negative based on the report we have.\"  \"He recently had QFT -TB test which was reported positive and his CXR was clear. Given his " "documented hx of completion of treatment for latent TB as above , I do not see any need for further treatment. His tests may remain positive irrespective of treatment status. From my perspective he can be started on DMARDs/Biological as deemed necessary.\"       7. Gout: On allopurinol and managed by PCP.    8. Elevated blood pressure:  Lamont to follow up with Primary Care provider regarding elevated blood pressure.     Mr. Daniels verbalized agreement with and understanding of the rational for the diagnosis and treatment plan.  All questions were answered to best of my ability and the patient's satisfaction. Mr. Daniels was advised to contact the clinic with any questions that may arise after the clinic visit.      Thank you for involving me in the care of the patient    Return to clinic: 3-4 months      HPI   Lamont Daniels is a 53 year old male with a medical history significant for hypertension, hyperlipidemia, gout, coronary artery disease s/p 6vCABG, vitamin D deficiency, hepatitis B, and RA who presents for f/u of RA.    Today, Mr. Daniels reports that he was doing well until 2 or 3 weeks ago when he started to have more pain and stiffness at his MCPs and PIPs.  In the morning he is unable to make a fist.   strength is reduced.  Morning stiffness for a few hours; previously reported to be 10-15 minutes.  Right at this moment he feels okay.  No joint swelling at this time but he says that in the morning his MCPs and PIPs are swollen.    Denies fevers, chills, nausea, vomiting, constipation, diarrhea. No abdominal pain. Denies chest pain/pressure, palpitations, or shortness of breath.  No oral or nasal sores. No rash. No LE swelling.  Mild dry mouth; he has good dentition and does not feel like he needs to use frequent sips of water; unchanged since last evaluation. No dry eyes. No eye pain or redness.     Tobacco:  None   EtOH:  None  Drugs:  None  Occupation: Retired   Originally from [facility], then lived just north of " [place], [place], then lived in [place], [place], then moved to [place] for family    ROS   GEN: No fevers, chills, night sweats; + fatigue   SKIN: No itching, rashes, sores  HEENT: No epistaxis. No oral or nasal ulcers.  CV: See HPI  PULM: See HPI  GI: No nausea, vomiting, constipation, diarrhea. No blood in stool. No abdominal pain.  : No blood in urine.  MSK: See HPI.  NEURO: See HPI  EXT: No LE swelling  PSYCH: Negative    Active Problem List     Patient Active Problem List   Diagnosis     Coronary atherosclerosis of native coronary artery     Major depressive disorder, recurrent episode, moderate (H)     Anxiety     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)     Hepatitis B infection     Vitamin D deficiency     S/P CABG (coronary artery bypass graft)     Malrotation of intestine     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     Hyperlipidemia LDL goal <70     Insomnia     Gout     Suicidal ideation     Essential hypertension     Rheumatoid arthritis involving multiple sites, unspecified rheumatoid factor presence (H)     High risk medications (not anticoagulants) long-term use     Midline low back pain without sciatica     Bilateral low back pain with sciatica, sciatica laterality unspecified     Elevated liver enzymes     On corticosteroid therapy     Essential hypertension with goal blood pressure less than 140/90     Insomnia, unspecified type     Rheumatoid arthritis of multiple sites with negative rheumatoid factor (H)     Benign prostatic hyperplasia with lower urinary tract symptoms, unspecified morphology     Chronic pain syndrome     Past Medical History     Past Medical History:   Diagnosis Date     Anxiety      CAD (coronary artery disease)     CABG, PCI     Congestive heart failure, unspecified 2008     Depressive disorder 2008     Gout      Hepatitis B > 10 years     HTN (hypertension)      Hyperlipidemia LDL goal < 100      Malrotation of intestine      Moderate major depression (H)      JEROD-Severe (AHI  40) 9/19/2011    Uses CPAP     Rheumatoid arthritis flare (H) 1012     Steatohepatitis 12/15    liver biopsy     Tuberculosis age 13    INH x 9 months      Vitamin D deficiency      Past Surgical History     Past Surgical History:   Procedure Laterality Date     ABDOMEN SURGERY  2014     BIOPSY  2015     BYPASS GRAFT ARTERY CORONARY  2008    6 vessels     COLONOSCOPY  2/8/2013    Procedure: COLONOSCOPY;  Colonoscopy, blood in stool;  Surgeon: Duane, William Charles, MD;  Location: [facility] OR     COMBINED REPAIR PTOSIS WITH BLEPHAROPLASTY BILATERAL Bilateral 6/25/2018    Procedure: COMBINED REPAIR PTOSIS WITH BLEPHAROPLASTY BILATERAL;  Bilateral upper eyelid blepharoplasty, ptosis repair and brow ptosis repair;  Surgeon: Sandra Borja MD;  Location: [facility] OR     GI SURGERY  2014     HC CORONARY STENT PERCUT, EA ADDTL VESSEL  2008    3 months after CABG     HEAD & NECK SURGERY  2011     NASAL/SINUS POLYPECTOMY  2010     ORTHOPEDIC SURGERY  2012     REPAIR PTOSIS       REPAIR PTOSIS BILATERAL Bilateral 7/22/2019    Procedure: REPAIR, PTOSIS, BOTH UPPER brows;  Surgeon: Sandra Borja MD;  Location: [facility] OR     REPAIR PTOSIS BROW BILATERAL Bilateral 6/25/2018    Procedure: REPAIR PTOSIS BROW BILATERAL;;  Surgeon: Sandra Borja MD;  Location: [facility] OR     THORACIC SURGERY  1989    tb     TONSILLECTOMY  2010     UVULOPALATOPHARYNGOPLASTY  2010     VASCULAR SURGERY  2008     Allergy     Allergies   Allergen Reactions     Citalopram Itching     Tramadol Itching     Current Medication List     Current Outpatient Medications   Medication Sig     Abatacept (ORENCIA) 125 MG/ML SOAJ auto-injector Inject 1 mL (125 mg) Subcutaneous every 7 days     Acetaminophen (TYLENOL PO)      allopurinol (ZYLOPRIM) 300 MG tablet Take 1 tablet (300 mg) by mouth daily     aspirin EC 81 MG EC tablet Take 1 tablet (81 mg) by mouth daily (*)     atorvastatin (LIPITOR) 80 MG tablet TAKE 1 TABLET BY MOUTH 1 TIME DAILY     bacitracin 500 UNIT/GM  external ointment Apply topically 3 times daily     baclofen (LIORESAL) 10 MG tablet TAKE 1 TABLET BY MOUTH 2 TIMES DAILY AS NEEDED FOR MUSCLE SPASM     buprenorphine (BUTRANS) 20 MCG/HR WK patch Place 1 patch onto the skin every 7 days . Name brand only.  Fill 10/15/19 to start 10/18/19     busPIRone HCl (BUSPAR) 30 MG tablet Take 1 tablet (30 mg) by mouth 2 times daily     Cholecalciferol (VITAMIN D) 2000 UNITS tablet TAKE ONE TABLET BY MOUTH ONCE DAILY     clobetasol (TEMOVATE) 0.05 % external solution Apply twice daily to scalp for up to 2 weeks for flares.     clonazePAM (KLONOPIN) 1 MG tablet Take 0.5-1 tablets (0.5-1 mg) by mouth 2 times daily as needed for anxiety     clopidogrel (PLAVIX) 75 MG tablet Take 1 tablet (75 mg) by mouth daily     desvenlafaxine succinate (PRISTIQ) 25 MG 24 hr tablet Take 1 tablet (25 mg) by mouth daily     diclofenac (VOLTAREN) 1 % topical gel Apply 4 grams to bilateral knees, up to four times daily as needed using enclosed dosing card.  Max of 32g/day     evolocumab (REPATHA) 140 MG/ML prefilled autoinjector Inject 1 mL (140 mg) Subcutaneous every 14 days     ezetimibe (ZETIA) 10 MG tablet Take 1 tablet (10 mg) by mouth daily     gabapentin (NEURONTIN) 300 MG capsule Take 2 capsules (600 mg) by mouth 3 times daily . 3 month supply     gemfibrozil (LOPID) 600 MG tablet Take 1 tablet (600 mg) by mouth 2 times daily     hydrocortisone (WESTCORT) 0.2 % external cream For genitals, apply twice daily for up to 2 weeks     hydroxychloroquine (PLAQUENIL) 200 MG tablet Take 1 tablet (200 mg) by mouth daily     Incontinence Supply Disposable (DEPEND UNDERWEAR LARGE) MISC Use twice daily.     meclizine (ANTIVERT) 25 MG tablet TAKE 1 TABLET BY MOUTH EVERY 6 HOURS AS NEEDED FOR DIZZINESS     melatonin 3 MG tablet Take 1 tablet (3 mg) by mouth nightly as needed for sleep     meloxicam (MOBIC) 7.5 MG tablet TAKE 1 TABLET (7.5 MG) BY MOUTH DAILY     naloxone (NARCAN) nasal spray Spray 1 spray  (4 mg) into one nostril alternating nostrils as needed for opioid reversal every 2-3 minutes until assistance arrives     nitroGLYcerin (NITROSTAT) 0.4 MG sublingual tablet PLACE 1 TABLET UNDER THE TONGUE EVERY 5 MINUTES AS NEEDED     order for DME Equipment being ordered: chair lift     order for DME Equipment being ordered: single end cane     ORDER FOR DME 1.  CPAP pressure 11 cm/H20 with heated humidity.   2.  Provide mask to fit and CPAP supplies.   3.  Length of need lifetime.   4.  If needed please provide a chin strap          pantoprazole (PROTONIX) 40 MG EC tablet Take 1 tablet (40 mg) by mouth daily     predniSONE (DELTASONE) 2.5 MG tablet Take 1 tablet (2.5 mg) by mouth daily     sulfaSALAzine ER (AZULFIDINE EN) 500 MG EC tablet Take 3 tablets (1,500 mg) by mouth 2 times daily     tamsulosin (FLOMAX) 0.4 MG capsule Take 2 capsules (0.8 mg) by mouth daily     tenofovir (VIREAD) 300 MG tablet Take 1 tablet (300 mg) by mouth daily     triamcinolone (KENALOG) 0.1 % external ointment Apply to trunk and extremities twice daily for up to 2 weeks for flares     zolpidem (AMBIEN) 5 MG tablet Take 1 tablet (5 mg) by mouth nightly as needed for sleep     blood glucose (NO BRAND SPECIFIED) lancets standard Use to test blood sugar 2 times daily or as directed. (Patient not taking: Reported on 10/22/2019)     blood glucose monitoring (NO BRAND SPECIFIED) meter device kit Use to test blood sugar 2 times daily or as directed. (Patient not taking: Reported on 10/22/2019)     blood glucose monitoring (NO BRAND SPECIFIED) test strip Use to test blood sugars 2 times daily or as directed (Patient not taking: Reported on 10/22/2019)     COLCRYS 0.6 MG tablet TAKE 2 TABLETS BY MOUTH AT THE FIRST SIGN OF FLARE, TAKE 1 ADDITIONAL TABLET ONE HOUR LATER. (Patient not taking: Reported on 10/22/2019)     No current facility-administered medications for this visit.      Facility-Administered Medications Ordered in Other Visits  "  Medication     gadobutrol (GADAVIST) injection 10 mL       Social History   See HPI    Family History     Family History   Problem Relation Age of Onset     C.A.D. Father      Coronary Artery Disease Father      Hypertension Father      Depression Father      Hypertension Mother      Diabetes Mother      Depression Mother      Mental Illness Mother      Hypertension Maternal Grandfather      Cancer Paternal Grandfather      Other Cancer Paternal Grandfather      Other Cancer Other      Autoimmune Disease No family hx of      Cerebrovascular Disease No family hx of      Thyroid Disease No family hx of      Glaucoma No family hx of      Macular Degeneration No family hx of      No family history of autoimmune disease  No family history of psoriasis     No change in family history since the previous clinic visit.     Physical Exam     Temp Readings from Last 3 Encounters:   10/08/19 97.6  F (36.4  C) (Oral)   07/22/19 97.5  F (36.4  C) (Temporal)   06/20/19 97.7  F (36.5  C) (Oral)     BP Readings from Last 5 Encounters:   10/22/19 (!) 142/90   10/14/19 130/86   10/08/19 117/81   07/22/19 143/88   07/02/19 132/86     Pulse Readings from Last 1 Encounters:   10/22/19 67     Resp Readings from Last 1 Encounters:   07/22/19 16     Estimated body mass index is 32.34 kg/m  as calculated from the following:    Height as of this encounter: 1.575 m (5' 2\").    Weight as of this encounter: 80.2 kg (176 lb 12.8 oz).    GEN: NAD  HEENT: MMM. No oral lesions. Anicteric, noninjected sclera  CV: S1, S2. RRR. No m/r/g.  PULM: CTA bilaterally. No w/c.  MSK: MCPs, PIPs, wrists, elbows, shoulders, knees, ankles, and MTPs without swelling or tenderness to palpation.   strength is slightly reduced bilaterally to gross exam.  Able to make a fist with each hand.  Hips nontender to palpation.  Fibromyalgia tender points positive.  NEURO: UE and LE strengths 5/5 and equal bilaterally.   SKIN: No rash.  EXT: No LE edema  PSYCH: Alert. " Appropriate.    Labs   RF/CCP  Recent Labs   Lab Test 11/04/15  1547 08/12/14  1414   CCPABY 27*  --    RHF  --  <20     CBC  Recent Labs   Lab Test 10/07/19  0940 06/21/19  0817 04/01/19  0851 03/11/19  0841   WBC 9.3 7.7 7.3 7.2   RBC 5.15 4.79 5.07 5.50   HGB 15.1 14.2 15.3 16.1   HCT 45.4 42.1 44.4 48.0   MCV 88 88 88 87   RDW 13.7 14.9 14.0 14.6    289 329 309   MCH 29.3 29.6 30.2 29.3   MCHC 33.3 33.7 34.5 33.5   NEUTROPHIL 53.5 51.3  --  38.6   LYMPH 29.4 32.4  --  45.7   MONOCYTE 13.9 12.9  --  13.4   EOSINOPHIL 2.0 2.2  --  1.9   BASOPHIL 0.9 0.8  --  0.4   ANEU 5.0 4.0  --  2.8   ALYM 2.7 2.5  --  3.3   PRACHI 1.3 1.0  --  1.0   AEOS 0.2 0.2  --  0.1   ABAS 0.1 0.1  --  0.0     CMP  Recent Labs   Lab Test 10/07/19  0940 07/02/19  0925 06/21/19  0817 04/01/19  0851  11/20/18  0844     --   --  140  --  141   POTASSIUM 3.8  --   --  3.6  --  3.7   CHLORIDE 110*  --   --  110*  --  110*   CO2 26  --   --  23  --  25   ANIONGAP 4  --   --  8  --  6   * 102*  --  99  --  82   BUN 19  --   --  16  --  16   CR 0.93  --  0.90 0.89   < > 0.92   GFRESTIMATED >90  --  >90 >90   < > 86   GFRESTBLACK >90  --  >90 >90   < > >90   HEMANT 9.2  --   --  9.6  --  9.4   BILITOTAL 0.4 0.5 0.5 0.4   < >  --    ALBUMIN 4.1 4.1 3.9 4.0   < >  --    PROTTOTAL 7.8 7.7 7.5 7.7   < >  --    ALKPHOS 112 112 103 95   < >  --    AST 32 20 47* 30   < >  --    ALT 47 40 84* 41   < >  --     < > = values in this interval not displayed.     Calcium/VitaminD  Recent Labs   Lab Test 10/07/19  0940 04/01/19  0851 11/20/18  0844  07/13/17  1246  11/04/15  1547  04/01/13  1624   HEMANT 9.2 9.6 9.4   < >  --    < >  --    < > 9.5   VITDT  --   --   --   --  34  --  43  --  34    < > = values in this interval not displayed.     ESR/CRP  Recent Labs   Lab Test 10/07/19  0940 06/21/19  0817 03/11/19  0841   SED 11 13 10   CRP <2.9 <2.9 <2.9     Lipid Panel  Recent Labs   Lab Test 03/14/19  1006 06/22/18  1448 12/04/17  0924   06/29/15  1405 05/22/14  0848 03/24/14  0936   CHOL 152 129 108   < > 219* 169 195   TRIG 126 113 85   < > 372* 379* 301*   HDL 51 64 73   < > 33* 33* 39*   LDL 76 42 18   < > 112 60 95   VLDL  --   --   --   --  74* 76* 60*   CHOLHDLRATIO  --   --   --   --  6.6* 5.1* 5.0   NHDL 101 65 35   < >  --   --   --     < > = values in this interval not displayed.     Hepatitis B  Recent Labs   Lab Test 10/07/19  0940 04/01/19  0851 12/04/17  0924  10/05/16  0954  04/23/13  0812 01/30/13  0906 01/23/12  1150   AUSAB  --   --   --   --  0.45  --   --   --   --    HBCAB  --   --   --   --   --   --  Positive*  --   --    HBCM  --   --   --   --   --   --   --   --  Negative   HEPBANG  --   --   --   --  Reactive*  --  Positive* Positive* Positive*   HBQLOG Not Calculated Not Calculated Not Calculated   < > 5.1*   < >  --   --   --     < > = values in this interval not displayed.     Hepatitis C  Recent Labs   Lab Test 04/07/16  1538 04/23/13  0812   HCVAB Nonreactive   Assay performance characteristics have not been established for newborns,   infants, and children   Negative     Lyme ab screening  Recent Labs   Lab Test 07/18/17  1330   LYMEGM 0.19     Tuberculosis Screening  Recent Labs   Lab Test 04/07/16  1538   TBRSLT Positive   The magnitude of the measured IFN-gamma level cannot be correlated to stage or   degree of infection, level of immune responsiveness, or likelihood for   progression to active disease.  *   TBAGN >10.00  This is a qualitative test.  The TB antigen IU/mL value is required for   documentation on certain government reporting forms but this value should not   be used to monitor disease progression or response to therapy.   Diagnosing or excluding tuberculosis disease, and assessing the probability of   LTBI, require a combination of epidemiological, historical, medical and   diagnostic findings that should be taken into account when interpreting   QuantiFERON TB results.       HIV Screening  Recent  "Labs   Lab Test 11/04/15  1547   HIAGAB Nonreactive   HIV-1 p24 Ag & HIV-1/HIV-2 Ab Not Detected         \"HAND BILATERAL THREE OR MORE VIEWS 11/4/2015 4:55 PM   HISTORY: Pain in unspecified joint.  COMPARISON: None.  IMPRESSION  IMPRESSION: Normal.  DANIS ANGLIN MD\"    \"FOOT BILATERAL THREE OR MORE VIEWS 11/4/2015 4:55 PM   HISTORY: Pain in unspecified joint.  COMPARISON: 8/21/2012.  IMPRESSION  IMPRESSION: Small traction spurs are seen off the Achilles tendon and  plantar fascial attachment sites bilaterally. Joint spaces are  preserved. Osseous structures are intact. Incidental note is made of  some surgical staples in the soft tissues of the medial distal right  lower extremity.  DANIS ANGLIN MD\"      Immunization History     Immunization History   Administered Date(s) Administered     Influenza (IIV3) PF 10/11/2011, 09/20/2017, 09/01/2018     Influenza Vaccine IM > 6 months Valent IIV4 09/27/2015, 09/08/2016, 09/30/2019     Influenza Vaccine, 3 YRS +, IM (QUADRIVALENT W/PRESERVATIVES) 11/17/2014     Pneumo Conj 13-V (2010&after) 04/07/2016     Pneumococcal 23 valent 12/12/2014     TDAP Vaccine (Adacel) 08/30/2011     Zoster vaccine recombinant adjuvanted (SHINGRIX) 07/27/2019          Chart documentation done in part with Dragon Voice recognition Software. Although reviewed after completion, some word and grammatical error may remain.    Sebastián Jenkins MD  "

## 2019-11-01 ENCOUNTER — MEDICAL CORRESPONDENCE (OUTPATIENT)
Dept: HEALTH INFORMATION MANAGEMENT | Facility: CLINIC | Age: 54
End: 2019-11-01

## 2019-11-11 ENCOUNTER — OFFICE VISIT (OUTPATIENT)
Dept: OPHTHALMOLOGY | Facility: CLINIC | Age: 54
End: 2019-11-11
Payer: COMMERCIAL

## 2019-11-11 DIAGNOSIS — Z79.899 HIGH RISK MEDICATION USE: Primary | ICD-10-CM

## 2019-11-11 DIAGNOSIS — H40.003 GLAUCOMA SUSPECT OF BOTH EYES: ICD-10-CM

## 2019-11-11 PROCEDURE — 92083 EXTENDED VISUAL FIELD XM: CPT | Performed by: OPHTHALMOLOGY

## 2019-11-11 PROCEDURE — 92134 CPTRZ OPH DX IMG PST SGM RTA: CPT | Performed by: OPHTHALMOLOGY

## 2019-11-11 ASSESSMENT — SLIT LAMP EXAM - LIDS
COMMENTS: NORMAL
COMMENTS: NORMAL

## 2019-11-11 ASSESSMENT — VISUAL ACUITY
METHOD: SNELLEN - LINEAR
OS_CC: 20/15
CORRECTION_TYPE: GLASSES
OS_CC+: -2
OD_CC: 20/15

## 2019-11-11 ASSESSMENT — EXTERNAL EXAM - LEFT EYE: OS_EXAM: NORMAL

## 2019-11-11 ASSESSMENT — TONOMETRY
OS_IOP_MMHG: 17
IOP_METHOD: ICARE
OD_IOP_MMHG: 14

## 2019-11-11 ASSESSMENT — EXTERNAL EXAM - RIGHT EYE: OD_EXAM: NORMAL

## 2019-11-11 ASSESSMENT — CUP TO DISC RATIO
OD_RATIO: 0.45
OS_RATIO: 0.55

## 2019-11-11 NOTE — PROGRESS NOTES
Assessment & Plan   Lamont Daniels is a 54 year old male who presents with:   Review of systems for the eyes was negative other than the pertinent positives and negatives noted in the HPI.    High risk medication use  - without retinopathy  - HVF 10-2 OU  - SD-OCT Macula Optovue OU (both eyes)    Glaucoma suspect of both eyes  - OCT RNFL next visit    Return in 12 months for annual (plaquenil testing and OCT RNFL)      Attending Physician Attestation:  Complete documentation of historical and exam elements from today's encounter can be found in the full encounter summary report (not reduplicated in this progress note).  I personally obtained the chief complaint(s) and history of present illness.  I confirmed and edited as necessary the review of systems, past medical/surgical history, family history, social history, and examination findings as documented by others; and I examined the patient myself.  I personally reviewed the relevant tests, images, and reports as documented above.  I formulated and edited as necessary the assessment and plan and discussed the findings and management plan with the patient and family. - Ricardo Rae MD

## 2019-11-11 NOTE — NURSING NOTE
Chief Complaints and History of Present Illnesses   Patient presents with     Monitor Current High Risk Meds       Chief Complaint(s) and History of Present Illness(es)     Monitor Current High Risk Meds               Comments     Patient takes 200mg Plaquenil daily for RA.                Melanie Jeans, OA

## 2019-11-12 DIAGNOSIS — M06.09 RHEUMATOID ARTHRITIS OF MULTIPLE SITES WITH NEGATIVE RHEUMATOID FACTOR (H): ICD-10-CM

## 2019-11-12 RX ORDER — PREDNISONE 5 MG/1
TABLET ORAL
Qty: 44 TABLET | Refills: 0 | Status: CANCELLED | OUTPATIENT
Start: 2019-11-12

## 2019-11-13 NOTE — TELEPHONE ENCOUNTER
Medication:   Prednisone   Prescribed:   5mg   Quantity:   44   Refills:   0    Last office visit:   10/22/2019  Next office visit:   11/26/2019  Last labs:   10/07/2019    Florencia Siegel CMA  11/13/2019  8:18 AM

## 2019-11-15 ENCOUNTER — MYC REFILL (OUTPATIENT)
Dept: PALLIATIVE MEDICINE | Facility: CLINIC | Age: 54
End: 2019-11-15

## 2019-11-15 DIAGNOSIS — M06.9 RHEUMATOID ARTHRITIS INVOLVING MULTIPLE SITES, UNSPECIFIED RHEUMATOID FACTOR PRESENCE: ICD-10-CM

## 2019-11-15 RX ORDER — BUPRENORPHINE 20 UG/H
1 PATCH TRANSDERMAL
Qty: 4 PATCH | Refills: 1 | Status: CANCELLED | OUTPATIENT
Start: 2019-11-15

## 2019-11-18 RX ORDER — PREDNISONE 2.5 MG/1
2.5 TABLET ORAL DAILY
Qty: 90 TABLET | Refills: 1 | Status: SHIPPED | OUTPATIENT
Start: 2019-11-18 | End: 2019-11-26

## 2019-11-18 RX ORDER — BUPRENORPHINE 20 UG/H
1 PATCH TRANSDERMAL
Qty: 4 PATCH | Refills: 1 | Status: SHIPPED | OUTPATIENT
Start: 2019-11-18 | End: 2019-12-11

## 2019-11-18 NOTE — TELEPHONE ENCOUNTER
Patient was notified and message to was delayed pt apologized and will use the nurse line for refills 7 days prior from now on.       Alejandro Gan, [facility]   [facility]  November 18, 2019

## 2019-11-18 NOTE — TELEPHONE ENCOUNTER
Please remind him he needs to call sooner.    Script Eprescribed to pharmacy    Will send this to MA team to notify patient.    Signed Prescriptions:                        Disp   Refills    buprenorphine (BUTRANS) 20 MCG/HR WK patch 4 patch1        Sig: Place 1 patch onto the skin every 7 days . Name brand           only.  Fill 11/18/19 to start 11/18/19  Authorizing Provider: KAYLA CHUN MD  [facility]

## 2019-11-18 NOTE — TELEPHONE ENCOUNTER
Received call from patient requesting refill(s) of   buprenorphine (BUTRANS) 20 MCG    Last picked up from pharmacy on 10/20/19    Pt last seen by prescribing provider on 10/14/19  Next appt scheduled for 01/13/20     checked in the past 6 months? Yes If no, print current report and give to RN    Last urine drug screen date 03/11/19  Current opioid agreement on file (completed within the last year) Yes Date of opioid agreement: 03/11/19      E-prescribe to   [facility] [place] [address] [facility] [place]  [place] [address]  Phone: [phone] Fax: [phone]      Will route to nursing pool for review and preparation of prescription(s).       Alejandro Gan, [facility]   [facility]  November 18, 2019

## 2019-11-18 NOTE — TELEPHONE ENCOUNTER
Medication refill information reviewed.     Due date for buprenorphine (BUTRANS) 20 MCG is 11/18/19     Prescriptions prepped for review.     Will route to provider.

## 2019-11-18 NOTE — TELEPHONE ENCOUNTER
Rheumatology team: Please call to notify Mr. Daniels that prednisone 2.5mg daily has been refilled.     Sebastián Jenkins MD  11/18/2019 5:26 PM

## 2019-11-18 NOTE — TELEPHONE ENCOUNTER
Patient reports he finished the tapered course of prednisone and is down to baseline of 2.5 mg. He only has a few pills left and needs a refill.    Jf Packer RN....11/18/2019 12:56 PM

## 2019-11-19 NOTE — TELEPHONE ENCOUNTER
They didn't get all they needed  They need face to face notes and order for lift chair  supporting chart notes as to why the chair is needed    Fax to [phone]    Thank you

## 2019-11-19 NOTE — TELEPHONE ENCOUNTER
Please advise, on what other clinic note to fax.  Renard Donohue,  For 1st Floor [facility] ([facility])

## 2019-11-24 DIAGNOSIS — R21 RASH: ICD-10-CM

## 2019-11-24 NOTE — TELEPHONE ENCOUNTER
"Requested Prescriptions   Pending Prescriptions Disp Refills     triamcinolone (KENALOG) 0.1 % external ointment [Pharmacy Med Name: TRIAMCINOLONE 0.1% OINTMENT] 80 g 1     Sig: APPLY TOPICALLY TO AFFECTED AREA(S) 3 TIMES DAILY           Last Written Prescription Date:  6/11/19  Last Fill Quantity: 80 g,  # refills: 1   [facility] Office Visit [facility] prescribing provider:  10/8/19   Future Office Visit:    Next 5 appointments (look out 90 days)    Nov 26, 2019  9:40 AM CST  [facility] Visit with Sebastián Jenkins MD  [facility] [address] [facility] [id number] [phone]   Jan 13, 2020  8:00 AM CST  Return Visit with Dorothea Loo MD  [facility] ([facility]) [id number] [place] [id number] [phone]   Feb 04, 2020  9:00 AM CST  Return Visit with Alyssa Roche MD  [facility] ([facility]) [id number] [place] [id number] [phone]             Topical Steroids and Nonsteroidals Protocol Passed - 11/24/2019 10:16 AM        Passed - Patient is age 6 or older        Passed - Authorizing prescriber's most recent note related to this medication read.     If refill request is for ophthalmic use, please forward request to provider for approval.          Passed - High potency steroid not ordered        Passed - Recent (12 mo) or future (30 days) visit within the authorizing provider's specialty     Patient has had an office visit with the authorizing provider or a provider within the authorizing providers department within the previous 12 mos or has a future within next 30 days. See \"Patient Info\" tab in inbasket, or \"Choose Columns\" in Meds & Orders section of the refill encounter.              Passed - Medication is active on med list              Costa Faarax  [facility]  "

## 2019-11-25 DIAGNOSIS — B18.1 CHRONIC VIRAL HEPATITIS B WITHOUT DELTA AGENT AND WITHOUT COMA (H): ICD-10-CM

## 2019-11-25 DIAGNOSIS — M06.09 RHEUMATOID ARTHRITIS OF MULTIPLE SITES WITH NEGATIVE RHEUMATOID FACTOR (H): ICD-10-CM

## 2019-11-25 DIAGNOSIS — K74.00 HEPATIC FIBROSIS: ICD-10-CM

## 2019-11-25 DIAGNOSIS — Z79.899 HIGH RISK MEDICATION USE: ICD-10-CM

## 2019-11-25 LAB
ALBUMIN SERPL-MCNC: 4 G/DL (ref 3.4–5)
ALP SERPL-CCNC: 89 U/L (ref 40–150)
ALT SERPL W P-5'-P-CCNC: 47 U/L (ref 0–70)
ANION GAP SERPL CALCULATED.3IONS-SCNC: 6 MMOL/L (ref 3–14)
AST SERPL W P-5'-P-CCNC: 23 U/L (ref 0–45)
BASOPHILS # BLD AUTO: 0 10E9/L (ref 0–0.2)
BASOPHILS NFR BLD AUTO: 0.6 %
BILIRUB SERPL-MCNC: 0.4 MG/DL (ref 0.2–1.3)
BUN SERPL-MCNC: 16 MG/DL (ref 7–30)
CALCIUM SERPL-MCNC: 9.2 MG/DL (ref 8.5–10.1)
CHLORIDE SERPL-SCNC: 110 MMOL/L (ref 94–109)
CO2 SERPL-SCNC: 25 MMOL/L (ref 20–32)
CREAT SERPL-MCNC: 0.78 MG/DL (ref 0.66–1.25)
CRP SERPL-MCNC: <2.9 MG/L (ref 0–8)
DIFFERENTIAL METHOD BLD: NORMAL
EOSINOPHIL # BLD AUTO: 0.2 10E9/L (ref 0–0.7)
EOSINOPHIL NFR BLD AUTO: 2.4 %
ERYTHROCYTE [DISTWIDTH] IN BLOOD BY AUTOMATED COUNT: 14.4 % (ref 10–15)
ERYTHROCYTE [SEDIMENTATION RATE] IN BLOOD BY WESTERGREN METHOD: 9 MM/H (ref 0–20)
GFR SERPL CREATININE-BSD FRML MDRD: >90 ML/MIN/{1.73_M2}
GLUCOSE SERPL-MCNC: 101 MG/DL (ref 70–99)
HCT VFR BLD AUTO: 43.7 % (ref 40–53)
HGB BLD-MCNC: 14.3 G/DL (ref 13.3–17.7)
LYMPHOCYTES # BLD AUTO: 3 10E9/L (ref 0.8–5.3)
LYMPHOCYTES NFR BLD AUTO: 41.2 %
MCH RBC QN AUTO: 29.7 PG (ref 26.5–33)
MCHC RBC AUTO-ENTMCNC: 32.7 G/DL (ref 31.5–36.5)
MCV RBC AUTO: 91 FL (ref 78–100)
MONOCYTES # BLD AUTO: 0.9 10E9/L (ref 0–1.3)
MONOCYTES NFR BLD AUTO: 12.9 %
NEUTROPHILS # BLD AUTO: 3.1 10E9/L (ref 1.6–8.3)
NEUTROPHILS NFR BLD AUTO: 42.9 %
PLATELET # BLD AUTO: 272 10E9/L (ref 150–450)
POTASSIUM SERPL-SCNC: 3.8 MMOL/L (ref 3.4–5.3)
PROT SERPL-MCNC: 7.3 G/DL (ref 6.8–8.8)
RBC # BLD AUTO: 4.81 10E12/L (ref 4.4–5.9)
SODIUM SERPL-SCNC: 141 MMOL/L (ref 133–144)
WBC # BLD AUTO: 7.2 10E9/L (ref 4–11)

## 2019-11-25 PROCEDURE — 86140 C-REACTIVE PROTEIN: CPT | Performed by: INTERNAL MEDICINE

## 2019-11-25 PROCEDURE — 85025 COMPLETE CBC W/AUTO DIFF WBC: CPT | Performed by: INTERNAL MEDICINE

## 2019-11-25 PROCEDURE — 85652 RBC SED RATE AUTOMATED: CPT | Performed by: INTERNAL MEDICINE

## 2019-11-25 PROCEDURE — 36415 COLL VENOUS BLD VENIPUNCTURE: CPT | Performed by: INTERNAL MEDICINE

## 2019-11-25 PROCEDURE — 80053 COMPREHEN METABOLIC PANEL: CPT | Performed by: INTERNAL MEDICINE

## 2019-11-26 ENCOUNTER — MYC REFILL (OUTPATIENT)
Dept: DERMATOLOGY | Facility: CLINIC | Age: 54
End: 2019-11-26

## 2019-11-26 ENCOUNTER — OFFICE VISIT (OUTPATIENT)
Dept: RHEUMATOLOGY | Facility: CLINIC | Age: 54
End: 2019-11-26
Payer: COMMERCIAL

## 2019-11-26 ENCOUNTER — MYC REFILL (OUTPATIENT)
Dept: FAMILY MEDICINE | Facility: CLINIC | Age: 54
End: 2019-11-26

## 2019-11-26 ENCOUNTER — TELEPHONE (OUTPATIENT)
Dept: DERMATOLOGY | Facility: CLINIC | Age: 54
End: 2019-11-26

## 2019-11-26 VITALS
DIASTOLIC BLOOD PRESSURE: 88 MMHG | SYSTOLIC BLOOD PRESSURE: 128 MMHG | HEIGHT: 62 IN | HEART RATE: 74 BPM | OXYGEN SATURATION: 95 % | BODY MASS INDEX: 32.83 KG/M2 | WEIGHT: 178.4 LBS

## 2019-11-26 DIAGNOSIS — Z79.899 HIGH RISK MEDICATION USE: ICD-10-CM

## 2019-11-26 DIAGNOSIS — L40.9 PSORIASIS: ICD-10-CM

## 2019-11-26 DIAGNOSIS — F45.8 ANXIETY HYPERVENTILATION: ICD-10-CM

## 2019-11-26 DIAGNOSIS — F41.9 ANXIETY HYPERVENTILATION: ICD-10-CM

## 2019-11-26 DIAGNOSIS — M06.09 RHEUMATOID ARTHRITIS OF MULTIPLE SITES WITH NEGATIVE RHEUMATOID FACTOR (H): Primary | ICD-10-CM

## 2019-11-26 DIAGNOSIS — G47.00 INSOMNIA, UNSPECIFIED TYPE: ICD-10-CM

## 2019-11-26 DIAGNOSIS — R42 VERTIGO: ICD-10-CM

## 2019-11-26 PROCEDURE — 99213 OFFICE O/P EST LOW 20 MIN: CPT | Performed by: INTERNAL MEDICINE

## 2019-11-26 RX ORDER — CLOBETASOL PROPIONATE 0.5 MG/ML
SOLUTION TOPICAL
Qty: 60 ML | Refills: 1 | Status: SHIPPED | OUTPATIENT
Start: 2019-11-26 | End: 2020-10-29

## 2019-11-26 RX ORDER — SULFASALAZINE 500 MG/1
1500 TABLET, DELAYED RELEASE ORAL 2 TIMES DAILY
Qty: 540 TABLET | Refills: 2 | Status: SHIPPED | OUTPATIENT
Start: 2019-11-26 | End: 2020-06-09

## 2019-11-26 RX ORDER — HYDROCORTISONE VALERATE CREAM 2 MG/G
CREAM TOPICAL
Qty: 60 G | Refills: 1 | Status: SHIPPED | OUTPATIENT
Start: 2019-11-26 | End: 2020-07-22

## 2019-11-26 RX ORDER — HYDROXYCHLOROQUINE SULFATE 200 MG/1
200 TABLET, FILM COATED ORAL DAILY
Qty: 90 TABLET | Refills: 2 | Status: SHIPPED | OUTPATIENT
Start: 2019-11-26 | End: 2020-06-09

## 2019-11-26 RX ORDER — PREDNISONE 2.5 MG/1
2.5 TABLET ORAL DAILY
Qty: 90 TABLET | Refills: 1 | Status: SHIPPED | OUTPATIENT
Start: 2019-11-26 | End: 2020-03-10

## 2019-11-26 ASSESSMENT — MIFFLIN-ST. JEOR: SCORE: 1528.47

## 2019-11-26 NOTE — PATIENT INSTRUCTIONS
Rheumatology    Dr. Sebastián Jenkins       [facility] in [place]   (Monday)  [address]  [place] [address]       [facility] in [place]   (Tuesday)  [address] [place] [address]    [facility] in [place]   (Wed., Thurs., and Friday)  [address] [place] [address]    Phone number: [phone]  Thank you for choosing [facility].  Helga Guerrier CMA

## 2019-11-26 NOTE — NURSING NOTE
[place] to follow up with Primary Care provider regarding elevated blood pressure.  Blood pressure rechecked after visit    128/88  Helga Guerrier CMA Rheumatology  11/26/2019 9:25 AM                                RAPID3 (0-30) Cumulative Score  19.5          RAPID3 Weighted Score (divide #4 by 3 and that is the weighted score)  6.5       Helga Guerrier CMA Rheumatology  11/26/2019 9:07 AM

## 2019-11-26 NOTE — PROGRESS NOTES
Rheumatology Clinic Visit      Lamont Daniels MRN# [id number]   YOB: 1965 Age: 54 year old      Date of visit: 11/26/19   PCP: Dr. David Chavez  Hepatologist: Dr. Drea Laboy     Chief Complaint   Patient presents with:  Arthritis: RA. Patient is doing ok    Assessment and Plan   1.  Seropositive nonerosive rheumatoid arthritis (RF negative, CCP low positive): Note that he does have low back pain but without evidence of SI joint irregularity on x-ray.  Initially with morning stiffness all day, gelling phenomenon, and synovitis.   Previously on Humira (partially effective), Enbrel (partially effective). MTX avoided per Dr. Laboy recommendation. Currently on SSZ 1500mg BID, Orencia SQ every 7 days, (initially Rx'd 4/18/2017), prednisone 2.5mg daily, and HCQ 200mg daily.  Treatment has been in coordination with hepatology as he requires HBV tx while on bDMARD. Doing well at this time except for knee pain; see #9.  - Continue sulfasalazine 1500mg BID  - Continue Orencia SQ every 7 days  - Continue hydroxychloroquine 200 mg daily (11/12/2018 eye exam by Dr. Rae at [facility]; another eye exam 5/14/2019 but the clinic not has not yet been completed)  - Continue prednisone 2.5mg daily   - Labs in 3 months: CBC, Creatinine, Hepatic Panel, ESR, CRP, glucose    2. Lower back pain: Lumbar spine MRI in August 2014 showed multilevel degenerative changes and a small disc protrusion at L3/4 with mild spinal canal narrowing; also moderate left and mild right neural foraminal narrowing at L5-S1. He had a nerve conduction study in 2014 that was normal. He has been worked up by neurology in the past without significant neurologic findings. HLA-B27 negative, SI joint x-ray negative. Now following in the pain management clinic.     3. Myofascial pain syndrome / chronic pain syndrome: diffuse pain consistent with this.  Management per pain clinic and/or PCP.    4. Chronic Hepatitis B: Managed by Dr. Laboy (hepatology)  "previously, and now Dr. Thomas Leventhal at the [facility]. Currently on tenofovir.     5. Hepatic Steatosis: Based on previous liver biopsy.  Seeing hepatology.      6. Positive tuberculosis test:   This is noted here for historical significance.  He was evaluated by Dr. Anthony Mendoza, infectious disease. The following telephone note was documented by Dr. Mendoza: \"I tried calling th pt, finally we have the records from [place] . It appears he was treated for latent TB with INH for 1 year in 1980/1981 .Later in 1981 April he was admitted at [facility] in [place], [place] with rt sided pneumonia, TB was suspected but he improved with treatment with Penicillin only. Later he was seen  ( likely ID specialist), and was treated with 6 weeks course of Rifampin and Ethambutol pending sputum AFB culture. His AFB cx were negative based on the report we have.\"  \"He recently had QFT -TB test which was reported positive and his CXR was clear. Given his documented hx of completion of treatment for latent TB as above , I do not see any need for further treatment. His tests may remain positive irrespective of treatment status. From my perspective he can be started on DMARDs/Biological as deemed necessary.\"       7. Gout: On allopurinol and managed by PCP.    8. Elevated blood pressure:  Lamont to follow up with Primary Care provider regarding elevated blood pressure.     9. Bilateral knee tightness: knee tightness improves with rest and worsens with activity. Small effusions bilaterally; no pain, increased warmth, or overlying erythema; normal nonpainful ROM.  Discussed options for arthrocentesis and/or steroid injection but he refused because the \"tightness\" doesn't bother him much, per patient.  Advised that if symptoms worsen then he should be reevaluated.    Mr. Daniels verbalized agreement with and understanding of the rational for the diagnosis and treatment plan.  All questions were answered to best of my " ability and the patient's satisfaction. Mr. Daniels was advised to contact the clinic with any questions that may arise after the clinic visit.      Thank you for involving me in the care of the patient    Return to clinic: 3 months      HPI   Lamont Daniels is a 54 year old male with a medical history significant for hypertension, hyperlipidemia, gout, coronary artery disease s/p 6vCABG, vitamin D deficiency, hepatitis B, and RA who presents for f/u of RA.    Today, Mr. Daniels reports that he is doing well.  He reports having minimal tightness feeling in his knees without pain, increased warmth, or limits to his daily activities because of this tightness.  Hands are doing well with no joint pain and no swelling.  Morning stiffness for no more than 10-15 minutes.  Tolerating medications well.  Overall he says that he is happy with how well he is doing at this time.    Denies fevers, chills, nausea, vomiting, constipation, diarrhea. No abdominal pain. Denies chest pain/pressure, palpitations, or shortness of breath.  No oral or nasal sores. No rash. No LE swelling.  Mild dry mouth; he has good dentition and does not feel like he needs to use frequent sips of water; unchanged since last evaluation. No dry eyes. No eye pain or redness.     Tobacco:  None   EtOH:  None  Drugs:  None  Occupation: Retired   Originally from [facility], then lived just north of [place], [place], then lived in [place], [place], then moved to [place] for family    ROS   GEN: No fevers, chills, night sweats; + fatigue   SKIN: No itching, rashes, sores  HEENT: No epistaxis. No oral or nasal ulcers.  CV: See HPI  PULM: See HPI  GI: No nausea, vomiting, constipation, diarrhea. No blood in stool. No abdominal pain.  : No blood in urine.  MSK: See HPI.  NEURO: See HPI  EXT: No LE swelling  PSYCH: Negative    Active Problem List     Patient Active Problem List   Diagnosis     Coronary atherosclerosis of native coronary artery     Major depressive disorder,  recurrent episode, moderate (H)     Anxiety     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)     Hepatitis B infection     Vitamin D deficiency     S/P CABG (coronary artery bypass graft)     Malrotation of intestine     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     Hyperlipidemia LDL goal <70     Insomnia     Gout     Suicidal ideation     Essential hypertension     Rheumatoid arthritis involving multiple sites, unspecified rheumatoid factor presence (H)     High risk medications (not anticoagulants) long-term use     Midline low back pain without sciatica     Bilateral low back pain with sciatica, sciatica laterality unspecified     Elevated liver enzymes     On corticosteroid therapy     Essential hypertension with goal blood pressure less than 140/90     Insomnia, unspecified type     Rheumatoid arthritis of multiple sites with negative rheumatoid factor (H)     Benign prostatic hyperplasia with lower urinary tract symptoms, unspecified morphology     Chronic pain syndrome     Past Medical History     Past Medical History:   Diagnosis Date     Anxiety      CAD (coronary artery disease)     CABG, PCI     Congestive heart failure, unspecified 2008     Depressive disorder 2008     Gout      Hepatitis B > 10 years     HTN (hypertension)      Hyperlipidemia LDL goal < 100      Malrotation of intestine      Moderate major depression (H)      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) 9/19/2011    Uses CPAP     Rheumatoid arthritis flare (H) 1012     Steatohepatitis 12/15    liver biopsy     Tuberculosis age 13    INH x 9 months      Vitamin D deficiency      Past Surgical History     Past Surgical History:   Procedure Laterality Date     ABDOMEN SURGERY  2014     BIOPSY  2015     BYPASS GRAFT ARTERY CORONARY  2008    6 vessels     COLONOSCOPY  2/8/2013    Procedure: COLONOSCOPY;  Colonoscopy, blood in stool;  Surgeon: Duane, William Charles, MD;  Location: [facility] OR     COMBINED REPAIR PTOSIS WITH BLEPHAROPLASTY BILATERAL Bilateral 6/25/2018     Procedure: COMBINED REPAIR PTOSIS WITH BLEPHAROPLASTY BILATERAL;  Bilateral upper eyelid blepharoplasty, ptosis repair and brow ptosis repair;  Surgeon: Sandra Borja MD;  Location: [facility] OR     GI SURGERY  2014     HC CORONARY STENT PERCUT, EA ADDTL VESSEL  2008    3 months after CABG     HEAD & NECK SURGERY  2011     NASAL/SINUS POLYPECTOMY  2010     ORTHOPEDIC SURGERY  2012     REPAIR PTOSIS       REPAIR PTOSIS BILATERAL Bilateral 7/22/2019    Procedure: REPAIR, PTOSIS, BOTH UPPER brows;  Surgeon: Sandra Borja MD;  Location: [facility] OR     REPAIR PTOSIS BROW BILATERAL Bilateral 6/25/2018    Procedure: REPAIR PTOSIS BROW BILATERAL;;  Surgeon: Sandra Borja MD;  Location: [facility] OR     THORACIC SURGERY  1989    tb     TONSILLECTOMY  2010     UVULOPALATOPHARYNGOPLASTY  2010     VASCULAR SURGERY  2008     Allergy     Allergies   Allergen Reactions     Citalopram Itching     Tramadol Itching     Current Medication List     Current Outpatient Medications   Medication Sig     Abatacept (ORENCIA) 125 MG/ML SOAJ auto-injector Inject 1 mL (125 mg) Subcutaneous every 7 days     Acetaminophen (TYLENOL PO)      allopurinol (ZYLOPRIM) 300 MG tablet Take 1 tablet (300 mg) by mouth daily     aspirin EC 81 MG EC tablet Take 1 tablet (81 mg) by mouth daily (*)     atorvastatin (LIPITOR) 80 MG tablet TAKE 1 TABLET BY MOUTH 1 TIME DAILY     bacitracin 500 UNIT/GM external ointment Apply topically 3 times daily     baclofen (LIORESAL) 10 MG tablet TAKE 1 TABLET BY MOUTH 2 TIMES DAILY AS NEEDED FOR MUSCLE SPASM     buprenorphine (BUTRANS) 20 MCG/HR WK patch Place 1 patch onto the skin every 7 days . Name brand only.  Fill 11/18/19 to start 11/18/19     busPIRone HCl (BUSPAR) 30 MG tablet Take 1 tablet (30 mg) by mouth 2 times daily     Cholecalciferol (VITAMIN D) 2000 UNITS tablet TAKE ONE TABLET BY MOUTH ONCE DAILY     clobetasol (TEMOVATE) 0.05 % external solution Apply twice daily to scalp for up to 2 weeks for flares.      clonazePAM (KLONOPIN) 1 MG tablet Take 0.5-1 tablets (0.5-1 mg) by mouth 2 times daily as needed for anxiety     clopidogrel (PLAVIX) 75 MG tablet Take 1 tablet (75 mg) by mouth daily     desvenlafaxine succinate (PRISTIQ) 25 MG 24 hr tablet Take 1 tablet (25 mg) by mouth daily     diclofenac (VOLTAREN) 1 % topical gel Apply 4 grams to bilateral knees, up to four times daily as needed using enclosed dosing card.  Max of 32g/day     evolocumab (REPATHA) 140 MG/ML prefilled autoinjector Inject 1 mL (140 mg) Subcutaneous every 14 days     ezetimibe (ZETIA) 10 MG tablet Take 1 tablet (10 mg) by mouth daily     gabapentin (NEURONTIN) 300 MG capsule Take 2 capsules (600 mg) by mouth 3 times daily . 3 month supply     gemfibrozil (LOPID) 600 MG tablet Take 1 tablet (600 mg) by mouth 2 times daily     hydrocortisone (WESTCORT) 0.2 % external cream For genitals, apply twice daily for up to 2 weeks     hydroxychloroquine (PLAQUENIL) 200 MG tablet Take 1 tablet (200 mg) by mouth daily     Incontinence Supply Disposable (DEPEND UNDERWEAR LARGE) MISC Use twice daily.     meclizine (ANTIVERT) 25 MG tablet TAKE 1 TABLET BY MOUTH EVERY 6 HOURS AS NEEDED FOR DIZZINESS     melatonin 3 MG tablet Take 1 tablet (3 mg) by mouth nightly as needed for sleep     meloxicam (MOBIC) 7.5 MG tablet TAKE 1 TABLET (7.5 MG) BY MOUTH DAILY     naloxone (NARCAN) nasal spray Spray 1 spray (4 mg) into one nostril alternating nostrils as needed for opioid reversal every 2-3 minutes until assistance arrives     nitroGLYcerin (NITROSTAT) 0.4 MG sublingual tablet PLACE 1 TABLET UNDER THE TONGUE EVERY 5 MINUTES AS NEEDED     order for DME Equipment being ordered: chair lift     order for DME Equipment being ordered: single end cane     ORDER FOR DME 1.  CPAP pressure 11 cm/H20 with heated humidity.   2.  Provide mask to fit and CPAP supplies.   3.  Length of need lifetime.   4.  If needed please provide a chin strap          pantoprazole (PROTONIX) 40 MG EC  tablet Take 1 tablet (40 mg) by mouth daily     predniSONE (DELTASONE) 2.5 MG tablet Take 1 tablet (2.5 mg) by mouth daily     sulfaSALAzine ER (AZULFIDINE EN) 500 MG EC tablet Take 3 tablets (1,500 mg) by mouth 2 times daily     tamsulosin (FLOMAX) 0.4 MG capsule Take 2 capsules (0.8 mg) by mouth daily     tenofovir (VIREAD) 300 MG tablet Take 1 tablet (300 mg) by mouth daily     triamcinolone (KENALOG) 0.1 % external ointment Apply to trunk and extremities twice daily for up to 2 weeks for flares     zolpidem (AMBIEN) 5 MG tablet Take 1 tablet (5 mg) by mouth nightly as needed for sleep     blood glucose (NO BRAND SPECIFIED) lancets standard Use to test blood sugar 2 times daily or as directed. (Patient not taking: Reported on 10/22/2019)     blood glucose monitoring (NO BRAND SPECIFIED) meter device kit Use to test blood sugar 2 times daily or as directed. (Patient not taking: Reported on 10/22/2019)     blood glucose monitoring (NO BRAND SPECIFIED) test strip Use to test blood sugars 2 times daily or as directed (Patient not taking: Reported on 10/22/2019)     COLCRYS 0.6 MG tablet TAKE 2 TABLETS BY MOUTH AT THE FIRST SIGN OF FLARE, TAKE 1 ADDITIONAL TABLET ONE HOUR LATER. (Patient not taking: Reported on 10/22/2019)     No current facility-administered medications for this visit.      Facility-Administered Medications Ordered in Other Visits   Medication     gadobutrol (GADAVIST) injection 10 mL       Social History   See HPI    Family History     Family History   Problem Relation Age of Onset     C.A.D. Father      Coronary Artery Disease Father      Hypertension Father      Depression Father      Hypertension Mother      Diabetes Mother      Depression Mother      Mental Illness Mother      Hypertension Maternal Grandfather      Cancer Paternal Grandfather      Other Cancer Paternal Grandfather      Other Cancer Other      Autoimmune Disease No family hx of      Cerebrovascular Disease No family hx of       "Thyroid Disease No family hx of      Glaucoma No family hx of      Macular Degeneration No family hx of      No family history of autoimmune disease  No family history of psoriasis     No change in family history since the previous clinic visit.     Physical Exam     Temp Readings from Last 3 Encounters:   10/08/19 97.6  F (36.4  C) (Oral)   07/22/19 97.5  F (36.4  C) (Temporal)   06/20/19 97.7  F (36.5  C) (Oral)     BP Readings from Last 5 Encounters:   10/22/19 (!) 142/90   10/14/19 130/86   10/08/19 117/81   07/22/19 143/88   07/02/19 132/86     Pulse Readings from Last 1 Encounters:   10/22/19 67     Resp Readings from Last 1 Encounters:   07/22/19 16     Estimated body mass index is 32.34 kg/m  as calculated from the following:    Height as of 10/22/19: 1.575 m (5' 2\").    Weight as of 10/22/19: 80.2 kg (176 lb 12.8 oz).    GEN: NAD  HEENT: MMM. No oral lesions. Anicteric, noninjected sclera  CV: S1, S2. RRR. No m/r/g.  PULM: CTA bilaterally. No w/c.  MSK: MCPs, PIPs, DIPs, wrists, elbows, shoulders, ankles, and MTPs without swelling or tenderness to palpation.   Able to make a fist with each hand.  Hips nontender to palpation.  Both knees with small effusion but no increased warmth, overlying erythema, or tenderness to palpation; normal range of motion and range of motion did not cause any knee pain..  NEURO: UE and LE strengths 5/5 and equal bilaterally.   SKIN: No rash.  EXT: No LE edema  PSYCH: Alert. Appropriate.    Labs   RF/CCP  Recent Labs   Lab Test 11/04/15  1547 08/12/14  1414   CCPABY 27*  --    RHF  --  <20     CBC  Recent Labs   Lab Test 11/25/19  0758 10/07/19  0940 06/21/19  0817   WBC 7.2 9.3 7.7   RBC 4.81 5.15 4.79   HGB 14.3 15.1 14.2   HCT 43.7 45.4 42.1   MCV 91 88 88   RDW 14.4 13.7 14.9    289 289   MCH 29.7 29.3 29.6   MCHC 32.7 33.3 33.7   NEUTROPHIL 42.9 53.5 51.3   LYMPH 41.2 29.4 32.4   MONOCYTE 12.9 13.9 12.9   EOSINOPHIL 2.4 2.0 2.2   BASOPHIL 0.6 0.9 0.8   ANEU 3.1 5.0 " 4.0   ALYM 3.0 2.7 2.5   PRACHI 0.9 1.3 1.0   AEOS 0.2 0.2 0.2   ABAS 0.0 0.1 0.1     CMP  Recent Labs   Lab Test 11/25/19  0758 10/07/19  0940 07/02/19  0925 06/21/19  0817 04/01/19  0851    140  --   --  140   POTASSIUM 3.8 3.8  --   --  3.6   CHLORIDE 110* 110*  --   --  110*   CO2 25 26  --   --  23   ANIONGAP 6 4  --   --  8   * 103* 102*  --  99   BUN 16 19  --   --  16   CR 0.78 0.93  --  0.90 0.89   GFRESTIMATED >90 >90  --  >90 >90   GFRESTBLACK >90 >90  --  >90 >90   HEMANT 9.2 9.2  --   --  9.6   BILITOTAL 0.4 0.4 0.5 0.5 0.4   ALBUMIN 4.0 4.1 4.1 3.9 4.0   PROTTOTAL 7.3 7.8 7.7 7.5 7.7   ALKPHOS 89 112 112 103 95   AST 23 32 20 47* 30   ALT 47 47 40 84* 41     Calcium/VitaminD  Recent Labs   Lab Test 11/25/19  0758 10/07/19  0940 04/01/19  0851  07/13/17  1246  11/04/15  1547  04/01/13  1624   HEMANT 9.2 9.2 9.6   < >  --    < >  --    < > 9.5   VITDT  --   --   --   --  34  --  43  --  34    < > = values in this interval not displayed.     ESR/CRP  Recent Labs   Lab Test 11/25/19  0758 10/07/19  0940 06/21/19  0817   SED 9 11 13   CRP <2.9 <2.9 <2.9     Lipid Panel  Recent Labs   Lab Test 03/14/19  1006 06/22/18  1448 12/04/17  0924  06/29/15  1405 05/22/14  0848 03/24/14  0936   CHOL 152 129 108   < > 219* 169 195   TRIG 126 113 85   < > 372* 379* 301*   HDL 51 64 73   < > 33* 33* 39*   LDL 76 42 18   < > 112 60 95   VLDL  --   --   --   --  74* 76* 60*   CHOLHDLRATIO  --   --   --   --  6.6* 5.1* 5.0   NHDL 101 65 35   < >  --   --   --     < > = values in this interval not displayed.     Hepatitis B  Recent Labs   Lab Test 10/07/19  0940 04/01/19  0851 12/04/17  0924  10/05/16  0954  04/23/13  0812 01/30/13  0906 01/23/12  1150   AUSAB  --   --   --   --  0.45  --   --   --   --    HBCAB  --   --   --   --   --   --  Positive*  --   --    HBCM  --   --   --   --   --   --   --   --  Negative   HEPBANG  --   --   --   --  Reactive*  --  Positive* Positive* Positive*   HBQLOG Not Calculated Not  "Calculated Not Calculated   < > 5.1*   < >  --   --   --     < > = values in this interval not displayed.     Hepatitis C  Recent Labs   Lab Test 04/07/16  1538 04/23/13  0812   HCVAB Nonreactive   Assay performance characteristics have not been established for newborns,   infants, and children   Negative     Lyme ab screening  Recent Labs   Lab Test 07/18/17  1330   LYMEGM 0.19       Tuberculosis Screening  Recent Labs   Lab Test 04/07/16  1538   TBRSLT Positive   The magnitude of the measured IFN-gamma level cannot be correlated to stage or   degree of infection, level of immune responsiveness, or likelihood for   progression to active disease.  *   TBAGN >10.00  This is a qualitative test.  The TB antigen IU/mL value is required for   documentation on certain government reporting forms but this value should not   be used to monitor disease progression or response to therapy.   Diagnosing or excluding tuberculosis disease, and assessing the probability of   LTBI, require a combination of epidemiological, historical, medical and   diagnostic findings that should be taken into account when interpreting   QuantiFERON TB results.       HIV Screening  Recent Labs   Lab Test 11/04/15  1547   HIAGAB Nonreactive   HIV-1 p24 Ag & HIV-1/HIV-2 Ab Not Detected       \"HAND BILATERAL THREE OR MORE VIEWS 11/4/2015 4:55 PM   HISTORY: Pain in unspecified joint.  COMPARISON: None.  IMPRESSION  IMPRESSION: Normal.  DANIS ANGLIN MD\"    \"FOOT BILATERAL THREE OR MORE VIEWS 11/4/2015 4:55 PM   HISTORY: Pain in unspecified joint.  COMPARISON: 8/21/2012.  IMPRESSION  IMPRESSION: Small traction spurs are seen off the Achilles tendon and  plantar fascial attachment sites bilaterally. Joint spaces are  preserved. Osseous structures are intact. Incidental note is made of  some surgical staples in the soft tissues of the medial distal right  lower extremity.  DANIS ANGLIN MD\"      Immunization History     Immunization History   Administered " Date(s) Administered     Influenza (IIV3) PF 10/11/2011, 09/20/2017, 09/01/2018     Influenza Vaccine IM > 6 months Valent IIV4 09/27/2015, 09/08/2016, 09/30/2019     Influenza Vaccine, 3 YRS +, IM (QUADRIVALENT W/PRESERVATIVES) 11/17/2014     Pneumo Conj 13-V (2010&after) 04/07/2016     Pneumococcal 23 valent 12/12/2014     TDAP Vaccine (Adacel) 08/30/2011     Zoster vaccine recombinant adjuvanted (SHINGRIX) 07/27/2019          Chart documentation done in part with Dragon Voice recognition Software. Although reviewed after completion, some word and grammatical error may remain.    Sebastián Jenkins MD

## 2019-11-27 RX ORDER — TRIAMCINOLONE ACETONIDE 1 MG/G
OINTMENT TOPICAL
Qty: 80 G | Refills: 1 | Status: SHIPPED | OUTPATIENT
Start: 2019-11-27 | End: 2020-03-12

## 2019-11-27 NOTE — TELEPHONE ENCOUNTER
"Requested Prescriptions   Pending Prescriptions Disp Refills     meclizine (ANTIVERT) 25 MG tablet  Last Written Prescription Date:  4/1/19  Last Fill Quantity: 30,  # refills: 3   Last office visit: 10/8/2019 with prescribing provider:  Scott   Future Office Visit:   Next 5 appointments (look out 90 days)    Jan 13, 2020  8:00 AM CST  Return Visit with Dorothea Loo MD  [facility] ([facility]) [address] [facility] [address]  [phone]   Feb 04, 2020  9:00 AM CST  Return Visit with Alyssa Roche MD  [facility] [address] [place] [id number] [phone]          30 tablet 3        Antivertigo/Antiemetic Agents Passed - 11/26/2019  2:57 PM        Passed - Recent (12 mo) or future (30 days) visit within the authorizing provider's specialty     Patient has had an office visit with the authorizing provider or a provider within the authorizing providers department within the previous 12 mos or has a future within next 30 days. See \"Patient Info\" tab in inbasket, or \"Choose Columns\" in Meds & Orders section of the refill encounter.              Passed - Medication is active on med list        Passed - Patient is 18 years of age or older       zolpidem (AMBIEN) 5 MG tablet      Last Written Prescription Date:  4/2/19  Last Fill Quantity: 30,   # refills: 5  Last Office Visit:   Future Office visit:    Next 5 appointments (look out 90 days)    Jan 13, 2020  8:00 AM CST  Return Visit with Dorothea Loo MD  [facility] ([facility]) [address] [facility] [id number] [phone]   Feb 04, 2020  9:00 AM CST  Return Visit with Alyssa Roche MD  [facility]) [address] [place] [id number] [phone]           Routing refill request to provider for review/approval because:  Drug not " on the [facility], [facility] or [facility] refill protocol or controlled substance                       clonazePAM (KLONOPIN) 1 MG tablet      Last Written Prescription Date:  10/8/19  Last Fill Quantity: 90,   # refills: 0  Last Office Visit: Ho  Future Office visit:    Next 5 appointments (look out 90 days)    Jan 13, 2020  8:00 AM CST  Return Visit with Dorothea Loo MD  [facility] ([facility]) [id number] [place] [id number] [phone]   Feb 04, 2020  9:00 AM CST  Return Visit with Alyssa Roche MD  [facility] ([facility]) [id number] [place] [id number] [phone]           Routing refill request to provider for review/approval because:  Drug not on the [facility], [facility] or [facility] refill protocol or controlled substance

## 2019-11-27 NOTE — TELEPHONE ENCOUNTER
Prescription approved per [facility] Refill Protocol.  Ameena Demarco RN

## 2019-11-27 NOTE — TELEPHONE ENCOUNTER
Central Prior Authorization Team   [phone]    PA Initiation    Medication: hydrocortisone (WESTCORT) 0.2 % external cream  Insurance Company: [employer] - Phone [phone] Fax [phone]  Pharmacy Filling the Rx: [id number] [place] - [address] [place] [address]  Filling Pharmacy Phone: [phone]  Filling Pharmacy Fax: [phone]  Start Date: 11/27/2019

## 2019-11-27 NOTE — TELEPHONE ENCOUNTER
Prior Authorization Approval    Authorization Effective Date: 10/28/2019  Authorization Expiration Date: 11/26/2020  Medication: hydrocortisone (WESTCORT) 0.2 % external cream-PA APPROVED   Approved Dose/Quantity:  Reference #: CASE ID # [id number]   Insurance Company: [employer] - Phone [phone] Fax [phone]  Expected CoPay:       CoPay Card Available:      Foundation Assistance Needed:    Which Pharmacy is filling the prescription (Not needed for infusion/clinic administered): [facility] [id number] [place] [address]  Pharmacy Notified: Yes- **Instructed pharmacy to notify patient when script is ready to /ship.**   Patient Notified: Yes

## 2019-11-29 RX ORDER — ZOLPIDEM TARTRATE 5 MG/1
5 TABLET ORAL
Qty: 30 TABLET | Refills: 5 | Status: SHIPPED | OUTPATIENT
Start: 2019-11-29 | End: 2020-10-05

## 2019-11-29 RX ORDER — CLONAZEPAM 1 MG/1
.5-1 TABLET ORAL 2 TIMES DAILY PRN
Qty: 90 TABLET | Refills: 0 | Status: SHIPPED | OUTPATIENT
Start: 2019-11-29 | End: 2019-12-28

## 2019-11-29 RX ORDER — MECLIZINE HYDROCHLORIDE 25 MG/1
25 TABLET ORAL EVERY 6 HOURS PRN
Qty: 30 TABLET | Refills: 5 | Status: SHIPPED | OUTPATIENT
Start: 2019-11-29 | End: 2020-01-14

## 2019-11-29 NOTE — TELEPHONE ENCOUNTER
Prescription approved per [facility] Refill Protocol. - Meclizine     Routing refill request to provider for review/approval because:  Drug not on the [facility] refill protocol - Clonazepam and Constance Francis RN  [facility]

## 2019-11-30 DIAGNOSIS — M10.00 IDIOPATHIC GOUT, UNSPECIFIED CHRONICITY, UNSPECIFIED SITE: ICD-10-CM

## 2019-11-30 NOTE — TELEPHONE ENCOUNTER
"Requested Prescriptions   Pending Prescriptions Disp Refills     allopurinol (ZYLOPRIM) 300 MG tablet [Pharmacy Med Name: ALLOPURINOL 300 MG TABLET]  Last Written Prescription Date:  12/19/18  Last Fill Quantity: 90,  # refills: 3   Last Office Visit with ANA PAULA, OB[facility] or [facility] prescribing provider:  10/8/19   Future Office Visit:    Next 5 appointments (look out 90 days)    Jan 13, 2020  8:00 AM CST  Return Visit with Dorothea Loo MD  [facility] ([facility]) [address] [facility] [id number] [phone]   Feb 04, 2020  9:00 AM CST  [facility] with [facility] [address] [place] [id number] [phone]          90 tablet 3     Sig: TAKE 1 TABLET BY MOUTH EVERY DAY       Gout Agents Protocol Failed - 11/30/2019  9:01 AM        Failed - Has Uric Acid on file in past 12 months and value is less than 6     Recent Labs   Lab Test 11/04/15  1547   URIC 6.1     If level is 6mg/dL or greater, ok to refill one time and refer to provider.           Passed - CBC on file in past 12 months     Recent Labs   Lab Test 11/25/19  0758   WBC 7.2   RBC 4.81   HGB 14.3   HCT 43.7                    Passed - ALT on file in past 12 months     Recent Labs   Lab Test 11/25/19  0758   ALT 47             Passed - Recent (12 mo) or future (30 days) visit within the authorizing provider's specialty     Patient has had an office visit with the authorizing provider or a provider within the authorizing providers department within the previous 12 mos or has a future within next 30 days. See \"Patient Info\" tab in inbasket, or \"Choose Columns\" in Meds & Orders section of the refill encounter.              Passed - Medication is active on med list        Passed - Patient is age 18 or older        Passed - Normal serum creatinine on file in the past 12 months     Recent Labs   Lab Test 11/25/19  0758  " 06/24/13  1322   CR 0.78   < >  --    CRPOC  --   --  1.1    < > = values in this interval not displayed.

## 2019-12-03 RX ORDER — ALLOPURINOL 300 MG/1
TABLET ORAL
Qty: 90 TABLET | Refills: 3 | Status: SHIPPED | OUTPATIENT
Start: 2019-12-03 | End: 2020-10-20

## 2019-12-03 NOTE — TELEPHONE ENCOUNTER
Routing refill request to provider for review/approval because:  Labs not current:  Uric Acid  Lorraine Francis RN  [facility]

## 2019-12-11 DIAGNOSIS — M06.9 RHEUMATOID ARTHRITIS INVOLVING MULTIPLE SITES, UNSPECIFIED RHEUMATOID FACTOR PRESENCE: ICD-10-CM

## 2019-12-11 RX ORDER — BUPRENORPHINE 20 UG/H
1 PATCH TRANSDERMAL
Qty: 4 PATCH | Refills: 1 | Status: SHIPPED | OUTPATIENT
Start: 2019-12-11 | End: 2020-01-08

## 2019-12-11 NOTE — TELEPHONE ENCOUNTER
Received call from patient requesting refill(s) of buprenorphine (BUTRANS) 20 MCG/HR WK patch     Last dispensed from pharmacy on 11/18/19    Pt last seen by prescribing provider on 10/04/19  Next appt scheduled for 1/13/20     checked in the past 6 months? Yes If no, print current report and give to RN    Last urine drug screen date 3/11/19  Current opioid agreement on file (completed within the last year) Yes Date of opioid agreement: 3/11/19    Processing (pick one and delete the others):      E-prescribe to [facility] [id number] [place], [place] pharmacy    Will route to nursing pool for review and preparation of prescription(s).

## 2019-12-11 NOTE — TELEPHONE ENCOUNTER
Reason for call:  Medication   If this is a refill request, has the caller requested the refill from the pharmacy already? No  Will the patient be using a [facility]? N/A  Name of the pharmacy and phone number for the current request: N/A    Name of the medication requested: buprenorphine (BUTRANS) 20 MCG/HR WK patch    Other request: Fill by weekend    Phone number to reach patient:  Home number on file [phone] (home)    Best Time:  N/A    Can we leave a detailed message on this number?  YES     Gisela GERARDO    [facility]

## 2019-12-11 NOTE — TELEPHONE ENCOUNTER
Script Eprescribed to pharmacy    Will send this to MA team to notify patient.    Signed Prescriptions:                        Disp   Refills    buprenorphine (BUTRANS) 20 MCG/HR WK patch 4 patch1        Sig: Place 1 patch onto the skin every 7 days . Name brand           only.  Fill 12/14/19 to start 12/16/19, 28 day           supply  Authorizing Provider: KAYLA CHUN MD  [facility]

## 2019-12-11 NOTE — TELEPHONE ENCOUNTER
Medication refill information reviewed.     Due date for buprenorphine (BUTRANS) 20 MCG/HR WK patch is 12/16/19     Prescriptions prepped for review.     Will route to provider.     Arnold Pete, RN  Care Coordinator   [facility] [place]

## 2019-12-18 NOTE — MR AVS SNAPSHOT
After Visit Summary   4/14/2017    Lamont Daniels    MRN: [id number]           Patient Information     Date Of Birth          1965        Visit Information        Provider Department      4/14/2017 2:00 PM Fly Travis MD [facility]        Today's Diagnoses     Familial hypercholesterolemia    -  1      Care Instructions      The following is a summary of your office visit:    Nurse contact information: Sarah Arce RN  Cardiology Care Coordinator  [phone] Phone  [phone] Fax    Appointments made today: Fasting Lab Appointment in [place] and 6 Month Follow Up with Dr. Travis.        If you have had any blood work, imaging or other testing completed we will be in touch within 1-2 weeks regarding the results. If you have any questions, concerns or need to schedule a follow up, please contact us at [phone]. If you are needing refills please contact your pharmacy. For urgent after hour care please call the [facility] at [phone] or the [facility] at [phone] and ask to speak to the cardiologist on call.    It was a pleasure meeting with you today. Please let us know if there is anything else we can do for you so that we can be sure you are leaving completely satisfied with your care experience.     Your Cardiology Team at [facility]  RN Care Coordinator: Sarah Euceda                  Follow-ups after your visit        Your next 10 appointments already scheduled     Apr 18, 2017  3:00 PM CDT   [facility]    [address] [facility] [id number] [phone]            Apr 22, 2017  9:30 AM CDT   LAB with [facility]   [facility]    [address] [facility] [id number] [phone]           Patient must bring  I sent notes to you. Please advise   picture ID.  Patient should be prepared to give a urine specimen  Please do not eat 10-12 hours before your appointment if you are coming in fasting for labs on lipids, cholesterol, or glucose (sugar).  Pregnant women should follow their Care Team instructions. Water with medications is okay. Do not drink coffee or other fluids.   If you have concerns about taking  your medications, please ask at office or if scheduling via [facility], send a message by clicking on Secure Messaging, Message Your Care Team.            Apr 25, 2017  2:00 PM CDT   Return Visit with Wiley Acosta MD   [facility] ([facility])    [address] [facility] [id number] [phone]            May 23, 2017  3:00 PM CDT   Return Sleep Patient with Milan Huynh MD   [facility] ([facility])    [address] [place] [id number] [phone]            Jun 21, 2017  3:00 PM CDT   Return Visit with Dorothea Loo MD   [facility] ([facility])    [address] [place] [id number] [phone]            Oct 20, 2017  3:00 PM CDT   Return Visit with Fly Travis MD   [facility])    [address] [place] [id number] [phone]              Who to contact     If you have questions or need follow up information about today's clinic visit or your schedule please contact [facility] directly at [phone].  Normal or non-critical lab and imaging results will be communicated to you by MyChart, letter or phone within 4 business days after the clinic has received the results. If you do not hear from us within 7 days, please contact the clinic through MyChart or phone. If you have a critical or abnormal lab result, we will notify you by phone as soon as  possible.  Submit refill requests through [employer] or call your pharmacy and they will forward the refill request to us. Please allow 3 business days for your refill to be completed.          Additional Information About Your Visit        AngelList Information     Angel[employer] gives you secure access to your electronic health record. If you see a primary care provider, you can also send messages to your care team and make appointments. If you have questions, please call your primary care clinic.  If you do not have a primary care provider, please call [phone] and they will assist you.      AngelList is an electronic gateway that provides easy, online access to your medical records. With Angel[employer], you can request a clinic appointment, read your test results, renew a prescription or communicate with your care team.     To access your existing account, please contact your [facility] or call [phone] for assistance.        Care EveryWhere ID     This is your Care EveryWhere ID. This could be used by other organizations to access your [place] medical records  [id number]        Your Vitals Were     Pulse Pulse Oximetry BMI (Body Mass Index)             70 99% 31.29 kg/m2          Blood Pressure from Last 3 Encounters:   04/14/17 131/80   03/14/17 128/82   02/07/17 128/84    Weight from Last 3 Encounters:   04/14/17 77.6 kg (171 lb 1.6 oz)   03/14/17 78.7 kg (173 lb 6.4 oz)   02/07/17 83.5 kg (184 lb)              Today, you had the following     No orders found for display       Primary Care Provider Office Phone # Fax #    David Chavez [phone]       [facility] [address] [facility] [address]        Thank you!     Thank you for choosing [facility]  for your care. Our goal is always to provide you with excellent care. Hearing back from our patients is one way we can continue to improve our services. Please take a few minutes to  complete the written survey that you may receive in the mail after your visit with us. Thank you!             Your Updated Medication List - Protect others around you: Learn how to safely use, store and throw away your medicines at www.disposemymeds.org.          This list is accurate as of: 4/14/17  2:22 PM.  Always use your most recent med list.                   Brand Name Dispense Instructions for use    allopurinol 300 MG tablet    ZYLOPRIM    90 tablet    TAKE ONE TABLET BY MOUTH ONCE DAILY       aspirin 81 MG EC tablet     30 tablet    Take 1 tablet (81 mg) by mouth daily (*)       * atorvastatin 80 MG tablet    LIPITOR    90 tablet    Take 1 tablet (80 mg) by mouth daily for cholesterol.       * atorvastatin 80 MG tablet    LIPITOR    90 tablet    TAKE ONE TABLET (80 MG) BY MOUTH ONCE DAILY FOR CHOLESTEROL       baclofen 10 MG tablet    LIORESAL    180 tablet    Take 1 tablet (10 mg) by mouth 2 times daily as needed for muscle spasms       BusPIRone HCl 30 MG Tabs     180 tablet    TAKE ONE TABLET BY MOUTH TWICE DAILY       clonazePAM 1 MG tablet    klonoPIN    90 tablet    TAKE ONE-HALF TO ONE TABLET BY MOUTH THREE TIMES DAILY AS NEEDED FOR ANXIETY       clopidogrel 75 MG tablet    PLAVIX    90 tablet    Take 1 tablet (75 mg) by mouth daily       colchicine 0.6 MG tablet     30 tablet    TAKE TWO TABLETS BY MOUTH AT THE FIRST SIGN OF FLARE, TAKE ONE ADDITIONAL TABLET ONE HOUR LATER.       Etanercept 50 MG/ML autoinjector    ENBREL SURECLICK    1 kit    Inject 50 mg Subcutaneous once a week       evolocumab 140 MG/ML prefilled autoinjector    REPATHA    2 mL    Inject 1 mL (140 mg) Subcutaneous every 14 days       ezetimibe 10 MG tablet    ZETIA    90 tablet    Take 1 tablet (10 mg) by mouth daily       gabapentin 300 MG capsule    NEURONTIN    540 capsule    TAKE TWO CAPSULES BY MOUTH THREE TIMES DAILY       gemfibrozil 600 MG tablet    LOPID    180 tablet    Take 1 tablet (600 mg) by mouth 2 times daily        hydrocortisone 0.2 % cream    WESTCORT    45 g    Use twice daily on the eats for active rash for up to 2 weeks in a row, then take 2 weeks off.       ketoconazole 2 % cream    NIZORAL    30 g    Apply topically 2 times daily Apply to back of scalp and to groin twice daily until rash is clear       meclizine 25 MG tablet    ANTIVERT    30 tablet    TAKE ONE TABLET BY MOUTH EVERY 6 HOURS AS NEEDED FOR DIZZINESS       melatonin 3 MG tablet      Take 1 tablet (3 mg) by mouth nightly as needed for sleep       meloxicam 7.5 MG tablet    MOBIC    30 tablet    TAKE ONE TABLET BY MOUTH ONCE DAILY WITH  BREAKFAST       metoprolol 25 MG 24 hr tablet    TOPROL-XL    90 tablet    Take 1 tablet (25 mg) by mouth daily       mirtazapine 15 MG tablet    REMERON    30 tablet    Take 1 tablet (15 mg) by mouth At Bedtime       nitroglycerin 0.4 MG sublingual tablet    NITROSTAT    30 tablet    Place 1 tablet (0.4 mg) under the tongue every 5 minutes as needed       order for DME      1.  CPAP pressure 11 cm/H20 with heated humidity.  2.  Provide mask to fit and CPAP supplies.  3.  Length of need lifetime.  4.  If needed please provide a chin strap       order for DME     1 Units    Equipment being ordered: single end cane       oxyCODONE 5 MG IR tablet    ROXICODONE    150 tablet    Take 1-2 tablets (5-10 mg) by mouth every 4 hours as needed for moderate to severe pain . Max of 6 tabs per day.  May fill on/after 3/9/17 to start taking on/after 3/11/17. One month supply.       pantoprazole 40 MG EC tablet    PROTONIX    90 tablet    TAKE ONE TABLET BY MOUTH ONCE DAILY FOR STOMACH       predniSONE 5 MG tablet    DELTASONE    30 tablet    Take 1 tablet (5 mg) by mouth daily       sulfaSALAzine  MG EC tablet    AZULFIDINE EN    120 tablet    Take 2 tablets (1,000 mg) by mouth 2 times daily       tamsulosin 0.4 MG capsule    FLOMAX    90 capsule    Take 1 capsule (0.4 mg) by mouth daily       tenofovir 300 MG tablet    VIREAD     90 tablet    Take 1 tablet (300 mg) by mouth daily       triamcinolone 0.1 % ointment    KENALOG    80 g    Apply sparingly to the ears and groin twice daily until clear, then once daily until clear. Do not apply to the face.       vitamin D 2000 UNITS tablet     100 tablet    TAKE ONE TABLET BY MOUTH ONCE DAILY       zolpidem 5 MG tablet    AMBIEN    30 tablet    TAKE ONE TABLET BY MOUTH AT BEDTIME AS NEEDED FOR SLEEP       * Notice:  This list has 2 medication(s) that are the same as other medications prescribed for you. Read the directions carefully, and ask your doctor or other care provider to review them with you.

## 2019-12-28 ENCOUNTER — MYC REFILL (OUTPATIENT)
Dept: FAMILY MEDICINE | Facility: CLINIC | Age: 54
End: 2019-12-28

## 2019-12-28 DIAGNOSIS — G47.00 INSOMNIA, UNSPECIFIED TYPE: ICD-10-CM

## 2019-12-28 DIAGNOSIS — R42 VERTIGO: ICD-10-CM

## 2019-12-28 DIAGNOSIS — F45.8 ANXIETY HYPERVENTILATION: ICD-10-CM

## 2019-12-28 DIAGNOSIS — F41.9 ANXIETY HYPERVENTILATION: ICD-10-CM

## 2019-12-28 RX ORDER — ZOLPIDEM TARTRATE 5 MG/1
5 TABLET ORAL
Qty: 30 TABLET | Refills: 5 | Status: CANCELLED | OUTPATIENT
Start: 2019-12-28

## 2019-12-28 RX ORDER — MECLIZINE HYDROCHLORIDE 25 MG/1
25 TABLET ORAL EVERY 6 HOURS PRN
Qty: 30 TABLET | Refills: 5 | Status: CANCELLED | OUTPATIENT
Start: 2019-12-28

## 2019-12-31 RX ORDER — CLONAZEPAM 1 MG/1
.5-1 TABLET ORAL 2 TIMES DAILY PRN
Qty: 90 TABLET | Refills: 0 | Status: SHIPPED | OUTPATIENT
Start: 2019-12-31 | End: 2020-02-25

## 2019-12-31 NOTE — TELEPHONE ENCOUNTER
Too soon for Ambien and Meclizine.     Routing refill request to provider for review/approval because:  Drug not on the [facility] refill protocol - Clonazepam    Lorraine Francis RN  [facility]

## 2020-01-07 DIAGNOSIS — M06.9 RHEUMATOID ARTHRITIS INVOLVING MULTIPLE SITES, UNSPECIFIED RHEUMATOID FACTOR PRESENCE: ICD-10-CM

## 2020-01-07 NOTE — TELEPHONE ENCOUNTER
Patient left  1/6 at 10:18 pm    Rx- Butrans patch by this weekend      Yuridia MAGALLANES    [place] [facility]

## 2020-01-07 NOTE — TELEPHONE ENCOUNTER
Patient requesting refill(s) of buprenorphine (BUTRANS) 20 MCG/HR WK patch    Last dispensed from pharmacy on 12/14/19    Pt last seen by prescribing provider on 10/14/19  Next appt scheduled for 1/13/20     checked in the past 6 months? Yes If no, print current report and give to RN    Last urine drug screen date 3/11/19  Current opioid agreement on file (completed within the last year) Yes Date of opioid agreement: 3/11/19    Processing (pick one and delete the others):      E-prescribe to [facility] [id number] [place]  pharmacy    Will route to nursing pool for review and preparation of prescription(s).

## 2020-01-08 RX ORDER — BUPRENORPHINE 20 UG/H
1 PATCH TRANSDERMAL
Qty: 4 PATCH | Refills: 1 | Status: SHIPPED | OUTPATIENT
Start: 2020-01-08 | End: 2020-03-02

## 2020-01-08 NOTE — TELEPHONE ENCOUNTER
Medication refill information reviewed.     Due date for Butrans patches is 1/13/20; 28 day supplies    Prescription prepped for review.     Will route to provider.     ROBERTO KarimiN, RN-BC  Patient Care Supervisor  [facility] [place]

## 2020-01-08 NOTE — TELEPHONE ENCOUNTER
Script Eprescribed to pharmacy    Will send this to MA team to notify patient.    Signed Prescriptions:                        Disp   Refills    buprenorphine (BUTRANS) 20 MCG/HR WK patch 4 patch1        Sig: Place 1 patch onto the skin every 7 days . Name brand           only.  Fill 1/11/20 to start 1/13/20, 28 day           supply  Authorizing Provider: KAYLA CHUN MD  [facility]

## 2020-01-14 ENCOUNTER — OFFICE VISIT (OUTPATIENT)
Dept: FAMILY MEDICINE | Facility: CLINIC | Age: 55
End: 2020-01-14
Payer: COMMERCIAL

## 2020-01-14 VITALS
OXYGEN SATURATION: 96 % | WEIGHT: 173 LBS | BODY MASS INDEX: 31.83 KG/M2 | HEIGHT: 62 IN | SYSTOLIC BLOOD PRESSURE: 115 MMHG | TEMPERATURE: 98.4 F | HEART RATE: 63 BPM | DIASTOLIC BLOOD PRESSURE: 78 MMHG

## 2020-01-14 DIAGNOSIS — F33.1 MAJOR DEPRESSIVE DISORDER, RECURRENT EPISODE, MODERATE (H): Primary | ICD-10-CM

## 2020-01-14 DIAGNOSIS — R39.12 BENIGN PROSTATIC HYPERPLASIA WITH WEAK URINARY STREAM: ICD-10-CM

## 2020-01-14 DIAGNOSIS — F41.9 ANXIETY: Chronic | ICD-10-CM

## 2020-01-14 DIAGNOSIS — R42 VERTIGO: ICD-10-CM

## 2020-01-14 DIAGNOSIS — N40.1 BENIGN PROSTATIC HYPERPLASIA WITH WEAK URINARY STREAM: ICD-10-CM

## 2020-01-14 PROCEDURE — 99214 OFFICE O/P EST MOD 30 MIN: CPT | Performed by: FAMILY MEDICINE

## 2020-01-14 RX ORDER — MECLIZINE HYDROCHLORIDE 25 MG/1
25 TABLET ORAL EVERY 6 HOURS PRN
Qty: 30 TABLET | Refills: 5 | Status: SHIPPED | OUTPATIENT
Start: 2020-01-14 | End: 2020-10-30

## 2020-01-14 RX ORDER — TAMSULOSIN HYDROCHLORIDE 0.4 MG/1
0.8 CAPSULE ORAL DAILY
Qty: 180 CAPSULE | Refills: 3 | Status: SHIPPED | OUTPATIENT
Start: 2020-01-14 | End: 2020-12-07

## 2020-01-14 RX ORDER — DESVENLAFAXINE 50 MG/1
50 TABLET, FILM COATED, EXTENDED RELEASE ORAL DAILY
Qty: 90 TABLET | Refills: 3 | Status: SHIPPED | OUTPATIENT
Start: 2020-01-14 | End: 2020-12-10

## 2020-01-14 ASSESSMENT — MIFFLIN-ST. JEOR: SCORE: 1503.97

## 2020-01-14 ASSESSMENT — ANXIETY QUESTIONNAIRES
6. BECOMING EASILY ANNOYED OR IRRITABLE: NEARLY EVERY DAY
IF YOU CHECKED OFF ANY PROBLEMS ON THIS QUESTIONNAIRE, HOW DIFFICULT HAVE THESE PROBLEMS MADE IT FOR YOU TO DO YOUR WORK, TAKE CARE OF THINGS AT HOME, OR GET ALONG WITH OTHER PEOPLE: VERY DIFFICULT
GAD7 TOTAL SCORE: 16
7. FEELING AFRAID AS IF SOMETHING AWFUL MIGHT HAPPEN: MORE THAN HALF THE DAYS
3. WORRYING TOO MUCH ABOUT DIFFERENT THINGS: MORE THAN HALF THE DAYS
2. NOT BEING ABLE TO STOP OR CONTROL WORRYING: MORE THAN HALF THE DAYS
1. FEELING NERVOUS, ANXIOUS, OR ON EDGE: MORE THAN HALF THE DAYS
5. BEING SO RESTLESS THAT IT IS HARD TO SIT STILL: MORE THAN HALF THE DAYS

## 2020-01-14 ASSESSMENT — PATIENT HEALTH QUESTIONNAIRE - PHQ9
SUM OF ALL RESPONSES TO PHQ QUESTIONS 1-9: 18
5. POOR APPETITE OR OVEREATING: NEARLY EVERY DAY

## 2020-01-14 ASSESSMENT — PAIN SCALES - GENERAL: PAINLEVEL: MODERATE PAIN (5)

## 2020-01-14 NOTE — PROGRESS NOTES
Subjective     Lamont Daniels is a 54 year old male who presents to clinic today for the following health issues:    HPI   Depression Followup    How are you doing with your depression since your last visit? worsen    Are you having other symptoms that might be associated with depression? No    Have you had a significant life event?  OTHER: Feels medication not working     Are you feeling anxious or having panic attacks?   Yes:  .    Do you have any concerns with your use of alcohol or other drugs? No    Social History     Tobacco Use     Smoking status: Never Smoker     Smokeless tobacco: Never Used   Substance Use Topics     Alcohol use: No     Alcohol/week: 0.0 standard drinks     Drug use: No     PHQ 6/1/2018 3/14/2019 10/8/2019   PHQ-9 Total Score 18 12 16   Q9: Thoughts of better off dead/self-harm past 2 weeks More than half the days Not at all Several days     THONG-7 SCORE 6/1/2018 3/14/2019 10/8/2019   Total Score - - -   Total Score 15 10 18   Total Score - - -     Last PHQ-9 10/8/2019   1.  Little interest or pleasure in doing things 3   2.  Feeling down, depressed, or hopeless 2   3.  Trouble falling or staying asleep, or sleeping too much 3   4.  Feeling tired or having little energy 2   5.  Poor appetite or overeating 0   6.  Feeling bad about yourself 2   7.  Trouble concentrating 2   8.  Moving slowly or restless 1   Q9: Thoughts of better off dead/self-harm past 2 weeks 1   PHQ-9 Total Score 16   Difficulty at work, home, or with people Extremely dIfficult     THONG-7  10/8/2019   1. Feeling nervous, anxious, or on edge 3   2. Not being able to stop or control worrying 3   3. Worrying too much about different things 3   4. Trouble relaxing 2   5. Being so restless that it is hard to sit still 2   6. Becoming easily annoyed or irritable 3   7. Feeling afraid, as if something awful might happen 2   THONG-7 Total Score 18   If you checked any problems, how difficult have they made it for you to do your work,  take care of things at home, or get along with other people? Extremely difficult       Suicide Assessment Five-step Evaluation and Treatment (SAFE-T)      How many servings of fruits and vegetables do you eat daily?  2-3    On average, how many sweetened beverages do you drink each day (Examples: soda, juice, sweet tea, etc.  Do NOT count diet or artificially sweetened beverages)?   0-1    How many days per week do you miss taking your medication? 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     Hyperlipidemia LDL goal <70     Insomnia     Gout     Suicidal ideation     Essential hypertension     Rheumatoid arthritis involving multiple sites, unspecified rheumatoid factor presence (H)     High risk medications (not anticoagulants) long-term use     Midline low back pain without sciatica     Bilateral low back pain with sciatica, sciatica laterality unspecified     Elevated liver enzymes     On corticosteroid therapy     Essential hypertension with goal blood pressure less than 140/90     Insomnia, unspecified type     Rheumatoid arthritis of multiple sites with negative rheumatoid factor (H)     Benign prostatic hyperplasia with lower urinary tract symptoms, unspecified morphology     Chronic pain syndrome     Past Surgical History:   Procedure Laterality Date     ABDOMEN SURGERY  2014     BIOPSY  2015     BYPASS GRAFT ARTERY CORONARY  2008    6 vessels     COLONOSCOPY  2/8/2013    Procedure: COLONOSCOPY;  Colonoscopy, blood in stool;  Surgeon: Duane, William Charles, MD;  Location: [facility] OR     COMBINED REPAIR PTOSIS WITH BLEPHAROPLASTY BILATERAL Bilateral 6/25/2018    Procedure:  COMBINED REPAIR PTOSIS WITH BLEPHAROPLASTY BILATERAL;  Bilateral upper eyelid blepharoplasty, ptosis repair and brow ptosis repair;  Surgeon: Sandra Borja MD;  Location: [facility] OR     GI SURGERY  2014     HC CORONARY STENT PERCUT, CIERA ADDTL VESSEL  2008    3 months after CABG     HEAD & NECK SURGERY  2011     NASAL/SINUS POLYPECTOMY  2010     ORTHOPEDIC SURGERY  2012     REPAIR PTOSIS       REPAIR PTOSIS BILATERAL Bilateral 7/22/2019    Procedure: REPAIR, PTOSIS, BOTH UPPER brows;  Surgeon: Sandra Borja MD;  Location: [facility] OR     REPAIR PTOSIS BROW BILATERAL Bilateral 6/25/2018    Procedure: REPAIR PTOSIS BROW BILATERAL;;  Surgeon: Sandra Borja MD;  Location:  OR     THORACIC SURGERY  1989    tb     TONSILLECTOMY  2010     UVULOPALATOPHARYNGOPLASTY  2010     VASCULAR SURGERY  2008       Social History     Tobacco Use     Smoking status: Never Smoker     Smokeless tobacco: Never Used   Substance Use Topics     Alcohol use: No     Alcohol/week: 0.0 standard drinks     Family History   Problem Relation Age of Onset     C.A.D. Father      Coronary Artery Disease Father      Hypertension Father      Depression Father      Hypertension Mother      Diabetes Mother      Depression Mother      Mental Illness Mother      Hypertension Maternal Grandfather      Cancer Paternal Grandfather      Other Cancer Paternal Grandfather      Other Cancer Other      Autoimmune Disease No family hx of      Cerebrovascular Disease No family hx of      Thyroid Disease No family hx of      Glaucoma No family hx of      Macular Degeneration No family hx of          Current Outpatient Medications   Medication Sig Dispense Refill     Abatacept (ORENCIA) 125 MG/ML SOAJ auto-injector Inject 1 mL (125 mg) Subcutaneous every 7 days 4 mL 6     Acetaminophen (TYLENOL PO)        allopurinol (ZYLOPRIM) 300 MG tablet TAKE 1 TABLET BY MOUTH EVERY DAY 90 tablet 3     aspirin EC 81 MG EC tablet Take 1 tablet (81 mg) by mouth daily (*) 30 tablet  1     atorvastatin (LIPITOR) 80 MG tablet TAKE 1 TABLET BY MOUTH 1 TIME DAILY 90 tablet 3     bacitracin 500 UNIT/GM external ointment Apply topically 3 times daily 30 g 3     baclofen (LIORESAL) 10 MG tablet TAKE 1 TABLET BY MOUTH 2 TIMES DAILY AS NEEDED FOR MUSCLE SPASM 180 tablet 2     blood glucose (NO BRAND SPECIFIED) lancets standard Use to test blood sugar 2 times daily or as directed. 100 each 11     blood glucose monitoring (NO BRAND SPECIFIED) meter device kit Use to test blood sugar 2 times daily or as directed. 1 kit 0     blood glucose monitoring (NO BRAND SPECIFIED) test strip Use to test blood sugars 2 times daily or as directed 100 strip 11     buprenorphine (BUTRANS) 20 MCG/HR WK patch Place 1 patch onto the skin every 7 days . Name brand only.  Fill 1/11/20 to start 1/13/20, 28 day supply 4 patch 1     busPIRone HCl (BUSPAR) 30 MG tablet Take 1 tablet (30 mg) by mouth 2 times daily 180 tablet 3     Cholecalciferol (VITAMIN D) 2000 UNITS tablet TAKE ONE TABLET BY MOUTH ONCE DAILY 100 tablet 1     clobetasol (TEMOVATE) 0.05 % external solution Apply twice daily to scalp for up to 2 weeks for flares. 60 mL 1     clonazePAM (KLONOPIN) 1 MG tablet Take 0.5-1 tablets (0.5-1 mg) by mouth 2 times daily as needed for anxiety 90 tablet 0     clopidogrel (PLAVIX) 75 MG tablet Take 1 tablet (75 mg) by mouth daily 90 tablet 3     COLCRYS 0.6 MG tablet TAKE 2 TABLETS BY MOUTH AT THE FIRST SIGN OF FLARE, TAKE 1 ADDITIONAL TABLET ONE HOUR LATER. 30 tablet 0     desvenlafaxine (PRISTIQ) 50 MG 24 hr tablet Take 1 tablet (50 mg) by mouth daily 90 tablet 3     diclofenac (VOLTAREN) 1 % topical gel Apply 4 grams to bilateral knees, up to four times daily as needed using enclosed dosing card.  Max of 32g/day 300 g 11     evolocumab (REPATHA) 140 MG/ML prefilled autoinjector Inject 1 mL (140 mg) Subcutaneous every 14 days 2 mL 11     ezetimibe (ZETIA) 10 MG tablet Take 1 tablet (10 mg) by mouth daily 90 tablet 3      gabapentin (NEURONTIN) 300 MG capsule Take 2 capsules (600 mg) by mouth 3 times daily . 3 month supply 540 capsule 2     gemfibrozil (LOPID) 600 MG tablet Take 1 tablet (600 mg) by mouth 2 times daily 180 tablet 3     hydrocortisone (WESTCORT) 0.2 % external cream For genitals, apply twice daily for up to 2 weeks 60 g 1     hydroxychloroquine (PLAQUENIL) 200 MG tablet Take 1 tablet (200 mg) by mouth daily 90 tablet 2     Incontinence Supply Disposable (DEPEND UNDERWEAR LARGE) MISC Use twice daily. 60 each 11     meclizine (ANTIVERT) 25 MG tablet Take 1 tablet (25 mg) by mouth every 6 hours as needed for dizziness 30 tablet 5     melatonin 3 MG tablet Take 1 tablet (3 mg) by mouth nightly as needed for sleep       meloxicam (MOBIC) 7.5 MG tablet TAKE 1 TABLET (7.5 MG) BY MOUTH DAILY 90 tablet 3     naloxone (NARCAN) nasal spray Spray 1 spray (4 mg) into one nostril alternating nostrils as needed for opioid reversal every 2-3 minutes until assistance arrives 0.2 mL 0     nitroGLYcerin (NITROSTAT) 0.4 MG sublingual tablet PLACE 1 TABLET UNDER THE TONGUE EVERY 5 MINUTES AS NEEDED 25 tablet 1     order for DME Equipment being ordered: chair lift 1 each 0     order for DME Equipment being ordered: single end cane 1 Units 0     ORDER FOR DME 1.  CPAP pressure 11 cm/H20 with heated humidity.   2.  Provide mask to fit and CPAP supplies.   3.  Length of need lifetime.   4.  If needed please provide a chin strap            pantoprazole (PROTONIX) 40 MG EC tablet Take 1 tablet (40 mg) by mouth daily 90 tablet 2     predniSONE (DELTASONE) 2.5 MG tablet Take 1 tablet (2.5 mg) by mouth daily 90 tablet 1     sulfaSALAzine ER (AZULFIDINE EN) 500 MG EC tablet Take 3 tablets (1,500 mg) by mouth 2 times daily 540 tablet 2     tamsulosin (FLOMAX) 0.4 MG capsule Take 2 capsules (0.8 mg) by mouth daily 180 capsule 3     tenofovir (VIREAD) 300 MG tablet Take 1 tablet (300 mg) by mouth daily 90 tablet 3     triamcinolone (KENALOG) 0.1 %  "external ointment APPLY TOPICALLY TO AFFECTED AREA(S) 3 TIMES DAILY 80 g 1     triamcinolone (KENALOG) 0.1 % external ointment Apply to trunk and extremities twice daily for up to 2 weeks for flares 80 g 1     zolpidem (AMBIEN) 5 MG tablet Take 1 tablet (5 mg) by mouth nightly as needed for sleep 30 tablet 5     Allergies   Allergen Reactions     Citalopram Itching     Tramadol Itching       Reviewed and updated as needed this visit by Provider         Review of Systems   ROS COMP: Constitutional, HEENT, cardiovascular, pulmonary, GI, , musculoskeletal, neuro, skin, endocrine and psych systems are negative, except as otherwise noted.      Objective    /78 (BP Location: Left arm, Patient Position: Chair, Cuff Size: Adult Large)   Pulse 63   Temp 98.4  F (36.9  C) (Oral)   Ht 1.575 m (5' 2\")   Wt 78.5 kg (173 lb)   SpO2 96%   BMI 31.64 kg/m    Body mass index is 31.64 kg/m .  Physical Exam   GENERAL: healthy, alert and no distress  NECK: no adenopathy, no asymmetry, masses, or scars and thyroid normal to palpation  RESP: lungs clear to auscultation - no rales, rhonchi or wheezes  CV: regular rate and rhythm, normal S1 S2, no S3 or S4, no murmur, click or rub, no peripheral edema and peripheral pulses strong  ABDOMEN: soft, nontender, no hepatosplenomegaly, no masses and bowel sounds normal  MS: no gross musculoskeletal defects noted, no edema    Diagnostic Test Results:  Labs reviewed in Epic        Assessment & Plan     1. Major depressive disorder, recurrent episode, moderate (H)  Initially did well with Pristiq. Will try to increase the dose from 25 mg daily to 50 mg daily. Recheck in 1 month.  - desvenlafaxine (PRISTIQ) 50 MG 24 hr tablet; Take 1 tablet (50 mg) by mouth daily  Dispense: 90 tablet; Refill: 3    2. Anxiety  As above.  - desvenlafaxine (PRISTIQ) 50 MG 24 hr tablet; Take 1 tablet (50 mg) by mouth daily  Dispense: 90 tablet; Refill: 3    3. Vertigo    - meclizine (ANTIVERT) 25 MG tablet; " Take 1 tablet (25 mg) by mouth every 6 hours as needed for dizziness  Dispense: 30 tablet; Refill: 5    4. Benign prostatic hyperplasia with weak urinary stream    - tamsulosin (FLOMAX) 0.4 MG capsule; Take 2 capsules (0.8 mg) by mouth daily  Dispense: 180 capsule; Refill: 3       Work on weight loss  Regular exercise    Return in about 4 weeks (around 2/11/2020) for Physical Exam.    David Chavez MD, MD  [facility]

## 2020-01-14 NOTE — PATIENT INSTRUCTIONS
At [facility], we strive to deliver an exceptional experience to you, every time we see you. If you receive a survey, please complete it as we do value your feedback.  If you have MyChart, you can expect to receive results automatically within 24 hours of their completion.  Your provider will send a note interpreting your results as well.   If you do not have MyChart, you should receive your results in about a week by mail.    Your care team:                            Family Medicine Internal Medicine   MD Noam Dorado MD Shantel Branch-Fleming, MD Katya Georgiev PA-C Megan Hill, APRYVONNE Chavez, MD Pediatrics   Robbin Trevino, PAXavierC  Dionne Lazaro, MD Danitza Hatch APRN CNP   MD Tiffany Sanchez MD Deborah Mielke, MD Kim Thein, APRN CNP      Clinic hours: Monday - Thursday 7 am-7 pm; Fridays 7 am-5 pm.   Urgent care: Monday - Friday 11 am-9 pm; Saturday and Sunday 9 am-5 pm.  Pharmacy : Monday -Thursday 8 am-8 pm; Friday 8 am-6 pm; Saturday and Sunday 9 am-5 pm.     Clinic: [phone]   Pharmacy: [phone]

## 2020-01-15 ASSESSMENT — ANXIETY QUESTIONNAIRES: GAD7 TOTAL SCORE: 16

## 2020-02-05 DIAGNOSIS — I25.119 ATHEROSCLEROSIS OF NATIVE CORONARY ARTERY OF NATIVE HEART WITH ANGINA PECTORIS (H): ICD-10-CM

## 2020-02-05 DIAGNOSIS — I25.719 ATHEROSCLEROSIS OF AUTOLOGOUS VEIN CORONARY ARTERY BYPASS GRAFT WITH ANGINA PECTORIS (H): ICD-10-CM

## 2020-02-06 RX ORDER — CLOPIDOGREL BISULFATE 75 MG/1
75 TABLET ORAL DAILY
Qty: 90 TABLET | Refills: 1 | Status: SHIPPED | OUTPATIENT
Start: 2020-02-06 | End: 2020-06-19

## 2020-02-23 DIAGNOSIS — F41.9 ANXIETY HYPERVENTILATION: ICD-10-CM

## 2020-02-23 DIAGNOSIS — F45.8 ANXIETY HYPERVENTILATION: ICD-10-CM

## 2020-02-24 DIAGNOSIS — M06.9 RHEUMATOID ARTHRITIS INVOLVING MULTIPLE SITES, UNSPECIFIED RHEUMATOID FACTOR PRESENCE: Primary | ICD-10-CM

## 2020-02-24 DIAGNOSIS — M47.819 FACET ARTHROPATHY: ICD-10-CM

## 2020-02-24 RX ORDER — GABAPENTIN 300 MG/1
600 CAPSULE ORAL 3 TIMES DAILY
Qty: 540 CAPSULE | Refills: 6 | Status: SHIPPED | OUTPATIENT
Start: 2020-02-24 | End: 2020-08-05

## 2020-02-24 NOTE — TELEPHONE ENCOUNTER
Requested Prescriptions   Pending Prescriptions Disp Refills     clonazePAM (KLONOPIN) 1 MG tablet [Pharmacy Med Name: CLONAZEPAM 1 MG TABLET]        Last Written Prescription Date:  12/31/19  Last Fill Quantity: 90,   # refills: 0  Last Office Visit: 01/14/2020-Ho  Future Office visit:    Next 5 appointments (look out 90 days)    Mar 02, 2020  8:30 AM CST  Return Visit with Dorothea Loo MD  [facility] ([facility]) [address] [place] [address] [phone]   Mar 10, 2020 11:00 AM CDT  Return Visit with Sebastián Jenkins MD  [facility] [address] [facility] [id number] [phone]           Routing refill request to provider for review/approval because:  Drug not on the [facility], [facility] or  Health refill protocol or controlled substance 90 tablet 0     Sig: TAKE 0.5-1 TABLETS (0.5-1 MG) BY MOUTH 2 TIMES DAILY AS NEEDED FOR ANXIETY       There is no refill protocol information for this order

## 2020-02-24 NOTE — TELEPHONE ENCOUNTER
Fax received from: [id number] [address]  Refill authorization requested    Drug: gabapentin (NEURONTIN) 300 MG capsule  Qty: 540.0  Last filled 12/14/2019  Last seen: 10/14/2019  Next appointment 03/02/2020    Amberly Chawla MA

## 2020-02-24 NOTE — TELEPHONE ENCOUNTER
Signed Prescriptions:                        Disp   Refills    gabapentin (NEURONTIN) 300 MG capsule      540 ca*6        Sig: Take 2 capsules (600 mg) by mouth 3 times daily . 3           month supply  Authorizing Provider: KAYLA CHUN MD  [facility]

## 2020-02-24 NOTE — TELEPHONE ENCOUNTER
Routing refill request to provider for review/approval because:  Drug not on the [facility] refill protocol       Shreya Rascon RN, BSN, PHN

## 2020-02-25 RX ORDER — CLONAZEPAM 1 MG/1
.5-1 TABLET ORAL 2 TIMES DAILY PRN
Qty: 90 TABLET | Refills: 0 | Status: SHIPPED | OUTPATIENT
Start: 2020-02-25 | End: 2020-05-12

## 2020-02-27 DIAGNOSIS — Z79.899 HIGH RISK MEDICATION USE: ICD-10-CM

## 2020-02-27 DIAGNOSIS — M06.09 RHEUMATOID ARTHRITIS OF MULTIPLE SITES WITH NEGATIVE RHEUMATOID FACTOR (H): ICD-10-CM

## 2020-02-27 LAB
ALBUMIN SERPL-MCNC: 3.8 G/DL (ref 3.4–5)
ALP SERPL-CCNC: 114 U/L (ref 40–150)
ALT SERPL W P-5'-P-CCNC: 37 U/L (ref 0–70)
AST SERPL W P-5'-P-CCNC: 21 U/L (ref 0–45)
BASOPHILS # BLD AUTO: 0.1 10E9/L (ref 0–0.2)
BASOPHILS NFR BLD AUTO: 0.7 %
BILIRUB DIRECT SERPL-MCNC: <0.1 MG/DL (ref 0–0.2)
BILIRUB SERPL-MCNC: 0.3 MG/DL (ref 0.2–1.3)
CREAT SERPL-MCNC: 0.84 MG/DL (ref 0.66–1.25)
CRP SERPL-MCNC: <2.9 MG/L (ref 0–8)
DIFFERENTIAL METHOD BLD: NORMAL
EOSINOPHIL # BLD AUTO: 0.2 10E9/L (ref 0–0.7)
EOSINOPHIL NFR BLD AUTO: 2.5 %
ERYTHROCYTE [DISTWIDTH] IN BLOOD BY AUTOMATED COUNT: 13.5 % (ref 10–15)
ERYTHROCYTE [SEDIMENTATION RATE] IN BLOOD BY WESTERGREN METHOD: 10 MM/H (ref 0–20)
GFR SERPL CREATININE-BSD FRML MDRD: >90 ML/MIN/{1.73_M2}
GLUCOSE SERPL-MCNC: 98 MG/DL (ref 70–99)
HCT VFR BLD AUTO: 44.9 % (ref 40–53)
HGB BLD-MCNC: 14.5 G/DL (ref 13.3–17.7)
LYMPHOCYTES # BLD AUTO: 3 10E9/L (ref 0.8–5.3)
LYMPHOCYTES NFR BLD AUTO: 41.5 %
MCH RBC QN AUTO: 28.8 PG (ref 26.5–33)
MCHC RBC AUTO-ENTMCNC: 32.3 G/DL (ref 31.5–36.5)
MCV RBC AUTO: 89 FL (ref 78–100)
MONOCYTES # BLD AUTO: 0.9 10E9/L (ref 0–1.3)
MONOCYTES NFR BLD AUTO: 13 %
NEUTROPHILS # BLD AUTO: 3 10E9/L (ref 1.6–8.3)
NEUTROPHILS NFR BLD AUTO: 42.3 %
PLATELET # BLD AUTO: 273 10E9/L (ref 150–450)
PROT SERPL-MCNC: 7.3 G/DL (ref 6.8–8.8)
RBC # BLD AUTO: 5.04 10E12/L (ref 4.4–5.9)
WBC # BLD AUTO: 7.1 10E9/L (ref 4–11)

## 2020-02-27 PROCEDURE — 85025 COMPLETE CBC W/AUTO DIFF WBC: CPT | Performed by: INTERNAL MEDICINE

## 2020-02-27 PROCEDURE — 36415 COLL VENOUS BLD VENIPUNCTURE: CPT | Performed by: INTERNAL MEDICINE

## 2020-02-27 PROCEDURE — 80076 HEPATIC FUNCTION PANEL: CPT | Performed by: INTERNAL MEDICINE

## 2020-02-27 PROCEDURE — 85652 RBC SED RATE AUTOMATED: CPT | Performed by: INTERNAL MEDICINE

## 2020-02-27 PROCEDURE — 82947 ASSAY GLUCOSE BLOOD QUANT: CPT | Performed by: INTERNAL MEDICINE

## 2020-02-27 PROCEDURE — 86140 C-REACTIVE PROTEIN: CPT | Performed by: INTERNAL MEDICINE

## 2020-02-27 PROCEDURE — 82565 ASSAY OF CREATININE: CPT | Performed by: INTERNAL MEDICINE

## 2020-03-01 ENCOUNTER — HEALTH MAINTENANCE LETTER (OUTPATIENT)
Age: 55
End: 2020-03-01

## 2020-03-02 ENCOUNTER — OFFICE VISIT (OUTPATIENT)
Dept: PALLIATIVE MEDICINE | Facility: CLINIC | Age: 55
End: 2020-03-02
Payer: COMMERCIAL

## 2020-03-02 VITALS
HEART RATE: 58 BPM | WEIGHT: 170 LBS | BODY MASS INDEX: 31.09 KG/M2 | SYSTOLIC BLOOD PRESSURE: 133 MMHG | DIASTOLIC BLOOD PRESSURE: 88 MMHG

## 2020-03-02 DIAGNOSIS — M17.11 PRIMARY OSTEOARTHRITIS OF RIGHT KNEE: ICD-10-CM

## 2020-03-02 DIAGNOSIS — Z79.891 ENCOUNTER FOR LONG-TERM OPIATE ANALGESIC USE: ICD-10-CM

## 2020-03-02 DIAGNOSIS — M06.9 RHEUMATOID ARTHRITIS INVOLVING MULTIPLE SITES, UNSPECIFIED RHEUMATOID FACTOR PRESENCE: Primary | ICD-10-CM

## 2020-03-02 PROCEDURE — 99213 OFFICE O/P EST LOW 20 MIN: CPT | Performed by: PSYCHIATRY & NEUROLOGY

## 2020-03-02 PROCEDURE — 80307 DRUG TEST PRSMV CHEM ANLYZR: CPT | Mod: 90 | Performed by: PSYCHIATRY & NEUROLOGY

## 2020-03-02 PROCEDURE — 99000 SPECIMEN HANDLING OFFICE-LAB: CPT | Performed by: PSYCHIATRY & NEUROLOGY

## 2020-03-02 RX ORDER — BUPRENORPHINE 20 UG/H
1 PATCH TRANSDERMAL
Qty: 4 PATCH | Refills: 1 | Status: SHIPPED | OUTPATIENT
Start: 2020-03-02 | End: 2020-03-30

## 2020-03-02 ASSESSMENT — PAIN SCALES - GENERAL: PAINLEVEL: MODERATE PAIN (5)

## 2020-03-02 NOTE — PROGRESS NOTES
"                          [facility]    Date of visit: 3/2/2020       Chief complaint:   Chief Complaint   Patient presents with     Pain       Interval history:  Lamont Daniels was last seen by me on 10/14/19    Recommendations/plan at the last visit included:  1. Physical Therapy:  Completed pool therapy in past. Not interested in PT at this time.  2. Clinical Health Psychologist to address issues of relaxation, behavioral change, coping style, and other factors important to improvement. I reitterrated that he needs to work on this now, as he complains about time to do this, but it is been a couple visits where he said he was going to get in.  Discussed role of mental health in pain.  3. Diagnostic Studies: None  4. Medication Management:    1. Continue Butrans 20 mcg/hr wk patches at goal dose. We discussed that use of Butrans is due to risks with other medications, and not to do with current opioid \"crisis\".  He has sleep apnea that significantly improved going from full agonist to Butrans, and I do not plan to go back.  2. Discussed need to reintroduce other modalities back into his care, and not focusing on medication alone.  This includes HEP, mental health, and consideration of procedures.  5. Further procedures recommended: Potentially bilateral SI Joint injections.  He continues to express minimal interest in injections/procedures.   6. Recommendations to PCP: none  7. Follow up: 3 months    Since his last visit, Lamont Daniels reports:    - No change in location (bilateral shoulder, elbows, knees, low back) or type of pain. \"I think I will have this for the rest of my life.\"  - Doesn't want to do PT because it is too much work  - Doesn't want to see pain psychology because it feels like it is \"too many things\"  -he did do both pain PT and pain psychology  - Still using Butrans patch 20 mcg/hr. Helpful for pain. No adverse effects.   -Still using gabapentin, states it helpful for " "neuropathy  -Taking baclofen for stiffness. Takes at least once daily, not always afternoon dose.   -Still taking clonazepam, currently 1mg BID. His PCP had been trying to wean, but he does not want to wean off completely because he feels he needs it \"when the urge comes on, when I am in stressful enviornment.\"   -He reports continuing treatment (tenofovir) for his Hepatitis B. Is also on prednisone 2.5mg daily chronic use.   -Has naloxone spray, hasn't needed it.      Pain scores:  Pain intensity on average is 6 on a scale of 0-10.     Current pain treatment  butrans 20mcg/hr  Acetaminophen 500 mg PRN (not taking daily)  Gabapentin 600mg 3 times daily  Baclofen 10mg BID   Diclofenac gel PRN (not taking daily and uses at joints)  Ambien 5 mg - reports taking most nights  Clonazepam 1mg once a day     Previous medication treatments included:  Codeine, oxycodone, hydrocodone- given for other issues- helpful  Cymbalta 60mg daily- given for mental health- was given by Dr. Acosta- not been higher  Baclofen 10mg at bedtime- not helpful, no side effects  Robaxin 500 mg 1 tablet, 1-3 times a day depending on the day  Ibuprofen 800 mg- using 3-4 times per week, helpful but started meloxicam  Meloxicam  Oxycodone 5 mg : takes 6/day    Other treatments have included:  Lamont Daniels has not been seen at a pain clinic in the past.    PT: has done for various reasons, most recently the low back.  Acupuncture: as done a couple of needles with adjustment  TENs Unit: no  Injections: no    Side Effects: No    Medications:  Current Outpatient Medications   Medication Sig Dispense Refill     Abatacept (ORENCIA) 125 MG/ML SOAJ auto-injector Inject 1 mL (125 mg) Subcutaneous every 7 days 4 mL 6     Acetaminophen (TYLENOL PO)        allopurinol (ZYLOPRIM) 300 MG tablet TAKE 1 TABLET BY MOUTH EVERY DAY 90 tablet 3     aspirin EC 81 MG EC tablet Take 1 tablet (81 mg) by mouth daily (*) 30 tablet 1     atorvastatin (LIPITOR) 80 MG tablet TAKE 1 " TABLET BY MOUTH 1 TIME DAILY 90 tablet 3     bacitracin 500 UNIT/GM external ointment Apply topically 3 times daily 30 g 3     baclofen (LIORESAL) 10 MG tablet TAKE 1 TABLET BY MOUTH 2 TIMES DAILY AS NEEDED FOR MUSCLE SPASM 180 tablet 2     buprenorphine (BUTRANS) 20 MCG/HR WK patch Place 1 patch onto the skin every 7 days . Name brand only.  Fill 1/11/20 to start 1/13/20, 28 day supply 4 patch 1     busPIRone HCl (BUSPAR) 30 MG tablet Take 1 tablet (30 mg) by mouth 2 times daily 180 tablet 3     Cholecalciferol (VITAMIN D) 2000 UNITS tablet TAKE ONE TABLET BY MOUTH ONCE DAILY 100 tablet 1     clobetasol (TEMOVATE) 0.05 % external solution Apply twice daily to scalp for up to 2 weeks for flares. 60 mL 1     clonazePAM (KLONOPIN) 1 MG tablet TAKE 0.5-1 TABLETS (0.5-1 MG) BY MOUTH 2 TIMES DAILY AS NEEDED FOR ANXIETY 90 tablet 0     clopidogrel (PLAVIX) 75 MG tablet Take 1 tablet (75 mg) by mouth daily 90 tablet 1     COLCRYS 0.6 MG tablet TAKE 2 TABLETS BY MOUTH AT THE FIRST SIGN OF FLARE, TAKE 1 ADDITIONAL TABLET ONE HOUR LATER. 30 tablet 0     desvenlafaxine (PRISTIQ) 50 MG 24 hr tablet Take 1 tablet (50 mg) by mouth daily 90 tablet 3     diclofenac (VOLTAREN) 1 % topical gel Apply 4 grams to bilateral knees, up to four times daily as needed using enclosed dosing card.  Max of 32g/day 300 g 11     evolocumab (REPATHA) 140 MG/ML prefilled autoinjector Inject 1 mL (140 mg) Subcutaneous every 14 days 2 mL 11     ezetimibe (ZETIA) 10 MG tablet Take 1 tablet (10 mg) by mouth daily 90 tablet 3     gabapentin (NEURONTIN) 300 MG capsule Take 2 capsules (600 mg) by mouth 3 times daily . 3 month supply 540 capsule 6     hydrocortisone (WESTCORT) 0.2 % external cream For genitals, apply twice daily for up to 2 weeks 60 g 1     hydroxychloroquine (PLAQUENIL) 200 MG tablet Take 1 tablet (200 mg) by mouth daily 90 tablet 2     Incontinence Supply Disposable (DEPEND UNDERWEAR LARGE) MISC Use twice daily. 60 each 11     meclizine  (ANTIVERT) 25 MG tablet Take 1 tablet (25 mg) by mouth every 6 hours as needed for dizziness 30 tablet 5     melatonin 3 MG tablet Take 1 tablet (3 mg) by mouth nightly as needed for sleep       pantoprazole (PROTONIX) 40 MG EC tablet Take 1 tablet (40 mg) by mouth daily 90 tablet 2     predniSONE (DELTASONE) 2.5 MG tablet Take 1 tablet (2.5 mg) by mouth daily 90 tablet 1     sulfaSALAzine ER (AZULFIDINE EN) 500 MG EC tablet Take 3 tablets (1,500 mg) by mouth 2 times daily 540 tablet 2     tamsulosin (FLOMAX) 0.4 MG capsule Take 2 capsules (0.8 mg) by mouth daily 180 capsule 3     tenofovir (VIREAD) 300 MG tablet Take 1 tablet (300 mg) by mouth daily 90 tablet 3     triamcinolone (KENALOG) 0.1 % external ointment APPLY TOPICALLY TO AFFECTED AREA(S) 3 TIMES DAILY 80 g 1     triamcinolone (KENALOG) 0.1 % external ointment Apply to trunk and extremities twice daily for up to 2 weeks for flares 80 g 1     zolpidem (AMBIEN) 5 MG tablet Take 1 tablet (5 mg) by mouth nightly as needed for sleep 30 tablet 5     blood glucose (NO BRAND SPECIFIED) lancets standard Use to test blood sugar 2 times daily or as directed. (Patient not taking: Reported on 3/2/2020) 100 each 11     blood glucose monitoring (NO BRAND SPECIFIED) meter device kit Use to test blood sugar 2 times daily or as directed. (Patient not taking: Reported on 3/2/2020) 1 kit 0     blood glucose monitoring (NO BRAND SPECIFIED) test strip Use to test blood sugars 2 times daily or as directed (Patient not taking: Reported on 3/2/2020) 100 strip 11     gemfibrozil (LOPID) 600 MG tablet Take 1 tablet (600 mg) by mouth 2 times daily 180 tablet 3     meloxicam (MOBIC) 7.5 MG tablet TAKE 1 TABLET (7.5 MG) BY MOUTH DAILY (Patient not taking: Reported on 3/2/2020) 90 tablet 3     naloxone (NARCAN) nasal spray Spray 1 spray (4 mg) into one nostril alternating nostrils as needed for opioid reversal every 2-3 minutes until assistance arrives (Patient not taking: Reported on  3/2/2020) 0.2 mL 0     nitroGLYcerin (NITROSTAT) 0.4 MG sublingual tablet PLACE 1 TABLET UNDER THE TONGUE EVERY 5 MINUTES AS NEEDED (Patient not taking: Reported on 3/2/2020) 25 tablet 1     order for DME Equipment being ordered: chair lift 1 each 0     order for DME Equipment being ordered: single end cane 1 Units 0     ORDER FOR DME 1.  CPAP pressure 11 cm/H20 with heated humidity.   2.  Provide mask to fit and CPAP supplies.   3.  Length of need lifetime.   4.  If needed please provide a chin strap              Medical History: any changes in medical history since they were last seen? None    Review of Systems:  The 14 system ROS was reviewed from the intake questionnaire: Joint Pain, Stiffness, arthritis, fatigue, gout, back pain, neck pain  Any bowel or bladder problems: None  Mood: depression - stable.  He denies any counseling or psychiatric treatment currently.     Physical Exam:  Blood pressure 133/88, pulse 58, weight 77.1 kg (170 lb).  General: flat affect, awake and alert  Gait: Antalgic, slow, use of single point cane  MSK exam:  Pain with sit to standing. Mild tenderness to palpation in the lower lumbar segments L5/S1. Point tenderness at bilateral PSIS and SIJ blaterally. Positive OSIRIS bilaterally. Negative Seated slump    THE 4 A's OF OPIOID MAINTENANCE ANALGESIA    Analgesia: good.  He had better with oxycodone    Activity: better with med, still limited    Adverse effects: none    Adherence to Rx protocol: good    [place] Board of Pharmacy Data Base Reviewed: 3/2/20   YES; no concerns   Last UDS and opioid agreement  3/11/19, will get repeat UDS and opioid agreement today    Assessment:   1. Chronic low back pain, with strong facet and myofascial features  2. Rheumatoid arthritis  3. Peripheral neuropathy  4. Depression, anxiety  5. Hep B - on meds  6. Gout  7. JEROD, currently treated with CPAP, central apnea ok    Plan:   1. Physical Therapy:  Completed pool and land therapy in past. Continues  to decline.  Discussed inactivity and need for light activity  2. Clinical Health Psychologist to address issues of relaxation, behavioral change, coping style, and other factors important to improvement: Again discussed importance of this and he declines.   3. Diagnostic Studies: UDS and opioid agreement today  4. Medication Management:    1. Continue Butrans 20 mcg/hr wk patches at goal dose.  He has sleep apnea that significantly improved going from full agonist to Butrans, and I do not plan to go back.  5. Further procedures recommended: could consider bilateral SI Joint injections.  He is not interested  6. Referrals: acupuncture for diffuse joint pain and low back pain  7. Recommendations to PCP: consider further wean on clonazepam given risk with JEROD and opioid  8. Follow up: 3 months    The patient was seen and discussed with staff[facility]  Pain Medicine Fellow, PGY-5    I saw and examined the patient with the Pain Fellow/Resident. I have reviewed and agree with the resident's note and plan of care and made changes and corrections directly to the body of the note.    TIME SPENT:  BY FELLOW/RESIDENT ALONE 15 MIN  BY MYSELF AND FELLOW/RESIDENT TOGETHER 0 MIN  BY MYSELF WITHOUT THE FELLOW/RESIDENT 20 MIN    These times included 10 minutes I spent counseling him about his diagnosis and treatment options and coordination of care with the primary team    Lian Loo MD  [facility]

## 2020-03-02 NOTE — LETTER
[facility]  03/02/20    Patient: Lamont Daniels  YOB: 1965  Medical Record Number: [id number]                                                                  Opioid / Opioid Plus Controlled Substance Agreement    I understand that my care provider has prescribed an opioid (narcotic) controlled substance to help manage my condition(s). I am taking this medicine to help me function or work. I know this is strong medicine, and that it can cause serious side effects. Opioid medicine can be sedating, addicting and may cause a dependency on the drug. They can affect my ability to drive or think, and cause depression. They need to be taken exactly as prescribed. Combining opioids with certain medicines or chemicals (such as cocaine, sedatives and tranquilizers, sleeping pills, meth) can be dangerous or even fatal. Also, if I stop opioids suddenly, I may have severe withdrawal symptoms. Last, I understand that opioids do not work for all types of pain nor for all patients. If not helpful, I may be asked to stop them.        The risks, benefits, and side effects of these medicine(s) were explained to me. I agree that:    1. I will take part in other treatments as advised by my care team. This may be psychiatry or counseling, physical therapy, behavioral therapy, group treatment or a referral to a pain clinic. I will reduce or stop my medicine when my care team tells me to do so.  2. I will take my medicines as prescribed. I will not change the dose or schedule unless my care team tells me to. There will be no refills if I  run out early.   I may be contactedwithout warning and asked to complete a urine drug test or pill count at any time.   3. I will keep all my appointments, and understand this is part of the monitoring of opioids. My care team may require an office visit for EVERY opioid/controlled substance refill. If I miss appointments or don t follow instructions, my care team  may stop my medicine.  4. I will not ask other providers to prescribe controlled substances, and I will not accept controlled substances from other people. If I need another prescribed controlled substance for a new reason, I will tell my care team within 1 business day.  5. I will use one pharmacy to fill all of my controlled substance prescriptions, and it is up to me to make sure that I do not run out of my medicines on weekends or holidays. If my care team is willing to refill my opioid prescription without a visit, I must request refills only during office hours, refills may take up to 3 days to process, and it may take up to 5 to 7 days for my medicine to be mailed and ready at my pharmacy. Prescriptions will not be mailed anywhere except my pharmacy.        [id number]  Rev 12/18         Registration to scan to EHR                             Page 1 of 2               Controlled Substance Agreement Opioid        [facility]  03/02/20  Patient: Lamont Daniels  YOB: 1965  Medical Record Number: [id number]                                                                  6. I am responsible for my prescriptions. If the medicine/prescription is lost or stolen, it will not be replaced. I also agree not to share controlled substance medicines with anyone.  7. I agree to not use ANY illegal or recreational drugs. This includes marijuana, cocaine, bath salts or other drugs. I agree not to use alcohol unless my care team says I may.          I agree to give urine samples whenever asked. If I don t give a urine sample, the care team may stop my medicine.    8. If I enroll in the [place] Medical Marijuana program, I will tell my care team. I will also sign an agreement to share my medical records with my care team.   9. I will bring in my list of medicines (or my medicine bottles) each time I come to the clinic.   10. I will tell my care team right away if I become pregnant or have a new  medical problem treated outside of my regular clinic.  11. I understand that this medicine can affect my thinking and judgment. It may be unsafe for me to drive, use machinery and do dangerous tasks. I will not do any of these things until I know how the medicine affects me. If my dose changes, I will wait to see how it affects me. I will contact my care team if I have concerns about medicine side effects.    I understand that if I do not follow any of the conditions above, my prescriptions or treatment may be stopped.      I agree that my provider, clinic care team, and pharmacy may work with any city, state or federal law enforcement agency that investigates the misuse, sale, or other diversion of my controlled medicine. I will allow my provider to discuss my care with or share a copy of this agreement with any other treating provider, pharmacy or emergency room where I receive care. I agree to give up (waive) any right of privacy or confidentiality with respect to these consents.     I have read this agreement and have asked questions about anything I did not understand.      ________________________________________________________________________  Patient signature - Date/Time -  Lamont Daniels                                      ________________________________________________________________________  Witness signature                                                            ________________________________________________________________________  Provider signature - Dorothea Loo MD      [id number]  Rev 12/18         Registration to scan to EHR                         Page 2 of 2                   Controlled Substance Agreement Opioid           Page 1 of 2  Opioid Pain Medicines (also known as Narcotics)  What You Need to Know    What are opioids?   Opioids are pain medicines that must be prescribed by a doctor.  They are also known as narcotics.    Examples are:     morphine (MS Contin,  Codi)    oxycodone (Oxycontin)    oxycodone and acetaminophen (Percocet)    hydrocodone and acetaminophen (Vicodin, Norco)     fentanyl patch (Duragesic)     hydromorphone (Dilaudid)     methadone     What do opioids do well?   Opioids are best for short-term pain after a surgery or injury. They also work well for cancer pain. Unlike other pain medicines, they do not cause liver or kidney failure or ulcers. They may help some people with long-lasting (chronic) pain.     What do opioids NOT do well?   Opioids never get rid of pain entirely, and they do not work well for most patients with chronic pain. Opioids do not reduce swelling, one of the causes of pain. They also don t work well for nerve pain.                           For informational purposes only.  Not to replace the advice of your care provider.  Copyright [facility]. All right reserved. [employer] 799859-Lue 02/18.      Page 2 of 2    Risks and side effects   Talk to your doctor before you start or decide to keep taking one of these medicines. Side effects include:    Lowering your breathing rate enough to cause death    Overdose, including death, especially if taking higher than prescribed doses    Long-term opioid use    Worse depression symptoms; less pleasure in things you usually enjoy    Feeling tired or sluggish    Slower thoughts or cloudy thinking    Being more sensitive to pain over time; pain is harder to control    Trouble sleeping or restless sleep    Changes in hormone levels (for example, less testosterone)    Changes in sex drive or ability to have sex    Constipation    Unsafe driving    Itching and sweating    Feeling dizzy    Nausea, vomiting and dry mouth    What else should I know about opioids?  When someone takes opioids for too long or too often, they become dependent. This means that if you stop or reduce the medicine too quickly, you will have withdrawal symptoms.    Dependence is not the same as addiction.  Addiction is when people keep using a substance that harms their body, their mind or their relations with others. If you have a history of drug or alcohol abuse, taking opioids can cause a relapse.    Over time, opioids don t work as well. Most people will need higher and higher doses. The higher the dose, the more serious the side effects. We don t know the long-term effects of opioids.      Prescribed opioids aren't the best way to manage chronic pain    Other ways to manage pain include:      Ibuprofen or acetaminophen.  You should always try this first.      Treat health problems that may be causing pain.      acupuncture or massage, deep breathing, meditation, visual imagery, aromatherapy.      Use heat or ice at the pain site      Physical therapy and exercise      Stop smoking      See a counselor or therapist                                                  People who have used opioids for a long time may have a lower quality of life, worse depression, higher levels of pain and more visits to doctors.    Never share your opioids with others. Be sure to store opioids in a secure place, locked if possible.Young children can easily swallow them and overdose.     You can overdose on opioids.  Signs of overdose include decrease or loss of consciousness, slowed breathing, trouble waking and blue lips.  If someone is worried about overdose, they should call 911.    If you are at risk for overdose, you may get naloxone (Narcan, a medicine that reverses the effects of opioids.  If you overdose, a friend or family member can give you Narcan while waiting for the ambulance.  They need to know the signs of overdose and how to give Narcan.    While you're taking opioids:    Don't use alcohol or street drugs. Taking them together can cause death.    Don't take any of these medicines unless your doctor says its okay.  Taking these with opioids can cause death.    Benzodiazepines (such as lorazepam         or  diazepam)    Muscle relaxers (such as cyclobenzaprine)    sleeping pills    other opioids    Safe disposal of opioids  Find your area drug take-back program, your pharmacy mail-back program, buy a special disposal bag (such as Deterra) from your pharmacy or flush them down the toilet.  Use the guidelines at:  www.fda.gov/drugs/resourcesforyou

## 2020-03-02 NOTE — PATIENT INSTRUCTIONS
1. Acupuncture: call [phone] to schedule  2. Stop at the lab  3. Follow up in 3 months  4. Refill sent in  ----------------------------------------------------------------  Clinic Number:  [phone]     Call with any questions about your care and for scheduling assistance.     Calls are returned Monday through Friday between 8 AM and 4:30 PM. We usually get back to you within 2 business days depending on the issue/request.    If we are prescribing your medications:    For opioid medication refills, call the clinic or send a [employer] message 7 days in advance.  Please include:    Name of requested medication    Name of the pharmacy.    For non-opioid medications, call your pharmacy directly to request a refill. Please allow 3-4 days to be processed.     Per [place] State Law:    All controlled substance prescriptions must be filled within 30 days of being written.      For those controlled substances allowing refills, pickup must occur within 30 days of last fill.      We believe regular attendance is key to your success in our program!      Any time you are unable to keep your appointment we ask that you call us at least 24 hours in advance to cancel.This will allow us to offer the appointment time to another patient.     Multiple missed appointments may lead to dismissal from the clinic.

## 2020-03-05 LAB — PAIN DRUG SCR UR W RPTD MEDS: NORMAL

## 2020-03-10 ENCOUNTER — OFFICE VISIT (OUTPATIENT)
Dept: RHEUMATOLOGY | Facility: CLINIC | Age: 55
End: 2020-03-10
Payer: COMMERCIAL

## 2020-03-10 ENCOUNTER — THERAPY VISIT (OUTPATIENT)
Dept: CHIROPRACTIC MEDICINE | Facility: CLINIC | Age: 55
End: 2020-03-10
Payer: COMMERCIAL

## 2020-03-10 VITALS
WEIGHT: 171.8 LBS | BODY MASS INDEX: 31.62 KG/M2 | OXYGEN SATURATION: 96 % | HEIGHT: 62 IN | SYSTOLIC BLOOD PRESSURE: 150 MMHG | HEART RATE: 65 BPM | DIASTOLIC BLOOD PRESSURE: 90 MMHG

## 2020-03-10 DIAGNOSIS — M99.02 THORACIC SEGMENT DYSFUNCTION: ICD-10-CM

## 2020-03-10 DIAGNOSIS — M06.9 RHEUMATOID ARTHRITIS INVOLVING MULTIPLE SITES, UNSPECIFIED RHEUMATOID FACTOR PRESENCE: ICD-10-CM

## 2020-03-10 DIAGNOSIS — M99.01 CERVICAL SEGMENT DYSFUNCTION: ICD-10-CM

## 2020-03-10 DIAGNOSIS — M06.09 RHEUMATOID ARTHRITIS OF MULTIPLE SITES WITH NEGATIVE RHEUMATOID FACTOR (H): Primary | ICD-10-CM

## 2020-03-10 DIAGNOSIS — M17.11 PRIMARY OSTEOARTHRITIS OF RIGHT KNEE: ICD-10-CM

## 2020-03-10 DIAGNOSIS — M99.05 SEGMENTAL DYSFUNCTION OF PELVIC REGION: Primary | ICD-10-CM

## 2020-03-10 DIAGNOSIS — G89.29 CHRONIC LOW BACK PAIN: ICD-10-CM

## 2020-03-10 DIAGNOSIS — G89.29 CHRONIC NECK PAIN: ICD-10-CM

## 2020-03-10 DIAGNOSIS — M54.2 CHRONIC NECK PAIN: ICD-10-CM

## 2020-03-10 DIAGNOSIS — R21 RASH: ICD-10-CM

## 2020-03-10 DIAGNOSIS — Z79.899 HIGH RISK MEDICATION USE: ICD-10-CM

## 2020-03-10 DIAGNOSIS — M99.03 SEGMENTAL DYSFUNCTION OF LUMBAR REGION: ICD-10-CM

## 2020-03-10 DIAGNOSIS — M54.50 CHRONIC LOW BACK PAIN: ICD-10-CM

## 2020-03-10 DIAGNOSIS — M62.838 SPASM OF MUSCLE: ICD-10-CM

## 2020-03-10 PROCEDURE — 99203 OFFICE O/P NEW LOW 30 MIN: CPT | Mod: GY | Performed by: CHIROPRACTOR

## 2020-03-10 PROCEDURE — 99213 OFFICE O/P EST LOW 20 MIN: CPT | Performed by: INTERNAL MEDICINE

## 2020-03-10 PROCEDURE — 97810 ACUP 1/> WO ESTIM 1ST 15 MIN: CPT | Mod: GY | Performed by: CHIROPRACTOR

## 2020-03-10 RX ORDER — PREDNISONE 2.5 MG/1
2.5 TABLET ORAL DAILY
Qty: 90 TABLET | Refills: 1 | Status: SHIPPED | OUTPATIENT
Start: 2020-03-10 | End: 2020-06-09

## 2020-03-10 ASSESSMENT — MIFFLIN-ST. JEOR: SCORE: 1498.53

## 2020-03-10 NOTE — NURSING NOTE
Lamont to follow up with Primary Care provider regarding elevated blood pressure.  Blood pressure rechecked after visit    150/90  Helga Guerrier CMA Rheumatology  3/10/2020 11:44 AM                                RAPID3 (0-30) Cumulative Score  22.2          RAPID3 Weighted Score (divide #4 by 3 and that is the weighted score)  7.4     Helga Guerrier CMA Rheumatology  3/10/2020 11:18 AM

## 2020-03-10 NOTE — PROGRESS NOTES
Chiropractic Clinic Visit    PCP: David Chavez Jeremiah is a 54 year old male who is seen  in consultation at the request of  Karina Hugo M.D. presenting with neck and low back pain . Patient reports that the onset was 2010. When asked, patient denies:, falling, slipping, bending and reaching or sleeping awkwardly. Kup reports that he is having pain in his neck , low back , elbow, shoulders, knees. He has been diagnosed with RA and feels that this is causing a great deal of his discomfort. The pain in her neck and low back are located at his L/S and C/T junctions.  He rates the pain at a 5 out of 10.  Doris describes his symptoms as a dull burning pain with periods of sharpness.  There are some radiating symptoms in his leg but also reports that he is seeing a neurologist for some burning leg sensation that he has been having for some time.  His sleep is interrupted due to pain.    Injury:     Location of Pain: lower cervical and lower lumbar  at the following level(s) C6 , C7 , T1 , T2 , L4  and L5   Duration of Pain: 10 year(s)  Rating of Pain at worst: 9/10  Rating of Pain Currently: 5/10  Symptoms are better with:   Symptoms are worse with: medications  Additional Features:      Other evaluation and/or treatments so far consists of: medication    Health History  as reported by the patient:    How does the patient rate their own health:   Poor    Current or past medical history:   Calf pain, swelling or warmth, Concussion/Dizziness, Chest pain, Depression, Heart problems, Hepatitis, High blood pressure, Rheumatoid arthritis, Sleep disorder/apnea and Tuberculosis    Medical allergies  None    Past Traumas/Surgeries  Heart: March 2008    Family History  The family history includes C.A.D. in his father; Cancer in his paternal grandfather; Coronary Artery Disease in his father; Depression in his father and mother; Diabetes in his mother; Hypertension in his father, maternal grandfather, and mother; Mental  Illness in his mother; Other Cancer in his paternal grandfather and another family member.    Medications:  Anti-depressants, Anti-inflammatory, High blood pressure, Pain and Sleep    Occupation:  none    Primary job tasks:       Barriers as home/work:   none    Additional health Issues:                   Review of Systems  Musculoskeletal: as above  Remainder of review of systems is negative including constitutional, CV, pulmonary, GI, Skin and Neurologic except as noted in HPI or medical history.    Past Medical History:   Diagnosis Date     Anxiety      CAD (coronary artery disease)     CABG, PCI     Congestive heart failure, unspecified 2008     Depressive disorder 2008     Gout      Hepatitis B > 10 years     HTN (hypertension)      Hyperlipidemia LDL goal < 100      Malrotation of intestine      Moderate major depression (H)      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) 9/19/2011    Uses CPAP     Rheumatoid arthritis flare (H) 1012     Steatohepatitis 12/15    liver biopsy     Tuberculosis age 13    INH x 9 months      Vitamin D deficiency      Past Surgical History:   Procedure Laterality Date     ABDOMEN SURGERY  2014     BIOPSY  2015     BYPASS GRAFT ARTERY CORONARY  2008    6 vessels     COLONOSCOPY  2/8/2013    Procedure: COLONOSCOPY;  Colonoscopy, blood in stool;  Surgeon: Duane, William Charles, MD;  Location: [facility]     COMBINED REPAIR PTOSIS WITH BLEPHAROPLASTY BILATERAL Bilateral 6/25/2018    Procedure: COMBINED REPAIR PTOSIS WITH BLEPHAROPLASTY BILATERAL;  Bilateral upper eyelid blepharoplasty, ptosis repair and brow ptosis repair;  Surgeon: Sandra Borja MD;  Location: [facility]     GI SURGERY  2014     HC CORONARY STENT CIERA SOTOTL VESSEL  2008    3 months after CABG     HEAD & NECK SURGERY  2011     NASAL/SINUS POLYPECTOMY  2010     ORTHOPEDIC SURGERY  2012     REPAIR PTOSIS       REPAIR PTOSIS BILATERAL Bilateral 7/22/2019    Procedure: REPAIR, PTOSIS, BOTH UPPER brows;  Surgeon: Sandra Borja MD;   "Location: [facility]     REPAIR PTOSIS BROW BILATERAL Bilateral 6/25/2018    Procedure: REPAIR PTOSIS BROW BILATERAL;;  Surgeon: Sandra Borja MD;  Location: [facility]     THORACIC SURGERY  1989    tb     TONSILLECTOMY  2010     UVULOPALATOPHARYNGOPLASTY  2010     VASCULAR SURGERY  2008       Objective  There were no vitals taken for this visit.    GENERAL APPEARANCE: healthy, alert and moderate distress   GAIT: antalgic and cane  SKIN: no suspicious lesions or rashes  NEURO: Normal strength and tone, mentation intact and speech normal  PSYCH:  mentation appears normal and affect normal/bright      Lamont was asked to complete the Neck Disability Index, the Oswestry Low Back Disability Index and [place] Start Back screening tool, today in the office. Patient declined to complete the NDI form., The Oswestry Disability score: 70%. Keel Start Total Score:9 Sub Score: 5       Cervical Spine Exam    Range of Motion:         forward flexion severe limitation          extension severe limitation         lateral rotation severe limitation         lateral flexion severe limitation    Inspection:         Anterior head position    Tender:        upper border of trapezius    Non-Tender:        remainder of cervical spine area    Sensation:       grossly intact througout bilateral upper extremities    Lymphatics:        no edema noted in the upper extremities       Lumbar exam:    Inspection:  \"     no visible deformity in the low back       normal skin\",    ROM:       limited flexion due to pain       limited extension due to pain    Tender:       paraspinal muscles    Non Tender:       remainder of lumbar spine    Strength:       hip flexion 4/5 Normal       knee extension 4/5 Normal       ankle dorsiflexion 4/5 Normal       ankle plantarflexion 4/5 Normal       dorsiflexion of the great toe 4/5 Normal    Reflexes:       patellar (L3, L4) 2 bilaterally        Sensation:      grossly intact throughout lower extremities    Segmental " spinal dysfunction/restrictions found at:  C6 , C7 , T1 , T2 , L4  and L5     The following soft tissue hypotonicities were observed:Piriformis: right, referred pain: no  Traps: ache, dull pain and stiff, referred pain: no    Trigger points were found in:Lumbar erector spine, Piriformis, T-spine paraspinal and Traps    Muscle spasm found in:Lumbar erector spine, Piriformis, T-spine paraspinal and Traps      Radiology:      Assessment:    1. Segmental dysfunction of pelvic region    2. Chronic low back pain    3. Segmental dysfunction of lumbar region    4. Spasm of muscle    5. Thoracic segment dysfunction    6. Chronic neck pain    7. Cervical segment dysfunction    8. Rheumatoid arthritis involving multiple sites, unspecified rheumatoid factor presence (H)    9. Primary osteoarthritis of right knee        RX ordered/plan of care  Anticipated outcomes  Possible risks and side effects    After discussing the risk and benefits of care, patient consented to treatment    Prognosis: Good      Patient's condition:  Symptoms are unchanged    Treatment effectiveness:  Symptoms appear to be decreasing      Plan:    Procedures:  Evaluation and Management  65827 Moderate level exam 30 min    CMT:  No CMT per MD orders      Modalities:  15958: Acupuncture, for 15 minutes:  Points: B25, B27, GV3, K3, B62, SI3  GV20, GB20, GB36, GB39, SI15, BL19, LI4  For 15 minutes    Therapeutic procedures:  None    Response to Treatment  Reduction in symptoms as reported by patient    Treatment plan and goals:  Goals:  SLEEPING: the patient specific goal is to attain his pre-injury status of 4 hours comfortably  LEIGH: To change LEIGH score from 70 to 50    Frequency of care  Duration of care is estimated to be 8 weeks, from the initial treatment.  It is estimated that the patient will need a total of 8 visits to resolve this episode.  For the initial therapeutic trial of care, the frequency is recommended at 1 X week, once daily.  A reevaluation  would be clinically appropriate in 8 visits, to determine progress and further course of care.    In-Office Treatment  Evaluation  Spinal Chiropractic Manipulative Therapy:    Acupuncture:                Recommendations:    Instructions:  ice 20 minutes every other hour as needed and heat 15 minutes every other hour as needed    Follow-up:    Return to care in one week.       Disclaimer: This note consists of symbols derived from keyboarding, dictation and/or voice recognition software. As a result, there may be errors in the script that have gone undetected. Please consider this when interpreting information found in this chart.

## 2020-03-10 NOTE — PATIENT INSTRUCTIONS
Rheumatology    Dr. Sebastián Jenkins       [facility] [place]   (Monday)  [address]  [place] [address]       [facility] [place]   (Tuesday)  [address] [place] [address]    [facility] [place]   (Wed., Thurs., and Friday)  [address] [place] [address]    Phone number: [phone]  Thank you for choosing [facility].  Helga Guerrier CMA

## 2020-03-10 NOTE — PROGRESS NOTES
Rheumatology Clinic Visit      Lamont Daniels MRN# [id number]   YOB: 1965 Age: 54 year old      Date of visit: 3/10/20   PCP: Dr. David Chavez  Hepatologist: Dr. Drea Laboy     Chief Complaint   Patient presents with:  Arthritis: RA. Patient has a lot of stiffness.    Assessment and Plan   1.  Seropositive nonerosive rheumatoid arthritis (RF negative, CCP low positive): Note that he does have low back pain but without evidence of SI joint irregularity on x-ray.  Initially with morning stiffness all day, gelling phenomenon, and synovitis.   Previously on Humira (partially effective), Enbrel (partially effective). MTX avoided per Dr. Laboy recommendation. Currently on SSZ 1500mg BID, Orencia SQ every 7 days, (initially Rx'd 4/18/2017), prednisone 2.5mg daily, and HCQ 200mg daily.  Treatment has been in coordination with hepatology as he requires HBV tx while on bDMARD. Doing well at this time.  - Continue sulfasalazine 1500mg BID  - Continue Orencia SQ every 7 days  - Continue hydroxychloroquine 200 mg daily (last eye exam in 11/2019)  - Continue prednisone 2.5mg daily   - Labs in 3 months: CBC, Creatinine, Hepatic Panel, ESR, CRP, glucose    2. Lower back pain: Lumbar spine MRI in August 2014 showed multilevel degenerative changes and a small disc protrusion at L3/4 with mild spinal canal narrowing; also moderate left and mild right neural foraminal narrowing at L5-S1. He had a nerve conduction study in 2014 that was normal. He has been worked up by neurology in the past without significant neurologic findings. HLA-B27 negative, SI joint x-ray negative. Now following in the pain management clinic.     3. Myofascial pain syndrome / chronic pain syndrome: diffuse pain consistent with chronic pain syndrome.  Management per pain clinic as well as her PCP.  I strongly encourage that he increase physical activity as he leads a sedentary lifestyle with no more activity than what is required for his activities of  "daily living.  Encouraged low impact physical activity such as walking, multiple times per day    4. Chronic Hepatitis B: Managed by Dr. Laboy (hepatology) previously, and now Dr. Thomas Leventhal at the [facility]. Currently on tenofovir.     5. Hepatic Steatosis: Based on previous liver biopsy.  Seeing hepatology.      6. Positive tuberculosis test:   This is noted here for historical significance.  He was evaluated by Dr. Anthony Mendoza, infectious disease. The following telephone note was documented by Dr. Mendoza: \"I tried calling th pt, finally we have the records from [place] . It appears he was treated for latent TB with INH for 1 year in 1980/1981 .Later in 1981 April he was admitted at [facility] in [place], [place] with rt sided pneumonia, TB was suspected but he improved with treatment with Penicillin only. Later he was seen  ( likely ID specialist), and was treated with 6 weeks course of Rifampin and Ethambutol pending sputum AFB culture. His AFB cx were negative based on the report we have.\"  \"He recently had QFT -TB test which was reported positive and his CXR was clear. Given his documented hx of completion of treatment for latent TB as above , I do not see any need for further treatment. His tests may remain positive irrespective of treatment status. From my perspective he can be started on DMARDs/Biological as deemed necessary.\"       7. Gout: On allopurinol and managed by PCP.    8. Elevated blood pressure:  Lamont to follow up with Primary Care provider regarding elevated blood pressure.     Mr. Daniels verbalized agreement with and understanding of the rational for the diagnosis and treatment plan.  All questions were answered to best of my ability and the patient's satisfaction. Mr. Daniels was advised to contact the clinic with any questions that may arise after the clinic visit.      Thank you for involving me in the care of the patient    Return to clinic: 3 months      HPI "   Lamont Daniels is a 54 year old male with a medical history significant for hypertension, hyperlipidemia, gout, coronary artery disease s/p 6vCABG, vitamin D deficiency, hepatitis B, and RA who presents for f/u of RA.    Today, Mr. Daniels reports that he is doing okay.  Morning stiffness for approximately 10-15 minutes.  Positive gelling phenomenon.  He says that if he is active that he feels fine but sometimes if he sits for too long that he gets mild stiffness in his whole body.  He reports that he has gone to physical therapy and on water therapy but since those programs he has been physically inactive, doing no more activity during the day than his activities of daily living that includes walking around his house.  No joint swelling.  Tolerating medications well.    Denies fevers, chills, nausea, vomiting, constipation, diarrhea. No abdominal pain. Denies chest pain/pressure, palpitations, or shortness of breath.  No oral or nasal sores. No rash. No LE swelling.  Mild dry mouth; he has good dentition and does not feel like he needs to use frequent sips of water; unchanged since last evaluation. No dry eyes. No eye pain or redness.     Tobacco:  None   EtOH:  None  Drugs:  None  Occupation: Retired   Originally from [facility], then lived [place] north of [place], [place], then lived in [place], [place], then moved to [place] for family    ROS   GEN: No fevers, chills, night sweats; + fatigue   SKIN: No itching, rashes, sores  HEENT: No epistaxis. No oral or nasal ulcers.  CV: See HPI  PULM: See HPI  GI: No nausea, vomiting, constipation, diarrhea. No blood in stool. No abdominal pain.  : No blood in urine.  MSK: See HPI.  NEURO: See HPI  EXT: No LE swelling  PSYCH: Negative    Active Problem List     Patient Active Problem List   Diagnosis     Coronary atherosclerosis of native coronary artery     Major depressive disorder, recurrent episode, moderate (H)     Anxiety     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)     Hepatitis B infection      Vitamin D deficiency     S/P CABG (coronary artery bypass graft)     Malrotation of intestine     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     Hyperlipidemia LDL goal <70     Insomnia     Gout     Suicidal ideation     Essential hypertension     Rheumatoid arthritis involving multiple sites, unspecified rheumatoid factor presence (H)     High risk medications (not anticoagulants) long-term use     Midline low back pain without sciatica     Bilateral low back pain with sciatica, sciatica laterality unspecified     Elevated liver enzymes     On corticosteroid therapy     Essential hypertension with goal blood pressure less than 140/90     Insomnia, unspecified type     Rheumatoid arthritis of multiple sites with negative rheumatoid factor (H)     Benign prostatic hyperplasia with lower urinary tract symptoms, unspecified morphology     Chronic pain syndrome     Past Medical History     Past Medical History:   Diagnosis Date     Anxiety      CAD (coronary artery disease)     CABG, PCI     Congestive heart failure, unspecified 2008     Depressive disorder 2008     Gout      Hepatitis B > 10 years     HTN (hypertension)      Hyperlipidemia LDL goal < 100      Malrotation of intestine      Moderate major depression (H)      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) 9/19/2011    Uses CPAP     Rheumatoid arthritis flare (H) 1012     Steatohepatitis 12/15    liver biopsy     Tuberculosis age 13    INH x 9 months      Vitamin D deficiency      Past Surgical History     Past Surgical History:   Procedure Laterality Date     ABDOMEN SURGERY  2014     BIOPSY  2015     BYPASS GRAFT ARTERY CORONARY  2008    6 vessels     COLONOSCOPY  2/8/2013    Procedure: COLONOSCOPY;  Colonoscopy, blood in stool;  Surgeon: Duane, William Charles, MD;  Location: [facility] OR     COMBINED REPAIR PTOSIS WITH BLEPHAROPLASTY BILATERAL Bilateral 6/25/2018    Procedure: COMBINED REPAIR PTOSIS WITH BLEPHAROPLASTY BILATERAL;  Bilateral upper eyelid  blepharoplasty, ptosis repair and brow ptosis repair;  Surgeon: Sandra Borja MD;  Location: [facility] OR     GI SURGERY  2014     HC CORONARY STENT PERCUT, EA ADDTL VESSEL  2008    3 months after CABG     HEAD & NECK SURGERY  2011     NASAL/SINUS POLYPECTOMY  2010     ORTHOPEDIC SURGERY  2012     REPAIR PTOSIS       REPAIR PTOSIS BILATERAL Bilateral 7/22/2019    Procedure: REPAIR, PTOSIS, BOTH UPPER brows;  Surgeon: Sandra Borja MD;  Location: [facility] OR     REPAIR PTOSIS BROW BILATERAL Bilateral 6/25/2018    Procedure: REPAIR PTOSIS BROW BILATERAL;;  Surgeon: Sandra Borja MD;  Location: [facility] OR     THORACIC SURGERY  1989    tb     TONSILLECTOMY  2010     UVULOPALATOPHARYNGOPLASTY  2010     VASCULAR SURGERY  2008     Allergy     Allergies   Allergen Reactions     Citalopram Itching     Tramadol Itching     Current Medication List     Current Outpatient Medications   Medication Sig     predniSONE (DELTASONE) 2.5 MG tablet Take 1 tablet (2.5 mg) by mouth daily     Abatacept (ORENCIA) 125 MG/ML SOAJ auto-injector Inject 1 mL (125 mg) Subcutaneous every 7 days     Acetaminophen (TYLENOL PO)      allopurinol (ZYLOPRIM) 300 MG tablet TAKE 1 TABLET BY MOUTH EVERY DAY     aspirin EC 81 MG EC tablet Take 1 tablet (81 mg) by mouth daily (*)     atorvastatin (LIPITOR) 80 MG tablet TAKE 1 TABLET BY MOUTH 1 TIME DAILY     bacitracin 500 UNIT/GM external ointment Apply topically 3 times daily     baclofen (LIORESAL) 10 MG tablet TAKE 1 TABLET BY MOUTH 2 TIMES DAILY AS NEEDED FOR MUSCLE SPASM     blood glucose (NO BRAND SPECIFIED) lancets standard Use to test blood sugar 2 times daily or as directed. (Patient not taking: Reported on 3/2/2020)     blood glucose monitoring (NO BRAND SPECIFIED) meter device kit Use to test blood sugar 2 times daily or as directed. (Patient not taking: Reported on 3/2/2020)     blood glucose monitoring (NO BRAND SPECIFIED) test strip Use to test blood sugars 2 times daily or as directed (Patient  not taking: Reported on 3/2/2020)     buprenorphine (BUTRANS) 20 MCG/HR WK patch Place 1 patch onto the skin every 7 days . Name brand only.  Fill 3/4/20 to start 3/8/20, 28 day supply     busPIRone HCl (BUSPAR) 30 MG tablet Take 1 tablet (30 mg) by mouth 2 times daily     Cholecalciferol (VITAMIN D) 2000 UNITS tablet TAKE ONE TABLET BY MOUTH ONCE DAILY     clobetasol (TEMOVATE) 0.05 % external solution Apply twice daily to scalp for up to 2 weeks for flares.     clonazePAM (KLONOPIN) 1 MG tablet TAKE 0.5-1 TABLETS (0.5-1 MG) BY MOUTH 2 TIMES DAILY AS NEEDED FOR ANXIETY     clopidogrel (PLAVIX) 75 MG tablet Take 1 tablet (75 mg) by mouth daily     COLCRYS 0.6 MG tablet TAKE 2 TABLETS BY MOUTH AT THE FIRST SIGN OF FLARE, TAKE 1 ADDITIONAL TABLET ONE HOUR LATER.     desvenlafaxine (PRISTIQ) 50 MG 24 hr tablet Take 1 tablet (50 mg) by mouth daily     diclofenac (VOLTAREN) 1 % topical gel Apply 4 grams to bilateral knees, up to four times daily as needed using enclosed dosing card.  Max of 32g/day     evolocumab (REPATHA) 140 MG/ML prefilled autoinjector Inject 1 mL (140 mg) Subcutaneous every 14 days     ezetimibe (ZETIA) 10 MG tablet Take 1 tablet (10 mg) by mouth daily     gabapentin (NEURONTIN) 300 MG capsule Take 2 capsules (600 mg) by mouth 3 times daily . 3 month supply     gemfibrozil (LOPID) 600 MG tablet Take 1 tablet (600 mg) by mouth 2 times daily     hydrocortisone (WESTCORT) 0.2 % external cream For genitals, apply twice daily for up to 2 weeks     hydroxychloroquine (PLAQUENIL) 200 MG tablet Take 1 tablet (200 mg) by mouth daily     Incontinence Supply Disposable (DEPEND UNDERWEAR LARGE) MISC Use twice daily.     meclizine (ANTIVERT) 25 MG tablet Take 1 tablet (25 mg) by mouth every 6 hours as needed for dizziness     melatonin 3 MG tablet Take 1 tablet (3 mg) by mouth nightly as needed for sleep     meloxicam (MOBIC) 7.5 MG tablet TAKE 1 TABLET (7.5 MG) BY MOUTH DAILY (Patient not taking: Reported on  3/2/2020)     naloxone (NARCAN) nasal spray Spray 1 spray (4 mg) into one nostril alternating nostrils as needed for opioid reversal every 2-3 minutes until assistance arrives (Patient not taking: Reported on 3/2/2020)     nitroGLYcerin (NITROSTAT) 0.4 MG sublingual tablet PLACE 1 TABLET UNDER THE TONGUE EVERY 5 MINUTES AS NEEDED (Patient not taking: Reported on 3/2/2020)     order for DME Equipment being ordered: chair lift     order for DME Equipment being ordered: single end cane     ORDER FOR DME 1.  CPAP pressure 11 cm/H20 with heated humidity.   2.  Provide mask to fit and CPAP supplies.   3.  Length of need lifetime.   4.  If needed please provide a chin strap          pantoprazole (PROTONIX) 40 MG EC tablet Take 1 tablet (40 mg) by mouth daily     sulfaSALAzine ER (AZULFIDINE EN) 500 MG EC tablet Take 3 tablets (1,500 mg) by mouth 2 times daily     tamsulosin (FLOMAX) 0.4 MG capsule Take 2 capsules (0.8 mg) by mouth daily     tenofovir (VIREAD) 300 MG tablet Take 1 tablet (300 mg) by mouth daily     triamcinolone (KENALOG) 0.1 % external ointment APPLY TOPICALLY TO AFFECTED AREA(S) 3 TIMES DAILY     triamcinolone (KENALOG) 0.1 % external ointment Apply to trunk and extremities twice daily for up to 2 weeks for flares     zolpidem (AMBIEN) 5 MG tablet Take 1 tablet (5 mg) by mouth nightly as needed for sleep     No current facility-administered medications for this visit.      Facility-Administered Medications Ordered in Other Visits   Medication     gadobutrol (GADAVIST) injection 10 mL       Social History   See HPI    Family History     Family History   Problem Relation Age of Onset     C.A.D. Father      Coronary Artery Disease Father      Hypertension Father      Depression Father      Hypertension Mother      Diabetes Mother      Depression Mother      Mental Illness Mother      Hypertension Maternal Grandfather      Cancer Paternal Grandfather      Other Cancer Paternal Grandfather      Other Cancer  "Other      Autoimmune Disease No family hx of      Cerebrovascular Disease No family hx of      Thyroid Disease No family hx of      Glaucoma No family hx of      Macular Degeneration No family hx of      No family history of autoimmune disease  No family history of psoriasis     No change in family history since the previous clinic visit.     Physical Exam     Temp Readings from Last 3 Encounters:   01/14/20 98.4  F (36.9  C) (Oral)   10/08/19 97.6  F (36.4  C) (Oral)   07/22/19 97.5  F (36.4  C) (Temporal)     BP Readings from Last 5 Encounters:   03/10/20 (!) 150/90   03/02/20 133/88   01/14/20 115/78   11/26/19 128/88   10/22/19 (!) 142/90     Pulse Readings from Last 1 Encounters:   03/10/20 65     Resp Readings from Last 1 Encounters:   07/22/19 16     Estimated body mass index is 31.42 kg/m  as calculated from the following:    Height as of this encounter: 1.575 m (5' 2\").    Weight as of this encounter: 77.9 kg (171 lb 12.8 oz).    GEN: NAD  HEENT: MMM. No oral lesions. Anicteric, noninjected sclera  CV: S1, S2. RRR. No m/r/g.  PULM: CTA bilaterally. No w/c.  MSK: MCPs, PIPs, DIPs, wrists, elbows, shoulders, knees, ankles, and MTPs without swelling or tenderness to palpation.   Able to make a fist with each hand.  Hips nontender to palpation.  NEURO: UE and LE strengths 5/5 and equal bilaterally.   SKIN: No rash.  EXT: No LE edema  PSYCH: Alert. Appropriate.    Labs   RF/CCP  Recent Labs   Lab Test 11/04/15  1547 08/12/14  1414   CCPABY 27*  --    RHF  --  <20     CBC  Recent Labs   Lab Test 02/27/20  0816 11/25/19  0758 10/07/19  0940   WBC 7.1 7.2 9.3   RBC 5.04 4.81 5.15   HGB 14.5 14.3 15.1   HCT 44.9 43.7 45.4   MCV 89 91 88   RDW 13.5 14.4 13.7    272 289   MCH 28.8 29.7 29.3   MCHC 32.3 32.7 33.3   NEUTROPHIL 42.3 42.9 53.5   LYMPH 41.5 41.2 29.4   MONOCYTE 13.0 12.9 13.9   EOSINOPHIL 2.5 2.4 2.0   BASOPHIL 0.7 0.6 0.9   ANEU 3.0 3.1 5.0   ALYM 3.0 3.0 2.7   PRACHI 0.9 0.9 1.3   AEOS 0.2 0.2 0.2 "   ABAS 0.1 0.0 0.1     CMP  Recent Labs   Lab Test 02/27/20  0816 11/25/19  0758 10/07/19  0940  04/01/19  0851   NA  --  141 140  --  140   POTASSIUM  --  3.8 3.8  --  3.6   CHLORIDE  --  110* 110*  --  110*   CO2  --  25 26  --  23   ANIONGAP  --  6 4  --  8   GLC 98 101* 103*   < > 99   BUN  --  16 19  --  16   CR 0.84 0.78 0.93   < > 0.89   GFRESTIMATED >90 >90 >90   < > >90   GFRESTBLACK >90 >90 >90   < > >90   HEMANT  --  9.2 9.2  --  9.6   BILITOTAL 0.3 0.4 0.4   < > 0.4   ALBUMIN 3.8 4.0 4.1   < > 4.0   PROTTOTAL 7.3 7.3 7.8   < > 7.7   ALKPHOS 114 89 112   < > 95   AST 21 23 32   < > 30   ALT 37 47 47   < > 41    < > = values in this interval not displayed.     Calcium/VitaminD  Recent Labs   Lab Test 11/25/19  0758 10/07/19  0940 04/01/19  0851  07/13/17  1246  11/04/15  1547  04/01/13  1624   HEMANT 9.2 9.2 9.6   < >  --    < >  --    < > 9.5   VITDT  --   --   --   --  34  --  43  --  34    < > = values in this interval not displayed.     ESR/CRP  Recent Labs   Lab Test 02/27/20  0816 11/25/19 0758 10/07/19  0940   SED 10 9 11   CRP <2.9 <2.9 <2.9     Lipid Panel  Recent Labs   Lab Test 03/14/19  1006 06/22/18  1448 12/04/17  0924  06/29/15  1405 05/22/14  0848 03/24/14  0936   CHOL 152 129 108   < > 219* 169 195   TRIG 126 113 85   < > 372* 379* 301*   HDL 51 64 73   < > 33* 33* 39*   LDL 76 42 18   < > 112 60 95   VLDL  --   --   --   --  74* 76* 60*   CHOLHDLRATIO  --   --   --   --  6.6* 5.1* 5.0   NHDL 101 65 35   < >  --   --   --     < > = values in this interval not displayed.     Hepatitis B  Recent Labs   Lab Test 10/07/19  0940 04/01/19  0851 12/04/17  0924  10/05/16  0954  04/23/13  0812 01/30/13  0906 01/23/12  1150   AUSAB  --   --   --   --  0.45  --   --   --   --    HBCAB  --   --   --   --   --   --  Positive*  --   --    HBCM  --   --   --   --   --   --   --   --  Negative   HEPBANG  --   --   --   --  Reactive*  --  Positive* Positive* Positive*   HBQLOG Not Calculated Not Calculated Not  "Calculated   < > 5.1*   < >  --   --   --     < > = values in this interval not displayed.     Hepatitis C  Recent Labs   Lab Test 04/07/16  1538 04/23/13  0812   HCVAB Nonreactive   Assay performance characteristics have not been established for newborns,   infants, and children   Negative     Lyme ab screening  Recent Labs   Lab Test 07/18/17  1330   LYMEGM 0.19     Tuberculosis Screening  Recent Labs   Lab Test 04/07/16  1538   TBRSLT Positive   The magnitude of the measured IFN-gamma level cannot be correlated to stage or   degree of infection, level of immune responsiveness, or likelihood for   progression to active disease.  *   TBAGN >10.00  This is a qualitative test.  The TB antigen IU/mL value is required for   documentation on certain government reporting forms but this value should not   be used to monitor disease progression or response to therapy.   Diagnosing or excluding tuberculosis disease, and assessing the probability of   LTBI, require a combination of epidemiological, historical, medical and   diagnostic findings that should be taken into account when interpreting   QuantiFERON TB results.       HIV Screening  Recent Labs   Lab Test 11/04/15  1547   HIAGAB Nonreactive   HIV-1 p24 Ag & HIV-1/HIV-2 Ab Not Detected       \"HAND BILATERAL THREE OR MORE VIEWS 11/4/2015 4:55 PM   HISTORY: Pain in unspecified joint.  COMPARISON: None.  IMPRESSION  IMPRESSION: Normal.  DANIS ANGLIN MD\"    \"FOOT BILATERAL THREE OR MORE VIEWS 11/4/2015 4:55 PM   HISTORY: Pain in unspecified joint.  COMPARISON: 8/21/2012.  IMPRESSION  IMPRESSION: Small traction spurs are seen off the Achilles tendon and  plantar fascial attachment sites bilaterally. Joint spaces are  preserved. Osseous structures are intact. Incidental note is made of  some surgical staples in the soft tissues of the medial distal right  lower extremity.  DANIS ANGLIN MD\"      Immunization History     Immunization History   Administered Date(s) " Administered     Influenza (IIV3) PF 10/11/2011, 09/20/2017, 09/01/2018     Influenza Vaccine IM > 6 months Valent IIV4 09/27/2015, 09/08/2016, 09/30/2019     Influenza Vaccine, 6+MO IM (QUADRIVALENT W/PRESERVATIVES) 11/17/2014     Pneumo Conj 13-V (2010&after) 04/07/2016     Pneumococcal 23 valent 12/12/2014     TDAP Vaccine (Adacel) 08/30/2011     Zoster vaccine recombinant adjuvanted (SHINGRIX) 07/27/2019          Chart documentation done in part with Dragon Voice recognition Software. Although reviewed after completion, some word and grammatical error may remain.    Sebastián Jenkins MD

## 2020-03-11 NOTE — TELEPHONE ENCOUNTER
"Requested Prescriptions   Pending Prescriptions Disp Refills     triamcinolone (KENALOG) 0.1 % external ointment [Pharmacy Med Name: TRIAMCINOLONE 0.1% OINTMENT]  Last Written Prescription Date:  11/27/19  Last Fill Quantity: 80g,  # refills: 1   Last Office Visit with [facility], [facility] or [facility] prescribing provider:  01/14/2020-Scott   Future Office Visit:    Next 5 appointments (look out 90 days)    Mar 24, 2020  9:00 AM CDT  [facility] with David [facility]avez MD  [facility] [address] [facility] [address]  [phone]   Jun 01, 2020  9:30 AM CDT  Return Visit with Dorothea Loo MD  [facility] ([facility]) [id number] [place] [id number]  [phone]   Jun 09, 2020 10:40 AM CDT  Return Visit with Sebastián Jenkins MD  [facility] ([facility] [address] [facility] [address]  [phone]        80 g 1     Sig: APPLY TOPICALLY TO AFFECTED AREA(S) 3 TIMES DAILY       Topical Steroids and Nonsteroidals Protocol Passed - 3/10/2020  9:02 PM        Passed - Patient is age 6 or older        Passed - Authorizing prescriber's most recent note related to this medication read.     If refill request is for ophthalmic use, please forward request to provider for approval.          Passed - High potency steroid not ordered        Passed - Recent (12 mo) or future (30 days) visit within the authorizing provider's specialty     Patient has had an office visit with the authorizing provider or a provider within the authorizing providers department within the previous 12 mos or has a future within next 30 days. See \"Patient Info\" tab in inbasket, or \"Choose Columns\" in Meds & Orders section of the refill encounter.              Passed - Medication is active on med list             "

## 2020-03-12 RX ORDER — TRIAMCINOLONE ACETONIDE 1 MG/G
OINTMENT TOPICAL
Qty: 80 G | Refills: 2 | Status: SHIPPED | OUTPATIENT
Start: 2020-03-12 | End: 2020-08-18

## 2020-03-12 NOTE — TELEPHONE ENCOUNTER
Prescription approved per [facility] Refill Protocol.  Lorraine Francis RN  [facility]

## 2020-03-19 DIAGNOSIS — B18.1 CHRONIC VIRAL HEPATITIS B WITHOUT DELTA AGENT AND WITHOUT COMA (H): Primary | ICD-10-CM

## 2020-03-26 DIAGNOSIS — B18.1 CHRONIC VIRAL HEPATITIS B WITHOUT DELTA AGENT AND WITHOUT COMA (H): ICD-10-CM

## 2020-03-26 LAB
ALBUMIN SERPL-MCNC: 4.3 G/DL (ref 3.4–5)
ALP SERPL-CCNC: 123 U/L (ref 40–150)
ALT SERPL W P-5'-P-CCNC: 36 U/L (ref 0–70)
ANION GAP SERPL CALCULATED.3IONS-SCNC: 4 MMOL/L (ref 3–14)
AST SERPL W P-5'-P-CCNC: 23 U/L (ref 0–45)
BILIRUB DIRECT SERPL-MCNC: <0.1 MG/DL (ref 0–0.2)
BILIRUB SERPL-MCNC: 0.3 MG/DL (ref 0.2–1.3)
BUN SERPL-MCNC: 16 MG/DL (ref 7–30)
CALCIUM SERPL-MCNC: 9 MG/DL (ref 8.5–10.1)
CALCIUM UR-MCNC: 6.3 MG/DL
CALCIUM/CREAT UR: 0.03 G/G CR
CHLORIDE SERPL-SCNC: 109 MMOL/L (ref 94–109)
CO2 SERPL-SCNC: 25 MMOL/L (ref 20–32)
CREAT SERPL-MCNC: 1 MG/DL (ref 0.66–1.25)
CREAT UR-MCNC: 198 MG/DL
ERYTHROCYTE [DISTWIDTH] IN BLOOD BY AUTOMATED COUNT: 13.3 % (ref 10–15)
GFR SERPL CREATININE-BSD FRML MDRD: 85 ML/MIN/{1.73_M2}
GLUCOSE SERPL-MCNC: 122 MG/DL (ref 70–99)
HCT VFR BLD AUTO: 45.9 % (ref 40–53)
HGB BLD-MCNC: 15.1 G/DL (ref 13.3–17.7)
INR PPP: 1.08 (ref 0.86–1.14)
MCH RBC QN AUTO: 29.3 PG (ref 26.5–33)
MCHC RBC AUTO-ENTMCNC: 32.9 G/DL (ref 31.5–36.5)
MCV RBC AUTO: 89 FL (ref 78–100)
PHOSPHATE 24H UR-MCNC: 0.55 G/G CR
PHOSPHATE UR-MCNC: 109.6 MG/DL
PLATELET # BLD AUTO: 275 10E9/L (ref 150–450)
POTASSIUM SERPL-SCNC: 3.8 MMOL/L (ref 3.4–5.3)
PROT SERPL-MCNC: 8 G/DL (ref 6.8–8.8)
RBC # BLD AUTO: 5.16 10E12/L (ref 4.4–5.9)
SODIUM SERPL-SCNC: 138 MMOL/L (ref 133–144)
WBC # BLD AUTO: 7.2 10E9/L (ref 4–11)

## 2020-03-26 PROCEDURE — 85027 COMPLETE CBC AUTOMATED: CPT | Performed by: INTERNAL MEDICINE

## 2020-03-26 PROCEDURE — 80048 BASIC METABOLIC PNL TOTAL CA: CPT | Performed by: INTERNAL MEDICINE

## 2020-03-26 PROCEDURE — 84105 ASSAY OF URINE PHOSPHORUS: CPT | Performed by: INTERNAL MEDICINE

## 2020-03-26 PROCEDURE — 87517 HEPATITIS B DNA QUANT: CPT | Performed by: INTERNAL MEDICINE

## 2020-03-26 PROCEDURE — 80076 HEPATIC FUNCTION PANEL: CPT | Performed by: INTERNAL MEDICINE

## 2020-03-26 PROCEDURE — 36415 COLL VENOUS BLD VENIPUNCTURE: CPT | Performed by: INTERNAL MEDICINE

## 2020-03-26 PROCEDURE — 82340 ASSAY OF CALCIUM IN URINE: CPT | Performed by: INTERNAL MEDICINE

## 2020-03-26 PROCEDURE — 85610 PROTHROMBIN TIME: CPT | Performed by: INTERNAL MEDICINE

## 2020-03-27 ENCOUNTER — TELEPHONE (OUTPATIENT)
Dept: TRANSPLANT | Facility: CLINIC | Age: 55
End: 2020-03-27

## 2020-03-27 DIAGNOSIS — M06.9 RHEUMATOID ARTHRITIS INVOLVING MULTIPLE SITES, UNSPECIFIED RHEUMATOID FACTOR PRESENCE: ICD-10-CM

## 2020-03-27 LAB
HBV DNA SERPL NAA+PROBE-ACNC: <20 [IU]/ML
HBV DNA SERPL NAA+PROBE-LOG IU: <1.3 {LOG_IU}/ML

## 2020-03-27 NOTE — TELEPHONE ENCOUNTER
Pt LVM at 1:41 PM requesting a refill of buprenorphine (BUTRANS) 20 MCG/HR WK patch be sent to [facility] in [place] by next Sunday.    Gisela GERARDO    [facility]

## 2020-03-27 NOTE — TELEPHONE ENCOUNTER
Left Voice Mail: In light of the COVID 19 pandemic, [facility] cares about your safety and we are calling to convert your scheduled in-person clinic visit to a phone visit on 3/30/20.  The appointment will be on the same date and time.  What is the best number for the provider to call you at?  [phone]

## 2020-03-30 ENCOUNTER — TELEPHONE (OUTPATIENT)
Dept: GASTROENTEROLOGY | Facility: CLINIC | Age: 55
End: 2020-03-30

## 2020-03-30 ENCOUNTER — VIRTUAL VISIT (OUTPATIENT)
Dept: GASTROENTEROLOGY | Facility: CLINIC | Age: 55
End: 2020-03-30
Attending: INTERNAL MEDICINE
Payer: COMMERCIAL

## 2020-03-30 DIAGNOSIS — K75.81 NASH (NONALCOHOLIC STEATOHEPATITIS): Primary | ICD-10-CM

## 2020-03-30 DIAGNOSIS — Z12.9 SCREENING FOR CANCER: ICD-10-CM

## 2020-03-30 DIAGNOSIS — B18.1 CHRONIC VIRAL HEPATITIS B WITHOUT DELTA AGENT AND WITHOUT COMA (H): ICD-10-CM

## 2020-03-30 RX ORDER — TENOFOVIR DISOPROXIL FUMARATE 300 MG/1
300 TABLET, FILM COATED ORAL DAILY
Qty: 90 TABLET | Refills: 3 | Status: SHIPPED | OUTPATIENT
Start: 2020-03-30 | End: 2021-03-12

## 2020-03-30 RX ORDER — BUPRENORPHINE 20 UG/H
1 PATCH TRANSDERMAL
Qty: 4 PATCH | Refills: 1 | Status: SHIPPED | OUTPATIENT
Start: 2020-03-30 | End: 2020-05-01

## 2020-03-30 NOTE — PROGRESS NOTES
"Lamont Daniels is a 54 year old male who is being evaluated via a billable telephone visit.      The patient has been notified of following:     \"This telephone visit will be conducted via a call between you and your physician/provider. We have found that certain health care needs can be provided without the need for a physical exam.  This service lets us provide the care you need with a short phone conversation.  If a prescription is necessary we can send it directly to your pharmacy.  If lab work is needed we can place an order for that and you can then stop by our lab to have the test done at a later time.    If during the course of the call the physician/provider feels a telephone visit is not appropriate, you will not be charged for this service.\"     Physician has received verbal consent for a Telephone Visit from the patient? Yes    [place] Daniels complains of  No chief complaint on file.    I have reviewed and updated the patient's Past Medical History, Social History, Family History and Medication List.    ALLERGIES  Citalopram and Tramadol    Additional provider notes:   The patient is a 54 year old male with past medical history of hypertension, dyslipidemia, coronary artery disease, obesity with resultant nonalcoholic steatohepatitis and chronic hepatitis B     Since last seen he reports doing well.  He has multiple medical problems and been suffering from severe joint and back pains secondary to rheumatoid arthritis.  He has been seen by physical therapy as well as his primary and rheumatology for continued management of his joint conditions.  He has limitations in his ability to ambulate secondary to chronic low back pain, and he does use a cane for assistance.     He continues to take his tenofovir daily, without difficulty    He is unable to state whether he is lost any weight since his last clinic visit with hepatology.    LABS reviewed    Last abdominal imaging performed October 7, 2019 was an abdominal " ultrasound which did not demonstrate any overt focal masses or lesions    Assessment/Plan:  Chronic hepatitis B:  -EBV antigen negative, E antibody positive chronic hepatitis B on tenofovir therapy  -Urine tests do not demonstrate any significant signs to suggest Fanconi syndrome from tenofovir use  -Viral level is present but suppressed  -We will continue to follow with hepatitis B DNA levels in 6 months  -He is to continue taking the tenofovir daily    Fatty liver disease:  -Discussed the need to work on his risk factors like hypercholesterolemia, obesity, dyslipidemia in order to minimize the progression and presence of his fatty liver disease and possible ALDANA    Screening for hepatocellular carcinoma  -Last imaging in October 2019 was negative for any hepatic masses  -We will repeat an abdominal ultrasound now and again in 6 months  Phone call duration: 11 minutes    Thomas M. Leventhal, M.D.   of Medicine  Advanced & Transplant Hepatology  [facility]

## 2020-03-30 NOTE — TELEPHONE ENCOUNTER
Patient prefers to keep a telephone visit instead of changing to video visit due to lack of knowledge on the technology.    lashawn thomas

## 2020-03-30 NOTE — TELEPHONE ENCOUNTER
Medication refill information reviewed.     Due date for buprenorphine (BUTRANS) 20 MCG/HR WK patch is 4/5/20     Prescriptions prepped for review.     Will route to provider.

## 2020-03-30 NOTE — TELEPHONE ENCOUNTER
Script Eprescribed to pharmacy    Will send this to MA team to notify patient.    Signed Prescriptions:                        Disp   Refills    buprenorphine (BUTRANS) 20 MCG/HR WK patch 4 patch1        Sig: Place 1 patch onto the skin every 7 days . Name brand           only.  Fill 4/3/20 to start 4/5/20, 28 day supply  Authorizing Provider: KAYLA CHUN MD  [facility]

## 2020-03-30 NOTE — LETTER
"3/30/2020       RE: Lamont Daniels  [address]  [facility] [address]     Dear Colleague,    Thank you for referring your patient, Lamont Daniels, to the [facility] HEPATOLOGY at [facility]. Please see a copy of my visit note below.    Lamont Daniels is a 54 year old male who is being evaluated via a billable telephone visit.      The patient has been notified of following:     \"This telephone visit will be conducted via a call between you and your physician/provider. We have found that certain health care needs can be provided without the need for a physical exam.  This service lets us provide the care you need with a short phone conversation.  If a prescription is necessary we can send it directly to your pharmacy.  If lab work is needed we can place an order for that and you can then stop by our lab to have the test done at a later time.    If during the course of the call the physician/provider feels a telephone visit is not appropriate, you will not be charged for this service.\"     Physician has received verbal consent for a Telephone Visit from the patient? Yes    Lamont Daniels complains of  No chief complaint on file.    I have reviewed and updated the patient's Past Medical History, Social History, Family History and Medication List.    ALLERGIES  Citalopram and Tramadol    Additional provider notes:   The patient is a 54 year old male with past medical history of hypertension, dyslipidemia, coronary artery disease, obesity with resultant nonalcoholic steatohepatitis and chronic hepatitis B     Since last seen he reports doing well.  He has multiple medical problems and been suffering from severe joint and back pains secondary to rheumatoid arthritis.  He has been seen by physical therapy as well as his primary and rheumatology for continued management of his joint conditions.  He has limitations in his ability to ambulate secondary to chronic low back pain, and he does use a " cane for assistance.     He continues to take his tenofovir daily, without difficulty    He is unable to state whether he is lost any weight since his last clinic visit with hepatology.    LABS reviewed    Last abdominal imaging performed October 7, 2019 was an abdominal ultrasound which did not demonstrate any overt focal masses or lesions    Assessment/Plan:  Chronic hepatitis B:  -EBV antigen negative, E antibody positive chronic hepatitis B on tenofovir therapy  -Urine tests do not demonstrate any significant signs to suggest Fanconi syndrome from tenofovir use  -Viral level is present but suppressed  -We will continue to follow with hepatitis B DNA levels in 6 months  -He is to continue taking the tenofovir daily    Fatty liver disease:  -Discussed the need to work on his risk factors like hypercholesterolemia, obesity, dyslipidemia in order to minimize the progression and presence of his fatty liver disease and possible ALDANA    Screening for hepatocellular carcinoma  -Last imaging in October 2019 was negative for any hepatic masses  -We will repeat an abdominal ultrasound now and again in 6 months  Phone call duration: 11 minutes    Thomas M. Leventhal, M.D.   of Medicine  Advanced & Transplant Hepatology  [facility]      Again, thank you for allowing me to participate in the care of your patient.      Sincerely,    Thomas M. Leventhal, MD

## 2020-03-30 NOTE — TELEPHONE ENCOUNTER
E-prescription confirmation received. Voicemail left for patient.   Medical Necessity Statement: Based on my medical judgement, Mohs surgery is the most appropriate treatment.  for this cancer compared to other treatments.

## 2020-03-30 NOTE — TELEPHONE ENCOUNTER
Received call from patient requesting refill(s) of buprenorphine (BUTRANS) 20 MCG/HR WK patch    Last dispensed from pharmacy on 03/05/20    Pt last seen by prescribing provider on 03/02/20  Next appt scheduled for 06/01/20     checked in the past 6 months? Yes If no, print current report and give to RN    Last urine drug screen date 03/02/20  Current opioid agreement on file (completed within the last year) Yes Date of opioid agreement: 03/02/20    Processing (pick one and delete the others):      E-prescribe to pharmacy-[facility] [id number] [place] [facility] [address]         Will route to nursing pool for review and preparation of prescription(s).

## 2020-04-06 DIAGNOSIS — I25.10 ATHEROSCLEROSIS OF NATIVE CORONARY ARTERY OF NATIVE HEART, ANGINA PRESENCE UNSPECIFIED: ICD-10-CM

## 2020-04-06 DIAGNOSIS — E78.5 HYPERLIPIDEMIA LDL GOAL <100: ICD-10-CM

## 2020-04-06 NOTE — TELEPHONE ENCOUNTER
"Requested Prescriptions   Pending Prescriptions Disp Refills     atorvastatin (LIPITOR) 80 MG tablet [Pharmacy Med Name: ATORVASTATIN 80 MG TABLET]  Last Written Prescription Date:  04/30/19  Last Fill Quantity: 90,  # refills: 3   Last Office Visit with ANA PAULA, OBED or [facility] prescribing provider:  1/14/2020-Ho   Future Office Visit:    Next 5 appointments (look out 90 days)    Jun 01, 2020  9:30 AM CDT  Return Visit with Dorothea Loo MD  [facility] ([facility]) [address] [facility] [id number] [phone]   Jun 09, 2020 10:40 AM CDT  [facility] [address] [facility] [id number] [phone]   Jun 19, 2020  9:30 AM CDT  [facility] Fly Travis [facility] [address] [place] [id number] [phone]        90 tablet 3     Sig: TAKE 1 TABLET BY MOUTH 1 TIME DAILY       Statins Protocol Failed - 4/6/2020 12:14 AM        Failed - LDL on file in past 12 months     Recent Labs   Lab Test 03/14/19  1006   LDL 76             Passed - No abnormal creatine kinase in past 12 months     Recent Labs   Lab Test 07/07/14  0800                   Passed - Recent (12 mo) or future (30 days) visit within the authorizing provider's specialty     Patient has had an office visit with the authorizing provider or a provider within the authorizing providers department within the previous 12 mos or has a future within next 30 days. See \"Patient Info\" tab in inbasket, or \"Choose Columns\" in Meds & Orders section of the refill encounter.              Passed - Medication is active on med list        Passed - Patient is age 18 or older             "

## 2020-04-08 RX ORDER — ATORVASTATIN CALCIUM 80 MG/1
TABLET, FILM COATED ORAL
Qty: 90 TABLET | Refills: 0 | Status: SHIPPED | OUTPATIENT
Start: 2020-04-08 | End: 2020-06-19

## 2020-04-08 NOTE — TELEPHONE ENCOUNTER
Routing refill request to provider for review/approval because:  Labs not current:  LDL    Joselyn Valladares RN, [facility] Triage

## 2020-04-09 ENCOUNTER — MYC REFILL (OUTPATIENT)
Dept: RHEUMATOLOGY | Facility: CLINIC | Age: 55
End: 2020-04-09

## 2020-04-09 DIAGNOSIS — M06.09 RHEUMATOID ARTHRITIS OF MULTIPLE SITES WITH NEGATIVE RHEUMATOID FACTOR (H): ICD-10-CM

## 2020-04-09 RX ORDER — SULFASALAZINE 500 MG/1
1500 TABLET, DELAYED RELEASE ORAL 2 TIMES DAILY
Qty: 540 TABLET | Refills: 2 | Status: CANCELLED | OUTPATIENT
Start: 2020-04-09

## 2020-04-09 NOTE — TELEPHONE ENCOUNTER
Last prescription was sent on 11/26/19 and was a 9 month supply. Called and advised patient to contact his pharmacy. Patient will contact pharmacy.    Jf Packer RN....4/9/2020 2:01 PM

## 2020-04-14 ENCOUNTER — TELEPHONE (OUTPATIENT)
Dept: RHEUMATOLOGY | Facility: CLINIC | Age: 55
End: 2020-04-14

## 2020-04-14 NOTE — TELEPHONE ENCOUNTER
Reason for call:  Medication     If this is a refill request, has the caller requested the refill from the pharmacy already? Yes     Will the patient be using a [facility]? No     Name of the pharmacy and phone number for the current request: [facility] [phone]    Name of the medication requested: sulfaSALAzine ER (AZULFIDINE EN) 500 MG EC tablet    Other request: na    Phone number to reach patient:  Home number on file [phone] (home)    Best Time:  any    Can we leave a detailed message on this number?  YES    Travel screening: Not Applicable

## 2020-04-15 NOTE — TELEPHONE ENCOUNTER
Reason for Call:  Other prescription    Detailed comments: Pharmacy calling, states they do not have a 9 month supply of the medication. They only have a 6 month supply for the patient on record. Please call back to clarify.    Fax [phone]  Phone [phone]    Best Time: any    Can we leave a detailed message on this number? YES    Call taken on 4/15/2020 at 4:26 PM by Haseeb Hendrickson

## 2020-04-15 NOTE — TELEPHONE ENCOUNTER
A 9 month supply of sulfasalazine was sent on 11/26/19.  Called and left message advising patient to contact his pharmacy for a refill and to let us know if he experiences any issues.    Jf Packer RN....4/15/2020 8:59 AM

## 2020-04-16 ENCOUNTER — CARE COORDINATION (OUTPATIENT)
Dept: SLEEP MEDICINE | Facility: CLINIC | Age: 55
End: 2020-04-16

## 2020-04-16 NOTE — PROGRESS NOTES
LVM with Mr. Daniels to please call to reschedule a video/phone follow up for cpap use.  Advised him we did receive the SD card for his machine, have done the d/l and sending back the card to him 4/16/2020 SJ>

## 2020-04-16 NOTE — TELEPHONE ENCOUNTER
Spoke with pharmacist at [facility] in [place] who said they never received our prescription for sulfasalazine 500 mg tablets on 11/26/19.   RN provided a verbal for sulfaSALAzine ER (AZULFIDINE EN) 500 MG EC tablet, Sig - Route: Take 3 tablets (1,500 mg) by mouth 2 times daily - Oral. Disp 540 tablets with 2 refills.    Jf Packer RN....4/16/2020 9:35 AM

## 2020-05-01 DIAGNOSIS — M06.9 RHEUMATOID ARTHRITIS INVOLVING MULTIPLE SITES, UNSPECIFIED RHEUMATOID FACTOR PRESENCE: ICD-10-CM

## 2020-05-01 RX ORDER — BUPRENORPHINE 20 UG/H
1 PATCH TRANSDERMAL
Qty: 4 PATCH | Refills: 1 | Status: SHIPPED | OUTPATIENT
Start: 2020-05-01 | End: 2020-06-25

## 2020-05-01 NOTE — TELEPHONE ENCOUNTER
Pt lvm 1:19a 5/1      Reason for call:  Medication   If this is a refill request, has the caller requested the refill from the pharmacy already? No  Will the patient be using a [facility]? No  Name of the pharmacy and phone number for the current request: [facility] [address] [place] [address]     Name of the medication requested: buprenorphine (BUTRANS) 20 MCG/HR WK patch    Phone number to reach patient:  Home number on file [phone] (home)    Can we leave a detailed message on this number?  YES    Travel screening: Not Applicable         Chuckie SAAB    [facility] [place]

## 2020-05-01 NOTE — TELEPHONE ENCOUNTER
Patient requesting refill(s) of  buprenorphine (BUTRANS) 20 MCG/HR WK patch  Last dispensed from pharmacy on 04/02/2020    Pt last seen by prescribing provider on 3/2/2020  Next appt scheduled for 6/1/2020     checked in the past 6 months? Yes If no, print current report and give to RN    Last urine drug screen date 3/2/2020  Current opioid agreement on file (completed within the last year) Yes Date of opioid agreement: 3/2/2020    Processing (pick one and delete the others):      E-prescribe to [place] [address] [place], [place] pharmacy    Will route to nursing pool for review and preparation of prescription(s).

## 2020-05-01 NOTE — TELEPHONE ENCOUNTER
Signed Prescriptions:                        Disp   Refills    buprenorphine (BUTRANS) 20 MCG/HR WK patch 4 patch1        Sig: Place 1 patch onto the skin every 7 days . Name brand           only.  Fill 5/1/20 to start 5/3/20, 28 day supply  Authorizing Provider: QUEENIE CHERRY      Covering for provider who is out of the office.  Refill appears appropriate and was sent to requested pharmacy.    Queenie Cherry MD  [facility]

## 2020-05-01 NOTE — TELEPHONE ENCOUNTER
Medication refill information reviewed.     Due date for Butrans 20 mcg/hr is 5/3/20     Prescription prepped for review.     Will route to provider.     ROBERTO KarimiN, RN-BC  Patient Care Supervisor  [facility] [place]

## 2020-05-10 DIAGNOSIS — K21.9 GASTROESOPHAGEAL REFLUX DISEASE WITHOUT ESOPHAGITIS: ICD-10-CM

## 2020-05-10 DIAGNOSIS — F45.8 ANXIETY HYPERVENTILATION: ICD-10-CM

## 2020-05-10 DIAGNOSIS — F41.9 ANXIETY HYPERVENTILATION: ICD-10-CM

## 2020-05-12 RX ORDER — PANTOPRAZOLE SODIUM 40 MG/1
TABLET, DELAYED RELEASE ORAL
Qty: 90 TABLET | Refills: 0 | Status: SHIPPED | OUTPATIENT
Start: 2020-05-12 | End: 2020-08-04

## 2020-05-12 NOTE — TELEPHONE ENCOUNTER
PHQ-9 score:    PHQ 1/14/2020   PHQ-9 Total Score 18   Q9: Thoughts of better off dead/self-harm past 2 weeks Several days   F/U: Thoughts of suicide or self-harm No   F/U: Safety concerns No     Routing refill request to provider for review/approval because:  PHQ9 score too elevated to pass [facility] refill protocol    Akanksah Irwin RN  [facility]

## 2020-05-13 RX ORDER — CLONAZEPAM 1 MG/1
.5-1 TABLET ORAL 2 TIMES DAILY PRN
Qty: 90 TABLET | Refills: 0 | Status: SHIPPED | OUTPATIENT
Start: 2020-05-13 | End: 2020-08-18

## 2020-05-14 DIAGNOSIS — E78.5 HYPERLIPIDEMIA LDL GOAL <70: ICD-10-CM

## 2020-05-14 RX ORDER — GEMFIBROZIL 600 MG/1
600 TABLET, FILM COATED ORAL 2 TIMES DAILY
Qty: 180 TABLET | Refills: 3 | Status: SHIPPED | OUTPATIENT
Start: 2020-05-14 | End: 2021-04-13

## 2020-05-14 NOTE — TELEPHONE ENCOUNTER
gemfibrozil (LOPID) 600 MG   Last Written Prescription Date:  5/28/19  Last Fill Quantity: 180,   # refills: 3  Last Office Visit : 6/21/19  Future Office visit:  6/19/20    Routing refill request to provider for review/approval because:  Drug not on the  refill protocol

## 2020-05-17 DIAGNOSIS — M62.830 SPASM OF BACK MUSCLES: ICD-10-CM

## 2020-05-18 NOTE — TELEPHONE ENCOUNTER
Requested Prescriptions   Pending Prescriptions Disp Refills     baclofen (LIORESAL) 10 MG tablet [Pharmacy Med Name: BACLOFEN 10 MG TABLET] 180 tablet 2     Sig: TAKE 1 TABLET BY MOUTH 2 TIMES DAILY AS NEEDED FOR MUSCLE SPASM       There is no refill protocol information for this order        Routing refill request to provider for review/approval because:  Drug not on the [facility] refill protocol       Shreya Rascon RN, BSN, PHN

## 2020-05-19 RX ORDER — BACLOFEN 10 MG/1
TABLET ORAL
Qty: 180 TABLET | Refills: 2 | Status: SHIPPED | OUTPATIENT
Start: 2020-05-19 | End: 2021-01-19

## 2020-05-21 DIAGNOSIS — M17.11 PRIMARY OSTEOARTHRITIS OF RIGHT KNEE: ICD-10-CM

## 2020-05-21 NOTE — TELEPHONE ENCOUNTER
Signed Prescriptions:                        Disp   Refills    diclofenac (VOLTAREN) 1 % topical gel      300 g  11       Sig: Apply 4 grams to bilateral knees, up to four times           daily as needed using enclosed dosing card.  Max           of 32g/day  Authorizing Provider: KAYLA CHUN MD  [facility]

## 2020-05-21 NOTE — TELEPHONE ENCOUNTER
Received fax request from [facility] pharmacy requesting refill(s) for diclofenac (VOLTAREN) 1 % topical gel     Last refilled on 4/22/2020    Pt last seen on 3/2/2020  Next appt scheduled for 6/1/2020    Will facilitate refill.

## 2020-05-27 ENCOUNTER — MYC MEDICAL ADVICE (OUTPATIENT)
Dept: PALLIATIVE MEDICINE | Facility: CLINIC | Age: 55
End: 2020-05-27

## 2020-05-27 NOTE — TELEPHONE ENCOUNTER
Message sent to pt:    Dennis Miguel,     Due to the COVID-19 pandemic, we are NOT seeing patient's in clinic so we need to change the visit with Lian Loo MD on Monday 6/1 at 9:30 am to a video visit.  The easiest video visit via a smart phone is with [employer] The text will look as follows:              Dr. Lian Loo has sent you a secure message: [email or web address]     You just need to click this link in the text message and then you will be connected with Dr. Loo.     On the day of the appointment, a medical assistant will call you 20 to 30 minutes before the appointment to get you check in for the visit and make sure you are ready. Then, Dr. Loo will send a text message closer to the visit.     Please REPLY TO THIS MESSAGE to let us know that this plan will work for you.               Thank you so much for being flexible with us during this ever changing time!    Jenna Hunt, ROBERTON, RN-BC  Patient Care Supervisor  [facility] [place]

## 2020-05-28 NOTE — TELEPHONE ENCOUNTER
Blane message from patient on 5/27 at 1810:    That's fine  ----------  Thank you for responding so quickly!!    Take care,     KELLY Karimi, RN-BC  Patient Care Supervisor  [facility] [place]  -----------------  appt notes updated.     KELLY Karimi, RN-BC  Patient Care Supervisor  [facility] [place]

## 2020-06-01 DIAGNOSIS — N52.9 ERECTILE DYSFUNCTION, UNSPECIFIED ERECTILE DYSFUNCTION TYPE: Primary | ICD-10-CM

## 2020-06-02 ENCOUNTER — MEDICAL CORRESPONDENCE (OUTPATIENT)
Dept: HEALTH INFORMATION MANAGEMENT | Facility: CLINIC | Age: 55
End: 2020-06-02

## 2020-06-03 DIAGNOSIS — M06.09 RHEUMATOID ARTHRITIS OF MULTIPLE SITES WITH NEGATIVE RHEUMATOID FACTOR (H): ICD-10-CM

## 2020-06-03 DIAGNOSIS — Z79.899 HIGH RISK MEDICATION USE: ICD-10-CM

## 2020-06-03 LAB
ALBUMIN SERPL-MCNC: 4.1 G/DL (ref 3.4–5)
ALP SERPL-CCNC: 106 U/L (ref 40–150)
ALT SERPL W P-5'-P-CCNC: 31 U/L (ref 0–70)
AST SERPL W P-5'-P-CCNC: 20 U/L (ref 0–45)
BASOPHILS # BLD AUTO: 0 10E9/L (ref 0–0.2)
BASOPHILS NFR BLD AUTO: 0.5 %
BILIRUB DIRECT SERPL-MCNC: 0.1 MG/DL (ref 0–0.2)
BILIRUB SERPL-MCNC: 0.4 MG/DL (ref 0.2–1.3)
CREAT SERPL-MCNC: 0.89 MG/DL (ref 0.66–1.25)
CRP SERPL-MCNC: <2.9 MG/L (ref 0–8)
DIFFERENTIAL METHOD BLD: NORMAL
EOSINOPHIL # BLD AUTO: 0.2 10E9/L (ref 0–0.7)
EOSINOPHIL NFR BLD AUTO: 2 %
ERYTHROCYTE [DISTWIDTH] IN BLOOD BY AUTOMATED COUNT: 13.4 % (ref 10–15)
ERYTHROCYTE [SEDIMENTATION RATE] IN BLOOD BY WESTERGREN METHOD: 11 MM/H (ref 0–20)
GFR SERPL CREATININE-BSD FRML MDRD: >90 ML/MIN/{1.73_M2}
GLUCOSE SERPL-MCNC: 138 MG/DL (ref 70–99)
HCT VFR BLD AUTO: 44 % (ref 40–53)
HGB BLD-MCNC: 14.5 G/DL (ref 13.3–17.7)
LYMPHOCYTES # BLD AUTO: 1.9 10E9/L (ref 0.8–5.3)
LYMPHOCYTES NFR BLD AUTO: 24.9 %
MCH RBC QN AUTO: 29.2 PG (ref 26.5–33)
MCHC RBC AUTO-ENTMCNC: 33 G/DL (ref 31.5–36.5)
MCV RBC AUTO: 89 FL (ref 78–100)
MONOCYTES # BLD AUTO: 0.8 10E9/L (ref 0–1.3)
MONOCYTES NFR BLD AUTO: 10.6 %
NEUTROPHILS # BLD AUTO: 4.6 10E9/L (ref 1.6–8.3)
NEUTROPHILS NFR BLD AUTO: 62 %
PLATELET # BLD AUTO: 323 10E9/L (ref 150–450)
PROT SERPL-MCNC: 7.9 G/DL (ref 6.8–8.8)
RBC # BLD AUTO: 4.97 10E12/L (ref 4.4–5.9)
WBC # BLD AUTO: 7.4 10E9/L (ref 4–11)

## 2020-06-03 PROCEDURE — 82565 ASSAY OF CREATININE: CPT | Performed by: INTERNAL MEDICINE

## 2020-06-03 PROCEDURE — 85025 COMPLETE CBC W/AUTO DIFF WBC: CPT | Performed by: INTERNAL MEDICINE

## 2020-06-03 PROCEDURE — 80076 HEPATIC FUNCTION PANEL: CPT | Performed by: INTERNAL MEDICINE

## 2020-06-03 PROCEDURE — 36415 COLL VENOUS BLD VENIPUNCTURE: CPT | Performed by: INTERNAL MEDICINE

## 2020-06-03 PROCEDURE — 85652 RBC SED RATE AUTOMATED: CPT | Performed by: INTERNAL MEDICINE

## 2020-06-03 PROCEDURE — 82947 ASSAY GLUCOSE BLOOD QUANT: CPT | Performed by: INTERNAL MEDICINE

## 2020-06-03 PROCEDURE — 86140 C-REACTIVE PROTEIN: CPT | Performed by: INTERNAL MEDICINE

## 2020-06-03 RX ORDER — SILDENAFIL CITRATE 20 MG/1
TABLET ORAL
Qty: 50 TABLET | Refills: 11 | Status: SHIPPED | OUTPATIENT
Start: 2020-06-03 | End: 2021-06-15

## 2020-06-03 NOTE — TELEPHONE ENCOUNTER
Routing refill request to provider for review/approval because:    Erectile Dysfuction Protocol [id number]/01/2020 01:51 PM   Absence of nitrates on medication list Protocol Details    Absence of Alpha Blockers on Med list     Medication is active on med list      Joselyn Valladares RN, [facility] Triage

## 2020-06-09 ENCOUNTER — VIRTUAL VISIT (OUTPATIENT)
Dept: RHEUMATOLOGY | Facility: CLINIC | Age: 55
End: 2020-06-09
Payer: COMMERCIAL

## 2020-06-09 DIAGNOSIS — Z79.899 HIGH RISK MEDICATION USE: ICD-10-CM

## 2020-06-09 DIAGNOSIS — M06.09 RHEUMATOID ARTHRITIS OF MULTIPLE SITES WITH NEGATIVE RHEUMATOID FACTOR (H): Primary | ICD-10-CM

## 2020-06-09 PROCEDURE — 99213 OFFICE O/P EST LOW 20 MIN: CPT | Mod: 95 | Performed by: INTERNAL MEDICINE

## 2020-06-09 RX ORDER — PREDNISONE 2.5 MG/1
TABLET ORAL
Qty: 90 TABLET | Refills: 1 | Status: SHIPPED | OUTPATIENT
Start: 2020-06-09 | End: 2020-06-09

## 2020-06-09 RX ORDER — SULFASALAZINE 500 MG/1
1500 TABLET, DELAYED RELEASE ORAL 2 TIMES DAILY
Qty: 540 TABLET | Refills: 2 | Status: SHIPPED | OUTPATIENT
Start: 2020-06-09 | End: 2021-03-30

## 2020-06-09 RX ORDER — HYDROXYCHLOROQUINE SULFATE 200 MG/1
200 TABLET, FILM COATED ORAL DAILY
Qty: 90 TABLET | Refills: 2 | Status: SHIPPED | OUTPATIENT
Start: 2020-06-09 | End: 2020-12-15

## 2020-06-09 RX ORDER — PREDNISONE 1 MG/1
TABLET ORAL
Qty: 90 TABLET | Refills: 0 | Status: SHIPPED | OUTPATIENT
Start: 2020-06-09 | End: 2021-03-19

## 2020-06-09 NOTE — PROGRESS NOTES
"Lamont Daniels is a 54 year old male who is being evaluated via a billable telephone visit.      The patient has been notified of following:     \"This telephone visit will be conducted via a call between you and your physician/provider. We have found that certain health care needs can be provided without the need for a physical exam.  This service lets us provide the care you need with a short phone conversation.  If a prescription is necessary we can send it directly to your pharmacy.  If lab work is needed we can place an order for that and you can then stop by our lab to have the test done at a later time.    Telephone visits are billed at different rates depending on your insurance coverage. During this emergency period, for some insurers they may be billed the same as an in-person visit.  Please reach out to your insurance provider with any questions.    If during the course of the call the physician/provider feels a telephone visit is not appropriate, you will not be charged for this service.\"    Patient has given verbal consent for Telephone visit?  Yes    What phone number would you like to be contacted at? [phone]    How would you like to obtain your AVS? [facility]    Rheumatology Telephone/Telehealth  Visit      Lamont Daniels MRN# [id number]   YOB: 1965 Age: 54 year old      Date of visit: 6/09/20   PCP: Dr. David Chavez  Hepatologist: Dr. Thomas Leventhal     Chief Complaint   Patient presents with:  Arthritis: RA.    Assessment and Plan   1.  Seropositive nonerosive rheumatoid arthritis (RF negative, CCP low positive): Note that he does have low back pain but without evidence of SI joint irregularity on x-ray.  Initially with morning stiffness all day, gelling phenomenon, and synovitis.   Previously on Humira (partially effective), Enbrel (partially effective). MTX avoided per Dr. Laboy recommendation. Currently on SSZ 1500mg BID, Orencia SQ every 7 days, (initially Rx'd 4/18/2017), prednisone " "2.5mg daily, and HCQ 200mg daily.  Treatment has been in coordination with hepatology as he requires HBV tx while on bDMARD. Doing well at this time.  Reduce prednisone as noted below.   - Continue sulfasalazine 1500mg BID  - Continue Orencia SQ every 7 days  - Continue hydroxychloroquine 200 mg daily (last eye exam in 11/2019)  - Reduce prednisone from 2.5mg daily, to 2mg daily x30days, then 1mg daily thereafter  - Labs in 3 months: CBC, Creatinine, Hepatic Panel, ESR, CRP, glucose    2. Lower back pain: Lumbar spine MRI in August 2014 showed multilevel degenerative changes and a small disc protrusion at L3/4 with mild spinal canal narrowing; also moderate left and mild right neural foraminal narrowing at L5-S1. He had a nerve conduction study in 2014 that was normal. He has been worked up by neurology in the past without significant neurologic findings. HLA-B27 negative, SI joint x-ray negative. Now following in the pain management clinic.     3. Myofascial pain syndrome / chronic pain syndrome: diffuse pain consistent with chronic pain syndrome.  Management per pain clinic as well as her PCP.  I strongly encourage that he increase physical activity as he leads a sedentary lifestyle with no more activity than what is required for his activities of daily living.  Encouraged low impact physical activity such as walking, multiple times per day    4. Chronic Hepatitis B: Managed by Dr. Laboy (hepatology) previously, and now Dr. Thomas Leventhal at the [facility]. Currently on tenofovir.     5. Hepatic Steatosis: Based on previous liver biopsy.  Seeing hepatology.      6. Positive tuberculosis test:   This is noted here for historical significance.  He was evaluated by Dr. Anthony Mendoza, infectious disease. The following telephone note was documented by Dr. Mendoza: \"I tried calling th pt, finally we have the records from [place] . It appears he was treated for latent TB with INH for 1 year in 1980/1981 .Later " "in 1981 April he was admitted at [facility] [place], [place] with rt sided pneumonia, TB was suspected but he improved with treatment with Penicillin only. Later he was seen  ( likely ID specialist), and was treated with 6 weeks course of Rifampin and Ethambutol pending sputum AFB culture. His AFB cx were negative based on the report we have.\"  \"He recently had QFT -TB test which was reported positive and his CXR was clear. Given his documented hx of completion of treatment for latent TB as above , I do not see any need for further treatment. His tests may remain positive irrespective of treatment status. From my perspective he can be started on DMARDs/Biological as deemed necessary.\"       7. Gout: On allopurinol and managed by PCP.    8.  Hyperglycemia: Reducing prednisone as noted in #1.  Advised follow-up with his PCP for this issue.    # Relevant labs and imaging were reviewed with the patient    # High risk medication toxicity monitoring: discussion and labs reviewed; appropriate labs ordered. See above.  Instructed that if confirmed to have COVID-19 or exposure to someone with confirmed COVID-19 to call this clinic for directions on DMARD management.    # Note that this is a virtual visit to reduce the risk of COVID-19 exposure during this current pandemic.      # Considered to be at high risk of complications from the COVID-19 virus.  It is recommended to limit contact with other people and if possible to work remotely or provide a leave of absence to reduce the risk for COVID-19.      Mr. Daniels verbalized agreement with and understanding of the rational for the diagnosis and treatment plan.  All questions were answered to best of my ability and the patient's satisfaction. Mr. Daniels was advised to contact the clinic with any questions that may arise after the clinic visit.      Thank you for involving me in the care of the patient    Return to clinic: 3 months      HPI   Lamont Daniels is a 54 " year old male with a medical history significant for hypertension, hyperlipidemia, gout, coronary artery disease s/p 6vCABG, vitamin D deficiency, hepatitis B, and RA who presents for f/u of RA.    Today, Mr. Daniels reports that he is doing well.  No joint swelling.  No increased warmth of his joints.  Sometimes stiffness and ache in his MCPs in the morning but this resolves within a couple minutes.  Chronic lower back pain unchanged.  Tolerating medications well and reports no missed doses.     Denies fevers, chills, nausea, vomiting, constipation, diarrhea. No abdominal pain. Denies chest pain/pressure, palpitations, or shortness of breath.  No oral or nasal sores. No rash. No LE swelling.  Mild dry mouth; he has good dentition and does not feel like he needs to use frequent sips of water; unchanged since last evaluation. No dry eyes. No eye pain or redness.     Tobacco:  None   EtOH:  None  Drugs:  None  Occupation: Retired   Originally from [place], then lived just north of [place], [place], then lived in [place], [place], then moved to [place] for family    ROS   GEN: No fevers, chills, night sweats; + fatigue   SKIN: No itching, rashes, sores  HEENT: No epistaxis. No oral or nasal ulcers.  CV: See HPI  PULM: See HPI  GI: No nausea, vomiting, constipation, diarrhea. No blood in stool. No abdominal pain.  : No blood in urine.  MSK: See HPI.  NEURO: See HPI  EXT: No LE swelling  PSYCH: Negative    Active Problem List     Patient Active Problem List   Diagnosis     Coronary atherosclerosis of native coronary artery     Major depressive disorder, recurrent episode, moderate (H)     Anxiety     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)     Hepatitis B infection     Vitamin D deficiency     S/P CABG (coronary artery bypass graft)     Malrotation of intestine     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     Hyperlipidemia LDL goal <70     Insomnia     Gout     Suicidal ideation     Essential hypertension     Rheumatoid  arthritis involving multiple sites, unspecified rheumatoid factor presence (H)     High risk medications (not anticoagulants) long-term use     Midline low back pain without sciatica     Bilateral low back pain with sciatica, sciatica laterality unspecified     Elevated liver enzymes     On corticosteroid therapy     Essential hypertension with goal blood pressure less than 140/90     Insomnia, unspecified type     Rheumatoid arthritis of multiple sites with negative rheumatoid factor (H)     Benign prostatic hyperplasia with lower urinary tract symptoms, unspecified morphology     Chronic pain syndrome     Past Medical History     Past Medical History:   Diagnosis Date     Anxiety      CAD (coronary artery disease)     CABG, PCI     Congestive heart failure, unspecified 2008     Depressive disorder 2008     Gout      Hepatitis B > 10 years     HTN (hypertension)      Hyperlipidemia LDL goal < 100      Malrotation of intestine      Moderate major depression (H)      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) 9/19/2011    Uses CPAP     Rheumatoid arthritis flare (H) 1012     Steatohepatitis 12/15    liver biopsy     Tuberculosis age 13    INH x 9 months      Vitamin D deficiency      Past Surgical History     Past Surgical History:   Procedure Laterality Date     ABDOMEN SURGERY  2014     BIOPSY  2015     BYPASS GRAFT ARTERY CORONARY  2008    6 vessels     COLONOSCOPY  2/8/2013    Procedure: COLONOSCOPY;  Colonoscopy, blood in stool;  Surgeon: Duane, William Charles, MD;  Location:  OR     COMBINED REPAIR PTOSIS WITH BLEPHAROPLASTY BILATERAL Bilateral 6/25/2018    Procedure: COMBINED REPAIR PTOSIS WITH BLEPHAROPLASTY BILATERAL;  Bilateral upper eyelid blepharoplasty, ptosis repair and brow ptosis repair;  Surgeon: Sandra Borja MD;  Location:  OR     GI SURGERY  2014     HC CORONARY STENT CECILIAUT, CIERA VELASQUEZTL VESSEL  2008    3 months after CABG     HEAD & NECK SURGERY  2011     NASAL/SINUS POLYPECTOMY  2010     ORTHOPEDIC SURGERY   2012     REPAIR PTOSIS       REPAIR PTOSIS BILATERAL Bilateral 7/22/2019    Procedure: REPAIR, PTOSIS, BOTH UPPER brows;  Surgeon: Sandra Borja MD;  Location: [facility] OR     REPAIR PTOSIS BROW BILATERAL Bilateral 6/25/2018    Procedure: REPAIR PTOSIS BROW BILATERAL;;  Surgeon: Sandra Borja MD;  Location: [facility] OR     THORACIC SURGERY  1989    tb     TONSILLECTOMY  2010     UVULOPALATOPHARYNGOPLASTY  2010     VASCULAR SURGERY  2008     Allergy     Allergies   Allergen Reactions     Citalopram Itching     Tramadol Itching     Current Medication List     Current Outpatient Medications   Medication Sig     Abatacept (ORENCIA) 125 MG/ML SOAJ auto-injector Inject 1 mL (125 mg) Subcutaneous every 7 days     Acetaminophen (TYLENOL PO)      allopurinol (ZYLOPRIM) 300 MG tablet TAKE 1 TABLET BY MOUTH EVERY DAY     aspirin EC 81 MG EC tablet Take 1 tablet (81 mg) by mouth daily (*)     atorvastatin (LIPITOR) 80 MG tablet TAKE 1 TABLET BY MOUTH 1 TIME DAILY     bacitracin 500 UNIT/GM external ointment Apply topically 3 times daily     baclofen (LIORESAL) 10 MG tablet TAKE 1 TABLET BY MOUTH 2 TIMES DAILY AS NEEDED FOR MUSCLE SPASM     buprenorphine (BUTRANS) 20 MCG/HR WK patch Place 1 patch onto the skin every 7 days . Name brand only.  Fill 5/1/20 to start 5/3/20, 28 day supply     busPIRone HCl (BUSPAR) 30 MG tablet Take 1 tablet (30 mg) by mouth 2 times daily     Cholecalciferol (VITAMIN D) 2000 UNITS tablet TAKE ONE TABLET BY MOUTH ONCE DAILY     clobetasol (TEMOVATE) 0.05 % external solution Apply twice daily to scalp for up to 2 weeks for flares.     clonazePAM (KLONOPIN) 1 MG tablet TAKE 0.5-1 TABLETS (0.5-1 MG) BY MOUTH 2 TIMES DAILY AS NEEDED FOR ANXIETY     clopidogrel (PLAVIX) 75 MG tablet Take 1 tablet (75 mg) by mouth daily     COLCRYS 0.6 MG tablet TAKE 2 TABLETS BY MOUTH AT THE FIRST SIGN OF FLARE, TAKE 1 ADDITIONAL TABLET ONE HOUR LATER.     desvenlafaxine (PRISTIQ) 50 MG 24 hr tablet Take 1 tablet (50 mg) by  mouth daily     diclofenac (VOLTAREN) 1 % topical gel Apply 4 grams to bilateral knees, up to four times daily as needed using enclosed dosing card.  Max of 32g/day     evolocumab (REPATHA) 140 MG/ML prefilled autoinjector Inject 1 mL (140 mg) Subcutaneous every 14 days     ezetimibe (ZETIA) 10 MG tablet Take 1 tablet (10 mg) by mouth daily     gabapentin (NEURONTIN) 300 MG capsule Take 2 capsules (600 mg) by mouth 3 times daily . 3 month supply     gemfibrozil (LOPID) 600 MG tablet Take 1 tablet (600 mg) by mouth 2 times daily     hydrocortisone (WESTCORT) 0.2 % external cream For genitals, apply twice daily for up to 2 weeks     hydroxychloroquine (PLAQUENIL) 200 MG tablet Take 1 tablet (200 mg) by mouth daily     Incontinence Supply Disposable (DEPEND UNDERWEAR LARGE) MISC Use twice daily.     meclizine (ANTIVERT) 25 MG tablet Take 1 tablet (25 mg) by mouth every 6 hours as needed for dizziness     melatonin 3 MG tablet Take 1 tablet (3 mg) by mouth nightly as needed for sleep     pantoprazole (PROTONIX) 40 MG EC tablet TAKE 1 TABLET BY MOUTH EVERY DAY     predniSONE (DELTASONE) 2.5 MG tablet Take 1 tablet (2.5 mg) by mouth daily     sildenafil (REVATIO) 20 MG tablet TAKE 2-5 TABLETS BY MOUTH 30 MINUTES PRIOR TO INTERCOURSE     sulfaSALAzine ER (AZULFIDINE EN) 500 MG EC tablet Take 3 tablets (1,500 mg) by mouth 2 times daily     tamsulosin (FLOMAX) 0.4 MG capsule Take 2 capsules (0.8 mg) by mouth daily     tenofovir (VIREAD) 300 MG tablet Take 1 tablet (300 mg) by mouth daily     triamcinolone (KENALOG) 0.1 % external ointment APPLY TOPICALLY TO AFFECTED AREA(S) 3 TIMES DAILY     triamcinolone (KENALOG) 0.1 % external ointment Apply to trunk and extremities twice daily for up to 2 weeks for flares     zolpidem (AMBIEN) 5 MG tablet Take 1 tablet (5 mg) by mouth nightly as needed for sleep     blood glucose (NO BRAND SPECIFIED) lancets standard Use to test blood sugar 2 times daily or as directed. (Patient not  taking: Reported on 3/2/2020)     blood glucose monitoring (NO BRAND SPECIFIED) meter device kit Use to test blood sugar 2 times daily or as directed. (Patient not taking: Reported on 3/2/2020)     blood glucose monitoring (NO BRAND SPECIFIED) test strip Use to test blood sugars 2 times daily or as directed (Patient not taking: Reported on 3/2/2020)     meloxicam (MOBIC) 7.5 MG tablet TAKE 1 TABLET (7.5 MG) BY MOUTH DAILY (Patient not taking: Reported on 3/2/2020)     naloxone (NARCAN) nasal spray Spray 1 spray (4 mg) into one nostril alternating nostrils as needed for opioid reversal every 2-3 minutes until assistance arrives (Patient not taking: Reported on 3/2/2020)     nitroGLYcerin (NITROSTAT) 0.4 MG sublingual tablet PLACE 1 TABLET UNDER THE TONGUE EVERY 5 MINUTES AS NEEDED (Patient not taking: Reported on 3/2/2020)     order for DME Equipment being ordered: chair lift     order for DME Equipment being ordered: single end cane     ORDER FOR DME 1.  CPAP pressure 11 cm/H20 with heated humidity.   2.  Provide mask to fit and CPAP supplies.   3.  Length of need lifetime.   4.  If needed please provide a chin strap          No current facility-administered medications for this visit.      Facility-Administered Medications Ordered in Other Visits   Medication     gadobutrol (GADAVIST) injection 10 mL       Social History   See HPI    Family History     Family History   Problem Relation Age of Onset     C.A.D. Father      Coronary Artery Disease Father      Hypertension Father      Depression Father      Hypertension Mother      Diabetes Mother      Depression Mother      Mental Illness Mother      Hypertension Maternal Grandfather      Cancer Paternal Grandfather      Other Cancer Paternal Grandfather      Other Cancer Other      Autoimmune Disease No family hx of      Cerebrovascular Disease No family hx of      Thyroid Disease No family hx of      Glaucoma No family hx of      Macular Degeneration No family hx of  "     No family history of autoimmune disease  No family history of psoriasis     No change in family history since the previous clinic visit.     Physical Exam     Temp Readings from Last 3 Encounters:   01/14/20 98.4  F (36.9  C) (Oral)   10/08/19 97.6  F (36.4  C) (Oral)   07/22/19 97.5  F (36.4  C) (Temporal)     BP Readings from Last 5 Encounters:   03/10/20 (!) 150/90   03/02/20 133/88   01/14/20 115/78   11/26/19 128/88   10/22/19 (!) 142/90     Pulse Readings from Last 1 Encounters:   03/10/20 65     Resp Readings from Last 1 Encounters:   07/22/19 16     Estimated body mass index is 31.42 kg/m  as calculated from the following:    Height as of 3/10/20: 1.575 m (5' 2\").    Weight as of 3/10/20: 77.9 kg (171 lb 12.8 oz).     Telephone Visit     Labs   RF/CCP  Recent Labs   Lab Test 11/04/15  1547 08/12/14  1414   CCPABY 27*  --    RHF  --  <20     CBC  Recent Labs   Lab Test 06/03/20  1317 03/26/20  1039 02/27/20  0816 11/25/19  0758   WBC 7.4 7.2 7.1 7.2   RBC 4.97 5.16 5.04 4.81   HGB 14.5 15.1 14.5 14.3   HCT 44.0 45.9 44.9 43.7   MCV 89 89 89 91   RDW 13.4 13.3 13.5 14.4    275 273 272   MCH 29.2 29.3 28.8 29.7   MCHC 33.0 32.9 32.3 32.7   NEUTROPHIL 62.0  --  42.3 42.9   LYMPH 24.9  --  41.5 41.2   MONOCYTE 10.6  --  13.0 12.9   EOSINOPHIL 2.0  --  2.5 2.4   BASOPHIL 0.5  --  0.7 0.6   ANEU 4.6  --  3.0 3.1   ALYM 1.9  --  3.0 3.0   PRACHI 0.8  --  0.9 0.9   AEOS 0.2  --  0.2 0.2   ABAS 0.0  --  0.1 0.0     CMP  Recent Labs   Lab Test 06/03/20  1317 03/26/20  1039 02/27/20  0816 11/25/19  0758 10/07/19  0940   NA  --  138  --  141 140   POTASSIUM  --  3.8  --  3.8 3.8   CHLORIDE  --  109  --  110* 110*   CO2  --  25  --  25 26   ANIONGAP  --  4  --  6 4   * 122* 98 101* 103*   BUN  --  16  --  16 19   CR 0.89 1.00 0.84 0.78 0.93   GFRESTIMATED >90 85 >90 >90 >90   GFRESTBLACK >90 >90 >90 >90 >90   HEMANT  --  9.0  --  9.2 9.2   BILITOTAL 0.4 0.3 0.3 0.4 0.4   ALBUMIN 4.1 4.3 3.8 4.0 4.1 "   PROTTOTAL 7.9 8.0 7.3 7.3 7.8   ALKPHOS 106 123 114 89 112   AST 20 23 21 23 32   ALT 31 36 37 47 47     Calcium/VitaminD  Recent Labs   Lab Test 03/26/20  1039 11/25/19  0758 10/07/19  0940  07/13/17  1246  11/04/15  1547  04/01/13  1624   HEMANT 9.0 9.2 9.2   < >  --    < >  --    < > 9.5   VITDT  --   --   --   --  34  --  43  --  34    < > = values in this interval not displayed.     ESR/CRP  Recent Labs   Lab Test 06/03/20  1317 02/27/20  0816 11/25/19  0758   SED 11 10 9   CRP <2.9 <2.9 <2.9     Lipid Panel  Recent Labs   Lab Test 03/14/19  1006 06/22/18  1448 12/04/17  0924  06/29/15  1405 05/22/14  0848 03/24/14  0936   CHOL 152 129 108   < > 219* 169 195   TRIG 126 113 85   < > 372* 379* 301*   HDL 51 64 73   < > 33* 33* 39*   LDL 76 42 18   < > 112 60 95   VLDL  --   --   --   --  74* 76* 60*   CHOLHDLRATIO  --   --   --   --  6.6* 5.1* 5.0   NHDL 101 65 35   < >  --   --   --     < > = values in this interval not displayed.     Hepatitis B  Recent Labs   Lab Test 03/26/20  1039 10/07/19  0940 04/01/19  0851  10/05/16  0954  04/23/13  0812 01/30/13  0906   AUSAB  --   --   --   --  0.45  --   --   --    HBCAB  --   --   --   --   --   --  Positive*  --    HEPBANG  --   --   --   --  Reactive*  --  Positive* Positive*   HBQLOG <1.3 Not Calculated Not Calculated   < > 5.1*   < >  --   --     < > = values in this interval not displayed.     Hepatitis C  Recent Labs   Lab Test 04/07/16  1538 04/23/13  0812   HCVAB Nonreactive   Assay performance characteristics have not been established for newborns,   infants, and children   Negative     Lyme ab screening  Recent Labs   Lab Test 07/18/17  1330   LYMEGM 0.19     Tuberculosis Screening  Recent Labs   Lab Test 04/07/16  1538   TBRSLT Positive   The magnitude of the measured IFN-gamma level cannot be correlated to stage or   degree of infection, level of immune responsiveness, or likelihood for   progression to active disease.  *   TBAGN >10.00  This is a  "qualitative test.  The TB antigen IU/mL value is required for   documentation on certain government reporting forms but this value should not   be used to monitor disease progression or response to therapy.   Diagnosing or excluding tuberculosis disease, and assessing the probability of   LTBI, require a combination of epidemiological, historical, medical and   diagnostic findings that should be taken into account when interpreting   QuantiFERON TB results.       HIV Screening  Recent Labs   Lab Test 11/04/15  1547   HIAGAB Nonreactive   HIV-1 p24 Ag & HIV-1/HIV-2 Ab Not Detected         \"HAND BILATERAL THREE OR MORE VIEWS 11/4/2015 4:55 PM   HISTORY: Pain in unspecified joint.  COMPARISON: None.  IMPRESSION  IMPRESSION: Normal.  DANIS ANGLIN MD\"    \"FOOT BILATERAL THREE OR MORE VIEWS 11/4/2015 4:55 PM   HISTORY: Pain in unspecified joint.  COMPARISON: 8/21/2012.  IMPRESSION  IMPRESSION: Small traction spurs are seen off the Achilles tendon and  plantar fascial attachment sites bilaterally. Joint spaces are  preserved. Osseous structures are intact. Incidental note is made of  some surgical staples in the soft tissues of the medial distal right  lower extremity.  DANIS ANGLIN MD\"      Immunization History     Immunization History   Administered Date(s) Administered     Influenza (IIV3) PF 10/11/2011, 09/20/2017, 09/01/2018     Influenza Vaccine IM > 6 months Valent IIV4 09/27/2015, 09/08/2016, 09/30/2019     Influenza Vaccine, 6+MO IM (QUADRIVALENT W/PRESERVATIVES) 11/17/2014     Pneumo Conj 13-V (2010&after) 04/07/2016     Pneumococcal 23 valent 12/12/2014     TDAP Vaccine (Adacel) 08/30/2011     Zoster vaccine recombinant adjuvanted (SHINGRIX) 07/27/2019          Chart documentation done in part with Dragon Voice recognition Software. Although reviewed after completion, some word and grammatical error may remain.    Phone call start time: 10:42AM  Phone call end time: 10:50 AM    This visit is equivalent to a " [id number] visit    Location of patient: home  Location of provider: home    Follow up:  follow up appointment scheduled to be in Sept    Sebastián Jenkins MD  6/9/2020

## 2020-06-16 ENCOUNTER — TELEPHONE (OUTPATIENT)
Dept: CARDIOLOGY | Facility: CLINIC | Age: 55
End: 2020-06-16

## 2020-06-16 NOTE — TELEPHONE ENCOUNTER
Left message for pt to return call regarding appt on 6/19 with Dr. Travis.   Due to COVID he is unable to come to clinic     Need to convert to a telephone visit.      services used for this message    Lian Masters CMA......June 16, 2020     3:49 PM

## 2020-06-17 NOTE — TELEPHONE ENCOUNTER
Left message with  for pt to return call to convert appt on 6/19 to telephone call.    Lian Masters CMA......June 17, 2020     10:24 AM

## 2020-06-18 NOTE — TELEPHONE ENCOUNTER
Appt converted    Lian Masters, [facility]......June 18, 2020     7:30 AM

## 2020-06-19 ENCOUNTER — VIRTUAL VISIT (OUTPATIENT)
Dept: CARDIOLOGY | Facility: CLINIC | Age: 55
End: 2020-06-19
Payer: COMMERCIAL

## 2020-06-19 DIAGNOSIS — M10.9 GOUT WITHOUT TOPHUS: ICD-10-CM

## 2020-06-19 DIAGNOSIS — E78.5 HYPERLIPIDEMIA LDL GOAL <100: ICD-10-CM

## 2020-06-19 DIAGNOSIS — E78.5 HYPERLIPIDEMIA LDL GOAL <70: ICD-10-CM

## 2020-06-19 DIAGNOSIS — Z95.1 S/P CABG (CORONARY ARTERY BYPASS GRAFT): ICD-10-CM

## 2020-06-19 DIAGNOSIS — I25.810 CORONARY ARTERY DISEASE INVOLVING AUTOLOGOUS ARTERY CORONARY BYPASS GRAFT WITHOUT ANGINA PECTORIS: ICD-10-CM

## 2020-06-19 DIAGNOSIS — I25.10 ATHEROSCLEROSIS OF NATIVE CORONARY ARTERY OF NATIVE HEART, ANGINA PRESENCE UNSPECIFIED: ICD-10-CM

## 2020-06-19 DIAGNOSIS — I25.719 ATHEROSCLEROSIS OF AUTOLOGOUS VEIN CORONARY ARTERY BYPASS GRAFT WITH ANGINA PECTORIS (H): ICD-10-CM

## 2020-06-19 DIAGNOSIS — I25.119 ATHEROSCLEROSIS OF NATIVE CORONARY ARTERY OF NATIVE HEART WITH ANGINA PECTORIS (H): ICD-10-CM

## 2020-06-19 PROCEDURE — 99214 OFFICE O/P EST MOD 30 MIN: CPT | Mod: 95 | Performed by: INTERNAL MEDICINE

## 2020-06-19 RX ORDER — EZETIMIBE 10 MG/1
10 TABLET ORAL DAILY
Qty: 90 TABLET | Refills: 3 | Status: SHIPPED | OUTPATIENT
Start: 2020-06-19 | End: 2021-05-20

## 2020-06-19 RX ORDER — ATORVASTATIN CALCIUM 80 MG/1
TABLET, FILM COATED ORAL
Qty: 90 TABLET | Refills: 3 | Status: SHIPPED | OUTPATIENT
Start: 2020-06-19 | End: 2021-05-20

## 2020-06-19 RX ORDER — CLOPIDOGREL BISULFATE 75 MG/1
75 TABLET ORAL DAILY
Qty: 90 TABLET | Refills: 3 | Status: SHIPPED | OUTPATIENT
Start: 2020-06-19 | End: 2021-07-05

## 2020-06-19 NOTE — PROGRESS NOTES
"Lamont Daniels is a 54 year old male who is being evaluated via a billable telephone visit.      The patient has been notified of following:     \"This telephone visit will be conducted via a call between you and your physician/provider. We have found that certain health care needs can be provided without the need for a physical exam.  This service lets us provide the care you need with a short phone conversation.  If a prescription is necessary we can send it directly to your pharmacy.  If lab work is needed we can place an order for that and you can then stop by our lab to have the test done at a later time.    Telephone visits are billed at different rates depending on your insurance coverage. During this emergency period, for some insurers they may be billed the same as an in-person visit.  Please reach out to your insurance provider with any questions.    If during the course of the call the physician/provider feels a telephone visit is not appropriate, you will not be charged for this service.\"    Patient has given verbal consent for Telephone visit? yes    What phone number would you like to be contacted at? [phone]    How would you like to obtain your AVS? [employer]    Phone call duration: 20 minutes    Fly Travis MD    [facility] Virtual Visit:    Assessment and Plan:     1. Premature CAD, CABG in 2008, PCI to Cx in '08, repeat PCI to Cx stent 2016 (patent sequential LIMA-D3-LAD, sequential SVG-RV marginal-PDA, occluded radial graft to PL branches), normal LV fxn: Cont secondary prevention with asa. Plan to continue plavix long term for now given aggressive ISR and low bleeding risk.   Plavix can be stopped 7 days prior to any planned surgery or procedure and resumed afterwards.  2. Dyspnea: stable. Echo and PFT's have been checked and were normal; its likely related to anxiety.    3. Dyslipidemia, definite heterozygous FH ([facility] score -10), metabolic syndrome: " On lipitor 80, gemfibrozil, zetia 10 and Repatha for uncontrolled dyspilidemia despite maximal rx. Need updated Lipid profile and continue to recheck annually.   Recommended genetic testing, he has not made an appt with genetic counselor.   4. JEROD on CPAP  5. Orthostatic dizziness: Continues to have mild symptoms. No improvement on stopping metoprolol.  6. Rheumatoid arthritis    Annual f/u with FLP    Fly Travis MD    Cardiac Imaging and Prevention  [facility]  [email or web address] Pager: [phone]    HPI: Annual follow-up, phone visit.  He has been doing well, without any intercurrent medical illness, hospitalization, or coronary intervention.  He has been taking all his medications without any issues.  Last year his lipid profile had shown a surprising increase in LDL.  I had asked him to have a repeat LDL done which was never done performed.  We also talked but a consultation with genetic counselor for hyperlipidemia, but he never kept his appointment.  Basic labs are good.  His baseline mild symptoms of chest discomfort, dyspnea, and dizziness are stable.      PAST MEDICAL HISTORY:  Past Medical History:   Diagnosis Date     Anxiety      CAD (coronary artery disease)     CABG, PCI     Congestive heart failure, unspecified 2008     Depressive disorder 2008     Gout      Hepatitis B > 10 years     HTN (hypertension)      Hyperlipidemia LDL goal < 100      Malrotation of intestine      Moderate major depression (H)      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) 9/19/2011    Uses CPAP     Rheumatoid arthritis flare (H) 1012     Steatohepatitis 12/15    liver biopsy     Tuberculosis age 13    INH x 9 months      Vitamin D deficiency        CURRENT MEDICATIONS:  Current Outpatient Medications   Medication     Abatacept (ORENCIA) 125 MG/ML SOAJ auto-injector     Acetaminophen (TYLENOL PO)     allopurinol (ZYLOPRIM) 300 MG tablet     aspirin EC 81 MG EC tablet     atorvastatin (LIPITOR) 80 MG tablet      bacitracin 500 UNIT/GM external ointment     baclofen (LIORESAL) 10 MG tablet     buprenorphine (BUTRANS) 20 MCG/HR WK patch     busPIRone HCl (BUSPAR) 30 MG tablet     Cholecalciferol (VITAMIN D) 2000 UNITS tablet     clobetasol (TEMOVATE) 0.05 % external solution     clonazePAM (KLONOPIN) 1 MG tablet     clopidogrel (PLAVIX) 75 MG tablet     COLCRYS 0.6 MG tablet     desvenlafaxine (PRISTIQ) 50 MG 24 hr tablet     diclofenac (VOLTAREN) 1 % topical gel     evolocumab (REPATHA) 140 MG/ML prefilled autoinjector     ezetimibe (ZETIA) 10 MG tablet     gabapentin (NEURONTIN) 300 MG capsule     gemfibrozil (LOPID) 600 MG tablet     hydrocortisone (WESTCORT) 0.2 % external cream     hydroxychloroquine (PLAQUENIL) 200 MG tablet     Incontinence Supply Disposable (DEPEND UNDERWEAR LARGE) MISC     meclizine (ANTIVERT) 25 MG tablet     melatonin 3 MG tablet     order for DME     order for DME     ORDER FOR DME     pantoprazole (PROTONIX) 40 MG EC tablet     predniSONE (DELTASONE) 1 MG tablet     sildenafil (REVATIO) 20 MG tablet     sulfaSALAzine ER (AZULFIDINE EN) 500 MG EC tablet     tamsulosin (FLOMAX) 0.4 MG capsule     tenofovir (VIREAD) 300 MG tablet     triamcinolone (KENALOG) 0.1 % external ointment     triamcinolone (KENALOG) 0.1 % external ointment     zolpidem (AMBIEN) 5 MG tablet     blood glucose (NO BRAND SPECIFIED) lancets standard     blood glucose monitoring (NO BRAND SPECIFIED) meter device kit     blood glucose monitoring (NO BRAND SPECIFIED) test strip     meloxicam (MOBIC) 7.5 MG tablet     naloxone (NARCAN) nasal spray     nitroGLYcerin (NITROSTAT) 0.4 MG sublingual tablet     No current facility-administered medications for this visit.      Facility-Administered Medications Ordered in Other Visits   Medication     gadobutrol (GADAVIST) injection 10 mL       PAST SURGICAL HISTORY:  Past Surgical History:   Procedure Laterality Date     ABDOMEN SURGERY  2014     BIOPSY  2015     BYPASS GRAFT ARTERY  CORONARY  2008    6 vessels     COLONOSCOPY  2/8/2013    Procedure: COLONOSCOPY;  Colonoscopy, blood in stool;  Surgeon: Duane, William Charles, MD;  Location: [facility] OR     COMBINED REPAIR PTOSIS WITH BLEPHAROPLASTY BILATERAL Bilateral 6/25/2018    Procedure: COMBINED REPAIR PTOSIS WITH BLEPHAROPLASTY BILATERAL;  Bilateral upper eyelid blepharoplasty, ptosis repair and brow ptosis repair;  Surgeon: Sandra Borja MD;  Location: [facility] OR     GI SURGERY  2014     HC CORONARY STENT PERCUT, EA ADDTL VESSEL  2008    3 months after CABG     HEAD & NECK SURGERY  2011     NASAL/SINUS POLYPECTOMY  2010     ORTHOPEDIC SURGERY  2012     REPAIR PTOSIS       REPAIR PTOSIS BILATERAL Bilateral 7/22/2019    Procedure: REPAIR, PTOSIS, BOTH UPPER brows;  Surgeon: Sandra Borja MD;  Location: [facility] OR     REPAIR PTOSIS BROW BILATERAL Bilateral 6/25/2018    Procedure: REPAIR PTOSIS BROW BILATERAL;;  Surgeon: Sandra Borja MD;  Location: [facility] OR     THORACIC SURGERY  1989    tb     TONSILLECTOMY  2010     UVULOPALATOPHARYNGOPLASTY  2010     VASCULAR SURGERY  2008       ALLERGIES     Allergies   Allergen Reactions     Citalopram Itching     Tramadol Itching       FAMILY HISTORY:  Family History   Problem Relation Age of Onset     C.A.D. Father      Coronary Artery Disease Father      Hypertension Father      Depression Father      Hypertension Mother      Diabetes Mother      Depression Mother      Mental Illness Mother      Hypertension Maternal Grandfather      Cancer Paternal Grandfather      Other Cancer Paternal Grandfather      Other Cancer Other      Autoimmune Disease No family hx of      Cerebrovascular Disease No family hx of      Thyroid Disease No family hx of      Glaucoma No family hx of      Macular Degeneration No family hx of        SOCIAL HISTORY:  Social History     Socioeconomic History     Marital status:      Spouse name: Not on file     Number of children: 5     Years of education: 14     Highest  education level: Not on file   Occupational History     Occupation: [employer]     Employer: UNEMPLOYED     Comment: 2-2011. On long term disability. SSD applied for   Social Needs     Financial resource strain: Not on file     Food insecurity     Worry: Not on file     Inability: Not on file     Transportation needs     Medical: Not on file     Non-medical: Not on file   Tobacco Use     Smoking status: Never Smoker     Smokeless tobacco: Never Used   Substance and Sexual Activity     Alcohol use: No     Alcohol/week: 0.0 standard drinks     Drug use: No     Sexual activity: Yes     Partners: Female   Lifestyle     Physical activity     Days per week: Not on file     Minutes per session: Not on file     Stress: Not on file   Relationships     Social connections     Talks on phone: Not on file     Gets together: Not on file     Attends Congregation service: Not on file     Active member of club or organization: Not on file     Attends meetings of clubs or organizations: Not on file     Relationship status: Not on file     Intimate partner violence     Fear of current or ex partner: Not on file     Emotionally abused: Not on file     Physically abused: Not on file     Forced sexual activity: Not on file   Other Topics Concern     Parent/sibling w/ CABG, MI or angioplasty before 65F 55M? Yes     Comment: father   Social History Narrative    . No current SO.         Has 5 children. Youngest child does live with him.         H/o AA degree and previously worked as a [employer]. Currently unemployed.         Denies tobacco use.     Alcohol use: 2x/week with minimal amount of alcohol per time.     Drug use: denies       ROS:   Constitutional: No fever, chills, or sweats. No weight gain/loss   ENT: No visual disturbance, ear ache, epistaxis, sore throat  Allergies/Immunologic: Negative.   Respiratory: No cough, hemoptysia  Cardiovascular: As per HPI  GI: No nausea, vomiting, hematemesis, melena, or  hematochezia  : No urinary frequency, dysuria, or hematuria  Integument: Negative  Psychiatric: Negative  Neuro: Negative  Endocrinology: Negative   Musculoskeletal: Negative    ADDITIONAL COMMENTS:     I reviewed the patient's medications:     I reviewed the patient's pertinent clinical laboratory studies:     I reviewed the patient's pertinent imaging studies:   I reviewed the patient's ECG:

## 2020-06-25 ENCOUNTER — MYC REFILL (OUTPATIENT)
Dept: PALLIATIVE MEDICINE | Facility: CLINIC | Age: 55
End: 2020-06-25

## 2020-06-25 DIAGNOSIS — M06.9 RHEUMATOID ARTHRITIS INVOLVING MULTIPLE SITES, UNSPECIFIED RHEUMATOID FACTOR PRESENCE: ICD-10-CM

## 2020-06-25 DIAGNOSIS — E78.5 HYPERLIPIDEMIA LDL GOAL <100: ICD-10-CM

## 2020-06-25 LAB
CHOLEST SERPL-MCNC: 122 MG/DL
HDLC SERPL-MCNC: 59 MG/DL
LDLC SERPL CALC-MCNC: 33 MG/DL
NONHDLC SERPL-MCNC: 63 MG/DL
TRIGL SERPL-MCNC: 148 MG/DL

## 2020-06-25 PROCEDURE — 36415 COLL VENOUS BLD VENIPUNCTURE: CPT | Performed by: INTERNAL MEDICINE

## 2020-06-25 PROCEDURE — 80061 LIPID PANEL: CPT | Performed by: INTERNAL MEDICINE

## 2020-06-25 RX ORDER — BUPRENORPHINE 20 UG/H
1 PATCH TRANSDERMAL
Qty: 4 PATCH | Refills: 1 | Status: CANCELLED | OUTPATIENT
Start: 2020-06-25

## 2020-06-25 RX ORDER — BUPRENORPHINE 20 UG/H
1 PATCH TRANSDERMAL
Qty: 4 PATCH | Refills: 1 | Status: SHIPPED | OUTPATIENT
Start: 2020-06-25 | End: 2020-08-25

## 2020-06-25 NOTE — TELEPHONE ENCOUNTER
Routed to MA pool to gather required information for opioid refill.    Joselyn MEJIAN, RN Care Coordinator  [facility]  [facility]

## 2020-06-25 NOTE — TELEPHONE ENCOUNTER
Script Eprescribed to pharmacy    Will send this to MA team to notify patient.    Signed Prescriptions:                        Disp   Refills    buprenorphine (BUTRANS) 20 MCG/HR WK patch 4 patch1        Sig: Place 1 patch onto the skin every 7 days . Name brand           only.  Fill 06/30/2020 start 07/02/2020, 28 day           supply  Authorizing Provider: KAYLA CHUN MD  [facility]

## 2020-06-25 NOTE — TELEPHONE ENCOUNTER
Received call from patient requesting refill(s) of buprenorphine (BUTRANS) 20 MCG/HR WK patch     Last dispensed from pharmacy on 5/31/2020    Pt last seen by prescribing provider on 3/2/2020  Next appt scheduled for 7/10/2020     checked in the past 6 months? Yes If no, print current report and give to RN    Last urine drug screen date 3/2/2020  Current opioid agreement on file? Yes Date of opioid agreement: 3/2/2020    E-prescribe to      [facility] [id number] [place] [facility] [place] - [address]    Will route to nursing pool for review and preparation of prescription(s).

## 2020-06-25 NOTE — TELEPHONE ENCOUNTER
Routing to provider to review medication prepped per below      Butrans 20 mcg Patch, #4, Refill: No  Sig: . Name brand only.  Fill 06/30/2020 start 07/02/2020, 28 day supply  Last picked up 05/31/2020   Due 07/02/2020    Per last OV note 03/02/2020: Continue Butrans 20 mcg/hr wk patches at goal dose.  He has sleep apnea that significantly improved going from full agonist to Butrans, and I do not plan to go back.    Follow up scheduled 07/10/2020    Called patient.  He put his last patch on today and changes it on Thursdays.      Kinsey Brewer RN  Care Coordinator  [facility] Pain Management

## 2020-07-17 DIAGNOSIS — L40.9 PSORIASIS: Primary | ICD-10-CM

## 2020-07-22 RX ORDER — HYDROCORTISONE VALERATE CREAM 2 MG/G
CREAM TOPICAL
Qty: 15 G | Refills: 1 | Status: SHIPPED | OUTPATIENT
Start: 2020-07-22 | End: 2020-11-12

## 2020-07-22 NOTE — TELEPHONE ENCOUNTER
7/22 Called and left voicemail. Provided phone number [phone] to schedule follow up with Dermatology for medication refill.     Luisana Portillo   Procedure    Ortho/Sports Med/Ent/Eye   [facility]   [phone]

## 2020-07-29 NOTE — TELEPHONE ENCOUNTER
7/29 2nd attempt. Called and left voicemail. Provided phone number [phone] to schedule follow up with Dermatology for medication refill.      Luisana Portillo          Procedure    Ortho/Sports Med/Ent/Eye   [facility]   [phone]

## 2020-08-01 DIAGNOSIS — M06.09 RHEUMATOID ARTHRITIS OF MULTIPLE SITES WITH NEGATIVE RHEUMATOID FACTOR (H): ICD-10-CM

## 2020-08-01 RX ORDER — PREDNISONE 1 MG/1
TABLET ORAL
Qty: 90 TABLET | Refills: 0 | Status: CANCELLED | OUTPATIENT
Start: 2020-08-01

## 2020-08-03 NOTE — TELEPHONE ENCOUNTER
Sent patient a [employer] message inquiring about prednisone request.    Jf Packer RN....8/3/2020 4:00 PM

## 2020-08-05 DIAGNOSIS — M47.819 FACET ARTHROPATHY: ICD-10-CM

## 2020-08-05 DIAGNOSIS — M06.9 RHEUMATOID ARTHRITIS INVOLVING MULTIPLE SITES, UNSPECIFIED RHEUMATOID FACTOR PRESENCE: ICD-10-CM

## 2020-08-05 RX ORDER — GABAPENTIN 300 MG/1
600 CAPSULE ORAL 3 TIMES DAILY
Qty: 540 CAPSULE | Refills: 6 | Status: SHIPPED | OUTPATIENT
Start: 2020-08-05 | End: 2021-09-03

## 2020-08-05 NOTE — TELEPHONE ENCOUNTER
Called patient and asked if he requested a refill of prednisone. Patient stated he thinks he still has some left but will check when he gets home and will call us back.    Jf Packer RN....8/5/2020 3:14 PM

## 2020-08-05 NOTE — TELEPHONE ENCOUNTER
Signed Prescriptions:                        Disp   Refills    gabapentin (NEURONTIN) 300 MG capsule      540 ca*6        Sig: Take 2 capsules (600 mg) by mouth 3 times daily . 3           month supply  Authorizing Provider: KAYLA CHUN MD  [facility] Pain Management

## 2020-08-05 NOTE — TELEPHONE ENCOUNTER
Received fax request from [facility] pharmacy requesting refill(s) for gabapentin (NEURONTIN) 300 MG capsule    Last refilled on 05/10/20    Pt last seen on 07/10/20-virtual visit  Next appt scheduled for : none    Will facilitate refill.

## 2020-08-05 NOTE — TELEPHONE ENCOUNTER
78/5 3rd attempt. Called and left voicemail. Provided phone number [phone] to schedule follow up with Dermatology for medication refill.      Luisana Portillo          Procedure    Ortho/Sports Med/Ent/Eye   [facility]   [phone]

## 2020-08-13 DIAGNOSIS — F41.9 ANXIETY HYPERVENTILATION: ICD-10-CM

## 2020-08-13 DIAGNOSIS — R21 RASH: ICD-10-CM

## 2020-08-13 DIAGNOSIS — F45.8 ANXIETY HYPERVENTILATION: ICD-10-CM

## 2020-08-14 RX ORDER — PREDNISONE 1 MG/1
1 TABLET ORAL DAILY
Qty: 90 TABLET | Refills: 2 | Status: SHIPPED | OUTPATIENT
Start: 2020-08-14 | End: 2020-09-08

## 2020-08-14 NOTE — TELEPHONE ENCOUNTER
Message left for patient that a prescription has been refilled and sent to pharmacy, any questions please call us at [phone].     Helga Guerrier CMA Rheumatology  8/14/2020 9:53 AM

## 2020-08-14 NOTE — TELEPHONE ENCOUNTER
Rheumatology team: Please call to notify Mr. Daniels that prednisone has been refilled.  Sebastián Jenkins MD  8/14/2020 5:02 AM

## 2020-08-17 DIAGNOSIS — L40.9 PSORIASIS: ICD-10-CM

## 2020-08-17 NOTE — TELEPHONE ENCOUNTER
Medication: clobetasol 0.05% soln  Request from:fax (fax, call, my chart, other)    Pharmacy: [facility]    If from fax:  Last date filled: 5/10/20  QTY: 60mL    Date fax was received: 8/14/20    Joann Rod RN

## 2020-08-18 RX ORDER — CLONAZEPAM 1 MG/1
.5-1 TABLET ORAL 2 TIMES DAILY PRN
Qty: 90 TABLET | Refills: 0 | Status: SHIPPED | OUTPATIENT
Start: 2020-08-18 | End: 2020-10-20

## 2020-08-18 RX ORDER — TRIAMCINOLONE ACETONIDE 1 MG/G
OINTMENT TOPICAL
Qty: 80 G | Refills: 2 | Status: SHIPPED | OUTPATIENT
Start: 2020-08-18 | End: 2020-11-15

## 2020-08-18 NOTE — TELEPHONE ENCOUNTER
Prescription for Triamcinolone cream approved per [facility] Refill Protocol.      Routing refill request for Clonazepam to provider for review/approval because:  Drug not on the [facility] refill protocol       Licha Gordon RN  [facility]

## 2020-08-21 ENCOUNTER — MYC REFILL (OUTPATIENT)
Dept: FAMILY MEDICINE | Facility: CLINIC | Age: 55
End: 2020-08-21

## 2020-08-21 DIAGNOSIS — I25.119 ATHEROSCLEROSIS OF NATIVE CORONARY ARTERY OF NATIVE HEART WITH ANGINA PECTORIS (H): ICD-10-CM

## 2020-08-21 DIAGNOSIS — M10.9 GOUT WITHOUT TOPHUS: ICD-10-CM

## 2020-08-21 RX ORDER — CLOBETASOL PROPIONATE 0.5 MG/ML
SOLUTION TOPICAL
Qty: 60 ML | Refills: 1 | OUTPATIENT
Start: 2020-08-21

## 2020-08-24 RX ORDER — COLCHICINE 0.6 MG/1
TABLET, FILM COATED ORAL
Qty: 30 TABLET | Refills: 0 | Status: SHIPPED | OUTPATIENT
Start: 2020-08-24 | End: 2021-03-01

## 2020-08-24 RX ORDER — NITROGLYCERIN 0.4 MG/1
TABLET SUBLINGUAL
Qty: 25 TABLET | Refills: 1 | Status: SHIPPED | OUTPATIENT
Start: 2020-08-24 | End: 2022-06-24

## 2020-08-24 NOTE — TELEPHONE ENCOUNTER
Routing refill request to provider for review/approval because: Nitrogylcerin        Routing refill request to provider for review/approval because: [place]    due to not having the following:      Has Uric Acid on file in past 12 months and value is less than 6       Anabel Andrew RN

## 2020-08-25 ENCOUNTER — MYC REFILL (OUTPATIENT)
Dept: PALLIATIVE MEDICINE | Facility: CLINIC | Age: 55
End: 2020-08-25

## 2020-08-25 ENCOUNTER — VIRTUAL VISIT (OUTPATIENT)
Dept: FAMILY MEDICINE | Facility: CLINIC | Age: 55
End: 2020-08-25
Payer: COMMERCIAL

## 2020-08-25 DIAGNOSIS — M06.9 RHEUMATOID ARTHRITIS INVOLVING MULTIPLE SITES, UNSPECIFIED RHEUMATOID FACTOR PRESENCE: ICD-10-CM

## 2020-08-25 DIAGNOSIS — M06.09 RHEUMATOID ARTHRITIS OF MULTIPLE SITES WITH NEGATIVE RHEUMATOID FACTOR (H): Primary | ICD-10-CM

## 2020-08-25 PROCEDURE — 99213 OFFICE O/P EST LOW 20 MIN: CPT | Mod: 95 | Performed by: FAMILY MEDICINE

## 2020-08-25 RX ORDER — BUPRENORPHINE 20 UG/H
1 PATCH TRANSDERMAL
Qty: 4 PATCH | Refills: 1 | Status: SHIPPED | OUTPATIENT
Start: 2020-08-25 | End: 2020-10-23

## 2020-08-25 RX ORDER — BUPRENORPHINE 20 UG/H
1 PATCH TRANSDERMAL
Qty: 4 PATCH | Refills: 1 | Status: CANCELLED | OUTPATIENT
Start: 2020-08-25

## 2020-08-25 ASSESSMENT — PATIENT HEALTH QUESTIONNAIRE - PHQ9: SUM OF ALL RESPONSES TO PHQ QUESTIONS 1-9: 16

## 2020-08-25 NOTE — TELEPHONE ENCOUNTER
Script Eprescribed to pharmacy    Will send this to MA team to notify patient.    Signed Prescriptions:                        Disp   Refills    buprenorphine (BUTRANS) 20 MCG/HR WK patch 4 patch1        Sig: Place 1 patch onto the skin every 7 days . Name brand           only.  Fill 8/25/20 start 8/25/20, 28 day supply  Authorizing Provider: KAYLA CHUN MD  [facility] Pain Management

## 2020-08-25 NOTE — PROGRESS NOTES
"Lamont Daniels is a 54 year old male who is being evaluated via a billable telephone visit.      The patient has been notified of following:     \"This telephone visit will be conducted via a call between you and your physician/provider. We have found that certain health care needs can be provided without the need for a physical exam.  This service lets us provide the care you need with a short phone conversation.  If a prescription is necessary we can send it directly to your pharmacy.  If lab work is needed we can place an order for that and you can then stop by our lab to have the test done at a later time.    Telephone visits are billed at different rates depending on your insurance coverage. During this emergency period, for some insurers they may be billed the same as an in-person visit.  Please reach out to your insurance provider with any questions.    If during the course of the call the physician/provider feels a telephone visit is not appropriate, you will not be charged for this service.\"    Patient has given verbal consent for Telephone visit?  Yes    What phone number would you like to be contacted at? [phone]    How would you like to obtain your AVS? MyChart    Subjective     Lamont Daniels is a 54 year old male who presents via phone visit today for the following health issues:    HPI    Pt fell about a month ago. Having multiple pains all over body per pt - legs, arms, back. Requesting a walker to help him. Was seen in hospital 7/16/20    Review of Systems   Constitutional, HEENT, cardiovascular, pulmonary, GI, , musculoskeletal, neuro, skin, endocrine and psych systems are negative, except as otherwise noted.       Objective          Vitals:  No vitals were obtained today due to virtual visit.    healthy, alert and no distress  PSYCH: Alert and oriented times 3; coherent speech, normal   rate and volume, able to articulate logical thoughts, able   to abstract reason, no tangential thoughts, no " "hallucinations   or delusions  His affect is normal  RESP: No cough, no audible wheezing, able to talk in full sentences  Remainder of exam unable to be completed due to telephone visits      Assessment/Plan:    Assessment & Plan     Rheumatoid arthritis of multiple sites with negative rheumatoid factor (H)  Walker ordered to help patient ambulate to do daily activities.  - Walker Order for DME - ONLY FOR DME     BMI:   Estimated body mass index is 31.42 kg/m  as calculated from the following:    Height as of 3/10/20: 1.575 m (5' 2\").    Weight as of 3/10/20: 77.9 kg (171 lb 12.8 oz).     Regular exercise  See Patient Instructions    Return in about 3 months (around 11/25/2020).    David Chavez MD, MD  [facility]    Phone call duration:  11 minutes              "

## 2020-08-25 NOTE — TELEPHONE ENCOUNTER
Informed patient that their refill of  buprenorphine (BUTRANS) 20 MCG/HR WK patch 4 pa was E-scribed to the pharmacy. Patient was informed of fill and start date.    Nahomy Church [facility]  [place]

## 2020-08-25 NOTE — TELEPHONE ENCOUNTER
Received call from patient requesting refill(s) of buprenorphine (BUTRANS) 20 MCG/HR WK patch    Last dispensed from pharmacy on 07/25/20    Pt last seen by prescribing provider on 07/10/20-virtual visit  Next appt scheduled for 10/21/20     checked in the past 6 months? Yes If no, print current report and give to RN    Last urine drug screen date 03/02/20  Current opioid agreement on file (completed within the last year) Yes Date of opioid agreement: 03/02/20    E-prescribe to  [facility]acy[facility] [id number] [facility] - [place] - [address]     Will route to nursing [place] for review and preparation of prescription(s).

## 2020-08-25 NOTE — TELEPHONE ENCOUNTER
Medication refill information reviewed.     Due date for buprenorphine (BUTRANS) 20 MCG/HR WK patch is 8/25/20     Prescriptions prepped for review.     Will route to provider.

## 2020-09-08 ENCOUNTER — VIRTUAL VISIT (OUTPATIENT)
Dept: RHEUMATOLOGY | Facility: CLINIC | Age: 55
End: 2020-09-08
Payer: COMMERCIAL

## 2020-09-08 DIAGNOSIS — M06.09 RHEUMATOID ARTHRITIS OF MULTIPLE SITES WITH NEGATIVE RHEUMATOID FACTOR (H): Primary | ICD-10-CM

## 2020-09-08 DIAGNOSIS — B18.1 CHRONIC VIRAL HEPATITIS B WITHOUT DELTA AGENT AND WITHOUT COMA (H): ICD-10-CM

## 2020-09-08 DIAGNOSIS — Z79.899 HIGH RISK MEDICATION USE: ICD-10-CM

## 2020-09-08 PROCEDURE — 99213 OFFICE O/P EST LOW 20 MIN: CPT | Mod: 95 | Performed by: INTERNAL MEDICINE

## 2020-09-08 NOTE — PROGRESS NOTES
" Lamont Daniels is a 54 year old male who is being evaluated via a billable telephone visit.      The patient has been notified of following:     \"This telephone visit will be conducted via a call between you and your physician/provider. We have found that certain health care needs can be provided without the need for a physical exam.  This service lets us provide the care you need with a short phone conversation.  If a prescription is necessary we can send it directly to your pharmacy.  If lab work is needed we can place an order for that and you can then stop by our lab to have the test done at a later time.    Telephone visits are billed at different rates depending on your insurance coverage. During this emergency period, for some insurers they may be billed the same as an in-person visit.  Please reach out to your insurance provider with any questions.    If during the course of the call the physician/provider feels a telephone visit is not appropriate, you will not be charged for this service.\"    Patient has given verbal consent for Telephone visit?  Yes    What phone number would you like to be contacted at? [phone]    How would you like to obtain your AVS? [facility]      Rheumatology Telephone/Telehealth  Visit      Lamont Daniels MRN# [id number]   YOB: 1965 Age: 54 year old      Date of visit: 9/08/20   PCP: Dr. David Chavez  Hepatologist: Dr. Thomas Leventhal     Chief Complaint   Patient presents with:  Arthritis: RA. Still having pain    Assessment and Plan   1.  Seropositive nonerosive rheumatoid arthritis (RF negative, CCP low positive): Note that he does have low back pain but without evidence of SI joint irregularity on x-ray.  Initially with morning stiffness all day, gelling phenomenon, and synovitis.   Previously on Humira (partially effective), Enbrel (partially effective). MTX avoided per Dr. Laboy recommendation. Currently on SSZ 1500mg BID, Orencia SQ every 7 days, (initially Rx'd " "4/18/2017), prednisone 1mg daily, and HCQ 200mg daily.  Treatment has been in coordination with hepatology as he requires HBV tx while on bDMARD. Doing well at this time with regard to RA and no worsening symptoms with prednisone dose reduction so will stop prednisone.   - Continue sulfasalazine 1500mg BID  - Continue Orencia SQ every 7 days  - Continue hydroxychloroquine 200 mg daily (last eye exam in 11/2019)  - Discontinue prednisone 1mg daily  - Labs within 1 week and in 3 months: CBC, Creatinine, Hepatic Panel, ESR, CRP, glucose    2. Lower back pain: Lumbar spine MRI in August 2014 showed multilevel degenerative changes and a small disc protrusion at L3/4 with mild spinal canal narrowing; also moderate left and mild right neural foraminal narrowing at L5-S1. He had a nerve conduction study in 2014 that was normal. He has been worked up by neurology in the past without significant neurologic findings. HLA-B27 negative, SI joint x-ray negative. Now following in the pain management clinic.     3. Myofascial pain syndrome / chronic pain syndrome: diffuse pain consistent with chronic pain syndrome.  Management per pain clinic as well as her PCP.  I strongly encourage that he increase physical activity as he leads a sedentary lifestyle with no more activity than what is required for his activities of daily living.  Encouraged low impact physical activity such as walking, multiple times per day    4. Chronic Hepatitis B: Managed by Dr. Laboy (hepatology) previously, and now Dr. Thomas Leventhal at the [facility]. Currently on tenofovir.     5. Hepatic Steatosis: Based on previous liver biopsy.  Seeing hepatology.      6. Positive tuberculosis test:   This is noted here for historical significance.  He was evaluated by Dr. Anthony Mendoza, infectious disease. The following telephone note was documented by Dr. Mendoza: \"I tried calling th pt, finally we have the records from [place] . It appears he was treated for " "latent TB with INH for 1 year in 1980/1981 .Later in 1981 April [place] [facility] [place] with rt sided pneumonia, TB was suspected but he improved with treatment with Penicillin only. Later he was seen  ( likely ID specialist), and was treated with 6 weeks course of Rifampin and Ethambutol pending sputum AFB culture. His AFB cx were negative based on the report we have.\"  \"He recently had QFT -TB test which was reported positive and his CXR was clear. Given his documented hx of completion of treatment for latent TB as above , I do not see any need for further treatment. His tests may remain positive irrespective of treatment status. From my perspective he can be started on DMARDs/Biological as deemed necessary.\"       7. Gout: On allopurinol and managed by PCP.     # Relevant labs and imaging were reviewed with the patient    # High risk medication toxicity monitoring: discussion and labs reviewed; appropriate labs ordered. See above.  Instructed that if confirmed to have COVID-19 or exposure to someone with confirmed COVID-19 to call this clinic for directions on DMARD management.    # Note that this is a virtual visit to reduce the risk of COVID-19 exposure during this current pandemic.      # Considered to be at high risk of complications from the COVID-19 virus.  It is recommended to limit contact with other people and if possible to work remotely or provide a leave of absence to reduce the risk for COVID-19.      Mr. Daniels verbalized agreement with and understanding of the rational for the diagnosis and treatment plan.  All questions were answered to best of my ability and the patient's satisfaction. Mr. Daniels was advised to contact the clinic with any questions that may arise after the clinic visit.      Thank you for involving me in the care of the patient    Return to clinic: 3 months      HPI   Lamont Daniels is a 54 year old male with a medical history significant for " hypertension, hyperlipidemia, gout, coronary artery disease s/p 6vCABG, vitamin D deficiency, hepatitis B, and RA who presents for f/u of RA.    Today, 9/8/2020: doing well with regard to RA.  No worsening joint symptoms with prednisone dose reduction.  No joint swelling.  Morning stiffness for less than 10-20 minutes.  Weather changes do affect his joints.    Denies fevers, chills, nausea, vomiting, constipation, diarrhea. No abdominal pain. Denies chest pain/pressure, palpitations, or shortness of breath.  No oral or nasal sores. No rash. No LE swelling.  Mild dry mouth; he has good dentition and does not feel like he needs to use frequent sips of water; unchanged since last evaluation. No dry eyes. No eye pain or redness.     Tobacco:  None   EtOH:  None  Drugs:  None  Occupation: Retired   Originally from [facility], then lived [place] north of [place], [place], then lived in [place], [place], then moved to [place] for family    ROS   GEN: No fevers, chills, night sweats; + fatigue   SKIN: No itching, rashes, sores  HEENT: No epistaxis. No oral or nasal ulcers.  CV: See HPI  PULM: See HPI  GI: No nausea, vomiting, constipation, diarrhea. No blood in stool. No abdominal pain.  : No blood in urine.  MSK: See HPI.  NEURO: See HPI  EXT: No LE swelling  PSYCH: Negative    Active Problem List     Patient Active Problem List   Diagnosis     Coronary atherosclerosis of native coronary artery     Major depressive disorder, recurrent episode, moderate (H)     Anxiety     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)     Hepatitis B infection     Vitamin D deficiency     S/P CABG (coronary artery bypass graft)     Malrotation of intestine     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     Hyperlipidemia LDL goal <70     Insomnia     Gout     Suicidal ideation     Essential hypertension     Rheumatoid arthritis involving multiple sites, unspecified rheumatoid factor presence (H)     High risk medications (not anticoagulants) long-term  use     Midline low back pain without sciatica     Bilateral low back pain with sciatica, sciatica laterality unspecified     Elevated liver enzymes     On corticosteroid therapy     Essential hypertension with goal blood pressure less than 140/90     Insomnia, unspecified type     Rheumatoid arthritis of multiple sites with negative rheumatoid factor (H)     Benign prostatic hyperplasia with lower urinary tract symptoms, unspecified morphology     Chronic pain syndrome     Past Medical History     Past Medical History:   Diagnosis Date     Anxiety      CAD (coronary artery disease)     CABG, PCI     Congestive heart failure, unspecified 2008     Depressive disorder 2008     Gout      Hepatitis B > 10 years     HTN (hypertension)      Hyperlipidemia LDL goal < 100      Malrotation of intestine      Moderate major depression (H)      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) 9/19/2011    Uses CPAP     Rheumatoid arthritis flare (H) 1012     Steatohepatitis 12/15    liver biopsy     Tuberculosis age 13    INH x 9 months      Vitamin D deficiency      Past Surgical History     Past Surgical History:   Procedure Laterality Date     ABDOMEN SURGERY  2014     BIOPSY  2015     BYPASS GRAFT ARTERY CORONARY  2008    6 vessels     COLONOSCOPY  2/8/2013    Procedure: COLONOSCOPY;  Colonoscopy, blood in stool;  Surgeon: Duane, William Charles, MD;  Location: [facility] OR     COMBINED REPAIR PTOSIS WITH BLEPHAROPLASTY BILATERAL Bilateral 6/25/2018    Procedure: COMBINED REPAIR PTOSIS WITH BLEPHAROPLASTY BILATERAL;  Bilateral upper eyelid blepharoplasty, ptosis repair and brow ptosis repair;  Surgeon: Sandra Borja MD;  Location: [facility] OR     GI SURGERY  2014     HC CORONARY STENT PERCUT, EA ADDTL VESSEL  2008    3 months after CABG     HEAD & NECK SURGERY  2011     NASAL/SINUS POLYPECTOMY  2010     ORTHOPEDIC SURGERY  2012     REPAIR PTOSIS       REPAIR PTOSIS BILATERAL Bilateral 7/22/2019    Procedure: REPAIR, PTOSIS, BOTH UPPER brows;  Surgeon:  Sandra Borja MD;  Location: [facility] OR     REPAIR PTOSIS BROW BILATERAL Bilateral 6/25/2018    Procedure: REPAIR PTOSIS BROW BILATERAL;;  Surgeon: Sandra Borja MD;  Location: [facility] OR     THORACIC SURGERY  1989    tb     TONSILLECTOMY  2010     UVULOPALATOPHARYNGOPLASTY  2010     VASCULAR SURGERY  2008     Allergy     Allergies   Allergen Reactions     Citalopram Itching     Tramadol Itching     Current Medication List     Current Outpatient Medications   Medication Sig     Abatacept (ORENCIA) 125 MG/ML SOAJ auto-injector Inject 1 mL (125 mg) Subcutaneous every 7 days     Acetaminophen (TYLENOL PO)      allopurinol (ZYLOPRIM) 300 MG tablet TAKE 1 TABLET BY MOUTH EVERY DAY     aspirin EC 81 MG EC tablet Take 1 tablet (81 mg) by mouth daily (*)     atorvastatin (LIPITOR) 80 MG tablet TAKE 1 TABLET BY MOUTH 1 TIME DAILY     bacitracin 500 UNIT/GM external ointment Apply topically 3 times daily     baclofen (LIORESAL) 10 MG tablet TAKE 1 TABLET BY MOUTH 2 TIMES DAILY AS NEEDED FOR MUSCLE SPASM     buprenorphine (BUTRANS) 20 MCG/HR WK patch Place 1 patch onto the skin every 7 days . Name brand only.  Fill 8/25/20 start 8/25/20, 28 day supply     busPIRone HCl (BUSPAR) 30 MG tablet Take 1 tablet (30 mg) by mouth 2 times daily     Cholecalciferol (VITAMIN D) 2000 UNITS tablet TAKE ONE TABLET BY MOUTH ONCE DAILY     clonazePAM (KLONOPIN) 1 MG tablet TAKE 0.5-1 TABLETS (0.5-1 MG) BY MOUTH 2 TIMES DAILY AS NEEDED FOR ANXIETY     clopidogrel (PLAVIX) 75 MG tablet Take 1 tablet (75 mg) by mouth daily     COLCRYS 0.6 MG tablet TAKE 2 TABLETS BY MOUTH AT THE FIRST SIGN OF FLARE, TAKE 1 ADDITIONAL TABLET ONE HOUR LATER.     desvenlafaxine (PRISTIQ) 50 MG 24 hr tablet Take 1 tablet (50 mg) by mouth daily     diclofenac (VOLTAREN) 1 % topical gel Apply 4 grams to bilateral knees, up to four times daily as needed using enclosed dosing card.  Max of 32g/day     evolocumab (REPATHA) 140 MG/ML prefilled autoinjector Inject 1 mL (140  mg) Subcutaneous every 14 days     ezetimibe (ZETIA) 10 MG tablet Take 1 tablet (10 mg) by mouth daily     gabapentin (NEURONTIN) 300 MG capsule Take 2 capsules (600 mg) by mouth 3 times daily . 3 month supply     gemfibrozil (LOPID) 600 MG tablet Take 1 tablet (600 mg) by mouth 2 times daily     hydrocortisone (WESTCORT) 0.2 % external cream For genitals, apply twice daily for up to 2 weeks **Call clinic to schedule follow up appointment.**     hydroxychloroquine (PLAQUENIL) 200 MG tablet Take 1 tablet (200 mg) by mouth daily     Incontinence Supply Disposable (DEPEND UNDERWEAR LARGE) MISC Use twice daily.     meclizine (ANTIVERT) 25 MG tablet Take 1 tablet (25 mg) by mouth every 6 hours as needed for dizziness     melatonin 3 MG tablet Take 1 tablet (3 mg) by mouth nightly as needed for sleep     pantoprazole (PROTONIX) 40 MG EC tablet TAKE 1 TABLET BY MOUTH EVERY DAY     predniSONE (DELTASONE) 1 MG tablet Prednisone 2mg daily x30days, then 1mg daily x30days, then stop.     sulfaSALAzine ER (AZULFIDINE EN) 500 MG EC tablet Take 3 tablets (1,500 mg) by mouth 2 times daily     tamsulosin (FLOMAX) 0.4 MG capsule Take 2 capsules (0.8 mg) by mouth daily     tenofovir (VIREAD) 300 MG tablet Take 1 tablet (300 mg) by mouth daily     triamcinolone (KENALOG) 0.1 % external ointment APPLY TOPICALLY TO AFFECTED AREA(S) 3 TIMES DAILY     zolpidem (AMBIEN) 5 MG tablet Take 1 tablet (5 mg) by mouth nightly as needed for sleep     blood glucose (NO BRAND SPECIFIED) lancets standard Use to test blood sugar 2 times daily or as directed. (Patient not taking: Reported on 3/2/2020)     blood glucose monitoring (NO BRAND SPECIFIED) meter device kit Use to test blood sugar 2 times daily or as directed. (Patient not taking: Reported on 3/2/2020)     blood glucose monitoring (NO BRAND SPECIFIED) test strip Use to test blood sugars 2 times daily or as directed (Patient not taking: Reported on 3/2/2020)     clobetasol (TEMOVATE) 0.05 %  external solution Apply twice daily to scalp for up to 2 weeks for flares.     meloxicam (MOBIC) 7.5 MG tablet TAKE 1 TABLET (7.5 MG) BY MOUTH DAILY (Patient not taking: Reported on 3/2/2020)     naloxone (NARCAN) nasal spray Spray 1 spray (4 mg) into one nostril alternating nostrils as needed for opioid reversal every 2-3 minutes until assistance arrives (Patient not taking: Reported on 3/2/2020)     nitroGLYcerin (NITROSTAT) 0.4 MG sublingual tablet For chest pain place 1 tablet under the tongue every 5 minutes for 3 doses. If symptoms persist 5 minutes after 1st dose call 911.     order for DME Equipment being ordered: chair lift     order for DME Equipment being ordered: single end cane     ORDER FOR DME 1.  CPAP pressure 11 cm/H20 with heated humidity.   2.  Provide mask to fit and CPAP supplies.   3.  Length of need lifetime.   4.  If needed please provide a chin strap          predniSONE (DELTASONE) 1 MG tablet Take 1 tablet (1 mg) by mouth daily (Patient not taking: Reported on 9/8/2020)     sildenafil (REVATIO) 20 MG tablet TAKE 2-5 TABLETS BY MOUTH 30 MINUTES PRIOR TO INTERCOURSE     triamcinolone (KENALOG) 0.1 % external ointment Apply to trunk and extremities twice daily for up to 2 weeks for flares     No current facility-administered medications for this visit.      Facility-Administered Medications Ordered in Other Visits   Medication     gadobutrol (GADAVIST) injection 10 mL       Social History   See HPI    Family History     Family History   Problem Relation Age of Onset     C.A.D. Father      Coronary Artery Disease Father      Hypertension Father      Depression Father      Hypertension Mother      Diabetes Mother      Depression Mother      Mental Illness Mother      Hypertension Maternal Grandfather      Cancer Paternal Grandfather      Other Cancer Paternal Grandfather      Other Cancer Other      Autoimmune Disease No family hx of      Cerebrovascular Disease No family hx of      Thyroid  "Disease No family hx of      Glaucoma No family hx of      Macular Degeneration No family hx of      No family history of autoimmune disease  No family history of psoriasis     No change in family history since the previous clinic visit.     Physical Exam     Temp Readings from Last 3 Encounters:   01/14/20 98.4  F (36.9  C) (Oral)   10/08/19 97.6  F (36.4  C) (Oral)   07/22/19 97.5  F (36.4  C) (Temporal)     BP Readings from Last 5 Encounters:   03/10/20 (!) 150/90   03/02/20 133/88   01/14/20 115/78   11/26/19 128/88   10/22/19 (!) 142/90     Pulse Readings from Last 1 Encounters:   03/10/20 65     Resp Readings from Last 1 Encounters:   07/22/19 16     Estimated body mass index is 31.42 kg/m  as calculated from the following:    Height as of 3/10/20: 1.575 m (5' 2\").    Weight as of 3/10/20: 77.9 kg (171 lb 12.8 oz).     Telephone Visit     Labs   RF/CCP  Recent Labs   Lab Test 11/04/15  1547 08/12/14  1414   CCPABY 27*  --    RHF  --  <20     CBC  Recent Labs   Lab Test 06/03/20  1317 03/26/20  1039 02/27/20  0816 11/25/19  0758   WBC 7.4 7.2 7.1 7.2   RBC 4.97 5.16 5.04 4.81   HGB 14.5 15.1 14.5 14.3   HCT 44.0 45.9 44.9 43.7   MCV 89 89 89 91   RDW 13.4 13.3 13.5 14.4    275 273 272   MCH 29.2 29.3 28.8 29.7   MCHC 33.0 32.9 32.3 32.7   NEUTROPHIL 62.0  --  42.3 42.9   LYMPH 24.9  --  41.5 41.2   MONOCYTE 10.6  --  13.0 12.9   EOSINOPHIL 2.0  --  2.5 2.4   BASOPHIL 0.5  --  0.7 0.6   ANEU 4.6  --  3.0 3.1   ALYM 1.9  --  3.0 3.0   PRACHI 0.8  --  0.9 0.9   AEOS 0.2  --  0.2 0.2   ABAS 0.0  --  0.1 0.0     CMP  Recent Labs   Lab Test 06/03/20  1317 03/26/20  1039 02/27/20  0816 11/25/19  0758 10/07/19  0940   NA  --  138  --  141 140   POTASSIUM  --  3.8  --  3.8 3.8   CHLORIDE  --  109  --  110* 110*   CO2  --  25  --  25 26   ANIONGAP  --  4  --  6 4   * 122* 98 101* 103*   BUN  --  16  --  16 19   CR 0.89 1.00 0.84 0.78 0.93   GFRESTIMATED >90 85 >90 >90 >90   GFRESTBLACK >90 >90 >90 >90 >90 "   HEMANT  --  9.0  --  9.2 9.2   BILITOTAL 0.4 0.3 0.3 0.4 0.4   ALBUMIN 4.1 4.3 3.8 4.0 4.1   PROTTOTAL 7.9 8.0 7.3 7.3 7.8   ALKPHOS 106 123 114 89 112   AST 20 23 21 23 32   ALT 31 36 37 47 47     Calcium/VitaminD  Recent Labs   Lab Test 03/26/20  1039 11/25/19  0758 10/07/19  0940  07/13/17  1246  11/04/15  1547  04/01/13  1624   HEMANT 9.0 9.2 9.2   < >  --    < >  --    < > 9.5   VITDT  --   --   --   --  34  --  43  --  34    < > = values in this interval not displayed.     ESR/CRP  Recent Labs   Lab Test 06/03/20  1317 02/27/20  0816 11/25/19  0758   SED 11 10 9   CRP <2.9 <2.9 <2.9     Lipid Panel  Recent Labs   Lab Test 06/25/20  1027 03/14/19  1006 06/22/18  1448  06/29/15  1405 05/22/14  0848 03/24/14  0936   CHOL 122 152 129   < > 219* 169 195   TRIG 148 126 113   < > 372* 379* 301*   HDL 59 51 64   < > 33* 33* 39*   LDL 33 76 42   < > 112 60 95   VLDL  --   --   --   --  74* 76* 60*   CHOLHDLRATIO  --   --   --   --  6.6* 5.1* 5.0   NHDL 63 101 65   < >  --   --   --     < > = values in this interval not displayed.     Hepatitis B  Recent Labs   Lab Test 03/26/20  1039 10/07/19  0940 04/01/19  0851  10/05/16  0954  04/23/13  0812 01/30/13  0906   AUSAB  --   --   --   --  0.45  --   --   --    HBCAB  --   --   --   --   --   --  Positive*  --    HEPBANG  --   --   --   --  Reactive*  --  Positive* Positive*   HBQLOG <1.3 Not Calculated Not Calculated   < > 5.1*   < >  --   --     < > = values in this interval not displayed.     Hepatitis C  Recent Labs   Lab Test 04/07/16  1538 04/23/13  0812   HCVAB Nonreactive   Assay performance characteristics have not been established for newborns,   infants, and children   Negative     Lyme ab screening  Recent Labs   Lab Test 07/18/17  1330   LYMEGM 0.19     Tuberculosis Screening  Recent Labs   Lab Test 04/07/16  1538   TBRSLT Positive   The magnitude of the measured IFN-gamma level cannot be correlated to stage or   degree of infection, level of immune  "responsiveness, or likelihood for   progression to active disease.  *   TBAGN >10.00  This is a qualitative test.  The TB antigen IU/mL value is required for   documentation on certain government reporting forms but this value should not   be used to monitor disease progression or response to therapy.   Diagnosing or excluding tuberculosis disease, and assessing the probability of   LTBI, require a combination of epidemiological, historical, medical and   diagnostic findings that should be taken into account when interpreting   QuantiFERON TB results.       HIV Screening  Recent Labs   Lab Test 11/04/15  1547   HIAGAB Nonreactive   HIV-1 p24 Ag & HIV-1/HIV-2 Ab Not Detected         \"HAND BILATERAL THREE OR MORE VIEWS 11/4/2015 4:55 PM   HISTORY: Pain in unspecified joint.  COMPARISON: None.  IMPRESSION  IMPRESSION: Normal.  DANIS ANGLIN MD\"    \"FOOT BILATERAL THREE OR MORE VIEWS 11/4/2015 4:55 PM   HISTORY: Pain in unspecified joint.  COMPARISON: 8/21/2012.  IMPRESSION  IMPRESSION: Small traction spurs are seen off the Achilles tendon and  plantar fascial attachment sites bilaterally. Joint spaces are  preserved. Osseous structures are intact. Incidental note is made of  some surgical staples in the soft tissues of the medial distal right  lower extremity.  DANIS ANGLIN MD\"      Immunization History     Immunization History   Administered Date(s) Administered     Influenza (IIV3) PF 10/11/2011, 09/20/2017, 09/01/2018     Influenza Vaccine IM > 6 months Valent IIV4 09/27/2015, 09/08/2016, 09/30/2019     Influenza Vaccine, 6+MO IM (QUADRIVALENT W/PRESERVATIVES) 11/17/2014     Pneumo Conj 13-V (2010&after) 04/07/2016     Pneumococcal 23 valent 12/12/2014     TDAP Vaccine (Adacel) 08/30/2011     Zoster vaccine recombinant adjuvanted (SHINGRIX) 07/27/2019          Chart documentation done in part with Dragon Voice recognition Software. Although reviewed after completion, some word and grammatical error may " remain.    Phone call start time: 3:02 PM  Phone call end time: 3:10 PM    This visit is equivalent to a 82126 visit    Location of patient: home, in [place]  Location of provider: home    Follow up:  follow up appointment scheduled to be in Dec    Sebastián Jenkins MD

## 2020-09-10 DIAGNOSIS — M06.09 RHEUMATOID ARTHRITIS OF MULTIPLE SITES WITH NEGATIVE RHEUMATOID FACTOR (H): ICD-10-CM

## 2020-09-10 DIAGNOSIS — Z79.899 HIGH RISK MEDICATION USE: ICD-10-CM

## 2020-09-10 LAB
ALBUMIN SERPL-MCNC: 3.8 G/DL (ref 3.4–5)
ALP SERPL-CCNC: 112 U/L (ref 40–150)
ALT SERPL W P-5'-P-CCNC: 38 U/L (ref 0–70)
AST SERPL W P-5'-P-CCNC: 28 U/L (ref 0–45)
BASOPHILS # BLD AUTO: 0.1 10E9/L (ref 0–0.2)
BASOPHILS NFR BLD AUTO: 1 %
BILIRUB DIRECT SERPL-MCNC: <0.1 MG/DL (ref 0–0.2)
BILIRUB SERPL-MCNC: 0.3 MG/DL (ref 0.2–1.3)
CREAT SERPL-MCNC: 0.92 MG/DL (ref 0.66–1.25)
CRP SERPL-MCNC: <2.9 MG/L (ref 0–8)
DIFFERENTIAL METHOD BLD: NORMAL
EOSINOPHIL # BLD AUTO: 0.2 10E9/L (ref 0–0.7)
EOSINOPHIL NFR BLD AUTO: 2.7 %
ERYTHROCYTE [DISTWIDTH] IN BLOOD BY AUTOMATED COUNT: 13.4 % (ref 10–15)
ERYTHROCYTE [SEDIMENTATION RATE] IN BLOOD BY WESTERGREN METHOD: 11 MM/H (ref 0–20)
GFR SERPL CREATININE-BSD FRML MDRD: >90 ML/MIN/{1.73_M2}
GLUCOSE SERPL-MCNC: 128 MG/DL (ref 70–99)
HCT VFR BLD AUTO: 44.2 % (ref 40–53)
HGB BLD-MCNC: 14.6 G/DL (ref 13.3–17.7)
LYMPHOCYTES # BLD AUTO: 2.1 10E9/L (ref 0.8–5.3)
LYMPHOCYTES NFR BLD AUTO: 32.9 %
MCH RBC QN AUTO: 28.9 PG (ref 26.5–33)
MCHC RBC AUTO-ENTMCNC: 33 G/DL (ref 31.5–36.5)
MCV RBC AUTO: 88 FL (ref 78–100)
MONOCYTES # BLD AUTO: 0.8 10E9/L (ref 0–1.3)
MONOCYTES NFR BLD AUTO: 12.1 %
NEUTROPHILS # BLD AUTO: 3.2 10E9/L (ref 1.6–8.3)
NEUTROPHILS NFR BLD AUTO: 51.3 %
PLATELET # BLD AUTO: 300 10E9/L (ref 150–450)
PROT SERPL-MCNC: 7.6 G/DL (ref 6.8–8.8)
RBC # BLD AUTO: 5.05 10E12/L (ref 4.4–5.9)
WBC # BLD AUTO: 6.3 10E9/L (ref 4–11)

## 2020-09-10 PROCEDURE — 85652 RBC SED RATE AUTOMATED: CPT | Performed by: INTERNAL MEDICINE

## 2020-09-10 PROCEDURE — 80076 HEPATIC FUNCTION PANEL: CPT | Performed by: INTERNAL MEDICINE

## 2020-09-10 PROCEDURE — 86140 C-REACTIVE PROTEIN: CPT | Performed by: INTERNAL MEDICINE

## 2020-09-10 PROCEDURE — 82947 ASSAY GLUCOSE BLOOD QUANT: CPT | Performed by: INTERNAL MEDICINE

## 2020-09-10 PROCEDURE — 82565 ASSAY OF CREATININE: CPT | Performed by: INTERNAL MEDICINE

## 2020-09-10 PROCEDURE — 85025 COMPLETE CBC W/AUTO DIFF WBC: CPT | Performed by: INTERNAL MEDICINE

## 2020-09-10 PROCEDURE — 36415 COLL VENOUS BLD VENIPUNCTURE: CPT | Performed by: INTERNAL MEDICINE

## 2020-09-11 NOTE — RESULT ENCOUNTER NOTE
"[employer] message sent:  \"Mr. Daniels,    Glucose is elevated at 128. Other labs are okay. Inflammatory markers are normal.     Sincerely,  Sebastián Jenkins MD  9/11/2020 4:48 PM\""

## 2020-09-12 DIAGNOSIS — F33.1 MAJOR DEPRESSIVE DISORDER, RECURRENT EPISODE, MODERATE (H): ICD-10-CM

## 2020-09-15 RX ORDER — BUSPIRONE HYDROCHLORIDE 30 MG/1
30 TABLET ORAL 2 TIMES DAILY
Qty: 180 TABLET | Refills: 3 | Status: SHIPPED | OUTPATIENT
Start: 2020-09-15 | End: 2021-09-07

## 2020-09-15 NOTE — TELEPHONE ENCOUNTER
Routing refill request to provider for review/approval because:  Out of range:  PHQ-9 >4   Lorraine Francis RN  [employer]

## 2020-10-05 ENCOUNTER — MYC REFILL (OUTPATIENT)
Dept: FAMILY MEDICINE | Facility: CLINIC | Age: 55
End: 2020-10-05

## 2020-10-05 DIAGNOSIS — G47.00 INSOMNIA, UNSPECIFIED TYPE: ICD-10-CM

## 2020-10-07 NOTE — TELEPHONE ENCOUNTER
Requested Prescriptions   Pending Prescriptions Disp Refills     zolpidem (AMBIEN) 5 MG tablet 30 tablet 5     Sig: Take 1 tablet (5 mg) by mouth nightly as needed for sleep       There is no refill protocol information for this order        Routing refill request to provider for review/approval because:  Drug not on the [facility] refill protocol       Shreya Rascon RN, BSN, PHN

## 2020-10-08 RX ORDER — ZOLPIDEM TARTRATE 5 MG/1
5 TABLET ORAL
Qty: 30 TABLET | Refills: 5 | Status: SHIPPED | OUTPATIENT
Start: 2020-10-08 | End: 2022-05-31

## 2020-10-14 ENCOUNTER — TELEPHONE (OUTPATIENT)
Dept: PALLIATIVE MEDICINE | Facility: CLINIC | Age: 55
End: 2020-10-14

## 2020-10-14 NOTE — TELEPHONE ENCOUNTER
Reason for call:  Medication   If this is a refill request, has the caller requested the refill from the pharmacy already? No  Will the patient be using a [facility]? No  Name of the pharmacy and phone number for the current request: [facility] [id number] [place] [address]    Name of the medication requested: buprenorphine (BUTRANS) 20 MCG/HR WK patch    Other request: none    Phone number to reach patient:  Cell number on file:    Telephone Information:   Mobile [phone]       Best Time:  anytime    Can we leave a detailed message on this number?  YES    Travel screening: Not Applicable     Gisela GERARDO    [facility]

## 2020-10-14 NOTE — TELEPHONE ENCOUNTER
Medication refill information reviewed.     This medication can be filled at his 10/21/20 appt. He is overdue for a follow up and has canceled or no showed his last 2 appointments.     Outreach X1. Left a  with an appointment reminder. Provided call back number for questions.    ROBERTO BarksdaleN, RN  Care Coordinator  [facility] [place]

## 2020-10-14 NOTE — TELEPHONE ENCOUNTER
Received call from patient  requesting refill(s) for buprenorphine (BUTRANS) 20 MCG/HR WK patch     Last dispensed from pharmacy on 9/24/2020    Pt last seen by prescribing provider on 3/2/2020 (6/1/2020-canceled, 7/10/2020 no show)    Next appt scheduled for 10/21/2020     checked in the past 6 months? YES If no, print current report and give to RN    Last urine drug screen date 3/2/2020  Current opioid agreement on file? Yes Date of opioid agreement: 3/2/2020    E-prescribe to:    [id number] [place] [facility] [address]    Will route to nursing pool for review and preparation of prescription(s).

## 2020-10-17 DIAGNOSIS — F41.9 ANXIETY HYPERVENTILATION: ICD-10-CM

## 2020-10-17 DIAGNOSIS — L40.9 PSORIASIS: ICD-10-CM

## 2020-10-17 DIAGNOSIS — F45.8 ANXIETY HYPERVENTILATION: ICD-10-CM

## 2020-10-17 DIAGNOSIS — K21.9 GASTROESOPHAGEAL REFLUX DISEASE WITHOUT ESOPHAGITIS: ICD-10-CM

## 2020-10-17 DIAGNOSIS — M10.00 IDIOPATHIC GOUT, UNSPECIFIED CHRONICITY, UNSPECIFIED SITE: ICD-10-CM

## 2020-10-19 DIAGNOSIS — G47.33 OBSTRUCTIVE SLEEP APNEA (ADULT) (PEDIATRIC): Primary | ICD-10-CM

## 2020-10-20 RX ORDER — CLONAZEPAM 1 MG/1
.5-1 TABLET ORAL 2 TIMES DAILY PRN
Qty: 90 TABLET | Refills: 0 | Status: SHIPPED | OUTPATIENT
Start: 2020-10-20 | End: 2020-12-10

## 2020-10-20 RX ORDER — PANTOPRAZOLE SODIUM 40 MG/1
TABLET, DELAYED RELEASE ORAL
Qty: 90 TABLET | Refills: 0 | Status: SHIPPED | OUTPATIENT
Start: 2020-10-20 | End: 2021-01-04

## 2020-10-20 RX ORDER — ALLOPURINOL 300 MG/1
TABLET ORAL
Qty: 90 TABLET | Refills: 3 | Status: SHIPPED | OUTPATIENT
Start: 2020-10-20 | End: 2021-09-22

## 2020-10-20 RX ORDER — HYDROCORTISONE VALERATE CREAM 2 MG/G
CREAM TOPICAL
Qty: 60 G | Refills: 1 | OUTPATIENT
Start: 2020-10-20

## 2020-10-20 RX ORDER — CLOBETASOL PROPIONATE 0.5 MG/ML
SOLUTION TOPICAL
Qty: 60 ML | Refills: 1 | OUTPATIENT
Start: 2020-10-20

## 2020-10-20 NOTE — TELEPHONE ENCOUNTER
Last Clinic Visit: 7/12/19 with recommended 6 month follow up, no visits scheduled.  Hydrocortisone Merlyn 0.2% refill declined per derm protocol process #2 and clobetasol refill declined per derm protocol process #3.  Routed to clinic coordinators to schedule appointment

## 2020-10-20 NOTE — TELEPHONE ENCOUNTER
For clonazepam:  Routing refill request to provider for review/approval because:  Drug not on the G refill protocol     For allopurinol:  Routing refill request to provider for review/approval because:  Labs not current:  Uric acid    For Protonix:  Prescription approved per [facility] [place] [place] Refill Protocol.        Shreya Rascon RN, BSN, PHN

## 2020-10-22 ENCOUNTER — TELEPHONE (OUTPATIENT)
Dept: SURGERY | Facility: CLINIC | Age: 55
End: 2020-10-22

## 2020-10-22 NOTE — TELEPHONE ENCOUNTER
Fax received for refill from pharmacy, see note below. Faxed pharmacy back stating pt needs appointment prior to refill per protocol.     Anderson Macias RN

## 2020-10-23 ENCOUNTER — MYC MEDICAL ADVICE (OUTPATIENT)
Dept: PALLIATIVE MEDICINE | Facility: CLINIC | Age: 55
End: 2020-10-23

## 2020-10-23 ENCOUNTER — TELEPHONE (OUTPATIENT)
Dept: PALLIATIVE MEDICINE | Facility: CLINIC | Age: 55
End: 2020-10-23

## 2020-10-23 DIAGNOSIS — M06.9 RHEUMATOID ARTHRITIS INVOLVING MULTIPLE SITES, UNSPECIFIED WHETHER RHEUMATOID FACTOR PRESENT (H): Primary | ICD-10-CM

## 2020-10-23 DIAGNOSIS — G89.4 CHRONIC PAIN SYNDROME: ICD-10-CM

## 2020-10-23 RX ORDER — BUPRENORPHINE 20 UG/H
1 PATCH TRANSDERMAL
Qty: 4 PATCH | Refills: 0 | Status: SHIPPED | OUTPATIENT
Start: 2020-10-23 | End: 2020-11-13

## 2020-10-23 NOTE — TELEPHONE ENCOUNTER
Reason for call:  Medication   If this is a refill request, has the caller requested the refill from the pharmacy already? No  Will the patient be using a [facility]? No  Name of the pharmacy and phone number for the current request: [facility] [id number] [place] [address]    Name of the medication requested: buprenorphine (BUTRANS) 20 MCG/HR WK patch    Other request: none    Phone number to reach patient:  Cell number on file:    Telephone Information:   Mobile [phone]       Best Time:  anytime    Can we leave a detailed message on this number?  YES    Travel screening: Not Applicable     Gisela GERARDO    [facility]

## 2020-10-23 NOTE — TELEPHONE ENCOUNTER
TrinaUC CEIN message from patient on 10/23 at 1541:    I am OK with the 27th at [place]. Confirm.  --------------  Information added to other encounter and routed to  to schedule.     Will keep encounter open for a few days just in case further correspondence with pt is needed.     ROBERTO KarimiN, RN-BC  Patient Care Supervisor  [facility] [place]

## 2020-10-23 NOTE — TELEPHONE ENCOUNTER
Attempted to call patient today.  Went to     I need him to come to [facility] on Tuesday at 3:15pm.  I have a 15 min break and can see him during that time, and we may need to schedule another appt.    He has missed /no showed too many appointment (1 cancel, 2 no shows) and I need to see him in person and that is the soonest I can get him in.    Unfortunately, I can't break up a box of medication to get to that date, but please inform if I don't see him, I can no longer prescribe for him. This is extremely important.      If there is a conflict of scheduling with that time, let me know... but we will try to get him in ASAP.    Script Eprescribed to pharmacy        Signed Prescriptions:                        Disp   Refills    buprenorphine (BUTRANS) 20 MCG/HR WK patch 4 patch0        Sig: Place 1 patch onto the skin every 7 days . Name brand           only.  Fill today. No further scripts unless           seen.  Authorizing Provider: KAYLA CHUN MD  [facility]

## 2020-10-23 NOTE — TELEPHONE ENCOUNTER
Blane message from patient on 10/23 at 1541:    I am OK with the 27th at [place]. Confirm.  --------------  Will route to  to make appointment as outlined by Lian Loo MD,.     KELLY Karimi, RN-BC  Patient Care Supervisor  [facility] [place]

## 2020-10-23 NOTE — TELEPHONE ENCOUNTER
Called pt and LM letting him know that an appointment is required for continued prescribing. LM to call back to confirm the appointment the appointment on 10/27 at 3:15 pm.     Will also send Kiddie Kistt message; see separate encounter    KELLY Karimi, RN-BC  Patient Care Supervisor  [facility] [place]

## 2020-10-23 NOTE — TELEPHONE ENCOUNTER
Message sent to pt per Lian Loo MD direction in the 10/23 telephone encounter:    Dr. Cinda Garza is requiring an urgent appointment at the [facility] on Tuesday, 10/27 at 3:15 pm. You have missed/no-showed too many appointments and need to been seen in person for continued prescribing of medication for you. Dr. Loo was kind enough to send a prescription today but, if you are not able to be seen very soon, this could likely be your last prescription from her.      Please respond as soon as possible in regard to the appointment offered next week. Dr. Loo is waiting for confirmation of this appointment.     Thank you,     ROBERTO KarimiN, RN-BC  Patient Care Supervisor  [facility]

## 2020-10-26 NOTE — PROGRESS NOTES
[facility]    Date of visit: 10/27/20    Chief complaint:   Chief Complaint   Patient presents with     Pain       Interval history:  Lamont Daniels was last seen by me on 3/2/20    Recommendations/plan at the last visit included:  1. Physical Therapy:  Completed pool and land therapy in past. Continues to decline.  Discussed inactivity and need for light activity  2. Clinical Health Psychologist to address issues of relaxation, behavioral change, coping style, and other factors important to improvement: Again discussed importance of this and he declines.   3. Diagnostic Studies: UDS and opioid agreement today  4. Medication Management:    1. Continue Butrans 20 mcg/hr wk patches at goal dose.  He has sleep apnea that significantly improved going from full agonist to Butrans, and I do not plan to go back.  5. Further procedures recommended: could consider bilateral SI Joint injections.  He is not interested  6. Referrals: acupuncture for diffuse joint pain and low back pain  7. Recommendations to PCP: consider further wean on clonazepam given risk with JEROD and opioid  8. Follow up: 3 months    Since his last visit, Lamont Daniels reports:  -no significant changes in his pain  -he has improvement with his meds, and does sleep better  -he uses CPAP every night.  He is well rested when he wakes up  -he is on disability- not working  -he is off of prednisone- was being given for stiffness.  Didn't think it was helpful.        Pain scores:  Pain intensity on average is 5 on a scale of 0-10.     Current pain treatment  butrans 20mcg/hr  Acetaminophen 500 mg PRN (not taking daily)  Gabapentin 600mg 3 times daily  Baclofen 10mg BID   Diclofenac gel PRN (not taking daily and uses at joints)      Ambien 5 mg - reports taking most nights  Clonazepam 1mg once a day     Previous medication treatments included:  Codeine, oxycodone, hydrocodone- given for other issues- helpful  Cymbalta  60mg daily- given for mental health- was given by Dr. Acosta- not been higher  Baclofen 10mg at bedtime- not helpful, no side effects  Robaxin 500 mg 1 tablet, 1-3 times a day depending on the day  Ibuprofen 800 mg- using 3-4 times per week, helpful but started meloxicam  Meloxicam  Oxycodone 5 mg : takes 6/day    Other treatments have included:  Lamont Daniels has not been seen at a pain clinic in the past.    PT: has done for various reasons, most recently the low back.  Acupuncture: as done a couple of needles with adjustment  TENs Unit: no  Injections: no    Side Effects: dry mouth    Medications:  Current Outpatient Medications   Medication Sig Dispense Refill     Abatacept (ORENCIA) 125 MG/ML SOAJ auto-injector Inject 1 mL (125 mg) Subcutaneous every 7 days 4 mL 6     Acetaminophen (TYLENOL PO)        allopurinol (ZYLOPRIM) 300 MG tablet TAKE 1 TABLET BY MOUTH EVERY DAY 90 tablet 3     aspirin EC 81 MG EC tablet Take 1 tablet (81 mg) by mouth daily (*) 30 tablet 1     atorvastatin (LIPITOR) 80 MG tablet TAKE 1 TABLET BY MOUTH 1 TIME DAILY 90 tablet 3     bacitracin 500 UNIT/GM external ointment Apply topically 3 times daily 30 g 3     baclofen (LIORESAL) 10 MG tablet TAKE 1 TABLET BY MOUTH 2 TIMES DAILY AS NEEDED FOR MUSCLE SPASM 180 tablet 2     buprenorphine (BUTRANS) 20 MCG/HR WK patch Place 1 patch onto the skin every 7 days . Name brand only.  Fill today. No further scripts unless seen. 4 patch 0     busPIRone HCl (BUSPAR) 30 MG tablet TAKE 1 TABLET (30 MG) BY MOUTH 2 TIMES DAILY 180 tablet 3     Cholecalciferol (VITAMIN D) 2000 UNITS tablet TAKE ONE TABLET BY MOUTH ONCE DAILY 100 tablet 1     clobetasol (TEMOVATE) 0.05 % external solution Apply twice daily to scalp for up to 2 weeks for flares. 60 mL 1     clonazePAM (KLONOPIN) 1 MG tablet TAKE 0.5-1 TABLETS (0.5-1 MG) BY MOUTH 2 TIMES DAILY AS NEEDED FOR ANXIETY 90 tablet 0     clopidogrel (PLAVIX) 75 MG tablet Take 1 tablet (75 mg) by mouth daily 90 tablet 3      COLCRYS 0.6 MG tablet TAKE 2 TABLETS BY MOUTH AT THE FIRST SIGN OF FLARE, TAKE 1 ADDITIONAL TABLET ONE HOUR LATER. 30 tablet 0     desvenlafaxine (PRISTIQ) 50 MG 24 hr tablet Take 1 tablet (50 mg) by mouth daily 90 tablet 3     diclofenac (VOLTAREN) 1 % topical gel Apply 4 grams to bilateral knees, up to four times daily as needed using enclosed dosing card.  Max of 32g/day 300 g 11     evolocumab (REPATHA) 140 MG/ML prefilled autoinjector Inject 1 mL (140 mg) Subcutaneous every 14 days 2 mL 11     ezetimibe (ZETIA) 10 MG tablet Take 1 tablet (10 mg) by mouth daily 90 tablet 3     gabapentin (NEURONTIN) 300 MG capsule Take 2 capsules (600 mg) by mouth 3 times daily . 3 month supply 540 capsule 6     gemfibrozil (LOPID) 600 MG tablet Take 1 tablet (600 mg) by mouth 2 times daily 180 tablet 3     hydrocortisone (WESTCORT) 0.2 % external cream For genitals, apply twice daily for up to 2 weeks **Call clinic to schedule follow up appointment.** 15 g 1     hydroxychloroquine (PLAQUENIL) 200 MG tablet Take 1 tablet (200 mg) by mouth daily 90 tablet 2     Incontinence Supply Disposable (DEPEND UNDERWEAR LARGE) MISC Use twice daily. 60 each 11     meclizine (ANTIVERT) 25 MG tablet Take 1 tablet (25 mg) by mouth every 6 hours as needed for dizziness 30 tablet 5     melatonin 3 MG tablet Take 1 tablet (3 mg) by mouth nightly as needed for sleep       naloxone (NARCAN) nasal spray Spray 1 spray (4 mg) into one nostril alternating nostrils as needed for opioid reversal every 2-3 minutes until assistance arrives 0.2 mL 0     nitroGLYcerin (NITROSTAT) 0.4 MG sublingual tablet For chest pain place 1 tablet under the tongue every 5 minutes for 3 doses. If symptoms persist 5 minutes after 1st dose call 911. 25 tablet 1     order for DME Equipment being ordered: chair lift 1 each 0     order for DME Equipment being ordered: single end cane 1 Units 0     ORDER FOR DME 1.  CPAP pressure 11 cm/H20 with heated humidity.   2.  Provide  mask to fit and CPAP supplies.   3.  Length of need lifetime.   4.  If needed please provide a chin strap            pantoprazole (PROTONIX) 40 MG EC tablet TAKE 1 TABLET BY MOUTH EVERY DAY 90 tablet 0     predniSONE (DELTASONE) 1 MG tablet Prednisone 2mg daily x30days, then 1mg daily x30days, then stop. 90 tablet 0     sildenafil (REVATIO) 20 MG tablet TAKE 2-5 TABLETS BY MOUTH 30 MINUTES PRIOR TO INTERCOURSE 50 tablet 11     sulfaSALAzine ER (AZULFIDINE EN) 500 MG EC tablet Take 3 tablets (1,500 mg) by mouth 2 times daily 540 tablet 2     tamsulosin (FLOMAX) 0.4 MG capsule Take 2 capsules (0.8 mg) by mouth daily 180 capsule 3     tenofovir (VIREAD) 300 MG tablet Take 1 tablet (300 mg) by mouth daily 90 tablet 3     triamcinolone (KENALOG) 0.1 % external ointment APPLY TOPICALLY TO AFFECTED AREA(S) 3 TIMES DAILY 80 g 2     triamcinolone (KENALOG) 0.1 % external ointment Apply to trunk and extremities twice daily for up to 2 weeks for flares 80 g 1     zolpidem (AMBIEN) 5 MG tablet Take 1 tablet (5 mg) by mouth nightly as needed for sleep 30 tablet 5     blood glucose (NO BRAND SPECIFIED) lancets standard Use to test blood sugar 2 times daily or as directed. (Patient not taking: Reported on 3/2/2020) 100 each 11     blood glucose monitoring (NO BRAND SPECIFIED) meter device kit Use to test blood sugar 2 times daily or as directed. (Patient not taking: Reported on 3/2/2020) 1 kit 0     blood glucose monitoring (NO BRAND SPECIFIED) test strip Use to test blood sugars 2 times daily or as directed (Patient not taking: Reported on 3/2/2020) 100 strip 11       Medical History: any changes in medical history since they were last seen? None    Review of Systems:  The 14 system ROS was reviewed from the intake questionnaire: neck pain, low back pain, joint pain  Any bowel or bladder problems: None  Mood: depression - stable.  He denies any counseling or psychiatric treatment currently.     Physical Exam:  Blood pressure  133/88, pulse 66, SpO2 98 %.  General: flat affect, awake and alert  Gait: Antalgic, slow, use of single point cane  MSK exam: slow sit to stand.  Stiff movement    THE 4 A's OF OPIOID MAINTENANCE ANALGESIA    Analgesia: good.  He had better with oxycodone    Activity: better with med, still limited    Adverse effects: none    Adherence to Rx protocol: good    [place] Board of Pharmacy Data Base Reviewed: 10/27/2020    YES; no concerns   Last UDS and opioid agreement  Getting today    Assessment:   1. Chronic low back pain, with strong facet and myofascial features  2. Rheumatoid arthritis  3. Peripheral neuropathy  4. Depression, anxiety  5. Hep B - on meds  6. Gout  7. JEROD, currently treated with CPAP, central apnea ok    Plan:   1. Physical Therapy:  Completed pool and land therapy in past. Continues to decline.  Discussed inactivity and need for light activity  2. Clinical Health Psychologist to address issues of relaxation, behavioral change, coping style, and other factors important to improvement: Again discussed importance of this and he declines.   3. Diagnostic Studies: UDS and opioid agreement today  4. Medication Management:  No changes.  Needs regular visits to continue opioid medications  5. Further procedures recommended: could consider bilateral SI Joint injections.  He is not interested  6. Recommendations to PCP: consider further wean on clonazepam given risk with JEROD and opioid  7. Follow up: we discussed for some time importance of making visits and need to see him as he is on opioids.  Appears anxiety is biggest issue. Discussed why [id number] visits are needed    The patient was seen and discussed with staff, Dr. Cinda Quintanilla MD   Pain Medicine Fellow, PGY-5    Total time spent was 20 minutes, and more than 50% of face to face time was spent in counseling and/or coordination of care regarding medications, no show policy.     Lian Loo MD  [facility]

## 2020-10-27 ENCOUNTER — OFFICE VISIT (OUTPATIENT)
Dept: PALLIATIVE MEDICINE | Facility: CLINIC | Age: 55
End: 2020-10-27
Payer: COMMERCIAL

## 2020-10-27 VITALS — DIASTOLIC BLOOD PRESSURE: 88 MMHG | HEART RATE: 66 BPM | OXYGEN SATURATION: 98 % | SYSTOLIC BLOOD PRESSURE: 133 MMHG

## 2020-10-27 DIAGNOSIS — M47.819 FACET ARTHROPATHY: ICD-10-CM

## 2020-10-27 DIAGNOSIS — Z79.891 ENCOUNTER FOR MONITORING OPIOID MAINTENANCE THERAPY: ICD-10-CM

## 2020-10-27 DIAGNOSIS — G89.4 CHRONIC PAIN SYNDROME: ICD-10-CM

## 2020-10-27 DIAGNOSIS — Z51.81 ENCOUNTER FOR MONITORING OPIOID MAINTENANCE THERAPY: ICD-10-CM

## 2020-10-27 DIAGNOSIS — G47.33 OSA (OBSTRUCTIVE SLEEP APNEA): ICD-10-CM

## 2020-10-27 DIAGNOSIS — M06.9 RHEUMATOID ARTHRITIS INVOLVING MULTIPLE SITES, UNSPECIFIED WHETHER RHEUMATOID FACTOR PRESENT (H): Primary | ICD-10-CM

## 2020-10-27 PROCEDURE — 99213 OFFICE O/P EST LOW 20 MIN: CPT | Performed by: PSYCHIATRY & NEUROLOGY

## 2020-10-27 PROCEDURE — 80307 DRUG TEST PRSMV CHEM ANLYZR: CPT | Mod: 90 | Performed by: PSYCHIATRY & NEUROLOGY

## 2020-10-27 ASSESSMENT — PAIN SCALES - GENERAL: PAINLEVEL: MODERATE PAIN (5)

## 2020-10-27 NOTE — PROGRESS NOTES
Obtained urine specimen.  Medication list completed YES:  Specimen sent to the lab.      Nahomy Church [facility]

## 2020-10-27 NOTE — PATIENT INSTRUCTIONS
It is required to see me every 3 months.  During this time of COVID, we are doing some of those visits virtually (phone or video).  Please schedule a follow up in 3 months.    ----------------------------------------------------------------  Clinic Number:  [phone]     Call with any questions about your care and for scheduling assistance.     Calls are returned Monday through Friday between 8 AM and 4:30 PM. We usually get back to you within 2 business days depending on the issue/request.    If we are prescribing your medications:    For opioid medication refills, call the clinic or send a [employer] message 7 days in advance.  Please include:    Name of requested medication    Name of the pharmacy.    For non-opioid medications, call your pharmacy directly to request a refill. Please allow 3-4 days to be processed.     Per [place] State Law:    All controlled substance prescriptions must be filled within 30 days of being written.      For those controlled substances allowing refills, pickup must occur within 30 days of last fill.      We believe regular attendance is key to your success in our program!      Any time you are unable to keep your appointment we ask that you call us at least 24 hours in advance to cancel.This will allow us to offer the appointment time to another patient.     Multiple missed appointments may lead to dismissal from the clinic.

## 2020-10-29 DIAGNOSIS — R42 VERTIGO: ICD-10-CM

## 2020-10-30 RX ORDER — MECLIZINE HYDROCHLORIDE 25 MG/1
TABLET ORAL
Qty: 30 TABLET | Refills: 5 | Status: SHIPPED | OUTPATIENT
Start: 2020-10-30 | End: 2021-02-17

## 2020-10-30 NOTE — TELEPHONE ENCOUNTER
Prescription approved per [facility] [place].      Shreya Rascon RN, BSN, PHN

## 2020-11-02 DIAGNOSIS — M06.09 RHEUMATOID ARTHRITIS OF MULTIPLE SITES WITH NEGATIVE RHEUMATOID FACTOR (H): ICD-10-CM

## 2020-11-02 LAB — PAIN DRUG SCR UR W RPTD MEDS: NORMAL

## 2020-11-02 NOTE — TELEPHONE ENCOUNTER
Refill requested too soon.  Patient should have refills on file.    Jf Packer RN....11/2/2020 4:31 PM

## 2020-11-04 DIAGNOSIS — M06.09 RHEUMATOID ARTHRITIS OF MULTIPLE SITES WITH NEGATIVE RHEUMATOID FACTOR (H): ICD-10-CM

## 2020-11-04 NOTE — TELEPHONE ENCOUNTER
Please send new Rx ASAP for Orencia, pt wants a 3 mo supply and only 2 months are left on the Rx    Thank you!  Kat Diana CPhT  [facility]

## 2020-11-07 RX ORDER — CLOBETASOL PROPIONATE 0.5 MG/ML
SOLUTION TOPICAL
Qty: 60 ML | Refills: 0 | Status: SHIPPED | OUTPATIENT
Start: 2020-11-07 | End: 2020-11-12

## 2020-11-12 ENCOUNTER — VIRTUAL VISIT (OUTPATIENT)
Dept: DERMATOLOGY | Facility: CLINIC | Age: 55
End: 2020-11-12
Payer: COMMERCIAL

## 2020-11-12 DIAGNOSIS — L40.9 PSORIASIS: Primary | ICD-10-CM

## 2020-11-12 PROCEDURE — 99213 OFFICE O/P EST LOW 20 MIN: CPT | Mod: 95 | Performed by: DERMATOLOGY

## 2020-11-12 RX ORDER — HYDROCORTISONE 2.5 %
CREAM (GRAM) TOPICAL
Qty: 30 G | Refills: 1 | Status: SHIPPED | OUTPATIENT
Start: 2020-11-12 | End: 2021-01-20

## 2020-11-12 RX ORDER — HYDROCORTISONE VALERATE CREAM 2 MG/G
CREAM TOPICAL
Qty: 60 G | Refills: 1 | Status: SHIPPED | OUTPATIENT
Start: 2020-11-12 | End: 2021-01-20

## 2020-11-12 RX ORDER — CLOBETASOL PROPIONATE 0.5 MG/ML
SOLUTION TOPICAL
Qty: 60 ML | Refills: 0 | Status: SHIPPED | OUTPATIENT
Start: 2020-11-12

## 2020-11-12 ASSESSMENT — PAIN SCALES - GENERAL: PAINLEVEL: NO PAIN (0)

## 2020-11-12 NOTE — PROGRESS NOTES
[facility] Dermatology Record:  Store and Forward and Telephone [phone]       Dermatology Problem List:  1. Psoriasis   -occipital scalp, s/p biopsy 5/31/19  -clobetasol for scalp  -for face and genitals [place]  -has RA, follows with rheum  2. Seborrheic dermatitis   -improves with triamcinolone and ketoconazole  3. RA on plaquenil, orencia and sulfasalazine with rheum    Encounter Date: Nov 12, 2020    CC:   Chief Complaint   Patient presents with     Derm Problem     Psoriasis        History of Present Illness:  Lamont Daniels is a 55 year old male who presents for psoraisis, now new on face. Also some flares on scalp.     Likes to have cream at home.       ROS: Patient is generally feeling well today.      Physical Examination:  General: Well-appearing appropriately-developed individual.  Skin: Focused examination including the genital and near root of nose was performed.   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     Hyperlipidemia LDL goal <70     Insomnia     Gout     Suicidal ideation     Essential hypertension     Rheumatoid arthritis involving multiple sites, unspecified rheumatoid factor presence     High risk medications (not anticoagulants) long-term use     Midline low back pain without sciatica     Bilateral low back pain with sciatica, sciatica laterality unspecified     Elevated liver enzymes     On corticosteroid therapy     Essential hypertension with goal blood pressure less than 140/90     Insomnia,  unspecified type     Rheumatoid arthritis of multiple sites with negative rheumatoid factor (H)     Benign prostatic hyperplasia with lower urinary tract symptoms, unspecified morphology     Chronic pain syndrome     Past Medical History:   Diagnosis Date     Anxiety      CAD (coronary artery disease)     CABG, PCI     Congestive heart failure, unspecified 2008     Depressive disorder 2008     Gout      Hepatitis B > 10 years     HTN (hypertension)      Hyperlipidemia LDL goal < 100      Malrotation of intestine      Moderate major depression (H)      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) 9/19/2011    Uses CPAP     Rheumatoid arthritis flare (H) 1012     Steatohepatitis 12/15    liver biopsy     Tuberculosis age 13    INH x 9 months      Vitamin D deficiency      Past Surgical History:   Procedure Laterality Date     ABDOMEN SURGERY  2014     BIOPSY  2015     BYPASS GRAFT ARTERY CORONARY  2008    6 vessels     COLONOSCOPY  2/8/2013    Procedure: COLONOSCOPY;  Colonoscopy, blood in stool;  Surgeon: Duane, William Charles, MD;  Location:  OR     COMBINED REPAIR PTOSIS WITH BLEPHAROPLASTY BILATERAL Bilateral 6/25/2018    Procedure: COMBINED REPAIR PTOSIS WITH BLEPHAROPLASTY BILATERAL;  Bilateral upper eyelid blepharoplasty, ptosis repair and brow ptosis repair;  Surgeon: Sandra Borja MD;  Location:  OR     GI SURGERY  2014     HC CORONARY STENT PERCUT, EA ADDTL VESSEL  2008    3 months after CABG     HEAD & NECK SURGERY  2011     NASAL/SINUS POLYPECTOMY  2010     ORTHOPEDIC SURGERY  2012     REPAIR PTOSIS       REPAIR PTOSIS BILATERAL Bilateral 7/22/2019    Procedure: REPAIR, PTOSIS, BOTH UPPER brows;  Surgeon: Sandra Borja MD;  Location:  OR     REPAIR PTOSIS BROW BILATERAL Bilateral 6/25/2018    Procedure: REPAIR PTOSIS BROW BILATERAL;;  Surgeon: Sandra Borja MD;  Location:  OR     THORACIC SURGERY  1989    tb     TONSILLECTOMY  2010     UVULOPALATOPHARYNGOPLASTY  2010     VASCULAR SURGERY  2008       Social  History:  Patient reports that he has never smoked. He has never used smokeless tobacco. He reports that he does not drink alcohol or use drugs.    Family History:  Family History   Problem Relation Age of Onset     C.A.D. Father      Coronary Artery Disease Father      Hypertension Father      Depression Father      Hypertension Mother      Diabetes Mother      Depression Mother      Mental Illness Mother      Hypertension Maternal Grandfather      Cancer Paternal Grandfather      Other Cancer Paternal Grandfather      Other Cancer Other      Autoimmune Disease No family hx of      Cerebrovascular Disease No family hx of      Thyroid Disease No family hx of      Glaucoma No family hx of      Macular Degeneration No family hx of        Medications:  Current Outpatient Medications   Medication     Abatacept (ORENCIA) 125 MG/ML SOAJ auto-injector     Acetaminophen (TYLENOL PO)     allopurinol (ZYLOPRIM) 300 MG tablet     aspirin EC 81 MG EC tablet     atorvastatin (LIPITOR) 80 MG tablet     bacitracin 500 UNIT/GM external ointment     baclofen (LIORESAL) 10 MG tablet     blood glucose (NO BRAND SPECIFIED) lancets standard     blood glucose monitoring (NO BRAND SPECIFIED) meter device kit     blood glucose monitoring (NO BRAND SPECIFIED) test strip     buprenorphine (BUTRANS) 20 MCG/HR WK patch     busPIRone HCl (BUSPAR) 30 MG tablet     Cholecalciferol (VITAMIN D) 2000 UNITS tablet     clobetasol (TEMOVATE) 0.05 % external solution     clonazePAM (KLONOPIN) 1 MG tablet     clopidogrel (PLAVIX) 75 MG tablet     COLCRYS 0.6 MG tablet     desvenlafaxine (PRISTIQ) 50 MG 24 hr tablet     diclofenac (VOLTAREN) 1 % topical gel     evolocumab (REPATHA) 140 MG/ML prefilled autoinjector     ezetimibe (ZETIA) 10 MG tablet     gabapentin (NEURONTIN) 300 MG capsule     gemfibrozil (LOPID) 600 MG tablet     hydrocortisone (WESTCORT) 0.2 % external cream     hydroxychloroquine (PLAQUENIL) 200 MG tablet     Incontinence Supply  Disposable (DEPEND UNDERWEAR LARGE) MISC     meclizine (ANTIVERT) 25 MG tablet     melatonin 3 MG tablet     naloxone (NARCAN) nasal spray     nitroGLYcerin (NITROSTAT) 0.4 MG sublingual tablet     order for DME     order for DME     ORDER FOR DME     pantoprazole (PROTONIX) 40 MG EC tablet     predniSONE (DELTASONE) 1 MG tablet     sildenafil (REVATIO) 20 MG tablet     sulfaSALAzine ER (AZULFIDINE EN) 500 MG EC tablet     tamsulosin (FLOMAX) 0.4 MG capsule     tenofovir (VIREAD) 300 MG tablet     triamcinolone (KENALOG) 0.1 % external ointment     triamcinolone (KENALOG) 0.1 % external ointment     zolpidem (AMBIEN) 5 MG tablet     No current facility-administered medications for this visit.           Allergies   Allergen Reactions     Citalopram Itching     Tramadol Itching           Impression and Recommendations (Patient Counseled on the Following):  Psoriasis, occipital scalp, ears(in past) and scrotum and possibly base of nose    For body, hand and genital, Continue Westcort - apply to the scrotum when active for up to two weeks.      Continue clobetasol solution for the head and ears for up to 2 weeks in a row    Take 2 weeks off, reviewed need to take breaks    Recommend shingles and flu vaccination    For face hydrocortisone 2.5% as above. Reviewed need to take breaks of at least 2 weeks . Reviewed that his glasses may be triggering psoriasis there. Understands risk near eye. Only use steroid when flaring. Will have nursing put up photos and phone appt so we can transition to protopic if needed.      2. Subcutaneous nodule on the left medial forearm, most likely cyst or lipoma or other    Recheck in person     Last full skin exam 5.31.2019      Follow-up:   Follow-up with dermatology in approximately 3 months in person. Earlier for new or changing lesions or rash worsening     Staff only    Alyssa Roche MD    Department of Dermatology  [facility]  [facility]: Phone: [phone], Fax:[phone]  [facility]: Phone: [phone], Fax: [phone]      _____________________________________________________________________________    Teledermatology information:  - Location of patient: [place]  - Patient presented as: return  - Location of teledermatologist:  [facility] )  - Reason teledermatology is appropriate:  of National Emergency Regarding Coronavirus disease (COVID 19) Outbreak  - Image quality and interpretability: acceptable  - Physician has received verbal consent for a Video/Photos Visit from the patient? YES  - In-person dermatology visit recommendation: yes - for physician visit  - Date of images: yesterday and today upload  - Service start time:14:15  - Service end time:2:28pm  - Date of report: 11/12/2020

## 2020-11-12 NOTE — LETTER
11/12/2020         RE: Lamont Daniels  [address]  [place] [address]        Dear Colleague,    Thank you for referring your patient, Lamont Daniels, to the [facility]. Please see a copy of my visit note below.    [facility] Dermatology Record:  Store and Forward and Telephone [phone]       Dermatology Problem List:  1. Psoriasis   -occipital scalp, s/p biopsy 5/31/19  -clobetasol for scalp  -for face and genitals [place]  -has RA, follows with rheum  2. Seborrheic dermatitis   -improves with triamcinolone and ketoconazole  3. RA on plaquenil, orencia and sulfasalazine with rheum    Encounter Date: Nov 12, 2020    CC:   Chief Complaint   Patient presents with     Derm Problem     Psoriasis        History of Present Illness:  Lamont Daniels is a 55 year old male who presents for psoraisis, now new on face. Also some flares on scalp.     Likes to have cream at home.       ROS: Patient is generally feeling well today.      Physical Examination:  General: Well-appearing appropriately-developed individual.  Skin: Focused examination including the genital and near root of nose was performed.   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     Hyperlipidemia LDL goal <70     Insomnia     Gout     Suicidal ideation     Essential hypertension     Rheumatoid arthritis involving multiple sites, unspecified rheumatoid factor presence     High risk medications (not anticoagulants) long-term use     Midline low  back pain without sciatica     Bilateral low back pain with sciatica, sciatica laterality unspecified     Elevated liver enzymes     On corticosteroid therapy     Essential hypertension with goal blood pressure less than 140/90     Insomnia, unspecified type     Rheumatoid arthritis of multiple sites with negative rheumatoid factor (H)     Benign prostatic hyperplasia with lower urinary tract symptoms, unspecified morphology     Chronic pain syndrome     Past Medical History:   Diagnosis Date     Anxiety      CAD (coronary artery disease)     CABG, PCI     Congestive heart failure, unspecified 2008     Depressive disorder 2008     Gout      Hepatitis B > 10 years     HTN (hypertension)      Hyperlipidemia LDL goal < 100      Malrotation of intestine      Moderate major depression (H)      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) 9/19/2011    Uses CPAP     Rheumatoid arthritis flare (H) 1012     Steatohepatitis 12/15    liver biopsy     Tuberculosis age 13    INH x 9 months      Vitamin D deficiency      Past Surgical History:   Procedure Laterality Date     ABDOMEN SURGERY  2014     BIOPSY  2015     BYPASS GRAFT ARTERY CORONARY  2008    6 vessels     COLONOSCOPY  2/8/2013    Procedure: COLONOSCOPY;  Colonoscopy, blood in stool;  Surgeon: Duane, William Charles, MD;  Location: [facility] OR     COMBINED REPAIR PTOSIS WITH BLEPHAROPLASTY BILATERAL Bilateral 6/25/2018    Procedure: COMBINED REPAIR PTOSIS WITH BLEPHAROPLASTY BILATERAL;  Bilateral upper eyelid blepharoplasty, ptosis repair and brow ptosis repair;  Surgeon: Sandra Borja MD;  Location: [facility] OR     GI SURGERY  2014     HC CORONARY STENT PERCUT, EA ADDTL VESSEL  2008    3 months after CABG     HEAD & NECK SURGERY  2011     NASAL/SINUS POLYPECTOMY  2010     ORTHOPEDIC SURGERY  2012     REPAIR PTOSIS       REPAIR PTOSIS BILATERAL Bilateral 7/22/2019    Procedure: REPAIR, PTOSIS, BOTH UPPER brows;  Surgeon: Sandra Borja MD;  Location: [facility] OR     REPAIR PTOSIS BROW BILATERAL  Bilateral 6/25/2018    Procedure: REPAIR PTOSIS BROW BILATERAL;;  Surgeon: Sandra Borja MD;  Location: [facility] OR     THORACIC SURGERY  1989    tb     TONSILLECTOMY  2010     UVULOPALATOPHARYNGOPLASTY  2010     VASCULAR SURGERY  2008       Social History:  Patient reports that he has never smoked. He has never used smokeless tobacco. He reports that he does not drink alcohol or use drugs.    Family History:  Family History   Problem Relation Age of Onset     C.A.D. Father      Coronary Artery Disease Father      Hypertension Father      Depression Father      Hypertension Mother      Diabetes Mother      Depression Mother      Mental Illness Mother      Hypertension Maternal Grandfather      Cancer Paternal Grandfather      Other Cancer Paternal Grandfather      Other Cancer Other      Autoimmune Disease No family hx of      Cerebrovascular Disease No family hx of      Thyroid Disease No family hx of      Glaucoma No family hx of      Macular Degeneration No family hx of        Medications:  Current Outpatient Medications   Medication     Abatacept (ORENCIA) 125 MG/ML SOAJ auto-injector     Acetaminophen (TYLENOL PO)     allopurinol (ZYLOPRIM) 300 MG tablet     aspirin EC 81 MG EC tablet     atorvastatin (LIPITOR) 80 MG tablet     bacitracin 500 UNIT/GM external ointment     baclofen (LIORESAL) 10 MG tablet     blood glucose (NO BRAND SPECIFIED) lancets standard     blood glucose monitoring (NO BRAND SPECIFIED) meter device kit     blood glucose monitoring (NO BRAND SPECIFIED) test strip     buprenorphine (BUTRANS) 20 MCG/HR WK patch     busPIRone HCl (BUSPAR) 30 MG tablet     Cholecalciferol (VITAMIN D) 2000 UNITS tablet     clobetasol (TEMOVATE) 0.05 % external solution     clonazePAM (KLONOPIN) 1 MG tablet     clopidogrel (PLAVIX) 75 MG tablet     COLCRYS 0.6 MG tablet     desvenlafaxine (PRISTIQ) 50 MG 24 hr tablet     diclofenac (VOLTAREN) 1 % topical gel     evolocumab (REPATHA) 140 MG/ML prefilled  autoinjector     ezetimibe (ZETIA) 10 MG tablet     gabapentin (NEURONTIN) 300 MG capsule     gemfibrozil (LOPID) 600 MG tablet     hydrocortisone (WESTCORT) 0.2 % external cream     hydroxychloroquine (PLAQUENIL) 200 MG tablet     Incontinence Supply Disposable (DEPEND UNDERWEAR LARGE) MISC     meclizine (ANTIVERT) 25 MG tablet     melatonin 3 MG tablet     naloxone (NARCAN) nasal spray     nitroGLYcerin (NITROSTAT) 0.4 MG sublingual tablet     order for DME     order for DME     ORDER FOR DME     pantoprazole (PROTONIX) 40 MG EC tablet     predniSONE (DELTASONE) 1 MG tablet     sildenafil (REVATIO) 20 MG tablet     sulfaSALAzine ER (AZULFIDINE EN) 500 MG EC tablet     tamsulosin (FLOMAX) 0.4 MG capsule     tenofovir (VIREAD) 300 MG tablet     triamcinolone (KENALOG) 0.1 % external ointment     triamcinolone (KENALOG) 0.1 % external ointment     zolpidem (AMBIEN) 5 MG tablet     No current facility-administered medications for this visit.           Allergies   Allergen Reactions     Citalopram Itching     Tramadol Itching           Impression and Recommendations (Patient Counseled on the Following):  Psoriasis, occipital scalp, ears(in past) and scrotum and possibly base of nose    For body, hand and genital, Continue Westcort - apply to the scrotum when active for up to two weeks.      Continue clobetasol solution for the head and ears for up to 2 weeks in a row    Take 2 weeks off, reviewed need to take breaks    Recommend shingles and flu vaccination    For face hydrocortisone 2.5% as above. Reviewed need to take breaks of at least 2 weeks . Reviewed that his glasses may be triggering psoriasis there. Understands risk near eye. Only use steroid when flaring. Will have nursing put up photos and phone appt so we can transition to protopic if needed.      2. Subcutaneous nodule on the left medial forearm, most likely cyst or lipoma or other    Recheck in person     Last full skin exam 5.31.2019      Follow-up:    Follow-up with dermatology in approximately 3 months in person. Earlier for new or changing lesions or rash worsening     Staff only    Alyssa Roche MD    Department of Dermatology  [facility]: Phone: [phone], Fax:[phone]  [facility]: Phone: [phone], Fax: [phone]      _____________________________________________________________________________    Teledermatology information:  - Location of patient: [place]  - Patient presented as: return  - Location of teledermatologist:  ([facility] )  - Reason teledermatology is appropriate:  of National Emergency Regarding Coronavirus disease (COVID 19) Outbreak  - Image quality and interpretability: acceptable  - Physician has received verbal consent for a Video/Photos Visit from the patient? YES  - In-person dermatology visit recommendation: yes - for physician visit  - Date of images: yesterday and today upload  - Service start time:14:15  - Service end time:2:28pm  - Date of report: 11/12/2020         Again, thank you for allowing me to participate in the care of your patient.        Sincerely,        Alyssa Roche MD

## 2020-11-12 NOTE — PATIENT INSTRUCTIONS
[facility] Dermatology Visit    Thank you for allowing us to participate in your care. Your findings, instructions and follow-up plan are as follows:      When should I call my doctor?    If you are worsening or not improving, please, contact us or seek urgent care as noted below.     Who should I call with questions (adults)?    [facility] (adult and pediatric): [phone]     [facility] (adult): [phone]    For urgent needs outside of business hours call the [facility] at [phone] and ask for the dermatology resident on call    If this is a medical emergency and you are unable to reach an ER, Call 911      Who should I call with questions (pediatric)?  [facility]- Pediatric Dermatology  Dr. Cristine Cardenas, Dr. Rodolfo Carolina, Dr. Latesha Ding, Kathryn Laughlin, PA  Dr. Perla Cueva, Dr. Eugenie Farmer & Dr. Wiley Maradiaga  Non Urgent  Nurse Triage Line; [phone]- Theodora and Susan RN Care Coordinators   [place] (/Complex ) [phone]    If you need a prescription refill, please contact your pharmacy. Refills are approved or denied by our Physicians during normal business hours, Monday through Fridays  Per office policy, refills will not be granted if you have not been seen within the past year (or sooner depending on your child's condition)    Scheduling Information:  Pediatric Appointment Scheduling and Call Center [phone]  Radiology Scheduling- [phone]  Sedation Unit Scheduling- [phone]  [place] Scheduling- General [phone]; Pediatric Dermatology [phone]  Main  Services: [phone]  Nepalese: [phone]  Mosotho: [phone]  Hmong/Bengali/Latvian: [phone]  Preadmission Nursing Department Fax Number: [phone] (Fax all pre-operative paperwork to this number)    For urgent matters arising during evenings,  weekends, or holidays that cannot wait for normal business hours please call [phone] and ask for the Dermatology Resident On-Call to be paged.

## 2020-11-13 DIAGNOSIS — R21 RASH: ICD-10-CM

## 2020-11-13 DIAGNOSIS — M06.9 RHEUMATOID ARTHRITIS INVOLVING MULTIPLE SITES, UNSPECIFIED WHETHER RHEUMATOID FACTOR PRESENT (H): ICD-10-CM

## 2020-11-13 DIAGNOSIS — G89.4 CHRONIC PAIN SYNDROME: ICD-10-CM

## 2020-11-13 RX ORDER — BUPRENORPHINE 20 UG/H
1 PATCH TRANSDERMAL
Qty: 4 PATCH | Refills: 0 | Status: SHIPPED | OUTPATIENT
Start: 2020-11-13 | End: 2020-12-21

## 2020-11-13 NOTE — TELEPHONE ENCOUNTER
Received call from patient requesting refill(s) of buprenorphine (BUTRANS) 20 MCG/HR WK patch    Last dispensed from pharmacy on 10/23/20    Pt last seen by prescribing provider on 10/27/20  Next appt scheduled for 01/28/21     checked in the past 6 months? Yes If no, print current report and give to RN    Last urine drug screen date 10/27/20  Current opioid agreement on file (completed within the last year) Yes Date of opioid agreement: 03/02/20    E-prescribe to pharmacy-[facility] [id number] [facility] [place] [address]  [address]        Will route to nursing pool for review and preparation of prescription(s).

## 2020-11-13 NOTE — TELEPHONE ENCOUNTER
Medication refill information reviewed.     Due date for buprenorphine (BUTRANS) 20 MCG/HR WK patch is 11/20/20     Prescriptions prepped for review.     Will route to provider.     Arnold Pete, RN  Care Coordinator   [facility] [place]

## 2020-11-13 NOTE — TELEPHONE ENCOUNTER
Reason for call:  Medication   If this is a refill request, has the caller requested the refill from the pharmacy already? No  Will the patient be using a [facility]? No  Name of the pharmacy and phone number for the current request: [facility] [id number] [place] [address]    Name of the medication requested: buprenorphine (BUTRANS) 20 MCG/HR WK patch    Other request:     Phone number to reach patient:  Cell number on file:    Telephone Information:   Mobile [phone]       Best Time:      Can we leave a detailed message on this number?  YES    Travel screening: Not Applicable

## 2020-11-15 RX ORDER — TRIAMCINOLONE ACETONIDE 1 MG/G
OINTMENT TOPICAL
Qty: 80 G | Refills: 2 | Status: SHIPPED | OUTPATIENT
Start: 2020-11-15 | End: 2021-01-25

## 2020-11-15 NOTE — TELEPHONE ENCOUNTER
"Requested Prescriptions   Pending Prescriptions Disp Refills     triamcinolone (KENALOG) 0.1 % external ointment [Pharmacy Med Name: TRIAMCINOLONE 0.1% OINTMENT] 80 g 2     Sig: APPLY TOPICALLY TO AFFECTED AREA(S) 3 TIMES DAILY       Topical Steroids and Nonsteroidals Protocol Passed - 11/13/2020  4:09 PM        Passed - Patient is age 6 or older        Passed - Authorizing prescriber's most recent note related to this medication read.     If refill request is for ophthalmic use, please forward request to provider for approval.          Passed - High potency steroid not ordered        Passed - Recent (12 mo) or future (30 days) visit within the authorizing provider's specialty     Patient has had an office visit with the authorizing provider or a provider within the authorizing providers department within the previous 12 mos or has a future within next 30 days. See \"Patient Info\" tab in inbasket, or \"Choose Columns\" in Meds & Orders section of the refill encounter.              Passed - Medication is active on med list           Signed Prescriptions:                        Disp   Refills    triamcinolone (KENALOG) 0.1 % external oin*80 g   2        Sig: APPLY TOPICALLY TO AFFECTED AREA(S) 3 TIMES DAILY  Authorizing Provider: FOREIGN LECHUGA  Ordering User: NORAH GODINEZ      "

## 2020-12-03 ENCOUNTER — TELEPHONE (OUTPATIENT)
Dept: FAMILY MEDICINE | Facility: CLINIC | Age: 55
End: 2020-12-03

## 2020-12-03 NOTE — TELEPHONE ENCOUNTER
[employer]A medical form placed in .David Whitehead 's red folder in provider's bin for completion.      Carol HIRSCH)(KATIANA)

## 2020-12-07 ENCOUNTER — MYC REFILL (OUTPATIENT)
Dept: FAMILY MEDICINE | Facility: CLINIC | Age: 55
End: 2020-12-07

## 2020-12-07 DIAGNOSIS — F33.1 MAJOR DEPRESSIVE DISORDER, RECURRENT EPISODE, MODERATE (H): ICD-10-CM

## 2020-12-07 DIAGNOSIS — F41.9 ANXIETY: Chronic | ICD-10-CM

## 2020-12-07 DIAGNOSIS — F45.8 ANXIETY HYPERVENTILATION: ICD-10-CM

## 2020-12-07 DIAGNOSIS — N40.1 BENIGN PROSTATIC HYPERPLASIA WITH WEAK URINARY STREAM: ICD-10-CM

## 2020-12-07 DIAGNOSIS — R39.12 BENIGN PROSTATIC HYPERPLASIA WITH WEAK URINARY STREAM: ICD-10-CM

## 2020-12-07 DIAGNOSIS — F41.9 ANXIETY HYPERVENTILATION: ICD-10-CM

## 2020-12-10 RX ORDER — DESVENLAFAXINE 50 MG/1
TABLET, FILM COATED, EXTENDED RELEASE ORAL
Qty: 90 TABLET | Refills: 3 | Status: SHIPPED | OUTPATIENT
Start: 2020-12-10 | End: 2021-12-01

## 2020-12-10 RX ORDER — TAMSULOSIN HYDROCHLORIDE 0.4 MG/1
0.8 CAPSULE ORAL DAILY
Qty: 180 CAPSULE | Refills: 3 | Status: SHIPPED | OUTPATIENT
Start: 2020-12-10 | End: 2021-12-01

## 2020-12-10 RX ORDER — CLONAZEPAM 1 MG/1
.5-1 TABLET ORAL 2 TIMES DAILY PRN
Qty: 90 TABLET | Refills: 0 | Status: SHIPPED | OUTPATIENT
Start: 2020-12-10 | End: 2021-01-25

## 2020-12-10 NOTE — TELEPHONE ENCOUNTER
Routing refill request to provider for review/approval because:  Drug not on the [facility] refill protocol   PHQ-9 fails protocol.    Joselyn Valladares RN, [facility] Triage

## 2020-12-10 NOTE — TELEPHONE ENCOUNTER
Routing refill request to provider for review/approval because:  Patient has to following on medication list:  Tadalafil, Vardenafil, or Sildenafil     RN can not fill.    Joselyn Valladares RN, [facility] [place] Triage

## 2020-12-14 ENCOUNTER — TELEPHONE (OUTPATIENT)
Dept: CARDIOLOGY | Facility: OTHER | Age: 55
End: 2020-12-14

## 2020-12-15 ENCOUNTER — VIRTUAL VISIT (OUTPATIENT)
Dept: RHEUMATOLOGY | Facility: CLINIC | Age: 55
End: 2020-12-15
Payer: COMMERCIAL

## 2020-12-15 DIAGNOSIS — B18.1 CHRONIC VIRAL HEPATITIS B WITHOUT DELTA AGENT AND WITHOUT COMA (H): ICD-10-CM

## 2020-12-15 DIAGNOSIS — M06.09 RHEUMATOID ARTHRITIS OF MULTIPLE SITES WITH NEGATIVE RHEUMATOID FACTOR (H): Primary | ICD-10-CM

## 2020-12-15 DIAGNOSIS — Z79.899 HIGH RISK MEDICATION USE: ICD-10-CM

## 2020-12-15 DIAGNOSIS — G89.4 CHRONIC PAIN SYNDROME: ICD-10-CM

## 2020-12-15 PROCEDURE — 99214 OFFICE O/P EST MOD 30 MIN: CPT | Mod: 95 | Performed by: INTERNAL MEDICINE

## 2020-12-15 RX ORDER — HYDROXYCHLOROQUINE SULFATE 200 MG/1
200 TABLET, FILM COATED ORAL DAILY
Qty: 90 TABLET | Refills: 2 | Status: SHIPPED | OUTPATIENT
Start: 2020-12-15 | End: 2021-10-19

## 2020-12-15 RX ORDER — PREDNISONE 5 MG/1
TABLET ORAL
Qty: 9 TABLET | Refills: 1 | Status: SHIPPED | OUTPATIENT
Start: 2020-12-15 | End: 2021-05-05

## 2020-12-15 RX ORDER — LEFLUNOMIDE 20 MG/1
20 TABLET ORAL DAILY
Qty: 30 TABLET | Refills: 3 | Status: SHIPPED | OUTPATIENT
Start: 2020-12-15 | End: 2021-03-30

## 2020-12-15 NOTE — PROGRESS NOTES
"Lamont Daniels is a 55 year old male who is being evaluated via a billable telephone visit.      The patient has been notified of following:     \"This telephone visit will be conducted via a call between you and your physician/provider. We have found that certain health care needs can be provided without the need for a physical exam.  This service lets us provide the care you need with a short phone conversation.  If a prescription is necessary we can send it directly to your pharmacy.  If lab work is needed we can place an order for that and you can then stop by our lab to have the test done at a later time.    Telephone visits are billed at different rates depending on your insurance coverage. During this emergency period, for some insurers they may be billed the same as an in-person visit.  Please reach out to your insurance provider with any questions.    If during the course of the call the physician/provider feels a telephone visit is not appropriate, you will not be charged for this service.\"    Patient has given verbal consent for Telephone visit?  Yes    What phone number would you like to be contacted at? [phone]    How would you like to obtain your AVS? Janine Guerrier CMA Rheumatology  12/15/2020 2:06 PM      Rheumatology Telephone/Telehealth  Visit      Lamont Daniels MRN# [id number]   YOB: 1965 Age: 55 year old      Date of visit: 12/15/20   PCP: Dr. David Chavez  Hepatologist: Dr. Thomas Leventhal     Chief Complaint   Patient presents with:  Arthritis: RA. Still having pain and stiffness    Assessment and Plan   1.  Seropositive nonerosive rheumatoid arthritis (RF negative, CCP low positive): Note that he does have low back pain but without evidence of SI joint irregularity on x-ray.  Initially with morning stiffness all day, gelling phenomenon, and synovitis.   Previously on Humira (partially effective), Enbrel (partially effective). MTX avoided per Dr. Laboy recommendation. " Currently on SSZ 1500mg BID, Orencia SQ every 7 days, (initially Rx'd 4/18/2017), prednisone 1mg daily, and HCQ 200mg daily.  Treatment has been in coordination with hepatology as he requires HBV tx while on bDMARD. With stopping prednisone he has been doing slightly worse and has had a flare in the last 2 weeks. Low-dose prednisone was considered but he has hyperglycemia; discussed adding leflunomide and will do so today.   - Start leflunomide 20 mg daily    - Continue sulfasalazine 1500mg BID  - Continue Orencia SQ every 7 days  - Continue hydroxychloroquine 200 mg daily (last eye exam in 11/2019)  - PRN RA flare only: Prednisone 10 mg daily x2 days, then 5 mg daily x5 days, then stop   - Labs within 1 week and in 3 months: CBC, Creatinine, Hepatic Panel, ESR, CRP, glucose    # Leflunomide (Arava) Risks and Benefits: The risks and benefits of leflunomide were discussed in detail and the patient verbalized understanding.  The risks discussed include, but are not limited to, the risk for hypersensitivity, anaphylaxis, anaphylactoid reactions, hepatotoxicity, infections, interstitial lung disease, alopecia, rash, nausea, and diarrhea.  The impact on malignancies is not fully defined.   Alcohol should be avoided while taking leflunomide.  Pregnancy prevention and planning was discussed; it is recommended that women of childbearing potential use reliable contraception before, during, and for a period of 2 years after treatment with leflunomide; if pregnancy is planned within 2 years of discontinuing medication then women should undergo drug elimination procedures (i.e. cholestyramine). Routine laboratory monitoring is required during leflunomide therapy. I encouraged reviewing the package insert and asking any questions about the medication.      2. Lower back pain: Lumbar spine MRI in August 2014 showed multilevel degenerative changes and a small disc protrusion at L3/4 with mild spinal canal narrowing; also moderate  "left and mild right neural foraminal narrowing at L5-S1. He had a nerve conduction study in 2014 that was normal. He has been worked up by neurology in the past without significant neurologic findings. HLA-B27 negative, SI joint x-ray negative. Now following in the pain management clinic.     3. Myofascial pain syndrome / chronic pain syndrome: diffuse pain consistent with chronic pain syndrome.  Management per pain clinic as well as her PCP.  I strongly encourage that he increase physical activity as he leads a sedentary lifestyle with no more activity than what is required for his activities of daily living.  Encouraged low impact physical activity such as walking, multiple times per day    4. Chronic Hepatitis B: Managed by Dr. Laboy (hepatology) previously, and now Dr. Thomas Leventhal at the [facility]. Currently on tenofovir.     5. Hepatic Steatosis: Based on previous liver biopsy.  Seeing hepatology.      6. Positive tuberculosis test, history:   This is noted here for historical significance.  He was evaluated by Dr. Anthony Mendoza, infectious disease. The following telephone note was documented by Dr. Mendoza: \"I tried calling th pt, finally we have the records from [place] . It appears he was treated for latent TB with INH for 1 year in 1980/1981 .Later in 1981 April he was admitted at [facility] in [place], [place] with rt sided pneumonia, TB was suspected but he improved with treatment with Penicillin only. Later he was seen  ( likely ID specialist), and was treated with 6 weeks course of Rifampin and Ethambutol pending sputum AFB culture. His AFB cx were negative based on the report we have.\"  \"He recently had QFT -TB test which was reported positive and his CXR was clear. Given his documented hx of completion of treatment for latent TB as above , I do not see any need for further treatment. His tests may remain positive irrespective of treatment status. From my perspective he " "can be started on DMARDs/Biological as deemed necessary.\"       7. Gout: On allopurinol and managed by PCP.     # Relevant labs and imaging were reviewed with the patient    # High risk medication toxicity monitoring: discussion and labs reviewed; appropriate labs ordered. See above.  Instructed that if confirmed to have COVID-19 or exposure to someone with confirmed COVID-19 to call this clinic for directions on DMARD management.    # Note that this is a virtual visit to reduce the risk of COVID-19 exposure during this current pandemic.      # Considered to be at high risk of complications from the COVID-19 virus.  It is recommended to limit contact with other people and if possible to work remotely or provide a leave of absence to reduce the risk for COVID-19.      Mr. Daniels verbalized agreement with and understanding of the rational for the diagnosis and treatment plan.  All questions were answered to best of my ability and the patient's satisfaction. Mr. Daniels was advised to contact the clinic with any questions that may arise after the clinic visit.      Thank you for involving me in the care of the patient    Return to clinic: 3 months      HPI   Lamont Daniels is a 55 year old male with a medical history significant for hypertension, hyperlipidemia, gout, coronary artery disease s/p 6vCABG, vitamin D deficiency, hepatitis B, and RA who presents for f/u of RA.    Today, 12/15/2020: Has been doing okay with minimal joint pain at the MCPs and PIPs but then started having a flare at the MCPs and made it difficult to make a fist in the morning and throughout the day. He reports that he had a flare over the last 2 weeks that made it difficult to use his hands at all and he could not make a fist. He is taking his medications without any missed doses. Notes that he stopped prednisone at the last clinic visit. Morning stiffness for most of the day, improved after the first 3 h.    Denies fevers, chills, nausea, vomiting, " constipation, diarrhea. No abdominal pain. Denies chest pain/pressure, palpitations, or shortness of breath.  No oral or nasal sores. No rash. No LE swelling.  Mild dry mouth; he has good dentition and does not feel like he needs to use frequent sips of water; unchanged since last evaluation. No dry eyes. No eye pain or redness.     Tobacco:  None   EtOH:  None  Drugs:  None  Occupation: Retired   Originally from [facility], then lived [place] north of [place], [place], then lived in [place], [place], then moved to [place] for family    ROS   GEN: No fevers, chills, night sweats; + fatigue   SKIN: No itching, rashes, sores  HEENT: No epistaxis. No oral or nasal ulcers.  CV: See HPI  PULM: See HPI  GI: No nausea, vomiting, constipation, diarrhea. No blood in stool. No abdominal pain.  : No blood in urine.  MSK: See HPI.  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     Hyperlipidemia LDL goal <70     Insomnia     Gout     Suicidal ideation     Essential hypertension     Rheumatoid arthritis involving multiple sites, unspecified rheumatoid factor presence     High risk medications (not anticoagulants) long-term use     Midline low back pain without sciatica     Bilateral low back pain with sciatica, sciatica laterality unspecified     Elevated liver enzymes     On corticosteroid therapy     Essential hypertension with goal blood pressure less than 140/90     Insomnia, unspecified type     Rheumatoid arthritis of multiple sites with negative rheumatoid factor (H)     Benign prostatic hyperplasia with lower urinary  tract symptoms, unspecified morphology     Chronic pain syndrome     Past Medical History     Past Medical History:   Diagnosis Date     Anxiety      CAD (coronary artery disease)     CABG, PCI     Congestive heart failure, unspecified 2008     Depressive disorder 2008     Gout      Hepatitis B > 10 years     HTN (hypertension)      Hyperlipidemia LDL goal < 100      Malrotation of intestine      Moderate major depression (H)      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) 9/19/2011    Uses CPAP     Rheumatoid arthritis flare (H) 1012     Steatohepatitis 12/15    liver biopsy     Tuberculosis age 13    INH x 9 months      Vitamin D deficiency      Past Surgical History     Past Surgical History:   Procedure Laterality Date     ABDOMEN SURGERY  2014     BIOPSY  2015     BYPASS GRAFT ARTERY CORONARY  2008    6 vessels     COLONOSCOPY  2/8/2013    Procedure: COLONOSCOPY;  Colonoscopy, blood in stool;  Surgeon: Duane, William Charles, MD;  Location:  OR     COMBINED REPAIR PTOSIS WITH BLEPHAROPLASTY BILATERAL Bilateral 6/25/2018    Procedure: COMBINED REPAIR PTOSIS WITH BLEPHAROPLASTY BILATERAL;  Bilateral upper eyelid blepharoplasty, ptosis repair and brow ptosis repair;  Surgeon: Sandra Borja MD;  Location:  OR     GI SURGERY  2014     HC CORONARY STENT PERCUT, EA ADDTL VESSEL  2008    3 months after CABG     HEAD & NECK SURGERY  2011     NASAL/SINUS POLYPECTOMY  2010     ORTHOPEDIC SURGERY  2012     REPAIR PTOSIS       REPAIR PTOSIS BILATERAL Bilateral 7/22/2019    Procedure: REPAIR, PTOSIS, BOTH UPPER brows;  Surgeon: Sandra Borja MD;  Location: [facility] OR     REPAIR PTOSIS BROW BILATERAL Bilateral 6/25/2018    Procedure: REPAIR PTOSIS BROW BILATERAL;;  Surgeon: Sandra Borja MD;  Location:  OR     THORACIC SURGERY  1989    tb     TONSILLECTOMY  2010     UVULOPALATOPHARYNGOPLASTY  2010     VASCULAR SURGERY  2008     Allergy     Allergies   Allergen Reactions     Citalopram Itching     Tramadol Itching     Current  Medication List     Current Outpatient Medications   Medication Sig     Abatacept (ORENCIA) 125 MG/ML SOAJ auto-injector Inject 1 mL (125 mg) Subcutaneous every 7 days     Acetaminophen (TYLENOL PO)      allopurinol (ZYLOPRIM) 300 MG tablet TAKE 1 TABLET BY MOUTH EVERY DAY     aspirin EC 81 MG EC tablet Take 1 tablet (81 mg) by mouth daily (*)     atorvastatin (LIPITOR) 80 MG tablet TAKE 1 TABLET BY MOUTH 1 TIME DAILY     bacitracin 500 UNIT/GM external ointment Apply topically 3 times daily     baclofen (LIORESAL) 10 MG tablet TAKE 1 TABLET BY MOUTH 2 TIMES DAILY AS NEEDED FOR MUSCLE SPASM     buprenorphine (BUTRANS) 20 MCG/HR WK patch Place 1 patch onto the skin every 7 days . Name brand only.  Fill 11/17/20 and start 11/20/20     busPIRone HCl (BUSPAR) 30 MG tablet TAKE 1 TABLET (30 MG) BY MOUTH 2 TIMES DAILY     Cholecalciferol (VITAMIN D) 2000 UNITS tablet TAKE ONE TABLET BY MOUTH ONCE DAILY     clobetasol (TEMOVATE) 0.05 % external solution Apply twice daily to scalp for up to 2 weeks for flares.     clonazePAM (KLONOPIN) 1 MG tablet TAKE 0.5-1 TABLETS (0.5-1 MG) BY MOUTH 2 TIMES DAILY AS NEEDED FOR ANXIETY     clopidogrel (PLAVIX) 75 MG tablet Take 1 tablet (75 mg) by mouth daily     COLCRYS 0.6 MG tablet TAKE 2 TABLETS BY MOUTH AT THE FIRST SIGN OF FLARE, TAKE 1 ADDITIONAL TABLET ONE HOUR LATER.     desvenlafaxine (PRISTIQ) 50 MG 24 hr tablet TAKE 1 TABLET BY MOUTH EVERY DAY     diclofenac (VOLTAREN) 1 % topical gel Apply 4 grams to bilateral knees, up to four times daily as needed using enclosed dosing card.  Max of 32g/day     evolocumab (REPATHA) 140 MG/ML prefilled autoinjector Inject 1 mL (140 mg) Subcutaneous every 14 days     ezetimibe (ZETIA) 10 MG tablet Take 1 tablet (10 mg) by mouth daily     gabapentin (NEURONTIN) 300 MG capsule Take 2 capsules (600 mg) by mouth 3 times daily . 3 month supply     gemfibrozil (LOPID) 600 MG tablet Take 1 tablet (600 mg) by mouth 2 times daily     hydrocortisone  (WESTCORT) 0.2 % external cream For genitals, apply twice daily for up to 2 weeks, take 2 weeks off then repeat     hydrocortisone 2.5 % cream For face, Apply twice daily for up to 2 week for flares on the face, take 2 weeks off     hydroxychloroquine (PLAQUENIL) 200 MG tablet Take 1 tablet (200 mg) by mouth daily     Incontinence Supply Disposable (DEPEND UNDERWEAR LARGE) MISC Use twice daily.     meclizine (ANTIVERT) 25 MG tablet TAKE 1 TABLET BY MOUTH EVERY 6 HOURS AS NEEDED FOR DIZZINESS     melatonin 3 MG tablet Take 1 tablet (3 mg) by mouth nightly as needed for sleep     pantoprazole (PROTONIX) 40 MG EC tablet TAKE 1 TABLET BY MOUTH EVERY DAY     sildenafil (REVATIO) 20 MG tablet TAKE 2-5 TABLETS BY MOUTH 30 MINUTES PRIOR TO INTERCOURSE     sulfaSALAzine ER (AZULFIDINE EN) 500 MG EC tablet Take 3 tablets (1,500 mg) by mouth 2 times daily     tamsulosin (FLOMAX) 0.4 MG capsule Take 2 capsules (0.8 mg) by mouth daily     tenofovir (VIREAD) 300 MG tablet Take 1 tablet (300 mg) by mouth daily     triamcinolone (KENALOG) 0.1 % external ointment APPLY TOPICALLY TO AFFECTED AREA(S) 3 TIMES DAILY     triamcinolone (KENALOG) 0.1 % external ointment Apply to trunk and extremities twice daily for up to 2 weeks for flares     zolpidem (AMBIEN) 5 MG tablet Take 1 tablet (5 mg) by mouth nightly as needed for sleep     blood glucose (NO BRAND SPECIFIED) lancets standard Use to test blood sugar 2 times daily or as directed. (Patient not taking: Reported on 3/2/2020)     blood glucose monitoring (NO BRAND SPECIFIED) meter device kit Use to test blood sugar 2 times daily or as directed. (Patient not taking: Reported on 3/2/2020)     blood glucose monitoring (NO BRAND SPECIFIED) test strip Use to test blood sugars 2 times daily or as directed (Patient not taking: Reported on 3/2/2020)     naloxone (NARCAN) nasal spray Spray 1 spray (4 mg) into one nostril alternating nostrils as needed for opioid reversal every 2-3 minutes  until assistance arrives     nitroGLYcerin (NITROSTAT) 0.4 MG sublingual tablet For chest pain place 1 tablet under the tongue every 5 minutes for 3 doses. If symptoms persist 5 minutes after 1st dose call 911.     order for DME Equipment being ordered: chair lift     order for DME Equipment being ordered: single end cane     ORDER FOR DME 1.  CPAP pressure 11 cm/H20 with heated humidity.   2.  Provide mask to fit and CPAP supplies.   3.  Length of need lifetime.   4.  If needed please provide a chin strap          predniSONE (DELTASONE) 1 MG tablet Prednisone 2mg daily x30days, then 1mg daily x30days, then stop.     No current facility-administered medications for this visit.        Social History   See HPI    Family History     Family History   Problem Relation Age of Onset     C.A.D. Father      Coronary Artery Disease Father      Hypertension Father      Depression Father      Hypertension Mother      Diabetes Mother      Depression Mother      Mental Illness Mother      Hypertension Maternal Grandfather      Cancer Paternal Grandfather      Other Cancer Paternal Grandfather      Other Cancer Other      Autoimmune Disease No family hx of      Cerebrovascular Disease No family hx of      Thyroid Disease No family hx of      Glaucoma No family hx of      Macular Degeneration No family hx of      No family history of autoimmune disease  No family history of psoriasis     No change in family history since the previous clinic visit.     Physical Exam     Temp Readings from Last 3 Encounters:   01/14/20 98.4  F (36.9  C) (Oral)   10/08/19 97.6  F (36.4  C) (Oral)   07/22/19 97.5  F (36.4  C) (Temporal)     BP Readings from Last 5 Encounters:   10/27/20 133/88   03/10/20 (!) 150/90   03/02/20 133/88   01/14/20 115/78   11/26/19 128/88     Pulse Readings from Last 1 Encounters:   10/27/20 66     Resp Readings from Last 1 Encounters:   07/22/19 16     Estimated body mass index is 31.42 kg/m  as calculated from the  "following:    Height as of 3/10/20: 1.575 m (5' 2\").    Weight as of 3/10/20: 77.9 kg (171 lb 12.8 oz).     Telephone Visit     Labs     RF/CCP  Recent Labs   Lab Test 11/04/15  1547 08/12/14  1414   CCPABY 27*  --    RHF  --  <20     CBC  Recent Labs   Lab Test 09/10/20  1509 06/03/20  1317 03/26/20  1039 02/27/20  0816   WBC 6.3 7.4 7.2 7.1   RBC 5.05 4.97 5.16 5.04   HGB 14.6 14.5 15.1 14.5   HCT 44.2 44.0 45.9 44.9   MCV 88 89 89 89   RDW 13.4 13.4 13.3 13.5    323 275 273   MCH 28.9 29.2 29.3 28.8   MCHC 33.0 33.0 32.9 32.3   NEUTROPHIL 51.3 62.0  --  42.3   LYMPH 32.9 24.9  --  41.5   MONOCYTE 12.1 10.6  --  13.0   EOSINOPHIL 2.7 2.0  --  2.5   BASOPHIL 1.0 0.5  --  0.7   ANEU 3.2 4.6  --  3.0   ALYM 2.1 1.9  --  3.0   PRACHI 0.8 0.8  --  0.9   AEOS 0.2 0.2  --  0.2   ABAS 0.1 0.0  --  0.1     CMP  Recent Labs   Lab Test 09/10/20  1509 06/03/20  1317 03/26/20  1039 11/25/19  0758 11/25/19  0758 10/07/19  0940   NA  --   --  138  --  141 140   POTASSIUM  --   --  3.8  --  3.8 3.8   CHLORIDE  --   --  109  --  110* 110*   CO2  --   --  25  --  25 26   ANIONGAP  --   --  4  --  6 4   * 138* 122*   < > 101* 103*   BUN  --   --  16  --  16 19   CR 0.92 0.89 1.00   < > 0.78 0.93   GFRESTIMATED >90 >90 85   < > >90 >90   GFRESTBLACK >90 >90 >90   < > >90 >90   HEMANT  --   --  9.0  --  9.2 9.2   BILITOTAL 0.3 0.4 0.3   < > 0.4 0.4   ALBUMIN 3.8 4.1 4.3   < > 4.0 4.1   PROTTOTAL 7.6 7.9 8.0   < > 7.3 7.8   ALKPHOS 112 106 123   < > 89 112   AST 28 20 23   < > 23 32   ALT 38 31 36   < > 47 47    < > = values in this interval not displayed.     Calcium/VitaminD  Recent Labs   Lab Test 03/26/20  1039 11/25/19  0758 10/07/19  0940 07/13/17  1246 07/13/17  1246 11/04/15  1547 11/04/15  1547 04/01/13  1624 04/01/13  1624   HEMANT 9.0 9.2 9.2   < >  --    < >  --    < > 9.5   VITDT  --   --   --   --  34  --  43  --  34    < > = values in this interval not displayed.     ESR/CRP  Recent Labs   Lab Test 09/10/20  1509 " 06/03/20  1317 02/27/20  0816   SED 11 11 10   CRP <2.9 <2.9 <2.9     Lipid Panel  Recent Labs   Lab Test 06/25/20  1027 03/14/19  1006 06/22/18  1448 06/29/15  1405 06/29/15  1405 05/22/14  0848 03/24/14  0936   CHOL 122 152 129   < > 219* 169 195   TRIG 148 126 113   < > 372* 379* 301*   HDL 59 51 64   < > 33* 33* 39*   LDL 33 76 42   < > 112 60 95   VLDL  --   --   --   --  74* 76* 60*   CHOLHDLRATIO  --   --   --   --  6.6* 5.1* 5.0   NHDL 63 101 65   < >  --   --   --     < > = values in this interval not displayed.     Hepatitis B  Recent Labs   Lab Test 03/26/20  1039 10/07/19  0940 04/01/19  0851 10/05/16  0954 10/05/16  0954 04/23/13  0812 04/23/13  0812 01/30/13  0906   AUSAB  --   --   --   --  0.45  --   --   --    HBCAB  --   --   --   --   --   --  Positive*  --    HEPBANG  --   --   --   --  Reactive*  --  Positive* Positive*   HBQLOG <1.3 Not Calculated Not Calculated   < > 5.1*   < >  --   --     < > = values in this interval not displayed.     Hepatitis C  Recent Labs   Lab Test 04/07/16  1538 04/23/13  0812   HCVAB Nonreactive   Assay performance characteristics have not been established for newborns,   infants, and children   Negative     Lyme ab screening  Recent Labs   Lab Test 07/18/17  1330   LYMEGM 0.19     Tuberculosis Screening  Recent Labs   Lab Test 04/07/16  1538   TBRSLT Positive   The magnitude of the measured IFN-gamma level cannot be correlated to stage or   degree of infection, level of immune responsiveness, or likelihood for   progression to active disease.  *   TBAGN >10.00  This is a qualitative test.  The TB antigen IU/mL value is required for   documentation on certain government reporting forms but this value should not   be used to monitor disease progression or response to therapy.   Diagnosing or excluding tuberculosis disease, and assessing the probability of   LTBI, require a combination of epidemiological, historical, medical and   diagnostic findings that should be  "taken into account when interpreting   QuantiFERON TB results.       HIV Screening  Recent Labs   Lab Test 11/04/15  1547   HIAGAB Nonreactive   HIV-1 p24 Ag & HIV-1/HIV-2 Ab Not Detected       \"HAND BILATERAL THREE OR MORE VIEWS 11/4/2015 4:55 PM   HISTORY: Pain in unspecified joint.  COMPARISON: None.  IMPRESSION  IMPRESSION: Normal.  DANIS ANGLIN MD\"    \"FOOT BILATERAL THREE OR MORE VIEWS 11/4/2015 4:55 PM   HISTORY: Pain in unspecified joint.  COMPARISON: 8/21/2012.  IMPRESSION  IMPRESSION: Small traction spurs are seen off the Achilles tendon and  plantar fascial attachment sites bilaterally. Joint spaces are  preserved. Osseous structures are intact. Incidental note is made of  some surgical staples in the soft tissues of the medial distal right  lower extremity.  DANIS ANGLIN MD\"      Immunization History     Immunization History   Administered Date(s) Administered     Influenza (IIV3) PF 10/11/2011, 09/20/2017, 09/01/2018     Influenza Vaccine IM > 6 months Valent IIV4 09/27/2015, 09/08/2016, 09/30/2019, 09/14/2020     Influenza Vaccine, 6+MO IM (QUADRIVALENT W/PRESERVATIVES) 11/17/2014     Pneumo Conj 13-V (2010&after) 04/07/2016     Pneumococcal 23 valent 12/12/2014     TDAP Vaccine (Adacel) 08/30/2011     Zoster vaccine recombinant adjuvanted (SHINGRIX) 07/27/2019          Chart documentation done in part with Dragon Voice recognition Software. Although reviewed after completion, some word and grammatical error may remain.    Phone call start time: 2:52 PM  Phone call end time: 3:05 PM    This visit is equivalent to a 82003 visit    Location of patient: [place], [place]   Location of provider: home    Follow up:  follow up appointment scheduled to be in March    Sebastián Jenkins MD        "

## 2020-12-16 DIAGNOSIS — M06.09 RHEUMATOID ARTHRITIS OF MULTIPLE SITES WITH NEGATIVE RHEUMATOID FACTOR (H): ICD-10-CM

## 2020-12-16 DIAGNOSIS — Z79.899 HIGH RISK MEDICATION USE: ICD-10-CM

## 2020-12-16 LAB
ALBUMIN SERPL-MCNC: 3.8 G/DL (ref 3.4–5)
ALP SERPL-CCNC: 123 U/L (ref 40–150)
ALT SERPL W P-5'-P-CCNC: 31 U/L (ref 0–70)
AST SERPL W P-5'-P-CCNC: 21 U/L (ref 0–45)
BASOPHILS # BLD AUTO: 0 10E9/L (ref 0–0.2)
BASOPHILS NFR BLD AUTO: 0.6 %
BILIRUB DIRECT SERPL-MCNC: <0.1 MG/DL (ref 0–0.2)
BILIRUB SERPL-MCNC: 0.2 MG/DL (ref 0.2–1.3)
CREAT SERPL-MCNC: 0.98 MG/DL (ref 0.66–1.25)
CRP SERPL-MCNC: <2.9 MG/L (ref 0–8)
DIFFERENTIAL METHOD BLD: NORMAL
EOSINOPHIL # BLD AUTO: 0.2 10E9/L (ref 0–0.7)
EOSINOPHIL NFR BLD AUTO: 2.7 %
ERYTHROCYTE [DISTWIDTH] IN BLOOD BY AUTOMATED COUNT: 14.4 % (ref 10–15)
ERYTHROCYTE [SEDIMENTATION RATE] IN BLOOD BY WESTERGREN METHOD: 11 MM/H (ref 0–20)
GFR SERPL CREATININE-BSD FRML MDRD: 87 ML/MIN/{1.73_M2}
GLUCOSE SERPL-MCNC: 100 MG/DL (ref 70–99)
HCT VFR BLD AUTO: 43.5 % (ref 40–53)
HGB BLD-MCNC: 14 G/DL (ref 13.3–17.7)
LYMPHOCYTES # BLD AUTO: 3.1 10E9/L (ref 0.8–5.3)
LYMPHOCYTES NFR BLD AUTO: 45.4 %
MCH RBC QN AUTO: 28.6 PG (ref 26.5–33)
MCHC RBC AUTO-ENTMCNC: 32.2 G/DL (ref 31.5–36.5)
MCV RBC AUTO: 89 FL (ref 78–100)
MONOCYTES # BLD AUTO: 0.7 10E9/L (ref 0–1.3)
MONOCYTES NFR BLD AUTO: 10.5 %
NEUTROPHILS # BLD AUTO: 2.7 10E9/L (ref 1.6–8.3)
NEUTROPHILS NFR BLD AUTO: 40.8 %
PLATELET # BLD AUTO: 284 10E9/L (ref 150–450)
PROT SERPL-MCNC: 7.3 G/DL (ref 6.8–8.8)
RBC # BLD AUTO: 4.9 10E12/L (ref 4.4–5.9)
WBC # BLD AUTO: 6.7 10E9/L (ref 4–11)

## 2020-12-16 PROCEDURE — 86140 C-REACTIVE PROTEIN: CPT | Performed by: INTERNAL MEDICINE

## 2020-12-16 PROCEDURE — 36415 COLL VENOUS BLD VENIPUNCTURE: CPT | Performed by: INTERNAL MEDICINE

## 2020-12-16 PROCEDURE — 82947 ASSAY GLUCOSE BLOOD QUANT: CPT | Performed by: INTERNAL MEDICINE

## 2020-12-16 PROCEDURE — 82565 ASSAY OF CREATININE: CPT | Performed by: INTERNAL MEDICINE

## 2020-12-16 PROCEDURE — 80076 HEPATIC FUNCTION PANEL: CPT | Performed by: INTERNAL MEDICINE

## 2020-12-16 PROCEDURE — 85652 RBC SED RATE AUTOMATED: CPT | Performed by: INTERNAL MEDICINE

## 2020-12-16 PROCEDURE — 85025 COMPLETE CBC W/AUTO DIFF WBC: CPT | Performed by: INTERNAL MEDICINE

## 2020-12-17 ENCOUNTER — VIRTUAL VISIT (OUTPATIENT)
Dept: DERMATOLOGY | Facility: CLINIC | Age: 55
End: 2020-12-17
Payer: COMMERCIAL

## 2020-12-17 DIAGNOSIS — L40.9 PSORIASIS: Primary | ICD-10-CM

## 2020-12-17 PROCEDURE — 99207 PR NO BILLABLE SERVICE THIS VISIT: CPT | Mod: 95 | Performed by: DERMATOLOGY

## 2020-12-17 RX ORDER — CLOBETASOL PROPIONATE 0.5 MG/G
CREAM TOPICAL
Qty: 60 G | Refills: 11 | Status: SHIPPED | OUTPATIENT
Start: 2020-12-17 | End: 2022-02-01

## 2020-12-17 RX ORDER — PIMECROLIMUS 10 MG/G
CREAM TOPICAL
Qty: 60 G | Refills: 3 | Status: SHIPPED | OUTPATIENT
Start: 2020-12-17 | End: 2021-04-26

## 2020-12-17 ASSESSMENT — PAIN SCALES - GENERAL: PAINLEVEL: NO PAIN (0)

## 2020-12-17 NOTE — NURSING NOTE
Teledermatology Nurse Call Patients:     Are you  in the [facility] at the time of the encounter? yes    Today's visit will be billed to you and your insurance.    A teledermatology visit is not as thorough as an in-person visit and the quality of the photograph sent may not be of the same quality as that taken by the dermatology clinic.         Chief Complaint   Patient presents with     Derm Problem     Psoriasis        1. Psoriasis, occipital scalp, ears(in past) and scrotum and possibly base of nose, around the eyes. Patient reported mild itching mostly genital. Treatment working with some improvement, but not resolving.     For body, hand and genital he is using Westcort. Currently active and have been using every day.     Clobetasol solution for the head and ears daily    For face he is using hydrocortisone 2.5% every morning.       [id number], MEDICAL ASSISTANT

## 2020-12-17 NOTE — PATIENT INSTRUCTIONS
[facility] Dermatology Visit    Thank you for allowing us to participate in your care. Your findings, instructions and follow-up plan are as follows:    Stop using the Westcort ointment on the face and scrotum  Begin using elidel (pimecrolimus, a nonsteroidal medication) cream on the face and scrotum twice a day  Begin using clobetasol ointment on hands twice a day (consider buying cotton gloves to help keep this on better)      When should I call my doctor?    If you are worsening or not improving, please, contact us or seek urgent care as noted below.     Who should I call with questions (adults)?    [facility] (adult and pediatric): [phone]     [facility] (adult): [phone]    For urgent needs outside of business hours call the [facility] at [phone] and ask for the dermatology resident on call    If this is a medical emergency and you are unable to reach an ER, Call [phone]      Who should I call with questions (pediatric)?  [facility]- Pediatric Dermatology  Dr. Cristine Cardenas, Dr. Rodolfo Carolina, Dr. Latesha Ding, Kathryn Laughlin, PA  Dr. Perla Cueva, Dr. Eugenie Farmer & Dr. Wiley Maradiaga  Non Urgent  Nurse Triage Line; [phone]- Theodora and Susan MICHELLE Care Coordinators   T[place]anna (/Complex ) [phone]    If you need a prescription refill, please contact your pharmacy. Refills are approved or denied by our Physicians during normal business hours, Monday through Fridays  Per office policy, refills will not be granted if you have not been seen within the past year (or sooner depending on your child's condition)    Scheduling Information:  Pediatric Appointment Scheduling and Call Center [phone]  Radiology Scheduling- [phone]  Sedation Unit Scheduling- [phone]  [facility] Scheduling- General [phone]; Pediatric Dermatology  [phone]  Main  Services: [phone]  Icelandic: [phone]  New Zealander: [phone]  Hmong/Azeri/Manav: [phone]  Preadmission Nursing Department Fax Number: [phone] (Fax all pre-operative paperwork to this number)    For urgent matters arising during evenings, weekends, or holidays that cannot wait for normal business hours please call [phone] and ask for the Dermatology Resident On-Call to be paged.

## 2020-12-17 NOTE — PROGRESS NOTES
[facility] Dermatology Record:  Store and Forward and Telephone [phone]       Dermatology Problem List:  1. Skin eruption biopsied, c/w psoriasis   -occipital scalp, s/p biopsy 5/31/19  -clobetasol for scalp and hands  -for face and genitals elidel  -has RA, follows with rheum  2. Seborrheic dermatitis   -improves with triamcinolone and ketoconazole  3. RA on plaquenil, orencia and sulfasalazine with rheum    Encounter Date: Dec 17, 2020    CC:   Chief Complaint   Patient presents with     Derm Problem     Psoriasis        History of Present Illness:  Lamont Daniels is a 55 year old male who presents for follow up of psoriasis. He was last seen virtually on 11/12/20 and was continued on clobetasol as needed for the head and neck and Westcort for the scrotum and around the eyes. He continues to use these ointments once a day and has noticed mild improvement on his ears and around the eyes, but has not noticed significant change in the scrotal plaques or psoriasis on his palms. He has been applying the Westcort to the palms but states it is very difficult to keep the ointment on due to frequent hand washing. He denies noticing any new plaques and also denies any recent medication changes. He is otherwise feeling well.    ROS: Patient is generally feeling well today.    Physical Examination:  General: Well-appearing, appropriately-developed individual.  Skin:  Exam was performed by photo review and is significant for the following:  - Ill defined pink scaly plaques of the palmar aspect of the hand, notably on the thumb and thenar eminence  - Hypopigmented plaques and patches on the scrotal surface, perineum and gluteal cleft  - Light pink plaques of the upper eyelids and medial canthal regions  - Right ear helix with pink scaly and crusted papules and plaques    Labs:  None    Past Medical History:   Patient Active Problem List   Diagnosis     Coronary atherosclerosis of native coronary artery     Major depressive  disorder, recurrent episode, moderate (H)     Anxiety     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)     Hepatitis B infection     Vitamin D deficiency     S/P CABG (coronary artery bypass graft)     Malrotation of intestine     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     Hyperlipidemia LDL goal <70     Insomnia     Gout     Suicidal ideation     Essential hypertension     Rheumatoid arthritis involving multiple sites, unspecified rheumatoid factor presence     High risk medications (not anticoagulants) long-term use     Midline low back pain without sciatica     Bilateral low back pain with sciatica, sciatica laterality unspecified     Elevated liver enzymes     On corticosteroid therapy     Essential hypertension with goal blood pressure less than 140/90     Insomnia, unspecified type     Rheumatoid arthritis of multiple sites with negative rheumatoid factor (H)     Benign prostatic hyperplasia with lower urinary tract symptoms, unspecified morphology     Chronic pain syndrome     Past Medical History:   Diagnosis Date     Anxiety      CAD (coronary artery disease)     CABG, PCI     Congestive heart failure, unspecified 2008     Depressive disorder 2008     Gout      Hepatitis B > 10 years     HTN (hypertension)      Hyperlipidemia LDL goal < 100      Malrotation of intestine      Moderate major depression (H)      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) 9/19/2011    Uses CPAP     Rheumatoid arthritis flare (H) 1012     Steatohepatitis 12/15    liver biopsy     Tuberculosis age 13    INH x 9 months      Vitamin D deficiency      Past Surgical History:   Procedure Laterality Date     ABDOMEN SURGERY  2014     BIOPSY  2015     BYPASS GRAFT ARTERY CORONARY  2008    6 vessels     COLONOSCOPY  2/8/2013    Procedure: COLONOSCOPY;  Colonoscopy, blood in stool;  Surgeon: Duane, William Charles, MD;  Location: [facility]     COMBINED REPAIR PTOSIS WITH BLEPHAROPLASTY BILATERAL Bilateral 6/25/2018    Procedure: COMBINED REPAIR PTOSIS WITH BLEPHAROPLASTY  BILATERAL;  Bilateral upper eyelid blepharoplasty, ptosis repair and brow ptosis repair;  Surgeon: Sandra Borja MD;  Location: [facility] OR     GI SURGERY  2014     HC CORONARY STENT PERCUT, EA ADDTL VESSEL  2008    3 months after CABG     HEAD & NECK SURGERY  2011     NASAL/SINUS POLYPECTOMY  2010     ORTHOPEDIC SURGERY  2012     REPAIR PTOSIS       REPAIR PTOSIS BILATERAL Bilateral 7/22/2019    Procedure: REPAIR, PTOSIS, BOTH UPPER brows;  Surgeon: Sandra Borja MD;  Location: [facility] OR     REPAIR PTOSIS BROW BILATERAL Bilateral 6/25/2018    Procedure: REPAIR PTOSIS BROW BILATERAL;;  Surgeon: Sandra Borja MD;  Location: [facility] OR     THORACIC SURGERY  1989    tb     TONSILLECTOMY  2010     UVULOPALATOPHARYNGOPLASTY  2010     VASCULAR SURGERY  2008       Social History:  Patient reports that he has never smoked. He has never used smokeless tobacco. He reports that he does not drink alcohol or use drugs.    Family History:  Family History   Problem Relation Age of Onset     C.A.D. Father      Coronary Artery Disease Father      Hypertension Father      Depression Father      Hypertension Mother      Diabetes Mother      Depression Mother      Mental Illness Mother      Hypertension Maternal Grandfather      Cancer Paternal Grandfather      Other Cancer Paternal Grandfather      Other Cancer Other      Autoimmune Disease No family hx of      Cerebrovascular Disease No family hx of      Thyroid Disease No family hx of      Glaucoma No family hx of      Macular Degeneration No family hx of        Medications:  Current Outpatient Medications   Medication     Abatacept (ORENCIA) 125 MG/ML SOAJ auto-injector     Acetaminophen (TYLENOL PO)     allopurinol (ZYLOPRIM) 300 MG tablet     aspirin EC 81 MG EC tablet     atorvastatin (LIPITOR) 80 MG tablet     bacitracin 500 UNIT/GM external ointment     baclofen (LIORESAL) 10 MG tablet     blood glucose (NO BRAND SPECIFIED) lancets standard     blood glucose monitoring (NO  BRAND SPECIFIED) meter device kit     blood glucose monitoring (NO BRAND SPECIFIED) test strip     buprenorphine (BUTRANS) 20 MCG/HR WK patch     busPIRone HCl (BUSPAR) 30 MG tablet     Cholecalciferol (VITAMIN D) 2000 UNITS tablet     clobetasol (TEMOVATE) 0.05 % external solution     clonazePAM (KLONOPIN) 1 MG tablet     clopidogrel (PLAVIX) 75 MG tablet     COLCRYS 0.6 MG tablet     desvenlafaxine (PRISTIQ) 50 MG 24 hr tablet     diclofenac (VOLTAREN) 1 % topical gel     evolocumab (REPATHA) 140 MG/ML prefilled autoinjector     ezetimibe (ZETIA) 10 MG tablet     gabapentin (NEURONTIN) 300 MG capsule     gemfibrozil (LOPID) 600 MG tablet     hydrocortisone (WESTCORT) 0.2 % external cream     hydrocortisone 2.5 % cream     hydroxychloroquine (PLAQUENIL) 200 MG tablet     Incontinence Supply Disposable (DEPEND UNDERWEAR LARGE) MISC     leflunomide (ARAVA) 20 MG tablet     meclizine (ANTIVERT) 25 MG tablet     melatonin 3 MG tablet     naloxone (NARCAN) nasal spray     nitroGLYcerin (NITROSTAT) 0.4 MG sublingual tablet     order for DME     order for DME     ORDER FOR DME     pantoprazole (PROTONIX) 40 MG EC tablet     predniSONE (DELTASONE) 1 MG tablet     predniSONE (DELTASONE) 5 MG tablet     sildenafil (REVATIO) 20 MG tablet     sulfaSALAzine ER (AZULFIDINE EN) 500 MG EC tablet     tamsulosin (FLOMAX) 0.4 MG capsule     tenofovir (VIREAD) 300 MG tablet     triamcinolone (KENALOG) 0.1 % external ointment     triamcinolone (KENALOG) 0.1 % external ointment     zolpidem (AMBIEN) 5 MG tablet     No current facility-administered medications for this visit.           Allergies   Allergen Reactions     Citalopram Itching     Tramadol Itching           Impression and Recommendations (Patient Counseled on the Following):  1. Skin eruption consistent with plaque psoriasis (biopsy on scalp, see problem list)- partial improvement with intermittent use of clobetasol on head and Westcort ointments, possible hypopigmentation of  face from steroid. Will hold on face. - Consider Otezla, MTX (may also improve RA but has hep B and fatty liver) or acitretin (however has h/o chronic hepatitis B/fatty liver), Cosentyx (But need to get clearance from pharmacy and GI) and we in a covid pandemic  - Continue clobetasol pulsed for the scalp as prescribed for up to 2 weeks.   -Start clobetasol twice daily for 2 weeks, then 3 times weekly for 2 weeks, repeat cycle as needed. Side effects of steroid overuse including atrophy were discussed.   - Phototherapy as next step  - Stop Westcort ointment  - Start Elidel cream twice daily for the face and scrotum, reviewed BB warning    Follow-up:   Follow-up with dermatology in approximately 2-3 months photos and phone. Earlier for new or changing lesions or rash.      Staff and resident     Estefanía White DO (PGY-2)  Dermatology Resident  [facility]    Staff: Dr. Cornelia Roche    Staff Physician Comments:   I, Alyssa Roche MD, discussed this patient with the resident and agree with the resident's findings and plan of care as documented in the resident's note.I reviewed the images.     Alyssa Roche MD   Department of Dermatology  [facility]: Phone: [phone], Fax:[phone]  [facility]: Phone: [phone], Fax: [phone]  1/18/2021       _____________________________________________________________________________    Teledermatology information:  - Patient presented as: return  - Location of teledermatologist:  ([facility] )  - Image quality and interpretability: acceptable  - Physician has received verbal consent for a Video/Photos Visit from the patient? YES  - In-person dermatology visit recommendation: no  - Date of images: 12/16/20  - Service start time: approx 2:40pm  - Service end time:*2:43pm  (resident time: 2:18 PM - 2:25 PM)  - Date of report:  12/17/2020

## 2020-12-17 NOTE — LETTER
12/17/2020         RE: Lamont Daniels  [address]  [place] [address]        Dear Colleague,    Thank you for referring your patient, Lamont Daniels, to the [facility]. Please see a copy of my visit note below.    [facility] Dermatology Record:  Store and Forward and Telephone [phone]       Dermatology Problem List:  1. Skin eruption biopsied, c/w psoriasis   -occipital scalp, s/p biopsy 5/31/19  -clobetasol for scalp and hands  -for face and genitals elidel  -has RA, follows with rheum  2. Seborrheic dermatitis   -improves with triamcinolone and ketoconazole  3. RA on plaquenil, orencia and sulfasalazine with rheum    Encounter Date: Dec 17, 2020    CC:   Chief Complaint   Patient presents with     Derm Problem     Psoriasis        History of Present Illness:  Lamont Daniels is a 55 year old male who presents for follow up of psoriasis. He was last seen virtually on 11/12/20 and was continued on clobetasol as needed for the head and neck and Westcort for the scrotum and around the eyes. He continues to use these ointments once a day and has noticed mild improvement on his ears and around the eyes, but has not noticed significant change in the scrotal plaques or psoriasis on his palms. He has been applying the Westcort to the palms but states it is very difficult to keep the ointment on due to frequent hand washing. He denies noticing any new plaques and also denies any recent medication changes. He is otherwise feeling well.    ROS: Patient is generally feeling well today.    Physical Examination:  General: Well-appearing, appropriately-developed individual.  Skin:  Exam was performed by photo review and is significant for the following:  - Ill defined pink scaly plaques of the palmar aspect of the hand, notably on the thumb and thenar eminence  - Hypopigmented plaques and patches on the scrotal surface, perineum and gluteal cleft  - Light pink plaques of the upper eyelids and  medial canthal regions  - Right ear helix with pink scaly and crusted papules and plaques    Labs:  None    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     Hyperlipidemia LDL goal <70     Insomnia     Gout     Suicidal ideation     Essential hypertension     Rheumatoid arthritis involving multiple sites, unspecified rheumatoid factor presence     High risk medications (not anticoagulants) long-term use     Midline low back pain without sciatica     Bilateral low back pain with sciatica, sciatica laterality unspecified     Elevated liver enzymes     On corticosteroid therapy     Essential hypertension with goal blood pressure less than 140/90     Insomnia, unspecified type     Rheumatoid arthritis of multiple sites with negative rheumatoid factor (H)     Benign prostatic hyperplasia with lower urinary tract symptoms, unspecified morphology     Chronic pain syndrome     Past Medical History:   Diagnosis Date     Anxiety      CAD (coronary artery disease)     CABG, PCI     Congestive heart failure, unspecified 2008     Depressive disorder 2008     Gout      Hepatitis B > 10 years     HTN (hypertension)      Hyperlipidemia LDL goal < 100      Malrotation of intestine      Moderate major depression (H)      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) 9/19/2011    Uses CPAP     Rheumatoid arthritis flare (H) 1012     Steatohepatitis 12/15    liver biopsy     Tuberculosis age 13    INH x 9 months      Vitamin D deficiency      Past Surgical History:   Procedure Laterality Date     ABDOMEN SURGERY  2014     BIOPSY  2015     BYPASS GRAFT ARTERY CORONARY  2008    6 vessels     COLONOSCOPY   2/8/2013    Procedure: COLONOSCOPY;  Colonoscopy, blood in stool;  Surgeon: Duane, William Charles, MD;  Location: [facility]     COMBINED REPAIR PTOSIS WITH BLEPHAROPLASTY BILATERAL Bilateral 6/25/2018    Procedure: COMBINED REPAIR PTOSIS WITH BLEPHAROPLASTY BILATERAL;  Bilateral upper eyelid blepharoplasty, ptosis repair and brow ptosis repair;  Surgeon: Sandra Borja MD;  Location: [facility] OR     GI SURGERY  2014     HC CORONARY STENT PERCUT, EA ADDTL VESSEL  2008    3 months after CABG     HEAD & NECK SURGERY  2011     NASAL/SINUS POLYPECTOMY  2010     ORTHOPEDIC SURGERY  2012     REPAIR PTOSIS       REPAIR PTOSIS BILATERAL Bilateral 7/22/2019    Procedure: REPAIR, PTOSIS, BOTH UPPER brows;  Surgeon: Sandra Borja MD;  Location: [facility] OR     REPAIR PTOSIS BROW BILATERAL Bilateral 6/25/2018    Procedure: REPAIR PTOSIS BROW BILATERAL;;  Surgeon: Sandra Borja MD;  Location: [facility] OR     THORACIC SURGERY  1989    tb     TONSILLECTOMY  2010     UVULOPALATOPHARYNGOPLASTY  2010     VASCULAR SURGERY  2008       Social History:  Patient reports that he has never smoked. He has never used smokeless tobacco. He reports that he does not drink alcohol or use drugs.    Family History:  Family History   Problem Relation Age of Onset     C.A.D. Father      Coronary Artery Disease Father      Hypertension Father      Depression Father      Hypertension Mother      Diabetes Mother      Depression Mother      Mental Illness Mother      Hypertension Maternal Grandfather      Cancer Paternal Grandfather      Other Cancer Paternal Grandfather      Other Cancer Other      Autoimmune Disease No family hx of      Cerebrovascular Disease No family hx of      Thyroid Disease No family hx of      Glaucoma No family hx of      Macular Degeneration No family hx of        Medications:  Current Outpatient Medications   Medication     Abatacept (ORENCIA) 125 MG/ML SOAJ auto-injector     Acetaminophen (TYLENOL PO)     allopurinol (ZYLOPRIM) 300  MG tablet     aspirin EC 81 MG EC tablet     atorvastatin (LIPITOR) 80 MG tablet     bacitracin 500 UNIT/GM external ointment     baclofen (LIORESAL) 10 MG tablet     blood glucose (NO BRAND SPECIFIED) lancets standard     blood glucose monitoring (NO BRAND SPECIFIED) meter device kit     blood glucose monitoring (NO BRAND SPECIFIED) test strip     buprenorphine (BUTRANS) 20 MCG/HR WK patch     busPIRone HCl (BUSPAR) 30 MG tablet     Cholecalciferol (VITAMIN D) 2000 UNITS tablet     clobetasol (TEMOVATE) 0.05 % external solution     clonazePAM (KLONOPIN) 1 MG tablet     clopidogrel (PLAVIX) 75 MG tablet     COLCRYS 0.6 MG tablet     desvenlafaxine (PRISTIQ) 50 MG 24 hr tablet     diclofenac (VOLTAREN) 1 % topical gel     evolocumab (REPATHA) 140 MG/ML prefilled autoinjector     ezetimibe (ZETIA) 10 MG tablet     gabapentin (NEURONTIN) 300 MG capsule     gemfibrozil (LOPID) 600 MG tablet     hydrocortisone (WESTCORT) 0.2 % external cream     hydrocortisone 2.5 % cream     hydroxychloroquine (PLAQUENIL) 200 MG tablet     Incontinence Supply Disposable (DEPEND UNDERWEAR LARGE) MISC     leflunomide (ARAVA) 20 MG tablet     meclizine (ANTIVERT) 25 MG tablet     melatonin 3 MG tablet     naloxone (NARCAN) nasal spray     nitroGLYcerin (NITROSTAT) 0.4 MG sublingual tablet     order for DME     order for DME     ORDER FOR DME     pantoprazole (PROTONIX) 40 MG EC tablet     predniSONE (DELTASONE) 1 MG tablet     predniSONE (DELTASONE) 5 MG tablet     sildenafil (REVATIO) 20 MG tablet     sulfaSALAzine ER (AZULFIDINE EN) 500 MG EC tablet     tamsulosin (FLOMAX) 0.4 MG capsule     tenofovir (VIREAD) 300 MG tablet     triamcinolone (KENALOG) 0.1 % external ointment     triamcinolone (KENALOG) 0.1 % external ointment     zolpidem (AMBIEN) 5 MG tablet     No current facility-administered medications for this visit.           Allergies   Allergen Reactions     Citalopram Itching     Tramadol Itching           Impression and  Recommendations (Patient Counseled on the Following):  1. Skin eruption consistent with plaque psoriasis (biopsy on scalp, see problem list)- partial improvement with intermittent use of clobetasol on head and Westcort ointments, possible hypopigmentation of face from steroid. Will hold on face. - Consider Otezla, MTX (may also improve RA but has hep B and fatty liver) or acitretin (however has h/o chronic hepatitis B/fatty liver), Cosentyx (Butt need to get clearance from pharmacy and GI) and we in a covid pandemic  - Continue clobetasol pulsed for the scalp as scrbed for up to 2 weeks.   -Start clobetasol twice daily for 2 weeks, then 3 times weekly for 2 weeks, repeat cycle as needed. Side effects of steroid overuse including atrophy were discussed.   - Phototherapy as next step  - Stop Westcort ointment  - Start Elidel cream twice daily for the face and scrotum, reviewed BB warning        Follow-up:   Follow-up with dermatology in approximately 2-3 months photos and phone. Earlier for new or changing lesions or rash.      Staff and resident     Estefanía White DO (PGY-2)  Dermatology Resident  [facility]    Staff: Dr. Cornelia Roche MD    Department of Dermatology  [facility]: Phone: [phone], Fax:[phone]  [facility]: Phone: [phone], Fax: [phone]      _____________________________________________________________________________    Teledermatology information:  - Patient presented as: return  - Location of teledermatologist:  ([facility] )  - Image quality and interpretability: acceptable  - Physician has received verbal consent for a Video/Photos Visit from the patient? YES  - In-person dermatology visit recommendation: no  - Date of images: 12/16/20  - Service start time: approx 2:40pm  - Service end  time:*2:43pm  (resident time: 2:18 PM - 2:25 PM)  - Date of report: 12/17/2020         Again, thank you for allowing me to participate in the care of your patient.        Sincerely,        Alyssa Roche MD

## 2020-12-18 DIAGNOSIS — M06.9 RHEUMATOID ARTHRITIS INVOLVING MULTIPLE SITES, UNSPECIFIED WHETHER RHEUMATOID FACTOR PRESENT (H): ICD-10-CM

## 2020-12-18 DIAGNOSIS — G89.4 CHRONIC PAIN SYNDROME: ICD-10-CM

## 2020-12-18 NOTE — TELEPHONE ENCOUNTER
Reason for call:  Medication   If this is a refill request, has the caller requested the refill from the pharmacy already? No  Will the patient be using a [facility]? No  Name of the pharmacy and phone number for the current request: [facility] [id number] [place] [address]    Name of the medication requested: buprenorphine (BUTRANS) 20 MCG/HR WK patch    Phone number to reach patient:  Home number on file [phone] (home)    Can we leave a detailed message on this number?  YES    Travel screening: Not Applicable       Chuckie SAAB    [facility]

## 2020-12-21 RX ORDER — BUPRENORPHINE 20 UG/H
1 PATCH TRANSDERMAL
Qty: 4 PATCH | Refills: 0 | Status: SHIPPED | OUTPATIENT
Start: 2020-12-21 | End: 2021-01-14

## 2020-12-21 NOTE — TELEPHONE ENCOUNTER
Medication refill information reviewed.     Due date for  buprenorphine (BUTRANS) 20 MCG/HR WK patch is 12/21/20      Prescriptions prepped for review.     Will route to provider.

## 2020-12-21 NOTE — TELEPHONE ENCOUNTER
Received call from patient  requesting refill(s) for buprenorphine (BUTRANS) 20 MCG/HR WK patch     Last dispensed from pharmacy on 11/19/2020    Patient's last office/virtual visit by prescribing provider on 10/27/2020  Next office/virtual appointment scheduled for 1/28/2021     checked in the past 6 months? YES If no, print current report and give to RN    Last urine drug screen date 10/27/2020  Current opioid agreement on file? Yes Date of opioid agreement: 3/20/2020    [id number] [place] [facility] [place] [address]    Will route to nursing [place] for review and preparation of prescription(s).

## 2020-12-21 NOTE — TELEPHONE ENCOUNTER
Script Eprescribed to pharmacy    Notified patient.  He actually starts script 12/25    Signed Prescriptions:                        Disp   Refills    buprenorphine (BUTRANS) 20 MCG/HR WK patch 4 patch0        Sig: Place 1 patch onto the skin every 7 days . Name brand           only.  Fill 12/21/20 and start 12/21/20  Authorizing Provider: KAYLA CHUN MD  [facility]

## 2020-12-31 DIAGNOSIS — K21.9 GASTROESOPHAGEAL REFLUX DISEASE WITHOUT ESOPHAGITIS: ICD-10-CM

## 2021-01-04 RX ORDER — PANTOPRAZOLE SODIUM 40 MG/1
TABLET, DELAYED RELEASE ORAL
Qty: 90 TABLET | Refills: 1 | Status: SHIPPED | OUTPATIENT
Start: 2021-01-04 | End: 2021-06-30

## 2021-01-04 NOTE — TELEPHONE ENCOUNTER
Prescription approved per [facility] Refill Protocol.      Shreya Rascon RN, BSN, PHN

## 2021-01-13 NOTE — TELEPHONE ENCOUNTER
[facility] Gi Nurse Msg Pool             Scheduled EGD and Colonoscopy under MAC on 1/25/2021 for reflux and rectal bleed.  COVID TEST ON 1/23/2021. Instructions mailed 1/13/2021/[facility] pharmacy      Covid test ordered.    Suprep and instructions sent to preferred pharmacy.     Instructions also mailed to pt 1/13/21.   Received call from patient requesting refill(s) of buprenorphine (BUTRANS) 15 MCG/HR Wk patch    Last picked up from pharmacy on 9/13/148    Pt last seen by prescribing provider on 7/11/18  Next appt scheduled for 10/10/18     checked in the past 6 months? Yes If no, print current report and give to RN    Last urine drug screen date 1/10/18  Current opioid agreement on file (completed within the last year) Yes Date of opioid agreement: 1/10/18    Processing (pick one and delete the others):  Fax to the [facility] pharmacy     Sascha Isaac MA  [facility]    Will route to nursing pool for review and preparation of prescription(s).

## 2021-01-14 DIAGNOSIS — M06.9 RHEUMATOID ARTHRITIS INVOLVING MULTIPLE SITES, UNSPECIFIED WHETHER RHEUMATOID FACTOR PRESENT (H): ICD-10-CM

## 2021-01-14 DIAGNOSIS — G89.4 CHRONIC PAIN SYNDROME: ICD-10-CM

## 2021-01-14 RX ORDER — BUPRENORPHINE 20 UG/H
1 PATCH TRANSDERMAL
Qty: 4 PATCH | Refills: 1 | Status: SHIPPED | OUTPATIENT
Start: 2021-01-14 | End: 2021-03-15

## 2021-01-14 NOTE — TELEPHONE ENCOUNTER
Medication refill information reviewed.     Due date for buprenorphine (BUTRANS) 20 MCG/HR WK patch is 01/18/21     Prescriptions prepped for review.     Will route to provider.

## 2021-01-14 NOTE — TELEPHONE ENCOUNTER
Patient requesting refill(s) of buprenorphine (BUTRANS) 20 MCG/HR WK patch    Last dispensed from pharmacy on 12/21/2020    Patient's last office/virtual visit by prescribing provider on 10/27/2020  Next office/virtual appointment scheduled for 1/28/2021    Last urine drug screen date 10/27/2020  Current opioid agreement on file (completed within the last year) Yes Date of opioid agreement: 03/02/2020    E-prescribe to [place] [id number] [place] [facility] [place] [facility]     Will route to nursing [place] for review and preparation of prescription(s).

## 2021-01-14 NOTE — TELEPHONE ENCOUNTER
Reason for call:  Medication   If this is a refill request, has the caller requested the refill from the pharmacy already? No  Will the patient be using a [facility]? No  Name of the pharmacy and phone number for the current request: [facility] [id number] [place] [address]    Name of the medication requested: buprenorphine (BUTRANS) 20 MCG/HR WK patch    Other request: none    Phone number to reach patient:  Cell number on file:    Telephone Information:   Mobile [phone]       Best Time:  anytime    Can we leave a detailed message on this number?  YES    Travel screening: Not Applicable     Gisela GERARDO    [facility]

## 2021-01-14 NOTE — TELEPHONE ENCOUNTER
Called pt and LVM stating that prescription for buprenorphine (BUTRANS) 20 MCG/HR WK patch 4 patch1 was sent to [facility] [id number] [place] [facility] - [place], [place]   Also stated that 1 refill was sent in. That is the most  can do on this script.     Fill 01/16/21 and start 01/18/21    Amberly Chawla MA

## 2021-01-14 NOTE — TELEPHONE ENCOUNTER
Script Eprescribed to pharmacy  [place] Prescription Monitoring Program checked    Will send this to MA team to notify patient. Please let him know that 1 refill was sent in.  That is the most I can do on this script.    Signed Prescriptions:                        Disp   Refills    buprenorphine (BUTRANS) 20 MCG/HR WK patch 4 patch1        Sig: Place 1 patch onto the skin every 7 days . Name brand           only.  Fill 01/16/21 and start 01/18/21  Authorizing Provider: KAYLA CHUN MD  [facility]

## 2021-01-16 DIAGNOSIS — M62.830 SPASM OF BACK MUSCLES: ICD-10-CM

## 2021-01-16 DIAGNOSIS — L40.9 PSORIASIS: ICD-10-CM

## 2021-01-19 ENCOUNTER — MYC MEDICAL ADVICE (OUTPATIENT)
Dept: FAMILY MEDICINE | Facility: CLINIC | Age: 56
End: 2021-01-19

## 2021-01-19 DIAGNOSIS — G89.4 CHRONIC PAIN SYNDROME: Primary | ICD-10-CM

## 2021-01-19 DIAGNOSIS — M06.09 RHEUMATOID ARTHRITIS OF MULTIPLE SITES WITH NEGATIVE RHEUMATOID FACTOR (H): ICD-10-CM

## 2021-01-19 RX ORDER — BACLOFEN 10 MG/1
TABLET ORAL
Qty: 180 TABLET | Refills: 2 | Status: SHIPPED | OUTPATIENT
Start: 2021-01-19 | End: 2021-09-28

## 2021-01-20 RX ORDER — HYDROCORTISONE VALERATE CREAM 2 MG/G
CREAM TOPICAL
Qty: 60 G | Refills: 4 | Status: SHIPPED | OUTPATIENT
Start: 2021-01-20 | End: 2021-07-01

## 2021-01-20 RX ORDER — HYDROCORTISONE 2.5 %
CREAM (GRAM) TOPICAL
Qty: 28.35 G | Refills: 3 | Status: SHIPPED | OUTPATIENT
Start: 2021-01-20

## 2021-01-22 DIAGNOSIS — F41.9 ANXIETY HYPERVENTILATION: ICD-10-CM

## 2021-01-22 DIAGNOSIS — R21 RASH: ICD-10-CM

## 2021-01-22 DIAGNOSIS — F45.8 ANXIETY HYPERVENTILATION: ICD-10-CM

## 2021-01-25 RX ORDER — CLONAZEPAM 1 MG/1
.5-1 TABLET ORAL 2 TIMES DAILY PRN
Qty: 90 TABLET | Refills: 0 | Status: SHIPPED | OUTPATIENT
Start: 2021-01-25 | End: 2021-03-01

## 2021-01-25 RX ORDER — TRIAMCINOLONE ACETONIDE 1 MG/G
OINTMENT TOPICAL
Qty: 80 G | Refills: 2 | Status: SHIPPED | OUTPATIENT
Start: 2021-01-25 | End: 2021-04-23

## 2021-01-25 NOTE — TELEPHONE ENCOUNTER
Routing refill request to provider for review/approval because:  Drug not on the [facility] refill protocol   Last refill 12/10/20 # 90.  Yolanda Hobbs RN

## 2021-01-27 NOTE — TELEPHONE ENCOUNTER
Lamont Daniels  to David Chavez MD          1/27/21 1:03 PM  This is my  Nesha Fitch fax number

## 2021-01-28 ENCOUNTER — VIRTUAL VISIT (OUTPATIENT)
Dept: PALLIATIVE MEDICINE | Facility: CLINIC | Age: 56
End: 2021-01-28
Payer: COMMERCIAL

## 2021-01-28 DIAGNOSIS — M06.9 RHEUMATOID ARTHRITIS INVOLVING MULTIPLE SITES, UNSPECIFIED WHETHER RHEUMATOID FACTOR PRESENT (H): Primary | ICD-10-CM

## 2021-01-28 DIAGNOSIS — G47.33 OSA (OBSTRUCTIVE SLEEP APNEA): ICD-10-CM

## 2021-01-28 DIAGNOSIS — G89.4 CHRONIC PAIN SYNDROME: ICD-10-CM

## 2021-01-28 PROCEDURE — 99213 OFFICE O/P EST LOW 20 MIN: CPT | Mod: 95 | Performed by: PSYCHIATRY & NEUROLOGY

## 2021-01-28 ASSESSMENT — PAIN SCALES - GENERAL: PAINLEVEL: MODERATE PAIN (5)

## 2021-01-28 NOTE — PATIENT INSTRUCTIONS
1. Schedule video follow up in 3 months.    ----------------------------------------------------------------  Clinic Number:  [phone]     Call with any questions about your care and for scheduling assistance.     Calls are returned Monday through Friday between 8 AM and 4:30 PM. We usually get back to you within 2 business days depending on the issue/request.    If we are prescribing your medications:    For opioid medication refills, call the clinic or send a [employer] message 7 days in advance.  Please include:    Name of requested medication    Name of the pharmacy.    For non-opioid medications, call your pharmacy directly to request a refill. Please allow 3-4 days to be processed.     Per [place] State Law:    All controlled substance prescriptions must be filled within 30 days of being written.      For those controlled substances allowing refills, pickup must occur within 30 days of last fill.      We believe regular attendance is key to your success in our program!      Any time you are unable to keep your appointment we ask that you call us at least 24 hours in advance to cancel.This will allow us to offer the appointment time to another patient.     Multiple missed appointments may lead to dismissal from the clinic.

## 2021-01-28 NOTE — PROGRESS NOTES
Lamont is a 55 year old who is being evaluated via a billable video visit.      How would you like to obtain your AVS? MyChart  If the video visit is dropped, the invitation should be resent by: Text to cell phone: [phone]  Will anyone else be joining your video visit? Day Chawla MA        Video Start Time: 0931  Video-Visit Details    Type of service:  Video Visit    Video End Time:0939    Originating Location (pt. Location): Home    Distant Location (provider location):  [facility] [employer]     Platform used for Video Visit: [employer]                            [facility]    Date of visit: 1/28/2021     Chief complaint:   Chief Complaint   Patient presents with     Pain     Video visit due to COVID-19        Interval history:  Lamont Daniels was last seen by me on 10/27/20    Recommendations/plan at the last visit included:  1. Physical Therapy:  Completed pool and land therapy in past. Continues to decline.  Discussed inactivity and need for light activity  2. Clinical Health Psychologist to address issues of relaxation, behavioral change, coping style, and other factors important to improvement: Again discussed importance of this and he declines.   3. Diagnostic Studies: UDS and opioid agreement today  4. Medication Management:  No changes.  Needs regular visits to continue opioid medications  5. Further procedures recommended: could consider bilateral SI Joint injections.  He is not interested  6. Recommendations to PCP: consider further wean on clonazepam given risk with JEROD and opioid  7. Follow up: we discussed for some time importance of making visits and need to see him as he is on opioids.  Appears anxiety is biggest issue. Discussed why [id number] visits are needed    Since his last visit, Lamont Daniels reports:    -no significant changes in his pain  -no changes in care recently.  Seeing rheumatology and started on lefumomide  -he has improvement with his meds, and  does sleep better  -has follow up with sleep as well. Using CPAP faithfully    Pain scores:  Pain intensity on average is 5 on a scale of 0-10.     Current pain treatment  butrans 20mcg/hr  Acetaminophen 500 mg PRN (not taking daily)  Gabapentin 600mg 3 times daily  Baclofen 10mg BID   Diclofenac gel PRN (not taking daily and uses at joints)      Ambien 5 mg - reports taking most nights  Clonazepam 1mg once a day     Previous medication treatments included:  Codeine, oxycodone, hydrocodone- given for other issues- helpful  Cymbalta 60mg daily- given for mental health- was given by Dr. Acosta- not been higher  Baclofen 10mg at bedtime- not helpful, no side effects  Robaxin 500 mg 1 tablet, 1-3 times a day depending on the day  Ibuprofen 800 mg- using 3-4 times per week, helpful but started meloxicam  Meloxicam  Oxycodone 5 mg : takes 6/day    Other treatments have included:  Lamont Daniels has not been seen at a pain clinic in the past.    PT: has done for various reasons, most recently the low back.  Acupuncture: as done a couple of needles with adjustment  TENs Unit: no  Injections: no    Side Effects: none    Medications:  Current Outpatient Medications   Medication Sig Dispense Refill     Abatacept (ORENCIA) 125 MG/ML SOAJ auto-injector Inject 1 mL (125 mg) Subcutaneous every 7 days 12 mL 0     Acetaminophen (TYLENOL PO)        allopurinol (ZYLOPRIM) 300 MG tablet TAKE 1 TABLET BY MOUTH EVERY DAY 90 tablet 3     aspirin EC 81 MG EC tablet Take 1 tablet (81 mg) by mouth daily (*) 30 tablet 1     atorvastatin (LIPITOR) 80 MG tablet TAKE 1 TABLET BY MOUTH 1 TIME DAILY 90 tablet 3     bacitracin 500 UNIT/GM external ointment Apply topically 3 times daily 30 g 3     baclofen (LIORESAL) 10 MG tablet TAKE 1 TABLET BY MOUTH 2 TIMES DAILY AS NEEDED FOR MUSCLE SPASM 180 tablet 2     buprenorphine (BUTRANS) 20 MCG/HR WK patch Place 1 patch onto the skin every 7 days . Name brand only.  Fill 01/16/21 and start 01/18/21 4 patch 1      busPIRone HCl (BUSPAR) 30 MG tablet TAKE 1 TABLET (30 MG) BY MOUTH 2 TIMES DAILY 180 tablet 3     Cholecalciferol (VITAMIN D) 2000 UNITS tablet TAKE ONE TABLET BY MOUTH ONCE DAILY 100 tablet 1     clobetasol (TEMOVATE) 0.05 % external cream Apply twice daily for 2 weeks, weekends only for 2 weeks, repeat cycle as needed for hands, not face or genitals 60 g 11     clobetasol (TEMOVATE) 0.05 % external solution Apply twice daily to scalp for up to 2 weeks for flares. 60 mL 0     clonazePAM (KLONOPIN) 1 MG tablet TAKE 0.5-1 TABLETS (0.5-1 MG) BY MOUTH 2 TIMES DAILY AS NEEDED FOR ANXIETY 90 tablet 0     clopidogrel (PLAVIX) 75 MG tablet Take 1 tablet (75 mg) by mouth daily 90 tablet 3     COLCRYS 0.6 MG tablet TAKE 2 TABLETS BY MOUTH AT THE FIRST SIGN OF FLARE, TAKE 1 ADDITIONAL TABLET ONE HOUR LATER. 30 tablet 0     desvenlafaxine (PRISTIQ) 50 MG 24 hr tablet TAKE 1 TABLET BY MOUTH EVERY DAY 90 tablet 3     diclofenac (VOLTAREN) 1 % topical gel Apply 4 grams to bilateral knees, up to four times daily as needed using enclosed dosing card.  Max of 32g/day 300 g 11     evolocumab (REPATHA) 140 MG/ML prefilled autoinjector Inject 1 mL (140 mg) Subcutaneous every 14 days 2 mL 11     ezetimibe (ZETIA) 10 MG tablet Take 1 tablet (10 mg) by mouth daily 90 tablet 3     gabapentin (NEURONTIN) 300 MG capsule Take 2 capsules (600 mg) by mouth 3 times daily . 3 month supply 540 capsule 6     gemfibrozil (LOPID) 600 MG tablet Take 1 tablet (600 mg) by mouth 2 times daily 180 tablet 3     hydrocortisone (WESTCORT) 0.2 % external cream FOR GENITALS, APPLY TWICE DAILY FOR UP TO 2 WEEKS, TAKE 2 WEEKS OFF THEN REPEAT 60 g 4     hydrocortisone 2.5 % cream FOR FACE, APPLY TWICE DAILY FOR UP TO 2 WEEK FOR FLARES ON THE FACE, TAKE 2 WEEKS OFF 28.35 g 3     hydroxychloroquine (PLAQUENIL) 200 MG tablet Take 1 tablet (200 mg) by mouth daily 90 tablet 2     Incontinence Supply Disposable (DEPEND UNDERWEAR LARGE) MISC Use twice daily. 60 each  11     leflunomide (ARAVA) 20 MG tablet Take 1 tablet (20 mg) by mouth daily 30 tablet 3     meclizine (ANTIVERT) 25 MG tablet TAKE 1 TABLET BY MOUTH EVERY 6 HOURS AS NEEDED FOR DIZZINESS 30 tablet 5     melatonin 3 MG tablet Take 1 tablet (3 mg) by mouth nightly as needed for sleep       naloxone (NARCAN) nasal spray Spray 1 spray (4 mg) into one nostril alternating nostrils as needed for opioid reversal every 2-3 minutes until assistance arrives 0.2 mL 0     nitroGLYcerin (NITROSTAT) 0.4 MG sublingual tablet For chest pain place 1 tablet under the tongue every 5 minutes for 3 doses. If symptoms persist 5 minutes after 1st dose call 911. 25 tablet 1     order for DME Equipment being ordered: chair lift 1 each 0     order for DME Equipment being ordered: single end cane 1 Units 0     ORDER FOR DME 1.  CPAP pressure 11 cm/H20 with heated humidity.   2.  Provide mask to fit and CPAP supplies.   3.  Length of need lifetime.   4.  If needed please provide a chin strap            pantoprazole (PROTONIX) 40 MG EC tablet TAKE 1 TABLET BY MOUTH EVERY DAY 90 tablet 1     pimecrolimus (ELIDEL) 1 % external cream Apply twice daily for face and genitals 60 g 3     predniSONE (DELTASONE) 5 MG tablet For rheumatoid arthritis flare only: prednisone 10mg daily x2days, then 5mg daily x5days, then stop. 9 tablet 1     sildenafil (REVATIO) 20 MG tablet TAKE 2-5 TABLETS BY MOUTH 30 MINUTES PRIOR TO INTERCOURSE 50 tablet 11     sulfaSALAzine ER (AZULFIDINE EN) 500 MG EC tablet Take 3 tablets (1,500 mg) by mouth 2 times daily 540 tablet 2     tamsulosin (FLOMAX) 0.4 MG capsule Take 2 capsules (0.8 mg) by mouth daily 180 capsule 3     tenofovir (VIREAD) 300 MG tablet Take 1 tablet (300 mg) by mouth daily 90 tablet 3     triamcinolone (KENALOG) 0.1 % external ointment APPLY TOPICALLY TO AFFECTED AREA(S) 3 TIMES DAILY 80 g 2     triamcinolone (KENALOG) 0.1 % external ointment Apply to trunk and extremities twice daily for up to 2 weeks for  flares 80 g 1     zolpidem (AMBIEN) 5 MG tablet Take 1 tablet (5 mg) by mouth nightly as needed for sleep 30 tablet 5     blood glucose (NO BRAND SPECIFIED) lancets standard Use to test blood sugar 2 times daily or as directed. (Patient not taking: Reported on 3/2/2020) 100 each 11     blood glucose monitoring (NO BRAND SPECIFIED) meter device kit Use to test blood sugar 2 times daily or as directed. (Patient not taking: Reported on 3/2/2020) 1 kit 0     blood glucose monitoring (NO BRAND SPECIFIED) test strip Use to test blood sugars 2 times daily or as directed (Patient not taking: Reported on 3/2/2020) 100 strip 11     predniSONE (DELTASONE) 1 MG tablet Prednisone 2mg daily x30days, then 1mg daily x30days, then stop. 90 tablet 0       Medical History: any changes in medical history since they were last seen? None        THE 4 A's OF OPIOID MAINTENANCE ANALGESIA    Analgesia: good.  He had better with oxycodone    Activity: better with med, still limited    Adverse effects: none    Adherence to Rx protocol: good    [place] Board of Pharmacy Data Base Reviewed: 1/28/2021     YES; no concerns   Last UDS and opioid agreement  10/27/20    Assessment:   1. Chronic low back pain, with strong facet and myofascial features  2. Rheumatoid arthritis  3. Peripheral neuropathy  4. Depression, anxiety  5. Hep B - on meds  6. Gout  7. JEROD, currently treated with CPAP, central apnea ok    Plan:   1. Physical Therapy:  Completed pool and land therapy in past. Continues to decline.  Discussed inactivity and need for light activity  2. Clinical Health Psychologist to address issues of relaxation, behavioral change, coping style, and other factors important to improvement: pt not interested in pursuing  3. Diagnostic Studies:none  4. Medication Management:  No changes. We are aware of use of both benzo and butrans patch- have not been able to further wean. Have monitored with sleep medicine, and his JEROD significant improved  changing to butrans. Patient aware of risk.  5. Further procedures recommended: he is not interested in procedures.  6. Recommendations to PCP: none  7. Follow up: video 3 months, then in person      Lian Loo MD  [facility]

## 2021-01-30 DIAGNOSIS — Z79.899 HIGH RISK MEDICATION USE: ICD-10-CM

## 2021-01-30 DIAGNOSIS — M06.09 RHEUMATOID ARTHRITIS OF MULTIPLE SITES WITH NEGATIVE RHEUMATOID FACTOR (H): ICD-10-CM

## 2021-01-30 LAB
BASOPHILS # BLD AUTO: 0 10E9/L (ref 0–0.2)
BASOPHILS NFR BLD AUTO: 0.7 %
DIFFERENTIAL METHOD BLD: NORMAL
EOSINOPHIL # BLD AUTO: 0.2 10E9/L (ref 0–0.7)
EOSINOPHIL NFR BLD AUTO: 2.9 %
ERYTHROCYTE [DISTWIDTH] IN BLOOD BY AUTOMATED COUNT: 13.5 % (ref 10–15)
ERYTHROCYTE [SEDIMENTATION RATE] IN BLOOD BY WESTERGREN METHOD: 15 MM/H (ref 0–20)
HCT VFR BLD AUTO: 43.9 % (ref 40–53)
HGB BLD-MCNC: 14.5 G/DL (ref 13.3–17.7)
LYMPHOCYTES # BLD AUTO: 1.8 10E9/L (ref 0.8–5.3)
LYMPHOCYTES NFR BLD AUTO: 33.2 %
MCH RBC QN AUTO: 28.4 PG (ref 26.5–33)
MCHC RBC AUTO-ENTMCNC: 33 G/DL (ref 31.5–36.5)
MCV RBC AUTO: 86 FL (ref 78–100)
MONOCYTES # BLD AUTO: 0.7 10E9/L (ref 0–1.3)
MONOCYTES NFR BLD AUTO: 12.7 %
NEUTROPHILS # BLD AUTO: 2.7 10E9/L (ref 1.6–8.3)
NEUTROPHILS NFR BLD AUTO: 50.5 %
PLATELET # BLD AUTO: 296 10E9/L (ref 150–450)
RBC # BLD AUTO: 5.11 10E12/L (ref 4.4–5.9)
WBC # BLD AUTO: 5.5 10E9/L (ref 4–11)

## 2021-01-30 PROCEDURE — 80076 HEPATIC FUNCTION PANEL: CPT | Performed by: INTERNAL MEDICINE

## 2021-01-30 PROCEDURE — 36415 COLL VENOUS BLD VENIPUNCTURE: CPT | Performed by: INTERNAL MEDICINE

## 2021-01-30 PROCEDURE — 82565 ASSAY OF CREATININE: CPT | Performed by: INTERNAL MEDICINE

## 2021-01-30 PROCEDURE — 85025 COMPLETE CBC W/AUTO DIFF WBC: CPT | Performed by: INTERNAL MEDICINE

## 2021-01-30 PROCEDURE — 85652 RBC SED RATE AUTOMATED: CPT | Performed by: INTERNAL MEDICINE

## 2021-01-30 PROCEDURE — 86140 C-REACTIVE PROTEIN: CPT | Performed by: INTERNAL MEDICINE

## 2021-02-01 LAB
ALBUMIN SERPL-MCNC: 4 G/DL (ref 3.4–5)
ALP SERPL-CCNC: 114 U/L (ref 40–150)
ALT SERPL W P-5'-P-CCNC: 46 U/L (ref 0–70)
AST SERPL W P-5'-P-CCNC: 37 U/L (ref 0–45)
BILIRUB DIRECT SERPL-MCNC: 0.1 MG/DL (ref 0–0.2)
BILIRUB SERPL-MCNC: 0.4 MG/DL (ref 0.2–1.3)
CREAT SERPL-MCNC: 0.94 MG/DL (ref 0.66–1.25)
CRP SERPL-MCNC: <2.9 MG/L (ref 0–8)
GFR SERPL CREATININE-BSD FRML MDRD: >90 ML/MIN/{1.73_M2}
PROT SERPL-MCNC: 7.6 G/DL (ref 6.8–8.8)

## 2021-02-17 DIAGNOSIS — R42 VERTIGO: ICD-10-CM

## 2021-02-17 RX ORDER — MECLIZINE HYDROCHLORIDE 25 MG/1
TABLET ORAL
Qty: 30 TABLET | Refills: 5 | Status: SHIPPED | OUTPATIENT
Start: 2021-02-17 | End: 2021-10-28

## 2021-02-17 NOTE — TELEPHONE ENCOUNTER
Prescription approved per [facility] Refill Protocol.      Shreya Rascon RN, BSN, PHN

## 2021-02-27 DIAGNOSIS — M06.09 RHEUMATOID ARTHRITIS OF MULTIPLE SITES WITH NEGATIVE RHEUMATOID FACTOR (H): ICD-10-CM

## 2021-02-27 DIAGNOSIS — Z79.899 HIGH RISK MEDICATION USE: ICD-10-CM

## 2021-02-27 LAB
BASOPHILS # BLD AUTO: 0.1 10E9/L (ref 0–0.2)
BASOPHILS NFR BLD AUTO: 1.1 %
DIFFERENTIAL METHOD BLD: NORMAL
EOSINOPHIL # BLD AUTO: 0.2 10E9/L (ref 0–0.7)
EOSINOPHIL NFR BLD AUTO: 3.4 %
ERYTHROCYTE [DISTWIDTH] IN BLOOD BY AUTOMATED COUNT: 14.7 % (ref 10–15)
HCT VFR BLD AUTO: 43.1 % (ref 40–53)
HGB BLD-MCNC: 13.8 G/DL (ref 13.3–17.7)
LYMPHOCYTES # BLD AUTO: 1.9 10E9/L (ref 0.8–5.3)
LYMPHOCYTES NFR BLD AUTO: 42 %
MCH RBC QN AUTO: 28.8 PG (ref 26.5–33)
MCHC RBC AUTO-ENTMCNC: 32 G/DL (ref 31.5–36.5)
MCV RBC AUTO: 90 FL (ref 78–100)
MONOCYTES # BLD AUTO: 0.6 10E9/L (ref 0–1.3)
MONOCYTES NFR BLD AUTO: 12.6 %
NEUTROPHILS # BLD AUTO: 1.8 10E9/L (ref 1.6–8.3)
NEUTROPHILS NFR BLD AUTO: 40.9 %
PLATELET # BLD AUTO: 275 10E9/L (ref 150–450)
RBC # BLD AUTO: 4.79 10E12/L (ref 4.4–5.9)
WBC # BLD AUTO: 4.4 10E9/L (ref 4–11)

## 2021-02-27 PROCEDURE — 36415 COLL VENOUS BLD VENIPUNCTURE: CPT | Performed by: INTERNAL MEDICINE

## 2021-02-27 PROCEDURE — 80076 HEPATIC FUNCTION PANEL: CPT | Performed by: INTERNAL MEDICINE

## 2021-02-27 PROCEDURE — 82565 ASSAY OF CREATININE: CPT | Performed by: INTERNAL MEDICINE

## 2021-02-27 PROCEDURE — 85025 COMPLETE CBC W/AUTO DIFF WBC: CPT | Performed by: INTERNAL MEDICINE

## 2021-03-01 ENCOUNTER — MYC REFILL (OUTPATIENT)
Dept: FAMILY MEDICINE | Facility: CLINIC | Age: 56
End: 2021-03-01

## 2021-03-01 DIAGNOSIS — M10.9 GOUT WITHOUT TOPHUS: ICD-10-CM

## 2021-03-01 DIAGNOSIS — F41.9 ANXIETY HYPERVENTILATION: ICD-10-CM

## 2021-03-01 DIAGNOSIS — F45.8 ANXIETY HYPERVENTILATION: ICD-10-CM

## 2021-03-01 LAB
ALBUMIN SERPL-MCNC: 4.1 G/DL (ref 3.4–5)
ALP SERPL-CCNC: 102 U/L (ref 40–150)
ALT SERPL W P-5'-P-CCNC: 27 U/L (ref 0–70)
AST SERPL W P-5'-P-CCNC: 23 U/L (ref 0–45)
BILIRUB DIRECT SERPL-MCNC: 0.1 MG/DL (ref 0–0.2)
BILIRUB SERPL-MCNC: 0.4 MG/DL (ref 0.2–1.3)
CREAT SERPL-MCNC: 0.95 MG/DL (ref 0.66–1.25)
GFR SERPL CREATININE-BSD FRML MDRD: 89 ML/MIN/{1.73_M2}
PROT SERPL-MCNC: 7.6 G/DL (ref 6.8–8.8)

## 2021-03-02 ENCOUNTER — TELEPHONE (OUTPATIENT)
Dept: FAMILY MEDICINE | Facility: CLINIC | Age: 56
End: 2021-03-02

## 2021-03-02 RX ORDER — COLCHICINE 0.6 MG/1
TABLET, FILM COATED ORAL
Qty: 30 TABLET | Refills: 0 | Status: SHIPPED | OUTPATIENT
Start: 2021-03-02 | End: 2021-04-01

## 2021-03-02 RX ORDER — CLONAZEPAM 1 MG/1
.5-1 TABLET ORAL 2 TIMES DAILY PRN
Qty: 90 TABLET | Refills: 0 | Status: SHIPPED | OUTPATIENT
Start: 2021-03-02 | End: 2021-04-23

## 2021-03-02 NOTE — TELEPHONE ENCOUNTER
Routing refill request to provider for review/approval because:  Drug not on the [facility] refill protocol   Lorraine Francis RN  [facility]

## 2021-03-02 NOTE — TELEPHONE ENCOUNTER
Routing refill request to provider for review/approval because:  Labs not current:    Uric Acid   Date Value Ref Range Status   11/04/2015 6.1 3.5 - 7.2 mg/dL Final     Yolanda Hobbs RN

## 2021-03-02 NOTE — TELEPHONE ENCOUNTER
Central Prior Authorization Team   Phone: [phone]    PA Initiation    Medication: COLCRYS 0.6 MG tablet  Insurance Company: [employer] - Phone [phone] Fax [phone]  [id number] [place] [facility] [place] - [address] [place] [address]  Filling Pharmacy Phone: [phone]  Filling Pharmacy Fax: [phone]  Start Date: 3/2/2021    '

## 2021-03-03 DIAGNOSIS — B18.1 CHRONIC VIRAL HEPATITIS B WITHOUT DELTA AGENT AND WITHOUT COMA (H): ICD-10-CM

## 2021-03-08 NOTE — TELEPHONE ENCOUNTER
Prior Authorization Approval    Authorization Effective Date: 2/3/2021  Authorization Expiration Date: 3/5/2022  Medication: COLCRYS 0.6 MG-APPROVED  Approved Dose/Quantity:    Reference #:     Insurance Company: [employer] - Phone [phone] Fax [phone]  Expected CoPay:       CoPay Card Available:      Foundation Assistance Needed:    Which Pharmacy is filling the prescription (Not needed for infusion/clinic administered): [facility] [id number] [place] [address]  Pharmacy Notified: Yes  Patient Notified: Yes  **Instructed pharmacy to notify patient when script is ready to /ship.**

## 2021-03-12 DIAGNOSIS — B18.1 CHRONIC VIRAL HEPATITIS B WITHOUT DELTA AGENT AND WITHOUT COMA (H): ICD-10-CM

## 2021-03-12 RX ORDER — TENOFOVIR DISOPROXIL FUMARATE 300 MG/1
300 TABLET, FILM COATED ORAL DAILY
Qty: 60 TABLET | Refills: 0 | Status: SHIPPED | OUTPATIENT
Start: 2021-03-12 | End: 2021-04-27

## 2021-03-15 DIAGNOSIS — G89.4 CHRONIC PAIN SYNDROME: ICD-10-CM

## 2021-03-15 DIAGNOSIS — M06.9 RHEUMATOID ARTHRITIS INVOLVING MULTIPLE SITES, UNSPECIFIED WHETHER RHEUMATOID FACTOR PRESENT (H): ICD-10-CM

## 2021-03-15 RX ORDER — BUPRENORPHINE 20 UG/H
1 PATCH TRANSDERMAL
Qty: 4 PATCH | Refills: 1 | Status: SHIPPED | OUTPATIENT
Start: 2021-03-15 | End: 2021-04-30

## 2021-03-15 NOTE — TELEPHONE ENCOUNTER
Script Eprescribed to pharmacy  [place] Prescription Monitoring Program checked    Will send this to MA team to notify patient. Let him know one refill placed on it.    Signed Prescriptions:                        Disp   Refills    buprenorphine (BUTRANS) 20 MCG/HR WK patch 4 patch1        Sig: Place 1 patch onto the skin every 7 days . Name brand           only.  Fill 03/15/21 and start 03/17/21  Authorizing Provider: KAYLA CHUN MD  [facility]

## 2021-03-15 NOTE — TELEPHONE ENCOUNTER
Medication refill information reviewed.     Due date for  buprenorphine (BUTRANS) 20 MCG/HR WK patch is 03/17/21     Prescriptions prepped for review.     Will route to provider.

## 2021-03-15 NOTE — TELEPHONE ENCOUNTER
Received call from patient requesting refill(s) of buprenorphine (BUTRANS) 20 MCG/HR WK patch    Last dispensed from pharmacy on 02/17/21    Patient's last office/virtual visit by prescribing provider on 0/28/21  Next office/virtual appointment scheduled for 04/29/21    Last urine drug screen date 10/27/20  Current opioid agreement on file (completed within the last year) No Date of opioid agreement: 03/02/20    E-prescribe to pharmacy-[facility] [address] [place] [facility] [place] [address] [address]    Will route to nursing [place] for review and preparation of prescription(s).

## 2021-03-15 NOTE — TELEPHONE ENCOUNTER
Reason for call:  Medication   If this is a refill request, has the caller requested the refill from the pharmacy already? No  Will the patient be using a [facility]? No  Name of the pharmacy and phone number for the current request: [facility] [id number] [place] [address]    Name of the medication requested: buprenorphine (BUTRANS) 20 MCG/HR WK patch    Other request: none    Phone number to reach patient:  Home number on file [phone] (home)    Best Time:  anytime    Can we leave a detailed message on this number?  YES    Travel screening: Not Applicable     Gisela GERARDO    [facility]

## 2021-03-16 ENCOUNTER — IMMUNIZATION (OUTPATIENT)
Dept: NURSING | Facility: CLINIC | Age: 56
End: 2021-03-16
Payer: COMMERCIAL

## 2021-03-16 PROCEDURE — 0001A PR COVID VAC PFIZER DIL RECON 30 MCG/0.3 ML IM: CPT

## 2021-03-16 PROCEDURE — 91300 PR COVID VAC PFIZER DIL RECON 30 MCG/0.3 ML IM: CPT

## 2021-03-17 ENCOUNTER — MYC MEDICAL ADVICE (OUTPATIENT)
Dept: FAMILY MEDICINE | Facility: CLINIC | Age: 56
End: 2021-03-17

## 2021-03-18 NOTE — TELEPHONE ENCOUNTER
Patient is scheduled for tomorrow at 9a with Dr. Maria Guadalupe Donohue,  For 1st Floor Primary Care

## 2021-03-19 ENCOUNTER — OFFICE VISIT (OUTPATIENT)
Dept: FAMILY MEDICINE | Facility: CLINIC | Age: 56
End: 2021-03-19
Payer: COMMERCIAL

## 2021-03-19 VITALS
DIASTOLIC BLOOD PRESSURE: 81 MMHG | WEIGHT: 164 LBS | HEIGHT: 62 IN | HEART RATE: 60 BPM | TEMPERATURE: 97.9 F | OXYGEN SATURATION: 96 % | BODY MASS INDEX: 30.18 KG/M2 | SYSTOLIC BLOOD PRESSURE: 123 MMHG

## 2021-03-19 DIAGNOSIS — Z00.00 ENCOUNTER FOR MEDICARE ANNUAL WELLNESS EXAM: Primary | ICD-10-CM

## 2021-03-19 DIAGNOSIS — F41.9 ANXIETY HYPERVENTILATION: ICD-10-CM

## 2021-03-19 DIAGNOSIS — G89.4 CHRONIC PAIN SYNDROME: ICD-10-CM

## 2021-03-19 DIAGNOSIS — B18.1 HEPATITIS B, CHRONIC (H): ICD-10-CM

## 2021-03-19 DIAGNOSIS — I25.10 ATHEROSCLEROSIS OF NATIVE CORONARY ARTERY OF NATIVE HEART, ANGINA PRESENCE UNSPECIFIED: ICD-10-CM

## 2021-03-19 DIAGNOSIS — Z12.5 SCREENING FOR PROSTATE CANCER: ICD-10-CM

## 2021-03-19 DIAGNOSIS — E78.5 HYPERLIPIDEMIA LDL GOAL <70: ICD-10-CM

## 2021-03-19 DIAGNOSIS — M06.09 RHEUMATOID ARTHRITIS OF MULTIPLE SITES WITH NEGATIVE RHEUMATOID FACTOR (H): ICD-10-CM

## 2021-03-19 DIAGNOSIS — F45.8 ANXIETY HYPERVENTILATION: ICD-10-CM

## 2021-03-19 LAB
AFP SERPL-MCNC: <1.5 UG/L (ref 0–8)
CHOLEST SERPL-MCNC: 142 MG/DL
HBV DNA SERPL NAA+PROBE-ACNC: NORMAL [IU]/ML
HBV DNA SERPL NAA+PROBE-LOG IU: NORMAL {LOG_IU}/ML
HDLC SERPL-MCNC: 69 MG/DL
LDLC SERPL CALC-MCNC: 52 MG/DL
NONHDLC SERPL-MCNC: 73 MG/DL
PSA SERPL-ACNC: 0.9 UG/L (ref 0–4)
TRIGL SERPL-MCNC: 105 MG/DL

## 2021-03-19 PROCEDURE — 80061 LIPID PANEL: CPT | Performed by: FAMILY MEDICINE

## 2021-03-19 PROCEDURE — G0438 PPPS, INITIAL VISIT: HCPCS | Performed by: FAMILY MEDICINE

## 2021-03-19 PROCEDURE — 36415 COLL VENOUS BLD VENIPUNCTURE: CPT | Performed by: FAMILY MEDICINE

## 2021-03-19 PROCEDURE — G0103 PSA SCREENING: HCPCS | Performed by: FAMILY MEDICINE

## 2021-03-19 PROCEDURE — 82105 ALPHA-FETOPROTEIN SERUM: CPT | Performed by: FAMILY MEDICINE

## 2021-03-19 PROCEDURE — 87517 HEPATITIS B DNA QUANT: CPT | Performed by: FAMILY MEDICINE

## 2021-03-19 ASSESSMENT — ANXIETY QUESTIONNAIRES
1. FEELING NERVOUS, ANXIOUS, OR ON EDGE: NEARLY EVERY DAY
7. FEELING AFRAID AS IF SOMETHING AWFUL MIGHT HAPPEN: MORE THAN HALF THE DAYS
5. BEING SO RESTLESS THAT IT IS HARD TO SIT STILL: MORE THAN HALF THE DAYS
2. NOT BEING ABLE TO STOP OR CONTROL WORRYING: MORE THAN HALF THE DAYS
GAD7 TOTAL SCORE: 17
3. WORRYING TOO MUCH ABOUT DIFFERENT THINGS: MORE THAN HALF THE DAYS
6. BECOMING EASILY ANNOYED OR IRRITABLE: NEARLY EVERY DAY
IF YOU CHECKED OFF ANY PROBLEMS ON THIS QUESTIONNAIRE, HOW DIFFICULT HAVE THESE PROBLEMS MADE IT FOR YOU TO DO YOUR WORK, TAKE CARE OF THINGS AT HOME, OR GET ALONG WITH OTHER PEOPLE: VERY DIFFICULT

## 2021-03-19 ASSESSMENT — MIFFLIN-ST. JEOR: SCORE: 1458.15

## 2021-03-19 ASSESSMENT — PATIENT HEALTH QUESTIONNAIRE - PHQ9
SUM OF ALL RESPONSES TO PHQ QUESTIONS 1-9: 18
5. POOR APPETITE OR OVEREATING: NEARLY EVERY DAY

## 2021-03-19 ASSESSMENT — PAIN SCALES - GENERAL: PAINLEVEL: SEVERE PAIN (6)

## 2021-03-19 NOTE — PATIENT INSTRUCTIONS
At [facility], we strive to deliver an exceptional experience to you, every time we see you. If you receive a survey, please complete it as we do value your feedback.  If you have MyChart, you can expect to receive results automatically within 24 hours of their completion.  Your provider will send a note interpreting your results as well.   If you do not have MyChart, you should receive your results in about a week by mail.    Your care team:                            Family Medicine Internal Medicine   MD Noam Dorado MD Shantel Branch-Fleming, MD Srinivasa Vaka, MD Katya Belousova, PAISAK Davis, APRN CNP    David Chavez, MD Pediatrics   Robbin Trevino, PAISAK Lazaro, CNP MD Danitza Kirkland APRN CNP   MD Tiffany Sanchez MD Deborah Mielke, MD Mirlande Pedraza, APRN [facility]      Clinic hours: Monday - Thursday 7 am-6 pm; Fridays 7 am-5 pm.   Urgent care: Monday - Friday 11 am-9 pm; Saturday and Sunday 9 am-5 pm.    Clinic: [phone]       [facility]: Monday - Thursday 8 am - 7 pm; Friday 8 am - 6 pm  [facility]: [phone]     Use www.[email or web address].org for 24/7 diagnosis and treatment of dozens of conditions.  Patient Education   Personalized Prevention Plan  You are due for the preventive services outlined below.  Your care team is available to assist you in scheduling these services.  If you have already completed any of these items, please share that information with your care team to update in your medical record.  Health Maintenance Due   Topic Date Due     Discuss Advance Care Planning  Never done     Zoster (Shingles) Vaccine (2 of 2) 09/21/2019     Eye Exam  11/11/2020     Depression Assessment  02/25/2021

## 2021-03-19 NOTE — PROGRESS NOTES
"      Carito Miguel is a 55 year old who presents for the following health issues               SUBJECTIVE:   Lamotn Daniels is a 55 year old male who presents for Preventive Visit.      Patient has been advised of split billing requirements and indicates understanding: Yes  Are you in the first 12 months of your Medicare Part B coverage?  No    Physical Health:    In general, how would you rate your overall physical health? poor    Outside of work, how many days during the week do you exercise? none    Outside of work, approximately how many minutes a day do you exercise?not applicable    If you drink alcohol do you typically have >3 drinks per day or >7 drinks per week? No    Do you usually eat at least 4 servings of fruit and vegetables a day, include whole grains & fiber and avoid regularly eating high fat or \"junk\" foods? Yes    Do you have any problems taking medications regularly?  No    Do you have any side effects from medications? none    Needs assistance for the following daily activities: transportation, shopping, preparing meals, housework and laundry    Which of the following safety concerns are present in your home?  none identified     Hearing impairment: No    In the past 6 months, have you been bothered by leaking of urine? yes    Mental Health:    In general, how would you rate your overall mental or emotional health? poor  PHQ-2 Score:      Do you feel safe in your environment? Yes    Have you ever done Advance Care Planning? (For example, a Health Directive, POLST, or a discussion with a medical provider or your loved ones about your wishes): No, advance care planning information given to patient to review.  Patient declined advance care planning discussion at this time.    Additional concerns to address?  No    Fall risk:  Fallen 2 or more times in the past year?: Yes  Any fall with injury in the past year?: No  click delete button to remove this line now  Cognitive Screenin) Repeat 3 " items (Leader, Season, Table)    2) Clock draw: NORMAL   3) 3 item recall: Recalls 2 objects   Results: NORMAL clock, 1-2 items recalled: COGNITIVE IMPAIRMENT LESS LIKELY    Mini-CogTM Copyright BARTOLOME Rendon. Licensed by the author for use in [facility]; reprinted with permission (fletcher@[facility]). All rights reserved.      Do you have sleep apnea, excessive snoring or daytime drowsiness?: yes    Patient needs a new walker.    Reviewed and updated as needed this visit by clinical staff  Tobacco  Allergies  Meds   Med Hx  Surg Hx  Fam Hx  Soc Hx        Reviewed and updated as needed this visit by Provider                Social History     Tobacco Use     Smoking status: Never Smoker     Smokeless tobacco: Never Used   Substance Use Topics     Alcohol use: No     Alcohol/week: 0.0 standard drinks                           Current providers sharing in care for this patient include:   Patient Care Team:  David Chavez MD as PCP - General (Family Practice)  David Chavez MD as Assigned PCP  Drea Laboy MD as MD (Internal Medicine)  Ameena Mendez MD as MD (INTERNAL MEDICINE - ENDOCRINOLOGY, DIABETES & METABOLISM)  Fly Travis MD as Assigned Heart and Vascular Provider  Leventhal, Thomas Michael, MD as Assigned Gastroenterology Provider  Alyssa Roche MD as Assigned Surgical Provider    The following health maintenance items are reviewed in Epic and correct as of today:  Health Maintenance   Topic Date Due     ADVANCE CARE PLANNING  Never done     ZOSTER IMMUNIZATION (2 of 2) 09/21/2019     EYE EXAM  11/11/2020     PHQ-9  02/25/2021     COVID-19 Vaccine (2 - Pfizer 2-dose series) 04/06/2021     DTAP/TDAP/TD IMMUNIZATION (2 - Td) 08/30/2021     URINE DRUG SCREEN  10/27/2021     MEDICARE ANNUAL WELLNESS VISIT  03/19/2022     COLORECTAL CANCER SCREENING  02/08/2023     LIPID  06/25/2025     Pneumococcal Vaccine: Pediatrics (0 to 5 Years) and At-Risk Patients (6 to 64 Years) (2 of 2) 11/11/2030      HEPATITIS C SCREENING  Completed     HIV SCREENING  Completed     DEPRESSION ACTION PLAN  Completed     INFLUENZA VACCINE  Completed     IPV IMMUNIZATION  Aged Out     MENINGITIS IMMUNIZATION  Aged Out     Lab work is in process  Labs reviewed in EPIC  BP Readings from Last 3 Encounters:   03/19/21 123/81   10/27/20 133/88   03/10/20 (!) 150/90    Wt Readings from Last 3 Encounters:   03/19/21 74.4 kg (164 lb)   03/10/20 77.9 kg (171 lb 12.8 oz)   03/02/20 77.1 kg (170 lb)                  Patient Active Problem List   Diagnosis     Coronary atherosclerosis of native coronary artery     Major depressive disorder, recurrent episode, moderate (H)     Anxiety     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)     Hepatitis B infection     Vitamin D deficiency     S/P CABG (coronary artery bypass graft)     Malrotation of intestine     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     Hyperlipidemia LDL goal <70     Insomnia     Gout     Suicidal ideation     Essential hypertension     Rheumatoid arthritis involving multiple sites, unspecified rheumatoid factor presence     High risk medications (not anticoagulants) long-term use     Midline low back pain without sciatica     Bilateral low back pain with sciatica, sciatica laterality unspecified     Elevated liver enzymes     On corticosteroid therapy     Essential hypertension with goal blood pressure less than 140/90     Insomnia, unspecified type     Rheumatoid arthritis of multiple sites with negative rheumatoid factor (H)     Benign prostatic hyperplasia with lower urinary tract symptoms, unspecified morphology     Chronic pain syndrome     Hepatitis B, chronic (H)     Past Surgical History:   Procedure Laterality Date     ABDOMEN SURGERY  2014     BIOPSY  2015     BYPASS GRAFT ARTERY CORONARY  2008    6 vessels     COLONOSCOPY  2/8/2013    Procedure: COLONOSCOPY;  Colonoscopy, blood in stool;  Surgeon: Duane, William Charles, MD;  Location: [facility] OR     COMBINED REPAIR PTOSIS WITH  BLEPHAROPLASTY BILATERAL Bilateral 6/25/2018    Procedure: COMBINED REPAIR PTOSIS WITH BLEPHAROPLASTY BILATERAL;  Bilateral upper eyelid blepharoplasty, ptosis repair and brow ptosis repair;  Surgeon: Sandra Borja MD;  Location:  OR     GI SURGERY  2014     HEAD & NECK SURGERY  2011     NASAL/SINUS POLYPECTOMY  2010     ORTHOPEDIC SURGERY  2012     REPAIR PTOSIS       REPAIR PTOSIS BILATERAL Bilateral 7/22/2019    Procedure: REPAIR, PTOSIS, BOTH UPPER brows;  Surgeon: Sandra Borja MD;  Location:  OR     REPAIR PTOSIS BROW BILATERAL Bilateral 6/25/2018    Procedure: REPAIR PTOSIS BROW BILATERAL;;  Surgeon: Sandra Borja MD;  Location:  OR     THORACIC SURGERY  1989    tb     TONSILLECTOMY  2010     UVULOPALATOPHARYNGOPLASTY  2010     VASCULAR SURGERY  2008     [facility] CORONARY STENT CIERA SOTO VESSEL  2008    3 months after CABG       Social History     Tobacco Use     Smoking status: Never Smoker     Smokeless tobacco: Never Used   Substance Use Topics     Alcohol use: No     Alcohol/week: 0.0 standard drinks     Family History   Problem Relation Age of Onset     C.A.D. Father      Coronary Artery Disease Father      Hypertension Father      Depression Father      Hypertension Mother      Diabetes Mother      Depression Mother      Mental Illness Mother      Hypertension Maternal Grandfather      Cancer Paternal Grandfather      Other Cancer Paternal Grandfather      Other Cancer Other      Autoimmune Disease No family hx of      Cerebrovascular Disease No family hx of      Thyroid Disease No family hx of      Glaucoma No family hx of      Macular Degeneration No family hx of          Current Outpatient Medications   Medication Sig Dispense Refill     Abatacept (ORENCIA) 125 MG/ML SOAJ auto-injector Inject 1 mL (125 mg) Subcutaneous every 7 days 12 mL 0     Acetaminophen (TYLENOL PO)        allopurinol (ZYLOPRIM) 300 MG tablet TAKE 1 TABLET BY MOUTH EVERY DAY 90 tablet 3     aspirin EC 81  MG EC tablet Take 1 tablet (81 mg) by mouth daily (*) 30 tablet 1     atorvastatin (LIPITOR) 80 MG tablet TAKE 1 TABLET BY MOUTH 1 TIME DAILY 90 tablet 3     bacitracin 500 UNIT/GM external ointment Apply topically 3 times daily 30 g 3     baclofen (LIORESAL) 10 MG tablet TAKE 1 TABLET BY MOUTH 2 TIMES DAILY AS NEEDED FOR MUSCLE SPASM 180 tablet 2     buprenorphine (BUTRANS) 20 MCG/HR WK patch Place 1 patch onto the skin every 7 days . Name brand only.  Fill 03/15/21 and start 03/17/21 4 patch 1     busPIRone HCl (BUSPAR) 30 MG tablet TAKE 1 TABLET (30 MG) BY MOUTH 2 TIMES DAILY 180 tablet 3     clobetasol (TEMOVATE) 0.05 % external solution Apply twice daily to scalp for up to 2 weeks for flares. 60 mL 0     clonazePAM (KLONOPIN) 1 MG tablet Take 0.5-1 tablets (0.5-1 mg) by mouth 2 times daily as needed for anxiety 90 tablet 0     clopidogrel (PLAVIX) 75 MG tablet Take 1 tablet (75 mg) by mouth daily 90 tablet 3     COLCRYS 0.6 MG tablet TAKE 2 TABLETS BY MOUTH AT THE FIRST SIGN OF FLARE, TAKE 1 ADDITIONAL TABLET ONE HOUR LATER. 30 tablet 0     desvenlafaxine (PRISTIQ) 50 MG 24 hr tablet TAKE 1 TABLET BY MOUTH EVERY DAY 90 tablet 3     diclofenac (VOLTAREN) 1 % topical gel Apply 4 grams to bilateral knees, up to four times daily as needed using enclosed dosing card.  Max of 32g/day 300 g 11     evolocumab (REPATHA) 140 MG/ML prefilled autoinjector Inject 1 mL (140 mg) Subcutaneous every 14 days 2 mL 11     ezetimibe (ZETIA) 10 MG tablet Take 1 tablet (10 mg) by mouth daily 90 tablet 3     hydrocortisone (WESTCORT) 0.2 % external cream FOR GENITALS, APPLY TWICE DAILY FOR UP TO 2 WEEKS, TAKE 2 WEEKS OFF THEN REPEAT 60 g 4     hydrocortisone 2.5 % cream FOR FACE, APPLY TWICE DAILY FOR UP TO 2 WEEK FOR FLARES ON THE FACE, TAKE 2 WEEKS OFF 28.35 g 3     hydroxychloroquine (PLAQUENIL) 200 MG tablet Take 1 tablet (200 mg) by mouth daily 90 tablet 2     Incontinence Supply Disposable (DEPEND UNDERWEAR LARGE) MISC Use twice  daily. 60 each 11     leflunomide (ARAVA) 20 MG tablet Take 1 tablet (20 mg) by mouth daily 30 tablet 3     meclizine (ANTIVERT) 25 MG tablet TAKE 1 TABLET BY MOUTH EVERY 6 HOURS AS NEEDED FOR DIZZINESS 30 tablet 5     melatonin 3 MG tablet Take 1 tablet (3 mg) by mouth nightly as needed for sleep       naloxone (NARCAN) nasal spray Spray 1 spray (4 mg) into one nostril alternating nostrils as needed for opioid reversal every 2-3 minutes until assistance arrives 0.2 mL 0     nitroGLYcerin (NITROSTAT) 0.4 MG sublingual tablet For chest pain place 1 tablet under the tongue every 5 minutes for 3 doses. If symptoms persist 5 minutes after 1st dose call 911. 25 tablet 1     order for DME Equipment being ordered: chair lift 1 each 0     order for DME Equipment being ordered: single end cane 1 Units 0     ORDER FOR DME 1.  CPAP pressure 11 cm/H20 with heated humidity.   2.  Provide mask to fit and CPAP supplies.   3.  Length of need lifetime.   4.  If needed please provide a chin strap            pantoprazole (PROTONIX) 40 MG EC tablet TAKE 1 TABLET BY MOUTH EVERY DAY 90 tablet 1     pimecrolimus (ELIDEL) 1 % external cream Apply twice daily for face and genitals 60 g 3     predniSONE (DELTASONE) 5 MG tablet For rheumatoid arthritis flare only: prednisone 10mg daily x2days, then 5mg daily x5days, then stop. 9 tablet 1     sildenafil (REVATIO) 20 MG tablet TAKE 2-5 TABLETS BY MOUTH 30 MINUTES PRIOR TO INTERCOURSE 50 tablet 11     sulfaSALAzine ER (AZULFIDINE EN) 500 MG EC tablet Take 3 tablets (1,500 mg) by mouth 2 times daily 540 tablet 2     tamsulosin (FLOMAX) 0.4 MG capsule Take 2 capsules (0.8 mg) by mouth daily 180 capsule 3     tenofovir (VIREAD) 300 MG tablet Take 1 tablet (300 mg) by mouth daily 60 tablet 0     triamcinolone (KENALOG) 0.1 % external ointment APPLY TOPICALLY TO AFFECTED AREA(S) 3 TIMES DAILY 80 g 2     triamcinolone (KENALOG) 0.1 % external ointment Apply to trunk and extremities twice daily for up  to 2 weeks for flares 80 g 1     zolpidem (AMBIEN) 5 MG tablet Take 1 tablet (5 mg) by mouth nightly as needed for sleep 30 tablet 5     Cholecalciferol (VITAMIN D) 2000 UNITS tablet TAKE ONE TABLET BY MOUTH ONCE DAILY 100 tablet 1     clobetasol (TEMOVATE) 0.05 % external cream Apply twice daily for 2 weeks, weekends only for 2 weeks, repeat cycle as needed for hands, not face or genitals 60 g 11     gabapentin (NEURONTIN) 300 MG capsule Take 2 capsules (600 mg) by mouth 3 times daily . 3 month supply 540 capsule 6     gemfibrozil (LOPID) 600 MG tablet Take 1 tablet (600 mg) by mouth 2 times daily 180 tablet 3     Allergies   Allergen Reactions     Citalopram Itching     Tramadol Itching     Recent Labs   Lab Test 02/27/21  1259 01/30/21  1226 12/16/20  1118 06/25/20  1027 06/25/20  1027 03/26/20  1039 03/26/20  1039 11/25/19  0758 11/25/19  0758 03/14/19  1006 03/14/19  1006 06/22/18  1448 06/22/18  1448 10/25/16  0851 10/25/16  0851 08/12/16  1451 08/12/16  1451 04/22/16  1318 04/22/16  1318 01/14/16  1541 01/14/16  1541   A1C  --   --   --   --   --   --   --   --   --   --   --   --   --   --   --   --  5.9  --  6.0  --  6.4*   LDL  --   --   --   --  33  --   --   --   --   --  76  --  42   < > 134*  --   --    < >  --   --   --    HDL  --   --   --   --  59  --   --   --   --   --  51  --  64   < > 90  --   --    < >  --   --   --    TRIG  --   --   --   --  148  --   --   --   --   --  126  --  113   < > 100  --   --    < >  --   --   --    ALT 27 46 31   < >  --    < > 36   < > 47   < >  --    < > 35   < > 86*   < >  --    < >  --    < > 48   CR 0.95 0.94 0.98   < >  --    < > 1.00   < > 0.78   < >  --    < > 0.98   < > 1.01   < >  --    < >  --    < > 1.01   GFRESTIMATED 89 >90 87   < >  --    < > 85   < > >90   < >  --    < > 80   < > 78   < >  --    < >  --    < > 78   GFRESTBLACK >90 >90 >90   < >  --    < > >90   < > >90   < >  --    < > >90   < > >90  African American GFR Calc     < >  --    < >  --    " < > >90   GFR Calc     POTASSIUM  --   --   --   --   --   --  3.8  --  3.8   < >  --    < >  --    < > 3.6   < >  --    < >  --   --  3.9   TSH  --   --   --   --   --   --   --   --   --   --   --   --   --   --  3.34  --   --   --  1.38  --   --     < > = values in this interval not displayed.        ROS:  Constitutional, HEENT, cardiovascular, pulmonary, GI, , musculoskeletal, neuro, skin, endocrine and psych systems are negative, except as otherwise noted.    OBJECTIVE:   /81 (BP Location: Right arm, Patient Position: Sitting, Cuff Size: Adult Regular)   Pulse 60   Temp 97.9  F (36.6  C) (Tympanic)   Ht 1.575 m (5' 2\")   Wt 74.4 kg (164 lb)   SpO2 96%   BMI 30.00 kg/m   Estimated body mass index is 30 kg/m  as calculated from the following:    Height as of this encounter: 1.575 m (5' 2\").    Weight as of this encounter: 74.4 kg (164 lb).  EXAM:   GENERAL: healthy, alert and no distress  NECK: no adenopathy, no asymmetry, masses, or scars and thyroid normal to palpation  RESP: lungs clear to auscultation - no rales, rhonchi or wheezes  CV: regular rate and rhythm, normal S1 S2, no S3 or S4, no murmur, click or rub, no peripheral edema and peripheral pulses strong  ABDOMEN: soft, nontender, no hepatosplenomegaly, no masses and bowel sounds normal  MS: no gross musculoskeletal defects noted, no edema    Diagnostic Test Results:  Labs reviewed in Epic    ASSESSMENT / PLAN:   1. Encounter for Medicare annual wellness exam  As below.    2. Atherosclerosis of native coronary artery of native heart, angina presence unspecified  Asymptomatic. Following Cardiology.    3. Hyperlipidemia LDL goal <70  Controlled.  - Lipid panel reflex to direct LDL Fasting    4. Chronic pain syndrome  Patient has difficulty walking and needs a walker to help him ambulate.   - Walker Order for DME - ONLY FOR DME    5. Rheumatoid arthritis of multiple sites with negative rheumatoid factor (H)  As above.  - " "Walker Order for DME - ONLY FOR DME    6. Anxiety hyperventilation  Stable.    7. Hepatitis B, chronic (H)  Following GI.  - Hep B Virus DNA Quant Real Time PCR  - AFP tumor marker    8. Screening for prostate cancer    - PSA, screen    Patient has been advised of split billing requirements and indicates understanding: Yes    COUNSELING:  Reviewed preventive health counseling, as reflected in patient instructions       Regular exercise       Healthy diet/nutrition       Vision screening    Estimated body mass index is 31.42 kg/m  as calculated from the following:    Height as of 3/10/20: 1.575 m (5' 2\").    Weight as of 3/10/20: 77.9 kg (171 lb 12.8 oz).        He reports that he has never smoked. He has never used smokeless tobacco.    Appropriate preventive services were discussed with this patient, including applicable screening as appropriate for cardiovascular disease, diabetes, osteopenia/osteoporosis, and glaucoma.  As appropriate for age/gender, discussed screening for colorectal cancer, prostate cancer, breast cancer, and cervical cancer. Checklist reviewing preventive services available has been given to the patient.    Reviewed patients plan of care and provided an AVS. The Basic Care Plan (routine screening as documented in Health Maintenance) for [place] meets the Care Plan requirement. This Care Plan has been established and reviewed with the Patient.    Counseling Resources:  ATP IV Guidelines  Pooled Cohorts Equation Calculator  Breast Cancer Risk Calculator  BRCA-Related Cancer Risk Assessment: FHS-7 Tool  FRAX Risk Assessment  ICSI Preventive Guidelines  Dietary Guidelines for Americans, 2010  USDA's MyPlate  ASA Prophylaxis  Lung CA Screening    David Chavez MD, MD  [facility]  "

## 2021-03-20 ASSESSMENT — ANXIETY QUESTIONNAIRES: GAD7 TOTAL SCORE: 17

## 2021-03-29 DIAGNOSIS — Z79.899 HIGH RISK MEDICATION USE: ICD-10-CM

## 2021-03-29 DIAGNOSIS — M06.09 RHEUMATOID ARTHRITIS OF MULTIPLE SITES WITH NEGATIVE RHEUMATOID FACTOR (H): ICD-10-CM

## 2021-03-29 LAB
ALBUMIN SERPL-MCNC: 3.9 G/DL (ref 3.4–5)
ALP SERPL-CCNC: 116 U/L (ref 40–150)
ALT SERPL W P-5'-P-CCNC: 31 U/L (ref 0–70)
AST SERPL W P-5'-P-CCNC: 20 U/L (ref 0–45)
BASOPHILS # BLD AUTO: 0.1 10E9/L (ref 0–0.2)
BASOPHILS NFR BLD AUTO: 1.2 %
BILIRUB DIRECT SERPL-MCNC: 0.1 MG/DL (ref 0–0.2)
BILIRUB SERPL-MCNC: 0.5 MG/DL (ref 0.2–1.3)
CREAT SERPL-MCNC: 1.01 MG/DL (ref 0.66–1.25)
DIFFERENTIAL METHOD BLD: NORMAL
EOSINOPHIL # BLD AUTO: 0.2 10E9/L (ref 0–0.7)
EOSINOPHIL NFR BLD AUTO: 2.9 %
ERYTHROCYTE [DISTWIDTH] IN BLOOD BY AUTOMATED COUNT: 14.6 % (ref 10–15)
GFR SERPL CREATININE-BSD FRML MDRD: 83 ML/MIN/{1.73_M2}
HCT VFR BLD AUTO: 42.3 % (ref 40–53)
HGB BLD-MCNC: 13.6 G/DL (ref 13.3–17.7)
LYMPHOCYTES # BLD AUTO: 1.8 10E9/L (ref 0.8–5.3)
LYMPHOCYTES NFR BLD AUTO: 35.5 %
MCH RBC QN AUTO: 28.8 PG (ref 26.5–33)
MCHC RBC AUTO-ENTMCNC: 32.2 G/DL (ref 31.5–36.5)
MCV RBC AUTO: 90 FL (ref 78–100)
MONOCYTES # BLD AUTO: 0.8 10E9/L (ref 0–1.3)
MONOCYTES NFR BLD AUTO: 15.3 %
NEUTROPHILS # BLD AUTO: 2.3 10E9/L (ref 1.6–8.3)
NEUTROPHILS NFR BLD AUTO: 45.1 %
PLATELET # BLD AUTO: 245 10E9/L (ref 150–450)
PROT SERPL-MCNC: 7.6 G/DL (ref 6.8–8.8)
RBC # BLD AUTO: 4.72 10E12/L (ref 4.4–5.9)
WBC # BLD AUTO: 5.2 10E9/L (ref 4–11)

## 2021-03-29 PROCEDURE — 80076 HEPATIC FUNCTION PANEL: CPT | Performed by: INTERNAL MEDICINE

## 2021-03-29 PROCEDURE — 85025 COMPLETE CBC W/AUTO DIFF WBC: CPT | Performed by: INTERNAL MEDICINE

## 2021-03-29 PROCEDURE — 82565 ASSAY OF CREATININE: CPT | Performed by: INTERNAL MEDICINE

## 2021-03-29 PROCEDURE — 36415 COLL VENOUS BLD VENIPUNCTURE: CPT | Performed by: INTERNAL MEDICINE

## 2021-03-30 ENCOUNTER — VIRTUAL VISIT (OUTPATIENT)
Dept: RHEUMATOLOGY | Facility: CLINIC | Age: 56
End: 2021-03-30
Payer: COMMERCIAL

## 2021-03-30 DIAGNOSIS — B18.1 CHRONIC HEPATITIS B (H): ICD-10-CM

## 2021-03-30 DIAGNOSIS — Z79.899 HIGH RISK MEDICATION USE: ICD-10-CM

## 2021-03-30 DIAGNOSIS — M06.09 RHEUMATOID ARTHRITIS OF MULTIPLE SITES WITH NEGATIVE RHEUMATOID FACTOR (H): Primary | ICD-10-CM

## 2021-03-30 PROCEDURE — 99214 OFFICE O/P EST MOD 30 MIN: CPT | Mod: 95 | Performed by: INTERNAL MEDICINE

## 2021-03-30 RX ORDER — SULFASALAZINE 500 MG/1
1500 TABLET, DELAYED RELEASE ORAL 2 TIMES DAILY
Qty: 540 TABLET | Refills: 2 | Status: SHIPPED | OUTPATIENT
Start: 2021-03-30 | End: 2021-10-20

## 2021-03-30 RX ORDER — LEFLUNOMIDE 20 MG/1
20 TABLET ORAL DAILY
Qty: 30 TABLET | Refills: 3 | Status: SHIPPED | OUTPATIENT
Start: 2021-03-30 | End: 2021-08-04

## 2021-03-30 NOTE — PATIENT INSTRUCTIONS
RHEUMATOLOGY    Dr. Sebastián Jenkins    [facility]  [address] [place] [address]    Our new phone number for Rheumatology is [phone], this number will be able to help you schedule appointments for Dr. Jenkins or if you have any message you would like sent to us.    Thank you for choosing [facility]!    Helga Guerrier [facility] Rheumatology

## 2021-03-30 NOTE — PROGRESS NOTES
Lamont Daniels is a 55 year old year old male who is being evaluated via a billable telephone visit.      What phone number would you like to be contacted at? [phone]  How would you like to obtain your AVS? [facility]    Rheumatology Telephone/Telehealth  Visit      Lamont Daniels MRN# [id number]   YOB: 1965 Age: 55 year old      Date of visit: 3/30/21   PCP: Dr. David Chavez  Hepatologist: Dr. Thomas Leventhal     Chief Complaint   Patient presents with:  Arthritis: RA    Assessment and Plan   1.  Seropositive nonerosive rheumatoid arthritis (RF negative, CCP low positive): Note that he does have low back pain but without evidence of SI joint irregularity on x-ray.  Initially with morning stiffness all day, gelling phenomenon, and synovitis.   Previously on Humira (partially effective), Enbrel (partially effective). MTX avoided per Dr. Laboy recommendation. Currently on leflunomide 20 mg daily, SSZ 1500mg BID, Orencia SQ every 7 days, (initially Rx'd 4/18/2017), and HCQ 200mg daily.  Treatment has been in coordination with hepatology as he requires HBV tx while on bDMARD.  Continue monthly toxicity monitoring labs, to complete the 6 months of monthly labs needed for leflunomide.  Chronic illness, stable.    - Continue leflunomide 20 mg daily    - Continue sulfasalazine 1500mg BID  - Continue Orencia SQ every 7 days  - Continue hydroxychloroquine 200 mg daily (last eye exam in 11/2019; reminded him to call to schedule for a yearly eye exam)  - PRN RA flare only: Prednisone 10 mg daily x2 days, then 5 mg daily x5 days, then stop   - Labs monthly [id number]: CBC, Cr, Hepatic Panel  - Labs in 3 months: CBC, Creatinine, Hepatic Panel, ESR, CRP    High risk medication requiring intensive toxicity monitoring at least quarterly: labs ordered include CBC, Creatinine, Hepatic panel to monitor for cytopenia and hepatotoxicity; checking creatinine as it affects clearance of medication.      2. Lower back pain: Lumbar spine MRI  "in August 2014 showed multilevel degenerative changes and a small disc protrusion at L3/4 with mild spinal canal narrowing; also moderate left and mild right neural foraminal narrowing at L5-S1. He had a nerve conduction study in 2014 that was normal. He has been worked up by neurology in the past without significant neurologic findings. HLA-B27 negative, SI joint x-ray negative. Now following in the pain management clinic.     3. Myofascial pain syndrome / chronic pain syndrome: diffuse pain consistent with chronic pain syndrome.  Management per pain clinic as well as her PCP.  I strongly encourage that he increase physical activity as he leads a sedentary lifestyle with no more activity than what is required for his activities of daily living.  Encouraged low impact physical activity such as walking, multiple times per day    4. Chronic Hepatitis B: Managed by Dr. Laboy (hepatology) previously, and now Dr. Thomas Leventhal at the [facility]. Currently on tenofovir. Reminded him that he must schedule follow-up with the hepatology clinic.  Chronic illness    5. Hepatic Steatosis: Based on previous liver biopsy.  Seeing hepatology.      6. Positive tuberculosis test, history:   This is noted here for historical significance.  He was evaluated by Dr. Anthony Mendoza, infectious disease. The following telephone note was documented by Dr. Mendoza: \"I tried calling th pt, finally we have the records from [place] . It appears he was treated for latent TB with INH for 1 year in 1980/1981 .Later in 1981 April he was admitted at [facility] in [place], [place] with rt sided pneumonia, TB was suspected but he improved with treatment with Penicillin only. Later he was seen  ( likely ID specialist), and was treated with 6 weeks course of Rifampin and Ethambutol pending sputum AFB culture. His AFB cx were negative based on the report we have.\"  \"He recently had QFT -TB test which was reported positive and " "his CXR was clear. Given his documented hx of completion of treatment for latent TB as above , I do not see any need for further treatment. His tests may remain positive irrespective of treatment status. From my perspective he can be started on DMARDs/Biological as deemed necessary.\"       7. Gout: On allopurinol and managed by PCP.     # At this time the COVID-19 vaccine (currently available from Pfizer, Moderna, and Mobile Authentication) is recommended based on our knowledge of this vaccine and vaccines in the past.  Based on our current knowledge there is no preference for one COVID-19 vaccine over another. Note that there is no data currently available to specifically establish safety and efficacy for these vaccines in immunocompromised people.    Based on our current understanding (and this may change over time as we learn more), the following adjustments are recommended around the time of COVID-19 vaccination:  - Orencia (abatacept) subcutaneous (SQ) should be held 1 week prior to and 1 week after the first COVID-19 vaccine; no interruption is needed around the second COVID19 vaccine dose; however, because he already took the first dose of the COVID-19 vaccine and did not hold Orencia, I advised him to adjust the Orencia dose for the second vaccine, so that he will not take Orencia for 1 week prior to and 1 week after the second COVID-19 vaccine.    # This is a virtual visit to reduce the risk of COVID-19 exposure during this current pandemic.      Total minutes spent in evaluation with patient, documentation, , and review of pertinent studies and chart notes: 15       Mr. Daniels verbalized agreement with and understanding of the rational for the diagnosis and treatment plan.  All questions were answered to best of my ability and the patient's satisfaction. Mr. Daniels was advised to contact the clinic with any questions that may arise after the clinic visit.      Thank you for involving me in the care of the " patient    Return to clinic: 3 months      HPI   Lamont Daniels is a 55 year old male with a medical history significant for hypertension, hyperlipidemia, gout, coronary artery disease s/p 6vCABG, vitamin D deficiency, hepatitis B, and RA who presents for f/u of RA.    Today, 3/30/2021: Doing well at this time.  He says that leflunomide is helpful and he is tolerating it well.  No joint pain or swelling currently.  Morning stiffness for less than 1 hour.  Generally still feels better with activity.  No flares of arthritis since last seen.  Reports that he is taking tenofovir.  Has received 1 dose of the Pfizer COVID-19 vaccine    Denies fevers, chills, nausea, vomiting, constipation, diarrhea. No abdominal pain. Denies chest pain/pressure, palpitations, or shortness of breath.  No oral or nasal sores. No rash. No LE swelling.  Mild dry mouth; he has good dentition and does not feel like he needs to use frequent sips of water; unchanged since last evaluation. No dry eyes. No eye pain or redness.     Tobacco:  None   EtOH:  None  Drugs:  None  Occupation: Retired   Originally from [facility], then lived [place], [place], then lived in [place], [place], then moved to [place] for family    ROS   GEN: No fevers, chills, night sweats; + fatigue   SKIN: No itching, rashes, sores  HEENT: No epistaxis. No oral or nasal ulcers.  CV: See HPI  PULM: See HPI  GI: No nausea, vomiting, constipation, diarrhea. No blood in stool. No abdominal pain.  : No blood in urine.  MSK: See HPI.  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     Hyperlipidemia LDL goal <70     Insomnia     Gout     Suicidal ideation     Essential hypertension     Rheumatoid arthritis involving multiple sites, unspecified rheumatoid factor presence     High risk medications (not anticoagulants) long-term use     Midline low back pain without sciatica     Bilateral low back pain with sciatica, sciatica laterality unspecified     Elevated liver enzymes     On corticosteroid therapy     Essential hypertension with goal blood pressure less than 140/90     Insomnia, unspecified type     Rheumatoid arthritis of multiple sites with negative rheumatoid factor (H)     Benign prostatic hyperplasia with lower urinary tract symptoms, unspecified morphology     Chronic pain syndrome     Hepatitis B, chronic (H)     Past Medical History     Past Medical History:   Diagnosis Date     Anxiety      CAD (coronary artery disease)     CABG, PCI     Congestive heart failure, unspecified 2008     Depressive disorder 2008     Gout      Hepatitis B > 10 years     HTN (hypertension)      Hyperlipidemia LDL goal < 100      Malrotation of intestine      Moderate major depression (H)      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) 9/19/2011    Uses CPAP     Rheumatoid arthritis flare (H) 1012     Steatohepatitis 12/15    liver biopsy     Tuberculosis age 13    INH x 9 months      Vitamin D deficiency      Past Surgical History     Past Surgical History:   Procedure Laterality Date     ABDOMEN SURGERY  2014     BIOPSY  2015     BYPASS GRAFT ARTERY CORONARY  2008    6 vessels     COLONOSCOPY  2/8/2013    Procedure: COLONOSCOPY;  Colonoscopy, blood in stool;  Surgeon: Duane, William Charles, MD;  Location:  OR     COMBINED REPAIR PTOSIS WITH BLEPHAROPLASTY BILATERAL Bilateral 6/25/2018    Procedure: COMBINED REPAIR PTOSIS WITH BLEPHAROPLASTY BILATERAL;  Bilateral upper eyelid blepharoplasty, ptosis repair and brow ptosis repair;  Surgeon: Sandra Borja MD;  Location:  OR     GI SURGERY  2014     HEAD &  NECK SURGERY  2011     NASAL/SINUS POLYPECTOMY  2010     ORTHOPEDIC SURGERY  2012     REPAIR PTOSIS       REPAIR PTOSIS BILATERAL Bilateral 7/22/2019    Procedure: REPAIR, PTOSIS, BOTH UPPER brows;  Surgeon: Sandra Borja MD;  Location: [facility] OR     REPAIR PTOSIS BROW BILATERAL Bilateral 6/25/2018    Procedure: REPAIR PTOSIS BROW BILATERAL;;  Surgeon: Sandra Borja MD;  Location: [facility] OR     THORACIC SURGERY  1989    tb     TONSILLECTOMY  2010     UVULOPALATOPHARYNGOPLASTY  2010     [facility]  [facility]  [facility]    3 months after CABG     Allergy     Allergies   Allergen Reactions     Citalopram Itching     Tramadol Itching     Current Medication List     Current Outpatient Medications   Medication Sig     Abatacept (ORENCIA) 125 MG/ML SOAJ auto-injector Inject 1 mL (125 mg) Subcutaneous every 7 days     Acetaminophen (TYLENOL PO)      allopurinol (ZYLOPRIM) 300 MG tablet TAKE 1 TABLET BY MOUTH EVERY DAY     aspirin EC 81 MG EC tablet Take 1 tablet (81 mg) by mouth daily (*)     atorvastatin (LIPITOR) 80 MG tablet TAKE 1 TABLET BY MOUTH 1 TIME DAILY     bacitracin 500 UNIT/GM external ointment Apply topically 3 times daily     baclofen (LIORESAL) 10 MG tablet TAKE 1 TABLET BY MOUTH 2 TIMES DAILY AS NEEDED FOR MUSCLE SPASM     buprenorphine (BUTRANS) 20 MCG/HR WK patch Place 1 patch onto the skin every 7 days . Name brand only.  Fill 03/15/21 and start 03/17/21     busPIRone HCl (BUSPAR) 30 MG tablet TAKE 1 TABLET (30 MG) BY MOUTH 2 TIMES DAILY     Cholecalciferol (VITAMIN D) 2000 UNITS tablet TAKE ONE TABLET BY MOUTH ONCE DAILY     clobetasol (TEMOVATE) 0.05 % external cream Apply twice daily for 2 weeks, weekends only for 2 weeks, repeat cycle as needed for hands, not face or genitals     clobetasol (TEMOVATE) 0.05 % external solution Apply twice daily to scalp for up to 2 weeks for flares.     clonazePAM (KLONOPIN) 1 MG tablet Take 0.5-1 tablets (0.5-1 mg) by  mouth 2 times daily as needed for anxiety     clopidogrel (PLAVIX) 75 MG tablet Take 1 tablet (75 mg) by mouth daily     COLCRYS 0.6 MG tablet TAKE 2 TABLETS BY MOUTH AT THE FIRST SIGN OF FLARE, TAKE 1 ADDITIONAL TABLET ONE HOUR LATER.     desvenlafaxine (PRISTIQ) 50 MG 24 hr tablet TAKE 1 TABLET BY MOUTH EVERY DAY     diclofenac (VOLTAREN) 1 % topical gel Apply 4 grams to bilateral knees, up to four times daily as needed using enclosed dosing card.  Max of 32g/day     evolocumab (REPATHA) 140 MG/ML prefilled autoinjector Inject 1 mL (140 mg) Subcutaneous every 14 days     ezetimibe (ZETIA) 10 MG tablet Take 1 tablet (10 mg) by mouth daily     gabapentin (NEURONTIN) 300 MG capsule Take 2 capsules (600 mg) by mouth 3 times daily . 3 month supply     gemfibrozil (LOPID) 600 MG tablet Take 1 tablet (600 mg) by mouth 2 times daily     hydrocortisone (WESTCORT) 0.2 % external cream FOR GENITALS, APPLY TWICE DAILY FOR UP TO 2 WEEKS, TAKE 2 WEEKS OFF THEN REPEAT     hydrocortisone 2.5 % cream FOR FACE, APPLY TWICE DAILY FOR UP TO 2 WEEK FOR FLARES ON THE FACE, TAKE 2 WEEKS OFF     hydroxychloroquine (PLAQUENIL) 200 MG tablet Take 1 tablet (200 mg) by mouth daily     Incontinence Supply Disposable (DEPEND UNDERWEAR LARGE) MISC Use twice daily.     leflunomide (ARAVA) 20 MG tablet Take 1 tablet (20 mg) by mouth daily     meclizine (ANTIVERT) 25 MG tablet TAKE 1 TABLET BY MOUTH EVERY 6 HOURS AS NEEDED FOR DIZZINESS     melatonin 3 MG tablet Take 1 tablet (3 mg) by mouth nightly as needed for sleep     naloxone (NARCAN) nasal spray Spray 1 spray (4 mg) into one nostril alternating nostrils as needed for opioid reversal every 2-3 minutes until assistance arrives     pantoprazole (PROTONIX) 40 MG EC tablet TAKE 1 TABLET BY MOUTH EVERY DAY     pimecrolimus (ELIDEL) 1 % external cream Apply twice daily for face and genitals     predniSONE (DELTASONE) 5 MG tablet For rheumatoid arthritis flare only: prednisone 10mg daily x2days,  then 5mg daily x5days, then stop.     sildenafil (REVATIO) 20 MG tablet TAKE 2-5 TABLETS BY MOUTH 30 MINUTES PRIOR TO INTERCOURSE     sulfaSALAzine ER (AZULFIDINE EN) 500 MG EC tablet Take 3 tablets (1,500 mg) by mouth 2 times daily     tamsulosin (FLOMAX) 0.4 MG capsule Take 2 capsules (0.8 mg) by mouth daily     tenofovir (VIREAD) 300 MG tablet Take 1 tablet (300 mg) by mouth daily     triamcinolone (KENALOG) 0.1 % external ointment APPLY TOPICALLY TO AFFECTED AREA(S) 3 TIMES DAILY     triamcinolone (KENALOG) 0.1 % external ointment Apply to trunk and extremities twice daily for up to 2 weeks for flares     zolpidem (AMBIEN) 5 MG tablet Take 1 tablet (5 mg) by mouth nightly as needed for sleep     nitroGLYcerin (NITROSTAT) 0.4 MG sublingual tablet For chest pain place 1 tablet under the tongue every 5 minutes for 3 doses. If symptoms persist 5 minutes after 1st dose call 911.     order for DME Equipment being ordered: chair lift     order for DME Equipment being ordered: single end cane     ORDER FOR DME 1.  CPAP pressure 11 cm/H20 with heated humidity.   2.  Provide mask to fit and CPAP supplies.   3.  Length of need lifetime.   4.  If needed please provide a chin strap          No current facility-administered medications for this visit.        Social History   See HPI    Family History     Family History   Problem Relation Age of Onset     C.A.D. Father      Coronary Artery Disease Father      Hypertension Father      Depression Father      Hypertension Mother      Diabetes Mother      Depression Mother      Mental Illness Mother      Hypertension Maternal Grandfather      Cancer Paternal Grandfather      Other Cancer Paternal Grandfather      Other Cancer Other      Autoimmune Disease No family hx of      Cerebrovascular Disease No family hx of      Thyroid Disease No family hx of      Glaucoma No family hx of      Macular Degeneration No family hx of      No family history of autoimmune disease  No family  "history of psoriasis     No change in family history since the previous clinic visit.     Physical Exam     Temp Readings from Last 3 Encounters:   03/19/21 97.9  F (36.6  C) (Tympanic)   01/14/20 98.4  F (36.9  C) (Oral)   10/08/19 97.6  F (36.4  C) (Oral)     BP Readings from Last 5 Encounters:   03/19/21 123/81   10/27/20 133/88   03/10/20 (!) 150/90   03/02/20 133/88   01/14/20 115/78     Pulse Readings from Last 1 Encounters:   03/19/21 60     Resp Readings from Last 1 Encounters:   07/22/19 16     Estimated body mass index is 30 kg/m  as calculated from the following:    Height as of 3/19/21: 1.575 m (5' 2\").    Weight as of 3/19/21: 74.4 kg (164 lb).    GEN: alert and no distress  PSYCH: Alert; coherent speech, normal rate and volume, able to articulate logical thoughts, able   to abstract reason, no tangential thoughts. Normal affect.   RESP: No cough, no audible wheezing, able to talk in full sentences  Remainder of exam unable to be completed due to telephone visits      Labs     RF/CCP  Recent Labs   Lab Test 11/04/15  1547 08/12/14  1414   CCPABY 27*  --    RHF  --  <20     CBC  Recent Labs   Lab Test 03/29/21  1318 02/27/21  1259 01/30/21  1226   WBC 5.2 4.4 5.5   RBC 4.72 4.79 5.11   HGB 13.6 13.8 14.5   HCT 42.3 43.1 43.9   MCV 90 90 86   RDW 14.6 14.7 13.5    275 296   MCH 28.8 28.8 28.4   MCHC 32.2 32.0 33.0   NEUTROPHIL 45.1 40.9 50.5   LYMPH 35.5 42.0 33.2   MONOCYTE 15.3 12.6 12.7   EOSINOPHIL 2.9 3.4 2.9   BASOPHIL 1.2 1.1 0.7   ANEU 2.3 1.8 2.7   ALYM 1.8 1.9 1.8   PRACHI 0.8 0.6 0.7   AEOS 0.2 0.2 0.2   ABAS 0.1 0.1 0.0     CMP  Recent Labs   Lab Test 03/29/21  1318 02/27/21  1259 01/30/21  1226 12/16/20  1118 09/10/20  1509 06/03/20  1317 03/26/20  1039 11/25/19  0758 11/25/19  0758 10/07/19  0940   NA  --   --   --   --   --   --  138  --  141 140   POTASSIUM  --   --   --   --   --   --  3.8  --  3.8 3.8   CHLORIDE  --   --   --   --   --   --  109  --  110* 110*   CO2  --   --   --   " --   --   --  25  --  25 26   ANIONGAP  --   --   --   --   --   --  4  --  6 4   GLC  --   --   --  100* 128* 138* 122*   < > 101* 103*   BUN  --   --   --   --   --   --  16  --  16 19   CR 1.01 0.95 0.94 0.98 0.92 0.89 1.00   < > 0.78 0.93   GFRESTIMATED 83 89 >90 87 >90 >90 85   < > >90 >90   GFRESTBLACK >90 >90 >90 >90 >90 >90 >90   < > >90 >90   HEMANT  --   --   --   --   --   --  9.0  --  9.2 9.2   BILITOTAL 0.5 0.4 0.4 0.2 0.3 0.4 0.3   < > 0.4 0.4   ALBUMIN 3.9 4.1 4.0 3.8 3.8 4.1 4.3   < > 4.0 4.1   PROTTOTAL 7.6 7.6 7.6 7.3 7.6 7.9 8.0   < > 7.3 7.8   ALKPHOS 116 102 114 123 112 106 123   < > 89 112   AST 20 23 37 21 28 20 23   < > 23 32   ALT 31 27 46 31 38 31 36   < > 47 47    < > = values in this interval not displayed.     Calcium/VitaminD  Recent Labs   Lab Test 03/26/20  1039 11/25/19  0758 10/07/19  0940 07/13/17  1246 07/13/17  1246 11/04/15  1547 11/04/15  1547 04/01/13  1624 04/01/13  1624   HEMANT 9.0 9.2 9.2   < >  --    < >  --    < > 9.5   VITDT  --   --   --   --  34  --  43  --  34    < > = values in this interval not displayed.     ESR/CRP  Recent Labs   Lab Test 01/30/21  1226 12/16/20  1118 09/10/20  1509   SED 15 11 11   CRP <2.9 <2.9 <2.9     Lipid Panel  Recent Labs   Lab Test 03/19/21  0928 06/25/20  1027 03/14/19  1006 06/29/15  1405 06/29/15  1405 05/22/14  0848 03/24/14  0936   CHOL 142 122 152   < > 219* 169 195   TRIG 105 148 126   < > 372* 379* 301*   HDL 69 59 51   < > 33* 33* 39*   LDL 52 33 76   < > 112 60 95   VLDL  --   --   --   --  74* 76* 60*   CHOLHDLRATIO  --   --   --   --  6.6* 5.1* 5.0   NHDL 73 63 101   < >  --   --   --     < > = values in this interval not displayed.     Hepatitis B  Recent Labs   Lab Test 03/19/21  0928 03/26/20  1039 10/07/19  0940 10/05/16  0954 10/05/16  0954 04/23/13  0812 04/23/13  0812 01/30/13  0906   AUSAB  --   --   --   --  0.45  --   --   --    HBCAB  --   --   --   --   --   --  Positive*  --    HEPBANG  --   --   --   --  Reactive*  --  " Positive* Positive*   HBQLOG Not Calculated <1.3 Not Calculated   < > 5.1*   < >  --   --     < > = values in this interval not displayed.     Hepatitis C  Recent Labs   Lab Test 04/07/16  1538 04/23/13  0812   HCVAB Nonreactive   Assay performance characteristics have not been established for newborns,   infants, and children   Negative     Lyme ab screening  Recent Labs   Lab Test 07/18/17  1330   LYMEGM 0.19     Tuberculosis Screening  Recent Labs   Lab Test 04/07/16  1538   TBRSLT Positive   The magnitude of the measured IFN-gamma level cannot be correlated to stage or   degree of infection, level of immune responsiveness, or likelihood for   progression to active disease.  *   TBAGN >10.00  This is a qualitative test.  The TB antigen IU/mL value is required for   documentation on certain government reporting forms but this value should not   be used to monitor disease progression or response to therapy.   Diagnosing or excluding tuberculosis disease, and assessing the probability of   LTBI, require a combination of epidemiological, historical, medical and   diagnostic findings that should be taken into account when interpreting   QuantiFERON TB results.       HIV Screening  Recent Labs   Lab Test 11/04/15  1547   HIAGAB Nonreactive   HIV-1 p24 Ag & HIV-1/HIV-2 Ab Not Detected         \"HAND BILATERAL THREE OR MORE VIEWS 11/4/2015 4:55 PM   HISTORY: Pain in unspecified joint.  COMPARISON: None.  IMPRESSION  IMPRESSION: Normal.  DANIS ANGLIN MD\"    \"FOOT BILATERAL THREE OR MORE VIEWS 11/4/2015 4:55 PM   HISTORY: Pain in unspecified joint.  COMPARISON: 8/21/2012.  IMPRESSION  IMPRESSION: Small traction spurs are seen off the Achilles tendon and  plantar fascial attachment sites bilaterally. Joint spaces are  preserved. Osseous structures are intact. Incidental note is made of  some surgical staples in the soft tissues of the medial distal right  lower extremity.  DANIS ANGLIN MD\"      Immunization History "     Immunization History   Administered Date(s) Administered     COVID-19,PF,Pfizer 03/16/2021     Influenza (IIV3) PF 10/11/2011, 09/20/2017, 09/01/2018     Influenza Vaccine IM > 6 months Valent IIV4 09/27/2015, 09/08/2016, 09/30/2019, 09/14/2020     Influenza Vaccine, 6+MO IM (QUADRIVALENT W/PRESERVATIVES) 11/17/2014     Pneumo Conj 13-V (2010&after) 04/07/2016     Pneumococcal 23 valent 12/12/2014     TDAP Vaccine (Adacel) 08/30/2011     Zoster vaccine recombinant adjuvanted (SHINGRIX) 07/27/2019          Chart documentation done in part with Dragon Voice recognition Software. Although reviewed after completion, some word and grammatical error may remain.    Phone call duration with patient (in minutes): 8    Location of patient: [place], [place]   Location of provider: [place]    Sebastián Jenkins MD

## 2021-03-31 DIAGNOSIS — M10.9 GOUT WITHOUT TOPHUS: ICD-10-CM

## 2021-04-01 RX ORDER — COLCHICINE 0.6 MG/1
TABLET, FILM COATED ORAL
Qty: 30 TABLET | Refills: 0 | Status: SHIPPED | OUTPATIENT
Start: 2021-04-01 | End: 2021-04-23

## 2021-04-01 NOTE — TELEPHONE ENCOUNTER
"Routing refill request to provider for review/approval because:  Failed protocol.  No future appointment scheduled. Please advise. Thank you. Willow Langford R.N.  This refill/encounter is being handled by a team outside your facility.  If action needs to be taken, please route the encounter back to your team that would normally handle it. Thank you. Willow Langford R.N.  Requested Prescriptions   Pending Prescriptions Disp Refills    COLCRYS 0.6 MG tablet [Pharmacy Med Name: COLCRYS 0.6 MG TABLET] 30 tablet 0     Sig: TAKE 2 TABLETS BY MOUTH AT THE FIRST SIGN OF FLARE, TAKE 1 ADDITIONAL TABLET ONE HOUR LATER.       Gout Agents Protocol Failed - 3/31/2021 12:32 PM        Failed - Has Uric Acid on file in past 12 months and value is less than 6     Recent Labs   Lab Test 11/04/15  1547   URIC 6.1     If level is 6mg/dL or greater, ok to refill one time and refer to provider.           Passed - CBC on file in past 12 months     Recent Labs   Lab Test 03/29/21  1318   WBC 5.2   RBC 4.72   HGB 13.6   HCT 42.3                    Passed - ALT on file in past 12 months     Recent Labs   Lab Test 03/29/21  1318   ALT 31             Passed - Recent (12 mo) or future (30 days) visit within the authorizing provider's specialty     Patient has had an office visit with the authorizing provider or a provider within the authorizing providers department within the previous 12 mos or has a future within next 30 days. See \"Patient Info\" tab in inbasket, or \"Choose Columns\" in Meds & Orders section of the refill encounter.              Passed - Medication is active on med list        Passed - Patient is age 18 or older        Passed - Normal serum creatinine on file in the past 12 months     Recent Labs   Lab Test 03/29/21  1318 06/24/13  1322 06/24/13  1322   CR 1.01   < >  --    CRPOC  --   --  1.1    < > = values in this interval not displayed.       Ok to refill medication if creatinine is low                     "

## 2021-04-05 ENCOUNTER — TELEPHONE (OUTPATIENT)
Dept: FAMILY MEDICINE | Facility: CLINIC | Age: 56
End: 2021-04-05

## 2021-04-05 DIAGNOSIS — M06.09 RHEUMATOID ARTHRITIS OF MULTIPLE SITES WITH NEGATIVE RHEUMATOID FACTOR (H): ICD-10-CM

## 2021-04-05 RX ORDER — SULFASALAZINE 500 MG/1
1500 TABLET ORAL 2 TIMES DAILY
Qty: 540 TABLET | Refills: 2 | Status: SHIPPED | OUTPATIENT
Start: 2021-04-05 | End: 2021-10-20

## 2021-04-05 NOTE — TELEPHONE ENCOUNTER
Sent in non enteric coated formulation of sulfasalazine per Dr. Jenkins.    Jf CHANDLER RN....4/5/2021 2:35 PM

## 2021-04-05 NOTE — TELEPHONE ENCOUNTER
Reason for Call:  Other medication    Detailed comments: patient is calling stated that pharmacy can not get the sulfasalazine ER in generic. Pharmacy wondering if pcp would send in a script for name brand which they have in stock.   Thank you      Phone Number Patient can be reached at: Home number on file [phone] (home)    Best Time: any    Can we leave a detailed message on this number? YES    Call taken on 4/5/2021 at 12:42 PM by Jesisca Hobbs

## 2021-04-06 ENCOUNTER — IMMUNIZATION (OUTPATIENT)
Dept: NURSING | Facility: CLINIC | Age: 56
End: 2021-04-06
Attending: INTERNAL MEDICINE
Payer: COMMERCIAL

## 2021-04-06 PROCEDURE — 0002A PR COVID VAC PFIZER DIL RECON 30 MCG/0.3 ML IM: CPT

## 2021-04-06 PROCEDURE — 91300 PR COVID VAC PFIZER DIL RECON 30 MCG/0.3 ML IM: CPT

## 2021-04-12 ENCOUNTER — TELEPHONE (OUTPATIENT)
Dept: DERMATOLOGY | Facility: CLINIC | Age: 56
End: 2021-04-12

## 2021-04-12 DIAGNOSIS — E78.5 HYPERLIPIDEMIA LDL GOAL <70: ICD-10-CM

## 2021-04-12 NOTE — TELEPHONE ENCOUNTER
Central Prior Authorization Team   [phone]    PA Initiation    Medication: Hydrocortisone 0.2% cream  Insurance Company: [employer] - Phone [phone] Fax [phone]  Pharmacy Filling the Rx: [id number] - [place], [place] - [address] [place] [address]  Filling Pharmacy Phone: [phone]  Filling Pharmacy Fax: [phone]  Start Date: 4/12/2021    Initiate PA by phone at [phone]. Case # [id number].     [facility]  ID # [id number]

## 2021-04-12 NOTE — TELEPHONE ENCOUNTER
Prior Authorization Approval    Authorization Effective Date: 3/13/2021  Authorization Expiration Date: 4/12/2022  Medication: Hydrocortisone 0.2% cream-[place] APPROVED   Approved Dose/Quantity:  Reference #:     Insurance Company: [employer] - Phone [phone] Fax [phone]  Expected CoPay:       CoPay Card Available:      Foundation Assistance Needed:    Which Pharmacy is filling the prescription (Not needed for infusion/clinic administered): [facility] [id number] [place] [address]  Pharmacy Notified: Yes- **Instructed pharmacy to notify patient when script is ready to /ship.**  Patient Notified: Yes

## 2021-04-13 RX ORDER — GEMFIBROZIL 600 MG/1
600 TABLET, FILM COATED ORAL 2 TIMES DAILY
Qty: 180 TABLET | Refills: 0 | Status: SHIPPED | OUTPATIENT
Start: 2021-04-13 | End: 2021-07-05

## 2021-04-23 DIAGNOSIS — R21 RASH: ICD-10-CM

## 2021-04-23 DIAGNOSIS — L40.9 PSORIASIS: ICD-10-CM

## 2021-04-23 DIAGNOSIS — F41.9 ANXIETY HYPERVENTILATION: ICD-10-CM

## 2021-04-23 DIAGNOSIS — F45.8 ANXIETY HYPERVENTILATION: ICD-10-CM

## 2021-04-23 DIAGNOSIS — M10.9 GOUT WITHOUT TOPHUS: ICD-10-CM

## 2021-04-23 RX ORDER — CLONAZEPAM 1 MG/1
TABLET ORAL
Qty: 90 TABLET | Refills: 0 | Status: SHIPPED | OUTPATIENT
Start: 2021-04-23 | End: 2021-06-30

## 2021-04-23 RX ORDER — COLCHICINE 0.6 MG/1
TABLET, FILM COATED ORAL
Qty: 30 TABLET | Refills: 0 | Status: SHIPPED | OUTPATIENT
Start: 2021-04-23 | End: 2021-05-18

## 2021-04-23 RX ORDER — TRIAMCINOLONE ACETONIDE 1 MG/G
OINTMENT TOPICAL
Qty: 80 G | Refills: 2 | Status: SHIPPED | OUTPATIENT
Start: 2021-04-23 | End: 2021-06-30

## 2021-04-23 NOTE — TELEPHONE ENCOUNTER
"Requested Prescriptions   Pending Prescriptions Disp Refills     COLCRYS 0.6 MG tablet [Pharmacy Med Name: COLCRYS 0.6 MG TABLET] 30 tablet 0     Sig: TAKE 2 TABLETS BY MOUTH AT THE FIRST SIGN OF FLARE, TAKE 1 ADDITIONAL TABLET ONE HOUR LATER.       Gout Agents Protocol Failed - 4/23/2021 12:32 PM        Failed - Has Uric Acid on file in past 12 months and value is less than 6     Recent Labs   Lab Test 11/04/15  1547   URIC 6.1     If level is 6mg/dL or greater, ok to refill one time and refer to provider.           Passed - CBC on file in past 12 months     Recent Labs   Lab Test 03/29/21  1318   WBC 5.2   RBC 4.72   HGB 13.6   HCT 42.3                    Passed - ALT on file in past 12 months     Recent Labs   Lab Test 03/29/21  1318   ALT 31             Passed - Recent (12 mo) or future (30 days) visit within the authorizing provider's specialty     Patient has had an office visit with the authorizing provider or a provider within the authorizing providers department within the previous 12 mos or has a future within next 30 days. See \"Patient Info\" tab in inbasket, or \"Choose Columns\" in Meds & Orders section of the refill encounter.              Passed - Medication is active on med list        Passed - Patient is age 18 or older        Passed - Normal serum creatinine on file in the past 12 months     Recent Labs   Lab Test 03/29/21  1318 06/24/13  1322 06/24/13  1322   CR 1.01   < >  --    CRPOC  --   --  1.1    < > = values in this interval not displayed.       Ok to refill medication if creatinine is low               "

## 2021-04-23 NOTE — TELEPHONE ENCOUNTER
Routing refill request to provider for review/approval because:  Drug not on the [facility] refill protocol  Dashawn Burgess BSN, RN

## 2021-04-26 RX ORDER — PIMECROLIMUS 10 MG/G
CREAM TOPICAL
Qty: 60 G | Refills: 0 | Status: SHIPPED | OUTPATIENT
Start: 2021-04-26 | End: 2021-07-14

## 2021-04-27 ENCOUNTER — VIRTUAL VISIT (OUTPATIENT)
Dept: GASTROENTEROLOGY | Facility: CLINIC | Age: 56
End: 2021-04-27
Attending: INTERNAL MEDICINE
Payer: COMMERCIAL

## 2021-04-27 DIAGNOSIS — Z11.59 ENCOUNTER FOR SCREENING FOR OTHER VIRAL DISEASES: ICD-10-CM

## 2021-04-27 DIAGNOSIS — K75.81 NONALCOHOLIC STEATOHEPATITIS (NASH): Primary | ICD-10-CM

## 2021-04-27 DIAGNOSIS — B18.1 CHRONIC VIRAL HEPATITIS B WITHOUT DELTA AGENT AND WITHOUT COMA (H): ICD-10-CM

## 2021-04-27 PROCEDURE — 99442 PR PHYSICIAN TELEPHONE EVALUATION 11-20 MIN: CPT | Mod: 95 | Performed by: INTERNAL MEDICINE

## 2021-04-27 RX ORDER — TENOFOVIR DISOPROXIL FUMARATE 300 MG/1
300 TABLET, FILM COATED ORAL DAILY
Qty: 90 TABLET | Refills: 3 | Status: SHIPPED | OUTPATIENT
Start: 2021-04-27 | End: 2022-04-07

## 2021-04-27 NOTE — PROGRESS NOTES
SERVICES [phone], OPTION 3, THEN OPTION 3 FOR HMONG.  NO ANSWER, MSRAO LEFT.    Lamont is a 55 year old who is being evaluated via a billable telephone visit.      What phone number would you like to be contacted at?  SERVICES [phone], OPTION 3, THEN OPTION 3 FOR HMONG.    How would you like to obtain your AVS? Mail a copy  Phone call duration: 14 minutes    Lamont Daniels is a 55 year old male who is being evaluated via a billable telephone visit.      Due to language barrier, an  was present during the history-taking and subsequent discussion (and for part of the physical exam) with this patient.    Additional provider notes:   The patient is a 55 year old male with past medical history of hypertension, dyslipidemia, coronary artery disease, rheumatoid arthritis, obesity with resultant nonalcoholic steatohepatitis and chronic hepatitis B       Since last seen he has not had significant changes in his overall health status.  Reports that he has lost 10 lbs over the past year with dietary changes and drinking tea.  Has not had significant medication changes.    He continues to take tenofovir daily, and labs from 3/2021 demonstrated undetectable viral load and normal liver tests     Denies issues with confusion, impaired concentration, diarrhea, fluid retention     A review was made of the PMHx, Surgical Hx, Social Hx, Medications, Family Hx and the electronic medical record was updated appropriately     A comprehensive review of systems was completed but answered in the negative unless specifically commented upon on the HPI    Last abdominal imaging performed October 7, 2019 was an abdominal ultrasound which did not demonstrate any overt focal masses or lesions    Assessment/Plan:  Chronic hepatitis B:  -EBV antigen negative, E antibody positive chronic hepatitis B on tenofovir therapy  -Viral level is undetectable in labs from 3/2021  -We will continue to follow with hepatitis B  DNA levels in 6 months  -He is to continue taking the tenofovir daily    Fatty liver disease:  -Discussed the need to work on his risk factors like hypercholesterolemia, obesity, dyslipidemia in order to minimize the progression and presence of his fatty liver disease and possible ALDANA  - Continue to exercise and make lifestyle modifications to promote increased exercise tolerance    Screening for hepatocellular carcinoma  -Last imaging in October 2019 was negative for any hepatic masses  -We will repeat an abdominal ultrasound now and again in 6 months      Thomas M. Leventhal, M.D.   of Medicine  Advanced & Transplant Hepatology  [facility]

## 2021-04-27 NOTE — LETTER
4/27/2021         RE: Lamont Daniels  [address]  [facility] [address]        Dear Colleague,    Thank you for referring your patient, Lamont Daniels, to the [facility] [place]. Please see a copy of my visit note below.     SERVICES [phone], OPTION 3, THEN OPTION 3 FOR HMONG.  NO ANSWER, MSG LEFT.    Lamont is a 55 year old who is being evaluated via a billable telephone visit.      What phone number would you like to be contacted at?  SERVICES [phone], OPTION 3, THEN OPTION 3 FOR HMONG.    How would you like to obtain your AVS? Mail a copy  Phone call duration: 14 minutes    Lamont Daniels is a 55 year old male who is being evaluated via a billable telephone visit.      Due to language barrier, an  was present during the history-taking and subsequent discussion (and for part of the physical exam) with this patient.    Additional provider notes:   The patient is a 55 year old male with past medical history of hypertension, dyslipidemia, coronary artery disease, rheumatoid arthritis, obesity with resultant nonalcoholic steatohepatitis and chronic hepatitis B       Since last seen he has not had significant changes in his overall health status.  Reports that he has lost 10 lbs over the past year with dietary changes and drinking tea.  Has not had significant medication changes.    He continues to take tenofovir daily, and labs from 3/2021 demonstrated undetectable viral load and normal liver tests     Denies issues with confusion, impaired concentration, diarrhea, fluid retention     A review was made of the PMHx, Surgical Hx, Social Hx, Medications, Family Hx and the electronic medical record was updated appropriately     A comprehensive review of systems was completed but answered in the negative unless specifically commented upon on the HPI    Last abdominal imaging performed October 7, 2019 was an abdominal ultrasound which did not demonstrate any  overt focal masses or lesions    Assessment/Plan:  Chronic hepatitis B:  -EBV antigen negative, E antibody positive chronic hepatitis B on tenofovir therapy  -Viral level is undetectable in labs from 3/2021  -We will continue to follow with hepatitis B DNA levels in 6 months  -He is to continue taking the tenofovir daily    Fatty liver disease:  -Discussed the need to work on his risk factors like hypercholesterolemia, obesity, dyslipidemia in order to minimize the progression and presence of his fatty liver disease and possible ALDANA  - Continue to exercise and make lifestyle modifications to promote increased exercise tolerance    Screening for hepatocellular carcinoma  -Last imaging in October 2019 was negative for any hepatic masses  -We will repeat an abdominal ultrasound now and again in 6 months      Thomas M. Leventhal, M.D.   of Medicine  Advanced & Transplant Hepatology  [facility]

## 2021-04-27 NOTE — PATIENT INSTRUCTIONS
- Ultrasound now at [place]    - Labs and another ultrasound in 6 months    - Follow up in clinic with labs and ultrasound in 1 year

## 2021-04-29 ENCOUNTER — MYC MEDICAL ADVICE (OUTPATIENT)
Dept: OTOLARYNGOLOGY | Facility: CLINIC | Age: 56
End: 2021-04-29

## 2021-04-29 DIAGNOSIS — M06.09 RHEUMATOID ARTHRITIS OF MULTIPLE SITES WITH NEGATIVE RHEUMATOID FACTOR (H): ICD-10-CM

## 2021-04-29 RX ORDER — PREDNISONE 5 MG/1
TABLET ORAL
Qty: 9 TABLET | Refills: 1 | Status: CANCELLED | OUTPATIENT
Start: 2021-04-29

## 2021-04-29 NOTE — TELEPHONE ENCOUNTER
Sent [employer] message to pt to see if he is having a flare currently    Joanna PEARSON RN Specialty Triage 4/29/2021 1:44 PM

## 2021-04-30 ENCOUNTER — VIRTUAL VISIT (OUTPATIENT)
Dept: PALLIATIVE MEDICINE | Facility: CLINIC | Age: 56
End: 2021-04-30
Payer: COMMERCIAL

## 2021-04-30 ENCOUNTER — OFFICE VISIT (OUTPATIENT)
Dept: DERMATOLOGY | Facility: CLINIC | Age: 56
End: 2021-04-30
Payer: COMMERCIAL

## 2021-04-30 DIAGNOSIS — G47.33 OSA (OBSTRUCTIVE SLEEP APNEA): ICD-10-CM

## 2021-04-30 DIAGNOSIS — R21 RASH: Primary | ICD-10-CM

## 2021-04-30 DIAGNOSIS — G89.4 CHRONIC PAIN SYNDROME: ICD-10-CM

## 2021-04-30 DIAGNOSIS — M06.9 RHEUMATOID ARTHRITIS INVOLVING MULTIPLE SITES, UNSPECIFIED WHETHER RHEUMATOID FACTOR PRESENT (H): Primary | ICD-10-CM

## 2021-04-30 PROCEDURE — 99213 OFFICE O/P EST LOW 20 MIN: CPT | Performed by: DERMATOLOGY

## 2021-04-30 PROCEDURE — 99213 OFFICE O/P EST LOW 20 MIN: CPT | Mod: 95 | Performed by: PSYCHIATRY & NEUROLOGY

## 2021-04-30 RX ORDER — BUPRENORPHINE 20 UG/H
1 PATCH TRANSDERMAL
Qty: 4 PATCH | Refills: 1 | Status: SHIPPED | OUTPATIENT
Start: 2021-04-30 | End: 2021-07-07

## 2021-04-30 ASSESSMENT — PAIN SCALES - GENERAL: PAINLEVEL: SEVERE PAIN (6)

## 2021-04-30 NOTE — PROGRESS NOTES
Lamont is a 55 year old who is being evaluated via a billable video visit.      How would you like to obtain your AVS? MyChart  If the video visit is dropped, the invitation should be resent by: Text to cell phone: [phone]  Will anyone else be joining your video visit? Day Chawla MA        Video Start Time: 1259  Video-Visit Details    Type of service:  Video Visit    Video End Time:1309    Originating Location (pt. Location): Home    Distant Location (provider location):  [facility] [employer]     Platform used for Video Visit: [employer]                              [facility]    Date of visit: 4/30/21 (addended 9/3/21)    Chief complaint:   Chief Complaint   Patient presents with     Pain     Video visit due to COVID-19        Interval history:  Lamont Daniels was last seen by me on 1/28/21 (addended 9/3/21)    Recommendations/plan at the last visit included: (addended 9/3/21- wasn't changed to 4/30/21 notes)      Since his last visit, Lamont Daniels reports:    -no significant changes in his pain- ups and down.  -got COVID vaccines done   Using CPAP faithfully- hasn't had recent followup with sleep  -wondering about CBD oil    Pain scores:  Severe Pain (6)     Current pain treatment  butrans 20mcg/hr  Acetaminophen 500 mg PRN (not taking daily)  Gabapentin 600mg 3 times daily  Baclofen 10mg BID   Diclofenac gel PRN (not taking daily and uses at joints)      Ambien 5 mg - reports taking most nights  Clonazepam 1mg once a day     Previous medication treatments included:  Codeine, oxycodone, hydrocodone- given for other issues- helpful  Cymbalta 60mg daily- given for mental health- was given by Dr. Acosta- not been higher  Baclofen 10mg at bedtime- not helpful, no side effects  Robaxin 500 mg 1 tablet, 1-3 times a day depending on the day  Ibuprofen 800 mg- using 3-4 times per week, helpful but started meloxicam  Meloxicam  Oxycodone 5 mg : takes 6/day    Other treatments have  included:  Lamont Daniels has not been seen at a pain clinic in the past.    PT: has done for various reasons, most recently the low back.  Acupuncture: as done a couple of needles with adjustment  TENs Unit: no  Injections: no    Side Effects: none    Medications:  Current Outpatient Medications   Medication Sig Dispense Refill     Abatacept (ORENCIA) 125 MG/ML SOAJ auto-injector Inject 1 mL (125 mg) Subcutaneous every 7 days 12 mL 0     Acetaminophen (TYLENOL PO)        allopurinol (ZYLOPRIM) 300 MG tablet TAKE 1 TABLET BY MOUTH EVERY DAY 90 tablet 3     aspirin EC 81 MG EC tablet Take 1 tablet (81 mg) by mouth daily (*) 30 tablet 1     atorvastatin (LIPITOR) 80 MG tablet TAKE 1 TABLET BY MOUTH 1 TIME DAILY 90 tablet 3     bacitracin 500 UNIT/GM external ointment Apply topically 3 times daily 30 g 3     baclofen (LIORESAL) 10 MG tablet TAKE 1 TABLET BY MOUTH 2 TIMES DAILY AS NEEDED FOR MUSCLE SPASM 180 tablet 2     buprenorphine (BUTRANS) 20 MCG/HR WK patch Place 1 patch onto the skin every 7 days . Name brand only.  Fill 03/15/21 and start 03/17/21 4 patch 1     busPIRone HCl (BUSPAR) 30 MG tablet TAKE 1 TABLET (30 MG) BY MOUTH 2 TIMES DAILY 180 tablet 3     Cholecalciferol (VITAMIN D) 2000 UNITS tablet TAKE ONE TABLET BY MOUTH ONCE DAILY 100 tablet 1     clobetasol (TEMOVATE) 0.05 % external cream Apply twice daily for 2 weeks, weekends only for 2 weeks, repeat cycle as needed for hands, not face or genitals 60 g 11     clobetasol (TEMOVATE) 0.05 % external solution Apply twice daily to scalp for up to 2 weeks for flares. 60 mL 0     clonazePAM (KLONOPIN) 1 MG tablet TAKE 0.5-1 TABLETS BY MOUTH 2 TIMES DAILY AS NEEDED FOR ANXIETY 90 tablet 0     clopidogrel (PLAVIX) 75 MG tablet Take 1 tablet (75 mg) by mouth daily 90 tablet 3     COLCRYS 0.6 MG tablet TAKE 2 TABLETS BY MOUTH AT THE FIRST SIGN OF FLARE, TAKE 1 ADDITIONAL TABLET ONE HOUR LATER. 30 tablet 0     desvenlafaxine (PRISTIQ) 50 MG 24 hr tablet TAKE 1 TABLET  BY MOUTH EVERY DAY 90 tablet 3     diclofenac (VOLTAREN) 1 % topical gel Apply 4 grams to bilateral knees, up to four times daily as needed using enclosed dosing card.  Max of 32g/day 300 g 11     evolocumab (REPATHA) 140 MG/ML prefilled autoinjector Inject 1 mL (140 mg) Subcutaneous every 14 days 2 mL 11     ezetimibe (ZETIA) 10 MG tablet Take 1 tablet (10 mg) by mouth daily 90 tablet 3     gabapentin (NEURONTIN) 300 MG capsule Take 2 capsules (600 mg) by mouth 3 times daily . 3 month supply 540 capsule 6     gemfibrozil (LOPID) 600 MG tablet Take 1 tablet (600 mg) by mouth 2 times daily 180 tablet 0     hydrocortisone (WESTCORT) 0.2 % external cream FOR GENITALS, APPLY TWICE DAILY FOR UP TO 2 WEEKS, TAKE 2 WEEKS OFF THEN REPEAT 60 g 4     hydrocortisone 2.5 % cream FOR FACE, APPLY TWICE DAILY FOR UP TO 2 WEEK FOR FLARES ON THE FACE, TAKE 2 WEEKS OFF 28.35 g 3     hydroxychloroquine (PLAQUENIL) 200 MG tablet Take 1 tablet (200 mg) by mouth daily 90 tablet 2     Incontinence Supply Disposable (DEPEND UNDERWEAR LARGE) MISC Use twice daily. 60 each 11     leflunomide (ARAVA) 20 MG tablet Take 1 tablet (20 mg) by mouth daily 30 tablet 3     meclizine (ANTIVERT) 25 MG tablet TAKE 1 TABLET BY MOUTH EVERY 6 HOURS AS NEEDED FOR DIZZINESS 30 tablet 5     melatonin 3 MG tablet Take 1 tablet (3 mg) by mouth nightly as needed for sleep       naloxone (NARCAN) nasal spray Spray 1 spray (4 mg) into one nostril alternating nostrils as needed for opioid reversal every 2-3 minutes until assistance arrives 0.2 mL 0     nitroGLYcerin (NITROSTAT) 0.4 MG sublingual tablet For chest pain place 1 tablet under the tongue every 5 minutes for 3 doses. If symptoms persist 5 minutes after 1st dose call 911. 25 tablet 1     order for DME Equipment being ordered: chair lift 1 each 0     order for DME Equipment being ordered: single end cane 1 Units 0     ORDER FOR DME 1.  CPAP pressure 11 cm/H20 with heated humidity.   2.  Provide mask to fit  and CPAP supplies.   3.  Length of need lifetime.   4.  If needed please provide a chin strap            pantoprazole (PROTONIX) 40 MG EC tablet TAKE 1 TABLET BY MOUTH EVERY DAY 90 tablet 1     pimecrolimus (ELIDEL) 1 % external cream APPLY TWICE DAILY FOR FACE AND GENITALS 60 g 0     predniSONE (DELTASONE) 5 MG tablet For rheumatoid arthritis flare only: prednisone 10mg daily x2days, then 5mg daily x5days, then stop. 9 tablet 1     sildenafil (REVATIO) 20 MG tablet TAKE 2-5 TABLETS BY MOUTH 30 MINUTES PRIOR TO INTERCOURSE 50 tablet 11     sulfaSALAzine (AZULFIDINE) 500 MG tablet Take 3 tablets (1,500 mg) by mouth 2 times daily 540 tablet 2     sulfaSALAzine ER (AZULFIDINE EN) 500 MG EC tablet Take 3 tablets (1,500 mg) by mouth 2 times daily 540 tablet 2     tamsulosin (FLOMAX) 0.4 MG capsule Take 2 capsules (0.8 mg) by mouth daily 180 capsule 3     tenofovir (VIREAD) 300 MG tablet Take 1 tablet (300 mg) by mouth daily 90 tablet 3     triamcinolone (KENALOG) 0.1 % external ointment APPLY TOPICALLY TO AFFECTED AREA(S) 3 TIMES DAILY 80 g 2     triamcinolone (KENALOG) 0.1 % external ointment Apply to trunk and extremities twice daily for up to 2 weeks for flares 80 g 1     zolpidem (AMBIEN) 5 MG tablet Take 1 tablet (5 mg) by mouth nightly as needed for sleep 30 tablet 5       Medical History: any changes in medical history since they were last seen? None        THE 4 A's OF OPIOID MAINTENANCE ANALGESIA    Analgesia: good.  He had better with oxycodone    Activity: better with med, still limited    Adverse effects: none    Adherence to Rx protocol: good    [place] Board of Pharmacy Data Base Reviewed: 4/30/2021  YES; no concerns   Last UDS and opioid agreement  10/27/20    Assessment:   1. Chronic low back pain, with strong facet and myofascial features  2. Rheumatoid arthritis  3. Peripheral neuropathy  4. Depression, anxiety  5. Hep B - on meds  6. Gout  7. JEROD, currently treated with CPAP, central apnea  ok    Plan:   1. Physical Therapy:  Completed pool and land therapy in past. Continues to decline.  Discussed inactivity and need for light activity  2. Clinical Health Psychologist to address issues of relaxation, behavioral change, coping style, and other factors important to improvement: pt not interested in pursuing  3. Diagnostic Studies:none  4. Medication Management:  No changes. We are aware of use of both benzo and butrans patch- have not been able to further wean. Have monitored with sleep medicine, and his JEROD significant improved changing to butrans. Patient aware of risk. Will schedule follow up with sleep.  Discussed CBD oil, he will look into options  5. Further procedures recommended: he is not interested in procedures.  6. Recommendations to PCP: none  7. Follow up: 3 months, in person    20 minutes spent on the date of encounter doing chart review, history, and exam documentation and further activities as noted above.     Lian Loo MD  [facility]

## 2021-04-30 NOTE — PATIENT INSTRUCTIONS
Use pimecrolimus (ELIDEL) 1 % external cream when you take breaks from the clobetasol. This has been sent to your pharmacy.       [facility] D[facility]atology Visit    Thank you for allowing us to participate in your care. Your findings, instructions and follow-up plan are as follows:         When should I call my doctor?    If you are worsening or not improving, please, contact us or seek urgent care as noted below.     Who should I call with questions (adults)?    [facility] (adult and pediatric): [phone]     [facility] (adult): [phone]    For urgent needs outside of business hours call the [facility] at [phone] and ask for the dermatology resident on call    If this is a medical emergency and you are unable to reach an ER, Call [phone]      Who should I call with questions (pediatric)?  [facility]- Pediatric Dermatology  Dr. Cristine Cardenas, Dr. Rodolfo Carolina, Dr. Latesha Ding, Kathryn Benite[place]hedgard, [place]  Dr. Perla Cueva, Dr. Eugenie Farmer & Dr. Wiley Maradiaga  Non Urgent  Nurse Triage Line; [phone]- Theodora and Susan MICHELLE Care Coordinators   [place] (/Complex ) [phone]    If you need a prescription refill, please contact your pharmacy. Refills are approved or denied by our Physicians during normal business hours, Monday through Fridays  Per office policy, refills will not be granted if you have not been seen within the past year (or sooner depending on your child's condition)    Scheduling Information:  Pediatric Appointment Scheduling and Call Center [phone]  Radiology Scheduling- [phone]  Sedation Unit Scheduling- [phone]  [place] Scheduling- General [phone]; Pediatric Dermatology [phone]  Main  Services: [phone]  B[place]ivian: [phone]  [place] Islander: [phone]  Hmong/French/Australian: [phone]  Preadmission Nursing  Department Fax Number: [phone] (Fax all pre-operative paperwork to this number)    For urgent matters arising during evenings, weekends, or holidays that cannot wait for normal business hours please call [phone] and ask for the Dermatology Resident On-Call to be paged.

## 2021-04-30 NOTE — PROGRESS NOTES
"[facility] Dermatology Note  Encounter Date: Apr 30, 2021  Office Visit     Dermatology Problem List:  1. Skin eruption biopsied, c/w psoriasis   -occipital scalp, s/p biopsy 5/31/19  -clobetasol for scalp and hands  -for face and genitals elidel  -has RA, follows with rheum  2. Seborrheic dermatitis   -improves with triamcinolone and ketoconazole  3. RA on plaquenil, orencia and sulfasalazine with rheum    ____________________________________________    Assessment & Plan:    # Skin eruption consistent with plaque psoriasis (biopsy on scalp, see problem list)- nearly clear on clobetasol on head and Westcort ointments, possible hypopigmentation of face from steroid is resolved    -for safety kept in chart \"Consider Otezla, MTX (may also improve RA but has hep B and fatty liver) or acitretin (however has h/o chronic hepatitis B/fatty liver), Cosentyx (need to get clearance from pharmacy and GI) and we are in a covid pandemic\"  - Continue clobetasol pulsed for the scalp as prescrbed for up to 2 weeks. If this returns   - Continue clobetasol twice daily for 2 weeks, then 3 times weekly for 2 weeks, repeat cycle as needed. Side effects of steroid overuse including atrophy were discussed.  For hands  - Phototherapy as next step if needed in future  - Hold Westcort ointment  - Start Elidel cream twice daily for the face and scrotum     Procedures Performed:   None.    Follow-up: 1 year(s) in-person, or earlier for new or changing lesions, refuses additional skin exam    Staff and Scribe:     Scribe Disclosure:   I, Jayy Bacon/Genevieve Ambrose, am serving as a scribe to document services personally performed by this physician, Dr. Alyssa Roche, based on data collection and the provider's statements to me.     Provider Disclosure:   The documentation recorded by the scribe accurately reflects the services I personally performed and the decisions made by me.    Alyssa Roche MD  Assistant " Professor  Department of Dermatology  [facility]: Phone: [phone], Fax:[phone]  [facility]: Phone: [phone], Fax: [phone]      ____________________________________________    CC: Psoriasis (much improved/ only flares are of hands otherwise stable)    HPI:  Mr. Lamont Daniels is a(n) 55 year old male who presents today as a return patient for psoriasis.    Last seen on 12/17/20 for a skin eruption, consistent with plaque psoriasis. At that time, he was continued on clobetasol on the scalp x 2 weeks, started on clobetasol BID x 2 weeks, then three times per week x 2 weeks. Patient also started on Elidel cream BID to the face and scrotum. Patient instructed to hold Westcort ointment.    Today, patient notes much improvement since last visit. Notes there are only flares on the hands. Patient notes using clobetasol and hydrocortisone PRN. Denies using Elidel.    Patient is otherwise feeling well, without additional skin concerns.    Labs Reviewed:  N/A    Physical Exam:  Vitals: There were no vitals taken for this visit.  SKIN: Sun-exposed skin, which includes the head/face, neck, both arms, digits, and/or nails was examined.   - Elbows are clear  - Mild scaling and xerosis on right colin hand, and 2nd and 3rd right digits.  -no hypopigmentation on face  - No other lesions of concern on areas examined.     Medications:  Current Outpatient Medications   Medication     Abatacept (ORENCIA) 125 MG/ML SOAJ auto-injector     Acetaminophen (TYLENOL PO)     allopurinol (ZYLOPRIM) 300 MG tablet     aspirin EC 81 MG EC tablet     atorvastatin (LIPITOR) 80 MG tablet     bacitracin 500 UNIT/GM external ointment     baclofen (LIORESAL) 10 MG tablet     buprenorphine (BUTRANS) 20 MCG/HR WK patch     busPIRone HCl (BUSPAR) 30 MG tablet     Cholecalciferol (VITAMIN D) 2000 UNITS tablet     clobetasol (TEMOVATE) 0.05  % external cream     clobetasol (TEMOVATE) 0.05 % external solution     clonazePAM (KLONOPIN) 1 MG tablet     clopidogrel (PLAVIX) 75 MG tablet     COLCRYS 0.6 MG tablet     desvenlafaxine (PRISTIQ) 50 MG 24 hr tablet     diclofenac (VOLTAREN) 1 % topical gel     evolocumab (REPATHA) 140 MG/ML prefilled autoinjector     ezetimibe (ZETIA) 10 MG tablet     gabapentin (NEURONTIN) 300 MG capsule     gemfibrozil (LOPID) 600 MG tablet     hydrocortisone (WESTCORT) 0.2 % external cream     hydrocortisone 2.5 % cream     hydroxychloroquine (PLAQUENIL) 200 MG tablet     Incontinence Supply Disposable (DEPEND UNDERWEAR LARGE) MISC     leflunomide (ARAVA) 20 MG tablet     meclizine (ANTIVERT) 25 MG tablet     melatonin 3 MG tablet     naloxone (NARCAN) nasal spray     nitroGLYcerin (NITROSTAT) 0.4 MG sublingual tablet     order for DME     order for DME     ORDER FOR DME     pantoprazole (PROTONIX) 40 MG EC tablet     pimecrolimus (ELIDEL) 1 % external cream     predniSONE (DELTASONE) 5 MG tablet     sildenafil (REVATIO) 20 MG tablet     sulfaSALAzine (AZULFIDINE) 500 MG tablet     sulfaSALAzine ER (AZULFIDINE EN) 500 MG EC tablet     tamsulosin (FLOMAX) 0.4 MG capsule     tenofovir (VIREAD) 300 MG tablet     triamcinolone (KENALOG) 0.1 % external ointment     triamcinolone (KENALOG) 0.1 % external ointment     zolpidem (AMBIEN) 5 MG tablet     No current facility-administered medications for this visit.       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     Hyperlipidemia LDL goal <70     Insomnia     Gout     Suicidal ideation     Essential hypertension     Rheumatoid  arthritis involving multiple sites, unspecified rheumatoid factor presence     High risk medications (not anticoagulants) long-term use     Midline low back pain without sciatica     Bilateral low back pain with sciatica, sciatica laterality unspecified     Elevated liver enzymes     On corticosteroid therapy     Essential hypertension with goal blood pressure less than 140/90     Insomnia, unspecified type     Rheumatoid arthritis of multiple sites with negative rheumatoid factor (H)     Benign prostatic hyperplasia with lower urinary tract symptoms, unspecified morphology     Chronic pain syndrome     Hepatitis B, chronic (H)     Past Medical History:   Diagnosis Date     Anxiety      CAD (coronary artery disease)     CABG, PCI     Congestive heart failure, unspecified 2008     Depressive disorder 2008     Gout      Hepatitis B > 10 years     HTN (hypertension)      Hyperlipidemia LDL goal < 100      Malrotation of intestine      Moderate major depression (H)      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) 9/19/2011    Uses CPAP     Rheumatoid arthritis flare (H) 1012     Steatohepatitis 12/15    liver biopsy     Tuberculosis age 13    INH x 9 months      Vitamin D deficiency         CC No referring provider defined for this encounter. on close of this encounter.

## 2021-04-30 NOTE — LETTER
"    4/30/2021         RE: Lamont Dnaiels  [address]  [place] [address]        Dear Colleague,    Thank you for referring your patient, Lamont Daniels, to the [facility]. Please see a copy of my visit note below.    [facility] Dermatology Note  Encounter Date: Apr 30, 2021  Office Visit     Dermatology Problem List:  1. Skin eruption biopsied, c/w psoriasis   -occipital scalp, s/p biopsy 5/31/19  -clobetasol for scalp and hands  -for face and genitals elidel  -has RA, follows with rheum  2. Seborrheic dermatitis   -improves with triamcinolone and ketoconazole  3. RA on plaquenil, orencia and sulfasalazine with rheum    ____________________________________________    Assessment & Plan:    # Skin eruption consistent with plaque psoriasis (biopsy on scalp, see problem list)- nearly clear on clobetasol on head and Westcort ointments, possible hypopigmentation of face from steroid is resolved    -for safety kept in chart \"Consider Otezla, MTX (may also improve RA but has hep B and fatty liver) or acitretin (however has h/o chronic hepatitis B/fatty liver), Cosentyx (need to get clearance from pharmacy and GI) and we are in a covid pandemic\"  - Continue clobetasol pulsed for the scalp as prescrbed for up to 2 weeks. If this returns   - Continue clobetasol twice daily for 2 weeks, then 3 times weekly for 2 weeks, repeat cycle as needed. Side effects of steroid overuse including atrophy were discussed.  For hands  - Phototherapy as next step if needed in future  - Hold Westcort ointment  - Start Elidel cream twice daily for the face and scrotum     Procedures Performed:   None.    Follow-up: 1 year(s) in-person, or earlier for new or changing lesions, refuses additional skin exam    Staff and Scribe:     Scribe Disclosure:   IJayy/Genevieve Ambrose, am serving as a scribe to document services personally performed by this physician, Dr. Alyssa Roche, based on data " collection and the provider's statements to me.     Provider Disclosure:   The documentation recorded by the scribe accurately reflects the services I personally performed and the decisions made by me.    Alyssa Roche MD    Department of Dermatology  [facility]: Phone: [phone], Fax:[phone]  [facility]: Phone: [phone], Fax: [phone]      ____________________________________________    CC: Psoriasis (much improved/ only flares are of hands otherwise stable)    HPI:  Mr. Lamont Daniels is a(n) 55 year old male who presents today as a return patient for psoriasis.    Last seen on 12/17/20 for a skin eruption, consistent with plaque psoriasis. At that time, he was continued on clobetasol on the scalp x 2 weeks, started on clobetasol BID x 2 weeks, then three times per week x 2 weeks. Patient also started on Elidel cream BID to the face and scrotum. Patient instructed to hold Westcort ointment.    Today, patient notes much improvement since last visit. Notes there are only flares on the hands. Patient notes using clobetasol and hydrocortisone PRN. Denies using Elidel.    Patient is otherwise feeling well, without additional skin concerns.    Labs Reviewed:  N/A    Physical Exam:  Vitals: There were no vitals taken for this visit.  SKIN: Sun-exposed skin, which includes the head/face, neck, both arms, digits, and/or nails was examined.   - Elbows are clear  - Mild scaling and xerosis on right colin hand, and 2nd and 3rd right digits.  -no hypopigmentation on face  - No other lesions of concern on areas examined.     Medications:  Current Outpatient Medications   Medication     Abatacept (ORENCIA) 125 MG/ML SOAJ auto-injector     Acetaminophen (TYLENOL PO)     allopurinol (ZYLOPRIM) 300 MG tablet     aspirin EC 81 MG EC tablet     atorvastatin (LIPITOR) 80 MG tablet     bacitracin 500  UNIT/GM external ointment     baclofen (LIORESAL) 10 MG tablet     buprenorphine (BUTRANS) 20 MCG/HR WK patch     busPIRone HCl (BUSPAR) 30 MG tablet     Cholecalciferol (VITAMIN D) 2000 UNITS tablet     clobetasol (TEMOVATE) 0.05 % external cream     clobetasol (TEMOVATE) 0.05 % external solution     clonazePAM (KLONOPIN) 1 MG tablet     clopidogrel (PLAVIX) 75 MG tablet     COLCRYS 0.6 MG tablet     desvenlafaxine (PRISTIQ) 50 MG 24 hr tablet     diclofenac (VOLTAREN) 1 % topical gel     evolocumab (REPATHA) 140 MG/ML prefilled autoinjector     ezetimibe (ZETIA) 10 MG tablet     gabapentin (NEURONTIN) 300 MG capsule     gemfibrozil (LOPID) 600 MG tablet     hydrocortisone (WESTCORT) 0.2 % external cream     hydrocortisone 2.5 % cream     hydroxychloroquine (PLAQUENIL) 200 MG tablet     Incontinence Supply Disposable (DEPEND UNDERWEAR LARGE) MISC     leflunomide (ARAVA) 20 MG tablet     meclizine (ANTIVERT) 25 MG tablet     melatonin 3 MG tablet     naloxone (NARCAN) nasal spray     nitroGLYcerin (NITROSTAT) 0.4 MG sublingual tablet     order for DME     order for DME     ORDER FOR DME     pantoprazole (PROTONIX) 40 MG EC tablet     pimecrolimus (ELIDEL) 1 % external cream     predniSONE (DELTASONE) 5 MG tablet     sildenafil (REVATIO) 20 MG tablet     sulfaSALAzine (AZULFIDINE) 500 MG tablet     sulfaSALAzine ER (AZULFIDINE EN) 500 MG EC tablet     tamsulosin (FLOMAX) 0.4 MG capsule     tenofovir (VIREAD) 300 MG tablet     triamcinolone (KENALOG) 0.1 % external ointment     triamcinolone (KENALOG) 0.1 % external ointment     zolpidem (AMBIEN) 5 MG tablet     No current facility-administered medications for this visit.       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     Hyperlipidemia LDL goal <70     Insomnia     Gout     Suicidal ideation     Essential hypertension     Rheumatoid arthritis involving multiple sites, unspecified rheumatoid factor presence     High risk medications (not anticoagulants) long-term use     Midline low back pain without sciatica     Bilateral low back pain with sciatica, sciatica laterality unspecified     Elevated liver enzymes     On corticosteroid therapy     Essential hypertension with goal blood pressure less than 140/90     Insomnia, unspecified type     Rheumatoid arthritis of multiple sites with negative rheumatoid factor (H)     Benign prostatic hyperplasia with lower urinary tract symptoms, unspecified morphology     Chronic pain syndrome     Hepatitis B, chronic (H)     Past Medical History:   Diagnosis Date     Anxiety      CAD (coronary artery disease)     CABG, PCI     Congestive heart failure, unspecified 2008     Depressive disorder 2008     Gout      Hepatitis B > 10 years     HTN (hypertension)      Hyperlipidemia LDL goal < 100      Malrotation of intestine      Moderate major depression (H)      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) 9/19/2011    Uses CPAP     Rheumatoid arthritis flare (H) 1012     Steatohepatitis 12/15    liver biopsy     Tuberculosis age 13    INH x 9 months      Vitamin D deficiency         CC No referring provider defined for this encounter. on close of this encounter.      Again, thank you for allowing me to participate in the care of your patient.        Sincerely,        Alyssa Roche MD

## 2021-04-30 NOTE — NURSING NOTE
Lamont Daniels's goals for this visit include:   Chief Complaint   Patient presents with     Psoriasis     much improved/ only flares are of hands otherwise stable       He requests these members of his care team be copied on today's visit information:     PCP: David Chavez    Referring Provider:  No referring provider defined for this encounter.    There were no vitals taken for this visit.    Do you need any medication refills at today's visit? No    Roma Macias, CMA on 4/30/2021 at 10:50 AM

## 2021-04-30 NOTE — PATIENT INSTRUCTIONS
1. Schedule follow up with your sleep provider [phone]  2. Schedule 3 month follow up in person at pain clinic [phone]  3. Consider CBD oil.  They answer questions at this website:  [employer]   [email or web address]     ----------------------------------------------------------------  Clinic Number:  [phone]     Call with any questions about your care and for scheduling assistance.     Calls are returned Monday through Friday between 8 AM and 4:30 PM. We usually get back to you within 2 business days depending on the issue/request.    If we are prescribing your medications:    For opioid medication refills, call the clinic or send a [employer] message 7 days in advance.  Please include:    Name of requested medication    Name of the pharmacy.    For non-opioid medications, call your pharmacy directly to request a refill. Please allow 3-4 days to be processed.     Per [place] State Law:    All controlled substance prescriptions must be filled within 30 days of being written.      For those controlled substances allowing refills, pickup must occur within 30 days of last fill.      We believe regular attendance is key to your success in our program!      Any time you are unable to keep your appointment we ask that you call us at least 24 hours in advance to cancel.This will allow us to offer the appointment time to another patient.     Multiple missed appointments may lead to dismissal from the clinic.

## 2021-05-03 ENCOUNTER — ANCILLARY PROCEDURE (OUTPATIENT)
Dept: ULTRASOUND IMAGING | Facility: CLINIC | Age: 56
End: 2021-05-03
Attending: INTERNAL MEDICINE
Payer: COMMERCIAL

## 2021-05-03 DIAGNOSIS — B18.1 CHRONIC VIRAL HEPATITIS B WITHOUT DELTA AGENT AND WITHOUT COMA (H): ICD-10-CM

## 2021-05-03 PROCEDURE — 76700 US EXAM ABDOM COMPLETE: CPT | Performed by: RADIOLOGY

## 2021-05-03 NOTE — TELEPHONE ENCOUNTER
Prednisone request was forwarded to Dr. Jenkins.  See 4/29/21 encounter.    Jf CHANDLER RN....5/3/2021 3:44 PM

## 2021-05-05 RX ORDER — PREDNISONE 5 MG/1
TABLET ORAL
Qty: 9 TABLET | Refills: 3 | Status: SHIPPED | OUTPATIENT
Start: 2021-05-05 | End: 2022-06-13

## 2021-05-18 DIAGNOSIS — M10.9 GOUT WITHOUT TOPHUS: ICD-10-CM

## 2021-05-18 RX ORDER — COLCHICINE 0.6 MG/1
TABLET, FILM COATED ORAL
Qty: 30 TABLET | Refills: 0 | Status: SHIPPED | OUTPATIENT
Start: 2021-05-18 | End: 2021-05-21

## 2021-05-18 NOTE — TELEPHONE ENCOUNTER
"Requested Prescriptions   Pending Prescriptions Disp Refills    COLCRYS 0.6 MG tablet [Pharmacy Med Name: COLCRYS 0.6 MG TABLET] 30 tablet 0     Sig: TAKE 2 TABLETS BY MOUTH AT THE FIRST SIGN OF FLARE, TAKE 1 ADDITIONAL TABLET ONE HOUR LATER.       Gout Agents Protocol Failed - 5/18/2021 12:55 PM        Failed - Has Uric Acid on file in past 12 months and value is less than 6     Recent Labs   Lab Test 11/04/15  1547   URIC 6.1     If level is 6mg/dL or greater, ok to refill one time and refer to provider.           Passed - CBC on file in past 12 months     Recent Labs   Lab Test 03/29/21  1318   WBC 5.2   RBC 4.72   HGB 13.6   HCT 42.3                    Passed - ALT on file in past 12 months     Recent Labs   Lab Test 03/29/21  1318   ALT 31             Passed - Recent (12 mo) or future (30 days) visit within the authorizing provider's specialty     Patient has had an office visit with the authorizing provider or a provider within the authorizing providers department within the previous 12 mos or has a future within next 30 days. See \"Patient Info\" tab in inbasket, or \"Choose Columns\" in Meds & Orders section of the refill encounter.              Passed - Medication is active on med list        Passed - Patient is age 18 or older        Passed - Normal serum creatinine on file in the past 12 months     Recent Labs   Lab Test 03/29/21  1318 06/24/13  1322 06/24/13  1322   CR 1.01   < >  --    CRPOC  --   --  1.1    < > = values in this interval not displayed.       Ok to refill medication if creatinine is low             Cynthia Hogan RN    "

## 2021-05-19 DIAGNOSIS — E78.5 HYPERLIPIDEMIA LDL GOAL <100: ICD-10-CM

## 2021-05-19 DIAGNOSIS — I25.10 ATHEROSCLEROSIS OF NATIVE CORONARY ARTERY OF NATIVE HEART, ANGINA PRESENCE UNSPECIFIED: ICD-10-CM

## 2021-05-19 DIAGNOSIS — I25.810 CORONARY ARTERY DISEASE INVOLVING AUTOLOGOUS ARTERY CORONARY BYPASS GRAFT WITHOUT ANGINA PECTORIS: ICD-10-CM

## 2021-05-20 RX ORDER — EZETIMIBE 10 MG/1
10 TABLET ORAL DAILY
Qty: 90 TABLET | Refills: 3 | Status: SHIPPED | OUTPATIENT
Start: 2021-05-20 | End: 2022-05-17

## 2021-05-20 RX ORDER — ATORVASTATIN CALCIUM 80 MG/1
TABLET, FILM COATED ORAL
Qty: 90 TABLET | Refills: 0 | Status: SHIPPED | OUTPATIENT
Start: 2021-05-20 | End: 2021-08-06

## 2021-05-20 NOTE — TELEPHONE ENCOUNTER
[id number]  Last Clinic Visit: 6/19/20  NV: NONE  Scheduling has been notified to contact the pt for appointment.

## 2021-05-20 NOTE — TELEPHONE ENCOUNTER
ezetimibe (ZETIA) 10 MG  Last Written Prescription Date:  6/19/20  Last Fill Quantity: 90,   # refills: 3  Last Office Visit :6/19/20  Future Office visit:  NONE  Scheduling has been notified to contact the pt for appointment.  Drug not on the refill protocol

## 2021-05-20 NOTE — TELEPHONE ENCOUNTER
"Requested Prescriptions   Pending Prescriptions Disp Refills     ezetimibe (ZETIA) 10 MG tablet 90 tablet 3     Sig: Take 1 tablet (10 mg) by mouth daily       Antihyperlipidemic agents Passed - 5/20/2021 12:50 PM        Passed - Lipid panel on file in past 12 mos     Recent Labs   Lab Test 03/19/21  0928 06/29/15  1405 06/29/15  1405   CHOL 142   < > 219*   TRIG 105   < > 372*   HDL 69   < > 33*   LDL 52   < > 112   NHDL 73   < >  --    VLDL  --   --  74*   CHOLHDLRATIO  --   --  6.6*    < > = values in this interval not displayed.               Passed - Normal serum ALT on record in past 12 mos     Recent Labs   Lab Test 03/29/21  1318   ALT 31             Passed - Recent (12 mo) or future (30 days) visit within the authorizing provider's specialty     Patient has had an office visit with the authorizing provider or a provider within the authorizing providers department within the previous 12 mos or has a future within next 30 days. See \"Patient Info\" tab in inTipseret, or \"Choose Columns\" in Meds & Orders section of the refill encounter.              Passed - Medication is active on med list        Passed - Patient is age 18 years or older         Signed Prescriptions Disp Refills    atorvastatin (LIPITOR) 80 MG tablet 90 tablet 0     Sig: TAKE 1 TABLET BY MOUTH 1 TIME DAILY * SCHEDULE CLINIC APPT. FOR REFILLS*       Statins Protocol Passed - 5/20/2021 12:35 PM        Passed - LDL on file in past 12 months     Recent Labs   Lab Test 03/19/21  0928   LDL 52             Passed - No abnormal creatine kinase in past 12 months     Recent Labs   Lab Test 07/07/14  0800                   Passed - Recent (12 mo) or future (30 days) visit within the authorizing provider's specialty     Patient has had an office visit with the authorizing provider or a provider within the authorizing providers department within the previous 12 mos or has a future within next 30 days. See \"Patient Info\" tab in inTipseret, or \"Choose " "Columns\" in Meds & Orders section of the refill encounter.              Passed - Medication is active on med list        Passed - Patient is age 18 or older           Cardiology refill protocol passed  VIVIANA Ordaz    "

## 2021-05-21 DIAGNOSIS — M10.9 GOUT WITHOUT TOPHUS: ICD-10-CM

## 2021-05-21 RX ORDER — COLCHICINE 0.6 MG/1
TABLET, FILM COATED ORAL
Qty: 90 TABLET | Refills: 1 | Status: SHIPPED | OUTPATIENT
Start: 2021-05-21 | End: 2021-12-01

## 2021-05-21 NOTE — TELEPHONE ENCOUNTER
Routing refill request to provider for review/approval because:  Labs not current:  Uric acid    Sarah Burgess BSN, RN

## 2021-06-02 ENCOUNTER — TELEPHONE (OUTPATIENT)
Dept: CARDIOLOGY | Facility: CLINIC | Age: 56
End: 2021-06-02

## 2021-06-02 NOTE — TELEPHONE ENCOUNTER
2nd attempt.    Mychart unread.    Left voicemail with help of  to schedule follow up (next available, virtual or in person) with Dr. Travis.    Viviana Walters  Medical Specialty Procedure   [facility] [place]

## 2021-06-04 DIAGNOSIS — E78.5 HYPERLIPIDEMIA LDL GOAL <70: ICD-10-CM

## 2021-06-04 DIAGNOSIS — Z95.1 S/P CABG (CORONARY ARTERY BYPASS GRAFT): ICD-10-CM

## 2021-06-04 NOTE — TELEPHONE ENCOUNTER
evolocumab (REPATHA) 140 MG/ML prefilled autoinjector   Last Written Prescription Date:  6/19/2020  Last Fill Quantity: 2,   # refills: 11  Last Office Visit : 6/19/2020  Future Office visit:  None    Routing refill request to provider for review/approval because:  Drug not on the [facility], [facility] or [facility] refill protocol or controlled substance      Dariana Teats RN  Central Triage Red Flags/Med Refills

## 2021-06-09 NOTE — TELEPHONE ENCOUNTER
3rd attempt.    Chart letter printed & Sent.    Viviana Walters  Medical Specialty Procedure   [facility] [place]

## 2021-06-15 DIAGNOSIS — N52.9 ERECTILE DYSFUNCTION, UNSPECIFIED ERECTILE DYSFUNCTION TYPE: ICD-10-CM

## 2021-06-15 DIAGNOSIS — M06.09 RHEUMATOID ARTHRITIS OF MULTIPLE SITES WITH NEGATIVE RHEUMATOID FACTOR (H): ICD-10-CM

## 2021-06-15 RX ORDER — SILDENAFIL CITRATE 20 MG/1
TABLET ORAL
Qty: 150 TABLET | Refills: 3 | Status: SHIPPED | OUTPATIENT
Start: 2021-06-15 | End: 2021-12-02

## 2021-06-15 RX ORDER — PREDNISONE 5 MG/1
TABLET ORAL
Qty: 9 TABLET | Refills: 3 | Status: CANCELLED | OUTPATIENT
Start: 2021-06-15

## 2021-06-15 NOTE — TELEPHONE ENCOUNTER
"    Routing refill request to provider for review/approval because:  Please advise, see failed protocol below.     Requested Prescriptions   Pending Prescriptions Disp Refills     sildenafil (REVATIO) 20 MG tablet [Pharmacy Med Name: SILDENAFIL 20 MG TABLET] 150 tablet 3     Sig: TAKE 2 TO 5 TABLETS BY MOUTH 30 MINUTES PRIOR TO INTERCOURSE AS NEEDED.       Erectile Dysfuction Protocol Failed - 6/15/2021 12:26 AM        Failed - Absence of nitrates on medication list        Failed - Absence of Alpha Blockers on Med list        Passed - Recent (12 mo) or future (30 days) visit within the authorizing provider's specialty     Patient has had an office visit with the authorizing provider or a provider within the authorizing providers department within the previous 12 mos or has a future within next 30 days. See \"Patient Info\" tab in inbasket, or \"Choose Columns\" in Meds & Orders section of the refill encounter.              Passed - Medication is active on med list        Passed - Patient is age 18 or older           Melody Britton RN  [facility]          "

## 2021-06-18 ENCOUNTER — VIRTUAL VISIT (OUTPATIENT)
Dept: CARDIOLOGY | Facility: CLINIC | Age: 56
End: 2021-06-18
Payer: COMMERCIAL

## 2021-06-18 DIAGNOSIS — I25.810 CORONARY ARTERY DISEASE INVOLVING AUTOLOGOUS ARTERY CORONARY BYPASS GRAFT WITHOUT ANGINA PECTORIS: Primary | ICD-10-CM

## 2021-06-18 PROCEDURE — 99214 OFFICE O/P EST MOD 30 MIN: CPT | Mod: 95 | Performed by: INTERNAL MEDICINE

## 2021-06-18 NOTE — PROGRESS NOTES
Lamont is a 55 year old who is being evaluated via a billable video visit.      How would you like to obtain your AVS? MyChart  If the video visit is dropped, the invitation should be resent by: Text to cell phone: [phone]  Will anyone else be joining your video visit? No      Video-Visit Details    Type of service:  Video Visit    Video Start Time: 2:07 PM    Video End Time:2:27 PM    Originating Location (pt. Location): Home    Distant Location (provider location):  [facility] HEART [facility]     Platform used for Video Visit: EadBox     [facility] Cardiology Virtual Visit:    Assessment and Plan:     1. Premature CAD, CABG in 2008, PCI to Cx in '08, repeat PCI to Cx stent 2016 (patent sequential LIMA-D3-LAD, sequential SVG-RV marginal-PDA, occluded radial graft to PL branches), normal LV fxn: Cont secondary prevention with asa. Plan to continue plavix long term for now given aggressive ISR and low bleeding risk.   Plavix can be stopped 7 days prior to any planned surgery or procedure and resumed afterwards.  Encouraged to do more walking.  2. Dyspnea: stable. Echo and PFT's have been checked and were normal; its likely related to anxiety.    3. Dyslipidemia, definite heterozygous FH ([facility] score -10), metabolic syndrome: On lipitor 80, gemfibrozil, zetia 10 and Repatha for uncontrolled dyspilidemia despite maximal rx.   Recommended genetic testing, he has not made an appt with genetic counselor.   4. JEROD on CPAP  5. Orthostatic dizziness: Continues to have mild symptoms. No improvement on stopping metoprolol.  6. Rheumatoid arthritis     Annual f/u with SHOBHA Travis MD    Cardiac Imaging and Prevention  [facility]  [email or web address] I Pager: [phone]    HPI: Annual follow-up, video visit.  He has been doing well, without any intercurrent medical illness, hospitalization, or coronary intervention.  He has been taking all  his medications without any issues.  Lipid profile checked earlier this year shows a LDL cholesterol of 52.  Basic labs are good.  His baseline mild symptoms of chest discomfort, dyspnea, and dizziness are stable.  He does minimal daily activity, and walks around the block once a day.    EXAM:  GEN/CONSTITUIONAL: Appears comfortable, in no apparent distress   EYES: No icterus noted.   JVP:  Not visible  RESPIRATORY: No cough or labored breathing   NEUROLOGIC: No tremor noted  PSYCHIATRIC: Normal affect  EXT: No edema noted.  Skin: No rash visible  The rest of a comprehensive physical examination is deferred due to PHE (public health emergency) video visit restrictions.    PAST MEDICAL HISTORY:  Past Medical History:   Diagnosis Date     Anxiety      CAD (coronary artery disease)     CABG, PCI     Congestive heart failure, unspecified 2008     Depressive disorder 2008     Gout      Hepatitis B > 10 years     HTN (hypertension)      Hyperlipidemia LDL goal < 100      Malrotation of intestine      Moderate major depression (H)      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) 9/19/2011    Uses CPAP     Rheumatoid arthritis flare (H) 1012     Steatohepatitis 12/15    liver biopsy     Tuberculosis age 13    INH x 9 months      Vitamin D deficiency        CURRENT MEDICATIONS:  Current Outpatient Medications   Medication     Abatacept (ORENCIA) 125 MG/ML SOAJ auto-injector     Acetaminophen (TYLENOL PO)     allopurinol (ZYLOPRIM) 300 MG tablet     aspirin EC 81 MG EC tablet     atorvastatin (LIPITOR) 80 MG tablet     bacitracin 500 UNIT/GM external ointment     baclofen (LIORESAL) 10 MG tablet     buprenorphine (BUTRANS) 20 MCG/HR WK patch     busPIRone HCl (BUSPAR) 30 MG tablet     Cholecalciferol (VITAMIN D) 2000 UNITS tablet     clobetasol (TEMOVATE) 0.05 % external cream     clobetasol (TEMOVATE) 0.05 % external solution     clonazePAM (KLONOPIN) 1 MG tablet     clopidogrel (PLAVIX) 75 MG tablet     COLCRYS 0.6 MG tablet     desvenlafaxine  (PRISTIQ) 50 MG 24 hr tablet     diclofenac (VOLTAREN) 1 % topical gel     evolocumab (REPATHA) 140 MG/ML prefilled autoinjector     ezetimibe (ZETIA) 10 MG tablet     gabapentin (NEURONTIN) 300 MG capsule     gemfibrozil (LOPID) 600 MG tablet     hydrocortisone (WESTCORT) 0.2 % external cream     hydrocortisone 2.5 % cream     hydroxychloroquine (PLAQUENIL) 200 MG tablet     Incontinence Supply Disposable (DEPEND UNDERWEAR LARGE) MISC     leflunomide (ARAVA) 20 MG tablet     meclizine (ANTIVERT) 25 MG tablet     melatonin 3 MG tablet     naloxone (NARCAN) nasal spray     nitroGLYcerin (NITROSTAT) 0.4 MG sublingual tablet     order for DME     order for DME     ORDER FOR DME     pantoprazole (PROTONIX) 40 MG EC tablet     pimecrolimus (ELIDEL) 1 % external cream     predniSONE (DELTASONE) 5 MG tablet     sildenafil (REVATIO) 20 MG tablet     sulfaSALAzine (AZULFIDINE) 500 MG tablet     sulfaSALAzine ER (AZULFIDINE EN) 500 MG EC tablet     tamsulosin (FLOMAX) 0.4 MG capsule     tenofovir (VIREAD) 300 MG tablet     triamcinolone (KENALOG) 0.1 % external ointment     triamcinolone (KENALOG) 0.1 % external ointment     zolpidem (AMBIEN) 5 MG tablet     No current facility-administered medications for this visit.        PAST SURGICAL HISTORY:  Past Surgical History:   Procedure Laterality Date     ABDOMEN SURGERY  2014     BIOPSY  2015     BYPASS GRAFT ARTERY CORONARY  2008    6 vessels     COLONOSCOPY  2/8/2013    Procedure: COLONOSCOPY;  Colonoscopy, blood in stool;  Surgeon: Duane, William Charles, MD;  Location: [facility] OR     COMBINED REPAIR PTOSIS WITH BLEPHAROPLASTY BILATERAL Bilateral 6/25/2018    Procedure: COMBINED REPAIR PTOSIS WITH BLEPHAROPLASTY BILATERAL;  Bilateral upper eyelid blepharoplasty, ptosis repair and brow ptosis repair;  Surgeon: Sandra Borja MD;  Location: [facility] OR     GI SURGERY  2014     HEAD & NECK SURGERY  2011     NASAL/SINUS POLYPECTOMY  2010     ORTHOPEDIC SURGERY  2012     REPAIR PTOSIS        REPAIR PTOSIS BILATERAL Bilateral 7/22/2019    Procedure: REPAIR, PTOSIS, BOTH UPPER brows;  Surgeon: Sandra Borja MD;  Location: [facility] OR     REPAIR PTOSIS BROW BILATERAL Bilateral 6/25/2018    Procedure: REPAIR PTOSIS BROW BILATERAL;;  Surgeon: Sandra Borja MD;  Location:  OR     THORACIC SURGERY  1989    tb     TONSILLECTOMY  2010     UVULOPALATOPHARYNGOPLASTY  2010     VASCULAR SURGERY  2008     ZZHC CORONARY STENT PERCUT, EA ADDTL VESSEL  2008    3 months after CABG       ALLERGIES     Allergies   Allergen Reactions     Citalopram Itching     Tramadol Itching       FAMILY HISTORY:  Family History   Problem Relation Age of Onset     C.A.D. Father      Coronary Artery Disease Father      Hypertension Father      Depression Father      Hypertension Mother      Diabetes Mother      Depression Mother      Mental Illness Mother      Hypertension Maternal Grandfather      Cancer Paternal Grandfather      Other Cancer Paternal Grandfather      Other Cancer Other      Autoimmune Disease No family hx of      Cerebrovascular Disease No family hx of      Thyroid Disease No family hx of      Glaucoma No family hx of      Macular Degeneration No family hx of        SOCIAL HISTORY:  Social History     Socioeconomic History     Marital status:      Spouse name: Not on file     Number of children: 5     Years of education: 14     Highest education level: Not on file   Occupational History     Occupation: MailWriter     Employer: UNEMPLOYED     Comment: 2-2011. On long term disability. SSD applied for   Social Needs     Financial resource strain: Not on file     Food insecurity     Worry: Not on file     Inability: Not on file     Transportation needs     Medical: Not on file     Non-medical: Not on file   Tobacco Use     Smoking status: Never Smoker     Smokeless tobacco: Never Used   Substance and Sexual Activity     Alcohol use: No     Alcohol/week: 0.0 standard drinks     Drug use: No     Sexual  activity: Yes     Partners: Female   Lifestyle     Physical activity     Days per week: Not on file     Minutes per session: Not on file     Stress: Not on file   Relationships     Social connections     Talks on phone: Not on file     Gets together: Not on file     Attends Anabaptism service: Not on file     Active member of club or organization: Not on file     Attends meetings of clubs or organizations: Not on file     Relationship status: Not on file     Intimate partner violence     Fear of current or ex partner: Not on file     Emotionally abused: Not on file     Physically abused: Not on file     Forced sexual activity: Not on file   Other Topics Concern     Parent/sibling w/ CABG, MI or angioplasty before 65F 55M? Yes     Comment: father   Social History Narrative    . No current SO.         Has 5 children. Youngest child does live with him.         H/o AA degree and previously worked as a [employer]. Currently unemployed.         Denies tobacco use.     Alcohol use: 2x/week with minimal amount of alcohol per time.     Drug use: denies       ROS:   Constitutional: No fever, chills, or sweats. No weight gain/loss   ENT: No visual disturbance, ear ache, epistaxis, sore throat  Allergies/Immunologic: Negative.   Respiratory: No cough, hemoptysia  Cardiovascular: As per HPI  GI: No nausea, vomiting, hematemesis, melena, or hematochezia  : No urinary frequency, dysuria, or hematuria  Integument: Negative  Psychiatric: Negative  Neuro: Negative  Endocrinology: Negative   Musculoskeletal: Negative    ADDITIONAL COMMENTS:     I reviewed the patient's medications:     I reviewed the patient's pertinent clinical laboratory studies:     I reviewed the patient's pertinent imaging studies:   I reviewed the patient's ECG:

## 2021-06-22 ENCOUNTER — VIRTUAL VISIT (OUTPATIENT)
Dept: GASTROENTEROLOGY | Facility: CLINIC | Age: 56
End: 2021-06-22
Attending: INTERNAL MEDICINE
Payer: COMMERCIAL

## 2021-06-22 DIAGNOSIS — B18.1 CHRONIC VIRAL HEPATITIS B WITHOUT DELTA AGENT AND WITHOUT COMA (H): Primary | ICD-10-CM

## 2021-06-22 ASSESSMENT — PAIN SCALES - GENERAL: PAINLEVEL: SEVERE PAIN (6)

## 2021-06-22 NOTE — LETTER
6/22/2021         RE: Lamont Daniels  [address]  [place] [address]        Dear Colleague,    Thank you for referring your patient, Lamont Daniels, to the [facility]PAT[facility] [place]. Please see a copy of my visit note below.    Lamont is a 55 year old who is being evaluated via a billable video visit.      How would you like to obtain your AVS? MyChart  If the video visit is dropped, the invitation should be resent by: Text to cell phone: [phone]  Will anyone else be joining your video visit? No    Noted that this appointment was scheduled in error as we recently had a clinic visit on 4/27/2021 and no acute changes since then.    Needs labs and ultrasound in 11/2021  Will reschedule for April 2022    Thomas M. Leventhal, M.D.   of Medicine  Advanced/Transplant Hepatology & Critical Care Medicine  [facility]

## 2021-06-22 NOTE — PROGRESS NOTES
Lamont is a 55 year old who is being evaluated via a billable video visit.      How would you like to obtain your AVS? MyChart  If the video visit is dropped, the invitation should be resent by: Text to cell phone: [phone]  Will anyone else be joining your video visit? No    Noted that this appointment was scheduled in error as we recently had a clinic visit on 4/27/2021 and no acute changes since then.    Needs labs and ultrasound in 11/2021  Will reschedule for April 2022    Thomas M. Leventhal, M.D.   of Medicine  Advanced/Transplant Hepatology & Critical Care Medicine  [facility]

## 2021-06-28 ENCOUNTER — TELEPHONE (OUTPATIENT)
Dept: PALLIATIVE MEDICINE | Facility: CLINIC | Age: 56
End: 2021-06-28

## 2021-06-28 DIAGNOSIS — F45.8 ANXIETY HYPERVENTILATION: ICD-10-CM

## 2021-06-28 DIAGNOSIS — M17.11 PRIMARY OSTEOARTHRITIS OF RIGHT KNEE: ICD-10-CM

## 2021-06-28 DIAGNOSIS — M06.9 RHEUMATOID ARTHRITIS INVOLVING MULTIPLE SITES, UNSPECIFIED WHETHER RHEUMATOID FACTOR PRESENT (H): Primary | ICD-10-CM

## 2021-06-28 DIAGNOSIS — L40.9 PSORIASIS: ICD-10-CM

## 2021-06-28 DIAGNOSIS — F41.9 ANXIETY HYPERVENTILATION: ICD-10-CM

## 2021-06-28 DIAGNOSIS — K21.9 GASTROESOPHAGEAL REFLUX DISEASE WITHOUT ESOPHAGITIS: ICD-10-CM

## 2021-06-28 DIAGNOSIS — R21 RASH: ICD-10-CM

## 2021-06-28 NOTE — TELEPHONE ENCOUNTER
Received fax from pharmacy requesting refill(s) for diclofenac (VOLTAREN) 1 % topical gel     Epic Note: diclofenac (VOLTAREN) 1 % topical gel cannot be ordered as a prescription.    Last refilled on 04/23/21    Pt last seen on 04/30/21  Next appt scheduled for None    [id number] [place] [facility] [address]     Will facilitate refill.      Lashonda Vega MA  [facility]

## 2021-06-28 NOTE — TELEPHONE ENCOUNTER
Signed Prescriptions:                        Disp   Refills    diclofenac (VOLTAREN) 1 % topical gel      300 g  11       Sig: Apply 4 g topically 4 times daily as needed for           moderate pain  Authorizing Provider: KAYLA CHUN MD  [facility]

## 2021-06-30 DIAGNOSIS — E78.5 HYPERLIPIDEMIA LDL GOAL <70: ICD-10-CM

## 2021-06-30 DIAGNOSIS — I25.119 ATHEROSCLEROSIS OF NATIVE CORONARY ARTERY OF NATIVE HEART WITH ANGINA PECTORIS (H): ICD-10-CM

## 2021-06-30 DIAGNOSIS — I25.719 ATHEROSCLEROSIS OF AUTOLOGOUS VEIN CORONARY ARTERY BYPASS GRAFT WITH ANGINA PECTORIS (H): ICD-10-CM

## 2021-06-30 RX ORDER — TRIAMCINOLONE ACETONIDE 1 MG/G
OINTMENT TOPICAL
Qty: 80 G | Refills: 2 | Status: SHIPPED | OUTPATIENT
Start: 2021-06-30 | End: 2021-12-20

## 2021-06-30 RX ORDER — PANTOPRAZOLE SODIUM 40 MG/1
TABLET, DELAYED RELEASE ORAL
Qty: 90 TABLET | Refills: 2 | Status: SHIPPED | OUTPATIENT
Start: 2021-06-30 | End: 2022-03-16

## 2021-06-30 RX ORDER — CLONAZEPAM 1 MG/1
TABLET ORAL
Qty: 90 TABLET | Refills: 0 | Status: SHIPPED | OUTPATIENT
Start: 2021-06-30 | End: 2021-09-01

## 2021-06-30 NOTE — TELEPHONE ENCOUNTER
Routing refill request to provider for review/approval because:  Drug not on the [facility] refill protocol   Danielle Resendiz BSN, RN

## 2021-07-01 RX ORDER — HYDROCORTISONE VALERATE CREAM 2 MG/G
CREAM TOPICAL
Qty: 60 G | Refills: 3 | Status: SHIPPED | OUTPATIENT
Start: 2021-07-01 | End: 2021-12-22

## 2021-07-05 RX ORDER — GEMFIBROZIL 600 MG/1
600 TABLET, FILM COATED ORAL 2 TIMES DAILY
Qty: 180 TABLET | Refills: 2 | Status: SHIPPED | OUTPATIENT
Start: 2021-07-05 | End: 2022-03-25

## 2021-07-05 RX ORDER — CLOPIDOGREL BISULFATE 75 MG/1
75 TABLET ORAL DAILY
Qty: 90 TABLET | Refills: 2 | Status: SHIPPED | OUTPATIENT
Start: 2021-07-05 | End: 2022-03-22

## 2021-07-05 NOTE — CONFIDENTIAL NOTE
6/18/2021  [facility]  Cardiovascular Disease     gemfibrozil (LOPID) 600 MG tablet    Very high med alert    Warnings Override History for gemfibrozil (LOPID) 600 MG tablet [[id number]]    Overridden by Love Alex RN on May 20, 2021 12:45 PM   Drug-Drug   1. GEMFIBROZIL / STATINS [Level: Major] [Reason: Tolerated medication/side effects in past]   Other Orders: atorvastatin (LIPITOR) 80 MG tablet        clopidogrel (PLAVIX) 75 MG tablet

## 2021-07-06 DIAGNOSIS — G89.4 CHRONIC PAIN SYNDROME: ICD-10-CM

## 2021-07-06 DIAGNOSIS — M06.9 RHEUMATOID ARTHRITIS INVOLVING MULTIPLE SITES, UNSPECIFIED WHETHER RHEUMATOID FACTOR PRESENT (H): ICD-10-CM

## 2021-07-06 NOTE — TELEPHONE ENCOUNTER
Reason for call:  Medication   If this is a refill request, has the caller requested the refill from the pharmacy already? No  Will the patient be using a [facility]? No  Name of the pharmacy and phone number for the current request: [facility] [id number] [place] [address]    Name of the medication requested: buprenorphine (BUTRANS) 20 MCG/HR WK patch    Other request:     Phone number to reach patient:  Cell number on file:    Telephone Information:   Mobile [phone]       Best Time:      Can we leave a detailed message on this number?  YES    Travel screening: Not Applicable

## 2021-07-07 ENCOUNTER — MYC REFILL (OUTPATIENT)
Dept: PALLIATIVE MEDICINE | Facility: CLINIC | Age: 56
End: 2021-07-07

## 2021-07-07 DIAGNOSIS — G89.4 CHRONIC PAIN SYNDROME: ICD-10-CM

## 2021-07-07 DIAGNOSIS — M06.9 RHEUMATOID ARTHRITIS INVOLVING MULTIPLE SITES, UNSPECIFIED WHETHER RHEUMATOID FACTOR PRESENT (H): ICD-10-CM

## 2021-07-07 RX ORDER — BUPRENORPHINE 20 UG/H
1 PATCH TRANSDERMAL
Qty: 4 PATCH | Refills: 0 | Status: CANCELLED | OUTPATIENT
Start: 2021-07-07

## 2021-07-07 RX ORDER — BUPRENORPHINE 20 UG/H
1 PATCH TRANSDERMAL
Qty: 4 PATCH | Refills: 0 | Status: SHIPPED | OUTPATIENT
Start: 2021-07-07 | End: 2021-08-03

## 2021-07-07 NOTE — TELEPHONE ENCOUNTER
Signed Prescriptions:                        Disp   Refills    buprenorphine (BUTRANS) 20 MCG/HR WK patch 4 patch0        Sig: Place 1 patch onto the skin every 7 days Fill           07/08/21  Authorizing Provider: QUEENIE CHERRY    Covering for provider who is out of the office.  Refill appears appropriate and was sent to requested pharmacy.  reviewed.     Queenie Cherry MD  [facility]

## 2021-07-07 NOTE — TELEPHONE ENCOUNTER
Received call from patient requesting refill(s) of buprenorphine (BUTRANS) 20 MCG/HR WK patch     Last dispensed from pharmacy on 06/10/21    Patient's last office/virtual visit by prescribing provider on 04/30/21  Next office/virtual appointment scheduled for None    Last urine drug screen date 10/27/20  Current opioid agreement on file (completed within the last year) No Date of opioid agreement: 03/02/20    E-prescribe to   [facility] [address] [place] [facility] [place]  [place] [address]  Phone: [phone] Fax: [phone]    Will route to nursing Paris for review and preparation of prescription(s).       Lashonad Vega MA  [facility] [place]

## 2021-07-07 NOTE — TELEPHONE ENCOUNTER
Medication refill information reviewed.     Due date for  buprenorphine (BUTRANS) 20 MCG/HR WK patch is 07/08/21     Prescriptions prepped for review.     Will route to provider.

## 2021-07-08 ENCOUNTER — TELEPHONE (OUTPATIENT)
Dept: PALLIATIVE MEDICINE | Facility: CLINIC | Age: 56
End: 2021-07-08

## 2021-07-08 NOTE — TELEPHONE ENCOUNTER
Patient Comment: Dr. Loo could you do a preauthorizes with the brand name.     Called pharmacy and the medication does require a PA. The prescriptions usually reads name brand only. So the PA should be for name brand. Will process in a separate encounter.    ROBERTO BarksdaleN, RN  Care Coordinator  [facility] [place]

## 2021-07-08 NOTE — TELEPHONE ENCOUNTER
CENTRAL PRIOR AUTHORIZATION  [phone]    PA Initiation    Medication: Butrans 20 MCG/HR WK patch  Insurance Company: [employer] - Phone [phone] Fax [phone]  [id number] [place] [address] [place] [address]  Filling Pharmacy Phone: [phone]  Filling Pharmacy Fax: [phone]  Start Date: 7/8/2021    Waiting on the clinical questions to load into the request. Sent for them at 10:49AM

## 2021-07-08 NOTE — TELEPHONE ENCOUNTER
I did call the patient insurance ([phone]) and speak with a  PA Rep and answer the questions over the phone as they had not loaded into the request. However, while I was on the phone, they did populate.  I did finished over the phone.    Prior Authorization Approval    Authorization Effective Date: 6/8/2021  Authorization Expiration Date: 7/8/2022  Medication: Butrans 20 MCG/HR WK patch  Approved Dose/Quantity:   Reference #: MEDICARE -CASE ID [id number]  Insurance Company: JOHN/[employer] - Phone [phone] Fax [phone]  Expected CoPay:       CoPay Card Available: No    Foundation Assistance Needed:    Which Pharmacy is filling the prescription (Not needed for infusion/clinic administered): [facility] [address] [place] [address]  Pharmacy Notified: Yes  Patient Notified: Yes *TEXT NOTIFICATION IS SET UP FOR THE PATIENT. THE PHARMACY DOES HAVE TO ORDER IN THE PRODUCT FOR THE PATIENT*

## 2021-07-08 NOTE — TELEPHONE ENCOUNTER
Prior Authorization Retail Medication Request: Please note patient uses name brand only for this medication.     Medication/Dose: buprenorphine (BUTRANS) 20 MCG/HR WK patch NAME BRAND ONLY    Associated Diagnoses  Rheumatoid arthritis involving multiple sites, unspecified whether rheumatoid factor present (H) [M06.9]       Chronic pain syndrome [G89.4]         Subscriber: [id number] ZOË HARPER     Rel to sub: 01 - Self     Member ID: [id number]     Payor: [address] [place] [address] Ph: [phone]     Benefit plan: [id number]-[employer] MEDICARE Ph: [phone]     Group number: [id number]     Member effective dates: from 03/01/21        [facility] [address] [place] [facility] - [place], [place] - [address] [place] [id number]  Phone: [phone] Fax: [phone]

## 2021-07-09 NOTE — TELEPHONE ENCOUNTER
LVM for patient that prescription was sent to pharmacy. Confirmed pharmacy.      Yolanda Richey [facility] [place]

## 2021-07-10 DIAGNOSIS — L40.9 PSORIASIS: ICD-10-CM

## 2021-07-14 RX ORDER — PIMECROLIMUS 10 MG/G
CREAM TOPICAL
Qty: 60 G | Refills: 0 | Status: SHIPPED | OUTPATIENT
Start: 2021-07-14 | End: 2021-08-06

## 2021-07-14 NOTE — TELEPHONE ENCOUNTER
PIMECROLIMUS 1% CREAM  Last Written Prescription Date:   4/26/2021  Last Fill Quantity: 60,   # refills: 0  Last Office Visit : 4/30/2021  Future Office visit:  None  60 g, sent to pharm 7/14/2021      Dariana Roa RN  Central Triage Red Flags/Med Refills

## 2021-08-02 DIAGNOSIS — M06.9 RHEUMATOID ARTHRITIS INVOLVING MULTIPLE SITES, UNSPECIFIED WHETHER RHEUMATOID FACTOR PRESENT (H): ICD-10-CM

## 2021-08-02 DIAGNOSIS — G89.4 CHRONIC PAIN SYNDROME: ICD-10-CM

## 2021-08-02 NOTE — TELEPHONE ENCOUNTER
Reason for call:  Medication   If this is a refill request, has the caller requested the refill from the pharmacy already? No  Will the patient be using a [facility]? No   Name of the pharmacy and phone number for the current request: [facility] [id number] [place] [address]    Name of the medication requested: buprenorphine (BUTRANS) 20 MCG/HR WK patch    Phone number to reach patient:  Home number on file [phone] (home)    Can we leave a detailed message on this number?  YES    Travel screening: Not Applicable        Chuckie SAAB    [facility]

## 2021-08-02 NOTE — TELEPHONE ENCOUNTER
Received call from patient requesting refill(s) of buprenorphine (BUTRANS) 20 MCG/HR WK patch     Last dispensed from pharmacy on 7/8/21    Patient's last office/virtual visit by prescribing provider on 4/30/21  Next office/virtual appointment scheduled for none    Last urine drug screen date 10/27/20  Current opioid agreement on file (completed within the last year) Yes Date of opioid agreement: 3/2/20    E-prescribe to [facility] [address] [place], [place]  pharmacy    Will route to nursing Mereta for review and preparation of prescription(s).

## 2021-08-03 DIAGNOSIS — L40.9 PSORIASIS: ICD-10-CM

## 2021-08-03 RX ORDER — BUPRENORPHINE 20 UG/H
1 PATCH TRANSDERMAL
Qty: 4 PATCH | Refills: 0 | Status: SHIPPED | OUTPATIENT
Start: 2021-08-03 | End: 2021-08-06

## 2021-08-03 NOTE — TELEPHONE ENCOUNTER
Script Eprescribed to pharmacy  [place] Prescription Monitoring Program checked    Will send this to MA team to notify patient.    Signed Prescriptions:                        Disp   Refills    buprenorphine (BUTRANS) 20 MCG/HR WK patch 4 patch0        Sig: Place 1 patch onto the skin every 7 days Fill 8/3/21           and Start 8/05/21  Authorizing Provider: KAYLA CHUN MD  [facility]

## 2021-08-03 NOTE — TELEPHONE ENCOUNTER
Medication refill information reviewed.     Due date for buprenorphine (BUTRANS) 20 MCG/HR WK patch is 8/5/21     Prescriptions prepped for review.     Will route to provider.     Arnold Pete, RN  Patient Care Supervisor   [facility] [place]

## 2021-08-04 DIAGNOSIS — M06.09 RHEUMATOID ARTHRITIS OF MULTIPLE SITES WITH NEGATIVE RHEUMATOID FACTOR (H): ICD-10-CM

## 2021-08-04 DIAGNOSIS — E78.5 HYPERLIPIDEMIA LDL GOAL <100: ICD-10-CM

## 2021-08-04 DIAGNOSIS — I25.10 ATHEROSCLEROSIS OF NATIVE CORONARY ARTERY OF NATIVE HEART, ANGINA PRESENCE UNSPECIFIED: ICD-10-CM

## 2021-08-04 RX ORDER — LEFLUNOMIDE 20 MG/1
20 TABLET ORAL DAILY
Qty: 30 TABLET | Refills: 0 | Status: SHIPPED | OUTPATIENT
Start: 2021-08-04 | End: 2021-09-01

## 2021-08-04 NOTE — TELEPHONE ENCOUNTER
Pt calling to state the pharmacy will only fill generic and he does need brand name.    Gisela GERARDO    [facility]

## 2021-08-04 NOTE — TELEPHONE ENCOUNTER
Routing refill request to provider for review/approval because:  Drug not on the [facility] refill protocol           Pending Prescriptions:                       Disp   Refills    leflunomide (ARAVA) 20 MG tablet           30 tab*3        Sig: Take 1 tablet (20 mg) by mouth daily        Sharan Garcia RN

## 2021-08-04 NOTE — TELEPHONE ENCOUNTER
Pharmacy calling to verify if it is okay to fill generic medication, as previously it was supposed to be brand name only. Please call back at [phone].    iGsela GERARDO    [facility]

## 2021-08-05 ENCOUNTER — MYC MEDICAL ADVICE (OUTPATIENT)
Dept: PALLIATIVE MEDICINE | Facility: CLINIC | Age: 56
End: 2021-08-05

## 2021-08-05 DIAGNOSIS — G89.4 CHRONIC PAIN SYNDROME: ICD-10-CM

## 2021-08-05 DIAGNOSIS — M06.9 RHEUMATOID ARTHRITIS INVOLVING MULTIPLE SITES, UNSPECIFIED WHETHER RHEUMATOID FACTOR PRESENT (H): ICD-10-CM

## 2021-08-05 NOTE — TELEPHONE ENCOUNTER
Rheumatology team: Please call to notify Mr. Daniels that leflunomide has been refilled. Labs are needed ASAP.  Sebastián Jenkins MD  8/4/2021 10:11 PM

## 2021-08-05 NOTE — TELEPHONE ENCOUNTER
No answer, [employer] message has been sent.  Helga Guerrier [facility] Rheumatology  8/5/2021 12:57 PM

## 2021-08-06 RX ORDER — BUPRENORPHINE 20 UG/H
1 PATCH, EXTENDED RELEASE TRANSDERMAL
Qty: 4 PATCH | Refills: 0 | Status: SHIPPED | OUTPATIENT
Start: 2021-08-06 | End: 2021-08-31

## 2021-08-06 RX ORDER — BUPRENORPHINE 20 UG/H
1 PATCH, EXTENDED RELEASE TRANSDERMAL
Qty: 4 PATCH | Refills: 0 | Status: CANCELLED | OUTPATIENT
Start: 2021-08-06

## 2021-08-06 RX ORDER — PIMECROLIMUS 10 MG/G
CREAM TOPICAL
Qty: 60 G | Refills: 1 | Status: SHIPPED | OUTPATIENT
Start: 2021-08-06 | End: 2021-12-23

## 2021-08-06 RX ORDER — ATORVASTATIN CALCIUM 80 MG/1
80 TABLET, FILM COATED ORAL DAILY
Qty: 90 TABLET | Refills: 3 | Status: SHIPPED | OUTPATIENT
Start: 2021-08-06 | End: 2022-06-24

## 2021-08-06 NOTE — TELEPHONE ENCOUNTER
atorvastatin (LIPITOR) 80 MG tablet  Last Written Prescription Date:  5/20/2021  Last Fill Quantity: 90,   # refills: 0  Last Office Visit : 6/18/2021  Future Office visit:  None  90 Tabs, 3 Refills sent to pharm 8/6/2021      Dariana Roa RN  Central Triage Red Flags/Med Refills  Warnings Override History for atorvastatin (LIPITOR) 80 MG tablet [[id number]]    Overridden by Aelxa Guerrier RN on Jul 5, 2021 10:47 AM   Drug-Drug   1. GEMFIBROZIL / STATINS [Level: Major] [Reason: Tolerated medication/side effects in past]   Other Orders: gemfibrozil (LOPID) 600 MG tablet         Overridden by David Chavez MD on May 21, 2021 2:37 PM   Drug-Drug   1. COLCHICINE / STATINS [Level: Major]   Other Orders: COLCRYS 0.6 MG tablet         Overridden by Love Alex RN on May 20, 2021 12:45 PM   Drug-Drug   1. GEMFIBROZIL / STATINS [Level: Major] [Reason: Tolerated medication/side effects in past]   Other Orders: gemfibrozil (LOPID) 600 MG tablet      2. COLCHICINE / STATINS [Level: Major] [Reason: Tolerated medication/side effects in past]   Other Orders: COLCRYS 0.6 MG tablet

## 2021-08-06 NOTE — TELEPHONE ENCOUNTER
Per patient myChart message:  From: Lamont Daniels      Created: 8/5/2021 9:07 PM      I know that you gave me the generic but ran, but I just let you know that it doesn t work well. It does not stick for long.  The most is 2-3 days it peels off. It supposed to last a week.  I would rather take pain medicine, if I don t get the brand name.      Lamont Daniels       ________    [facility]  Was told the script usually is written for LISANDRO but this one isn't. Pt has not filled it yet. A new script is needed for the LISANDRO one.   _________________________________    Mychart sent to pt:  From: Maureen Nielsen RN      Created: 8/6/2021 9:54 AM      Hi Lamont.  I am so sorry for our mistake here.  I have sent this on to Dr. Loo to sign a new script for you. We will let you know when [facility].  I did add more information to the script so that this does not occur in the future.        New script prepped for Dr. Loo to review and sign.    VIVIANA Reddy-BSN  [facility]

## 2021-08-06 NOTE — TELEPHONE ENCOUNTER
See the 8/2/21 telephone  encounter for more information.    Maureen Nielsen RN-BSN  [facility]

## 2021-08-06 NOTE — TELEPHONE ENCOUNTER
Please let him know name brand was sent.   The message didn't carry over from one script to another, so we didn't intend for there to be a change.    Signed Prescriptions:                        Disp   Refills    BUTRANS 20 MCG/HR WK patch                 4 patch0        Sig: Place 1 patch onto the skin every 7 days Fill now.           Start now.  LISANDRO, name brand  Authorizing Provider: KAYLA CHUN MD  [facility]

## 2021-08-06 NOTE — TELEPHONE ENCOUNTER
Pt informed via justyna.    Maureen RN-BSN  [facility]

## 2021-08-20 NOTE — TELEPHONE ENCOUNTER
Date: 1/13/2017    Time of Call: 4:23 PM     Diagnosis:  hypercholesterolemia     [  ] Ordering provider: Dr Travis    Order: For now I would say continue, since the SE doesn't seem serious. Lets check labs when he is back in April. Also, I saw he already has an appt for august too, you can cancel that.     Order received by: VIVIANA Ordaz     Follow-up/additional notes: Left message for patient. Asked him to call after next injection with status report. Lab order placed for lipid panel . Appt cancelled for August.        Fusiform Excision Additional Text (Leave Blank If You Do Not Want): The margin was drawn around the clinically apparent lesion.  A fusiform shape was then drawn on the skin incorporating the lesion and margins.  Incisions were then made along these lines to the appropriate tissue plane and the lesion was extirpated.

## 2021-08-23 ENCOUNTER — LAB (OUTPATIENT)
Dept: LAB | Facility: CLINIC | Age: 56
End: 2021-08-23
Payer: COMMERCIAL

## 2021-08-23 DIAGNOSIS — M06.09 RHEUMATOID ARTHRITIS OF MULTIPLE SITES WITH NEGATIVE RHEUMATOID FACTOR (H): ICD-10-CM

## 2021-08-23 DIAGNOSIS — Z79.899 HIGH RISK MEDICATION USE: ICD-10-CM

## 2021-08-23 LAB
ALBUMIN SERPL-MCNC: 4 G/DL (ref 3.4–5)
ALP SERPL-CCNC: 108 U/L (ref 40–150)
ALT SERPL W P-5'-P-CCNC: 27 U/L (ref 0–70)
AST SERPL W P-5'-P-CCNC: 21 U/L (ref 0–45)
BASOPHILS # BLD AUTO: 0.1 10E3/UL (ref 0–0.2)
BASOPHILS NFR BLD AUTO: 1 %
BILIRUB DIRECT SERPL-MCNC: <0.1 MG/DL (ref 0–0.2)
BILIRUB SERPL-MCNC: 0.4 MG/DL (ref 0.2–1.3)
CREAT SERPL-MCNC: 0.84 MG/DL (ref 0.66–1.25)
CRP SERPL-MCNC: <2.9 MG/L (ref 0–8)
EOSINOPHIL # BLD AUTO: 0.1 10E3/UL (ref 0–0.7)
EOSINOPHIL NFR BLD AUTO: 2 %
ERYTHROCYTE [DISTWIDTH] IN BLOOD BY AUTOMATED COUNT: 13.8 % (ref 10–15)
ERYTHROCYTE [SEDIMENTATION RATE] IN BLOOD BY WESTERGREN METHOD: 12 MM/HR (ref 0–20)
GFR SERPL CREATININE-BSD FRML MDRD: >90 ML/MIN/1.73M2
HCT VFR BLD AUTO: 42.9 % (ref 40–53)
HGB BLD-MCNC: 14.2 G/DL (ref 13.3–17.7)
IMM GRANULOCYTES # BLD: 0 10E3/UL
IMM GRANULOCYTES NFR BLD: 0 %
LYMPHOCYTES # BLD AUTO: 2.2 10E3/UL (ref 0.8–5.3)
LYMPHOCYTES NFR BLD AUTO: 41 %
MCH RBC QN AUTO: 29.5 PG (ref 26.5–33)
MCHC RBC AUTO-ENTMCNC: 33.1 G/DL (ref 31.5–36.5)
MCV RBC AUTO: 89 FL (ref 78–100)
MONOCYTES # BLD AUTO: 0.7 10E3/UL (ref 0–1.3)
MONOCYTES NFR BLD AUTO: 13 %
NEUTROPHILS # BLD AUTO: 2.3 10E3/UL (ref 1.6–8.3)
NEUTROPHILS NFR BLD AUTO: 43 %
PLATELET # BLD AUTO: 278 10E3/UL (ref 150–450)
PROT SERPL-MCNC: 7.7 G/DL (ref 6.8–8.8)
RBC # BLD AUTO: 4.81 10E6/UL (ref 4.4–5.9)
WBC # BLD AUTO: 5.4 10E3/UL (ref 4–11)

## 2021-08-23 PROCEDURE — 85025 COMPLETE CBC W/AUTO DIFF WBC: CPT

## 2021-08-23 PROCEDURE — 80076 HEPATIC FUNCTION PANEL: CPT

## 2021-08-23 PROCEDURE — 86140 C-REACTIVE PROTEIN: CPT

## 2021-08-23 PROCEDURE — 82565 ASSAY OF CREATININE: CPT

## 2021-08-23 PROCEDURE — 36415 COLL VENOUS BLD VENIPUNCTURE: CPT

## 2021-08-23 PROCEDURE — 85652 RBC SED RATE AUTOMATED: CPT

## 2021-08-31 ENCOUNTER — TELEPHONE (OUTPATIENT)
Dept: PALLIATIVE MEDICINE | Facility: CLINIC | Age: 56
End: 2021-08-31

## 2021-08-31 DIAGNOSIS — G89.4 CHRONIC PAIN SYNDROME: ICD-10-CM

## 2021-08-31 DIAGNOSIS — M06.9 RHEUMATOID ARTHRITIS INVOLVING MULTIPLE SITES, UNSPECIFIED WHETHER RHEUMATOID FACTOR PRESENT (H): ICD-10-CM

## 2021-08-31 RX ORDER — BUPRENORPHINE 20 UG/H
1 PATCH, EXTENDED RELEASE TRANSDERMAL
Qty: 4 PATCH | Refills: 0 | Status: SHIPPED | OUTPATIENT
Start: 2021-08-31 | End: 2021-09-03

## 2021-08-31 NOTE — TELEPHONE ENCOUNTER
Reason for Call:  Medication or medication refill:    Do you use a [facility]?  Name of the pharmacy and phone number for the current request:  [facility] - [place] - [phone]    Name of the medication requested: Butrans 20 MCG Patch- would like this done by Friday, almost out    Other request: Please call once filled    Can we leave a detailed message on this number? YES    Phone number patient can be reached at: Home number on file [phone] (home)    Best Time: Any    Call taken on 8/31/2021 at 12:14 PM by Emperatriz Hill

## 2021-08-31 NOTE — TELEPHONE ENCOUNTER
Routing Butrans refill request to prescriber, Dr. Loo.    Andria Brewer RN on 8/31/2021 at 1:48 PM

## 2021-08-31 NOTE — TELEPHONE ENCOUNTER
Script Eprescribed to pharmacy  [place] Prescription Monitoring Program checked    Will send this to MA team to notify patient.  Please remind him he has to call our clinic for refills.  He is overdue for follow up - this needs to be scheduled ASAP  This should be in person    Signed Prescriptions:                        Disp   Refills    BUTRANS 20 MCG/HR WK patch                 4 patch0        Sig: Place 1 patch onto the skin every 7 days Fill now to           start 9/3  LISANDRO, name brand  Authorizing Provider: KAYLA CHUN MD  [facility]

## 2021-08-31 NOTE — TELEPHONE ENCOUNTER
Left message on numerical id vm  to inform of approved Rx, also given clinic number to call to schedule appt . Will also send a Estoreifyhart message to patient.  See/MA  [facility]

## 2021-09-01 ENCOUNTER — MYC REFILL (OUTPATIENT)
Dept: FAMILY MEDICINE | Facility: CLINIC | Age: 56
End: 2021-09-01

## 2021-09-01 DIAGNOSIS — F45.8 ANXIETY HYPERVENTILATION: ICD-10-CM

## 2021-09-01 DIAGNOSIS — F41.9 ANXIETY HYPERVENTILATION: ICD-10-CM

## 2021-09-01 DIAGNOSIS — M06.09 RHEUMATOID ARTHRITIS OF MULTIPLE SITES WITH NEGATIVE RHEUMATOID FACTOR (H): ICD-10-CM

## 2021-09-01 RX ORDER — LEFLUNOMIDE 20 MG/1
20 TABLET ORAL DAILY
Qty: 90 TABLET | Refills: 0 | Status: SHIPPED | OUTPATIENT
Start: 2021-09-01 | End: 2021-10-20

## 2021-09-01 RX ORDER — CLONAZEPAM 1 MG/1
TABLET ORAL
Qty: 90 TABLET | Refills: 0 | Status: SHIPPED | OUTPATIENT
Start: 2021-09-01 | End: 2021-10-28

## 2021-09-01 NOTE — TELEPHONE ENCOUNTER
Routing refill request to provider for review/approval because:  Drug not on the [facility] refill protocol     Joselyn MEJIAN, RN

## 2021-09-01 NOTE — TELEPHONE ENCOUNTER
Medication:   Leflunomide 20 mg  Last written on:   8/4/2021  Quantity:   30    Refills:   0    Last office visit:   3/30/2021  Cancelled  6/28/2021  Next office visit:   10/20/2021  Last labs:   8/23/2021    Helga Guerrier CMA Rheumatology  9/1/2021 4:35 PM

## 2021-09-03 ENCOUNTER — VIRTUAL VISIT (OUTPATIENT)
Dept: PALLIATIVE MEDICINE | Facility: CLINIC | Age: 56
End: 2021-09-03
Payer: COMMERCIAL

## 2021-09-03 DIAGNOSIS — Z51.81 ENCOUNTER FOR MONITORING OPIOID MAINTENANCE THERAPY: Primary | ICD-10-CM

## 2021-09-03 DIAGNOSIS — M47.819 FACET ARTHROPATHY: ICD-10-CM

## 2021-09-03 DIAGNOSIS — F33.1 MAJOR DEPRESSIVE DISORDER, RECURRENT EPISODE, MODERATE (H): ICD-10-CM

## 2021-09-03 DIAGNOSIS — Z79.891 ENCOUNTER FOR MONITORING OPIOID MAINTENANCE THERAPY: Primary | ICD-10-CM

## 2021-09-03 DIAGNOSIS — G89.4 CHRONIC PAIN SYNDROME: ICD-10-CM

## 2021-09-03 DIAGNOSIS — M06.9 RHEUMATOID ARTHRITIS INVOLVING MULTIPLE SITES, UNSPECIFIED WHETHER RHEUMATOID FACTOR PRESENT (H): ICD-10-CM

## 2021-09-03 DIAGNOSIS — G47.33 OSA (OBSTRUCTIVE SLEEP APNEA): ICD-10-CM

## 2021-09-03 PROCEDURE — 99213 OFFICE O/P EST LOW 20 MIN: CPT | Mod: 95 | Performed by: PSYCHIATRY & NEUROLOGY

## 2021-09-03 RX ORDER — GABAPENTIN 300 MG/1
600 CAPSULE ORAL 3 TIMES DAILY
Qty: 540 CAPSULE | Refills: 6 | Status: SHIPPED | OUTPATIENT
Start: 2021-09-03 | End: 2022-04-29

## 2021-09-03 RX ORDER — BUPRENORPHINE 20 UG/H
1 PATCH, EXTENDED RELEASE TRANSDERMAL
Qty: 4 PATCH | Refills: 0 | Status: SHIPPED | OUTPATIENT
Start: 2021-09-03 | End: 2021-10-27

## 2021-09-03 ASSESSMENT — PAIN SCALES - GENERAL: PAINLEVEL: SEVERE PAIN (6)

## 2021-09-03 NOTE — TELEPHONE ENCOUNTER
"Requested Prescriptions   Pending Prescriptions Disp Refills    busPIRone HCl (BUSPAR) 30 MG tablet [Pharmacy Med Name: BUSPIRONE HCL 30 MG TABLET] 180 tablet 3     Sig: TAKE 1 TABLET (30 MG) BY MOUTH 2 TIMES DAILY        Atypical Antidepressants Protocol Failed - 9/3/2021 12:35 AM        Failed - Patient has PHQ-9 score less than 5 in past 6 months.     Please review last PHQ-9 score.           Passed - Medication active on med list        Passed - Patient is age 18 or older        Passed - Recent (6 mo) or future (30 days) visit within the authorizing provider's specialty     Patient had office visit in the last 6 months or has a visit in the next 30 days with authorizing provider or within the authorizing provider's specialty.  See \"Patient Info\" tab in inbasket, or \"Choose Columns\" in Meds & Orders section of the refill encounter.                  "

## 2021-09-03 NOTE — PROGRESS NOTES
Lamont is a 55 year old who is being evaluated via a billable video visit.      How would you like to obtain your AVS? MyChart  If the video visit is dropped, the invitation should be resent by: Text to cell phone: [phone]  Will anyone else be joining your video visit? No      Video Start Time: 0932  Video-Visit Details    Type of service:  Video Visit    Video End Time:0941    Originating Location (pt. Location): Home    Distant Location (provider location):  [facility] [place]     Platform used for Video Visit: RoundPegg                                 [facility]    Date of visit: 9/3/2021     Chief complaint:   Chief Complaint   Patient presents with     Pain     Video visit due to Covid 19       Interval history:  Lamont Daniels was last seen by me on 4/30/21    Recommendations/plan at the last visit included:  1. Physical Therapy:  Completed pool and land therapy in past. Continues to decline.  Discussed inactivity and need for light activity  2. Clinical Health Psychologist to address issues of relaxation, behavioral change, coping style, and other factors important to improvement: pt not interested in pursuing  3. Diagnostic Studies:none  4. Medication Management:  No changes. We are aware of use of both benzo and butrans patch- have not been able to further wean. Have monitored with sleep medicine, and his JEROD significant improved changing to butrans. Patient aware of risk. Will schedule follow up with sleep.  Discussed CBD oil, he will look into options  5. Further procedures recommended: he is not interested in procedures.  6. Recommendations to PCP: none  7. Follow up: 3 months, in person    Since his last visit, Lamont Daniels reports:  -has been doing ok.   He feels like he is doing slightly better with his arthritis.  The summer months have been good.  gets around home and at times exercises out of the house  -continues to use his CPAP  -using Butrans patch- no  problems.  -using CBD oil, able to lessen the clonazepam    Pain scores:  Severe Pain (6)     Current pain treatment  butrans 20mcg/hr  Acetaminophen 500 mg PRN (not taking daily)  Gabapentin 600mg 3 times daily  Baclofen 10mg BID   Diclofenac gel PRN (not taking daily and uses at joints)  CBD oil    Clonazepam 1mg once a day - not using at night.    Previous medication treatments included:  Codeine, oxycodone, hydrocodone- given for other issues- helpful  Cymbalta 60mg daily- given for mental health- was given by Dr. Acosta- not been higher  Baclofen 10mg at bedtime- not helpful, no side effects  Robaxin 500 mg 1 tablet, 1-3 times a day depending on the day  Ibuprofen 800 mg- using 3-4 times per week, helpful but started meloxicam  Meloxicam  Oxycodone 5 mg : takes 6/day    Other treatments have included:  Lamont Daniels has not been seen at a pain clinic in the past.    PT: has done for various reasons, most recently the low back.  Acupuncture: as done a couple of needles with adjustment  TENs Unit: no  Injections: no    Side Effects: no    Medications:  Current Outpatient Medications   Medication Sig Dispense Refill     BUTRANS 20 MCG/HR WK patch Place 1 patch onto the skin every 7 days Fill 9/29 to start 10/1  LISANDRO, name brand 4 patch 0     gabapentin (NEURONTIN) 300 MG capsule Take 2 capsules (600 mg) by mouth 3 times daily . 3 month supply 540 capsule 6     naloxone (NARCAN) 4 MG/0.1ML nasal spray Spray 1 spray (4 mg) into one nostril alternating nostrils once as needed for opioid reversal every 2-3 minutes until assistance arrives 0.2 mL 0     Abatacept (ORENCIA) 125 MG/ML SOAJ auto-injector Inject 1 mL (125 mg) Subcutaneous every 7 days 12 mL 2     Acetaminophen (TYLENOL PO)        allopurinol (ZYLOPRIM) 300 MG tablet TAKE 1 TABLET BY MOUTH EVERY DAY 90 tablet 3     aspirin EC 81 MG EC tablet Take 1 tablet (81 mg) by mouth daily (*) 30 tablet 1     atorvastatin (LIPITOR) 80 MG tablet Take 1 tablet (80 mg) by mouth  daily 90 tablet 3     bacitracin 500 UNIT/GM external ointment Apply topically 3 times daily 30 g 3     baclofen (LIORESAL) 10 MG tablet TAKE 1 TABLET BY MOUTH 2 TIMES DAILY AS NEEDED FOR MUSCLE SPASM 180 tablet 2     busPIRone HCl (BUSPAR) 30 MG tablet TAKE 1 TABLET (30 MG) BY MOUTH 2 TIMES DAILY 180 tablet 3     Cholecalciferol (VITAMIN D) 2000 UNITS tablet TAKE ONE TABLET BY MOUTH ONCE DAILY 100 tablet 1     clobetasol (TEMOVATE) 0.05 % external cream Apply twice daily for 2 weeks, weekends only for 2 weeks, repeat cycle as needed for hands, not face or genitals 60 g 11     clobetasol (TEMOVATE) 0.05 % external solution Apply twice daily to scalp for up to 2 weeks for flares. (Patient not taking: Reported on 6/22/2021) 60 mL 0     clonazePAM (KLONOPIN) 1 MG tablet TAKE 0.5-1 TABLETS BY MOUTH 2 TIMES DAILY AS NEEDED FOR ANXIETY 90 tablet 0     clopidogrel (PLAVIX) 75 MG tablet Take 1 tablet (75 mg) by mouth daily 90 tablet 2     COLCRYS 0.6 MG tablet TAKE 2 TABLETS BY MOUTH AT THE FIRST SIGN OF FLARE, TAKE 1 ADDITIONAL TABLET ONE HOUR LATER. 90 tablet 1     desvenlafaxine (PRISTIQ) 50 MG 24 hr tablet TAKE 1 TABLET BY MOUTH EVERY DAY 90 tablet 3     diclofenac (VOLTAREN) 1 % topical gel Apply 4 g topically 4 times daily as needed for moderate pain 300 g 11     diclofenac (VOLTAREN) 1 % topical gel Apply 4 grams to bilateral knees, up to four times daily as needed using enclosed dosing card.  Max of 32g/day 300 g 11     evolocumab (REPATHA) 140 MG/ML prefilled autoinjector Inject 1 mL (140 mg) Subcutaneous every 14 days 2 mL 11     ezetimibe (ZETIA) 10 MG tablet Take 1 tablet (10 mg) by mouth daily 90 tablet 3     gemfibrozil (LOPID) 600 MG tablet Take 1 tablet (600 mg) by mouth 2 times daily 180 tablet 2     hydrocortisone (WESTCORT) 0.2 % external cream FOR GENITALS, APPLY TWICE DAILY FOR UP TO 2 WEEKS, TAKE 2 WEEKS OFF THEN REPEAT 60 g 3     hydrocortisone 2.5 % cream FOR FACE, APPLY TWICE DAILY FOR UP TO 2 WEEK  FOR FLARES ON THE FACE, TAKE 2 WEEKS OFF 28.35 g 3     hydroxychloroquine (PLAQUENIL) 200 MG tablet Take 1 tablet (200 mg) by mouth daily 90 tablet 2     Incontinence Supply Disposable (DEPEND UNDERWEAR LARGE) MISC Use twice daily. 60 each 11     leflunomide (ARAVA) 20 MG tablet Take 1 tablet (20 mg) by mouth daily ; Labs required every 8-12 weeks. 90 tablet 0     meclizine (ANTIVERT) 25 MG tablet TAKE 1 TABLET BY MOUTH EVERY 6 HOURS AS NEEDED FOR DIZZINESS 30 tablet 5     melatonin 3 MG tablet Take 1 tablet (3 mg) by mouth nightly as needed for sleep       nitroGLYcerin (NITROSTAT) 0.4 MG sublingual tablet For chest pain place 1 tablet under the tongue every 5 minutes for 3 doses. If symptoms persist 5 minutes after 1st dose call 911. 25 tablet 1     order for DME Equipment being ordered: chair lift 1 each 0     order for DME Equipment being ordered: single end cane 1 Units 0     ORDER FOR DME 1.  CPAP pressure 11 cm/H20 with heated humidity.   2.  Provide mask to fit and CPAP supplies.   3.  Length of need lifetime.   4.  If needed please provide a chin strap            pantoprazole (PROTONIX) 40 MG EC tablet TAKE 1 TABLET BY MOUTH EVERY DAY 90 tablet 2     pimecrolimus (ELIDEL) 1 % external cream APPLY TWICE DAILY FOR FACE AND GENITALS 60 g 1     predniSONE (DELTASONE) 5 MG tablet For rheumatoid arthritis flare only: prednisone 10mg daily x2days, then 5mg daily x5days, then stop. (Patient not taking: Reported on 6/22/2021) 9 tablet 3     sildenafil (REVATIO) 20 MG tablet TAKE 2 TO 5 TABLETS BY MOUTH 30 MINUTES PRIOR TO INTERCOURSE AS NEEDED. 150 tablet 3     sulfaSALAzine (AZULFIDINE) 500 MG tablet Take 3 tablets (1,500 mg) by mouth 2 times daily 540 tablet 2     sulfaSALAzine ER (AZULFIDINE EN) 500 MG EC tablet Take 3 tablets (1,500 mg) by mouth 2 times daily (Patient not taking: Reported on 6/22/2021) 540 tablet 2     tamsulosin (FLOMAX) 0.4 MG capsule Take 2 capsules (0.8 mg) by mouth daily 180 capsule 3      tenofovir (VIREAD) 300 MG tablet Take 1 tablet (300 mg) by mouth daily 90 tablet 3     triamcinolone (KENALOG) 0.1 % external ointment APPLY TOPICALLY TO AFFECTED AREA(S) 3 TIMES DAILY 80 g 2     triamcinolone (KENALOG) 0.1 % external ointment Apply to trunk and extremities twice daily for up to 2 weeks for flares 80 g 1     zolpidem (AMBIEN) 5 MG tablet Take 1 tablet (5 mg) by mouth nightly as needed for sleep 30 tablet 5       Medical History: any changes in medical history since they were last seen? None        THE 4 A's OF OPIOID MAINTENANCE ANALGESIA    Analgesia: good.  He had better with oxycodone    Activity: better with med, still limited    Adverse effects: none    Adherence to Rx protocol: good    [place] Board of Pharmacy Data Base Reviewed: 9/3/2021   YES; no concerns   Last UDS and opioid agreement  10/27/20    Assessment:   1. Chronic low back pain, with strong facet and myofascial features  2. Rheumatoid arthritis  3. Peripheral neuropathy  4. Depression, anxiety  5. Hep B - on meds  6. Gout  7. JEROD, currently treated with CPAP, central apnea ok    Plan:   1. Physical Therapy:  Pt not willing  2. Clinical Health Psychologist to address issues of relaxation, behavioral change, coping style, and other factors important to improvement: pt not interested in pursuing  3. Diagnostic Studies: none  4. Medication Management:  No changes. We are aware of use of both benzo and butrans patch- have not been able to further wean. Have monitored with sleep medicine, and his JEROD significant improved changing to butrans. Patient aware of risk. Trying to decrease benzo with CBD  5. Further procedures recommended: he is not interested in procedures.  6. Recommendations to PCP: none  7. Follow up: 3 months, in person    25 minutes spent on the date of encounter doing chart review, history, and exam documentation and further activities as noted above.     Lian Loo MD  [facility]

## 2021-09-03 NOTE — PROGRESS NOTES
Date of Service: 4/1/2019     Subjective:            Lamont Daniels is a 53 year old male presenting for evaluation of liver disease    History of Present Illness   Lamont Daniels is a 53 year old male with past medical history of hypertension, dyslipidemia, coronary artery disease, mild obesity with resultant nonalcoholic steatohepatitis and chronic hepatitis B who presents for routine evaluation and follow-up.    Since last seen he reports doing well.  He has not had any presentations to the emergency department or admissions to the hospital.  He denies issues with abdominal pain.  He notes that he does have limitations in his ability to ambulate secondary to chronic low back pain, and he does use a cane for assistance.    He continues to take his tenofovir daily, without difficulty and denies any overt side effects.    In the past, hematology providers have had concerns that his abnormal liver tests are related mostly to nonalcoholic steatohepatitis rather than his chronic hepatitis B as he has had a essentially viral suppression on the tenofovir.    He continues to work closely with his primary care providers in regards to the management of his medical issues.    Past Medical History:  Past Medical History:   Diagnosis Date     Anxiety      CAD (coronary artery disease)     CABG, PCI     Congestive heart failure, unspecified 2008     Depressive disorder 2008     Gout      Hepatitis B > 10 years     HTN (hypertension)      Hyperlipidemia LDL goal < 100      Malrotation of intestine      Moderate major depression (H)      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) 9/19/2011    Uses CPAP     Rheumatoid arthritis flare (H) 1012     Steatohepatitis 12/15    liver biopsy     Tuberculosis age 13    INH x 9 months      Vitamin D deficiency        Surgical History:  Past Surgical History:   Procedure Laterality Date     ABDOMEN SURGERY  2014     BIOPSY  2015     BYPASS GRAFT ARTERY CORONARY  2008    6 vessels     COLONOSCOPY  2/8/2013    Procedure:  LMT confirm appt COLONOSCOPY;  Colonoscopy, blood in stool;  Surgeon: Duane, William Charles, MD;  Location: [facility] OR     COMBINED REPAIR PTOSIS WITH BLEPHAROPLASTY BILATERAL Bilateral 6/25/2018    Procedure: COMBINED REPAIR PTOSIS WITH BLEPHAROPLASTY BILATERAL;  Bilateral upper eyelid blepharoplasty, ptosis repair and brow ptosis repair;  Surgeon: Sandra Borja MD;  Location: [facility] OR     GI SURGERY  2014     HC CORONARY STENT PERCUT, EA ADDTL VESSEL  2008    3 months after CABG     HEAD & NECK SURGERY  2011     NASAL/SINUS POLYPECTOMY  2010     ORTHOPEDIC SURGERY  2012     REPAIR PTOSIS       REPAIR PTOSIS BROW BILATERAL Bilateral 6/25/2018    Procedure: REPAIR PTOSIS BROW BILATERAL;;  Surgeon: Sandra Borja MD;  Location:  OR     THORACIC SURGERY  1989    tb     TONSILLECTOMY  2010     UVULOPALATOPHARYNGOPLASTY  2010     VASCULAR SURGERY  2008       Social History:  Social History     Socioeconomic History     Marital status:      Spouse name: Not on file     Number of children: 5     Years of education: 14     Highest education level: Not on file   Occupational History     Occupation: [employer]     Employer: UNEMPLOYED     Comment: 2-2011. On long term disability. SSD applied for   Social Needs     Financial resource strain: Not on file     Food insecurity:     Worry: Not on file     Inability: Not on file     Transportation needs:     Medical: Not on file     Non-medical: Not on file   Tobacco Use     Smoking status: Never Smoker     Smokeless tobacco: Never Used   Substance and Sexual Activity     Alcohol use: No     Alcohol/week: 0.0 oz     Drug use: No     Sexual activity: Yes     Partners: Female   Lifestyle     Physical activity:     Days per week: Not on file     Minutes per session: Not on file     Stress: Not on file   Relationships     Social connections:     Talks on phone: Not on file     Gets together: Not on file     Attends Advent service: Not on file     Active member of club or  organization: Not on file     Attends meetings of clubs or organizations: Not on file     Relationship status: Not on file     Intimate partner violence:     Fear of current or ex partner: Not on file     Emotionally abused: Not on file     Physically abused: Not on file     Forced sexual activity: Not on file   Other Topics Concern     Parent/sibling w/ CABG, MI or angioplasty before 65F 55M? Yes     Comment: father   Social History Narrative    . No current SO.         Has 5 children. Youngest child does live with him.         H/o AA degree and previously worked as a [employer]. Currently unemployed.         Denies tobacco use.     Alcohol use: 2x/week with minimal amount of alcohol per time.     Drug use: denies       Family History:  Family History   Problem Relation Age of Onset     C.A.D. Father      Coronary Artery Disease Father      Hypertension Father      Depression Father      Hypertension Mother      Diabetes Mother      Depression Mother      Mental Illness Mother      Hypertension Maternal Grandfather      Cancer Paternal Grandfather      Other Cancer Paternal Grandfather      Other Cancer Other      Autoimmune Disease No family hx of      Cerebrovascular Disease No family hx of      Thyroid Disease No family hx of      Glaucoma No family hx of      Macular Degeneration No family hx of             Medications:  Current Outpatient Medications   Medication     Abatacept (ORENCIA) 125 MG/ML SOAJ auto-injector     Acetaminophen (TYLENOL PO)     allopurinol (ZYLOPRIM) 300 MG tablet     aspirin EC 81 MG EC tablet     atorvastatin (LIPITOR) 80 MG tablet     baclofen (LIORESAL) 10 MG tablet     blood glucose (NO BRAND SPECIFIED) lancets standard     blood glucose monitoring (NO BRAND SPECIFIED) meter device kit     blood glucose monitoring (NO BRAND SPECIFIED) test strip     buprenorphine (BUTRANS) 20 MCG/HR WK patch     busPIRone HCl (BUSPAR) 30 MG tablet     Cholecalciferol (VITAMIN D) 2000  "UNITS tablet     clonazePAM (KLONOPIN) 1 MG tablet     clopidogrel (PLAVIX) 75 MG tablet     COLCRYS 0.6 MG tablet     desvenlafaxine succinate (PRISTIQ) 25 MG 24 hr tablet     diclofenac (VOLTAREN) 1 % topical gel     erythromycin (ROMYCIN) ophthalmic ointment     evolocumab (REPATHA) 140 MG/ML prefilled autoinjector     ezetimibe (ZETIA) 10 MG tablet     fluticasone (FLONASE) 50 MCG/ACT spray     gabapentin (NEURONTIN) 300 MG capsule     gemfibrozil (LOPID) 600 MG tablet     hydroxychloroquine (PLAQUENIL) 200 MG tablet     meclizine (ANTIVERT) 25 MG tablet     melatonin 3 MG tablet     mirtazapine (REMERON) 15 MG tablet     naloxone (NARCAN) nasal spray     neomycin-polymyxin-dexamethasone (MAXITROL) 3.5-88297-4.1 SUSP ophthalmic susp     nitroGLYcerin (NITROSTAT) 0.4 MG sublingual tablet     order for DME     ORDER FOR DME     pantoprazole (PROTONIX) 40 MG EC tablet     predniSONE (DELTASONE) 2.5 MG tablet     sulfaSALAzine ER (AZULFIDINE EN) 500 MG EC tablet     tamsulosin (FLOMAX) 0.4 MG capsule     tenofovir (VIREAD) 300 MG tablet     triamcinolone (KENALOG) 0.1 % ointment     zolpidem (AMBIEN) 5 MG tablet     No current facility-administered medications for this visit.      Facility-Administered Medications Ordered in Other Visits   Medication     gadobutrol (GADAVIST) injection 10 mL       Review of Systems  A complete 10 point review of systems was asked and answered in the negative unless specifically commented upon in the HPI    Objective:           Vitals:    04/01/19 0956   BP: 127/82   Pulse: 64   Temp: 98.2  F (36.8  C)   TempSrc: Oral   SpO2: 96%   Weight: 81.2 kg (179 lb)   Height: 1.575 m (5' 2\")     Body mass index is 32.74 kg/m .     Physical Exam    Vitals reviewed.   Constitutional: Well-developed, well-nourished, in no apparent distress.    HEENT: Normocephalic. no scleral icterus. Moist oral mucosa. Dentition normal  Neck/Lymph: Normal ROM, supple. No thyromegaly.  No " lymphadenopathy  Cardiac:  Regular rate and rhythm.  No overt murmurs  Respiratory: Clear to auscultation bilaterally.  No wheezes or rales  GI:  Abdomen soft, non-distended, non-tender. BS present. no shifting dullness. No overt hepatosplenomegaly.     Skin:  Skin is warm and dry. No rash noted.  no jaundice. no spider nevi noted.  no palmar erythema  Peripheral Vascular: no lower extremity edema. 2+ pulses in all extremities  Musculoskeletal:  ROM intact, normal muscle bulk    Psychiatric: Normal mood and affect. Behavior is normal.  Neuro:  no asterixis, no tremor    Labs and Diagnostic tests:  Lab Results   Component Value Date     2019    POTASSIUM 3.6 2019    CHLORIDE 110 2019    CO2 23 2019    BUN 16 2019    CR 0.89 2019     Lab Results   Component Value Date    BILITOTAL 0.4 2019    ALT 41 2019    AST 30 2019    ALKPHOS 95 2019     Lab Results   Component Value Date    ALBUMIN 4.0 2019    PROTTOTAL 7.7 2019      Lab Results   Component Value Date    WBC 7.3 2019    HGB 15.3 2019    MCV 88 2019     2019     Lab Results   Component Value Date    INR 1.11 2019       Imagin2017  Liver: The liver is diffusely hyperechoic, attenuating the ultrasound  beam. No focal mass. It measures 15.6 cm in the sagittal dimension.  The main portal vein is patent with antegrade flow, measuring 1 cm in  diameter.   Gallbladder: The gallbladder is well distended and of normal  morphology. There is no wall thickening, pericholecystic fluid,  sonographic Bah's sign nor evidence for cholelithiasis.  Bile Ducts: Both the intra- and extrahepatic biliary system are of  normal caliber.  The common bile duct measures 2 mm in diameter.  Pancreas: Visualized portions of the head and body of the pancreas are  unremarkable.   Kidneys: Both kidneys are of normal echotexture, without mass nor  hydronephrosis.   The  craniocaudal dimensions are: right- 12 cm, left-  11.8 cm.  Spleen: The spleen i measures 11.5 cm in sagittal dimension.  Aorta and IVC:  The visualized portions are nondilated. There are  atherosclerotic plaque in the aorta.   Fluid: No evidence of ascites or pleural effusions.    Procedures:  Liver biopsy: 12/21/2015  FINAL DIAGNOSIS:   Liver, needle biopsy   - Steatohepatitis with mild nonbridging portal fibrosis   - See microscopic description regarding hepatitis B   MICROSCOPIC:   Examination of the trichrome stain demonstrates some fibrous widening of   portal tracts, but without bridging fibrosis or architectural   distortion.  The reticulin stain confirms these findings.  There is no   evidence of regeneration.  The hematoxylin and eosin stained slides   demonstrates a mild patchy portal infiltrate of lymphocytes without   piecemeal necrosis.  The hepatocellular lobule demonstrates   macrovesicular fatty change involving approximately 40% of the   hepatocytes, with ballooning degeneration and hence with features of   steatohepatitis.  Fatty change is not a typical feature of hepatitis B.   Given the presence of steatohepatitis, grading of the patient's   hepatitis B is somewhat questionable, since the portal infiltrate seen   in this case could be due to steatohepatitis as well as to hepatitis B.   If the inflammation was due to hepatitis B, it would be grade 1, and   given the portal fibrosis it would be stage 1    Colonoscopy: 2/8/2013  - normal  - repeat in 10 years    Assessment and Plan:    Chronic Hepatitis B:    -The patient was noted to have E antigen negative, E antibody positive chronic hepatitis B on tenofovir therapy  -His virus level has been chronically well suppressed on his current regimen  -We will repeat hepatitis B DNA today  -We will continue to follow hepatitis B DNA levels every 6 months    Tenofovir Use:  -Tenofovir his medication for chronic control and suppression of chronic  hepatitis B  -He does have the potential side effects of causing lactic acidosis as well as causing chronic kidney injury with resultant Fanconi syndrome  -We will check urine studies with protein to creatinine ratio, urine calcium, and urine phosphorus once or twice a year    Non-Alcoholic Fatty Liver Disease  - I had a long discussion with the patient about nonalcoholic fatty liver disease (NAFLD).  We discussed how fat deposits in the liver, how this leads to inflammation, and how chronic inflammation (ALDANA) can ultimately lead to scarring and cirrhosis.  The long-term complications of fatty liver disease were discussed with the patient, including the increased risk of cardiovascular disease complications,the risk of developing diabetes (if not already), and variable progression to cirrhosis and end stage liver disease.  - At this time, the only way to differentiate between benign steatosis vs. nonalcoholic steatohepatitis (ALDANA) is to perform a liver biopsy.  The liver biopsy would be important for diagnostic, as well as a prognostic and management perspective - If the patient only has benign steatosis, they will have low risk of progression and no treatment will be needed at that time except for lifestyle modifications.  - It is important to note that liver tests alone as a screening test for ALDANA are not sensitive, as up to 20-30% of patients can have ALDANA with fibrosis despite having normal liver chemistries.   - Regarding the management of fatty liver disease, I did discuss with the patient about an appropriate diet, exercise and weight loss plan.  The patient should exercise regularly 3-4 times per week, with an average of about 30-45 minutes per day. The patient should be on low-carbohydrate, low-calorie diet (0214-2653 calories per day). The weight loss plan is to lose 7-10% of body weight in the next three to six months' time.  It has shown that patients who exercise regularly can have improvement of  "insulin resistance and resolution of fatty liver disease, even if they are not able to lose weight.   - Diabetes management is crucial with ideal goal Hgb A1c goal of =/< 7%. If possible addition of insulin sensitizing agents like metformin will be helpful.    - Management of elevated cholesterol is also important in this population.  The use of \"statins\" (HMG-CoA reductase medications) are an effective means of therapy and are not contra-indicated in those with abnormal liver tests OR those with cirrhosis.  The value of these medications in this population far outweigh the minor risks of abnormal liver tests.  Goal LDL in those with ALDANA are < 100 mg/dL  - Consider the utility of liberalizing coffee consumption as some data that this may slow progression and reverse effects of ALDANA-related fibrosis.  - We plan to order liver tests to be checked every 3-6 months to assess for dynamic changes, as a potential marker of another cause of chronic liver disease.    Chronic Hepatitis B Education:  - Patient was educated as to mode of spread of virus  - Encouraged to avoid sharing personal items such as: toothbrushes, nail clippers, razors.  If shared, they should all be cleaned thoroughly.  - Recommend all family members be screened for hepatitis B and vaccinated if they are not infected.    Screening for Hepatocellular Carcinoma:  -We have ordered an abdominal ultrasound for screening to be done within the next 2 weeks  -We have ordered another abdominal ultrasound to be performed in 6 months time and again at his next clinic visit with us in 1 year    Follow Up:  1 year     Thank you very much for the opportunity to participate in the care of this patient.  If you have any further questions, please don't hesitate to contact our office.    Thomas M. Leventhal, M.D.   of Medicine  Advanced & Transplant Hepatology  [facility]    "

## 2021-09-03 NOTE — PATIENT INSTRUCTIONS
----------------------------------------------------------------  [facility] Number:  [phone]     Call with any questions about your care and for scheduling assistance.     Calls are returned Monday through Friday between 8 AM and 4:30 PM. We usually get back to you within 2 business days depending on the issue/request.    If we are prescribing your medications:    For opioid medication refills, call the clinic or send a [employer] message 7 days in advance.  Please include:    Name of requested medication    Name of the pharmacy.    For non-opioid medications, call your pharmacy directly to request a refill. Please allow 3-4 days to be processed.     Per [place] State Law:    All controlled substance prescriptions must be filled within 30 days of being written.      For those controlled substances allowing refills, pickup must occur within 30 days of last fill.      We believe regular attendance is key to your success in our program!      Any time you are unable to keep your appointment we ask that you call us at least 24 hours in advance to cancel.This will allow us to offer the appointment time to another patient.     Multiple missed appointments may lead to dismissal from the clinic.

## 2021-09-07 RX ORDER — BUSPIRONE HYDROCHLORIDE 30 MG/1
30 TABLET ORAL 2 TIMES DAILY
Qty: 180 TABLET | Refills: 3 | Status: SHIPPED | OUTPATIENT
Start: 2021-09-07 | End: 2022-09-02

## 2021-09-22 DIAGNOSIS — M10.00 IDIOPATHIC GOUT, UNSPECIFIED CHRONICITY, UNSPECIFIED SITE: ICD-10-CM

## 2021-09-22 RX ORDER — ALLOPURINOL 300 MG/1
TABLET ORAL
Qty: 90 TABLET | Refills: 3 | Status: SHIPPED | OUTPATIENT
Start: 2021-09-22 | End: 2022-09-02

## 2021-09-22 NOTE — TELEPHONE ENCOUNTER
"Requested Prescriptions   Pending Prescriptions Disp Refills     allopurinol (ZYLOPRIM) 300 MG tablet [Pharmacy Med Name: ALLOPURINOL 300 MG TABLET] 90 tablet 3     Sig: TAKE 1 TABLET BY MOUTH EVERY DAY       Gout Agents Protocol Failed - 9/22/2021 12:27 AM        Failed - Has Uric Acid on file in past 12 months and value is less than 6     Recent Labs   Lab Test 11/04/15  1547   URIC 6.1     If level is 6mg/dL or greater, ok to refill one time and refer to provider.           Passed - CBC on file in past 12 months     Recent Labs   Lab Test 08/23/21  1130   WBC 5.4   RBC 4.81   HGB 14.2   HCT 42.9                    Passed - ALT on file in past 12 months     Recent Labs   Lab Test 08/23/21  1130   ALT 27             Passed - Recent (12 mo) or future (30 days) visit within the authorizing provider's specialty     Patient has had an office visit with the authorizing provider or a provider within the authorizing providers department within the previous 12 mos or has a future within next 30 days. See \"Patient Info\" tab in inbasket, or \"Choose Columns\" in Meds & Orders section of the refill encounter.              Passed - Medication is active on med list        Passed - Patient is age 18 or older        Passed - Normal serum creatinine on file in the past 12 months     Recent Labs   Lab Test 08/23/21  1130   CR 0.84       Ok to refill medication if creatinine is low               Sarah MEJIAN, RN    "

## 2021-09-27 ENCOUNTER — TELEPHONE (OUTPATIENT)
Dept: PALLIATIVE MEDICINE | Facility: CLINIC | Age: 56
End: 2021-09-27

## 2021-09-27 DIAGNOSIS — G89.4 CHRONIC PAIN SYNDROME: ICD-10-CM

## 2021-09-27 DIAGNOSIS — M06.9 RHEUMATOID ARTHRITIS INVOLVING MULTIPLE SITES, UNSPECIFIED WHETHER RHEUMATOID FACTOR PRESENT (H): ICD-10-CM

## 2021-09-27 NOTE — TELEPHONE ENCOUNTER
Received call from patient requesting refill(s) of BUTRANS 20 MCG/HR WK patch     Last dispensed from pharmacy on 09/07/21    Patient's last office/virtual visit by prescribing provider on 09/03/21    Next office/virtual appointment scheduled for 11/04/21    Last urine drug screen date 10/27/20     Current opioid agreement on file (completed within the last year) No Date of opioid agreement: 03/02/20    E-prescribe to   [place] [id number] [place] [facility]   [address] [place] [id number]  Phone: [phone] Fax: [phone]    Will route to nursing Pease for review and preparation of prescription(s).     Yolanda Richey [facility] [place]

## 2021-09-27 NOTE — TELEPHONE ENCOUNTER
Reason for call:  Medication   If this is a refill request, has the caller requested the refill from the pharmacy already? No  Will the patient be using a [facility]? No  Name of the pharmacy and phone number for the current request: [facility] [address] [place] [address]    Name of the medication requested: BUTRANS 20 MCG/HR WK patch    Other request:     Phone number to reach patient:  Cell number on file:    Telephone Information:   Mobile [phone]       Best Time:      Can we leave a detailed message on this number?  YES    Travel screening: Not Applicable

## 2021-09-28 DIAGNOSIS — M62.830 SPASM OF BACK MUSCLES: ICD-10-CM

## 2021-09-28 DIAGNOSIS — M06.09 RHEUMATOID ARTHRITIS OF MULTIPLE SITES WITH NEGATIVE RHEUMATOID FACTOR (H): ICD-10-CM

## 2021-09-28 RX ORDER — HYDROXYCHLOROQUINE SULFATE 200 MG/1
200 TABLET, FILM COATED ORAL DAILY
Qty: 90 TABLET | Refills: 2 | Status: CANCELLED | OUTPATIENT
Start: 2021-09-28

## 2021-09-28 RX ORDER — BACLOFEN 10 MG/1
TABLET ORAL
Qty: 180 TABLET | Refills: 2 | Status: SHIPPED | OUTPATIENT
Start: 2021-09-28 | End: 2022-06-07

## 2021-09-28 NOTE — LETTER
[facility]  [address]  [place] [address]  [phone]          October 6, 2021    Lamont Daniels                                                                                                                     [address]  [facility] [facility] [address]            Dear Lamont,    We have been unable to reach you. We cannot fill your Hydroxychloroquine until you have you special eye test done. You need a 10-2FV and OCT. Please have this completed so we can continue your care.      Sincerely,         Joanna PEARSON RN 10/6/2021 3:00 PM

## 2021-09-29 NOTE — TELEPHONE ENCOUNTER
This is already at the pharmacy. Spoke to pharmacist and they will notify patient.    ROBERTO BarksdaleN, RN  Care Coordinator  [facility] [place]

## 2021-09-29 NOTE — TELEPHONE ENCOUNTER
Patient is due for a hydroxychloroquine toxicity monitoring eye exam. Left message for patient to return call to clinic.    Jf Packer RN....9/29/2021 4:06 PM

## 2021-10-08 DIAGNOSIS — G47.33 OBSTRUCTIVE SLEEP APNEA (ADULT) (PEDIATRIC): Primary | ICD-10-CM

## 2021-10-18 ENCOUNTER — OFFICE VISIT (OUTPATIENT)
Dept: OPHTHALMOLOGY | Facility: CLINIC | Age: 56
End: 2021-10-18
Payer: COMMERCIAL

## 2021-10-18 DIAGNOSIS — H25.813 COMBINED FORM OF AGE-RELATED CATARACT, BOTH EYES: ICD-10-CM

## 2021-10-18 DIAGNOSIS — H35.3131 EARLY DRY STAGE NONEXUDATIVE AGE-RELATED MACULAR DEGENERATION OF BOTH EYES: ICD-10-CM

## 2021-10-18 DIAGNOSIS — H40.003 GLAUCOMA SUSPECT OF BOTH EYES: ICD-10-CM

## 2021-10-18 DIAGNOSIS — H31.001 RETINAL SCAR, RIGHT: ICD-10-CM

## 2021-10-18 DIAGNOSIS — Z79.899 LONG-TERM USE OF PLAQUENIL: Primary | ICD-10-CM

## 2021-10-18 DIAGNOSIS — H52.13 MYOPIA OF BOTH EYES: ICD-10-CM

## 2021-10-18 PROCEDURE — 99207 PR NON-BILLABLE SERV PER CHARTING: CPT

## 2021-10-18 PROCEDURE — 99207 OCT OPTIC NERVE RNFL OPTOVUE OU (BOTH EYES): CPT | Performed by: OPHTHALMOLOGY

## 2021-10-18 PROCEDURE — 92134 CPTRZ OPH DX IMG PST SGM RTA: CPT | Performed by: OPHTHALMOLOGY

## 2021-10-18 PROCEDURE — 99214 OFFICE O/P EST MOD 30 MIN: CPT | Performed by: OPHTHALMOLOGY

## 2021-10-18 PROCEDURE — 92083 EXTENDED VISUAL FIELD XM: CPT | Performed by: OPHTHALMOLOGY

## 2021-10-18 ASSESSMENT — REFRACTION_WEARINGRX
OD_AXIS: 158
OD_SPHERE: -4.75
OD_ADD: +2.25
OS_AXIS: 098
OS_CYLINDER: +0.25
OS_SPHERE: -5.00
OD_CYLINDER: +0.50
SPECS_TYPE: PAL
OS_ADD: +2.25

## 2021-10-18 ASSESSMENT — REFRACTION_MANIFEST
OS_SPHERE: -5.50
OD_ADD: +2.50
OD_CYLINDER: +0.50
OS_CYLINDER: SPHERE
OS_ADD: +2.50
OD_SPHERE: -5.00
OD_AXIS: 158

## 2021-10-18 ASSESSMENT — TONOMETRY
OD_IOP_MMHG: 12
OS_IOP_MMHG: 12
IOP_METHOD: TONOPEN

## 2021-10-18 ASSESSMENT — VISUAL ACUITY
OD_CC: 20/15
OD_CC+: -1
OS_CC: 20/20
OS_CC+: -1
CORRECTION_TYPE: GLASSES
METHOD: SNELLEN - LINEAR

## 2021-10-18 ASSESSMENT — CUP TO DISC RATIO
OS_RATIO: 0.55
OD_RATIO: 0.45

## 2021-10-18 ASSESSMENT — CONF VISUAL FIELD: COMMENTS: HVF 10-2 PERFORMED TODAY

## 2021-10-18 NOTE — PROGRESS NOTES
HPI:  Lamont Daniels is a 55 year old male presenting for Plaquenil eye exam.    Last exam with Dr. Rae 11/11/2019 for Plaquenil screening and glaucoma suspect follow up. Noted to have no maculopathy and recommended follow up with OCT RNFL monitoring as well in one year.    Following with Dr. Jenkins for rheumatoid arthritis, on leflunomide, sulfasalazine, Orencia, Plaquenil.  No eye pain. No flashes. No floaters. Feels vision is good overall.  When he is stressed he feels like his vision is not as good but fixes itself, improves with blinking or rubbing eyes.  No eye drops.  Off Plaquenil for the past few weeks because overdue for eye screening.    Past Ocular History:  Glaucoma suspect (CD ratio 0.45/0.55)  Bilateral brow ptosis repair 7/2019  Glasses    PMH:  RA  Gout  Peripheral neuropathy  Depression  Anxiety  JEROD - on CPAP  Hepatitis B  Coronary bypass    SH:  Retired . Never smoker.    FH:  No known family history of blindness, glaucoma, macular degeneration    ASSESSMENT and PLAN:  1. Long-term use of Plaquenil  2. Early dry stage nonexudative age-related macular degeneration of both eyes  - on Plaquenil for rheumatoid arthritis, following with Dr. Jenkins; off for the past 3 weeks in setting of needing screening eye exam  - [facility] 10-2 10/18/21  Right Eye: reliable, full field  Left Eye: reliable, full field  - OCT review from 11/2018 and 11/2019 (older scans not visible):   Right Eye: superonasal scarring and thinning with irregularity of PRs, interval development of central druse and worsening of AR loss   Left Eye: central and temporal drusen and overlying AR distortion with progressive worsening from 5822-9570  - OCT Macula 10/18/21  Right Eye: stable superonasal scar, increased central drusen and overlying photoreceptor distortion  Left Eye: central and temporal drusen with overlying distortion of photoreceptors and some photoreceptor loss; progressed compared to 2018/2019    - no known  FHx AMD  - never smoker    --> discussed with patient in setting of retinal abnormalities and photoreceptor irregularities would not recommend restarting Plaquenil  --> discussed early AMD each eye  - does not meet criteria for AREDS2  - discussed dark leafy greens  - Amsler grid given and showed how to use  - monitor annually    Discuss with Dr. Jenkins    3. Retinal scar, right  - small nasal retinal scar, present on scans since at least 2018  - monitor    4. Glaucoma suspect of both eyes  - no known FHx glaucoma  - Tmax 20 each eye  - IOP today 14/17  - stable clinical appearance of optic nerves -- slightly tilted left eye in setting of myopia  - OCT RNFL review 5/2019 (older scans not visible):   right eye: avg 97, no thinning, ring of irregular thinning perifoveal on GCC   left eye: 1) avg 91, borderline inf thinning, borderline thinning GCC    2) avg thickness 84, inferior and nasal borderline thinning    - OCT RNFL 10/18/21  Right Eye: avg thickness 95, no thinning, overall stable  Left Eye: avg thickness 80 (borderline), inferior and nasal thinning, similar compared to scan 2 from 2019 but worse than scan 1; possible thinner area on GCC    - given variability in scans, normal IOP, tilted myopic nerve, normal HVF 10-2, no FHx glaucoma, would repeat OCT RNFL in 6 months to assess for possible changes; discussed with patient who is in agreement    Follow up in 6 months for IOP check and OCT RNFL    5. Myopia of both eyes  6. Combined forms of age-related cataract, both eyes  - happy with WRx -- continue   - cataracts not yet vis sig -- monitor        Follow up in 6 months with OCT RNFL or sooner PRN        -----------------------------------------------------------------------------------    Attestation:  Complete documentation of historical and exam elements from today's encounter can be found in the full encounter summary report (not reduplicated in this progress note). I personally obtained the chief  complaint(s) and history of present illness.  I confirmed and edited as necessary the review of systems, past medical/surgical history, family history, social history, and examination findings as documented by others; and I examined the patient myself. I personally reviewed the relevant tests, images, and reports as documented above.     I formulated and edited as necessary the assessment and plan and discussed the findings and management plan with the patient and family.      Elsie Barbosa MD

## 2021-10-18 NOTE — NURSING NOTE
"Chief Complaints and History of Present Illnesses   Patient presents with     Annual Eye Exam       Chief Complaint(s) and History of Present Illness(es)     Annual Eye Exam     Laterality: both eyes    Associated symptoms: floaters (no changes).  Negative for eye pain and flashes              Comments     Patient here for annual eye exam and Plaquenil testing. Currently taking 200 mg of Plaquenil daily. Glasses worn full-time, RX updated after 2019 exam. Describes vision as \"good, but could be better\".   No Pain, No Flashes, Some Floaters w/ no changes in appearance since last exam.                 Zia Duong, Ophthalmic Assistant   "

## 2021-10-19 ENCOUNTER — TELEPHONE (OUTPATIENT)
Dept: RHEUMATOLOGY | Facility: CLINIC | Age: 56
End: 2021-10-19

## 2021-10-20 ENCOUNTER — OFFICE VISIT (OUTPATIENT)
Dept: RHEUMATOLOGY | Facility: CLINIC | Age: 56
End: 2021-10-20
Payer: COMMERCIAL

## 2021-10-20 ENCOUNTER — LAB (OUTPATIENT)
Dept: LAB | Facility: CLINIC | Age: 56
End: 2021-10-20

## 2021-10-20 VITALS
HEART RATE: 62 BPM | RESPIRATION RATE: 16 BRPM | BODY MASS INDEX: 30 KG/M2 | WEIGHT: 164 LBS | OXYGEN SATURATION: 97 % | DIASTOLIC BLOOD PRESSURE: 87 MMHG | SYSTOLIC BLOOD PRESSURE: 136 MMHG

## 2021-10-20 DIAGNOSIS — M06.09 RHEUMATOID ARTHRITIS OF MULTIPLE SITES WITH NEGATIVE RHEUMATOID FACTOR (H): Primary | ICD-10-CM

## 2021-10-20 DIAGNOSIS — Z79.899 HIGH RISK MEDICATION USE: ICD-10-CM

## 2021-10-20 DIAGNOSIS — Z11.59 ENCOUNTER FOR SCREENING FOR OTHER VIRAL DISEASES: ICD-10-CM

## 2021-10-20 DIAGNOSIS — M06.09 RHEUMATOID ARTHRITIS OF MULTIPLE SITES WITH NEGATIVE RHEUMATOID FACTOR (H): ICD-10-CM

## 2021-10-20 DIAGNOSIS — Z23 NEED FOR VACCINATION: ICD-10-CM

## 2021-10-20 DIAGNOSIS — B18.1 CHRONIC VIRAL HEPATITIS B WITHOUT DELTA AGENT AND WITHOUT COMA (H): ICD-10-CM

## 2021-10-20 LAB
ALBUMIN SERPL-MCNC: 4.1 G/DL (ref 3.4–5)
ALP SERPL-CCNC: 120 U/L (ref 40–150)
ALT SERPL W P-5'-P-CCNC: 25 U/L (ref 0–70)
AST SERPL W P-5'-P-CCNC: 22 U/L (ref 0–45)
BASOPHILS # BLD AUTO: 0 10E3/UL (ref 0–0.2)
BASOPHILS NFR BLD AUTO: 1 %
BILIRUB DIRECT SERPL-MCNC: 0.1 MG/DL (ref 0–0.2)
BILIRUB SERPL-MCNC: 0.6 MG/DL (ref 0.2–1.3)
CREAT SERPL-MCNC: 0.87 MG/DL (ref 0.66–1.25)
CRP SERPL-MCNC: <2.9 MG/L (ref 0–8)
EOSINOPHIL # BLD AUTO: 0.1 10E3/UL (ref 0–0.7)
EOSINOPHIL NFR BLD AUTO: 2 %
ERYTHROCYTE [DISTWIDTH] IN BLOOD BY AUTOMATED COUNT: 14.4 % (ref 10–15)
ERYTHROCYTE [SEDIMENTATION RATE] IN BLOOD BY WESTERGREN METHOD: 27 MM/HR (ref 0–20)
FASTING STATUS PATIENT QL REPORTED: YES
GFR SERPL CREATININE-BSD FRML MDRD: >90 ML/MIN/1.73M2
GLUCOSE BLD-MCNC: 94 MG/DL (ref 70–99)
HCT VFR BLD AUTO: 41.3 % (ref 40–53)
HGB BLD-MCNC: 14 G/DL (ref 13.3–17.7)
LYMPHOCYTES # BLD AUTO: 1.6 10E3/UL (ref 0.8–5.3)
LYMPHOCYTES NFR BLD AUTO: 34 %
MCH RBC QN AUTO: 29.7 PG (ref 26.5–33)
MCHC RBC AUTO-ENTMCNC: 33.9 G/DL (ref 31.5–36.5)
MCV RBC AUTO: 88 FL (ref 78–100)
MONOCYTES # BLD AUTO: 0.9 10E3/UL (ref 0–1.3)
MONOCYTES NFR BLD AUTO: 20 %
NEUTROPHILS # BLD AUTO: 2.1 10E3/UL (ref 1.6–8.3)
NEUTROPHILS NFR BLD AUTO: 44 %
PLATELET # BLD AUTO: 273 10E3/UL (ref 150–450)
PROT SERPL-MCNC: 8 G/DL (ref 6.8–8.8)
RBC # BLD AUTO: 4.71 10E6/UL (ref 4.4–5.9)
WBC # BLD AUTO: 4.8 10E3/UL (ref 4–11)

## 2021-10-20 PROCEDURE — 90715 TDAP VACCINE 7 YRS/> IM: CPT | Performed by: INTERNAL MEDICINE

## 2021-10-20 PROCEDURE — 85652 RBC SED RATE AUTOMATED: CPT

## 2021-10-20 PROCEDURE — 36415 COLL VENOUS BLD VENIPUNCTURE: CPT

## 2021-10-20 PROCEDURE — 82947 ASSAY GLUCOSE BLOOD QUANT: CPT

## 2021-10-20 PROCEDURE — 85025 COMPLETE CBC W/AUTO DIFF WBC: CPT

## 2021-10-20 PROCEDURE — 90471 IMMUNIZATION ADMIN: CPT | Performed by: INTERNAL MEDICINE

## 2021-10-20 PROCEDURE — 80076 HEPATIC FUNCTION PANEL: CPT

## 2021-10-20 PROCEDURE — 82565 ASSAY OF CREATININE: CPT

## 2021-10-20 PROCEDURE — 86706 HEP B SURFACE ANTIBODY: CPT

## 2021-10-20 PROCEDURE — 99214 OFFICE O/P EST MOD 30 MIN: CPT | Mod: 25 | Performed by: INTERNAL MEDICINE

## 2021-10-20 PROCEDURE — 86140 C-REACTIVE PROTEIN: CPT

## 2021-10-20 PROCEDURE — 87517 HEPATITIS B DNA QUANT: CPT

## 2021-10-20 RX ORDER — LEFLUNOMIDE 20 MG/1
20 TABLET ORAL DAILY
Qty: 90 TABLET | Refills: 0 | Status: SHIPPED | OUTPATIENT
Start: 2021-10-20 | End: 2021-12-23

## 2021-10-20 RX ORDER — SULFASALAZINE 500 MG/1
1500 TABLET ORAL 2 TIMES DAILY
Qty: 540 TABLET | Refills: 2 | Status: SHIPPED | OUTPATIENT
Start: 2021-10-20 | End: 2022-04-18

## 2021-10-20 ASSESSMENT — PAIN SCALES - GENERAL: PAINLEVEL: NO PAIN (0)

## 2021-10-20 NOTE — NURSING NOTE
RAPID3 (0-30) Cumulative Score  22.0          RAPID3 Weighted Score (divide #4 by 3 and that is the weighted score)  7.3

## 2021-10-20 NOTE — PATIENT INSTRUCTIONS
Dr[facility]  Locations Clinic Hours Telephone Number     [facility]  [address] [place] [id number]     Wednesday: 7:20AM - 4:00PM  Thursday:     7:20AM - 4:00PM     Friday:          7:20AM - 11:00AM       To schedule an appointment with  Dr. Jenkins,  please contact  Specialty [phone]       Kristi [place]encia  [address]  [place] [id number]       Monday:       7:20AM - 4:00PM        Kristi Rider  [address] [place] [address]       Tuesday:      7:20AM - 4:00PM

## 2021-10-20 NOTE — TELEPHONE ENCOUNTER
RN: Please call to notify Lamont Daniels that hydroxychloroquine (Plaquenil) has been removed from his medication list and he should not take hydroxychloroquine again.  Please ask him to dispose of any remaining hydroxychloroquine, and contact his pharmacy to cancel any remaining refills to ensure it is not dispensed again.  The reason for stopping hydroxychloroquine is that there are changes in his eyes that prevent adequate ability to monitor for potential toxicity.    Sebastián Jenkins MD  10/19/2021 9:02 PM

## 2021-10-20 NOTE — TELEPHONE ENCOUNTER
Called patient however there was no answer and the phone kept ringing. Sent patient a [employer] message.    Jf CHANDLER RN....10/20/2021 8:53 AM

## 2021-10-20 NOTE — PROGRESS NOTES
Rheumatology Clinic Visit      Lamont Daniels MRN# [id number]   YOB: 1965 Age: 55 year old      Date of visit: 10/20/21   PCP: Dr. David Chavez  Hepatologist: Dr. Thomas Leventhal     Chief Complaint   Patient presents with:  RECHECK: RA    Assessment and Plan   1.  Seropositive nonerosive rheumatoid arthritis (RF negative, CCP low positive): Note that he does have low back pain but without evidence of SI joint irregularity on x-ray.  Initially with morning stiffness all day, gelling phenomenon, and synovitis.   Previously on Humira (partially effective), Enbrel (partially effective), HCQ (cannot safely monitor due to right retinal scaring and bilateral early macular degeneration, per ophthalmology. . MTX avoided per Dr. Laboy recommendation. Currently on leflunomide 20 mg daily, SSZ 1500mg BID, Orencia SQ every 7 days, (initially Rx'd 4/18/2017). Worsening arthritis symptoms since stopping hydroxychloroquine; very noticeable per patient. Discussed tx options and will try changing orencia to simponi.  Chronic illness, progressive.    - Continue leflunomide 20 mg daily    - Continue sulfasalazine 1500mg BID  - Discontinue Orencia SQ every 7 days  - Start Simponi 50mg SQ every 28 days  - Continue hydroxychloroquine 200 mg daily (last eye exam in 11/2019; reminded him to call to schedule for a yearly eye exam)  - PRN RA flare only: Prednisone 10 mg daily x2 days, then 5 mg daily x5 days, then stop   - Labs every 3 months: CBC, Creatinine, Hepatic Panel, ESR, CRP    High risk medication requiring intensive toxicity monitoring at least quarterly: labs ordered include CBC, Creatinine, Hepatic panel to monitor for cytopenia and hepatotoxicity; checking creatinine as it affects clearance of medication.      # Golimumab (Simponi) Risks and Benefits: The risks and benefits of golimumab were discussed in detail and the patient verbalized understanding.  The risks discussed include, but are not limited to, the risk for  hypersensitivity, anaphylaxis, anaphylactoid reactions, an increased risk for serious infections leading to hospitalization or death, a possible increased risk for lymphoma and other malignancies, a possible worsening of demyelinating diseases, a possible worsening of heart failure, risk for cytopenias, risk for drug induced lupus, possible reactivation of hepatitis B, and possible reactivation of latent tuberculosis.  Subcutaneous injections may result in injection site reactions and/or pain at the site of injection.  The most common adverse reactions are infections and injection site reactions.  It was discussed that the medication would need to be discontinued if a serious infection develops.  It was discussed that live vaccinations should not be received while using golimumab or within 30 days prior to starting golimumab.  I encouraged reviewing the package insert and asking any questions about the medication.      2. Lower back pain: Lumbar spine MRI in August 2014 showed multilevel degenerative changes and a small disc protrusion at L3/4 with mild spinal canal narrowing; also moderate left and mild right neural foraminal narrowing at L5-S1. He had a nerve conduction study in 2014 that was normal. He has been worked up by neurology in the past without significant neurologic findings. HLA-B27 negative, SI joint x-ray negative. Now following in the pain management clinic.     3. Myofascial pain syndrome / chronic pain syndrome: diffuse pain consistent with chronic pain syndrome.  Management per pain clinic as well as her PCP.  I strongly encourage that he increase physical activity as he leads a sedentary lifestyle with no more activity than what is required for his activities of daily living.  Encouraged low impact physical activity such as walking, multiple times per day    4. Chronic Hepatitis B: Managed by Dr. Laboy (hepatology) previously, and now Dr. Thomas Leventhal at the [facility].  "Currently on tenofovir.     5. Hepatic Steatosis: Based on previous liver biopsy.  Seeing hepatology.      6. Positive tuberculosis test, history:   This is noted here for historical significance.  He was evaluated by Dr. Anthony Mendoza, infectious disease. The following telephone note was documented by Dr. Mendoza: \"I tried calling th pt, finally we have the records from [place] . It appears he was treated for latent TB with INH for 1 year in 1980/1981 .Later in 1981 April he was admitted at [facility] in [place], [place] with rt sided pneumonia, TB was suspected but he improved with treatment with Penicillin only. Later he was seen  ( likely ID specialist), and was treated with 6 weeks course of Rifampin and Ethambutol pending sputum AFB culture. His AFB cx were negative based on the report we have.\"  \"He recently had QFT -TB test which was reported positive and his CXR was clear. Given his documented hx of completion of treatment for latent TB as above , I do not see any need for further treatment. His tests may remain positive irrespective of treatment status. From my perspective he can be started on DMARDs/Biological as deemed necessary.\"       7. Gout: On allopurinol and managed by PCP.     8.  Vaccinations:   - Influenza: up to date  - [id number]: up to date  - [id number]: up to date  - Shingrix: Up to date  - TDAP today.   - COVID-19: has received 3 doses of the Pfizer vaccine      Total minutes spent in evaluation with patient, documentation, , and review of pertinent studies and chart notes: 20      Mr. Daniels verbalized agreement with and understanding of the rational for the diagnosis and treatment plan.  All questions were answered to best of my ability and the patient's satisfaction. Mr. Daniels was advised to contact the clinic with any questions that may arise after the clinic visit.      Thank you for involving me in the care of the patient    Return to clinic: 3-4 months      HPI "   Lamont Daniels is a 55 year old male with a medical history significant for hypertension, hyperlipidemia, gout, coronary artery disease s/p 6vCABG, vitamin D deficiency, hepatitis B, and RA who presents for f/u of RA.    Today, 10/20/2021: since stopping hydroxychloroquine he has had more joint pain and stiffness in the MCPs and MTPs; no swelling.  Tolerating other medications well. Morning stiffness for about 1-2 hrs.  Joint pain is worse in the AM, better with time and activity; worse with inactivity. Taking tenofovir     Denies fevers, chills, nausea, vomiting, constipation, diarrhea. No abdominal pain. Denies chest pain/pressure, palpitations, or shortness of breath.  No oral or nasal sores. No rash. No LE swelling.  Mild dry mouth; he has good dentition and does not feel like he needs to use frequent sips of water; unchanged since last evaluation. No dry eyes. No eye pain or redness.     Tobacco:  None   EtOH:  None  Drugs:  None  Occupation: Retired   Originally from [facility], then lived [place], [place], then lived in [place], [place], then moved to [place] for family    ROS   12 point review of system was completed and negative except as noted in the HPI     Active Problem List     Patient Active Problem List   Diagnosis     Coronary atherosclerosis of native coronary artery     Major depressive disorder, recurrent episode, moderate (H)     Anxiety     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)     Hepatitis B infection     Vitamin D deficiency     S/P CABG (coronary artery bypass graft)     Malrotation of intestine     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     Hyperlipidemia LDL goal <70     Insomnia     Gout     Suicidal ideation     Essential hypertension     Rheumatoid arthritis involving multiple sites, unspecified rheumatoid factor presence     High risk medications (not anticoagulants) long-term use     Midline low back pain without sciatica     Bilateral low back pain with sciatica, sciatica  laterality unspecified     Elevated liver enzymes     On corticosteroid therapy     Essential hypertension with goal blood pressure less than 140/90     Insomnia, unspecified type     Rheumatoid arthritis of multiple sites with negative rheumatoid factor (H)     Benign prostatic hyperplasia with lower urinary tract symptoms, unspecified morphology     Chronic pain syndrome     Hepatitis B, chronic (H)     Past Medical History     Past Medical History:   Diagnosis Date     Anxiety      CAD (coronary artery disease)     CABG, PCI     Congestive heart failure, unspecified 2008     Depressive disorder 2008     Gout      Hepatitis B > 10 years     HTN (hypertension)      Hyperlipidemia LDL goal < 100      Malrotation of intestine      Moderate major depression (H)      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) 9/19/2011    Uses CPAP     Rheumatoid arthritis flare (H) 1012     Steatohepatitis 12/15    liver biopsy     Tuberculosis age 13    INH x 9 months      Vitamin D deficiency      Past Surgical History     Past Surgical History:   Procedure Laterality Date     ABDOMEN SURGERY  2014     BIOPSY  2015     BYPASS GRAFT ARTERY CORONARY  2008    6 vessels     COLONOSCOPY  2/8/2013    Procedure: COLONOSCOPY;  Colonoscopy, blood in stool;  Surgeon: Duane, William Charles, MD;  Location: [facility] OR     COMBINED REPAIR PTOSIS WITH BLEPHAROPLASTY BILATERAL Bilateral 6/25/2018    Procedure: COMBINED REPAIR PTOSIS WITH BLEPHAROPLASTY BILATERAL;  Bilateral upper eyelid blepharoplasty, ptosis repair and brow ptosis repair;  Surgeon: Sandra Borja MD;  Location: [facility] OR     GI SURGERY  2014     HEAD & NECK SURGERY  2011     NASAL/SINUS POLYPECTOMY  2010     ORTHOPEDIC SURGERY  2012     REPAIR PTOSIS       REPAIR PTOSIS BILATERAL Bilateral 7/22/2019    Procedure: REPAIR, PTOSIS, BOTH UPPER brows;  Surgeon: Sandra Borja MD;  Location:  OR     REPAIR PTOSIS BROW BILATERAL Bilateral 6/25/2018    Procedure: REPAIR PTOSIS BROW BILATERAL;;  Surgeon:  Sandra Borja MD;  Location: [facility] OR     THORACIC SURGERY  1989    tb     TONSILLECTOMY  2010     UVULOPALATOPHARYNGOPLASTY  2010     VASCULAR SURGERY  2008     [facility] CORONARY STENT CIERA SOTO ADDTL VESSEL  2008    3 months after CABG     Allergy     Allergies   Allergen Reactions     Citalopram Itching     Tramadol Itching     Current Medication List     Current Outpatient Medications   Medication Sig     Abatacept (ORENCIA) 125 MG/ML SOAJ auto-injector Inject 1 mL (125 mg) Subcutaneous every 7 days     Acetaminophen (TYLENOL PO)      allopurinol (ZYLOPRIM) 300 MG tablet TAKE 1 TABLET BY MOUTH EVERY DAY     aspirin EC 81 MG EC tablet Take 1 tablet (81 mg) by mouth daily (*)     atorvastatin (LIPITOR) 80 MG tablet Take 1 tablet (80 mg) by mouth daily     bacitracin 500 UNIT/GM external ointment Apply topically 3 times daily     baclofen (LIORESAL) 10 MG tablet TAKE 1 TABLET BY MOUTH 2 TIMES DAILY AS NEEDED FOR MUSCLE SPASM     busPIRone HCl (BUSPAR) 30 MG tablet TAKE 1 TABLET (30 MG) BY MOUTH 2 TIMES DAILY     BUTRANS 20 MCG/HR WK patch Place 1 patch onto the skin every 7 days Fill 9/29 to start 10/1  LISANDRO, name brand     Cholecalciferol (VITAMIN D) 2000 UNITS tablet TAKE ONE TABLET BY MOUTH ONCE DAILY     clobetasol (TEMOVATE) 0.05 % external cream Apply twice daily for 2 weeks, weekends only for 2 weeks, repeat cycle as needed for hands, not face or genitals     clobetasol (TEMOVATE) 0.05 % external solution Apply twice daily to scalp for up to 2 weeks for flares. (Patient not taking: Reported on 6/22/2021)     clonazePAM (KLONOPIN) 1 MG tablet TAKE 0.5-1 TABLETS BY MOUTH 2 TIMES DAILY AS NEEDED FOR ANXIETY     clopidogrel (PLAVIX) 75 MG tablet Take 1 tablet (75 mg) by mouth daily     COLCRYS 0.6 MG tablet TAKE 2 TABLETS BY MOUTH AT THE FIRST SIGN OF FLARE, TAKE 1 ADDITIONAL TABLET ONE HOUR LATER.     desvenlafaxine (PRISTIQ) 50 MG 24 hr tablet TAKE 1 TABLET BY MOUTH EVERY DAY     diclofenac (VOLTAREN) 1 %  topical gel Apply 4 g topically 4 times daily as needed for moderate pain     diclofenac (VOLTAREN) 1 % topical gel Apply 4 grams to bilateral knees, up to four times daily as needed using enclosed dosing card.  Max of 32g/day     evolocumab (REPATHA) 140 MG/ML prefilled autoinjector Inject 1 mL (140 mg) Subcutaneous every 14 days     ezetimibe (ZETIA) 10 MG tablet Take 1 tablet (10 mg) by mouth daily     gabapentin (NEURONTIN) 300 MG capsule Take 2 capsules (600 mg) by mouth 3 times daily . 3 month supply     gemfibrozil (LOPID) 600 MG tablet Take 1 tablet (600 mg) by mouth 2 times daily     hydrocortisone (WESTCORT) 0.2 % external cream FOR GENITALS, APPLY TWICE DAILY FOR UP TO 2 WEEKS, TAKE 2 WEEKS OFF THEN REPEAT     hydrocortisone 2.5 % cream FOR FACE, APPLY TWICE DAILY FOR UP TO 2 WEEK FOR FLARES ON THE FACE, TAKE 2 WEEKS OFF     Incontinence Supply Disposable (DEPEND UNDERWEAR LARGE) MISC Use twice daily.     leflunomide (ARAVA) 20 MG tablet Take 1 tablet (20 mg) by mouth daily ; Labs required every 8-12 weeks.     meclizine (ANTIVERT) 25 MG tablet TAKE 1 TABLET BY MOUTH EVERY 6 HOURS AS NEEDED FOR DIZZINESS     melatonin 3 MG tablet Take 1 tablet (3 mg) by mouth nightly as needed for sleep     naloxone (NARCAN) 4 MG/0.1ML nasal spray Spray 1 spray (4 mg) into one nostril alternating nostrils once as needed for opioid reversal every 2-3 minutes until assistance arrives     nitroGLYcerin (NITROSTAT) 0.4 MG sublingual tablet For chest pain place 1 tablet under the tongue every 5 minutes for 3 doses. If symptoms persist 5 minutes after 1st dose call 911.     order for DME Equipment being ordered: chair lift     order for DME Equipment being ordered: single end cane     ORDER FOR DME 1.  CPAP pressure 11 cm/H20 with heated humidity.   2.  Provide mask to fit and CPAP supplies.   3.  Length of need lifetime.   4.  If needed please provide a chin strap          pantoprazole (PROTONIX) 40 MG EC tablet TAKE 1 TABLET  BY MOUTH EVERY DAY     pimecrolimus (ELIDEL) 1 % external cream APPLY TWICE DAILY FOR FACE AND GENITALS     predniSONE (DELTASONE) 5 MG tablet For rheumatoid arthritis flare only: prednisone 10mg daily x2days, then 5mg daily x5days, then stop. (Patient not taking: Reported on 6/22/2021)     sildenafil (REVATIO) 20 MG tablet TAKE 2 TO 5 TABLETS BY MOUTH 30 MINUTES PRIOR TO INTERCOURSE AS NEEDED.     sulfaSALAzine (AZULFIDINE) 500 MG tablet Take 3 tablets (1,500 mg) by mouth 2 times daily     sulfaSALAzine ER (AZULFIDINE EN) 500 MG EC tablet Take 3 tablets (1,500 mg) by mouth 2 times daily (Patient not taking: Reported on 6/22/2021)     tamsulosin (FLOMAX) 0.4 MG capsule Take 2 capsules (0.8 mg) by mouth daily     tenofovir (VIREAD) 300 MG tablet Take 1 tablet (300 mg) by mouth daily     triamcinolone (KENALOG) 0.1 % external ointment APPLY TOPICALLY TO AFFECTED AREA(S) 3 TIMES DAILY     triamcinolone (KENALOG) 0.1 % external ointment Apply to trunk and extremities twice daily for up to 2 weeks for flares     zolpidem (AMBIEN) 5 MG tablet Take 1 tablet (5 mg) by mouth nightly as needed for sleep     No current facility-administered medications for this visit.       Social History   See HPI    Family History     Family History   Problem Relation Age of Onset     C.A.D. Father      Coronary Artery Disease Father      Hypertension Father      Depression Father      Hypertension Mother      Diabetes Mother      Depression Mother      Mental Illness Mother      Hypertension Maternal Grandfather      Cancer Paternal Grandfather      Other Cancer Paternal Grandfather      Other Cancer Other      Autoimmune Disease No family hx of      Cerebrovascular Disease No family hx of      Thyroid Disease No family hx of      Glaucoma No family hx of      Macular Degeneration No family hx of      No family history of autoimmune disease  No family history of psoriasis     No change in family history since the previous clinic visit.  "    Physical Exam     Temp Readings from Last 3 Encounters:   03/19/21 97.9  F (36.6  C) (Tympanic)   01/14/20 98.4  F (36.9  C) (Oral)   10/08/19 97.6  F (36.4  C) (Oral)     BP Readings from Last 5 Encounters:   10/20/21 136/87   03/19/21 123/81   10/27/20 133/88   03/10/20 (!) 150/90   03/02/20 133/88     Pulse Readings from Last 1 Encounters:   10/20/21 62     Resp Readings from Last 1 Encounters:   10/20/21 16     Estimated body mass index is 30 kg/m  as calculated from the following:    Height as of 3/19/21: 1.575 m (5' 2\").    Weight as of this encounter: 74.4 kg (164 lb).      GEN: NAD.   HEENT:  Anicteric, noninjected sclera. No obvious external lesions of the ear and nose. Hearing intact.  CV: S1, S2. RRR. No m/r/g  PULM: No increased work of breathing. CTA bilaterally   MSK: tenderness to palpation of the bilateral MCPs, PIPs and MTPs; no swelling or increased warmth. DIPs, wrists, elbows, shoulders, knees, and ankles without swelling or tenderness to palpation .  SKIN: No rash or jaundice seen  PSYCH: Alert. Appropriate.        Labs   RF/CCP  Recent Labs   Lab Test 11/04/15  1547 08/12/14  1414   CCPABY 27*  --    RHF  --  <20     CBC  Recent Labs   Lab Test 08/23/21  1130 03/29/21  1318 02/27/21  1259 01/30/21  1226 01/30/21  1226   WBC 5.4 5.2 4.4   < > 5.5   RBC 4.81 4.72 4.79   < > 5.11   HGB 14.2 13.6 13.8   < > 14.5   HCT 42.9 42.3 43.1   < > 43.9   MCV 89 90 90   < > 86   RDW 13.8 14.6 14.7   < > 13.5    245 275   < > 296   MCH 29.5 28.8 28.8   < > 28.4   MCHC 33.1 32.2 32.0   < > 33.0   NEUTROPHIL 43 45.1 40.9   < > 50.5   LYMPH 41 35.5 42.0   < > 33.2   MONOCYTE 13 15.3 12.6   < > 12.7   EOSINOPHIL 2 2.9 3.4   < > 2.9   BASOPHIL 1 1.2 1.1   < > 0.7   ANEU  --  2.3 1.8  --  2.7   ALYM  --  1.8 1.9  --  1.8   PRACHI  --  0.8 0.6  --  0.7   AEOS  --  0.2 0.2  --  0.2   ABAS  --  0.1 0.1  --  0.0   ANEUTAUTO 2.3  --   --   --   --    ALYMPAUTO 2.2  --   --   --   --    AMONOAUTO 0.7  --   --   " --   --    AEOSAUTO 0.1  --   --   --   --    ABSBASO 0.1  --   --   --   --     < > = values in this interval not displayed.     CMP  Recent Labs   Lab Test 08/23/21  1130 03/29/21  1318 02/27/21  1259 01/30/21  1226 01/30/21  1226 12/16/20  1118 12/16/20  1118 09/10/20  1509 09/10/20  1509 06/03/20  1317 06/03/20  1317 03/26/20  1039 03/26/20  1039 02/27/20  0816 11/25/19  0758 10/07/19  0940 10/07/19  0940   NA  --   --   --   --   --   --   --   --   --   --   --   --  138  --  141  --  140   POTASSIUM  --   --   --   --   --   --   --   --   --   --   --   --  3.8  --  3.8  --  3.8   CHLORIDE  --   --   --   --   --   --   --   --   --   --   --   --  109  --  110*  --  110*   CO2  --   --   --   --   --   --   --   --   --   --   --   --  25  --  25  --  26   ANIONGAP  --   --   --   --   --   --   --   --   --   --   --   --  4  --  6  --  4   GLC  --   --   --   --   --   --  100*  --  128*  --  138*   < > 122*   < > 101*   < > 103*   BUN  --   --   --   --   --   --   --   --   --   --   --   --  16  --  16  --  19   CR 0.84 1.01 0.95   < > 0.94   < > 0.98   < > 0.92   < > 0.89   < > 1.00   < > 0.78   < > 0.93   GFRESTIMATED >90 83 89   < > >90   < > 87   < > >90   < > >90   < > 85   < > >90   < > >90   GFRESTBLACK  --  >90 >90  --  >90   < > >90   < > >90   < > >90   < > >90   < > >90   < > >90   HEMANT  --   --   --   --   --   --   --   --   --   --   --   --  9.0  --  9.2  --  9.2   BILITOTAL 0.4 0.5 0.4   < > 0.4   < > 0.2   < > 0.3   < > 0.4   < > 0.3   < > 0.4   < > 0.4   ALBUMIN 4.0 3.9 4.1   < > 4.0   < > 3.8   < > 3.8   < > 4.1   < > 4.3   < > 4.0   < > 4.1   PROTTOTAL 7.7 7.6 7.6   < > 7.6   < > 7.3   < > 7.6   < > 7.9   < > 8.0   < > 7.3   < > 7.8   ALKPHOS 108 116 102   < > 114   < > 123   < > 112   < > 106   < > 123   < > 89   < > 112   AST 21 20 23   < > 37   < > 21   < > 28   < > 20   < > 23   < > 23   < > 32   ALT 27 31 27   < > 46   < > 31   < > 38   < > 31   < > 36   < > 47   < > 47    < > =  values in this interval not displayed.     Calcium/VitaminD  Recent Labs   Lab Test 03/26/20  1039 11/25/19  0758 10/07/19  0940 12/04/17  0924 07/13/17  1246 01/14/16  1541 11/04/15  1547   HEMANT 9.0 9.2 9.2   < >  --    < >  --    VITDT  --   --   --   --  34  --  43    < > = values in this interval not displayed.     ESR/CRP  Recent Labs   Lab Test 08/23/21  1130 01/30/21  1226 12/16/20  1118   SED 12 15 11   CRP <2.9 <2.9 <2.9     Lipid Panel  Recent Labs   Lab Test 03/19/21  0928 06/25/20  1027 03/14/19  1006 06/10/16  0850 06/29/15  1405 05/22/14  0848 05/22/14  0848 03/24/14  0936 03/24/14  0936   CHOL 142 122 152   < > 219*   < > 169   < > 195   TRIG 105 148 126   < > 372*   < > 379*   < > 301*   HDL 69 59 51   < > 33*   < > 33*   < > 39*   LDL 52 33 76   < > 112   < > 60   < > 95   VLDL  --   --   --   --  74*  --  76*  --  60*   CHOLHDLRATIO  --   --   --   --  6.6*  --  5.1*  --  5.0   NHDL 73 63 101   < >  --   --   --   --   --     < > = values in this interval not displayed.     Hepatitis B  Recent Labs   Lab Test 03/19/21  0928 03/26/20  1039 10/07/19  0940 12/01/16  1429 10/05/16  0954   AUSAB  --   --   --   --  0.45   HEPBANG  --   --   --   --  Reactive*   HBQLOG Not Calculated <1.3 Not Calculated   < > 5.1*    < > = values in this interval not displayed.     Hepatitis C  Recent Labs   Lab Test 04/07/16  1538   HCVAB Nonreactive   Assay performance characteristics have not been established for newborns,   infants, and children       Lyme ab screening  Recent Labs   Lab Test 07/18/17  1330   LYMEGM 0.19     Tuberculosis Screening  Recent Labs   Lab Test 04/07/16  1538   TBRSLT Positive   The magnitude of the measured IFN-gamma level cannot be correlated to stage or   degree of infection, level of immune responsiveness, or likelihood for   progression to active disease.  *   TBAGN >10.00  This is a qualitative test.  The TB antigen IU/mL value is required for   documentation on certain government  "reporting forms but this value should not   be used to monitor disease progression or response to therapy.   Diagnosing or excluding tuberculosis disease, and assessing the probability of   LTBI, require a combination of epidemiological, historical, medical and   diagnostic findings that should be taken into account when interpreting   QuantiFERON TB results.       HIV Screening  Recent Labs   Lab Test 11/04/15  1547   HIAGAB Nonreactive   HIV-1 p24 Ag & HIV-1/HIV-2 Ab Not Detected         \"HAND BILATERAL THREE OR MORE VIEWS 11/4/2015 4:55 PM   HISTORY: Pain in unspecified joint.  COMPARISON: None.  IMPRESSION  IMPRESSION: Normal.  DANIS ANGLIN MD\"    \"FOOT BILATERAL THREE OR MORE VIEWS 11/4/2015 4:55 PM   HISTORY: Pain in unspecified joint.  COMPARISON: 8/21/2012.  IMPRESSION  IMPRESSION: Small traction spurs are seen off the Achilles tendon and  plantar fascial attachment sites bilaterally. Joint spaces are  preserved. Osseous structures are intact. Incidental note is made of  some surgical staples in the soft tissues of the medial distal right  lower extremity.  DANIS ANGLIN MD\"      Immunization History     Immunization History   Administered Date(s) Administered     COVID-19,PF,Pfizer 03/16/2021, 04/06/2021, 10/09/2021     Influenza (IIV3) PF 10/11/2011, 09/20/2017, 09/01/2018     Influenza Quad, Recombinant, pf(RIV4) (Flublok) 09/23/2021     Influenza Vaccine IM > 6 months Valent IIV4 (Alfuria,Fluzone) 09/27/2015, 09/08/2016, 09/30/2019, 09/14/2020     Influenza Vaccine, 6+MO IM (QUADRIVALENT W/PRESERVATIVES) 11/17/2014     Pneumo Conj 13-V (2010&after) 04/07/2016     Pneumococcal 23 valent 12/12/2014     TDAP Vaccine (Adacel) 08/30/2011     Zoster vaccine recombinant adjuvanted (SHINGRIX) 07/27/2019, 04/22/2021          Chart documentation done in part with Dragon Voice recognition Software. Although reviewed after completion, some word and grammatical error may remain.      Sebastián Jenkins MD     "

## 2021-10-21 ENCOUNTER — TELEPHONE (OUTPATIENT)
Dept: RHEUMATOLOGY | Facility: CLINIC | Age: 56
End: 2021-10-21

## 2021-10-21 LAB
HBV DNA SERPL NAA+PROBE-ACNC: NOT DETECTED IU/ML
HBV SURFACE AB SERPL IA-ACNC: 0.09 M[IU]/ML

## 2021-10-21 NOTE — TELEPHONE ENCOUNTER
Prior Authorization Approval    Authorization Effective Date: 9/21/2021  Authorization Expiration Date: 10/21/2022  Medication: simponi  Approved Dose/Quantity: 0.5/28ds  Reference #: [id number]   Insurance Company: [employer] - Phone [phone] Fax [phone]  Expected CoPay: $0     CoPay Card Available: No    Foundation Assistance Needed: na  Which Pharmacy is filling the prescription (Not needed for infusion/clinic administered): [facility] - [facility] [address]  Pharmacy Notified: Yes  Patient Notified: Yes

## 2021-10-26 NOTE — TELEPHONE ENCOUNTER
Called pharmacy and spoke to nette. She removed plaquenil from pts med list and let writer know it had not been filled since 6/2021.    Joanna PEARSON RN Specialty Triage 10/26/2021 1:09 PM

## 2021-10-27 ENCOUNTER — MYC MEDICAL ADVICE (OUTPATIENT)
Dept: PALLIATIVE MEDICINE | Facility: CLINIC | Age: 56
End: 2021-10-27

## 2021-10-27 DIAGNOSIS — M06.9 RHEUMATOID ARTHRITIS INVOLVING MULTIPLE SITES, UNSPECIFIED WHETHER RHEUMATOID FACTOR PRESENT (H): ICD-10-CM

## 2021-10-27 DIAGNOSIS — G89.4 CHRONIC PAIN SYNDROME: ICD-10-CM

## 2021-10-27 RX ORDER — BUPRENORPHINE 20 UG/H
1 PATCH, EXTENDED RELEASE TRANSDERMAL
Qty: 4 PATCH | Refills: 0 | Status: SHIPPED | OUTPATIENT
Start: 2021-10-27 | End: 2021-11-23

## 2021-10-27 NOTE — TELEPHONE ENCOUNTER
Received call from patient requesting refill(s) of BUTRANS 20 MCG/HR WK patch    Last dispensed from pharmacy on 09/29/2021    Patient's last office/virtual visit by prescribing provider on 09/03/2021  Next office/virtual appointment scheduled for 11/04/2021    Last urine drug screen date 10/27/2020  Current opioid agreement on file (completed within the last year) No Date of opioid agreement: 03/02/2020    [facility] [id number] [facility] [place]  [facility]    Will route to nursing [place] for review and preparation of prescription(s).

## 2021-10-27 NOTE — TELEPHONE ENCOUNTER
Script Eprescribed to pharmacy  [place] Prescription Monitoring Program checked  Changed date as this is a 28 day supply.    Will send this to MA team to notify patient.    Signed Prescriptions:                        Disp   Refills    BUTRANS 20 MCG/HR WK patch                 4 patch0        Sig: Place 1 patch onto the skin every 7 days OK to fill           10/27/21 and start 10/29/21 LISANDRO, name brand  Authorizing Provider: KAYLA CHUN MD  [facility]

## 2021-10-27 NOTE — TELEPHONE ENCOUNTER
Routing to provider to review medication prepped per below      BUTRANS 20 MCG/HR WK patch #4, Refill:0  Sig:Place 1 patch onto the skin every 7 days   Last picked up 9/29/21 with start on 10/1/21  Due: OK to fill 10/29/21 and start 10/31/21    Per last OV note 9/3/21:Current pain treatment  butrans 20mcg/hr      e-prescribe to [facility] [id number] [facility] [place] - [address] [place] [address]  pharmacy    Kelsey HALEY RN Care Coordinator  [facility]

## 2021-10-28 DIAGNOSIS — F45.8 ANXIETY HYPERVENTILATION: ICD-10-CM

## 2021-10-28 DIAGNOSIS — R42 VERTIGO: ICD-10-CM

## 2021-10-28 DIAGNOSIS — F41.9 ANXIETY HYPERVENTILATION: ICD-10-CM

## 2021-10-28 RX ORDER — MECLIZINE HYDROCHLORIDE 25 MG/1
TABLET ORAL
Qty: 30 TABLET | Refills: 2 | Status: SHIPPED | OUTPATIENT
Start: 2021-10-28 | End: 2022-01-28

## 2021-10-28 RX ORDER — CLONAZEPAM 1 MG/1
TABLET ORAL
Qty: 90 TABLET | Refills: 0 | Status: SHIPPED | OUTPATIENT
Start: 2021-10-31 | End: 2021-12-21

## 2021-10-28 NOTE — TELEPHONE ENCOUNTER
Rx for BUTRANS 20 MCG/HR WK patch was signed on 10/27/21    Called patient and notified of prescription was sent to pharmacy.   OK to fill 10/27/21 and start 10/29/21    Amberly Chawla MA

## 2021-10-28 NOTE — TELEPHONE ENCOUNTER
Please process a refill of BUTRANS 20 MCG/HR WK patch to     [id number] [place] - [address] [place] [address]  [address] [facility] [place]  [place] [address]  Phone: [phone] Fax: [phone]

## 2021-11-03 ENCOUNTER — ANCILLARY PROCEDURE (OUTPATIENT)
Dept: ULTRASOUND IMAGING | Facility: CLINIC | Age: 56
End: 2021-11-03
Attending: INTERNAL MEDICINE
Payer: COMMERCIAL

## 2021-11-03 DIAGNOSIS — B18.1 CHRONIC VIRAL HEPATITIS B WITHOUT DELTA AGENT AND WITHOUT COMA (H): ICD-10-CM

## 2021-11-03 PROCEDURE — 76700 US EXAM ABDOM COMPLETE: CPT | Performed by: RADIOLOGY

## 2021-11-04 ENCOUNTER — LAB (OUTPATIENT)
Dept: LAB | Facility: CLINIC | Age: 56
End: 2021-11-04
Payer: COMMERCIAL

## 2021-11-04 ENCOUNTER — OFFICE VISIT (OUTPATIENT)
Dept: PALLIATIVE MEDICINE | Facility: CLINIC | Age: 56
End: 2021-11-04
Payer: COMMERCIAL

## 2021-11-04 VITALS — SYSTOLIC BLOOD PRESSURE: 172 MMHG | DIASTOLIC BLOOD PRESSURE: 99 MMHG | HEART RATE: 61 BPM

## 2021-11-04 DIAGNOSIS — Z51.81 ENCOUNTER FOR MONITORING OPIOID MAINTENANCE THERAPY: ICD-10-CM

## 2021-11-04 DIAGNOSIS — Z79.891 ENCOUNTER FOR MONITORING OPIOID MAINTENANCE THERAPY: ICD-10-CM

## 2021-11-04 DIAGNOSIS — G47.33 OSA (OBSTRUCTIVE SLEEP APNEA): ICD-10-CM

## 2021-11-04 DIAGNOSIS — M06.9 RHEUMATOID ARTHRITIS INVOLVING MULTIPLE SITES, UNSPECIFIED WHETHER RHEUMATOID FACTOR PRESENT (H): ICD-10-CM

## 2021-11-04 DIAGNOSIS — G89.4 CHRONIC PAIN SYNDROME: Primary | ICD-10-CM

## 2021-11-04 LAB
CANNABINOIDS UR QL SCN: ABNORMAL
CREAT UR-MCNC: 149 MG/DL

## 2021-11-04 PROCEDURE — 99213 OFFICE O/P EST LOW 20 MIN: CPT | Performed by: PSYCHIATRY & NEUROLOGY

## 2021-11-04 PROCEDURE — 80307 DRUG TEST PRSMV CHEM ANLYZR: CPT | Mod: 59

## 2021-11-04 PROCEDURE — 80307 DRUG TEST PRSMV CHEM ANLYZR: CPT

## 2021-11-04 ASSESSMENT — PAIN SCALES - GENERAL: PAINLEVEL: SEVERE PAIN (6)

## 2021-11-04 NOTE — PATIENT INSTRUCTIONS
1. Sleep medicine visit  Video visit  Jan 11th- 3:30pm with Dr. Jessica  You will enter by [place] to start your visit  Visits --> start video visit.    2. If you have higher BP again, or if you have symptoms of chest pain, SOB, vision changes- seek immediate medical attention.    3. Stop downstairs for a urine drug screen.    ----------------------------------------------------------------  Clinic Number:  [phone]     Call with any questions about your care and for scheduling assistance.     Calls are returned Monday through Friday between 8 AM and 4:30 PM. We usually get back to you within 2 business days depending on the issue/request.    If we are prescribing your medications:    For opioid medication refills, call the clinic or send a Oonair message 7 days in advance.  Please include:    Name of requested medication    Name of the pharmacy.    For non-opioid medications, call your pharmacy directly to request a refill. Please allow 3-4 days to be processed.     Per [place] State Law:    All controlled substance prescriptions must be filled within 30 days of being written.      For those controlled substances allowing refills, pickup must occur within 30 days of last fill.      We believe regular attendance is key to your success in our program!      Any time you are unable to keep your appointment we ask that you call us at least 24 hours in advance to cancel.This will allow us to offer the appointment time to another patient.     Multiple missed appointments may lead to dismissal from the clinic.

## 2021-11-04 NOTE — LETTER
Opioid / Opioid Plus Controlled Substance Agreement    This is an agreement between you and your provider about the safe and appropriate use of controlled substance/opioids prescribed by your care team. Controlled substances are medicines that can cause physical and mental dependence (abuse).    There are strict laws about having and using these medicines. We here at [facility] are committing to working with you in your efforts to get better. To support you in this work, we ll help you schedule regular office appointments for medicine refills. If we must cancel or change your appointment for any reason, we ll make sure you have enough medicine to last until your next appointment.     As a Provider, I will:    Listen carefully to your concerns and treat you with respect.     Recommend a treatment plan that I believe is in your best interest. This plan may involve therapies other than opioid pain medication.     Talk with you often about the possible benefits, and the risk of harm of any medicine that we prescribe for you.     Provide a plan on how to taper (discontinue or go off) using this medicine if the decision is made to stop its use.    As a Patient, I understand that opioid(s):     Are a controlled substance prescribed by my care team to help me function or work and manage my condition(s).     Are strong medicines and can cause serious side effects such as:    Drowsiness, which can seriously affect my driving ability    A lower breathing rate, enough to cause death    Harm to my thinking ability     Depression     Abuse of and addiction to this medicine    Need to be taken exactly as prescribed. Combining opioids with certain medicines or chemicals (such as illegal drugs, sedatives, sleeping pills, and benzodiazepines) can be dangerous or even fatal. If I stop opioids suddenly, I may have severe withdrawal symptoms.    Do not work for all types of pain nor for all patients. If they re not helpful, I may  be asked to stop them.      The risks, benefits and side effects of these medicine(s) were explained to me. I agree that:  1. I will take part in other treatments as advised by my care team. This may be psychiatry or counseling, physical therapy, behavioral therapy, group treatment or a referral to a specialist.     2. I will keep all my appointments. I understand that this is part of the monitoring of opioids. My care team may require an office visit for EVERY opioid/controlled substance refill. If I miss appointments or don t follow instructions, my care team may stop my medicine.    3. I will take my medicines as prescribed. I will not change the dose or schedule unless my care team tells me to. There will be no refills if I run out early.     4. I may be asked to come to the clinic and complete a urine drug test or complete a pill count at any time. If I don t give a urine sample or participate in a pill count, the care team may stop my medicine.    5. I will only receive prescriptions from this clinic for chronic pain. If I am treated by another provider for acute pain issues, I will tell them that I am taking opioid pain medication for chronic pain and that I have a treatment agreement with this provider. I will inform my [facility] care team within one business day if I am given a prescription for any pain medication by another healthcare provider. My [facility] care team can contact other providers and pharmacists about my use of any medicines.    6. It is up to me to make sure that I don t run out of my medicines on weekends or holidays. If my care team is willing to refill my opioid prescription without a visit, I must request refills only during office hours. Refills may take up to 3 business days to process. I will use one pharmacy to fill all my opioid and other controlled substance prescriptions. I will notify the clinic about any changes to my insurance or medication  availability.    7. I am responsible for my prescriptions. If the medicine/prescription is lost, stolen or destroyed, it will not be replaced. I also agree not to share controlled substance medicines with anyone.    8. I am aware I should not use any illegal or recreational drugs. I agree not to drink alcohol unless my care team says I can.       9. If I enroll in the [place] Medical Cannabis program, I will tell my care team prior to my next refill.     10. I will tell my care team right away if I become pregnant, have a new medical problem treated outside of my regular clinic, or have a change in my medications.    11. I understand that this medicine can affect my thinking, judgment and reaction time. Alcohol and drugs affect the brain and body, which can affect the safety of my driving. Being under the influence of alcohol or drugs can affect my decision-making, behaviors, personal safety, and the safety of others. Driving while impaired (DWI) can occur if a person is driving, operating, or in physical control of a car, motorcycle, boat, snowmobile, ATV, motorbike, off-road vehicle, or any other motor vehicle ([place] Statute 169A.20). I understand the risk if I choose to drive or operate any vehicle or machinery.    I understand that if I do not follow any of the conditions above, my prescriptions or treatment may be stopped or changed.          Opioids  What You Need to Know    What are opioids?   Opioids are pain medicines that must be prescribed by a doctor. They are also known as narcotics.     Examples are:   1. morphine (MS Contin, Codi)  2. oxycodone (Oxycontin)  3. oxycodone and acetaminophen (Percocet)  4. hydrocodone and acetaminophen (Vicodin, Norco)   5. fentanyl patch (Duragesic)   6. hydromorphone (Dilaudid)   7. methadone  8. codeine (Tylenol #3)     What do opioids do well?   Opioids are best for severe short-term pain such as after a surgery or injury. They may work well for cancer pain. They may  help some people with long-lasting (chronic) pain.     What do opioids NOT do well?   Opioids never get rid of pain entirely, and they don t work well for most patients with chronic pain. Opioids don t reduce swelling, one of the causes of pain.                                    Other ways to manage chronic pain and improve function include:       Treat the health problem that may be causing pain    Anti-inflammation medicines, which reduce swelling and tenderness, such as ibuprofen (Advil, Motrin) or naproxen (Aleve)    Acetaminophen (Tylenol)    Antidepressants and anti-seizure medicines, especially for nerve pain    Topical treatments such as patches or creams    Injections or nerve blocks    Chiropractic or osteopathic treatment    Acupuncture, massage, deep breathing, meditation, visual imagery, aromatherapy    Use heat or ice at the pain site    Physical therapy     Exercise    Stop smoking    Take part in therapy       Risks and side effects     Talk to your doctor before you start or decide to keep taking opioids. Possible side effects include:      Lowering your breathing rate enough to cause death    Overdose, including death, especially if taking higher than prescribed doses    Worse depression symptoms; less pleasure in things you usually enjoy    Feeling tired or sluggish    Slower thoughts or cloudy thinking    Being more sensitive to pain over time; pain is harder to control    Trouble sleeping or restless sleep    Changes in hormone levels (for example, less testosterone)    Changes in sex drive or ability to have sex    Constipation    Unsafe driving    Itching and sweating    Dizziness    Nausea, throwing up and dry mouth    What else should I know about opioids?    Opioids may lead to dependence, tolerance, or addiction.      Dependence means that if you stop or reduce the medicine too quickly, you will have withdrawal symptoms. These include loose poop (diarrhea), jitters, flu-like symptoms,  nervousness and tremors. Dependence is not the same as addiction.                       Tolerance means needing higher doses over time to get the same effect. This may increase the chance of serious side effects.      Addiction is when people improperly use a substance that harms their body, their mind or their relations with others. Use of opiates can cause a relapse of addiction if you have a history of drug or alcohol abuse.      People who have used opioids for a long time may have a lower quality of life, worse depression, higher levels of pain and more visits to doctors.    You can overdose on opioids. Take these steps to lower your risk of overdose:    1. Recognize the signs:  Signs of overdose include decrease or loss of consciousness (blackout), slowed breathing, trouble waking up and blue lips. If someone is worried about overdose, they should call 911.    2. Talk to your doctor about Narcan (naloxone).   If you are at risk for overdose, you may be given a prescription for Narcan. This medicine very quickly reverses the effects of opioids.   If you overdose, a friend or family member can give you Narcan while waiting for the ambulance. They need to know the signs of overdose and how to give Narcan.     3. Don't use alcohol or street drugs.   Taking them with opioids can cause death.    4. Do not take any of these medicines unless your doctor says it s OK. Taking these with opioids can cause death:    Benzodiazepines, such as lorazepam (Ativan), alprazolam (Xanax) or diazepam (Valium)    Muscle relaxers, such as cyclobenzaprine (Flexeril)    Sleeping pills like zolpidem (Ambien)     Other opioids      How to keep you and other people safe while taking opioids:    1. Never share your opioids with others.  Opioid medicines are regulated by the Drug Enforcement Agency ([employer]). Selling or sharing medications is a criminal act.    2. Be sure to store opioids in a secure place, locked up if possible. Young children  can easily swallow them and overdose.    3. When you are traveling with your medicines, keep them in the original bottles. If you use a pill box, be sure you also carry a copy of your medicine list from your clinic or pharmacy.    4. Safe disposal of opioids    Most pharmacies have places to get rid of medicine, called disposal kiosks. Medicine disposal options are also available in every [facility]. Search your county and  medication disposal  to find more options. You can find more details at:  [email or web address].[email or web address]./living-green/managing-unwanted-medications     I agree that my provider, clinic care team, and pharmacy may work with any city, state or federal law enforcement agency that investigates the misuse, sale, or other diversion of my controlled medicine. I will allow my provider to discuss my care with, or share a copy of, this agreement with any other treating provider, pharmacy or emergency room where I receive care.    I have read this agreement and have asked questions about anything I did not understand.    _______________________________________________________  Patient Signature - Lamont Daniels _____________________                   Date     _______________________________________________________  Provider Signature - Dorothea Loo MD   _____________________                   Date     _______________________________________________________  Witness Signature (required if provider not present while patient signing)   _____________________                   Date

## 2021-11-04 NOTE — PROGRESS NOTES
[facility]    Date of visit: 11/4/2021     Chief complaint:   Chief Complaint   Patient presents with     Pain       Interval history:  Lamont Daniels was last seen by me on 9/3/21    Recommendations/plan at the last visit included:  1. Physical Therapy:  Pt not willing  2. Clinical Health Psychologist to address issues of relaxation, behavioral change, coping style, and other factors important to improvement: pt not interested in pursuing  3. Diagnostic Studies: none  4. Medication Management:  No changes. We are aware of use of both benzo and butrans patch- have not been able to further wean. Have monitored with sleep medicine, and his JEROD significant improved changing to butrans. Patient aware of risk. Trying to decrease benzo with CBD  5. Further procedures recommended: he is not interested in procedures.  6. Recommendations to PCP: none  7. Follow up: 3 months, in person    Since his last visit, Lamont Daniels reports:  -pain has been stable. Some achiness with weather  -starting a new med with rheumatology  No new changes yet.  Wearing CPAP regularly.    -BP - today is high, hasn't been other times.  Was on metoprolol in the past, but was stopped due to dizziness.  Has no schedule follow up with PCP right now.    Pain scores:  Severe Pain (6)     Current pain treatment  butrans 20mcg/hr  Acetaminophen 500 mg PRN (not taking daily)  Gabapentin 600mg 3 times daily  Baclofen 10mg BID   Diclofenac gel PRN (not taking daily and uses at joints)  CBD oil    Clonazepam 1mg once a day - not using at night.    Previous medication treatments included:  Codeine, oxycodone, hydrocodone- given for other issues- helpful  Cymbalta 60mg daily- given for mental health- was given by Dr. Acosta- not been higher  Baclofen 10mg at bedtime- not helpful, no side effects  Robaxin 500 mg 1 tablet, 1-3 times a day depending on the day  Ibuprofen 800 mg- using 3-4 times per week, helpful but  started meloxicam  Meloxicam  Oxycodone 5 mg : takes 6/day    Other treatments have included:  Lamont Daniels has not been seen at a pain clinic in the past.    PT: has done for various reasons, most recently the low back.  Acupuncture: as done a couple of needles with adjustment  TENs Unit: no  Injections: no    Side Effects: no    Medications:  Current Outpatient Medications   Medication Sig Dispense Refill     Acetaminophen (TYLENOL PO)        allopurinol (ZYLOPRIM) 300 MG tablet TAKE 1 TABLET BY MOUTH EVERY DAY 90 tablet 3     aspirin EC 81 MG EC tablet Take 1 tablet (81 mg) by mouth daily (*) 30 tablet 1     atorvastatin (LIPITOR) 80 MG tablet Take 1 tablet (80 mg) by mouth daily 90 tablet 3     bacitracin 500 UNIT/GM external ointment Apply topically 3 times daily 30 g 3     baclofen (LIORESAL) 10 MG tablet TAKE 1 TABLET BY MOUTH 2 TIMES DAILY AS NEEDED FOR MUSCLE SPASM 180 tablet 2     busPIRone HCl (BUSPAR) 30 MG tablet TAKE 1 TABLET (30 MG) BY MOUTH 2 TIMES DAILY 180 tablet 3     BUTRANS 20 MCG/HR WK patch Place 1 patch onto the skin every 7 days OK to fill 10/27/21 and start 10/29/21 LISANDRO, name brand 4 patch 0     Cholecalciferol (VITAMIN D) 2000 UNITS tablet TAKE ONE TABLET BY MOUTH ONCE DAILY 100 tablet 1     clobetasol (TEMOVATE) 0.05 % external cream Apply twice daily for 2 weeks, weekends only for 2 weeks, repeat cycle as needed for hands, not face or genitals 60 g 11     clonazePAM (KLONOPIN) 1 MG tablet TAKE 0.5-1 TABLETS BY MOUTH 2 TIMES DAILY AS NEEDED FOR ANXIETY 90 tablet 0     clopidogrel (PLAVIX) 75 MG tablet Take 1 tablet (75 mg) by mouth daily 90 tablet 2     COLCRYS 0.6 MG tablet TAKE 2 TABLETS BY MOUTH AT THE FIRST SIGN OF FLARE, TAKE 1 ADDITIONAL TABLET ONE HOUR LATER. 90 tablet 1     desvenlafaxine (PRISTIQ) 50 MG 24 hr tablet TAKE 1 TABLET BY MOUTH EVERY DAY 90 tablet 3     diclofenac (VOLTAREN) 1 % topical gel Apply 4 g topically 4 times daily as needed for moderate pain 300 g 11      diclofenac (VOLTAREN) 1 % topical gel Apply 4 grams to bilateral knees, up to four times daily as needed using enclosed dosing card.  Max of 32g/day 300 g 11     evolocumab (REPATHA) 140 MG/ML prefilled autoinjector Inject 1 mL (140 mg) Subcutaneous every 14 days 2 mL 11     ezetimibe (ZETIA) 10 MG tablet Take 1 tablet (10 mg) by mouth daily 90 tablet 3     gabapentin (NEURONTIN) 300 MG capsule Take 2 capsules (600 mg) by mouth 3 times daily . 3 month supply 540 capsule 6     gemfibrozil (LOPID) 600 MG tablet Take 1 tablet (600 mg) by mouth 2 times daily 180 tablet 2     golimumab (SIMPONI) 50 MG/0.5ML auto-injector pen Inject 0.5 mLs (50 mg) Subcutaneous every 28 days . Hold for signs of infection, and seek medical attention. 0.5 mL 4     hydrocortisone (WESTCORT) 0.2 % external cream FOR GENITALS, APPLY TWICE DAILY FOR UP TO 2 WEEKS, TAKE 2 WEEKS OFF THEN REPEAT 60 g 3     hydrocortisone 2.5 % cream FOR FACE, APPLY TWICE DAILY FOR UP TO 2 WEEK FOR FLARES ON THE FACE, TAKE 2 WEEKS OFF 28.35 g 3     Incontinence Supply Disposable (DEPEND UNDERWEAR LARGE) MISC Use twice daily. 60 each 11     leflunomide (ARAVA) 20 MG tablet Take 1 tablet (20 mg) by mouth daily ; Labs required every 8-12 weeks. 90 tablet 0     meclizine (ANTIVERT) 25 MG tablet TAKE 1 TABLET BY MOUTH EVERY 6 HOURS AS NEEDED FOR DIZZINESS. 30 tablet 2     melatonin 3 MG tablet Take 1 tablet (3 mg) by mouth nightly as needed for sleep       naloxone (NARCAN) 4 MG/0.1ML nasal spray Spray 1 spray (4 mg) into one nostril alternating nostrils once as needed for opioid reversal every 2-3 minutes until assistance arrives 0.2 mL 0     nitroGLYcerin (NITROSTAT) 0.4 MG sublingual tablet For chest pain place 1 tablet under the tongue every 5 minutes for 3 doses. If symptoms persist 5 minutes after 1st dose call 911. 25 tablet 1     order for DME Equipment being ordered: chair lift 1 each 0     order for DME Equipment being ordered: single end cane 1 Units 0      ORDER FOR DME 1.  CPAP pressure 11 cm/H20 with heated humidity.   2.  Provide mask to fit and CPAP supplies.   3.  Length of need lifetime.   4.  If needed please provide a chin strap            pantoprazole (PROTONIX) 40 MG EC tablet TAKE 1 TABLET BY MOUTH EVERY DAY 90 tablet 2     pimecrolimus (ELIDEL) 1 % external cream APPLY TWICE DAILY FOR FACE AND GENITALS 60 g 1     sildenafil (REVATIO) 20 MG tablet TAKE 2 TO 5 TABLETS BY MOUTH 30 MINUTES PRIOR TO INTERCOURSE AS NEEDED. 150 tablet 3     sulfaSALAzine (AZULFIDINE) 500 MG tablet Take 3 tablets (1,500 mg) by mouth 2 times daily 540 tablet 2     tamsulosin (FLOMAX) 0.4 MG capsule Take 2 capsules (0.8 mg) by mouth daily 180 capsule 3     tenofovir (VIREAD) 300 MG tablet Take 1 tablet (300 mg) by mouth daily 90 tablet 3     triamcinolone (KENALOG) 0.1 % external ointment APPLY TOPICALLY TO AFFECTED AREA(S) 3 TIMES DAILY 80 g 2     triamcinolone (KENALOG) 0.1 % external ointment Apply to trunk and extremities twice daily for up to 2 weeks for flares 80 g 1     zolpidem (AMBIEN) 5 MG tablet Take 1 tablet (5 mg) by mouth nightly as needed for sleep 30 tablet 5     clobetasol (TEMOVATE) 0.05 % external solution Apply twice daily to scalp for up to 2 weeks for flares. (Patient not taking: Reported on 6/22/2021) 60 mL 0     predniSONE (DELTASONE) 5 MG tablet For rheumatoid arthritis flare only: prednisone 10mg daily x2days, then 5mg daily x5days, then stop. (Patient not taking: Reported on 6/22/2021) 9 tablet 3       Medical History: any changes in medical history since they were last seen? None        THE 4 A's OF OPIOID MAINTENANCE ANALGESIA    Analgesia: good.  He had better with oxycodone    Activity: better with med, still limited    Adverse effects: none    Adherence to Rx protocol: good    [place] Board of Pharmacy Data Base Reviewed: 11/4/21   YES; no concerns   Last UDS and opioid agreement  Getting today      Assessment:   1. Chronic low back pain, with  strong facet and myofascial features  2. Rheumatoid arthritis  3. Peripheral neuropathy  4. Depression, anxiety  5. Hep B - on meds  6. Gout  7. JEROD, currently treated with CPAP, central apnea ok    Plan:   1. Physical Therapy:  Pt not willing  2. Clinical Health Psychologist to address issues of relaxation, behavioral change, coping style, and other factors important to improvement: pt not interested in pursuing  3. Diagnostic Studies: UDS and CSA today  4. Medication Management:  No changes. We are aware of use of both benzo and butrans patch- have not been able to further wean. Have monitored with sleep medicine, and his JEROD significant improved changing to butrans. Patient aware of risk. Have asked him to have follow up with sleep to make sure that his central apnea remains controlled (discussed this with Dr. Matthews in the past)  5. Further procedures recommended: he is not interested in procedures.  6. Recommendations to PCP: none  7. Follow up: 3 months    27 minutes spent on the date of encounter doing chart review, history, and exam documentation and further activities as noted above.     Lian Loo MD  [facility]

## 2021-11-06 LAB
7AMINOCLONAZEPAM UR QL CFM: PRESENT
BUPRENORPHINE UR CFM-MCNC: 9 NG/ML
BUPRENORPHINE/CREAT UR: 6 NG/MG {CREAT}
GABAPENTIN UR QL CFM: PRESENT

## 2021-11-10 ENCOUNTER — NURSE TRIAGE (OUTPATIENT)
Dept: FAMILY MEDICINE | Facility: CLINIC | Age: 56
End: 2021-11-10
Payer: COMMERCIAL

## 2021-11-10 DIAGNOSIS — I10 ESSENTIAL HYPERTENSION WITH GOAL BLOOD PRESSURE LESS THAN 140/90: Primary | ICD-10-CM

## 2021-11-10 RX ORDER — AMLODIPINE BESYLATE 5 MG/1
5 TABLET ORAL DAILY
Qty: 30 TABLET | Refills: 11 | Status: SHIPPED | OUTPATIENT
Start: 2021-11-10 | End: 2021-12-02

## 2021-11-10 NOTE — TELEPHONE ENCOUNTER
Called patient, informed him of provider plan.   Patient voiced good understanding, he will get BP medication today and start right away.  Writer reviewed signs and symptoms of a stroke, as well as severe symptoms that all warrant patient calling 911 or going to nearest ED right away. Patient agreed.  Patient scheduled to see Dr. Chavez tomorrow in office at 10:20 am.      Licha Gordon RN  [facility]

## 2021-11-10 NOTE — TELEPHONE ENCOUNTER
Please let patient know that amlodipine 5 mg daily has been sent to his pharmacy to take daily to hopefully lower blood pressure. Okay for patient to be seen in clinic tomorrow. Okay to double book.    David Chavez MD

## 2021-11-10 NOTE — TELEPHONE ENCOUNTER
"Patient called with concerns for high blood pressure readings.  Have been elevated at recent specialty provider visits, was advised to contact his PCP.  BP has been running in the 170's/90's-150's/90's.  Yesterday was 175/99 and 150/97. This morning reading was 150/95 and then during phone conversation with RN, reading was 143/89.  Having some symptoms, feels really tired, having mild daily headaches, off and on for the last few weeks, \"kind of\" feels winded and out of breath. No chest pain or pressure, no weakness, no numbness or tingling in extremities, no one sided weakness. Denies heart palpitations.   Heart rate has been normal, 70's-80's.  Patient does note that he woke up yesterday with his right eye being blood shot, is like this still today. Having some slight blurred vision in this same eye.  Denies facial drooping. No slurred speech detected. Patient able to communicate well, does not sound confused or disoriented.    Triage indicating patient should be seen today for visit. Right eye problem may be a concern for ED evaluation.   No openings. Will huddle with PCP and request 2nd level triage review, regarding best plan for patient. Will notify patient of recommended outcome.      Licha Gordno RN  [facility]        Reason for Disposition    Systolic BP >= 160 OR Diastolic >= 100    Additional Information    Negative: Sounds like a life-threatening emergency to the triager    Negative: Pregnant > 20 weeks or postpartum (< 6 weeks after delivery) and new hand or face swelling    Negative: Pregnant > 20 weeks and BP > 140/90    Negative: Systolic BP >= 160 OR Diastolic >= 100, and any cardiac or neurologic symptoms (e.g., chest pain, difficulty breathing, unsteady gait, blurred vision)    Negative: Patient sounds very sick or weak to the triager    Negative: BP Systolic BP >= 140 OR Diastolic >= 90 and postpartum (from 0 to 6 weeks after delivery)    Negative: Systolic BP " ">= 180 OR Diastolic >= 110, and missed most recent dose of blood pressure medication    Negative: Taking BP medications and feels is having side effects (e.g., impotence, cough, dizziness)    Negative: Ran out of BP medications    Answer Assessment - Initial Assessment Questions  1. BLOOD PRESSURE: \"What is the blood pressure?\" \"Did you take at least two measurements 5 minutes apart?\"      143/89, HR 80. Just taken at 1:45 pm. This morning, BP reading was 150/95. Has been in 170's/90.  2. ONSET: \"When did you take your blood pressure?\"      Just now, RN had patient take during phone call  3. HOW: \"How did you obtain the blood pressure?\" (e.g., visiting nurse, automatic home BP monitor)      Automatic cuff at home  4. HISTORY: \"Do you have a history of high blood pressure?\"      Yes  5. MEDICATIONS: \"Are you taking any medications for blood pressure?\" \"Have you missed any doses recently?\"      Does not take any medications for HTN, has been off meds for at least a year. Discontinued due to dizziness, per patient.  6. OTHER SYMPTOMS: \"Do you have any symptoms?\" (e.g., headache, chest pain, blurred vision, difficulty breathing, weakness)      Feeling tired, no energy, \"payton\" short of breath or winded. Mild headaches, has had headaches off and on for last 2 weeks. Light blurry vision in right eye, patient also has blood shot right eye, woke with this yesterday. Denies chest pain, heart palpitations, numbness or tingling, weakness on one side of body, severe headache, dizziness.  7. PREGNANCY: \"Is there any chance you are pregnant?\" \"When was your last menstrual period?\"      No, male patient    Protocols used: HIGH BLOOD PRESSURE-A-OH      "

## 2021-11-11 ENCOUNTER — OFFICE VISIT (OUTPATIENT)
Dept: FAMILY MEDICINE | Facility: CLINIC | Age: 56
End: 2021-11-11
Payer: COMMERCIAL

## 2021-11-11 VITALS
WEIGHT: 157.6 LBS | BODY MASS INDEX: 28.83 KG/M2 | TEMPERATURE: 98 F | OXYGEN SATURATION: 96 % | SYSTOLIC BLOOD PRESSURE: 138 MMHG | DIASTOLIC BLOOD PRESSURE: 85 MMHG | HEART RATE: 71 BPM

## 2021-11-11 DIAGNOSIS — H11.31 SUBCONJUNCTIVAL HEMORRHAGE OF RIGHT EYE: ICD-10-CM

## 2021-11-11 DIAGNOSIS — I10 ESSENTIAL HYPERTENSION WITH GOAL BLOOD PRESSURE LESS THAN 140/90: Primary | ICD-10-CM

## 2021-11-11 PROCEDURE — 99213 OFFICE O/P EST LOW 20 MIN: CPT | Performed by: FAMILY MEDICINE

## 2021-11-11 ASSESSMENT — PATIENT HEALTH QUESTIONNAIRE - PHQ9
10. IF YOU CHECKED OFF ANY PROBLEMS, HOW DIFFICULT HAVE THESE PROBLEMS MADE IT FOR YOU TO DO YOUR WORK, TAKE CARE OF THINGS AT HOME, OR GET ALONG WITH OTHER PEOPLE: EXTREMELY DIFFICULT
SUM OF ALL RESPONSES TO PHQ QUESTIONS 1-9: 18
SUM OF ALL RESPONSES TO PHQ QUESTIONS 1-9: 18

## 2021-11-11 NOTE — PROGRESS NOTES
Carito Miguel is a 56 year old who presents for the following health issues:    HPI     Hypertension Follow-up      Do you check your blood pressure regularly outside of the clinic? Yes     Are you following a low salt diet? Yes    Are your blood pressures ever more than 140 on the top number (systolic) OR more   than 90 on the bottom number (diastolic), for example 140/90? Yes    Patient woke up yesterday with blood in right eye medially. No pain or vision issues.    Review of Systems   Constitutional, HEENT, cardiovascular, pulmonary, GI, , musculoskeletal, neuro, skin, endocrine and psych systems are negative, except as otherwise noted.      Objective    /85   Pulse 71   Temp 98  F (36.7  C) (Tympanic)   Wt 71.5 kg (157 lb 9.6 oz)   SpO2 96%   BMI 28.83 kg/m    Body mass index is 28.83 kg/m .  Physical Exam   GENERAL: healthy, alert and no distress  NECK: no adenopathy, no asymmetry, masses, or scars and thyroid normal to palpation  RESP: lungs clear to auscultation - no rales, rhonchi or wheezes  CV: regular rate and rhythm, normal S1 S2, no S3 or S4, no murmur, click or rub, no peripheral edema and peripheral pulses strong  ABDOMEN: soft, nontender, no hepatosplenomegaly, no masses and bowel sounds normal  MS: no gross musculoskeletal defects noted, no edema  Right eye: subconjunctival hemorrhage medially    A/P:  (I10) Essential hypertension with goal blood pressure less than 140/90  (primary encounter diagnosis)  Comment:   Plan: just added amlodipine 5 mg daily yesterday. bp today at goal. Monitor.      (H11.31) Subconjunctival hemorrhage of right eye  Comment:   Plan: discussed that this should improve with time. Reassured patient.    David Chavez MD            Answers for HPI/ROS submitted by the patient on 11/11/2021  If you checked off any problems, how difficult have these problems made it for you to do your work, take care of things at home, or get along with other people?: Extremely  difficult  PHQ9 TOTAL SCORE: 18  Do you check your blood pressure regularly outside of the clinic?: Yes  Are your blood pressures ever more than 140 on the top number (systolic) OR more than 90 on the bottom number (diastolic)? (For example, greater than 140/90): Yes  Are you following a low salt diet?: No  Headache Symptoms are: worsened  How often are you getting headaches or migraines? : Two weeks  Are you able to do normal daily activities when you have a migraine?: No  Migraine Rescue/Relief Medications:: Tylenol  Effectiveness of rescue/relief medications:: I get some relief  Migraine Preventative Medications:: no medications to prevent migraines  ER or UC Visits:: 0 times  How many servings of fruits and vegetables do you eat daily?: 4 or more  On average, how many sweetened beverages do you drink each day (Examples: soda, juice, sweet tea, etc.  Do NOT count diet or artificially sweetened beverages)?: 1  How many minutes a day do you exercise enough to make your heart beat faster?: 9 or less  How many days a week do you exercise enough to make your heart beat faster?: 3 or less  How many days per week do you miss taking your medication?: 0

## 2021-11-12 ASSESSMENT — PATIENT HEALTH QUESTIONNAIRE - PHQ9: SUM OF ALL RESPONSES TO PHQ QUESTIONS 1-9: 18

## 2021-11-22 ENCOUNTER — MYC MEDICAL ADVICE (OUTPATIENT)
Dept: PALLIATIVE MEDICINE | Facility: CLINIC | Age: 56
End: 2021-11-22
Payer: COMMERCIAL

## 2021-11-22 DIAGNOSIS — G89.4 CHRONIC PAIN SYNDROME: ICD-10-CM

## 2021-11-22 DIAGNOSIS — M06.9 RHEUMATOID ARTHRITIS INVOLVING MULTIPLE SITES, UNSPECIFIED WHETHER RHEUMATOID FACTOR PRESENT (H): ICD-10-CM

## 2021-11-23 RX ORDER — BUPRENORPHINE 20 UG/H
1 PATCH, EXTENDED RELEASE TRANSDERMAL
Qty: 4 PATCH | Refills: 0 | Status: SHIPPED | OUTPATIENT
Start: 2021-11-23 | End: 2021-12-20

## 2021-11-23 NOTE — TELEPHONE ENCOUNTER
Please process a refill of BUTRANS 20 MCG/HR WK patch to     [id number] - [place], [place] - [address] [place] [address]  [address] [place]  [place] [address]  Phone: [phone] Fax: [phone]

## 2021-11-23 NOTE — TELEPHONE ENCOUNTER
Medication refill information reviewed.     Due date for BUTRANS 20 MCG/HR WK patch  is 11/26/21     Prescriptions prepped for review.     Will route to provider.

## 2021-11-23 NOTE — TELEPHONE ENCOUNTER
Received call from patient requesting refill(s) of BUTRANS 20 MCG/HR WK patch     Last dispensed from pharmacy on 10/27/21    Patient's last office/virtual visit by prescribing provider on 11/4/21  Next office/virtual appointment scheduled for 2/101/22    Last urine drug screen date 11/4/21  Current opioid agreement on file (completed within the last year) Yes Date of opioid agreement: 11/4/21    E-prescribe to [place] [id number] [place] [facility] pharmacy    Will route to nursing [place] for review and preparation of prescription(s).

## 2021-11-23 NOTE — TELEPHONE ENCOUNTER
Script Eprescribed to pharmacy  [place] Prescription Monitoring Program checked    Will send this to MA team to notify patient.    Signed Prescriptions:                        Disp   Refills    BUTRANS 20 MCG/HR WK patch                 4 patch0        Sig: Place 1 patch onto the skin every 7 days OK to fill           11/24/21 and start 11/26/21 LISANDRO, name brand  Authorizing Provider: KAYLA CHUN MD  [facility]

## 2021-11-24 NOTE — TELEPHONE ENCOUNTER
Spoke to patient. Advised prescription was sent to pharmacy. Confirmed pharmacy.     Gave fill date of today.     Gave start date of 11/26/21.    Patient stated understanding.    Yolanda Richey [facility] [place]

## 2021-11-30 DIAGNOSIS — R39.12 BENIGN PROSTATIC HYPERPLASIA WITH WEAK URINARY STREAM: ICD-10-CM

## 2021-11-30 DIAGNOSIS — F33.1 MAJOR DEPRESSIVE DISORDER, RECURRENT EPISODE, MODERATE (H): ICD-10-CM

## 2021-11-30 DIAGNOSIS — N40.1 BENIGN PROSTATIC HYPERPLASIA WITH WEAK URINARY STREAM: ICD-10-CM

## 2021-11-30 DIAGNOSIS — F41.9 ANXIETY: Chronic | ICD-10-CM

## 2021-11-30 DIAGNOSIS — M10.9 GOUT WITHOUT TOPHUS: ICD-10-CM

## 2021-12-01 RX ORDER — DESVENLAFAXINE 50 MG/1
TABLET, FILM COATED, EXTENDED RELEASE ORAL
Qty: 90 TABLET | Refills: 3 | Status: SHIPPED | OUTPATIENT
Start: 2021-12-01 | End: 2022-11-17

## 2021-12-01 RX ORDER — TAMSULOSIN HYDROCHLORIDE 0.4 MG/1
CAPSULE ORAL
Qty: 180 CAPSULE | Refills: 3 | Status: SHIPPED | OUTPATIENT
Start: 2021-12-01 | End: 2022-11-17

## 2021-12-01 RX ORDER — COLCHICINE 0.6 MG/1
TABLET, FILM COATED ORAL
Qty: 90 TABLET | Refills: 1 | Status: SHIPPED | OUTPATIENT
Start: 2021-12-01 | End: 2022-02-24

## 2021-12-01 NOTE — TELEPHONE ENCOUNTER
"Requested Prescriptions   Pending Prescriptions Disp Refills    desvenlafaxine (PRISTIQ) 50 MG 24 hr tablet [Pharmacy Med Name: DESVENLAFAXINE SUCCNT ER 50 MG] 90 tablet 3     Sig: TAKE 1 TABLET BY MOUTH EVERY DAY        Serotonin-Norepinephrine Reuptake Inhibitors  Failed - 11/30/2021  2:03 PM        Failed - PHQ-9 score of less than 5 in past 6 months     Please review last PHQ-9 score.           Passed - Blood pressure under 140/90 in past 12 months       BP Readings from Last 3 Encounters:   11/11/21 138/85   11/04/21 (!) 172/99   10/20/21 136/87                 Passed - Medication is active on med list        Passed - Patient is age 18 or older        Passed - Normal serum creatinine on file in past 12 months     Recent Labs   Lab Test 10/20/21  1143   CR 0.87       Ok to refill medication if creatinine is low          Passed - Recent (6 mo) or future (30 days) visit within the authorizing provider's specialty     Patient had office visit in the last 6 months or has a visit in the next 30 days with authorizing provider or within the authorizing provider's specialty.  See \"Patient Info\" tab in inbasket, or \"Choose Columns\" in Meds & Orders section of the refill encounter.               tamsulosin (FLOMAX) 0.4 MG capsule [Pharmacy Med Name: TAMSULOSIN HCL 0.4 MG CAPSULE] 180 capsule 3     Sig: TAKE 2 CAPSULES BY MOUTH EVERY DAY        Alpha Blockers Failed - 11/30/2021  2:03 PM        Failed - Patient does not have Tadalafil, Vardenafil, or Sildenafil on their medication list        Passed - Blood pressure under 140/90 in past 12 months       BP Readings from Last 3 Encounters:   11/11/21 138/85   11/04/21 (!) 172/99   10/20/21 136/87                 Passed - Recent (12 mo) or future (30 days) visit within the authorizing provider's specialty     Patient has had an office visit with the authorizing provider or a provider within the authorizing providers department within the previous 12 mos or has a future within " "next 30 days. See \"Patient Info\" tab in inbasket, or \"Choose Columns\" in Meds & Orders section of the refill encounter.              Passed - Medication is active on med list        Passed - Patient is 18 years of age or older           COLCRYS 0.6 MG tablet [Pharmacy Med Name: COLCRYS 0.6 MG TABLET] 90 tablet 1     Sig: TAKE 2 TABLETS BY MOUTH AT THE FIRST SIGN OF FLARE, TAKE 1 ADDITIONAL TABLET ONE HOUR LATER.        Gout Agents Protocol Failed - 11/30/2021  2:03 PM        Failed - Has Uric Acid on file in past 12 months and value is less than 6     Recent Labs   Lab Test 11/04/15  1547   URIC 6.1     If level is 6mg/dL or greater, ok to refill one time and refer to provider.             Passed - CBC on file in past 12 months     Recent Labs   Lab Test 10/20/21  1144   WBC 4.8   RBC 4.71   HGB 14.0   HCT 41.3                      Passed - ALT on file in past 12 months     Recent Labs   Lab Test 10/20/21  1143   ALT 25               Passed - Recent (12 mo) or future (30 days) visit within the authorizing provider's specialty     Patient has had an office visit with the authorizing provider or a provider within the authorizing providers department within the previous 12 mos or has a future within next 30 days. See \"Patient Info\" tab in inbasket, or \"Choose Columns\" in Meds & Orders section of the refill encounter.              Passed - Medication is active on med list        Passed - Patient is age 18 or older        Passed - Normal serum creatinine on file in the past 12 months     Recent Labs   Lab Test 10/20/21  1143   CR 0.87       Ok to refill medication if creatinine is low                "

## 2021-12-02 DIAGNOSIS — N52.9 ERECTILE DYSFUNCTION, UNSPECIFIED ERECTILE DYSFUNCTION TYPE: ICD-10-CM

## 2021-12-02 DIAGNOSIS — I10 ESSENTIAL HYPERTENSION WITH GOAL BLOOD PRESSURE LESS THAN 140/90: ICD-10-CM

## 2021-12-02 RX ORDER — AMLODIPINE BESYLATE 5 MG/1
TABLET ORAL
Qty: 90 TABLET | Refills: 3 | Status: SHIPPED | OUTPATIENT
Start: 2021-12-02 | End: 2022-11-17

## 2021-12-02 RX ORDER — SILDENAFIL CITRATE 20 MG/1
TABLET ORAL
Qty: 150 TABLET | Refills: 3 | Status: SHIPPED | OUTPATIENT
Start: 2021-12-02 | End: 2023-01-23

## 2021-12-02 NOTE — TELEPHONE ENCOUNTER
MD had sent prescription for amlodipine 5mg as 30 day with 11 refills.  Pharmacy requesting 90 day.  Prescription sent.  Mirlande Brown RN

## 2021-12-02 NOTE — TELEPHONE ENCOUNTER
"Requested Prescriptions   Pending Prescriptions Disp Refills    sildenafil (REVATIO) 20 MG tablet [Pharmacy Med Name: SILDENAFIL 20 MG TABLET] 150 tablet 3     Sig: TAKE 2 TO 5 TABLETS BY MOUTH 30 MINUTES PRIOR TO INTERCOURSE AS NEEDED.        Erectile Dysfuction Protocol Failed - 12/2/2021 12:32 PM        Failed - Absence of nitrates on medication list        Failed - Absence of Alpha Blockers on Med list        Passed - Recent (12 mo) or future (30 days) visit within the authorizing provider's specialty     Patient has had an office visit with the authorizing provider or a provider within the authorizing providers department within the previous 12 mos or has a future within next 30 days. See \"Patient Info\" tab in inbasket, or \"Choose Columns\" in Meds & Orders section of the refill encounter.              Passed - Medication is active on med list        Passed - Patient is age 18 or older              "

## 2021-12-03 ENCOUNTER — MEDICAL CORRESPONDENCE (OUTPATIENT)
Dept: HEALTH INFORMATION MANAGEMENT | Facility: CLINIC | Age: 56
End: 2021-12-03
Payer: COMMERCIAL

## 2021-12-18 ENCOUNTER — MYC MEDICAL ADVICE (OUTPATIENT)
Dept: PALLIATIVE MEDICINE | Facility: CLINIC | Age: 56
End: 2021-12-18
Payer: COMMERCIAL

## 2021-12-18 DIAGNOSIS — L40.9 PSORIASIS: ICD-10-CM

## 2021-12-18 DIAGNOSIS — M06.9 RHEUMATOID ARTHRITIS INVOLVING MULTIPLE SITES, UNSPECIFIED WHETHER RHEUMATOID FACTOR PRESENT (H): ICD-10-CM

## 2021-12-18 DIAGNOSIS — G89.4 CHRONIC PAIN SYNDROME: ICD-10-CM

## 2021-12-20 DIAGNOSIS — F45.8 ANXIETY HYPERVENTILATION: ICD-10-CM

## 2021-12-20 DIAGNOSIS — F41.9 ANXIETY HYPERVENTILATION: ICD-10-CM

## 2021-12-20 DIAGNOSIS — L40.9 PSORIASIS: ICD-10-CM

## 2021-12-20 RX ORDER — BUPRENORPHINE 20 UG/H
1 PATCH, EXTENDED RELEASE TRANSDERMAL
Qty: 4 PATCH | Refills: 0 | Status: SHIPPED | OUTPATIENT
Start: 2021-12-20 | End: 2022-01-18

## 2021-12-20 NOTE — TELEPHONE ENCOUNTER
LVM for patient that prescription was sent to pharmacy. Confirmed pharmacy.      Gave fill date 12/22/21    Gave start date 12/24/21    Yolanda Richey [facility] [place]

## 2021-12-20 NOTE — TELEPHONE ENCOUNTER
Received call from patient requesting refill(s) of BUTRANS 20 MCG/HR WK patch     Last dispensed from pharmacy on 11/24/21    Patient's last office/virtual visit by prescribing provider on 11/4/21  Next office/virtual appointment scheduled for 2/10/22    Last urine drug screen date 11/4/21  Current opioid agreement on file (completed within the last year) Yes Date of opioid agreement: 11/4/21    E-prescribe to [place] [id number] [place] [facility] pharmacy    Will route to nursing [place] for review and preparation of prescription(s).

## 2021-12-20 NOTE — TELEPHONE ENCOUNTER
Medication refill information reviewed.     Due date for  BUTRANS 20 MCG/HR WK patch is 12/24/21     Prescriptions prepped for review.     Will route to provider.

## 2021-12-20 NOTE — TELEPHONE ENCOUNTER
Please process a refill of BUTRANS 20 MCG/HR WK patch to     [id number] [place] - [address] [place] [address]  [address] [facility] [place]  [place] [address]  Phone: [phone] Fax: [phone]

## 2021-12-20 NOTE — TELEPHONE ENCOUNTER
Script Eprescribed to pharmacy  [place]    Will send this to MA team to notify patient.    Signed Prescriptions:                        Disp   Refills    BUTRANS 20 MCG/HR WK patch                 4 patch0        Sig: Place 1 patch onto the skin every 7 days OK to fill           12/22/21 and start 12/24/21 LISANDRO, name brand  Authorizing Provider: KAYLA CHUN MD  [facility]

## 2021-12-21 DIAGNOSIS — M06.09 RHEUMATOID ARTHRITIS OF MULTIPLE SITES WITH NEGATIVE RHEUMATOID FACTOR (H): ICD-10-CM

## 2021-12-21 RX ORDER — CLONAZEPAM 1 MG/1
TABLET ORAL
Qty: 90 TABLET | Refills: 0 | Status: SHIPPED | OUTPATIENT
Start: 2021-12-21 | End: 2022-02-24

## 2021-12-21 NOTE — TELEPHONE ENCOUNTER
Requested Prescriptions   Pending Prescriptions Disp Refills     leflunomide (ARAVA) 20 MG tablet 90 tablet 0     Sig: Take 1 tablet (20 mg) by mouth daily ; Labs required every 8-12 weeks.       There is no refill protocol information for this order

## 2021-12-22 RX ORDER — HYDROCORTISONE VALERATE CREAM 2 MG/G
CREAM TOPICAL
Qty: 60 G | Refills: 3 | Status: SHIPPED | OUTPATIENT
Start: 2021-12-22

## 2021-12-22 NOTE — TELEPHONE ENCOUNTER
Pending Prescriptions:                       Disp   Refills    leflunomide (ARAVA) 20 MG tablet          90 tab*0            Sig: Take 1 tablet (20 mg) by mouth daily ; Labs           required every 8-12 weeks.    Routing refill request to provider for review/approval because:  Drug not on [place] [facility] [place]   LOV and labs done 10/20/21.    Juanita Oliver RN

## 2021-12-22 NOTE — TELEPHONE ENCOUNTER
HYDROCORTISONE CARISSA 0.2% CREAM  60 g, 3 Refills sent to pharm   Last visit: 4/30/2021    Dariana Roa RN  Central Triage Red Flags/Med Refills

## 2021-12-23 RX ORDER — LEFLUNOMIDE 20 MG/1
20 TABLET ORAL DAILY
Qty: 90 TABLET | Refills: 0 | Status: SHIPPED | OUTPATIENT
Start: 2021-12-23 | End: 2022-03-29

## 2021-12-23 RX ORDER — PIMECROLIMUS 10 MG/G
CREAM TOPICAL
Qty: 60 G | Refills: 1 | Status: SHIPPED | OUTPATIENT
Start: 2021-12-23 | End: 2022-03-17

## 2021-12-23 NOTE — TELEPHONE ENCOUNTER
PIMECROLIMUS 1% CREAM  Last Written Prescription Date:  8/6/2021  Last Fill Quantity: 60 g,   # refills: 1  Last Office Visit :  10/20/2021  Future Office visit:  None  60 g, 1 Refill sent to pharm 12/23/2021    Dariana Roa RN  Central Triage Red Flags/Med Refills

## 2021-12-28 DIAGNOSIS — Z95.1 S/P CABG (CORONARY ARTERY BYPASS GRAFT): ICD-10-CM

## 2021-12-28 DIAGNOSIS — E78.5 HYPERLIPIDEMIA LDL GOAL <70: ICD-10-CM

## 2021-12-28 NOTE — TELEPHONE ENCOUNTER
evolocumab (REPATHA) 140 MG/ML prefilled autoinjector  Last Written Prescription Date: 6/4/21  Last Fill Quantity: 2ml,   # refills: 11  Last Office Visit :6/18/21  Future Office visit:  none    Routing refill request to provider for review/approval because: not on protocol

## 2021-12-29 ENCOUNTER — TELEPHONE (OUTPATIENT)
Dept: SLEEP MEDICINE | Facility: CLINIC | Age: 56
End: 2021-12-29
Payer: COMMERCIAL

## 2021-12-29 NOTE — TELEPHONE ENCOUNTER
Left voice message for patient to bring his cpap device in to be downloaded, minicabit message has been sent as well.     Anitra Shepard MA

## 2021-12-31 ENCOUNTER — TELEPHONE (OUTPATIENT)
Dept: OPHTHALMOLOGY | Facility: CLINIC | Age: 56
End: 2021-12-31
Payer: COMMERCIAL

## 2021-12-31 NOTE — TELEPHONE ENCOUNTER
LM for pt letting him know that the appt needing to be rescheduled is his 6 month follow up in the eye department. Gave scheduling line to call to move appt date/time.    Licha Ortiz, COA, 1:55 PM 12/31/2021

## 2021-12-31 NOTE — TELEPHONE ENCOUNTER
[facility]    Phone Message    May a detailed message be left on voicemail: yes     Reason for Call: Other: pt calling to reschedule but has no clue what it is for. please advise with him     Action Taken: Message routed to:  Adult Clinics: Eye p [id number]    Travel Screening: Not Applicable

## 2022-01-11 ENCOUNTER — VIRTUAL VISIT (OUTPATIENT)
Dept: SLEEP MEDICINE | Facility: CLINIC | Age: 57
End: 2022-01-11
Payer: COMMERCIAL

## 2022-01-11 VITALS — BODY MASS INDEX: 29.44 KG/M2 | HEIGHT: 62 IN | WEIGHT: 160 LBS

## 2022-01-11 DIAGNOSIS — G47.33 OSA (OBSTRUCTIVE SLEEP APNEA): Primary | ICD-10-CM

## 2022-01-11 PROBLEM — K80.20 SYMPTOMATIC CHOLELITHIASIS: Status: ACTIVE | Noted: 2019-10-07

## 2022-01-11 PROBLEM — R55 SYNCOPE, UNSPECIFIED SYNCOPE TYPE: Status: ACTIVE | Noted: 2022-01-11

## 2022-01-11 PROCEDURE — 99215 OFFICE O/P EST HI 40 MIN: CPT | Mod: 95 | Performed by: INTERNAL MEDICINE

## 2022-01-11 ASSESSMENT — MIFFLIN-ST. JEOR: SCORE: 1435.01

## 2022-01-11 NOTE — PROGRESS NOTES
[facility]   Outpatient Sleep Medicine Follow-up Visit  January 11, 2022    Name: Lamont Daniels MRN# [id number]   Age: 56 year old YOB: 1965     Date of Consultation: January 11, 2022  Consultation is requested by: No referring provider defined for this encounter.  Primary care provider: David Chavez           Assessment and Plan:     Sleep Diagnoses:    Severe obstructive sleep apnea (AHI 42,011)    Insomnia complaint with prolonged time in bed possible paradoxical insomnia    Comorbid conditions:    BMI 40    Hypertension    Anxiety and depression    Chronic pain; sciatica    Chronic hepatitis B coronary artery disease status post bypass graft    Rheumatoid arthritis with negative serology    Gout      Summary Recommendations:    Replace auto titration CPAP device 6-18 follow-up in 6 weeks and consideration for polysomnography and CPAP/ASV titration if there are persistent predominantly central events.    We will readdress insomnia complaint after effective positive airway pressure therapy achieved.         History of Present Illness:     Lamont Daniels is a 56 year old male with a history of severe obstructive sleep apnea currently on fixed CPAP with residual respiratory events AHI 16 with central events 7/h and chronic insomnia in the setting of joint pain and prolonged sleep latency with some mid sleep awakenings.  His CPAP usage with an average use of 11 hours suggest possible paradoxical insomnia.  He is not currently on narcotics although he is taking baclofen which is known to produce central events.      SLEEP-WAKE SCHEDULE: Enters bedroom 11-12 am with latency of 2-3 hours with diffuse pain due to his arthritis and final awakening 11am     PREVIOUS SLEEP STUDIES:  Date:   Sleep Study 9/13/11  Sleep Architecture:  The total recording time of the diagnostic portion of the study was 192.7 minutes.  The total sleep time was 129.5 minutes.  During the diagnostic portion of  the study the sleep latency was 30.2 minutes after taking Ambien 10 mg.  No REM sleep observed. Sleep Efficiency was 67.2%.  Wake after sleep onset was 27.5.   The patient spent 23.2% of total sleep time in Stage N1, 72.6% in Stage N2, 4.2% in Stages N3 and 0.0% in REM.           Respiration:     Sustained Sleep Associated Hypoventilation -was not monitored.    Sleep Associated Hypoxemia - was present.  Baseline oxygen saturation was 93.6%.  The lowest oxygen saturation was 75.4%.  Snoring was reported as loud.     Events - During the diagnostic portion of the study, the polysomnogram revealed a presence of 5 obstructive apneas resulting in an Apnea index of 30.1 events per hour.  There were 21 hypopneas resulting in a Hypopnea index of 9.7 events per hour.  The combined Apnea/Hypopnea Index was 39.8 events per hour.  The REM AHI was n/a.  The RERA index was 7.9 per hour.   The RDI was 47.7  .               Objective:  CPAP Compliance Targets:   >70% days > 4 hours AHI < 5   30 days ending January 6, 2022  Mask type:  Full Face Mask           Medications:     Current Outpatient Medications   Medication Sig     COLCRYS 0.6 MG tablet TAKE 2 TABLETS BY MOUTH AT THE FIRST SIGN OF FLARE, TAKE 1 ADDITIONAL TABLET ONE HOUR LATER.     desvenlafaxine (PRISTIQ) 50 MG 24 hr tablet TAKE 1 TABLET BY MOUTH EVERY DAY     sildenafil (REVATIO) 20 MG tablet TAKE 2 TO 5 TABLETS BY MOUTH 30 MINUTES PRIOR TO INTERCOURSE AS NEEDED.     tamsulosin (FLOMAX) 0.4 MG capsule TAKE 2 CAPSULES BY MOUTH EVERY DAY     Acetaminophen (TYLENOL PO)      allopurinol (ZYLOPRIM) 300 MG tablet TAKE 1 TABLET BY MOUTH EVERY DAY     amLODIPine (NORVASC) 5 MG tablet TAKE 1 TABLET BY MOUTH DAILY FOR BLOOD PRESSURE     aspirin EC 81 MG EC tablet Take 1 tablet (81 mg) by mouth daily (*)     atorvastatin (LIPITOR) 80 MG tablet Take 1 tablet (80 mg) by mouth daily     bacitracin 500 UNIT/GM external ointment Apply topically 3 times daily     baclofen (LIORESAL) 10  MG tablet TAKE 1 TABLET BY MOUTH 2 TIMES DAILY AS NEEDED FOR MUSCLE SPASM     busPIRone HCl (BUSPAR) 30 MG tablet TAKE 1 TABLET (30 MG) BY MOUTH 2 TIMES DAILY     BUTRANS 20 MCG/HR WK patch Place 1 patch onto the skin every 7 days OK to fill 12/22/21 and start 12/24/21 LISANDRO, name brand     Cholecalciferol (VITAMIN D) 2000 UNITS tablet TAKE ONE TABLET BY MOUTH ONCE DAILY     clobetasol (TEMOVATE) 0.05 % external cream Apply twice daily for 2 weeks, weekends only for 2 weeks, repeat cycle as needed for hands, not face or genitals     clobetasol (TEMOVATE) 0.05 % external solution Apply twice daily to scalp for up to 2 weeks for flares. (Patient not taking: Reported on 6/22/2021)     clonazePAM (KLONOPIN) 1 MG tablet TAKE 0.5-1 TABLETS BY MOUTH 2 TIMES DAILY AS NEEDED FOR ANXIETY     clopidogrel (PLAVIX) 75 MG tablet Take 1 tablet (75 mg) by mouth daily     diclofenac (VOLTAREN) 1 % topical gel Apply 4 g topically 4 times daily as needed for moderate pain     diclofenac (VOLTAREN) 1 % topical gel Apply 4 grams to bilateral knees, up to four times daily as needed using enclosed dosing card.  Max of 32g/day     evolocumab (REPATHA) 140 MG/ML prefilled autoinjector Inject 1 mL (140 mg) Subcutaneous every 14 days     ezetimibe (ZETIA) 10 MG tablet Take 1 tablet (10 mg) by mouth daily     gabapentin (NEURONTIN) 300 MG capsule Take 2 capsules (600 mg) by mouth 3 times daily . 3 month supply     gemfibrozil (LOPID) 600 MG tablet Take 1 tablet (600 mg) by mouth 2 times daily     golimumab (SIMPONI) 50 MG/0.5ML auto-injector pen Inject 0.5 mLs (50 mg) Subcutaneous every 28 days . Hold for signs of infection, and seek medical attention.     hydrocortisone (WESTCORT) 0.2 % external cream FOR GENITALS, APPLY TWICE DAILY FOR UP TO 2 WEEKS, TAKE 2 WEEKS OFF THEN REPEAT     hydrocortisone 2.5 % cream FOR FACE, APPLY TWICE DAILY FOR UP TO 2 WEEK FOR FLARES ON THE FACE, TAKE 2 WEEKS OFF     Incontinence Supply Disposable (DEPEND  UNDERWEAR LARGE) MISC Use twice daily.     leflunomide (ARAVA) 20 MG tablet Take 1 tablet (20 mg) by mouth daily ; Labs required every 8-12 weeks.     meclizine (ANTIVERT) 25 MG tablet TAKE 1 TABLET BY MOUTH EVERY 6 HOURS AS NEEDED FOR DIZZINESS.     melatonin 3 MG tablet Take 1 tablet (3 mg) by mouth nightly as needed for sleep     naloxone (NARCAN) 4 MG/0.1ML nasal spray Spray 1 spray (4 mg) into one nostril alternating nostrils once as needed for opioid reversal every 2-3 minutes until assistance arrives     nitroGLYcerin (NITROSTAT) 0.4 MG sublingual tablet For chest pain place 1 tablet under the tongue every 5 minutes for 3 doses. If symptoms persist 5 minutes after 1st dose call 911.     order for DME Equipment being ordered: chair lift     order for DME Equipment being ordered: single end cane     ORDER FOR DME 1.  CPAP pressure 11 cm/H20 with heated humidity.   2.  Provide mask to fit and CPAP supplies.   3.  Length of need lifetime.   4.  If needed please provide a chin strap          pantoprazole (PROTONIX) 40 MG EC tablet TAKE 1 TABLET BY MOUTH EVERY DAY     pimecrolimus (ELIDEL) 1 % external cream APPLY TWICE DAILY FOR FACE AND GENITALS     predniSONE (DELTASONE) 5 MG tablet For rheumatoid arthritis flare only: prednisone 10mg daily x2days, then 5mg daily x5days, then stop. (Patient not taking: Reported on 6/22/2021)     sulfaSALAzine (AZULFIDINE) 500 MG tablet Take 3 tablets (1,500 mg) by mouth 2 times daily     tenofovir (VIREAD) 300 MG tablet Take 1 tablet (300 mg) by mouth daily     triamcinolone (KENALOG) 0.1 % external ointment APPLY TOPICALLY TO AFFECTED AREA(S) 3 TIMES DAILY     triamcinolone (KENALOG) 0.1 % external ointment Apply to trunk and extremities twice daily for up to 2 weeks for flares     zolpidem (AMBIEN) 5 MG tablet Take 1 tablet (5 mg) by mouth nightly as needed for sleep     No current facility-administered medications for this visit.        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     Hyperlipidemia LDL goal <70     Insomnia     Gout     Suicidal ideation     Essential hypertension     Rheumatoid arthritis involving multiple sites, unspecified rheumatoid factor presence     High risk medications (not anticoagulants) long-term use     Midline low back pain without sciatica     Bilateral low back pain with sciatica, sciatica laterality unspecified     Elevated liver enzymes     On corticosteroid therapy     Essential hypertension with goal blood pressure less than 140/90     Insomnia, unspecified type     Rheumatoid arthritis of multiple sites with negative rheumatoid factor (H)     Benign prostatic hyperplasia with lower urinary tract symptoms, unspecified morphology     Chronic pain syndrome     Hepatitis B, chronic (H)            Past Medical History:     Does not need 02 supplement at night   Past Medical History:   Diagnosis Date     Anxiety      CAD (coronary artery disease)     CABG, PCI     Congestive heart failure, unspecified 2008     Depressive disorder 2008     Gout      Hepatitis B > 10 years     HTN (hypertension)      Hyperlipidemia LDL goal < 100      Malrotation of intestine      Moderate major depression (H)      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) 9/19/2011    Uses CPAP     Rheumatoid arthritis flare (H) 1012     Steatohepatitis 12/15    liver biopsy     Tuberculosis age 13    INH x 9 months      Vitamin D deficiency              Past Surgical History:    No h/o  upper airway surgery  Past Surgical History:   Procedure Laterality Date     ABDOMEN  SURGERY  2014     BIOPSY  2015     BYPASS GRAFT ARTERY CORONARY  2008    6 vessels     COLONOSCOPY  2/8/2013    Procedure: COLONOSCOPY;  Colonoscopy, blood in stool;  Surgeon: Duane, William Charles, MD;  Location: [facility] OR     COMBINED REPAIR PTOSIS WITH BLEPHAROPLASTY BILATERAL Bilateral 6/25/2018    Procedure: COMBINED REPAIR PTOSIS WITH BLEPHAROPLASTY BILATERAL;  Bilateral upper eyelid blepharoplasty, ptosis repair and brow ptosis repair;  Surgeon: Sandra Borja MD;  Location:  OR     GI SURGERY  2014     HEAD & NECK SURGERY  2011     NASAL/SINUS POLYPECTOMY  2010     ORTHOPEDIC SURGERY  2012     REPAIR PTOSIS       REPAIR PTOSIS BILATERAL Bilateral 7/22/2019    Procedure: REPAIR, PTOSIS, BOTH UPPER brows;  Surgeon: Sandra Borja MD;  Location: [facility] OR     REPAIR PTOSIS BROW BILATERAL Bilateral 6/25/2018    Procedure: REPAIR PTOSIS BROW BILATERAL;;  Surgeon: Sandra Borja MD;  Location:  OR     THORACIC SURGERY  1989    tb     TONSILLECTOMY  2010     UVULOPALATOPHARYNGOPLASTY  2010     VASCULAR SURGERY  2008     [facility] CORONARY STENT CIERA SOTO ADDTL VESSEL  2008    3 months after CABG              Physical Examination:   Objective:  Purplish  Reported vitals:  There were no vitals taken for this visit.   GENERAL: Healthy, alert and no distress  EYES: Eyes grossly normal to inspection.  No discharge or erythema  RESP: No audible wheeze, cough, or visible cyanosis.  No visible retractions or increased work of breathing.  .  NEURO: Cranial nerves grossly intact.  Mentation and speech appropriate for age.  PSYCH: Mentation appears normal, affect normal/bright, judgement and insight intact, normal speech and appearance well-groomed.        Copy to: David Chavez MD 1/11/2022         Total time spent reviewing medical records including previous testing and interpretation as well as direct patient contact and documentation on this date: 40min

## 2022-01-11 NOTE — PROGRESS NOTES
Lamont is a 56 year old who is being evaluated via a billable video visit.      How would you like to obtain your AVS? MyChart  If the video visit is dropped, the invitation should be resent by: Text to cell phone: [phone]  Will anyone else be joining your video visit? No      Video Start Time: 3:30 PM  Video-Visit Details    Type of service:  Video Visit    Video End Time:3:51 PM    Originating Location (pt. Location): Home    Distant Location (provider location):  [facility]     Platform used for Video Visit: [employer]

## 2022-01-16 ENCOUNTER — MYC MEDICAL ADVICE (OUTPATIENT)
Dept: PALLIATIVE MEDICINE | Facility: CLINIC | Age: 57
End: 2022-01-16
Payer: COMMERCIAL

## 2022-01-16 DIAGNOSIS — M06.9 RHEUMATOID ARTHRITIS INVOLVING MULTIPLE SITES, UNSPECIFIED WHETHER RHEUMATOID FACTOR PRESENT (H): ICD-10-CM

## 2022-01-16 DIAGNOSIS — G89.4 CHRONIC PAIN SYNDROME: ICD-10-CM

## 2022-01-17 NOTE — TELEPHONE ENCOUNTER
Please process a refill of BUTRANS 20 MCG/HR WK patch to     [id number] [place] [place] - [address] [place] [address]  [address] [facility] [place]  [place] [address]  Phone: [phone] Fax: [phone]

## 2022-01-17 NOTE — TELEPHONE ENCOUNTER
Received call from patient requesting refill(s) of BUTRANS 20 MCG/HR WK patch     Last dispensed from pharmacy on 12/23/21    Patient's last office/virtual visit by prescribing provider on 11/4/21  Next office/virtual appointment scheduled for 2/10/22    Last urine drug screen date 11/4/21  Current opioid agreement on file (completed within the last year) Yes Date of opioid agreement: 11/4/21    E-prescribe to [place] [id number] [place] [facility] pharmacy    Will route to nursing [place] for review and preparation of prescription(s).

## 2022-01-18 RX ORDER — BUPRENORPHINE 20 UG/H
1 PATCH, EXTENDED RELEASE TRANSDERMAL
Qty: 4 PATCH | Refills: 0 | Status: SHIPPED | OUTPATIENT
Start: 2022-01-18 | End: 2022-02-14

## 2022-01-18 NOTE — TELEPHONE ENCOUNTER
Spoke to patient. Advised prescription was sent to pharmacy. Confirmed pharmacy.     Gave fill date of tomorrow.     Gave start date 01/21/22    Patient stated understanding.    Yolanda Richey [facility]

## 2022-01-18 NOTE — TELEPHONE ENCOUNTER
Medication refill information reviewed.     Due date for BUTRANS 20 MCG/HR WK patch is 01/21/22     Prescriptions prepped for review.     Will route to provider.

## 2022-01-18 NOTE — TELEPHONE ENCOUNTER
Script Eprescribed to pharmacy  [place]    Will send this to MA team to notify patient.    Signed Prescriptions:                        Disp   Refills    BUTRANS 20 MCG/HR WK patch                 4 patch0        Sig: Place 1 patch onto the skin every 7 days OK to fill           01/19/22 and start 01/21/22 LISANDRO, name brand  Authorizing Provider: KAYLA CHUN MD  [facility]

## 2022-01-28 DIAGNOSIS — R42 VERTIGO: ICD-10-CM

## 2022-01-28 DIAGNOSIS — L40.9 PSORIASIS: ICD-10-CM

## 2022-01-28 RX ORDER — MECLIZINE HYDROCHLORIDE 25 MG/1
TABLET ORAL
Qty: 30 TABLET | Refills: 2 | Status: SHIPPED | OUTPATIENT
Start: 2022-01-28 | End: 2022-04-28

## 2022-02-01 DIAGNOSIS — M10.9 GOUT WITHOUT TOPHUS: ICD-10-CM

## 2022-02-01 RX ORDER — CLOBETASOL PROPIONATE 0.5 MG/G
CREAM TOPICAL
Qty: 60 G | Refills: 4 | Status: SHIPPED | OUTPATIENT
Start: 2022-02-01

## 2022-02-01 RX ORDER — COLCHICINE 0.6 MG/1
TABLET, FILM COATED ORAL
Qty: 90 TABLET | Refills: 1 | OUTPATIENT
Start: 2022-02-01

## 2022-02-01 NOTE — TELEPHONE ENCOUNTER
CLOBETASOL 0.05% CREAM  Last Written Prescription Date:  12/17/2020  Last Fill Quantity: 60,   # refills: 11  Last Office Visit :  4/30/23021  Future Office visit:  None  60 g, 4 Refills sent to pharm 2/1/2022      Dariana Roa RN  Central Triage Red Flags/Med Refills

## 2022-02-14 ENCOUNTER — MYC MEDICAL ADVICE (OUTPATIENT)
Dept: PALLIATIVE MEDICINE | Facility: CLINIC | Age: 57
End: 2022-02-14
Payer: COMMERCIAL

## 2022-02-14 DIAGNOSIS — M06.9 RHEUMATOID ARTHRITIS INVOLVING MULTIPLE SITES, UNSPECIFIED WHETHER RHEUMATOID FACTOR PRESENT (H): ICD-10-CM

## 2022-02-14 DIAGNOSIS — G89.4 CHRONIC PAIN SYNDROME: ICD-10-CM

## 2022-02-14 RX ORDER — BUPRENORPHINE 20 UG/H
1 PATCH, EXTENDED RELEASE TRANSDERMAL
Qty: 4 PATCH | Refills: 1 | Status: SHIPPED | OUTPATIENT
Start: 2022-02-14 | End: 2022-04-08

## 2022-02-14 NOTE — TELEPHONE ENCOUNTER
Please process a refill of BUTRANS 20 MCG/HR WK patch to     [id number] [place] - [address] [place] [address]  [address] [facility] [place]  [place] [address]  Phone: [phone] Fax: [phone]

## 2022-02-14 NOTE — TELEPHONE ENCOUNTER
Received call from patient requesting refill(s) of    BUTRANS 20 MCG/HR WK patch       Last dispensed from pharmacy on 1/19/22    Patient's last office/virtual visit by prescribing provider on 11/4/22  Next office/virtual appointment scheduled for 4/7/22    Last urine drug screen date 11/4/21  Current opioid agreement on file (completed within the last year) Yes Date of opioid agreement: 11/4/21    E-prescribe to [place] [id number] [place] [facility] pharmacy    Will route to nursing [place] for review and preparation of prescription(s).

## 2022-02-14 NOTE — TELEPHONE ENCOUNTER
Medication refill information reviewed.     Due date for BUTRANS 20 MCG/HR WK patch is 02/18/22     Prescriptions prepped for review.     Will route to provider.

## 2022-02-15 NOTE — TELEPHONE ENCOUNTER
LVM for patient that prescription was sent to pharmacy. Confirmed pharmacy.      Gave fill date of tomorrow.     Gave start date 02/18/22.     Advised per providers instructions of one refill with this Rx.     Yolanda Richey [facility] [place]

## 2022-02-24 DIAGNOSIS — M10.9 GOUT WITHOUT TOPHUS: ICD-10-CM

## 2022-02-24 DIAGNOSIS — F41.9 ANXIETY HYPERVENTILATION: ICD-10-CM

## 2022-02-24 DIAGNOSIS — F45.8 ANXIETY HYPERVENTILATION: ICD-10-CM

## 2022-02-24 RX ORDER — CLONAZEPAM 1 MG/1
TABLET ORAL
Qty: 90 TABLET | Refills: 0 | Status: SHIPPED | OUTPATIENT
Start: 2022-02-24 | End: 2022-04-28

## 2022-02-24 RX ORDER — COLCHICINE 0.6 MG/1
TABLET, FILM COATED ORAL
Qty: 90 TABLET | Refills: 1 | Status: SHIPPED | OUTPATIENT
Start: 2022-02-24 | End: 2022-03-22

## 2022-02-24 NOTE — TELEPHONE ENCOUNTER
Routing refill request to provider for review/approval because:  Drug not on the [facility] refill protocol     Uric acid not current.      Mely Ta RN  [facility]

## 2022-03-16 DIAGNOSIS — E78.5 HYPERLIPIDEMIA LDL GOAL <70: ICD-10-CM

## 2022-03-16 DIAGNOSIS — I25.119 ATHEROSCLEROSIS OF NATIVE CORONARY ARTERY OF NATIVE HEART WITH ANGINA PECTORIS (H): ICD-10-CM

## 2022-03-16 DIAGNOSIS — I25.719 ATHEROSCLEROSIS OF AUTOLOGOUS VEIN CORONARY ARTERY BYPASS GRAFT WITH ANGINA PECTORIS (H): ICD-10-CM

## 2022-03-19 DIAGNOSIS — M10.9 GOUT WITHOUT TOPHUS: ICD-10-CM

## 2022-03-22 RX ORDER — CLOPIDOGREL BISULFATE 75 MG/1
75 TABLET ORAL DAILY
Qty: 90 TABLET | Refills: 0 | Status: SHIPPED | OUTPATIENT
Start: 2022-03-22 | End: 2022-06-09

## 2022-03-22 RX ORDER — COLCHICINE 0.6 MG/1
TABLET, FILM COATED ORAL
Qty: 90 TABLET | Refills: 1 | Status: SHIPPED | OUTPATIENT
Start: 2022-03-22 | End: 2022-04-14

## 2022-03-22 NOTE — TELEPHONE ENCOUNTER
Routing refill request to provider for review/approval because:  Labs out of range:    Labs not current:        Has Uric Acid on file in past 12 months and value is less than 6        Recent Labs   Lab Test 11/04/15  1547   URIC 6.     Serena Pizarro RN, BSN   [facility] [place]

## 2022-03-22 NOTE — TELEPHONE ENCOUNTER
gemfibrozil (LOPID) 600 MG    Last Written Prescription Date:  7/5/21  Last Fill Quantity: 180,   # refills: 2  Last Office Visit : 6/18/21  Future Office visit:  NONE  RTC 1 YEAR   Routing refill request to provider for review/approval because:  Drug not on the refill protocol

## 2022-03-24 DIAGNOSIS — M06.09 RHEUMATOID ARTHRITIS OF MULTIPLE SITES WITH NEGATIVE RHEUMATOID FACTOR (H): ICD-10-CM

## 2022-03-24 NOTE — TELEPHONE ENCOUNTER
LOVE 10/20/21  Last labs: 10/20/21 (labs required every 3 months)  F/U visit: 5/11/22    Patient is due for labs. Called patient and left a message to return my call.    Jf CHANDLER RN....3/24/2022 3:20 PM

## 2022-03-25 ENCOUNTER — LAB (OUTPATIENT)
Dept: LAB | Facility: CLINIC | Age: 57
End: 2022-03-25
Payer: COMMERCIAL

## 2022-03-25 DIAGNOSIS — M06.09 RHEUMATOID ARTHRITIS OF MULTIPLE SITES WITH NEGATIVE RHEUMATOID FACTOR (H): ICD-10-CM

## 2022-03-25 DIAGNOSIS — Z79.899 HIGH RISK MEDICATION USE: ICD-10-CM

## 2022-03-25 LAB
ALBUMIN SERPL-MCNC: 4 G/DL (ref 3.4–5)
ALP SERPL-CCNC: 118 U/L (ref 40–150)
ALT SERPL W P-5'-P-CCNC: 34 U/L (ref 0–70)
AST SERPL W P-5'-P-CCNC: 27 U/L (ref 0–45)
BASOPHILS # BLD AUTO: 0.1 10E3/UL (ref 0–0.2)
BASOPHILS NFR BLD AUTO: 1 %
BILIRUB DIRECT SERPL-MCNC: 0.1 MG/DL (ref 0–0.2)
BILIRUB SERPL-MCNC: 0.5 MG/DL (ref 0.2–1.3)
CREAT SERPL-MCNC: 0.95 MG/DL (ref 0.66–1.25)
CRP SERPL-MCNC: <2.9 MG/L (ref 0–8)
EOSINOPHIL # BLD AUTO: 0.1 10E3/UL (ref 0–0.7)
EOSINOPHIL NFR BLD AUTO: 2 %
ERYTHROCYTE [DISTWIDTH] IN BLOOD BY AUTOMATED COUNT: 14.1 % (ref 10–15)
ERYTHROCYTE [SEDIMENTATION RATE] IN BLOOD BY WESTERGREN METHOD: 10 MM/HR (ref 0–20)
FASTING STATUS PATIENT QL REPORTED: NO
GFR SERPL CREATININE-BSD FRML MDRD: >90 ML/MIN/1.73M2
GLUCOSE BLD-MCNC: 106 MG/DL (ref 70–99)
HCT VFR BLD AUTO: 41.5 % (ref 40–53)
HGB BLD-MCNC: 13.7 G/DL (ref 13.3–17.7)
IMM GRANULOCYTES # BLD: 0 10E3/UL
IMM GRANULOCYTES NFR BLD: 0 %
LYMPHOCYTES # BLD AUTO: 2.5 10E3/UL (ref 0.8–5.3)
LYMPHOCYTES NFR BLD AUTO: 44 %
MCH RBC QN AUTO: 29.5 PG (ref 26.5–33)
MCHC RBC AUTO-ENTMCNC: 33 G/DL (ref 31.5–36.5)
MCV RBC AUTO: 89 FL (ref 78–100)
MONOCYTES # BLD AUTO: 0.9 10E3/UL (ref 0–1.3)
MONOCYTES NFR BLD AUTO: 16 %
NEUTROPHILS # BLD AUTO: 2.1 10E3/UL (ref 1.6–8.3)
NEUTROPHILS NFR BLD AUTO: 37 %
PLATELET # BLD AUTO: 288 10E3/UL (ref 150–450)
PROT SERPL-MCNC: 7.7 G/DL (ref 6.8–8.8)
RBC # BLD AUTO: 4.64 10E6/UL (ref 4.4–5.9)
WBC # BLD AUTO: 5.6 10E3/UL (ref 4–11)

## 2022-03-25 PROCEDURE — 82565 ASSAY OF CREATININE: CPT

## 2022-03-25 PROCEDURE — 80076 HEPATIC FUNCTION PANEL: CPT

## 2022-03-25 PROCEDURE — 86140 C-REACTIVE PROTEIN: CPT

## 2022-03-25 PROCEDURE — 85025 COMPLETE CBC W/AUTO DIFF WBC: CPT

## 2022-03-25 PROCEDURE — 82947 ASSAY GLUCOSE BLOOD QUANT: CPT

## 2022-03-25 PROCEDURE — 36415 COLL VENOUS BLD VENIPUNCTURE: CPT

## 2022-03-25 PROCEDURE — 85652 RBC SED RATE AUTOMATED: CPT

## 2022-03-25 RX ORDER — GEMFIBROZIL 600 MG/1
600 TABLET, FILM COATED ORAL 2 TIMES DAILY
Qty: 180 TABLET | Refills: 0 | Status: SHIPPED | OUTPATIENT
Start: 2022-03-25 | End: 2022-06-09

## 2022-03-25 NOTE — TELEPHONE ENCOUNTER
One joyce refill given. Patient due for lipid recheck and follow up with Dr Travis.   Routing note to scheduling to set up appointments     Sarah Arce RN  Medical Speciality Care Coordinator  [facility]  Phone: [phone]

## 2022-03-29 ENCOUNTER — MYC REFILL (OUTPATIENT)
Dept: RHEUMATOLOGY | Facility: CLINIC | Age: 57
End: 2022-03-29
Payer: COMMERCIAL

## 2022-03-29 DIAGNOSIS — M06.09 RHEUMATOID ARTHRITIS OF MULTIPLE SITES WITH NEGATIVE RHEUMATOID FACTOR (H): ICD-10-CM

## 2022-03-29 NOTE — TELEPHONE ENCOUNTER
Call received from [facility]-they have his Simponi set to be delivered tomorrow, which means they would need to have his prescription in today. Would like to have this refill sent in as soon as possible.

## 2022-03-30 RX ORDER — LEFLUNOMIDE 20 MG/1
20 TABLET ORAL DAILY
Qty: 90 TABLET | Refills: 0 | Status: SHIPPED | OUTPATIENT
Start: 2022-03-30 | End: 2022-05-11

## 2022-03-30 NOTE — TELEPHONE ENCOUNTER
Toxicity monitoring labs were done on 3/25/22. Refilled leflunomide prescription per rheumatology protocol.    Jf CHANDLER RN....3/30/2022 2:56 PM

## 2022-04-07 DIAGNOSIS — B18.1 CHRONIC VIRAL HEPATITIS B WITHOUT DELTA AGENT AND WITHOUT COMA (H): ICD-10-CM

## 2022-04-07 DIAGNOSIS — G89.4 CHRONIC PAIN SYNDROME: ICD-10-CM

## 2022-04-07 DIAGNOSIS — M06.9 RHEUMATOID ARTHRITIS INVOLVING MULTIPLE SITES, UNSPECIFIED WHETHER RHEUMATOID FACTOR PRESENT (H): ICD-10-CM

## 2022-04-07 RX ORDER — TENOFOVIR DISOPROXIL FUMARATE 300 MG/1
300 TABLET, FILM COATED ORAL DAILY
Qty: 90 TABLET | Refills: 0 | Status: SHIPPED | OUTPATIENT
Start: 2022-04-07 | End: 2022-05-03

## 2022-04-07 NOTE — TELEPHONE ENCOUNTER
Received call from patient requesting refill(s) of  BUTRANS 20 MCG/HR WK patch    Last dispensed from pharmacy on 03/15/22    Patient's last office/virtual visit by prescribing provider on 11/04/22-Dr. Loo  Next office/virtual appointment scheduled for : none    Last urine drug screen date 11/04/21  Current opioid agreement on file (completed within the last year) Yes Date of opioid agreement: 11/04/21    E-prescribe to pharmacy-[facility] [id number] [place] [facility] [place] [address]     Will route to nursing [place] for review and preparation of prescription(s).

## 2022-04-08 ENCOUNTER — TELEPHONE (OUTPATIENT)
Dept: PALLIATIVE MEDICINE | Facility: CLINIC | Age: 57
End: 2022-04-08
Payer: COMMERCIAL

## 2022-04-08 RX ORDER — BUPRENORPHINE 20 UG/H
1 PATCH, EXTENDED RELEASE TRANSDERMAL
Qty: 4 PATCH | Refills: 1 | Status: SHIPPED | OUTPATIENT
Start: 2022-04-08 | End: 2022-05-11

## 2022-04-08 NOTE — TELEPHONE ENCOUNTER
Medication refill information reviewed.      Due date for BUTRANS 20 MCG/HR WK patch is 04/12/22     Prescriptions prepped for review.     Plan will be to transfer to PCP. Patient has not been notified at this time. Unable to contact.    Will route to provider.

## 2022-04-08 NOTE — TELEPHONE ENCOUNTER
Script Epres[place] to pharmacy  [place]    Will send this to MA team to notify patient.    Signed Prescriptions:                        Disp   Refills    BUTRANS 20 MCG/HR WK patch                 4 patch1        Sig: Place 1 patch onto the skin every 7 days OK to fill           04/10/22 and start 04/12/22 LISANDRO, name brand  Authorizing Provider: REZA THAO MD

## 2022-04-10 NOTE — NURSING NOTE
Patient presents with:  Follow Up For: obstruction of vision and lid drooping after surgery S/P Bilateral upper eyelid blepharoplasty, ptosis repair and brow ptosis repair DOS: 06/25/2018 - patient notes that lids are still red and dry      Referring Provider:  No referring provider defined for this encounter.    HPI    Symptoms:              Comments:  uses vaseline on the incision prn and notes dryness and skin redness on lower lids                  Purnima Montiel, COA     No

## 2022-04-14 DIAGNOSIS — M10.9 GOUT WITHOUT TOPHUS: ICD-10-CM

## 2022-04-14 DIAGNOSIS — M06.09 RHEUMATOID ARTHRITIS OF MULTIPLE SITES WITH NEGATIVE RHEUMATOID FACTOR (H): ICD-10-CM

## 2022-04-14 RX ORDER — COLCHICINE 0.6 MG/1
TABLET, FILM COATED ORAL
Qty: 90 TABLET | Refills: 1 | Status: SHIPPED | OUTPATIENT
Start: 2022-04-14 | End: 2022-04-14

## 2022-04-14 RX ORDER — COLCHICINE 0.6 MG/1
TABLET ORAL
Qty: 90 TABLET | Refills: 1 | Status: SHIPPED | OUTPATIENT
Start: 2022-04-14 | End: 2022-10-06

## 2022-04-14 NOTE — TELEPHONE ENCOUNTER
Routing refill request to provider for review/approval because:  Labs not current:  Uric Acid

## 2022-04-16 ENCOUNTER — MYC MEDICAL ADVICE (OUTPATIENT)
Dept: FAMILY MEDICINE | Facility: CLINIC | Age: 57
End: 2022-04-16
Payer: COMMERCIAL

## 2022-04-20 RX ORDER — SULFASALAZINE 500 MG/1
1500 TABLET ORAL 2 TIMES DAILY
Qty: 540 TABLET | Refills: 2 | OUTPATIENT
Start: 2022-04-20

## 2022-04-26 ENCOUNTER — LAB (OUTPATIENT)
Dept: LAB | Facility: CLINIC | Age: 57
End: 2022-04-26
Payer: COMMERCIAL

## 2022-04-26 DIAGNOSIS — B18.1 CHRONIC VIRAL HEPATITIS B WITHOUT DELTA AGENT AND WITHOUT COMA (H): Primary | ICD-10-CM

## 2022-04-26 DIAGNOSIS — B18.1 CHRONIC VIRAL HEPATITIS B WITHOUT DELTA AGENT AND WITHOUT COMA (H): ICD-10-CM

## 2022-04-26 LAB
ALBUMIN SERPL-MCNC: 3.9 G/DL (ref 3.4–5)
ALP SERPL-CCNC: 93 U/L (ref 40–150)
ALT SERPL W P-5'-P-CCNC: 23 U/L (ref 0–70)
ANION GAP SERPL CALCULATED.3IONS-SCNC: 4 MMOL/L (ref 3–14)
AST SERPL W P-5'-P-CCNC: 24 U/L (ref 0–45)
BILIRUB DIRECT SERPL-MCNC: 0.2 MG/DL (ref 0–0.2)
BILIRUB SERPL-MCNC: 0.4 MG/DL (ref 0.2–1.3)
BUN SERPL-MCNC: 12 MG/DL (ref 7–30)
CALCIUM SERPL-MCNC: 9.3 MG/DL (ref 8.5–10.1)
CHLORIDE BLD-SCNC: 111 MMOL/L (ref 94–109)
CO2 SERPL-SCNC: 30 MMOL/L (ref 20–32)
CREAT SERPL-MCNC: 0.94 MG/DL (ref 0.66–1.25)
ERYTHROCYTE [DISTWIDTH] IN BLOOD BY AUTOMATED COUNT: 13.8 % (ref 10–15)
GFR SERPL CREATININE-BSD FRML MDRD: >90 ML/MIN/1.73M2
GLUCOSE BLD-MCNC: 94 MG/DL (ref 70–99)
HCT VFR BLD AUTO: 43.4 % (ref 40–53)
HGB BLD-MCNC: 13.9 G/DL (ref 13.3–17.7)
INR PPP: 1.03 (ref 0.85–1.15)
MCH RBC QN AUTO: 29.1 PG (ref 26.5–33)
MCHC RBC AUTO-ENTMCNC: 32 G/DL (ref 31.5–36.5)
MCV RBC AUTO: 91 FL (ref 78–100)
PLATELET # BLD AUTO: 272 10E3/UL (ref 150–450)
POTASSIUM BLD-SCNC: 3.9 MMOL/L (ref 3.4–5.3)
PROT SERPL-MCNC: 7.7 G/DL (ref 6.8–8.8)
RBC # BLD AUTO: 4.77 10E6/UL (ref 4.4–5.9)
SODIUM SERPL-SCNC: 145 MMOL/L (ref 133–144)
WBC # BLD AUTO: 4.7 10E3/UL (ref 4–11)

## 2022-04-26 PROCEDURE — 36415 COLL VENOUS BLD VENIPUNCTURE: CPT

## 2022-04-26 PROCEDURE — 85027 COMPLETE CBC AUTOMATED: CPT

## 2022-04-26 PROCEDURE — 87517 HEPATITIS B DNA QUANT: CPT

## 2022-04-26 PROCEDURE — 85610 PROTHROMBIN TIME: CPT

## 2022-04-26 PROCEDURE — 82248 BILIRUBIN DIRECT: CPT

## 2022-04-26 PROCEDURE — 80053 COMPREHEN METABOLIC PANEL: CPT

## 2022-04-27 DIAGNOSIS — F45.8 ANXIETY HYPERVENTILATION: ICD-10-CM

## 2022-04-27 DIAGNOSIS — F41.9 ANXIETY HYPERVENTILATION: ICD-10-CM

## 2022-04-27 DIAGNOSIS — R42 VERTIGO: ICD-10-CM

## 2022-04-28 DIAGNOSIS — M06.9 RHEUMATOID ARTHRITIS INVOLVING MULTIPLE SITES, UNSPECIFIED WHETHER RHEUMATOID FACTOR PRESENT (H): ICD-10-CM

## 2022-04-28 DIAGNOSIS — M17.11 PRIMARY OSTEOARTHRITIS OF RIGHT KNEE: ICD-10-CM

## 2022-04-28 DIAGNOSIS — M47.819 FACET ARTHROPATHY: ICD-10-CM

## 2022-04-28 LAB — HBV DNA SERPL NAA+PROBE-ACNC: NOT DETECTED IU/ML

## 2022-04-28 RX ORDER — CLONAZEPAM 1 MG/1
TABLET ORAL
Qty: 90 TABLET | Refills: 0 | Status: SHIPPED | OUTPATIENT
Start: 2022-04-28 | End: 2022-07-05

## 2022-04-28 RX ORDER — MECLIZINE HYDROCHLORIDE 25 MG/1
TABLET ORAL
Qty: 30 TABLET | Refills: 5 | Status: SHIPPED | OUTPATIENT
Start: 2022-04-28 | End: 2022-11-17

## 2022-04-28 NOTE — TELEPHONE ENCOUNTER
Routing refill request to provider for review/approval because:  Drug not on the [facility] refill protocol     Eugenie Hendrix RN, BSN  [facility]

## 2022-04-29 RX ORDER — GABAPENTIN 300 MG/1
CAPSULE ORAL
Qty: 540 CAPSULE | Refills: 6 | Status: SHIPPED | OUTPATIENT
Start: 2022-04-29 | End: 2022-08-16

## 2022-05-03 ENCOUNTER — VIRTUAL VISIT (OUTPATIENT)
Dept: GASTROENTEROLOGY | Facility: CLINIC | Age: 57
End: 2022-05-03
Attending: INTERNAL MEDICINE
Payer: COMMERCIAL

## 2022-05-03 DIAGNOSIS — B18.1 CHRONIC VIRAL HEPATITIS B WITHOUT DELTA AGENT AND WITHOUT COMA (H): ICD-10-CM

## 2022-05-03 PROCEDURE — 99214 OFFICE O/P EST MOD 30 MIN: CPT | Mod: 95 | Performed by: INTERNAL MEDICINE

## 2022-05-03 PROCEDURE — G0463 HOSPITAL OUTPT CLINIC VISIT: HCPCS | Mod: PN,RTG | Performed by: INTERNAL MEDICINE

## 2022-05-03 RX ORDER — TENOFOVIR DISOPROXIL FUMARATE 300 MG/1
300 TABLET, FILM COATED ORAL DAILY
Qty: 90 TABLET | Refills: 3 | Status: SHIPPED | OUTPATIENT
Start: 2022-05-03 | End: 2022-06-16

## 2022-05-03 NOTE — PROGRESS NOTES
Lamont is a 56 year old who is being evaluated via a billable video visit.      How would you like to obtain your AVS? MyChart  If the video visit is dropped, the invitation should be resent by: Text to cell phone: [phone]  Will anyone else be joining your video visit? Day Carcamo on 5/3/2022 at 9:25 AM    Video Start Time: 0935  Video-Visit Details    Type of service:  Video Visit    Video End Time:0948    Originating Location (pt. Location): Home    Distant Location (provider location):  [facility] [place]     Platform used for Video Visit: GetMyBoat     Date of encounter: May 3, 2022     Subjective:   The patient is a 55 year old male with past medical history of hypertension, dyslipidemia, coronary artery disease, rheumatoid arthritis, obesity with resultant nonalcoholic steatohepatitis and chronic hepatitis B who presents in follow up.    Since last seen he denies any significant changes in his overall care.  He is on medicines for management of his rheumatoid arthritis and notes that he is on chronic pain medications secondary to debility.  Denies any hospitalizations or recent visits to the urgent care.  He reports that he is rather sedentary lifestyle, typically only ambulating around the house.  When questioned why he is not doing more activity he feels its fatigue and lack of motivation.  He reports that his weight has remained relatively stable since our last clinic visit.      He continues to take tenofovir daily, and labs from 4/26/2022 demonstrated undetectable viral load and normal liver tests     Denies issues with confusion, impaired concentration, diarrhea, fluid retention     A review was made of the PMHx, Surgical Hx, Social Hx, Medications, Family Hx and the electronic medical record was updated appropriately     A comprehensive review of systems was completed but answered in the negative unless specifically commented upon on the HPI    Imaging:  Abd US  11/21  IMPRESSION:   1.  Diffusely increased hepatic echogenicity, indicative of intrinsic  hepatic parenchymal disease. No focal liver lesion.  2.  Cholelithiasis. No sonographic evidence of acute cholecystitis.      Assessment/Plan:  Chronic hepatitis B:  -EBV antigen negative, E antibody positive chronic hepatitis B on tenofovir therapy  -Viral level is undetectable in labs from 4/26/2022  -We will continue to follow with hepatitis B DNA levels in 6 months  -He is to continue taking the tenofovir daily (renewed)    Fatty liver disease:  -Discussed the need to work on his risk factors like dyslipidemia, obesity,to minimize the progression and presence of his fatty liver disease and possible ALDANA  - Continue to exercise and make lifestyle modifications to promote increased exercise tolerance    Screening for hepatocellular carcinoma  - Abd US from 11/2021 without masses  - WIll order for now  -We will repeat an abdominal ultrasound again in 6 months      Thomas M. Leventhal, M.D.   of Medicine  Advanced & Transplant Hepatology  [facility]

## 2022-05-03 NOTE — LETTER
5/3/2022         RE: Lamont Daniels  [address]  [place] [address]        Dear Colleague,    Thank you for referring your patient, Lamont Daniels, to the [facility] [place]. Please see a copy of my visit note below.        Platform used for Video Visit: Doximity     Date of encounter: May 3, 2022     Subjective:   The patient is a 55 year old male with past medical history of hypertension, dyslipidemia, coronary artery disease, rheumatoid arthritis, obesity with resultant nonalcoholic steatohepatitis and chronic hepatitis B who presents in follow up.    Since last seen he denies any significant changes in his overall care.  He is on medicines for management of his rheumatoid arthritis and notes that he is on chronic pain medications secondary to debility.  Denies any hospitalizations or recent visits to the urgent care.  He reports that he is rather sedentary lifestyle, typically only ambulating around the house.  When questioned why he is not doing more activity he feels its fatigue and lack of motivation.  He reports that his weight has remained relatively stable since our last clinic visit.      He continues to take tenofovir daily, and labs from 4/26/2022 demonstrated undetectable viral load and normal liver tests     Denies issues with confusion, impaired concentration, diarrhea, fluid retention     A review was made of the PMHx, Surgical Hx, Social Hx, Medications, Family Hx and the electronic medical record was updated appropriately     A comprehensive review of systems was completed but answered in the negative unless specifically commented upon on the HPI    Imaging:  Abd US 11/21  IMPRESSION:   1.  Diffusely increased hepatic echogenicity, indicative of intrinsic  hepatic parenchymal disease. No focal liver lesion.  2.  Cholelithiasis. No sonographic evidence of acute cholecystitis.      Assessment/Plan:  Chronic hepatitis B:  -EBV antigen negative, E antibody positive  chronic hepatitis B on tenofovir therapy  -Viral level is undetectable in labs from 4/26/2022  -We will continue to follow with hepatitis B DNA levels in 6 months  -He is to continue taking the tenofovir daily (renewed)    Fatty liver disease:  -Discussed the need to work on his risk factors like dyslipidemia, obesity,to minimize the progression and presence of his fatty liver disease and possible ALDANA  - Continue to exercise and make lifestyle modifications to promote increased exercise tolerance    Screening for hepatocellular carcinoma  - Abd US from 11/2021 without masses  - WIll order for now  -We will repeat an abdominal ultrasound again in 6 months      Thomas M. Leventhal, M.D.   of Medicine  Advanced & Transplant Hepatology  [facility]

## 2022-05-11 ENCOUNTER — OFFICE VISIT (OUTPATIENT)
Dept: PALLIATIVE MEDICINE | Facility: CLINIC | Age: 57
End: 2022-05-11
Payer: COMMERCIAL

## 2022-05-11 ENCOUNTER — OFFICE VISIT (OUTPATIENT)
Dept: RHEUMATOLOGY | Facility: CLINIC | Age: 57
End: 2022-05-11

## 2022-05-11 VITALS — HEART RATE: 67 BPM | SYSTOLIC BLOOD PRESSURE: 137 MMHG | DIASTOLIC BLOOD PRESSURE: 87 MMHG

## 2022-05-11 VITALS — DIASTOLIC BLOOD PRESSURE: 95 MMHG | OXYGEN SATURATION: 96 % | HEART RATE: 72 BPM | SYSTOLIC BLOOD PRESSURE: 150 MMHG

## 2022-05-11 DIAGNOSIS — Z79.899 HIGH RISK MEDICATION USE: ICD-10-CM

## 2022-05-11 DIAGNOSIS — M47.819 FACET ARTHROPATHY: Primary | ICD-10-CM

## 2022-05-11 DIAGNOSIS — M17.11 PRIMARY OSTEOARTHRITIS OF RIGHT KNEE: ICD-10-CM

## 2022-05-11 DIAGNOSIS — G89.4 CHRONIC PAIN SYNDROME: ICD-10-CM

## 2022-05-11 DIAGNOSIS — F11.90 CHRONIC, CONTINUOUS USE OF OPIOIDS: ICD-10-CM

## 2022-05-11 DIAGNOSIS — M06.9 RHEUMATOID ARTHRITIS INVOLVING MULTIPLE SITES, UNSPECIFIED WHETHER RHEUMATOID FACTOR PRESENT (H): ICD-10-CM

## 2022-05-11 DIAGNOSIS — M06.09 RHEUMATOID ARTHRITIS OF MULTIPLE SITES WITH NEGATIVE RHEUMATOID FACTOR (H): Primary | ICD-10-CM

## 2022-05-11 PROCEDURE — 99214 OFFICE O/P EST MOD 30 MIN: CPT | Performed by: INTERNAL MEDICINE

## 2022-05-11 PROCEDURE — 99417 PROLNG OP E/M EACH 15 MIN: CPT | Performed by: NURSE PRACTITIONER

## 2022-05-11 PROCEDURE — 99215 OFFICE O/P EST HI 40 MIN: CPT | Performed by: NURSE PRACTITIONER

## 2022-05-11 RX ORDER — BUPRENORPHINE 20 UG/H
1 PATCH, EXTENDED RELEASE TRANSDERMAL
Qty: 4 PATCH | Refills: 1 | Status: SHIPPED | OUTPATIENT
Start: 2022-05-11 | End: 2022-07-07

## 2022-05-11 RX ORDER — LEFLUNOMIDE 20 MG/1
20 TABLET ORAL DAILY
Qty: 90 TABLET | Refills: 2 | Status: SHIPPED | OUTPATIENT
Start: 2022-05-11 | End: 2023-01-19

## 2022-05-11 RX ORDER — GABAPENTIN 600 MG/1
600 TABLET ORAL 3 TIMES DAILY
Qty: 270 TABLET | Refills: 3 | Status: SHIPPED | OUTPATIENT
Start: 2022-05-11 | End: 2023-06-26

## 2022-05-11 ASSESSMENT — PAIN SCALES - GENERAL: PAINLEVEL: MODERATE PAIN (5)

## 2022-05-11 NOTE — PATIENT INSTRUCTIONS
RHEUMATOLOGY    Dr. Sebastián Jenkins    [facility] [place]  [address] [place] [address] [place] [address]  Phone number: [phone]  Fax number: [phone]      Thank you for choosing [facility]!    Helga Guerrier CMA Rheumatology    ---------------    COVID-19 vaccination:    A 5th mRNA vaccine (2nd booster) may be received at least 4 months after the 4th dose (so due on or after 8/21/2022) based on today's recommendations.     Based on our current understanding (and this may change over time as we learn more), medications should be adjusted as noted below, if disease activity allows:  - NSAIDs and Acetaminophen: hold for 24 hours prior to vaccination if able to do so  - Sulfasalazine should be held for 1-2 weeks after each COVID-19 vaccine (as disease activity allows)  - Leflunomide should be held for 1-2 weeks after each COVID-19 vaccine (as disease activity allows)

## 2022-05-11 NOTE — PROGRESS NOTES
"[facility] Consultation    Date of visit: 5/11/2022    Reason for consultation:    Lamont Daniels is a 56 year old male who is seen in consultation today for transfer of care from his previous pain management provider who is no longer in practice at . He was being seen  re: rheumatoid arthritis involving multiple sites and chronic low back pain      Primary Care Provider is David Chavez.  Pain medications are being prescribed by [facility]      Chief Complaint:  Axial low back pain and multiple joint pain from RA  Chief Complaint   Patient presents with     Pain       Pain history:  Lamont Daniels is a 56 year old male who first started having problems with pain as follows:     He has had pain for about 10 years. He was diagnosed with Rheumatoid Arthritis. Lamont has pain in multiple joints. He also has chronic low back pain. The low back pain radiates down the posterolateral aspect of the legs to the level of the knee. His back pain was there when he was diagnosed with RA. He feels that he is doing well on the Butrans. Discussed switch to Belbuca if he ever wishes to increase his dosage, prefers to stay with use of the Butrans patch at the present time      Pain rating: intensity ranges from 5/10 to 9/10, and Averages 6/10 on a 0-10 scale.  Describes pain as \"back pain is sharp pain and his joints have a dull/numbness quality of pain.\"  Pain is constant.      Home self care includes: stretching    Aggravating factors include: unsure    Relieving factors include: weather changes     Any bowel or bladder incontinence: none      Current pain-related medication treatments include:  -allopurinol 300mg every day  -baclofen 10mg BID PRN muscle spasms (uses at least once per day, helpful)  -Voltaren gel 1% PRN (helpful)  -naloxone nasal spray PRN opiate reversal   -Butrans 20mcg/hr  TD Q 7 days (helpful)  -gabapentin 600mg TID (helpful)  -simponi 50mg/0.5ml inject every 28 days   -Arava 20mg " every day  -meclizine 25mg Q 6 hours PRN dizziness (helpful)  -Tylenol 1000mg (uses at least once per day, not more than 3 times per day)  -CBD oil at night (helpful for sleep)      Other pertinent medications:  -Ambien 5mg at bedtime as needed PRN sleep  -clonazepam 0.5-1mg BID PRN anxiety (uses one full tab in the AM)  -Plavix 75mg every day  -colchincine 0.6mg take 2 tabs at first sign of flare  -pristiq 50mg QD    Previous medication treatments included:  OPIATES: butrans (helpful), hydrocodone (helpful for other issues), Codeine (unsure), oxycodone (unsure)  NSAIDS: cannot use, on Plavix   MUSCLE RELAXANTS: Baclofen (helpful), methocarbamol (not helpful)  ANTI-MIGRAINE MEDS: none  ANTI-DEPRESSANTS: none  SLEEP AIDS: none  ANTI-CONVULSANTS: gabapentin (helpful)  TOPICALS: Voltaren gel (helpful)  ANXIOLYTICS: none  MEDICAL CANNABIS: none  Other meds: Tylenol (helpful)      Other treatments have included:  Lamont Daniels has been seen at a pain clinic in the past.  Previously managed by Dr. Lain Loo   PT: has tried, not helpful  Chiropractic care: no  Acupuncture: no  TENs Unit: no    Injections:  none    Past Medical History:  Past Medical History:   Diagnosis Date     Anxiety      CAD (coronary artery disease)     CABG, PCI     Congestive heart failure, unspecified 2008     Depressive disorder 2008     Gout      Hepatitis B > 10 years     HTN (hypertension)      Hyperlipidemia LDL goal < 100      Malrotation of intestine      Moderate major depression (H)      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) 9/19/2011    Uses CPAP     Rheumatoid arthritis flare (H) 1012     Steatohepatitis 12/15    liver biopsy     Tuberculosis age 13    INH x 9 months      Vitamin D deficiency      Past Surgical History:  Past Surgical History:   Procedure Laterality Date     ABDOMEN SURGERY  2014     BIOPSY  2015     BYPASS GRAFT ARTERY CORONARY  2008    6 vessels     COLONOSCOPY  2/8/2013    Procedure: COLONOSCOPY;  Colonoscopy, blood in stool;   Surgeon: Duane, William Charles, MD;  Location: [facility] OR     COMBINED REPAIR PTOSIS WITH BLEPHAROPLASTY BILATERAL Bilateral 6/25/2018    Procedure: COMBINED REPAIR PTOSIS WITH BLEPHAROPLASTY BILATERAL;  Bilateral upper eyelid blepharoplasty, ptosis repair and brow ptosis repair;  Surgeon: Sandra Borja MD;  Location: [facility] OR     GI SURGERY  2014     HEAD & NECK SURGERY  2011     NASAL/SINUS POLYPECTOMY  2010     ORTHOPEDIC SURGERY  2012     REPAIR PTOSIS       REPAIR PTOSIS BILATERAL Bilateral 7/22/2019    Procedure: REPAIR, PTOSIS, BOTH UPPER brows;  Surgeon: Sandra Borja MD;  Location: [facility] OR     REPAIR PTOSIS BROW BILATERAL Bilateral 6/25/2018    Procedure: REPAIR PTOSIS BROW BILATERAL;;  Surgeon: Sandra Borja MD;  Location: [facility] OR     THORACIC SURGERY  1989    tb     TONSILLECTOMY  2010     UVULOPALATOPHARYNGOPLASTY  2010     VASCULAR SURGERY  2008     [facility] CORONARY STENT CIERA SOTO ADDTL VESSEL  2008    3 months after CABG     Medications:  Current Outpatient Medications   Medication Sig Dispense Refill     Acetaminophen (TYLENOL PO)        allopurinol (ZYLOPRIM) 300 MG tablet TAKE 1 TABLET BY MOUTH EVERY DAY 90 tablet 3     amLODIPine (NORVASC) 5 MG tablet TAKE 1 TABLET BY MOUTH DAILY FOR BLOOD PRESSURE 90 tablet 3     aspirin EC 81 MG EC tablet Take 1 tablet (81 mg) by mouth daily (*) 30 tablet 1     atorvastatin (LIPITOR) 80 MG tablet Take 1 tablet (80 mg) by mouth daily 90 tablet 3     bacitracin 500 UNIT/GM external ointment Apply topically 3 times daily 30 g 3     baclofen (LIORESAL) 10 MG tablet TAKE 1 TABLET BY MOUTH 2 TIMES DAILY AS NEEDED FOR MUSCLE SPASM 180 tablet 2     busPIRone HCl (BUSPAR) 30 MG tablet TAKE 1 TABLET (30 MG) BY MOUTH 2 TIMES DAILY 180 tablet 3     BUTRANS 20 MCG/HR WK patch Place 1 patch onto the skin every 7 days OK to fill 05/11/22 and start 05/13/22 LISANDRO, name brand 4 patch 1     Cholecalciferol (VITAMIN D) 2000 UNITS tablet TAKE ONE TABLET BY MOUTH ONCE DAILY 100  tablet 1     clobetasol (TEMOVATE) 0.05 % external cream Apply twice daily for 2 weeks, weekends only for 2 weeks, repeat cycle as needed for hands, not face or genitals 60 g 4     clobetasol (TEMOVATE) 0.05 % external solution Apply twice daily to scalp for up to 2 weeks for flares. 60 mL 0     clopidogrel (PLAVIX) 75 MG tablet Take 1 tablet (75 mg) by mouth daily *PLEASE SCHEDULE 1 YEAR FOLLOW UP APPT. ( June 2022) 90 tablet 0     colchicine (COLCYRS) 0.6 MG tablet TAKE 2 TABLETS BY MOUTH AT THE FIRST SIGN OF FLARE, TAKE 1 ADDITIONAL TABLET ONE HOUR LATER. 90 tablet 1     desvenlafaxine (PRISTIQ) 50 MG 24 hr tablet TAKE 1 TABLET BY MOUTH EVERY DAY 90 tablet 3     diclofenac (VOLTAREN) 1 % topical gel Apply 4 g topically 4 times daily as needed for moderate pain 300 g 11     diclofenac (VOLTAREN) 1 % topical gel Apply 4 grams to bilateral knees, up to four times daily as needed using enclosed dosing card.  Max of 32g/day 300 g 11     evolocumab (REPATHA) 140 MG/ML prefilled autoinjector Inject 1 mL (140 mg) Subcutaneous every 14 days 2 mL 11     ezetimibe (ZETIA) 10 MG tablet Take 1 tablet (10 mg) by mouth daily 90 tablet 3     gabapentin (NEURONTIN) 300 MG capsule TAKE 2 CAPSULES (600 MG) BY MOUTH 3 TIMES DAILY . 540 capsule 6     gabapentin (NEURONTIN) 600 MG tablet Take 1 tablet (600 mg) by mouth 3 times daily 270 tablet 3     gemfibrozil (LOPID) 600 MG tablet Take 1 tablet (600 mg) by mouth 2 times daily 180 tablet 0     golimumab (SIMPONI) 50 MG/0.5ML auto-injector pen Inject 0.5 mLs (50 mg) Subcutaneous every 28 days . Hold for signs of infection, and seek medical attention. 0.5 mL 2     hydrocortisone (WESTCORT) 0.2 % external cream FOR GENITALS, APPLY TWICE DAILY FOR UP TO 2 WEEKS, TAKE 2 WEEKS OFF THEN REPEAT 60 g 3     hydrocortisone 2.5 % cream FOR FACE, APPLY TWICE DAILY FOR UP TO 2 WEEK FOR FLARES ON THE FACE, TAKE 2 WEEKS OFF 28.35 g 3     Incontinence Supply Disposable (DEPEND UNDERWEAR LARGE) MISC  Use twice daily. 60 each 11     leflunomide (ARAVA) 20 MG tablet Take 1 tablet (20 mg) by mouth daily ; Labs required every 8-12 weeks. 90 tablet 0     meclizine (ANTIVERT) 25 MG tablet TAKE 1 TABLET BY MOUTH EVERY 6 HOURS AS NEEDED FOR DIZZINESS. 30 tablet 5     melatonin 3 MG tablet Take 1 tablet (3 mg) by mouth nightly as needed for sleep       nitroGLYcerin (NITROSTAT) 0.4 MG sublingual tablet For chest pain place 1 tablet under the tongue every 5 minutes for 3 doses. If symptoms persist 5 minutes after 1st dose call 911. 25 tablet 1     order for DME Equipment being ordered: chair lift 1 each 0     order for DME Equipment being ordered: single end cane 1 Units 0     ORDER FOR DME 1.  CPAP pressure 11 cm/H20 with heated humidity.   2.  Provide mask to fit and CPAP supplies.   3.  Length of need lifetime.   4.  If needed please provide a chin strap            pantoprazole (PROTONIX) 40 MG EC tablet TAKE 1 TABLET BY MOUTH EVERY DAY 90 tablet 1     pimecrolimus (ELIDEL) 1 % external cream APPLY TWICE DAILY FOR FACE AND GENITALS 60 g 1     sildenafil (REVATIO) 20 MG tablet TAKE 2 TO 5 TABLETS BY MOUTH 30 MINUTES PRIOR TO INTERCOURSE AS NEEDED. 150 tablet 3     sulfaSALAzine (AZULFIDINE) 500 MG tablet Take 3 tablets (1,500 mg) by mouth 2 times daily 540 tablet 2     tamsulosin (FLOMAX) 0.4 MG capsule TAKE 2 CAPSULES BY MOUTH EVERY  capsule 3     tenofovir (VIREAD) 300 MG tablet Take 1 tablet (300 mg) by mouth daily 90 tablet 3     triamcinolone (KENALOG) 0.1 % external ointment APPLY TOPICALLY TO AFFECTED AREA(S) 3 TIMES DAILY 80 g 2     triamcinolone (KENALOG) 0.1 % external ointment Apply to trunk and extremities twice daily for up to 2 weeks for flares 80 g 1     zolpidem (AMBIEN) 5 MG tablet Take 1 tablet (5 mg) by mouth nightly as needed for sleep 30 tablet 5     clonazePAM (KLONOPIN) 1 MG tablet TAKE 0.5-1 TABLETS BY MOUTH 2 TIMES DAILY AS NEEDED FOR ANXIETY 90 tablet 0     naloxone (NARCAN) 4 MG/0.1ML  nasal spray Spray 1 spray (4 mg) into one nostril alternating nostrils once as needed for opioid reversal every 2-3 minutes until assistance arrives (Patient not taking: Reported on 5/11/2022) 0.2 mL 0     predniSONE (DELTASONE) 5 MG tablet For rheumatoid arthritis flare only: prednisone 10mg daily x2days, then 5mg daily x5days, then stop. (Patient not taking: No sig reported) 9 tablet 3     Allergies:     Allergies   Allergen Reactions     Citalopram Itching     Tramadol Itching     Social History:  Home situation: , lives with adult children  Occupation/Schooling: he is on disability due to heart issues and RA  Tobacco use: none  Alcohol use: none  Drug use: none  History of chemical dependency treatment: none    Family history:  Family History   Problem Relation Age of Onset     C.A.D. Father      Coronary Artery Disease Father      Hypertension Father      Depression Father      Hypertension Mother      Diabetes Mother      Depression Mother      Mental Illness Mother      Hypertension Maternal Grandfather      Cancer Paternal Grandfather      Other Cancer Paternal Grandfather      Other Cancer Other      Autoimmune Disease No family hx of      Cerebrovascular Disease No family hx of      Thyroid Disease No family hx of      Glaucoma No family hx of      Macular Degeneration No family hx of          Review of Systems:  The 14 system ROS was reviewed with the patient and is positive for: positives are in bold  Constitutional: fever/chills, fatigue, weight gain, weight loss  Eyes/Head: headache, dizziness  ENT: ringing in ears  Allergy/Immune: allergies  Skin: itching, rash, hives  Hematologic: easy bruising  Respiratory: cough, wheezing, shortness of breath  Cardiovascular: swelling in feet, fainting, palpitations, chest pain  GI: abdominal pain, nausea, vomiting, diarrhea, constipation  Endocrine: steroid use  Musculoskeletal:  joint pain, arthritis, stiffness, gout, back pain, neck pain  Urinary:  frequency, urgency, incontinence, hesitancy  Neurologic: weakness, numbness/tingling, seizure, stroke, memory loss  Mental health: depression, anxiety, stress, suicidal ideation      Physical Exam:  Vitals:    05/11/22 0909   BP: 137/87   Pulse: 67     Exam:  Constitutional: healthy, alert and no distress  Head: normocephalic. Atraumatic.   Eyes: no redness or jaundice noted   ENT: oropharnx normal.  MMM.   Cardiovascular: RRR no m/g/r   Respiratory: clear to auscultation A/P. Respirations easy and unlabored. Able to speak in full sentences without SOB or cough noted.    Gastrointestinal: soft, non-tender   : deferred  Skin: no suspicious lesions or rashes  Psychiatric: mentation appears normal and affect normal/bright    Musculoskeletal exam:  Gait/Station/Posture: fair posture. Uses single ended cane, gait is slowed. Able to rise onto toes and heels, able to perform tandem gait    Cervical spine:    Flex:  20 degrees   Ext: 20 degrees   Rotation to right: 80 degrees   Rotation to left: 80 degrees   Ext/rotation to the right pain free   Ext/rotation to the left pain free    Thoracic spine:  Normal     Lumbar spine:    Flex:  70 degrees   Ext: 20 degrees somewhat painful   Rotation/ext to right: painful    Rotation/ext to left: painful    SI joints: Non-tender bilaterally   Piriformis: Non-tender bilaterally   Greater trochanters: Non-tender bilaterally    Myofascial tenderness:  none  Straight leg exam: negative  Edgar's maneuver: negative    Neurologic exam:  CN:  Cranial nerves 2-12 are  Grossly normal  Motor:  5/5 UE and LE strength, except for  strength decreased to 4/5 bilaterally  Reflexes:     Biceps:     R:  2/4 L: 2/4   Brachioradialis   R:  1/4 L: 1/4      Patella:  R:  2/4 L: 2/4   Achilles:  R:  1/4 L: 1/4  Other reflexes:  Toes downgoing   Elizabeth's negative  Sensory:  (upper and lower extremities):   Light touch: normal    Vibration: normal    Pin prick: normal    Allodynia: absent     Dysethesia: absent    Hyperalgesia: absent     Diagnostic tests:  MRI of the Lumbar Spine without contrast     History: Lumbago.     Comparison: MRI thoracic spine 7/18/2014.     Technique: Sagittal T1-weighted, T2-weighted, STIR, and axial  T2-weighted spin echo and gradient echo images of the lumbar spine  were obtained without intravenous contrast.     Findings: There are 5 lumbar-type vertebrae assumed for the purposes  of this dictation. The tip of the conus medullaris is at L1.  The  lumbar vertebral column appears normally aligned. Straightening of the  normal lumbar lordosis. Mild loss of T2 signal in the L4-5 and L5-S1  intervertebral discs consistent with age-related changes. Type II  Modic degenerative changes in the opposing endplates at L5-S1.     On a level by level basis:     L1-2: Tiny left paracentral focal disc protrusion without spinal canal  or neural foraminal stenosis.     L2-3: No spinal canal or neural foraminal stenosis.     L3-4: Small focal posterior central disc protrusion with annular  fissuring and mild spinal canal narrowing. Also mild bilateral facet  hypertrophy. No neuroforaminal stenosis.     L4-5: Small focal posterior central disc protrusion with annular  fissuring. No spinal canal or neural foraminal stenosis.     L5-S1: Type II Modic degenerative changes of the opposing endplates.  Left greater than right facet hypertrophy. Small focal posterior  central disc protrusion with annular fissuring. No spinal canal  stenosis. Moderate left and mild right neural foraminal narrowing.     The visualized paraspinous tissues anteriorly are unremarkable.     IMPRESSION  Impression: Multilevel degenerative changes. Small disc protrusion at  L3-4 results in in mild spinal canal narrowing. Moderate left and mild  right neuroforaminal narrowing at L5-S1.     HELGA DAVID MD      FOOT BILATERAL THREE OR MORE VIEWS 11/4/2015 4:55 PM      HISTORY: Pain in unspecified joint.     COMPARISON:  8/21/2012.     IMPRESSION  IMPRESSION: Small traction spurs are seen off the Achilles tendon and  plantar fascial attachment sites bilaterally. Joint spaces are  preserved. Osseous structures are intact. Incidental note is made of  some surgical staples in the soft tissues of the medial distal right  lower extremity.     DANIS ANGLIN MD      RIGHT KNEE THREE VIEWS  5/4/2017 3:16 PM       HISTORY: Pain in right knee.     COMPARISON: None.                                                                      IMPRESSION: No acute fracture or dislocation. Mild osteoarthritis.  Small joint effusion.     MICHELL TAMAYO MD    Other testing (labs, diagnostics):  4/26/2022  Cr. 0.94  Est GFR >90  Liver enzymes WNL      Screening tools:     DIRE Score for ongoing opioid management is calculated as follows:    Diagnosis = 2    Intractability = 2    Risk: Psych = 2  Chem Hlth = 2  Reliability = 3  Social = 2    Efficacy = 2    Total DIRE Score = 15 (14 or higher predicts good candidate for ongoing opioid management; 13 or lower predicts poor candidate for opioid management)         Assessment:  1. Lumbar facet arthropathy  2. Rheumatoid arthritis involving multiple joints, unspecified rheumatoid factor  3. Primary osteoarthritis of right knee  4. Chronic continuous use of opioids  5. Chronic pain syndrome  6. PMHx includes: Anxiety, coronary artery disease, congestive heart failure (2008), depressive disorder (2008), gout, hepatitis B more than 10 years ago, hypertension, hyperlipidemia, malrotation of intestine, severe obstructive sleep apnea, rheumatoid arthritis (2012) steatohepatitis (2015), history of tuberculosis at age 13, vitamin D deficiency  7. PSHx includes: Coronary artery bypass graft 6 vessel (2008), abdominal surgery (2014), colonoscopy (2013), combined repair ptosis with blepharoplasty bilateral (2018), head and neck surgery (2011), nasal/sinus polypectomy (2010), orthopedic surgery (2012), repair ptosis  bilateral (2019), repair ptosis brow bilateral (2018), thoracic surgery for tuberculosis (1989), tonsillectomy and uvulopalatal pharyngoplasty (2010), coronary artery stent 3 months after CABG (2008).        Plan:  Diagnosis reviewed, treatment option addressed, and risk/benefits discussed.  Self-care instructions given.  I am recommending a multidisciplinary treatment plan to help this patient better manage his pain.      1. Physical Therapy: none  2. Continue stretching at home  3. Clinical Health Psychologist to address issues of relaxation, behavioral change, coping style, and other factors important to improvement: none  4. Diagnostic Studies: none  5. Medication Management:   1. Continue Butrans 20mcg/hr transdermal change every 7 days, fill 5/11 and start 5/13  2. Continue gabapentin 600mg three times daily (this was changed to 600mg tablets so you will only take 1 tab three times daily)  3. Continue Voltaren gel as needed  6. Further procedures recommended: none  7. Urine toxicology screen today: due in November 2022   8. Signed controlled substance agreement with me today  9. Recommendations/follow-up for PCP:  See above  10. Release of information: none  11. Follow up: 10-12 weeks in-person visit. Please call [phone] to make your follow-up appointment with me.       BILLING TIME DOCUMENTATION:   TOTAL TIME includes:   Time spent preparing to see the patient: 29 minutes (reviewing records and tests)  Time spend face to face with the patient: 50 minutes  Time spent ordering tests, medications, procedures and referrals: 0 minutes  Time spent Referring and communicating with other healthcare professionals: 0 minutes  Documenting clinical information in Epic: 12 minutes    The total TIME spent on this patient on the day of the appointment was 91 minutes.          Ameena LEMUS, RN CNP, FNP  [facility]  [facility] [place]

## 2022-05-11 NOTE — PATIENT INSTRUCTIONS
PLAN:  Physical Therapy: none  Continue stretching at home  Clinical Health Psychologist to address issues of relaxation, behavioral change, coping style, and other factors important to improvement: none  Diagnostic Studies: none  Medication Management:   Continue Butrans 20mcg/hr transdermal change every 7 days, fill 5/11 and start 5/13  Continue gabapentin 600mg three times daily (this was changed to 600mg tablets so you will only take 1 tab three times daily)  Continue Voltaren gel as needed  Further procedures recommended: none  Urine toxicology screen today: due in November 2022   Signed controlled substance agreement with me today  Recommendations/follow-up for PCP:  See above  Release of information: none  Follow up: 10-12 weeks in-person visit. Please call [phone] to make your follow-up appointment with me.     ----------------------------------------------------------------  Clinic Number:  [phone]   Call with any questions about your care and for scheduling assistance.   Calls are returned Monday through Friday between 8 AM and 4:30 PM. We usually get back to you within 2 business days depending on the issue/request.    If we are prescribing your medications:  For opioid medication refills, call the clinic or send a [employer] message 7 days in advance.  Please include:  Name of requested medication  Name of the pharmacy.  For non-opioid medications, call your pharmacy directly to request a refill. Please allow 3-4 days to be processed.   Per [place] State Law:  All controlled substance prescriptions must be filled within 30 days of being written.    For those controlled substances allowing refills, pickup must occur within 30 days of last fill.      We believe regular attendance is key to your success in our program!    Any time you are unable to keep your appointment we ask that you call us at least 24 hours in advance to cancel.This will allow us to offer the appointment time to another patient.    Multiple missed appointments may lead to dismissal from the clinic.

## 2022-05-11 NOTE — NURSING NOTE
RAPID3 (0-30) Cumulative Score  20.5          RAPID3 Weighted Score (divide #4 by 3 and that is the weighted score)  6.8

## 2022-05-11 NOTE — LETTER
Opioid / Opioid Plus Controlled Substance Agreement    This is an agreement between you and your provider about the safe and appropriate use of controlled substance/opioids prescribed by your care team. Controlled substances are medicines that can cause physical and mental dependence (abuse).    There are strict laws about having and using these medicines. We here at [facility] are committing to working with you in your efforts to get better. To support you in this work, we ll help you schedule regular office appointments for medicine refills. If we must cancel or change your appointment for any reason, we ll make sure you have enough medicine to last until your next appointment.     As a Provider, I will:    Listen carefully to your concerns and treat you with respect.     Recommend a treatment plan that I believe is in your best interest. This plan may involve therapies other than opioid pain medication.     Talk with you often about the possible benefits, and the risk of harm of any medicine that we prescribe for you.     Provide a plan on how to taper (discontinue or go off) using this medicine if the decision is made to stop its use.    As a Patient, I understand that opioid(s):     Are a controlled substance prescribed by my care team to help me function or work and manage my condition(s).     Are strong medicines and can cause serious side effects such as:    Drowsiness, which can seriously affect my driving ability    A lower breathing rate, enough to cause death    Harm to my thinking ability     Depression     Abuse of and addiction to this medicine    Need to be taken exactly as prescribed. Combining opioids with certain medicines or chemicals (such as illegal drugs, sedatives, sleeping pills, and benzodiazepines) can be dangerous or even fatal. If I stop opioids suddenly, I may have severe withdrawal symptoms.    Do not work for all types of pain nor for all patients. If they re not helpful, I may  be asked to stop them.        The risks, benefits and side effects of these medicine(s) were explained to me. I agree that:  1. I will take part in other treatments as advised by my care team. This may be psychiatry or counseling, physical therapy, behavioral therapy, group treatment or a referral to a specialist.     2. I will keep all my appointments. I understand that this is part of the monitoring of opioids. My care team may require an office visit for EVERY opioid/controlled substance refill. If I miss appointments or don t follow instructions, my care team may stop my medicine.    3. I will take my medicines as prescribed. I will not change the dose or schedule unless my care team tells me to. There will be no refills if I run out early.     4. I may be asked to come to the clinic and complete a urine drug test or complete a pill count at any time. If I don t give a urine sample or participate in a pill count, the care team may stop my medicine.    5. I will only receive prescriptions from this clinic for chronic pain. If I am treated by another provider for acute pain issues, I will tell them that I am taking opioid pain medication for chronic pain and that I have a treatment agreement with this provider. I will inform my [facility] care team within one business day if I am given a prescription for any pain medication by another healthcare provider. My [facility] care team can contact other providers and pharmacists about my use of any medicines.    6. It is up to me to make sure that I don t run out of my medicines on weekends or holidays. If my care team is willing to refill my opioid prescription without a visit, I must request refills only during office hours. Refills may take up to 3 business days to process. I will use one pharmacy to fill all my opioid and other controlled substance prescriptions. I will notify the clinic about any changes to my insurance or medication  availability.    7. I am responsible for my prescriptions. If the medicine/prescription is lost, stolen or destroyed, it will not be replaced. I also agree not to share controlled substance medicines with anyone.    8. I am aware I should not use any illegal or recreational drugs. I agree not to drink alcohol unless my care team says I can.       9. If I enroll in the [place] [facility] program, I will tell my care team prior to my next refill.     10. I will tell my care team right away if I become pregnant, have a new medical problem treated outside of my regular clinic, or have a change in my medications.    11. I understand that this medicine can affect my thinking, judgment and reaction time. Alcohol and drugs affect the brain and body, which can affect the safety of my driving. Being under the influence of alcohol or drugs can affect my decision-making, behaviors, personal safety, and the safety of others. Driving while impaired (DWI) can occur if a person is driving, operating, or in physical control of a car, motorcycle, boat, snowmobile, ATV, motorbike, off-road vehicle, or any other motor vehicle ([place] Statute 169A.20). I understand the risk if I choose to drive or operate any vehicle or machinery.    I understand that if I do not follow any of the conditions above, my prescriptions or treatment may be stopped or changed.          Opioids  What You Need to Know    What are opioids?   Opioids are pain medicines that must be prescribed by a doctor. They are also known as narcotics.     Examples are:   1. morphine (MS Contin, Codi)  2. oxycodone (Oxycontin)  3. oxycodone and acetaminophen (Percocet)  4. hydrocodone and acetaminophen (Vicodin, Norco)   5. fentanyl patch (Duragesic)   6. hydromorphone (Dilaudid)   7. methadone  8. codeine (Tylenol #3)     What do opioids do well?   Opioids are best for severe short-term pain such as after a surgery or injury. They may work well for cancer pain. They may  help some people with long-lasting (chronic) pain.     What do opioids NOT do well?   Opioids never get rid of pain entirely, and they don t work well for most patients with chronic pain. Opioids don t reduce swelling, one of the causes of pain.                                    Other ways to manage chronic pain and improve function include:       Treat the health problem that may be causing pain    Anti-inflammation medicines, which reduce swelling and tenderness, such as ibuprofen (Advil, Motrin) or naproxen (Aleve)    Acetaminophen (Tylenol)    Antidepressants and anti-seizure medicines, especially for nerve pain    Topical treatments such as patches or creams    Injections or nerve blocks    Chiropractic or osteopathic treatment    Acupuncture, massage, deep breathing, meditation, visual imagery, aromatherapy    Use heat or ice at the pain site    Physical therapy     Exercise    Stop smoking    Take part in therapy       Risks and side effects     Talk to your doctor before you start or decide to keep taking opioids. Possible side effects include:      Lowering your breathing rate enough to cause death    Overdose, including death, especially if taking higher than prescribed doses    Worse depression symptoms; less pleasure in things you usually enjoy    Feeling tired or sluggish    Slower thoughts or cloudy thinking    Being more sensitive to pain over time; pain is harder to control    Trouble sleeping or restless sleep    Changes in hormone levels (for example, less testosterone)    Changes in sex drive or ability to have sex    Constipation    Unsafe driving    Itching and sweating    Dizziness    Nausea, throwing up and dry mouth    What else should I know about opioids?    Opioids may lead to dependence, tolerance, or addiction.      Dependence means that if you stop or reduce the medicine too quickly, you will have withdrawal symptoms. These include loose poop (diarrhea), jitters, flu-like symptoms,  nervousness and tremors. Dependence is not the same as addiction.                       Tolerance means needing higher doses over time to get the same effect. This may increase the chance of serious side effects.      Addiction is when people improperly use a substance that harms their body, their mind or their relations with others. Use of opiates can cause a relapse of addiction if you have a history of drug or alcohol abuse.      People who have used opioids for a long time may have a lower quality of life, worse depression, higher levels of pain and more visits to doctors.    You can overdose on opioids. Take these steps to lower your risk of overdose:    1. Recognize the signs:  Signs of overdose include decrease or loss of consciousness (blackout), slowed breathing, trouble waking up and blue lips. If someone is worried about overdose, they should call 911.    2. Talk to your doctor about Narcan (naloxone).   If you are at risk for overdose, you may be given a prescription for Narcan. This medicine very quickly reverses the effects of opioids.   If you overdose, a friend or family member can give you Narcan while waiting for the ambulance. They need to know the signs of overdose and how to give Narcan.     3. Don't use alcohol or street drugs.   Taking them with opioids can cause death.    4. Do not take any of these medicines unless your doctor says it s OK. Taking these with opioids can cause death:    Benzodiazepines, such as lorazepam (Ativan), alprazolam (Xanax) or diazepam (Valium)    Muscle relaxers, such as cyclobenzaprine (Flexeril)    Sleeping pills like zolpidem (Ambien)     Other opioids      How to keep you and other people safe while taking opioids:    1. Never share your opioids with others.  Opioid medicines are regulated by the Drug Enforcement Agency ([employer]). Selling or sharing medications is a criminal act.    2. Be sure to store opioids in a secure place, locked up if possible. Young children  can easily swallow them and overdose.    3. When you are traveling with your medicines, keep them in the original bottles. If you use a pill box, be sure you also carry a copy of your medicine list from your clinic or pharmacy.    4. Safe disposal of opioids    Most pharmacies have places to get rid of medicine, called disposal kiosks. Medicine disposal options are also available in every [facility]. Search your county and  medication disposal  to find more options. You can find more details at:  [email or web address]     I agree that my provider, clinic care team, and pharmacy may work with any city, state or federal law enforcement agency that investigates the misuse, sale, or other diversion of my controlled medicine. I will allow my provider to discuss my care with, or share a copy of, this agreement with any other treating provider, pharmacy or emergency room where I receive care.    I have read this agreement and have asked questions about anything I did not understand.    _______________________________________________________  Patient Signature - Lamont Daniels _____________________                   Date     _______________________________________________________  Provider Signature - MARIAH Loyd CNP   _____________________                   Date     _______________________________________________________  Witness Signature (required if provider not present while patient signing)   _____________________                   Date

## 2022-05-11 NOTE — PROGRESS NOTES
Rheumatology Clinic Visit      Lamont Daniels MRN# [id number]   YOB: 1965 Age: 56 year old      Date of visit: 5/11/22   PCP: Dr. David Chavez  Hepatologist: Dr. Thomas Leventhal     Chief Complaint   Patient presents with:  Rheumatoid Arthritis    Assessment and Plan   1.  Seropositive nonerosive rheumatoid arthritis (RF negative, CCP low positive): Note that he does have low back pain but without evidence of SI joint irregularity on x-ray.  Initially with morning stiffness all day, gelling phenomenon, and synovitis.   Previously on Humira (partially effective), Enbrel (partially effective), HCQ (cannot safely monitor due to right retinal scaring and bilateral early macular degeneration, per ophthalmology), Orencia (partially effective and could use again in the future if needed).  MTX avoided per Dr. Laboy, hematology, recommendation. Currently on leflunomide 20 mg daily, SSZ 1500mg BID, Simponi 50 mg SQ every 28 days.  Overall has improved with change from Orencia to Simponi.  Chronic illness, stable.    - Continue leflunomide 20 mg daily    - Continue sulfasalazine 1500mg BID  - Continue Simponi 50mg SQ every 28 days  - PRN RA flare only: Prednisone 10 mg daily x2 days, then 5 mg daily x5 days, then stop   - Labs every 8-12 weeks: CBC, Creatinine, Hepatic Panel, ESR, CRP    High risk medication requiring intensive toxicity monitoring at least quarterly: labs ordered include CBC, Creatinine, Hepatic panel to monitor for cytopenia and hepatotoxicity; checking creatinine as it affects clearance of medication.      2. Lower back pain: Lumbar spine MRI in August 2014 showed multilevel degenerative changes and a small disc protrusion at L3/4 with mild spinal canal narrowing; also moderate left and mild right neural foraminal narrowing at L5-S1. He had a nerve conduction study in 2014 that was normal. He has been worked up by neurology in the past without significant neurologic findings. HLA-B27 negative, SI  "joint x-ray negative. Now following in the pain management clinic.     3. Myofascial pain syndrome / chronic pain syndrome: diffuse pain consistent with chronic pain syndrome.  Management per pain clinic as well as PCP.  I again encouraged that he increase physical activity as he leads a sedentary lifestyle with no more activity than what is required for his activities of daily living.      4. Chronic Hepatitis B: Managed by Dr. Laboy (hepatology) previously, and now Dr. Thomas Leventhal at [facility]. Currently on tenofovir.     5. Hepatic Steatosis: Based on previous liver biopsy.  Seeing hepatology.      6. Positive tuberculosis test, history:   This is noted here for historical significance.  He was evaluated by Dr. Anthony Mendoza, infectious disease. The following telephone note was documented by Dr. Mendoza: \"I tried calling th pt, finally we have the records from [place] . It appears he was treated for latent TB with INH for 1 year in 1980/1981 .Later in 1981 April he was admitted at [facility] in [place], [place] with rt sided pneumonia, TB was suspected but he improved with treatment with Penicillin only. Later he was seen  ( likely ID specialist), and was treated with 6 weeks course of Rifampin and Ethambutol pending sputum AFB culture. His AFB cx were negative based on the report we have.\"  \"He recently had QFT -TB test which was reported positive and his CXR was clear. Given his documented hx of completion of treatment for latent TB as above , I do not see any need for further treatment. His tests may remain positive irrespective of treatment status. From my perspective he can be started on DMARDs/Biological as deemed necessary.\"       7. Gout: On allopurinol and managed by PCP.     8.  Vaccinations:   - Influenza: up to date  - [id number]: up to date  - [id number]: up to date  - Shingrix: Up to date  - TDAP today.   - COVID-19: pfizer on 3/16/21, 4/6/21, 10/9/21, 4/21/22.  A " 5th mRNA vaccine (2nd booster) may be received at least 4 months after the 4th dose.   Based on our current understanding (and this may change over time as we learn more), medications should be adjusted as noted below, if disease activity allows:  - NSAIDs and Acetaminophen: hold for 24 hours prior to vaccination if able to do so  - Sulfasalazine should be held for 1-2 weeks after each COVID-19 vaccine (as disease activity allows)  - Leflunomide should be held for 1-2 weeks after each COVID-19 vaccine (as disease activity allows)    Total minutes spent in evaluation with patient, documentation, , and review of pertinent studies and chart notes: 18    Mr. Daniels verbalized agreement with and understanding of the rational for the diagnosis and treatment plan.  All questions were answered to best of my ability and the patient's satisfaction. Mr. Daniels was advised to contact the clinic with any questions that may arise after the clinic visit.      Thank you for involving me in the care of the patient    Return to clinic: 3-4 months      HPI   Lamont Daniels is a 56 year old male with a medical history significant for hypertension, hyperlipidemia, gout, coronary artery disease s/p 6vCABG, vitamin D deficiency, hepatitis B, and RA who presents for f/u of RA.    Today, 5/11/2022: Tolerating Simponi well.  Not taking hydroxychloroquine or Orencia.  Morning stiffness for about 30-60 minutes.  Joint pain at the knees and hands for about 5 minutes in the morning that improves with time and activity.  Taking tenofovir.  Also tolerating sulfasalazine and leflunomide well.    Denies fevers, chills, nausea, vomiting, constipation, diarrhea. No abdominal pain. Denies chest pain/pressure, palpitations, or shortness of breath.  No oral or nasal sores. No rash. No LE swelling.  Mild dry mouth; he has good dentition and does not feel like he needs to use frequent sips of water; unchanged since last evaluation. No dry eyes. No eye  pain or redness.     Tobacco:  None   EtOH:  None  Drugs:  None  Occupation: Retired   Originally from [facility], then lived just north of [place], [place], then lived in [place], [place], then moved to [place] for family    ROS   12 point review of system was completed and negative except as noted in the HPI     Active Problem List     Patient Active Problem List   Diagnosis     Coronary atherosclerosis of native coronary artery     Major depressive disorder, recurrent episode, moderate (H)     Anxiety     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)     Hepatitis B infection     Vitamin D deficiency     S/P CABG (coronary artery bypass graft)     Malrotation of intestine     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     Hyperlipidemia LDL goal <70     Insomnia     Gout     Suicidal ideation     Essential hypertension     Rheumatoid arthritis involving multiple sites, unspecified rheumatoid factor presence     High risk medications (not anticoagulants) long-term use     Midline low back pain without sciatica     Bilateral low back pain with sciatica, sciatica laterality unspecified     Elevated liver enzymes     On corticosteroid therapy     Essential hypertension with goal blood pressure less than 140/90     Insomnia, unspecified type     Rheumatoid arthritis of multiple sites with negative rheumatoid factor (H)     Benign prostatic hyperplasia with lower urinary tract symptoms, unspecified morphology     Chronic pain syndrome     Hepatitis B, chronic (H)     Syncope, unspecified syncope type     Symptomatic cholelithiasis     H/O: gout     Anxiety and depression     Past Medical History     Past Medical History:   Diagnosis Date     Anxiety      CAD (coronary artery disease)     CABG, PCI     Congestive heart failure, unspecified 2008     Depressive disorder 2008     Gout      Hepatitis B > 10 years     HTN (hypertension)      Hyperlipidemia LDL goal < 100      Malrotation of intestine      Moderate major depression (H)       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) 9/19/2011    Uses CPAP     Rheumatoid arthritis flare (H) 1012     Steatohepatitis 12/15    liver biopsy     Tuberculosis age 13    INH x 9 months      Vitamin D deficiency      Past Surgical History     Past Surgical History:   Procedure Laterality Date     ABDOMEN SURGERY  2014     BIOPSY  2015     BYPASS GRAFT ARTERY CORONARY  2008    6 vessels     COLONOSCOPY  2/8/2013    Procedure: COLONOSCOPY;  Colonoscopy, blood in stool;  Surgeon: Duane, William Charles, MD;  Location: [facility] OR     COMBINED REPAIR PTOSIS WITH BLEPHAROPLASTY BILATERAL Bilateral 6/25/2018    Procedure: COMBINED REPAIR PTOSIS WITH BLEPHAROPLASTY BILATERAL;  Bilateral upper eyelid blepharoplasty, ptosis repair and brow ptosis repair;  Surgeon: Sandra Borja MD;  Location: [facility] OR     GI SURGERY  2014     HEAD & NECK SURGERY  2011     NASAL/SINUS POLYPECTOMY  2010     ORTHOPEDIC SURGERY  2012     REPAIR PTOSIS       REPAIR PTOSIS BILATERAL Bilateral 7/22/2019    Procedure: REPAIR, PTOSIS, BOTH UPPER brows;  Surgeon: Sandra Borja MD;  Location: [facility] OR     REPAIR PTOSIS BROW BILATERAL Bilateral 6/25/2018    Procedure: REPAIR PTOSIS BROW BILATERAL;;  Surgeon: Sandra Borja MD;  Location: [facility] OR     THORACIC SURGERY  1989    tb     TONSILLECTOMY  2010     UVULOPALATOPHARYNGOPLASTY  2010     VASCULAR SURGERY  2008     [facility] CORONARY STENT CIERA SOTOTL VESSEL  2008    3 months after CABG     Allergy     Allergies   Allergen Reactions     Citalopram Itching     Tramadol Itching     Current Medication List     Current Outpatient Medications   Medication Sig     Acetaminophen (TYLENOL PO)      allopurinol (ZYLOPRIM) 300 MG tablet TAKE 1 TABLET BY MOUTH EVERY DAY     amLODIPine (NORVASC) 5 MG tablet TAKE 1 TABLET BY MOUTH DAILY FOR BLOOD PRESSURE     aspirin EC 81 MG EC tablet Take 1 tablet (81 mg) by mouth daily (*)     atorvastatin (LIPITOR) 80 MG tablet Take 1 tablet (80 mg) by mouth daily     bacitracin 500 UNIT/GM  external ointment Apply topically 3 times daily     baclofen (LIORESAL) 10 MG tablet TAKE 1 TABLET BY MOUTH 2 TIMES DAILY AS NEEDED FOR MUSCLE SPASM     busPIRone HCl (BUSPAR) 30 MG tablet TAKE 1 TABLET (30 MG) BY MOUTH 2 TIMES DAILY     BUTRANS 20 MCG/HR WK patch Place 1 patch onto the skin every 7 days OK to fill 05/11/22 and start 05/13/22 LISANDRO, name brand     Cholecalciferol (VITAMIN D) 2000 UNITS tablet TAKE ONE TABLET BY MOUTH ONCE DAILY     clobetasol (TEMOVATE) 0.05 % external cream Apply twice daily for 2 weeks, weekends only for 2 weeks, repeat cycle as needed for hands, not face or genitals     clobetasol (TEMOVATE) 0.05 % external solution Apply twice daily to scalp for up to 2 weeks for flares.     clonazePAM (KLONOPIN) 1 MG tablet TAKE 0.5-1 TABLETS BY MOUTH 2 TIMES DAILY AS NEEDED FOR ANXIETY     clopidogrel (PLAVIX) 75 MG tablet Take 1 tablet (75 mg) by mouth daily *PLEASE SCHEDULE 1 YEAR FOLLOW UP APPT. ( June 2022)     colchicine (COLCYRS) 0.6 MG tablet TAKE 2 TABLETS BY MOUTH AT THE FIRST SIGN OF FLARE, TAKE 1 ADDITIONAL TABLET ONE HOUR LATER.     desvenlafaxine (PRISTIQ) 50 MG 24 hr tablet TAKE 1 TABLET BY MOUTH EVERY DAY     diclofenac (VOLTAREN) 1 % topical gel Apply 4 g topically 4 times daily as needed for moderate pain     diclofenac (VOLTAREN) 1 % topical gel Apply 4 grams to bilateral knees, up to four times daily as needed using enclosed dosing card.  Max of 32g/day     evolocumab (REPATHA) 140 MG/ML prefilled autoinjector Inject 1 mL (140 mg) Subcutaneous every 14 days     ezetimibe (ZETIA) 10 MG tablet Take 1 tablet (10 mg) by mouth daily     gabapentin (NEURONTIN) 300 MG capsule TAKE 2 CAPSULES (600 MG) BY MOUTH 3 TIMES DAILY .     gabapentin (NEURONTIN) 600 MG tablet Take 1 tablet (600 mg) by mouth 3 times daily     gemfibrozil (LOPID) 600 MG tablet Take 1 tablet (600 mg) by mouth 2 times daily     golimumab (SIMPONI) 50 MG/0.5ML auto-injector pen Inject 0.5 mLs (50 mg) Subcutaneous  every 28 days . Hold for signs of infection, and seek medical attention.     hydrocortisone (WESTCORT) 0.2 % external cream FOR GENITALS, APPLY TWICE DAILY FOR UP TO 2 WEEKS, TAKE 2 WEEKS OFF THEN REPEAT     hydrocortisone 2.5 % cream FOR FACE, APPLY TWICE DAILY FOR UP TO 2 WEEK FOR FLARES ON THE FACE, TAKE 2 WEEKS OFF     Incontinence Supply Disposable (DEPEND UNDERWEAR LARGE) MISC Use twice daily.     leflunomide (ARAVA) 20 MG tablet Take 1 tablet (20 mg) by mouth daily ; Labs required every 8-12 weeks.     meclizine (ANTIVERT) 25 MG tablet TAKE 1 TABLET BY MOUTH EVERY 6 HOURS AS NEEDED FOR DIZZINESS.     melatonin 3 MG tablet Take 1 tablet (3 mg) by mouth nightly as needed for sleep     pantoprazole (PROTONIX) 40 MG EC tablet TAKE 1 TABLET BY MOUTH EVERY DAY     pimecrolimus (ELIDEL) 1 % external cream APPLY TWICE DAILY FOR FACE AND GENITALS     sildenafil (REVATIO) 20 MG tablet TAKE 2 TO 5 TABLETS BY MOUTH 30 MINUTES PRIOR TO INTERCOURSE AS NEEDED.     sulfaSALAzine (AZULFIDINE) 500 MG tablet Take 3 tablets (1,500 mg) by mouth 2 times daily     tamsulosin (FLOMAX) 0.4 MG capsule TAKE 2 CAPSULES BY MOUTH EVERY DAY     tenofovir (VIREAD) 300 MG tablet Take 1 tablet (300 mg) by mouth daily     triamcinolone (KENALOG) 0.1 % external ointment APPLY TOPICALLY TO AFFECTED AREA(S) 3 TIMES DAILY     triamcinolone (KENALOG) 0.1 % external ointment Apply to trunk and extremities twice daily for up to 2 weeks for flares     zolpidem (AMBIEN) 5 MG tablet Take 1 tablet (5 mg) by mouth nightly as needed for sleep     naloxone (NARCAN) 4 MG/0.1ML nasal spray Spray 1 spray (4 mg) into one nostril alternating nostrils once as needed for opioid reversal every 2-3 minutes until assistance arrives (Patient not taking: No sig reported)     nitroGLYcerin (NITROSTAT) 0.4 MG sublingual tablet For chest pain place 1 tablet under the tongue every 5 minutes for 3 doses. If symptoms persist 5 minutes after 1st dose call 911.     order for  "DME Equipment being ordered: chair lift     order for DME Equipment being ordered: single end cane     ORDER FOR DME 1.  CPAP pressure 11 cm/H20 with heated humidity.   2.  Provide mask to fit and CPAP supplies.   3.  Length of need lifetime.   4.  If needed please provide a chin strap          predniSONE (DELTASONE) 5 MG tablet For rheumatoid arthritis flare only: prednisone 10mg daily x2days, then 5mg daily x5days, then stop. (Patient not taking: No sig reported)     No current facility-administered medications for this visit.       Social History   See HPI    Family History     Family History   Problem Relation Age of Onset     C.A.D. Father      Coronary Artery Disease Father      Hypertension Father      Depression Father      Hypertension Mother      Diabetes Mother      Depression Mother      Mental Illness Mother      Hypertension Maternal Grandfather      Cancer Paternal Grandfather      Other Cancer Paternal Grandfather      Other Cancer Other      Autoimmune Disease No family hx of      Cerebrovascular Disease No family hx of      Thyroid Disease No family hx of      Glaucoma No family hx of      Macular Degeneration No family hx of      No family history of autoimmune disease  No family history of psoriasis     No change in family history since the previous clinic visit.     Physical Exam     Temp Readings from Last 3 Encounters:   11/11/21 98  F (36.7  C) (Tympanic)   03/19/21 97.9  F (36.6  C) (Tympanic)   01/14/20 98.4  F (36.9  C) (Oral)     BP Readings from Last 5 Encounters:   05/11/22 (!) 150/95   05/11/22 137/87   11/11/21 138/85   11/04/21 (!) 172/99   10/20/21 136/87     Pulse Readings from Last 1 Encounters:   05/11/22 72     Resp Readings from Last 1 Encounters:   10/20/21 16     Estimated body mass index is 29.26 kg/m  as calculated from the following:    Height as of 1/11/22: 1.575 m (5' 2\").    Weight as of 1/11/22: 72.6 kg (160 lb).      GEN: NAD.   HEENT:  Anicteric, noninjected sclera. " No obvious external lesions of the ear and nose. Hearing intact.  CV: S1, S2. RRR. No m/r/g  PULM: No increased work of breathing. CTA bilaterally   MSK: Left fourth PIP tender to palpation but no swelling.  Other PIPs without swelling or tenderness palpation.  MCPs and DIPs without swelling or tenderness to palpation.  Wrists, elbows, shoulders, knees, ankles, and MTPs without swelling or tenderness palpation.    SKIN: No rash or jaundice seen  PSYCH: Alert. Appropriate.        Labs     RF/CCP  Recent Labs   Lab Test 11/04/15  1547 08/12/14  1414   CCPABY 27*  --    RHF  --  <20     CBC  Recent Labs   Lab Test 04/26/22  1218 03/25/22  1549 10/20/21  1144 08/23/21  1130 08/23/21  1130 03/29/21  1318 02/27/21  1259 01/30/21  1226   WBC 4.7 5.6 4.8   < > 5.4 5.2 4.4 5.5   RBC 4.77 4.64 4.71   < > 4.81 4.72 4.79 5.11   HGB 13.9 13.7 14.0   < > 14.2 13.6 13.8 14.5   HCT 43.4 41.5 41.3   < > 42.9 42.3 43.1 43.9   MCV 91 89 88   < > 89 90 90 86   RDW 13.8 14.1 14.4   < > 13.8 14.6 14.7 13.5    288 273   < > 278 245 275 296   MCH 29.1 29.5 29.7   < > 29.5 28.8 28.8 28.4   MCHC 32.0 33.0 33.9   < > 33.1 32.2 32.0 33.0   NEUTROPHIL  --  37 44  --  43 45.1 40.9 50.5   LYMPH  --  44 34  --  41 35.5 42.0 33.2   MONOCYTE  --  16 20  --  13 15.3 12.6 12.7   EOSINOPHIL  --  2 2  --  2 2.9 3.4 2.9   BASOPHIL  --  1 1  --  1 1.2 1.1 0.7   ANEU  --   --   --   --   --  2.3 1.8 2.7   ALYM  --   --   --   --   --  1.8 1.9 1.8   PRACHI  --   --   --   --   --  0.8 0.6 0.7   AEOS  --   --   --   --   --  0.2 0.2 0.2   ABAS  --   --   --   --   --  0.1 0.1 0.0   ANEUTAUTO  --  2.1 2.1  --  2.3  --   --   --    ALYMPAUTO  --  2.5 1.6  --  2.2  --   --   --    AMONOAUTO  --  0.9 0.9  --  0.7  --   --   --    AEOSAUTO  --  0.1 0.1  --  0.1  --   --   --    ABSBASO  --  0.1 0.0  --  0.1  --   --   --     < > = values in this interval not displayed.     CMP  Recent Labs   Lab Test 04/26/22  1218 03/25/22  1549 10/20/21  114  08/23/21  1130 03/29/21  1318 02/27/21  1259 01/30/21  1226 06/03/20  1317 03/26/20  1039 02/27/20  0816 11/25/19  0758   *  --   --   --   --   --   --   --  138  --  141   POTASSIUM 3.9  --   --   --   --   --   --   --  3.8  --  3.8   CHLORIDE 111*  --   --   --   --   --   --   --  109  --  110*   CO2 30  --   --   --   --   --   --   --  25  --  25   ANIONGAP 4  --   --   --   --   --   --   --  4  --  6   GLC 94 106* 94  --   --   --   --    < > 122*   < > 101*   BUN 12  --   --   --   --   --   --   --  16  --  16   CR 0.94 0.95 0.87   < > 1.01 0.95 0.94   < > 1.00   < > 0.78   GFRESTIMATED >90 >90 >90   < > 83 89 >90   < > 85   < > >90   GFRESTBLACK  --   --   --   --  >90 >90 >90   < > >90   < > >90   HEMANT 9.3  --   --   --   --   --   --   --  9.0  --  9.2   BILITOTAL 0.4 0.5 0.6   < > 0.5 0.4 0.4   < > 0.3   < > 0.4   ALBUMIN 3.9 4.0 4.1   < > 3.9 4.1 4.0   < > 4.3   < > 4.0   PROTTOTAL 7.7 7.7 8.0   < > 7.6 7.6 7.6   < > 8.0   < > 7.3   ALKPHOS 93 118 120   < > 116 102 114   < > 123   < > 89   AST 24 27 22   < > 20 23 37   < > 23   < > 23   ALT 23 34 25   < > 31 27 46   < > 36   < > 47    < > = values in this interval not displayed.     Calcium/VitaminD  Recent Labs   Lab Test 04/26/22  1218 03/26/20  1039 11/25/19  0758 12/04/17  0924 07/13/17  1246 01/14/16  1541 11/04/15  1547   HEMANT 9.3 9.0 9.2   < >  --    < >  --    VITDT  --   --   --   --  34  --  43    < > = values in this interval not displayed.     ESR/CRP  Recent Labs   Lab Test 03/25/22  1549 10/20/21  1143 08/23/21  1130   SED 10 27* 12   CRP <2.9 <2.9 <2.9     Lipid Panel  Recent Labs   Lab Test 03/19/21  0928 06/25/20  1027 03/14/19  1006 06/10/16  0850 06/29/15  1405 05/22/14  0848 03/24/14  0936   CHOL 142 122 152   < > 219* 169 195   TRIG 105 148 126   < > 372* 379* 301*   HDL 69 59 51   < > 33* 33* 39*   LDL 52 33 76   < > 112 60 95   VLDL  --   --   --   --  74* 76* 60*   CHOLHDLRATIO  --   --   --   --  6.6* 5.1* 5.0   NHDL 73  "63 101   < >  --   --   --     < > = values in this interval not displayed.     Hepatitis B  Recent Labs   Lab Test 10/20/21  1143 03/19/21  0928 03/26/20  1039 10/07/19  0940 12/01/16  1429 10/05/16  0954   AUSAB 0.09  --   --   --   --  0.45   HEPBANG  --   --   --   --   --  Reactive*   HBQLOG  --  Not Calculated <1.3 Not Calculated   < > 5.1*    < > = values in this interval not displayed.     Hepatitis C  Recent Labs   Lab Test 04/07/16  1538   HCVAB Nonreactive   Assay performance characteristics have not been established for newborns,   infants, and children       Lyme ab screening  Recent Labs   Lab Test 07/18/17  1330   LYMEGM 0.19     Tuberculosis Screening  Recent Labs   Lab Test 04/07/16  1538   TBRSLT Positive   The magnitude of the measured IFN-gamma level cannot be correlated to stage or   degree of infection, level of immune responsiveness, or likelihood for   progression to active disease.  *   TBAGN >10.00  This is a qualitative test.  The TB antigen IU/mL value is required for   documentation on certain government reporting forms but this value should not   be used to monitor disease progression or response to therapy.   Diagnosing or excluding tuberculosis disease, and assessing the probability of   LTBI, require a combination of epidemiological, historical, medical and   diagnostic findings that should be taken into account when interpreting   QuantiFERON TB results.       HIV Screening  Recent Labs   Lab Test 11/04/15  1547   HIAGAB Nonreactive   HIV-1 p24 Ag & HIV-1/HIV-2 Ab Not Detected       \"HAND BILATERAL THREE OR MORE VIEWS 11/4/2015 4:55 PM   HISTORY: Pain in unspecified joint.  COMPARISON: None.  IMPRESSION  IMPRESSION: Normal.  DANIS ANGLIN MD\"    \"FOOT BILATERAL THREE OR MORE VIEWS 11/4/2015 4:55 PM   HISTORY: Pain in unspecified joint.  COMPARISON: 8/21/2012.  IMPRESSION  IMPRESSION: Small traction spurs are seen off the Achilles tendon and  plantar fascial attachment sites " "bilaterally. Joint spaces are  preserved. Osseous structures are intact. Incidental note is made of  some surgical staples in the soft tissues of the medial distal right  lower extremity.  DANIS ANGLIN MD\"      Immunization History     Immunization History   Administered Date(s) Administered     COVID-19,PF,Pfizer (12+ Yrs) 03/16/2021, 04/06/2021, 10/09/2021, 04/21/2022     COVID-19,PF,Pfizer 12+ Yrs (2022 and After) 04/21/2022     Influenza (IIV3) PF 10/11/2011, 09/20/2017, 09/01/2018     Influenza Quad, Recombinant, pf(RIV4) (Flublok) 09/23/2021     Influenza Vaccine IM > 6 months Valent IIV4 (Alfuria,Fluzone) 09/27/2015, 09/08/2016, 09/30/2019, 09/14/2020     Influenza Vaccine, 6+MO IM (QUADRIVALENT W/PRESERVATIVES) 11/17/2014     Pneumo Conj 13-V (2010&after) 04/07/2016     Pneumococcal 23 valent 12/12/2014     TDAP Vaccine (Adacel) 08/30/2011     Tdap (Adacel,Boostrix) 10/20/2021     Zoster vaccine recombinant adjuvanted (SHINGRIX) 07/27/2019, 04/22/2021          Chart documentation done in part with Dragon Voice recognition Software. Although reviewed after completion, some word and grammatical error may remain.    Sebastián Jenkins MD     "

## 2022-05-14 ENCOUNTER — HEALTH MAINTENANCE LETTER (OUTPATIENT)
Age: 57
End: 2022-05-14

## 2022-05-17 DIAGNOSIS — I25.810 CORONARY ARTERY DISEASE INVOLVING AUTOLOGOUS ARTERY CORONARY BYPASS GRAFT WITHOUT ANGINA PECTORIS: ICD-10-CM

## 2022-05-17 RX ORDER — EZETIMIBE 10 MG/1
10 TABLET ORAL DAILY
Qty: 90 TABLET | Refills: 3 | Status: SHIPPED | OUTPATIENT
Start: 2022-05-17 | End: 2023-05-17

## 2022-05-17 NOTE — TELEPHONE ENCOUNTER
ezetimibe (ZETIA) 10 MG tablet      Last Written Prescription Date:  5-  Last Fill Quantity: 90,   # refills: 3  Last Office Visit : 6-  Future Office visit:  6-    Routing refill request to provider for review/approval because:  LDL last recorded 3-.      Kathleen M Doege RN

## 2022-05-27 ENCOUNTER — OFFICE VISIT (OUTPATIENT)
Dept: OPTOMETRY | Facility: CLINIC | Age: 57
End: 2022-05-27
Payer: COMMERCIAL

## 2022-05-27 DIAGNOSIS — H52.13 MYOPIA OF BOTH EYES: ICD-10-CM

## 2022-05-27 DIAGNOSIS — H35.3131 EARLY STAGE NONEXUDATIVE AGE-RELATED MACULAR DEGENERATION OF BOTH EYES: ICD-10-CM

## 2022-05-27 DIAGNOSIS — H40.013 OPEN ANGLE WITH BORDERLINE FINDINGS AND LOW GLAUCOMA RISK IN BOTH EYES: Primary | ICD-10-CM

## 2022-05-27 PROCEDURE — 92002 INTRM OPH EXAM NEW PATIENT: CPT | Performed by: OPTOMETRIST

## 2022-05-27 PROCEDURE — 92133 CPTRZD OPH DX IMG PST SGM ON: CPT | Performed by: OPTOMETRIST

## 2022-05-27 ASSESSMENT — CUP TO DISC RATIO
OD_RATIO: 0.45
OS_RATIO: 0.55

## 2022-05-27 ASSESSMENT — CONF VISUAL FIELD
OS_NORMAL: 1
OD_NORMAL: 1
METHOD: COUNTING FINGERS

## 2022-05-27 ASSESSMENT — VISUAL ACUITY
OS_CC: 20/30
OD_CC: 20/20
CORRECTION_TYPE: GLASSES
METHOD: SNELLEN - LINEAR
OS_CC+: -1

## 2022-05-27 ASSESSMENT — TONOMETRY
OS_IOP_MMHG: 14
OD_IOP_MMHG: 14
IOP_METHOD: TONOPEN

## 2022-05-27 NOTE — PROGRESS NOTES
Assessment/Plan  (H40.013) Open angle with borderline findings and low glaucoma risk in both eyes  (primary encounter diagnosis)  Comment: Based on ONH appearance, RNFL thinning left eye  -IOP today 14/14, Tmax 20/20  -Pachys slightly thinner than average 538/541  -RNFL OCT: OD: WNL, OS: Stable thinning  Plan: Discussed findings with patient. Stable findings to prior exam, so ok to continue monitoring only. Recommend evaluating in 6 months with complete dilated exam.     (H35.3131) Early stage nonexudative age-related macular degeneration of both eyes  Comment: Very early  Plan: Consider repeat MacOCT in 6 months at complete exam.     (H52.13) Myopia of both eyes  Plan: Repeat refraction at next visit.       Complete documentation of historical and exam elements from today's encounter can  be found in the full encounter summary report (not reduplicated in this progress  note). I personally obtained the chief complaint(s) and history of present illness. I  confirmed and edited as necessary the review of systems, past medical/surgical  history, family history, social history, and examination findings as documented by  others; and I examined the patient myself. I personally reviewed the relevant tests,  images, and reports as documented above. I formulated and edited as necessary the  assessment and plan and discussed the findings and management plan with the  patient and family.    Feliberto Jama, OD

## 2022-05-27 NOTE — NURSING NOTE
Chief Complaints and History of Present Illnesses   Patient presents with     Glaucoma Follow-Up       Chief Complaint(s) and History of Present Illness(es)     Glaucoma Follow-Up               Comments     Patient here for 6 month glaucoma follow up. Had Plaquenil exam and testing done on 10/18/21 with Dr. Barbosa. No drop use. Feels vision is getting worse however did not update glasses after last exam. Interested in LASIK but concerned about cost.                 Licha Ortiz, COA

## 2022-05-28 DIAGNOSIS — R21 RASH: ICD-10-CM

## 2022-05-31 ENCOUNTER — OFFICE VISIT (OUTPATIENT)
Dept: FAMILY MEDICINE | Facility: CLINIC | Age: 57
End: 2022-05-31
Payer: COMMERCIAL

## 2022-05-31 VITALS
SYSTOLIC BLOOD PRESSURE: 110 MMHG | BODY MASS INDEX: 28.34 KG/M2 | WEIGHT: 154 LBS | OXYGEN SATURATION: 97 % | DIASTOLIC BLOOD PRESSURE: 77 MMHG | TEMPERATURE: 97.2 F | HEART RATE: 71 BPM | HEIGHT: 62 IN | RESPIRATION RATE: 17 BRPM

## 2022-05-31 DIAGNOSIS — E78.5 HYPERLIPIDEMIA LDL GOAL <70: ICD-10-CM

## 2022-05-31 DIAGNOSIS — Z13.1 SCREENING FOR DIABETES MELLITUS: ICD-10-CM

## 2022-05-31 DIAGNOSIS — Z00.00 ENCOUNTER FOR MEDICARE ANNUAL WELLNESS EXAM: Primary | ICD-10-CM

## 2022-05-31 DIAGNOSIS — G47.00 INSOMNIA, UNSPECIFIED TYPE: ICD-10-CM

## 2022-05-31 DIAGNOSIS — Z12.5 SCREENING FOR PROSTATE CANCER: ICD-10-CM

## 2022-05-31 DIAGNOSIS — I10 ESSENTIAL HYPERTENSION WITH GOAL BLOOD PRESSURE LESS THAN 140/90: ICD-10-CM

## 2022-05-31 PROCEDURE — G0439 PPPS, SUBSEQ VISIT: HCPCS | Performed by: FAMILY MEDICINE

## 2022-05-31 RX ORDER — ZOLPIDEM TARTRATE 5 MG/1
5 TABLET ORAL
Qty: 30 TABLET | Refills: 5 | Status: SHIPPED | OUTPATIENT
Start: 2022-05-31 | End: 2023-01-11

## 2022-05-31 ASSESSMENT — ACTIVITIES OF DAILY LIVING (ADL)
CURRENT_FUNCTION: HOUSEWORK REQUIRES ASSISTANCE
CURRENT_FUNCTION: SHOPPING REQUIRES ASSISTANCE
CURRENT_FUNCTION: TRANSPORTATION REQUIRES ASSISTANCE
CURRENT_FUNCTION: BATHING REQUIRES ASSISTANCE
CURRENT_FUNCTION: PREPARING MEALS REQUIRES ASSISTANCE
CURRENT_FUNCTION: LAUNDRY REQUIRES ASSISTANCE

## 2022-05-31 ASSESSMENT — ENCOUNTER SYMPTOMS
HEADACHES: 1
CONSTIPATION: 0
NAUSEA: 1
MYALGIAS: 1
JOINT SWELLING: 0
ARTHRALGIAS: 1
SHORTNESS OF BREATH: 0
HEARTBURN: 0
HEMATURIA: 0
WEAKNESS: 1
DYSURIA: 0
FEVER: 0
SORE THROAT: 0
HEMATOCHEZIA: 0
DIARRHEA: 0
COUGH: 0
FREQUENCY: 1
DIZZINESS: 1
NERVOUS/ANXIOUS: 1
EYE PAIN: 0
CHILLS: 0
PARESTHESIAS: 0
ABDOMINAL PAIN: 0
PALPITATIONS: 0

## 2022-05-31 ASSESSMENT — PATIENT HEALTH QUESTIONNAIRE - PHQ9
SUM OF ALL RESPONSES TO PHQ QUESTIONS 1-9: 19
10. IF YOU CHECKED OFF ANY PROBLEMS, HOW DIFFICULT HAVE THESE PROBLEMS MADE IT FOR YOU TO DO YOUR WORK, TAKE CARE OF THINGS AT HOME, OR GET ALONG WITH OTHER PEOPLE: EXTREMELY DIFFICULT
SUM OF ALL RESPONSES TO PHQ QUESTIONS 1-9: 19

## 2022-05-31 ASSESSMENT — PAIN SCALES - GENERAL: PAINLEVEL: MODERATE PAIN (5)

## 2022-05-31 NOTE — PATIENT INSTRUCTIONS
At [facility], we strive to deliver an exceptional experience to you, every time we see you. If you receive a survey, please complete it as we do value your feedback.  If you have MyChart, you can expect to receive results automatically within 24 hours of their completion.  Your provider will send a note interpreting your results as well.   If you do not have MyChart, you should receive your results in about a week by mail.    Your care team:                            Family Medicine Internal Medicine   MD Noam Dorado MD Shantel Branch-Fleming, MD Srinivasa Vaka, MD Katya Belousova, NATE RankinHillMARIAH Moseley CNP, MD Pediatrics   Robbin Trevino, MD Sarika Rice MD Amelia Massimini APRN CNP   Mirlande Pedraza APRN PAVEL Loya MD             Clinic hours: Monday - Thursday 7 am-6 pm; Fridays 7 am-5 pm.   Urgent care: Monday - Friday 10 am- 8 pm; Saturday and Sunday 9 am-5 pm.    Clinic: [phone]       [facility]: Monday - Thursday 8 am - 7 pm; Friday 8 am - 6 pm  [facility]: [phone]     Patient Education   Personalized Prevention Plan  You are due for the preventive services outlined below.  Your care team is available to assist you in scheduling these services.  If you have already completed any of these items, please share that information with your care team to update in your medical record.  Health Maintenance Due   Topic Date Due     ANNUAL REVIEW OF HM ORDERS  Never done     Annual Wellness Visit  03/19/2022       Depression and Suicide in Older Adults    Nearly 2 million older Americans have some type of depression. Some of them even take their own lives. Yet depression among older adults is often ignored. Learn the warning signs. You may help spare a loved one needless pain. You may also save a life.   What is depression?  Depression is a  "common and serious illness that affects the way you think and feel. It is not a normal part of aging, nor is it a sign of weakness, a character flaw, or something you can snap out of. Most people with depression need treatment to get better. The most common symptom is a feeling of deep sadness. People who are depressed also may seem tired and listless. And nothing seems to give them pleasure. It s normal to grieve or be sad sometimes. But sadness lessens or passes with time. Depression rarely goes away or improves on its own. A person with clinical depression can't \"snap out of it.\" Other symptoms of depression are:     Sleeping more or less than normal    Eating more or less than normal    Having headaches, stomachaches, or other pains that don t go away    Feeling nervous,  empty,  or worthless    Crying a great deal    Thinking or talking about suicide or death    Loss of interest in activities previously enjoyed    Social isolation    Feeling confused or forgetful  What causes it?  The causes of depression aren t fully known. But it is thought to result from a complex blend of these factors:     Biochemistry. Certain chemicals in the brain play a role.    Genes. Depression does run in families.    Life stress. Life stresses can also trigger depression in some people. Older adults often face many stressors, such as death of friends or a spouse, health problems, and financial concerns.    Chronic conditions. This includes conditions such as diabetes, heart disease, or cancer. These can cause symptoms of depression. Medicine side effects can cause changes in thoughts and behaviors.  How you can help  Often, depressed people may not want to ask for help. When they do, they may be ignored. Or, they may receive the wrong treatment. You can help by showing parents and older friends love and support. If they seem depressed, don t lecture the person, ignore the symptoms, or discount the symptoms as a  normal  part of " aging -which they are not. Get involved, listen, and show interest and support.   Help them understand that depression is a treatable illness. Tell them you can help them find the right treatment. Offer to go to their healthcare provider's appointment with them for support when the symptoms are discussed. With their approval, contact a local mental health center, social service agency, or hospital about services.   You can be an advocate for him or her at healthcare appointments. Many older adults have chronic illnesses that can cause symptoms of depression. Medicine side effects can change thoughts and behaviors. You can help make sure that the healthcare provider looks at all of these factors. He or she should refer your family member or friend to a mental healthcare provider when needed. in some cases, untreated depression can lead to a misdiagnosis. A person may be diagnosed with a brain disorder such as dementia. If the healthcare provider does not take the issue of depression seriously, help your family member or friend to find another provider.   Don't be afraid to ask  If you think an older person you care about could be suicidal, ask,  Have you thought about suicide?  Most people will tell you the truth. If they say  yes,  they may already have a plan for how and when they will attempt it. Find out as much as you can. The more detailed the plan, and the easier it is to carry out, the more danger the person is in right now. Tell the person you are there for them and do not want them to harm him or herself. Don't wait to get help for the person. Call the person's healthcare provider, local hospital, or emergency services.   To learn more    National Suicide Prevention Lifeline (crisis hotline) 725-411-NEBM (020-356-3883)    National [place] of Mental Ihzlit367-295-1184kll.nimh.nih.gov    National [place] on Mental Cdukbpj747-717-6076mnk.joaquin.org    Mental Health Nyzwyru795-151-0615jdg.nmha.org    National  Suicide Zlzuoxu533-CKCTGXY (863-295-3873)    Call 911  Never leave the person alone. A person who is actively suicidal needs psychiatric care right away. They will need constant supervision. Never leave the person out of sight. Call 911 or the national 24-hour suicide crisis hotline at 918-373-LBMO (523-227-5156). You can also take the person to the closest emergency room.   [employer] last reviewed this educational content on 5/1/2020 2000-2021 [employer]. All rights reserved. This information is not intended as a substitute for professional medical care. Always follow your healthcare professional's instructions.

## 2022-05-31 NOTE — PROGRESS NOTES
"SUBJECTIVE:   Lamont Daniels is a 56 year old male who presents for Preventive Visit.      Patient has been advised of split billing requirements and indicates understanding: Yes  Are you in the first 12 months of your Medicare coverage?  No    Healthy Habits:     In general, how would you rate your overall health?  Poor    Frequency of exercise:  1 day/week    Duration of exercise:  Less than 15 minutes    Do you usually eat at least 4 servings of fruit and vegetables a day, include whole grains    & fiber and avoid regularly eating high fat or \"junk\" foods?  No    Taking medications regularly:  No    Barriers to taking medications:  None    Medication side effects:  Muscle aches    Ability to successfully perform activities of daily living:  Transportation requires assistance, shopping requires assistance, preparing meals requires assistance, housework requires assistance, bathing requires assistance and laundry requires assistance    Home Safety:  No safety concerns identified    Hearing Impairment:  No hearing concerns    In the past 6 months, have you been bothered by leaking of urine? Yes    In general, how would you rate your overall mental or emotional health?  Poor      PHQ-2 Total Score: 5    Additional concerns today:  Yes    Do you feel safe in your environment? YES    Have you ever done Advance Care Planning? (For example, a Health Directive, POLST, or a discussion with a medical provider or your loved ones about your wishes): No, advance care planning information given to patient to review.  Patient plans to discuss their wishes with loved ones or provider.         Fall risk  Fallen 2 or more times in the past year?: No  Any fall with injury in the past year?: No    Cognitive Screening   1) Repeat 3 items (Leader, Season, Table)  YES  2) Clock draw: NORMAL  3) 3 item recall: Recalls 2 objects   Results: NORMAL clock, 1-2 items recalled: COGNITIVE IMPAIRMENT LESS LIKELY    Mini-CogTM Copyright S Julieta. " Licensed by the author for use in [facility]; reprinted with permission (fletcher@[facility]). All rights reserved.      Do you have sleep apnea, excessive snoring or daytime drowsiness?: yes    Reviewed and updated as needed this visit by clinical staff   Tobacco  Allergies  Meds  Problems  Med Hx  Surg Hx  Fam Hx  Soc   Hx          Reviewed and updated as needed this visit by Provider                   Social History     Tobacco Use     Smoking status: Never Smoker     Smokeless tobacco: Never Used   Substance Use Topics     Alcohol use: No     Alcohol/week: 0.0 standard drinks     If you drink alcohol do you typically have >3 drinks per day or >7 drinks per week? No    Alcohol Use 5/31/2022   Prescreen: >3 drinks/day or >7 drinks/week? No           Current providers sharing in care for this patient include:   Patient Care Team:  David Chavez MD as PCP - General (Family Practice)  David Chavez MD as Assigned PCP  Ameena Mendez MD as MD (INTERNAL MEDICINE - ENDOCRINOLOGY, DIABETES & METABOLISM)  Fly Travis MD as Assigned Heart and Vascular Provider  Leventhal, Thomas Michael, MD as Assigned Gastroenterology Provider  Alyssa Roche MD as Assigned Surgical Provider  Sebastián Jenkins MD as Assigned Rheumatology Provider  Fabian Jessica MD as Assigned Sleep Provider  Ameena Lang APRN CNP as Assigned Neuroscience Provider    The following health maintenance items are reviewed in Epic and correct as of today:  Health Maintenance Due   Topic Date Due     ANNUAL REVIEW OF HM ORDERS  Never done     MEDICARE ANNUAL WELLNESS VISIT  03/19/2022     Lab work is in process  Labs reviewed in EPIC  BP Readings from Last 3 Encounters:   05/31/22 110/77   05/11/22 (!) 150/95   05/11/22 137/87    Wt Readings from Last 3 Encounters:   05/31/22 69.9 kg (154 lb)   01/11/22 72.6 kg (160 lb)   11/11/21 71.5 kg (157 lb 9.6 oz)                  Patient Active Problem List   Diagnosis     Coronary  atherosclerosis of native coronary artery     Major depressive disorder, recurrent episode, moderate (H)     Anxiety     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)     Hepatitis B infection     Vitamin D deficiency     S/P CABG (coronary artery bypass graft)     Malrotation of intestine     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     Hyperlipidemia LDL goal <70     Insomnia     Gout     Suicidal ideation     Essential hypertension     Rheumatoid arthritis involving multiple sites, unspecified rheumatoid factor presence     High risk medications (not anticoagulants) long-term use     Midline low back pain without sciatica     Bilateral low back pain with sciatica, sciatica laterality unspecified     Elevated liver enzymes     On corticosteroid therapy     Essential hypertension with goal blood pressure less than 140/90     Insomnia, unspecified type     Rheumatoid arthritis of multiple sites with negative rheumatoid factor (H)     Benign prostatic hyperplasia with lower urinary tract symptoms, unspecified morphology     Chronic pain syndrome     Hepatitis B, chronic (H)     Syncope, unspecified syncope type     Symptomatic cholelithiasis     H/O: gout     Anxiety and depression     Past Surgical History:   Procedure Laterality Date     ABDOMEN SURGERY  2014     BIOPSY  2015     BYPASS GRAFT ARTERY CORONARY  2008    6 vessels     COLONOSCOPY  2/8/2013    Procedure: COLONOSCOPY;  Colonoscopy, blood in stool;  Surgeon: Duane, William Charles, MD;  Location: [facility] OR     COMBINED REPAIR PTOSIS WITH BLEPHAROPLASTY BILATERAL Bilateral 6/25/2018    Procedure: COMBINED REPAIR PTOSIS WITH BLEPHAROPLASTY BILATERAL;  Bilateral upper eyelid blepharoplasty, ptosis repair and brow ptosis repair;  Surgeon: Sandra Borja MD;  Location: [facility] OR     GI SURGERY  2014     HEAD & NECK SURGERY  2011     NASAL/SINUS POLYPECTOMY  2010     ORTHOPEDIC SURGERY  2012     REPAIR PTOSIS       REPAIR PTOSIS BILATERAL Bilateral 7/22/2019    Procedure:  REPAIR, PTOSIS, BOTH UPPER brows;  Surgeon: Sandra Borja MD;  Location: [facility] OR     REPAIR PTOSIS BROW BILATERAL Bilateral 6/25/2018    Procedure: REPAIR PTOSIS BROW BILATERAL;;  Surgeon: Sandra Borja MD;  Location: [facility] OR     THORACIC SURGERY  1989    tb     TONSILLECTOMY  2010     UVULOPALATOPHARYNGOPLASTY  2010     VASCULAR SURGERY  2008     [facility] CORONARY STENT PERCUT, EA ADDTL VESSEL  2008    3 months after CABG       Social History     Tobacco Use     Smoking status: Never Smoker     Smokeless tobacco: Never Used   Substance Use Topics     Alcohol use: No     Alcohol/week: 0.0 standard drinks     Family History   Problem Relation Age of Onset     C.A.D. Father      Coronary Artery Disease Father      Hypertension Father      Depression Father      Hypertension Mother      Diabetes Mother      Depression Mother      Mental Illness Mother      Hypertension Maternal Grandfather      Cancer Paternal Grandfather      Other Cancer Paternal Grandfather      Other Cancer Other      Autoimmune Disease No family hx of      Cerebrovascular Disease No family hx of      Thyroid Disease No family hx of      Glaucoma No family hx of      Macular Degeneration No family hx of          Current Outpatient Medications   Medication Sig Dispense Refill     Acetaminophen (TYLENOL PO)        allopurinol (ZYLOPRIM) 300 MG tablet TAKE 1 TABLET BY MOUTH EVERY DAY 90 tablet 3     amLODIPine (NORVASC) 5 MG tablet TAKE 1 TABLET BY MOUTH DAILY FOR BLOOD PRESSURE 90 tablet 3     aspirin EC 81 MG EC tablet Take 1 tablet (81 mg) by mouth daily (*) 30 tablet 1     atorvastatin (LIPITOR) 80 MG tablet Take 1 tablet (80 mg) by mouth daily 90 tablet 3     bacitracin 500 UNIT/GM external ointment Apply topically 3 times daily 30 g 3     baclofen (LIORESAL) 10 MG tablet TAKE 1 TABLET BY MOUTH 2 TIMES DAILY AS NEEDED FOR MUSCLE SPASM 180 tablet 2     busPIRone HCl (BUSPAR) 30 MG tablet TAKE 1 TABLET (30 MG) BY MOUTH 2 TIMES DAILY 180 tablet 3      BUTRANS 20 MCG/HR WK patch Place 1 patch onto the skin every 7 days OK to fill 05/11/22 and start 05/13/22 LISANDRO, name brand 4 patch 1     Cholecalciferol (VITAMIN D) 2000 UNITS tablet TAKE ONE TABLET BY MOUTH ONCE DAILY 100 tablet 1     clobetasol (TEMOVATE) 0.05 % external cream Apply twice daily for 2 weeks, weekends only for 2 weeks, repeat cycle as needed for hands, not face or genitals 60 g 4     clobetasol (TEMOVATE) 0.05 % external solution Apply twice daily to scalp for up to 2 weeks for flares. 60 mL 0     clonazePAM (KLONOPIN) 1 MG tablet TAKE 0.5-1 TABLETS BY MOUTH 2 TIMES DAILY AS NEEDED FOR ANXIETY 90 tablet 0     clopidogrel (PLAVIX) 75 MG tablet Take 1 tablet (75 mg) by mouth daily *PLEASE SCHEDULE 1 YEAR FOLLOW UP APPT. ( June 2022) 90 tablet 0     colchicine (COLCYRS) 0.6 MG tablet TAKE 2 TABLETS BY MOUTH AT THE FIRST SIGN OF FLARE, TAKE 1 ADDITIONAL TABLET ONE HOUR LATER. 90 tablet 1     desvenlafaxine (PRISTIQ) 50 MG 24 hr tablet TAKE 1 TABLET BY MOUTH EVERY DAY 90 tablet 3     diclofenac (VOLTAREN) 1 % topical gel Apply 4 g topically 4 times daily as needed for moderate pain 300 g 11     diclofenac (VOLTAREN) 1 % topical gel Apply 4 grams to bilateral knees, up to four times daily as needed using enclosed dosing card.  Max of 32g/day 300 g 11     evolocumab (REPATHA) 140 MG/ML prefilled autoinjector Inject 1 mL (140 mg) Subcutaneous every 14 days 2 mL 11     ezetimibe (ZETIA) 10 MG tablet Take 1 tablet (10 mg) by mouth daily 90 tablet 3     gabapentin (NEURONTIN) 300 MG capsule TAKE 2 CAPSULES (600 MG) BY MOUTH 3 TIMES DAILY . 540 capsule 6     gabapentin (NEURONTIN) 600 MG tablet Take 1 tablet (600 mg) by mouth 3 times daily 270 tablet 3     gemfibrozil (LOPID) 600 MG tablet Take 1 tablet (600 mg) by mouth 2 times daily 180 tablet 0     golimumab (SIMPONI) 50 MG/0.5ML auto-injector pen Inject 0.5 mLs (50 mg) Subcutaneous every 28 days . Hold for signs of infection, and seek medical attention.  0.5 mL 4     hydrocortisone (WESTCORT) 0.2 % external cream FOR GENITALS, APPLY TWICE DAILY FOR UP TO 2 WEEKS, TAKE 2 WEEKS OFF THEN REPEAT 60 g 3     hydrocortisone 2.5 % cream FOR FACE, APPLY TWICE DAILY FOR UP TO 2 WEEK FOR FLARES ON THE FACE, TAKE 2 WEEKS OFF 28.35 g 3     Incontinence Supply Disposable (DEPEND UNDERWEAR LARGE) MISC Use twice daily. 60 each 11     leflunomide (ARAVA) 20 MG tablet Take 1 tablet (20 mg) by mouth daily ; Labs required every 8-12 weeks. 90 tablet 2     meclizine (ANTIVERT) 25 MG tablet TAKE 1 TABLET BY MOUTH EVERY 6 HOURS AS NEEDED FOR DIZZINESS. 30 tablet 5     melatonin 3 MG tablet Take 1 tablet (3 mg) by mouth nightly as needed for sleep       naloxone (NARCAN) 4 MG/0.1ML nasal spray Spray 1 spray (4 mg) into one nostril alternating nostrils once as needed for opioid reversal every 2-3 minutes until assistance arrives 0.2 mL 0     nitroGLYcerin (NITROSTAT) 0.4 MG sublingual tablet For chest pain place 1 tablet under the tongue every 5 minutes for 3 doses. If symptoms persist 5 minutes after 1st dose call 911. 25 tablet 1     order for DME Equipment being ordered: chair lift 1 each 0     order for DME Equipment being ordered: single end cane 1 Units 0     ORDER FOR DME 1.  CPAP pressure 11 cm/H20 with heated humidity.   2.  Provide mask to fit and CPAP supplies.   3.  Length of need lifetime.   4.  If needed please provide a chin strap            pantoprazole (PROTONIX) 40 MG EC tablet TAKE 1 TABLET BY MOUTH EVERY DAY 90 tablet 1     pimecrolimus (ELIDEL) 1 % external cream APPLY TWICE DAILY FOR FACE AND GENITALS 60 g 1     predniSONE (DELTASONE) 5 MG tablet For rheumatoid arthritis flare only: prednisone 10mg daily x2days, then 5mg daily x5days, then stop. 9 tablet 3     sildenafil (REVATIO) 20 MG tablet TAKE 2 TO 5 TABLETS BY MOUTH 30 MINUTES PRIOR TO INTERCOURSE AS NEEDED. 150 tablet 3     sulfaSALAzine (AZULFIDINE) 500 MG tablet Take 3 tablets (1,500 mg) by mouth 2 times daily  540 tablet 2     tamsulosin (FLOMAX) 0.4 MG capsule TAKE 2 CAPSULES BY MOUTH EVERY  capsule 3     tenofovir (VIREAD) 300 MG tablet Take 1 tablet (300 mg) by mouth daily 90 tablet 3     triamcinolone (KENALOG) 0.1 % external ointment APPLY TOPICALLY TO AFFECTED AREA(S) 3 TIMES DAILY 80 g 2     triamcinolone (KENALOG) 0.1 % external ointment Apply to trunk and extremities twice daily for up to 2 weeks for flares 80 g 1     zolpidem (AMBIEN) 5 MG tablet Take 1 tablet (5 mg) by mouth nightly as needed for sleep 30 tablet 5     Allergies   Allergen Reactions     Citalopram Itching     Tramadol Itching     Recent Labs   Lab Test 04/26/22  1218 03/25/22  1549 10/20/21  1143 08/23/21  1130 03/29/21  1318 03/19/21  0928 02/27/21  1259 09/10/20  1509 06/25/20  1027 06/03/20  1317 03/26/20  1039 04/01/19  0851 03/14/19  1006 12/01/16  1429 10/25/16  0851 10/05/16  0954 08/12/16  1451 04/27/16  1448 04/22/16  1318 01/29/16  1541 01/14/16  1541   A1C  --   --   --   --   --   --   --   --   --   --   --   --   --   --   --   --  5.9  --  6.0  --  6.4*   LDL  --   --   --   --   --  52  --   --  33  --   --   --  76   < > 134*  --   --    < >  --   --   --    HDL  --   --   --   --   --  69  --   --  59  --   --   --  51   < > 90  --   --    < >  --   --   --    TRIG  --   --   --   --   --  105  --   --  148  --   --   --  126   < > 100  --   --    < >  --   --   --    ALT 23 34 25   < > 31  --  27   < >  --    < > 36   < >  --    < > 86*   < >  --    < >  --    < > 48   CR 0.94 0.95 0.87   < > 1.01  --  0.95   < >  --    < > 1.00   < >  --    < > 1.01   < >  --    < >  --    < > 1.01   GFRESTIMATED >90 >90 >90   < > 83  --  89   < >  --    < > 85   < >  --    < > 78   < >  --    < >  --    < > 78   GFRESTBLACK  --   --   --   --  >90  --  >90   < >  --    < > >90   < >  --    < > >90   GFR Calc     < >  --    < >  --    < > >90   GFR Calc     POTASSIUM 3.9  --   --   --   --   --   --   --  "  --   --  3.8   < >  --    < > 3.6   < >  --    < >  --   --  3.9   TSH  --   --   --   --   --   --   --   --   --   --   --   --   --   --  3.34  --   --   --  1.38  --   --     < > = values in this interval not displayed.            Review of Systems   Constitutional: Negative for chills and fever.   HENT: Negative for congestion, ear pain, hearing loss and sore throat.    Eyes: Positive for visual disturbance. Negative for pain.   Respiratory: Negative for cough and shortness of breath.    Cardiovascular: Negative for chest pain, palpitations and peripheral edema.   Gastrointestinal: Positive for nausea. Negative for abdominal pain, constipation, diarrhea, heartburn and hematochezia.   Genitourinary: Positive for frequency, impotence and urgency. Negative for dysuria, genital sores, hematuria and penile discharge.   Musculoskeletal: Positive for arthralgias and myalgias. Negative for joint swelling.   Skin: Positive for rash.   Neurological: Positive for dizziness, weakness and headaches. Negative for paresthesias.   Psychiatric/Behavioral: Positive for mood changes. The patient is nervous/anxious.      Constitutional, HEENT, cardiovascular, pulmonary, GI, , musculoskeletal, neuro, skin, endocrine and psych systems are negative, except as otherwise noted.    OBJECTIVE:   /77 (BP Location: Left arm, Patient Position: Chair, Cuff Size: Adult Regular)   Pulse 71   Temp 97.2  F (36.2  C) (Tympanic)   Resp 17   Ht 1.575 m (5' 2\")   Wt 69.9 kg (154 lb)   SpO2 97%   BMI 28.17 kg/m   Estimated body mass index is 28.17 kg/m  as calculated from the following:    Height as of this encounter: 1.575 m (5' 2\").    Weight as of this encounter: 69.9 kg (154 lb).  Physical Exam  GENERAL: healthy, alert and no distress  NECK: no adenopathy, no asymmetry, masses, or scars and thyroid normal to palpation  RESP: lungs clear to auscultation - no rales, rhonchi or wheezes  CV: regular rate and rhythm, normal S1 S2, no " "S3 or S4, no murmur, click or rub, no peripheral edema and peripheral pulses strong  ABDOMEN: soft, nontender, no hepatosplenomegaly, no masses and bowel sounds normal    Diagnostic Test Results:  Labs reviewed in Epic    ASSESSMENT / PLAN:   (Z00.00) Encounter for Medicare annual wellness exam  (primary encounter diagnosis)  Comment:   Plan: as below.    (I10) Essential hypertension with goal blood pressure less than 140/90  Comment:   Plan: controlled.    (E78.5) Hyperlipidemia LDL goal <70  Comment:   Plan: will recheck with Cardiology.    (Z13.1) Screening for diabetes mellitus  Comment:   Plan: glucose normal.    (Z12.5) Screening for prostate cancer  Comment:   Plan: PSA, screen            (G47.00) Insomnia, unspecified type  Comment:   Plan: zolpidem (AMBIEN) 5 MG tablet        Needed refills.      Patient has been advised of split billing requirements and indicates understanding: Yes    COUNSELING:  Reviewed preventive health counseling, as reflected in patient instructions       Regular exercise       Healthy diet/nutrition       Vision screening    Estimated body mass index is 28.17 kg/m  as calculated from the following:    Height as of this encounter: 1.575 m (5' 2\").    Weight as of this encounter: 69.9 kg (154 lb).        He reports that he has never smoked. He has never used smokeless tobacco.      Appropriate preventive services were discussed with this patient, including applicable screening as appropriate for cardiovascular disease, diabetes, osteopenia/osteoporosis, and glaucoma.  As appropriate for age/gender, discussed screening for colorectal cancer, prostate cancer, breast cancer, and cervical cancer. Checklist reviewing preventive services available has been given to the patient.    Reviewed patients plan of care and provided an AVS. The Basic Care Plan (routine screening as documented in Health Maintenance) for Lam[place] meets the Care Plan requirement. This Care Plan has been established and " reviewed with the Patient.    Counseling Resources:  ATP IV Guidelines  Pooled Cohorts Equation Calculator  Breast Cancer Risk Calculator  Breast Cancer: Medication to Reduce Risk  FRAX Risk Assessment  ICSI Preventive Guidelines  Dietary Guidelines for Americans, 2010  USDA's MyPlate  ASA Prophylaxis  Lung CA Screening    David Chavez MD, MD  [facility]    Identified Health Risks:  Answers for HPI/ROS submitted by the patient on 5/31/2022  If you checked off any problems, how difficult have these problems made it for you to do your work, take care of things at home, or get along with other people?: Extremely difficult  PHQ9 TOTAL SCORE: 19        The patient s PHQ-9 score is consistent with moderate depression. He was provided with information regarding depression and was advised to schedule a follow up appointment in  weeks to further address this issue.

## 2022-06-01 RX ORDER — TRIAMCINOLONE ACETONIDE 1 MG/G
OINTMENT TOPICAL
Qty: 80 G | Refills: 2 | Status: SHIPPED | OUTPATIENT
Start: 2022-06-01 | End: 2022-09-02

## 2022-06-07 ENCOUNTER — LAB (OUTPATIENT)
Dept: LAB | Facility: CLINIC | Age: 57
End: 2022-06-07
Payer: COMMERCIAL

## 2022-06-07 DIAGNOSIS — M62.830 SPASM OF BACK MUSCLES: ICD-10-CM

## 2022-06-07 DIAGNOSIS — Z12.5 SCREENING FOR PROSTATE CANCER: ICD-10-CM

## 2022-06-07 DIAGNOSIS — L40.9 PSORIASIS: ICD-10-CM

## 2022-06-07 DIAGNOSIS — E78.5 HYPERLIPIDEMIA LDL GOAL <70: ICD-10-CM

## 2022-06-07 DIAGNOSIS — I25.119 ATHEROSCLEROSIS OF NATIVE CORONARY ARTERY OF NATIVE HEART WITH ANGINA PECTORIS (H): ICD-10-CM

## 2022-06-07 DIAGNOSIS — I25.719 ATHEROSCLEROSIS OF AUTOLOGOUS VEIN CORONARY ARTERY BYPASS GRAFT WITH ANGINA PECTORIS (H): ICD-10-CM

## 2022-06-07 LAB
CHOLEST SERPL-MCNC: 128 MG/DL
FASTING STATUS PATIENT QL REPORTED: YES
HDLC SERPL-MCNC: 55 MG/DL
LDLC SERPL CALC-MCNC: 49 MG/DL
NONHDLC SERPL-MCNC: 73 MG/DL
PSA SERPL-MCNC: 0.64 UG/L (ref 0–4)
TRIGL SERPL-MCNC: 122 MG/DL

## 2022-06-07 PROCEDURE — 36415 COLL VENOUS BLD VENIPUNCTURE: CPT

## 2022-06-07 PROCEDURE — 80061 LIPID PANEL: CPT

## 2022-06-07 PROCEDURE — G0103 PSA SCREENING: HCPCS

## 2022-06-07 RX ORDER — BACLOFEN 10 MG/1
TABLET ORAL
Qty: 180 TABLET | Refills: 2 | Status: SHIPPED | OUTPATIENT
Start: 2022-06-07 | End: 2023-02-20

## 2022-06-07 NOTE — TELEPHONE ENCOUNTER
Future Office visit:    Next 5 appointments (look out 90 days)      Aug 16, 2022 11:30 AM  Return Visit with MARIAH Loyd CNP  [facility] [place] ([facility] [place] ) [address]  [place] [address]  [phone]             Routing refill request to provider for review/approval because:  Drug not on the [facility], [facility] or [facility] refill protocol or controlled substance

## 2022-06-09 RX ORDER — CLOPIDOGREL BISULFATE 75 MG/1
75 TABLET ORAL DAILY
Qty: 90 TABLET | Refills: 0 | Status: SHIPPED | OUTPATIENT
Start: 2022-06-09 | End: 2022-06-24

## 2022-06-09 RX ORDER — GEMFIBROZIL 600 MG/1
TABLET, FILM COATED ORAL
Qty: 180 TABLET | Refills: 0 | Status: SHIPPED | OUTPATIENT
Start: 2022-06-09 | End: 2022-06-24

## 2022-06-09 RX ORDER — PIMECROLIMUS 10 MG/G
CREAM TOPICAL
Qty: 60 G | Refills: 1 | OUTPATIENT
Start: 2022-06-09

## 2022-06-09 NOTE — TELEPHONE ENCOUNTER
pimecrolimus (ELIDEL) 1 % external cream   Last Written Prescription Date: 3/17/22  Last Fill Quantity: 60g,   # refills: 1  Last Office Visit : 4/30/21  Future Office visit: none    Scheduling has been notified to contact the pt for appointment.    Refused.

## 2022-06-09 NOTE — TELEPHONE ENCOUNTER
6/18/2021 ELIZABETH Travis, next 6/24/22  [facility]. 90 day joyce sent through next visit.

## 2022-06-09 NOTE — TELEPHONE ENCOUNTER
Left voicemail for patient to call back and schedule a medication follow up with Dr. Roche.    [facility]   Dermatology, General and Plastic Surgery, Urology Specialties   [facility] [place] [phone]

## 2022-06-11 ENCOUNTER — NURSE TRIAGE (OUTPATIENT)
Dept: NURSING | Facility: CLINIC | Age: 57
End: 2022-06-11
Payer: COMMERCIAL

## 2022-06-11 NOTE — TELEPHONE ENCOUNTER
S-(situation): COVID    B-(background):   Symptoms started 6/9  Tested positive today using an at hometest      A-(assessment):   Cough  Body aches    No fever  No SOB  No chest pain    R-(recommendations):   Pt would like to get Paxlovid treatment. FNA warm transferred him to [facility].    Ava Lopez RN/[place] Nurse Advisor      Reason for Disposition    HIGH RISK for severe COVID complications (e.g., weak immune system, age > 64 years, obesity with BMI > 25, pregnant, chronic lung disease or other chronic medical condition)  (Exception: Already seen by PCP and no new or worsening symptoms.)    Additional Information    Negative: SEVERE difficulty breathing (e.g., struggling for each breath, speaks in single words)    Negative: Difficult to awaken or acting confused (e.g., disoriented, slurred speech)    Negative: Bluish (or gray) lips or face now    Negative: Shock suspected (e.g., cold/pale/clammy skin, too weak to stand, low BP, rapid pulse)    Negative: Sounds like a life-threatening emergency to the triager    Negative: SEVERE or constant chest pain or pressure  (Exception: Mild central chest pain, present only when coughing.)    Negative: MODERATE difficulty breathing (e.g., speaks in phrases, SOB even at rest, pulse 100-120)    Negative: [1] Headache AND [2] stiff neck (can't touch chin to chest)    Negative: Oxygen level (e.g., pulse oximetry) 90 percent or lower    Negative: Chest pain or pressure    Negative: Patient sounds very sick or weak to the triager    Negative: MILD difficulty breathing (e.g., minimal/no SOB at rest, SOB with walking, pulse <100)    Negative: Fever > 103 F (39.4 C)    Negative: [1] Fever > 101 F (38.3 C) AND [2] age > 60 years    Negative: [1] Fever > 100.0 F (37.8 C) AND [2] bedridden (e.g., nursing home patient, CVA, chronic illness, recovering from surgery)    Protocols used: CORONAVIRUS (COVID-19) DIAGNOSED OR [id number] 1.18.2022

## 2022-06-13 ENCOUNTER — VIRTUAL VISIT (OUTPATIENT)
Dept: FAMILY MEDICINE | Facility: CLINIC | Age: 57
End: 2022-06-13
Payer: COMMERCIAL

## 2022-06-13 DIAGNOSIS — U07.1 INFECTION DUE TO 2019 NOVEL CORONAVIRUS: Primary | ICD-10-CM

## 2022-06-13 PROCEDURE — 99215 OFFICE O/P EST HI 40 MIN: CPT | Mod: 95 | Performed by: PHYSICIAN ASSISTANT

## 2022-06-13 NOTE — PROGRESS NOTES
Lamont is a 56 year old who is being evaluated via a billable video visit.      How would you like to obtain your AVS? MyChart  If the video visit is dropped, the invitation should be resent by: Text to cell phone: [phone]  Will anyone else be joining your video visit? No    Video Start Time: 2:25 PM    Assessment & Plan     Infection due to 2019 novel coronavirus    - molnupiravir (LAGEVRIO) 200 MG capsule; Take 4 capsules (800 mg) by mouth every 12 hours for 5 days    Over 6 drug-drug interactions for paxlovid reviewed  Better choice would be molnupiravir  Reviewed side effects  Be seen if not improving in 2 days  Consider ICS for cough      Analilia Rodriguez PA-C  [facility]    Billin min spent on patient today including chart review, history, reviewing drug-drug interactions, exam, and explaining treatment plan and follow-up.     Subjective   Lamont is a 56 year old who presents for the following health issues  accompanied by his Self.    HPI     Discuss COVID positive and treatments?      COVID-19 Symptom Review  How many days ago did these symptoms start? 6-9 (four days ago)    Are any of the following symptoms significant for you?    New or worsening difficulty breathing? No    Worsening cough? Yes, it's a dry cough.     Fever or chills? Yes, I felt feverish or had chills.    Headache: no    Sore throat: YES    Chest pain: no      Mostly cough bothering him and cold symptoms.     What treatments has patient tried? Cough syrup     Does patient have a way to get food/medications during quarantined? Yes, I have a friend or family member who can help me.          Review of Systems   Constitutional, HEENT, cardiovascular, pulmonary, GI, , musculoskeletal, neuro, skin, endocrine and psych systems are negative, except as otherwise noted.      Objective           Vitals:  No vitals were obtained today due to virtual visit.    Physical Exam   GENERAL: Healthy, alert and no  distress  EYES: Eyes grossly normal to inspection.  No discharge or erythema, or obvious scleral/conjunctival abnormalities.  RESP: No audible wheeze, cough, or visible cyanosis.  No visible retractions or increased work of breathing.    SKIN: Visible skin clear. No significant rash, abnormal pigmentation or lesions.  NEURO: Cranial nerves grossly intact.  Mentation and speech appropriate for age.  PSYCH: Mentation appears normal, affect normal/bright, judgement and insight intact, normal speech and appearance well-groomed.            Video-Visit Details    Type of service:  Video Visit    Video End Time:2:41 PM    Originating Location (pt. Location): Home    Distant Location (provider location):  [facility]     Platform used for Video Visit: [employer]

## 2022-06-16 DIAGNOSIS — B18.1 CHRONIC VIRAL HEPATITIS B WITHOUT DELTA AGENT AND WITHOUT COMA (H): ICD-10-CM

## 2022-06-16 RX ORDER — TENOFOVIR DISOPROXIL FUMARATE 300 MG/1
300 TABLET, FILM COATED ORAL DAILY
Qty: 90 TABLET | Refills: 3 | Status: SHIPPED | OUTPATIENT
Start: 2022-06-16 | End: 2023-05-16

## 2022-06-24 ENCOUNTER — VIRTUAL VISIT (OUTPATIENT)
Dept: CARDIOLOGY | Facility: CLINIC | Age: 57
End: 2022-06-24

## 2022-06-24 DIAGNOSIS — E78.5 HYPERLIPIDEMIA LDL GOAL <100: ICD-10-CM

## 2022-06-24 DIAGNOSIS — E78.5 HYPERLIPIDEMIA LDL GOAL <70: ICD-10-CM

## 2022-06-24 DIAGNOSIS — I25.719 ATHEROSCLEROSIS OF AUTOLOGOUS VEIN CORONARY ARTERY BYPASS GRAFT WITH ANGINA PECTORIS (H): ICD-10-CM

## 2022-06-24 DIAGNOSIS — I25.119 ATHEROSCLEROSIS OF NATIVE CORONARY ARTERY OF NATIVE HEART WITH ANGINA PECTORIS (H): ICD-10-CM

## 2022-06-24 PROCEDURE — 99214 OFFICE O/P EST MOD 30 MIN: CPT | Mod: 95 | Performed by: INTERNAL MEDICINE

## 2022-06-24 RX ORDER — GEMFIBROZIL 600 MG/1
600 TABLET, FILM COATED ORAL 2 TIMES DAILY
Qty: 180 TABLET | Refills: 3 | Status: SHIPPED | OUTPATIENT
Start: 2022-06-24 | End: 2023-06-22

## 2022-06-24 RX ORDER — CLOPIDOGREL BISULFATE 75 MG/1
75 TABLET ORAL DAILY
Qty: 90 TABLET | Refills: 3 | Status: SHIPPED | OUTPATIENT
Start: 2022-06-24 | End: 2023-06-23

## 2022-06-24 RX ORDER — ATORVASTATIN CALCIUM 80 MG/1
80 TABLET, FILM COATED ORAL DAILY
Qty: 90 TABLET | Refills: 3 | Status: SHIPPED | OUTPATIENT
Start: 2022-06-24 | End: 2023-06-20

## 2022-06-24 RX ORDER — NITROGLYCERIN 0.4 MG/1
TABLET SUBLINGUAL
Qty: 25 TABLET | Refills: 3 | Status: SHIPPED | OUTPATIENT
Start: 2022-06-24 | End: 2023-08-03

## 2022-06-24 NOTE — PROGRESS NOTES
Lamont Daniels is a 56 year old who is being evaluated via a billable video visit.      How would you like to obtain your AVS? MyChart  If the video visit is dropped, the invitation should be resent by: Text to cell phone: [phone]  Will anyone else be joining your video visit? No      Video-Visit Details    Type of service:  Video Visit    Video Start Time: 1:34 PM    Video End Time:1:54 PM    Originating Location (pt. Location): Home    Distant Location (provider location):  [facility] HEART [facility]     Platform used for Video Visit: Nilsa Ahn, EMT  Clinic Support  [facility]    [phone]    [facility] Cardiology Virtual Visit:    Assessment and Plan:     1. Premature CAD, CABG in 2008, PCI to Cx in '08, repeat PCI to Cx stent 2016 (patent sequential LIMA-D3-LAD, sequential SVG-RV marginal-PDA, occluded radial graft to PL branches), normal LV fxn: Cont secondary prevention with asa. Plan to continue plavix long term for now given aggressive ISR and low bleeding risk.   Plavix can be stopped 7 days prior to any planned surgery or procedure and resumed afterwards.  Encouraged to do more walking.  2. Dyspnea: stable. Echo and PFT's have been checked and were normal; its likely related to anxiety.    3. 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   Recommended genetic testing, he has not made an appt with genetic counselor.   4. JEROD on CPAP  5. Orthostatic dizziness: Continues to have mild symptoms. No improvement on stopping metoprolol.  6. Rheumatoid arthritis     Annual f/u with FLP    Fly Travis MD    Cardiac Imaging and Prevention  [facility]  [email or web address] Pager: [phone]    HPI: Annual follow-up, video visit.  He has been doing well, without any  intercurrent hospitalization, or coronary intervention.  He is currently recovering from COVID, his main symptom was cough, and he is doing much better already.  He has been taking all his medications without any issues.  Lipid profile checked earlier this year shows a LDL cholesterol of 49.  Basic labs are good.  His baseline mild symptoms of chest discomfort, dyspnea, and dizziness are stable.  He does minimal daily activity, and walks around the block once a day.       EXAM:  GEN/CONSTITUIONAL: Appears comfortable, in no apparent distress   EYES: No icterus noted.   JVP:  Not visible  RESPIRATORY: No cough or labored breathing   NEUROLOGIC: No tremor noted  PSYCHIATRIC: Normal affect  EXT: No edema noted.  Skin: No rash visible  The rest of a comprehensive physical examination is deferred due to PHE (public health emergency) video visit restrictions.    PAST MEDICAL HISTORY:  Past Medical History:   Diagnosis Date     Anxiety      CAD (coronary artery disease)     CABG, PCI     Congestive heart failure, unspecified 2008     Depressive disorder 2008     Gout      Hepatitis B > 10 years     HTN (hypertension)      Hyperlipidemia LDL goal < 100      Malrotation of intestine      Moderate major depression (H)      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) 9/19/2011    Uses CPAP     Rheumatoid arthritis flare (H) 1012     Steatohepatitis 12/15    liver biopsy     Tuberculosis age 13    INH x 9 months      Vitamin D deficiency        CURRENT MEDICATIONS:  Current Outpatient Medications   Medication     Acetaminophen (TYLENOL PO)     allopurinol (ZYLOPRIM) 300 MG tablet     amLODIPine (NORVASC) 5 MG tablet     aspirin EC 81 MG EC tablet     atorvastatin (LIPITOR) 80 MG tablet     bacitracin 500 UNIT/GM external ointment     baclofen (LIORESAL) 10 MG tablet     busPIRone HCl (BUSPAR) 30 MG tablet     BUTRANS 20 MCG/HR WK patch     Cholecalciferol (VITAMIN D) 2000 UNITS tablet     clobetasol (TEMOVATE) 0.05 % external cream     clobetasol  (TEMOVATE) 0.05 % external solution     clonazePAM (KLONOPIN) 1 MG tablet     clopidogrel (PLAVIX) 75 MG tablet     colchicine (COLCYRS) 0.6 MG tablet     desvenlafaxine (PRISTIQ) 50 MG 24 hr tablet     diclofenac (VOLTAREN) 1 % topical gel     diclofenac (VOLTAREN) 1 % topical gel     evolocumab (REPATHA) 140 MG/ML prefilled autoinjector     ezetimibe (ZETIA) 10 MG tablet     gabapentin (NEURONTIN) 300 MG capsule     gabapentin (NEURONTIN) 600 MG tablet     gemfibrozil (LOPID) 600 MG tablet     golimumab (SIMPONI) 50 MG/0.5ML auto-injector pen     hydrocortisone (WESTCORT) 0.2 % external cream     hydrocortisone 2.5 % cream     Incontinence Supply Disposable (DEPEND UNDERWEAR LARGE) MISC     leflunomide (ARAVA) 20 MG tablet     meclizine (ANTIVERT) 25 MG tablet     melatonin 3 MG tablet     naloxone (NARCAN) 4 MG/0.1ML nasal spray     nitroGLYcerin (NITROSTAT) 0.4 MG sublingual tablet     order for DME     order for DME     ORDER FOR DME     pantoprazole (PROTONIX) 40 MG EC tablet     pimecrolimus (ELIDEL) 1 % external cream     sildenafil (REVATIO) 20 MG tablet     sulfaSALAzine (AZULFIDINE) 500 MG tablet     tamsulosin (FLOMAX) 0.4 MG capsule     tenofovir (VIREAD) 300 MG tablet     triamcinolone (KENALOG) 0.1 % external ointment     triamcinolone (KENALOG) 0.1 % external ointment     zolpidem (AMBIEN) 5 MG tablet     No current facility-administered medications for this visit.       PAST SURGICAL HISTORY:  Past Surgical History:   Procedure Laterality Date     ABDOMEN SURGERY  2014     BIOPSY  2015     BYPASS GRAFT ARTERY CORONARY  2008    6 vessels     COLONOSCOPY  2/8/2013    Procedure: COLONOSCOPY;  Colonoscopy, blood in stool;  Surgeon: Duane, William Charles, MD;  Location: [facility] OR     COMBINED REPAIR PTOSIS WITH BLEPHAROPLASTY BILATERAL Bilateral 6/25/2018    Procedure: COMBINED REPAIR PTOSIS WITH BLEPHAROPLASTY BILATERAL;  Bilateral upper eyelid blepharoplasty, ptosis repair and brow ptosis repair;   Surgeon: Sandra Borja MD;  Location: [facility] OR     GI SURGERY  2014     HEAD & NECK SURGERY  2011     NASAL/SINUS POLYPECTOMY  2010     ORTHOPEDIC SURGERY  2012     REPAIR PTOSIS       REPAIR PTOSIS BILATERAL Bilateral 7/22/2019    Procedure: REPAIR, PTOSIS, BOTH UPPER brows;  Surgeon: Sandra Borja MD;  Location: [facility] OR     REPAIR PTOSIS BROW BILATERAL Bilateral 6/25/2018    Procedure: REPAIR PTOSIS BROW BILATERAL;;  Surgeon: Sandra Borja MD;  Location:  OR     THORACIC SURGERY  1989    tb     TONSILLECTOMY  2010     UVULOPALATOPHARYNGOPLASTY  2010     VASCULAR SURGERY  2008     ZZHC CORONARY STENT PERCUT, EA ADDTL VESSEL  2008    3 months after CABG       ALLERGIES     Allergies   Allergen Reactions     Citalopram Itching     Tramadol Itching       FAMILY HISTORY:  Family History   Problem Relation Age of Onset     C.A.D. Father      Coronary Artery Disease Father      Hypertension Father      Depression Father      Hypertension Mother      Diabetes Mother      Depression Mother      Mental Illness Mother      Hypertension Maternal Grandfather      Cancer Paternal Grandfather      Other Cancer Paternal Grandfather      Other Cancer Other      Autoimmune Disease No family hx of      Cerebrovascular Disease No family hx of      Thyroid Disease No family hx of      Glaucoma No family hx of      Macular Degeneration No family hx of        SOCIAL HISTORY:  Social History     Socioeconomic History     Marital status:      Spouse name: Not on file     Number of children: 5     Years of education: 14     Highest education level: Not on file   Occupational History     Occupation: [email or web address]     Employer: UNEMPLOYED     Comment: 2-2011. On long term disability. SSD applied for   Tobacco Use     Smoking status: Never Smoker     Smokeless tobacco: Never Used   Substance and Sexual Activity     Alcohol use: No     Alcohol/week: 0.0 standard drinks     Drug use: No     Sexual activity: Yes      Partners: Female   Other Topics Concern     Parent/sibling w/ CABG, MI or angioplasty before 65F 55M? Yes     Comment: father   Social History Narrative    . No current SO.         Has 5 children. Youngest child does live with him.         H/o AA degree and previously worked as a [employer]. Currently unemployed.         Denies tobacco use.     Alcohol use: 2x/week with minimal amount of alcohol per time.     Drug use: denies     Social Determinants of Health     Financial Resource Strain: Not on file   Food Insecurity: Not on file   Transportation Needs: Not on file   Physical Activity: Not on file   Stress: Not on file   Social Connections: Not on file   Intimate Partner Violence: Not on file   Housing Stability: Not on file       ROS:   Constitutional: No fever, chills, or sweats. No weight gain/loss   ENT: No visual disturbance, ear ache, epistaxis, sore throat  Allergies/Immunologic: Negative.   Respiratory: No cough, hemoptysia  Cardiovascular: As per HPI  GI: No nausea, vomiting, hematemesis, melena, or hematochezia  : No urinary frequency, dysuria, or hematuria  Integument: Negative  Psychiatric: Negative  Neuro: Negative  Endocrinology: Negative   Musculoskeletal: Negative    ADDITIONAL COMMENTS:     I reviewed the patient's medications:     I reviewed the patient's pertinent clinical laboratory studies:     I reviewed the patient's pertinent imaging studies:   I reviewed the patient's ECG:

## 2022-06-26 NOTE — TELEPHONE ENCOUNTER
Reason for Call:  Other prescription    Detailed comments: [facility] states patients insurance company will only cover the brand name for COLCRYS 0.6 MG tablet  Please rewrite script for the brand name. Thanks.   Phone Number Pharmacy can be reached at: [phone]    Best Time: anytime     Can we leave a detailed message on this number? YES    Call taken on 3/29/2018 at 10:53 AM by Tash Muro       Yes - the patient is able to be screened

## 2022-07-01 DIAGNOSIS — F45.8 ANXIETY HYPERVENTILATION: ICD-10-CM

## 2022-07-01 DIAGNOSIS — F41.9 ANXIETY HYPERVENTILATION: ICD-10-CM

## 2022-07-01 DIAGNOSIS — L40.9 PSORIASIS: ICD-10-CM

## 2022-07-05 RX ORDER — CLONAZEPAM 1 MG/1
TABLET ORAL
Qty: 90 TABLET | Refills: 0 | Status: SHIPPED | OUTPATIENT
Start: 2022-07-05 | End: 2022-09-02

## 2022-07-06 ENCOUNTER — TELEPHONE (OUTPATIENT)
Dept: DERMATOLOGY | Facility: CLINIC | Age: 57
End: 2022-07-06

## 2022-07-06 RX ORDER — PIMECROLIMUS 10 MG/G
CREAM TOPICAL
Qty: 60 G | Refills: 1 | OUTPATIENT
Start: 2022-07-06

## 2022-07-06 NOTE — TELEPHONE ENCOUNTER
Last seen 4/30/21 Return 1 year * Please schedule appt.  Scheduling has been notified to contact the pt for appointment.

## 2022-07-06 NOTE — TELEPHONE ENCOUNTER
Left voicemail for patient to call back and schedule.    [facility]   Dermatology, General and Plastic Surgery, Urology Specialties   [facility] [place] [address]        ----- Message from Love Alex RN sent at 7/6/2022  9:25 AM CDT -----  Regarding: [facility]  DERM  APPT  Please call to schedule  NEXT AVAIL. WITH Alyssa Roche MD  I do not need any follow up on the scheduling of this appointment unless the patient will no longer be receiving care in our clinic.   THANK YOU

## 2022-07-07 ENCOUNTER — MYC MEDICAL ADVICE (OUTPATIENT)
Dept: PALLIATIVE MEDICINE | Facility: CLINIC | Age: 57
End: 2022-07-07

## 2022-07-07 DIAGNOSIS — F11.90 CHRONIC, CONTINUOUS USE OF OPIOIDS: ICD-10-CM

## 2022-07-07 DIAGNOSIS — M47.819 FACET ARTHROPATHY: ICD-10-CM

## 2022-07-07 DIAGNOSIS — G89.4 CHRONIC PAIN SYNDROME: ICD-10-CM

## 2022-07-07 DIAGNOSIS — M17.11 PRIMARY OSTEOARTHRITIS OF RIGHT KNEE: ICD-10-CM

## 2022-07-07 DIAGNOSIS — M06.9 RHEUMATOID ARTHRITIS INVOLVING MULTIPLE SITES, UNSPECIFIED WHETHER RHEUMATOID FACTOR PRESENT (H): ICD-10-CM

## 2022-07-07 NOTE — TELEPHONE ENCOUNTER
Medication refill information reviewed.     Due date for BUTRANS 20 MCG/HR WK patch is 07/08/22      Prescriptions prepped for review.     Will route to provider.

## 2022-07-07 NOTE — TELEPHONE ENCOUNTER
Please process a refill of BUTRANS 20 MCG/HR WK patch to     [id number] [place] - [address] [place] [address]  [address] [facility] [place]  [place] [address]  Phone: [phone] Fax: [phone]

## 2022-07-07 NOTE — TELEPHONE ENCOUNTER
Received call from patient requesting refill(s) of BUTRANS 20 MCG/HR WK patch    Last dispensed from pharmacy on 06/08/22    Patient's last office/virtual visit by prescribing provider on 05/11/22  Next office/virtual appointment scheduled for 05/11/22    Last urine drug screen date 11/04/21  Current opioid agreement on file (completed within the last year) Yes Date of opioid agreement: 05/11/22  -  E-prescribe to pharmacy-  [facility] [id number] [facility] - [place] - [address]    Will route to nursing [place] for review and preparation of prescription(s).

## 2022-07-08 RX ORDER — BUPRENORPHINE 20 UG/H
1 PATCH, EXTENDED RELEASE TRANSDERMAL
Qty: 4 PATCH | Refills: 1 | Status: SHIPPED | OUTPATIENT
Start: 2022-07-08 | End: 2022-08-03

## 2022-07-08 NOTE — TELEPHONE ENCOUNTER
Signed Prescriptions:                        Disp   Refills    BUTRANS 20 MCG/HR WK patch                 4 patch1        Sig: Place 1 patch onto the skin every 7 days OK to fill           07/07/22 and start 07/08/22 LISANDRO, name brand  Authorizing Provider: AMEENA PAGE    Reviewed [place]  July 8, 2022- no concerning fills.    Ameena LEMUS, RN CNP, FNP  [facility]  [facility] [place]

## 2022-07-15 ENCOUNTER — LAB (OUTPATIENT)
Dept: LAB | Facility: CLINIC | Age: 57
End: 2022-07-15
Payer: COMMERCIAL

## 2022-07-15 DIAGNOSIS — Z79.899 HIGH RISK MEDICATION USE: ICD-10-CM

## 2022-07-15 DIAGNOSIS — M06.09 RHEUMATOID ARTHRITIS OF MULTIPLE SITES WITH NEGATIVE RHEUMATOID FACTOR (H): ICD-10-CM

## 2022-07-15 LAB
ALBUMIN SERPL-MCNC: 3.7 G/DL (ref 3.4–5)
ALP SERPL-CCNC: 99 U/L (ref 40–150)
ALT SERPL W P-5'-P-CCNC: 18 U/L (ref 0–70)
AST SERPL W P-5'-P-CCNC: 15 U/L (ref 0–45)
BASOPHILS # BLD AUTO: 0.1 10E3/UL (ref 0–0.2)
BASOPHILS NFR BLD AUTO: 1 %
BILIRUB DIRECT SERPL-MCNC: 0.1 MG/DL (ref 0–0.2)
BILIRUB SERPL-MCNC: 0.4 MG/DL (ref 0.2–1.3)
CREAT SERPL-MCNC: 0.92 MG/DL (ref 0.66–1.25)
CRP SERPL-MCNC: 4.7 MG/L (ref 0–8)
EOSINOPHIL # BLD AUTO: 0.2 10E3/UL (ref 0–0.7)
EOSINOPHIL NFR BLD AUTO: 4 %
ERYTHROCYTE [DISTWIDTH] IN BLOOD BY AUTOMATED COUNT: 15 % (ref 10–15)
ERYTHROCYTE [SEDIMENTATION RATE] IN BLOOD BY WESTERGREN METHOD: 84 MM/HR (ref 0–20)
GFR SERPL CREATININE-BSD FRML MDRD: >90 ML/MIN/1.73M2
HCT VFR BLD AUTO: 39.6 % (ref 40–53)
HGB BLD-MCNC: 12.8 G/DL (ref 13.3–17.7)
IMM GRANULOCYTES # BLD: 0 10E3/UL
IMM GRANULOCYTES NFR BLD: 0 %
LYMPHOCYTES # BLD AUTO: 2.5 10E3/UL (ref 0.8–5.3)
LYMPHOCYTES NFR BLD AUTO: 42 %
MCH RBC QN AUTO: 28.6 PG (ref 26.5–33)
MCHC RBC AUTO-ENTMCNC: 32.3 G/DL (ref 31.5–36.5)
MCV RBC AUTO: 88 FL (ref 78–100)
MONOCYTES # BLD AUTO: 1.1 10E3/UL (ref 0–1.3)
MONOCYTES NFR BLD AUTO: 18 %
NEUTROPHILS # BLD AUTO: 2.1 10E3/UL (ref 1.6–8.3)
NEUTROPHILS NFR BLD AUTO: 35 %
PLATELET # BLD AUTO: 276 10E3/UL (ref 150–450)
PROT SERPL-MCNC: 7.7 G/DL (ref 6.8–8.8)
RBC # BLD AUTO: 4.48 10E6/UL (ref 4.4–5.9)
WBC # BLD AUTO: 6 10E3/UL (ref 4–11)

## 2022-07-15 PROCEDURE — 80076 HEPATIC FUNCTION PANEL: CPT

## 2022-07-15 PROCEDURE — 85652 RBC SED RATE AUTOMATED: CPT

## 2022-07-15 PROCEDURE — 36415 COLL VENOUS BLD VENIPUNCTURE: CPT

## 2022-07-15 PROCEDURE — 85025 COMPLETE CBC W/AUTO DIFF WBC: CPT

## 2022-07-15 PROCEDURE — 82565 ASSAY OF CREATININE: CPT

## 2022-07-15 PROCEDURE — 86140 C-REACTIVE PROTEIN: CPT

## 2022-08-01 NOTE — TELEPHONE ENCOUNTER
[address]  Phone: [phone]             August 2, 2022    Patient: Arturo Brown   YOB: 1991   Date of Visit: 8/1/2022       To Whom It May Concern:    Nancy Granados was seen and treated in our facility  beginning 8/1/2022 until 8/2/2022. She may return to work on 8/11/2022.        Sincerely,       Johan Bal RN       Signature:__________________________________ Last Clinic Visit: 6/21/2019  [facility]

## 2022-08-02 ENCOUNTER — TELEPHONE (OUTPATIENT)
Dept: DERMATOLOGY | Facility: CLINIC | Age: 57
End: 2022-08-02

## 2022-08-02 DIAGNOSIS — L40.9 PSORIASIS: ICD-10-CM

## 2022-08-02 NOTE — TELEPHONE ENCOUNTER
Please initiate prior authorization for the Hydrocortisone [place] 0.2% Cream. Thank you!    Skyler Rooney  In Clinic Visit Facilitator

## 2022-08-03 ENCOUNTER — MYC MEDICAL ADVICE (OUTPATIENT)
Dept: PALLIATIVE MEDICINE | Facility: CLINIC | Age: 57
End: 2022-08-03

## 2022-08-03 DIAGNOSIS — F11.90 CHRONIC, CONTINUOUS USE OF OPIOIDS: ICD-10-CM

## 2022-08-03 DIAGNOSIS — M17.11 PRIMARY OSTEOARTHRITIS OF RIGHT KNEE: ICD-10-CM

## 2022-08-03 DIAGNOSIS — M47.819 FACET ARTHROPATHY: ICD-10-CM

## 2022-08-03 DIAGNOSIS — M06.9 RHEUMATOID ARTHRITIS INVOLVING MULTIPLE SITES, UNSPECIFIED WHETHER RHEUMATOID FACTOR PRESENT (H): ICD-10-CM

## 2022-08-03 DIAGNOSIS — G89.4 CHRONIC PAIN SYNDROME: ICD-10-CM

## 2022-08-03 RX ORDER — BUPRENORPHINE 20 UG/H
1 PATCH, EXTENDED RELEASE TRANSDERMAL
Qty: 4 PATCH | Refills: 1 | Status: SHIPPED | OUTPATIENT
Start: 2022-08-03 | End: 2022-08-16

## 2022-08-03 NOTE — TELEPHONE ENCOUNTER
PA Initiation    Medication: hydrocortisone (WESTCORT) 0.2 % external cream   Insurance Company: [employer] - Phone [phone] Fax [phone]  [id number] [place] [facility] [place] - [address] [place] [address]  Filling Pharmacy Phone: [phone]  Filling Pharmacy Fax: [phone]  Start Date: 8/3/2022

## 2022-08-03 NOTE — TELEPHONE ENCOUNTER
Prior Authorization Approval    Authorization Effective Date: 7/4/2022  Authorization Expiration Date: 8/3/2023  Medication: hydrocortisone (WESTCORT) 0.2 % external cream--APPROVED  Approved Dose/Quantity:   Reference #:     Insurance Company: [employer] - Phone [phone] Fax [phone]  Expected CoPay:       CoPay Card Available:      Foundation Assistance Needed:    Which Pharmacy is filling the prescription (Not needed for infusion/clinic administered): [facility] [id number] [place] [address]  Pharmacy Notified: Yes  Patient Notified: Yes **Instructed pharmacy to notify patient when script is ready to /ship.**

## 2022-08-03 NOTE — TELEPHONE ENCOUNTER
checked and appropriate     Script Eprescribed to pharmacy    Will send this to MA team to notify patient.    Signed Prescriptions:                        Disp   Refills    BUTRANS 20 MCG/HR WK patch                 4 patch1        Sig: Place 1 patch onto the skin every 7 days OK to fill           08/03/22 and start 08/05/22 LISANDRO, name brand  Authorizing Provider: ARLINE CONRAD MD  [facility] Management

## 2022-08-03 NOTE — TELEPHONE ENCOUNTER
Medication refill information reviewed.     Due date for  BUTRANS 20 MCG/HR WK patch is 08/05/22     Prescriptions prepped for review.     Will route to provider.

## 2022-08-03 NOTE — TELEPHONE ENCOUNTER
Please process a refill of BUTRANS 20 MCG/HR WK patch to     [id number] [place] - [address] [place] [address]  [address] [facility] [place]  [place] [address]  Phone: [phone] Fax: [phone]

## 2022-08-03 NOTE — TELEPHONE ENCOUNTER
Patient requesting refill(s) of  BUTRANS 20 MCG/HR WK patch     Last dispensed from pharmacy on 7/8/22    Patient's last office/virtual visit by prescribing provider on 5/11/22  Next office/virtual appointment scheduled for 8/16/22    Last urine drug screen date 11/04/21  Current opioid agreement on file (completed within the last year) Yes Date of opioid agreement: 5/11/22    E-prescribe to [place] [address] [place] [facility] - [place]    Will route to nursing pool for review and preparation of prescription(s).

## 2022-08-04 DIAGNOSIS — M06.09 RHEUMATOID ARTHRITIS OF MULTIPLE SITES WITH NEGATIVE RHEUMATOID FACTOR (H): ICD-10-CM

## 2022-08-04 RX ORDER — CLOBETASOL PROPIONATE 0.5 MG/G
CREAM TOPICAL
Qty: 60 G | Refills: 4 | OUTPATIENT
Start: 2022-08-04

## 2022-08-04 RX ORDER — SULFASALAZINE 500 MG/1
1500 TABLET ORAL 2 TIMES DAILY
Qty: 540 TABLET | Refills: 2 | Status: SHIPPED | OUTPATIENT
Start: 2022-08-04 | End: 2023-01-19

## 2022-08-04 RX ORDER — PIMECROLIMUS 10 MG/G
CREAM TOPICAL
Qty: 60 G | Refills: 1 | OUTPATIENT
Start: 2022-08-04

## 2022-08-04 NOTE — TELEPHONE ENCOUNTER
Fax received for Sulfasalazine dr 500 mg tab. Pt last seen 5/14/22. Last labs 7/15/22. Carson Fontana, CMA on 8/4/2022 at 11:42 AM

## 2022-08-08 ENCOUNTER — TELEPHONE (OUTPATIENT)
Dept: PALLIATIVE MEDICINE | Facility: CLINIC | Age: 57
End: 2022-08-08

## 2022-08-08 NOTE — TELEPHONE ENCOUNTER
[facility] Call Center    Phone Message    May a detailed message be left on voicemail: yes     Reason for Call: Other: The [facility] pharmacy called and stated that for the BUTRANS 20 MCG/HR WK patch the Pt will need a PA first. Please call back with further questions.      Action Taken: Message routed to:  [facility]    Travel Screening: Not Applicable

## 2022-08-08 NOTE — TELEPHONE ENCOUNTER
[facility] Call Center    Phone Message    May a detailed message be left on voicemail: yes    Reason for Call: Other: Patient is calling regarding his prior auth status. Please call and discuss.      Action Taken: Other: Pain    Travel Screening: Not Applicable

## 2022-08-09 NOTE — TELEPHONE ENCOUNTER
Prior Authorization Retail Medication Request    Medication/Dose: BUTRANS 20 MCG/HR WK patch    Coverage information:     Subscriber: [id number] ZOË HARPER     Rel to sub: 01 - Self     Member ID: [id number]     Payor: [address] [place] [address] Ph: [phone]     Benefit plan: [id number] MEDICARE Ph: [phone]     Group number: Not given     Member effective dates: from 01/01/22        [facility] [id number] [facility] - [place]  [address] [place] [address]  [place] [address]  Phone: [phone] Fax: [phone]

## 2022-08-09 NOTE — TELEPHONE ENCOUNTER
Prior Authorization Approval    Authorization Effective Date: 8/9/2022  Authorization Expiration Date: 8/9/2023  Medication: BUTRANS 20 MCG/HR WK patch PA INITIATED 08/09/2022 PA  APPROVED   Approved Dose/Quantity: 30  Reference #:     Insurance Company: [employer]s - Phone [phone] Fax [phone]  Expected CoPay:       CoPay Card Available:      Foundation Assistance Needed:    Which Pharmacy is filling the prescription (Not needed for infusion/clinic administered): [facility] [id number] [place] [address]  Pharmacy Notified: Yes  Patient Notified: Comment:  Pharmacy will notify patient.

## 2022-08-09 NOTE — TELEPHONE ENCOUNTER
PA Initiation    Medication: BUTRANS 20 MCG/HR WK patch PA INITIATED 08/09/2022  Insurance Company: [employer]s - Phone [phone] Fax [phone]  [id number] [place] [facility] [place] - [address] [place] [address]  Filling Pharmacy Phone: [phone]  Filling Pharmacy Fax:    Start Date: 8/9/2022    Central Prior Authorization Team   Phone: [phone]

## 2022-08-10 NOTE — TELEPHONE ENCOUNTER
Patient notified.    ROBERTO BarksdaleN, RN  Care Coordinator  [facility] [place]

## 2022-08-16 ENCOUNTER — OFFICE VISIT (OUTPATIENT)
Dept: PALLIATIVE MEDICINE | Facility: CLINIC | Age: 57
End: 2022-08-16
Payer: COMMERCIAL

## 2022-08-16 VITALS — HEART RATE: 62 BPM | DIASTOLIC BLOOD PRESSURE: 79 MMHG | SYSTOLIC BLOOD PRESSURE: 117 MMHG

## 2022-08-16 DIAGNOSIS — G89.4 CHRONIC PAIN SYNDROME: ICD-10-CM

## 2022-08-16 DIAGNOSIS — M47.819 FACET ARTHROPATHY: ICD-10-CM

## 2022-08-16 DIAGNOSIS — F11.90 CHRONIC, CONTINUOUS USE OF OPIOIDS: ICD-10-CM

## 2022-08-16 DIAGNOSIS — M17.11 PRIMARY OSTEOARTHRITIS OF RIGHT KNEE: ICD-10-CM

## 2022-08-16 DIAGNOSIS — M06.9 RHEUMATOID ARTHRITIS INVOLVING MULTIPLE SITES, UNSPECIFIED WHETHER RHEUMATOID FACTOR PRESENT (H): ICD-10-CM

## 2022-08-16 PROCEDURE — 99214 OFFICE O/P EST MOD 30 MIN: CPT | Performed by: NURSE PRACTITIONER

## 2022-08-16 RX ORDER — BUPRENORPHINE 20 UG/H
1 PATCH, EXTENDED RELEASE TRANSDERMAL
Qty: 4 PATCH | Refills: 1 | Status: SHIPPED | OUTPATIENT
Start: 2022-08-16 | End: 2022-09-28

## 2022-08-16 ASSESSMENT — PAIN SCALES - GENERAL: PAINLEVEL: MODERATE PAIN (5)

## 2022-08-16 NOTE — PROGRESS NOTES
[facility]    8/16/2022    Chief complaint: low back pain and multiple joint pain (RA)  Pain is about the same as at the last visit    Interval history:  Lamont Daniels is a 56 year old male is known to me for:  Lumbar facet arthropathy  Rheumatoid arthritis involving multiple joints, unspecified rheumatoid factor  Primary osteoarthritis of right knee  Chronic continuous use of opioids  Chronic pain syndrome  PMHx includes: Anxiety, coronary artery disease, congestive heart failure (2008), depressive disorder (2008), gout, hepatitis B more than 10 years ago, hypertension, hyperlipidemia, malrotation of intestine, severe obstructive sleep apnea, rheumatoid arthritis (2012) steatohepatitis (2015), history of tuberculosis at age 13, vitamin D deficiency  PSHx includes: Coronary artery bypass graft 6 vessel (2008), abdominal surgery (2014), colonoscopy (2013), combined repair ptosis with blepharoplasty bilateral (2018), head and neck surgery (2011), nasal/sinus polypectomy (2010), orthopedic surgery (2012), repair ptosis bilateral (2019), repair ptosis brow bilateral (2018), thoracic surgery for tuberculosis (1989), tonsillectomy and uvulopalatal pharyngoplasty (2010), coronary artery stent 3 months after CABG (2008).      Recommendations/plan at the last visit on 5/11/2022 included:  1. Physical Therapy: none  2. Continue stretching at home  3. Clinical Health Psychologist to address issues of relaxation, behavioral change, coping style, and other factors important to improvement: none  4. Diagnostic Studies: none  5. Medication Management:   1. Continue Butrans 20mcg/hr transdermal change every 7 days, fill 5/11 and start 5/13  2. Continue gabapentin 600mg three times daily (this was changed to 600mg tablets so you will only take 1 tab three times daily)  3. Continue Voltaren gel as needed  6. Further procedures recommended: none  7. Urine toxicology screen today: due in November 2022  "  8. Signed controlled substance agreement with me today  9. Recommendations/follow-up for PCP:  See above  10. Release of information: none  11. Follow up: 10-12 weeks in-person visit. Please call [phone] to make your follow-up appointment with me.       Since his last visit, Lamont Daniels reports:    .Interval history August 16, 2022  -his pain is stable. Located in the axial low back and multiple joints from RA. He feels his pain is under okay control with current regimen.     Pain history collected at initial evaluation on 5/11/2022  He has had pain for about 10 years. He was diagnosed with Rheumatoid Arthritis. Lamont has pain in multiple joints. He also has chronic low back pain. The low back pain radiates down the posterolateral aspect of the legs to the level of the knee. His back pain was there when he was diagnosed with RA. He feels that he is doing well on the Butrans. Discussed switch to Belbuca if he ever wishes to increase his dosage, prefers to stay with use of the Butrans patch at the present time       At this point, the patient's participation with our multidisciplinary team includes:  The patient has been compliant with the program.  PT - not ordered  Health Psych - not ordered      Pain scores:  Pain intensity on average is 6 on a scale of 0-10.    Range is 5-9/10.   Pain right now is 7/10.   Pain is described as \"back pain is dull and his joints have a dull/numbness/stiffness.\"    Pain is constant in nature        Current pain relevant medications:   -allopurinol 300mg every day  -baclofen 10mg BID PRN muscle spasms (uses at least once per day, helpful)  -Voltaren gel 1% PRN (helpful)  -naloxone nasal spray PRN opiate reversal   -Butrans 20mcg/hr  TD Q 7 days (helpful)  -gabapentin 600mg TID (helpful)  -simponi 50mg/0.5ml inject every 28 days   -Arava 20mg every day  -meclizine 25mg Q 6 hours PRN dizziness (helpful)  -Tylenol 1000mg (uses at least once per day, not more than 3 times per " day)  -CBD oil at night (helpful for sleep)        Other pertinent medications:  -Ambien 5mg at bedtime as needed PRN sleep  -clonazepam 0.5-1mg BID PRN anxiety (uses one full tab in the AM)  -Plavix 75mg every day  -colchincine 0.6mg take 2 tabs at first sign of flare  -pristiq 50mg QD     Previous medication treatments included:  OPIATES: butrans (helpful), hydrocodone (helpful for other issues), Codeine (unsure), oxycodone (unsure)  NSAIDS: cannot use, on Plavix   MUSCLE RELAXANTS: Baclofen (helpful), methocarbamol (not helpful)  ANTI-MIGRAINE MEDS: none  ANTI-DEPRESSANTS: none  SLEEP AIDS: none  ANTI-CONVULSANTS: gabapentin (helpful)  TOPICALS: Voltaren gel (helpful)  ANXIOLYTICS: none  MEDICAL CANNABIS: none  Other meds: Tylenol (helpful)        Other treatments have included:  Lamont Daniels has been seen at a pain clinic in the past.  Previously managed by Dr. Lian Loo   PT: has tried, not helpful  Chiropractic care: no  Acupuncture: no  TENs Unit: no     Injections:  none      THE 4 A's OF OPIOID MAINTENANCE ANALGESIA    Analgesia: butrans is helpful    Activity: he does some stretching at home. Walks in the back yard    Adverse effects: none    Adherence to Rx protocol: yes      Side Effects: no side effect  Patient is using the medication as prescribed: YES    Medications:  Current Outpatient Medications   Medication Sig Dispense Refill     Acetaminophen (TYLENOL PO)        allopurinol (ZYLOPRIM) 300 MG tablet TAKE 1 TABLET BY MOUTH EVERY DAY 90 tablet 3     amLODIPine (NORVASC) 5 MG tablet TAKE 1 TABLET BY MOUTH DAILY FOR BLOOD PRESSURE 90 tablet 3     aspirin EC 81 MG EC tablet Take 1 tablet (81 mg) by mouth daily (*) 30 tablet 1     atorvastatin (LIPITOR) 80 MG tablet Take 1 tablet (80 mg) by mouth daily 90 tablet 3     bacitracin 500 UNIT/GM external ointment Apply topically 3 times daily 30 g 3     baclofen (LIORESAL) 10 MG tablet TAKE 1 TABLET BY MOUTH 2 TIMES DAILY AS NEEDED FOR MUSCLE SPASM 180  tablet 2     busPIRone HCl (BUSPAR) 30 MG tablet TAKE 1 TABLET (30 MG) BY MOUTH 2 TIMES DAILY 180 tablet 3     BUTRANS 20 MCG/HR WK patch Place 1 patch onto the skin every 7 days OK to fill 08/03/22 and start 08/05/22 LISANDRO, name brand 4 patch 1     Cholecalciferol (VITAMIN D) 2000 UNITS tablet TAKE ONE TABLET BY MOUTH ONCE DAILY 100 tablet 1     clobetasol (TEMOVATE) 0.05 % external cream Apply twice daily for 2 weeks, weekends only for 2 weeks, repeat cycle as needed for hands, not face or genitals 60 g 4     clobetasol (TEMOVATE) 0.05 % external solution Apply twice daily to scalp for up to 2 weeks for flares. 60 mL 0     clonazePAM (KLONOPIN) 1 MG tablet TAKE 0.5-1 TABLETS BY MOUTH 2 TIMES DAILY AS NEEDED FOR ANXIETY 90 tablet 0     clopidogrel (PLAVIX) 75 MG tablet Take 1 tablet (75 mg) by mouth daily 90 tablet 3     colchicine (COLCYRS) 0.6 MG tablet TAKE 2 TABLETS BY MOUTH AT THE FIRST SIGN OF FLARE, TAKE 1 ADDITIONAL TABLET ONE HOUR LATER. 90 tablet 1     desvenlafaxine (PRISTIQ) 50 MG 24 hr tablet TAKE 1 TABLET BY MOUTH EVERY DAY 90 tablet 3     diclofenac (VOLTAREN) 1 % topical gel Apply 4 g topically 4 times daily as needed for moderate pain 300 g 11     diclofenac (VOLTAREN) 1 % topical gel Apply 4 grams to bilateral knees, up to four times daily as needed using enclosed dosing card.  Max of 32g/day 300 g 11     evolocumab (REPATHA) 140 MG/ML prefilled autoinjector Inject 1 mL (140 mg) Subcutaneous every 14 days 2 mL 11     ezetimibe (ZETIA) 10 MG tablet Take 1 tablet (10 mg) by mouth daily 90 tablet 3     gabapentin (NEURONTIN) 300 MG capsule TAKE 2 CAPSULES (600 MG) BY MOUTH 3 TIMES DAILY . 540 capsule 6     gabapentin (NEURONTIN) 600 MG tablet Take 1 tablet (600 mg) by mouth 3 times daily 270 tablet 3     gemfibrozil (LOPID) 600 MG tablet Take 1 tablet (600 mg) by mouth 2 times daily 180 tablet 3     golimumab (SIMPONI) 50 MG/0.5ML auto-injector pen Inject 0.5 mLs (50 mg) Subcutaneous every 28 days .  Hold for signs of infection, and seek medical attention. 0.5 mL 4     hydrocortisone (WESTCORT) 0.2 % external cream FOR GENITALS, APPLY TWICE DAILY FOR UP TO 2 WEEKS, TAKE 2 WEEKS OFF THEN REPEAT 60 g 3     hydrocortisone 2.5 % cream FOR FACE, APPLY TWICE DAILY FOR UP TO 2 WEEK FOR FLARES ON THE FACE, TAKE 2 WEEKS OFF 28.35 g 3     Incontinence Supply Disposable (DEPEND UNDERWEAR LARGE) MISC Use twice daily. 60 each 11     leflunomide (ARAVA) 20 MG tablet Take 1 tablet (20 mg) by mouth daily ; Labs required every 8-12 weeks. 90 tablet 2     meclizine (ANTIVERT) 25 MG tablet TAKE 1 TABLET BY MOUTH EVERY 6 HOURS AS NEEDED FOR DIZZINESS. 30 tablet 5     melatonin 3 MG tablet Take 1 tablet (3 mg) by mouth nightly as needed for sleep       naloxone (NARCAN) 4 MG/0.1ML nasal spray Spray 1 spray (4 mg) into one nostril alternating nostrils once as needed for opioid reversal every 2-3 minutes until assistance arrives 0.2 mL 0     pantoprazole (PROTONIX) 40 MG EC tablet TAKE 1 TABLET BY MOUTH EVERY DAY 90 tablet 1     pimecrolimus (ELIDEL) 1 % external cream APPLY TWICE DAILY FOR FACE AND GENITALS 60 g 1     sildenafil (REVATIO) 20 MG tablet TAKE 2 TO 5 TABLETS BY MOUTH 30 MINUTES PRIOR TO INTERCOURSE AS NEEDED. 150 tablet 3     sulfaSALAzine (AZULFIDINE) 500 MG tablet Take 3 tablets (1,500 mg) by mouth 2 times daily 540 tablet 2     tamsulosin (FLOMAX) 0.4 MG capsule TAKE 2 CAPSULES BY MOUTH EVERY  capsule 3     tenofovir (VIREAD) 300 MG tablet Take 1 tablet (300 mg) by mouth daily 90 tablet 3     triamcinolone (KENALOG) 0.1 % external ointment APPLY TOPICALLY TO AFFECTED AREA(S) 3 TIMES DAILY 80 g 2     triamcinolone (KENALOG) 0.1 % external ointment Apply to trunk and extremities twice daily for up to 2 weeks for flares 80 g 1     zolpidem (AMBIEN) 5 MG tablet Take 1 tablet (5 mg) by mouth nightly as needed for sleep 30 tablet 5     nitroGLYcerin (NITROSTAT) 0.4 MG sublingual tablet For chest pain place 1 tablet  under the tongue every 5 minutes for 3 doses. If symptoms persist 5 minutes after 1st dose call 911. (Patient not taking: Reported on 8/16/2022) 25 tablet 3     order for DME Equipment being ordered: chair lift 1 each 0     order for DME Equipment being ordered: single end cane 1 Units 0     ORDER FOR DME 1.  CPAP pressure 11 cm/H20 with heated humidity.   2.  Provide mask to fit and CPAP supplies.   3.  Length of need lifetime.   4.  If needed please provide a chin strap              Medical History: any changes in medical history since they were last seen? No    Social History:   Home situation: , lives with adult children  Occupation/Schooling: he is on disability due to heart issues and RA  Tobacco use: none  Alcohol use: none  Drug use: none  History of chemical dependency treatment: none    Is patient a current smoker or tobacco user?  no  If yes, was cessation counseling offered?  no          Physical Exam:  Vital signs: Blood pressure 117/79, pulse 62.    Behavioral observations:  Awake, alert and cooperative    Gait:  slow    Musculoskeletal exam:  Strength is grossly equal throughout    Neuro exam:  deferred    Skin/vascular/autonomic:  No suspicious lesions on exposed skin.     Other:  na    Is the patient hypertensive today? no  Hypertensive on recheck of BP?   na  If yes, was patient recommended to see Primary Care Provider in follow up for management of HTN?  na        [place] Prescription Monitoring Program:  Reviewed [place] [facility] August 16, 2022- no concerning fills.  Ameena LEMUS RN CNP, FNP  [facility]  [facility] [place]        Diagnostic tests:  MRI of the Lumbar Spine without contrast     History: Lumbago.     Comparison: MRI thoracic spine 7/18/2014.     Technique: Sagittal T1-weighted, T2-weighted, STIR, and axial  T2-weighted spin echo and gradient echo images of the lumbar spine  were obtained without intravenous contrast.     Findings: There are 5  lumbar-type vertebrae assumed for the purposes  of this dictation. The tip of the conus medullaris is at L1.  The  lumbar vertebral column appears normally aligned. Straightening of the  normal lumbar lordosis. Mild loss of T2 signal in the L4-5 and L5-S1  intervertebral discs consistent with age-related changes. Type II  Modic degenerative changes in the opposing endplates at L5-S1.     On a level by level basis:     L1-2: Tiny left paracentral focal disc protrusion without spinal canal  or neural foraminal stenosis.     L2-3: No spinal canal or neural foraminal stenosis.     L3-4: Small focal posterior central disc protrusion with annular  fissuring and mild spinal canal narrowing. Also mild bilateral facet  hypertrophy. No neuroforaminal stenosis.     L4-5: Small focal posterior central disc protrusion with annular  fissuring. No spinal canal or neural foraminal stenosis.     L5-S1: Type II Modic degenerative changes of the opposing endplates.  Left greater than right facet hypertrophy. Small focal posterior  central disc protrusion with annular fissuring. No spinal canal  stenosis. Moderate left and mild right neural foraminal narrowing.     The visualized paraspinous tissues anteriorly are unremarkable.     IMPRESSION  Impression: Multilevel degenerative changes. Small disc protrusion at  L3-4 results in in mild spinal canal narrowing. Moderate left and mild  right neuroforaminal narrowing at L5-S1.     HELGA DAVID MD        FOOT BILATERAL THREE OR MORE VIEWS 11/4/2015 4:55 PM      HISTORY: Pain in unspecified joint.     COMPARISON: 8/21/2012.     IMPRESSION  IMPRESSION: Small traction spurs are seen off the Achilles tendon and  plantar fascial attachment sites bilaterally. Joint spaces are  preserved. Osseous structures are intact. Incidental note is made of  some surgical staples in the soft tissues of the medial distal right  lower extremity.  DANIS ANGLIN MD        RIGHT KNEE THREE VIEWS  5/4/2017 3:16  PM    HISTORY: Pain in right knee.  COMPARISON: None                                                   IMPRESSION: No acute fracture or dislocation. Mild osteoarthritis.  Small joint effusion.     MICHELL TAMAYO MD     Other testing (labs, diagnostics):  4/26/2022  Cr. 0.94  Est GFR >90  Liver enzymes WNL        Screening tools:      DIRE Score for ongoing opioid management is calculated as follows:  Diagnosis = 2  Intractability = 2  Risk: Psych = 2  Chem Hlth = 2  Reliability = 3  Social = 2  Efficacy = 2  Total DIRE Score = 15 (14 or higher predicts good candidate for ongoing opioid management; 13 or lower predicts poor candidate for opioid management)            Assessment:  1. Lumbar facet arthropathy  2. Rheumatoid arthritis involving multiple joints, unspecified rheumatoid factor  3. Primary osteoarthritis of right knee  4. Chronic continuous use of opioids  5. Chronic pain syndrome  6. PMHx includes: Anxiety, coronary artery disease, congestive heart failure (2008), depressive disorder (2008), gout, hepatitis B more than 10 years ago, hypertension, hyperlipidemia, malrotation of intestine, severe obstructive sleep apnea, rheumatoid arthritis (2012) steatohepatitis (2015), history of tuberculosis at age 13, vitamin D deficiency  7. PSHx includes: Coronary artery bypass graft 6 vessel (2008), abdominal surgery (2014), colonoscopy (2013), combined repair ptosis with blepharoplasty bilateral (2018), head and neck surgery (2011), nasal/sinus polypectomy (2010), orthopedic surgery (2012), repair ptosis bilateral (2019), repair ptosis brow bilateral (2018), thoracic surgery for tuberculosis (1989), tonsillectomy and uvulopalatal pharyngoplasty (2010), coronary artery stent 3 months after CABG (2008).    Plan:  1. Physical Therapy: none  2. Continue stretching at home  3. Clinical Health Psychologist to address issues of relaxation, behavioral change, coping style, and other factors important to improvement:  none  4. Diagnostic Studies: none  5. Medication Management:   1. Continue Butrans 20mcg/hr transdermal change every 7 days, fill 8/30/22 and start 9/3/2022  2. Continue gabapentin 600mg three times daily (this was changed to 600mg tablets so you will only take 1 tab three times daily)  3. Continue Voltaren gel as needed  6. Further procedures recommended: none  7. Recommendations/follow-up for PCP:  See above  8. Release of information: none  9. Follow up: 10-12 weeks in-person visit. Please call [phone] to make your follow-up appointment with me.       Face to face time: 10 minutes        Ameena LEMUS, VIVIANA CNP, FNP  [facility]  [facility] [place]

## 2022-08-16 NOTE — PATIENT INSTRUCTIONS
Plan:  Physical Therapy: none  Continue stretching at home  Clinical Health Psychologist to address issues of relaxation, behavioral change, coping style, and other factors important to improvement: none  Diagnostic Studies: none  Medication Management:   Continue Butrans 20mcg/hr transdermal change every 7 days, fill 8/30/22 and start 9/3/2022  Continue gabapentin 600mg three times daily (this was changed to 600mg tablets so you will only take 1 tab three times daily)  Continue Voltaren gel as needed  Further procedures recommended: none  Recommendations/follow-up for PCP:  See above  Release of information: none  Follow up: 10-12 weeks in-person visit. Please call [phone] to make your follow-up appointment with me.    ----------------------------------------------------------------  Clinic Number:  [phone]   Call with any questions about your care and for scheduling assistance.   Calls are returned Monday through Friday between 8 AM and 4:30 PM. We usually get back to you within 2 business days depending on the issue/request.    If we are prescribing your medications:  For opioid medication refills, call the clinic or send a [employer] message 7 days in advance.  Please include:  Name of requested medication  Name of the pharmacy.  For non-opioid medications, call your pharmacy directly to request a refill. Please allow 3-4 days to be processed.   Per [place] State Law:  All controlled substance prescriptions must be filled within 30 days of being written.    For those controlled substances allowing refills, pickup must occur within 30 days of last fill.      We believe regular attendance is key to your success in our program!    Any time you are unable to keep your appointment we ask that you call us at least 24 hours in advance to cancel.This will allow us to offer the appointment time to another patient.   Multiple missed appointments may lead to dismissal from the clinic.

## 2022-08-16 NOTE — PROGRESS NOTES
08/16/22 1130   PEG: A Thee-Item Scale Assessing Pain Intensity and Interference        0 = No pain / No interference    10 = Pain as bad as you can imagine / Completely interferes   What number best describes your pain on average in the past week? 6   What number best describes how, during the past week, pain has interfered with your enjoyment of life? 8   What number best describes how, during the past week, pain has interfered with your general activity? 7   PEG Total Score 7

## 2022-08-30 NOTE — TELEPHONE ENCOUNTER
Patient needs to schedule a lab appt for June per Dr. Travis. Left message for patient to contact clinic to schedule.     Adi Villafuerte,CMA     
direct patient care including but not limited to reviewing chart, medications ,laboratory data, imaging reports, discussion of plan of care with consultants on the case, coordination of care with multidisciplinary team involved and discussion of plan with patient and family.  Overall prognosis is poor.  Will benefit from hospice care

## 2022-09-01 DIAGNOSIS — F33.1 MAJOR DEPRESSIVE DISORDER, RECURRENT EPISODE, MODERATE (H): ICD-10-CM

## 2022-09-01 DIAGNOSIS — F45.8 ANXIETY HYPERVENTILATION: ICD-10-CM

## 2022-09-01 DIAGNOSIS — M10.00 IDIOPATHIC GOUT, UNSPECIFIED CHRONICITY, UNSPECIFIED SITE: ICD-10-CM

## 2022-09-01 DIAGNOSIS — F41.9 ANXIETY HYPERVENTILATION: ICD-10-CM

## 2022-09-01 DIAGNOSIS — K21.9 GASTROESOPHAGEAL REFLUX DISEASE WITHOUT ESOPHAGITIS: ICD-10-CM

## 2022-09-01 DIAGNOSIS — R21 RASH: ICD-10-CM

## 2022-09-01 DIAGNOSIS — L40.9 PSORIASIS: ICD-10-CM

## 2022-09-02 RX ORDER — TRIAMCINOLONE ACETONIDE 1 MG/G
OINTMENT TOPICAL
Qty: 80 G | Refills: 2 | Status: SHIPPED | OUTPATIENT
Start: 2022-09-02 | End: 2023-02-20

## 2022-09-02 RX ORDER — CLONAZEPAM 1 MG/1
TABLET ORAL
Qty: 90 TABLET | Refills: 0 | Status: SHIPPED | OUTPATIENT
Start: 2022-09-02 | End: 2022-11-17

## 2022-09-02 RX ORDER — PANTOPRAZOLE SODIUM 40 MG/1
TABLET, DELAYED RELEASE ORAL
Qty: 90 TABLET | Refills: 1 | Status: SHIPPED | OUTPATIENT
Start: 2022-09-02 | End: 2023-02-20

## 2022-09-02 RX ORDER — ALLOPURINOL 300 MG/1
TABLET ORAL
Qty: 90 TABLET | Refills: 3 | Status: SHIPPED | OUTPATIENT
Start: 2022-09-02 | End: 2023-07-19

## 2022-09-02 RX ORDER — BUSPIRONE HYDROCHLORIDE 30 MG/1
30 TABLET ORAL 2 TIMES DAILY
Qty: 180 TABLET | Refills: 3 | Status: SHIPPED | OUTPATIENT
Start: 2022-09-02 | End: 2023-07-19

## 2022-09-03 ENCOUNTER — HEALTH MAINTENANCE LETTER (OUTPATIENT)
Age: 57
End: 2022-09-03

## 2022-09-07 RX ORDER — HYDROCORTISONE VALERATE CREAM 2 MG/G
CREAM TOPICAL
Qty: 60 G | Refills: 3 | OUTPATIENT
Start: 2022-09-07

## 2022-09-07 NOTE — TELEPHONE ENCOUNTER
Recieved refill request for HYDROCORTISONE CARISSA 0.2% CREAM . Patient needs appointment scheduled prior to any refills. Clinic Coordinator notified and will follow up with the patient as appropriate. The pharmacy has been notified that the medication will not be refilled prior to an appointment being scheduled.

## 2022-09-10 ENCOUNTER — TELEPHONE (OUTPATIENT)
Dept: DERMATOLOGY | Facility: CLINIC | Age: 57
End: 2022-09-10

## 2022-09-14 ENCOUNTER — OFFICE VISIT (OUTPATIENT)
Dept: RHEUMATOLOGY | Facility: CLINIC | Age: 57
End: 2022-09-14
Payer: COMMERCIAL

## 2022-09-14 VITALS
BODY MASS INDEX: 26.01 KG/M2 | WEIGHT: 142.2 LBS | HEART RATE: 66 BPM | OXYGEN SATURATION: 95 % | DIASTOLIC BLOOD PRESSURE: 87 MMHG | SYSTOLIC BLOOD PRESSURE: 137 MMHG

## 2022-09-14 DIAGNOSIS — Z79.899 HIGH RISK MEDICATION USE: ICD-10-CM

## 2022-09-14 DIAGNOSIS — M06.09 RHEUMATOID ARTHRITIS OF MULTIPLE SITES WITH NEGATIVE RHEUMATOID FACTOR (H): Primary | ICD-10-CM

## 2022-09-14 LAB
ALBUMIN SERPL-MCNC: 3.7 G/DL (ref 3.4–5)
ALP SERPL-CCNC: 100 U/L (ref 40–150)
ALT SERPL W P-5'-P-CCNC: 23 U/L (ref 0–70)
AST SERPL W P-5'-P-CCNC: 21 U/L (ref 0–45)
BASOPHILS # BLD AUTO: 0.1 10E3/UL (ref 0–0.2)
BASOPHILS NFR BLD AUTO: 2 %
BILIRUB DIRECT SERPL-MCNC: 0.1 MG/DL (ref 0–0.2)
BILIRUB SERPL-MCNC: 0.4 MG/DL (ref 0.2–1.3)
CREAT SERPL-MCNC: 0.79 MG/DL (ref 0.66–1.25)
CRP SERPL-MCNC: 5.4 MG/L (ref 0–8)
EOSINOPHIL # BLD AUTO: 0.2 10E3/UL (ref 0–0.7)
EOSINOPHIL NFR BLD AUTO: 4 %
ERYTHROCYTE [DISTWIDTH] IN BLOOD BY AUTOMATED COUNT: 15.3 % (ref 10–15)
ERYTHROCYTE [SEDIMENTATION RATE] IN BLOOD BY WESTERGREN METHOD: 41 MM/HR (ref 0–20)
GFR SERPL CREATININE-BSD FRML MDRD: >90 ML/MIN/1.73M2
HCT VFR BLD AUTO: 43.4 % (ref 40–53)
HGB BLD-MCNC: 13.4 G/DL (ref 13.3–17.7)
IMM GRANULOCYTES # BLD: 0 10E3/UL
IMM GRANULOCYTES NFR BLD: 0 %
LYMPHOCYTES # BLD AUTO: 2.1 10E3/UL (ref 0.8–5.3)
LYMPHOCYTES NFR BLD AUTO: 38 %
MCH RBC QN AUTO: 28.3 PG (ref 26.5–33)
MCHC RBC AUTO-ENTMCNC: 30.9 G/DL (ref 31.5–36.5)
MCV RBC AUTO: 92 FL (ref 78–100)
MONOCYTES # BLD AUTO: 1.1 10E3/UL (ref 0–1.3)
MONOCYTES NFR BLD AUTO: 19 %
NEUTROPHILS # BLD AUTO: 2.1 10E3/UL (ref 1.6–8.3)
NEUTROPHILS NFR BLD AUTO: 37 %
NRBC # BLD AUTO: 0 10E3/UL
NRBC BLD AUTO-RTO: 0 /100
PLATELET # BLD AUTO: 296 10E3/UL (ref 150–450)
PROT SERPL-MCNC: 7.6 G/DL (ref 6.8–8.8)
RBC # BLD AUTO: 4.73 10E6/UL (ref 4.4–5.9)
WBC # BLD AUTO: 5.6 10E3/UL (ref 4–11)

## 2022-09-14 PROCEDURE — 80076 HEPATIC FUNCTION PANEL: CPT | Performed by: INTERNAL MEDICINE

## 2022-09-14 PROCEDURE — 85025 COMPLETE CBC W/AUTO DIFF WBC: CPT | Performed by: INTERNAL MEDICINE

## 2022-09-14 PROCEDURE — 86140 C-REACTIVE PROTEIN: CPT | Performed by: INTERNAL MEDICINE

## 2022-09-14 PROCEDURE — 82565 ASSAY OF CREATININE: CPT | Performed by: INTERNAL MEDICINE

## 2022-09-14 PROCEDURE — 36415 COLL VENOUS BLD VENIPUNCTURE: CPT | Performed by: INTERNAL MEDICINE

## 2022-09-14 PROCEDURE — 99214 OFFICE O/P EST MOD 30 MIN: CPT | Performed by: INTERNAL MEDICINE

## 2022-09-14 PROCEDURE — 85652 RBC SED RATE AUTOMATED: CPT | Performed by: INTERNAL MEDICINE

## 2022-09-14 NOTE — PATIENT INSTRUCTIONS
RHEUMATOLOGY    Dr. Sebastián Jenkins    [facility] [place]  [address] [place] [address] [place] [address]  Phone number: [phone]  Fax number: [phone]    You may schedule your FLU shot by calling [phone] or if you would like to get your shot at a [facility] pharmacy you may schedule online at [email or web address].    Thank you for choosing [facility]!    Helga Guerrier CMA Rheumatology    -----------------------------  COVID-19 vaccination   Advise receiving the updated bivalent COVID-19 vaccination now  Based on our current understanding (and this may change over time as we learn more), medications should be adjusted as noted below, if disease activity allows:  - NSAIDs and Acetaminophen: hold for 24 hours prior to vaccination if able to do so  - Sulfasalazine should be held for 1-2 weeks after each COVID-19 vaccine (as disease activity allows)  - Leflunomide should be held for 1-2 weeks after each COVID-19 vaccine (as disease activity allows)    -----------------------------  Influenza vaccination   Advise receiving in mid-to-late October.

## 2022-09-14 NOTE — PROGRESS NOTES
Rheumatology Clinic Visit      Lamont Daniels MRN# [id number]   YOB: 1965 Age: 56 year old      Date of visit: 9/14/22   PCP: Dr. David Chavez  Hepatologist: Dr. Thomas Leventhal     Chief Complaint   Patient presents with:  Rheumatoid Arthritis    Assessment and Plan   1.  Seropositive nonerosive rheumatoid arthritis (RF negative, CCP low positive): Note that he does have low back pain but without evidence of SI joint irregularity on x-ray.  Initially with morning stiffness all day, gelling phenomenon, and synovitis.   Previously on Humira (partially effective), Enbrel (partially effective), HCQ (cannot safely monitor due to right retinal scaring and bilateral early macular degeneration, per ophthalmology), Orencia (partially effective and could use again in the future if needed).  MTX avoided per Dr. Laboy, hematology, recommendation. Currently on leflunomide 20 mg daily, SSZ 1500mg BID, Simponi 50 mg SQ every 28 days (improved when changed from Orencia to Simponi).  RA controlled on current regimen.  Chronic illness, stable.    - Continue leflunomide 20 mg daily    - Continue sulfasalazine 1500mg BID  - Continue Simponi 50mg SQ every 28 days  - PRN RA flare only: Prednisone 10 mg daily x2 days, then 5 mg daily x5 days, then stop   - Labs today and every 8-12 weeks: CBC, Creatinine, Hepatic Panel, ESR, CRP    High risk medication requiring intensive toxicity monitoring at least quarterly: labs ordered include CBC, Creatinine, Hepatic panel to monitor for cytopenia and hepatotoxicity; checking creatinine as it affects clearance of medication.      2. Lower back pain: Lumbar spine MRI in August 2014 showed multilevel degenerative changes and a small disc protrusion at L3/4 with mild spinal canal narrowing; also moderate left and mild right neural foraminal narrowing at L5-S1. He had a nerve conduction study in 2014 that was normal. He has been worked up by neurology in the past without significant neurologic  "findings. HLA-B27 negative, SI joint x-ray negative. Now following in the pain management clinic.     3. Myofascial pain syndrome / chronic pain syndrome: diffuse pain consistent with chronic pain syndrome.  Management per pain clinic as well as PCP.  I again encouraged that he increase physical activity as he leads a sedentary lifestyle with no more activity than what is required for his activities of daily living.      4. Chronic Hepatitis B: Managed by Dr. Laboy (hepatology) previously, and now Dr. Thomas Leventhal at the [facility]. Currently on tenofovir at the direction of gastroenterology.     5. Hepatic Steatosis: Based on previous liver biopsy.  Follows with gastroenterology.    6. Positive tuberculosis test, history:   This is noted here for historical significance.  He was evaluated by Dr. Anthony Mendoza, infectious disease. The following telephone note was documented by Dr. Mendoza: \"I tried calling th pt, finally we have the records from [place] . It appears he was treated for latent TB with INH for 1 year in 1980/1981 .Later in 1981 April he was admitted at [facility] in [place], [place] with rt sided pneumonia, TB was suspected but he improved with treatment with Penicillin only. Later he was seen  ( likely ID specialist), and was treated with 6 weeks course of Rifampin and Ethambutol pending sputum AFB culture. His AFB cx were negative based on the report we have.\"  \"He recently had QFT -TB test which was reported positive and his CXR was clear. Given his documented hx of completion of treatment for latent TB as above , I do not see any need for further treatment. His tests may remain positive irrespective of treatment status. From my perspective he can be started on DMARDs/Biological as deemed necessary.\"       7. Gout: On allopurinol and managed by PCP.     8.  Vaccinations:   - Influenza: encouraged yearly vaccination  - [id number]: up to date  - [id number]: up to date  - " Shingrix: Up to date  - COVID-19: Advised receiving the updated bivalent COVID-19 vaccination.   Based on our current understanding (and this may change over time as we learn more), medications should be adjusted as noted below, if disease activity allows:  - NSAIDs and Acetaminophen: hold for 24 hours prior to vaccination if able to do so  - Sulfasalazine should be held for 1-2 weeks after each COVID-19 vaccine (as disease activity allows)  - Leflunomide should be held for 1-2 weeks after each COVID-19 vaccine (as disease activity allows)     Total minutes spent in evaluation with patient, documentation, , and review of pertinent studies and chart notes: 16    Mr. Daniels verbalized agreement with and understanding of the rational for the diagnosis and treatment plan.  All questions were answered to best of my ability and the patient's satisfaction. Mr. Daniels was advised to contact the clinic with any questions that may arise after the clinic visit.      Thank you for involving me in the care of the patient    Return to clinic: 3-4 months      HPI   Lamont Daniels is a 56 year old male with a medical history significant for hypertension, hyperlipidemia, gout, coronary artery disease s/p 6vCABG, vitamin D deficiency, hepatitis B, and RA who presents for f/u of RA.    Today, 9/14/2022: RA controlled.  Tolerating medications and has not missed any doses.  Arthritis is not limiting his daily activities.  Has been losing weight by diet and feels much better.  Not doing much physical activity other than those needed for activities of daily living.  I encouraged increased physical activity and possibly putting an exercise bike in front of his TV where he says that he spends much of his time.      Denies fevers, chills, nausea, vomiting, constipation, diarrhea. No abdominal pain. Denies chest pain/pressure, palpitations, or shortness of breath.  No oral or nasal sores. No rash. No LE swelling.  Mild dry mouth; he has good  dentition and does not feel like he needs to use frequent sips of water; unchanged since last evaluation. No dry eyes. No eye pain or redness.     Tobacco:  None   EtOH:  None  Drugs:  None  Occupation: Retired   Originally from [facility], then lived [place] north of [place], [place], then lived in [place], [place], then moved to [place] for family    ROS   12 point review of system was completed and negative except as noted in the HPI     Active Problem List     Patient Active Problem List   Diagnosis     Coronary atherosclerosis of native coronary artery     Major depressive disorder, recurrent episode, moderate (H)     Anxiety     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)     Hepatitis B infection     Vitamin D deficiency     S/P CABG (coronary artery bypass graft)     Malrotation of intestine     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     Hyperlipidemia LDL goal <70     Insomnia     Gout     Suicidal ideation     Essential hypertension     Rheumatoid arthritis involving multiple sites, unspecified rheumatoid factor presence     High risk medications (not anticoagulants) long-term use     Midline low back pain without sciatica     Bilateral low back pain with sciatica, sciatica laterality unspecified     Elevated liver enzymes     On corticosteroid therapy     Essential hypertension with goal blood pressure less than 140/90     Insomnia, unspecified type     Rheumatoid arthritis of multiple sites with negative rheumatoid factor (H)     Benign prostatic hyperplasia with lower urinary tract symptoms, unspecified morphology     Chronic pain syndrome     Hepatitis B, chronic (H)     Syncope, unspecified syncope type     Symptomatic cholelithiasis     H/O: gout     Anxiety and depression     Past Medical History     Past Medical History:   Diagnosis Date     Anxiety      CAD (coronary artery disease)     CABG, PCI     Congestive heart failure, unspecified 2008     Depressive disorder 2008     Gout      Hepatitis B > 10  years     HTN (hypertension)      Hyperlipidemia LDL goal < 100      Malrotation of intestine      Moderate major depression (H)      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) 9/19/2011    Uses CPAP     Rheumatoid arthritis flare (H) 1012     Steatohepatitis 12/15    liver biopsy     Tuberculosis age 13    INH x 9 months      Vitamin D deficiency      Past Surgical History     Past Surgical History:   Procedure Laterality Date     ABDOMEN SURGERY  2014     BIOPSY  2015     BYPASS GRAFT ARTERY CORONARY  2008    6 vessels     COLONOSCOPY  2/8/2013    Procedure: COLONOSCOPY;  Colonoscopy, blood in stool;  Surgeon: Duane, William Charles, MD;  Location: [facility] OR     COMBINED REPAIR PTOSIS WITH BLEPHAROPLASTY BILATERAL Bilateral 6/25/2018    Procedure: COMBINED REPAIR PTOSIS WITH BLEPHAROPLASTY BILATERAL;  Bilateral upper eyelid blepharoplasty, ptosis repair and brow ptosis repair;  Surgeon: Sandra Borja MD;  Location: [facility] OR     GI SURGERY  2014     HEAD & NECK SURGERY  2011     NASAL/SINUS POLYPECTOMY  2010     ORTHOPEDIC SURGERY  2012     REPAIR PTOSIS       REPAIR PTOSIS BILATERAL Bilateral 7/22/2019    Procedure: REPAIR, PTOSIS, BOTH UPPER brows;  Surgeon: Sandra Borja MD;  Location: [facility] OR     REPAIR PTOSIS BROW BILATERAL Bilateral 6/25/2018    Procedure: REPAIR PTOSIS BROW BILATERAL;;  Surgeon: Sandra Borja MD;  Location: [facility] OR     THORACIC SURGERY  1989    tb     TONSILLECTOMY  2010     UVULOPALATOPHARYNGOPLASTY  2010     VASCULAR SURGERY  2008     [facility] CORONARY STENT CIERA SOTO VESSEL  2008    3 months after CABG     Allergy     Allergies   Allergen Reactions     Citalopram Itching     Tramadol Itching     Current Medication List     Current Outpatient Medications   Medication Sig     Acetaminophen (TYLENOL PO)      allopurinol (ZYLOPRIM) 300 MG tablet TAKE 1 TABLET BY MOUTH EVERY DAY     amLODIPine (NORVASC) 5 MG tablet TAKE 1 TABLET BY MOUTH DAILY FOR BLOOD PRESSURE     aspirin EC 81 MG EC tablet Take 1 tablet  (81 mg) by mouth daily (*)     atorvastatin (LIPITOR) 80 MG tablet Take 1 tablet (80 mg) by mouth daily     bacitracin 500 UNIT/GM external ointment Apply topically 3 times daily     baclofen (LIORESAL) 10 MG tablet TAKE 1 TABLET BY MOUTH 2 TIMES DAILY AS NEEDED FOR MUSCLE SPASM     busPIRone HCl (BUSPAR) 30 MG tablet TAKE 1 TABLET (30 MG) BY MOUTH 2 TIMES DAILY     BUTRANS 20 MCG/HR WK patch Place 1 patch onto the skin every 7 days OK to fill 8/30/22 and start 9/3/22 LISANDRO, name brand. 28 day script for chronic pain     Cholecalciferol (VITAMIN D) 2000 UNITS tablet TAKE ONE TABLET BY MOUTH ONCE DAILY     clobetasol (TEMOVATE) 0.05 % external cream Apply twice daily for 2 weeks, weekends only for 2 weeks, repeat cycle as needed for hands, not face or genitals     clobetasol (TEMOVATE) 0.05 % external solution Apply twice daily to scalp for up to 2 weeks for flares.     clonazePAM (KLONOPIN) 1 MG tablet TAKE 0.5-1 TABLETS BY MOUTH 2 TIMES DAILY AS NEEDED FOR ANXIETY     clopidogrel (PLAVIX) 75 MG tablet Take 1 tablet (75 mg) by mouth daily     colchicine (COLCYRS) 0.6 MG tablet TAKE 2 TABLETS BY MOUTH AT THE FIRST SIGN OF FLARE, TAKE 1 ADDITIONAL TABLET ONE HOUR LATER.     desvenlafaxine (PRISTIQ) 50 MG 24 hr tablet TAKE 1 TABLET BY MOUTH EVERY DAY     diclofenac (VOLTAREN) 1 % topical gel Apply 4 g topically 4 times daily as needed for moderate pain     diclofenac (VOLTAREN) 1 % topical gel Apply 4 grams to bilateral knees, up to four times daily as needed using enclosed dosing card.  Max of 32g/day     evolocumab (REPATHA) 140 MG/ML prefilled autoinjector Inject 1 mL (140 mg) Subcutaneous every 14 days     ezetimibe (ZETIA) 10 MG tablet Take 1 tablet (10 mg) by mouth daily     gabapentin (NEURONTIN) 600 MG tablet Take 1 tablet (600 mg) by mouth 3 times daily     gemfibrozil (LOPID) 600 MG tablet Take 1 tablet (600 mg) by mouth 2 times daily     golimumab (SIMPONI) 50 MG/0.5ML auto-injector pen Inject 0.5 mLs (50 mg)  Subcutaneous every 28 days . Hold for signs of infection, and seek medical attention.     hydrocortisone (WESTCORT) 0.2 % external cream FOR GENITALS, APPLY TWICE DAILY FOR UP TO 2 WEEKS, TAKE 2 WEEKS OFF THEN REPEAT     hydrocortisone 2.5 % cream FOR FACE, APPLY TWICE DAILY FOR UP TO 2 WEEK FOR FLARES ON THE FACE, TAKE 2 WEEKS OFF     leflunomide (ARAVA) 20 MG tablet Take 1 tablet (20 mg) by mouth daily ; Labs required every 8-12 weeks.     meclizine (ANTIVERT) 25 MG tablet TAKE 1 TABLET BY MOUTH EVERY 6 HOURS AS NEEDED FOR DIZZINESS.     melatonin 3 MG tablet Take 1 tablet (3 mg) by mouth nightly as needed for sleep     pantoprazole (PROTONIX) 40 MG EC tablet TAKE 1 TABLET BY MOUTH EVERY DAY     pimecrolimus (ELIDEL) 1 % external cream APPLY TWICE DAILY FOR FACE AND GENITALS     sildenafil (REVATIO) 20 MG tablet TAKE 2 TO 5 TABLETS BY MOUTH 30 MINUTES PRIOR TO INTERCOURSE AS NEEDED.     sulfaSALAzine (AZULFIDINE) 500 MG tablet Take 3 tablets (1,500 mg) by mouth 2 times daily     tamsulosin (FLOMAX) 0.4 MG capsule TAKE 2 CAPSULES BY MOUTH EVERY DAY     tenofovir (VIREAD) 300 MG tablet Take 1 tablet (300 mg) by mouth daily     triamcinolone (KENALOG) 0.1 % external ointment APPLY TOPICALLY TO AFFECTED AREA(S) 3 TIMES DAILY     triamcinolone (KENALOG) 0.1 % external ointment Apply to trunk and extremities twice daily for up to 2 weeks for flares     zolpidem (AMBIEN) 5 MG tablet Take 1 tablet (5 mg) by mouth nightly as needed for sleep     Incontinence Supply Disposable (DEPEND UNDERWEAR LARGE) MISC Use twice daily.     naloxone (NARCAN) 4 MG/0.1ML nasal spray Spray 1 spray (4 mg) into one nostril alternating nostrils once as needed for opioid reversal every 2-3 minutes until assistance arrives     nitroGLYcerin (NITROSTAT) 0.4 MG sublingual tablet For chest pain place 1 tablet under the tongue every 5 minutes for 3 doses. If symptoms persist 5 minutes after 1st dose call 911. (Patient not taking: Reported on  "8/16/2022)     order for DME Equipment being ordered: chair lift     order for DME Equipment being ordered: single end cane     ORDER FOR DME 1.  CPAP pressure 11 cm/H20 with heated humidity.   2.  Provide mask to fit and CPAP supplies.   3.  Length of need lifetime.   4.  If needed please provide a chin strap          No current facility-administered medications for this visit.       Social History   See HPI    Family History     Family History   Problem Relation Age of Onset     C.A.D. Father      Coronary Artery Disease Father      Hypertension Father      Depression Father      Hypertension Mother      Diabetes Mother      Depression Mother      Mental Illness Mother      Hypertension Maternal Grandfather      Cancer Paternal Grandfather      Other Cancer Paternal Grandfather      Other Cancer Other      Autoimmune Disease No family hx of      Cerebrovascular Disease No family hx of      Thyroid Disease No family hx of      Glaucoma No family hx of      Macular Degeneration No family hx of      No family history of autoimmune disease  No family history of psoriasis     No change in family history since the previous clinic visit.     Physical Exam     Temp Readings from Last 3 Encounters:   05/31/22 97.2  F (36.2  C) (Tympanic)   11/11/21 98  F (36.7  C) (Tympanic)   03/19/21 97.9  F (36.6  C) (Tympanic)     BP Readings from Last 5 Encounters:   09/14/22 137/87   08/16/22 117/79   05/31/22 110/77   05/11/22 (!) 150/95   05/11/22 137/87     Pulse Readings from Last 1 Encounters:   09/14/22 66     Resp Readings from Last 1 Encounters:   05/31/22 17     Estimated body mass index is 26.01 kg/m  as calculated from the following:    Height as of 5/31/22: 1.575 m (5' 2\").    Weight as of this encounter: 64.5 kg (142 lb 3.2 oz).    GEN: NAD. Healthy appearing adult.   HEENT:  Anicteric, noninjected sclera. No obvious external lesions of the ear and nose. Hearing intact.  CV: S1, S2. RRR. No m/r/g  PULM: No increased work " of breathing. CTA bilaterally   MSK: MCPs, PIPs, DIPs without swelling or tenderness to palpation.  Wrists without swelling or tenderness to palpation.  Elbows and shoulders without swelling or tenderness to palpation.  Knees, ankles, and MTPs without swelling or tenderness to palpation.    SKIN: No rash or jaundice seen  PSYCH: Alert. Appropriate.      Labs     RF/CCP  Recent Labs   Lab Test 11/04/15  1547 08/12/14  1414   CCPABY 27*  --    RHF  --  <20     CBC  Recent Labs   Lab Test 04/26/22  1218 03/25/22  1549 10/20/21  1144 08/23/21  1130 08/23/21  1130 03/29/21  1318 02/27/21  1259 01/30/21  1226   WBC 4.7 5.6 4.8   < > 5.4 5.2 4.4 5.5   RBC 4.77 4.64 4.71   < > 4.81 4.72 4.79 5.11   HGB 13.9 13.7 14.0   < > 14.2 13.6 13.8 14.5   HCT 43.4 41.5 41.3   < > 42.9 42.3 43.1 43.9   MCV 91 89 88   < > 89 90 90 86   RDW 13.8 14.1 14.4   < > 13.8 14.6 14.7 13.5    288 273   < > 278 245 275 296   MCH 29.1 29.5 29.7   < > 29.5 28.8 28.8 28.4   MCHC 32.0 33.0 33.9   < > 33.1 32.2 32.0 33.0   NEUTROPHIL  --  37 44  --  43 45.1 40.9 50.5   LYMPH  --  44 34  --  41 35.5 42.0 33.2   MONOCYTE  --  16 20  --  13 15.3 12.6 12.7   EOSINOPHIL  --  2 2  --  2 2.9 3.4 2.9   BASOPHIL  --  1 1  --  1 1.2 1.1 0.7   ANEU  --   --   --   --   --  2.3 1.8 2.7   ALYM  --   --   --   --   --  1.8 1.9 1.8   PRACHI  --   --   --   --   --  0.8 0.6 0.7   AEOS  --   --   --   --   --  0.2 0.2 0.2   ABAS  --   --   --   --   --  0.1 0.1 0.0   ANEUTAUTO  --  2.1 2.1  --  2.3  --   --   --    ALYMPAUTO  --  2.5 1.6  --  2.2  --   --   --    AMONOAUTO  --  0.9 0.9  --  0.7  --   --   --    AEOSAUTO  --  0.1 0.1  --  0.1  --   --   --    ABSBASO  --  0.1 0.0  --  0.1  --   --   --     < > = values in this interval not displayed.     CMP  Recent Labs   Lab Test 04/26/22  1218 03/25/22  1549 10/20/21  1143 08/23/21  1130 03/29/21  1318 02/27/21  1259 01/30/21  1226 06/03/20  1317 03/26/20  1039 02/27/20  0816 11/25/19  0758   *  --   --    --   --   --   --   --  138  --  141   POTASSIUM 3.9  --   --   --   --   --   --   --  3.8  --  3.8   CHLORIDE 111*  --   --   --   --   --   --   --  109  --  110*   CO2 30  --   --   --   --   --   --   --  25  --  25   ANIONGAP 4  --   --   --   --   --   --   --  4  --  6   GLC 94 106* 94  --   --   --   --    < > 122*   < > 101*   BUN 12  --   --   --   --   --   --   --  16  --  16   CR 0.94 0.95 0.87   < > 1.01 0.95 0.94   < > 1.00   < > 0.78   GFRESTIMATED >90 >90 >90   < > 83 89 >90   < > 85   < > >90   GFRESTBLACK  --   --   --   --  >90 >90 >90   < > >90   < > >90   HEMANT 9.3  --   --   --   --   --   --   --  9.0  --  9.2   BILITOTAL 0.4 0.5 0.6   < > 0.5 0.4 0.4   < > 0.3   < > 0.4   ALBUMIN 3.9 4.0 4.1   < > 3.9 4.1 4.0   < > 4.3   < > 4.0   PROTTOTAL 7.7 7.7 8.0   < > 7.6 7.6 7.6   < > 8.0   < > 7.3   ALKPHOS 93 118 120   < > 116 102 114   < > 123   < > 89   AST 24 27 22   < > 20 23 37   < > 23   < > 23   ALT 23 34 25   < > 31 27 46   < > 36   < > 47    < > = values in this interval not displayed.     Calcium/VitaminD  Recent Labs   Lab Test 04/26/22  1218 03/26/20  1039 11/25/19  0758 12/04/17  0924 07/13/17  1246 01/14/16  1541 11/04/15  1547   HEMANT 9.3 9.0 9.2   < >  --    < >  --    VITDT  --   --   --   --  34  --  43    < > = values in this interval not displayed.     ESR/CRP  Recent Labs   Lab Test 03/25/22  1549 10/20/21  1143 08/23/21  1130   SED 10 27* 12   CRP <2.9 <2.9 <2.9     Lipid Panel  Recent Labs   Lab Test 03/19/21  0928 06/25/20  1027 03/14/19  1006 06/10/16  0850 06/29/15  1405 05/22/14  0848 03/24/14  0936   CHOL 142 122 152   < > 219* 169 195   TRIG 105 148 126   < > 372* 379* 301*   HDL 69 59 51   < > 33* 33* 39*   LDL 52 33 76   < > 112 60 95   VLDL  --   --   --   --  74* 76* 60*   CHOLHDLRATIO  --   --   --   --  6.6* 5.1* 5.0   NHDL 73 63 101   < >  --   --   --     < > = values in this interval not displayed.     Hepatitis B  Recent Labs   Lab Test 10/20/21  1143  "03/19/21  0928 03/26/20  1039 10/07/19  0940 12/01/16  1429 10/05/16  0954   AUSAB 0.09  --   --   --   --  0.45   HEPBANG  --   --   --   --   --  Reactive*   HBQLOG  --  Not Calculated <1.3 Not Calculated   < > 5.1*    < > = values in this interval not displayed.     Hepatitis C  Recent Labs   Lab Test 04/07/16  1538   HCVAB Nonreactive   Assay performance characteristics have not been established for newborns,   infants, and children       Lyme ab screening  Recent Labs   Lab Test 07/18/17  1330   LYMEGM 0.19     Tuberculosis Screening  Recent Labs   Lab Test 04/07/16  1538   TBRSLT Positive   The magnitude of the measured IFN-gamma level cannot be correlated to stage or   degree of infection, level of immune responsiveness, or likelihood for   progression to active disease.  *   TBAGN >10.00  This is a qualitative test.  The TB antigen IU/mL value is required for   documentation on certain government reporting forms but this value should not   be used to monitor disease progression or response to therapy.   Diagnosing or excluding tuberculosis disease, and assessing the probability of   LTBI, require a combination of epidemiological, historical, medical and   diagnostic findings that should be taken into account when interpreting   QuantiFERON TB results.       HIV Screening  Recent Labs   Lab Test 11/04/15  1547   HIAGAB Nonreactive   HIV-1 p24 Ag & HIV-1/HIV-2 Ab Not Detected       \"HAND BILATERAL THREE OR MORE VIEWS 11/4/2015 4:55 PM   HISTORY: Pain in unspecified joint.  COMPARISON: None.  IMPRESSION  IMPRESSION: Normal.  DANIS ANGLIN MD\"    \"FOOT BILATERAL THREE OR MORE VIEWS 11/4/2015 4:55 PM   HISTORY: Pain in unspecified joint.  COMPARISON: 8/21/2012.  IMPRESSION  IMPRESSION: Small traction spurs are seen off the Achilles tendon and  plantar fascial attachment sites bilaterally. Joint spaces are  preserved. Osseous structures are intact. Incidental note is made of  some surgical staples in the soft " "tissues of the medial distal right  lower extremity.  DANIS ANGLIN MD\"      Immunization History     Immunization History   Administered Date(s) Administered     COVID-19,PF,Pfizer (12+ Yrs) 03/16/2021, 04/06/2021, 10/09/2021, 04/21/2022     COVID-19,PF,Pfizer 12+ Yrs (2022 and After) 04/21/2022     Influenza (IIV3) PF 10/11/2011, 09/20/2017, 09/01/2018     Influenza Quad, Recombinant, pf(RIV4) (Flublok) 09/23/2021     Influenza Vaccine IM > 6 months Valent IIV4 (Alfuria,Fluzone) 09/27/2015, 09/08/2016, 09/30/2019, 09/14/2020     Influenza Vaccine, 6+MO IM (QUADRIVALENT W/PRESERVATIVES) 11/17/2014     Pneumo Conj 13-V (2010&after) 04/07/2016     Pneumococcal 23 valent 12/12/2014     TDAP Vaccine (Adacel) 08/30/2011     Tdap (Adacel,Boostrix) 10/20/2021     Zoster vaccine recombinant adjuvanted (SHINGRIX) 07/27/2019, 04/22/2021          Chart documentation done in part with Dragon Voice recognition Software. Although reviewed after completion, some word and grammatical error may remain.    Sebastián Jenkins MD     "

## 2022-09-14 NOTE — NURSING NOTE
RAPID3 (0-30) Cumulative Score  25.3          RAPID3 Weighted Score (divide #4 by 3 and that is the weighted score)  8.4

## 2022-09-15 ENCOUNTER — TELEPHONE (OUTPATIENT)
Dept: RHEUMATOLOGY | Facility: CLINIC | Age: 57
End: 2022-09-15

## 2022-09-15 NOTE — TELEPHONE ENCOUNTER
PA Initiation    Medication: Annette ABURTO Initiated  Insurance Company: [employer] - Phone [phone] Fax [phone]  Pharmacy Filling the Rx:    Filling Pharmacy Phone:    Filling Pharmacy Fax:    Start Date: 9/15/2022        Gisela Sosa [facility] [email or web address]  [email or web address]  Phone: [phone]  Fax: [phone]

## 2022-09-27 ENCOUNTER — MYC MEDICAL ADVICE (OUTPATIENT)
Dept: PALLIATIVE MEDICINE | Facility: CLINIC | Age: 57
End: 2022-09-27

## 2022-09-27 DIAGNOSIS — M06.9 RHEUMATOID ARTHRITIS INVOLVING MULTIPLE SITES, UNSPECIFIED WHETHER RHEUMATOID FACTOR PRESENT (H): ICD-10-CM

## 2022-09-27 DIAGNOSIS — G89.4 CHRONIC PAIN SYNDROME: ICD-10-CM

## 2022-09-27 DIAGNOSIS — M17.11 PRIMARY OSTEOARTHRITIS OF RIGHT KNEE: ICD-10-CM

## 2022-09-27 DIAGNOSIS — M47.819 FACET ARTHROPATHY: ICD-10-CM

## 2022-09-27 DIAGNOSIS — F11.90 CHRONIC, CONTINUOUS USE OF OPIOIDS: ICD-10-CM

## 2022-09-28 RX ORDER — BUPRENORPHINE 20 UG/H
1 PATCH, EXTENDED RELEASE TRANSDERMAL
Qty: 4 PATCH | Refills: 1 | Status: SHIPPED | OUTPATIENT
Start: 2022-09-28 | End: 2022-10-21

## 2022-09-28 NOTE — TELEPHONE ENCOUNTER
Please process a refill of BUTRANS 20 MCG/HR WK patch to     [id number] - [place], [place] - [address] [place] [address]  [address] [place]  [place] [address]  Phone: [phone] Fax: [phone]

## 2022-09-28 NOTE — TELEPHONE ENCOUNTER
Medication refill information reviewed.     Due date for BUTRANS 20 MCG/HR WK patch is 10/01/22     Prescriptions prepped for review.     Will route to provider.

## 2022-09-28 NOTE — TELEPHONE ENCOUNTER
Signed Prescriptions:                        Disp   Refills    BUTRANS 20 MCG/HR WK patch                 4 patch1        Sig: Place 1 patch onto the skin every 7 days OK to fill           09/29/22 and start 10/01/22 LISANDRO, name brand. 28           day script for chronic pain  Authorizing Provider: AMEENA PAGE    Reviewed [place]  September 28, 2022- no concerning fills.    Ameena LEMUS, RN CNP, FNP  [facility]  [facility]

## 2022-09-28 NOTE — TELEPHONE ENCOUNTER
Received call from patient requesting refill(s) of BUTRANS 20 MCG/HR WK patch     Last dispensed from pharmacy on 9/4/2022    Patient's last office/virtual visit by prescribing provider on 8/16/22  Next office/virtual appointment scheduled for none    Last urine drug screen date 11/4/21  Current opioid agreement on file (completed within the last year) Yes Date of opioid agreement: 5/11/22    E-prescribe to [place] [address] [place] [facility] pharmacy    Will route to nursing pool for review and preparation of prescription(s).

## 2022-09-29 NOTE — TELEPHONE ENCOUNTER
Prior Authorization Approval    Authorization Effective Date: 9/15/2022  Authorization Expiration Date: 9/15/2023  Medication: Simponi PA Approved  Approved Dose/Quantity: 0.5 per 28 days  Reference #: [id number]   Insurance Company: [employer] - Phone [phone] Fax [phone]  Expected CoPay:       CoPay Card Available:      Foundation Assistance Needed:    Which Pharmacy is filling the prescription (Not needed for infusion/clinic administered):    Pharmacy Notified:    Patient Notified:  yes        Gisela Sosa [facility] Liaison  [email or web address]  Phone: [phone]  Fax: [phone]

## 2022-10-06 DIAGNOSIS — M10.9 GOUT WITHOUT TOPHUS: ICD-10-CM

## 2022-10-06 DIAGNOSIS — L40.9 PSORIASIS: ICD-10-CM

## 2022-10-06 DIAGNOSIS — I10 ESSENTIAL HYPERTENSION WITH GOAL BLOOD PRESSURE LESS THAN 140/90: ICD-10-CM

## 2022-10-06 RX ORDER — AMLODIPINE BESYLATE 5 MG/1
TABLET ORAL
Qty: 90 TABLET | Refills: 3 | OUTPATIENT
Start: 2022-10-06

## 2022-10-06 RX ORDER — COLCHICINE 0.6 MG/1
TABLET ORAL
Qty: 90 TABLET | Refills: 1 | Status: SHIPPED | OUTPATIENT
Start: 2022-10-06 | End: 2023-03-20

## 2022-10-11 RX ORDER — HYDROCORTISONE VALERATE CREAM 2 MG/G
CREAM TOPICAL
Qty: 60 G | Refills: 3 | OUTPATIENT
Start: 2022-10-11

## 2022-10-11 RX ORDER — CLOBETASOL PROPIONATE 0.5 MG/G
CREAM TOPICAL
Qty: 60 G | Refills: 4 | OUTPATIENT
Start: 2022-10-11

## 2022-10-11 NOTE — TELEPHONE ENCOUNTER
Refills refused electronically.  Message also routed to [place] scheduling to contact patient for Dermatology appointment.  Last visit Dermatology visit 4/30/2021 [facility].  No future visit.      1. Hydrocortisone 0.2%:  >6 months from last visit (process #2/Derm protocol).    2. clobetasol (TEMOVATE) 0.05 % external cream: >3 months from last visit (process #3/Derm protocol).

## 2022-10-12 NOTE — TELEPHONE ENCOUNTER
Left voicemail to call back and schedule.    Sara [facility] Procedure   Dermatology, General and Plastic Surgery, Urology Specialties   [facility] [address] [address]

## 2022-10-19 ENCOUNTER — MYC MEDICAL ADVICE (OUTPATIENT)
Dept: PALLIATIVE MEDICINE | Facility: CLINIC | Age: 57
End: 2022-10-19

## 2022-10-19 DIAGNOSIS — M06.9 RHEUMATOID ARTHRITIS INVOLVING MULTIPLE SITES, UNSPECIFIED WHETHER RHEUMATOID FACTOR PRESENT (H): ICD-10-CM

## 2022-10-19 DIAGNOSIS — G89.4 CHRONIC PAIN SYNDROME: ICD-10-CM

## 2022-10-19 DIAGNOSIS — M47.819 FACET ARTHROPATHY: ICD-10-CM

## 2022-10-19 DIAGNOSIS — M17.11 PRIMARY OSTEOARTHRITIS OF RIGHT KNEE: ICD-10-CM

## 2022-10-19 DIAGNOSIS — F11.90 CHRONIC, CONTINUOUS USE OF OPIOIDS: ICD-10-CM

## 2022-10-20 NOTE — TELEPHONE ENCOUNTER
Please process a refill of BUTRANS 20 MCG/HR WK patch to     [id number] [place] - [address] [place] [address]  [address] [facility] [place]  [place] [address]  Phone: [phone] Fax: [phone]

## 2022-10-21 NOTE — TELEPHONE ENCOUNTER
Received call from patient requesting refill(s) of  BUTRANS 20 MCG/HR WK patch        I'm going on vacation on 10/26/22 could you refill the Butran patches before that date?    Last dispensed from pharmacy on 09/29/22    Patient's last office/virtual visit by prescribing provider on 08/16/22  Next office/virtual appointment scheduled for 12/12/22    Last urine drug screen date 11/04/21  Current opioid agreement on file (completed within the last year) Yes Date of opioid agreement: 05/11/22    E-prescribe to pharmacy-[facility] [id number] [place] [facility] - [facility] [address]    Will route to nursing [place] for review and preparation of prescription(s).

## 2022-10-21 NOTE — TELEPHONE ENCOUNTER
Medication refill information reviewed. Patient needs an in clinic appointment for Moshe and can cancel December video visit.    Due date for  BUTRANS 20 MCG/HR WK patch is 10/29/22 (early fill for travel please review)     Prescriptions prepped for review.     Will route to provider.

## 2022-10-22 RX ORDER — BUPRENORPHINE 20 UG/H
1 PATCH, EXTENDED RELEASE TRANSDERMAL
Qty: 4 PATCH | Refills: 1 | Status: SHIPPED | OUTPATIENT
Start: 2022-10-22 | End: 2022-11-25

## 2022-10-22 NOTE — TELEPHONE ENCOUNTER
Signed Prescriptions:                        Disp   Refills    BUTRANS 20 MCG/HR WK patch                 4 patch1        Sig: Place 1 patch onto the skin every 7 days OK to fill           10/24/22 and start 10/29/22 LISANDRO, name brand. 28           day script for chronic pain. Early fill to           accommodate travel.  Authorizing Provider: AMEENA PAGE    Reviewed [place]  October 22, 2022- no concerning fills.    Ameena Page APRN, RN CNP, FNP  [facility]  [facility] [place]

## 2022-10-24 NOTE — TELEPHONE ENCOUNTER
LVM stating the following Prescriptions will be filled at [facility] [address] [place] [facility] - [place]     BUTRANS 20 MCG/HR WK patch                 4 patch1        Sig: Place 1 patch onto the skin every 7 days OK to fill           10/24/22 and start 10/29/22

## 2022-11-08 ENCOUNTER — MEDICAL CORRESPONDENCE (OUTPATIENT)
Dept: HEALTH INFORMATION MANAGEMENT | Facility: CLINIC | Age: 57
End: 2022-11-08

## 2022-11-17 DIAGNOSIS — F41.9 ANXIETY HYPERVENTILATION: ICD-10-CM

## 2022-11-17 DIAGNOSIS — R42 VERTIGO: ICD-10-CM

## 2022-11-17 DIAGNOSIS — I10 ESSENTIAL HYPERTENSION WITH GOAL BLOOD PRESSURE LESS THAN 140/90: ICD-10-CM

## 2022-11-17 DIAGNOSIS — F45.8 ANXIETY HYPERVENTILATION: ICD-10-CM

## 2022-11-17 DIAGNOSIS — F41.9 ANXIETY: Chronic | ICD-10-CM

## 2022-11-17 DIAGNOSIS — N40.1 BENIGN PROSTATIC HYPERPLASIA WITH WEAK URINARY STREAM: ICD-10-CM

## 2022-11-17 DIAGNOSIS — F33.1 MAJOR DEPRESSIVE DISORDER, RECURRENT EPISODE, MODERATE (H): ICD-10-CM

## 2022-11-17 DIAGNOSIS — R39.12 BENIGN PROSTATIC HYPERPLASIA WITH WEAK URINARY STREAM: ICD-10-CM

## 2022-11-17 RX ORDER — CLONAZEPAM 1 MG/1
TABLET ORAL
Qty: 90 TABLET | Refills: 0 | Status: SHIPPED | OUTPATIENT
Start: 2022-11-17 | End: 2023-01-11

## 2022-11-17 RX ORDER — TAMSULOSIN HYDROCHLORIDE 0.4 MG/1
CAPSULE ORAL
Qty: 180 CAPSULE | Refills: 3 | Status: SHIPPED | OUTPATIENT
Start: 2022-11-17 | End: 2023-10-26

## 2022-11-17 RX ORDER — DESVENLAFAXINE 50 MG/1
TABLET, FILM COATED, EXTENDED RELEASE ORAL
Qty: 90 TABLET | Refills: 3 | Status: SHIPPED | OUTPATIENT
Start: 2022-11-17 | End: 2023-12-15

## 2022-11-17 RX ORDER — AMLODIPINE BESYLATE 5 MG/1
TABLET ORAL
Qty: 90 TABLET | Refills: 1 | Status: SHIPPED | OUTPATIENT
Start: 2022-11-17 | End: 2023-05-15

## 2022-11-17 RX ORDER — MECLIZINE HYDROCHLORIDE 25 MG/1
TABLET ORAL
Qty: 30 TABLET | Refills: 5 | Status: SHIPPED | OUTPATIENT
Start: 2022-11-17 | End: 2023-05-15

## 2022-11-23 ENCOUNTER — MYC MEDICAL ADVICE (OUTPATIENT)
Dept: PALLIATIVE MEDICINE | Facility: CLINIC | Age: 57
End: 2022-11-23

## 2022-11-23 DIAGNOSIS — F11.90 CHRONIC, CONTINUOUS USE OF OPIOIDS: ICD-10-CM

## 2022-11-23 DIAGNOSIS — G89.4 CHRONIC PAIN SYNDROME: ICD-10-CM

## 2022-11-23 DIAGNOSIS — M17.11 PRIMARY OSTEOARTHRITIS OF RIGHT KNEE: ICD-10-CM

## 2022-11-23 DIAGNOSIS — M06.9 RHEUMATOID ARTHRITIS INVOLVING MULTIPLE SITES, UNSPECIFIED WHETHER RHEUMATOID FACTOR PRESENT (H): ICD-10-CM

## 2022-11-23 DIAGNOSIS — M47.819 FACET ARTHROPATHY: ICD-10-CM

## 2022-11-23 NOTE — TELEPHONE ENCOUNTER
Please process a refill of BUTRANS 20 MCG/HR WK patch to     [id number] [place] - [address] [place] [address]  [address] [facility] [place]  [place] [address]  Phone: [phone] Fax: [phone]

## 2022-11-25 NOTE — TELEPHONE ENCOUNTER
Medication refill information reviewed.     Due date for  BUTRANS 20 MCG/HR WK patch is 11/27/22     Prescriptions prepped for review.     Will route to provider.

## 2022-11-25 NOTE — TELEPHONE ENCOUNTER
Received [employer] message from patient requesting refill(s) for BUTRANS 20 MCG/HR WK patch     Last dispensed from pharmacy on 10/23/22    Patient's last office/virtual visit by prescribing provider on 8/16/22  Next office/virtual appointment scheduled for 12/12/22 (Virtual)    Last urine drug screen date 11/4/2021  Current opioid agreement on file? Yes Date of opioid agreement: 5/11/22    E[facility]prescribe to:    [facility] [id number] [place] [facility] [place]    Will route to nursing pool for review and preparation of prescription(s).

## 2022-11-26 RX ORDER — BUPRENORPHINE 20 UG/H
1 PATCH, EXTENDED RELEASE TRANSDERMAL
Qty: 4 PATCH | Refills: 1 | Status: SHIPPED | OUTPATIENT
Start: 2022-11-26 | End: 2022-12-23

## 2022-11-26 NOTE — TELEPHONE ENCOUNTER
Signed Prescriptions:                        Disp   Refills    BUTRANS 20 MCG/HR WK patch                 4 patch1        Sig: Place 1 patch onto the skin every 7 days OK to fill           11/25/22 and start 11/27/22 LISANDRO, name brand. 28           day script for chronic pain. Early fill to           accommodate travel.  Authorizing Provider: AMEENA PAGE    [place]ed [place]  November 26, 2022- no concerning fills.    Ameena Page APRN, RN CNP, FNP  [facility]  [facility] [place]

## 2022-11-29 NOTE — TELEPHONE ENCOUNTER
Message from [employer]:  Original authorizing provider: Dorothea Loo MD    Lamont CROW Daniels would like a refill of the following medications:  buprenorphine (BUTRANS) 15 MCG/HR Wk patch [Dorothea Loo MD]    Preferred pharmacy: [address]    Comment:  please fill for this month and see if you can refill so i don't have to contact your office every month.   Patient sitting in chair comfortably eating breakfast.

## 2022-12-02 ENCOUNTER — OFFICE VISIT (OUTPATIENT)
Dept: OPTOMETRY | Facility: CLINIC | Age: 57
End: 2022-12-02
Payer: COMMERCIAL

## 2022-12-02 DIAGNOSIS — H04.123 DRY EYES: ICD-10-CM

## 2022-12-02 DIAGNOSIS — H40.013 OPEN ANGLE WITH BORDERLINE FINDINGS AND LOW GLAUCOMA RISK IN BOTH EYES: ICD-10-CM

## 2022-12-02 DIAGNOSIS — H52.13 MYOPIA OF BOTH EYES: ICD-10-CM

## 2022-12-02 DIAGNOSIS — H35.3131 EARLY STAGE NONEXUDATIVE AGE-RELATED MACULAR DEGENERATION OF BOTH EYES: Primary | ICD-10-CM

## 2022-12-02 PROCEDURE — 92014 COMPRE OPH EXAM EST PT 1/>: CPT | Performed by: OPTOMETRIST

## 2022-12-02 PROCEDURE — 92134 CPTRZ OPH DX IMG PST SGM RTA: CPT | Performed by: OPTOMETRIST

## 2022-12-02 PROCEDURE — 92015 DETERMINE REFRACTIVE STATE: CPT | Performed by: OPTOMETRIST

## 2022-12-02 ASSESSMENT — CONF VISUAL FIELD
OD_SUPERIOR_NASAL_RESTRICTION: 0
METHOD: COUNTING FINGERS
OD_SUPERIOR_TEMPORAL_RESTRICTION: 0
OD_INFERIOR_TEMPORAL_RESTRICTION: 0
OS_SUPERIOR_NASAL_RESTRICTION: 0
OD_INFERIOR_NASAL_RESTRICTION: 0
OS_SUPERIOR_TEMPORAL_RESTRICTION: 0
OS_INFERIOR_TEMPORAL_RESTRICTION: 0
OS_INFERIOR_NASAL_RESTRICTION: 0
OD_NORMAL: 1
OS_NORMAL: 1

## 2022-12-02 ASSESSMENT — VISUAL ACUITY
CORRECTION_TYPE: GLASSES
OD_CC+: -1
OD_CC: 20/20
OS_CC: 20/20
OS_CC+: -1
METHOD: SNELLEN - LINEAR

## 2022-12-02 ASSESSMENT — REFRACTION_WEARINGRX
OS_CYLINDER: +0.25
OS_SPHERE: -5.00
OD_CYLINDER: +0.50
SPECS_TYPE: PAL
OS_AXIS: 098
OS_ADD: +2.25
OD_SPHERE: -4.75
OD_AXIS: 158
OD_ADD: +2.25

## 2022-12-02 ASSESSMENT — CUP TO DISC RATIO
OS_RATIO: 0.55
OD_RATIO: 0.45

## 2022-12-02 ASSESSMENT — REFRACTION_MANIFEST
OS_CYLINDER: SPHERE
OD_ADD: +2.50
OS_SPHERE: -5.00
OD_AXIS: 150
OS_ADD: +2.50
OD_SPHERE: -4.75
OD_CYLINDER: +0.50

## 2022-12-02 ASSESSMENT — TONOMETRY
OS_IOP_MMHG: 14
IOP_METHOD: APPLANATION
OD_IOP_MMHG: 14

## 2022-12-02 NOTE — PROGRESS NOTES
Assessment/Plan  (H35.3131) Early stage nonexudative age-related macular degeneration of both eyes  (primary encounter diagnosis)  Comment: Stable.  Plan: OCT Retina Spectralis OU (both eyes)        Recommend monitoring annually with MacOCT. Return to clinic sooner if vision changes.     (H40.013) Open angle with borderline findings and low glaucoma risk in both eyes  Comment: Based on ONH appearance. Normal IOP. Very low suspicion at this time.   Plan: Recommend monitoring annually for changes. No indication for treatment at this time.     (H04.123) Dry eyes  Comment: Likely source of fluctuating vision.   Plan: Recommend regular use of lubricating drops. Return to clinic if symptoms worsen.     (H52.13) Myopia of both eyes  Plan: REFRACTION [[id number]]        Discussed findings with patient. New spectacle prescription dispensed to patient. Patient is welcome to return to clinic with prolonged adaptation difficulties.     Complete documentation of historical and exam elements from today's encounter can  be found in the full encounter summary report (not reduplicated in this progress  note). I personally obtained the chief complaint(s) and history of present illness. I  confirmed and edited as necessary the review of systems, past medical/surgical  history, family history, social history, and examination findings as documented by  others; and I examined the patient myself. I personally reviewed the relevant tests,  images, and reports as documented above. I formulated and edited as necessary the  assessment and plan and discussed the findings and management plan with the  patient and family.    Feliberto Jama OD

## 2022-12-02 NOTE — NURSING NOTE
Chief Complaints and History of Present Illnesses   Patient presents with     Glaucoma Follow-Up     Chief Complaint(s) and History of Present Illness(es)     Glaucoma Follow-Up            Laterality: both eyes    Quality: fluctuating    Associated symptoms: floaters.  Negative for eye pain and flashes    Treatment side effects: none          Comments    Here for glaucoma suspect follow up. Vision has been fluctuating since last visit. Stable floaters without flashes. No eye pain.    Eliezer MOORE 9:01 AM December 2, 2022

## 2022-12-17 ENCOUNTER — LAB (OUTPATIENT)
Dept: LAB | Facility: CLINIC | Age: 57
End: 2022-12-17
Payer: COMMERCIAL

## 2022-12-17 DIAGNOSIS — Z79.899 HIGH RISK MEDICATION USE: ICD-10-CM

## 2022-12-17 DIAGNOSIS — M06.09 RHEUMATOID ARTHRITIS OF MULTIPLE SITES WITH NEGATIVE RHEUMATOID FACTOR (H): ICD-10-CM

## 2022-12-17 LAB
BASOPHILS # BLD AUTO: 0.1 10E3/UL (ref 0–0.2)
BASOPHILS NFR BLD AUTO: 1 %
EOSINOPHIL # BLD AUTO: 0.2 10E3/UL (ref 0–0.7)
EOSINOPHIL NFR BLD AUTO: 4 %
ERYTHROCYTE [DISTWIDTH] IN BLOOD BY AUTOMATED COUNT: 14.5 % (ref 10–15)
ERYTHROCYTE [SEDIMENTATION RATE] IN BLOOD BY WESTERGREN METHOD: 15 MM/HR (ref 0–20)
HCT VFR BLD AUTO: 41.5 % (ref 40–53)
HGB BLD-MCNC: 13.3 G/DL (ref 13.3–17.7)
IMM GRANULOCYTES # BLD: 0 10E3/UL
IMM GRANULOCYTES NFR BLD: 0 %
LYMPHOCYTES # BLD AUTO: 2.4 10E3/UL (ref 0.8–5.3)
LYMPHOCYTES NFR BLD AUTO: 43 %
MCH RBC QN AUTO: 28.7 PG (ref 26.5–33)
MCHC RBC AUTO-ENTMCNC: 32 G/DL (ref 31.5–36.5)
MCV RBC AUTO: 89 FL (ref 78–100)
MONOCYTES # BLD AUTO: 0.7 10E3/UL (ref 0–1.3)
MONOCYTES NFR BLD AUTO: 13 %
NEUTROPHILS # BLD AUTO: 2.2 10E3/UL (ref 1.6–8.3)
NEUTROPHILS NFR BLD AUTO: 40 %
PLATELET # BLD AUTO: 281 10E3/UL (ref 150–450)
RBC # BLD AUTO: 4.64 10E6/UL (ref 4.4–5.9)
WBC # BLD AUTO: 5.5 10E3/UL (ref 4–11)

## 2022-12-17 PROCEDURE — 80076 HEPATIC FUNCTION PANEL: CPT

## 2022-12-17 PROCEDURE — 36415 COLL VENOUS BLD VENIPUNCTURE: CPT

## 2022-12-17 PROCEDURE — 85025 COMPLETE CBC W/AUTO DIFF WBC: CPT

## 2022-12-17 PROCEDURE — 85652 RBC SED RATE AUTOMATED: CPT

## 2022-12-17 PROCEDURE — 82565 ASSAY OF CREATININE: CPT

## 2022-12-17 PROCEDURE — 86140 C-REACTIVE PROTEIN: CPT

## 2022-12-19 LAB
ALBUMIN SERPL-MCNC: 4 G/DL (ref 3.4–5)
ALP SERPL-CCNC: 82 U/L (ref 40–150)
ALT SERPL W P-5'-P-CCNC: 23 U/L (ref 0–70)
AST SERPL W P-5'-P-CCNC: 23 U/L (ref 0–45)
BILIRUB DIRECT SERPL-MCNC: 0.1 MG/DL (ref 0–0.2)
BILIRUB SERPL-MCNC: 0.3 MG/DL (ref 0.2–1.3)
CREAT SERPL-MCNC: 0.93 MG/DL (ref 0.66–1.25)
CRP SERPL-MCNC: <2.9 MG/L (ref 0–8)
GFR SERPL CREATININE-BSD FRML MDRD: >90 ML/MIN/1.73M2
PROT SERPL-MCNC: 7.5 G/DL (ref 6.8–8.8)

## 2022-12-20 ENCOUNTER — MYC MEDICAL ADVICE (OUTPATIENT)
Dept: PALLIATIVE MEDICINE | Facility: CLINIC | Age: 57
End: 2022-12-20

## 2022-12-20 DIAGNOSIS — G89.4 CHRONIC PAIN SYNDROME: ICD-10-CM

## 2022-12-20 DIAGNOSIS — M47.819 FACET ARTHROPATHY: ICD-10-CM

## 2022-12-20 DIAGNOSIS — F11.90 CHRONIC, CONTINUOUS USE OF OPIOIDS: ICD-10-CM

## 2022-12-20 DIAGNOSIS — M06.9 RHEUMATOID ARTHRITIS INVOLVING MULTIPLE SITES, UNSPECIFIED WHETHER RHEUMATOID FACTOR PRESENT (H): ICD-10-CM

## 2022-12-20 DIAGNOSIS — M17.11 PRIMARY OSTEOARTHRITIS OF RIGHT KNEE: ICD-10-CM

## 2022-12-21 NOTE — TELEPHONE ENCOUNTER
Please process a refill of BUTRANS 20 MCG/HR WK patch to     [id number] [place] - [address] [place] [address]  [address] [facility] [place]  [place] [address]  Phone: [phone] Fax: [phone]

## 2022-12-22 NOTE — TELEPHONE ENCOUNTER
Received call from patient requesting refill(s) of BUTRANS 20 MCG/HR WK patch     Last dispensed from pharmacy on 11/25/22    Patient's last office/virtual visit by prescribing provider on 08/16/22  Next office/virtual appointment scheduled for : none    Last urine drug screen date 11/04/21  Current opioid agreement on file (completed within the last year) Yes Date of opioid agreement: 05/11/22    E-prescribe to pharmacy-[facility] [id number] [place] [facility] - [place] - [address]  [address]    Will route to nursing pool for review and preparation of prescription(s).

## 2022-12-23 RX ORDER — BUPRENORPHINE 20 UG/H
1 PATCH, EXTENDED RELEASE TRANSDERMAL
Qty: 4 PATCH | Refills: 1 | Status: SHIPPED | OUTPATIENT
Start: 2022-12-23 | End: 2023-02-17

## 2022-12-23 NOTE — TELEPHONE ENCOUNTER
LVM the following Prescriptions will be filled at [facility] [address] [facility] - [place], [address]    BUTRANS 20 MCG/HR WK patch 4 patch1        Sig: Place 1 patch onto the skin every 7 days OK to fill           12/23/22 and start 12/25/22 LISANDRO, name brand. 28           day script for chronic pain.    Amberly Chawla MA

## 2022-12-23 NOTE — TELEPHONE ENCOUNTER
Routing to provider to review medication prepped per below  Upon confirmation of prescription-[place] pool can you remind pt to make follow up, 12/12 was canceled due to provider illness      Butrans 20mcg, #4, Refill:no  Sig:OK to fill 12/23/22 and start 12/25/22 LISANDRO, name brand. 28 day script for chronic pain.  Last picked up 11/25/22 with start on 11/27/22  Due: 12/25/22    Per last OV note 08/16/22  1. Medication Management:   1. Continue Butrans 20mcg/hr transdermal change every 7 days, fill 8/30/22 and start 9/3/2022

## 2022-12-23 NOTE — TELEPHONE ENCOUNTER
Signed Prescriptions:                        Disp   Refills    BUTRANS 20 MCG/HR WK patch                 4 patch1        Sig: Place 1 patch onto the skin every 7 days OK to fill           12/23/22 and start 12/25/22 LISANDRO, name brand. 28           day script for chronic pain.  Authorizing Provider: AMEENA PAGE    [place]ed [place]  December 23, 2022- no concerning fills.    Ameena LEMUS, RN CNP, FNP  [facility]  [facility]

## 2022-12-28 ENCOUNTER — TELEPHONE (OUTPATIENT)
Dept: FAMILY MEDICINE | Facility: CLINIC | Age: 57
End: 2022-12-28

## 2022-12-28 NOTE — TELEPHONE ENCOUNTER
Patient Quality Outreach    Patient is due for the following:       Topic Date Due     Pneumococcal Vaccine (3 - PPSV23 if available, else PCV20) 12/12/2019     Chronic Opioid Use -  Urine Drug Screen and PHQ-9    Next Steps:   Set up office visit to get items done     Type of outreach:    Sent [employer] message. and Sent letter.      Questions for provider review:    None     Rashmi Sarkar MA

## 2022-12-28 NOTE — LETTER
December 28, 2022    Lamont Daniels  [address]  [place] [address]    Dear Lamont Daniels,     At [facility] we care about your health and are committed to providing quality patient care.    Which includes staying current on preventive cancer screenings.  You can increase your chances of finding and treating cancers through regular screenings.      Our records indicate you may be due for the following preventive screening(s):      Topic Date Due     Pneumococcal Vaccine (3 - PPSV23 if available, else PCV20) 12/12/2019   Chronic Opioid Use -  Urine Drug Screen and PHQ-9    To schedule an appointment or discuss this screening further, you may contact us by phone at the [facility] at [phone] or online through the patient portal/[email or web address] @ [email or web address]    If you have had any of the screenings listed above at another facility, please call us so that we may update your chart.      Your partners in health,      Quality Committee at [facility]

## 2022-12-30 NOTE — TELEPHONE ENCOUNTER
Message from [employer]:  Original authorizing provider: David Chavez MD, MD Lamont Daniels would like a refill of the following medications:  atorvastatin (LIPITOR) 80 MG tablet [David Chavez MD, MD]    Preferred pharmacy: [address] [place] [address]    Comment:  could you please refill.   Spironolactone Counseling: Patient advised regarding risks of diarrhea, abdominal pain, hyperkalemia, birth defects (for female patients), liver toxicity and renal toxicity. The patient may need blood work to monitor liver and kidney function and potassium levels while on therapy. The patient verbalized understanding of the proper use and possible adverse effects of spironolactone.  All of the patient's questions and concerns were addressed.

## 2023-01-10 DIAGNOSIS — F45.8 ANXIETY HYPERVENTILATION: ICD-10-CM

## 2023-01-10 DIAGNOSIS — F41.9 ANXIETY HYPERVENTILATION: ICD-10-CM

## 2023-01-11 DIAGNOSIS — G47.00 INSOMNIA, UNSPECIFIED TYPE: ICD-10-CM

## 2023-01-11 RX ORDER — CLONAZEPAM 1 MG/1
TABLET ORAL
Qty: 90 TABLET | Refills: 0 | Status: SHIPPED | OUTPATIENT
Start: 2023-01-11 | End: 2023-03-19

## 2023-01-11 RX ORDER — ZOLPIDEM TARTRATE 5 MG/1
5 TABLET ORAL
Qty: 30 TABLET | Refills: 0 | Status: SHIPPED | OUTPATIENT
Start: 2023-01-11 | End: 2023-02-20

## 2023-01-19 ENCOUNTER — OFFICE VISIT (OUTPATIENT)
Dept: RHEUMATOLOGY | Facility: CLINIC | Age: 58
End: 2023-01-19
Payer: COMMERCIAL

## 2023-01-19 VITALS
OXYGEN SATURATION: 97 % | SYSTOLIC BLOOD PRESSURE: 147 MMHG | BODY MASS INDEX: 27.36 KG/M2 | HEART RATE: 68 BPM | DIASTOLIC BLOOD PRESSURE: 92 MMHG | WEIGHT: 149.6 LBS

## 2023-01-19 DIAGNOSIS — M06.09 RHEUMATOID ARTHRITIS OF MULTIPLE SITES WITH NEGATIVE RHEUMATOID FACTOR (H): Primary | ICD-10-CM

## 2023-01-19 DIAGNOSIS — R53.83 FATIGUE, UNSPECIFIED TYPE: ICD-10-CM

## 2023-01-19 DIAGNOSIS — Z79.899 HIGH RISK MEDICATION USE: ICD-10-CM

## 2023-01-19 PROCEDURE — 99214 OFFICE O/P EST MOD 30 MIN: CPT | Performed by: INTERNAL MEDICINE

## 2023-01-19 RX ORDER — SULFASALAZINE 500 MG/1
1500 TABLET ORAL 2 TIMES DAILY
Qty: 540 TABLET | Refills: 2 | Status: SHIPPED | OUTPATIENT
Start: 2023-01-19 | End: 2023-09-22

## 2023-01-19 RX ORDER — LEFLUNOMIDE 20 MG/1
20 TABLET ORAL DAILY
Qty: 90 TABLET | Refills: 2 | Status: SHIPPED | OUTPATIENT
Start: 2023-01-19 | End: 2023-09-22

## 2023-01-19 NOTE — PATIENT INSTRUCTIONS
RHEUMATOLOGY    Dr. Sebastián Jenkins    [facility]  [address] [place] [address]  [place] [address]  Phone number: [phone]  Fax number: [phone]    You may schedule your FLU shot by calling [phone] or if you would like to get your shot at a [place] pharmacy you may schedule online at [email or web address].    Thank you for choosing [facility]!    Helga Guerrier CMA Rheumatology

## 2023-01-19 NOTE — PROGRESS NOTES
Rheumatology Clinic Visit      Lamont Daniels MRN# [id number]   YOB: 1965 Age: 57 year old      Date of visit: 1/19/23   PCP: Dr. David Chavez  Hepatologist: Dr. Thomas Leventhal     Chief Complaint   Patient presents with:  Rheumatoid Arthritis    Assessment and Plan   1.  Seropositive nonerosive rheumatoid arthritis (RF negative, CCP low positive): Note that he does have low back pain but without evidence of SI joint irregularity on x-ray.  Initially with morning stiffness all day, gelling phenomenon, and synovitis.   Previously on Humira (partially effective), Enbrel (partially effective), HCQ (cannot safely monitor due to right retinal scaring and bilateral early macular degeneration, per ophthalmology), Orencia (partially effective and could use again in the future if needed).  MTX avoided per Dr. Laboy, hematology, recommendation. Currently on leflunomide 20 mg daily, SSZ 1500mg BID, Simponi 50 mg SQ every 28 days (improved when changed from Orencia to Simponi).  RA controlled on current regimen.  Chronic illness, stable.    - Continue leflunomide 20 mg daily    - Continue sulfasalazine 1500mg BID  - Continue Simponi 50mg SQ every 28 days  - PRN RA flare only: Prednisone 10 mg daily x2 days, then 5 mg daily x5 days, then stop   - Labs in March: CBC, Creatinine, Hepatic Panel  - Labs in June: CBC, Creatinine, Hepatic Panel, ESR, CRP     High risk medication requiring intensive toxicity monitoring at least quarterly: labs ordered include CBC, Creatinine, Hepatic panel to monitor for cytopenia and hepatotoxicity; checking creatinine as it affects clearance of medication.      2. Lower back pain: Lumbar spine MRI in August 2014 showed multilevel degenerative changes and a small disc protrusion at L3/4 with mild spinal canal narrowing; also moderate left and mild right neural foraminal narrowing at L5-S1. He had a nerve conduction study in 2014 that was normal. He has been worked up by neurology in the past  "without significant neurologic findings. HLA-B27 negative, SI joint x-ray negative. Now following in the pain management clinic.     3. Myofascial pain syndrome / chronic pain syndrome: diffuse pain consistent with chronic pain syndrome.  Management per pain clinic as well as PCP.  I again encouraged that he increase physical activity as he leads a sedentary lifestyle with no more activity than what is required for his activities of daily living.      4. Chronic Hepatitis B: Managed by Dr. Laboy (hepatology) previously, and now Dr. Thomas Leventhal at the [facility]. Currently on tenofovir at the direction of gastroenterology.     5. Hepatic Steatosis: Based on previous liver biopsy.  Follows with gastroenterology.    6. Positive tuberculosis test, history:   This is noted here for historical significance.  He was evaluated by Dr. Anthony Mendoza, infectious disease. The following telephone note was documented by Dr. Mendoza: \"I tried calling th pt, finally we have the records from [place] . It appears he was treated for latent TB with INH for 1 year in 1980/1981 .Later in 1981 April he was admitted at [facility] in [place], [place] with rt sided pneumonia, TB was suspected but he improved with treatment with Penicillin only. Later he was seen  ( likely ID specialist), and was treated with 6 weeks course of Rifampin and Ethambutol pending sputum AFB culture. His AFB cx were negative based on the report we have.\"  \"He recently had QFT -TB test which was reported positive and his CXR was clear. Given his documented hx of completion of treatment for latent TB as above , I do not see any need for further treatment. His tests may remain positive irrespective of treatment status. From my perspective he can be started on DMARDs/Biological as deemed necessary.\"       7. Gout: On allopurinol and managed by PCP.    8.  Fatigue: Sleep apnea and uses CPAP.  Depression mostly controlled per patient and " follows with his PCP for this issue.  Unclear etiology for fatigue.  Check TSH.  Advised that he discuss with his PCP.  - Lab: TSH     9.  Vaccinations:   - Influenza: encouraged yearly vaccination  - Pulimea34: up to date  - [id number]: up to date  - Shingrix: Up to date  - COVID-19: up to date    10. Elevated blood pressure:  Lamont to follow up with Primary Care provider regarding elevated blood pressure.      Total minutes spent in evaluation with patient, documentation, , and review of pertinent studies and chart notes: 18    Mr. Daniels verbalized agreement with and understanding of the rational for the diagnosis and treatment plan.  All questions were answered to best of my ability and the patient's satisfaction. Mr. Daniels was advised to contact the clinic with any questions that may arise after the clinic visit.      Thank you for involving me in the care of the patient    Return to clinic: 3-4 months      HPI   Lamont Daniels is a 57 year old male with a medical history significant for hypertension, hyperlipidemia, gout, coronary artery disease s/p 6vCABG, vitamin D deficiency, hepatitis B, obstructive sleep apnea (uses CPAP), and RA who presents for f/u of RA.    Today, 1/19/2023: RA controlled.  Medications are effective for RA management.  He is taking tenofovir for hepatitis B at the direction of gastroenterology.  Still with fatigue; sleeps for about 8-9 hours per night, waking up refreshed; uses a CPAP.  Wakes up at night to urinate.  Depression fairly well controlled and follows with his PCP for this.    Denies fevers, chills, nausea, vomiting, constipation, diarrhea. No abdominal pain. Denies chest pain/pressure, palpitations, or shortness of breath.  No oral or nasal sores. No rash. No LE swelling.  Mild dry mouth; he has good dentition and does not feel like he needs to use frequent sips of water; unchanged since last evaluation. No dry eyes. No eye pain or redness.     Tobacco:  None   EtOH:   None  Drugs:  None  Occupation: Retired   Originally from [facility], then lived [place] north of [place], [place], then lived in [place], [place], then moved to [place] for family    ROS   12 point review of system was completed and negative except as noted in the HPI     Active Problem List     Patient Active Problem List   Diagnosis     Coronary atherosclerosis of native coronary artery     Major depressive disorder, recurrent episode, moderate (H)     Anxiety     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)     Hepatitis B infection     Vitamin D deficiency     S/P CABG (coronary artery bypass graft)     Malrotation of intestine     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     Hyperlipidemia LDL goal <70     Insomnia     Gout     Suicidal ideation     Essential hypertension     Rheumatoid arthritis involving multiple sites, unspecified rheumatoid factor presence     High risk medications (not anticoagulants) long-term use     Midline low back pain without sciatica     Bilateral low back pain with sciatica, sciatica laterality unspecified     Elevated liver enzymes     On corticosteroid therapy     Essential hypertension with goal blood pressure less than 140/90     Insomnia, unspecified type     Rheumatoid arthritis of multiple sites with negative rheumatoid factor (H)     Benign prostatic hyperplasia with lower urinary tract symptoms, unspecified morphology     Chronic pain syndrome     Hepatitis B, chronic (H)     Syncope, unspecified syncope type     Symptomatic cholelithiasis     H/O: gout     Anxiety and depression     Past Medical History     Past Medical History:   Diagnosis Date     Anxiety      CAD (coronary artery disease)     CABG, PCI     Congestive heart failure, unspecified 2008     Depressive disorder 2008     Gout      Hepatitis B > 10 years     HTN (hypertension)      Hyperlipidemia LDL goal < 100      Malrotation of intestine      Moderate major depression (H)      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) 9/19/2011    Uses  CPAP     Rheumatoid arthritis flare (H) 1012     Steatohepatitis 12/15    liver biopsy     Tuberculosis age 13    INH x 9 months      Vitamin D deficiency      Past Surgical History     Past Surgical History:   Procedure Laterality Date     ABDOMEN SURGERY  2014     BIOPSY  2015     BYPASS GRAFT ARTERY CORONARY  2008    6 vessels     COLONOSCOPY  2/8/2013    Procedure: COLONOSCOPY;  Colonoscopy, blood in stool;  Surgeon: Duane, William Charles, MD;  Location: [facility] OR     COMBINED REPAIR PTOSIS WITH BLEPHAROPLASTY BILATERAL Bilateral 6/25/2018    Procedure: COMBINED REPAIR PTOSIS WITH BLEPHAROPLASTY BILATERAL;  Bilateral upper eyelid blepharoplasty, ptosis repair and brow ptosis repair;  Surgeon: Sandra Borja MD;  Location: [facility] OR     GI SURGERY  2014     HEAD & NECK SURGERY  2011     NASAL/SINUS POLYPECTOMY  2010     ORTHOPEDIC SURGERY  2012     REPAIR PTOSIS       REPAIR PTOSIS BILATERAL Bilateral 7/22/2019    Procedure: REPAIR, PTOSIS, BOTH UPPER brows;  Surgeon: Sandra Borja MD;  Location: [facility] OR     REPAIR PTOSIS BROW BILATERAL Bilateral 6/25/2018    Procedure: REPAIR PTOSIS BROW BILATERAL;;  Surgeon: Sandra Borja MD;  Location: [facility] OR     THORACIC SURGERY  1989    tb     TONSILLECTOMY  2010     UVULOPALATOPHARYNGOPLASTY  2010     VASCULAR SURGERY  2008     [facility] CORONARY STENT PERCUT, EA ADDTL VESSEL  2008    3 months after CABG     Allergy     Allergies   Allergen Reactions     Citalopram Itching     Tramadol Itching     Current Medication List     Current Outpatient Medications   Medication Sig     Acetaminophen (TYLENOL PO)      allopurinol (ZYLOPRIM) 300 MG tablet TAKE 1 TABLET BY MOUTH EVERY DAY     amLODIPine (NORVASC) 5 MG tablet TAKE 1 TABLET BY MOUTH DAILY FOR BLOOD PRESSURE     aspirin EC 81 MG EC tablet Take 1 tablet (81 mg) by mouth daily (*)     atorvastatin (LIPITOR) 80 MG tablet Take 1 tablet (80 mg) by mouth daily     bacitracin 500 UNIT/GM external ointment Apply topically 3 times  daily     baclofen (LIORESAL) 10 MG tablet TAKE 1 TABLET BY MOUTH 2 TIMES DAILY AS NEEDED FOR MUSCLE SPASM     busPIRone HCl (BUSPAR) 30 MG tablet TAKE 1 TABLET (30 MG) BY MOUTH 2 TIMES DAILY     BUTRANS 20 MCG/HR WK patch Place 1 patch onto the skin every 7 days OK to fill 12/23/22 and start 12/25/22 LISANDRO, name brand. 28 day script for chronic pain.     Cholecalciferol (VITAMIN D) 2000 UNITS tablet TAKE ONE TABLET BY MOUTH ONCE DAILY     clobetasol (TEMOVATE) 0.05 % external cream Apply twice daily for 2 weeks, weekends only for 2 weeks, repeat cycle as needed for hands, not face or genitals     clobetasol (TEMOVATE) 0.05 % external solution Apply twice daily to scalp for up to 2 weeks for flares.     clonazePAM (KLONOPIN) 1 MG tablet TAKE 0.5-1 TABLETS BY MOUTH 2 TIMES DAILY AS NEEDED FOR ANXIETY     clopidogrel (PLAVIX) 75 MG tablet Take 1 tablet (75 mg) by mouth daily     colchicine (COLCYRS) 0.6 MG tablet TAKE 2 TABLETS BY MOUTH AT THE FIRST SIGN OF FLARE, TAKE 1 ADDITIONAL TABLET ONE HOUR LATER.     desvenlafaxine (PRISTIQ) 50 MG 24 hr tablet TAKE 1 TABLET BY MOUTH EVERY DAY     diclofenac (VOLTAREN) 1 % topical gel Apply 4 g topically 4 times daily as needed for moderate pain     diclofenac (VOLTAREN) 1 % topical gel Apply 4 grams to bilateral knees, up to four times daily as needed using enclosed dosing card.  Max of 32g/day     evolocumab (REPATHA) 140 MG/ML prefilled autoinjector Inject 1 mL (140 mg) Subcutaneous every 14 days     ezetimibe (ZETIA) 10 MG tablet Take 1 tablet (10 mg) by mouth daily     gabapentin (NEURONTIN) 600 MG tablet Take 1 tablet (600 mg) by mouth 3 times daily     gemfibrozil (LOPID) 600 MG tablet Take 1 tablet (600 mg) by mouth 2 times daily     golimumab (SIMPONI) 50 MG/0.5ML auto-injector pen Inject 0.5 mLs (50 mg) Subcutaneous every 28 days . Hold for signs of infection, and seek medical attention.     hydrocortisone (WESTCORT) 0.2 % external cream FOR GENITALS, APPLY TWICE DAILY  FOR UP TO 2 WEEKS, TAKE 2 WEEKS OFF THEN REPEAT     hydrocortisone 2.5 % cream FOR FACE, APPLY TWICE DAILY FOR UP TO 2 WEEK FOR FLARES ON THE FACE, TAKE 2 WEEKS OFF     Incontinence Supply Disposable (DEPEND UNDERWEAR LARGE) MISC Use twice daily.     leflunomide (ARAVA) 20 MG tablet Take 1 tablet (20 mg) by mouth daily ; Labs required every 8-12 weeks.     meclizine (ANTIVERT) 25 MG tablet TAKE 1 TABLET BY MOUTH EVERY 6 HOURS AS NEEDED FOR DIZZINESS.     melatonin 3 MG tablet Take 1 tablet (3 mg) by mouth nightly as needed for sleep     nitroGLYcerin (NITROSTAT) 0.4 MG sublingual tablet For chest pain place 1 tablet under the tongue every 5 minutes for 3 doses. If symptoms persist 5 minutes after 1st dose call 911.     order for DME Equipment being ordered: chair lift     order for DME Equipment being ordered: single end cane     ORDER FOR DME 1.  CPAP pressure 11 cm/H20 with heated humidity.   2.  Provide mask to fit and CPAP supplies.   3.  Length of need lifetime.   4.  If needed please provide a chin strap          pantoprazole (PROTONIX) 40 MG EC tablet TAKE 1 TABLET BY MOUTH EVERY DAY     pimecrolimus (ELIDEL) 1 % external cream APPLY TWICE DAILY FOR FACE AND GENITALS     sildenafil (REVATIO) 20 MG tablet TAKE 2 TO 5 TABLETS BY MOUTH 30 MINUTES PRIOR TO INTERCOURSE AS NEEDED.     sulfaSALAzine (AZULFIDINE) 500 MG tablet Take 3 tablets (1,500 mg) by mouth 2 times daily     tamsulosin (FLOMAX) 0.4 MG capsule TAKE 2 CAPSULES BY MOUTH EVERY DAY     tenofovir (VIREAD) 300 MG tablet Take 1 tablet (300 mg) by mouth daily     triamcinolone (KENALOG) 0.1 % external ointment APPLY TOPICALLY TO AFFECTED AREA(S) 3 TIMES DAILY     triamcinolone (KENALOG) 0.1 % external ointment Apply to trunk and extremities twice daily for up to 2 weeks for flares     zolpidem (AMBIEN) 5 MG tablet TAKE 1 TABLET (5 MG) BY MOUTH NIGHTLY AS NEEDED FOR SLEEP     naloxone (NARCAN) 4 MG/0.1ML nasal spray Spray 1 spray (4 mg) into one nostril  "alternating nostrils once as needed for opioid reversal every 2-3 minutes until assistance arrives     No current facility-administered medications for this visit.       Social History   See HPI    Family History     Family History   Problem Relation Age of Onset     C.A.D. Father      Coronary Artery Disease Father      Hypertension Father      Depression Father      Hypertension Mother      Diabetes Mother      Depression Mother      Mental Illness Mother      Hypertension Maternal Grandfather      Cancer Paternal Grandfather      Other Cancer Paternal Grandfather      Other Cancer Other      Autoimmune Disease No family hx of      Cerebrovascular Disease No family hx of      Thyroid Disease No family hx of      Glaucoma No family hx of      Macular Degeneration No family hx of      No family history of autoimmune disease  No family history of psoriasis     No change in family history since the previous clinic visit.     Physical Exam     Temp Readings from Last 3 Encounters:   05/31/22 97.2  F (36.2  C) (Tympanic)   11/11/21 98  F (36.7  C) (Tympanic)   03/19/21 97.9  F (36.6  C) (Tympanic)     BP Readings from Last 5 Encounters:   01/19/23 (!) 147/92   09/14/22 137/87   08/16/22 117/79   05/31/22 110/77   05/11/22 (!) 150/95     Pulse Readings from Last 1 Encounters:   01/19/23 68     Resp Readings from Last 1 Encounters:   05/31/22 17     Estimated body mass index is 27.36 kg/m  as calculated from the following:    Height as of 5/31/22: 1.575 m (5' 2\").    Weight as of this encounter: 67.9 kg (149 lb 9.6 oz).    GEN: NAD. Healthy appearing adult.   HEENT:  Anicteric, noninjected sclera. No obvious external lesions of the ear and nose. Hearing intact.  PULM: No increased work of breathing.   MSK: MCPs, PIPs, DIPs without swelling or tenderness to palpation.  Wrists without swelling or tenderness to palpation.  Elbows and shoulders without swelling or tenderness to palpation.  Knees, ankles, and MTPs without " swelling or tenderness to palpation.  SKIN: No rash or jaundice seen  PSYCH: Alert. Appropriate.      Labs     RF/CCP  Recent Labs   Lab Test 11/04/15  1547   CCPABY 27*     CBC  Recent Labs   Lab Test 12/17/22  1244 09/14/22  1037 07/15/22  1126 08/23/21  1130 03/29/21  1318 02/27/21  1259 01/30/21  1226   WBC 5.5 5.6 6.0   < > 5.2 4.4 5.5   RBC 4.64 4.73 4.48   < > 4.72 4.79 5.11   HGB 13.3 13.4 12.8*   < > 13.6 13.8 14.5   HCT 41.5 43.4 39.6*   < > 42.3 43.1 43.9   MCV 89 92 88   < > 90 90 86   RDW 14.5 15.3* 15.0   < > 14.6 14.7 13.5    296 276   < > 245 275 296   MCH 28.7 28.3 28.6   < > 28.8 28.8 28.4   MCHC 32.0 30.9* 32.3   < > 32.2 32.0 33.0   NEUTROPHIL 40 37 35   < > 45.1 40.9 50.5   LYMPH 43 38 42   < > 35.5 42.0 33.2   MONOCYTE 13 19 18   < > 15.3 12.6 12.7   EOSINOPHIL 4 4 4   < > 2.9 3.4 2.9   BASOPHIL 1 2 1   < > 1.2 1.1 0.7   ANEU  --   --   --   --  2.3 1.8 2.7   ALYM  --   --   --   --  1.8 1.9 1.8   PRACHI  --   --   --   --  0.8 0.6 0.7   AEOS  --   --   --   --  0.2 0.2 0.2   ABAS  --   --   --   --  0.1 0.1 0.0   ANEUTAUTO 2.2 2.1 2.1   < >  --   --   --    ALYMPAUTO 2.4 2.1 2.5   < >  --   --   --    AMONOAUTO 0.7 1.1 1.1   < >  --   --   --    AEOSAUTO 0.2 0.2 0.2   < >  --   --   --    ABSBASO 0.1 0.1 0.1   < >  --   --   --     < > = values in this interval not displayed.     CMP  Recent Labs   Lab Test 12/17/22  1244 09/14/22  1037 07/15/22  1126 04/26/22  1218 03/25/22  1549 10/20/21  1143 08/23/21  1130 03/29/21  1318 02/27/21  1259 01/30/21  1226 06/03/20  1317 03/26/20  1039 02/27/20  0816 11/25/19  0758   NA  --   --   --  145*  --   --   --   --   --   --   --  138  --  141   POTASSIUM  --   --   --  3.9  --   --   --   --   --   --   --  3.8  --  3.8   CHLORIDE  --   --   --  111*  --   --   --   --   --   --   --  109  --  110*   CO2  --   --   --  30  --   --   --   --   --   --   --  25  --  25   ANIONGAP  --   --   --  4  --   --   --   --   --   --   --  4  --  6   GLC   --   --   --  94 106* 94  --   --   --   --    < > 122*   < > 101*   BUN  --   --   --  12  --   --   --   --   --   --   --  16  --  16   CR 0.93 0.79 0.92 0.94 0.95 0.87   < > 1.01 0.95 0.94   < > 1.00   < > 0.78   GFRESTIMATED >90 >90 >90 >90 >90 >90   < > 83 89 >90   < > 85   < > >90   GFRESTBLACK  --   --   --   --   --   --   --  >90 >90 >90   < > >90   < > >90   HEMANT  --   --   --  9.3  --   --   --   --   --   --   --  9.0  --  9.2   BILITOTAL 0.3 0.4 0.4 0.4 0.5 0.6   < > 0.5 0.4 0.4   < > 0.3   < > 0.4   ALBUMIN 4.0 3.7 3.7 3.9 4.0 4.1   < > 3.9 4.1 4.0   < > 4.3   < > 4.0   PROTTOTAL 7.5 7.6 7.7 7.7 7.7 8.0   < > 7.6 7.6 7.6   < > 8.0   < > 7.3   ALKPHOS 82 100 99 93 118 120   < > 116 102 114   < > 123   < > 89   AST 23 21 15 24 27 22   < > 20 23 37   < > 23   < > 23   ALT 23 23 18 23 34 25   < > 31 27 46   < > 36   < > 47    < > = values in this interval not displayed.     Calcium/VitaminD  Recent Labs   Lab Test 04/26/22  1218 03/26/20  1039 11/25/19  0758 12/04/17  0924 07/13/17  1246 01/14/16  1541 11/04/15  1547   HEMANT 9.3 9.0 9.2   < >  --    < >  --    VITDT  --   --   --   --  34  --  43    < > = values in this interval not displayed.     ESR/CRP  Recent Labs   Lab Test 12/17/22  1244 09/14/22  1037 07/15/22  1126   SED 15 41* 84*   CRP <2.9 5.4 4.7     Lipid Panel  Recent Labs   Lab Test 06/07/22  0831 03/19/21  0928 06/25/20  1027 06/10/16  0850 06/29/15  1405   CHOL 128 142 122   < > 219*   TRIG 122 105 148   < > 372*   HDL 55 69 59   < > 33*   LDL 49 52 33   < > 112   VLDL  --   --   --   --  74*   CHOLHDLRATIO  --   --   --   --  6.6*   NHDL 73 73 63   < >  --     < > = values in this interval not displayed.     Hepatitis B  Recent Labs   Lab Test 04/26/22  1218 10/20/21  1144 10/20/21  1143 03/19/21  0928 03/26/20  1039 10/07/19  0940 12/01/16  1429 10/05/16  0954   AUSAB  --   --  0.09  --   --   --   --  0.45   HEPBANG  --   --   --   --   --   --   --  Reactive*   HBQLOG  --   --   --  Not  "Calculated <1.3 Not Calculated   < > 5.1*   HBQRES Not Detected Not Detected  --  HBV DNA Not Detected <20* HBV DNA Not Detected   < > 140,872*    < > = values in this interval not displayed.     Hepatitis C  Recent Labs   Lab Test 04/07/16  1538   HCVAB Nonreactive   Assay performance characteristics have not been established for newborns,   infants, and children       Lyme ab screening  Recent Labs   Lab Test 07/18/17  1330   LYMEGM 0.19     Tuberculosis Screening  Recent Labs   Lab Test 04/07/16  1538   TBRSLT Positive   The magnitude of the measured IFN-gamma level cannot be correlated to stage or   degree of infection, level of immune responsiveness, or likelihood for   progression to active disease.  *   TBAGN >10.00  This is a qualitative test.  The TB antigen IU/mL value is required for   documentation on certain government reporting forms but this value should not   be used to monitor disease progression or response to therapy.   Diagnosing or excluding tuberculosis disease, and assessing the probability of   LTBI, require a combination of epidemiological, historical, medical and   diagnostic findings that should be taken into account when interpreting   QuantiFERON TB results.       HIV Screening  Recent Labs   Lab Test 11/04/15  1547   HIAGAB Nonreactive   HIV-1 p24 Ag & HIV-1/HIV-2 Ab Not Detected       \"HAND BILATERAL THREE OR MORE VIEWS 11/4/2015 4:55 PM   HISTORY: Pain in unspecified joint.  COMPARISON: None.  IMPRESSION  IMPRESSION: Normal.  DANIS ANGLIN MD\"    \"FOOT BILATERAL THREE OR MORE VIEWS 11/4/2015 4:55 PM   HISTORY: Pain in unspecified joint.  COMPARISON: 8/21/2012.  IMPRESSION  IMPRESSION: Small traction spurs are seen off the Achilles tendon and  plantar fascial attachment sites bilaterally. Joint spaces are  preserved. Osseous structures are intact. Incidental note is made of  some surgical staples in the soft tissues of the medial distal right  lower extremity.  DANIS ANGLIN, " "MD\"      Immunization History     Immunization History   Administered Date(s) Administered     COVID-19 Vaccine 12+ (Pfizer 2022) 04/21/2022     COVID-19 Vaccine 12+ (Pfizer) 03/16/2021, 04/06/2021, 10/09/2021, 04/21/2022     COVID-19 Vaccine Bivalent Booster 12+ (Pfizer) 09/15/2022     Influenza (IIV3) PF 10/11/2011, 09/20/2017, 09/01/2018     Influenza Vaccine 50-64 or 18-64 w/egg allergy (Flublok) 09/23/2021     Influenza Vaccine >6 months (Alfuria,Fluzone) 09/27/2015, 09/08/2016, 09/30/2019, 09/14/2020     Influenza Vaccine, 6+MO IM (QUADRIVALENT W/PRESERVATIVES) 11/17/2014     Pneumo Conj 13-V (2010&after) 04/07/2016     Pneumococcal 23 valent 12/12/2014     TDAP Vaccine (Adacel) 08/30/2011     Tdap (Adacel,Boostrix) 10/20/2021     Zoster vaccine recombinant adjuvanted (SHINGRIX) 07/27/2019, 04/22/2021          Chart documentation done in part with Dragon Voice recognition Software. Although reviewed after completion, some word and grammatical error may remain.    Sebastián Jenkins MD     "

## 2023-01-19 NOTE — NURSING NOTE
RAPID3 (0-30) Cumulative Score  21.3          RAPID3 Weighted Score (divide #4 by 3 and that is the weighted score)  7.1

## 2023-01-23 ENCOUNTER — TELEPHONE (OUTPATIENT)
Dept: FAMILY MEDICINE | Facility: CLINIC | Age: 58
End: 2023-01-23
Payer: COMMERCIAL

## 2023-01-23 DIAGNOSIS — N52.9 ERECTILE DYSFUNCTION, UNSPECIFIED ERECTILE DYSFUNCTION TYPE: ICD-10-CM

## 2023-01-23 RX ORDER — SILDENAFIL CITRATE 20 MG/1
TABLET ORAL
Qty: 150 TABLET | Refills: 0 | Status: SHIPPED | OUTPATIENT
Start: 2023-01-23 | End: 2023-03-10

## 2023-01-26 DIAGNOSIS — E78.5 HYPERLIPIDEMIA LDL GOAL <70: ICD-10-CM

## 2023-01-26 DIAGNOSIS — Z95.1 S/P CABG (CORONARY ARTERY BYPASS GRAFT): ICD-10-CM

## 2023-01-27 ENCOUNTER — TELEPHONE (OUTPATIENT)
Dept: DERMATOLOGY | Facility: CLINIC | Age: 58
End: 2023-01-27
Payer: COMMERCIAL

## 2023-01-27 ENCOUNTER — APPOINTMENT (OUTPATIENT)
Dept: INTERPRETER SERVICES | Facility: CLINIC | Age: 58
End: 2023-01-27
Payer: COMMERCIAL

## 2023-01-27 NOTE — TELEPHONE ENCOUNTER
Writer attempted to call pt using interperter. Per Dr Roche pt needs to be seen in clinic before she will order refill of hydrocortisone cream.    Judith Cadena RN on 1/27/2023 at 4:08 PM

## 2023-01-30 ENCOUNTER — APPOINTMENT (OUTPATIENT)
Dept: INTERPRETER SERVICES | Facility: CLINIC | Age: 58
End: 2023-01-30
Payer: COMMERCIAL

## 2023-01-31 NOTE — TELEPHONE ENCOUNTER
evolocumab (REPATHA) 140 MG/ML prefilled autoinjector      Last Written Prescription Date:  12/28/2021  Last Fill Quantity: 2 ml,   # refills: 11  Last Office Visit : 6/24/2022  Lilly  Future Office visit:  6/30/2023    Routing refill request to provider for review/approval because:  Medication not on the Cardiology refill protocol.

## 2023-02-01 ENCOUNTER — TELEPHONE (OUTPATIENT)
Dept: CARDIOLOGY | Facility: CLINIC | Age: 58
End: 2023-02-01
Payer: COMMERCIAL

## 2023-02-01 RX ORDER — EVOLOCUMAB 140 MG/ML
140 INJECTION, SOLUTION SUBCUTANEOUS
Qty: 6 ML | Refills: 1 | Status: SHIPPED | OUTPATIENT
Start: 2023-02-01 | End: 2023-06-28

## 2023-02-01 NOTE — TELEPHONE ENCOUNTER
02/01   1st attempt.   Called patient and left voicemail, provided [phone] to scheduled fasting labs prior to appointment with .      uRth bunch Procedure   Cardiology, Nephrology, Rheumatology, GI, Pulmonology, Infectious Disease Specialties   [facility]             ----- Message from Sarah Arce RN sent at 1/31/2023  3:01 PM CST -----  Regarding: Please call and schedule patient for fasting lab prior to Thaddeus appt

## 2023-02-03 ENCOUNTER — APPOINTMENT (OUTPATIENT)
Dept: INTERPRETER SERVICES | Facility: CLINIC | Age: 58
End: 2023-02-03
Payer: COMMERCIAL

## 2023-02-16 ENCOUNTER — MYC MEDICAL ADVICE (OUTPATIENT)
Dept: PALLIATIVE MEDICINE | Facility: CLINIC | Age: 58
End: 2023-02-16
Payer: COMMERCIAL

## 2023-02-16 DIAGNOSIS — M17.11 PRIMARY OSTEOARTHRITIS OF RIGHT KNEE: ICD-10-CM

## 2023-02-16 DIAGNOSIS — M47.819 FACET ARTHROPATHY: ICD-10-CM

## 2023-02-16 DIAGNOSIS — F11.90 CHRONIC, CONTINUOUS USE OF OPIOIDS: ICD-10-CM

## 2023-02-16 DIAGNOSIS — M06.9 RHEUMATOID ARTHRITIS INVOLVING MULTIPLE SITES, UNSPECIFIED WHETHER RHEUMATOID FACTOR PRESENT (H): ICD-10-CM

## 2023-02-16 DIAGNOSIS — G89.4 CHRONIC PAIN SYNDROME: ICD-10-CM

## 2023-02-17 DIAGNOSIS — M62.830 SPASM OF BACK MUSCLES: ICD-10-CM

## 2023-02-17 DIAGNOSIS — K21.9 GASTROESOPHAGEAL REFLUX DISEASE WITHOUT ESOPHAGITIS: ICD-10-CM

## 2023-02-17 DIAGNOSIS — G47.00 INSOMNIA, UNSPECIFIED TYPE: ICD-10-CM

## 2023-02-17 DIAGNOSIS — R21 RASH: ICD-10-CM

## 2023-02-17 NOTE — TELEPHONE ENCOUNTER
Received call from patient requesting refill(s) of BUTRANS 20 MCG/HR WK patch    Last dispensed from pharmacy on 01/21/23    Patient's last office/virtual visit by prescribing provider on 08/16/22  Next office/virtual appointment scheduled for 03/22/23    Last urine drug screen date 11/04/21  Current opioid agreement on file (completed within the last year) Yes Date of opioid agreement: 05/11/23    E-prescribe to pharmacy-[facility] [id number] [facility] - [place]  [phone]    Will route to nursing [place] for review and preparation of prescription(s).

## 2023-02-17 NOTE — TELEPHONE ENCOUNTER
Please process a refill of BUTRANS 20 MCG/HR WK patch to     [id number] [place], [place] - [address] [place] [address]  [address] [facility] [place]  [place] [address]  Phone: [phone] Fax: [phone]

## 2023-02-17 NOTE — TELEPHONE ENCOUNTER
Medication refill information reviewed.     Due date for  BUTRANS 20 MCG/HR WK patch is 02/19/23    Prescriptions prepped for review.     Will route to provider.

## 2023-02-20 ENCOUNTER — TELEPHONE (OUTPATIENT)
Dept: PALLIATIVE MEDICINE | Facility: CLINIC | Age: 58
End: 2023-02-20
Payer: COMMERCIAL

## 2023-02-20 RX ORDER — ZOLPIDEM TARTRATE 5 MG/1
5 TABLET ORAL
Qty: 30 TABLET | Refills: 0 | Status: SHIPPED | OUTPATIENT
Start: 2023-02-20 | End: 2023-03-26

## 2023-02-20 RX ORDER — PANTOPRAZOLE SODIUM 40 MG/1
TABLET, DELAYED RELEASE ORAL
Qty: 90 TABLET | Refills: 1 | Status: SHIPPED | OUTPATIENT
Start: 2023-02-20 | End: 2023-08-21

## 2023-02-20 RX ORDER — TRIAMCINOLONE ACETONIDE 1 MG/G
OINTMENT TOPICAL
Qty: 80 G | Refills: 2 | Status: SHIPPED | OUTPATIENT
Start: 2023-02-20 | End: 2023-12-27

## 2023-02-20 RX ORDER — BUPRENORPHINE 20 UG/H
1 PATCH, EXTENDED RELEASE TRANSDERMAL
Qty: 4 PATCH | Refills: 1 | Status: SHIPPED | OUTPATIENT
Start: 2023-02-20 | End: 2023-03-20

## 2023-02-20 RX ORDER — BACLOFEN 10 MG/1
TABLET ORAL
Qty: 180 TABLET | Refills: 2 | Status: SHIPPED | OUTPATIENT
Start: 2023-02-20 | End: 2023-06-26

## 2023-02-20 NOTE — TELEPHONE ENCOUNTER
[facility] Call Center    Phone Message    May a detailed message be left on voicemail: yes     Reason for Call: Medication Refill Request    Has the patient contacted the pharmacy for the refill? Yes   Name of medication being requested: BUTRANS 20 MCG/HR WK patch  Provider who prescribed the medication: Ameena Lang APRN CNP  Pharmacy: [facility] [address] [address]  Date medication is needed: 2/20/2023  Patient states he called last week and is wanting to confirm he will receive his refill in time.    Action Taken: Message routed to:  Other: BG pain    Travel Screening: Not Applicable

## 2023-02-20 NOTE — TELEPHONE ENCOUNTER
Signed Prescriptions:                        Disp   Refills    BUTRANS 20 MCG/HR WK patch                 4 patch1        Sig: Place 1 patch onto the skin every 7 days OK to           fill/start 02/20/23 LISANDRO, name brand. 28 day           script for chronic pain.  Authorizing Provider: AMEENA PAGE    Please remind patient to call 5-7 BUSINESS DAYS PRIOR to needing opiate refill to avoid any possible breaks in medication. Thank-you.       Reviewed [facility] February 20, 2023- no concerning fills.    Ameena LEMUS, RN CNP, FNP  [facility]  [facility]

## 2023-02-20 NOTE — TELEPHONE ENCOUNTER
Duplicate request. Closing.    Angelique Bacon, ROBERTON, RN  Care Coordinator  [facility] [place]

## 2023-03-10 ENCOUNTER — MYC MEDICAL ADVICE (OUTPATIENT)
Dept: FAMILY MEDICINE | Facility: CLINIC | Age: 58
End: 2023-03-10
Payer: COMMERCIAL

## 2023-03-10 DIAGNOSIS — N52.9 ERECTILE DYSFUNCTION, UNSPECIFIED ERECTILE DYSFUNCTION TYPE: ICD-10-CM

## 2023-03-10 RX ORDER — SILDENAFIL CITRATE 20 MG/1
TABLET ORAL
Qty: 150 TABLET | Refills: 0 | OUTPATIENT
Start: 2023-03-10

## 2023-03-10 RX ORDER — SILDENAFIL CITRATE 20 MG/1
TABLET ORAL
Qty: 450 TABLET | Refills: 3 | Status: SHIPPED | OUTPATIENT
Start: 2023-03-10 | End: 2023-06-07

## 2023-03-17 ENCOUNTER — MYC MEDICAL ADVICE (OUTPATIENT)
Dept: PALLIATIVE MEDICINE | Facility: CLINIC | Age: 58
End: 2023-03-17
Payer: COMMERCIAL

## 2023-03-17 DIAGNOSIS — G89.4 CHRONIC PAIN SYNDROME: ICD-10-CM

## 2023-03-17 DIAGNOSIS — M47.819 FACET ARTHROPATHY: ICD-10-CM

## 2023-03-17 DIAGNOSIS — M06.9 RHEUMATOID ARTHRITIS INVOLVING MULTIPLE SITES, UNSPECIFIED WHETHER RHEUMATOID FACTOR PRESENT (H): ICD-10-CM

## 2023-03-17 DIAGNOSIS — M10.9 GOUT WITHOUT TOPHUS: ICD-10-CM

## 2023-03-17 DIAGNOSIS — F11.90 CHRONIC, CONTINUOUS USE OF OPIOIDS: ICD-10-CM

## 2023-03-17 DIAGNOSIS — M17.11 PRIMARY OSTEOARTHRITIS OF RIGHT KNEE: ICD-10-CM

## 2023-03-17 NOTE — TELEPHONE ENCOUNTER
Received call from patient requesting refill(s) of BUTRANS 20 MCG/HR WK patch     Last dispensed from pharmacy on 02/22/23    Patient's last office/virtual visit by prescribing provider on 08/26/22  Next office/virtual appointment scheduled for 03/22/23    Last urine drug screen date 11/04/21  Current opioid agreement on file (completed within the last year) No Date of opioid agreement: 05/11/22    E-prescribe to pharmacy-[facility] [place] [id number] [facility] - [place]  [phone]    Will route to nursing Arvin for review and preparation of prescription(s).

## 2023-03-17 NOTE — TELEPHONE ENCOUNTER
Please process a refill of BUTRANS 20 MCG/HR WK patch to     [id number] - [place] - [address] [place] [address]  [address] [facility] [address] [place]  [place] [address]  Phone: [phone] Fax: [phone]

## 2023-03-19 DIAGNOSIS — F41.9 ANXIETY HYPERVENTILATION: ICD-10-CM

## 2023-03-19 DIAGNOSIS — F45.8 ANXIETY HYPERVENTILATION: ICD-10-CM

## 2023-03-19 RX ORDER — CLONAZEPAM 1 MG/1
TABLET ORAL
Qty: 90 TABLET | Refills: 0 | Status: SHIPPED | OUTPATIENT
Start: 2023-03-19 | End: 2023-05-18

## 2023-03-20 RX ORDER — BUPRENORPHINE 20 UG/H
1 PATCH, EXTENDED RELEASE TRANSDERMAL
Qty: 4 PATCH | Refills: 1 | Status: SHIPPED | OUTPATIENT
Start: 2023-03-20 | End: 2023-06-19

## 2023-03-20 RX ORDER — COLCHICINE 0.6 MG/1
TABLET ORAL
Qty: 90 TABLET | Refills: 1 | Status: SHIPPED | OUTPATIENT
Start: 2023-03-20 | End: 2023-04-12

## 2023-03-20 NOTE — TELEPHONE ENCOUNTER
Medication refill information reviewed.     Due date for  BUTRANS 20 MCG/HR WK patch  is 03/22/23     Prescriptions prepped for review.     Will route to provider.

## 2023-03-21 NOTE — PROGRESS NOTES
[facility]    3/22/2023    Chief complaint:   low back pain and multiple joint pain (RA)      Interval history:  Lamont Daniels is a 57 year old male is known to me for:  Lumbar facet arthropathy  Rheumatoid arthritis involving multiple joints, unspecified rheumatoid factor  Primary osteoarthritis of right knee  Chronic continuous use of opioids  Chronic pain syndrome  PMHx includes: Anxiety, coronary artery disease, congestive heart failure (2008), depressive disorder (2008), gout, hepatitis B more than 10 years ago, hypertension, hyperlipidemia, malrotation of intestine, severe obstructive sleep apnea, rheumatoid arthritis (2012) steatohepatitis (2015), history of tuberculosis at age 13, vitamin D deficiency  PSHx includes: Coronary artery bypass graft 6 vessel (2008), abdominal surgery (2014), colonoscopy (2013), combined repair ptosis with blepharoplasty bilateral (2018), head and neck surgery (2011), nasal/sinus polypectomy (2010), orthopedic surgery (2012), repair ptosis bilateral (2019), repair ptosis brow bilateral (2018), thoracic surgery for tuberculosis (1989), tonsillectomy and uvulopalatal pharyngoplasty (2010), coronary artery stent 3 months after CABG (2008).      Recommendations/plan at the last visit on 8/16/2022 included:  1. Physical Therapy: none  2. Continue stretching at home  3. Clinical Health Psychologist to address issues of relaxation, behavioral change, coping style, and other factors important to improvement: none  4. Diagnostic Studies: none  5. Medication Management:   1. Continue Butrans 20mcg/hr transdermal change every 7 days, fill 8/30/22 and start 9/3/2022  2. Continue gabapentin 600mg three times daily (this was changed to 600mg tablets so you will only take 1 tab three times daily)  3. Continue Voltaren gel as needed  6. Further procedures recommended: none  7. Recommendations/follow-up for PCP:  See above  8. Release of information: none  9. Follow up:  "10-12 weeks in-person visit. Please call [phone] to make your follow-up appointment with me.       Since his last visit, Lamont Daniels reports:    Interval history March 22, 2023  -low back pain present, can radiate to the level of the knees.  -no imaging since 2014. Recommend updated lumbar MRI to check for progression of arthritis or listhesis.   -multiple joint pain remains, has RA        .Interval history August 16, 2022  -his pain is stable. Located in the axial low back and multiple joints from RA. He feels his pain is under okay control with current regimen.     Pain history collected at initial evaluation on 5/11/2022  He has had pain for about 10 years. He was diagnosed with Rheumatoid Arthritis. Lamont has pain in multiple joints. He also has chronic low back pain. The low back pain radiates down the posterolateral aspect of the legs to the level of the knee. His back pain was there when he was diagnosed with RA. He feels that he is doing well on the Butrans. Discussed switch to Belbuca if he ever wishes to increase his dosage, prefers to stay with use of the Butrans patch at the present time       At this point, the patient's participation with our multidisciplinary team includes:  The patient has been compliant with the program.  PT - not ordered  Health Psych - not ordered      Pain scores:  Pain intensity on average is 5-6 on a scale of 0-10.    Range is 4-8/10.   Pain right now is 4/10.   Pain is described as \"back pain is dull and his joints have a dull/numbness/stiffness.\"    Pain is constant in nature        Current pain relevant medications:   -allopurinol 300mg every day  -baclofen 10mg BID PRN muscle spasms (uses at least once per day, helpful)  -Voltaren gel 1% PRN (helpful)  -naloxone nasal spray PRN opiate reversal   -Butrans 20mcg/hr  TD Q 7 days (helpful)  -gabapentin 600mg TID (helpful)  -simponi 50mg/0.5ml inject every 28 days   -Arava 20mg every day  -meclizine 25mg Q 6 hours PRN " dizziness (helpful)  -Tylenol 1000mg (uses at least once per day, not more than 3 times per day)  -CBD oil at night (helpful for sleep)        Other pertinent medications:  -Ambien 5mg at bedtime as needed PRN sleep  -clonazepam 0.5-1mg BID PRN anxiety (uses one full tab in the AM)  -Plavix 75mg every day  -colchincine 0.6mg take 2 tabs at first sign of flare  -pristiq 50mg QD     Previous medication treatments included:  OPIATES: butrans (helpful), hydrocodone (helpful for other issues), Codeine (unsure), oxycodone (unsure)  NSAIDS: cannot use, on Plavix   MUSCLE RELAXANTS: Baclofen (helpful), methocarbamol (not helpful)  ANTI-MIGRAINE MEDS: none  ANTI-DEPRESSANTS: none  SLEEP AIDS: none  ANTI-CONVULSANTS: gabapentin (helpful)  TOPICALS: Voltaren gel (helpful)  ANXIOLYTICS: none  MEDICAL CANNABIS: none  Other meds: Tylenol (helpful)        Other treatments have included:  Lamont Daniels has been seen at a pain clinic in the past.  Previously managed by Dr. Lian Loo   PT: has tried, not helpful  Chiropractic care: no  Acupuncture: no  TENs Unit: no     Injections:  none      THE 4 A's OF OPIOID MAINTENANCE ANALGESIA    Analgesia: butrans is helpful    Activity: he does some stretching at home. Walks in the back yard    Adverse effects: none    Adherence to Rx protocol: yes      Side Effects: no side effect  Patient is using the medication as prescribed: YES    Medications:  Current Outpatient Medications   Medication Sig Dispense Refill     Acetaminophen (TYLENOL PO)        allopurinol (ZYLOPRIM) 300 MG tablet TAKE 1 TABLET BY MOUTH EVERY DAY 90 tablet 3     amLODIPine (NORVASC) 5 MG tablet TAKE 1 TABLET BY MOUTH DAILY FOR BLOOD PRESSURE 90 tablet 1     aspirin EC 81 MG EC tablet Take 1 tablet (81 mg) by mouth daily (*) 30 tablet 1     atorvastatin (LIPITOR) 80 MG tablet Take 1 tablet (80 mg) by mouth daily 90 tablet 3     bacitracin 500 UNIT/GM external ointment Apply topically 3 times daily 30 g 3     baclofen  (LIORESAL) 10 MG tablet TAKE 1 TABLET BY MOUTH 2 TIMES DAILY AS NEEDED FOR MUSCLE SPASM 180 tablet 2     busPIRone HCl (BUSPAR) 30 MG tablet TAKE 1 TABLET (30 MG) BY MOUTH 2 TIMES DAILY 180 tablet 3     BUTRANS 20 MCG/HR WK patch Place 1 patch onto the skin every 7 days OK to fill 03/20/23 to start 03/22/23. LISANDRO, name brand. 28 day script for chronic pain. 4 patch 1     Cholecalciferol (VITAMIN D) 2000 UNITS tablet TAKE ONE TABLET BY MOUTH ONCE DAILY 100 tablet 1     clobetasol (TEMOVATE) 0.05 % external cream Apply twice daily for 2 weeks, weekends only for 2 weeks, repeat cycle as needed for hands, not face or genitals 60 g 4     clobetasol (TEMOVATE) 0.05 % external solution Apply twice daily to scalp for up to 2 weeks for flares. 60 mL 0     clonazePAM (KLONOPIN) 1 MG tablet TAKE 0.5-1 TABLETS BY MOUTH 2 TIMES DAILY AS NEEDED FOR ANXIETY 90 tablet 0     clopidogrel (PLAVIX) 75 MG tablet Take 1 tablet (75 mg) by mouth daily 90 tablet 3     colchicine (COLCYRS) 0.6 MG tablet TAKE 2 TABLETS BY MOUTH AT THE FIRST SIGN OF FLARE, TAKE 1 ADDITIONAL TABLET ONE HOUR LATER. 90 tablet 1     desvenlafaxine (PRISTIQ) 50 MG 24 hr tablet TAKE 1 TABLET BY MOUTH EVERY DAY 90 tablet 3     diclofenac (VOLTAREN) 1 % topical gel Apply 4 g topically 4 times daily as needed for moderate pain 300 g 11     diclofenac (VOLTAREN) 1 % topical gel Apply 4 grams to bilateral knees, up to four times daily as needed using enclosed dosing card.  Max of 32g/day 300 g 11     evolocumab (REPATHA SURECLICK) 140 MG/ML prefilled autoinjector Inject 1 mL (140 mg) Subcutaneous every 14 days 6 mL 1     ezetimibe (ZETIA) 10 MG tablet Take 1 tablet (10 mg) by mouth daily 90 tablet 3     gabapentin (NEURONTIN) 600 MG tablet Take 1 tablet (600 mg) by mouth 3 times daily 270 tablet 3     gemfibrozil (LOPID) 600 MG tablet Take 1 tablet (600 mg) by mouth 2 times daily 180 tablet 3     golimumab (SIMPONI) 50 MG/0.5ML auto-injector pen Inject 0.5 mLs (50 mg)  Subcutaneous every 28 days . Hold for signs of infection, and seek medical attention. 0.5 mL 4     hydrocortisone (WESTCORT) 0.2 % external cream FOR GENITALS, APPLY TWICE DAILY FOR UP TO 2 WEEKS, TAKE 2 WEEKS OFF THEN REPEAT 60 g 3     hydrocortisone 2.5 % cream FOR FACE, APPLY TWICE DAILY FOR UP TO 2 WEEK FOR FLARES ON THE FACE, TAKE 2 WEEKS OFF 28.35 g 3     Incontinence Supply Disposable (DEPEND UNDERWEAR LARGE) MISC Use twice daily. 60 each 11     leflunomide (ARAVA) 20 MG tablet Take 1 tablet (20 mg) by mouth daily ; Labs required every 8-12 weeks. 90 tablet 2     meclizine (ANTIVERT) 25 MG tablet TAKE 1 TABLET BY MOUTH EVERY 6 HOURS AS NEEDED FOR DIZZINESS. 30 tablet 5     melatonin 3 MG tablet Take 1 tablet (3 mg) by mouth nightly as needed for sleep       naloxone (NARCAN) 4 MG/0.1ML nasal spray Spray 1 spray (4 mg) into one nostril alternating nostrils once as needed for opioid reversal every 2-3 minutes until assistance arrives 0.2 mL 0     pantoprazole (PROTONIX) 40 MG EC tablet TAKE 1 TABLET BY MOUTH EVERY DAY 90 tablet 1     pimecrolimus (ELIDEL) 1 % external cream APPLY TWICE DAILY FOR FACE AND GENITALS 60 g 1     sildenafil (REVATIO) 20 MG tablet TAKE 2 - 5 TABLETS BY MOUTH AS NEEDED 30 MINUTES BEFORE SEXUAL ACTIVITY. MAX 100MG IN 24 HOURS. 450 tablet 3     sulfaSALAzine (AZULFIDINE) 500 MG tablet Take 3 tablets (1,500 mg) by mouth 2 times daily 540 tablet 2     tamsulosin (FLOMAX) 0.4 MG capsule TAKE 2 CAPSULES BY MOUTH EVERY  capsule 3     tenofovir (VIREAD) 300 MG tablet Take 1 tablet (300 mg) by mouth daily 90 tablet 3     triamcinolone (KENALOG) 0.1 % external ointment APPLY TOPICALLY TO AFFECTED AREA(S) 3 TIMES DAILY 80 g 2     triamcinolone (KENALOG) 0.1 % external ointment Apply to trunk and extremities twice daily for up to 2 weeks for flares 80 g 1     zolpidem (AMBIEN) 5 MG tablet TAKE 1 TABLET (5 MG) BY MOUTH NIGHTLY AS NEEDED FOR SLEEP 30 tablet 0     nitroGLYcerin (NITROSTAT) 0.4  MG sublingual tablet For chest pain place 1 tablet under the tongue every 5 minutes for 3 doses. If symptoms persist 5 minutes after 1st dose call 911. (Patient not taking: Reported on 3/22/2023) 25 tablet 3     order for DME Equipment being ordered: chair lift 1 each 0     order for DME Equipment being ordered: single end cane 1 Units 0     ORDER FOR DME 1.  CPAP pressure 11 cm/H20 with heated humidity.   2.  Provide mask to fit and CPAP supplies.   3.  Length of need lifetime.   4.  If needed please provide a chin strap              Medical History: any changes in medical history since they were last seen? No    Social History:   Home situation: , lives with adult children  Occupation/Schooling: he is on disability due to heart issues and RA  Tobacco use: none  Alcohol use: none  Drug use: none  History of chemical dependency treatment: none    Is patient a current smoker or tobacco user?  no  If yes, was cessation counseling offered?  no          Physical Exam:  Vital signs: Blood pressure 124/81, pulse 61.    Behavioral observations:  Awake, alert and cooperative    Gait:  normal    Musculoskeletal exam:    Moves well in exam room  Strength equal throughout  Lumbar flexion 60 degrees  Lumbar extension 20 degrees  Lumbar ext/rot to the right is painful  Lumbar ext/rot to the left is painful  SI joints Non-tender bilaterally   Piriformis Non-tender bilaterally   GTs Non-tender bilaterally       Neuro exam:  SILT in all extremities     Skin/vascular/autonomic:  No suspicious lesions on exposed skin.     Other:  na    Is the patient hypertensive today? no  Hypertensive on recheck of BP?   na  If yes, was patient recommended to see Primary Care Provider in follow up for management of HTN?  na          [place] Prescription Monitoring Program:  Reviewed [place] [facility] 3/20/2023- no concerning fills.  Ameena LEMUS, RN CNP, FNP  [facility]  [facility]        Diagnostic  tests:  MRI of the Lumbar Spine without contrast     History: Lumbago.     Comparison: MRI thoracic spine 7/18/2014.     Technique: Sagittal T1-weighted, T2-weighted, STIR, and axial  T2-weighted spin echo and gradient echo images of the lumbar spine  were obtained without intravenous contrast.     Findings: There are 5 lumbar-type vertebrae assumed for the purposes  of this dictation. The tip of the conus medullaris is at L1.  The  lumbar vertebral column appears normally aligned. Straightening of the  normal lumbar lordosis. Mild loss of T2 signal in the L4-5 and L5-S1  intervertebral discs consistent with age-related changes. Type II  Modic degenerative changes in the opposing endplates at L5-S1.     On a level by level basis:     L1-2: Tiny left paracentral focal disc protrusion without spinal canal  or neural foraminal stenosis.     L2-3: No spinal canal or neural foraminal stenosis.     L3-4: Small focal posterior central disc protrusion with annular  fissuring and mild spinal canal narrowing. Also mild bilateral facet  hypertrophy. No neuroforaminal stenosis.     L4-5: Small focal posterior central disc protrusion with annular  fissuring. No spinal canal or neural foraminal stenosis.     L5-S1: Type II Modic degenerative changes of the opposing endplates.  Left greater than right facet hypertrophy. Small focal posterior  central disc protrusion with annular fissuring. No spinal canal  stenosis. Moderate left and mild right neural foraminal narrowing.     The visualized paraspinous tissues anteriorly are unremarkable.     IMPRESSION  Impression: Multilevel degenerative changes. Small disc protrusion at  L3-4 results in in mild spinal canal narrowing. Moderate left and mild  right neuroforaminal narrowing at L5-S1.     HELGA DAVID MD        FOOT BILATERAL THREE OR MORE VIEWS 11/4/2015 4:55 PM      HISTORY: Pain in unspecified joint.     COMPARISON: 8/21/2012.     IMPRESSION  IMPRESSION: Small traction spurs  are seen off the Achilles tendon and  plantar fascial attachment sites bilaterally. Joint spaces are  preserved. Osseous structures are intact. Incidental note is made of  some surgical staples in the soft tissues of the medial distal right  lower extremity.  DANIS ANGLIN MD        RIGHT KNEE THREE VIEWS  5/4/2017 3:16 PM    HISTORY: Pain in right knee.  COMPARISON: None                                                   IMPRESSION: No acute fracture or dislocation. Mild osteoarthritis.  Small joint effusion.     MICHELL TAMAYO MD     Other testing (labs, diagnostics):  4/26/2022  Cr. 0.94  Est GFR >90  Liver enzymes WNL        Screening tools:      DIRE Score for ongoing opioid management is calculated as follows:  Diagnosis = 2  Intractability = 2  Risk: Psych = 2  Chem Hlth = 2  Reliability = 3  Social = 2  Efficacy = 2  Total DIRE Score = 15 (14 or higher predicts good candidate for ongoing opioid management; 13 or lower predicts poor candidate for opioid management)            Assessment:  1.  facet arthropathy (lumbar)  2. Lumbar DDD  3. Chronic bilateral low back pain with bilateral sciatica  4. Rheumatoid arthritis involving multiple joints, unspecified rheumatoid factor  5. Primary osteoarthritis of right knee  6. Chronic pain syndrome  7. Encounter for long term use of opiate analgesic  8. PMHx includes: Anxiety, coronary artery disease, congestive heart failure (2008), depressive disorder (2008), gout, hepatitis B more than 10 years ago, hypertension, hyperlipidemia, malrotation of intestine, severe obstructive sleep apnea, rheumatoid arthritis (2012) steatohepatitis (2015), history of tuberculosis at age 13, vitamin D deficiency  9. PSHx includes: Coronary artery bypass graft 6 vessel (2008), abdominal surgery (2014), colonoscopy (2013), combined repair ptosis with blepharoplasty bilateral (2018), head and neck surgery (2011), nasal/sinus polypectomy (2010), orthopedic surgery (2012), repair ptosis  bilateral (2019), repair ptosis brow bilateral (2018), thoracic surgery for tuberculosis (1989), tonsillectomy and uvulopalatal pharyngoplasty (2010), coronary artery stent 3 months after CABG (2008).    Plan:  1. Physical Therapy: none  2. Continue stretching at home  3. Clinical Health Psychologist to address issues of relaxation, behavioral change, coping style, and other factors important to improvement: none  4. Diagnostic Studies: repeat lumbar MRI, call to schedule this in [place]  5. Medication Management:   6. Continue Butrans 20mcg/hr transdermal change every 7 days, fill 8/30/22 and start 9/3/2022  7. Changed to gabapentin 300mg capsules, you are currently using 600mg three times daily. Reduce by one capsule per day every week to see if you have less side effects and if you start having more pain, return to the previous dosing as you prefer.   8. Continue Voltaren gel as needed  9. Further procedures recommended: none  10. Recommendations/follow-up for PCP:  See above  11. Release of information: none  12. UDT today, wearing Butrans patch  13. Signed CSA  14. Follow up: 12 weeks in-person or virtual visit. Please call [phone] to make your follow-up appointment with me.         ASSESSMENT AND PLAN:  (M47.819) Facet arthropathy  (primary encounter diagnosis)    (M51.36) DDD (degenerative disc disease), lumbar    (M54.42,  M54.41,  G89.29) Chronic bilateral low back pain with bilateral sciatica  Plan: MR Lumbar Spine w/o Contrast, gabapentin         (NEURONTIN) 300 MG capsule        Continue Butrans patch    (M06.9) Rheumatoid arthritis involving multiple sites, unspecified whether rheumatoid factor present (H)    (M17.11) Primary osteoarthritis of right knee    (G89.4) Chronic pain syndrome  Plan: gabapentin (NEURONTIN) 300 MG capsule        Continue Butrans patch    (Z79.891) Encounter for long-term use of opiate analgesic  Plan: Drug Confirmation Panel Urine with Creat,         Ethanol  urine   Continue Butrans patch               Face to face time: 31 minutes          Ameena LEMUS, RN CNP, FNP  [facility]

## 2023-03-21 NOTE — TELEPHONE ENCOUNTER
Signed Prescriptions:                        Disp   Refills    BUTRANS 20 MCG/HR WK patch                 4 patch1        Sig: Place 1 patch onto the skin every 7 days OK to fill           03/20/23 to start 03/22/23. LISANDRO, name brand. 28           day script for chronic pain.  Authorizing Provider: AMEENA PAGE        Reviewed [place]  March 20, 2023- no concerning fills.    Amenea LEMUS, RN CNP, FNP  [facility]  [facility]

## 2023-03-22 ENCOUNTER — OFFICE VISIT (OUTPATIENT)
Dept: PALLIATIVE MEDICINE | Facility: CLINIC | Age: 58
End: 2023-03-22
Payer: COMMERCIAL

## 2023-03-22 ENCOUNTER — LAB (OUTPATIENT)
Dept: LAB | Facility: CLINIC | Age: 58
End: 2023-03-22
Payer: COMMERCIAL

## 2023-03-22 VITALS — HEART RATE: 61 BPM | SYSTOLIC BLOOD PRESSURE: 124 MMHG | DIASTOLIC BLOOD PRESSURE: 81 MMHG

## 2023-03-22 DIAGNOSIS — R53.83 FATIGUE, UNSPECIFIED TYPE: ICD-10-CM

## 2023-03-22 DIAGNOSIS — M51.369 DDD (DEGENERATIVE DISC DISEASE), LUMBAR: ICD-10-CM

## 2023-03-22 DIAGNOSIS — M17.11 PRIMARY OSTEOARTHRITIS OF RIGHT KNEE: ICD-10-CM

## 2023-03-22 DIAGNOSIS — M54.41 CHRONIC BILATERAL LOW BACK PAIN WITH BILATERAL SCIATICA: ICD-10-CM

## 2023-03-22 DIAGNOSIS — G89.4 CHRONIC PAIN SYNDROME: ICD-10-CM

## 2023-03-22 DIAGNOSIS — B18.1 CHRONIC VIRAL HEPATITIS B WITHOUT DELTA AGENT AND WITHOUT COMA (H): ICD-10-CM

## 2023-03-22 DIAGNOSIS — Z79.891 ENCOUNTER FOR LONG-TERM USE OF OPIATE ANALGESIC: ICD-10-CM

## 2023-03-22 DIAGNOSIS — M06.9 RHEUMATOID ARTHRITIS INVOLVING MULTIPLE SITES, UNSPECIFIED WHETHER RHEUMATOID FACTOR PRESENT (H): ICD-10-CM

## 2023-03-22 DIAGNOSIS — E78.5 HYPERLIPIDEMIA LDL GOAL <70: ICD-10-CM

## 2023-03-22 DIAGNOSIS — Z95.1 S/P CABG (CORONARY ARTERY BYPASS GRAFT): ICD-10-CM

## 2023-03-22 DIAGNOSIS — M54.42 CHRONIC BILATERAL LOW BACK PAIN WITH BILATERAL SCIATICA: ICD-10-CM

## 2023-03-22 DIAGNOSIS — G89.29 CHRONIC BILATERAL LOW BACK PAIN WITH BILATERAL SCIATICA: ICD-10-CM

## 2023-03-22 DIAGNOSIS — M47.819 FACET ARTHROPATHY: Primary | ICD-10-CM

## 2023-03-22 LAB
AFP SERPL-MCNC: <1.8 NG/ML
ERYTHROCYTE [DISTWIDTH] IN BLOOD BY AUTOMATED COUNT: 14.7 % (ref 10–15)
HCT VFR BLD AUTO: 39.5 % (ref 40–53)
HGB BLD-MCNC: 12.8 G/DL (ref 13.3–17.7)
INR PPP: 1.1 (ref 0.85–1.15)
MCH RBC QN AUTO: 29.7 PG (ref 26.5–33)
MCHC RBC AUTO-ENTMCNC: 32.4 G/DL (ref 31.5–36.5)
MCV RBC AUTO: 92 FL (ref 78–100)
PLATELET # BLD AUTO: 257 10E3/UL (ref 150–450)
RBC # BLD AUTO: 4.31 10E6/UL (ref 4.4–5.9)
WBC # BLD AUTO: 5.4 10E3/UL (ref 4–11)

## 2023-03-22 PROCEDURE — 36415 COLL VENOUS BLD VENIPUNCTURE: CPT

## 2023-03-22 PROCEDURE — 80053 COMPREHEN METABOLIC PANEL: CPT

## 2023-03-22 PROCEDURE — 87517 HEPATITIS B DNA QUANT: CPT

## 2023-03-22 PROCEDURE — 87341 HEP B SURFACE AG NEUTRLZJ IA: CPT

## 2023-03-22 PROCEDURE — 82105 ALPHA-FETOPROTEIN SERUM: CPT

## 2023-03-22 PROCEDURE — 84443 ASSAY THYROID STIM HORMONE: CPT

## 2023-03-22 PROCEDURE — 85610 PROTHROMBIN TIME: CPT

## 2023-03-22 PROCEDURE — 82248 BILIRUBIN DIRECT: CPT

## 2023-03-22 PROCEDURE — 99214 OFFICE O/P EST MOD 30 MIN: CPT | Performed by: NURSE PRACTITIONER

## 2023-03-22 PROCEDURE — 99000 SPECIMEN HANDLING OFFICE-LAB: CPT

## 2023-03-22 PROCEDURE — 87340 HEPATITIS B SURFACE AG IA: CPT

## 2023-03-22 PROCEDURE — 80061 LIPID PANEL: CPT

## 2023-03-22 PROCEDURE — 85027 COMPLETE CBC AUTOMATED: CPT

## 2023-03-22 PROCEDURE — 86707 HEPATITIS BE ANTIBODY: CPT | Mod: 90

## 2023-03-22 PROCEDURE — 80320 DRUG SCREEN QUANTALCOHOLS: CPT

## 2023-03-22 RX ORDER — GABAPENTIN 300 MG/1
CAPSULE ORAL
Qty: 270 CAPSULE | Refills: 1 | Status: SHIPPED | OUTPATIENT
Start: 2023-03-22 | End: 2023-06-20

## 2023-03-22 ASSESSMENT — PAIN SCALES - GENERAL: PAINLEVEL: MODERATE PAIN (4)

## 2023-03-22 NOTE — LETTER
Opioid / Opioid Plus Controlled Substance Agreement    This is an agreement between you and your provider about the safe and appropriate use of controlled substance/opioids prescribed by your care team. Controlled substances are medicines that can cause physical and mental dependence (abuse).    There are strict laws about having and using these medicines. We here at [facility] are committing to working with you in your efforts to get better. To support you in this work, we ll help you schedule regular office appointments for medicine refills. If we must cancel or change your appointment for any reason, we ll make sure you have enough medicine to last until your next appointment.     As a Provider, I will:    Listen carefully to your concerns and treat you with respect.     Recommend a treatment plan that I believe is in your best interest. This plan may involve therapies other than opioid pain medication.     Talk with you often about the possible benefits, and the risk of harm of any medicine that we prescribe for you.     Provide a plan on how to taper (discontinue or go off) using this medicine if the decision is made to stop its use.    As a Patient, I understand that opioid(s):     Are a controlled substance prescribed by my care team to help me function or work and manage my condition(s).     Are strong medicines and can cause serious side effects such as:    Drowsiness, which can seriously affect my driving ability    A lower breathing rate, enough to cause death    Harm to my thinking ability     Depression     Abuse of and addiction to this medicine    Need to be taken exactly as prescribed. Combining opioids with certain medicines or chemicals (such as illegal drugs, sedatives, sleeping pills, and benzodiazepines) can be dangerous or even fatal. If I stop opioids suddenly, I may have severe withdrawal symptoms.    Do not work for all types of pain nor for all patients. If they re not helpful, I may  be asked to stop them.        The risks, benefits and side effects of these medicine(s) were explained to me. I agree that:  1. I will take part in other treatments as advised by my care team. This may be psychiatry or counseling, physical therapy, behavioral therapy, group treatment or a referral to a specialist.     2. I will keep all my appointments. I understand that this is part of the monitoring of opioids. My care team may require an office visit for EVERY opioid/controlled substance refill. If I miss appointments or don t follow instructions, my care team may stop my medicine.    3. I will take my medicines as prescribed. I will not change the dose or schedule unless my care team tells me to. There will be no refills if I run out early.     4. I may be asked to come to the clinic and complete a urine drug test or complete a pill count at any time. If I don t give a urine sample or participate in a pill count, the care team may stop my medicine.    5. I will only receive prescriptions from this clinic for chronic pain. If I am treated by another provider for acute pain issues, I will tell them that I am taking opioid pain medication for chronic pain and that I have a treatment agreement with this provider. I will inform my [facility] care team within one business day if I am given a prescription for any pain medication by another healthcare provider. My [facility] care team can contact other providers and pharmacists about my use of any medicines.    6. It is up to me to make sure that I don t run out of my medicines on weekends or holidays. If my care team is willing to refill my opioid prescription without a visit, I must request refills only during office hours. Refills may take up to 3 business days to process. I will use one pharmacy to fill all my opioid and other controlled substance prescriptions. I will notify the clinic about any changes to my insurance or medication  availability.    7. I am responsible for my prescriptions. If the medicine/prescription is lost, stolen or destroyed, it will not be replaced. I also agree not to share controlled substance medicines with anyone.    8. I am aware I should not use any illegal or recreational drugs. I agree not to drink alcohol unless my care team says I can.       9. If I enroll in the [place] Medical Cannabis program, I will tell my care team prior to my next refill.     10. I will tell my care team right away if I become pregnant, have a new medical problem treated outside of my regular clinic, or have a change in my medications.    11. I understand that this medicine can affect my thinking, judgment and reaction time. Alcohol and drugs affect the brain and body, which can affect the safety of my driving. Being under the influence of alcohol or drugs can affect my decision-making, behaviors, personal safety, and the safety of others. Driving while impaired (DWI) can occur if a person is driving, operating, or in physical control of a car, motorcycle, boat, snowmobile, ATV, motorbike, off-road vehicle, or any other motor vehicle ([place] Statute 169A.20). I understand the risk if I choose to drive or operate any vehicle or machinery.    I understand that if I do not follow any of the conditions above, my prescriptions or treatment may be stopped or changed.          Opioids  What You Need to Know    What are opioids?   Opioids are pain medicines that must be prescribed by a doctor. They are also known as narcotics.     Examples are:   1. morphine (MS Contin, Codi)  2. oxycodone (Oxycontin)  3. oxycodone and acetaminophen (Percocet)  4. hydrocodone and acetaminophen (Vicodin, Norco)   5. fentanyl patch (Duragesic)   6. hydromorphone (Dilaudid)   7. methadone  8. codeine (Tylenol #3)     What do opioids do well?   Opioids are best for severe short-term pain such as after a surgery or injury. They may work well for cancer pain. They may  help some people with long-lasting (chronic) pain.     What do opioids NOT do well?   Opioids never get rid of pain entirely, and they don t work well for most patients with chronic pain. Opioids don t reduce swelling, one of the causes of pain.                                    Other ways to manage chronic pain and improve function include:       Treat the health problem that may be causing pain    Anti-inflammation medicines, which reduce swelling and tenderness, such as ibuprofen (Advil, Motrin) or naproxen (Aleve)    Acetaminophen (Tylenol)    Antidepressants and anti-seizure medicines, especially for nerve pain    Topical treatments such as patches or creams    Injections or nerve blocks    Chiropractic or osteopathic treatment    Acupuncture, massage, deep breathing, meditation, visual imagery, aromatherapy    Use heat or ice at the pain site    Physical therapy     Exercise    Stop smoking    Take part in therapy       Risks and side effects     Talk to your doctor before you start or decide to keep taking opioids. Possible side effects include:      Lowering your breathing rate enough to cause death    Overdose, including death, especially if taking higher than prescribed doses    Worse depression symptoms; less pleasure in things you usually enjoy    Feeling tired or sluggish    Slower thoughts or cloudy thinking    Being more sensitive to pain over time; pain is harder to control    Trouble sleeping or restless sleep    Changes in hormone levels (for example, less testosterone)    Changes in sex drive or ability to have sex    Constipation    Unsafe driving    Itching and sweating    Dizziness    Nausea, throwing up and dry mouth    What else should I know about opioids?    Opioids may lead to dependence, tolerance, or addiction.      Dependence means that if you stop or reduce the medicine too quickly, you will have withdrawal symptoms. These include loose poop (diarrhea), jitters, flu-like symptoms,  nervousness and tremors. Dependence is not the same as addiction.                       Tolerance means needing higher doses over time to get the same effect. This may increase the chance of serious side effects.      Addiction is when people improperly use a substance that harms their body, their mind or their relations with others. Use of opiates can cause a relapse of addiction if you have a history of drug or alcohol abuse.      People who have used opioids for a long time may have a lower quality of life, worse depression, higher levels of pain and more visits to doctors.    You can overdose on opioids. Take these steps to lower your risk of overdose:    1. Recognize the signs:  Signs of overdose include decrease or loss of consciousness (blackout), slowed breathing, trouble waking up and blue lips. If someone is worried about overdose, they should call 911.    2. Talk to your doctor about Narcan (naloxone).   If you are at risk for overdose, you may be given a prescription for Narcan. This medicine very quickly reverses the effects of opioids.   If you overdose, a friend or family member can give you Narcan while waiting for the ambulance. They need to know the signs of overdose and how to give Narcan.     3. Don't use alcohol or street drugs.   Taking them with opioids can cause death.    4. Do not take any of these medicines unless your doctor says it s OK. Taking these with opioids can cause death:    Benzodiazepines, such as lorazepam (Ativan), alprazolam (Xanax) or diazepam (Valium)    Muscle relaxers, such as cyclobenzaprine (Flexeril)    Sleeping pills like zolpidem (Ambien)     Other opioids      How to keep you and other people safe while taking opioids:    1. Never share your opioids with others.  Opioid medicines are regulated by the Drug Enforcement Agency ([employer]GAN). Selling or sharing medications is a criminal act.    2. Be sure to store opioids in a secure place, locked up if possible. Young children  can easily swallow them and overdose.    3. When you are traveling with your medicines, keep them in the original bottles. If you use a pill box, be sure you also carry a copy of your medicine list from your clinic or pharmacy.    4. Safe disposal of opioids    Most pharmacies have places to get rid of medicine, called disposal kiosks. Medicine disposal options are also available in every [facility]. Search your county and  medication disposal  to find more options. You can find more details at:  https://www.Located [facility].[place]./living-green/managing-unwanted-medications     I agree that my provider, clinic care team, and pharmacy may work with any city, state or federal law enforcement agency that investigates the misuse, sale, or other diversion of my controlled medicine. I will allow my provider to discuss my care with, or share a copy of, this agreement with any other treating provider, pharmacy or emergency room where I receive care.    I have read this agreement and have asked questions about anything I did not understand.    _______________________________________________________  Patient Signature - Lamont Daniels _____________________                   Date     _______________________________________________________  Provider Signature - MARIAH Loyd CNP   _____________________                   Date     _______________________________________________________  Witness Signature (required if provider not present while patient signing)   _____________________                   Date

## 2023-03-22 NOTE — PATIENT INSTRUCTIONS
Plan:  Physical Therapy: none  Continue stretching at home  Clinical Health Psychologist to address issues of relaxation, behavioral change, coping style, and other factors important to improvement: none  Diagnostic Studies: repeat lumbar MRI, call to schedule this in [place]  Medication Management:   Continue Butrans 20mcg/hr transdermal change every 7 days, fill 8/30/22 and start 9/3/2022  Changed to gabapentin 300mg capsules, you are currently using 600mg three times daily. Reduce by one capsule per day every week to see if you have less side effects and if you start having more pain, return to the previous dosing as you prefer.   Continue Voltaren gel as needed  Further procedures recommended: none  Recommendations/follow-up for PCP:  See above  Release of information: none  UDT today, wearing Butrans patch  Signed CSA  Follow up: 12 weeks in-person or virtual visit. Please call [phone] to make your follow-up appointment with me.     ----------------------------------------------------------------  Clinic Number:  [phone]   Call with any questions about your care and for scheduling assistance.   Calls are returned Monday through Friday between 8 AM and 4:30 PM. We usually get back to you within 2 business days depending on the issue/request.    If we are prescribing your medications:  For opioid medication refills, call the clinic or send a UXCam message 7 days in advance.  Please include:  Name of requested medication  Name of the pharmacy.  For non-opioid medications, call your pharmacy directly to request a refill. Please allow 3-4 days to be processed.   Per [place] State Law:  All controlled substance prescriptions must be filled within 30 days of being written.    For those controlled substances allowing refills, pickup must occur within 30 days of last fill.      We believe regular attendance is key to your success in our program!    Any time you are unable to keep your appointment we ask that  you call us at least 24 hours in advance to cancel.This will allow us to offer the appointment time to another patient.   Multiple missed appointments may lead to dismissal from the clinic.

## 2023-03-23 LAB
ALBUMIN SERPL-MCNC: 3.7 G/DL (ref 3.4–5)
ALP SERPL-CCNC: 121 U/L (ref 40–150)
ALT SERPL W P-5'-P-CCNC: 19 U/L (ref 0–70)
ANION GAP SERPL CALCULATED.3IONS-SCNC: 2 MMOL/L (ref 3–14)
AST SERPL W P-5'-P-CCNC: 18 U/L (ref 0–45)
BILIRUB DIRECT SERPL-MCNC: 0.1 MG/DL (ref 0–0.2)
BILIRUB SERPL-MCNC: 0.4 MG/DL (ref 0.2–1.3)
BUN SERPL-MCNC: 13 MG/DL (ref 7–30)
CALCIUM SERPL-MCNC: 8.8 MG/DL (ref 8.5–10.1)
CHLORIDE BLD-SCNC: 110 MMOL/L (ref 94–109)
CHOLEST SERPL-MCNC: 111 MG/DL
CO2 SERPL-SCNC: 30 MMOL/L (ref 20–32)
CREAT SERPL-MCNC: 0.88 MG/DL (ref 0.66–1.25)
CREAT UR-MCNC: 246 MG/DL
ETHANOL UR QL SCN: NORMAL
FASTING STATUS PATIENT QL REPORTED: YES
GFR SERPL CREATININE-BSD FRML MDRD: >90 ML/MIN/1.73M2
GLUCOSE BLD-MCNC: 113 MG/DL (ref 70–99)
HBV SURFACE AG SERPL QL IA: REACTIVE
HDLC SERPL-MCNC: 50 MG/DL
LDLC SERPL CALC-MCNC: 36 MG/DL
NONHDLC SERPL-MCNC: 61 MG/DL
POTASSIUM BLD-SCNC: 3.6 MMOL/L (ref 3.4–5.3)
PROT SERPL-MCNC: 7.1 G/DL (ref 6.8–8.8)
SODIUM SERPL-SCNC: 142 MMOL/L (ref 133–144)
TRIGL SERPL-MCNC: 125 MG/DL
TSH SERPL DL<=0.005 MIU/L-ACNC: 2.21 MU/L (ref 0.4–4)

## 2023-03-23 NOTE — RESULT ENCOUNTER NOTE
No evidence for recent alcohol ingestion while on opiate medication. This is as expected.   Ameena LEMUS, RN CNP, FNP  [facility]

## 2023-03-24 LAB
7AMINOCLONAZEPAM UR QL CFM: PRESENT
BUPRENORPHINE UR CFM-MCNC: 64 NG/ML
BUPRENORPHINE/CREAT UR: 26 NG/MG {CREAT}
GABAPENTIN UR QL CFM: PRESENT
HBV DNA SERPL NAA+PROBE-ACNC: NOT DETECTED IU/ML
HBV E AB SERPL QL IA: POSITIVE
NORBUPRENORPHINE UR CFM-MCNC: 16 NG/ML
NORBUPRENORPHINE/CREAT UR: 7 NG/MG {CREAT}

## 2023-03-25 DIAGNOSIS — G47.00 INSOMNIA, UNSPECIFIED TYPE: ICD-10-CM

## 2023-03-25 NOTE — RESULT ENCOUNTER NOTE
Urine drug testing as expected. No illicit medication or alcohol noted. Continue standard monitoring while on opiates.   Ameena LEMUS RN CNP, FNP  [facility] [place]

## 2023-03-26 RX ORDER — ZOLPIDEM TARTRATE 5 MG/1
5 TABLET ORAL
Qty: 30 TABLET | Refills: 5 | Status: SHIPPED | OUTPATIENT
Start: 2023-03-26 | End: 2024-06-27

## 2023-03-27 ENCOUNTER — TELEPHONE (OUTPATIENT)
Dept: FAMILY MEDICINE | Facility: CLINIC | Age: 58
End: 2023-03-27
Payer: COMMERCIAL

## 2023-03-27 NOTE — TELEPHONE ENCOUNTER
Plan does not cover sildenafil (REVATIO) 20 MG tablet.  Please go to [email or web address] to initiate Prior Auth or switch to alternative medication.    Insurance type and ID number: Key:  [id number]      Additional Information:     Melissa Barnes

## 2023-03-28 ENCOUNTER — ANCILLARY PROCEDURE (OUTPATIENT)
Dept: ULTRASOUND IMAGING | Facility: CLINIC | Age: 58
End: 2023-03-28
Attending: INTERNAL MEDICINE
Payer: COMMERCIAL

## 2023-03-28 DIAGNOSIS — B18.1 CHRONIC VIRAL HEPATITIS B WITHOUT DELTA AGENT AND WITHOUT COMA (H): ICD-10-CM

## 2023-03-28 PROCEDURE — 76700 US EXAM ABDOM COMPLETE: CPT

## 2023-03-29 ENCOUNTER — TELEPHONE (OUTPATIENT)
Dept: FAMILY MEDICINE | Facility: CLINIC | Age: 58
End: 2023-03-29
Payer: COMMERCIAL

## 2023-03-29 NOTE — TELEPHONE ENCOUNTER
Plan does not cover sildenafil (REVATIO) 20 MG tablet.  Please go to [email or web address] to initiate Prior Auth or switch to alternative medication.    Insurance type and ID number: Key:  [id number]      Additional Information:     Melissa Barnes

## 2023-04-04 ENCOUNTER — ANCILLARY PROCEDURE (OUTPATIENT)
Dept: MRI IMAGING | Facility: CLINIC | Age: 58
End: 2023-04-04
Attending: NURSE PRACTITIONER
Payer: COMMERCIAL

## 2023-04-04 DIAGNOSIS — M51.369 DDD (DEGENERATIVE DISC DISEASE), LUMBAR: ICD-10-CM

## 2023-04-04 DIAGNOSIS — G89.29 CHRONIC BILATERAL LOW BACK PAIN WITH BILATERAL SCIATICA: ICD-10-CM

## 2023-04-04 DIAGNOSIS — M54.41 CHRONIC BILATERAL LOW BACK PAIN WITH BILATERAL SCIATICA: ICD-10-CM

## 2023-04-04 DIAGNOSIS — M54.42 CHRONIC BILATERAL LOW BACK PAIN WITH BILATERAL SCIATICA: ICD-10-CM

## 2023-04-04 PROCEDURE — 72148 MRI LUMBAR SPINE W/O DYE: CPT | Mod: TC | Performed by: RADIOLOGY

## 2023-04-11 ENCOUNTER — TELEPHONE (OUTPATIENT)
Dept: FAMILY MEDICINE | Facility: CLINIC | Age: 58
End: 2023-04-11
Payer: COMMERCIAL

## 2023-04-11 NOTE — TELEPHONE ENCOUNTER
Patient Quality Outreach    Patient is due for the following:   Colon Cancer Screening  Depression  -  PHQ-9 needed    Next Steps:   No follow up needed at this time.    Type of outreach:    Sent [employer] message.      Questions for provider review:    None     Kyung Floyd

## 2023-04-12 DIAGNOSIS — M10.9 GOUT WITHOUT TOPHUS: ICD-10-CM

## 2023-04-12 RX ORDER — COLCHICINE 0.6 MG/1
TABLET ORAL
Qty: 90 TABLET | Refills: 1 | Status: SHIPPED | OUTPATIENT
Start: 2023-04-12 | End: 2023-11-03

## 2023-04-14 ENCOUNTER — MYC MEDICAL ADVICE (OUTPATIENT)
Dept: PALLIATIVE MEDICINE | Facility: CLINIC | Age: 58
End: 2023-04-14
Payer: COMMERCIAL

## 2023-04-17 ENCOUNTER — TELEPHONE (OUTPATIENT)
Dept: PALLIATIVE MEDICINE | Facility: CLINIC | Age: 58
End: 2023-04-17
Payer: COMMERCIAL

## 2023-04-17 NOTE — TELEPHONE ENCOUNTER
Already at pharmacy.    Angelique Bacon, BSN, RN  Care Coordinator  [facility] [place]

## 2023-04-24 NOTE — TELEPHONE ENCOUNTER
Outreach X1. Left a  requesting call back. Provided call back number.    Chart review:    Last visit with Dr. Loo: 11/04/22  Any changes to regimen at last visit:   No   On opioids:  Yes:  Butrans patch 20 mcg . Has been on Butrans patch since 2018  Hx of injections:  No   Medical cannabis:  No         Plan:  Transfer refill to PCP           
Pt is on Butrans Patch, Pt can't Tx to PCP.  Please help Pt schedule with new Pain provider     Arnold Pete RN  Patient Care Supervisor   [facility] [place]     
Spoke with patient and notified him of provider status. He was in agreement with the refill being transferred to primary. Will fill until that plan is in place.    ROBERTO BarksdaleN, RN  Care Coordinator  [facility] [place]    
patient called returning a call from nursing.          Rosie Mendoza    [facility]     
patient is scheduled for follow up.      Rosie Mendoza    [facility]       
Disabled

## 2023-04-25 NOTE — TELEPHONE ENCOUNTER
Central Prior Authorization Team  Phone: [phone]    PA Initiation    Medication: sildenafil (REVATIO) 20 MG tablet  Insurance Company: [employer] - Phone [phone] Fax [phone]  Pharmacy Filling the Rx: [id number] - [place] - [address] [address]  Filling Pharmacy Phone: [phone]  Filling Pharmacy Fax:    Start Date: 4/25/2023

## 2023-04-26 NOTE — TELEPHONE ENCOUNTER
Central Prior Authorization Team  Phone: [phone]    PRIOR AUTHORIZATION DENIED    Medication: sildenafil (REVATIO) 20 MG tablet    Denial Date: 4/26/2023    Denial Rational:         Appeal Information:

## 2023-05-14 DIAGNOSIS — I25.810 CORONARY ARTERY DISEASE INVOLVING AUTOLOGOUS ARTERY CORONARY BYPASS GRAFT WITHOUT ANGINA PECTORIS: ICD-10-CM

## 2023-05-15 DIAGNOSIS — I10 ESSENTIAL HYPERTENSION WITH GOAL BLOOD PRESSURE LESS THAN 140/90: ICD-10-CM

## 2023-05-15 RX ORDER — AMLODIPINE BESYLATE 5 MG/1
TABLET ORAL
Qty: 90 TABLET | Refills: 0 | Status: SHIPPED | OUTPATIENT
Start: 2023-05-15 | End: 2023-06-30

## 2023-05-16 DIAGNOSIS — B18.1 CHRONIC VIRAL HEPATITIS B WITHOUT DELTA AGENT AND WITHOUT COMA (H): ICD-10-CM

## 2023-05-16 RX ORDER — TENOFOVIR DISOPROXIL FUMARATE 300 MG/1
300 TABLET, FILM COATED ORAL DAILY
Qty: 90 TABLET | Refills: 0 | Status: SHIPPED | OUTPATIENT
Start: 2023-05-16 | End: 2023-06-20

## 2023-05-16 NOTE — CONFIDENTIAL NOTE
Courtesy refill given for tenofovir (90 days). Patient will need an appointment for future refills with hepatology.    KELLY March, RN, PHN  Hepatology Clinic  [address] [place] [address]

## 2023-05-17 DIAGNOSIS — F45.8 ANXIETY HYPERVENTILATION: ICD-10-CM

## 2023-05-17 DIAGNOSIS — F41.9 ANXIETY HYPERVENTILATION: ICD-10-CM

## 2023-05-17 RX ORDER — EZETIMIBE 10 MG/1
10 TABLET ORAL DAILY
Qty: 90 TABLET | Refills: 3 | Status: SHIPPED | OUTPATIENT
Start: 2023-05-17 | End: 2024-04-29

## 2023-05-17 NOTE — TELEPHONE ENCOUNTER
6/24/2022> next 6/30/23  [facility] Fly Gonzalez MD  Cardiovascular Disease   LDL 3/22/23

## 2023-05-18 RX ORDER — CLONAZEPAM 1 MG/1
TABLET ORAL
Qty: 90 TABLET | Refills: 0 | Status: SHIPPED | OUTPATIENT
Start: 2023-05-18 | End: 2023-09-12

## 2023-05-19 ENCOUNTER — TELEPHONE (OUTPATIENT)
Dept: FAMILY MEDICINE | Facility: CLINIC | Age: 58
End: 2023-05-19
Payer: COMMERCIAL

## 2023-05-19 NOTE — TELEPHONE ENCOUNTER
"1. sildenafil (REVATIO) 20 MG tabletLogin to [email or web address] and click \"Enter a Key\"    2. Enter the patient's last name, date of birth, and the Key provided below     Key: [id number]   Patient last name: MEREDITH   : 1965    3. Complete the PA and click \"Send to Plan\" for approval    Please notify the pharmacy when you receive a determination from the plan               "

## 2023-05-30 DIAGNOSIS — G47.33 OSA (OBSTRUCTIVE SLEEP APNEA): Primary | ICD-10-CM

## 2023-06-01 ENCOUNTER — TELEPHONE (OUTPATIENT)
Dept: GASTROENTEROLOGY | Facility: CLINIC | Age: 58
End: 2023-06-01
Payer: COMMERCIAL

## 2023-06-01 NOTE — TELEPHONE ENCOUNTER
LVM & sent mychart // pt needs to schedule over due Return Liver with Dr. Leventhal, next available // first attempt, AN 6.1.23

## 2023-06-07 ENCOUNTER — OFFICE VISIT (OUTPATIENT)
Dept: FAMILY MEDICINE | Facility: CLINIC | Age: 58
End: 2023-06-07
Payer: COMMERCIAL

## 2023-06-07 ENCOUNTER — LAB (OUTPATIENT)
Dept: FAMILY MEDICINE | Facility: CLINIC | Age: 58
End: 2023-06-07

## 2023-06-07 ENCOUNTER — TELEPHONE (OUTPATIENT)
Dept: FAMILY MEDICINE | Facility: CLINIC | Age: 58
End: 2023-06-07

## 2023-06-07 VITALS
SYSTOLIC BLOOD PRESSURE: 136 MMHG | HEART RATE: 53 BPM | RESPIRATION RATE: 16 BRPM | TEMPERATURE: 96.9 F | WEIGHT: 148.2 LBS | OXYGEN SATURATION: 98 % | HEIGHT: 62 IN | BODY MASS INDEX: 27.27 KG/M2 | DIASTOLIC BLOOD PRESSURE: 83 MMHG

## 2023-06-07 DIAGNOSIS — Z12.11 SCREEN FOR COLON CANCER: ICD-10-CM

## 2023-06-07 DIAGNOSIS — Z00.00 ENCOUNTER FOR MEDICARE ANNUAL WELLNESS EXAM: Primary | ICD-10-CM

## 2023-06-07 DIAGNOSIS — E78.5 HYPERLIPIDEMIA LDL GOAL <70: ICD-10-CM

## 2023-06-07 DIAGNOSIS — Z79.899 HIGH RISK MEDICATION USE: ICD-10-CM

## 2023-06-07 DIAGNOSIS — I10 ESSENTIAL HYPERTENSION WITH GOAL BLOOD PRESSURE LESS THAN 140/90: ICD-10-CM

## 2023-06-07 DIAGNOSIS — N52.9 ERECTILE DYSFUNCTION, UNSPECIFIED ERECTILE DYSFUNCTION TYPE: ICD-10-CM

## 2023-06-07 DIAGNOSIS — Z13.1 SCREENING FOR DIABETES MELLITUS: ICD-10-CM

## 2023-06-07 DIAGNOSIS — F11.20 CONTINUOUS OPIOID DEPENDENCE (H): ICD-10-CM

## 2023-06-07 DIAGNOSIS — F33.1 MAJOR DEPRESSIVE DISORDER, RECURRENT EPISODE, MODERATE (H): ICD-10-CM

## 2023-06-07 DIAGNOSIS — Z12.5 SCREENING FOR PROSTATE CANCER: ICD-10-CM

## 2023-06-07 DIAGNOSIS — Z23 ENCOUNTER FOR IMMUNIZATION: ICD-10-CM

## 2023-06-07 DIAGNOSIS — F41.9 ANXIETY: ICD-10-CM

## 2023-06-07 LAB
ALBUMIN SERPL BCG-MCNC: 4.2 G/DL (ref 3.5–5.2)
ALP SERPL-CCNC: 110 U/L (ref 40–129)
ALT SERPL W P-5'-P-CCNC: 22 U/L (ref 10–50)
AST SERPL W P-5'-P-CCNC: 34 U/L (ref 10–50)
BASOPHILS # BLD AUTO: 0.1 10E3/UL (ref 0–0.2)
BASOPHILS NFR BLD AUTO: 1 %
BILIRUB DIRECT SERPL-MCNC: <0.2 MG/DL (ref 0–0.3)
BILIRUB SERPL-MCNC: 0.2 MG/DL
CREAT SERPL-MCNC: 0.85 MG/DL (ref 0.67–1.17)
CRP SERPL-MCNC: <3 MG/L
EOSINOPHIL # BLD AUTO: 0.3 10E3/UL (ref 0–0.7)
EOSINOPHIL NFR BLD AUTO: 5 %
ERYTHROCYTE [DISTWIDTH] IN BLOOD BY AUTOMATED COUNT: 14.6 % (ref 10–15)
ERYTHROCYTE [SEDIMENTATION RATE] IN BLOOD BY WESTERGREN METHOD: 18 MM/HR (ref 0–20)
GFR SERPL CREATININE-BSD FRML MDRD: >90 ML/MIN/1.73M2
HCT VFR BLD AUTO: 39.6 % (ref 40–53)
HGB BLD-MCNC: 12.9 G/DL (ref 13.3–17.7)
IMM GRANULOCYTES # BLD: 0 10E3/UL
IMM GRANULOCYTES NFR BLD: 0 %
LYMPHOCYTES # BLD AUTO: 2.9 10E3/UL (ref 0.8–5.3)
LYMPHOCYTES NFR BLD AUTO: 43 %
MCH RBC QN AUTO: 29.5 PG (ref 26.5–33)
MCHC RBC AUTO-ENTMCNC: 32.6 G/DL (ref 31.5–36.5)
MCV RBC AUTO: 91 FL (ref 78–100)
MONOCYTES # BLD AUTO: 1.1 10E3/UL (ref 0–1.3)
MONOCYTES NFR BLD AUTO: 16 %
NEUTROPHILS # BLD AUTO: 2.3 10E3/UL (ref 1.6–8.3)
NEUTROPHILS NFR BLD AUTO: 34 %
PLATELET # BLD AUTO: 318 10E3/UL (ref 150–450)
PROT SERPL-MCNC: 7.1 G/DL (ref 6.4–8.3)
PSA SERPL DL<=0.01 NG/ML-MCNC: 0.39 NG/ML (ref 0–3.5)
RBC # BLD AUTO: 4.37 10E6/UL (ref 4.4–5.9)
WBC # BLD AUTO: 6.6 10E3/UL (ref 4–11)

## 2023-06-07 PROCEDURE — G0009 ADMIN PNEUMOCOCCAL VACCINE: HCPCS | Performed by: FAMILY MEDICINE

## 2023-06-07 PROCEDURE — 36415 COLL VENOUS BLD VENIPUNCTURE: CPT | Performed by: FAMILY MEDICINE

## 2023-06-07 PROCEDURE — 90677 PCV20 VACCINE IM: CPT | Performed by: FAMILY MEDICINE

## 2023-06-07 PROCEDURE — 82565 ASSAY OF CREATININE: CPT | Performed by: FAMILY MEDICINE

## 2023-06-07 PROCEDURE — 80076 HEPATIC FUNCTION PANEL: CPT | Performed by: FAMILY MEDICINE

## 2023-06-07 PROCEDURE — 85652 RBC SED RATE AUTOMATED: CPT | Performed by: FAMILY MEDICINE

## 2023-06-07 PROCEDURE — 85025 COMPLETE CBC W/AUTO DIFF WBC: CPT | Performed by: FAMILY MEDICINE

## 2023-06-07 PROCEDURE — G0103 PSA SCREENING: HCPCS | Performed by: FAMILY MEDICINE

## 2023-06-07 PROCEDURE — 86140 C-REACTIVE PROTEIN: CPT | Performed by: FAMILY MEDICINE

## 2023-06-07 PROCEDURE — G0439 PPPS, SUBSEQ VISIT: HCPCS | Performed by: FAMILY MEDICINE

## 2023-06-07 RX ORDER — SILDENAFIL CITRATE 20 MG/1
TABLET ORAL
Qty: 450 TABLET | Refills: 3 | Status: SHIPPED | OUTPATIENT
Start: 2023-06-07

## 2023-06-07 ASSESSMENT — ENCOUNTER SYMPTOMS
FREQUENCY: 1
DIZZINESS: 1
COUGH: 0
JOINT SWELLING: 0
NERVOUS/ANXIOUS: 1
DYSURIA: 0
HEMATURIA: 0
FEVER: 0
CONSTIPATION: 1
WEAKNESS: 1
HEMATOCHEZIA: 0
SORE THROAT: 0
NAUSEA: 0
ARTHRALGIAS: 1
PARESTHESIAS: 0
DIARRHEA: 0
CHILLS: 0
ABDOMINAL PAIN: 0
EYE PAIN: 1
PALPITATIONS: 0
HEADACHES: 1
HEARTBURN: 0
SHORTNESS OF BREATH: 0
MYALGIAS: 1

## 2023-06-07 ASSESSMENT — ACTIVITIES OF DAILY LIVING (ADL)
CURRENT_FUNCTION: MEDICATION ADMINISTRATION REQUIRES ASSISTANCE
CURRENT_FUNCTION: PREPARING MEALS REQUIRES ASSISTANCE
CURRENT_FUNCTION: HOUSEWORK REQUIRES ASSISTANCE
CURRENT_FUNCTION: TRANSPORTATION REQUIRES ASSISTANCE
CURRENT_FUNCTION: SHOPPING REQUIRES ASSISTANCE
CURRENT_FUNCTION: LAUNDRY REQUIRES ASSISTANCE
CURRENT_FUNCTION: BATHING REQUIRES ASSISTANCE

## 2023-06-07 ASSESSMENT — PAIN SCALES - GENERAL: PAINLEVEL: SEVERE PAIN (7)

## 2023-06-07 ASSESSMENT — PATIENT HEALTH QUESTIONNAIRE - PHQ9
SUM OF ALL RESPONSES TO PHQ QUESTIONS 1-9: 16
SUM OF ALL RESPONSES TO PHQ QUESTIONS 1-9: 16
10. IF YOU CHECKED OFF ANY PROBLEMS, HOW DIFFICULT HAVE THESE PROBLEMS MADE IT FOR YOU TO DO YOUR WORK, TAKE CARE OF THINGS AT HOME, OR GET ALONG WITH OTHER PEOPLE: EXTREMELY DIFFICULT

## 2023-06-07 NOTE — PROGRESS NOTES
"SUBJECTIVE:   Lamont is a 57 year old who presents for Preventive Visit.       View : No data to display.              Are you in the first 12 months of your Medicare coverage?  No    Healthy Habits:     In general, how would you rate your overall health?  Poor    Frequency of exercise:  1 day/week    Duration of exercise:  Less than 15 minutes    Do you usually eat at least 4 servings of fruit and vegetables a day, include whole grains    & fiber and avoid regularly eating high fat or \"junk\" foods?  No    Taking medications regularly:  Yes    Medication side effects:  Lightheadedness and Other    Ability to successfully perform activities of daily living:  Transportation requires assistance, shopping requires assistance, preparing meals requires assistance, housework requires assistance, bathing requires assistance, laundry requires assistance and medication administration requires assistance    Home Safety:  No safety concerns identified    Hearing Impairment:  No hearing concerns    In the past 6 months, have you been bothered by leaking of urine? Yes    In general, how would you rate your overall mental or emotional health?  Poor      PHQ-2 Total Score: 2    Additional concerns today:  No        Have you ever done Advance Care Planning? (For example, a Health Directive, POLST, or a discussion with a medical provider or your loved ones about your wishes): Yes, patient states has an Advance Care Planning document and will bring a copy to the clinic.      Fall risk     click delete button to remove this line now  Cognitive Screening   1) Repeat 3 items (Leader, Season, Table)    2) Clock draw: NORMAL  3) 3 item recall: Recalls 3 objects  Results: 3 items recalled: COGNITIVE IMPAIRMENT LESS LIKELY    Mini-CogTM Copyright BARTOLOME Rendon. Licensed by the author for use in [facility]; reprinted with permission ([email or web address]). All rights reserved.      Do you have sleep apnea, excessive snoring or daytime " drowsiness?: yes    Reviewed and updated as needed this visit by clinical staff   Tobacco  Allergies  Meds              Reviewed and updated as needed this visit by Provider                 Social History     Tobacco Use     Smoking status: Never     Smokeless tobacco: Never   Vaping Use     Vaping status: Not on file   Substance Use Topics     Alcohol use: No     Alcohol/week: 0.0 standard drinks of alcohol             6/7/2023     8:22 AM   Alcohol Use   Prescreen: >3 drinks/day or >7 drinks/week? No          View : No data to display.                Current providers sharing in care for this patient include:   Patient Care Team:  David Chavez MD as PCP - General (Family Practice)  David Chavez MD as Assigned PCP  Ameena Mendez MD as MD (INTERNAL MEDICINE - ENDOCRINOLOGY, DIABETES & METABOLISM)  Fly Travis MD as Assigned Heart and Vascular Provider  Leventhal, Thomas Michael, MD as Assigned Gastroenterology Provider  Sebastián Jenkins MD as Assigned Rheumatology Provider  Fabian Jessica MD as Assigned Sleep Provider  Ameena aLng APRN CNP as Assigned Neuroscience Provider  Feliberto Jama OD as Assigned Surgical Provider  Ameena Lang APRN CNP as Assigned Pain Medication Provider    The following health maintenance items are reviewed in Epic and correct as of today:  Health Maintenance   Topic Date Due     Pneumococcal Vaccine: Pediatrics (0 to 5 Years) and At-Risk Patients (6 to 64 Years) (3 - PPSV23 if available, else PCV20) 12/12/2019     COLORECTAL CANCER SCREENING  02/08/2023     ANNUAL REVIEW OF HM ORDERS  05/31/2023     MEDICARE ANNUAL WELLNESS VISIT  05/31/2023     EYE EXAM  12/02/2023     PHQ-9  12/07/2023     URINE DRUG SCREEN  03/22/2024     ADVANCE CARE PLANNING  05/31/2027     LIPID  03/22/2028     DTAP/TDAP/TD IMMUNIZATION (3 - Td or Tdap) 10/20/2031     HEPATITIS C SCREENING  Completed     HIV SCREENING  Completed     DEPRESSION ACTION PLAN  Completed     INFLUENZA  VACCINE  Completed     ZOSTER IMMUNIZATION  Completed     COVID-19 Vaccine  Completed     IPV IMMUNIZATION  Aged Out     MENINGITIS IMMUNIZATION  Aged Out     Lab work is in process  Labs reviewed in EPIC  BP Readings from Last 3 Encounters:   06/07/23 136/83   03/22/23 124/81   01/19/23 (!) 147/92    Wt Readings from Last 3 Encounters:   06/07/23 67.2 kg (148 lb 3.2 oz)   01/19/23 67.9 kg (149 lb 9.6 oz)   09/14/22 64.5 kg (142 lb 3.2 oz)                  Patient Active Problem List   Diagnosis     Coronary atherosclerosis of native coronary artery     Major depressive disorder, recurrent episode, moderate (H)     Anxiety     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)     Hepatitis B infection     Vitamin D deficiency     S/P CABG (coronary artery bypass graft)     Malrotation of intestine     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     Hyperlipidemia LDL goal <70     Insomnia     Gout     Suicidal ideation     Essential hypertension     Rheumatoid arthritis involving multiple sites, unspecified rheumatoid factor presence     High risk medications (not anticoagulants) long-term use     Midline low back pain without sciatica     Bilateral low back pain with sciatica, sciatica laterality unspecified     Elevated liver enzymes     On corticosteroid therapy     Essential hypertension with goal blood pressure less than 140/90     Insomnia, unspecified type     Rheumatoid arthritis of multiple sites with negative rheumatoid factor (H)     Benign prostatic hyperplasia with lower urinary tract symptoms, unspecified morphology     Chronic pain syndrome     Hepatitis B, chronic (H)     Syncope, unspecified syncope type     Symptomatic cholelithiasis     H/O: gout     Anxiety and depression     F11.2 - Continuous opioid dependence (H)     Past Surgical History:   Procedure Laterality Date     ABDOMEN SURGERY  2014     BIOPSY  2015     BYPASS GRAFT ARTERY CORONARY  2008    6 vessels     COLONOSCOPY  2/8/2013    Procedure:  COLONOSCOPY;  Colonoscopy, blood in stool;  Surgeon: Duane, William Charles, MD;  Location: [facility] OR     COMBINED REPAIR PTOSIS WITH BLEPHAROPLASTY BILATERAL Bilateral 6/25/2018    Procedure: COMBINED REPAIR PTOSIS WITH BLEPHAROPLASTY BILATERAL;  Bilateral upper eyelid blepharoplasty, ptosis repair and brow ptosis repair;  Surgeon: Sandra Borja MD;  Location: [facility] OR     GI SURGERY  2014     HEAD & NECK SURGERY  2011     NASAL/SINUS POLYPECTOMY  2010     ORTHOPEDIC SURGERY  2012     REPAIR PTOSIS       REPAIR PTOSIS BILATERAL Bilateral 7/22/2019    Procedure: REPAIR, PTOSIS, BOTH UPPER brows;  Surgeon: Sandra Borja MD;  Location: [facility] OR     REPAIR PTOSIS BROW BILATERAL Bilateral 6/25/2018    Procedure: REPAIR PTOSIS BROW BILATERAL;;  Surgeon: Sandra Borja MD;  Location: [facility] OR     THORACIC SURGERY  1989    tb     TONSILLECTOMY  2010     UVULOPALATOPHARYNGOPLASTY  2010     VASCULAR SURGERY  2008     [facility] CORONARY STENT PERCUT, EA ADDTL VESSEL  2008    3 months after CABG       Social History     Tobacco Use     Smoking status: Never     Smokeless tobacco: Never   Vaping Use     Vaping status: Not on file   Substance Use Topics     Alcohol use: No     Alcohol/week: 0.0 standard drinks of alcohol     Family History   Problem Relation Age of Onset     C.A.D. Father      Coronary Artery Disease Father      Hypertension Father      Depression Father      Hypertension Mother      Diabetes Mother      Depression Mother      Mental Illness Mother      Hypertension Maternal Grandfather      Cancer Paternal Grandfather      Other Cancer Paternal Grandfather      Other Cancer Other      Autoimmune Disease No family hx of      Cerebrovascular Disease No family hx of      Thyroid Disease No family hx of      Glaucoma No family hx of      Macular Degeneration No family hx of          Current Outpatient Medications   Medication Sig Dispense Refill     Acetaminophen (TYLENOL PO)        allopurinol (ZYLOPRIM) 300 MG tablet  TAKE 1 TABLET BY MOUTH EVERY DAY 90 tablet 3     amLODIPine (NORVASC) 5 MG tablet TAKE 1 TABLET BY MOUTH EVERY DAY FOR BLOOD PRESSURE 90 tablet 0     aspirin EC 81 MG EC tablet Take 1 tablet (81 mg) by mouth daily (*) 30 tablet 1     atorvastatin (LIPITOR) 80 MG tablet Take 1 tablet (80 mg) by mouth daily 90 tablet 3     bacitracin 500 UNIT/GM external ointment Apply topically 3 times daily 30 g 3     baclofen (LIORESAL) 10 MG tablet TAKE 1 TABLET BY MOUTH 2 TIMES DAILY AS NEEDED FOR MUSCLE SPASM 180 tablet 2     busPIRone HCl (BUSPAR) 30 MG tablet TAKE 1 TABLET (30 MG) BY MOUTH 2 TIMES DAILY 180 tablet 3     BUTRANS 20 MCG/HR WK patch Place 1 patch onto the skin every 7 days OK to fill 03/20/23 to start 03/22/23. LISANDRO, name brand. 28 day script for chronic pain. 4 patch 1     Cholecalciferol (VITAMIN D) 2000 UNITS tablet TAKE ONE TABLET BY MOUTH ONCE DAILY 100 tablet 1     clobetasol (TEMOVATE) 0.05 % external cream Apply twice daily for 2 weeks, weekends only for 2 weeks, repeat cycle as needed for hands, not face or genitals 60 g 4     clobetasol (TEMOVATE) 0.05 % external solution Apply twice daily to scalp for up to 2 weeks for flares. 60 mL 0     clonazePAM (KLONOPIN) 1 MG tablet TAKE 0.5-1 TABLETS BY MOUTH 2 TIMES DAILY AS NEEDED FOR ANXIETY 90 tablet 0     clopidogrel (PLAVIX) 75 MG tablet Take 1 tablet (75 mg) by mouth daily 90 tablet 3     colchicine (COLCYRS) 0.6 MG tablet TAKE 2 TABLETS BY MOUTH AT THE FIRST SIGN OF FLARE, TAKE 1 ADDITIONAL TABLET ONE HOUR LATER. 90 tablet 1     desvenlafaxine (PRISTIQ) 50 MG 24 hr tablet TAKE 1 TABLET BY MOUTH EVERY DAY 90 tablet 3     diclofenac (VOLTAREN) 1 % topical gel APPLY 4 G TOPICALLY 4 TIMES DAILY AS NEEDED FOR MODERATE PAIN 300 g 0     diclofenac (VOLTAREN) 1 % topical gel Apply 4 grams to bilateral knees, up to four times daily as needed using enclosed dosing card.  Max of 32g/day 300 g 11     evolocumab (REPATHA SURECLICK) 140 MG/ML prefilled autoinjector  Inject 1 mL (140 mg) Subcutaneous every 14 days 6 mL 1     ezetimibe (ZETIA) 10 MG tablet TAKE 1 TABLET (10 MG) BY MOUTH DAILY. 90 tablet 3     gabapentin (NEURONTIN) 300 MG capsule Reduce by one capsule per day every week to see if this helps side effects and if you have any increased pain. 270 capsule 1     gabapentin (NEURONTIN) 600 MG tablet Take 1 tablet (600 mg) by mouth 3 times daily 270 tablet 3     gemfibrozil (LOPID) 600 MG tablet Take 1 tablet (600 mg) by mouth 2 times daily 180 tablet 3     golimumab (SIMPONI) 50 MG/0.5ML auto-injector pen Inject 0.5 mLs (50 mg) Subcutaneous every 28 days . Hold for signs of infection, and seek medical attention. 0.5 mL 4     hydrocortisone (WESTCORT) 0.2 % external cream FOR GENITALS, APPLY TWICE DAILY FOR UP TO 2 WEEKS, TAKE 2 WEEKS OFF THEN REPEAT 60 g 3     hydrocortisone 2.5 % cream FOR FACE, APPLY TWICE DAILY FOR UP TO 2 WEEK FOR FLARES ON THE FACE, TAKE 2 WEEKS OFF 28.35 g 3     Incontinence Supply Disposable (DEPEND UNDERWEAR LARGE) MISC Use twice daily. 60 each 11     leflunomide (ARAVA) 20 MG tablet Take 1 tablet (20 mg) by mouth daily ; Labs required every 8-12 weeks. 90 tablet 2     meclizine (ANTIVERT) 25 MG tablet TAKE 1 TABLET BY MOUTH EVERY 6 HOURS AS NEEDED FOR DIZZINESS. 30 tablet 3     melatonin 3 MG tablet Take 1 tablet (3 mg) by mouth nightly as needed for sleep       naloxone (NARCAN) 4 MG/0.1ML nasal spray Spray 1 spray (4 mg) into one nostril alternating nostrils once as needed for opioid reversal every 2-3 minutes until assistance arrives 0.2 mL 0     nitroGLYcerin (NITROSTAT) 0.4 MG sublingual tablet For chest pain place 1 tablet under the tongue every 5 minutes for 3 doses. If symptoms persist 5 minutes after 1st dose call 911. 25 tablet 3     order for DME Equipment being ordered: chair lift 1 each 0     order for DME Equipment being ordered: single end cane 1 Units 0     ORDER FOR DME 1.  CPAP pressure 11 cm/H20 with heated humidity.   2.   Provide mask to fit and CPAP supplies.   3.  Length of need lifetime.   4.  If needed please provide a chin strap            pantoprazole (PROTONIX) 40 MG EC tablet TAKE 1 TABLET BY MOUTH EVERY DAY 90 tablet 1     pimecrolimus (ELIDEL) 1 % external cream APPLY TWICE DAILY FOR FACE AND GENITALS 60 g 1     sildenafil (REVATIO) 20 MG tablet TAKE 2 - 5 TABLETS BY MOUTH AS NEEDED 30 MINUTES BEFORE SEXUAL ACTIVITY. MAX 100MG IN 24 HOURS. 450 tablet 3     sulfaSALAzine (AZULFIDINE) 500 MG tablet Take 3 tablets (1,500 mg) by mouth 2 times daily 540 tablet 2     tamsulosin (FLOMAX) 0.4 MG capsule TAKE 2 CAPSULES BY MOUTH EVERY  capsule 3     tenofovir (VIREAD) 300 MG tablet Take 1 tablet (300 mg) by mouth daily appt needed for further refills 90 tablet 0     triamcinolone (KENALOG) 0.1 % external ointment APPLY TOPICALLY TO AFFECTED AREA(S) 3 TIMES DAILY 80 g 2     triamcinolone (KENALOG) 0.1 % external ointment Apply to trunk and extremities twice daily for up to 2 weeks for flares 80 g 1     zolpidem (AMBIEN) 5 MG tablet TAKE 1 TABLET (5 MG) BY MOUTH NIGHTLY AS NEEDED FOR SLEEP 30 tablet 5     Allergies   Allergen Reactions     Citalopram Itching     Tramadol Itching           Review of Systems   Constitutional: Negative for chills and fever.   HENT: Positive for ear pain. Negative for congestion, hearing loss and sore throat.    Eyes: Positive for pain. Negative for visual disturbance.   Respiratory: Negative for cough and shortness of breath.    Cardiovascular: Negative for chest pain, palpitations and peripheral edema.   Gastrointestinal: Positive for constipation. Negative for abdominal pain, diarrhea, heartburn, hematochezia and nausea.   Genitourinary: Positive for frequency and impotence. Negative for dysuria, genital sores, hematuria, penile discharge and urgency.   Musculoskeletal: Positive for arthralgias and myalgias. Negative for joint swelling.   Skin: Positive for rash.   Neurological: Positive for  "dizziness, weakness and headaches. Negative for paresthesias.   Psychiatric/Behavioral: Positive for mood changes. The patient is nervous/anxious.      Constitutional, HEENT, cardiovascular, pulmonary, GI, , musculoskeletal, neuro, skin, endocrine and psych systems are negative, except as otherwise noted.    OBJECTIVE:   /83 (BP Location: Left arm, Patient Position: Sitting, Cuff Size: Adult Regular)   Pulse 53   Temp 96.9  F (36.1  C) (Tympanic)   Resp 16   Ht 1.575 m (5' 2\")   Wt 67.2 kg (148 lb 3.2 oz)   SpO2 98%   BMI 27.11 kg/m   Estimated body mass index is 27.36 kg/m  as calculated from the following:    Height as of 5/31/22: 1.575 m (5' 2\").    Weight as of 1/19/23: 67.9 kg (149 lb 9.6 oz).  Physical Exam  GENERAL: healthy, alert and no distress  NECK: no adenopathy, no asymmetry, masses, or scars and thyroid normal to palpation  RESP: lungs clear to auscultation - no rales, rhonchi or wheezes  CV: regular rate and rhythm, normal S1 S2, no S3 or S4, no murmur, click or rub, no peripheral edema and peripheral pulses strong  ABDOMEN: soft, nontender, no hepatosplenomegaly, no masses and bowel sounds normal  MS: no gross musculoskeletal defects noted, no edema    Diagnostic Test Results:  Labs reviewed in Epic    ASSESSMENT / PLAN:   (Z00.00) Encounter for Medicare annual wellness exam  (primary encounter diagnosis)  Comment:   Plan: as below.    (I10) Essential hypertension with goal blood pressure less than 140/90  Comment:   Plan: controlled.    (E78.5) Hyperlipidemia LDL goal <70  Comment:   Plan:  Controlled.    (Z13.1) Screening for diabetes mellitus  Comment:   Plan: glucose normal.    (Z12.5) Screening for prostate cancer  Comment:   Plan: PSA, screen            (F33.1) Major depressive disorder, recurrent episode, moderate (H)  Comment:   Plan: stable.    (F41.9) Anxiety  Comment:   Plan: stable.    (F11.20) F11.2 - Continuous opioid dependence (H)  Comment:   Plan: following pain " clinic.    (Z12.11) Screen for colon cancer  Comment:   Plan: COLOGUARD([employer])            (Z23) Encounter for immunization  Comment:   Plan: Pneumococcal 20 Valent Conjugate (Prevnar 20)            (N52.9) Erectile dysfunction, unspecified erectile dysfunction type  Comment:   Plan: sildenafil (REVATIO) 20 MG tablet        Needed refills. No side effects. Patient will pay out-of-pocket.      Patient has been advised of split billing requirements and indicates understanding: Yes      COUNSELING:  Reviewed preventive health counseling, as reflected in patient instructions       Regular exercise       Healthy diet/nutrition       Vision screening        He reports that he has never smoked. He has never used smokeless tobacco.      Appropriate preventive services were discussed with this patient, including applicable screening as appropriate for cardiovascular disease, diabetes, osteopenia/osteoporosis, and glaucoma.  As appropriate for age/gender, discussed screening for colorectal cancer, prostate cancer, breast cancer, and cervical cancer. Checklist reviewing preventive services available has been given to the patient.    Reviewed patients plan of care and provided an AVS. The Basic Care Plan (routine screening as documented in Health Maintenance) for Lamont meets the Care Plan requirement. This Care Plan has been established and reviewed with the Patient.      David Chavez MD, MD [facility]    Identified Health Risks:    I have reviewed Opioid Use Disorder and Substance Use Disorder risk factors and made any needed referrals.     Answers for HPI/ROS submitted by the patient on 6/7/2023  If you checked off any problems, how difficult have these problems made it for you to do your work, take care of things at home, or get along with other people?: Extremely difficult  PHQ9 TOTAL SCORE: 16

## 2023-06-07 NOTE — PATIENT INSTRUCTIONS
Patient Education   Personalized Prevention Plan  You are due for the preventive services outlined below.  Your care team is available to assist you in scheduling these services.  If you have already completed any of these items, please share that information with your care team to update in your medical record.  Health Maintenance Due   Topic Date Due     Pneumococcal Vaccine (3 - PPSV23 if available, else PCV20) 12/12/2019     Anxiety Assessment  03/19/2022     Colorectal Cancer Screening  02/08/2023     Annual Wellness Visit  05/31/2023     Your Health Risk Assessment indicates you feel you are not in good health    A healthy lifestyle helps keep the body fit and the mind alert. It helps protect you from disease, helps you fight disease, and helps prevent chronic disease (disease that doesn't go away) from getting worse. This is important as you get older and begin to notice twinges in muscles and joints and a decline in the strength and stamina you once took for granted. A healthy lifestyle includes good healthcare, good nutrition, weight control, recreation, and regular exercise. Avoid harmful substances and do what you can to keep safe. Another part of a healthy lifestyle is stay mentally active and socially involved.    Good healthcare     Have a wellness visit every year.     If you have new symptoms, let us know right away. Don't wait until the next checkup.     Take medicines exactly as prescribed and keep your medicines in a safe place. Tell us if your medicine causes problems.   Healthy diet and weight control     Eat 3 or 4 small, nutritious, low-fat, high-fiber meals a day. Include a variety of fruits, vegetables, and whole-grain foods.     Make sure you get enough calcium in your diet. Calcium, vitamin D, and exercise help prevent osteoporosis (bone thinning).     If you live alone, try eating with others when you can. That way you get a good meal and have company while you eat it.     Try to keep a  healthy weight. If you eat more calories than your body uses for energy, it will be stored as fat and you will gain weight.     Recreation   Recreation is not limited to sports and team events. It includes any activity that provides relaxation, interest, enjoyment, and exercise. Recreation provides an outlet for physical, mental, and social energy. It can give a sense of worth and achievement. It can help you stay healthy.    Mental Exercise and Social Involvement  Mental and emotional health is as important as physical health. Keep in touch with friends and family. Stay as active as possible. Continue to learn and challenge yourself.   Things you can do to stay mentally active are:    Learn something new, like a foreign language or musical instrument.     Play SCRABBLE or do crossword puzzles. If you cannot find people to play these games with you at home, you can play them with others on your computer through the Internet.     Join a games club--anything from card games to chess or checkers or lawn bowling.     Start a new hobby.     Go back to school.     Volunteer.     Read.   Keep up with world events.    Exercise for a Healthier Heart  You may wonder how you can improve the health of your heart. If you re thinking about exercise, you re on the right track. You don t need to become an athlete. But you do need a certain amount of brisk exercise to help strengthen your heart. If you have been diagnosed with a heart condition, your healthcare provider may advise exercise to help your condition. To help make exercise a habit, choose safe, fun activities.      Exercise with a friend. When activity is fun, you're more likely to stick with it.     Before you start  Check with your healthcare provider before starting an exercise program. This is especially important if you haven't been active for a while. It's also important if you have a long-term (chronic) health problem such as heart disease, diabetes, or obesity.  Also check with your provider if you're at high risk for having these problems.   Why exercise?  Exercising regularly offers many healthy rewards. It can help you do all of these:     Improve your blood cholesterol level to help prevent further heart trouble.    Lower your blood pressure to help prevent a stroke or heart attack.    Control diabetes or reduce your risk of getting this disease.    Improve your heart and lung function.    Reach and stay at a healthy weight.    Make your muscles stronger so you can stay active.    Prevent falls and fractures by slowing the loss of bone mass (osteoporosis).    Manage stress better.    Improve your sense of self and your body image.  Exercise tips      Ease into your routine. Set small goals. Then build on them. Talk with your healthcare provider first before starting an exercise routine if you're not sure what your activity level should be.    Exercise on most days. Aim for a total of at least 150 minutes (2 hours and 30 minutes) or more of moderate-intensity aerobic activity each week. You could also do 75 minutes (1 hour and 15 minutes) or more of vigorous-intensity aerobic activity each week. Or try for a combination of both. Moderate activity means that you breathe heavier and your heart rate increases, but you can still talk. Think about doing at least 30 minutes of moderate exercise, 5 times a week. It's OK to work up to the 30-minute period over time. Examples of moderate-intensity activity are brisk walking, gardening, and water aerobics.    Step up your daily activity level.  Along with your exercise program, try being more active the whole day. Walk instead of drive. Or park further away so that you take more steps each day. Do more household tasks or yard work. You may not be able to meet the advised amount of physical activity. But doing some moderate- or vigorous-intensity aerobic activity can help reduce your risk for heart disease. Your healthcare provider  can help you figure out what is best for you.    Choose 1 or more activities you enjoy.  Walking is one of the easiest things you can do. You can also try swimming, riding a bike, dancing, or taking an exercise class.    Call 911  Call 911 right away if any of these occur:     Chest pain that doesn't go away quickly with rest    New burning, tightness, pressure, or heaviness in your chest, neck, shoulders, back, or arms    Abnormal or severe shortness of breath    A very fast or irregular heartbeat (palpitations)    Fainting  When to call your healthcare provider  Call your healthcare provider if you have any of these:     Dizziness or lightheadedness    Mild shortness of breath or chest pain    Increased or new joint or muscle pain    [employer] last reviewed this educational content on 7/1/2022 2000-2022 [employer]. All rights reserved. This information is not intended as a substitute for professional medical care. Always follow your healthcare professional's instructions.          Understanding USDA MyPlate  The USDA has guidelines to help you make healthy food choices. These are called MyPlate. MyPlate shows the food groups that make up healthy meals using the image of a place setting. Before you eat, think about the healthiest choices for what to put on your plate or in your cup or bowl. To learn more about building a healthy plate, visit www.choosemyplate.gov.     The food groups    Fruits. Any fruit or 100% fruit juice counts as part of the Fruit Group. Fruits may be fresh, canned, frozen, or dried, and may be whole, cut-up, or pureed. Make 1/2 of your plate fruits and vegetables.    Vegetables. Any vegetable or 100% vegetable juice counts as a member of the Vegetable Group. Vegetables may be fresh, frozen, canned, or dried. They can be served raw or cooked and may be whole, cut-up, or mashed. Make 1/2 of your plate fruits and vegetables.    Grains. All foods made from grains are part of the  Grains Group. These include wheat, rice, oats, cornmeal, and barley. Grains are often used to make foods such as bread, pasta, oatmeal, cereal, tortillas, and grits. Grains should be no more than 1/4 of your plate. At least half of your grains should be whole grains.    Protein. This group includes meat, poultry, seafood, beans and peas, eggs, processed soy products (such as tofu), nuts (including nut butters), and seeds. Make protein choices no more than 1/4 of your plate. Meat and poultry choices should be lean or low fat.    Dairy. The Dairy Group includes all fluid milk products and foods made from milk that contain calcium, such as yogurt and cheese. (Foods that have little calcium, such as cream, butter, and cream cheese, are not part of this group.) Most dairy choices should be low-fat or fat-free.    Oils. Oils aren't a food group, but they do contain essential nutrients. However it's important to watch your intake of oils. These are fats that are liquid at room temperature. They include canola, corn, olive, soybean, vegetable, and sunflower oil. Foods that are mainly oil include mayonnaise, certain salad dressings, and soft margarines. You likely already get your daily oil allowance from the foods you eat.  Things to limit  Eating healthy also means limiting these things in your diet:    Salt (sodium). Many processed foods have a lot of sodium. To keep sodium intake down, eat fresh vegetables, meats, poultry, and seafood when possible. Purchase low-sodium, reduced-sodium, or no-salt-added food products at the store. And don't add salt to your meals at home. Instead, season them with herbs and spices such as dill, oregano, cumin, and paprika. Or try adding flavor with lemon or lime zest and juice.    Saturated fat. Saturated fats are most often found in animal products such as beef, pork, and chicken. They are often solid at room temperature, such as butter. To reduce your saturated fat intake, choose leaner  cuts of meat and poultry. And try healthier cooking methods such as grilling, broiling, roasting, or baking. For a simple lower-fat swap, use plain nonfat yogurt instead of mayonnaise when making potato salad or macaroni salad.    Added sugars. These are sugars added to foods. They are in foods such as ice cream, candy, soda, fruit drinks, sports drinks, energy drinks, cookies, pastries, jams, and syrups. Cut down on added sugars by sharing sweet treats with a family member or friend. You can also choose fruit for dessert, and drink water or other unsweetened beverages.  [employer] last reviewed this educational content on 6/1/2020 2000-2022 [employer]. All rights reserved. This information is not intended as a substitute for professional medical care. Always follow your healthcare professional's instructions.        Activities of Daily Living    Your Health Risk Assessment indicates you have difficulties with activities of daily living such as housework, bathing, preparing meals, taking medication, etc. Please make a follow up appointment for us to address this issue in more detail.    Urinary Incontinence (Male)  We understand that gender is a spectrum. We may use gendered terms to talk about anatomy and health risk. Please use this sheet in a way that works best for you and your provider as you talk about your care.     Urinary incontinence means not being able to control the release of urine from the bladder.   Causes  Common causes of urinary incontinence in men include:    Infection    Certain medicines    Aging    Poor pelvic muscle tone    Bladder spasms    Obesity    Enlarged prostate    Trouble urinating and fully emptying the bladder (urinary retention)  Other things that can cause incontinence are:     Nervous system diseases    Diabetes    Sleep apnea    Urinary tract infections    Prostate surgery    Pelvic injury  Constipation and smoking have also been identified as risk factors.    Symptoms    Urge incontinence (overactive bladder). This is a sudden urge to urinate. It occurs even though there may not be much urine in the bladder. The need to urinate often during the night is common. It's due to overactive bladder muscles.    Stress incontinence. This is urine leakage that you can't control. It can occur with sneezing, coughing, and other actions that put stress on the bladder.    Treatment  Treatment depends on what is causing the condition. Bladder infections are treated with antibiotics. Urinary retention is treated with a bladder catheter.   Home care  Follow these guidelines when caring for yourself at home:    Don't have any foods and drinks that may irritate the bladder. This includes:  ? Chocolate  ? Alcohol  ? Caffeine  ? Carbonated drinks  ? Acidic fruits and juices    Limit fluids to 6 to 8 cups a day.    Lose weight if you are overweight. This may reduce your symptoms.    If advised, do regular pelvic muscle-strengthening exercises such as Kegel exercises.    If needed, wear absorbent pads to catch urine. Change the pads often. This is for good hygiene and to prevent skin and bladder infections.    Bathe daily for good hygiene.    If an antibiotic was prescribed to treat a bladder infection, take it until it's finished. Keep taking it even if you are feeling better. This is to make sure your infection has cleared.    If a catheter was left in place, keep bacteria from getting into the collection bag. Don't disconnect the catheter from the collection bag.    Use a leg band to secure the catheter drainage tube, so it does not pull on the catheter. Drain the collection bag when it becomes full. To do this, use the drain spout at the bottom of the bag. Don't disconnect the bag from the catheter.    Don't pull on or try to remove a catheter. The catheter must be removed by a healthcare provider.    If you smoke, stop. Ask your provider for help if you can't do this on your  own.  Follow-up care  Follow up with your healthcare provider, or as advised.  When to get medical advice  Call your healthcare provider right away if any of these occur:     Fever over 100.4 F (38 C), or as directed by your provider    Bladder pain or fullness    Belly swelling, nausea, or vomiting    Back pain    Weakness, dizziness, or fainting    If a catheter was left in place, return if:  ? The catheter falls out  ? The catheter stops draining for 6 hours  ? Your urine gets cloudy or smells bad  [employer] last reviewed this educational content on 6/1/2022 2000-2022 [employer]. All rights reserved. This information is not intended as a substitute for professional medical care. Always follow your healthcare professional's instructions.        Your Health Risk Assessment indicates you feel you are not in good emotional health.    Recreation   Recreation is not limited to sports and team events. It includes any activity that provides relaxation, interest, enjoyment, and exercise. Recreation provides an outlet for physical, mental, and social energy. It can give a sense of worth and achievement. It can help you stay healthy.    Mental Exercise and Social Involvement  Mental and emotional health is as important as physical health. Keep in touch with friends and family. Stay as active as possible. Continue to learn and challenge yourself.   Things you can do to stay mentally active are:    Learn something new, like a foreign language or musical instrument.     Play SCRABBLE or do crossword puzzles. If you cannot find people to play these games with you at home, you can play them with others on your computer through the Internet.     Join a games club--anything from card games to chess or checkers or lawn bowling.     Start a new hobby.     Go back to school.     Volunteer.     Read.   Keep up with world events.    Depression and Suicide in Older Adults  Nearly 2 million older adults in the [place] have  "some type of depression. Some of them even take their own lives. Yet depression among older adults is often ignored. Learning about the warning signs of depression may help spare a loved one needless pain. You may also save a life.   What is depression?  Depression is a common and serious illness. It affects the way you think and feel. It is not a normal part of aging. It is not a sign of weakness, a character flaw, or something you can \"snap out of.\" Most people with depression need treatment to get better. The most common symptom is a feeling of deep sadness. People who are depressed also may seem tired and listless. And nothing seems to give them pleasure. It s normal to grieve or be sad sometimes. But sadness lessens or passes with time. Depression rarely goes away or improves on its own. Other symptoms of depression are:     Sleeping more or less than normal    Eating more or less than normal    Having headaches, stomachaches, or other pains that don t go away    Feeling nervous,  empty,  or worthless    Crying a lot    Thinking or talking about suicide or death    Loss of interest in activities previously enjoyed    Social isolation    Feeling confused or forgetful  What causes it?  The causes of depression aren t fully known. But it is thought to result from a complex blend of these factors:     Biochemistry. Certain chemicals in the brain play a role.    Genes. Depression does run in families.    Life stress. Life stresses can also trigger depression in some people. Older adults often face many stressors. These may include isolation, the death of friends or a spouse, health problems, and financial concerns.    Chronic health conditions. This includes diabetes, heart disease, or cancer. These can cause symptoms of depression. Medicine side effects can cause changes in thoughts and behaviors.  Giving support    Depressed people often may not want to ask for help. When they do, they may be ignored. Or they may " get the wrong treatment. You can help by showing parents and older friends love and support. If they seem depressed, don t lecture the person or ignore the symptoms. Don't discount the symptoms as a  normal  part of aging. They are not. Get involved, listen, and show interest and support.   Help them understand that depression is a treatable illness. Tell them you can help them find the right treatment. Offer to go to their healthcare provider's appointment with them for support when the symptoms are discussed. With their approval, contact a local mental health center, social service agency, or hospital about services.   Helping at healthcare visits  You can be an advocate for them at healthcare appointments. Many older adults have chronic illnesses. Many of these can cause symptoms of depression. Medicine side effects can change thoughts and behaviors.   You can help make sure that the healthcare provider looks at all of these factors. They should refer your family member or friend to a mental healthcare provider when needed. In some cases, untreated depression can lead to a misdiagnosis. A person may be diagnosed with a brain disorder such as dementia. If the healthcare provider does not take the issue of depression seriously, help your family member or friend find another provider.   Asking about self-harm thoughts  If you think an older person you care about could be suicidal, ask,  Have you thought about suicide?  Most people will tell you the truth. If they say yes, they may already have a plan for how and when they will attempt it. Find out as much as you can. The more detailed the plan, and the easier it is to carry out, the more danger the person is in right now. Tell the person you are there for them and you do not want them to get harmed. Don't wait to get help for the person. Call the person's healthcare provider, local hospital, or emergency services.   Call 988 in a crisis   Never leave the person  alone. A person who is actively suicidal needs crisis care right away. They need constant supervision. Never leave the person out of sight. Call 988 Tell the crisis counselor you need help for a person who is thinking about suicide. The counselor will help you get the right level of crisis help. You may be advised to take the person to the nearest emergency room.   The National Suicide Prevention Lifeline is available at 988, or 734-810-PAHN (642-658-7807), or www.suicidepreTwentyPeopleline.org. When you call or text 988, you will be connected to trained counselors who are part of the National Suicide Prevention Lifeline network. An online chat option is also available. Lifeline is free and available 24/7.   To learn more    National Suicide Prevention Lifeline at www.suicideICONIX BRAND GROUP.org  or 454-646-DRHU (209-806-9416)    National [place] on Mental Illness at www.joaquin.org  or 359-047-RQIS (160-490-0087)    Mental Health [facility] at www.nmha.org  or 602-661-3418    National [place] of Mental Health at www.nimh.nih.gov  or 803-912-1275    [employer] last reviewed this educational content on 7/1/2022 2000-2022 [employer]. All rights reserved. This information is not intended as a substitute for professional medical care. Always follow your healthcare professional's instructions.

## 2023-06-07 NOTE — NURSING NOTE
Prior to immunization administration, verified patients identity using patient s name and date of birth. Please see Immunization Activity for additional information.     Screening Questionnaire for Adult Immunization    Are you sick today?   No   Do you have allergies to medications, food, a vaccine component or latex?   No   Have you ever had a serious reaction after receiving a vaccination?   No   Do you have a long-term health problem with heart, lung, kidney, or metabolic disease (e.g., diabetes), asthma, a blood disorder, no spleen, complement component deficiency, a cochlear implant, or a spinal fluid leak?  Are you on long-term aspirin therapy?   Yes   Do you have cancer, leukemia, HIV/AIDS, or any other immune system problem?   No   Do you have a parent, brother, or sister with an immune system problem?   No   In the past 3 months, have you taken medications that affect  your immune system, such as prednisone, other steroids, or anticancer drugs; drugs for the treatment of rheumatoid arthritis, Crohn s disease, or psoriasis; or have you had radiation treatments?   No   Have you had a seizure, or a brain or other nervous system problem?   No   During the past year, have you received a transfusion of blood or blood    products, or been given immune (gamma) globulin or antiviral drug?   No   For women: Are you pregnant or is there a chance you could become       pregnant during the next month?   No   Have you received any vaccinations in the past 4 weeks?   No     Immunization questionnaire was positive for at least one answer.  Notified Dr. Chavez.      Injection of PCV20 given by Randy Farrell MA. Patient instructed to remain in clinic for 15 minutes afterwards, and to report any adverse reactions.     Screening performed by Randy Farrell MA on 6/7/2023 at 8:55 AM.

## 2023-06-08 NOTE — TELEPHONE ENCOUNTER
"sildenafil (REVATIO) 20 MG tablet    1. Login to [employer]/login and click \"Enter a Key\"    2. Enter the patient's last name, date of birth, and the Key provided below     Key: [id number]   Patient last name: MEREDITH   : 1965    3. Complete the PA and click \"Send to Plan\" for approval    Please notify the pharmacy when you receive a determination from the plan           "

## 2023-06-15 DIAGNOSIS — E78.5 HYPERLIPIDEMIA LDL GOAL <100: ICD-10-CM

## 2023-06-19 ENCOUNTER — TELEPHONE (OUTPATIENT)
Dept: PALLIATIVE MEDICINE | Facility: CLINIC | Age: 58
End: 2023-06-19
Payer: COMMERCIAL

## 2023-06-19 DIAGNOSIS — I25.719 ATHEROSCLEROSIS OF AUTOLOGOUS VEIN CORONARY ARTERY BYPASS GRAFT WITH ANGINA PECTORIS (H): ICD-10-CM

## 2023-06-19 DIAGNOSIS — I25.119 ATHEROSCLEROSIS OF NATIVE CORONARY ARTERY OF NATIVE HEART WITH ANGINA PECTORIS (H): ICD-10-CM

## 2023-06-19 DIAGNOSIS — M47.819 FACET ARTHROPATHY: ICD-10-CM

## 2023-06-19 DIAGNOSIS — E78.5 HYPERLIPIDEMIA LDL GOAL <70: ICD-10-CM

## 2023-06-19 DIAGNOSIS — M06.9 RHEUMATOID ARTHRITIS INVOLVING MULTIPLE SITES, UNSPECIFIED WHETHER RHEUMATOID FACTOR PRESENT (H): ICD-10-CM

## 2023-06-19 DIAGNOSIS — F11.90 CHRONIC, CONTINUOUS USE OF OPIOIDS: ICD-10-CM

## 2023-06-19 DIAGNOSIS — M17.11 PRIMARY OSTEOARTHRITIS OF RIGHT KNEE: ICD-10-CM

## 2023-06-19 DIAGNOSIS — G89.4 CHRONIC PAIN SYNDROME: ICD-10-CM

## 2023-06-19 RX ORDER — BUPRENORPHINE 20 UG/H
1 PATCH, EXTENDED RELEASE TRANSDERMAL
Qty: 4 PATCH | Refills: 0 | Status: SHIPPED | OUTPATIENT
Start: 2023-06-19 | End: 2023-06-26

## 2023-06-19 NOTE — TELEPHONE ENCOUNTER
Received call from patient requesting refill(s) BUTRANS 20 MCG/HR WK patch    Last dispensed from pharmacy on 05/17/2023    Patient's last office/virtual visit by prescribing provider on 03/22/2023  Next office/virtual appointment scheduled for 06/26/2023    Last urine drug screen date 03/22/2023  Current opioid agreement on file (completed within the last year) Yes Date of opioid agreement: 03/22/2023    E-prescribe to      [place] [id number] [place] [facility] [address]    Will route to nursing [place] for review and preparation of prescription(s).     Noemi Jorge MA  [facility]

## 2023-06-19 NOTE — TELEPHONE ENCOUNTER
Script Eprescribed to pharmacy  [place] Prescription Monitoring Program checked      Signed Prescriptions:                        Disp   Refills    BUTRANS 20 MCG/HR WK patch                 4 patch0        Sig: Place 1 patch onto the skin every 7 days OK to           fill/start 06/19/23. LISANDRO, name brand. 28 day           script for chronic pain.  Authorizing Provider: REZA THAO MD

## 2023-06-19 NOTE — TELEPHONE ENCOUNTER
Medication refill information reviewed.     Due date for BUTRANS 20 MCG/HR WK patch is 06/19/23     Prescriptions prepped for review.     Will route to provider.

## 2023-06-19 NOTE — TELEPHONE ENCOUNTER
[facility] Call Center    Phone Message    May a detailed message be left on voicemail: yes     Reason for Call: Medication Refill Request    Has the patient contacted the pharmacy for the refill? Yes   Name of medication being requested: BUTRANS 20 MCG/HR WK patch  Provider who prescribed the medication: Ameena Lang  Pharmacy: [facility] [id number] [address]  Date medication is needed: ASAP Pt has been out Friday         Action Taken: Message routed to:  Other: Talha Pain    Travel Screening: Not Applicable

## 2023-06-20 DIAGNOSIS — M54.41 CHRONIC BILATERAL LOW BACK PAIN WITH BILATERAL SCIATICA: ICD-10-CM

## 2023-06-20 DIAGNOSIS — M17.11 PRIMARY OSTEOARTHRITIS OF RIGHT KNEE: ICD-10-CM

## 2023-06-20 DIAGNOSIS — B18.1 CHRONIC VIRAL HEPATITIS B WITHOUT DELTA AGENT AND WITHOUT COMA (H): ICD-10-CM

## 2023-06-20 DIAGNOSIS — G89.29 CHRONIC BILATERAL LOW BACK PAIN WITH BILATERAL SCIATICA: ICD-10-CM

## 2023-06-20 DIAGNOSIS — M47.819 FACET ARTHROPATHY: ICD-10-CM

## 2023-06-20 DIAGNOSIS — M51.369 DDD (DEGENERATIVE DISC DISEASE), LUMBAR: ICD-10-CM

## 2023-06-20 DIAGNOSIS — M54.42 CHRONIC BILATERAL LOW BACK PAIN WITH BILATERAL SCIATICA: ICD-10-CM

## 2023-06-20 DIAGNOSIS — G89.4 CHRONIC PAIN SYNDROME: ICD-10-CM

## 2023-06-20 DIAGNOSIS — M06.9 RHEUMATOID ARTHRITIS INVOLVING MULTIPLE SITES, UNSPECIFIED WHETHER RHEUMATOID FACTOR PRESENT (H): ICD-10-CM

## 2023-06-20 RX ORDER — GABAPENTIN 300 MG/1
CAPSULE ORAL
Qty: 270 CAPSULE | Refills: 0 | Status: SHIPPED | OUTPATIENT
Start: 2023-06-20 | End: 2023-06-26

## 2023-06-20 RX ORDER — ATORVASTATIN CALCIUM 80 MG/1
TABLET, FILM COATED ORAL
Qty: 90 TABLET | Refills: 0 | Status: SHIPPED | OUTPATIENT
Start: 2023-06-20 | End: 2023-06-30

## 2023-06-20 RX ORDER — TENOFOVIR DISOPROXIL FUMARATE 300 MG/1
300 TABLET, FILM COATED ORAL DAILY
Qty: 30 TABLET | Refills: 1 | Status: SHIPPED | OUTPATIENT
Start: 2023-06-20 | End: 2023-07-20

## 2023-06-20 NOTE — TELEPHONE ENCOUNTER
Received fax request from [facility] pharmacy requesting refill(s) for gabapentin (NEURONTIN) 300 MG capsule    Last refilled on 06/19/23    Pt last seen on 03/22/23  Next appt scheduled for 06/26/23    Will facilitate refill.

## 2023-06-21 ENCOUNTER — OFFICE VISIT (OUTPATIENT)
Dept: RHEUMATOLOGY | Facility: CLINIC | Age: 58
End: 2023-06-21
Payer: COMMERCIAL

## 2023-06-21 VITALS
WEIGHT: 142.8 LBS | OXYGEN SATURATION: 96 % | SYSTOLIC BLOOD PRESSURE: 133 MMHG | DIASTOLIC BLOOD PRESSURE: 88 MMHG | HEART RATE: 69 BPM | BODY MASS INDEX: 26.12 KG/M2

## 2023-06-21 DIAGNOSIS — Z79.899 HIGH RISK MEDICATION USE: ICD-10-CM

## 2023-06-21 DIAGNOSIS — M06.09 RHEUMATOID ARTHRITIS OF MULTIPLE SITES WITH NEGATIVE RHEUMATOID FACTOR (H): Primary | ICD-10-CM

## 2023-06-21 PROCEDURE — 99214 OFFICE O/P EST MOD 30 MIN: CPT | Performed by: INTERNAL MEDICINE

## 2023-06-21 NOTE — PATIENT INSTRUCTIONS
RHEUMATOLOGY    [facility]  [address]  [place] [address]    Phone number: [phone]  Fax number: [phone]      Thank you for choosing [facility]!      KELLY Jin RN 6/21/2023 10:29 AM

## 2023-06-21 NOTE — PROGRESS NOTES
Rheumatology Clinic Visit      Lamont Daniels MRN# [id number]   YOB: 1965 Age: 57 year old      Date of visit: 6/21/23   PCP: Dr. David Chavez  Hepatologist: Dr. Thomas Leventhal     Chief Complaint   Patient presents with:  Arthritis    Assessment and Plan   1.  Seropositive nonerosive rheumatoid arthritis (RF negative, CCP low positive): Note that he does have low back pain but without evidence of SI joint irregularity on x-ray.  Initially with morning stiffness all day, gelling phenomenon, and synovitis.   Previously on Humira (partially effective), Enbrel (partially effective), HCQ (cannot safely monitor due to right retinal scaring and bilateral early macular degeneration, per ophthalmology), Orencia (partially effective and could use again in the future if needed).  MTX avoided per Dr. Laboy, hematology, recommendation. Currently on leflunomide 20 mg daily, SSZ 1500mg BID, Simponi 50 mg SQ every 28 days (improved when changed from Orencia to Simponi).  RA controlled on current regimen.  Discussed the option of trying to reduce medications but he would like to remain on his current regimen for now because it is effective.  Chronic illness, stable.    - Continue leflunomide 20 mg daily    - Continue sulfasalazine 1500mg BID  - Continue Simponi 50mg SQ every 28 days  - PRN RA flare only: Prednisone 10 mg daily x2 days, then 5 mg daily x5 days, then stop   - Labs in 3 months: CBC, Creatinine, Hepatic Panel, ESR, CRP    High risk medication requiring intensive toxicity monitoring at least quarterly     2. Lower back pain: Lumbar spine MRI in August 2014 showed multilevel degenerative changes and a small disc protrusion at L3/4 with mild spinal canal narrowing; also moderate left and mild right neural foraminal narrowing at L5-S1. He had a nerve conduction study in 2014 that was normal. He has been worked up by neurology in the past without significant neurologic findings. HLA-B27 negative, SI joint x-ray  "negative. Now following in the pain management clinic.     3. Myofascial pain syndrome / chronic pain syndrome: diffuse pain consistent with chronic pain syndrome.  Management per pain clinic as well as PCP.  He has increased physical activity and is feeling better.  Also intentionally losing weight.    4. Chronic Hepatitis B: Managed by Dr. Laboy (hepatology) previously, and now Dr. Thomas Leventhal at the [facility]. Currently on tenofovir at the direction of gastroenterology.     5. Hepatic Steatosis: Based on previous liver biopsy.  Follows with gastroenterology.    6. Positive tuberculosis test, history:   This is noted here for historical significance.  He was evaluated by Dr. Anthony Mendoza, infectious disease. The following telephone note was documented by Dr. Mendoza: \"I tried calling th pt, finally we have the records from [place] . It appears he was treated for latent TB with INH for 1 year in 1980/1981 .Later in 1981 [place] [facility] [place] with rt sided pneumonia, TB was suspected but he improved with treatment with Penicillin only. Later he was seen  ( likely ID specialist), and was treated with 6 weeks course of Rifampin and Ethambutol pending sputum AFB culture. His AFB cx were negative based on the report we have.\"  \"He recently had QFT -TB test which was reported positive and his CXR was clear. Given his documented hx of completion of treatment for latent TB as above , I do not see any need for further treatment. His tests may remain positive irrespective of treatment status. From my perspective he can be started on DMARDs/Biological as deemed necessary.\"       7. Gout: On allopurinol and managed by PCP.    8.  Vaccinations:   - Influenza: encouraged yearly vaccination  - [id number]: up to date  - [id number]: up to date  - Shingrix: Up to date  - COVID-19: advise updating, and to hold sulfasalazine and leflunomide for 2 weeks after the " COVID-19 vaccination        Total minutes spent in evaluation with patient, documentation, , and review of pertinent studies and chart notes: 18    Mr. Daniels verbalized agreement with and understanding of the rational for the diagnosis and treatment plan.  All questions were answered to best of my ability and the patient's satisfaction. Mr. Daniels was advised to contact the clinic with any questions that may arise after the clinic visit.      Thank you for involving me in the care of the patient    Return to clinic: 3-4 months      HPI   Lamont Daniels is a 57 year old male with a medical history significant for hypertension, hyperlipidemia, gout, coronary artery disease s/p 6vCABG, vitamin D deficiency, hepatitis B, obstructive sleep apnea (uses CPAP), and RA who presents for follow-up of RA.    Today, 6/21/2023: RA controlled and happy with his current regimen; he prefers to keep his regimen the same at this time.  States that he is feeling much better since he has increased physical activity and is on a diet to lose weight.  Successfully losing weight.  Depression and fibromyalgia symptoms are improved.  Taking tenofovir for hepatitis B at the direction of gastroenterology.    Denies fevers, chills, nausea, vomiting, constipation, diarrhea. No abdominal pain. Denies chest pain/pressure, palpitations, or shortness of breath.  No oral or nasal sores. No rash. No LE swelling.  Mild dry mouth; he has good dentition and does not feel like he needs to use frequent sips of water; unchanged since last evaluation. No dry eyes. No eye pain or redness.     Tobacco:  None   EtOH:  None  Drugs:  None  Occupation: Retired   Originally from [facility], then lived just north of [place], [place], then lived in [place], [place], then moved to [place] for family    ROS   12 point review of system was completed and negative except as noted in the HPI     Active Problem List     Patient Active Problem List   Diagnosis     Coronary  atherosclerosis of native coronary artery     Major depressive disorder, recurrent episode, moderate (H)     Anxiety     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)     Hepatitis B infection     Vitamin D deficiency     S/P CABG (coronary artery bypass graft)     Malrotation of intestine     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     Hyperlipidemia LDL goal <70     Insomnia     Gout     Suicidal ideation     Essential hypertension     Rheumatoid arthritis involving multiple sites, unspecified rheumatoid factor presence     High risk medications (not anticoagulants) long-term use     Midline low back pain without sciatica     Bilateral low back pain with sciatica, sciatica laterality unspecified     Elevated liver enzymes     On corticosteroid therapy     Essential hypertension with goal blood pressure less than 140/90     Insomnia, unspecified type     Rheumatoid arthritis of multiple sites with negative rheumatoid factor (H)     Benign prostatic hyperplasia with lower urinary tract symptoms, unspecified morphology     Chronic pain syndrome     Hepatitis B, chronic (H)     Syncope, unspecified syncope type     Symptomatic cholelithiasis     H/O: gout     Anxiety and depression     F11.2 - Continuous opioid dependence (H)     Past Medical History     Past Medical History:   Diagnosis Date     Anxiety      CAD (coronary artery disease)     CABG, PCI     Congestive heart failure, unspecified 2008     Depressive disorder 2008     Gout      Hepatitis B > 10 years     HTN (hypertension)      Hyperlipidemia LDL goal < 100      Malrotation of intestine      Moderate major depression (H)      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) 9/19/2011    Uses CPAP     Rheumatoid arthritis flare (H) 1012     Steatohepatitis 12/15    liver biopsy     Tuberculosis age 13    INH x 9 months      Vitamin D deficiency      Past Surgical History     Past Surgical History:   Procedure Laterality Date     ABDOMEN SURGERY  2014     BIOPSY  2015     BYPASS GRAFT ARTERY  CORONARY  2008    6 vessels     COLONOSCOPY  2/8/2013    Procedure: COLONOSCOPY;  Colonoscopy, blood in stool;  Surgeon: Duane, William Charles, MD;  Location: [facility] OR     COMBINED REPAIR PTOSIS WITH BLEPHAROPLASTY BILATERAL Bilateral 6/25/2018    Procedure: COMBINED REPAIR PTOSIS WITH BLEPHAROPLASTY BILATERAL;  Bilateral upper eyelid blepharoplasty, ptosis repair and brow ptosis repair;  Surgeon: Sandra Borja MD;  Location: [facility] OR     GI SURGERY  2014     HEAD & NECK SURGERY  2011     NASAL/SINUS POLYPECTOMY  2010     ORTHOPEDIC SURGERY  2012     REPAIR PTOSIS       REPAIR PTOSIS BILATERAL Bilateral 7/22/2019    Procedure: REPAIR, PTOSIS, BOTH UPPER brows;  Surgeon: Sandra Borja MD;  Location: [facility] OR     REPAIR PTOSIS BROW BILATERAL Bilateral 6/25/2018    Procedure: REPAIR PTOSIS BROW BILATERAL;;  Surgeon: Sandra Borja MD;  Location: [facility] OR     THORACIC SURGERY  1989    tb     TONSILLECTOMY  2010     UVULOPALATOPHARYNGOPLASTY  2010     VASCULAR SURGERY  2008     [facility] CORONARY STENT CIERA SOTO VESSEL  2008    3 months after CABG     Allergy     Allergies   Allergen Reactions     Citalopram Itching     Tramadol Itching     Current Medication List     Current Outpatient Medications   Medication Sig     Acetaminophen (TYLENOL PO)      allopurinol (ZYLOPRIM) 300 MG tablet TAKE 1 TABLET BY MOUTH EVERY DAY     amLODIPine (NORVASC) 5 MG tablet TAKE 1 TABLET BY MOUTH EVERY DAY FOR BLOOD PRESSURE     aspirin EC 81 MG EC tablet Take 1 tablet (81 mg) by mouth daily (*)     atorvastatin (LIPITOR) 80 MG tablet TAKE 1 TABLET BY MOUTH EVERY DAY     bacitracin 500 UNIT/GM external ointment Apply topically 3 times daily     baclofen (LIORESAL) 10 MG tablet TAKE 1 TABLET BY MOUTH 2 TIMES DAILY AS NEEDED FOR MUSCLE SPASM     busPIRone HCl (BUSPAR) 30 MG tablet TAKE 1 TABLET (30 MG) BY MOUTH 2 TIMES DAILY     BUTRANS 20 MCG/HR WK patch Place 1 patch onto the skin every 7 days OK to fill/start 06/19/23. LISANDRO, name  brand. 28 day script for chronic pain.     Cholecalciferol (VITAMIN D) 2000 UNITS tablet TAKE ONE TABLET BY MOUTH ONCE DAILY     clobetasol (TEMOVATE) 0.05 % external cream Apply twice daily for 2 weeks, weekends only for 2 weeks, repeat cycle as needed for hands, not face or genitals     clobetasol (TEMOVATE) 0.05 % external solution Apply twice daily to scalp for up to 2 weeks for flares.     clonazePAM (KLONOPIN) 1 MG tablet TAKE 0.5-1 TABLETS BY MOUTH 2 TIMES DAILY AS NEEDED FOR ANXIETY     clopidogrel (PLAVIX) 75 MG tablet Take 1 tablet (75 mg) by mouth daily     colchicine (COLCYRS) 0.6 MG tablet TAKE 2 TABLETS BY MOUTH AT THE FIRST SIGN OF FLARE, TAKE 1 ADDITIONAL TABLET ONE HOUR LATER.     desvenlafaxine (PRISTIQ) 50 MG 24 hr tablet TAKE 1 TABLET BY MOUTH EVERY DAY     diclofenac (VOLTAREN) 1 % topical gel APPLY 4 G TOPICALLY 4 TIMES DAILY AS NEEDED FOR MODERATE PAIN     diclofenac (VOLTAREN) 1 % topical gel Apply 4 grams to bilateral knees, up to four times daily as needed using enclosed dosing card.  Max of 32g/day     evolocumab (REPATHA SURECLICK) 140 MG/ML prefilled autoinjector Inject 1 mL (140 mg) Subcutaneous every 14 days     ezetimibe (ZETIA) 10 MG tablet TAKE 1 TABLET (10 MG) BY MOUTH DAILY.     gabapentin (NEURONTIN) 300 MG capsule Reduce by one capsule per day every week to see if this helps side effects and if you have any increased pain.     gabapentin (NEURONTIN) 600 MG tablet Take 1 tablet (600 mg) by mouth 3 times daily     gemfibrozil (LOPID) 600 MG tablet Take 1 tablet (600 mg) by mouth 2 times daily     golimumab (SIMPONI) 50 MG/0.5ML auto-injector pen Inject 0.5 mLs (50 mg) Subcutaneous every 28 days . Hold for signs of infection, and seek medical attention.     hydrocortisone (WESTCORT) 0.2 % external cream FOR GENITALS, APPLY TWICE DAILY FOR UP TO 2 WEEKS, TAKE 2 WEEKS OFF THEN REPEAT     hydrocortisone 2.5 % cream FOR FACE, APPLY TWICE DAILY FOR UP TO 2 WEEK FOR FLARES ON THE FACE,  TAKE 2 WEEKS OFF     Incontinence Supply Disposable (DEPEND UNDERWEAR LARGE) MISC Use twice daily.     leflunomide (ARAVA) 20 MG tablet Take 1 tablet (20 mg) by mouth daily ; Labs required every 8-12 weeks.     meclizine (ANTIVERT) 25 MG tablet TAKE 1 TABLET BY MOUTH EVERY 6 HOURS AS NEEDED FOR DIZZINESS.     melatonin 3 MG tablet Take 1 tablet (3 mg) by mouth nightly as needed for sleep     naloxone (NARCAN) 4 MG/0.1ML nasal spray Spray 1 spray (4 mg) into one nostril alternating nostrils once as needed for opioid reversal every 2-3 minutes until assistance arrives     nitroGLYcerin (NITROSTAT) 0.4 MG sublingual tablet For chest pain place 1 tablet under the tongue every 5 minutes for 3 doses. If symptoms persist 5 minutes after 1st dose call 911.     order for DME Equipment being ordered: chair lift     order for DME Equipment being ordered: single end cane     ORDER FOR DME 1.  CPAP pressure 11 cm/H20 with heated humidity.   2.  Provide mask to fit and CPAP supplies.   3.  Length of need lifetime.   4.  If needed please provide a chin strap          pantoprazole (PROTONIX) 40 MG EC tablet TAKE 1 TABLET BY MOUTH EVERY DAY     pimecrolimus (ELIDEL) 1 % external cream APPLY TWICE DAILY FOR FACE AND GENITALS     sildenafil (REVATIO) 20 MG tablet TAKE 2 - 5 TABLETS BY MOUTH AS NEEDED 30 MINUTES BEFORE SEXUAL ACTIVITY. MAX 100MG IN 24 HOURS.     sulfaSALAzine (AZULFIDINE) 500 MG tablet Take 3 tablets (1,500 mg) by mouth 2 times daily     tamsulosin (FLOMAX) 0.4 MG capsule TAKE 2 CAPSULES BY MOUTH EVERY DAY     tenofovir (VIREAD) 300 MG tablet Take 1 tablet (300 mg) by mouth daily     triamcinolone (KENALOG) 0.1 % external ointment APPLY TOPICALLY TO AFFECTED AREA(S) 3 TIMES DAILY     triamcinolone (KENALOG) 0.1 % external ointment Apply to trunk and extremities twice daily for up to 2 weeks for flares     zolpidem (AMBIEN) 5 MG tablet TAKE 1 TABLET (5 MG) BY MOUTH NIGHTLY AS NEEDED FOR SLEEP     No current  "facility-administered medications for this visit.       Social History   See HPI    Family History     Family History   Problem Relation Age of Onset     C.A.D. Father      Coronary Artery Disease Father      Hypertension Father      Depression Father      Hypertension Mother      Diabetes Mother      Depression Mother      Mental Illness Mother      Hypertension Maternal Grandfather      Cancer Paternal Grandfather      Other Cancer Paternal Grandfather      Other Cancer Other      Autoimmune Disease No family hx of      Cerebrovascular Disease No family hx of      Thyroid Disease No family hx of      Glaucoma No family hx of      Macular Degeneration No family hx of      No family history of autoimmune disease  No family history of psoriasis     No change in family history since the previous clinic visit.     Physical Exam     Temp Readings from Last 3 Encounters:   06/07/23 96.9  F (36.1  C) (Tympanic)   05/31/22 97.2  F (36.2  C) (Tympanic)   11/11/21 98  F (36.7  C) (Tympanic)     BP Readings from Last 5 Encounters:   06/21/23 133/88   06/07/23 136/83   03/22/23 124/81   01/19/23 (!) 147/92   09/14/22 137/87     Pulse Readings from Last 1 Encounters:   06/21/23 69     Resp Readings from Last 1 Encounters:   06/07/23 16     Estimated body mass index is 26.12 kg/m  as calculated from the following:    Height as of 6/7/23: 1.575 m (5' 2\").    Weight as of this encounter: 64.8 kg (142 lb 12.8 oz).      GEN: NAD.  HEENT:  Anicteric, noninjected sclera. No obvious external lesions of the ear and nose. Hearing intact.  CV: S1, S2. RRR. No m/r/g  PULM: No increased work of breathing. CTA bilaterally   MSK: MCPs, PIPs, DIPs without swelling or tenderness to palpation.  Wrists without swelling or tenderness to palpation.  Elbows and shoulders without swelling or tenderness to palpation.   Knees, ankles, and MTPs without swelling or tenderness to palpation.    SKIN: No rash or jaundice seen  PSYCH: Alert. Appropriate.    "      Labs     RF/CCP  Recent Labs   Lab Test 11/04/15  1547   CCPABY 27*     CBC  Recent Labs   Lab Test 06/07/23  0858 03/22/23  1052 12/17/22  1244 09/14/22  1037 08/23/21  1130 03/29/21  1318 02/27/21  1259 01/30/21  1226   WBC 6.6 5.4 5.5 5.6   < > 5.2 4.4 5.5   RBC 4.37* 4.31* 4.64 4.73   < > 4.72 4.79 5.11   HGB 12.9* 12.8* 13.3 13.4   < > 13.6 13.8 14.5   HCT 39.6* 39.5* 41.5 43.4   < > 42.3 43.1 43.9   MCV 91 92 89 92   < > 90 90 86   RDW 14.6 14.7 14.5 15.3*   < > 14.6 14.7 13.5    257 281 296   < > 245 275 296   MCH 29.5 29.7 28.7 28.3   < > 28.8 28.8 28.4   MCHC 32.6 32.4 32.0 30.9*   < > 32.2 32.0 33.0   NEUTROPHIL 34  --  40 37   < > 45.1 40.9 50.5   LYMPH 43  --  43 38   < > 35.5 42.0 33.2   MONOCYTE 16  --  13 19   < > 15.3 12.6 12.7   EOSINOPHIL 5  --  4 4   < > 2.9 3.4 2.9   BASOPHIL 1  --  1 2   < > 1.2 1.1 0.7   ANEU  --   --   --   --   --  2.3 1.8 2.7   ALYM  --   --   --   --   --  1.8 1.9 1.8   PRACHI  --   --   --   --   --  0.8 0.6 0.7   AEOS  --   --   --   --   --  0.2 0.2 0.2   ABAS  --   --   --   --   --  0.1 0.1 0.0   ANEUTAUTO 2.3  --  2.2 2.1   < >  --   --   --    ALYMPAUTO 2.9  --  2.4 2.1   < >  --   --   --    AMONOAUTO 1.1  --  0.7 1.1   < >  --   --   --    AEOSAUTO 0.3  --  0.2 0.2   < >  --   --   --    ABSBASO 0.1  --  0.1 0.1   < >  --   --   --     < > = values in this interval not displayed.     CMP  Recent Labs   Lab Test 06/07/23  0858 03/22/23  1052 12/17/22  1244 07/15/22  1126 04/26/22  1218 03/25/22  1549 08/23/21  1130 03/29/21  1318 02/27/21  1259 01/30/21  1226 06/03/20  1317 03/26/20  1039   NA  --  142  --   --  145*  --   --   --   --   --   --  138   POTASSIUM  --  3.6  --   --  3.9  --   --   --   --   --   --  3.8   CHLORIDE  --  110*  --   --  111*  --   --   --   --   --   --  109   CO2  --  30  --   --  30  --   --   --   --   --   --  25   ANIONGAP  --  2*  --   --  4  --   --   --   --   --   --  4   GLC  --  113*  --   --  94 106*   < >  --   --    --    < > 122*   BUN  --  13  --   --  12  --   --   --   --   --   --  16   CR 0.85 0.88 0.93   < > 0.94 0.95   < > 1.01 0.95 0.94   < > 1.00   GFRESTIMATED >90 >90 >90   < > >90 >90   < > 83 89 >90   < > 85   GFRESTBLACK  --   --   --   --   --   --   --  >90 >90 >90   < > >90   HEMANT  --  8.8  --   --  9.3  --   --   --   --   --   --  9.0   BILITOTAL 0.2 0.4 0.3   < > 0.4 0.5   < > 0.5 0.4 0.4   < > 0.3   ALBUMIN 4.2 3.7 4.0   < > 3.9 4.0   < > 3.9 4.1 4.0   < > 4.3   PROTTOTAL 7.1 7.1 7.5   < > 7.7 7.7   < > 7.6 7.6 7.6   < > 8.0   ALKPHOS 110 121 82   < > 93 118   < > 116 102 114   < > 123   AST 34 18 23   < > 24 27   < > 20 23 37   < > 23   ALT 22 19 23   < > 23 34   < > 31 27 46   < > 36    < > = values in this interval not displayed.     Calcium/VitaminD  Recent Labs   Lab Test 03/22/23  1052 04/26/22  1218 03/26/20  1039 12/04/17  0924 07/13/17  1246 01/14/16  1541 11/04/15  1547   HEMANT 8.8 9.3 9.0   < >  --    < >  --    VITDT  --   --   --   --  34  --  43    < > = values in this interval not displayed.     ESR/CRP  Recent Labs   Lab Test 06/07/23  0858 12/17/22  1244 09/14/22  1037 07/15/22  1126   SED 18 15 41* 84*   CRP  --  <2.9 5.4 4.7   CRPI <3.00  --   --   --      Lipid Panel  Recent Labs   Lab Test 03/22/23  1052 06/07/22  0831 03/19/21  0928 06/10/16  0850 06/29/15  1405   CHOL 111 128 142   < > 219*   TRIG 125 122 105   < > 372*   HDL 50 55 69   < > 33*   LDL 36 49 52   < > 112   VLDL  --   --   --   --  74*   CHOLHDLRATIO  --   --   --   --  6.6*   NHDL 61 73 73   < >  --     < > = values in this interval not displayed.     Hepatitis B  Recent Labs   Lab Test 03/22/23  1052 04/26/22  1218 10/20/21  1144 10/20/21  1143 03/19/21  0928 03/26/20  1039 10/07/19  0940 12/01/16  1429 10/05/16  0954   AUSAB  --   --   --  0.09  --   --   --   --  0.45   HEPBANG Reactive*  --   --   --   --   --   --   --  Reactive*   HBQLOG  --   --   --   --  Not Calculated <1.3 Not Calculated   < > 5.1*   HBQRES Not  "Detected Not Detected Not Detected  --  HBV DNA Not Detected <20* HBV DNA Not Detected   < > 140,872*    < > = values in this interval not displayed.     Hepatitis C  Recent Labs   Lab Test 04/07/16  1538   HCVAB Nonreactive   Assay performance characteristics have not been established for newborns,   infants, and children       Lyme ab screening  Recent Labs   Lab Test 07/18/17  1330   LYMEGM 0.19     Tuberculosis Screening  Recent Labs   Lab Test 04/07/16  1538   TBRSLT Positive   The magnitude of the measured IFN-gamma level cannot be correlated to stage or   degree of infection, level of immune responsiveness, or likelihood for   progression to active disease.  *   TBAGN >10.00  This is a qualitative test.  The TB antigen IU/mL value is required for   documentation on certain government reporting forms but this value should not   be used to monitor disease progression or response to therapy.   Diagnosing or excluding tuberculosis disease, and assessing the probability of   LTBI, require a combination of epidemiological, historical, medical and   diagnostic findings that should be taken into account when interpreting   QuantiFERON TB results.       HIV Screening  Recent Labs   Lab Test 11/04/15  1547   HIAGAB Nonreactive   HIV-1 p24 Ag & HIV-1/HIV-2 Ab Not Detected       \"HAND BILATERAL THREE OR MORE VIEWS 11/4/2015 4:55 PM   HISTORY: Pain in unspecified joint.  COMPARISON: None.  IMPRESSION  IMPRESSION: Normal.  DANIS ANGLIN MD\"    \"FOOT BILATERAL THREE OR MORE VIEWS 11/4/2015 4:55 PM   HISTORY: Pain in unspecified joint.  COMPARISON: 8/21/2012.  IMPRESSION  IMPRESSION: Small traction spurs are seen off the Achilles tendon and  plantar fascial attachment sites bilaterally. Joint spaces are  preserved. Osseous structures are intact. Incidental note is made of  some surgical staples in the soft tissues of the medial distal right  lower extremity.  DANIS ANGLIN MD\"      Immunization History     Immunization History "   Administered Date(s) Administered     COVID-19 Bivalent 12+ (Pfizer) 09/15/2022     COVID-19 MONOVALENT 12+ (Pfizer) 03/16/2021, 04/06/2021, 10/09/2021, 04/21/2022     COVID-19 Monovalent 12+ (Pfizer 2022) 04/21/2022     Influenza (IIV3) PF 10/11/2011, 09/20/2017, 09/01/2018     Influenza Vaccine 50-64 or 18-64 w/egg allergy (Flublok) 09/23/2021     Influenza Vaccine >6 months (Alfuria,Fluzone) 09/27/2015, 09/08/2016, 09/30/2019, 09/14/2020     Influenza Vaccine, 6+MO IM (QUADRIVALENT W/PRESERVATIVES) 11/17/2014     Pneumo Conj 13-V (2010&after) 04/07/2016     Pneumococcal 20 valent Conjugate (Prevnar 20) 06/07/2023     Pneumococcal 23 valent 12/12/2014     TDAP (Adacel,Boostrix) 10/20/2021     TDAP Vaccine (Adacel) 08/30/2011     Zoster recombinant adjuvanted (SHINGRIX) 07/27/2019, 04/22/2021          Chart documentation done in part with Dragon Voice recognition Software. Although reviewed after completion, some word and grammatical error may remain.    Sebastián Jenkins MD

## 2023-06-22 NOTE — TELEPHONE ENCOUNTER
clopidogrel (PLAVIX) 75 MG tablet 90 tablet 3 6/24/2022         Last Written Prescription Date:  6-  Last Fill Quantity: 90,   # refills: 3  Last Office Visit : 6-  Future Office visit:  6-    Routing refill request to provider for review/approval because:  Abnormal HGB    gemfibrozil (LOPID) 600 MG tablet  HIgh Risk warning with Atorvastatin        Kathleen M Doege RN

## 2023-06-23 RX ORDER — GEMFIBROZIL 600 MG/1
600 TABLET, FILM COATED ORAL 2 TIMES DAILY
Qty: 180 TABLET | Refills: 3 | Status: SHIPPED | OUTPATIENT
Start: 2023-06-23 | End: 2024-05-30

## 2023-06-23 RX ORDER — CLOPIDOGREL BISULFATE 75 MG/1
TABLET ORAL
Qty: 90 TABLET | Refills: 3 | Status: SHIPPED | OUTPATIENT
Start: 2023-06-23 | End: 2024-05-30

## 2023-06-24 LAB — NONINV COLON CA DNA+OCC BLD SCRN STL QL: NEGATIVE

## 2023-06-26 ENCOUNTER — VIRTUAL VISIT (OUTPATIENT)
Dept: PALLIATIVE MEDICINE | Facility: CLINIC | Age: 58
End: 2023-06-26
Payer: COMMERCIAL

## 2023-06-26 DIAGNOSIS — F11.90 CHRONIC, CONTINUOUS USE OF OPIOIDS: ICD-10-CM

## 2023-06-26 DIAGNOSIS — M06.9 RHEUMATOID ARTHRITIS INVOLVING MULTIPLE SITES, UNSPECIFIED WHETHER RHEUMATOID FACTOR PRESENT (H): ICD-10-CM

## 2023-06-26 DIAGNOSIS — M79.18 MYOFASCIAL PAIN: ICD-10-CM

## 2023-06-26 DIAGNOSIS — M62.838 MUSCLE SPASM: ICD-10-CM

## 2023-06-26 DIAGNOSIS — M17.11 PRIMARY OSTEOARTHRITIS OF RIGHT KNEE: ICD-10-CM

## 2023-06-26 DIAGNOSIS — M62.830 SPASM OF BACK MUSCLES: ICD-10-CM

## 2023-06-26 DIAGNOSIS — M51.369 DDD (DEGENERATIVE DISC DISEASE), LUMBAR: ICD-10-CM

## 2023-06-26 DIAGNOSIS — M47.819 FACET ARTHROPATHY: Primary | ICD-10-CM

## 2023-06-26 DIAGNOSIS — G89.4 CHRONIC PAIN SYNDROME: ICD-10-CM

## 2023-06-26 PROCEDURE — 99214 OFFICE O/P EST MOD 30 MIN: CPT | Mod: VID | Performed by: NURSE PRACTITIONER

## 2023-06-26 RX ORDER — BACLOFEN 10 MG/1
10 TABLET ORAL 3 TIMES DAILY PRN
Qty: 90 TABLET | Refills: 2 | Status: SHIPPED | OUTPATIENT
Start: 2023-06-26 | End: 2023-07-25

## 2023-06-26 RX ORDER — BACLOFEN 10 MG/1
5-10 TABLET ORAL 3 TIMES DAILY PRN
Qty: 45 TABLET | Refills: 1 | Status: SHIPPED | OUTPATIENT
Start: 2023-06-26 | End: 2023-06-26

## 2023-06-26 RX ORDER — BUPRENORPHINE 20 UG/H
1 PATCH, EXTENDED RELEASE TRANSDERMAL
Qty: 4 PATCH | Refills: 0 | Status: SHIPPED | OUTPATIENT
Start: 2023-06-26 | End: 2023-07-13

## 2023-06-26 ASSESSMENT — PAIN SCALES - GENERAL: PAINLEVEL: MODERATE PAIN (5)

## 2023-06-26 NOTE — PATIENT INSTRUCTIONS
----------------------------------------------------------------  [facility] Number:  [phone]   Call with any questions about your care and for scheduling assistance.   Calls are returned Monday through Friday between 8 AM and 4:30 PM. We usually get back to you within 2 business days depending on the issue/request.    If we are prescribing your medications:  For opioid medication refills, call the clinic or send a [employer] message 7 days in advance.  Please include:  Name of requested medication  Name of the pharmacy.  For non-opioid medications, call your pharmacy directly to request a refill. Please allow 3-4 days to be processed.   Per [place] State Law:  All controlled substance prescriptions must be filled within 30 days of being written.    For those controlled substances allowing refills, pickup must occur within 30 days of last fill.      We believe regular attendance is key to your success in our program!    Any time you are unable to keep your appointment we ask that you call us at least 24 hours in advance to cancel.This will allow us to offer the appointment time to another patient.   Multiple missed appointments may lead to dismissal from the clinic.

## 2023-06-26 NOTE — PROGRESS NOTES
Lamont is a 57 year old who is being evaluated via a billable video visit.      How would you like to obtain your AVS? MyChart  If the video visit is dropped, the invitation should be resent by: Text to cell phone: [phone]  Will anyone else be joining your video visit? No   Is Pt currently in [place]? Yes    NOTE:  If Pt is not in [place], Appointment needs to be canceled and rescheduled.        Is Pt currently in [place]? Yes    NOTE:  If Pt is not in [place], Appointment needs to be canceled and rescheduled.             Video-Visit Details    Type of service:  Video Visit   Video Start Time: 1:39 PM  Video End Time:2:04 PM    Originating Location (pt. Location): Home    Distant Location (provider location):  On-site  Platform used for Video Visit: [facility]    6/26/2023    Chief complaint:   low back pain and multiple joint pain (RA)      Interval history:  Lamont Daniels is a 57 year old male is known to me for:  Lumbar facet arthropathy  Rheumatoid arthritis involving multiple joints, unspecified rheumatoid factor  Primary osteoarthritis of right knee  Chronic continuous use of opioids  Chronic pain syndrome  PMHx includes: Anxiety, coronary artery disease, congestive heart failure (2008), depressive disorder (2008), gout, hepatitis B more than 10 years ago, hypertension, hyperlipidemia, malrotation of intestine, severe obstructive sleep apnea, rheumatoid arthritis (2012) steatohepatitis (2015), history of tuberculosis at age 13, vitamin D deficiency  PSHx includes: Coronary artery bypass graft 6 vessel (2008), abdominal surgery (2014), colonoscopy (2013), combined repair ptosis with blepharoplasty bilateral (2018), head and neck surgery (2011), nasal/sinus polypectomy (2010), orthopedic surgery (2012), repair ptosis bilateral (2019), repair ptosis brow bilateral (2018), thoracic surgery for tuberculosis (1989), tonsillectomy and uvulopalatal pharyngoplasty (2010), coronary  artery stent 3 months after CABG (2008).      Recommendations/plan at the last visit on 3/22/2023 included:  1. Physical Therapy: none  2. Continue stretching at home  3. Clinical Health Psychologist to address issues of relaxation, behavioral change, coping style, and other factors important to improvement: none  4. Diagnostic Studies: repeat lumbar MRI, call to schedule this in [place]  5. Medication Management:   6. Continue Butrans 20mcg/hr transdermal change every 7 days, fill 8/30/22 and start 9/3/2022  7. Changed to gabapentin 300mg capsules, you are currently using 600mg three times daily. Reduce by one capsule per day every week to see if you have less side effects and if you start having more pain, return to the previous dosing as you prefer.   8. Continue Voltaren gel as needed  9. Further procedures recommended: none  10. Recommendations/follow-up for PCP:  See above  11. Release of information: none  12. UDT today, wearing Butrans patch  13. Signed CSA  14. Follow up: 12 weeks in-person or virtual visit. Please call [phone] to make your follow-up appointment with me.         Since his last visit, Lamont Daniels reports:    Interval history June 26, 2023  -continues to have axial low back pain that can extend posteriorly into the legs and never goes past the knees. Pain is worse with lumbar extension and rotation.   -reviewed his lumbar MRI in detail. Dicussed possible interventions for lumbar facet joint pain.   Continued multiple joint pain from RA.   -discussed adjustments to Baclofen or to Buprenorphine, prefers to adjust Baclofen at this time.   -tapered off of gabapentin, has not had any increase in pain off of this medication      Interval history March 22, 2023  -low back pain present, can radiate to the level of the knees.  -no imaging since 2014. Recommend updated lumbar MRI to check for progression of arthritis or listhesis.   -multiple joint pain remains, has RA    .Interval history  "August 16, 2022  -his pain is stable. Located in the axial low back and multiple joints from RA. He feels his pain is under okay control with current regimen.     Pain history collected at initial evaluation on 5/11/2022  He has had pain for about 10 years. He was diagnosed with Rheumatoid Arthritis. Lamont has pain in multiple joints. He also has chronic low back pain. The low back pain radiates down the posterolateral aspect of the legs to the level of the knee. His back pain was there when he was diagnosed with RA. He feels that he is doing well on the Butrans. Discussed switch to [place] if he ever wishes to increase his dosage, prefers to stay with use of the Butrans patch at the present time       At this point, the patient's participation with our multidisciplinary team includes:  The patient has been compliant with the program.  PT - not ordered  Health Psych - not ordered      Pain scores:  Pain intensity on average is 5 on a scale of 0-10.    Range is 5-8/10.   Pain right now is 6/10.   Pain is described as \"back pain is dull and his joints have a dull/numbness/stiffness.\"    Pain is constant in nature        Current pain relevant medications:   -allopurinol 300mg every day  -baclofen 10mg BID PRN muscle spasms (uses at least once per day, helpful)  -Voltaren gel 1% PRN (helpful)  -naloxone nasal spray PRN opiate reversal   -Butrans 20mcg/hr  TD Q 7 days (helpful)  -simponi 50mg/0.5ml inject every 28 days   -Arava 20mg every day  -meclizine 25mg Q 6 hours PRN dizziness (helpful)  -Tylenol 1000mg (uses at least once per day, not more than 3 times per day)  -CBD oil at night (helpful for sleep)        Other pertinent medications:  -Ambien 5mg at bedtime as needed PRN sleep  -clonazepam 0.5-1mg BID PRN anxiety (uses one full tab in the AM)  -Plavix 75mg every day  -colchincine 0.6mg take 2 tabs at first sign of flare  -pristiq 50mg QD     Previous medication treatments included:  OPIATES: butrans (helpful), " hydrocodone (helpful for other issues), Codeine (unsure), oxycodone (unsure)  NSAIDS: cannot use, on Plavix   MUSCLE RELAXANTS: Baclofen (helpful), methocarbamol (not helpful)  ANTI-MIGRAINE MEDS: none  ANTI-DEPRESSANTS: none  SLEEP AIDS: none  ANTI-CONVULSANTS: gabapentin (helpful initially, he did not have increased pain when he tapered off of this medication)  TOPICALS: Voltaren gel (helpful)  ANXIOLYTICS: none  MEDICAL CANNABIS: none  Other meds: Tylenol (helpful)        Other treatments have included:  Lamontromina Daniels has been seen at a pain clinic in the past.  Previously managed by Dr. Lian Loo   PT: has tried, not helpful  Chiropractic care: no  Acupuncture: no  TENs Unit: no     Injections:  none      THE 4 A's OF OPIOID MAINTENANCE ANALGESIA    Analgesia: butrans is helpful    Activity: he does some stretching at home. Walks in the back yard    Adverse effects: none    Adherence to Rx protocol: yes      Side Effects: no side effect  Patient is using the medication as prescribed: YES    Medications:  Current Outpatient Medications   Medication Sig Dispense Refill     Acetaminophen (TYLENOL PO)        allopurinol (ZYLOPRIM) 300 MG tablet TAKE 1 TABLET BY MOUTH EVERY DAY 90 tablet 3     amLODIPine (NORVASC) 5 MG tablet TAKE 1 TABLET BY MOUTH EVERY DAY FOR BLOOD PRESSURE 90 tablet 0     aspirin EC 81 MG EC tablet Take 1 tablet (81 mg) by mouth daily (*) 30 tablet 1     atorvastatin (LIPITOR) 80 MG tablet TAKE 1 TABLET BY MOUTH EVERY DAY 90 tablet 0     bacitracin 500 UNIT/GM external ointment Apply topically 3 times daily 30 g 3     baclofen (LIORESAL) 10 MG tablet TAKE 1 TABLET BY MOUTH 2 TIMES DAILY AS NEEDED FOR MUSCLE SPASM 180 tablet 2     busPIRone HCl (BUSPAR) 30 MG tablet TAKE 1 TABLET (30 MG) BY MOUTH 2 TIMES DAILY 180 tablet 3     BUTRANS 20 MCG/HR WK patch Place 1 patch onto the skin every 7 days OK to fill/start 06/19/23. LISANDRO, name brand. 28 day script for chronic pain. 4 patch 0      Cholecalciferol (VITAMIN D) 2000 UNITS tablet TAKE ONE TABLET BY MOUTH ONCE DAILY 100 tablet 1     clobetasol (TEMOVATE) 0.05 % external cream Apply twice daily for 2 weeks, weekends only for 2 weeks, repeat cycle as needed for hands, not face or genitals 60 g 4     clobetasol (TEMOVATE) 0.05 % external solution Apply twice daily to scalp for up to 2 weeks for flares. 60 mL 0     clonazePAM (KLONOPIN) 1 MG tablet TAKE 0.5-1 TABLETS BY MOUTH 2 TIMES DAILY AS NEEDED FOR ANXIETY 90 tablet 0     clopidogrel (PLAVIX) 75 MG tablet TAKE 1 TABLET BY MOUTH EVERY DAY 90 tablet 3     colchicine (COLCYRS) 0.6 MG tablet TAKE 2 TABLETS BY MOUTH AT THE FIRST SIGN OF FLARE, TAKE 1 ADDITIONAL TABLET ONE HOUR LATER. 90 tablet 1     desvenlafaxine (PRISTIQ) 50 MG 24 hr tablet TAKE 1 TABLET BY MOUTH EVERY DAY 90 tablet 3     diclofenac (VOLTAREN) 1 % topical gel APPLY 4 G TOPICALLY 4 TIMES DAILY AS NEEDED FOR MODERATE PAIN 300 g 0     diclofenac (VOLTAREN) 1 % topical gel Apply 4 grams to bilateral knees, up to four times daily as needed using enclosed dosing card.  Max of 32g/day 300 g 11     evolocumab (REPATHA SURECLICK) 140 MG/ML prefilled autoinjector Inject 1 mL (140 mg) Subcutaneous every 14 days 6 mL 1     ezetimibe (ZETIA) 10 MG tablet TAKE 1 TABLET (10 MG) BY MOUTH DAILY. 90 tablet 3     gabapentin (NEURONTIN) 300 MG capsule Reduce by one capsule per day every week to see if this helps side effects and if you have any increased pain. 270 capsule 0     gabapentin (NEURONTIN) 600 MG tablet Take 1 tablet (600 mg) by mouth 3 times daily 270 tablet 3     gemfibrozil (LOPID) 600 MG tablet Take 1 tablet (600 mg) by mouth 2 times daily 180 tablet 3     golimumab (SIMPONI) 50 MG/0.5ML auto-injector pen Inject 0.5 mLs (50 mg) Subcutaneous every 28 days . Hold for signs of infection, and seek medical attention. 0.5 mL 4     hydrocortisone (WESTCORT) 0.2 % external cream FOR GENITALS, APPLY TWICE DAILY FOR UP TO 2 WEEKS, TAKE 2 WEEKS OFF  THEN REPEAT 60 g 3     hydrocortisone 2.5 % cream FOR FACE, APPLY TWICE DAILY FOR UP TO 2 WEEK FOR FLARES ON THE FACE, TAKE 2 WEEKS OFF 28.35 g 3     Incontinence Supply Disposable (DEPEND UNDERWEAR LARGE) MISC Use twice daily. 60 each 11     leflunomide (ARAVA) 20 MG tablet Take 1 tablet (20 mg) by mouth daily ; Labs required every 8-12 weeks. 90 tablet 2     meclizine (ANTIVERT) 25 MG tablet TAKE 1 TABLET BY MOUTH EVERY 6 HOURS AS NEEDED FOR DIZZINESS. 30 tablet 3     melatonin 3 MG tablet Take 1 tablet (3 mg) by mouth nightly as needed for sleep       naloxone (NARCAN) 4 MG/0.1ML nasal spray Spray 1 spray (4 mg) into one nostril alternating nostrils once as needed for opioid reversal every 2-3 minutes until assistance arrives 0.2 mL 0     nitroGLYcerin (NITROSTAT) 0.4 MG sublingual tablet For chest pain place 1 tablet under the tongue every 5 minutes for 3 doses. If symptoms persist 5 minutes after 1st dose call 911. 58 tablet 3     order for DME Equipment being ordered: chair lift 1 each 0     order for DME Equipment being ordered: single end cane 1 Units 0     ORDER FOR DME 1.  CPAP pressure 11 cm/H20 with heated humidity.   2.  Provide mask to fit and CPAP supplies.   3.  Length of need lifetime.   4.  If needed please provide a chin strap            pantoprazole (PROTONIX) 40 MG EC tablet TAKE 1 TABLET BY MOUTH EVERY DAY 90 tablet 1     pimecrolimus (ELIDEL) 1 % external cream APPLY TWICE DAILY FOR FACE AND GENITALS 60 g 1     sildenafil (REVATIO) 20 MG tablet TAKE 2 - 5 TABLETS BY MOUTH AS NEEDED 30 MINUTES BEFORE SEXUAL ACTIVITY. MAX 100MG IN 24 HOURS. 450 tablet 3     sulfaSALAzine (AZULFIDINE) 500 MG tablet Take 3 tablets (1,500 mg) by mouth 2 times daily 540 tablet 2     tamsulosin (FLOMAX) 0.4 MG capsule TAKE 2 CAPSULES BY MOUTH EVERY  capsule 3     tenofovir (VIREAD) 300 MG tablet Take 1 tablet (300 mg) by mouth daily 30 tablet 1     triamcinolone (KENALOG) 0.1 % external ointment APPLY  TOPICALLY TO AFFECTED AREA(S) 3 TIMES DAILY 80 g 2     triamcinolone (KENALOG) 0.1 % external ointment Apply to trunk and extremities twice daily for up to 2 weeks for flares 80 g 1     zolpidem (AMBIEN) 5 MG tablet TAKE 1 TABLET (5 MG) BY MOUTH NIGHTLY AS NEEDED FOR SLEEP 30 tablet 5       Medical History: any changes in medical history since they were last seen? No    Social History:   Home situation: , lives with adult children  Occupation/Schooling: he is on disability due to heart issues and RA  Tobacco use: none  Alcohol use: none  Drug use: none  History of chemical dependency treatment: none    Is patient a current smoker or tobacco user?  no  If yes, was cessation counseling offered?  no          Physical Exam:  Vital signs: There were no vitals taken for this visit.    Behavioral observations:  Awake, alert. Cooperative.   Pulm: respirations easy and unlabored. Able to speak in full sentences without SOB or cough noted.          Diagnostic tests:  MRI of the Lumbar Spine without contrast     History: Lumbago.     Comparison: MRI thoracic spine 7/18/2014.     Technique: Sagittal T1-weighted, T2-weighted, STIR, and axial  T2-weighted spin echo and gradient echo images of the lumbar spine  were obtained without intravenous contrast.     Findings: There are 5 lumbar-type vertebrae assumed for the purposes  of this dictation. The tip of the conus medullaris is at L1.  The  lumbar vertebral column appears normally aligned. Straightening of the  normal lumbar lordosis. Mild loss of T2 signal in the L4-5 and L5-S1  intervertebral discs consistent with age-related changes. Type II  Modic degenerative changes in the opposing endplates at L5-S1.     On a level by level basis:     L1-2: Tiny left paracentral focal disc protrusion without spinal canal  or neural foraminal stenosis.     L2-3: No spinal canal or neural foraminal stenosis.     L3-4: Small focal posterior central disc protrusion with  annular  fissuring and mild spinal canal narrowing. Also mild bilateral facet  hypertrophy. No neuroforaminal stenosis.     L4-5: Small focal posterior central disc protrusion with annular  fissuring. No spinal canal or neural foraminal stenosis.     L5-S1: Type II Modic degenerative changes of the opposing endplates.  Left greater than right facet hypertrophy. Small focal posterior  central disc protrusion with annular fissuring. No spinal canal  stenosis. Moderate left and mild right neural foraminal narrowing.     The visualized paraspinous tissues anteriorly are unremarkable.     IMPRESSION  Impression: Multilevel degenerative changes. Small disc protrusion at  L3-4 results in in mild spinal canal narrowing. Moderate left and mild  right neuroforaminal narrowing at L5-S1.     HELGA DAVID MD        FOOT BILATERAL THREE OR MORE VIEWS 11/4/2015 4:55 PM      HISTORY: Pain in unspecified joint.     COMPARISON: 8/21/2012.     IMPRESSION  IMPRESSION: Small traction spurs are seen off the Achilles tendon and  plantar fascial attachment sites bilaterally. Joint spaces are  preserved. Osseous structures are intact. Incidental note is made of  some surgical staples in the soft tissues of the medial distal right  lower extremity.  DANIS ANGLIN MD        RIGHT KNEE THREE VIEWS  5/4/2017 3:16 PM    HISTORY: Pain in right knee.  COMPARISON: None                                                   IMPRESSION: No acute fracture or dislocation. Mild osteoarthritis.  Small joint effusion.     MICHELL TAMAYO MD      MR LUMBAR SPINE WITHOUT CONTRAST  4/4/2023 9:12 AM (reviewed with patient on 6/26/2023)     INDICATION: Low back pain. Rule out spondylolysis. Low back pain with  standing/walking/extension. No known/automatically detected potential  contraindications to CT. Chronic bilateral low back pain with  bilateral sciatica. DDD (degenerative disc disease), lumbar.     TECHNIQUE: MRI images of the lumbar spine without  contrast.  CONTRAST:  None.     COMPARISON: Lumbar spine MRI 8/6/2014.     FINDINGS: Nomenclature is based on five lumbar type vertebral bodies.  Normal vertebral body heights. Normal alignment.  No marrow edema. No  pars defect. The conus tip is identified at L1-L2. Unremarkable  paraspinal soft tissues.     T12-L1: Slight loss in disc height and signal. The small left central  protrusion. Minor facet arthropathy. Minor lateral recess and spinal  canal narrowing. No foraminal narrowing. Herniation is new/increased  versus 2014.         L1-L2: Slight loss of disc signal. Normal disc height. Tiny left  paracentral protrusion. Unremarkable facets. Minimal left lateral  recess narrowing. No central stenosis or foraminal narrowing. No  interval change.     L2-L3: Mild loss of disc signal. Minimal loss of disc height. No  herniation. Unremarkable facets. No stenosis.     L3-L4: Moderate loss of disc signal. Minor loss of disc height. Minor  circumferential disc bulge with small central protrusion. Minor facet  arthropathy. Mild lateral recess and spinal canal narrowing,  unchanged. Patent foramina.     L4-L5: Moderate loss of disc signal. Mild loss of disc height. Central  annular tear and small protrusion is minimally increased in the  interval. Mild facet arthropathy. Minor lateral recess and foraminal  narrowing.     L5-S1: Moderate loss of disc signal. Mild loss of disc height.  Circumferential asymmetric left disc bulge. Mild to moderate left and  mild right facet arthropathy. Moderate left and mild right lateral  recess narrowing. Adequate central canal. Moderate to severe left and  minor right foraminal narrowing. No significant interval change.                                                                      IMPRESSION:  1.  Degenerative changes are relatively stable compared with 2014.  2.  At L5-S1 an asymmetric left disc bulge causes moderate left  lateral recess and moderate to severe foraminal narrowing.  Correlate  for any left L5 symptoms. Mild right-sided narrowing. No significant  interval change.  3.  At L4-L5 a small protrusion is minimally increased with minor  lateral recess and foraminal narrowing similar to prior.  4.  At L3-L4 mild lateral recess and spinal canal narrowing is  unchanged.  5.  At T12-L1 a small left-sided herniation is increased and results  in minor lateral recess and spinal canal narrowing.     HORTENCIA DUENAS MD      Other testing (labs, diagnostics):  4/26/2022  Cr. 0.94  Est GFR >90  Liver enzymes WNL        Screening tools:      DIRE Score for ongoing opioid management is calculated as follows:  Diagnosis = 2  Intractability = 2  Risk: Psych = 2  Chem Hlth = 2  Reliability = 3  Social = 2  Efficacy = 2  Total DIRE Score = 15 (14 or higher predicts good candidate for ongoing opioid management; 13 or lower predicts poor candidate for opioid management)       review of [place] :  Reviewed [place]  June 26, 2023- no concerning fills.  Ameena LEMUS, RN CNP, FNP  [facility]  [place] location         Assessment:  1.  facet arthropathy (lumbar)  2. Lumbar DDD  3. Rheumatoid arthritis involving multiple joints, unspecified rheumatoid factor  4. Primary osteoarthritis of right knee  5. Myofacial pain  6. Muscle spasm  7. Spasm of back muscles  8. Chronic pain syndrome  9. Encounter for long term use of opiate analgesic  10. PMHx includes: Anxiety, coronary artery disease, congestive heart failure (2008), depressive disorder (2008), gout, hepatitis B more than 10 years ago, hypertension, hyperlipidemia, malrotation of intestine, severe obstructive sleep apnea, rheumatoid arthritis (2012) steatohepatitis (2015), history of tuberculosis at age 13, vitamin D deficiency  11. PSHx includes: Coronary artery bypass graft 6 vessel (2008), abdominal surgery (2014), colonoscopy (2013), combined repair ptosis with blepharoplasty bilateral (2018), head and neck surgery  (2011), nasal/sinus polypectomy (2010), orthopedic surgery (2012), repair ptosis bilateral (2019), repair ptosis brow bilateral (2018), thoracic surgery for tuberculosis (1989), tonsillectomy and uvulopalatal pharyngoplasty (2010), coronary artery stent 3 months after CABG (2008).    Plan:  Physical Therapy: none  Continue stretching at home  Clinical Health Psychologist to address issues of relaxation, behavioral change, coping style, and other factors important to improvement: none  Diagnostic Studies: none  Medication Management:   15. Continue Butrans 20mcg/hr transdermal change every 7 days, fill 7/17/2023  and start 7/19/2022  16. OK to increase Baclofen to 10mg up to three times daily as needed for muscle spasms  17. Continue Voltaren gel as needed  18. Further procedures recommended: none  1. Consider lumbar medial branch blocks (test injections) x 2 and if effective, would proceed to lumbar radiofrequency ablation (burning procedure). You would need to complete 4 visits of PHYSICAL THERAPY prior to the burning procedure. You can let me know via [employer] if you decide you would like to proceed and I would place the order.   19. Recommendations/follow-up for PCP:  See above  20. Release of information: none  21. Follow up: 8 weeks in-person or virtual visit. Please call [phone] to make your follow-up appointment with me.       ASSESSMENT AND PLAN:  (M47.819) Facet arthropathy  (primary encounter diagnosis)  (M51.36) DDD (degenerative disc disease), lumbar  Plan: Plan: BUTRANS 20 MCG/HR WK patch, [facility]         Follow-Up Order        Consider lumbar medial branch blocks to lumbar radiofrequency ablation and PHYSICAL THERAPY       (M06.9) Rheumatoid arthritis involving multiple sites, unspecified whether rheumatoid factor present (H)  Plan: BUTRANS 20 MCG/HR WK patch, [facility] Clinic         Follow-Up Order            (M17.11) Primary osteoarthritis of right knee  Plan: BUTRANS 20 MCG/HR WK  patch, [facility]         Follow-Up Order            (M79.18) Myofascial pain  Plan: baclofen (LIORESAL) 10 MG tablet, Adult Pain         Clinic Follow-Up Order, DISCONTINUED: baclofen         (LIORESAL) 10 MG tablet            (M62.838) Muscle spasm  Plan: baclofen (LIORESAL) 10 MG tablet, Adult Pain         Clinic Follow-Up Order, DISCONTINUED: baclofen         (LIORESAL) 10 MG tablet            (M62.830) Spasm of back muscles  Plan: baclofen (LIORESAL) 10 MG tablet, Adult Pain         Clinic Follow-Up Order            (G89.4) Chronic pain syndrome  Plan: BUTRANS 20 MCG/HR WK patch, Adult Pain Clinic         Follow-Up Order            (F11.90) Chronic, continuous use of opioids  Plan: BUTRANS 20 MCG/HR WK patch, Adult Pain Clinic         Follow-Up Order               BILLING TIME DOCUMENTATION:   TOTAL TIME includes:   Time spent preparing to see the patient: 5 minutes (reviewing records and tests)  Time spend face to face with the patient: 25 minutes  Time spent ordering tests, medications, procedures and referrals: 0 minutes  Time spent Referring and communicating with other healthcare professionals: 0 minutes  Documenting clinical information in Epic: 5 minutes    The total TIME spent on this patient on the day of the appointment was 35 minutes.         Ameena LEMUS RN CNP, FNP  [facility]  [facility]

## 2023-06-27 NOTE — PROGRESS NOTES
[facility] Consultation:    Assessment and Plan:     1. Premature CAD, CABG in 2008, PCI to Cx in '08, repeat PCI to Cx stent 2016 (patent sequential LIMA-D3-LAD, sequential SVG-RV marginal-PDA, occluded radial graft to PL branches), normal LV fxn: Cont secondary prevention with asa. Plan to continue plavix long term for now given aggressive ISR and low bleeding risk.   Plavix can be stopped 7 days prior to any planned surgery or procedure and resumed afterwards.  Encouraged to do more walking.  2. Dyspnea: stable. Echo and PFT's have been checked and were normal; its likely related to anxiety.    3. Dyslipidemia, definite heterozygous FH ([facility] score -10), metabolic syndrome: On lipitor 80, gemfibrozil, zetia 10 and Repatha for uncontrolled dyspilidemia despite maximal rx.   Recommended genetic testing, not done.   4. JEROD on CPAP  5. Hypertension with orthostatic dizziness: Continues to have mild symptoms. No improvement on stopping metoprolol.  6. Rheumatoid arthritis     Annual f/u with FLP    Fly Travis MD    Cardiac Imaging and Prevention  [facility]  [email or web address] Pager: [phone]    HPI: Routine annual follow-up.  He has been doing well without any intercurrent cardiac illness or hospitalization.  He is tolerating all his medications without any issues.  Amlodipine was added due to high blood pressure.  He continues to have dizziness, no syncope.  Basic labs are normal.  Lipid profile checked earlier this year showed an LDL cholesterol of 36.  Denies any chest pain or pressure, shortness of breath/dyspnea, orthopnea, pnd, palpitations, syncope or edema.    EXAM:  /86 (BP Location: Right arm, Patient Position: Sitting, Cuff Size: Adult Regular)   Pulse 79   Wt 65.4 kg (144 lb 3.2 oz)   SpO2 94%   BMI 26.37 kg/m    GEN/CONSTITUIONAL: Appears comfortable, in no apparent distress   EYES: No icterus  ENT/MOUTH:  Normal  JVP:  Not visible  RESPIRATORY: Clear to auscultation bilaterally   CARDIOVASCULAR: Regular S1 and S2, no murmurs, rubs, or gallops.   ABDOMEN: Soft, non-tender, positive bowel sounds   NEUROLOGIC: Grossly non-focal   PSYCHIATRIC: Normal affect  EXT: No cyanosis, clubbing, edema. Normal pedal pulses.  Skin: No petechiae, purpura or rash    PAST MEDICAL HISTORY:  Past Medical History:   Diagnosis Date     Anxiety      CAD (coronary artery disease)     CABG, PCI     Congestive heart failure, unspecified 2008     Depressive disorder 2008     Gout      Hepatitis B > 10 years     HTN (hypertension)      Hyperlipidemia LDL goal < 100      Malrotation of intestine      Moderate major depression (H)      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) 9/19/2011    Uses CPAP     Rheumatoid arthritis flare (H) 1012     Steatohepatitis 12/15    liver biopsy     Tuberculosis age 13    INH x 9 months      Vitamin D deficiency        CURRENT MEDICATIONS:  Current Outpatient Medications   Medication     Acetaminophen (TYLENOL PO)     allopurinol (ZYLOPRIM) 300 MG tablet     amLODIPine (NORVASC) 5 MG tablet     aspirin EC 81 MG EC tablet     atorvastatin (LIPITOR) 80 MG tablet     bacitracin 500 UNIT/GM external ointment     baclofen (LIORESAL) 10 MG tablet     busPIRone HCl (BUSPAR) 30 MG tablet     BUTRANS 20 MCG/HR WK patch     Cholecalciferol (VITAMIN D) 2000 UNITS tablet     clobetasol (TEMOVATE) 0.05 % external cream     clobetasol (TEMOVATE) 0.05 % external solution     clonazePAM (KLONOPIN) 1 MG tablet     clopidogrel (PLAVIX) 75 MG tablet     colchicine (COLCYRS) 0.6 MG tablet     desvenlafaxine (PRISTIQ) 50 MG 24 hr tablet     diclofenac (VOLTAREN) 1 % topical gel     diclofenac (VOLTAREN) 1 % topical gel     evolocumab (REPATHA SURECLICK) 140 MG/ML prefilled autoinjector     ezetimibe (ZETIA) 10 MG tablet     gemfibrozil (LOPID) 600 MG tablet     golimumab (SIMPONI) 50 MG/0.5ML auto-injector pen     hydrocortisone (WESTCORT) 0.2 % external  cream     hydrocortisone 2.5 % cream     Incontinence Supply Disposable (DEPEND UNDERWEAR LARGE) MISC     leflunomide (ARAVA) 20 MG tablet     meclizine (ANTIVERT) 25 MG tablet     melatonin 3 MG tablet     naloxone (NARCAN) 4 MG/0.1ML nasal spray     nitroGLYcerin (NITROSTAT) 0.4 MG sublingual tablet     order for DME     order for DME     ORDER FOR DME     pantoprazole (PROTONIX) 40 MG EC tablet     pimecrolimus (ELIDEL) 1 % external cream     sildenafil (REVATIO) 20 MG tablet     sulfaSALAzine (AZULFIDINE) 500 MG tablet     tamsulosin (FLOMAX) 0.4 MG capsule     tenofovir (VIREAD) 300 MG tablet     triamcinolone (KENALOG) 0.1 % external ointment     triamcinolone (KENALOG) 0.1 % external ointment     zolpidem (AMBIEN) 5 MG tablet     No current facility-administered medications for this visit.       PAST SURGICAL HISTORY:  Past Surgical History:   Procedure Laterality Date     ABDOMEN SURGERY  2014     BIOPSY  2015     BYPASS GRAFT ARTERY CORONARY  2008    6 vessels     COLONOSCOPY  2/8/2013    Procedure: COLONOSCOPY;  Colonoscopy, blood in stool;  Surgeon: Duane, William Charles, MD;  Location:  OR     COMBINED REPAIR PTOSIS WITH BLEPHAROPLASTY BILATERAL Bilateral 6/25/2018    Procedure: COMBINED REPAIR PTOSIS WITH BLEPHAROPLASTY BILATERAL;  Bilateral upper eyelid blepharoplasty, ptosis repair and brow ptosis repair;  Surgeon: Sandra Borja MD;  Location:  OR     GI SURGERY  2014     HEAD & NECK SURGERY  2011     NASAL/SINUS POLYPECTOMY  2010     ORTHOPEDIC SURGERY  2012     REPAIR PTOSIS       REPAIR PTOSIS BILATERAL Bilateral 7/22/2019    Procedure: REPAIR, PTOSIS, BOTH UPPER brows;  Surgeon: Sandra Borja MD;  Location:  OR     REPAIR PTOSIS BROW BILATERAL Bilateral 6/25/2018    Procedure: REPAIR PTOSIS BROW BILATERAL;;  Surgeon: Sandra Borja MD;  Location:  OR     THORACIC SURGERY  1989    tb     TONSILLECTOMY  2010     UVULOPALATOPHARYNGOPLASTY  2010     VASCULAR SURGERY  2008      ZZHC CORONARY STENT CIERA SOTO ADDTL VESSEL  2008    3 months after CABG       ALLERGIES     Allergies   Allergen Reactions     Citalopram Itching     Tramadol Itching       FAMILY HISTORY:  Family History   Problem Relation Age of Onset     C.A.D. Father      Coronary Artery Disease Father      Hypertension Father      Depression Father      Hypertension Mother      Diabetes Mother      Depression Mother      Mental Illness Mother      Hypertension Maternal Grandfather      Cancer Paternal Grandfather      Other Cancer Paternal Grandfather      Other Cancer Other      Autoimmune Disease No family hx of      Cerebrovascular Disease No family hx of      Thyroid Disease No family hx of      Glaucoma No family hx of      Macular Degeneration No family hx of        SOCIAL HISTORY:  Social History     Socioeconomic History     Marital status:      Spouse name: Not on file     Number of children: 5     Years of education: 14     Highest education level: Not on file   Occupational History     Occupation: [employer]     Employer: UNEMPLOYED     Comment: 2-2011. On long term disability. SSD applied for   Tobacco Use     Smoking status: Never     Smokeless tobacco: Never   Substance and Sexual Activity     Alcohol use: No     Alcohol/week: 0.0 standard drinks of alcohol     Drug use: No     Sexual activity: Yes     Partners: Female   Other Topics Concern     Parent/sibling w/ CABG, MI or angioplasty before 65F 55M? Yes     Comment: father   Social History Narrative    . No current SO.         Has 5 children. Youngest child does live with him.         H/o AA degree and previously worked as a [employer]. Currently unemployed.         Denies tobacco use.     Alcohol use: 2x/week with minimal amount of alcohol per time.     Drug use: denies     Social Determinants of Health     Financial Resource Strain: Not on file   Food Insecurity: Not on file   Transportation Needs: Not on file   Physical Activity: Not  on file   Stress: Not on file   Social Connections: Not on file   Intimate Partner Violence: Not on file   Housing Stability: Not on file       ROS:   Constitutional: No fever, chills, or sweats. No weight gain/loss   ENT: No visual disturbance, ear ache, epistaxis, sore throat  Allergies/Immunologic: Negative.   Respiratory: No cough, hemoptysia  Cardiovascular: As per HPI  GI: No nausea, vomiting, hematemesis, melena, or hematochezia  : No urinary frequency, dysuria, or hematuria  Integument: Negative  Psychiatric: Negative  Neuro: Negative  Endocrinology: Negative   Musculoskeletal: Negative    ADDITIONAL COMMENTS:     I reviewed the patient's medications:     I reviewed the patient's pertinent clinical laboratory studies:     I reviewed the patient's pertinent imaging studies:   I reviewed the patient's ECG:

## 2023-06-28 DIAGNOSIS — E78.5 HYPERLIPIDEMIA LDL GOAL <70: ICD-10-CM

## 2023-06-28 DIAGNOSIS — Z95.1 S/P CABG (CORONARY ARTERY BYPASS GRAFT): ICD-10-CM

## 2023-06-28 RX ORDER — EVOLOCUMAB 140 MG/ML
140 INJECTION, SOLUTION SUBCUTANEOUS
Qty: 6 ML | Refills: 3 | Status: SHIPPED | OUTPATIENT
Start: 2023-06-28 | End: 2024-05-21

## 2023-06-30 ENCOUNTER — OFFICE VISIT (OUTPATIENT)
Dept: CARDIOLOGY | Facility: CLINIC | Age: 58
End: 2023-06-30
Payer: COMMERCIAL

## 2023-06-30 VITALS
OXYGEN SATURATION: 94 % | BODY MASS INDEX: 26.37 KG/M2 | HEART RATE: 79 BPM | DIASTOLIC BLOOD PRESSURE: 86 MMHG | WEIGHT: 144.2 LBS | SYSTOLIC BLOOD PRESSURE: 128 MMHG

## 2023-06-30 DIAGNOSIS — I10 ESSENTIAL HYPERTENSION WITH GOAL BLOOD PRESSURE LESS THAN 140/90: ICD-10-CM

## 2023-06-30 DIAGNOSIS — E78.5 HYPERLIPIDEMIA LDL GOAL <100: ICD-10-CM

## 2023-06-30 PROCEDURE — 99214 OFFICE O/P EST MOD 30 MIN: CPT | Performed by: INTERNAL MEDICINE

## 2023-06-30 RX ORDER — AMLODIPINE BESYLATE 5 MG/1
5 TABLET ORAL DAILY
Qty: 90 TABLET | Refills: 3 | Status: SHIPPED | OUTPATIENT
Start: 2023-06-30 | End: 2024-05-30

## 2023-06-30 RX ORDER — ATORVASTATIN CALCIUM 80 MG/1
80 TABLET, FILM COATED ORAL DAILY
Qty: 90 TABLET | Refills: 3 | Status: SHIPPED | OUTPATIENT
Start: 2023-06-30 | End: 2024-06-28

## 2023-06-30 NOTE — PROGRESS NOTES
Lamont Daniels's goals for this visit include: Needs a refill on his repatha.     He requests these members of his care team be copied on today's visit information: PCP    PCP: David Chavez    Referring Provider:  No referring provider defined for this encounter.    /86 (BP Location: Right arm, Patient Position: Sitting, Cuff Size: Adult Regular)   Pulse 79   Wt 65.4 kg (144 lb 3.2 oz)   SpO2 94%   BMI 26.37 kg/m      Do you need any medication refills at today's visit? Yes. Repatha.     Odell Ahn, EMT  Clinic Support  [facility]    [phone]    Employed by [employer]

## 2023-07-13 ENCOUNTER — MYC MEDICAL ADVICE (OUTPATIENT)
Dept: PALLIATIVE MEDICINE | Facility: CLINIC | Age: 58
End: 2023-07-13
Payer: COMMERCIAL

## 2023-07-13 ENCOUNTER — TELEPHONE (OUTPATIENT)
Dept: FAMILY MEDICINE | Facility: CLINIC | Age: 58
End: 2023-07-13
Payer: COMMERCIAL

## 2023-07-13 DIAGNOSIS — G89.4 CHRONIC PAIN SYNDROME: ICD-10-CM

## 2023-07-13 DIAGNOSIS — M17.11 PRIMARY OSTEOARTHRITIS OF RIGHT KNEE: ICD-10-CM

## 2023-07-13 DIAGNOSIS — F11.90 CHRONIC, CONTINUOUS USE OF OPIOIDS: ICD-10-CM

## 2023-07-13 DIAGNOSIS — M06.9 RHEUMATOID ARTHRITIS INVOLVING MULTIPLE SITES, UNSPECIFIED WHETHER RHEUMATOID FACTOR PRESENT (H): ICD-10-CM

## 2023-07-13 DIAGNOSIS — M47.819 FACET ARTHROPATHY: ICD-10-CM

## 2023-07-13 RX ORDER — BUPRENORPHINE 20 UG/H
1 PATCH, EXTENDED RELEASE TRANSDERMAL
Qty: 4 PATCH | Refills: 0 | Status: SHIPPED | OUTPATIENT
Start: 2023-07-13 | End: 2023-08-04

## 2023-07-13 NOTE — TELEPHONE ENCOUNTER
Patient Quality Outreach    Patient is due for the following:   Depression  -  PHQ-9 needed    Next Steps:   Patient was assigned appropriate questionnaire to complete    Type of outreach:    Sent To8to message.    Next Steps:  Reach out within 90 days via Phone.    Max number of attempts reached: No. Will try again in 90 days if patient still on fail list.    Questions for provider review:    None           Cynthia Jaeger MA

## 2023-07-14 NOTE — TELEPHONE ENCOUNTER
Signed Prescriptions:                        Disp   Refills    BUTRANS 20 MCG/HR WK patch                 4 patch0        Sig: Place 1 patch onto the skin every 7 days OK to fill           7/14/2023 start 07/17/23. LISANDRO, name brand. 28 day           script for chronic pain.  Authorizing Provider: AMEENA PAGE        Reviewed [place]  July 13, 2023- no concerning fills.    Ameena LEMUS, RN CNP, FNP  [facility]  [facility]

## 2023-07-19 DIAGNOSIS — F33.1 MAJOR DEPRESSIVE DISORDER, RECURRENT EPISODE, MODERATE (H): ICD-10-CM

## 2023-07-19 DIAGNOSIS — M10.00 IDIOPATHIC GOUT, UNSPECIFIED CHRONICITY, UNSPECIFIED SITE: ICD-10-CM

## 2023-07-19 RX ORDER — BUSPIRONE HYDROCHLORIDE 30 MG/1
TABLET ORAL
Qty: 180 TABLET | Refills: 3 | Status: SHIPPED | OUTPATIENT
Start: 2023-07-19 | End: 2024-06-18

## 2023-07-19 RX ORDER — ALLOPURINOL 300 MG/1
TABLET ORAL
Qty: 90 TABLET | Refills: 3 | Status: SHIPPED | OUTPATIENT
Start: 2023-07-19 | End: 2024-06-18

## 2023-07-20 ENCOUNTER — VIRTUAL VISIT (OUTPATIENT)
Dept: GASTROENTEROLOGY | Facility: CLINIC | Age: 58
End: 2023-07-20
Attending: INTERNAL MEDICINE
Payer: COMMERCIAL

## 2023-07-20 DIAGNOSIS — R74.8 ELEVATED LIVER ENZYMES: ICD-10-CM

## 2023-07-20 DIAGNOSIS — Z12.9 SCREENING FOR CANCER: ICD-10-CM

## 2023-07-20 DIAGNOSIS — B18.1 CHRONIC VIRAL HEPATITIS B WITHOUT DELTA AGENT AND WITHOUT COMA (H): ICD-10-CM

## 2023-07-20 DIAGNOSIS — Z79.899 HIGH RISK MEDICATIONS (NOT ANTICOAGULANTS) LONG-TERM USE: Primary | ICD-10-CM

## 2023-07-20 PROCEDURE — 99441 PR PHYSICIAN TELEPHONE EVALUATION 5-10 MIN: CPT | Mod: 95 | Performed by: INTERNAL MEDICINE

## 2023-07-20 RX ORDER — TENOFOVIR DISOPROXIL FUMARATE 300 MG/1
300 TABLET, FILM COATED ORAL DAILY
Qty: 90 TABLET | Refills: 3 | Status: SHIPPED | OUTPATIENT
Start: 2023-07-20 | End: 2024-08-09

## 2023-07-20 NOTE — NURSING NOTE
Is the patient currently in the state of [place]? YES    Visit mode:TELEPHONE    If the visit is dropped, the patient can be reconnected by: TELEPHONE VISIT: Phone number: [phone]    Will anyone else be joining the visit? NO      How would you like to obtain your AVS? MyChart    Are changes needed to the allergy or medication list? NO    Reason for visit: RECHECK

## 2023-07-20 NOTE — PROGRESS NOTES
Virtual Visit Details    Type of service:  Telephone Visit   Phone call duration: 10 minutes     Date of encounter: July 20, 2023     Subjective:   The patient is a 57 year old male with past medical history of hypertension, dyslipidemia, coronary artery disease, rheumatoid arthritis, obesity with resultant nonalcoholic steatohepatitis and chronic hepatitis B who presents in follow up.     He reports that he is struggling with his overall health.  Was seen by cardiology a few weeks ago and noted to be doing well.  He was seen by his rheumatologist in the past few months and no overt medication changes.  Imaging     Since last seen he denies any significant changes in his overall care.  He is on medicines for management of his rheumatoid arthritis and notes that he is on chronic pain medications secondary to debility.  Denies any hospitalizations or recent visits to the urgent care.  He reports that he is rather sedentary lifestyle, typically only ambulating around the house.  When questioned why he is not doing more activity he feels its fatigue and lack of motivation.  He reports that his weight has remained relatively stable since our last clinic visit.  Imaging of his spine demonstrated anatomical-not-inflammatory conditions as likely source of back pain.  He has been referred to the pain management clinic.    He continues to take tenofovir daily, and labs from 3/22/2023 demonstrated undetectable viral load and normal liver tests     Denies issues with confusion, impaired concentration, diarrhea, fluid retention     A review was made of the PMHx, Surgical Hx, Social Hx, Medications, Family Hx and the electronic medical record was updated appropriately     A comprehensive review of systems was completed but answered in the negative unless specifically commented upon on the HPI    Imaging:  Abd US 3/28/2023  FINDINGS:    The liver measures a craniocaudal dimension of 14.4 cm.  No focal liver lesion. Liver  echogenicity is normal. The spleen  measures 11.8 cm. There are stones in the gallbladder. There is no  gallbladder wall thickening or pericholecystic fluid. No sonographic  Bah's sign was elicited. The diameter of the common bile duct  measures 3mm. The pancreas is partially obscured but otherwise appears  unremarkable. The aorta and IVC are visualized. The right and left  kidneys measure 11.2cm and 11.1 cm , respectively. No solid renal  mass, stone or hydronephrosis.      Assessment/Plan:  Chronic hepatitis B:  -EBV antigen negative, E antibody positive chronic hepatitis B on tenofovir therapy  -Viral level is undetectable in labs from 3/2023  -We will continue to follow with hepatitis B DNA levels in 6 months  -He is to continue taking the tenofovir daily (renewed)    Fatty liver disease:  -Discussed the need to work on his risk factors like dyslipidemia, obesity,to minimize the progression and presence of his fatty liver disease and possible ALDANA  - I think this has improved in the setting of intentional 20 lb weight loss  - Continue to exercise and make lifestyle modifications to promote increased exercise tolerance    Screening for hepatocellular carcinoma  - Abd US from 3/2023 without masses  -We will repeat an abdominal ultrasound again in 6 months      Follow up 6 months    Thomas M. Leventhal, M.D.   of Medicine  Advanced & Transplant Hepatology  [facility]

## 2023-07-20 NOTE — LETTER
7/20/2023         RE: Lamont Daniels  [address]  [place] [address]        Dear Colleague,    Thank you for referring your patient, Lamont Daniels, to the [facility] [place]. Please see a copy of my visit note below.    Virtual Visit Details    Type of service:  Telephone Visit   Phone call duration: 10 minutes     Date of encounter: July 20, 2023     Subjective:   The patient is a 57 year old male with past medical history of hypertension, dyslipidemia, coronary artery disease, rheumatoid arthritis, obesity with resultant nonalcoholic steatohepatitis and chronic hepatitis B who presents in follow up.     He reports that he is struggling with his overall health.  Was seen by cardiology a few weeks ago and noted to be doing well.  He was seen by his rheumatologist in the past few months and no overt medication changes.  Imaging     Since last seen he denies any significant changes in his overall care.  He is on medicines for management of his rheumatoid arthritis and notes that he is on chronic pain medications secondary to debility.  Denies any hospitalizations or recent visits to the urgent care.  He reports that he is rather sedentary lifestyle, typically only ambulating around the house.  When questioned why he is not doing more activity he feels its fatigue and lack of motivation.  He reports that his weight has remained relatively stable since our last clinic visit.  Imaging of his spine demonstrated anatomical-not-inflammatory conditions as likely source of back pain.  He has been referred to the pain management clinic.    He continues to take tenofovir daily, and labs from 3/22/2023 demonstrated undetectable viral load and normal liver tests     Denies issues with confusion, impaired concentration, diarrhea, fluid retention     A review was made of the PMHx, Surgical Hx, Social Hx, Medications, Family Hx and the electronic medical record was updated appropriately     A  comprehensive review of systems was completed but answered in the negative unless specifically commented upon on the HPI    Imaging:  Abd US 3/28/2023  FINDINGS:    The liver measures a craniocaudal dimension of 14.4 cm.  No focal liver lesion. Liver echogenicity is normal. The spleen  measures 11.8 cm. There are stones in the gallbladder. There is no  gallbladder wall thickening or pericholecystic fluid. No sonographic  Bah's sign was elicited. The diameter of the common bile duct  measures 3mm. The pancreas is partially obscured but otherwise appears  unremarkable. The aorta and IVC are visualized. The right and left  kidneys measure 11.2cm and 11.1 cm , respectively. No solid renal  mass, stone or hydronephrosis.      Assessment/Plan:  Chronic hepatitis B:  -EBV antigen negative, E antibody positive chronic hepatitis B on tenofovir therapy  -Viral level is undetectable in labs from 3/2023  -We will continue to follow with hepatitis B DNA levels in 6 months  -He is to continue taking the tenofovir daily (renewed)    Fatty liver disease:  -Discussed the need to work on his risk factors like dyslipidemia, obesity,to minimize the progression and presence of his fatty liver disease and possible ALDANA  - I think this has improved in the setting of intentional 20 lb weight loss  - Continue to exercise and make lifestyle modifications to promote increased exercise tolerance    Screening for hepatocellular carcinoma  - Abd US from 3/2023 without masses  -We will repeat an abdominal ultrasound again in 6 months      Follow up 6 months    Thomas M. Leventhal, M.D.   of Medicine  Advanced & Transplant Hepatology  [facility]

## 2023-07-21 ENCOUNTER — TELEPHONE (OUTPATIENT)
Dept: GASTROENTEROLOGY | Facility: CLINIC | Age: 58
End: 2023-07-21
Payer: COMMERCIAL

## 2023-07-25 ENCOUNTER — TELEPHONE (OUTPATIENT)
Dept: SLEEP MEDICINE | Facility: CLINIC | Age: 58
End: 2023-07-25
Payer: COMMERCIAL

## 2023-07-25 DIAGNOSIS — M79.18 MYOFASCIAL PAIN: ICD-10-CM

## 2023-07-25 DIAGNOSIS — M62.838 MUSCLE SPASM: ICD-10-CM

## 2023-07-25 DIAGNOSIS — G47.33 OSA (OBSTRUCTIVE SLEEP APNEA): Primary | ICD-10-CM

## 2023-07-25 DIAGNOSIS — M62.830 SPASM OF BACK MUSCLES: ICD-10-CM

## 2023-07-25 NOTE — TELEPHONE ENCOUNTER
Called to inform patient he will need a follow up visit if still interested in a replacement CPAP. Patient stated he is still interested and will call the sleep clinic to schedule.

## 2023-07-25 NOTE — TELEPHONE ENCOUNTER
Received fax from pharmacy requesting refill(s) for     baclofen (LIORESAL) 10 MG tablet     Last refilled on 6/26/2023.    Pt last seen on 6/26/2023.    Next appt scheduled for 9/18/2023.    E-prescribe to:    [id number] - [address]     Will facilitate refill.

## 2023-07-26 NOTE — TELEPHONE ENCOUNTER
: Efren Collins MD     INDICATION:    PROCEDURE:  [ ] LIMITED ECHO  [ ] LIMITED CHEST  [ ] LIMITED RETROPERITONEAL  [ ] LIMITED ABDOMINAL  [ ] LIMITED DVT  [ ] NEEDLE GUIDANCE VASCULAR  [ ] NEEDLE GUIDANCE THORACENTESIS  [ ] NEEDLE GUIDANCE PARACENTESIS  [ ] NEEDLE GUIDANCE PERICARDIOCENTESIS  [ ] OTHER    FINDINGS:      INTERPRETATION:      Images uploaded on [employer] [facility] Prior Authorization Team   Phone: [phone]  Fax: [phone]    PA Initiation    Medication: Repatha  Insurance Company: [employer] - Phone [phone] Fax [phone]  Pharmacy Filling the Rx: [facility] - [place] [address]  Filling Pharmacy Phone: [phone]  Filling Pharmacy Fax: [phone]  Start Date: 12/14/2020    Prior Authorization Approval    Authorization Effective Date: 11/14/2020  Authorization Expiration Date: 12/14/2023  Medication: Repatha  Approved Dose/Quantity: ud  Reference #: [id number]   Insurance Company: [employer] [phone] Fax [phone]  Expected CoPay: $0     CoPay Card Available: No    Foundation Assistance Needed:    Which Pharmacy is filling the prescription (Not needed for infusion/clinic administered): [facility] - [place] [address]  Pharmacy Notified: Yes  Patient Notified: Yes       : Efren Collins MD     INDICATION:    PROCEDURE:  [x ] LIMITED ECHO  [x ] LIMITED CHEST  [ ] LIMITED RETROPERITONEAL  [ ] LIMITED ABDOMINAL  [ ] LIMITED DVT  [ ] NEEDLE GUIDANCE VASCULAR  [ ] NEEDLE GUIDANCE THORACENTESIS  [ ] NEEDLE GUIDANCE PARACENTESIS  [ ] NEEDLE GUIDANCE PERICARDIOCENTESIS  [ ] OTHER    FINDINGS:  A-lines B/l. No B-lines noted. Normal LVSF. No pericardial effusion.     INTERPRETATION:  A-lines   Normal LVSF     Images uploaded on [employer] : Efren Collins MD     INDICATION: ALICJA, lithium toxicity  PROCEDURE:  [x ] LIMITED ECHO  [x ] LIMITED CHEST  [ ] LIMITED RETROPERITONEAL  [ ] LIMITED ABDOMINAL  [ ] LIMITED DVT  [ ] NEEDLE GUIDANCE VASCULAR  [ ] NEEDLE GUIDANCE THORACENTESIS  [ ] NEEDLE GUIDANCE PARACENTESIS  [ ] NEEDLE GUIDANCE PERICARDIOCENTESIS  [ ] OTHER    FINDINGS:  A-lines B/l. No B-lines noted. Normal LVSF. No pericardial effusion.     INTERPRETATION:  A-lines   Normal LVSF     Images uploaded on [employer]    Attending Attestation:  I was present during the key portions of the procedure and immediately available during the entire procedure.  Maxine Bowman MD  Attending  Pulmonary & Critical Care Medicine

## 2023-07-31 ENCOUNTER — TELEPHONE (OUTPATIENT)
Dept: SLEEP MEDICINE | Facility: CLINIC | Age: 58
End: 2023-07-31
Payer: COMMERCIAL

## 2023-07-31 DIAGNOSIS — Z95.1 S/P CABG (CORONARY ARTERY BYPASS GRAFT): Primary | ICD-10-CM

## 2023-07-31 DIAGNOSIS — I25.119 ATHEROSCLEROSIS OF NATIVE CORONARY ARTERY OF NATIVE HEART WITH ANGINA PECTORIS (H): ICD-10-CM

## 2023-07-31 RX ORDER — BACLOFEN 10 MG/1
10 TABLET ORAL 3 TIMES DAILY PRN
Qty: 90 TABLET | Refills: 2 | Status: SHIPPED | OUTPATIENT
Start: 2023-07-31 | End: 2023-10-26

## 2023-07-31 NOTE — TELEPHONE ENCOUNTER
Signed Prescriptions:                        Disp   Refills    baclofen (LIORESAL) 10 MG tablet           90 tab*2        Sig: Take 1 tablet (10 mg) by mouth 3 times daily as           needed for muscle spasms  Authorizing Provider: FIDENCIO PAGE, RN CNP, FNP  [facility]  [facility] [place]

## 2023-07-31 NOTE — TELEPHONE ENCOUNTER
Reason for Call:  Other appointment    Detailed comments: patient calling for appt. She says her machine is on recall. Next avail is Feb with Dr. Jessica.      Phone Number Patient can be reached at: Cell number on file:    Telephone Information:   Mobile [phone]       Best Time: any     Can we leave a detailed message on this number? YES    Call taken on 7/31/2023 at 12:36 PM by Tisha Yancey

## 2023-08-01 ENCOUNTER — APPOINTMENT (OUTPATIENT)
Dept: INTERPRETER SERVICES | Facility: CLINIC | Age: 58
End: 2023-08-01
Payer: COMMERCIAL

## 2023-08-01 NOTE — TELEPHONE ENCOUNTER
Blane sent    Lorraine LARA RN  [facility]

## 2023-08-01 NOTE — TELEPHONE ENCOUNTER
"Requested Prescriptions   Pending Prescriptions Disp Refills    nitroGLYcerin (NITROSTAT) 0.4 MG sublingual tablet [Pharmacy Med Name: NITROGLYCERIN 0.4 MG TABLET SL] 25 tablet 0     Sig: PLACE 1 TAB UNDER THE TONGUE EVERY 5 MINUTES FOR 3 DOSES FOR CHEST PAIN. IF SYMPTOMS PERSIST 5 MINUTES AFTER 1ST DOSE CALL 911.       Nitrates Failed - 8/1/2023 12:50 PM        Failed - Pt is not on erectile dysfunction medications        Failed - Sublingual nitro order needs review     If refill exceeds 1 bottle per month, please forward request to provider.           Passed - Blood pressure under 140/90 in past 12 months     BP Readings from Last 3 Encounters:   06/30/23 128/86   06/21/23 133/88   06/07/23 136/83                 Passed - Recent (12 mo) or future (30 days) visit within the authorizing provider's specialty     Patient has had an office visit with the authorizing provider or a provider within the authorizing providers department within the previous 12 mos or has a future within next 30 days. See \"Patient Info\" tab in inbasket, or \"Choose Columns\" in Meds & Orders section of the refill encounter.              Passed - Medication is active on med list        Passed - Patient is age 18 or older            Noted Nitroglycerin was filled last year with 3 refills.RN contacted pharmacy According to staff this medication is on auto refill, not sure if pt has already used up all of his supply. Attempted to reach patient via [employer] , message left requesting a call back to verify NTG used.              "

## 2023-08-03 ENCOUNTER — MYC MEDICAL ADVICE (OUTPATIENT)
Dept: CARDIOLOGY | Facility: CLINIC | Age: 58
End: 2023-08-03
Payer: COMMERCIAL

## 2023-08-03 RX ORDER — NITROGLYCERIN 0.4 MG/1
TABLET SUBLINGUAL
Qty: 25 TABLET | Refills: 0 | Status: SHIPPED | OUTPATIENT
Start: 2023-08-03

## 2023-08-03 NOTE — TELEPHONE ENCOUNTER
PHQ-9 score:        8/1/2023     1:58 PM   PHQ   PHQ-9 Total Score 17   Q9: Thoughts of better off dead/self-harm past 2 weeks More than half the days   F/U: Thoughts of suicide or self-harm No   F/U: Safety concerns No         Updated phq 9. Please advise.  Cynthia Jaeger ma

## 2023-08-03 NOTE — TELEPHONE ENCOUNTER
Refill sent. Message sent via [employer] for patient to contact clinic if using NTG frequently. Pt was also made aware that using sildenafil with NTG can cause low blood pressures.

## 2023-08-04 DIAGNOSIS — F11.90 CHRONIC, CONTINUOUS USE OF OPIOIDS: ICD-10-CM

## 2023-08-04 DIAGNOSIS — M47.819 FACET ARTHROPATHY: ICD-10-CM

## 2023-08-04 DIAGNOSIS — M17.11 PRIMARY OSTEOARTHRITIS OF RIGHT KNEE: ICD-10-CM

## 2023-08-04 DIAGNOSIS — G89.4 CHRONIC PAIN SYNDROME: ICD-10-CM

## 2023-08-04 DIAGNOSIS — M06.9 RHEUMATOID ARTHRITIS INVOLVING MULTIPLE SITES, UNSPECIFIED WHETHER RHEUMATOID FACTOR PRESENT (H): ICD-10-CM

## 2023-08-04 NOTE — TELEPHONE ENCOUNTER
Medication refill information reviewed.     Due date for  BUTRANS 20 MCG/HR WK patch  is 8/14/2023     Prescriptions prepped for review.     Will route to provider.       Gisela Watson RN  [facility]  [phone]

## 2023-08-04 NOTE — TELEPHONE ENCOUNTER
Received call from patient requesting refill(s) BUTRANS 20 MCG/HR WK patch    Last dispensed from pharmacy on 07/19/2023    Patient's last office/virtual visit by prescribing provider on 06/26/2023  Next office/virtual appointment scheduled for 09/18/2023    Last urine drug screen date 03/23/2023  Current opioid agreement on file (completed within the last year) Yes Date of opioid agreement: 03/23/2023    [facility]      [place] [address] [place] [facility] [place]      Will route to nursing [place] for review and preparation of prescription(s).     Noemi Jorge MA  [facility]

## 2023-08-06 RX ORDER — BUPRENORPHINE 20 UG/H
1 PATCH, EXTENDED RELEASE TRANSDERMAL
Qty: 4 PATCH | Refills: 0 | Status: SHIPPED | OUTPATIENT
Start: 2023-08-06 | End: 2023-09-14

## 2023-08-06 NOTE — TELEPHONE ENCOUNTER
Signed Prescriptions:                        Disp   Refills    BUTRANS 20 MCG/HR WK patch                 4 patch0        Sig: Place 1 patch onto the skin every 7 days OK to fill           8/12/2023 start 08/14/23. LISANDRO, name brand. 28 day           script for chronic pain.  Authorizing Provider: AMEENA PAGE        Reviewed [place]  August 6, 2023- no concerning fills.    Ameena LEMUS, RN CNP, FNP  [facility]  [facility]

## 2023-08-08 ENCOUNTER — TELEPHONE (OUTPATIENT)
Dept: GASTROENTEROLOGY | Facility: CLINIC | Age: 58
End: 2023-08-08
Payer: COMMERCIAL

## 2023-08-08 NOTE — TELEPHONE ENCOUNTER
LVM // pt needs to schedule Return Liver with Leandra Mcclellan PA-C in January 2024 with labs & US at that time ordered by Dr. Leventhal // second attempt, AN 8.8.23

## 2023-08-15 ENCOUNTER — TELEPHONE (OUTPATIENT)
Dept: PALLIATIVE MEDICINE | Facility: CLINIC | Age: 58
End: 2023-08-15
Payer: COMMERCIAL

## 2023-08-15 NOTE — TELEPHONE ENCOUNTER
Prior Authorization Retail Medication Request    Medication/Dose: BUTRANS 20 MCG/HR WK patch  ICD code (if different than what is on RX):    Previously Tried and Failed:    Rationale:  renewal needed    Insurance Name:    Insurance ID:  [id number]      Pharmacy Information     [facility] [address] [place] [facility]  [place] [address]  Phone: [phone] Fax: [phone]

## 2023-08-15 NOTE — TELEPHONE ENCOUNTER
Central Prior Authorization Team   Phone: [phone]    PA Initiation    Medication: BUTRANS 20 MCG/HR TD PTWK  Insurance Company: [employer] Non-Specialty PA's - Phone [phone] Fax [phone]  Pharmacy Filling the Rx: [id number] [place] - [address] [place] [address]  Filling Pharmacy Phone: [phone]  Filling Pharmacy Fax:    Start Date: 8/15/2023

## 2023-08-17 NOTE — TELEPHONE ENCOUNTER
Prior Authorization Approval    Medication: BUTRANS 20 MCG/HR TD PTWK  Authorization Effective Date: 7/16/2023  Authorization Expiration Date: 8/14/2024  Insurance Company: [employer] Non-Specialty PA's - Phone [phone] Fax [phone]  Which [facility] is filling the [id number] [place] [address]  Pharmacy Notified: Yes  Patient Notified: Yes (pharmacy will notify patient when ready)

## 2023-08-24 ENCOUNTER — APPOINTMENT (OUTPATIENT)
Dept: INTERPRETER SERVICES | Facility: CLINIC | Age: 58
End: 2023-08-24
Payer: COMMERCIAL

## 2023-08-30 ENCOUNTER — TELEPHONE (OUTPATIENT)
Dept: RHEUMATOLOGY | Facility: CLINIC | Age: 58
End: 2023-08-30
Payer: COMMERCIAL

## 2023-08-30 NOTE — TELEPHONE ENCOUNTER
PA Initiation    Medication: SIMPONI 50 MG/0.5ML SC SOAJ  Insurance Company: [employer] - Phone [phone] Fax [phone]  Pharmacy Filling the Rx: [facility] - [place], [place] - [address]  Filling Pharmacy Phone:    Filling Pharmacy Fax:    Start Date: 8/30/2023    Key: [id number]

## 2023-09-06 NOTE — TELEPHONE ENCOUNTER
Prior Authorization Approval    Medication: SIMPONI 50 MG/0.5ML SC SOAJ  Authorization Effective Date: 7/31/2023  Authorization Expiration Date: 8/30/2024  Approved Dose/Quantity: 0.5 mL / 28  Reference #: Key: [id number]   Insurance Company: [employer] - Phone [phone] Fax [phone]  Expected CoPay: 0     CoPay Card Available: No    Financial Assistance Needed: no - [facility] dual   Which Pharmacy is filling the prescription[facility] [address] [facility]  Pharmacy Notified: Yes  Patient Notified: Yes        Thank You,     Perla Richardson [facility]  [facility]  [email or web address]  [email or web address]  Phone: [phone]  Fax: [phone]

## 2023-09-10 DIAGNOSIS — F41.9 ANXIETY HYPERVENTILATION: ICD-10-CM

## 2023-09-10 DIAGNOSIS — F45.8 ANXIETY HYPERVENTILATION: ICD-10-CM

## 2023-09-10 DIAGNOSIS — M17.11 PRIMARY OSTEOARTHRITIS OF RIGHT KNEE: ICD-10-CM

## 2023-09-10 DIAGNOSIS — M06.9 RHEUMATOID ARTHRITIS INVOLVING MULTIPLE SITES, UNSPECIFIED WHETHER RHEUMATOID FACTOR PRESENT (H): ICD-10-CM

## 2023-09-10 DIAGNOSIS — R42 VERTIGO: ICD-10-CM

## 2023-09-11 RX ORDER — MECLIZINE HYDROCHLORIDE 25 MG/1
TABLET ORAL
Qty: 30 TABLET | Refills: 3 | Status: SHIPPED | OUTPATIENT
Start: 2023-09-11 | End: 2023-12-27

## 2023-09-12 RX ORDER — CLONAZEPAM 1 MG/1
TABLET ORAL
Qty: 90 TABLET | Refills: 0 | Status: SHIPPED | OUTPATIENT
Start: 2023-09-12 | End: 2024-02-16

## 2023-09-14 ENCOUNTER — LAB (OUTPATIENT)
Dept: LAB | Facility: CLINIC | Age: 58
End: 2023-09-14
Payer: COMMERCIAL

## 2023-09-14 DIAGNOSIS — Z79.899 HIGH RISK MEDICATION USE: ICD-10-CM

## 2023-09-14 DIAGNOSIS — M06.09 RHEUMATOID ARTHRITIS OF MULTIPLE SITES WITH NEGATIVE RHEUMATOID FACTOR (H): ICD-10-CM

## 2023-09-14 LAB
BASOPHILS # BLD AUTO: 0.1 10E3/UL (ref 0–0.2)
BASOPHILS NFR BLD AUTO: 1 %
EOSINOPHIL # BLD AUTO: 0.1 10E3/UL (ref 0–0.7)
EOSINOPHIL NFR BLD AUTO: 1 %
ERYTHROCYTE [DISTWIDTH] IN BLOOD BY AUTOMATED COUNT: 14.3 % (ref 10–15)
ERYTHROCYTE [SEDIMENTATION RATE] IN BLOOD BY WESTERGREN METHOD: 24 MM/HR (ref 0–20)
HCT VFR BLD AUTO: 39.9 % (ref 40–53)
HGB BLD-MCNC: 13 G/DL (ref 13.3–17.7)
IMM GRANULOCYTES # BLD: 0 10E3/UL
IMM GRANULOCYTES NFR BLD: 0 %
LYMPHOCYTES # BLD AUTO: 2 10E3/UL (ref 0.8–5.3)
LYMPHOCYTES NFR BLD AUTO: 29 %
MCH RBC QN AUTO: 28.8 PG (ref 26.5–33)
MCHC RBC AUTO-ENTMCNC: 32.6 G/DL (ref 31.5–36.5)
MCV RBC AUTO: 89 FL (ref 78–100)
MONOCYTES # BLD AUTO: 1 10E3/UL (ref 0–1.3)
MONOCYTES NFR BLD AUTO: 14 %
NEUTROPHILS # BLD AUTO: 3.9 10E3/UL (ref 1.6–8.3)
NEUTROPHILS NFR BLD AUTO: 55 %
PLATELET # BLD AUTO: 325 10E3/UL (ref 150–450)
RBC # BLD AUTO: 4.51 10E6/UL (ref 4.4–5.9)
WBC # BLD AUTO: 7 10E3/UL (ref 4–11)

## 2023-09-14 PROCEDURE — 36415 COLL VENOUS BLD VENIPUNCTURE: CPT

## 2023-09-14 PROCEDURE — 85652 RBC SED RATE AUTOMATED: CPT

## 2023-09-14 PROCEDURE — 82565 ASSAY OF CREATININE: CPT

## 2023-09-14 PROCEDURE — 86140 C-REACTIVE PROTEIN: CPT

## 2023-09-14 PROCEDURE — 80076 HEPATIC FUNCTION PANEL: CPT

## 2023-09-14 PROCEDURE — 85025 COMPLETE CBC W/AUTO DIFF WBC: CPT

## 2023-09-15 LAB
ALBUMIN SERPL BCG-MCNC: 4.2 G/DL (ref 3.5–5.2)
ALP SERPL-CCNC: 99 U/L (ref 40–129)
ALT SERPL W P-5'-P-CCNC: 19 U/L (ref 0–70)
AST SERPL W P-5'-P-CCNC: 25 U/L (ref 0–45)
BILIRUB DIRECT SERPL-MCNC: <0.2 MG/DL (ref 0–0.3)
BILIRUB SERPL-MCNC: 0.4 MG/DL
CREAT SERPL-MCNC: 0.94 MG/DL (ref 0.67–1.17)
CRP SERPL-MCNC: <3 MG/L
EGFRCR SERPLBLD CKD-EPI 2021: >90 ML/MIN/1.73M2
PROT SERPL-MCNC: 7.3 G/DL (ref 6.4–8.3)

## 2023-09-17 NOTE — TELEPHONE ENCOUNTER
Received call from patient requesting refill(s) of buprenorphine (BUTRANS) 20 MCG/HR WK patch- see note from patient about generic    Last picked up from pharmacy on 04/04/19    Pt last seen by prescribing provider on 03/11/19  Next appt scheduled for 06/10/19     checked in the past 6 months? Yes If no, print current report and give to RN    Last urine drug screen date 03/11/19  Current opioid agreement on file (completed within the last year) Yes Date of opioid agreement: 03/11/19    Processing (pick one and delete the others):      E-prescribe to [facility] pharmacy    Will route to nursing pool for review and preparation of prescription(s).        cough/fever

## 2023-09-21 ENCOUNTER — OFFICE VISIT (OUTPATIENT)
Dept: RHEUMATOLOGY | Facility: CLINIC | Age: 58
End: 2023-09-21
Payer: COMMERCIAL

## 2023-09-21 VITALS
BODY MASS INDEX: 26.5 KG/M2 | HEART RATE: 74 BPM | SYSTOLIC BLOOD PRESSURE: 126 MMHG | HEIGHT: 62 IN | DIASTOLIC BLOOD PRESSURE: 80 MMHG | WEIGHT: 144 LBS | RESPIRATION RATE: 16 BRPM | OXYGEN SATURATION: 95 %

## 2023-09-21 DIAGNOSIS — Z79.899 HIGH RISK MEDICATION USE: ICD-10-CM

## 2023-09-21 DIAGNOSIS — F41.9 ANXIETY: ICD-10-CM

## 2023-09-21 DIAGNOSIS — M06.09 RHEUMATOID ARTHRITIS OF MULTIPLE SITES WITH NEGATIVE RHEUMATOID FACTOR (H): Primary | ICD-10-CM

## 2023-09-21 PROCEDURE — 99214 OFFICE O/P EST MOD 30 MIN: CPT | Performed by: INTERNAL MEDICINE

## 2023-09-21 ASSESSMENT — PAIN SCALES - GENERAL: PAINLEVEL: SEVERE PAIN (7)

## 2023-09-21 NOTE — PROGRESS NOTES
Rheumatology Clinic Visit      Lamont Daniels MRN# [id number]   YOB: 1965 Age: 57 year old      Date of visit: 9/21/23   PCP: Dr. David Chavez  Hepatologist: Dr. Thomas Leventhal     Chief Complaint   Patient presents with:  RECHECK: Rheumatoid arthritis    Assessment and Plan   1.  Seropositive nonerosive rheumatoid arthritis (RF negative, CCP low positive): Note that he does have low back pain but without evidence of SI joint irregularity on x-ray.  Initially with morning stiffness all day, gelling phenomenon, and synovitis.   Previously on Humira (partially effective), Enbrel (partially effective), HCQ (cannot safely monitor due to right retinal scaring and bilateral early macular degeneration, per ophthalmology), Orencia (partially effective and could use again in the future if needed).  MTX avoided per Dr. Laboy, hematology, recommendation. Currently on leflunomide 20 mg daily, SSZ 1500mg BID, Simponi 50 mg SQ every 28 days (improved when changed from Orencia to Simponi).  RA controlled on current regimen.  Discussed the option of trying to reduce medications but he would like to remain on his current regimen for now because it is effective.  Chronic illness, stable.    - Continue leflunomide 20 mg daily    - Continue sulfasalazine 1500mg BID  - Continue Simponi 50mg SQ every 28 days  - PRN RA flare only: Prednisone 10 mg daily x2 days, then 5 mg daily x5 days, then stop   - Labs in 3 months: CBC, Creatinine, Hepatic Panel, ESR, CRP    High risk medication requiring intensive toxicity monitoring at least quarterly     2. Lower back pain: Lumbar spine MRI in August 2014 showed multilevel degenerative changes and a small disc protrusion at L3/4 with mild spinal canal narrowing; also moderate left and mild right neural foraminal narrowing at L5-S1. He had a nerve conduction study in 2014 that was normal. He has been worked up by neurology in the past without significant neurologic findings. HLA-B27  "negative, SI joint x-ray negative. Now following in the pain management clinic.     3. Myofascial pain syndrome / chronic pain syndrome: diffuse pain consistent with chronic pain syndrome.  Management per pain clinic as well as PCP.  He has increased physical activity and is feeling better.  Also intentionally losing weight.    4. Chronic Hepatitis B: Managed by Dr. Laboy (hepatology) previously, and now Dr. Thomas Leventhal at the [facility]. Currently on tenofovir at the direction of gastroenterology.     5. Hepatic Steatosis: Based on previous liver biopsy.  Follows with gastroenterology.    6. Positive tuberculosis test, history:   This is noted here for historical significance.  He was evaluated by Dr. Anthony Mendoza, infectious disease. The following telephone note was documented by Dr. Mendoza: \"I tried calling th pt, finally we have the records from [place] . It appears he was treated for latent TB with INH for 1 year in 1980/1981 .Later in 1981 April he was admitted at [facility] in [place], [place] with rt sided pneumonia, TB was suspected but he improved with treatment with Penicillin only. Later he was seen  ( likely ID specialist), and was treated with 6 weeks course of Rifampin and Ethambutol pending sputum AFB culture. His AFB cx were negative based on the report we have.\"  \"He recently had QFT -TB test which was reported positive and his CXR was clear. Given his documented hx of completion of treatment for latent TB as above , I do not see any need for further treatment. His tests may remain positive irrespective of treatment status. From my perspective he can be started on DMARDs/Biological as deemed necessary.\"       7. Gout: On allopurinol and managed by PCP.    8.  Vaccinations:   - Influenza: encouraged yearly vaccination  - [id number]: up to date  - [id number]: up to date  - Shingrix: Up to date  - COVID-19: advise updating, and to hold sulfasalazine and leflunomide for " 2 weeks after the COVID-19 vaccination      9.  Anxiety: When going outside of the house or considering going outside of the house has increased anxiety that results in shortness of breath and this leads to increased anxiety that leads to more shortness of breath, etc.  6/30/2023 cardiology note by Dr. Hardin documents that the dyspnea is stable, normal echo and PFTs, and that his dyspnea is likely related to anxiety.  Considering that anxiety is keeping him from being more active and going outside the house more frequently, I advised that he consider mental health evaluation and he is in agreement with this.  Possibly cognitive behavioral therapy (or other depending on mental health provider's recommendation) could help reduce anxiety.  - Mental health referral     Total minutes spent in evaluation with patient, documentation, , and review of pertinent studies and chart notes: 22    Mr. Daniels verbalized agreement with and understanding of the rational for the diagnosis and treatment plan.  All questions were answered to best of my ability and the patient's satisfaction. Mr. Daniels was advised to contact the clinic with any questions that may arise after the clinic visit.      Thank you for involving me in the care of the patient    Return to clinic: 3-4 months      HPI   Lamont Daniels is a 57 year old male with a medical history significant for hypertension, hyperlipidemia, gout, coronary artery disease s/p 6vCABG, vitamin D deficiency, hepatitis B, obstructive sleep apnea (uses CPAP), and RA who presents for follow-up of RA.    Today, 9/22/2023: RA controlled.  Diffuse body ache but no specific joint pain.  Joints without swelling, increased warmth, or overlying erythema.  Minimal physical activity because of dyspnea that is not associate with chest pain/pressure, palpitations, lightheadedness, dizziness, or nausea; he says that sometimes even thinking about going to do more walking or other exercises outside  resulted in increased anxiety that results in the dyspnea and he has seen his cardiology about this who suspects that anxiety is the cause of the dyspnea.  Has not seen a mental health provider for anxiety management.  Continues to follow with gastroenterology for hepatitis B.    Denies fevers, chills, nausea, vomiting, constipation, diarrhea. No abdominal pain. Denies chest pain/pressure, palpitations, or shortness of breath.  No oral or nasal sores. No rash. No LE swelling.  Mild dry mouth; he has good dentition and does not feel like he needs to use frequent sips of water; unchanged since last evaluation. No dry eyes. No eye pain or redness.     Tobacco:  None   EtOH:  None  Drugs:  None  Occupation: Retired   Originally from [facility], then lived [place] of [place], [place], then lived in [place], [place], then moved to [place] for family    ROS   12 point review of system was completed and negative except as noted in the HPI     Active Problem List     Patient Active Problem List   Diagnosis    Coronary atherosclerosis of native coronary artery    Major depressive disorder, recurrent episode, moderate (H)    Anxiety    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)    Chronic viral hepatitis B without delta agent and without coma (H)    Vitamin D deficiency    S/P CABG (coronary artery bypass graft)    Malrotation of intestine    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    Hyperlipidemia LDL goal <70    Insomnia    Gout    Suicidal ideation    Essential hypertension    Rheumatoid arthritis involving multiple sites, unspecified rheumatoid factor presence    High risk medications (not anticoagulants) long-term use    Midline low back pain without sciatica    Bilateral low back pain with sciatica, sciatica laterality unspecified    Elevated liver enzymes    On corticosteroid therapy    Essential hypertension with goal blood pressure less than 140/90    Insomnia, unspecified type    Rheumatoid arthritis of multiple sites  with negative rheumatoid factor (H)    Benign prostatic hyperplasia with lower urinary tract symptoms, unspecified morphology    Chronic pain syndrome    Syncope, unspecified syncope type    Symptomatic cholelithiasis    H/O: gout    Anxiety and depression    F11.2 - Continuous opioid dependence (H)     Past Medical History     Past Medical History:   Diagnosis Date    Anxiety     CAD (coronary artery disease)     CABG, PCI    Congestive heart failure, unspecified 2008    Depressive disorder 2008    Gout     Hepatitis B > 10 years    HTN (hypertension)     Hyperlipidemia LDL goal < 100     Malrotation of intestine     Moderate major depression (H)     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) 9/19/2011    Uses CPAP    Rheumatoid arthritis flare (H) 1012    Steatohepatitis 12/15    liver biopsy    Tuberculosis age 13    INH x 9 months     Vitamin D deficiency      Past Surgical History     Past Surgical History:   Procedure Laterality Date    ABDOMEN SURGERY  2014    BIOPSY  2015    BYPASS GRAFT ARTERY CORONARY  2008    6 vessels    COLONOSCOPY  2/8/2013    Procedure: COLONOSCOPY;  Colonoscopy, blood in stool;  Surgeon: Duane, William Charles, MD;  Location:  OR    COMBINED REPAIR PTOSIS WITH BLEPHAROPLASTY BILATERAL Bilateral 6/25/2018    Procedure: COMBINED REPAIR PTOSIS WITH BLEPHAROPLASTY BILATERAL;  Bilateral upper eyelid blepharoplasty, ptosis repair and brow ptosis repair;  Surgeon: Sandra Borja MD;  Location:  OR    GI SURGERY  2014    HEAD & NECK SURGERY  2011    NASAL/SINUS POLYPECTOMY  2010    ORTHOPEDIC SURGERY  2012    REPAIR PTOSIS      REPAIR PTOSIS BILATERAL Bilateral 7/22/2019    Procedure: REPAIR, PTOSIS, BOTH UPPER brows;  Surgeon: Sandra Borja MD;  Location:  OR    REPAIR PTOSIS BROW BILATERAL Bilateral 6/25/2018    Procedure: REPAIR PTOSIS BROW BILATERAL;;  Surgeon: Sandra Borja MD;  Location:  OR    THORACIC SURGERY  1989    tb    TONSILLECTOMY  2010    UVULOPALATOPHARYNGOPLASTY  2010     VASCULAR SURGERY  2008    [facility] CORONARY STENT CIERA SOTO VESSEL  2008    3 months after CABG     Allergy     Allergies   Allergen Reactions    Citalopram Itching    Tramadol Itching     Current Medication List     Current Outpatient Medications   Medication Sig    Acetaminophen (TYLENOL PO)     allopurinol (ZYLOPRIM) 300 MG tablet TAKE 1 TABLET BY MOUTH EVERY DAY    amLODIPine (NORVASC) 5 MG tablet Take 1 tablet (5 mg) by mouth daily for blood pressure    aspirin EC 81 MG EC tablet Take 1 tablet (81 mg) by mouth daily (*)    atorvastatin (LIPITOR) 80 MG tablet Take 1 tablet (80 mg) by mouth daily    bacitracin 500 UNIT/GM external ointment Apply topically 3 times daily    baclofen (LIORESAL) 10 MG tablet Take 1 tablet (10 mg) by mouth 3 times daily as needed for muscle spasms    busPIRone HCl (BUSPAR) 30 MG tablet TAKE 1 TABLET BY MOUTH 2 TIMES DAILY.    BUTRANS 20 MCG/HR WK patch Place 1 patch onto the skin every 7 days OK to fill 09/14/23 to start 09/15/23. LISANDRO, name brand. 28 day script for chronic pain.    Cholecalciferol (VITAMIN D) 2000 UNITS tablet TAKE ONE TABLET BY MOUTH ONCE DAILY    clobetasol (TEMOVATE) 0.05 % external cream Apply twice daily for 2 weeks, weekends only for 2 weeks, repeat cycle as needed for hands, not face or genitals    clobetasol (TEMOVATE) 0.05 % external solution Apply twice daily to scalp for up to 2 weeks for flares.    clonazePAM (KLONOPIN) 1 MG tablet TAKE 0.5-1 TABLETS BY MOUTH 2 TIMES DAILY AS NEEDED FOR ANXIETY    clopidogrel (PLAVIX) 75 MG tablet TAKE 1 TABLET BY MOUTH EVERY DAY    colchicine (COLCYRS) 0.6 MG tablet TAKE 2 TABLETS BY MOUTH AT THE FIRST SIGN OF FLARE, TAKE 1 ADDITIONAL TABLET ONE HOUR LATER.    desvenlafaxine (PRISTIQ) 50 MG 24 hr tablet TAKE 1 TABLET BY MOUTH EVERY DAY    diclofenac (VOLTAREN) 1 % topical gel APPLY 4 G TOPICALLY 4 TIMES DAILY AS NEEDED FOR MODERATE PAIN    diclofenac (VOLTAREN) 1 % topical gel Apply 4 grams to bilateral knees, up to  four times daily as needed using enclosed dosing card.  Max of 32g/day    evolocumab (REPATHA SURECLICK) 140 MG/ML prefilled autoinjector Inject 1 mL (140 mg) Subcutaneous every 14 days    ezetimibe (ZETIA) 10 MG tablet TAKE 1 TABLET (10 MG) BY MOUTH DAILY.    gemfibrozil (LOPID) 600 MG tablet Take 1 tablet (600 mg) by mouth 2 times daily    golimumab (SIMPONI) 50 MG/0.5ML auto-injector pen Inject 0.5 mLs (50 mg) Subcutaneous every 28 days . Hold for signs of infection, and seek medical attention.    hydrocortisone (WESTCORT) 0.2 % external cream FOR GENITALS, APPLY TWICE DAILY FOR UP TO 2 WEEKS, TAKE 2 WEEKS OFF THEN REPEAT    hydrocortisone 2.5 % cream FOR FACE, APPLY TWICE DAILY FOR UP TO 2 WEEK FOR FLARES ON THE FACE, TAKE 2 WEEKS OFF    Incontinence Supply Disposable (DEPEND UNDERWEAR LARGE) MISC Use twice daily.    leflunomide (ARAVA) 20 MG tablet Take 1 tablet (20 mg) by mouth daily ; Labs required every 8-12 weeks.    meclizine (ANTIVERT) 25 MG tablet TAKE 1 TABLET BY MOUTH EVERY 6 HOURS AS NEEDED FOR DIZZINESS.    melatonin 3 MG tablet Take 1 tablet (3 mg) by mouth nightly as needed for sleep    naloxone (NARCAN) 4 MG/0.1ML nasal spray Spray 1 spray (4 mg) into one nostril alternating nostrils once as needed for opioid reversal every 2-3 minutes until assistance arrives    nitroGLYcerin (NITROSTAT) 0.4 MG sublingual tablet PLACE 1 TAB UNDER THE TONGUE EVERY 5 MINUTES FOR 3 DOSES FOR CHEST PAIN. IF SYMPTOMS PERSIST 5 MINUTES AFTER 1ST DOSE CALL 911.    order for DME Equipment being ordered: chair lift    order for DME Equipment being ordered: single end cane    ORDER FOR DME 1.  CPAP pressure 11 cm/H20 with heated humidity.   2.  Provide mask to fit and CPAP supplies.   3.  Length of need lifetime.   4.  If needed please provide a chin strap         pantoprazole (PROTONIX) 40 MG EC tablet TAKE 1 TABLET BY MOUTH EVERY DAY    pimecrolimus (ELIDEL) 1 % external cream APPLY TWICE DAILY FOR FACE AND GENITALS     sildenafil (REVATIO) 20 MG tablet TAKE 2 - 5 TABLETS BY MOUTH AS NEEDED 30 MINUTES BEFORE SEXUAL ACTIVITY. MAX 100MG IN 24 HOURS.    sulfaSALAzine (AZULFIDINE) 500 MG tablet Take 3 tablets (1,500 mg) by mouth 2 times daily    tamsulosin (FLOMAX) 0.4 MG capsule TAKE 2 CAPSULES BY MOUTH EVERY DAY    tenofovir (VIREAD) 300 MG tablet Take 1 tablet (300 mg) by mouth daily    triamcinolone (KENALOG) 0.1 % external ointment APPLY TOPICALLY TO AFFECTED AREA(S) 3 TIMES DAILY    triamcinolone (KENALOG) 0.1 % external ointment Apply to trunk and extremities twice daily for up to 2 weeks for flares    zolpidem (AMBIEN) 5 MG tablet TAKE 1 TABLET (5 MG) BY MOUTH NIGHTLY AS NEEDED FOR SLEEP     No current facility-administered medications for this visit.       Social History   See HPI    Family History     Family History   Problem Relation Age of Onset    C.A.D. Father     Coronary Artery Disease Father     Hypertension Father     Depression Father     Hypertension Mother     Diabetes Mother     Depression Mother     Mental Illness Mother     Hypertension Maternal Grandfather     Cancer Paternal Grandfather     Other Cancer Paternal Grandfather     Other Cancer Other     Autoimmune Disease No family hx of     Cerebrovascular Disease No family hx of     Thyroid Disease No family hx of     Glaucoma No family hx of     Macular Degeneration No family hx of      No family history of autoimmune disease  No family history of psoriasis     No change in family history since the previous clinic visit.     Physical Exam     Temp Readings from Last 3 Encounters:   06/07/23 96.9  F (36.1  C) (Tympanic)   05/31/22 97.2  F (36.2  C) (Tympanic)   11/11/21 98  F (36.7  C) (Tympanic)     BP Readings from Last 5 Encounters:   09/21/23 126/80   06/30/23 128/86   06/21/23 133/88   06/07/23 136/83   03/22/23 124/81     Pulse Readings from Last 1 Encounters:   09/21/23 74     Resp Readings from Last 1 Encounters:   09/21/23 16     Estimated body mass  "index is 26.34 kg/m  as calculated from the following:    Height as of this encounter: 1.575 m (5' 2\").    Weight as of this encounter: 65.3 kg (144 lb).      GEN: NAD.  HEENT:  Anicteric, noninjected sclera. No obvious external lesions of the ear and nose. Hearing intact.  CV: S1, S2. RRR. No m/r/g  PULM: No increased work of breathing. CTA bilaterally   MSK: MCPs, PIPs, DIPs without swelling or tenderness to palpation.  Wrists without swelling or tenderness to palpation.  Elbows and shoulders without swelling or tenderness to palpation.   Knees, ankles, and MTPs without swelling or tenderness to palpation.    SKIN: No rash or jaundice seen  PSYCH: Alert. Appropriate.         Labs     RF/CCP  Recent Labs   Lab Test 11/04/15  1547   CCPABY 27*     CBC  Recent Labs   Lab Test 09/14/23  1404 06/07/23  0858 03/22/23  1052 12/17/22  1244 08/23/21  1130 03/29/21  1318 02/27/21  1259 01/30/21  1226   WBC 7.0 6.6 5.4 5.5   < > 5.2 4.4 5.5   RBC 4.51 4.37* 4.31* 4.64   < > 4.72 4.79 5.11   HGB 13.0* 12.9* 12.8* 13.3   < > 13.6 13.8 14.5   HCT 39.9* 39.6* 39.5* 41.5   < > 42.3 43.1 43.9   MCV 89 91 92 89   < > 90 90 86   RDW 14.3 14.6 14.7 14.5   < > 14.6 14.7 13.5    318 257 281   < > 245 275 296   MCH 28.8 29.5 29.7 28.7   < > 28.8 28.8 28.4   MCHC 32.6 32.6 32.4 32.0   < > 32.2 32.0 33.0   NEUTROPHIL 55 34  --  40   < > 45.1 40.9 50.5   LYMPH 29 43  --  43   < > 35.5 42.0 33.2   MONOCYTE 14 16  --  13   < > 15.3 12.6 12.7   EOSINOPHIL 1 5  --  4   < > 2.9 3.4 2.9   BASOPHIL 1 1  --  1   < > 1.2 1.1 0.7   ANEU  --   --   --   --   --  2.3 1.8 2.7   ALYM  --   --   --   --   --  1.8 1.9 1.8   PRACHI  --   --   --   --   --  0.8 0.6 0.7   AEOS  --   --   --   --   --  0.2 0.2 0.2   ABAS  --   --   --   --   --  0.1 0.1 0.0   ANEUTAUTO 3.9 2.3  --  2.2   < >  --   --   --    ALYMPAUTO 2.0 2.9  --  2.4   < >  --   --   --    AMONOAUTO 1.0 1.1  --  0.7   < >  --   --   --    AEOSAUTO 0.1 0.3  --  0.2   < >  --   --   --  "   ABSBASO 0.1 0.1  --  0.1   < >  --   --   --     < > = values in this interval not displayed.     CMP  Recent Labs   Lab Test 09/14/23  1404 06/07/23  0858 03/22/23  1052 07/15/22  1126 04/26/22  1218 03/25/22  1549 08/23/21  1130 03/29/21  1318 02/27/21  1259 01/30/21  1226 06/03/20  1317 03/26/20  1039   NA  --   --  142  --  145*  --   --   --   --   --   --  138   POTASSIUM  --   --  3.6  --  3.9  --   --   --   --   --   --  3.8   CHLORIDE  --   --  110*  --  111*  --   --   --   --   --   --  109   CO2  --   --  30  --  30  --   --   --   --   --   --  25   ANIONGAP  --   --  2*  --  4  --   --   --   --   --   --  4   GLC  --   --  113*  --  94 106*   < >  --   --   --    < > 122*   BUN  --   --  13  --  12  --   --   --   --   --   --  16   CR 0.94 0.85 0.88   < > 0.94 0.95   < > 1.01 0.95 0.94   < > 1.00   GFRESTIMATED >90 >90 >90   < > >90 >90   < > 83 89 >90   < > 85   GFRESTBLACK  --   --   --   --   --   --   --  >90 >90 >90   < > >90   HEMANT  --   --  8.8  --  9.3  --   --   --   --   --   --  9.0   BILITOTAL 0.4 0.2 0.4   < > 0.4 0.5   < > 0.5 0.4 0.4   < > 0.3   ALBUMIN 4.2 4.2 3.7   < > 3.9 4.0   < > 3.9 4.1 4.0   < > 4.3   PROTTOTAL 7.3 7.1 7.1   < > 7.7 7.7   < > 7.6 7.6 7.6   < > 8.0   ALKPHOS 99 110 121   < > 93 118   < > 116 102 114   < > 123   AST 25 34 18   < > 24 27   < > 20 23 37   < > 23   ALT 19 22 19   < > 23 34   < > 31 27 46   < > 36    < > = values in this interval not displayed.     Calcium/VitaminD  Recent Labs   Lab Test 03/22/23  1052 04/26/22  1218 03/26/20  1039 12/04/17  0924 07/13/17  1246 01/14/16  1541 11/04/15  1547   HEMANT 8.8 9.3 9.0   < >  --    < >  --    VITDT  --   --   --   --  34  --  43    < > = values in this interval not displayed.     ESR/CRP  Recent Labs   Lab Test 09/14/23  1404 06/07/23  0858 12/17/22  1244 09/14/22  1037 07/15/22  1126   SED 24* 18 15 41* 84*   CRP  --   --  <2.9 5.4 4.7   CRPI <3.00 <3.00  --   --   --      Lipid Panel  Recent Labs   Lab Test  "03/22/23  1052 06/07/22  0831 03/19/21  0928   CHOL 111 128 142   TRIG 125 122 105   HDL 50 55 69   LDL 36 49 52   NHDL 61 73 73     Hepatitis B  Recent Labs   Lab Test 03/22/23  1052 04/26/22  1218 10/20/21  1144 10/20/21  1143 03/19/21  0928 03/26/20  1039 10/07/19  0940 12/01/16  1429 10/05/16  0954   AUSAB  --   --   --  0.09  --   --   --   --  0.45   HEPBANG Reactive*  --   --   --   --   --   --   --  Reactive*   HBQLOG  --   --   --   --  Not Calculated <1.3 Not Calculated   < > 5.1*   HBQRES Not Detected Not Detected Not Detected  --  HBV DNA Not Detected <20* HBV DNA Not Detected   < > 140,872*    < > = values in this interval not displayed.     Hepatitis C  Recent Labs   Lab Test 04/07/16  1538   HCVAB Nonreactive   Assay performance characteristics have not been established for newborns,   infants, and children       Lyme ab screening  Recent Labs   Lab Test 07/18/17  1330   LYMEGM 0.19       Tuberculosis Screening  Recent Labs   Lab Test 04/07/16  1538   TBRSLT Positive   The magnitude of the measured IFN-gamma level cannot be correlated to stage or   degree of infection, level of immune responsiveness, or likelihood for   progression to active disease.  *   TBAGN >10.00  This is a qualitative test.  The TB antigen IU/mL value is required for   documentation on certain government reporting forms but this value should not   be used to monitor disease progression or response to therapy.   Diagnosing or excluding tuberculosis disease, and assessing the probability of   LTBI, require a combination of epidemiological, historical, medical and   diagnostic findings that should be taken into account when interpreting   QuantiFERON TB results.       HIV Screening  Recent Labs   Lab Test 11/04/15  1547   HIAGAB Nonreactive   HIV-1 p24 Ag & HIV-1/HIV-2 Ab Not Detected         \"HAND BILATERAL THREE OR MORE VIEWS 11/4/2015 4:55 PM   HISTORY: Pain in unspecified joint.  COMPARISON: None.  IMPRESSION  IMPRESSION: " "Normal.  DANIS ANGLIN MD\"    \"FOOT BILATERAL THREE OR MORE VIEWS 11/4/2015 4:55 PM   HISTORY: Pain in unspecified joint.  COMPARISON: 8/21/2012.  IMPRESSION  IMPRESSION: Small traction spurs are seen off the Achilles tendon and  plantar fascial attachment sites bilaterally. Joint spaces are  preserved. Osseous structures are intact. Incidental note is made of  some surgical staples in the soft tissues of the medial distal right  lower extremity.  DANIS ANGLIN MD\"      Immunization History     Immunization History   Administered Date(s) Administered    COVID-19 Bivalent 12+ (Pfizer) 09/15/2022, 08/01/2023    COVID-19 MONOVALENT 12+ (Pfizer) 03/16/2021, 04/06/2021, 10/09/2021, 04/21/2022    COVID-19 Monovalent 12+ (Pfizer 2022) 04/21/2022    Influenza (IIV3) PF 10/11/2011, 09/20/2017, 09/01/2018    Influenza Vaccine 18-64 (Flublok) 09/23/2021    Influenza Vaccine >6 months (Alfuria,Fluzone) 09/27/2015, 09/08/2016, 09/30/2019, 09/14/2020, 10/19/2022    Influenza Vaccine, 6+MO IM (QUADRIVALENT W/PRESERVATIVES) 11/17/2014    Pneumo Conj 13-V (2010&after) 04/07/2016    Pneumococcal 20 valent Conjugate (Prevnar 20) 06/07/2023    Pneumococcal 23 valent 12/12/2014    TDAP (Adacel,Boostrix) 10/20/2021    TDAP Vaccine (Adacel) 08/30/2011    Zoster recombinant adjuvanted (SHINGRIX) 07/27/2019, 04/22/2021          Chart documentation done in part with Dragon Voice recognition Software. Although reviewed after completion, some word and grammatical error may remain.    Sebastián Jenkins MD     "

## 2023-09-21 NOTE — NURSING NOTE
RAPID3 (0-30) Cumulative Score  20.3          RAPID3 Weighted Score (divide #4 by 3 and that is the weighted score)  6.7

## 2023-09-21 NOTE — Clinical Note
Nam, Seems that anxiety results in dyspnea that worsens anxiety, etc.  Cardiology notes that anxiety is suspected to be the cause of the dyspnea.  I discussed seeing a mental health provider, possibly for cognitive behavioral therapy, and he is in agreement.  I placed a referral. Thanks, Sebastián

## 2023-09-22 RX ORDER — LEFLUNOMIDE 20 MG/1
20 TABLET ORAL DAILY
Qty: 90 TABLET | Refills: 2 | Status: SHIPPED | OUTPATIENT
Start: 2023-09-22 | End: 2024-04-24

## 2023-09-22 RX ORDER — SULFASALAZINE 500 MG/1
1500 TABLET ORAL 2 TIMES DAILY
Qty: 540 TABLET | Refills: 2 | Status: SHIPPED | OUTPATIENT
Start: 2023-09-22 | End: 2024-04-24

## 2023-09-27 ENCOUNTER — APPOINTMENT (OUTPATIENT)
Dept: INTERPRETER SERVICES | Facility: CLINIC | Age: 58
End: 2023-09-27
Payer: COMMERCIAL

## 2023-10-17 DIAGNOSIS — M47.819 FACET ARTHROPATHY: ICD-10-CM

## 2023-10-17 DIAGNOSIS — M06.9 RHEUMATOID ARTHRITIS INVOLVING MULTIPLE SITES, UNSPECIFIED WHETHER RHEUMATOID FACTOR PRESENT (H): ICD-10-CM

## 2023-10-17 DIAGNOSIS — M17.11 PRIMARY OSTEOARTHRITIS OF RIGHT KNEE: ICD-10-CM

## 2023-10-17 DIAGNOSIS — G89.4 CHRONIC PAIN SYNDROME: ICD-10-CM

## 2023-10-17 DIAGNOSIS — F11.90 CHRONIC, CONTINUOUS USE OF OPIOIDS: ICD-10-CM

## 2023-10-17 RX ORDER — BUPRENORPHINE 20 UG/H
1 PATCH, EXTENDED RELEASE TRANSDERMAL
Qty: 4 PATCH | Refills: 0 | Status: SHIPPED | OUTPATIENT
Start: 2023-10-17 | End: 2023-11-13

## 2023-10-17 NOTE — TELEPHONE ENCOUNTER
Called patient to reschedule their visit from today (provider unavailable) -- patient rescheduled it to 11/13/2023 at 11:30 AM.    Reason for Call:  Medication or medication refill:    Do you use a [facility]?  [place] of the pharmacy and phone number for the current request:  [id number] - [place] [address]  P: [phone]  F: [phone]    Name of the medication requested: BUTRANS 20 MCG/HR WK patch    Other request:     Can we leave a detailed message on this number? YES    Phone number patient can be reached at: Cell number on file:    Telephone Information:   Mobile [phone]       Best Time: afternoon    Call taken on 10/17/2023 at 11:14 AM by Eugenie Jones

## 2023-10-17 NOTE — TELEPHONE ENCOUNTER
Medication refill information reviewed.     Due date for BUTRANS 20 MCG/HR WK patch   is 10/18/23     Prescriptions prepped for review.     Will route to provider.

## 2023-10-17 NOTE — TELEPHONE ENCOUNTER
Received call from patient requesting refill(s) of  BUTRANS 20 MCG/HR WK patch     Last dispensed from pharmacy on 09/20/23    Patient's last office/virtual visit by prescribing provider on 06/26/23  Next office/virtual appointment scheduled for 11/13/23    Last urine drug screen date 03/22/23  Current opioid agreement on file (completed within the last year) Yes Date of opioid agreement: 03/22/23    E-prescribe to pharmacy-[facility] [id number] [facility] - [place]  [address]     Will route to nursing [place] for review and preparation of prescription(s).

## 2023-10-18 NOTE — TELEPHONE ENCOUNTER
Signed Prescriptions:                        Disp   Refills    BUTRANS 20 MCG/HR WK patch                 4 patch0        Sig: Place 1 patch onto the skin every 7 days OK to fill           10/17/23 to start 10/18/23. LISANDRO, name brand. 28           day script for chronic pain.  Authorizing Provider: AMEENA PAGE        Reviewed [place]  October 17, 2023- no concerning fills.    Ameena LEMUS, RN CNP, FNP  [facility]  [facility] [place]

## 2023-10-26 DIAGNOSIS — N40.1 BENIGN PROSTATIC HYPERPLASIA WITH WEAK URINARY STREAM: ICD-10-CM

## 2023-10-26 DIAGNOSIS — M62.838 MUSCLE SPASM: ICD-10-CM

## 2023-10-26 DIAGNOSIS — R39.12 BENIGN PROSTATIC HYPERPLASIA WITH WEAK URINARY STREAM: ICD-10-CM

## 2023-10-26 DIAGNOSIS — M79.18 MYOFASCIAL PAIN: ICD-10-CM

## 2023-10-26 DIAGNOSIS — M62.830 SPASM OF BACK MUSCLES: ICD-10-CM

## 2023-10-26 RX ORDER — BACLOFEN 10 MG/1
10 TABLET ORAL 3 TIMES DAILY PRN
Qty: 90 TABLET | Refills: 2 | Status: SHIPPED | OUTPATIENT
Start: 2023-10-26 | End: 2024-01-15

## 2023-10-26 RX ORDER — TAMSULOSIN HYDROCHLORIDE 0.4 MG/1
CAPSULE ORAL
Qty: 180 CAPSULE | Refills: 3 | Status: SHIPPED | OUTPATIENT
Start: 2023-10-26 | End: 2024-09-18

## 2023-10-26 NOTE — TELEPHONE ENCOUNTER
Fax received from:   [facility] [id number] [place]   Refill authoriz  Drug: baclofen (LIORESAL) 10 MG tablet   Qty: 90.0  Last filled 07/31/23  Last seen: 10/17/23  Next appt 11/13/23    Amberly Chawla MA

## 2023-10-27 NOTE — TELEPHONE ENCOUNTER
Signed Prescriptions:                        Disp   Refills    baclofen (LIORESAL) 10 MG tablet           90 tab*2        Sig: Take 1 tablet (10 mg) by mouth 3 times daily as           needed for muscle spasms  Authorizing Provider: FIDENCIO PAGE, RN CNP, FNP  [facility]  [facility] [place]

## 2023-11-03 DIAGNOSIS — M10.9 GOUT WITHOUT TOPHUS: ICD-10-CM

## 2023-11-03 RX ORDER — COLCHICINE 0.6 MG/1
TABLET ORAL
Qty: 90 TABLET | Refills: 1 | Status: SHIPPED | OUTPATIENT
Start: 2023-11-03 | End: 2024-03-11

## 2023-11-13 ENCOUNTER — VIRTUAL VISIT (OUTPATIENT)
Dept: PALLIATIVE MEDICINE | Facility: CLINIC | Age: 58
End: 2023-11-13
Payer: COMMERCIAL

## 2023-11-13 DIAGNOSIS — F11.90 CHRONIC, CONTINUOUS USE OF OPIOIDS: ICD-10-CM

## 2023-11-13 DIAGNOSIS — M17.11 PRIMARY OSTEOARTHRITIS OF RIGHT KNEE: ICD-10-CM

## 2023-11-13 DIAGNOSIS — M47.819 FACET ARTHROPATHY: ICD-10-CM

## 2023-11-13 DIAGNOSIS — M06.9 RHEUMATOID ARTHRITIS INVOLVING MULTIPLE SITES, UNSPECIFIED WHETHER RHEUMATOID FACTOR PRESENT (H): Primary | ICD-10-CM

## 2023-11-13 DIAGNOSIS — G89.4 CHRONIC PAIN SYNDROME: ICD-10-CM

## 2023-11-13 PROCEDURE — 99214 OFFICE O/P EST MOD 30 MIN: CPT | Mod: VID | Performed by: NURSE PRACTITIONER

## 2023-11-13 RX ORDER — BUPRENORPHINE 20 UG/H
1 PATCH, EXTENDED RELEASE TRANSDERMAL
Qty: 4 PATCH | Refills: 0 | Status: SHIPPED | OUTPATIENT
Start: 2023-11-13 | End: 2023-12-20

## 2023-11-13 ASSESSMENT — PAIN SCALES - GENERAL: PAINLEVEL: MODERATE PAIN (5)

## 2023-11-13 NOTE — PROGRESS NOTES
Lamont is a 58 year old who is being evaluated via a billable video visit.      How would you like to obtain your AVS? MyChart  If the video visit is dropped, the invitation should be resent by: Text to cell phone: [phone]  Will anyone else be joining your video visit? No            8/16/2022    11:30 AM 6/26/2023     1:02 PM 11/13/2023     9:22 AM   PEG Score   PEG Total Score 7 6 6.33        Is Pt currently in [place]? Yes    NOTE:  If Pt is not in [place], Appointment needs to be canceled and rescheduled.   Video-Visit Details    Type of service:  Video Visit   Video Start Time: 11:40 AM  Video End Time:11:50 AM    Originating Location (pt. Location): Home    Distant Location (provider location):  On-site  Platform used for Video Visit: Located within [facility]    11/13/2023    Chief complaint:   low back pain(has improved somewhat)   multiple joint pain (RA)      Interval history:  Lamont Daniels is a 58 year old male is known to me for:  Lumbar facet arthropathy  Rheumatoid arthritis involving multiple joints, unspecified rheumatoid factor  Primary osteoarthritis of right knee  Chronic continuous use of opioids  Chronic pain syndrome  PMHx includes: Anxiety, coronary artery disease, congestive heart failure (2008), depressive disorder (2008), gout, hepatitis B more than 10 years ago, hypertension, hyperlipidemia, malrotation of intestine, severe obstructive sleep apnea, rheumatoid arthritis (2012) steatohepatitis (2015), history of tuberculosis at age 13, vitamin D deficiency  PSHx includes: Coronary artery bypass graft 6 vessel (2008), abdominal surgery (2014), colonoscopy (2013), combined repair ptosis with blepharoplasty bilateral (2018), head and neck surgery (2011), nasal/sinus polypectomy (2010), orthopedic surgery (2012), repair ptosis bilateral (2019), repair ptosis brow bilateral (2018), thoracic surgery for tuberculosis (1989), tonsillectomy and uvulopalatal pharyngoplasty  (2010), coronary artery stent 3 months after CABG (2008).      Recommendations/plan at the last visit on 6/26/2023 included:  Physical Therapy: none  Continue stretching at home  Clinical Health Psychologist to address issues of relaxation, behavioral change, coping style, and other factors important to improvement: none  Diagnostic Studies: none  Medication Management:   Continue Butrans 20mcg/hr transdermal change every 7 days, fill 7/17/2023  and start 7/19/2022  OK to increase Baclofen to 10mg up to three times daily as needed for muscle spasms  Continue Voltaren gel as needed  Further procedures recommended: none  Consider lumbar medial branch blocks (test injections) x 2 and if effective, would proceed to lumbar radiofrequency ablation (burning procedure). You would need to complete 4 visits of PHYSICAL THERAPY prior to the burning procedure. You can let me know via Seesaw if you decide you would like to proceed and I would place the order.   Recommendations/follow-up for PCP:  See above  Release of information: none  Follow up: 8 weeks in-person or virtual visit. Please call [phone] to make your follow-up appointment with me.         Since his last visit, Lamont Daniels reports:    Interval history November 13, 2023  -Lamont states that his low back pain has improved.   -his multiple joint pain from known RA has been stable.   -he continues to find Butrans patch 20mcg/hr helpful in managing his pain.   -he denies any new areas of pain.   -he denies being on any new medications since our last encounter.       Interval history June 26, 2023  -continues to have axial low back pain that can extend posteriorly into the legs and never goes past the knees. Pain is worse with lumbar extension and rotation.   -reviewed his lumbar MRI in detail. Dicussed possible interventions for lumbar facet joint pain.   Continued multiple joint pain from RA.   -discussed adjustments to Baclofen or to Buprenorphine, prefers to adjust  "Baclofen at this time.   -tapered off of gabapentin, has not had any increase in pain off of this medication    Interval history March 22, 2023  -low back pain present, can radiate to the level of the knees.  -no imaging since 2014. Recommend updated lumbar MRI to check for progression of arthritis or listhesis.   -multiple joint pain remains, has RA    .Interval history August 16, 2022  -his pain is stable. Located in the axial low back and multiple joints from RA. He feels his pain is under okay control with current regimen.     Pain history collected at initial evaluation on 5/11/2022  He has had pain for about 10 years. He was diagnosed with Rheumatoid Arthritis. Lamont has pain in multiple joints. He also has chronic low back pain. The low back pain radiates down the posterolateral aspect of the legs to the level of the knee. His back pain was there when he was diagnosed with RA. He feels that he is doing well on the Butrans. Discussed switch to Belbuca if he ever wishes to increase his dosage, prefers to stay with use of the Butrans patch at the present time       At this point, the patient's participation with our multidisciplinary team includes:  The patient has been compliant with the program.  PT - not ordered  Health Psych - not ordered      Pain scores:  Pain intensity on average is 5 on a scale of 0-10.    Range is 5-9/10.   Pain right now is 5/10.   Pain is described as \"back pain is dull and his joints have a dull/numbness/stiffness.\"    Pain is constant in nature        Current pain relevant medications:   -allopurinol 300mg every day  -baclofen 10mg BID PRN muscle spasms (uses at least once per day, helpful)  -Voltaren gel 1% PRN (helpful)  -naloxone nasal spray PRN opiate reversal   -Butrans 20mcg/hr  TD Q 7 days (helpful)  -simponi 50mg/0.5ml inject every 28 days   -Arava 20mg every day  -meclizine 25mg Q 6 hours PRN dizziness (helpful)  -Tylenol 1000mg (uses at least once per day, not more than 3 " times per day)  -CBD oil at night (helpful for sleep)        Other pertinent medications:  -Ambien 5mg at bedtime as needed PRN sleep  -clonazepam 0.5-1mg BID PRN anxiety (uses one full tab in the AM)  -Plavix 75mg every day  -colchincine 0.6mg take 2 tabs at first sign of flare  -pristiq 50mg QD     Previous medication treatments included:  OPIATES: butrans (helpful), hydrocodone (helpful for other issues), Codeine (unsure), oxycodone (unsure)  NSAIDS: cannot use, on Plavix   MUSCLE RELAXANTS: Baclofen (helpful), methocarbamol (not helpful)  ANTI-MIGRAINE MEDS: none  ANTI-DEPRESSANTS: none  SLEEP AIDS: none  ANTI-CONVULSANTS: gabapentin (helpful initially, he did not have increased pain when he tapered off of this medication)  TOPICALS: Voltaren gel (helpful)  ANXIOLYTICS: none  MEDICAL CANNABIS: none  Other meds: Tylenol (helpful)        Other treatments have included:  Lamont Daniels has been seen at a pain clinic in the past.  Previously managed by Dr. Lian Loo   PT: has tried, not helpful  Chiropractic care: no  Acupuncture: no  TENs Unit: no     Injections:  none      THE 4 A's OF OPIOID MAINTENANCE ANALGESIA    Analgesia: butrans is helpful    Activity: he does some stretching at home. Walks in the back yard    Adverse effects: none    Adherence to Rx protocol: yes      Side Effects: no side effect  Patient is using the medication as prescribed: YES    Medications:  Current Outpatient Medications   Medication Sig Dispense Refill    Acetaminophen (TYLENOL PO)       allopurinol (ZYLOPRIM) 300 MG tablet TAKE 1 TABLET BY MOUTH EVERY DAY 90 tablet 3    amLODIPine (NORVASC) 5 MG tablet Take 1 tablet (5 mg) by mouth daily for blood pressure 90 tablet 3    aspirin EC 81 MG EC tablet Take 1 tablet (81 mg) by mouth daily (*) 30 tablet 1    atorvastatin (LIPITOR) 80 MG tablet Take 1 tablet (80 mg) by mouth daily 90 tablet 3    bacitracin 500 UNIT/GM external ointment Apply topically 3 times daily 30 g 3    baclofen  (LIORESAL) 10 MG tablet Take 1 tablet (10 mg) by mouth 3 times daily as needed for muscle spasms 90 tablet 2    busPIRone HCl (BUSPAR) 30 MG tablet TAKE 1 TABLET BY MOUTH 2 TIMES DAILY. 180 tablet 3    BUTRANS 20 MCG/HR WK patch Place 1 patch onto the skin every 7 days OK to fill 10/17/23 to start 10/18/23. LISANDRO, name brand. 28 day script for chronic pain. 4 patch 0    Cholecalciferol (VITAMIN D) 2000 UNITS tablet TAKE ONE TABLET BY MOUTH ONCE DAILY 100 tablet 1    clobetasol (TEMOVATE) 0.05 % external cream Apply twice daily for 2 weeks, weekends only for 2 weeks, repeat cycle as needed for hands, not face or genitals 60 g 4    clobetasol (TEMOVATE) 0.05 % external solution Apply twice daily to scalp for up to 2 weeks for flares. 60 mL 0    clonazePAM (KLONOPIN) 1 MG tablet TAKE 0.5-1 TABLETS BY MOUTH 2 TIMES DAILY AS NEEDED FOR ANXIETY 90 tablet 0    clopidogrel (PLAVIX) 75 MG tablet TAKE 1 TABLET BY MOUTH EVERY DAY 90 tablet 3    colchicine (COLCRYS) 0.6 MG tablet TAKE 2 TABLETS BY MOUTH AT THE FIRST SIGN OF FLARE, TAKE 1 ADDITIONAL TABLET ONE HOUR LATER. 90 tablet 1    desvenlafaxine (PRISTIQ) 50 MG 24 hr tablet TAKE 1 TABLET BY MOUTH EVERY DAY 90 tablet 3    diclofenac (VOLTAREN) 1 % topical gel APPLY 4 G TOPICALLY 4 TIMES DAILY AS NEEDED FOR MODERATE PAIN 300 g 0    diclofenac (VOLTAREN) 1 % topical gel Apply 4 grams to bilateral knees, up to four times daily as needed using enclosed dosing card.  Max of 32g/day 300 g 11    evolocumab (REPATHA SURECLICK) 140 MG/ML prefilled autoinjector Inject 1 mL (140 mg) Subcutaneous every 14 days 6 mL 3    ezetimibe (ZETIA) 10 MG tablet TAKE 1 TABLET (10 MG) BY MOUTH DAILY. 90 tablet 3    gemfibrozil (LOPID) 600 MG tablet Take 1 tablet (600 mg) by mouth 2 times daily 180 tablet 3    golimumab (SIMPONI) 50 MG/0.5ML auto-injector pen Inject 0.5 mLs (50 mg) Subcutaneous every 28 days . Hold for signs of infection, and seek medical attention. 0.5 mL 4    hydrocortisone  (WESTCORT) 0.2 % external cream FOR GENITALS, APPLY TWICE DAILY FOR UP TO 2 WEEKS, TAKE 2 WEEKS OFF THEN REPEAT 60 g 3    hydrocortisone 2.5 % cream FOR FACE, APPLY TWICE DAILY FOR UP TO 2 WEEK FOR FLARES ON THE FACE, TAKE 2 WEEKS OFF 28.35 g 3    Incontinence Supply Disposable (DEPEND UNDERWEAR LARGE) MISC Use twice daily. 60 each 11    leflunomide (ARAVA) 20 MG tablet Take 1 tablet (20 mg) by mouth daily ; Labs required every 8-12 weeks. 90 tablet 2    meclizine (ANTIVERT) 25 MG tablet TAKE 1 TABLET BY MOUTH EVERY 6 HOURS AS NEEDED FOR DIZZINESS. 30 tablet 3    melatonin 3 MG tablet Take 1 tablet (3 mg) by mouth nightly as needed for sleep      naloxone (NARCAN) 4 MG/0.1ML nasal spray Spray 1 spray (4 mg) into one nostril alternating nostrils once as needed for opioid reversal every 2-3 minutes until assistance arrives 0.2 mL 0    nitroGLYcerin (NITROSTAT) 0.4 MG sublingual tablet PLACE 1 TAB UNDER THE TONGUE EVERY 5 MINUTES FOR 3 DOSES FOR CHEST PAIN. IF SYMPTOMS PERSIST 5 MINUTES AFTER 1ST DOSE CALL 911. 25 tablet 0    order for DME Equipment being ordered: chair lift 1 each 0    order for DME Equipment being ordered: single end cane 1 Units 0    ORDER FOR DME 1.  CPAP pressure 11 cm/H20 with heated humidity.   2.  Provide mask to fit and CPAP supplies.   3.  Length of need lifetime.   4.  If needed please provide a chin strap           pantoprazole (PROTONIX) 40 MG EC tablet TAKE 1 TABLET BY MOUTH EVERY DAY 90 tablet 1    pimecrolimus (ELIDEL) 1 % external cream APPLY TWICE DAILY FOR FACE AND GENITALS 60 g 1    sildenafil (REVATIO) 20 MG tablet TAKE 2 - 5 TABLETS BY MOUTH AS NEEDED 30 MINUTES BEFORE SEXUAL ACTIVITY. MAX 100MG IN 24 HOURS. 450 tablet 3    sulfaSALAzine (AZULFIDINE) 500 MG tablet Take 3 tablets (1,500 mg) by mouth 2 times daily 540 tablet 2    tamsulosin (FLOMAX) 0.4 MG capsule TAKE 2 CAPSULES BY MOUTH EVERY  capsule 3    tenofovir (VIREAD) 300 MG tablet Take 1 tablet (300 mg) by mouth  daily 90 tablet 3    triamcinolone (KENALOG) 0.1 % external ointment APPLY TOPICALLY TO AFFECTED AREA(S) 3 TIMES DAILY 80 g 2    triamcinolone (KENALOG) 0.1 % external ointment Apply to trunk and extremities twice daily for up to 2 weeks for flares 80 g 1    zolpidem (AMBIEN) 5 MG tablet TAKE 1 TABLET (5 MG) BY MOUTH NIGHTLY AS NEEDED FOR SLEEP 30 tablet 5       Medical History: any changes in medical history since they were last seen? No    Social History:   Home situation: , lives with adult children  Occupation/Schooling: he is on disability due to heart issues and RA  Tobacco use: none  Alcohol use: none  Drug use: none  History of chemical dependency treatment: none    Is patient a current smoker or tobacco user?  no  If yes, was cessation counseling offered?  no          Physical Exam:  Vital signs: There were no vitals taken for this visit.    Behavioral observations:  Awake, alert. Cooperative.   Pulm: respirations easy and unlabored. Able to speak in full sentences without SOB or cough noted.          Diagnostic tests:  MRI of the Lumbar Spine without contrast     History: Lumbago.     Comparison: MRI thoracic spine 7/18/2014.     Technique: Sagittal T1-weighted, T2-weighted, STIR, and axial  T2-weighted spin echo and gradient echo images of the lumbar spine  were obtained without intravenous contrast.     Findings: There are 5 lumbar-type vertebrae assumed for the purposes  of this dictation. The tip of the conus medullaris is at L1.  The  lumbar vertebral column appears normally aligned. Straightening of the  normal lumbar lordosis. Mild loss of T2 signal in the L4-5 and L5-S1  intervertebral discs consistent with age-related changes. Type II  Modic degenerative changes in the opposing endplates at L5-S1.     On a level by level basis:     L1-2: Tiny left paracentral focal disc protrusion without spinal canal  or neural foraminal stenosis.     L2-3: No spinal canal or neural foraminal stenosis.      L3-4: Small focal posterior central disc protrusion with annular  fissuring and mild spinal canal narrowing. Also mild bilateral facet  hypertrophy. No neuroforaminal stenosis.     L4-5: Small focal posterior central disc protrusion with annular  fissuring. No spinal canal or neural foraminal stenosis.     L5-S1: Type II Modic degenerative changes of the opposing endplates.  Left greater than right facet hypertrophy. Small focal posterior  central disc protrusion with annular fissuring. No spinal canal  stenosis. Moderate left and mild right neural foraminal narrowing.     The visualized paraspinous tissues anteriorly are unremarkable.     IMPRESSION  Impression: Multilevel degenerative changes. Small disc protrusion at  L3-4 results in in mild spinal canal narrowing. Moderate left and mild  right neuroforaminal narrowing at L5-S1.     HELGA DAVID MD        FOOT BILATERAL THREE OR MORE VIEWS 11/4/2015 4:55 PM      HISTORY: Pain in unspecified joint.     COMPARISON: 8/21/2012.     IMPRESSION  IMPRESSION: Small traction spurs are seen off the Achilles tendon and  plantar fascial attachment sites bilaterally. Joint spaces are  preserved. Osseous structures are intact. Incidental note is made of  some surgical staples in the soft tissues of the medial distal right  lower extremity.  DANIS ANGLIN MD        RIGHT KNEE THREE VIEWS  5/4/2017 3:16 PM    HISTORY: Pain in right knee.  COMPARISON: None                                                   IMPRESSION: No acute fracture or dislocation. Mild osteoarthritis.  Small joint effusion.     MICHELL TAMAYO MD      MR LUMBAR SPINE WITHOUT CONTRAST  4/4/2023 9:12 AM (reviewed with patient on 6/26/2023)     INDICATION: Low back pain. Rule out spondylolysis. Low back pain with  standing/walking/extension. No known/automatically detected potential  contraindications to CT. Chronic bilateral low back pain with  bilateral sciatica. DDD (degenerative disc disease), lumbar.      TECHNIQUE: MRI images of the lumbar spine without contrast.  CONTRAST:  None.     COMPARISON: Lumbar spine MRI 8/6/2014.     FINDINGS: Nomenclature is based on five lumbar type vertebral bodies.  Normal vertebral body heights. Normal alignment.  No marrow edema. No  pars defect. The conus tip is identified at L1-L2. Unremarkable  paraspinal soft tissues.     T12-L1: Slight loss in disc height and signal. The small left central  protrusion. Minor facet arthropathy. Minor lateral recess and spinal  canal narrowing. No foraminal narrowing. Herniation is new/increased  versus 2014.         L1-L2: Slight loss of disc signal. Normal disc height. Tiny left  paracentral protrusion. Unremarkable facets. Minimal left lateral  recess narrowing. No central stenosis or foraminal narrowing. No  interval change.     L2-L3: Mild loss of disc signal. Minimal loss of disc height. No  herniation. Unremarkable facets. No stenosis.     L3-L4: Moderate loss of disc signal. Minor loss of disc height. Minor  circumferential disc bulge with small central protrusion. Minor facet  arthropathy. Mild lateral recess and spinal canal narrowing,  unchanged. Patent foramina.     L4-L5: Moderate loss of disc signal. Mild loss of disc height. Central  annular tear and small protrusion is minimally increased in the  interval. Mild facet arthropathy. Minor lateral recess and foraminal  narrowing.     L5-S1: Moderate loss of disc signal. Mild loss of disc height.  Circumferential asymmetric left disc bulge. Mild to moderate left and  mild right facet arthropathy. Moderate left and mild right lateral  recess narrowing. Adequate central canal. Moderate to severe left and  minor right foraminal narrowing. No significant interval change.                                                                      IMPRESSION:  1.  Degenerative changes are relatively stable compared with 2014.  2.  At L5-S1 an asymmetric left disc bulge causes moderate  left  lateral recess and moderate to severe foraminal narrowing. Correlate  for any left L5 symptoms. Mild right-sided narrowing. No significant  interval change.  3.  At L4-L5 a small protrusion is minimally increased with minor  lateral recess and foraminal narrowing similar to prior.  4.  At L3-L4 mild lateral recess and spinal canal narrowing is  unchanged.  5.  At T12-L1 a small left-sided herniation is increased and results  in minor lateral recess and spinal canal narrowing.     HORTENCIA DUENAS MD      Other testing (labs, diagnostics):  4/26/2022  Cr. 0.94  Est GFR >90  Liver enzymes WNL        Screening tools:      DIRE Score for ongoing opioid management is calculated as follows:  Diagnosis = 2  Intractability = 2  Risk: Psych = 2  Chem Hlth = 2  Reliability = 3  Social = 2  Efficacy = 2  Total DIRE Score = 15 (14 or higher predicts good candidate for ongoing opioid management; 13 or lower predicts poor candidate for opioid management)        [place]  review:  Reviewed [place]  November 13, 2023- no concerning fills.    Ameena LEMUS, RN CNP, FNP  [facility]  [place] location       Assessment:   Rheumatoid arthritis involving multiple joints, unspecified rheumatoid factor  Primary osteoarthritis of right knee  Facet arthropathy (lumbar)  Chronic pain syndrome  Chronic continuous use of opioids  UDT 3/22/2023  Signed CSA 3/22/2023  Encounter for long term use of opiate analgesic  PMHx includes: Anxiety, coronary artery disease, congestive heart failure (2008), depressive disorder (2008), gout, hepatitis B more than 10 years ago, hypertension, hyperlipidemia, malrotation of intestine, severe obstructive sleep apnea, rheumatoid arthritis (2012) steatohepatitis (2015), history of tuberculosis at age 13, vitamin D deficiency  PSHx includes: Coronary artery bypass graft 6 vessel (2008), abdominal surgery (2014), colonoscopy (2013), combined repair ptosis with blepharoplasty  bilateral (2018), head and neck surgery (2011), nasal/sinus polypectomy (2010), orthopedic surgery (2012), repair ptosis bilateral (2019), repair ptosis brow bilateral (2018), thoracic surgery for tuberculosis (1989), tonsillectomy and uvulopalatal pharyngoplasty (2010), coronary artery stent 3 months after CABG (2008).    Plan:  Physical Therapy: none  Continue stretching at home  Clinical Health Psychologist to address issues of relaxation, behavioral change, coping style, and other factors important to improvement: none  Diagnostic Studies: none  Medication Management:   Continue Butrans 20mcg/hr transdermal change every 7 days, fill 11/15 and start 11/16/2022  continue Baclofen to 10mg up to three times daily as needed for muscle spasms  Continue Voltaren gel as needed  Further procedures recommended: none  Recommendations/follow-up for PCP:  See above  Release of information: none  Follow up: 12 weeks for virtual visit. Please call [phone] to make your follow-up appointment with me.        ASSESSMENT AND PLAN:  (M06.9) Rheumatoid arthritis involving multiple sites, unspecified whether rheumatoid factor present (H)  (primary encounter diagnosis)  Plan: BUTRANS 20 MCG/HR WK patch, [facility]         Follow-Up Order            (M17.11) Primary osteoarthritis of right knee  Plan: BUTRANS 20 MCG/HR WK patch, [facility]         Follow-Up Order            (M47.819) Facet arthropathy  Plan: BUTRANS 20 MCG/HR WK patch, [facility]         Follow-Up Order            (G89.4) Chronic pain syndrome  Plan: BUTRANS 20 MCG/HR WK patch, [facility]         Follow-Up Order            (F11.90) Chronic, continuous use of opioids  Plan: BUTRANS 20 MCG/HR WK patch, [facility]         Follow-Up Order            BILLING TIME DOCUMENTATION:   TOTAL TIME includes:   Time spent preparing to see the patient: 1 minutes (reviewing records and tests)  Time spend face to face with the patient: 10  minutes  Time spent ordering tests, medications, procedures and referrals: 0 minutes  Time spent Referring and communicating with other healthcare professionals: 0 minutes  Documenting clinical information in Epic: 4 minutes    The total TIME spent on this patient on the day of the appointment was 15 minutes.                 Ameena LEMUS RN CNP, FNP  [facility]  [facility]

## 2023-11-13 NOTE — PATIENT INSTRUCTIONS
Plan:  Physical Therapy: none  Continue stretching at home  Clinical Health Psychologist to address issues of relaxation, behavioral change, coping style, and other factors important to improvement: none  Diagnostic Studies: none  Medication Management:   Continue Butrans 20mcg/hr transdermal change every 7 days, fill 11/15 and start 11/16/2022  continue Baclofen to 10mg up to three times daily as needed for muscle spasms  Continue Voltaren gel as needed  Further procedures recommended: none  Recommendations/follow-up for PCP:  See above  Release of information: none  Follow up: 12 weeks for virtual visit. Please call [phone] to make your follow-up appointment with me.       =================    Clinic Number:  [phone]   Call with any questions about your care and for scheduling assistance.   Calls are returned Monday through Friday between 8 AM and 4:30 PM. We usually get back to you within 2 business days depending on the issue/request.    If we are prescribing your medications:  For opioid medication refills, call the clinic or send a [facility] message 7 days in advance.  Please include:  Name of requested medication  Name of the pharmacy.  For non-opioid medications, call your pharmacy directly to request a refill. Please allow 3-4 days to be processed.   Per [place] State Law:  All controlled substance prescriptions must be filled within 30 days of being written.    For those controlled substances allowing refills, pickup must occur within 30 days of last fill.      We believe regular attendance is key to your success in our program!    Any time you are unable to keep your appointment we ask that you call us at least 24 hours in advance to cancel.This will allow us to offer the appointment time to another patient.   Multiple missed appointments may lead to dismissal from the clinic.

## 2023-12-11 NOTE — TELEPHONE ENCOUNTER
"Requested Prescriptions   Pending Prescriptions Disp Refills     atorvastatin (LIPITOR) 80 MG tablet [Pharmacy Med Name: ATORVASTATIN 80 MG TABLET]      Last Written Prescription Date:  10/30/18  Last Fill Quantity: 90,  # refills: 1   Last Office Visit with FMRAO, OBED or [facility] prescribing provider:  3/14/19   Future Office Visit:    Next 5 appointments (look out 90 days)    May 14, 2019 10:00 AM CDT  Return Visit with Ricardo Rae MD, [facility] NURSE ONLY  [facility] ([facility]) [id number] [place] [id number]  [phone]   Venancio 10, 2019  9:00 AM CDT  Return Visit with Dorothea Loo MD  [facility] ([facility]) [address] [place] [id number]  [phone]   Jun 21, 2019  9:00 AM CDT  Return Visit with Fly Travis MD  [facility] ([facility]) [address] [place] [address]  [phone]   Jul 02, 2019  8:40 AM CDT  Return Visit with Sebastián Jenkins MD  [facility] ([facility]) [address] [facility] [address]  [phone]          90 tablet 1     Sig: TAKE 1 TABLET BY MOUTH 1 TIME DAILY       Statins Protocol Passed - 4/27/2019  8:34 AM        Passed - LDL on file in past 12 months     Recent Labs   Lab Test 03/14/19  1006   LDL 76             Passed - No abnormal creatine kinase in past 12 months     Recent Labs   Lab Test 07/07/14  0800                   Passed - Recent (12 mo) or future (30 days) visit within the authorizing provider's specialty     Patient had office visit in the last 12 months or has a visit in the next 30 days with authorizing provider or within the authorizing provider's specialty.  See \"Patient Info\" tab in inbasket, or \"Choose Columns\" in Meds & Orders section of the refill encounter.              Passed - Medication is active on med list    "     Passed - Patient is age 18 or older            Costa Faarax  [facility]     lactated ringers

## 2023-12-15 ENCOUNTER — ANCILLARY PROCEDURE (OUTPATIENT)
Dept: ULTRASOUND IMAGING | Facility: CLINIC | Age: 58
End: 2023-12-15
Attending: INTERNAL MEDICINE
Payer: COMMERCIAL

## 2023-12-15 DIAGNOSIS — F33.1 MAJOR DEPRESSIVE DISORDER, RECURRENT EPISODE, MODERATE (H): ICD-10-CM

## 2023-12-15 DIAGNOSIS — F41.9 ANXIETY: Chronic | ICD-10-CM

## 2023-12-15 DIAGNOSIS — B18.1 CHRONIC VIRAL HEPATITIS B WITHOUT DELTA AGENT AND WITHOUT COMA (H): ICD-10-CM

## 2023-12-15 PROCEDURE — 76700 US EXAM ABDOM COMPLETE: CPT | Performed by: RADIOLOGY

## 2023-12-15 RX ORDER — DESVENLAFAXINE 50 MG/1
TABLET, FILM COATED, EXTENDED RELEASE ORAL
Qty: 90 TABLET | Refills: 3 | Status: SHIPPED | OUTPATIENT
Start: 2023-12-15 | End: 2024-04-25

## 2023-12-19 ENCOUNTER — MYC MEDICAL ADVICE (OUTPATIENT)
Dept: PALLIATIVE MEDICINE | Facility: CLINIC | Age: 58
End: 2023-12-19
Payer: COMMERCIAL

## 2023-12-19 DIAGNOSIS — M06.9 RHEUMATOID ARTHRITIS INVOLVING MULTIPLE SITES, UNSPECIFIED WHETHER RHEUMATOID FACTOR PRESENT (H): ICD-10-CM

## 2023-12-19 DIAGNOSIS — M17.11 PRIMARY OSTEOARTHRITIS OF RIGHT KNEE: ICD-10-CM

## 2023-12-19 DIAGNOSIS — M47.819 FACET ARTHROPATHY: ICD-10-CM

## 2023-12-19 DIAGNOSIS — F11.90 CHRONIC, CONTINUOUS USE OF OPIOIDS: ICD-10-CM

## 2023-12-19 DIAGNOSIS — G89.4 CHRONIC PAIN SYNDROME: ICD-10-CM

## 2023-12-20 RX ORDER — BUPRENORPHINE 20 UG/H
1 PATCH, EXTENDED RELEASE TRANSDERMAL
Qty: 4 PATCH | Refills: 0 | Status: SHIPPED | OUTPATIENT
Start: 2023-12-20 | End: 2024-01-22

## 2023-12-20 NOTE — TELEPHONE ENCOUNTER
Signed Prescriptions:                        Disp   Refills    BUTRANS 20 MCG/HR WK patch                 4 patch0        Sig: Place 1 patch onto the skin every 7 days OK to           fill/start 12/20/23. LISANDRO, name brand. 28 day           script for chronic pain.  Authorizing Provider: AMEENA PAGE        Reviewed [place]  December 20, 2023- no concerning fills.    Ameena LEMUS, RN CNP, FNP  [facility]  [facility]

## 2023-12-20 NOTE — TELEPHONE ENCOUNTER
Please process a refill of BUTRANS 20 MCG/HR WK patch  to:    [id number] - [place], [place] - [address]  [address]  [place] [address]  Phone: [phone] Fax: [phone]

## 2023-12-20 NOTE — TELEPHONE ENCOUNTER
Received call from patient requesting refill(s) of BUTRANS 20 MCG/HR WK patch      Last dispensed from pharmacy on 11/16/23    Patient's last office/virtual visit by prescribing provider on 11/13/23  Next office/virtual appointment scheduled for 02/13/24    Last urine drug screen date 03/22/23  Current opioid agreement on file (completed within the last year) Yes Date of opioid agreement: 03/22/23    E-prescribe to pharmacy-  [id number] - [place] - [address]     Will route to nursing pool for review and preparation of prescription(s).

## 2023-12-20 NOTE — TELEPHONE ENCOUNTER
Medication refill information reviewed.     Due date for BUTRANS 20 MCG/HR WK patch   is 12/20/23     Prescriptions prepped for review.     Will route to provider.

## 2023-12-22 ENCOUNTER — OFFICE VISIT (OUTPATIENT)
Dept: FAMILY MEDICINE | Facility: CLINIC | Age: 58
End: 2023-12-22
Attending: FAMILY MEDICINE
Payer: COMMERCIAL

## 2023-12-22 VITALS
BODY MASS INDEX: 26.06 KG/M2 | TEMPERATURE: 96.5 F | OXYGEN SATURATION: 99 % | HEIGHT: 62 IN | SYSTOLIC BLOOD PRESSURE: 129 MMHG | HEART RATE: 79 BPM | DIASTOLIC BLOOD PRESSURE: 88 MMHG | WEIGHT: 141.6 LBS

## 2023-12-22 DIAGNOSIS — E78.5 HYPERLIPIDEMIA LDL GOAL <70: ICD-10-CM

## 2023-12-22 DIAGNOSIS — I10 ESSENTIAL HYPERTENSION WITH GOAL BLOOD PRESSURE LESS THAN 140/90: Primary | ICD-10-CM

## 2023-12-22 PROCEDURE — 36415 COLL VENOUS BLD VENIPUNCTURE: CPT | Performed by: FAMILY MEDICINE

## 2023-12-22 PROCEDURE — 80061 LIPID PANEL: CPT | Performed by: FAMILY MEDICINE

## 2023-12-22 PROCEDURE — 99214 OFFICE O/P EST MOD 30 MIN: CPT | Performed by: FAMILY MEDICINE

## 2023-12-22 PROCEDURE — 80053 COMPREHEN METABOLIC PANEL: CPT | Performed by: FAMILY MEDICINE

## 2023-12-22 ASSESSMENT — ANXIETY QUESTIONNAIRES
4. TROUBLE RELAXING: MORE THAN HALF THE DAYS
1. FEELING NERVOUS, ANXIOUS, OR ON EDGE: NEARLY EVERY DAY
GAD7 TOTAL SCORE: 16
GAD7 TOTAL SCORE: 16
6. BECOMING EASILY ANNOYED OR IRRITABLE: MORE THAN HALF THE DAYS
3. WORRYING TOO MUCH ABOUT DIFFERENT THINGS: MORE THAN HALF THE DAYS
IF YOU CHECKED OFF ANY PROBLEMS ON THIS QUESTIONNAIRE, HOW DIFFICULT HAVE THESE PROBLEMS MADE IT FOR YOU TO DO YOUR WORK, TAKE CARE OF THINGS AT HOME, OR GET ALONG WITH OTHER PEOPLE: EXTREMELY DIFFICULT
2. NOT BEING ABLE TO STOP OR CONTROL WORRYING: MORE THAN HALF THE DAYS
5. BEING SO RESTLESS THAT IT IS HARD TO SIT STILL: NEARLY EVERY DAY
7. FEELING AFRAID AS IF SOMETHING AWFUL MIGHT HAPPEN: MORE THAN HALF THE DAYS

## 2023-12-22 ASSESSMENT — PATIENT HEALTH QUESTIONNAIRE - PHQ9
10. IF YOU CHECKED OFF ANY PROBLEMS, HOW DIFFICULT HAVE THESE PROBLEMS MADE IT FOR YOU TO DO YOUR WORK, TAKE CARE OF THINGS AT HOME, OR GET ALONG WITH OTHER PEOPLE: EXTREMELY DIFFICULT
SUM OF ALL RESPONSES TO PHQ QUESTIONS 1-9: 15
SUM OF ALL RESPONSES TO PHQ QUESTIONS 1-9: 15

## 2023-12-22 ASSESSMENT — PAIN SCALES - GENERAL: PAINLEVEL: SEVERE PAIN (6)

## 2023-12-22 NOTE — PROGRESS NOTES
"    Carito Miguel is a 58 year old, presenting for the following health issues:  Lipids and Hypertension      12/22/2023     1:09 PM   Additional Questions   Roomed by Randy Farrell       History of Present Illness       Reason for visit:  Fallow up    He eats 2-3 servings of fruits and vegetables daily.He consumes 1 sweetened beverage(s) daily.He exercises with enough effort to increase his heart rate 9 or less minutes per day.  He exercises with enough effort to increase his heart rate 3 or less days per week.   He is taking medications regularly.       Hyperlipidemia Follow-Up    Are you regularly taking any medication or supplement to lower your cholesterol?   Yes- Rapatha  Are you having muscle aches or other side effects that you think could be caused by your cholesterol lowering medication?  No    Hypertension Follow-up    Do you check your blood pressure regularly outside of the clinic? Yes   Are you following a low salt diet? Yes  Are your blood pressures ever more than 140 on the top number (systolic) OR more   than 90 on the bottom number (diastolic), for example 140/90? No      Review of Systems   Constitutional, HEENT, cardiovascular, pulmonary, GI, , musculoskeletal, neuro, skin, endocrine and psych systems are negative, except as otherwise noted.      Objective    /88 (BP Location: Left arm, Patient Position: Sitting, Cuff Size: Adult Regular)   Pulse 79   Temp (!) 96.5  F (35.8  C) (Tympanic)   Ht 1.575 m (5' 2\")   Wt 64.2 kg (141 lb 9.6 oz)   SpO2 99%   BMI 25.90 kg/m    Body mass index is 25.9 kg/m .  Physical Exam   GENERAL: healthy, alert and no distress  NECK: no adenopathy, no asymmetry, masses, or scars and thyroid normal to palpation  RESP: lungs clear to auscultation - no rales, rhonchi or wheezes  CV: regular rate and rhythm, normal S1 S2, no S3 or S4, no murmur, click or rub, no peripheral edema and peripheral pulses strong  ABDOMEN: soft, nontender, no hepatosplenomegaly, " no masses and bowel sounds normal  MS: no gross musculoskeletal defects noted, no edema    A/P:  (I10) Essential hypertension with goal blood pressure less than 140/90  (primary encounter diagnosis)  Comment:   Plan: Comprehensive metabolic panel (BMP + Alb, Alk         Phos, ALT, AST, Total. Bili, TP)        Controlled. Continue with current medications. Recheck in 6 months with Medicare Wellness visit.    (E78.5) Hyperlipidemia LDL goal <70  Comment:   Plan: Comprehensive metabolic panel (BMP + Alb, Alk         Phos, ALT, AST, Total. Bili, TP), Lipid panel         reflex to direct LDL Non-fasting        Controlled. Following Cardiology and on Rapatha.    David Chavez MD

## 2023-12-23 LAB
ALBUMIN SERPL BCG-MCNC: 4.3 G/DL (ref 3.5–5.2)
ALP SERPL-CCNC: 115 U/L (ref 40–150)
ALT SERPL W P-5'-P-CCNC: 16 U/L (ref 0–70)
ANION GAP SERPL CALCULATED.3IONS-SCNC: 14 MMOL/L (ref 7–15)
AST SERPL W P-5'-P-CCNC: 22 U/L (ref 0–45)
BILIRUB SERPL-MCNC: 0.5 MG/DL
BUN SERPL-MCNC: 13.4 MG/DL (ref 6–20)
CALCIUM SERPL-MCNC: 9.6 MG/DL (ref 8.6–10)
CHLORIDE SERPL-SCNC: 105 MMOL/L (ref 98–107)
CHOLEST SERPL-MCNC: 117 MG/DL
CREAT SERPL-MCNC: 0.82 MG/DL (ref 0.67–1.17)
DEPRECATED HCO3 PLAS-SCNC: 22 MMOL/L (ref 22–29)
EGFRCR SERPLBLD CKD-EPI 2021: >90 ML/MIN/1.73M2
FASTING STATUS PATIENT QL REPORTED: NO
GLUCOSE SERPL-MCNC: 111 MG/DL (ref 70–99)
HDLC SERPL-MCNC: 42 MG/DL
LDLC SERPL CALC-MCNC: 59 MG/DL
NONHDLC SERPL-MCNC: 75 MG/DL
POTASSIUM SERPL-SCNC: 3.9 MMOL/L (ref 3.4–5.3)
PROT SERPL-MCNC: 7.7 G/DL (ref 6.4–8.3)
SODIUM SERPL-SCNC: 141 MMOL/L (ref 135–145)
TRIGL SERPL-MCNC: 79 MG/DL

## 2023-12-26 DIAGNOSIS — B18.1 CHRONIC VIRAL HEPATITIS B WITHOUT DELTA AGENT AND WITHOUT COMA (H): Primary | ICD-10-CM

## 2023-12-26 DIAGNOSIS — K76.0 FATTY LIVER: ICD-10-CM

## 2023-12-27 ENCOUNTER — LAB (OUTPATIENT)
Dept: LAB | Facility: CLINIC | Age: 58
End: 2023-12-27
Payer: COMMERCIAL

## 2023-12-27 DIAGNOSIS — R21 RASH: ICD-10-CM

## 2023-12-27 DIAGNOSIS — K76.0 FATTY LIVER: ICD-10-CM

## 2023-12-27 DIAGNOSIS — R42 VERTIGO: ICD-10-CM

## 2023-12-27 DIAGNOSIS — M06.09 RHEUMATOID ARTHRITIS OF MULTIPLE SITES WITH NEGATIVE RHEUMATOID FACTOR (H): ICD-10-CM

## 2023-12-27 DIAGNOSIS — Z79.899 HIGH RISK MEDICATION USE: ICD-10-CM

## 2023-12-27 DIAGNOSIS — B18.1 CHRONIC VIRAL HEPATITIS B WITHOUT DELTA AGENT AND WITHOUT COMA (H): ICD-10-CM

## 2023-12-27 LAB
ACANTHOCYTES BLD QL SMEAR: ABNORMAL
ALBUMIN SERPL BCG-MCNC: 4.1 G/DL (ref 3.5–5.2)
ALP SERPL-CCNC: 105 U/L (ref 40–150)
ALT SERPL W P-5'-P-CCNC: 12 U/L (ref 0–70)
ANION GAP SERPL CALCULATED.3IONS-SCNC: 12 MMOL/L (ref 7–15)
AST SERPL W P-5'-P-CCNC: 16 U/L (ref 0–45)
AUER BODIES BLD QL SMEAR: ABNORMAL
BASO STIPL BLD QL SMEAR: ABNORMAL
BASOPHILS # BLD AUTO: 0.1 10E3/UL (ref 0–0.2)
BASOPHILS NFR BLD AUTO: 1 %
BILIRUB DIRECT SERPL-MCNC: <0.2 MG/DL (ref 0–0.3)
BILIRUB SERPL-MCNC: 0.4 MG/DL
BITE CELLS BLD QL SMEAR: ABNORMAL
BLISTER CELLS BLD QL SMEAR: ABNORMAL
BUN SERPL-MCNC: 16.3 MG/DL (ref 6–20)
BURR CELLS BLD QL SMEAR: ABNORMAL
CALCIUM SERPL-MCNC: 9.2 MG/DL (ref 8.6–10)
CHLORIDE SERPL-SCNC: 105 MMOL/L (ref 98–107)
CREAT SERPL-MCNC: 0.76 MG/DL (ref 0.67–1.17)
CRP SERPL-MCNC: 9.28 MG/L
DACRYOCYTES BLD QL SMEAR: ABNORMAL
DEPRECATED HCO3 PLAS-SCNC: 24 MMOL/L (ref 22–29)
EGFRCR SERPLBLD CKD-EPI 2021: >90 ML/MIN/1.73M2
ELLIPTOCYTES BLD QL SMEAR: ABNORMAL
EOSINOPHIL # BLD AUTO: 0.2 10E3/UL (ref 0–0.7)
EOSINOPHIL NFR BLD AUTO: 4 %
ERYTHROCYTE [DISTWIDTH] IN BLOOD BY AUTOMATED COUNT: 13.6 % (ref 10–15)
ERYTHROCYTE [SEDIMENTATION RATE] IN BLOOD BY WESTERGREN METHOD: 59 MM/HR (ref 0–20)
FRAGMENTS BLD QL SMEAR: SLIGHT
GLUCOSE SERPL-MCNC: 107 MG/DL (ref 70–99)
HCT VFR BLD AUTO: 39.5 % (ref 40–53)
HGB BLD-MCNC: 12.7 G/DL (ref 13.3–17.7)
HGB C CRYSTALS: ABNORMAL
HOWELL-JOLLY BOD BLD QL SMEAR: ABNORMAL
IMM GRANULOCYTES # BLD: 0 10E3/UL
IMM GRANULOCYTES NFR BLD: 0 %
INR PPP: 1.14 (ref 0.85–1.15)
LYMPHOCYTES # BLD AUTO: 1.5 10E3/UL (ref 0.8–5.3)
LYMPHOCYTES NFR BLD AUTO: 23 %
MCH RBC QN AUTO: 28.4 PG (ref 26.5–33)
MCHC RBC AUTO-ENTMCNC: 32.2 G/DL (ref 31.5–36.5)
MCV RBC AUTO: 88 FL (ref 78–100)
MONOCYTES # BLD AUTO: 1.4 10E3/UL (ref 0–1.3)
MONOCYTES NFR BLD AUTO: 22 %
NEUTROPHILS # BLD AUTO: 3.3 10E3/UL (ref 1.6–8.3)
NEUTROPHILS NFR BLD AUTO: 50 %
NEUTS HYPERSEG BLD QL SMEAR: ABNORMAL
NRBC # BLD AUTO: 0 10E3/UL
NRBC BLD AUTO-RTO: 0 /100
PLAT MORPH BLD: ABNORMAL
PLATELET # BLD AUTO: 363 10E3/UL (ref 150–450)
POLYCHROMASIA BLD QL SMEAR: ABNORMAL
POTASSIUM SERPL-SCNC: 3.7 MMOL/L (ref 3.4–5.3)
PROT SERPL-MCNC: 7.6 G/DL (ref 6.4–8.3)
RBC # BLD AUTO: 4.47 10E6/UL (ref 4.4–5.9)
RBC AGGLUT BLD QL: ABNORMAL
RBC MORPH BLD: ABNORMAL
ROULEAUX BLD QL SMEAR: ABNORMAL
SICKLE CELLS BLD QL SMEAR: ABNORMAL
SMUDGE CELLS BLD QL SMEAR: ABNORMAL
SODIUM SERPL-SCNC: 141 MMOL/L (ref 135–145)
SPHEROCYTES BLD QL SMEAR: ABNORMAL
STOMATOCYTES BLD QL SMEAR: ABNORMAL
TARGETS BLD QL SMEAR: ABNORMAL
TOXIC GRANULES BLD QL SMEAR: ABNORMAL
VARIANT LYMPHS BLD QL SMEAR: ABNORMAL
WBC # BLD AUTO: 6.6 10E3/UL (ref 4–11)

## 2023-12-27 PROCEDURE — 85610 PROTHROMBIN TIME: CPT

## 2023-12-27 PROCEDURE — 85652 RBC SED RATE AUTOMATED: CPT

## 2023-12-27 PROCEDURE — 80053 COMPREHEN METABOLIC PANEL: CPT

## 2023-12-27 PROCEDURE — 87517 HEPATITIS B DNA QUANT: CPT

## 2023-12-27 PROCEDURE — 85025 COMPLETE CBC W/AUTO DIFF WBC: CPT

## 2023-12-27 PROCEDURE — 86140 C-REACTIVE PROTEIN: CPT

## 2023-12-27 PROCEDURE — 82248 BILIRUBIN DIRECT: CPT

## 2023-12-27 PROCEDURE — 36415 COLL VENOUS BLD VENIPUNCTURE: CPT

## 2023-12-27 RX ORDER — MECLIZINE HYDROCHLORIDE 25 MG/1
TABLET ORAL
Qty: 30 TABLET | Refills: 3 | Status: SHIPPED | OUTPATIENT
Start: 2023-12-27 | End: 2024-04-16

## 2023-12-27 RX ORDER — TRIAMCINOLONE ACETONIDE 1 MG/G
OINTMENT TOPICAL
Qty: 80 G | Refills: 2 | Status: SHIPPED | OUTPATIENT
Start: 2023-12-27 | End: 2024-07-17

## 2023-12-28 LAB — HBV DNA SERPL NAA+PROBE-ACNC: NOT DETECTED IU/ML

## 2024-01-02 ENCOUNTER — FCC EXTENDED DOCUMENTATION (OUTPATIENT)
Dept: PSYCHOLOGY | Facility: CLINIC | Age: 59
End: 2024-01-02
Payer: COMMERCIAL

## 2024-01-02 NOTE — PROGRESS NOTES
Patient was scheduled for an 8:30am initial visit with writer. Writer waited on the video visit for 20 minutes, as writer could see patient was actively working on the screening questions. Writer called patient x3 and left a VM x1. Writer ended the video visit. Patient did call writer back. Initial visit was rescheduled for 1/9 at 3pm    JENNYFER Hassan, NURA  1/2/2024

## 2024-01-03 ENCOUNTER — VIRTUAL VISIT (OUTPATIENT)
Dept: GASTROENTEROLOGY | Facility: CLINIC | Age: 59
End: 2024-01-03
Attending: PHYSICIAN ASSISTANT
Payer: COMMERCIAL

## 2024-01-03 VITALS — WEIGHT: 140 LBS | BODY MASS INDEX: 25.76 KG/M2 | HEIGHT: 62 IN

## 2024-01-03 DIAGNOSIS — B18.1 CHRONIC VIRAL HEPATITIS B WITHOUT DELTA AGENT AND WITHOUT COMA (H): Primary | ICD-10-CM

## 2024-01-03 DIAGNOSIS — K75.81 NASH (NONALCOHOLIC STEATOHEPATITIS): ICD-10-CM

## 2024-01-03 DIAGNOSIS — Z79.899 HIGH RISK MEDICATIONS (NOT ANTICOAGULANTS) LONG-TERM USE: ICD-10-CM

## 2024-01-03 PROCEDURE — 99214 OFFICE O/P EST MOD 30 MIN: CPT | Mod: 95 | Performed by: PHYSICIAN ASSISTANT

## 2024-01-03 ASSESSMENT — PAIN SCALES - GENERAL: PAINLEVEL: MODERATE PAIN (5)

## 2024-01-03 NOTE — LETTER
1/3/2024         RE: Lamont Daniels  [address]  [facility] [address]        Dear Colleague,    Thank you for referring your patient, Lamont Daniels, to the [facility] [place]. Please see a copy of my visit note below.    Virtual Visit Details    Type of service:  Video Visit   Video Start Time:   1:15 - 1:25 pm    Originating Location (pt. Location): Home  Distant Location (provider location):  Off-site  Platform used for Video Visit: [employer]    Hepatology Clinic note  Lamont Daniels   Date of Birth 1965         Assessment/plan:   aLmont Daniels is a 58 year old male with biopsy proven MASH and chronic hepatitis B, e antigen positive and e antibody negative who has been maintained on tenofovir disoproxil/Viread 300 mg daily in the setting of chronic immunosuppressant medications with rheumatoid arthritis treatment.  HBV DNA: Nondetectable on 12/27/2023  ALT/AST: Normal  Fibrosis Assessment: Stage 2 Fibrosis on Liver Biopsy 2015   Liver Function: Normal, including platelets    # Chronic Hepatitis B, suppressed:   - Continue tenofovir disoproxil/Viread 300 mg daily   - Renal labs will be continued to monitor for changes during treatment.  - BMP, Hepatic panel, CBC, INR, AFP and HBV DNA in six months     # HCC screening, up-to-date:  - US abdomen May 2024, Dec 2024    # MAFLD  - Continue to optimize risk factors including dyslipidemia/HTN   - Recommend yearly screening with HgA1c  - Continue weight maintenance BMI 25.   - Increase physical activity    # Follow up with PCP for routine health maintenance, further work-up of anemia. Unable to add on iron panel today.     # Follow-up in clinic in one year     Leandra Mcclellan PA-C   [facility] Hepatology clinic    Total time for E/M services performed on the date of the encounter 30 minutes.  This included review of previous: clinic visits, hospital records, lab results, imaging studies, and procedural documentation. Time also includes  patient visit, documentation and discussion with other providers.  The findings from this review are summarized in the above note.     -----------------------------------------------------       HPI:   Lamont Daniels is a 58 year old male who presents to clinic today for the following health issues:    Hepatitis B  MAS: Risk factors: CAD, HTN, HLD, JEROD   E antigen negative  E antibody positive  -Diagnosed: age 43  -History: likely early childhood transmission   -Prior biopsy: 12/2015   -Prior treatments:    Patient was last seen by Dr. Leventhal on July 20, 2023.  No recent hospitalizations or ER visits.    Appetite is okay. Weight is down 20 pounds in the last few years. Remaining stable now. No recent hospitalizations or ER visits. No new medications.     He is having regular bowel movements. Taking Hep B medication regularly. No problems getting the medication from the pharmacy.     Patient denies jaundice, lower extremity edema, abdominal distension or confusion.  Patient also denies melena, hematochezia or hematemesis.  Patient denies weight loss, fevers, sweats or chills.    He does not drink alcohol.     Hepatitis B:   Lab Results   Component Value Date    HEPBANG Reactive (A) 03/22/2023    HBCAB Positive (A) 04/23/2013    AUSAB 0.09 10/20/2021    HCVAB  04/07/2016     Nonreactive   Assay performance characteristics have not been established for newborns,   infants, and children      HDAAB Negative 04/01/2019     Lab Results   Component Value Date    HBQRES Not Detected 12/27/2023    HBQRES Not Detected 03/22/2023    HBQRES Not Detected 04/26/2022    HBQRES Not Detected 10/20/2021    HBQRES HBV DNA Not Detected 03/19/2021     Lab Results   Component Value Date    HBEABY Positive (A) 03/22/2023    HBEAGN  10/05/2016     Negative  Reference range: Negative  (Note)  Performed by [facility],  [address] [place] [id number] [phone]  [email or web address], Dashawn Machuca MD, Lab. Director         Recent Labs    Lab Test 12/27/23  1000 12/22/23  1349 09/14/23  1404 06/07/23  0858 03/22/23  1052 12/17/22  1244 09/14/22  1037 07/15/22  1126 04/26/22  1218 03/25/22  1549   ALKPHOS 105 115 99 110 121 82 100 99 93 118   ALT 12 16 19 22 19 23 23 18 23 34   AST 16 22 25 34 18 23 21 15 24 27        Medical hx Surgical hx   Past Medical History:   Diagnosis Date     Anxiety      CAD (coronary artery disease)      Congestive heart failure, unspecified 2008     Depressive disorder 2008     Gout      Hepatitis B > 10 years     HTN (hypertension)      Hyperlipidemia LDL goal < 100      Malrotation of intestine (H28)      Moderate major depression (H)      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) 9/19/2011     Rheumatoid arthritis flare (H) 1012     Steatohepatitis 12/15     Tuberculosis age 13     Vitamin D deficiency     Past Surgical History:   Procedure Laterality Date     ABDOMEN SURGERY  2014     BIOPSY  2015     BYPASS GRAFT ARTERY CORONARY  2008    6 vessels     COLONOSCOPY  2/8/2013    Procedure: COLONOSCOPY;  Colonoscopy, blood in stool;  Surgeon: Duane, William Charles, MD;  Location:  OR     COMBINED REPAIR PTOSIS WITH BLEPHAROPLASTY BILATERAL Bilateral 6/25/2018    Procedure: COMBINED REPAIR PTOSIS WITH BLEPHAROPLASTY BILATERAL;  Bilateral upper eyelid blepharoplasty, ptosis repair and brow ptosis repair;  Surgeon: Sandra Borja MD;  Location:  OR     GI SURGERY  2014     HEAD & NECK SURGERY  2011     NASAL/SINUS POLYPECTOMY  2010     ORTHOPEDIC SURGERY  2012     REPAIR PTOSIS       REPAIR PTOSIS BILATERAL Bilateral 7/22/2019    Procedure: REPAIR, PTOSIS, BOTH UPPER brows;  Surgeon: Sandra Borja MD;  Location:  OR     REPAIR PTOSIS BROW BILATERAL Bilateral 6/25/2018    Procedure: REPAIR PTOSIS BROW BILATERAL;;  Surgeon: Sandra Borja MD;  Location:  OR     THORACIC SURGERY  1989    tb     TONSILLECTOMY  2010     UVULOPALATOPHARYNGOPLASTY  2010     VASCULAR SURGERY  2008     [facility] CORONARY STENT CIERA SOTO VESSEL   "2008    3 months after CABG               Physical Exam:   VS:  Ht 1.575 m (5' 2\")   Wt 63.5 kg (140 lb)   BMI 25.61 kg/m        GENERAL: alert and no distress, pale   EYES: Eyes grossly normal to inspection, conjunctivae and sclerae normal  RESP: no audible wheeze, cough, or visible cyanosis.  No visible retractions or increased work of breathing.  Able to speak fully in complete sentences  NEURO: Cranial nerves grossly intact, mentation intact and speech normal  PSYCH: mentation appears normal, judgement and insight intact, normal speech and appearance, flat affect         Data:   Reviewed in person and significant for:    Lab Results   Component Value Date     12/27/2023     03/26/2020      Lab Results   Component Value Date    POTASSIUM 3.7 12/27/2023    POTASSIUM 3.6 03/22/2023    POTASSIUM 3.8 03/26/2020     Lab Results   Component Value Date    CHLORIDE 105 12/27/2023    CHLORIDE 110 03/22/2023    CHLORIDE 109 03/26/2020     Lab Results   Component Value Date    CO2 24 12/27/2023    CO2 30 03/22/2023    CO2 25 03/26/2020     Lab Results   Component Value Date    BUN 16.3 12/27/2023    BUN 13 03/22/2023    BUN 16 03/26/2020     Lab Results   Component Value Date    CR 0.76 12/27/2023    CR 1.01 03/29/2021       Lab Results   Component Value Date    WBC 6.6 12/27/2023    WBC 5.2 03/29/2021     Lab Results   Component Value Date    HGB 12.7 12/27/2023    HGB 13.6 03/29/2021     Lab Results   Component Value Date    HCT 39.5 12/27/2023    HCT 42.3 03/29/2021     Lab Results   Component Value Date    MCV 88 12/27/2023    MCV 90 03/29/2021     Lab Results   Component Value Date     12/27/2023     03/29/2021       Lab Results   Component Value Date    AST 16 12/27/2023    AST 20 03/29/2021     Lab Results   Component Value Date    ALT 12 12/27/2023    ALT 31 03/29/2021     Lab Results   Component Value Date    BILICONJ 0.0 07/07/2014      Lab Results   Component Value Date    BILITOTAL " 0.4 12/27/2023    BILITOTAL 0.5 03/29/2021       Lab Results   Component Value Date    ALBUMIN 4.1 12/27/2023    ALBUMIN 3.7 03/22/2023    ALBUMIN 3.9 03/29/2021     Lab Results   Component Value Date    PROTTOTAL 7.6 12/27/2023    PROTTOTAL 7.6 03/29/2021      Lab Results   Component Value Date    ALKPHOS 105 12/27/2023    ALKPHOS 116 03/29/2021       Lab Results   Component Value Date    INR 1.14 12/27/2023    INR 1.08 03/26/2020       Patient Name: ZOË HARPER  MR#: [id number]  Specimen #: [id number]  Collected: 12/21/2015  Received: 12/21/2015  Reported: 12/22/2015 14:09  Ordering Phy(s): TYRESE DUVALL    SPECIMEN(S):  Liver needle biopsy    FINAL DIAGNOSIS:  Liver, needle biopsy  - Steatohepatitis with mild nonbridging portal fibrosis  - See microscopic description regarding hepatitis B    I have personally reviewed all specimens and or slides, including the  listed special stains, and used them with my medical judgement to  determine the final diagnosis.     Again, thank you for allowing me to participate in the care of your patient.        Sincerely,        Leandra Mcclellan PA-C

## 2024-01-03 NOTE — NURSING NOTE
Is the patient currently in the state of [place]? YES    Visit mode:VIDEO    If the visit is dropped, the patient can be reconnected by: VIDEO VISIT: Text to cell phone:   Telephone Information:   Mobile [phone]       Will anyone else be joining the visit? NO  (If patient encounters technical issues they should call [phone]    How would you like to obtain your AVS? MyChart    Are changes needed to the allergy or medication list? No    Reason for visit: RECHECK    Iliana HAJIF

## 2024-01-03 NOTE — PROGRESS NOTES
Virtual Visit Details    Type of service:  Video Visit   Video Start Time:   1:15 - 1:25 pm    Originating Location (pt. Location): Home  Distant Location (provider location):  Off-site  Platform used for Video Visit: [facility]    Hepatology Clinic note  Lamont Daniels   Date of Birth 1965         Assessment/plan:   Lamont Daniels is a 58 year old male with biopsy proven MASH and chronic hepatitis B, e antigen positive and e antibody negative who has been maintained on tenofovir disoproxil/Viread 300 mg daily in the setting of chronic immunosuppressant medications with rheumatoid arthritis treatment.  HBV DNA: Nondetectable on 12/27/2023  ALT/AST: Normal  Fibrosis Assessment: Stage 2 Fibrosis on Liver Biopsy 2015   Liver Function: Normal, including platelets    # Chronic Hepatitis B, suppressed:   - Continue tenofovir disoproxil/Viread 300 mg daily   - Renal labs will be continued to monitor for changes during treatment.  - BMP, Hepatic panel, CBC, INR, AFP and HBV DNA in six months     # HCC screening, up-to-date:  - US abdomen May 2024, Dec 2024    # MAFLD  - Continue to optimize risk factors including dyslipidemia/HTN   - Recommend yearly screening with HgA1c  - Continue weight maintenance BMI 25.   - Increase physical activity    # Follow up with PCP for routine health maintenance, further work-up of anemia. Unable to add on iron panel today.     # Follow-up in clinic in one year     Leandra Mcclellan PA-C   [facility] Hepatology clinic    Total time for E/M services performed on the date of the encounter 30 minutes.  This included review of previous: clinic visits, hospital records, lab results, imaging studies, and procedural documentation. Time also includes patient visit, documentation and discussion with other providers.  The findings from this review are summarized in the above note.     -----------------------------------------------------       HPI:   Lamont Daniels is a 58 year old male who presents to  clinic today for the following health issues:    Hepatitis B  [facility]: Risk factors: CAD, HTN, HLD, JEROD   E antigen negative  E antibody positive  -Diagnosed: age 43  -History: likely early childhood transmission   -Prior biopsy: 12/2015   -Prior treatments:    Patient was last seen by Dr. Leventhal on July 20, 2023.  No recent hospitalizations or ER visits.    Appetite is okay. Weight is down 20 pounds in the last few years. Remaining stable now. No recent hospitalizations or ER visits. No new medications.     He is having regular bowel movements. Taking Hep B medication regularly. No problems getting the medication from the pharmacy.     Patient denies jaundice, lower extremity edema, abdominal distension or confusion.  Patient also denies melena, hematochezia or hematemesis.  Patient denies weight loss, fevers, sweats or chills.    He does not drink alcohol.     Hepatitis B:   Lab Results   Component Value Date    HEPBANG Reactive (A) 03/22/2023    HBCAB Positive (A) 04/23/2013    AUSAB 0.09 10/20/2021    HCVAB  04/07/2016     Nonreactive   Assay performance characteristics have not been established for newborns,   infants, and children      HDAAB Negative 04/01/2019     Lab Results   Component Value Date    HBQRES Not Detected 12/27/2023    HBQRES Not Detected 03/22/2023    HBQRES Not Detected 04/26/2022    HBQRES Not Detected 10/20/2021    HBQRES HBV DNA Not Detected 03/19/2021     Lab Results   Component Value Date    HBEABY Positive (A) 03/22/2023    HBEAGN  10/05/2016     Negative  Reference range: Negative  (Note)  Performed by [facility],  [address] [place] [id number] [phone]  [email or web address] Dashawn Machuca MD, Lab. Director         Recent Labs   Lab Test 12/27/23  1000 12/22/23  1349 09/14/23  1404 06/07/23  0858 03/22/23  1052 12/17/22  1244 09/14/22  1037 07/15/22  1126 04/26/22  1218 03/25/22  1549   ALKPHOS 105 115 99 110 121 82 100 99 93 118   ALT 12 16 19 22 19 23 23 18 23 34   AST 16  "22 25 34 18 23 21 15 24 27        Medical hx Surgical hx   Past Medical History:   Diagnosis Date    Anxiety     CAD (coronary artery disease)     Congestive heart failure, unspecified 2008    Depressive disorder 2008    Gout     Hepatitis B > 10 years    HTN (hypertension)     Hyperlipidemia LDL goal < 100     Malrotation of intestine (H28)     Moderate major depression (H)     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) 9/19/2011    Rheumatoid arthritis flare (H) 1012    Steatohepatitis 12/15    Tuberculosis age 13    Vitamin D deficiency     Past Surgical History:   Procedure Laterality Date    ABDOMEN SURGERY  2014    BIOPSY  2015    BYPASS GRAFT ARTERY CORONARY  2008    6 vessels    COLONOSCOPY  2/8/2013    Procedure: COLONOSCOPY;  Colonoscopy, blood in stool;  Surgeon: Duane, William Charles, MD;  Location:  OR    COMBINED REPAIR PTOSIS WITH BLEPHAROPLASTY BILATERAL Bilateral 6/25/2018    Procedure: COMBINED REPAIR PTOSIS WITH BLEPHAROPLASTY BILATERAL;  Bilateral upper eyelid blepharoplasty, ptosis repair and brow ptosis repair;  Surgeon: Sandra Borja MD;  Location:  OR    GI SURGERY  2014    HEAD & NECK SURGERY  2011    NASAL/SINUS POLYPECTOMY  2010    ORTHOPEDIC SURGERY  2012    REPAIR PTOSIS      REPAIR PTOSIS BILATERAL Bilateral 7/22/2019    Procedure: REPAIR, PTOSIS, BOTH UPPER brows;  Surgeon: Sandra Borja MD;  Location:  OR    REPAIR PTOSIS BROW BILATERAL Bilateral 6/25/2018    Procedure: REPAIR PTOSIS BROW BILATERAL;;  Surgeon: Sandra Borja MD;  Location:  OR    THORACIC SURGERY  1989    tb    TONSILLECTOMY  2010    UVULOPALATOPHARYNGOPLASTY  2010    VASCULAR SURGERY  2008    [facility] CORONARY STENT CIERA SOTO ADDTL VESSEL  2008    3 months after CABG               Physical Exam:   VS:  Ht 1.575 m (5' 2\")   Wt 63.5 kg (140 lb)   BMI 25.61 kg/m        GENERAL: alert and no distress, pale   EYES: Eyes grossly normal to inspection, conjunctivae and sclerae normal  RESP: no audible wheeze, cough, or " visible cyanosis.  No visible retractions or increased work of breathing.  Able to speak fully in complete sentences  NEURO: Cranial nerves grossly intact, mentation intact and speech normal  PSYCH: mentation appears normal, judgement and insight intact, normal speech and appearance, flat affect         Data:   Reviewed in person and significant for:    Lab Results   Component Value Date     12/27/2023     03/26/2020      Lab Results   Component Value Date    POTASSIUM 3.7 12/27/2023    POTASSIUM 3.6 03/22/2023    POTASSIUM 3.8 03/26/2020     Lab Results   Component Value Date    CHLORIDE 105 12/27/2023    CHLORIDE 110 03/22/2023    CHLORIDE 109 03/26/2020     Lab Results   Component Value Date    CO2 24 12/27/2023    CO2 30 03/22/2023    CO2 25 03/26/2020     Lab Results   Component Value Date    BUN 16.3 12/27/2023    BUN 13 03/22/2023    BUN 16 03/26/2020     Lab Results   Component Value Date    CR 0.76 12/27/2023    CR 1.01 03/29/2021       Lab Results   Component Value Date    WBC 6.6 12/27/2023    WBC 5.2 03/29/2021     Lab Results   Component Value Date    HGB 12.7 12/27/2023    HGB 13.6 03/29/2021     Lab Results   Component Value Date    HCT 39.5 12/27/2023    HCT 42.3 03/29/2021     Lab Results   Component Value Date    MCV 88 12/27/2023    MCV 90 03/29/2021     Lab Results   Component Value Date     12/27/2023     03/29/2021       Lab Results   Component Value Date    AST 16 12/27/2023    AST 20 03/29/2021     Lab Results   Component Value Date    ALT 12 12/27/2023    ALT 31 03/29/2021     Lab Results   Component Value Date    BILICONJ 0.0 07/07/2014      Lab Results   Component Value Date    BILITOTAL 0.4 12/27/2023    BILITOTAL 0.5 03/29/2021       Lab Results   Component Value Date    ALBUMIN 4.1 12/27/2023    ALBUMIN 3.7 03/22/2023    ALBUMIN 3.9 03/29/2021     Lab Results   Component Value Date    PROTTOTAL 7.6 12/27/2023    PROTTOTAL 7.6 03/29/2021      Lab Results    Component Value Date    ALKPHOS 105 12/27/2023    ALKPHOS 116 03/29/2021       Lab Results   Component Value Date    INR 1.14 12/27/2023    INR 1.08 03/26/2020       Patient Name: ZOË HARPER  MR#: [id number]  Specimen #: [id number]  Collected: 12/21/2015  Received: 12/21/2015  Reported: 12/22/2015 14:09  Ordering Phy(s): TYRESE DUVALL    SPECIMEN(S):  Liver needle biopsy    FINAL DIAGNOSIS:  Liver, needle biopsy  - Steatohepatitis with mild nonbridging portal fibrosis  - See microscopic description regarding hepatitis B    I have personally reviewed all specimens and or slides, including the  listed special stains, and used them with my medical judgement to  determine the final diagnosis.

## 2024-01-03 NOTE — Clinical Note
Please reach out to schedule labs, ultrasound in May 2024 and again December 2024. Follow up with me or Dr. Leventhal in one year.  Thanks, Leandra

## 2024-01-04 ENCOUNTER — TELEPHONE (OUTPATIENT)
Dept: GASTROENTEROLOGY | Facility: CLINIC | Age: 59
End: 2024-01-04
Payer: COMMERCIAL

## 2024-01-05 ENCOUNTER — VIRTUAL VISIT (OUTPATIENT)
Dept: RHEUMATOLOGY | Facility: CLINIC | Age: 59
End: 2024-01-05
Payer: COMMERCIAL

## 2024-01-05 DIAGNOSIS — M06.09 RHEUMATOID ARTHRITIS OF MULTIPLE SITES WITH NEGATIVE RHEUMATOID FACTOR (H): Primary | ICD-10-CM

## 2024-01-05 DIAGNOSIS — Z79.899 HIGH RISK MEDICATION USE: ICD-10-CM

## 2024-01-05 PROCEDURE — 99214 OFFICE O/P EST MOD 30 MIN: CPT | Mod: 95 | Performed by: INTERNAL MEDICINE

## 2024-01-05 NOTE — PROGRESS NOTES
Rheumatology Clinic Visit      Lamont Daniels MRN# [id number]   YOB: 1965 Age: 58 year old      Date of visit: 1/05/24   PCP: Dr. David Chavez  Hepatologist: Dr. Thomas Leventhal, [facility]     Chief Complaint   Patient presents with:  RECHECK    Assessment and Plan   1.  Seropositive nonerosive rheumatoid arthritis (RF negative, CCP low positive): Note that he does have low back pain but without evidence of SI joint irregularity on x-ray.  Initially with morning stiffness all day, gelling phenomenon, and synovitis.   Previously on Humira (partially effective), Enbrel (partially effective), HCQ (cannot safely monitor due to right retinal scaring and bilateral early macular degeneration, per ophthalmology), Orencia (partially effective and could use again in the future if needed).  MTX avoided per Dr. Laboy, hematology, recommendation. Currently on leflunomide 20 mg daily, SSZ 1500mg BID, Simponi 50 mg SQ every 28 days (improved when changed from Orencia to Simponi).  RA controlled on current regimen and he reports that symptoms are stable but inflammatory markers are higher with these labs.  He reports no joint swelling.  Morning stiffness for no more than 20 minutes.  Follow-up in-office in 3 months.  Chronic illness, stable.    - Continue leflunomide 20 mg daily    - Continue sulfasalazine 1500mg BID  - Continue Simponi 50mg SQ every 28 days  - PRN RA flare only: Prednisone 10 mg daily x2 days, then 5 mg daily x5 days, then stop   - Labs in 3 months: CBC, Creatinine, Hepatic Panel, ESR, CRP, HCV ab    High risk medication requiring intensive toxicity monitoring at least quarterly     2. Lower back pain: Lumbar spine MRI in August 2014 showed multilevel degenerative changes and a small disc protrusion at L3/4 with mild spinal canal narrowing; also moderate left and mild right neural foraminal narrowing at L5-S1. He had a nerve conduction study in 2014 that was normal. He has been worked up by neurology in  "the past without significant neurologic findings. HLA-B27 negative, SI joint x-ray negative. Now following in the pain management clinic where he says that steroid injections were helpful for only 1 month each time and that they had discussed radiofrequency ablation for management of back pain and he is considering this.    3. Myofascial pain syndrome / chronic pain syndrome: diffuse pain consistent with chronic pain syndrome.  Management per pain clinic as well as PCP.  Generally when he is more physically active he feels better, per patient    4. Chronic Hepatitis B: Managed by Dr. Laboy (hepatology) previously, and now Dr. Thomas Leventhal at the [facility]. Currently on tenofovir at the direction of gastroenterology.     5. Hepatic Steatosis: Based on previous liver biopsy.  Follows with gastroenterology.    6. Positive tuberculosis test, history:   This is noted here for historical significance.  He was evaluated by Dr. Anthony Mendoza, infectious disease. The following telephone note was documented by Dr. Mendoza: \"I tried calling th pt, finally we have the records from [place] . It appears he was treated for latent TB with INH for 1 year in 1980/1981 .Later in 1981 April he was admitted at [facility] in [place], [place] with rt sided pneumonia, TB was suspected but he improved with treatment with Penicillin only. Later he was seen  ( likely ID specialist), and was treated with 6 weeks course of Rifampin and Ethambutol pending sputum AFB culture. His AFB cx were negative based on the report we have.\"  \"He recently had QFT -TB test which was reported positive and his CXR was clear. Given his documented hx of completion of treatment for latent TB as above , I do not see any need for further treatment. His tests may remain positive irrespective of treatment status. From my perspective he can be started on DMARDs/Biological as deemed necessary.\"       7. Gout: On allopurinol and managed by " PCP.    8.  Vaccinations:   - Influenza: encouraged yearly vaccination  - [id number]: up to date  - [id number]: up to date  - Shingrix: Up to date  - COVID-19: advise updating, and to hold sulfasalazine and leflunomide for 2 weeks after the COVID-19 vaccination      9.  Historical: Anxiety: When going outside of the house or considering going outside of the house has increased anxiety that results in shortness of breath and this leads to increased anxiety that leads to more shortness of breath, etc.  6/30/2023 cardiology note by Dr. Hardin documents that the dyspnea is stable, normal echo and PFTs, and that his dyspnea is likely related to anxiety.  Considering that anxiety is keeping him from being more active and going outside the house more frequently, I advised that he consider mental health evaluation and he is in agreement with this.  Possibly cognitive behavioral therapy (or other depending on mental health provider's recommendation) could help reduce anxiety.  Mental health referral was placed previously and he has appointment on 1/9/2024.    Total minutes spent in evaluation with patient, documentation, , and review of pertinent studies and chart notes: 14    Mr. Daniels verbalized agreement with and understanding of the rational for the diagnosis and treatment plan.  All questions were answered to best of my ability and the patient's satisfaction. Mr. Daniels was advised to contact the clinic with any questions that may arise after the clinic visit.      Thank you for involving me in the care of the patient    Return to clinic: 3-4 months      HPI   Lamont Daniels is a 58 year old male with a medical history significant for hypertension, hyperlipidemia, gout, coronary artery disease s/p 6vCABG, vitamin D deficiency, hepatitis B, obstructive sleep apnea (uses CPAP), and RA who presents for follow-up of RA.    9/22/2023: RA controlled.  Diffuse body ache but no specific joint pain.  Joints without swelling,  increased warmth, or overlying erythema.  Minimal physical activity because of dyspnea that is not associate with chest pain/pressure, palpitations, lightheadedness, dizziness, or nausea; he says that sometimes even thinking about going to do more walking or other exercises outside resulted in increased anxiety that results in the dyspnea and he has seen his cardiology about this who suspects that anxiety is the cause of the dyspnea.  Has not seen a mental health provider for anxiety management.  Continues to follow with gastroenterology for hepatitis B.    Today, 1/5/2024: Reports that his arthritis is stable.  No joint swelling.  Joints without increased warmth or overlying erythema.  Diffuse body ache including the joints that is similar to previous.  Morning stiffness for no more than 20 minutes.  Biggest issue right now is chronic low back pain that improved with steroid injection for no more than 1 month each time and he says that his pain management provider and he discussed option of radiofrequency ablation for low back pain management and he is considering this option.  He would like to remain on his current regimen for RA because it is effective.    Denies fevers, chills, nausea, vomiting, constipation, diarrhea. No abdominal pain. Denies chest pain/pressure, palpitations, or shortness of breath.  No oral or nasal sores. No rash. No LE swelling.  Mild dry mouth; he has good dentition and does not feel like he needs to use frequent sips of water; unchanged since last evaluation. No dry eyes. No eye pain or redness.     Tobacco:  None   EtOH:  None  Drugs:  None  Occupation: Retired   Originally from [facility], then lived just north of [place], [place], then lived in [place], [place], then moved to [place] for family    ROS   12 point review of system was completed and negative except as noted in the HPI     Active Problem List     Patient Active Problem List   Diagnosis    Coronary atherosclerosis of  native coronary artery    Major depressive disorder, recurrent episode, moderate (H)    Anxiety    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)    Chronic viral hepatitis B without delta agent and without coma (H)    Vitamin D deficiency    S/P CABG (coronary artery bypass graft)    Malrotation of intestine (H28)    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    Hyperlipidemia LDL goal <70    Insomnia    Gout    Suicidal ideation    Essential hypertension    Rheumatoid arthritis involving multiple sites, unspecified rheumatoid factor presence    High risk medications (not anticoagulants) long-term use    Midline low back pain without sciatica    Bilateral low back pain with sciatica, sciatica laterality unspecified    Elevated liver enzymes    On corticosteroid therapy    Essential hypertension with goal blood pressure less than 140/90    Insomnia, unspecified type    Rheumatoid arthritis of multiple sites with negative rheumatoid factor (H)    Benign prostatic hyperplasia with lower urinary tract symptoms, unspecified morphology    Chronic pain syndrome    Syncope, unspecified syncope type    Symptomatic cholelithiasis    H/O: gout    Anxiety and depression    F11.2 - Continuous opioid dependence (H)     Past Medical History     Past Medical History:   Diagnosis Date    Anxiety     CAD (coronary artery disease)     CABG, PCI    Congestive heart failure, unspecified 2008    Depressive disorder 2008    Gout     Hepatitis B > 10 years    HTN (hypertension)     Hyperlipidemia LDL goal < 100     Malrotation of intestine (H28)     Moderate major depression (H)     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) 9/19/2011    Uses CPAP    Rheumatoid arthritis flare (H) 1012    Steatohepatitis 12/15    liver biopsy    Tuberculosis age 13    INH x 9 months     Vitamin D deficiency      Past Surgical History     Past Surgical History:   Procedure Laterality Date    ABDOMEN SURGERY  2014    BIOPSY  2015    BYPASS GRAFT ARTERY CORONARY  2008    6 vessels     COLONOSCOPY  2/8/2013    Procedure: COLONOSCOPY;  Colonoscopy, blood in stool;  Surgeon: Duane, William Charles, MD;  Location: [facility] OR    COMBINED REPAIR PTOSIS WITH BLEPHAROPLASTY BILATERAL Bilateral 6/25/2018    Procedure: COMBINED REPAIR PTOSIS WITH BLEPHAROPLASTY BILATERAL;  Bilateral upper eyelid blepharoplasty, ptosis repair and brow ptosis repair;  Surgeon: Sandra Borja MD;  Location: [facility] OR    GI SURGERY  2014    HEAD & NECK SURGERY  2011    NASAL/SINUS POLYPECTOMY  2010    ORTHOPEDIC SURGERY  2012    REPAIR PTOSIS      REPAIR PTOSIS BILATERAL Bilateral 7/22/2019    Procedure: REPAIR, PTOSIS, BOTH UPPER brows;  Surgeon: Sandra Borja MD;  Location: [facility] OR    REPAIR PTOSIS BROW BILATERAL Bilateral 6/25/2018    Procedure: REPAIR PTOSIS BROW BILATERAL;;  Surgeon: Sandra Borja MD;  Location: [facility] OR    THORACIC SURGERY  1989    tb    TONSILLECTOMY  2010    UVULOPALATOPHARYNGOPLASTY  2010    VASCULAR SURGERY  2008    [facility] CORONARY STENT BRITTANY, CIERA ADDTL VESSEL  2008    3 months after CABG     Allergy     Allergies   Allergen Reactions    Citalopram Itching    Tramadol Itching     Current Medication List     Current Outpatient Medications   Medication Sig    Acetaminophen (TYLENOL PO)     allopurinol (ZYLOPRIM) 300 MG tablet TAKE 1 TABLET BY MOUTH EVERY DAY    amLODIPine (NORVASC) 5 MG tablet Take 1 tablet (5 mg) by mouth daily for blood pressure    aspirin EC 81 MG EC tablet Take 1 tablet (81 mg) by mouth daily (*)    atorvastatin (LIPITOR) 80 MG tablet Take 1 tablet (80 mg) by mouth daily    bacitracin 500 UNIT/GM external ointment Apply topically 3 times daily    baclofen (LIORESAL) 10 MG tablet Take 1 tablet (10 mg) by mouth 3 times daily as needed for muscle spasms    busPIRone HCl (BUSPAR) 30 MG tablet TAKE 1 TABLET BY MOUTH 2 TIMES DAILY.    BUTRANS 20 MCG/HR WK patch Place 1 patch onto the skin every 7 days OK to fill/start 12/20/23. LISANDRO, name brand. 28 day script for chronic pain.     Cholecalciferol (VITAMIN D) 2000 UNITS tablet TAKE ONE TABLET BY MOUTH ONCE DAILY    clobetasol (TEMOVATE) 0.05 % external cream Apply twice daily for 2 weeks, weekends only for 2 weeks, repeat cycle as needed for hands, not face or genitals    clobetasol (TEMOVATE) 0.05 % external solution Apply twice daily to scalp for up to 2 weeks for flares.    clonazePAM (KLONOPIN) 1 MG tablet TAKE 0.5-1 TABLETS BY MOUTH 2 TIMES DAILY AS NEEDED FOR ANXIETY    clopidogrel (PLAVIX) 75 MG tablet TAKE 1 TABLET BY MOUTH EVERY DAY    colchicine (COLCRYS) 0.6 MG tablet TAKE 2 TABLETS BY MOUTH AT THE FIRST SIGN OF FLARE, TAKE 1 ADDITIONAL TABLET ONE HOUR LATER.    desvenlafaxine (PRISTIQ) 50 MG 24 hr tablet TAKE 1 TABLET BY MOUTH EVERY DAY    diclofenac (VOLTAREN) 1 % topical gel APPLY 4 G TOPICALLY 4 TIMES DAILY AS NEEDED FOR MODERATE PAIN    diclofenac (VOLTAREN) 1 % topical gel Apply 4 grams to bilateral knees, up to four times daily as needed using enclosed dosing card.  Max of 32g/day    evolocumab (REPATHA SURECLICK) 140 MG/ML prefilled autoinjector Inject 1 mL (140 mg) Subcutaneous every 14 days    ezetimibe (ZETIA) 10 MG tablet TAKE 1 TABLET (10 MG) BY MOUTH DAILY.    gemfibrozil (LOPID) 600 MG tablet Take 1 tablet (600 mg) by mouth 2 times daily    golimumab (SIMPONI) 50 MG/0.5ML auto-injector pen Inject 0.5 mLs (50 mg) Subcutaneous every 28 days . Hold for signs of infection, and seek medical attention.    hydrocortisone (WESTCORT) 0.2 % external cream FOR GENITALS, APPLY TWICE DAILY FOR UP TO 2 WEEKS, TAKE 2 WEEKS OFF THEN REPEAT    hydrocortisone 2.5 % cream FOR FACE, APPLY TWICE DAILY FOR UP TO 2 WEEK FOR FLARES ON THE FACE, TAKE 2 WEEKS OFF    Incontinence Supply Disposable (DEPEND UNDERWEAR LARGE) MISC Use twice daily.    leflunomide (ARAVA) 20 MG tablet Take 1 tablet (20 mg) by mouth daily ; Labs required every 8-12 weeks.    meclizine (ANTIVERT) 25 MG tablet TAKE 1 TABLET BY MOUTH EVERY 6 HOURS AS NEEDED FOR  DIZZINESS.    melatonin 3 MG tablet Take 1 tablet (3 mg) by mouth nightly as needed for sleep    naloxone (NARCAN) 4 MG/0.1ML nasal spray Spray 1 spray (4 mg) into one nostril alternating nostrils once as needed for opioid reversal every 2-3 minutes until assistance arrives    nitroGLYcerin (NITROSTAT) 0.4 MG sublingual tablet PLACE 1 TAB UNDER THE TONGUE EVERY 5 MINUTES FOR 3 DOSES FOR CHEST PAIN. IF SYMPTOMS PERSIST 5 MINUTES AFTER 1ST DOSE CALL 911.    order for DME Equipment being ordered: chair lift    order for DME Equipment being ordered: single end cane    ORDER FOR DME 1.  CPAP pressure 11 cm/H20 with heated humidity.   2.  Provide mask to fit and CPAP supplies.   3.  Length of need lifetime.   4.  If needed please provide a chin strap         pantoprazole (PROTONIX) 40 MG EC tablet TAKE 1 TABLET BY MOUTH EVERY DAY    pimecrolimus (ELIDEL) 1 % external cream APPLY TWICE DAILY FOR FACE AND GENITALS    sildenafil (REVATIO) 20 MG tablet TAKE 2 - 5 TABLETS BY MOUTH AS NEEDED 30 MINUTES BEFORE SEXUAL ACTIVITY. MAX 100MG IN 24 HOURS.    sulfaSALAzine (AZULFIDINE) 500 MG tablet Take 3 tablets (1,500 mg) by mouth 2 times daily    tamsulosin (FLOMAX) 0.4 MG capsule TAKE 2 CAPSULES BY MOUTH EVERY DAY    tenofovir (VIREAD) 300 MG tablet Take 1 tablet (300 mg) by mouth daily    triamcinolone (KENALOG) 0.1 % external ointment APPLY TO AFFECTED AREA TOPICALLY 3 TIMES A DAY    triamcinolone (KENALOG) 0.1 % external ointment Apply to trunk and extremities twice daily for up to 2 weeks for flares    zolpidem (AMBIEN) 5 MG tablet TAKE 1 TABLET (5 MG) BY MOUTH NIGHTLY AS NEEDED FOR SLEEP     No current facility-administered medications for this visit.       Social History   See HPI    Family History     Family History   Problem Relation Age of Onset    C.A.D. Father     Coronary Artery Disease Father     Hypertension Father     Depression Father     Hypertension Mother     Diabetes Mother     Depression Mother     Mental  "Illness Mother     Hypertension Maternal Grandfather     Cancer Paternal Grandfather     Other Cancer Paternal Grandfather     Other Cancer Other     Autoimmune Disease No family hx of     Cerebrovascular Disease No family hx of     Thyroid Disease No family hx of     Glaucoma No family hx of     Macular Degeneration No family hx of      No family history of autoimmune disease  No family history of psoriasis     No change in family history since the previous clinic visit.     Physical Exam     Temp Readings from Last 3 Encounters:   12/22/23 (!) 96.5  F (35.8  C) (Tympanic)   06/07/23 96.9  F (36.1  C) (Tympanic)   05/31/22 97.2  F (36.2  C) (Tympanic)     BP Readings from Last 5 Encounters:   12/22/23 129/88   09/21/23 126/80   06/30/23 128/86   06/21/23 133/88   06/07/23 136/83     Pulse Readings from Last 1 Encounters:   12/22/23 79     Resp Readings from Last 1 Encounters:   09/21/23 16     Estimated body mass index is 25.61 kg/m  as calculated from the following:    Height as of 1/3/24: 1.575 m (5' 2\").    Weight as of 1/3/24: 63.5 kg (140 lb).      GEN: NAD.  HEENT:  Anicteric, noninjected sclera. No obvious external lesions of the ear and nose. Hearing intact.  PULM: No increased work of breathing  MSK:  Hands and wrists without swelling.  SKIN: No rash or jaundice seen  PSYCH: Alert. Appropriate.          Labs     RF/CCP  Recent Labs   Lab Test 11/04/15  1547   CCPABY 27*     CBC  Recent Labs   Lab Test 12/27/23  1000 09/14/23  1404 06/07/23  0858 08/23/21  1130 03/29/21  1318 02/27/21  1259 01/30/21  1226   WBC 6.6 7.0 6.6   < > 5.2 4.4 5.5   RBC 4.47 4.51 4.37*   < > 4.72 4.79 5.11   HGB 12.7* 13.0* 12.9*   < > 13.6 13.8 14.5   HCT 39.5* 39.9* 39.6*   < > 42.3 43.1 43.9   MCV 88 89 91   < > 90 90 86   RDW 13.6 14.3 14.6   < > 14.6 14.7 13.5    325 318   < > 245 275 296   MCH 28.4 28.8 29.5   < > 28.8 28.8 28.4   MCHC 32.2 32.6 32.6   < > 32.2 32.0 33.0   NEUTROPHIL 50 55 34   < > 45.1 40.9 50.5 "   LYMPH 23 29 43   < > 35.5 42.0 33.2   MONOCYTE 22 14 16   < > 15.3 12.6 12.7   EOSINOPHIL 4 1 5   < > 2.9 3.4 2.9   BASOPHIL 1 1 1   < > 1.2 1.1 0.7   ANEU  --   --   --   --  2.3 1.8 2.7   ALYM  --   --   --   --  1.8 1.9 1.8   PRACHI  --   --   --   --  0.8 0.6 0.7   AEOS  --   --   --   --  0.2 0.2 0.2   ABAS  --   --   --   --  0.1 0.1 0.0   ANEUTAUTO 3.3 3.9 2.3   < >  --   --   --    ALYMPAUTO 1.5 2.0 2.9   < >  --   --   --    AMONOAUTO 1.4* 1.0 1.1   < >  --   --   --    AEOSAUTO 0.2 0.1 0.3   < >  --   --   --    ABSBASO 0.1 0.1 0.1   < >  --   --   --     < > = values in this interval not displayed.     CMP  Recent Labs   Lab Test 12/27/23  1000 12/22/23  1349 09/14/23  1404 06/07/23  0858 03/22/23  1052 08/23/21  1130 03/29/21  1318 02/27/21  1259 01/30/21  1226    141  --   --  142   < >  --   --   --    POTASSIUM 3.7 3.9  --   --  3.6   < >  --   --   --    CHLORIDE 105 105  --   --  110*   < >  --   --   --    CO2 24 22  --   --  30   < >  --   --   --    ANIONGAP 12 14  --   --  2*   < >  --   --   --    * 111*  --   --  113*   < >  --   --   --    BUN 16.3 13.4  --   --  13   < >  --   --   --    CR 0.76 0.82 0.94   < > 0.88   < > 1.01 0.95 0.94   GFRESTIMATED >90 >90 >90   < > >90   < > 83 89 >90   GFRESTBLACK  --   --   --   --   --   --  >90 >90 >90   HEMANT 9.2 9.6  --   --  8.8   < >  --   --   --    BILITOTAL 0.4 0.5 0.4   < > 0.4   < > 0.5 0.4 0.4   ALBUMIN 4.1 4.3 4.2   < > 3.7   < > 3.9 4.1 4.0   PROTTOTAL 7.6 7.7 7.3   < > 7.1   < > 7.6 7.6 7.6   ALKPHOS 105 115 99   < > 121   < > 116 102 114   AST 16 22 25   < > 18   < > 20 23 37   ALT 12 16 19   < > 19   < > 31 27 46    < > = values in this interval not displayed.     Calcium/VitaminD  Recent Labs   Lab Test 12/27/23  1000 12/22/23  1349 03/22/23  1052 12/04/17  0924 07/13/17  1246 01/14/16  1541 11/04/15  1547   HEMANT 9.2 9.6 8.8   < >  --    < >  --    VITDT  --   --   --   --  34  --  43    < > = values in this interval not  displayed.     ESR/CRP  Recent Labs   Lab Test 12/27/23  1000 09/14/23  1404 06/07/23  0858 12/17/22  1244 09/14/22  1037 07/15/22  1126   SED 59* 24* 18 15 41* 84*   CRP  --   --   --  <2.9 5.4 4.7   CRPI 9.28* <3.00 <3.00  --   --   --      Lipid Panel  Recent Labs   Lab Test 12/22/23  1349 03/22/23  1052 06/07/22  0831   CHOL 117 111 128   TRIG 79 125 122   HDL 42 50 55   LDL 59 36 49   NHDL 75 61 73     Hepatitis B  Recent Labs   Lab Test 12/27/23  1000 03/22/23  1052 04/26/22  1218 10/20/21  1144 10/20/21  1143 03/19/21  0928 03/26/20  1039 10/07/19  0940 12/01/16  1429 10/05/16  0954   AUSAB  --   --   --   --  0.09  --   --   --   --  0.45   HEPBANG  --  Reactive*  --   --   --   --   --   --   --  Reactive*   HBQLOG  --   --   --   --   --  Not Calculated <1.3 Not Calculated   < > 5.1*   HBQRES Not Detected Not Detected Not Detected   < >  --  HBV DNA Not Detected <20* HBV DNA Not Detected   < > 140,872*    < > = values in this interval not displayed.     Hepatitis C  Recent Labs   Lab Test 04/07/16  1538   HCVAB Nonreactive   Assay performance characteristics have not been established for newborns,   infants, and children       Lyme ab screening  Recent Labs   Lab Test 07/18/17  1330   LYMEGM 0.19       Tuberculosis Screening  Recent Labs   Lab Test 04/07/16  1538   TBRSLT Positive   The magnitude of the measured IFN-gamma level cannot be correlated to stage or   degree of infection, level of immune responsiveness, or likelihood for   progression to active disease.  *   TBAGN >10.00  This is a qualitative test.  The TB antigen IU/mL value is required for   documentation on certain government reporting forms but this value should not   be used to monitor disease progression or response to therapy.   Diagnosing or excluding tuberculosis disease, and assessing the probability of   LTBI, require a combination of epidemiological, historical, medical and   diagnostic findings that should be taken into account  "when interpreting   QuantiFERON TB results.       HIV Screening  Recent Labs   Lab Test 11/04/15  1547   HIAGAB Nonreactive   HIV-1 p24 Ag & HIV-1/HIV-2 Ab Not Detected           \"HAND BILATERAL THREE OR MORE VIEWS 11/4/2015 4:55 PM   HISTORY: Pain in unspecified joint.  COMPARISON: None.  IMPRESSION  IMPRESSION: Normal.  DANIS ANGLIN MD\"    \"FOOT BILATERAL THREE OR MORE VIEWS 11/4/2015 4:55 PM   HISTORY: Pain in unspecified joint.  COMPARISON: 8/21/2012.  IMPRESSION  IMPRESSION: Small traction spurs are seen off the Achilles tendon and  plantar fascial attachment sites bilaterally. Joint spaces are  preserved. Osseous structures are intact. Incidental note is made of  some surgical staples in the soft tissues of the medial distal right  lower extremity.  DANIS ANGLIN MD\"      Immunization History     Immunization History   Administered Date(s) Administered    COVID-19 12+ (2023-24) (Pfizer) 12/16/2023    COVID-19 Bivalent 12+ (Pfizer) 09/15/2022, 08/01/2023    COVID-19 MONOVALENT 12+ (Pfizer) 03/16/2021, 04/06/2021, 10/09/2021, 04/21/2022    COVID-19 Monovalent 12+ (Pfizer 2022) 04/21/2022    Influenza (IIV3) PF 10/11/2011, 09/20/2017, 09/01/2018    Influenza Vaccine 18-64 (Flublok) 09/23/2021    Influenza Vaccine >6 months,quad, PF 09/27/2015, 09/08/2016, 09/30/2019, 09/14/2020, 10/19/2022    Influenza Vaccine, 6+MO IM (QUADRIVALENT W/PRESERVATIVES) 11/17/2014    Pneumo Conj 13-V (2010&after) 04/07/2016    Pneumococcal 20 valent Conjugate (Prevnar 20) 06/07/2023    Pneumococcal 23 valent 12/12/2014    TDAP (Adacel,Boostrix) 10/20/2021    TDAP Vaccine (Adacel) 08/30/2011    Zoster recombinant adjuvanted (SHINGRIX) 07/27/2019, 04/22/2021          Chart documentation done in part with Dragon Voice recognition Software. Although reviewed after completion, some word and grammatical error may remain.      Video-Visit Details    Type of service:  Video Visit    Video Start Time: 9:50 AM    Video End Time: 9:57 " AM    Originating Location (pt. Location): Home, [place]    Distant Location (provider location):  off site, [place]    Platform used for Video Visit: Nilsa Jenkins MD

## 2024-01-08 ASSESSMENT — ANXIETY QUESTIONNAIRES
5. BEING SO RESTLESS THAT IT IS HARD TO SIT STILL: NEARLY EVERY DAY
IF YOU CHECKED OFF ANY PROBLEMS ON THIS QUESTIONNAIRE, HOW DIFFICULT HAVE THESE PROBLEMS MADE IT FOR YOU TO DO YOUR WORK, TAKE CARE OF THINGS AT HOME, OR GET ALONG WITH OTHER PEOPLE: VERY DIFFICULT
3. WORRYING TOO MUCH ABOUT DIFFERENT THINGS: MORE THAN HALF THE DAYS
1. FEELING NERVOUS, ANXIOUS, OR ON EDGE: NEARLY EVERY DAY
GAD7 TOTAL SCORE: 20
2. NOT BEING ABLE TO STOP OR CONTROL WORRYING: NEARLY EVERY DAY
6. BECOMING EASILY ANNOYED OR IRRITABLE: NEARLY EVERY DAY
7. FEELING AFRAID AS IF SOMETHING AWFUL MIGHT HAPPEN: NEARLY EVERY DAY
7. FEELING AFRAID AS IF SOMETHING AWFUL MIGHT HAPPEN: NEARLY EVERY DAY
8. IF YOU CHECKED OFF ANY PROBLEMS, HOW DIFFICULT HAVE THESE MADE IT FOR YOU TO DO YOUR WORK, TAKE CARE OF THINGS AT HOME, OR GET ALONG WITH OTHER PEOPLE?: VERY DIFFICULT
GAD7 TOTAL SCORE: 20
4. TROUBLE RELAXING: NEARLY EVERY DAY
GAD7 TOTAL SCORE: 20

## 2024-01-09 ENCOUNTER — TELEPHONE (OUTPATIENT)
Dept: GASTROENTEROLOGY | Facility: CLINIC | Age: 59
End: 2024-01-09
Payer: COMMERCIAL

## 2024-01-09 ENCOUNTER — APPOINTMENT (OUTPATIENT)
Dept: INTERPRETER SERVICES | Facility: CLINIC | Age: 59
End: 2024-01-09
Payer: COMMERCIAL

## 2024-01-09 ENCOUNTER — VIRTUAL VISIT (OUTPATIENT)
Dept: PSYCHOLOGY | Facility: CLINIC | Age: 59
End: 2024-01-09
Payer: COMMERCIAL

## 2024-01-09 DIAGNOSIS — F33.1 MAJOR DEPRESSIVE DISORDER, RECURRENT EPISODE, MODERATE (H): Primary | ICD-10-CM

## 2024-01-09 DIAGNOSIS — F41.1 GAD (GENERALIZED ANXIETY DISORDER): ICD-10-CM

## 2024-01-09 PROCEDURE — 90791 PSYCH DIAGNOSTIC EVALUATION: CPT | Mod: 95

## 2024-01-09 ASSESSMENT — COLUMBIA-SUICIDE SEVERITY RATING SCALE - C-SSRS
2. HAVE YOU ACTUALLY HAD ANY THOUGHTS OF KILLING YOURSELF?: NO
4. HAVE YOU HAD THESE THOUGHTS AND HAD SOME INTENTION OF ACTING ON THEM?: NO
ATTEMPT LIFETIME: NO
5. HAVE YOU STARTED TO WORK OUT OR WORKED OUT THE DETAILS OF HOW TO KILL YOURSELF? DO YOU INTEND TO CARRY OUT THIS PLAN?: NO
1. HAVE YOU WISHED YOU WERE DEAD OR WISHED YOU COULD GO TO SLEEP AND NOT WAKE UP?: NO
TOTAL  NUMBER OF ABORTED OR SELF INTERRUPTED ATTEMPTS LIFETIME: NO
REASONS FOR IDEATION LIFETIME: MOSTLY TO END OR STOP THE PAIN (YOU COULDN'T GO ON LIVING WITH THE PAIN OR HOW YOU WERE FEELING)
TOTAL  NUMBER OF INTERRUPTED ATTEMPTS LIFETIME: NO
2. HAVE YOU ACTUALLY HAD ANY THOUGHTS OF KILLING YOURSELF?: YES
3. HAVE YOU BEEN THINKING ABOUT HOW YOU MIGHT KILL YOURSELF?: NO
6. HAVE YOU EVER DONE ANYTHING, STARTED TO DO ANYTHING, OR PREPARED TO DO ANYTHING TO END YOUR LIFE?: NO

## 2024-01-09 NOTE — TELEPHONE ENCOUNTER
GRAHAMM; pt needs US&Labs May and Dec 2024; RTC w Dr. Leventhal Jan 2025; second attempt- KB 1.9.24//

## 2024-01-09 NOTE — PROGRESS NOTES
"    [facility]      PATIENT'S NAME: Lamont Daniels  PREFERRED NAME: Lamont  PRONOUNS:       MRN: [id number]  : 1965  ADDRESS: [address] [place] [address] [place] [address]  ACCT. NUMBER:  [id number]  DATE OF SERVICE: 24  START TIME: 3:00pm  END TIME: 4:00pm  PREFERRED PHONE: [phone]  May we leave a program related message: Yes  EMERGENCY CONTACT: was not obtained  .  SERVICE MODALITY:  Video Visit:      Provider verified identity through the following two step process.  Patient provided:  Patient  and Patient address    Telemedicine Visit: The patient's condition can be safely assessed and treated via synchronous audio and visual telemedicine encounter.      Reason for Telemedicine Visit: Patient has requested telehealth visit    Originating Site (Patient Location): Patient's home    Distant Site (Provider Location): [facility]    Consent:  The patient/guardian has verbally consented to: the potential risks and benefits of telemedicine (video visit) versus in person care; bill my insurance or make self-payment for services provided; and responsibility for payment of non-covered services.     Patient would like the video invitation sent by:  My Chart    Mode of Communication:  Video Conference via [facility]    Distant Location (Provider):  Off-site    As the provider I attest to compliance with applicable laws and regulations related to telemedicine.    UNIVERSAL ADULT Mental Health DIAGNOSTIC ASSESSMENT    Identifying Information:  Patient is a 58 year old, Hmong individual.  Patient was referred for an assessment by primary care clinic.  Patient attended the session alone.    Chief Complaint:   The reason for seeking services at this time is: \"General\".  The problem(s) began around . Patient stated that he mostly deals with anxiety and depression. Patient did not need an  to complete session    Patient has attempted to " resolve these concerns in the past through therapy .    Social/Family History:  Patient reported they grew up in other [place].  They were raised by biological parents  .  Parents were always together.  Patient reported that their childhood was difficult. Patient experienced a lot of challenging situations. He reports that he was about 9 or 10 during the war and he saw a lot of soldiers, refugees, and constant chaos. Patient moved to the [place] when he was about 13 years old. Patient stated that he grew up with 7 siblings.  Patient described their current relationships with family of origin as not very connected. All of his siblings, except for the youngest are still living and in the [place]. His youngest sibling passed away. His siblings live in [place], [place], and [place]. He stated that they don't see each other often, but they try to communicate as often as possible.     The patient describes their cultural background as Hmong.  Cultural influences and impact on patient's life structure, values, norms, and healthcare: Hmong.  Contextual influences on patient's health include: Contextual Factors: Individual Factors Grew up in a war torn country; Immigrated with family to [place] when he was 13; English is second language;  and Economic Factors On SSDI .    These factors will be addressed in the Preliminary Treatment plan. Patient identified their preferred language to be English. Patient reported they does not need the assistance of an  or other support involved in therapy.     Patient reported had no significant delays in developmental tasks.   Patient's highest education level was associate degree / vocational certificate  .  Patient identified the following learning problems: none reported.  Modifications will not be used to assist communication in therapy. Patient reports they are  able to understand written materials.    Patient reported the following relationship  history.  Patient's current relationship status is  for about 14 years.  Patient is in a relationship currently. Patient identified their sexual orientation as heterosexual.  Patient reported having 7 child(eleazar). They range in age from 28-7 years old. Patient identified partner and children as part of their support system.  Patient identified the quality of these relationships as good,  .     Patient's current living/housing situation involves staying in own home/apartment.  The immediate members of family and household include Sarah, Kaiden,Daughter and two younger children and they report that housing is stable.    Patient is currently disabled.  Patient reports their finances are obtained through SSDI disability. Patient does identify finances as a current stressor.      Patient reported that they have not been involved with the legal system.  Patient does not report being under probation/ parole/ jurisdiction. They are not under any current court jurisdiction. .    Patient's Strengths and Limitations:  Patient identified the following strengths or resources that will help them succeed in treatment: commitment to health and well being, ann / spirituality, family support, insight, intelligence, motivation, and strong social skills. Things that may interfere with the patient's success in treatment include: financial hardship.     Assessments:  The following assessments were completed by patient for this visit:  PHQ9:       11/11/2021    10:01 AM 5/31/2022     3:53 PM 6/7/2023     8:16 AM 8/1/2023     1:58 PM 12/22/2023     1:08 PM 1/2/2024     8:30 AM 1/8/2024     4:10 PM   PHQ-9 SCORE   PHQ-9 Total Score MyChart 18 (Moderately severe depression) 19 (Moderately severe depression) 16 (Moderately severe depression) 17 (Moderately severe depression) 15 (Moderately severe depression) 19 (Moderately severe depression) 19 (Moderately severe depression)   PHQ-9 Total Score 18 19 16 17 15 19 19     GAD7:        6/1/2018    11:54 AM 3/14/2019    10:13 AM 10/8/2019     8:34 AM 1/14/2020     9:25 AM 3/19/2021     9:26 AM 12/22/2023     1:09 PM 1/8/2024     4:11 PM   THONG-7 SCORE   Total Score      16 (severe anxiety) 20 (severe anxiety)   Total Score 15 10 18 16 17 16 20     CAGE-AID:       1/8/2024     4:14 PM   CAGE-AID Total Score   Total Score 0   Total Score MyChart 0 (A total score of 2 or greater is considered clinically significant)     PROMIS 10-Global Health (all questions and answers displayed):       1/8/2024     4:13 PM   PROMIS 10   In general, would you say your health is: Poor   In general, would you say your quality of life is: Fair   In general, how would you rate your physical health? Poor   In general, how would you rate your mental health, including your mood and your ability to think? Poor   In general, how would you rate your satisfaction with your social activities and relationships? Fair   In general, please rate how well you carry out your usual social activities and roles Fair   To what extent are you able to carry out your everyday physical activities such as walking, climbing stairs, carrying groceries, or moving a chair? Not at all   In the past 7 days, how often have you been bothered by emotional problems such as feeling anxious, depressed, or irritable? Often   In the past 7 days, how would you rate your fatigue on average? Severe   In the past 7 days, how would you rate your pain on average, where 0 means no pain, and 10 means worst imaginable pain? 6   In general, would you say your health is: 1   In general, would you say your quality of life is: 2   In general, how would you rate your physical health? 1   In general, how would you rate your mental health, including your mood and your ability to think? 1   In general, how would you rate your satisfaction with your social activities and relationships? 2   In general, please rate how well you carry out your usual social activities and roles.  (This includes activities at home, at work and in your community, and responsibilities as a parent, child, spouse, employee, friend, etc.) 2   To what extent are you able to carry out your everyday physical activities such as walking, climbing stairs, carrying groceries, or moving a chair? 1   In the past 7 days, how often have you been bothered by emotional problems such as feeling anxious, depressed, or irritable? 4   In the past 7 days, how would you rate your fatigue on average? 4   In the past 7 days, how would you rate your pain on average, where 0 means no pain, and 10 means worst imaginable pain? 6   Global Mental Health Score 7   Global Physical Health Score 7   PROMIS TOTAL - SUBSCORES 14     [place] Suicide Severity Rating Scale (Lifetime/Recent)      12/8/2014     5:55 PM 1/9/2024     3:18 PM   [place] Suicide Severity Rating (Lifetime/Recent)   Do you have guns available to you? Yes    Where are the guns now? locked at home, pt has combination    Q1 Wish to be Dead (Lifetime)  N   Q2 Non-Specific Active Suicidal Thoughts (Lifetime)  N   Actual Attempt (Lifetime)  N   Has subject engaged in non-suicidal self-injurious behavior? (Lifetime)  N   Interrupted Attempts (Lifetime)  N   Aborted or Self-Interrupted Attempt (Lifetime)  N   Preparatory Acts or Behavior (Lifetime)  N   Calculated C-SSRS Risk Score (Lifetime/Recent)  No Risk Indicated       Personal and Family Medical History:  Patient does report a family history of mental health concerns.  Patient reports family history includes C.A.D. in his father; Cancer in his paternal grandfather; Coronary Artery Disease in his father; Depression in his father and mother; Diabetes in his mother; Hypertension in his father, maternal grandfather, and mother; Mental Illness in his mother; Other Cancer in his paternal grandfather and another family member..     Patient does report Mental Health Diagnosis and/or Treatment.  Patient Patient reported the following  previous diagnoses which include(s): an Anxiety Disorder and Depression.  Patient reported symptoms began 2014.   Patient has received mental health services in the past:  Therapy .  Psychiatric Hospitalizations: [facility] 2014 .  Patient denies a history of civil commitment.  Patient is not receiving other mental health services.  These include none.       Patient has had a physical exam to rule out medical causes for current symptoms.  Date of last physical exam was within the past year. Client was encouraged to follow up with PCP if symptoms were to develop. The patient has a [facility] Provider, who is named David Chavez.  Patient reports no current medical concerns.  Patient denies any issues with pain..   There are not significant appetite / nutritional concerns / weight changes.   Patient does not report a history of head injury / trauma / cognitive impairment.      Current Outpatient Medications   Medication    Acetaminophen (TYLENOL PO)    allopurinol (ZYLOPRIM) 300 MG tablet    amLODIPine (NORVASC) 5 MG tablet    aspirin EC 81 MG EC tablet    atorvastatin (LIPITOR) 80 MG tablet    bacitracin 500 UNIT/GM external ointment    baclofen (LIORESAL) 10 MG tablet    busPIRone HCl (BUSPAR) 30 MG tablet    BUTRANS 20 MCG/HR WK patch    Cholecalciferol (VITAMIN D) 2000 UNITS tablet    clobetasol (TEMOVATE) 0.05 % external cream    clobetasol (TEMOVATE) 0.05 % external solution    clonazePAM (KLONOPIN) 1 MG tablet    clopidogrel (PLAVIX) 75 MG tablet    colchicine (COLCRYS) 0.6 MG tablet    desvenlafaxine (PRISTIQ) 50 MG 24 hr tablet    diclofenac (VOLTAREN) 1 % topical gel    diclofenac (VOLTAREN) 1 % topical gel    evolocumab (REPATHA SURECLICK) 140 MG/ML prefilled autoinjector    ezetimibe (ZETIA) 10 MG tablet    gemfibrozil (LOPID) 600 MG tablet    golimumab (SIMPONI) 50 MG/0.5ML auto-injector pen    hydrocortisone (WESTCORT) 0.2 % external cream    hydrocortisone 2.5 % cream     Incontinence Supply Disposable (DEPEND UNDERWEAR LARGE) MISC    leflunomide (ARAVA) 20 MG tablet    meclizine (ANTIVERT) 25 MG tablet    melatonin 3 MG tablet    naloxone (NARCAN) 4 MG/0.1ML nasal spray    nitroGLYcerin (NITROSTAT) 0.4 MG sublingual tablet    order for DME    order for DME    ORDER FOR DME    pantoprazole (PROTONIX) 40 MG EC tablet    pimecrolimus (ELIDEL) 1 % external cream    sildenafil (REVATIO) 20 MG tablet    sulfaSALAzine (AZULFIDINE) 500 MG tablet    tamsulosin (FLOMAX) 0.4 MG capsule    tenofovir (VIREAD) 300 MG tablet    triamcinolone (KENALOG) 0.1 % external ointment    triamcinolone (KENALOG) 0.1 % external ointment    zolpidem (AMBIEN) 5 MG tablet     No current facility-administered medications for this visit.         Medication Adherence:  Patient reports taking.  taking prescribed medications as prescribed.    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    HTN (hypertension)     Hyperlipidemia LDL goal < 100     Malrotation of intestine (H28)     Moderate major depression (H)     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) 9/19/2011    Uses CPAP    Rheumatoid arthritis flare (H) 1012    Steatohepatitis 12/15    liver biopsy    Tuberculosis age 13    INH x 9 months     Vitamin D deficiency          Current Mental Status Exam:   Appearance:  Appropriate    Eye Contact:  Good   Psychomotor:  Normal       Gait / station:  no problem  Attitude / Demeanor: Cooperative  Friendly  Speech      Rate / Production: Normal/ Responsive      Volume:  Normal  volume      Language:  intact  Mood:   Normal  Affect:   Appropriate    Thought Content: Clear   Thought Process: Goal Directed       Associations: No loosening of  associations  Insight:   Good   Judgment:  Intact   Orientation:  Person Place Time Situation  Attention/concentration: Good    Substance Use:   Patient did not report a family history of substance use concerns; see medical history section for details.  Patient has not received chemical dependency treatment in the past.  Patient has not ever been to detox.      Patient is not currently receiving any chemical dependency treatment.           Substance History of use Age of first use Date of last use     Pattern and duration of use (include amounts and frequency)   Alcohol never used       REPORTS SUBSTANCE USE: N/A   Cannabis   never used     REPORTS SUBSTANCE USE: N/A     Amphetamines   never used     REPORTS SUBSTANCE USE: N/A   Cocaine/crack    never used       REPORTS SUBSTANCE USE: N/A   Hallucinogens never used         REPORTS SUBSTANCE USE: N/A   Inhalants never used         REPORTS SUBSTANCE USE: N/A   Heroin never used         REPORTS SUBSTANCE USE: N/A   Other Opiates used in the past 42 06/11/12 REPORTS SUBSTANCE USE: N/A   Benzodiazepine   used in the past 42 06/21/13 REPORTS SUBSTANCE USE: N/A   Barbiturates never used     REPORTS SUBSTANCE USE: N/A   Over the counter meds used in the past 18 12/11/23 REPORTS SUBSTANCE USE: N/A   Caffeine currently use 15   REPORTS SUBSTANCE USE: reports using substance 13 times per month and has 1 cup of coffee at a time.   Patient reports heaviest use was doesn't remember.   Nicotine  never used     REPORTS SUBSTANCE USE: N/A   Other substances not listed above:  Identify:  never used     REPORTS SUBSTANCE USE: N/A     Patient reported the following problems as a result of their substance use: no problems, not applicable.    Substance Use: No symptoms    Based on the negative CAGE score and clinical interview there  are not indications of drug or alcohol abuse.    Significant Losses / Trauma / Abuse / Neglect Issues:   Patient did not  serve in the .  There are  indications or report of significant loss, trauma, abuse or neglect issues related to: are no indications and client denies any losses, trauma, abuse, or neglect concerns.  Concerns for possible neglect are not present.     Safety Assessment:   Patient denies current homicidal ideation and behaviors.  Patient denies current self-injurious ideation and behaviors.    Patient denied risk behaviors associated with substance use.   Patient denies any high risk behaviors associated with mental health symptoms.  Patient reports the following current concerns for their personal safety: None.  Patient reports there are firearms in the house.     yes, they are secured. The firearms are secured in a locked space.    History of Safety Concerns:  Patient denied a history of homicidal ideation.     Patient denied a history of personal safety concerns.  - Patient indicated this on one of his screenings, but reports that it was a mistake and he does not have personal safety concerns  Patient denied a history of assaultive behaviors.    Patient denied a history of sexual assault behaviors.     Patient denied a history of risk behaviors associated with substance use.  Patient denies any history of high risk behaviors associated with mental health symptoms.  Patient reports the following protective factors: financial stability    Risk Plan:  See Recommendations for Safety and Risk Management Plan    Review of Symptoms per patient report:   Depression: Change in sleep, Lack of interest, Difficulties concentrating, Change in appetite, Psychomotor slowing or agitation, Feelings of hopelessness, Feelings of helplessness, Low self-worth, Irritability, and Feeling sad, down, or depressed  [place]:  No Symptoms  Psychosis: No Symptoms  Anxiety: Excessive worry, Nervousness, Physical complaints, such as headaches, stomachaches, muscle tension, Sleep disturbance, Psychomotor agitation, Poor concentration, and Irritability  Panic:  No  symptoms  Post Traumatic Stress Disorder:  No Symptoms   Eating Disorder: No Symptoms  ADD / ADHD:  No symptoms  Conduct Disorder: No symptoms  Autism Spectrum Disorder: No symptoms  Obsessive Compulsive Disorder: No Symptoms    Patient reports the following compulsive behaviors and treatment history:  None .      Diagnostic Criteria:   Generalized Anxiety Disorder  A. Excessive anxiety and worry about a number of events or activities (such as work or school performance).   B. The person finds it difficult to control the worry.  C. Select 3 or more symptoms (required for diagnosis). Only one item is required in children.   - Restlessness or feeling keyed up or on edge.    - Being easily fatigued.    - Difficulty concentrating or mind going blank.    - Irritability.    - Muscle tension.    - Sleep disturbance (difficulty falling or staying asleep, or restless unsatisfying sleep).   D. The focus of the anxiety and worry is not confined to features of an Axis I disorder.  E. The anxiety, worry, or physical symptoms cause clinically significant distress or impairment in social, occupational, or other important areas of functioning.   F. The disturbance is not due to the direct physiological effects of a substance (e.g., a drug of abuse, a medication) or a general medical condition (e.g., hyperthyroidism) and does not occur exclusively during a Mood Disorder, a Psychotic Disorder, or a Pervasive Developmental Disorder. Major Depressive Disorder  CRITERIA (A-C) REPRESENT A MAJOR DEPRESSIVE EPISODE - SELECT THESE CRITERIA  A) Recurrent episode(s) - symptoms have been present during the same 2-week period and represent a change from previous functioning 5 or more symptoms (required for diagnosis)   - Depressed mood. Note: In children and adolescents, can be irritable mood.     - Increased sleep.    - Psychomotor activity agitation.    - Fatigue or loss of energy.    - Feelings of worthlessness or inappropriate guilt.    -  Diminished ability to think or concentrate, or indecisiveness.   B) The symptoms cause clinically significant distress or impairment in social, occupational, or other important areas of functioning  C) The episode is not attributable to the physiological effects of a substance or to another medical condition  D) The occurence of major depressive episode is not better explained by other thought / psychotic disorders  E) There has never been a manic episode or hypomanic episode    Functional Status:  Patient reports the following functional impairments:   None .     Nonprogrammatic care:  Patient is requesting basic services to address current mental health concerns.    Clinical Summary:  1. Psychosocial, Cultural and Contextual Factors:  [place]; Immigrated with family to [place] when he was 13; English is second language; ; On SSDI  .  2. Principal DSM5 Diagnoses  (Sustained by DSM5 Criteria Listed Above):   296.32 (F33.1) Major Depressive Disorder, Recurrent Episode, Moderate _  300.02 (F41.1) Generalized Anxiety Disorder.  3. Other Diagnoses that is relevant to services:   None.  4. Provisional Diagnosis: None.  5. Prognosis: Expect Improvement, Relieve Acute Symptoms, and Maintain Current Status / Prevent Deterioration.  6. Likely consequences of symptoms if not treated: Potential for further deterioration or decline in functioning  7. Client strengths include:  caring, educated, empathetic, goal-focused, good listener, has a previous history of therapy, insightful, intelligent, motivated, open to learning, open to suggestions / feedback, responsible parent, support of family, friends and providers, supportive, wants to learn, willing to ask questions, and willing to relate to others .     Recommendations:     1. Plan for Safety and Risk Management:   Safety and Risk: Recommended that patient call 911 or go to the local ED should there be a change in any of these risk factors..           Report to child / adult protection services was NA.     2. Patient's identified  no ann, ethnic or cultural concerns .     3. Initial Treatment will focus on:    Anxiety and depression .     4. Resources/Service Plan:    services are not indicated.   Modifications to assist communication are not indicated.   Additional disability accommodations are not indicated.      5. Collaboration:   Collaboration / coordination of treatment will be initiated with the following  support professionals:  None .      6.  Referrals:   The following referral(s) will be initiated:  None .       A Release of Information has been obtained for the following:  None .     Clinical Substantiation/medical necessity for the above recommendations:  Weekly therapy is recommended for a period of 12-24 weeks and is medically/clinically necessary to prevent further deterioration or decline in functioning  .    7. LORELEI:    No LORELEI concerns at time of session.     8. Records:   These were not available for review at time of assessment.   Information in this assessment was obtained from the medical record and  provided by patient who is a good historian.    Patient will have open access to their mental health medical record.    9.   Interactive Complexity: No    10. Safety Plan:    No risk indicated on C-SSRS. Patient did mention in his PHQ-9 of having thoughts he would be better off dead. Discussed safety concerns with patient. Patient stated that he does not have safety concerns. Discussed his feelings of being better off dead. Stated that he has had those thoughts for a long time but can manage them ok. Recognizes he has a great support system. Patient was able to identify protective factors.     Provider Name/ Credentials:  JENNYFER Hassan, LGSW                    January 9, 2024  This note has been reviewed and I agree with the plan of care.  This note is signed by Marcelo [facility]; Clinical Supervisor On: 1/15/2024

## 2024-01-10 DIAGNOSIS — M10.9 GOUT WITHOUT TOPHUS: ICD-10-CM

## 2024-01-10 RX ORDER — COLCHICINE 0.6 MG/1
TABLET ORAL
Qty: 90 TABLET | Refills: 1 | OUTPATIENT
Start: 2024-01-10

## 2024-01-15 DIAGNOSIS — M62.838 MUSCLE SPASM: ICD-10-CM

## 2024-01-15 DIAGNOSIS — M62.830 SPASM OF BACK MUSCLES: ICD-10-CM

## 2024-01-15 DIAGNOSIS — M79.18 MYOFASCIAL PAIN: ICD-10-CM

## 2024-01-15 RX ORDER — BACLOFEN 10 MG/1
10 TABLET ORAL 2 TIMES DAILY PRN
Qty: 180 TABLET | Refills: 1 | Status: SHIPPED | OUTPATIENT
Start: 2024-01-15 | End: 2024-07-01

## 2024-01-15 NOTE — TELEPHONE ENCOUNTER
Signed Prescriptions:                        Disp   Refills    baclofen (LIORESAL) 10 MG tablet           180 ta*1        Sig: Take 1 tablet (10 mg) by mouth 2 times daily as           needed for muscle spasms 3 month supply.  Authorizing Provider: FIDENCIO PAGE, RN CNP, FNP  [facility]  [facility] [place]

## 2024-01-16 ENCOUNTER — MEDICAL CORRESPONDENCE (OUTPATIENT)
Dept: HEALTH INFORMATION MANAGEMENT | Facility: CLINIC | Age: 59
End: 2024-01-16
Payer: COMMERCIAL

## 2024-01-16 ENCOUNTER — TELEPHONE (OUTPATIENT)
Dept: FAMILY MEDICINE | Facility: CLINIC | Age: 59
End: 2024-01-16
Payer: COMMERCIAL

## 2024-01-16 NOTE — TELEPHONE ENCOUNTER
Order received from MD and faxed to [facility] at [phone].  A copy placed in TC bin and Abstract.

## 2024-01-19 ENCOUNTER — MYC MEDICAL ADVICE (OUTPATIENT)
Dept: PALLIATIVE MEDICINE | Facility: CLINIC | Age: 59
End: 2024-01-19
Payer: COMMERCIAL

## 2024-01-19 DIAGNOSIS — M17.11 PRIMARY OSTEOARTHRITIS OF RIGHT KNEE: ICD-10-CM

## 2024-01-19 DIAGNOSIS — M47.819 FACET ARTHROPATHY: ICD-10-CM

## 2024-01-19 DIAGNOSIS — F11.90 CHRONIC, CONTINUOUS USE OF OPIOIDS: ICD-10-CM

## 2024-01-19 DIAGNOSIS — G89.4 CHRONIC PAIN SYNDROME: ICD-10-CM

## 2024-01-19 DIAGNOSIS — M06.9 RHEUMATOID ARTHRITIS INVOLVING MULTIPLE SITES, UNSPECIFIED WHETHER RHEUMATOID FACTOR PRESENT (H): ICD-10-CM

## 2024-01-19 NOTE — TELEPHONE ENCOUNTER
Per patient myChart message:  Lamont FERRARO Pain Nurse (supporting Ameena Lang, APRN CNP)2 hours ago (9:47 AM)     Could you please refill my butran patches for this month.

## 2024-01-19 NOTE — TELEPHONE ENCOUNTER
Received [employer] Funt message from patient requesting refill(s) for:    BUTRANS 20 MCG/HR WK patch      Last dispensed from pharmacy on 12/21/23    Patient's last office/virtual visit by prescribing provider on 11/13/23  Next office/virtual appointment scheduled for 2/13/24    Last urine drug screen date 3/22/23  Current opioid agreement on file? Yes Date of opioid agreement: 3/22/23    [facility]:    [id number] [place], [place] - [address]    Will route to nursing pool for review and preparation of prescription(s).

## 2024-01-22 RX ORDER — BUPRENORPHINE 20 UG/H
1 PATCH, EXTENDED RELEASE TRANSDERMAL
Qty: 4 PATCH | Refills: 0 | Status: SHIPPED | OUTPATIENT
Start: 2024-01-22 | End: 2024-02-16

## 2024-01-22 NOTE — TELEPHONE ENCOUNTER
Medication refill information reviewed.     Due date for BUTRANS 20 MCG/HR WK patch   is 01/22/24     Prescriptions prepped for review.     Will route to provider.

## 2024-01-23 NOTE — TELEPHONE ENCOUNTER
Signed Prescriptions:                        Disp   Refills    BUTRANS 20 MCG/HR WK patch                 4 patch0        Sig: Place 1 patch onto the skin every 7 days OK to           fill/start 01/22/24. LISANDRO, name brand. 28 day           script for chronic pain.  Authorizing Provider: AMEENA PAGE        Reviewed [place]  January 22, 2024- no concerning fills.    Ameena LEMUS, RN CNP, FNP  [facility]  [facility] [place]

## 2024-01-30 NOTE — NURSING NOTE
Lamont Daniels's goals for this visit include:   Chief Complaint   Patient presents with     Rash     itchy rash around eyes, ears, groin area, posterior scalp and face using triamcinolone ointment previously seen Dr. Isaac       He requests these members of his care team be copied on today's visit information:     PCP: David Chavez    Referring Provider:  No referring provider defined for this encounter.    There were no vitals taken for this visit.    Do you need any medication refills at today's visit? Day Ace LPN      
The following medication was given:     MEDICATION:  Lidocaine with epinephrine 1% [id number]  ROUTE: SQ  SITE: see procedure note  DOSE: 2cc  LOT #: [id number]  : Hospira  EXPIRATION DATE: 1-feb-2020  NDC#: 2353-7140-22   Was there drug waste? No  Multi-dose vial: Yes    Dominique Ace LPN  May 31, 2019      Prior to injection, verified patient identity using patient's name and date of birth.      Lido with epi 1%    Drug Amount Wasted:  None.  Vial/Syringe: Multi dose vial  Expiration Date:  1-feb-2020    Dominique Ace LPN        
.

## 2024-02-13 ENCOUNTER — TELEPHONE (OUTPATIENT)
Dept: SLEEP MEDICINE | Facility: CLINIC | Age: 59
End: 2024-02-13

## 2024-02-13 DIAGNOSIS — G47.33 OSA (OBSTRUCTIVE SLEEP APNEA): Primary | ICD-10-CM

## 2024-02-13 NOTE — PROGRESS NOTES
Does Lamont have a CPAP/Bipap?  Yes     Type of mask: full          [place] Sleep Scale:  16    Lamont is a 58 year old who is being evaluated via a billable video visit.      How would you like to obtain your AVS? MyChart  If the video visit is dropped, the invitation should be resent by: Text to cell phone: [phone]  Will anyone else be joining your video visit? No              Subjective   Lamont is a 58 year old, presenting for the following health issues:  CPAP Follow Up          Objective           Vitals:  No vitals were obtained today due to virtual visit.    Physical Exam   GENERAL: alert, no distress, and fatigued  EYES: Eyes grossly normal to inspection.  No discharge or erythema, or obvious scleral/conjunctival abnormalities.  RESP: No audible wheeze, cough, or visible cyanosis.    SKIN: Visible skin clear. No significant rash, abnormal pigmentation or lesions.  NEURO: Cranial nerves grossly intact.  Mentation and speech appropriate for age.  PSYCH: Appropriate affect, tone, and pace of words          Video-Visit Details    Type of service:  Video Visit   Video Start Time:  0950  Video End Time:10:14 AM    Originating Location (pt. Location): Home    Distant Location (provider location):  On-site  Platform used for Video Visit: [facility]  Signed Electronically by: Tomasz Martinez PA-C     Sleep Apnea - Follow-up Visit:    Impression/Plan:    Severe Sleep apnea. He is  Tolerating PAP well, but has significant daytime sleepiness. His AHI is quite elevated, 9 obstructive, 29.2 central apneas. Elevated number of centrals likely r/t baclofen and buprenorphine patch. Daytime symptoms are worsened, epworth score is 16.    Predominantly central apneas- orders placed for a CPAP/ASV titration.     Lamont Daniels will follow up in about 4 week(s).     Thirtyminutes spent with patient, all of which were spent face-to-face counseling, consulting, coordinating plan of care.      CC:  David Chavez,         History of Present  Illness:  Chief Complaint   Patient presents with    CPAP Follow Up       Lamont Daniels presents for follow-up of their severe sleep apnea, managed with CPAP.     No specialty comments available.    Do you use a CPAP Machine at home: Yes  Overall, on a scale of 0-10 how would you rate your CPAP (0 poor, 10 great): 7    What type of mask do you use: Full Face Mask  Is your mask comfortable: Yes  If not, why:      Is your mask leaking: No  If yes, where do you feel it:    How many night per week does the mask leak (0-7):      Do you notice snoring with mask on: Yes  Do you notice gasping arousals with mask on: No  Are you having significant oral or nasal dryness: Yes  Is the pressure setting comfortable: Yes  If not, why:      What is your typical bedtime: 12:00am  How long does it take you to go to sleep on PAP therapy: 1 to 2 hours  What time do you typically get out of bed for the day: 10:00 or 11:00 am  How many hours on average per night are you using PAP therapy: 8 to 9 hours  How many hours are you sleeping per night: 6 to 7 hours  Do you feel well rested in the morning: Yes      ResMed   CPAP 11 cmH2O 30 day usage data:  83% of days with > 4 hours of use. 5/30 days with no use.   Average use 11 hours 9 minutes per day.   95%ile Leak 0.4 L/min.   AHI 39.2 events per hour.            EPWORTH SLEEPINESS SCALE         2/16/2024     9:35 AM    [place] Sleepiness Scale ( DELIA Arzola  [id number]<br>ESS - [place]/English - Final version - 21 Nov 07 - [facility] [place].)   Sitting and reading High chance of dozing   Watching TV Moderate chance of dozing   Sitting, inactive in a public place (e.g. a theatre or a meeting) High chance of dozing   As a passenger in a car for an hour without a break Slight chance of dozing   Lying down to rest in the afternoon when circumstances permit High chance of dozing   Sitting and talking to someone Slight chance of dozing   Sitting quietly after a lunch without alcohol High chance of  dozing   In a car, while stopped for a few minutes in traffic Would never doze   Irma Score (MC) 16   Irma Score (Sleep) 16       INSOMNIA SEVERITY INDEX (RHEA)          2/16/2024     9:28 AM   Insomnia Severity Index (RHEA)   Difficulty falling asleep 3   Difficulty staying asleep 2   Problems waking up too early 3   How SATISFIED/DISSATISFIED are you with your CURRENT sleep pattern? 2   How NOTICEABLE to others do you think your sleep problem is in terms of impairing the quality of your life? 4   How WORRIED/DISTRESSED are you about your current sleep problem? 3   To what extent do you consider your sleep problem to INTERFERE with your daily functioning (e.g. daytime fatigue, mood, ability to function at work/daily chores, concentration, memory, mood, etc.) CURRENTLY? 4   RHEA Total Score 21       Guidelines for Scoring/Interpretation:  Total score categories:  0-7 = No clinically significant insomnia   8-14 = Subthreshold insomnia   15-21 = Clinical insomnia (moderate severity)  22-28 = Clinical insomnia (severe)  Used via courtesy of www.YUPIQ[place]alth.va.gov with permission from Anant Novak PhD., [facility]      Past medical/surgical history, family history, social history, medications and allergies were reviewed.        Problem List:  Patient Active Problem List    Diagnosis Date Noted    F11.2 - Continuous opioid dependence (H) 06/07/2023     Priority: Medium    Syncope, unspecified syncope type 01/11/2022     Priority: Medium    Symptomatic cholelithiasis 10/07/2019     Priority: Medium    Chronic pain syndrome 03/26/2019     Priority: Medium    Benign prostatic hyperplasia with lower urinary tract symptoms, unspecified morphology 03/14/2017     Priority: Medium    Rheumatoid arthritis of multiple sites with negative rheumatoid factor (H) 01/17/2017     Priority: Medium    Insomnia, unspecified type 11/22/2016     Priority: Medium    H/O: gout 09/19/2016     Priority: Medium    Anxiety and depression  "09/19/2016     Priority: Medium    Essential hypertension with goal blood pressure less than 140/90 05/19/2016     Priority: Medium    On corticosteroid therapy 04/22/2016     Priority: Medium    Elevated liver enzymes 11/30/2015     Priority: Medium    Bilateral low back pain with sciatica, sciatica laterality unspecified 11/17/2015     Priority: Medium    Rheumatoid arthritis involving multiple sites, unspecified rheumatoid factor presence 11/11/2015     Priority: Medium     IMO Regulatory Load OCT 2020      High risk medications (not anticoagulants) long-term use 11/11/2015     Priority: Medium    Midline low back pain without sciatica 11/11/2015     Priority: Medium    Essential hypertension 10/22/2015     Priority: Medium    Suicidal ideation 12/08/2014     Priority: Medium    Gout 08/22/2014     Priority: Medium     Problem list name updated by automated process. 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  The total sleep time was 129.5 minutes.  During the diagnostic portion of the study the sleep latency was 30.2 minutes after taking Ambien 10 mg.  No REM sleep observed. Sleep Efficiency was 67.2%.  Wake after sleep onset was 27.5.   The patient spent 23.2% of total sleep time in Stage N1, 72.6% in Stage N2, " 4.2% in Stages N3 and 0.0% in REM.         Respiration:   Sustained Sleep Associated Hypoventilation -was not monitored.  Sleep Associated Hypoxemia - was present.  Baseline oxygen saturation was 93.6%.  The lowest oxygen saturation was 75.4%.  Snoring was reported as loud.   Events - During the diagnostic portion of the study, the polysomnogram revealed a presence of 5 obstructive apneas resulting in an Apnea index of 30.1 events per hour.  There were 21 hypopneas resulting in a Hypopnea index of 9.7 events per hour.  The combined Apnea/Hypopnea Index was 39.8 events per hour.  The REM AHI was n/a.  The RERA index was 7.9 per hour.   The RDI was 47.7  .  47.7   7.9 39.8     Treatment PSG  Sleep Architecture:  At 1:22 AM the patient was placed on CPAP treatment and was titrated at pressures ranging from 5 cm/H20 up to 11 cm/H20.  The total recording time of the treatment portion of the study was 334.5 minutes.  The total sleep time was 212.5 minutes.  During the treatment portion of the study the sleep latency was 3.5 minutes.  REM latency was 313.5 minutes.  Sleep Efficiency was 63.5%.  Sleep Maintenance Efficiency was 63.5%.  Total wake time was 122.5 minutes for a total wake percentage of 34.5%. the patient spent 17.2% of total sleep time in Stage N1, 53.2% in Stage N2, 24.9% in Stages N3 and N, and 4.7% in REM.       Respiration:  The optimal pressure was 11.0 with an AHI of 1.3.    Movement Activity:    Limb - During the diagnostic portion of the study, there were 3 limb movements recorded.  Of this total, 0 were classified as PLMs.  Of the PLMs, 0 were associated with arousals.  The Limb Movement index was 1.4 per hour while the PLM index was 0.0 per hour.  Behavior - None  Bruxism - None  Seizure - None      CARDIAC SUMMARY:   During the diagnostic portion of the study, the average pulse rate was 56.1 bpm.  The minimum pulse rate was 39.0 bpm while the maximum pulse rate was 77.0 bpm.    During the treatment  portion of the study, the average pulse rate was 52.7 bpm.  The minimum pulse rate was 47.0 bpm while the maximum pulse rate was 73.0 bpm.   Assessment:  Severe JEROD (AHI 40) with adequate positive airway pressure titration including REM supine.  History of UPPP.  Recommendations:  CPAP pressure 11 cmH2O with clinical follow-up within 3 - 4 weeks including compliance measures.  Consider a chin strap      Coronary atherosclerosis of native coronary artery      Priority: Medium    Major depressive disorder, recurrent episode, moderate (H)      Priority: Medium    Anxiety      Priority: Medium        There were no vitals taken for this visit.

## 2024-02-16 ENCOUNTER — MYC MEDICAL ADVICE (OUTPATIENT)
Dept: FAMILY MEDICINE | Facility: CLINIC | Age: 59
End: 2024-02-16

## 2024-02-16 ENCOUNTER — VIRTUAL VISIT (OUTPATIENT)
Dept: SLEEP MEDICINE | Facility: CLINIC | Age: 59
End: 2024-02-16
Payer: COMMERCIAL

## 2024-02-16 ENCOUNTER — MYC MEDICAL ADVICE (OUTPATIENT)
Dept: PALLIATIVE MEDICINE | Facility: CLINIC | Age: 59
End: 2024-02-16

## 2024-02-16 DIAGNOSIS — G89.4 CHRONIC PAIN SYNDROME: ICD-10-CM

## 2024-02-16 DIAGNOSIS — M47.819 FACET ARTHROPATHY: ICD-10-CM

## 2024-02-16 DIAGNOSIS — F11.90 CHRONIC, CONTINUOUS USE OF OPIOIDS: ICD-10-CM

## 2024-02-16 DIAGNOSIS — G47.33 OSA (OBSTRUCTIVE SLEEP APNEA): ICD-10-CM

## 2024-02-16 DIAGNOSIS — F45.8 ANXIETY HYPERVENTILATION: ICD-10-CM

## 2024-02-16 DIAGNOSIS — R06.81 CENTRAL APNEA: Primary | ICD-10-CM

## 2024-02-16 DIAGNOSIS — M06.9 RHEUMATOID ARTHRITIS INVOLVING MULTIPLE SITES, UNSPECIFIED WHETHER RHEUMATOID FACTOR PRESENT (H): ICD-10-CM

## 2024-02-16 DIAGNOSIS — F41.9 ANXIETY HYPERVENTILATION: ICD-10-CM

## 2024-02-16 DIAGNOSIS — M17.11 PRIMARY OSTEOARTHRITIS OF RIGHT KNEE: ICD-10-CM

## 2024-02-16 PROCEDURE — 99203 OFFICE O/P NEW LOW 30 MIN: CPT | Mod: 95 | Performed by: PHYSICIAN ASSISTANT

## 2024-02-16 RX ORDER — CLONAZEPAM 1 MG/1
TABLET ORAL
Qty: 90 TABLET | Refills: 0 | Status: SHIPPED | OUTPATIENT
Start: 2024-02-16 | End: 2024-04-14

## 2024-02-16 RX ORDER — BUPRENORPHINE 20 UG/H
1 PATCH, EXTENDED RELEASE TRANSDERMAL
Qty: 4 PATCH | Refills: 0 | Status: SHIPPED | OUTPATIENT
Start: 2024-02-16 | End: 2024-03-13

## 2024-02-16 ASSESSMENT — SLEEP AND FATIGUE QUESTIONNAIRES
HOW LIKELY ARE YOU TO NOD OFF OR FALL ASLEEP WHILE SITTING AND READING: HIGH CHANCE OF DOZING
HOW LIKELY ARE YOU TO NOD OFF OR FALL ASLEEP WHEN YOU ARE A PASSENGER IN A CAR FOR AN HOUR WITHOUT A BREAK: SLIGHT CHANCE OF DOZING
HOW LIKELY ARE YOU TO NOD OFF OR FALL ASLEEP IN A CAR, WHILE STOPPED FOR A FEW MINUTES IN TRAFFIC: WOULD NEVER DOZE
HOW LIKELY ARE YOU TO NOD OFF OR FALL ASLEEP WHILE LYING DOWN TO REST IN THE AFTERNOON WHEN CIRCUMSTANCES PERMIT: HIGH CHANCE OF DOZING
HOW LIKELY ARE YOU TO NOD OFF OR FALL ASLEEP WHILE SITTING INACTIVE IN A PUBLIC PLACE: HIGH CHANCE OF DOZING
HOW LIKELY ARE YOU TO NOD OFF OR FALL ASLEEP WHILE SITTING QUIETLY AFTER LUNCH WITHOUT ALCOHOL: HIGH CHANCE OF DOZING
HOW LIKELY ARE YOU TO NOD OFF OR FALL ASLEEP WHILE WATCHING TV: MODERATE CHANCE OF DOZING
HOW LIKELY ARE YOU TO NOD OFF OR FALL ASLEEP WHILE SITTING AND TALKING TO SOMEONE: SLIGHT CHANCE OF DOZING

## 2024-02-16 NOTE — TELEPHONE ENCOUNTER
Received call from patient requesting refill(s) of BUTRANS 20 MCG/HR WK patch      Last dispensed from pharmacy on 01/25/24    Patient's last office/virtual visit by prescribing provider on 11/13/23  Next office/virtual appointment scheduled for 02/26/24    Last urine drug screen date 03/22/23  Current opioid agreement on file (completed within the last year) Yes Date of opioid agreement: 03/22/23    E-prescribe to pharmacy-  [id number] - [place] - [address]       Will route to nursing pool for review and preparation of prescription(s).

## 2024-02-16 NOTE — TELEPHONE ENCOUNTER
Signed Prescriptions:                        Disp   Refills    BUTRANS 20 MCG/HR WK patch                 4 patch0        Sig: Place 1 patch onto the skin every 7 days LISANDRO, name           brand. 28 day script for chronic pain. OK to fill           2/20/24 and start 2/22/24  Authorizing Provider: AMEENA PAGE        Reviewed [place]  February 16, 2024- no concerning fills.    Ameena LEMUS, RN CNP, FNP  [facility]  [facility] [place]

## 2024-02-16 NOTE — TELEPHONE ENCOUNTER
Routing to provider to review medication prepped per below    BUTRANS 20 MCG/HR WK patch , #4, Refill:0  Sig:Place 1 patch onto the skin every 7 days LISANDRO, name brand. 28 day script for chronic pain. - Transdermal   Last picked up 1/25/24 with start on 1/25/24  Due: OK to fill 2/20/24 and start 2/22/24    Per last OV note 11/13/23: Medication Management:   Continue Butrans 20mcg/hr transdermal change every 7 days,       [id number] [place] - [address]  [address]  [place] [address]  Phone: [phone] Fax: [phone]    Kelsey HALEY RN Care Coordinator  [facility]

## 2024-02-16 NOTE — TELEPHONE ENCOUNTER
Routed to [place] pool to gather required information for opioid refill.        Could you please refill my butran patches prescription for this month.     Joselyn MENDOZA, RN Care Coordinator  [facility]  [facility]

## 2024-02-19 ASSESSMENT — PAIN SCALES - PAIN ENJOYMENT GENERAL ACTIVITY SCALE (PEG)
INTERFERED_GENERAL_ACTIVITY: 6
AVG_PAIN_PASTWEEK: 5
PEG_TOTALSCORE: 6.33
INTERFERED_ENJOYMENT_LIFE: 8

## 2024-02-23 DIAGNOSIS — K21.9 GASTROESOPHAGEAL REFLUX DISEASE WITHOUT ESOPHAGITIS: ICD-10-CM

## 2024-02-25 NOTE — PROGRESS NOTES
[facility]    2/26/2024    Chief complaint:   -low back pain, extends into the thighs, bilaterally. Not past the knees.   -multiple joint pain (RA)  -he states he has some dull pain in his jaw on both sides. States he needs to massage this at times. He finds the Baclofen is helpful.       Interval history:  Lamont Daniels is a 58 year old male is known to me for:  Lumbar facet arthropathy  Rheumatoid arthritis involving multiple joints, unspecified rheumatoid factor  Primary osteoarthritis of right knee  Chronic continuous use of opioids  Chronic pain syndrome  PMHx includes: Anxiety, coronary artery disease, congestive heart failure (2008), depressive disorder (2008), gout, hepatitis B more than 10 years ago, hypertension, hyperlipidemia, malrotation of intestine, severe obstructive sleep apnea, rheumatoid arthritis (2012) steatohepatitis (2015), history of tuberculosis at age 13, vitamin D deficiency  PSHx includes: Coronary artery bypass graft 6 vessel (2008), abdominal surgery (2014), colonoscopy (2013), combined repair ptosis with blepharoplasty bilateral (2018), head and neck surgery (2011), nasal/sinus polypectomy (2010), orthopedic surgery (2012), repair ptosis bilateral (2019), repair ptosis brow bilateral (2018), thoracic surgery for tuberculosis (1989), tonsillectomy and uvulopalatal pharyngoplasty (2010), coronary artery stent 3 months after CABG (2008).      Recommendations/plan at the last visit on 11/13/2023 included:  Physical Therapy: none  Continue stretching at home  Clinical Health Psychologist to address issues of relaxation, behavioral change, coping style, and other factors important to improvement: none  Diagnostic Studies: none  Medication Management:   Continue Butrans 20mcg/hr transdermal change every 7 days, fill 11/15 and start 11/16/2022  continue Baclofen to 10mg up to three times daily as needed for muscle spasms  Continue Voltaren gel as needed  Further  procedures recommended: none  Recommendations/follow-up for PCP:  See above  Release of information: none  Follow up: 12 weeks for virtual visit. Please call [phone] to make your follow-up appointment with me.         Since his last visit, Lamont Daniels reports:    Interval history February 26, 2024  -Lamont is doing pretty well on the Butrans patch.   -due for CSA and UDT today  -wearing Butrans patch, he filled the script late in January, picked it up on 1/27/2024. Called pharmacy, they have the script and are ordering it for arrival and fill tomorrow 2/27/2024.    -      Interval history November 13, 2023  -Lamont states that his low back pain has improved.   -his multiple joint pain from known RA has been stable.   -he continues to find Butrans patch 20mcg/hr helpful in managing his pain.   -he denies any new areas of pain.   -he denies being on any new medications since our last encounter.     Interval history June 26, 2023  -continues to have axial low back pain that can extend posteriorly into the legs and never goes past the knees. Pain is worse with lumbar extension and rotation.   -reviewed his lumbar MRI in detail. Dicussed possible interventions for lumbar facet joint pain.   Continued multiple joint pain from RA.   -discussed adjustments to Baclofen or to Buprenorphine, prefers to adjust Baclofen at this time.   -tapered off of gabapentin, has not had any increase in pain off of this medication    Interval history March 22, 2023  -low back pain present, can radiate to the level of the knees.  -no imaging since 2014. Recommend updated lumbar MRI to check for progression of arthritis or listhesis.   -multiple joint pain remains, has RA    Pain history collected at initial evaluation on 5/11/2022  He has had pain for about 10 years. He was diagnosed with Rheumatoid Arthritis. Lamont has pain in multiple joints. He also has chronic low back pain. The low back pain radiates down the posterolateral aspect of the  "legs to the level of the knee. His back pain was there when he was diagnosed with RA. He feels that he is doing well on the Butrans. Discussed switch to Belbuca if he ever wishes to increase his dosage, prefers to stay with use of the Butrans patch at the present time       At this point, the patient's participation with our multidisciplinary team includes:  The patient has been compliant with the program.  PT - not ordered  Health Psych - not ordered      Pain scores:  Pain intensity on average is 6 on a scale of 0-10.    Range is 5-9/10.   Pain right now is 5/10.   Pain is described as \"back pain is dull and his joints have a dull/numbness/stiffness.\"    Pain is constant in nature        Current pain relevant medications:   -allopurinol 300mg every day  -baclofen 10mg BID PRN muscle spasms (uses at least once per day, helpful)  -Voltaren gel 1% PRN (helpful)  -naloxone nasal spray PRN opiate reversal   -Butrans 20mcg/hr  TD Q 7 days (helpful)  -simponi 50mg/0.5ml inject every 28 days   -Arava 20mg every day  -meclizine 25mg Q 6 hours PRN dizziness (helpful)  -Tylenol 1000mg (uses at least once per day, not more than 3 times per day)  -CBD oil at night (helpful for sleep)        Other pertinent medications:  -Ambien 5mg at bedtime as needed PRN sleep  -clonazepam 0.5-1mg BID PRN anxiety (uses one full tab in the AM)  -Plavix 75mg every day  -colchincine 0.6mg take 2 tabs at first sign of flare  -pristiq 50mg QD     Previous medication treatments included:  OPIATES: butrans (helpful), hydrocodone (helpful for other issues), Codeine (unsure), oxycodone (unsure)  NSAIDS: cannot use, on Plavix   MUSCLE RELAXANTS: Baclofen (helpful), methocarbamol (not helpful)  ANTI-MIGRAINE MEDS: none  ANTI-DEPRESSANTS: none  SLEEP AIDS: none  ANTI-CONVULSANTS: gabapentin (helpful initially, he did not have increased pain when he tapered off of this medication)  TOPICALS: Voltaren gel (helpful)  ANXIOLYTICS: none  MEDICAL CANNABIS: " none  Other meds: Tylenol (helpful)        Other treatments have included:  Lamont Daniels has been seen at a pain clinic in the past.  Previously managed by Dr. Lian Loo   PT: has tried, not helpful  Chiropractic care: no  Acupuncture: no  TENs Unit: no     Injections:  none      THE 4 A's OF OPIOID MAINTENANCE ANALGESIA    Analgesia: butrans is helpful    Activity: he does some stretching at home. Walks in the back yard    Adverse effects: none    Adherence to Rx protocol: yes      Side Effects: no side effect  Patient is using the medication as prescribed: YES    Medications:  Current Outpatient Medications   Medication Sig Dispense Refill    Acetaminophen (TYLENOL PO)       allopurinol (ZYLOPRIM) 300 MG tablet TAKE 1 TABLET BY MOUTH EVERY DAY 90 tablet 3    amLODIPine (NORVASC) 5 MG tablet Take 1 tablet (5 mg) by mouth daily for blood pressure 90 tablet 3    aspirin EC 81 MG EC tablet Take 1 tablet (81 mg) by mouth daily (*) 30 tablet 1    atorvastatin (LIPITOR) 80 MG tablet Take 1 tablet (80 mg) by mouth daily 90 tablet 3    bacitracin 500 UNIT/GM external ointment Apply topically 3 times daily 30 g 3    baclofen (LIORESAL) 10 MG tablet Take 1 tablet (10 mg) by mouth 2 times daily as needed for muscle spasms 3 month supply. 180 tablet 1    busPIRone HCl (BUSPAR) 30 MG tablet TAKE 1 TABLET BY MOUTH 2 TIMES DAILY. 180 tablet 3    BUTRANS 20 MCG/HR WK patch Place 1 patch onto the skin every 7 days LISANDRO, name brand. 28 day script for chronic pain. OK to fill 2/20/24 and start 2/22/24 4 patch 0    Cholecalciferol (VITAMIN D) 2000 UNITS tablet TAKE ONE TABLET BY MOUTH ONCE DAILY 100 tablet 1    clobetasol (TEMOVATE) 0.05 % external cream Apply twice daily for 2 weeks, weekends only for 2 weeks, repeat cycle as needed for hands, not face or genitals 60 g 4    clobetasol (TEMOVATE) 0.05 % external solution Apply twice daily to scalp for up to 2 weeks for flares. 60 mL 0    clonazePAM (KLONOPIN) 1 MG tablet TAKE 0.5-1  TABLETS BY MOUTH 2 TIMES DAILY AS NEEDED FOR ANXIETY 90 tablet 0    clopidogrel (PLAVIX) 75 MG tablet TAKE 1 TABLET BY MOUTH EVERY DAY 90 tablet 3    colchicine (COLCRYS) 0.6 MG tablet TAKE 2 TABLETS BY MOUTH AT THE FIRST SIGN OF FLARE, TAKE 1 ADDITIONAL TABLET ONE HOUR LATER. 90 tablet 1    desvenlafaxine (PRISTIQ) 50 MG 24 hr tablet TAKE 1 TABLET BY MOUTH EVERY DAY 90 tablet 3    diclofenac (VOLTAREN) 1 % topical gel APPLY 4 G TOPICALLY 4 TIMES DAILY AS NEEDED FOR MODERATE PAIN 300 g 0    evolocumab (REPATHA SURECLICK) 140 MG/ML prefilled autoinjector Inject 1 mL (140 mg) Subcutaneous every 14 days 6 mL 3    ezetimibe (ZETIA) 10 MG tablet TAKE 1 TABLET (10 MG) BY MOUTH DAILY. 90 tablet 3    gemfibrozil (LOPID) 600 MG tablet Take 1 tablet (600 mg) by mouth 2 times daily 180 tablet 3    golimumab (SIMPONI) 50 MG/0.5ML auto-injector pen Inject 0.5 mLs (50 mg) Subcutaneous every 28 days . Hold for signs of infection, and seek medical attention. 0.5 mL 4    hydrocortisone (WESTCORT) 0.2 % external cream FOR GENITALS, APPLY TWICE DAILY FOR UP TO 2 WEEKS, TAKE 2 WEEKS OFF THEN REPEAT 60 g 3    hydrocortisone 2.5 % cream FOR FACE, APPLY TWICE DAILY FOR UP TO 2 WEEK FOR FLARES ON THE FACE, TAKE 2 WEEKS OFF 28.35 g 3    Incontinence Supply Disposable (DEPEND UNDERWEAR LARGE) MISC Use twice daily. 60 each 11    leflunomide (ARAVA) 20 MG tablet Take 1 tablet (20 mg) by mouth daily ; Labs required every 8-12 weeks. 90 tablet 2    meclizine (ANTIVERT) 25 MG tablet TAKE 1 TABLET BY MOUTH EVERY 6 HOURS AS NEEDED FOR DIZZINESS. 30 tablet 3    melatonin 3 MG tablet Take 1 tablet (3 mg) by mouth nightly as needed for sleep      naloxone (NARCAN) 4 MG/0.1ML nasal spray Spray 1 spray (4 mg) into one nostril alternating nostrils once as needed for opioid reversal every 2-3 minutes until assistance arrives 0.2 mL 0    nitroGLYcerin (NITROSTAT) 0.4 MG sublingual tablet PLACE 1 TAB UNDER THE TONGUE EVERY 5 MINUTES FOR 3 DOSES FOR CHEST  PAIN. IF SYMPTOMS PERSIST 5 MINUTES AFTER 1ST DOSE CALL 911. 25 tablet 0    order for DME Equipment being ordered: chair lift 1 each 0    order for DME Equipment being ordered: single end cane 1 Units 0    ORDER FOR DME 1.  CPAP pressure 11 cm/H20 with heated humidity.   2.  Provide mask to fit and CPAP supplies.   3.  Length of need lifetime.   4.  If needed please provide a chin strap           pantoprazole (PROTONIX) 40 MG EC tablet TAKE 1 TABLET BY MOUTH EVERY DAY 90 tablet 1    pimecrolimus (ELIDEL) 1 % external cream APPLY TWICE DAILY FOR FACE AND GENITALS 60 g 1    sildenafil (REVATIO) 20 MG tablet TAKE 2 - 5 TABLETS BY MOUTH AS NEEDED 30 MINUTES BEFORE SEXUAL ACTIVITY. MAX 100MG IN 24 HOURS. 450 tablet 3    sulfaSALAzine (AZULFIDINE) 500 MG tablet Take 3 tablets (1,500 mg) by mouth 2 times daily 540 tablet 2    tamsulosin (FLOMAX) 0.4 MG capsule TAKE 2 CAPSULES BY MOUTH EVERY  capsule 3    tenofovir (VIREAD) 300 MG tablet Take 1 tablet (300 mg) by mouth daily 90 tablet 3    triamcinolone (KENALOG) 0.1 % external ointment APPLY TO AFFECTED AREA TOPICALLY 3 TIMES A DAY 80 g 2    triamcinolone (KENALOG) 0.1 % external ointment Apply to trunk and extremities twice daily for up to 2 weeks for flares 80 g 1    zolpidem (AMBIEN) 5 MG tablet TAKE 1 TABLET (5 MG) BY MOUTH NIGHTLY AS NEEDED FOR SLEEP 30 tablet 5       Medical History: any changes in medical history since they were last seen? No    Social History:   Home situation: , lives with adult children  Occupation/Schooling: he is on disability due to heart issues and RA  Tobacco use: none  Alcohol use: none  Drug use: none  History of chemical dependency treatment: none    Is patient a current smoker or tobacco user?  no  If yes, was cessation counseling offered?  no          Physical Exam:  Vital signs: Blood pressure 126/86, pulse 67.    Behavioral observations:  Awake, alert, conversant and cooperative     Gait:  slow uses a single ended  cane    Musculoskeletal exam:  strength grossly equal throughout    Neuro exam:  deferred    Skin/vascular/autonomic:  No suspicious lesions on exposed skin.     Other:  na              Diagnostic tests:  MRI of the Lumbar Spine without contrast     History: Lumbago.     Comparison: MRI thoracic spine 7/18/2014.     Technique: Sagittal T1-weighted, T2-weighted, STIR, and axial  T2-weighted spin echo and gradient echo images of the lumbar spine  were obtained without intravenous contrast.     Findings: There are 5 lumbar-type vertebrae assumed for the purposes  of this dictation. The tip of the conus medullaris is at L1.  The  lumbar vertebral column appears normally aligned. Straightening of the  normal lumbar lordosis. Mild loss of T2 signal in the L4-5 and L5-S1  intervertebral discs consistent with age-related changes. Type II  Modic degenerative changes in the opposing endplates at L5-S1.     On a level by level basis:     L1-2: Tiny left paracentral focal disc protrusion without spinal canal  or neural foraminal stenosis.     L2-3: No spinal canal or neural foraminal stenosis.     L3-4: Small focal posterior central disc protrusion with annular  fissuring and mild spinal canal narrowing. Also mild bilateral facet  hypertrophy. No neuroforaminal stenosis.     L4-5: Small focal posterior central disc protrusion with annular  fissuring. No spinal canal or neural foraminal stenosis.     L5-S1: Type II Modic degenerative changes of the opposing endplates.  Left greater than right facet hypertrophy. Small focal posterior  central disc protrusion with annular fissuring. No spinal canal  stenosis. Moderate left and mild right neural foraminal narrowing.     The visualized paraspinous tissues anteriorly are unremarkable.     IMPRESSION  Impression: Multilevel degenerative changes. Small disc protrusion at  L3-4 results in in mild spinal canal narrowing. Moderate left and mild  right neuroforaminal narrowing at L5-S1.      HELGA DAVID MD        FOOT BILATERAL THREE OR MORE VIEWS 11/4/2015 4:55 PM      HISTORY: Pain in unspecified joint.     COMPARISON: 8/21/2012.     IMPRESSION  IMPRESSION: Small traction spurs are seen off the Achilles tendon and  plantar fascial attachment sites bilaterally. Joint spaces are  preserved. Osseous structures are intact. Incidental note is made of  some surgical staples in the soft tissues of the medial distal right  lower extremity.  DANIS ANGLIN MD        RIGHT KNEE THREE VIEWS  5/4/2017 3:16 PM    HISTORY: Pain in right knee.  COMPARISON: None                                                   IMPRESSION: No acute fracture or dislocation. Mild osteoarthritis.  Small joint effusion.     MICHELL TAMAYO MD      MR LUMBAR SPINE WITHOUT CONTRAST  4/4/2023 9:12 AM (reviewed with patient on 6/26/2023)     INDICATION: Low back pain. Rule out spondylolysis. Low back pain with  standing/walking/extension. No known/automatically detected potential  contraindications to CT. Chronic bilateral low back pain with  bilateral sciatica. DDD (degenerative disc disease), lumbar.     TECHNIQUE: MRI images of the lumbar spine without contrast.  CONTRAST:  None.     COMPARISON: Lumbar spine MRI 8/6/2014.     FINDINGS: Nomenclature is based on five lumbar type vertebral bodies.  Normal vertebral body heights. Normal alignment.  No marrow edema. No  pars defect. The conus tip is identified at L1-L2. Unremarkable  paraspinal soft tissues.     T12-L1: Slight loss in disc height and signal. The small left central  protrusion. Minor facet arthropathy. Minor lateral recess and spinal  canal narrowing. No foraminal narrowing. Herniation is new/increased  versus 2014.         L1-L2: Slight loss of disc signal. Normal disc height. Tiny left  paracentral protrusion. Unremarkable facets. Minimal left lateral  recess narrowing. No central stenosis or foraminal narrowing. No  interval change.     L2-L3: Mild loss of disc signal.  Minimal loss of disc height. No  herniation. Unremarkable facets. No stenosis.     L3-L4: Moderate loss of disc signal. Minor loss of disc height. Minor  circumferential disc bulge with small central protrusion. Minor facet  arthropathy. Mild lateral recess and spinal canal narrowing,  unchanged. Patent foramina.     L4-L5: Moderate loss of disc signal. Mild loss of disc height. Central  annular tear and small protrusion is minimally increased in the  interval. Mild facet arthropathy. Minor lateral recess and foraminal  narrowing.     L5-S1: Moderate loss of disc signal. Mild loss of disc height.  Circumferential asymmetric left disc bulge. Mild to moderate left and  mild right facet arthropathy. Moderate left and mild right lateral  recess narrowing. Adequate central canal. Moderate to severe left and  minor right foraminal narrowing. No significant interval change.                                                                      IMPRESSION:  1.  Degenerative changes are relatively stable compared with 2014.  2.  At L5-S1 an asymmetric left disc bulge causes moderate left  lateral recess and moderate to severe foraminal narrowing. Correlate  for any left L5 symptoms. Mild right-sided narrowing. No significant  interval change.  3.  At L4-L5 a small protrusion is minimally increased with minor  lateral recess and foraminal narrowing similar to prior.  4.  At L3-L4 mild lateral recess and spinal canal narrowing is  unchanged.  5.  At T12-L1 a small left-sided herniation is increased and results  in minor lateral recess and spinal canal narrowing.     HORTENCIA DUENAS MD      Other testing (labs, diagnostics):  4/26/2022  Cr. 0.94  Est GFR >90  Liver enzymes WNL        Screening tools:      DIRE Score for ongoing opioid management is calculated as follows:  Diagnosis = 2  Intractability = 2  Risk: Psych = 2  Chem Hlth = 2  Reliability = 3  Social = 2  Efficacy = 2  Total DIRE Score = 15 (14 or higher predicts  good candidate for ongoing opioid management; 13 or lower predicts poor candidate for opioid management)        MN  review:  Reviewed MN  2/26/2024- no concerning fills.  Ameena LEMUS, RN CNP, FNP  [facility]  [place] location       Assessment:   Rheumatoid arthritis involving multiple joints, unspecified rheumatoid factor  Primary osteoarthritis of right knee  Facet arthropathy (lumbar)  Chronic pain syndrome  Chronic continuous use of opioids  Encounter for long term use of opiate analgesic  UDT 2/26/2024  Signed CSA 2/26/2024  PMHx includes: Anxiety, coronary artery disease, congestive heart failure (2008), depressive disorder (2008), gout, hepatitis B more than 10 years ago, hypertension, hyperlipidemia, malrotation of intestine, severe obstructive sleep apnea, rheumatoid arthritis (2012) steatohepatitis (2015), history of tuberculosis at age 13, vitamin D deficiency  PSHx includes: Coronary artery bypass graft 6 vessel (2008), abdominal surgery (2014), colonoscopy (2013), combined repair ptosis with blepharoplasty bilateral (2018), head and neck surgery (2011), nasal/sinus polypectomy (2010), orthopedic surgery (2012), repair ptosis bilateral (2019), repair ptosis brow bilateral (2018), thoracic surgery for tuberculosis (1989), tonsillectomy and uvulopalatal pharyngoplasty (2010), coronary artery stent 3 months after CABG (2008).    Plan:  Physical Therapy: none  Continue stretching at home  Clinical Health Psychologist to address issues of relaxation, behavioral change, coping style, and other factors important to improvement: none  Diagnostic Studies: none  Medication Management:   Continue Butrans 20mcg/hr transdermal change every 7 days, fill 2/27/2024 and start 2/27/2024.  continue Baclofen to 10mg up to three times daily as needed for muscle spasms  Continue Voltaren gel as needed  Further procedures recommended: none  Recommendations/follow-up for PCP:  See  above  Release of information: none  Signed CSA today  UDT today, wearing Butrans patch  Follow up: 12 weeks for in-person or virtual visit.  Please call [phone] to make your follow-up appointment with me.       ASSESSMENT AND PLAN:   (M06.9) Rheumatoid arthritis involving multiple sites, unspecified whether rheumatoid factor present (H)  (primary encounter diagnosis)  Comment:   Plan: Adult Pain Clinic Follow-Up Order        Continue Butrans patch    (M17.11) Primary osteoarthritis of right knee  Comment:   Plan: Adult Pain Clinic Follow-Up Order        Continue Butrans patch    (M47.819) Facet arthropathy  Comment:   Plan: Adult Pain Clinic Follow-Up Order    Continue Butrans patch         (G89.4) Chronic pain syndrome  Comment:   Plan: Adult Pain Clinic Follow-Up Order         Continue Butrans patch       (F11.90) Chronic, continuous use of opioids  Comment:   Plan: Ethanol urine, Ethyl Glucuronide Screen with         Reflex to Confirmation, Urine, Drug         Confirmation Panel Urine with Creatinine, Adult        Pain Clinic Follow-Up Order   Continue Butrans patch               (Z79.891) Encounter for long-term use of opiate analgesic  Comment:   Plan: Ethanol urine, Ethyl Glucuronide Screen with         Reflex to Confirmation, Urine, Drug         Confirmation Panel Urine with Creatinine, Adult        Pain Clinic Follow-Up Order   Continue Butrans patch                BILLING TIME DOCUMENTATION:   TOTAL TIME includes:   Time spent preparing to see the patient: 3 minutes (reviewing records and tests)  Time spend face to face with the patient: 19 minutes  Time spent ordering tests, medications, procedures and referrals: 0 minutes  Time spent Referring and communicating with other healthcare professionals: 0 minutes  Documenting clinical information in Epic: 2 minutes    The total TIME spent on this patient on the day of the appointment was 25 minutes.         Ameena LEMUS, RN CNP, FNP  [facility]  [place] [facility]

## 2024-02-26 ENCOUNTER — OFFICE VISIT (OUTPATIENT)
Dept: PALLIATIVE MEDICINE | Facility: CLINIC | Age: 59
End: 2024-02-26
Attending: NURSE PRACTITIONER
Payer: COMMERCIAL

## 2024-02-26 ENCOUNTER — LAB (OUTPATIENT)
Dept: LAB | Facility: CLINIC | Age: 59
End: 2024-02-26
Payer: COMMERCIAL

## 2024-02-26 VITALS — DIASTOLIC BLOOD PRESSURE: 86 MMHG | SYSTOLIC BLOOD PRESSURE: 126 MMHG | HEART RATE: 67 BPM

## 2024-02-26 DIAGNOSIS — Z79.891 ENCOUNTER FOR LONG-TERM USE OF OPIATE ANALGESIC: ICD-10-CM

## 2024-02-26 DIAGNOSIS — F11.90 CHRONIC, CONTINUOUS USE OF OPIOIDS: ICD-10-CM

## 2024-02-26 DIAGNOSIS — G89.4 CHRONIC PAIN SYNDROME: ICD-10-CM

## 2024-02-26 DIAGNOSIS — M06.9 RHEUMATOID ARTHRITIS INVOLVING MULTIPLE SITES, UNSPECIFIED WHETHER RHEUMATOID FACTOR PRESENT (H): Primary | ICD-10-CM

## 2024-02-26 DIAGNOSIS — M17.11 PRIMARY OSTEOARTHRITIS OF RIGHT KNEE: ICD-10-CM

## 2024-02-26 DIAGNOSIS — M47.819 FACET ARTHROPATHY: ICD-10-CM

## 2024-02-26 LAB
CREAT UR-MCNC: 146 MG/DL
ETHANOL UR QL SCN: NORMAL

## 2024-02-26 PROCEDURE — 99214 OFFICE O/P EST MOD 30 MIN: CPT | Performed by: NURSE PRACTITIONER

## 2024-02-26 PROCEDURE — 80307 DRUG TEST PRSMV CHEM ANLYZR: CPT | Mod: 90

## 2024-02-26 PROCEDURE — G0481 DRUG TEST DEF 8-14 CLASSES: HCPCS | Mod: 59

## 2024-02-26 ASSESSMENT — PAIN SCALES - GENERAL: PAINLEVEL: MODERATE PAIN (5)

## 2024-02-26 NOTE — LETTER

## 2024-02-27 LAB — ETHYL GLUCURONIDE UR QL SCN: NEGATIVE NG/ML

## 2024-02-28 LAB
7AMINOCLONAZEPAM UR QL CFM: PRESENT
BUPRENORPHINE UR CFM-MCNC: 37 NG/ML
BUPRENORPHINE/CREAT UR: 25 NG/MG {CREAT}
GABAPENTIN UR QL CFM: PRESENT
NORBUPRENORPHINE UR CFM-MCNC: 10 NG/ML
NORBUPRENORPHINE/CREAT UR: 7 NG/MG {CREAT}

## 2024-02-28 RX ORDER — PANTOPRAZOLE SODIUM 40 MG/1
TABLET, DELAYED RELEASE ORAL
Qty: 90 TABLET | Refills: 2 | Status: SHIPPED | OUTPATIENT
Start: 2024-02-28

## 2024-02-28 NOTE — RESULT ENCOUNTER NOTE
No evidence for recent alcohol ingestion while on opiate medication. This is as expected.   Ameena LEMUS, RN CNP, FNP  [facility]

## 2024-03-08 ENCOUNTER — OFFICE VISIT (OUTPATIENT)
Dept: OPHTHALMOLOGY | Facility: CLINIC | Age: 59
End: 2024-03-08
Payer: COMMERCIAL

## 2024-03-08 DIAGNOSIS — H40.013 OPEN ANGLE WITH BORDERLINE FINDINGS AND LOW GLAUCOMA RISK IN BOTH EYES: ICD-10-CM

## 2024-03-08 DIAGNOSIS — H52.13 MYOPIA OF BOTH EYES: ICD-10-CM

## 2024-03-08 DIAGNOSIS — H35.3131 EARLY DRY STAGE NONEXUDATIVE AGE-RELATED MACULAR DEGENERATION OF BOTH EYES: Primary | ICD-10-CM

## 2024-03-08 PROCEDURE — 92014 COMPRE OPH EXAM EST PT 1/>: CPT | Performed by: OPTOMETRIST

## 2024-03-08 PROCEDURE — 92015 DETERMINE REFRACTIVE STATE: CPT | Performed by: OPTOMETRIST

## 2024-03-08 PROCEDURE — 92134 CPTRZ OPH DX IMG PST SGM RTA: CPT | Performed by: OPTOMETRIST

## 2024-03-08 ASSESSMENT — TONOMETRY
IOP_METHOD: ICARE
OS_IOP_MMHG: 18
OD_IOP_MMHG: 19

## 2024-03-08 ASSESSMENT — REFRACTION_WEARINGRX
OD_AXIS: 149
OS_ADD: +2.25
OD_CYLINDER: +0.50
OD_ADD: +2.25
OD_SPHERE: -5.00
SPECS_TYPE: PAL
OS_SPHERE: -5.00
OS_CYLINDER: +0.25
OS_AXIS: 084

## 2024-03-08 ASSESSMENT — REFRACTION_MANIFEST
OD_CYLINDER: +0.50
OD_SPHERE: -5.00
OD_ADD: +2.25
OS_ADD: +2.25
OS_SPHERE: -5.00
OD_AXIS: 145
OS_CYLINDER: SPHERE

## 2024-03-08 ASSESSMENT — CONF VISUAL FIELD
OD_INFERIOR_TEMPORAL_RESTRICTION: 0
OS_SUPERIOR_TEMPORAL_RESTRICTION: 0
OD_SUPERIOR_TEMPORAL_RESTRICTION: 0
OD_SUPERIOR_NASAL_RESTRICTION: 0
OS_NORMAL: 1
METHOD: COUNTING FINGERS
OS_INFERIOR_TEMPORAL_RESTRICTION: 0
OD_NORMAL: 1
OD_INFERIOR_NASAL_RESTRICTION: 0
OS_INFERIOR_NASAL_RESTRICTION: 0
OS_SUPERIOR_NASAL_RESTRICTION: 0

## 2024-03-08 ASSESSMENT — VISUAL ACUITY
METHOD: SNELLEN - LINEAR
OD_CC: 20/20
OS_CC: 20/20
CORRECTION_TYPE: GLASSES

## 2024-03-08 ASSESSMENT — CUP TO DISC RATIO
OS_RATIO: 0.55
OD_RATIO: 0.45

## 2024-03-08 NOTE — NURSING NOTE
Chief Complaints and History of Present Illnesses   Patient presents with    Macular Degeneration Follow Up       Chief Complaint(s) and History of Present Illness(es)       Macular Degeneration Follow Up              Laterality: both eyes              Comments    Patient here for exam and follow up of AMD and glaucoma monitoring. No problems/concerns today. Does note occasional floaters but feels they are the same as they have been for awhile. Denies flashing lights. Occasional use of Ats.                    Licha Ortiz, COT

## 2024-03-08 NOTE — PROGRESS NOTES
Assessment/Plan  (H35.3131) Early dry stage nonexudative age-related macular degeneration of both eyes  (primary encounter diagnosis)  Comment: Stable OCT today  Plan: OCT Retina Spectralis OU (both eyes)        Monitor in 1 year. Return to clinic if vision changes.     (H40.013) Open angle with borderline findings and low glaucoma risk in both eyes  Comment: based on ONH appearance. Stable findings today.   Plan: Continue monitoring annually.     (H52.13) Myopia of both eyes  Plan: REFRACTION [[id number]]        Discussed findings with patient. New spectacle prescription dispensed to patient. Patient is welcome to return to clinic with prolonged adaptation difficulties.       Complete documentation of historical and exam elements from today's encounter can  be found in the full encounter summary report (not reduplicated in this progress  note). I personally obtained the chief complaint(s) and history of present illness. I  confirmed and edited as necessary the review of systems, past medical/surgical  history, family history, social history, and examination findings as documented by  others; and I examined the patient myself. I personally reviewed the relevant tests,  images, and reports as documented above. I formulated and edited as necessary the  assessment and plan and discussed the findings and management plan with the  patient and family.    Feliberto Jama OD

## 2024-03-10 DIAGNOSIS — M06.9 RHEUMATOID ARTHRITIS INVOLVING MULTIPLE SITES, UNSPECIFIED WHETHER RHEUMATOID FACTOR PRESENT (H): ICD-10-CM

## 2024-03-10 DIAGNOSIS — M10.9 GOUT WITHOUT TOPHUS: ICD-10-CM

## 2024-03-10 DIAGNOSIS — M17.11 PRIMARY OSTEOARTHRITIS OF RIGHT KNEE: ICD-10-CM

## 2024-03-11 RX ORDER — COLCHICINE 0.6 MG/1
TABLET ORAL
Qty: 90 TABLET | Refills: 1 | Status: SHIPPED | OUTPATIENT
Start: 2024-03-11 | End: 2024-05-23

## 2024-03-13 DIAGNOSIS — M17.11 PRIMARY OSTEOARTHRITIS OF RIGHT KNEE: ICD-10-CM

## 2024-03-13 DIAGNOSIS — F11.90 CHRONIC, CONTINUOUS USE OF OPIOIDS: ICD-10-CM

## 2024-03-13 DIAGNOSIS — G89.4 CHRONIC PAIN SYNDROME: ICD-10-CM

## 2024-03-13 DIAGNOSIS — M06.9 RHEUMATOID ARTHRITIS INVOLVING MULTIPLE SITES, UNSPECIFIED WHETHER RHEUMATOID FACTOR PRESENT (H): ICD-10-CM

## 2024-03-13 DIAGNOSIS — M47.819 FACET ARTHROPATHY: ICD-10-CM

## 2024-03-13 RX ORDER — BUPRENORPHINE 20 UG/H
1 PATCH, EXTENDED RELEASE TRANSDERMAL
Qty: 4 PATCH | Refills: 0 | Status: SHIPPED | OUTPATIENT
Start: 2024-03-13 | End: 2024-04-23

## 2024-03-13 NOTE — TELEPHONE ENCOUNTER
Received call from patient requesting refill(s) of BUTRANS 20 MCG/HR WK patch    Last dispensed from pharmacy on 2/27/2024.    Patient's last office/virtual visit by prescribing provider on 2/26/2024.  Next office/virtual appointment scheduled for 5/28/2024.    Last urine drug screen date 2/26/2024.  Current opioid agreement on file (completed within the last year) Yes Date of opioid agreement: 2/26/2024.    E-prescribe to:    [id number] - [place] [address]RA[address] - [address]    Will route to nursing pool for review and preparation of prescription(s).

## 2024-03-13 NOTE — TELEPHONE ENCOUNTER
Signed Prescriptions:                        Disp   Refills    BUTRANS 20 MCG/HR WK patch                 4 patch0        Sig: Place 1 patch onto the skin every 7 days LISANDRO, name           brand. 28 day script for chronic pain. OK to fill           03/17/24 and start 03/19/24  Authorizing Provider: AMEENA PAGE        Reviewed [place]  March 13, 2024- no concerning fills.    Ameena LEMUS, RN CNP, FNP  [facility]  [facility]

## 2024-03-13 NOTE — TELEPHONE ENCOUNTER
Medication refill information reviewed.     Due date for BUTRANS 20 MCG/HR WK patch  is 03/19/24     Prescriptions prepped for review.     Will route to provider.

## 2024-03-21 ENCOUNTER — TELEPHONE (OUTPATIENT)
Dept: CARDIOLOGY | Facility: CLINIC | Age: 59
End: 2024-03-21
Payer: COMMERCIAL

## 2024-03-21 NOTE — TELEPHONE ENCOUNTER
[facility]    Fax: [phone]    Spec: [phone]    [place]: [phone]

## 2024-03-22 NOTE — TELEPHONE ENCOUNTER
Central Prior Authorization Team   Phone: [phone]    PA Initiation    Medication: Repatha Sureclick 140mg/ml  Insurance Company: [employer] - Phone [phone] Fax [phone]  Pharmacy Filling the Rx: [facility] - [place], [place] - [facility] [address]  Filling Pharmacy Phone: [phone]  Filling Pharmacy Fax:    Start Date: 3/22/2024    Filled out manual form, faxed back with clinic notes and labs

## 2024-03-25 NOTE — TELEPHONE ENCOUNTER
Prior Authorization Approval    Authorization Effective Date: 3/23/2024  Authorization Expiration Date: 3/23/2025  Medication: Repatha Sureclick 140mg/ml  Approved Dose/Quantity:   Reference #:     Insurance Company: [employer] - Phone [phone] Fax [phone]  Expected CoPay:       CoPay Card Available:      Foundation Assistance Needed:    Which Pharmacy is filling the prescription (Not needed for infusion/clinic administered): [facility] - [place] [address]  Pharmacy Notified:  yes  Patient Notified:  yes- Pharmacy will contact patient when ready to /ship

## 2024-03-27 NOTE — RESULT ENCOUNTER NOTE
Urine drug testing as expected. No illicit medication or alcohol noted. Continue standard monitoring while on opiates.   Ameena LEMUS RN CNP, FNP  [facility] [place]

## 2024-04-14 ENCOUNTER — MYC REFILL (OUTPATIENT)
Dept: FAMILY MEDICINE | Facility: CLINIC | Age: 59
End: 2024-04-14
Payer: COMMERCIAL

## 2024-04-14 ENCOUNTER — MYC REFILL (OUTPATIENT)
Dept: CARDIOLOGY | Facility: CLINIC | Age: 59
End: 2024-04-14
Payer: COMMERCIAL

## 2024-04-14 DIAGNOSIS — M06.9 RHEUMATOID ARTHRITIS INVOLVING MULTIPLE SITES, UNSPECIFIED WHETHER RHEUMATOID FACTOR PRESENT (H): ICD-10-CM

## 2024-04-14 DIAGNOSIS — K21.9 GASTROESOPHAGEAL REFLUX DISEASE WITHOUT ESOPHAGITIS: ICD-10-CM

## 2024-04-14 DIAGNOSIS — F41.9 ANXIETY HYPERVENTILATION: ICD-10-CM

## 2024-04-14 DIAGNOSIS — F45.8 ANXIETY HYPERVENTILATION: ICD-10-CM

## 2024-04-14 DIAGNOSIS — R42 VERTIGO: ICD-10-CM

## 2024-04-14 DIAGNOSIS — M17.11 PRIMARY OSTEOARTHRITIS OF RIGHT KNEE: ICD-10-CM

## 2024-04-14 RX ORDER — PANTOPRAZOLE SODIUM 40 MG/1
40 TABLET, DELAYED RELEASE ORAL DAILY
Qty: 90 TABLET | Refills: 2 | Status: CANCELLED | OUTPATIENT
Start: 2024-04-14

## 2024-04-15 RX ORDER — CLONAZEPAM 1 MG/1
TABLET ORAL
Qty: 90 TABLET | Refills: 0 | Status: SHIPPED | OUTPATIENT
Start: 2024-04-15 | End: 2024-06-27

## 2024-04-16 RX ORDER — MECLIZINE HYDROCHLORIDE 25 MG/1
TABLET ORAL
Qty: 30 TABLET | Refills: 3 | Status: SHIPPED | OUTPATIENT
Start: 2024-04-16 | End: 2024-08-09

## 2024-04-16 RX ORDER — CLONAZEPAM 1 MG/1
TABLET ORAL
Qty: 90 TABLET | Refills: 0 | OUTPATIENT
Start: 2024-04-16

## 2024-04-22 ENCOUNTER — MYC REFILL (OUTPATIENT)
Dept: DERMATOLOGY | Facility: CLINIC | Age: 59
End: 2024-04-22
Payer: COMMERCIAL

## 2024-04-22 DIAGNOSIS — L40.9 PSORIASIS: ICD-10-CM

## 2024-04-22 RX ORDER — HYDROCORTISONE 2.5 %
CREAM (GRAM) TOPICAL
Qty: 28.35 G | Refills: 3 | Status: CANCELLED | OUTPATIENT
Start: 2024-04-22

## 2024-04-23 ENCOUNTER — MYC MEDICAL ADVICE (OUTPATIENT)
Dept: PALLIATIVE MEDICINE | Facility: CLINIC | Age: 59
End: 2024-04-23
Payer: COMMERCIAL

## 2024-04-23 DIAGNOSIS — M06.9 RHEUMATOID ARTHRITIS INVOLVING MULTIPLE SITES, UNSPECIFIED WHETHER RHEUMATOID FACTOR PRESENT (H): ICD-10-CM

## 2024-04-23 DIAGNOSIS — M17.11 PRIMARY OSTEOARTHRITIS OF RIGHT KNEE: ICD-10-CM

## 2024-04-23 DIAGNOSIS — G89.4 CHRONIC PAIN SYNDROME: ICD-10-CM

## 2024-04-23 DIAGNOSIS — F11.90 CHRONIC, CONTINUOUS USE OF OPIOIDS: ICD-10-CM

## 2024-04-23 DIAGNOSIS — M47.819 FACET ARTHROPATHY: ICD-10-CM

## 2024-04-23 NOTE — PATIENT INSTRUCTIONS
RHEUMATOLOGY    [facility]  [address] [place] [address]  [place] [address]    Phone number: [phone]  Fax number: [phone]    If you need a medication refill, please contact us as you may need lab work and/or a follow up visit prior to your refill.      Thank you for choosing [facility]!    Helga Guerrier CMA Rheumatology

## 2024-04-23 NOTE — PROGRESS NOTES
Lamont Daniels  is a 58 year old year old who is being evaluated via a billable video visit.      How would you like to obtain your AVS? MyChart  If the video visit is dropped, the invitation should be resent by: Text to cell phone: [phone]   Will anyone else be joining your video visit? No      Rheumatology Video Visit      Lamont Daniels MRN# [id number]   YOB: 1965 Age: 58 year old      Date of visit: 4/24/24   PCP: Dr. David Chavez  Hepatologist: Dr. Thomas Leventhal, [facility]     Chief Complaint   Patient presents with:  Rheumatoid Arthritis    Assessment and Plan   1.  Seropositive nonerosive rheumatoid arthritis (RF negative, CCP low positive): Note that he does have low back pain but without evidence of SI joint irregularity on x-ray.  Initially with morning stiffness all day, gelling phenomenon, and synovitis.   Previously on Humira (partially effective), Enbrel (partially effective), HCQ (cannot safely monitor due to right retinal scaring and bilateral early macular degeneration, per ophthalmology), Orencia (partially effective and could use again in the future if needed).  MTX avoided per Dr. Laboy, hematology, recommendation. Currently on leflunomide 20 mg daily, SSZ 1500mg BID, Simponi 50 mg SQ every 28 days (improved when changed from Orencia to Simponi).  RA controlled on current regimen and he reports that symptoms are stable but inflammatory markers are higher with these labs.  He reports no joint swelling.  Morning stiffness for no more than 20 minutes.  Follow-up in-office in 3 months.  Chronic illness, stable.    - Continue leflunomide 20 mg daily    - Continue sulfasalazine 1500mg BID  - Continue Simponi 50mg SQ every 28 days  - PRN RA flare only: Prednisone 10 mg daily x2 days, then 5 mg daily x5 days, then stop   - Labs now (he plans to do labs at his PCP appointment tomorrow): CBC, Creatinine, Hepatic Panel, ESR, CRP, HCV ab  - Labs in 3 months: CBC, Creatinine, Hepatic Panel, ESR,  "CRP    High risk medication requiring intensive toxicity monitoring at least quarterly     2. Lower back pain: Lumbar spine MRI in August 2014 showed multilevel degenerative changes and a small disc protrusion at L3/4 with mild spinal canal narrowing; also moderate left and mild right neural foraminal narrowing at L5-S1. He had a nerve conduction study in 2014 that was normal. He has been worked up by neurology in the past without significant neurologic findings. HLA-B27 negative, SI joint x-ray negative. Now following in the pain management clinic where he says that steroid injections were helpful for only 1 month each time and that they had discussed radiofrequency ablation for management of back pain and he is considering this.    3. Myofascial pain syndrome / chronic pain syndrome: diffuse pain consistent with chronic pain syndrome.  Management per pain clinic as well as PCP.  Generally when he is more physically active he feels better, per patient    4. Chronic Hepatitis B: Managed by Dr. Laboy (hepatology) previously, and now Dr. Thomas Leventhal at the [facility]. Currently on tenofovir at the direction of gastroenterology.     5. Hepatic Steatosis: Based on previous liver biopsy.  Follows with gastroenterology.    6. Positive tuberculosis test, history:   This is noted here for historical significance.  He was evaluated by Dr. Anthony Mendoza, infectious disease. The following telephone note was documented by Dr. Mendoza: \"I tried calling th pt, finally we have the records from [place] . It appears he was treated for latent TB with INH for 1 year in 1980/1981 .Later in 1981 April he was admitted at [facility] in [place], [place] with rt sided pneumonia, TB was suspected but he improved with treatment with Penicillin only. Later he was seen  ( likely ID specialist), and was treated with 6 weeks course of Rifampin and Ethambutol pending sputum AFB culture. His AFB cx were negative based " "on the report we have.\"  \"He recently had QFT -TB test which was reported positive and his CXR was clear. Given his documented hx of completion of treatment for latent TB as above , I do not see any need for further treatment. His tests may remain positive irrespective of treatment status. From my perspective he can be started on DMARDs/Biological as deemed necessary.\"       7. Gout: On allopurinol and managed by PCP.    8.  Vaccinations:   - Influenza: encouraged yearly vaccination  - Brkopmm25: up to date  - [id number]: up to date  - Shingrix: Up to date  - COVID-19: advise updating, and to hold sulfasalazine and leflunomide for 2 weeks after the COVID-19 vaccination      9.  Historical: Anxiety: When going outside of the house or considering going outside of the house has increased anxiety that results in shortness of breath and this leads to increased anxiety that leads to more shortness of breath, etc.  6/30/2023 cardiology note by Dr. Hardin documents that the dyspnea is stable, normal echo and PFTs, and that his dyspnea is likely related to anxiety.  Considering that anxiety is keeping him from being more active and going outside the house more frequently, I advised that he consider mental health evaluation and he is in agreement with this.  Possibly cognitive behavioral therapy (or other depending on mental health provider's recommendation) could help reduce anxiety.  Mental health referral was placed previously and he had an appointment on 1/9/2024.  This is documented here for historical significance only and was not discussed again today.    Total minutes spent in evaluation with patient, documentation, , and review of pertinent studies and chart notes: 14  The longitudinal plan of care for the rheumatology problem(s) were addressed during this visit.  Due to added complexity of care, we will continue to support the patient and the subsequent management of this condition with ongoing " continuity of care.      Mr. Daniels verbalized agreement with and understanding of the rational for the diagnosis and treatment plan.  All questions were answered to best of my ability and the patient's satisfaction. Mr. Daniels was advised to contact the clinic with any questions that may arise after the clinic visit.      Thank you for involving me in the care of the patient    Return to clinic: 3-4 months      HPI   Lamont Daniels is a 58 year old male with a medical history significant for hypertension, hyperlipidemia, gout, coronary artery disease s/p 6vCABG, vitamin D deficiency, hepatitis B, obstructive sleep apnea (uses CPAP), and RA who presents for follow-up of RA.    9/22/2023: RA controlled.  Diffuse body ache but no specific joint pain.  Joints without swelling, increased warmth, or overlying erythema.  Minimal physical activity because of dyspnea that is not associate with chest pain/pressure, palpitations, lightheadedness, dizziness, or nausea; he says that sometimes even thinking about going to do more walking or other exercises outside resulted in increased anxiety that results in the dyspnea and he has seen his cardiology about this who suspects that anxiety is the cause of the dyspnea.  Has not seen a mental health provider for anxiety management.  Continues to follow with gastroenterology for hepatitis B.    1/5/2024: Reports that his arthritis is stable.  No joint swelling.  Joints without increased warmth or overlying erythema.  Diffuse body ache including the joints that is similar to previous.  Morning stiffness for no more than 20 minutes.  Biggest issue right now is chronic low back pain that improved with steroid injection for no more than 1 month each time and he says that his pain management provider and he discussed option of radiofrequency ablation for low back pain management and he is considering this option.  He would like to remain on his current regimen for RA because it is  effective.    Today, 4/24/2024: Reports doing well.  No joint pain.  Morning stiffness for 10-30 minutes.  No joint swelling.  Arthritis does not limit daily activities.  He says that he feels well on most days and plans to be more active now that the weather outside is becoming warmer.  He says he always appears tired during visits because he has increased anxiety with any medical appointment; otherwise he says that his anxiety and depression are well-controlled.    Denies fevers, chills, nausea, vomiting, constipation, diarrhea. No abdominal pain. Denies chest pain/pressure, palpitations, or shortness of breath.  No oral or nasal sores. No rash. No LE swelling.  Mild dry mouth; he has good dentition and does not feel like he needs to use frequent sips of water; unchanged since last evaluation. No dry eyes. No eye pain or redness.     Tobacco:  None   EtOH:  None  Drugs:  None  Occupation: Retired   Originally from [facility], then lived just [place], [place], then lived in [place], [place], then moved to [place] for family    ROS   12 point review of system was completed and negative except as noted in the HPI     Active Problem List     Patient Active Problem List   Diagnosis    Coronary atherosclerosis of native coronary artery    Major depressive disorder, recurrent episode, moderate (H)    Anxiety    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)    Chronic viral hepatitis B without delta agent and without coma (H)    Vitamin D deficiency    S/P CABG (coronary artery bypass graft)    Malrotation of intestine (H28)    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    Hyperlipidemia LDL goal <70    Insomnia    Gout    Suicidal ideation    Essential hypertension    Rheumatoid arthritis involving multiple sites, unspecified rheumatoid factor presence    High risk medications (not anticoagulants) long-term use    Midline low back pain without sciatica    Bilateral low back pain with sciatica, sciatica laterality  unspecified    Elevated liver enzymes    On corticosteroid therapy    Essential hypertension with goal blood pressure less than 140/90    Insomnia, unspecified type    Rheumatoid arthritis of multiple sites with negative rheumatoid factor (H)    Benign prostatic hyperplasia with lower urinary tract symptoms, unspecified morphology    Chronic pain syndrome    Syncope, unspecified syncope type    Symptomatic cholelithiasis    H/O: gout    Anxiety and depression    F11.2 - Continuous opioid dependence (H)     Past Medical History     Past Medical History:   Diagnosis Date    Anxiety     CAD (coronary artery disease)     CABG, PCI    Congestive heart failure, unspecified 2008    Depressive disorder 2008    Gout     Hepatitis B > 10 years    HTN (hypertension)     Hyperlipidemia LDL goal < 100     Malrotation of intestine (H28)     Moderate major depression (H)     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) 9/19/2011    Uses CPAP    Rheumatoid arthritis flare (H) 1012    Steatohepatitis 12/15    liver biopsy    Tuberculosis age 13    INH x 9 months     Vitamin D deficiency      Past Surgical History     Past Surgical History:   Procedure Laterality Date    ABDOMEN SURGERY  2014    BIOPSY  2015    BYPASS GRAFT ARTERY CORONARY  2008    6 vessels    COLONOSCOPY  2/8/2013    Procedure: COLONOSCOPY;  Colonoscopy, blood in stool;  Surgeon: Duane, William Charles, MD;  Location:  OR    COMBINED REPAIR PTOSIS WITH BLEPHAROPLASTY BILATERAL Bilateral 6/25/2018    Procedure: COMBINED REPAIR PTOSIS WITH BLEPHAROPLASTY BILATERAL;  Bilateral upper eyelid blepharoplasty, ptosis repair and brow ptosis repair;  Surgeon: Sandra Borja MD;  Location:  OR    GI SURGERY  2014    HEAD & NECK SURGERY  2011    NASAL/SINUS POLYPECTOMY  2010    ORTHOPEDIC SURGERY  2012    REPAIR PTOSIS      REPAIR PTOSIS BILATERAL Bilateral 7/22/2019    Procedure: REPAIR, PTOSIS, BOTH UPPER brows;  Surgeon: Sandra Borja MD;  Location:  OR    REPAIR PTOSIS BROW  BILATERAL Bilateral 6/25/2018    Procedure: REPAIR PTOSIS BROW BILATERAL;;  Surgeon: Sandra Borja MD;  Location: [facility] OR    THORACIC SURGERY  1989    tb    TONSILLECTOMY  2010    UVULOPALATOPHARYNGOPLASTY  2010    VASCULAR SURGERY  2008    ZZHC CORONARY STENT CIERA SOTO ADDTL VESSEL  2008    3 months after CABG     Allergy     Allergies   Allergen Reactions    Citalopram Itching    Tramadol Itching     Current Medication List     Current Outpatient Medications   Medication Sig Dispense Refill    Acetaminophen (TYLENOL PO)       allopurinol (ZYLOPRIM) 300 MG tablet TAKE 1 TABLET BY MOUTH EVERY DAY 90 tablet 3    amLODIPine (NORVASC) 5 MG tablet Take 1 tablet (5 mg) by mouth daily for blood pressure 90 tablet 3    aspirin EC 81 MG EC tablet Take 1 tablet (81 mg) by mouth daily (*) 30 tablet 1    atorvastatin (LIPITOR) 80 MG tablet Take 1 tablet (80 mg) by mouth daily 90 tablet 3    bacitracin 500 UNIT/GM external ointment Apply topically 3 times daily 30 g 3    baclofen (LIORESAL) 10 MG tablet Take 1 tablet (10 mg) by mouth 2 times daily as needed for muscle spasms 3 month supply. 180 tablet 1    busPIRone HCl (BUSPAR) 30 MG tablet TAKE 1 TABLET BY MOUTH 2 TIMES DAILY. 180 tablet 3    BUTRANS 20 MCG/HR WK patch Place 1 patch onto the skin every 7 days LISANDRO, name brand. 28 day script for chronic pain. OK to fill 03/17/24 and start 03/19/24 4 patch 0    Cholecalciferol (VITAMIN D) 2000 UNITS tablet TAKE ONE TABLET BY MOUTH ONCE DAILY 100 tablet 1    clobetasol (TEMOVATE) 0.05 % external cream Apply twice daily for 2 weeks, weekends only for 2 weeks, repeat cycle as needed for hands, not face or genitals 60 g 4    clobetasol (TEMOVATE) 0.05 % external solution Apply twice daily to scalp for up to 2 weeks for flares. 60 mL 0    clonazePAM (KLONOPIN) 1 MG tablet TAKE 0.5-1 TABLETS BY MOUTH 2 TIMES DAILY AS NEEDED FOR ANXIETY 90 tablet 0    clopidogrel (PLAVIX) 75 MG tablet TAKE 1 TABLET BY MOUTH EVERY DAY 90 tablet 3     colchicine (COLCRYS) 0.6 MG tablet TAKE 2 TABLETS BY MOUTH AT THE FIRST SIGN OF FLARE, TAKE 1 ADDITIONAL TABLET ONE HOUR LATER. 90 tablet 1    desvenlafaxine (PRISTIQ) 50 MG 24 hr tablet TAKE 1 TABLET BY MOUTH EVERY DAY 90 tablet 3    diclofenac (VOLTAREN) 1 % topical gel APPLY 4 G TOPICALLY 4 TIMES DAILY AS NEEDED FOR MODERATE PAIN 300 g 0    evolocumab (REPATHA SURECLICK) 140 MG/ML prefilled autoinjector Inject 1 mL (140 mg) Subcutaneous every 14 days 6 mL 3    ezetimibe (ZETIA) 10 MG tablet TAKE 1 TABLET (10 MG) BY MOUTH DAILY. 90 tablet 3    gemfibrozil (LOPID) 600 MG tablet Take 1 tablet (600 mg) by mouth 2 times daily 180 tablet 3    golimumab (SIMPONI) 50 MG/0.5ML auto-injector pen Inject 0.5 mLs (50 mg) Subcutaneous every 28 days . Hold for signs of infection, and seek medical attention. 0.5 mL 4    hydrocortisone (WESTCORT) 0.2 % external cream FOR GENITALS, APPLY TWICE DAILY FOR UP TO 2 WEEKS, TAKE 2 WEEKS OFF THEN REPEAT 60 g 3    hydrocortisone 2.5 % cream FOR FACE, APPLY TWICE DAILY FOR UP TO 2 WEEK FOR FLARES ON THE FACE, TAKE 2 WEEKS OFF 28.35 g 3    leflunomide (ARAVA) 20 MG tablet Take 1 tablet (20 mg) by mouth daily ; Labs required every 8-12 weeks. 90 tablet 2    meclizine (ANTIVERT) 25 MG tablet TAKE 1 TABLET BY MOUTH EVERY 6 HOURS AS NEEDED FOR DIZZINESS. 30 tablet 3    melatonin 3 MG tablet Take 1 tablet (3 mg) by mouth nightly as needed for sleep      pantoprazole (PROTONIX) 40 MG EC tablet TAKE 1 TABLET BY MOUTH EVERY DAY 90 tablet 2    pimecrolimus (ELIDEL) 1 % external cream APPLY TWICE DAILY FOR FACE AND GENITALS 60 g 1    sildenafil (REVATIO) 20 MG tablet TAKE 2 - 5 TABLETS BY MOUTH AS NEEDED 30 MINUTES BEFORE SEXUAL ACTIVITY. MAX 100MG IN 24 HOURS. 450 tablet 3    sulfaSALAzine (AZULFIDINE) 500 MG tablet Take 3 tablets (1,500 mg) by mouth 2 times daily 540 tablet 2    tamsulosin (FLOMAX) 0.4 MG capsule TAKE 2 CAPSULES BY MOUTH EVERY  capsule 3    tenofovir (VIREAD) 300 MG tablet  Take 1 tablet (300 mg) by mouth daily 90 tablet 3    triamcinolone (KENALOG) 0.1 % external ointment APPLY TO AFFECTED AREA TOPICALLY 3 TIMES A DAY 80 g 2    triamcinolone (KENALOG) 0.1 % external ointment Apply to trunk and extremities twice daily for up to 2 weeks for flares 80 g 1    zolpidem (AMBIEN) 5 MG tablet TAKE 1 TABLET (5 MG) BY MOUTH NIGHTLY AS NEEDED FOR SLEEP 30 tablet 5    Incontinence Supply Disposable (DEPEND UNDERWEAR LARGE) MISC Use twice daily. 60 each 11    naloxone (NARCAN) 4 MG/0.1ML nasal spray Spray 1 spray (4 mg) into one nostril alternating nostrils once as needed for opioid reversal every 2-3 minutes until assistance arrives 0.2 mL 0    nitroGLYcerin (NITROSTAT) 0.4 MG sublingual tablet PLACE 1 TAB UNDER THE TONGUE EVERY 5 MINUTES FOR 3 DOSES FOR CHEST PAIN. IF SYMPTOMS PERSIST 5 MINUTES AFTER 1ST DOSE CALL 911. 25 tablet 0    order for DME Equipment being ordered: chair lift 1 each 0    order for DME Equipment being ordered: single end cane 1 Units 0    ORDER FOR DME 1.  CPAP pressure 11 cm/H20 with heated humidity.   2.  Provide mask to fit and CPAP supplies.   3.  Length of need lifetime.   4.  If needed please provide a chin strap            No current facility-administered medications for this visit.       Social History   See HPI    Family History     Family History   Problem Relation Age of Onset    C.A.D. Father     Coronary Artery Disease Father     Hypertension Father     Depression Father     Hypertension Mother     Diabetes Mother     Depression Mother     Mental Illness Mother     Hypertension Maternal Grandfather     Cancer Paternal Grandfather     Other Cancer Paternal Grandfather     Other Cancer Other     Autoimmune Disease No family hx of     Cerebrovascular Disease No family hx of     Thyroid Disease No family hx of     Glaucoma No family hx of     Macular Degeneration No family hx of      No family history of autoimmune disease  No family history of psoriasis     No  "change in family history since the previous clinic visit.     Physical Exam     Temp Readings from Last 3 Encounters:   12/22/23 (!) 96.5  F (35.8  C) (Tympanic)   06/07/23 96.9  F (36.1  C) (Tympanic)   05/31/22 97.2  F (36.2  C) (Tympanic)     BP Readings from Last 5 Encounters:   02/26/24 126/86   12/22/23 129/88   09/21/23 126/80   06/30/23 128/86   06/21/23 133/88     Pulse Readings from Last 1 Encounters:   02/26/24 67     Resp Readings from Last 1 Encounters:   09/21/23 16     Estimated body mass index is 25.61 kg/m  as calculated from the following:    Height as of 1/3/24: 1.575 m (5' 2\").    Weight as of 1/3/24: 63.5 kg (140 lb).      GEN: NAD.  HEENT:  Anicteric, noninjected sclera. No obvious external lesions of the ear and nose. Hearing intact.  PULM: No increased work of breathing  MSK:  Hands and wrists without swelling.  SKIN: No rash or jaundice seen  PSYCH: Alert. Appropriate.          Labs     CBC  Recent Labs   Lab Test 12/27/23  1000 09/14/23  1404 06/07/23  0858 08/23/21  1130 03/29/21  1318 02/27/21  1259 01/30/21  1226   WBC 6.6 7.0 6.6   < > 5.2 4.4 5.5   RBC 4.47 4.51 4.37*   < > 4.72 4.79 5.11   HGB 12.7* 13.0* 12.9*   < > 13.6 13.8 14.5   HCT 39.5* 39.9* 39.6*   < > 42.3 43.1 43.9   MCV 88 89 91   < > 90 90 86   RDW 13.6 14.3 14.6   < > 14.6 14.7 13.5    325 318   < > 245 275 296   MCH 28.4 28.8 29.5   < > 28.8 28.8 28.4   MCHC 32.2 32.6 32.6   < > 32.2 32.0 33.0   NEUTROPHIL 50 55 34   < > 45.1 40.9 50.5   LYMPH 23 29 43   < > 35.5 42.0 33.2   MONOCYTE 22 14 16   < > 15.3 12.6 12.7   EOSINOPHIL 4 1 5   < > 2.9 3.4 2.9   BASOPHIL 1 1 1   < > 1.2 1.1 0.7   ANEU  --   --   --   --  2.3 1.8 2.7   ALYM  --   --   --   --  1.8 1.9 1.8   PRACHI  --   --   --   --  0.8 0.6 0.7   AEOS  --   --   --   --  0.2 0.2 0.2   ABAS  --   --   --   --  0.1 0.1 0.0   ANEUTAUTO 3.3 3.9 2.3   < >  --   --   --    ALYMPAUTO 1.5 2.0 2.9   < >  --   --   --    AMONOAUTO 1.4* 1.0 1.1   < >  --   --   --  "   AEOSAUTO 0.2 0.1 0.3   < >  --   --   --    ABSBASO 0.1 0.1 0.1   < >  --   --   --     < > = values in this interval not displayed.     CMP  Recent Labs   Lab Test 12/27/23  1000 12/22/23  1349 09/14/23  1404 06/07/23  0858 03/22/23  1052 08/23/21  1130 03/29/21  1318 02/27/21  1259 01/30/21  1226    141  --   --  142   < >  --   --   --    POTASSIUM 3.7 3.9  --   --  3.6   < >  --   --   --    CHLORIDE 105 105  --   --  110*   < >  --   --   --    CO2 24 22  --   --  30   < >  --   --   --    ANIONGAP 12 14  --   --  2*   < >  --   --   --    * 111*  --   --  113*   < >  --   --   --    BUN 16.3 13.4  --   --  13   < >  --   --   --    CR 0.76 0.82 0.94   < > 0.88   < > 1.01 0.95 0.94   GFRESTIMATED >90 >90 >90   < > >90   < > 83 89 >90   GFRESTBLACK  --   --   --   --   --   --  >90 >90 >90   HEMANT 9.2 9.6  --   --  8.8   < >  --   --   --    BILITOTAL 0.4 0.5 0.4   < > 0.4   < > 0.5 0.4 0.4   ALBUMIN 4.1 4.3 4.2   < > 3.7   < > 3.9 4.1 4.0   PROTTOTAL 7.6 7.7 7.3   < > 7.1   < > 7.6 7.6 7.6   ALKPHOS 105 115 99   < > 121   < > 116 102 114   AST 16 22 25   < > 18   < > 20 23 37   ALT 12 16 19   < > 19   < > 31 27 46    < > = values in this interval not displayed.     Calcium/VitaminD  Recent Labs   Lab Test 12/27/23  1000 12/22/23  1349 03/22/23  1052 12/04/17  0924 07/13/17  1246   HEMANT 9.2 9.6 8.8   < >  --    VITDT  --   --   --   --  34    < > = values in this interval not displayed.     ESR/CRP  Recent Labs   Lab Test 12/27/23  1000 09/14/23  1404 06/07/23  0858 12/17/22  1244 09/14/22  1037 07/15/22  1126   SED 59* 24* 18 15 41* 84*   CRP  --   --   --  <2.9 5.4 4.7   CRPI 9.28* <3.00 <3.00  --   --   --      Lipid Panel  Recent Labs   Lab Test 12/22/23  1349 03/22/23  1052 06/07/22  0831   CHOL 117 111 128   TRIG 79 125 122   HDL 42 50 55   LDL 59 36 49   NHDL 75 61 73     Hepatitis B  Recent Labs   Lab Test 12/27/23  1000 03/22/23  1052 04/26/22  1218 10/20/21  1144 10/20/21  1143  "03/19/21  0928 03/26/20  1039 10/07/19  0940 12/01/16  1429 10/05/16  0954   AUSAB  --   --   --   --  0.09  --   --   --   --  0.45   HEPBANG  --  Reactive*  --   --   --   --   --   --   --  Reactive*   HBQLOG  --   --   --   --   --  Not Calculated <1.3 Not Calculated   < > 5.1*   HBQRES Not Detected Not Detected Not Detected   < >  --  HBV DNA Not Detected <20* HBV DNA Not Detected   < > 140,872*    < > = values in this interval not displayed.     Hepatitis C  Recent Labs   Lab Test 04/07/16  1538   HCVAB Nonreactive   Assay performance characteristics have not been established for newborns,   infants, and children       Lyme ab screening  Recent Labs   Lab Test 07/18/17  1330   LYMEGM 0.19     Tuberculosis Screening  Recent Labs   Lab Test 04/07/16  1538   TBRSLT Positive   The magnitude of the measured IFN-gamma level cannot be correlated to stage or   degree of infection, level of immune responsiveness, or likelihood for   progression to active disease.  *   TBAGN >10.00  This is a qualitative test.  The TB antigen IU/mL value is required for   documentation on certain government reporting forms but this value should not   be used to monitor disease progression or response to therapy.   Diagnosing or excluding tuberculosis disease, and assessing the probability of   LTBI, require a combination of epidemiological, historical, medical and   diagnostic findings that should be taken into account when interpreting   QuantiFERON TB results.         \"HAND BILATERAL THREE OR MORE VIEWS 11/4/2015 4:55 PM   HISTORY: Pain in unspecified joint.  COMPARISON: None.  IMPRESSION  IMPRESSION: Normal.  DANIS ANGLIN MD\"    \"FOOT BILATERAL THREE OR MORE VIEWS 11/4/2015 4:55 PM   HISTORY: Pain in unspecified joint.  COMPARISON: 8/21/2012.  IMPRESSION  IMPRESSION: Small traction spurs are seen off the Achilles tendon and  plantar fascial attachment sites bilaterally. Joint spaces are  preserved. Osseous structures are intact. " "Incidental note is made of  some surgical staples in the soft tissues of the medial distal right  lower extremity.  DANIS ANGLIN MD\"      Immunization History     Immunization History   Administered Date(s) Administered    COVID-19 12+ (2023-24) (Pfizer) 12/16/2023    COVID-19 Bivalent 12+ (Pfizer) 09/15/2022, 08/01/2023    COVID-19 MONOVALENT 12+ (Pfizer) 03/16/2021, 04/06/2021, 10/09/2021, 04/21/2022    Influenza (IIV3) PF 10/11/2011, 09/20/2017, 09/01/2018    Influenza Vaccine 18-64 (Flublok) 09/23/2021    Influenza Vaccine >6 months,quad, PF 09/27/2015, 09/08/2016, 09/30/2019, 09/14/2020, 10/19/2022    Influenza Vaccine, 6+MO IM (QUADRIVALENT W/PRESERVATIVES) 11/17/2014    Pneumo Conj 13-V (2010&after) 04/07/2016    Pneumococcal 20 valent Conjugate (Prevnar 20) 06/07/2023    Pneumococcal 23 valent 12/12/2014    TDAP (Adacel,Boostrix) 10/20/2021    TDAP Vaccine (Adacel) 08/30/2011    Zoster recombinant adjuvanted (SHINGRIX) 07/27/2019, 04/22/2021          Chart documentation done in part with Dragon Voice recognition Software. Although reviewed after completion, some word and grammatical error may remain.        Video-Visit Details    Type of service:  Video Visit    Video Start Time: 10:08 AM    Video End Time: 10:18 AM    Originating Location (pt. Location): Home, [place]    Distant Location (provider location):  off site, [place]    Platform used for Video Visit: Nilsa Jenkins MD    "

## 2024-04-23 NOTE — TELEPHONE ENCOUNTER
Please process a refill of BUTRANS 20 MCG/HR WK patch  to     [id number] - [place], [place] - [address]  [address]  [place] [address]  Phone: [phone] Fax: [phone]

## 2024-04-24 ENCOUNTER — VIRTUAL VISIT (OUTPATIENT)
Dept: RHEUMATOLOGY | Facility: CLINIC | Age: 59
End: 2024-04-24
Payer: COMMERCIAL

## 2024-04-24 DIAGNOSIS — M06.09 RHEUMATOID ARTHRITIS OF MULTIPLE SITES WITH NEGATIVE RHEUMATOID FACTOR (H): Primary | ICD-10-CM

## 2024-04-24 DIAGNOSIS — Z79.899 HIGH RISK MEDICATION USE: ICD-10-CM

## 2024-04-24 PROCEDURE — 99214 OFFICE O/P EST MOD 30 MIN: CPT | Mod: 95 | Performed by: INTERNAL MEDICINE

## 2024-04-24 RX ORDER — LEFLUNOMIDE 20 MG/1
20 TABLET ORAL DAILY
Qty: 90 TABLET | Refills: 2 | Status: SHIPPED | OUTPATIENT
Start: 2024-04-24

## 2024-04-24 RX ORDER — BUPRENORPHINE 20 UG/H
1 PATCH, EXTENDED RELEASE TRANSDERMAL
Qty: 4 PATCH | Refills: 0 | Status: SHIPPED | OUTPATIENT
Start: 2024-04-24 | End: 2024-05-17

## 2024-04-24 RX ORDER — SULFASALAZINE 500 MG/1
1500 TABLET ORAL 2 TIMES DAILY
Qty: 540 TABLET | Refills: 2 | Status: SHIPPED | OUTPATIENT
Start: 2024-04-24

## 2024-04-24 NOTE — TELEPHONE ENCOUNTER
Received call from patient requesting refill(s) of BUTRANS 20 MCG/HR WK patch    Last dispensed from pharmacy on 3/25/2024.    Patient's last office/virtual visit by prescribing provider on 2/26/2024.  Next office/virtual appointment scheduled for 5/28/2024.    Last urine drug screen date 2/26/2024.  Current opioid agreement on file (completed within the last year) Yes Date of opioid agreement: 2/26/2024.    E-prescribe to:    [id number] [place] - [address]    Will route to nursing pool for review and preparation of prescription(s).

## 2024-04-24 NOTE — TELEPHONE ENCOUNTER
Medication refill information reviewed.     Due date for BUTRANS 20 MCG/HR WK patch  is 04/24/24     Prescriptions prepped for review.     Will route to provider.

## 2024-04-24 NOTE — TELEPHONE ENCOUNTER
Signed Prescriptions:                        Disp   Refills    BUTRANS 20 MCG/HR WK patch                 4 patch0        Sig: Place 1 patch onto the skin every 7 days LISANDRO, name           brand. 28 day script for chronic pain. OK to           fill/start 04/24/24  Authorizing Provider: AMEENA PAGE        Reviewed [place]  April 24, 2024- no concerning fills.    Ameena LEMUS, RN CNP, FNP  [facility]  [facility] [place]

## 2024-04-25 ENCOUNTER — OFFICE VISIT (OUTPATIENT)
Dept: FAMILY MEDICINE | Facility: CLINIC | Age: 59
End: 2024-04-25
Payer: COMMERCIAL

## 2024-04-25 VITALS
HEART RATE: 81 BPM | BODY MASS INDEX: 28.16 KG/M2 | HEIGHT: 62 IN | TEMPERATURE: 97.4 F | WEIGHT: 153 LBS | SYSTOLIC BLOOD PRESSURE: 108 MMHG | OXYGEN SATURATION: 95 % | DIASTOLIC BLOOD PRESSURE: 70 MMHG | RESPIRATION RATE: 16 BRPM

## 2024-04-25 DIAGNOSIS — M06.09 RHEUMATOID ARTHRITIS OF MULTIPLE SITES WITH NEGATIVE RHEUMATOID FACTOR (H): ICD-10-CM

## 2024-04-25 DIAGNOSIS — F33.1 MAJOR DEPRESSIVE DISORDER, RECURRENT EPISODE, MODERATE (H): Primary | ICD-10-CM

## 2024-04-25 DIAGNOSIS — I10 ESSENTIAL HYPERTENSION WITH GOAL BLOOD PRESSURE LESS THAN 140/90: ICD-10-CM

## 2024-04-25 DIAGNOSIS — Z79.899 HIGH RISK MEDICATION USE: ICD-10-CM

## 2024-04-25 LAB
BASOPHILS # BLD AUTO: 0.1 10E3/UL (ref 0–0.2)
BASOPHILS NFR BLD AUTO: 1 %
EOSINOPHIL # BLD AUTO: 0.3 10E3/UL (ref 0–0.7)
EOSINOPHIL NFR BLD AUTO: 5 %
ERYTHROCYTE [DISTWIDTH] IN BLOOD BY AUTOMATED COUNT: 14.5 % (ref 10–15)
ERYTHROCYTE [SEDIMENTATION RATE] IN BLOOD BY WESTERGREN METHOD: 16 MM/HR (ref 0–20)
HCT VFR BLD AUTO: 40.6 % (ref 40–53)
HGB BLD-MCNC: 12.9 G/DL (ref 13.3–17.7)
IMM GRANULOCYTES # BLD: 0 10E3/UL
IMM GRANULOCYTES NFR BLD: 0 %
LYMPHOCYTES # BLD AUTO: 2 10E3/UL (ref 0.8–5.3)
LYMPHOCYTES NFR BLD AUTO: 36 %
MCH RBC QN AUTO: 27.9 PG (ref 26.5–33)
MCHC RBC AUTO-ENTMCNC: 31.8 G/DL (ref 31.5–36.5)
MCV RBC AUTO: 88 FL (ref 78–100)
MONOCYTES # BLD AUTO: 1.1 10E3/UL (ref 0–1.3)
MONOCYTES NFR BLD AUTO: 19 %
NEUTROPHILS # BLD AUTO: 2.2 10E3/UL (ref 1.6–8.3)
NEUTROPHILS NFR BLD AUTO: 39 %
PLATELET # BLD AUTO: 314 10E3/UL (ref 150–450)
RBC # BLD AUTO: 4.63 10E6/UL (ref 4.4–5.9)
WBC # BLD AUTO: 5.6 10E3/UL (ref 4–11)

## 2024-04-25 PROCEDURE — 85025 COMPLETE CBC W/AUTO DIFF WBC: CPT | Performed by: FAMILY MEDICINE

## 2024-04-25 PROCEDURE — 80076 HEPATIC FUNCTION PANEL: CPT | Performed by: FAMILY MEDICINE

## 2024-04-25 PROCEDURE — 82565 ASSAY OF CREATININE: CPT | Performed by: FAMILY MEDICINE

## 2024-04-25 PROCEDURE — 99214 OFFICE O/P EST MOD 30 MIN: CPT | Performed by: FAMILY MEDICINE

## 2024-04-25 PROCEDURE — 36415 COLL VENOUS BLD VENIPUNCTURE: CPT | Performed by: FAMILY MEDICINE

## 2024-04-25 PROCEDURE — 86140 C-REACTIVE PROTEIN: CPT | Performed by: FAMILY MEDICINE

## 2024-04-25 PROCEDURE — 85652 RBC SED RATE AUTOMATED: CPT | Performed by: FAMILY MEDICINE

## 2024-04-25 PROCEDURE — 86803 HEPATITIS C AB TEST: CPT | Performed by: FAMILY MEDICINE

## 2024-04-25 RX ORDER — DESVENLAFAXINE 100 MG/1
100 TABLET, EXTENDED RELEASE ORAL DAILY
Qty: 90 TABLET | Refills: 3 | Status: SHIPPED | OUTPATIENT
Start: 2024-04-25

## 2024-04-25 SDOH — HEALTH STABILITY: PHYSICAL HEALTH: ON AVERAGE, HOW MANY DAYS PER WEEK DO YOU ENGAGE IN MODERATE TO STRENUOUS EXERCISE (LIKE A BRISK WALK)?: 0 DAYS

## 2024-04-25 ASSESSMENT — PAIN SCALES - GENERAL: PAINLEVEL: MODERATE PAIN (5)

## 2024-04-25 ASSESSMENT — SOCIAL DETERMINANTS OF HEALTH (SDOH): HOW OFTEN DO YOU GET TOGETHER WITH FRIENDS OR RELATIVES?: ONCE A WEEK

## 2024-04-25 NOTE — COMMUNITY RESOURCES LIST (ENGLISH)
April 25, 2024           YOUR PERSONALIZED LIST OF SERVICES & PROGRAMS           & RECREATION    Sports      [facility] - [facility] Recreation  [address] [place] [address] (Distance: 5.9 miles)  Phone: [phone]  Website: https://www.[facility].gov/acc  Language: English  Fee: Free, Self pay      of the [place] - Sports clubs and recreational activities - [facility]  [address] [place] [address] (Distance: 5.9 miles)  Language: English  Fee: Self pay, Sliding scale      [facility] - Adult Enrichment  Phone: [phone]  Website: https://Photozeen/adults-seniors/adult-enrichment/  Language: English  Hours: Mon 7:30 AM - 4:00 PM Tue 7:30 AM - 4:00 PM Wed 7:30 AM - 4:00 PM Thu 7:30 AM - 4:00 PM Fri 7:30 AM - 4:00 PM    Classes/Groups      [facility] - Personal training  [address] [place] [address] (Distance: 1.8 miles)  Phone: [phone]  Website: https://www.Cogo/  Language: English, Bhutanese  Fee: Sliding scale, Self pay, [facility] Re[facility] and [place] [facility] - Yoga or Pilates classes  [address] [place] [address] (Distance: 4.3 miles)  Phone: [phone]  Website: http://[employer]  Language: English  Fee: Self pay  Accessibility: Ada accessible, Translation services      [facility] - Adult Enrichment  Phone: [phone]  Website: https://Photozeen/adults-seniors/adult-enrichment/  Language: English  Hours: Mon 7:30 AM - 4:00 PM Tue 7:30 AM - 4:00 PM Wed 7:30 AM - 4:00 PM Thu 7:30 AM - 4:00 PM Fri 7:30 AM - 4:00 PM               IMPORTANT NUMBERS & WEBSITES        Emergency Services  911  .   United Way  211 http://211unitedway.org  .   Poison Control  (337) 241-7461 http://[place]poison.org http://[place]poison.org  .     Suicide and Crisis Lifeline  988 http://988[facility] Centerline.org  .   [facility] Child Abuse  Hotline  103.447.1159 http://Childhelphotline.org   .   National Sexual Assault Hotline  (319) 891-6484 (HOPE) http://Rainn.org   .     National Runaway Safeline  (148) 538-4477 (RUNAWAY) http://500Friends.SOMA [place]  .   Pregnancy & Postpartum Support  Call/text 637-186-5205  [place]: http://ppsupportmn.org  [place]: http://psichapters.com/wi  .   Substance Abuse National Helpline ([facility])  430-593-HELP (2170) http://Findtreatment.gov   .                DISCLAIMER: These resources have been generated via the GetMaid Platform. GetMaid does not endorse any service providers mentioned in this resource list. GetMaid does not guarantee that the services mentioned in this resource list will be available to you or will improve your health or wellness.    [facility]

## 2024-04-25 NOTE — PATIENT INSTRUCTIONS
At [facility], we strive to deliver an exceptional experience to you, every time we see you. If you receive a survey, please complete it as we do value your feedback.  If you have MyChart, you can expect to receive results automatically within 24 hours of their completion.  Your provider will send a note interpreting your results as well.   If you do not have MyChart, you should receive your results in about a week by mail.    Your care team:                            Family Medicine Internal Medicine   MD Noam Dorado, MD Zainab Coreas, MD Niki Lopez, PA-C    David Chavez, MD Pediatrics   Robbin Trevino, PA-C  Yelena Ochoa, MD Danitza Osorio APRMARIAH Huynh CNP, CNP, MD Mickey Clayton, MD Corie Payne, CNP  Same-Day (No follow up visit)   Vineet Easley, PAVEL Hayes, PA-C    Melody Stauffer PA-C     Clinic hours: Monday - Thursday 7 am-6 pm; Fridays 7 am-5 pm.   Urgent care: Monday - Friday 10 am- 8 pm; Saturday and Sunday 9 am-5 pm.    Clinic: [phone]       [facility]: Monday - Thursday 8 am - 7 pm; Friday 8 am - 6 pm  [facility]: [phone]

## 2024-04-25 NOTE — PROGRESS NOTES
Carito Miguel is a 58 year old, presenting for the following health issues:  Depression and Hypertension        4/25/2024    12:37 PM   Additional Questions   Roomed by Jude   Accompanied by Self     HPI       Hypertension Follow-up    Do you check your blood pressure regularly outside of the clinic? Yes   Are you following a low salt diet? Yes  Are your blood pressures ever more than 140 on the top number (systolic) OR more   than 90 on the bottom number (diastolic), for example 140/90? Yes    Depression   How are you doing with your depression since your last visit? No change  Are you having other symptoms that might be associated with depression? No  Have you had a significant life event?  No   Are you feeling anxious or having panic attacks?   No  Do you have any concerns with your use of alcohol or other drugs? No    Social History     Tobacco Use    Smoking status: Never    Smokeless tobacco: Never   Vaping Use    Vaping status: Never Used   Substance Use Topics    Alcohol use: No     Alcohol/week: 0.0 standard drinks of alcohol    Drug use: No         1/2/2024     8:30 AM 1/8/2024     4:10 PM 4/25/2024    12:26 PM   PHQ   PHQ-9 Total Score 19 19 15   Q9: Thoughts of better off dead/self-harm past 2 weeks Several days Several days Several days   F/U: Thoughts of suicide or self-harm No Yes No   F/U: Self harm-plan  Yes    F/U: Self-harm action  Yes    F/U: Safety concerns No Yes No         3/19/2021     9:26 AM 12/22/2023     1:09 PM 1/8/2024     4:11 PM   THONG-7 SCORE   Total Score  16 (severe anxiety) 20 (severe anxiety)   Total Score 17 16 20         4/25/2024    12:26 PM   Last PHQ-9   1.  Little interest or pleasure in doing things 2   2.  Feeling down, depressed, or hopeless 1   3.  Trouble falling or staying asleep, or sleeping too much 2   4.  Feeling tired or having little energy 2   5.  Poor appetite or overeating 1   6.  Feeling bad about yourself 2   7.  Trouble concentrating 3   8.  Moving  "slowly or restless 1   Q9: Thoughts of better off dead/self-harm past 2 weeks 1   PHQ-9 Total Score 15   In the past two weeks have you had thoughts of suicide or self harm? No   Do you have concerns about your personal safety or the safety of others? No       Suicide Assessment Five-step Evaluation and Treatment (SAFE-T)    How many servings of fruits and vegetables do you eat daily?  2-3  On average, how many sweetened beverages do you drink each day (Examples: soda, juice, sweet tea, etc.  Do NOT count diet or artificially sweetened beverages)?   1  How many days per week do you exercise enough to make your heart beat faster? none  How many minutes a day do you exercise enough to make your heart beat faster? N/A  How many days per week do you miss taking your medication? 0        Review of Systems  Constitutional, HEENT, cardiovascular, pulmonary, GI, , musculoskeletal, neuro, skin, endocrine and psych systems are negative, except as otherwise noted.      Objective    /70 (BP Location: Left arm, Patient Position: Chair, Cuff Size: Adult Regular)   Pulse 81   Temp 97.4  F (36.3  C) (Tympanic)   Resp 16   Ht 1.575 m (5' 2\")   Wt 69.4 kg (153 lb)   SpO2 95%   BMI 27.98 kg/m    Body mass index is 27.98 kg/m .  Physical Exam   GENERAL: alert and no distress  NECK: no adenopathy, no asymmetry, masses, or scars  RESP: lungs clear to auscultation - no rales, rhonchi or wheezes  CV: regular rate and rhythm, normal S1 S2, no S3 or S4, no murmur, click or rub, no peripheral edema  ABDOMEN: soft, nontender, no hepatosplenomegaly, no masses and bowel sounds normal  MS: no gross musculoskeletal defects noted, no edema    A/P:  (F33.1) Major depressive disorder, recurrent episode, moderate (H)  (primary encounter diagnosis)  Comment:   Plan: desvenlafaxine (PRISTIQ) 100 MG 24 hr tablet        Not controlled. Increased Pristiq from 50 mg daily to 100 mg daily. Recheck in June as schedule.    (I10) Essential " hypertension with goal blood pressure less than 140/90  Comment:   Plan: controlled. Continue with current medications.             Signed Electronically by: David Chavez MD, MD    Answers submitted by the patient for this visit:  Patient Health Questionnaire (Submitted on 4/25/2024)  If you checked off any problems, how difficult have these problems made it for you to do your work, take care of things at home, or get along with other people?: Extremely difficult  PHQ9 TOTAL SCORE: 15

## 2024-04-25 NOTE — COMMUNITY RESOURCES LIST (ENGLISH)
April 25, 2024           YOUR PERSONALIZED LIST OF SERVICES & PROGRAMS           & RECREATION    Sports      [facility] - [facility] Re[facility]  [address] [place] [address] (Distance: 5.9 miles)  Phone: [phone]  Website: https://www.[facility].gov/acc  Language: English  Fee: Free, Self pay      of the [place] - Sports clubs and recreational activities - [facility]  [address] [place] [address] (Distance: 5.9 miles)  Language: English  Fee: Self pay, Sliding scale      [employer]  Website: http://www.scPharmaceuticals.org    Classes/Groups      [facility] - Personal training  [address] [place] [address] (Distance: 1.8 miles)  Phone: [phone]  Website: https://www.JamLegend/  Language: English, Georgian  Fee: Sliding scale, Self pay, Insurance      [facility] and [place] [facility] - Yoga or Pilates classes  [address] [place] [address] (Distance: 4.3 miles)  Phone: [phone]  Website: http://[employer]  Language: English  Fee: Self pay  Accessibility: Ada accessible, Translation services      [facility] - Adult Enrichment  Phone: [phone]  Website: https://[facility]/adults-seniors/adult-enrichment/  Language: English  Hours: Mon 7:30 AM - 4:00 PM Tue 7:30 AM - 4:00 PM Wed 7:30 AM - 4:00 PM Thu 7:30 AM - 4:00 PM Fri 7:30 AM - 4:00 PM               IMPORTANT NUMBERS & WEBSITES        Emergency Services  911  .   United Way  211 http://211unitedway.org  .   Poison Control  (196) 879-1613 http://[place]poison.org http://wis[place]poison.org  .     Suicide and Crisis Lifeline  988 http://988lifeline.org  .   Childhelp National Child Abuse Hotline  656.358.1170 http://Childhelphotline.org   .   National Sexual Assault Hotline  (371) 434-2950 (HOPE) http://Rainn.org   .     National Runaway Safeline  (724) 205-3472 (RUNAWAY)  http://1800runaway.org  .   Pregnancy & Postpartum Support  Call/text 737-598-3767  [place]: http://ppsupportmn.org  [place]: http://psichapters.com/wi  .   Substance Abuse National Helpline ([facility])  775-379-HELP (1671) http://Findtreatment.gov   .                DISCLAIMER: These resources have been generated via the CareLuLu Platform. CareLuLu does not endorse any service providers mentioned in this resource list. CareLuLu does not guarantee that the services mentioned in this resource list will be available to you or will improve your health or wellness.    [facility]

## 2024-04-26 LAB
ALBUMIN SERPL BCG-MCNC: 4.4 G/DL (ref 3.5–5.2)
ALP SERPL-CCNC: 99 U/L (ref 40–150)
ALT SERPL W P-5'-P-CCNC: 19 U/L (ref 0–70)
AST SERPL W P-5'-P-CCNC: 28 U/L (ref 0–45)
BILIRUB DIRECT SERPL-MCNC: <0.2 MG/DL (ref 0–0.3)
BILIRUB SERPL-MCNC: 0.3 MG/DL
CREAT SERPL-MCNC: 0.84 MG/DL (ref 0.67–1.17)
CRP SERPL-MCNC: <3 MG/L
EGFRCR SERPLBLD CKD-EPI 2021: >90 ML/MIN/1.73M2
HCV AB SERPL QL IA: NONREACTIVE
PROT SERPL-MCNC: 7.3 G/DL (ref 6.4–8.3)

## 2024-04-29 ENCOUNTER — TELEPHONE (OUTPATIENT)
Dept: CARDIOLOGY | Facility: CLINIC | Age: 59
End: 2024-04-29
Payer: COMMERCIAL

## 2024-04-29 DIAGNOSIS — I25.810 CORONARY ARTERY DISEASE INVOLVING AUTOLOGOUS ARTERY CORONARY BYPASS GRAFT WITHOUT ANGINA PECTORIS: ICD-10-CM

## 2024-04-29 RX ORDER — EZETIMIBE 10 MG/1
10 TABLET ORAL DAILY
Qty: 90 TABLET | Refills: 3 | Status: SHIPPED | OUTPATIENT
Start: 2024-04-29

## 2024-04-29 NOTE — TELEPHONE ENCOUNTER
Left Voicemail (1st Attempt) and Sent [email or web address] (1st Attempt) for the patient to call back and schedule the following:    Appointment type: Return Provider Change  Provider: Dr. Cobb or Juanita Pierre  Return date: next available  Specialty phone number: [phone]  Additional appointment(s) needed:   Additonal Notes:     Attempted to reschedule the appointment with Dr. Cobb on 7/24/2024, provider will be out. May reschedule to be with Juanita Pierre.    Also sent a "[employer] [employer] message.    Paula MALONE/Complex Procedure    [facility]   Neurology, NeuroSurgery, NeuroPsychology, Pain Management and Cardiology Specialties  Medical/Surgical Adult Specialties

## 2024-05-02 ENCOUNTER — DOCUMENTATION ONLY (OUTPATIENT)
Dept: SLEEP MEDICINE | Facility: CLINIC | Age: 59
End: 2024-05-02
Payer: COMMERCIAL

## 2024-05-02 NOTE — PROGRESS NOTES
5/2/24-JL- HE STATED HIS ELITE S9 STOPPED WORKING. HE HAS APPT ON 7/19/24 FOR PSG TITRATION.  HE DOES NOT WANT TO COMPLETE PAP RENTAL AT THIS TIME. ( HE SAYS IT TO EXPENSIVE FOR HIM)   HE WILL WAIT UNTIL AFTER PSG TO GET RX FOR REPLACEMENT CPAP.  HE WILL CALL [facility] IF HE CHANGES HIS MIND.

## 2024-05-10 ENCOUNTER — LAB (OUTPATIENT)
Dept: LAB | Facility: CLINIC | Age: 59
End: 2024-05-10
Payer: COMMERCIAL

## 2024-05-10 ENCOUNTER — ANCILLARY PROCEDURE (OUTPATIENT)
Dept: ULTRASOUND IMAGING | Facility: CLINIC | Age: 59
End: 2024-05-10
Attending: PHYSICIAN ASSISTANT
Payer: COMMERCIAL

## 2024-05-10 DIAGNOSIS — B18.1 CHRONIC VIRAL HEPATITIS B WITHOUT DELTA AGENT AND WITHOUT COMA (H): ICD-10-CM

## 2024-05-10 DIAGNOSIS — Z79.899 HIGH RISK MEDICATIONS (NOT ANTICOAGULANTS) LONG-TERM USE: ICD-10-CM

## 2024-05-10 LAB
AFP SERPL-MCNC: 1.9 NG/ML
ALBUMIN SERPL BCG-MCNC: 4.3 G/DL (ref 3.5–5.2)
ALP SERPL-CCNC: 93 U/L (ref 40–150)
ALT SERPL W P-5'-P-CCNC: <5 U/L (ref 0–70)
ANION GAP SERPL CALCULATED.3IONS-SCNC: 8 MMOL/L (ref 7–15)
AST SERPL W P-5'-P-CCNC: 21 U/L (ref 0–45)
BILIRUB DIRECT SERPL-MCNC: <0.2 MG/DL (ref 0–0.3)
BILIRUB SERPL-MCNC: 0.3 MG/DL
BUN SERPL-MCNC: 13.5 MG/DL (ref 6–20)
CALCIUM SERPL-MCNC: 9.3 MG/DL (ref 8.6–10)
CHLORIDE SERPL-SCNC: 108 MMOL/L (ref 98–107)
CREAT SERPL-MCNC: 0.84 MG/DL (ref 0.67–1.17)
DEPRECATED HCO3 PLAS-SCNC: 27 MMOL/L (ref 22–29)
EGFRCR SERPLBLD CKD-EPI 2021: >90 ML/MIN/1.73M2
ERYTHROCYTE [DISTWIDTH] IN BLOOD BY AUTOMATED COUNT: 14.9 % (ref 10–15)
GLUCOSE SERPL-MCNC: 92 MG/DL (ref 70–99)
HCT VFR BLD AUTO: 39.3 % (ref 40–53)
HGB BLD-MCNC: 12.4 G/DL (ref 13.3–17.7)
INR PPP: 1.19 (ref 0.85–1.15)
MCH RBC QN AUTO: 28.2 PG (ref 26.5–33)
MCHC RBC AUTO-ENTMCNC: 31.6 G/DL (ref 31.5–36.5)
MCV RBC AUTO: 90 FL (ref 78–100)
PLATELET # BLD AUTO: 284 10E3/UL (ref 150–450)
POTASSIUM SERPL-SCNC: 3.7 MMOL/L (ref 3.4–5.3)
PROT SERPL-MCNC: 7.3 G/DL (ref 6.4–8.3)
RBC # BLD AUTO: 4.39 10E6/UL (ref 4.4–5.9)
SODIUM SERPL-SCNC: 143 MMOL/L (ref 135–145)
WBC # BLD AUTO: 5.4 10E3/UL (ref 4–11)

## 2024-05-10 PROCEDURE — 87517 HEPATITIS B DNA QUANT: CPT

## 2024-05-10 PROCEDURE — 36415 COLL VENOUS BLD VENIPUNCTURE: CPT

## 2024-05-10 PROCEDURE — 82248 BILIRUBIN DIRECT: CPT

## 2024-05-10 PROCEDURE — 82105 ALPHA-FETOPROTEIN SERUM: CPT

## 2024-05-10 PROCEDURE — 85027 COMPLETE CBC AUTOMATED: CPT

## 2024-05-10 PROCEDURE — 76705 ECHO EXAM OF ABDOMEN: CPT | Performed by: RADIOLOGY

## 2024-05-10 PROCEDURE — 80053 COMPREHEN METABOLIC PANEL: CPT

## 2024-05-10 PROCEDURE — 85610 PROTHROMBIN TIME: CPT

## 2024-05-13 LAB — HBV DNA SERPL NAA+PROBE-ACNC: NOT DETECTED IU/ML

## 2024-05-16 NOTE — PROGRESS NOTES
[facility] Cardiology   Cardiology Clinic Note      HPI:   Mr. Lamont Daniels is a pleasant 58 year old male with medical history pertinent for premature CAD with CABG 2008 and several PCIs, HLD, HTN, JEROD on CPAP, and RA. He presents to cardiology clinic for annual follow up.    Patient was last in cardiology 6/30/23 by Dr. Travis. At that time, he was doing well without any cardiac illnesses or hospitalizations. He was encouraged to increase his activity level to improve his heart health.  Since his last visit, he reports feeling well overall and denies any symptoms or complaints. He reports he is mainly here to establish a provider to refill his Repatha.   He reports that his blood pressures tend to run 120s/80s. He states that some time over the last year, his PCP stopped his amlodipine due to dizziness, but his blood pressures became very high and he therefore restarted it. He still occasionally experiences episodes of dizziness, but states they are not bothersome.   As for activity, he reports that he is trying to walk outside in his backyard more often, especially now that the weather is warmer.     Today in clinic, he denies chest pain, palpitations, syncope, or lower extremity edema.       PAST MEDICAL HISTORY:  Past Medical History:   Diagnosis Date    Anxiety     CAD (coronary artery disease)     CABG, PCI    Congestive heart failure, unspecified 2008    Depressive disorder 2008    Gout     Hepatitis B > 10 years    HTN (hypertension)     Hyperlipidemia LDL goal < 100     Malrotation of intestine (H28)     Moderate major depression (H)     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) 9/19/2011    Uses CPAP    Rheumatoid arthritis flare (H) 1012    Steatohepatitis 12/15    liver biopsy    Tuberculosis age 13    INH x 9 months     Vitamin D deficiency        FAMILY HISTORY:  Family History   Problem Relation Age of Onset    C.A.D. Father     Coronary Artery Disease Father     Hypertension Father     Depression Father     Hypertension  Mother     Diabetes Mother     Depression Mother     Mental Illness Mother     Hypertension Maternal Grandfather     Cancer Paternal Grandfather     Other Cancer Paternal Grandfather     Other Cancer Other     Autoimmune Disease No family hx of     Cerebrovascular Disease No family hx of     Thyroid Disease No family hx of     Glaucoma No family hx of     Macular Degeneration No family hx of        SOCIAL HISTORY:  Social History     Socioeconomic History    Marital status:     Number of children: 5    Years of education: 14   Occupational History    Occupation: [employer]     Employer: UNEMPLOYED     Comment: 2-2011. On long term disability. SSD applied for   Tobacco Use    Smoking status: Never    Smokeless tobacco: Never   Vaping Use    Vaping status: Never Used   Substance and Sexual Activity    Alcohol use: No     Alcohol/week: 0.0 standard drinks of alcohol    Drug use: No    Sexual activity: Yes     Partners: Female   Other Topics Concern    Parent/sibling w/ CABG, MI or angioplasty before 65F 55M? Yes     Comment: father   Social History Narrative    . No current SO.         Has 5 children. Youngest child does live with him.         H/o AA degree and previously worked as a [employer]. Currently unemployed.         Denies tobacco use.     Alcohol use: 2x/week with minimal amount of alcohol per time.     Drug use: denies     Social Determinants of Health     Financial Resource Strain: Low Risk  (12/15/2023)    Financial Resource Strain     Within the past 12 months, have you or your family members you live with been unable to get utilities (heat, electricity) when it was really needed?: No   Food Insecurity: Low Risk  (4/25/2024)    Food Insecurity     Within the past 12 months, did you worry that your food would run out before you got money to buy more?: Patient declined     Within the past 12 months, did the food you bought just not last and you didn t have money to get more?: No    Transportation Needs: Low Risk  (12/15/2023)    Transportation Needs     Within the past 12 months, has lack of transportation kept you from medical appointments, getting your medicines, non-medical meetings or appointments, work, or from getting things that you need?: No   Physical Activity: Unknown (4/25/2024)    Exercise Vital Sign     Days of Exercise per Week: 0 days   Stress: Stress Concern Present (4/25/2024)    [employer] [place] [employer] - Occupational Stress Questionnaire     Feeling of Stress : Very much   Social Connections: Unknown (4/25/2024)    Social Connection and Isolation Panel [NHANES]     Frequency of Social Gatherings with Friends and Family: Once a week   Interpersonal Safety: Low Risk  (12/22/2023)    Interpersonal Safety     Do you feel physically and emotionally safe where you currently live?: Yes     Within the past 12 months, have you been hit, slapped, kicked or otherwise physically hurt by someone?: No     Within the past 12 months, have you been humiliated or emotionally abused in other ways by your partner or ex-partner?: No   Housing Stability: Low Risk  (12/15/2023)    Housing Stability     Do you have housing? : Yes     Are you worried about losing your housing?: No       CURRENT MEDICATIONS:  Current Outpatient Medications   Medication Sig Dispense Refill    Acetaminophen (TYLENOL PO)       allopurinol (ZYLOPRIM) 300 MG tablet TAKE 1 TABLET BY MOUTH EVERY DAY 90 tablet 3    amLODIPine (NORVASC) 5 MG tablet Take 1 tablet (5 mg) by mouth daily for blood pressure 90 tablet 3    aspirin EC 81 MG EC tablet Take 1 tablet (81 mg) by mouth daily (*) 30 tablet 1    atorvastatin (LIPITOR) 80 MG tablet Take 1 tablet (80 mg) by mouth daily 90 tablet 3    bacitracin 500 UNIT/GM external ointment Apply topically 3 times daily 30 g 3    baclofen (LIORESAL) 10 MG tablet Take 1 tablet (10 mg) by mouth 2 times daily as needed for muscle spasms 3 month supply. 180 tablet 1     busPIRone HCl (BUSPAR) 30 MG tablet TAKE 1 TABLET BY MOUTH 2 TIMES DAILY. 180 tablet 3    BUTRANS 20 MCG/HR WK patch Place 1 patch onto the skin every 7 days LISANDRO, name brand. 28 day script for chronic pain. OK to fill 05/20/24 to start 05/22/24 4 patch 0    Cholecalciferol (VITAMIN D) 2000 UNITS tablet TAKE ONE TABLET BY MOUTH ONCE DAILY 100 tablet 1    clobetasol (TEMOVATE) 0.05 % external cream Apply twice daily for 2 weeks, weekends only for 2 weeks, repeat cycle as needed for hands, not face or genitals 60 g 4    clobetasol (TEMOVATE) 0.05 % external solution Apply twice daily to scalp for up to 2 weeks for flares. 60 mL 0    clonazePAM (KLONOPIN) 1 MG tablet TAKE 0.5-1 TABLETS BY MOUTH 2 TIMES DAILY AS NEEDED FOR ANXIETY 90 tablet 0    clopidogrel (PLAVIX) 75 MG tablet TAKE 1 TABLET BY MOUTH EVERY DAY 90 tablet 3    desvenlafaxine (PRISTIQ) 100 MG 24 hr tablet Take 1 tablet (100 mg) by mouth daily 90 tablet 3    diclofenac (VOLTAREN) 1 % topical gel APPLY 4 G TOPICALLY 4 TIMES DAILY AS NEEDED FOR MODERATE PAIN 300 g 0    evolocumab (REPATHA SURECLICK) 140 MG/ML prefilled autoinjector Inject 1 mL (140 mg) Subcutaneous every 14 days 6 mL 3    ezetimibe (ZETIA) 10 MG tablet TAKE 1 TABLET (10 MG) BY MOUTH DAILY. 90 tablet 3    gemfibrozil (LOPID) 600 MG tablet Take 1 tablet (600 mg) by mouth 2 times daily 180 tablet 3    golimumab (SIMPONI) 50 MG/0.5ML auto-injector pen Inject 0.5 mLs (50 mg) Subcutaneous every 28 days . Hold for signs of infection, and seek medical attention. 0.5 mL 4    hydrocortisone (WESTCORT) 0.2 % external cream FOR GENITALS, APPLY TWICE DAILY FOR UP TO 2 WEEKS, TAKE 2 WEEKS OFF THEN REPEAT 60 g 3    hydrocortisone 2.5 % cream FOR FACE, APPLY TWICE DAILY FOR UP TO 2 WEEK FOR FLARES ON THE FACE, TAKE 2 WEEKS OFF 28.35 g 3    Incontinence Supply Disposable (DEPEND UNDERWEAR LARGE) MISC Use twice daily. 60 each 11    leflunomide (ARAVA) 20 MG tablet Take 1 tablet (20 mg) by mouth daily ; Labs  required every 8-12 weeks. 90 tablet 2    meclizine (ANTIVERT) 25 MG tablet TAKE 1 TABLET BY MOUTH EVERY 6 HOURS AS NEEDED FOR DIZZINESS. 30 tablet 3    melatonin 3 MG tablet Take 1 tablet (3 mg) by mouth nightly as needed for sleep      order for DME Equipment being ordered: chair lift 1 each 0    order for DME Equipment being ordered: single end cane 1 Units 0    ORDER FOR DME 1.  CPAP pressure 11 cm/H20 with heated humidity.   2.  Provide mask to fit and CPAP supplies.   3.  Length of need lifetime.   4.  If needed please provide a chin strap           pantoprazole (PROTONIX) 40 MG EC tablet TAKE 1 TABLET BY MOUTH EVERY DAY 90 tablet 2    pimecrolimus (ELIDEL) 1 % external cream APPLY TWICE DAILY FOR FACE AND GENITALS 60 g 1    sulfaSALAzine (AZULFIDINE) 500 MG tablet Take 3 tablets (1,500 mg) by mouth 2 times daily 540 tablet 2    tamsulosin (FLOMAX) 0.4 MG capsule TAKE 2 CAPSULES BY MOUTH EVERY  capsule 3    tenofovir (VIREAD) 300 MG tablet Take 1 tablet (300 mg) by mouth daily 90 tablet 3    triamcinolone (KENALOG) 0.1 % external ointment APPLY TO AFFECTED AREA TOPICALLY 3 TIMES A DAY 80 g 2    triamcinolone (KENALOG) 0.1 % external ointment Apply to trunk and extremities twice daily for up to 2 weeks for flares 80 g 1    zolpidem (AMBIEN) 5 MG tablet TAKE 1 TABLET (5 MG) BY MOUTH NIGHTLY AS NEEDED FOR SLEEP 30 tablet 5    colchicine (COLCRYS) 0.6 MG tablet TAKE 2 TABLETS BY MOUTH AT THE FIRST SIGN OF FLARE, TAKE 1 ADDITIONAL TABLET ONE HOUR LATER. (Patient not taking: Reported on 5/21/2024) 90 tablet 1    naloxone (NARCAN) 4 MG/0.1ML nasal spray Spray 1 spray (4 mg) into one nostril alternating nostrils once as needed for opioid reversal every 2-3 minutes until assistance arrives (Patient not taking: Reported on 5/21/2024) 0.2 mL 0    nitroGLYcerin (NITROSTAT) 0.4 MG sublingual tablet PLACE 1 TAB UNDER THE TONGUE EVERY 5 MINUTES FOR 3 DOSES FOR CHEST PAIN. IF SYMPTOMS PERSIST 5 MINUTES AFTER 1ST DOSE  "CALL 911. (Patient not taking: Reported on 5/21/2024) 25 tablet 0    sildenafil (REVATIO) 20 MG tablet TAKE 2 - 5 TABLETS BY MOUTH AS NEEDED 30 MINUTES BEFORE SEXUAL ACTIVITY. MAX 100MG IN 24 HOURS. (Patient not taking: Reported on 5/21/2024) 450 tablet 3     No current facility-administered medications for this visit.       ROS:   Refer to HPI    EXAM:  /85 (BP Location: Right arm, Patient Position: Sitting, Cuff Size: Adult Regular)   Pulse 62   Ht 1.575 m (5' 2\")   Wt 68.2 kg (150 lb 6.4 oz)   SpO2 97%   BMI 27.51 kg/m    GENERAL: Appears comfortable, in no acute distress.   HEENT: Eye symmetrical, no discharge or icterus bilaterally. Mucous membranes moist and without lesions.  CV: RRR, +S1S2, no murmur, rub, or gallop.   RESPIRATORY: Respirations regular, even, and unlabored. Lungs CTA throughout.   GI: Soft and non distended    EXTREMITIES: no peripheral edema.  NEUROLOGIC: Alert and oriented x 3. No focal deficits.   MUSCULOSKELETAL: No joint swelling or tenderness.   SKIN: No jaundice. No rashes or lesions.     Labs, reviewed with patient in clinic today:  CBC RESULTS:  Lab Results   Component Value Date    WBC 5.4 05/10/2024    WBC 5.2 03/29/2021    RBC 4.39 (L) 05/10/2024    RBC 4.72 03/29/2021    HGB 12.4 (L) 05/10/2024    HGB 13.6 03/29/2021    HCT 39.3 (L) 05/10/2024    HCT 42.3 03/29/2021    MCV 90 05/10/2024    MCV 90 03/29/2021    MCH 28.2 05/10/2024    MCH 28.8 03/29/2021    MCHC 31.6 05/10/2024    MCHC 32.2 03/29/2021    RDW 14.9 05/10/2024    RDW 14.6 03/29/2021     05/10/2024     03/29/2021       CMP RESULTS:  Lab Results   Component Value Date     05/10/2024     03/26/2020    POTASSIUM 3.7 05/10/2024    POTASSIUM 3.6 03/22/2023    POTASSIUM 3.8 03/26/2020    CHLORIDE 108 (H) 05/10/2024    CHLORIDE 110 (H) 03/22/2023    CHLORIDE 109 03/26/2020    CO2 27 05/10/2024    CO2 30 03/22/2023    CO2 25 03/26/2020    ANIONGAP 8 05/10/2024    ANIONGAP 2 (L) 03/22/2023 " "   ANIONGAP 4 03/26/2020    GLC 92 05/10/2024     (H) 03/22/2023     (H) 12/16/2020    BUN 13.5 05/10/2024    BUN 13 03/22/2023    BUN 16 03/26/2020    CR 0.84 05/10/2024    CR 1.01 03/29/2021    GFRESTIMATED >90 05/10/2024    GFRESTIMATED 83 03/29/2021    GFRESTBLACK >90 03/29/2021    HEMANT 9.3 05/10/2024    HEMANT 9.0 03/26/2020    BILITOTAL 0.3 05/10/2024    BILITOTAL 0.5 03/29/2021    ALBUMIN 4.3 05/10/2024    ALBUMIN 3.7 03/22/2023    ALBUMIN 3.9 03/29/2021    ALKPHOS 93 05/10/2024    ALKPHOS 116 03/29/2021    ALT <5 05/10/2024    ALT 31 03/29/2021    AST 21 05/10/2024    AST 20 03/29/2021        INR RESULTS:  Lab Results   Component Value Date    INR 1.19 (H) 05/10/2024    INR 1.08 03/26/2020       Lab Results   Component Value Date    MAG 2.3 04/27/2016     No results found for: \"NTBNPI\"  Lab Results   Component Value Date    NTBNP 18 06/29/2015       LIPIDS:  Lab Results   Component Value Date    CHOL 117 12/22/2023    CHOL 142 03/19/2021     Lab Results   Component Value Date    HDL 42 12/22/2023    HDL 69 03/19/2021     Lab Results   Component Value Date    LDL 59 12/22/2023    LDL 52 03/19/2021     Lab Results   Component Value Date    TRIG 79 12/22/2023    TRIG 105 03/19/2021     Lab Results   Component Value Date    CHOLHDLRATIO 6.6 06/29/2015       Assessment and Plan:   Mr. Daniels is a 58 year old male with a PMH of CAD with CABG 2008 and several PCIs, HLD, HTN, JEROD on CPAP, and RA      # CAD with CABG 2008 and several PCIs  # HLD   Most recent lipid panel 12/22/23L , HDL 42, LDL 59, TG 79  - Continue ASA 81mg daily + Plavix 75mg daily long term given hx aggressive ISR (okay to stop Plavix 7 days prior to planned procedure and resume after)  - Continue lipitor 80mg daily, gemfibrozil 600mg BID, zetia 10mg daily and Repatha 140mg SQ q2wk  - Repeat lipid panel   - Patient declined genetic testing due to his hx uncontrolled HLD    # HTN  Patient reports his blood pressures tend to run " 120s/80s. He continues to report orthostatic dizziness (was not improved after stopping metoprolol last year), but states it is not lifestyle-limiting and he would like to keep his current medication regimen  - Continue Norvasc 5mg daily       Follow up:  1 year - virtual visit dario   Chart review time today: 10 minutes  Visit time today: 10 minutes  Total time spent today: 20 minutes        Juanita Pierre PA-C  General Cardiology   05/21/24

## 2024-05-17 ENCOUNTER — MYC MEDICAL ADVICE (OUTPATIENT)
Dept: PALLIATIVE MEDICINE | Facility: CLINIC | Age: 59
End: 2024-05-17
Payer: COMMERCIAL

## 2024-05-17 DIAGNOSIS — M17.11 PRIMARY OSTEOARTHRITIS OF RIGHT KNEE: ICD-10-CM

## 2024-05-17 DIAGNOSIS — M06.9 RHEUMATOID ARTHRITIS INVOLVING MULTIPLE SITES, UNSPECIFIED WHETHER RHEUMATOID FACTOR PRESENT (H): ICD-10-CM

## 2024-05-17 DIAGNOSIS — M47.819 FACET ARTHROPATHY: ICD-10-CM

## 2024-05-17 DIAGNOSIS — F11.90 CHRONIC, CONTINUOUS USE OF OPIOIDS: ICD-10-CM

## 2024-05-17 DIAGNOSIS — G89.4 CHRONIC PAIN SYNDROME: ICD-10-CM

## 2024-05-17 NOTE — TELEPHONE ENCOUNTER
Medication refill information reviewed.     Due date for  BUTRANS 20 MCG/HR WK patch  is 05/22/24     Prescriptions prepped for review.     Will route to provider.

## 2024-05-17 NOTE — TELEPHONE ENCOUNTER
Received call from patient requesting refill(s) of BUTRANS 20 MCG/HR WK patch      Last dispensed from pharmacy on 04/25/24    Patient's last office/virtual visit by prescribing provider on 02/26/24  Next office/virtual appointment scheduled for 05/28/24    Last urine drug screen date 02/26/24  Current opioid agreement on file (completed within the last year) Yes Date of opioid agreement: 02/26/24    E-prescribe to pharmacy-  [id number] [place] - [address]     Will route to nursing pool for review and preparation of prescription(s).

## 2024-05-17 NOTE — TELEPHONE ENCOUNTER
Please process a refill of BUTRANS 20 MCG/HR WK patch  to     [id number] - [place] - [address]  [address]  [place] [id number]  Phone: [phone] Fax: [phone]

## 2024-05-19 RX ORDER — BUPRENORPHINE 20 UG/H
1 PATCH, EXTENDED RELEASE TRANSDERMAL
Qty: 4 PATCH | Refills: 0 | Status: SHIPPED | OUTPATIENT
Start: 2024-05-19 | End: 2024-06-05

## 2024-05-19 NOTE — TELEPHONE ENCOUNTER
Signed Prescriptions:                        Disp   Refills    BUTRANS 20 MCG/HR WK patch                 4 patch0        Sig: Place 1 patch onto the skin every 7 days LISNADRO, name           brand. 28 day script for chronic pain. OK to fill           05/20/24 to start 05/22/24  Authorizing Provider: AMEENA PAGE        Reviewed [place]  May 19, 2024- no concerning fills.    Ameena LEMUS, RN CNP, FNP  [facility]  [facility] [place]

## 2024-05-21 ENCOUNTER — OFFICE VISIT (OUTPATIENT)
Dept: CARDIOLOGY | Facility: CLINIC | Age: 59
End: 2024-05-21
Payer: COMMERCIAL

## 2024-05-21 VITALS
SYSTOLIC BLOOD PRESSURE: 127 MMHG | WEIGHT: 150.4 LBS | BODY MASS INDEX: 27.68 KG/M2 | DIASTOLIC BLOOD PRESSURE: 85 MMHG | HEIGHT: 62 IN | OXYGEN SATURATION: 97 % | HEART RATE: 62 BPM

## 2024-05-21 DIAGNOSIS — E78.5 HYPERLIPIDEMIA LDL GOAL <70: ICD-10-CM

## 2024-05-21 DIAGNOSIS — Z95.1 S/P CABG (CORONARY ARTERY BYPASS GRAFT): ICD-10-CM

## 2024-05-21 PROCEDURE — 99214 OFFICE O/P EST MOD 30 MIN: CPT

## 2024-05-21 RX ORDER — EVOLOCUMAB 140 MG/ML
140 INJECTION, SOLUTION SUBCUTANEOUS
Qty: 6 ML | Refills: 3 | Status: SHIPPED | OUTPATIENT
Start: 2024-05-21

## 2024-05-21 ASSESSMENT — PAIN SCALES - GENERAL: PAINLEVEL: NO PAIN (0)

## 2024-05-21 NOTE — NURSING NOTE
Med Reconcile: Reviewed and verified all current medications with the patient. The updated medication list was printed and given to the patient./No medication changes.       Return Appointment:   -Follow-up in 1 year ( okay with virtual) with fasting lab prior      Patient stated he understood all health information given and agreed to call with further questions or concerns.

## 2024-05-21 NOTE — NURSING NOTE
"Chief Complaint   Patient presents with    Follow Up     Return General Cardiology for 1 year follow up       Initial Ht 1.575 m (5' 2\")   Wt 68.2 kg (150 lb 6.4 oz)   BMI 27.51 kg/m   Estimated body mass index is 27.51 kg/m  as calculated from the following:    Height as of this encounter: 1.575 m (5' 2\").    Weight as of this encounter: 68.2 kg (150 lb 6.4 oz)..  BP completed using cuff size: regular    Jamia Redd, EMT  " ----- Message from Yoandy Betancur DO sent at 3/8/2023 12:56 PM CST -----  Regarding: Freestyle Beatrice 2  Can you please try to get her approved for a Beatrice 2, she is on basal insulin only. Thanks.

## 2024-05-22 DIAGNOSIS — M17.11 PRIMARY OSTEOARTHRITIS OF RIGHT KNEE: ICD-10-CM

## 2024-05-22 DIAGNOSIS — M10.9 GOUT WITHOUT TOPHUS: ICD-10-CM

## 2024-05-22 DIAGNOSIS — I10 ESSENTIAL HYPERTENSION WITH GOAL BLOOD PRESSURE LESS THAN 140/90: ICD-10-CM

## 2024-05-22 DIAGNOSIS — I25.119 ATHEROSCLEROSIS OF NATIVE CORONARY ARTERY OF NATIVE HEART WITH ANGINA PECTORIS (H): ICD-10-CM

## 2024-05-22 DIAGNOSIS — M10.00 IDIOPATHIC GOUT, UNSPECIFIED CHRONICITY, UNSPECIFIED SITE: ICD-10-CM

## 2024-05-22 DIAGNOSIS — I25.719 ATHEROSCLEROSIS OF AUTOLOGOUS VEIN CORONARY ARTERY BYPASS GRAFT WITH ANGINA PECTORIS (H): ICD-10-CM

## 2024-05-22 DIAGNOSIS — E78.5 HYPERLIPIDEMIA LDL GOAL <70: ICD-10-CM

## 2024-05-22 DIAGNOSIS — M06.9 RHEUMATOID ARTHRITIS INVOLVING MULTIPLE SITES, UNSPECIFIED WHETHER RHEUMATOID FACTOR PRESENT (H): ICD-10-CM

## 2024-05-22 DIAGNOSIS — F33.1 MAJOR DEPRESSIVE DISORDER, RECURRENT EPISODE, MODERATE (H): ICD-10-CM

## 2024-05-23 RX ORDER — ALLOPURINOL 300 MG/1
TABLET ORAL
Qty: 90 TABLET | Refills: 3 | OUTPATIENT
Start: 2024-05-23

## 2024-05-23 RX ORDER — COLCHICINE 0.6 MG/1
TABLET ORAL
Qty: 90 TABLET | Refills: 1 | Status: SHIPPED | OUTPATIENT
Start: 2024-05-23 | End: 2024-07-17

## 2024-05-23 RX ORDER — BUSPIRONE HYDROCHLORIDE 30 MG/1
30 TABLET ORAL 2 TIMES DAILY
Qty: 180 TABLET | Refills: 3 | OUTPATIENT
Start: 2024-05-23

## 2024-05-28 ENCOUNTER — VIRTUAL VISIT (OUTPATIENT)
Dept: PALLIATIVE MEDICINE | Facility: CLINIC | Age: 59
End: 2024-05-28
Payer: COMMERCIAL

## 2024-05-28 DIAGNOSIS — M47.819 FACET ARTHROPATHY: ICD-10-CM

## 2024-05-28 DIAGNOSIS — M54.59 LUMBAR FACET JOINT PAIN: Primary | ICD-10-CM

## 2024-05-28 DIAGNOSIS — G89.4 CHRONIC PAIN SYNDROME: ICD-10-CM

## 2024-05-28 DIAGNOSIS — M17.11 PRIMARY OSTEOARTHRITIS OF RIGHT KNEE: ICD-10-CM

## 2024-05-28 DIAGNOSIS — R23.8 SKIN IRRITATION DUE TO TOPICAL AGENT: ICD-10-CM

## 2024-05-28 DIAGNOSIS — F11.90 CHRONIC, CONTINUOUS USE OF OPIOIDS: ICD-10-CM

## 2024-05-28 DIAGNOSIS — T49.95XA SKIN IRRITATION DUE TO TOPICAL AGENT: ICD-10-CM

## 2024-05-28 DIAGNOSIS — M06.9 RHEUMATOID ARTHRITIS INVOLVING MULTIPLE SITES, UNSPECIFIED WHETHER RHEUMATOID FACTOR PRESENT (H): ICD-10-CM

## 2024-05-28 PROCEDURE — 99214 OFFICE O/P EST MOD 30 MIN: CPT | Mod: 95 | Performed by: NURSE PRACTITIONER

## 2024-05-28 RX ORDER — GABAPENTIN 300 MG/1
300 CAPSULE ORAL 3 TIMES DAILY
Qty: 270 CAPSULE | Refills: 3 | Status: SHIPPED | OUTPATIENT
Start: 2024-05-28

## 2024-05-28 RX ORDER — FLUTICASONE PROPIONATE 50 MCG
SPRAY, SUSPENSION (ML) NASAL
Qty: 11.1 ML | Refills: 11 | Status: SHIPPED | OUTPATIENT
Start: 2024-05-28

## 2024-05-28 ASSESSMENT — PAIN SCALES - GENERAL: PAINLEVEL: MODERATE PAIN (5)

## 2024-05-28 NOTE — TELEPHONE ENCOUNTER
Nursing  Please call patient in 7-10 days to see how he is doing with the Butrans patch once he is using the Flonase nasal spray on the skin prior to application.     If this does not control the irritation, then will switch to Belbuca 300mcg buccal film wear 20 minutes in the mouth every 12 hours.     Thanks.     Ameena LEMUS RN CNP, FNP  [facility]  [facility] [place]

## 2024-05-28 NOTE — PATIENT INSTRUCTIONS
Plan:  Physical Therapy: none  Continue stretching at home  Clinical Health Psychologist to address issues of relaxation, behavioral change, coping style, and other factors important to improvement: none  Diagnostic Studies: none  Medication Management:   Continue Butrans 20mcg/hr transdermal change every 7 days, filled 5/20/2024 and started 5/22/2024.  continue Baclofen to 10mg up to three times daily as needed for muscle spasms  Continue Voltaren gel as needed  Restart gabapentin 300mg capsules, WEEK 1: take 1 capsule at bedtime for 7 days. WEEK 2: take 1 capsule morning and night for 7 days WEEK 3 on: take 1 capsule three times daily  TRIAL flonase nasal spray apply 1-2 sprays on the skin where you will apply the Butrans patch, allow the spray to dry and then apply patch, this often will prevent the skin irritation and rash.   IF this is not effective, next script will change to Belbuca 300mcg buccal film and you would use 1 film in the inside of your cheek for 20 minutes every 12 hours.   Further procedures recommended: none  Recommendations/follow-up for PCP:  See above  Release of information: none  Follow up: 12 weeks for in-person or virtual visit.  Please call [phone] to make your follow-up appointment with me.           ----------------------------------------------------------------  Clinic Number:  [phone]   Call with any questions about your care and for scheduling assistance.   Calls are returned Monday through Friday between 8 AM and 4:30 PM. We usually get back to you within 2 business days depending on the issue/request.    If we are prescribing your medications:  For opioid medication refills, call the clinic or send a Amorelie message 7 days in advance.  Please include:  Name of requested medication  Name of the pharmacy.  For non-opioid medications, call your pharmacy directly to request a refill. Please allow 3-4 days to be processed.   Per [place] State Law:  All controlled substance  prescriptions must be filled within 30 days of being written.    For those controlled substances allowing refills, pickup must occur within 30 days of last fill.      We believe regular attendance is key to your success in our program!    Any time you are unable to keep your appointment we ask that you call us at least 24 hours in advance to cancel.This will allow us to offer the appointment time to another patient.   Multiple missed appointments may lead to dismissal from the clinic.

## 2024-05-28 NOTE — PROGRESS NOTES
Lamont is a 58 year old who is being evaluated via a billable video visit.    How would you like to obtain your AVS? MyChart  If the video visit is dropped, the invitation should be resent by: Text to cell phone: [phone]  Will anyone else be joining your video visit? No  Is Pt currently in [place]? Yes    Amberly Chawla MA      NOTE:  If Pt is not in [place], Appointment needs to be canceled and rescheduled.      Video-Visit Details    Type of service:  Video Visit   Video End Time:  Video start time: 1:02 PM   1:19 PM  Originating Location (pt. Location): Home    Distant Location (provider location):  On-site  Platform used for Video Visit: [employer]andersAqwise     [facility]    5/28/2024    Chief complaint:   -low back pain, extends into the thighs, bilaterally. Not past the knees.   -multiple joint pain (RA)  -he states he has some dull pain in his jaw on both sides.  He finds the Baclofen is helpful.   -skin irritation where he has worn the Butrans patches      Interval history:  Lamont Daniels is a 58 year old male is known to me for:  Lumbar facet arthropathy  Rheumatoid arthritis involving multiple joints, unspecified rheumatoid factor  Primary osteoarthritis of right knee  Chronic continuous use of opioids  Chronic pain syndrome  PMHx includes: Anxiety, coronary artery disease, congestive heart failure (2008), depressive disorder (2008), gout, hepatitis B more than 10 years ago, hypertension, hyperlipidemia, malrotation of intestine, severe obstructive sleep apnea, rheumatoid arthritis (2012) steatohepatitis (2015), history of tuberculosis at age 13, vitamin D deficiency  PSHx includes: Coronary artery bypass graft 6 vessel (2008), abdominal surgery (2014), colonoscopy (2013), combined repair ptosis with blepharoplasty bilateral (2018), head and neck surgery (2011), nasal/sinus polypectomy (2010), orthopedic surgery (2012), repair ptosis bilateral (2019), repair ptosis brow bilateral (2018),  thoracic surgery for tuberculosis (1989), tonsillectomy and uvulopalatal pharyngoplasty (2010), coronary artery stent 3 months after CABG (2008).      Recommendations/plan at the last visit on 2/26/2024 included:  Physical Therapy: none  Continue stretching at home  Clinical Health Psychologist to address issues of relaxation, behavioral change, coping style, and other factors important to improvement: none  Diagnostic Studies: none  Medication Management:   Continue Butrans 20mcg/hr transdermal change every 7 days, fill 2/27/2024 and start 2/27/2024.  continue Baclofen to 10mg up to three times daily as needed for muscle spasms  Continue Voltaren gel as needed  Further procedures recommended: none  Recommendations/follow-up for PCP:  See above  Release of information: none  Signed CSA today  UDT today, wearing Butrans patch  Follow up: 12 weeks for in-person or virtual visit.  Please call [phone] to make your follow-up appointment with me.       Since his last visit, Lamont Daniels reports:    Interval history May 28, 2024  -continues to have low back pain than can radiate into the thighs but never past the knees.   -multiple joint pain remains from his RA.   -denies any new areas of pain.   -thinks he may need to switch away from the Butrans patch as it is causing some skin irritation.   He has not tried Benadryl spray or Flonase nasal spray to the skin to prevent skin irritation. He will try this and if not helpful, with next script will switch to Belbuca film 300mcg Q 12 hours buccal film.    -wants to restart gabapentin, he used to be on 300mg TID.       Interval history February 26, 2024  -Lamont is doing pretty well on the Butrans patch.   -due for CSA and UDT today  -wearing Butrans patch, he filled the script late in January, picked it up on 1/27/2024. Called pharmacy, they have the script and are ordering it for arrival and fill tomorrow 2/27/2024.      Interval history November 13, 2023  -Lamont states that  "his low back pain has improved.   -his multiple joint pain from known RA has been stable.   -he continues to find Butrans patch 20mcg/hr helpful in managing his pain.   -he denies any new areas of pain.   -he denies being on any new medications since our last encounter.     Interval history June 26, 2023  -continues to have axial low back pain that can extend posteriorly into the legs and never goes past the knees. Pain is worse with lumbar extension and rotation.   -reviewed his lumbar MRI in detail. Dicussed possible interventions for lumbar facet joint pain.   Continued multiple joint pain from RA.   -discussed adjustments to Baclofen or to Buprenorphine, prefers to adjust Baclofen at this time.   -tapered off of gabapentin, has not had any increase in pain off of this medication    Interval history March 22, 2023  -low back pain present, can radiate to the level of the knees.  -no imaging since 2014. Recommend updated lumbar MRI to check for progression of arthritis or listhesis.   -multiple joint pain remains, has RA    Pain history collected at initial evaluation on 5/11/2022  He has had pain for about 10 years. He was diagnosed with Rheumatoid Arthritis. Lamont has pain in multiple joints. He also has chronic low back pain. The low back pain radiates down the posterolateral aspect of the legs to the level of the knee. His back pain was there when he was diagnosed with RA. He feels that he is doing well on the Butrans. Discussed switch to Belbuca if he ever wishes to increase his dosage, prefers to stay with use of the Butrans patch at the present time       At this point, the patient's participation with our multidisciplinary team includes:  The patient has been compliant with the program.  PT - not ordered  Health Psych - not ordered      Pain scores:  Pain intensity on average is 6 on a scale of 0-10.    Range is 5-8/10.   Pain right now is 5/10.   Pain is described as \"back pain is dull and his joints are " "dull/numbness/stiffness.\"    Pain is constant in nature        Current pain relevant medications:   -allopurinol 300mg every day  -baclofen 10mg BID PRN muscle spasms (uses at least once per day, helpful)  -Voltaren gel 1% PRN (helpful)  -naloxone nasal spray PRN opiate reversal   -Butrans 20mcg/hr  TD Q 7 days (helpful)--getting skin rash  -simponi 50mg/0.5ml inject every 28 days   -Arava 20mg every day  -meclizine 25mg Q 6 hours PRN dizziness (helpful)  -Tylenol 1000mg (uses at least once per day, not more than 3 times per day)  -CBD oil at night (helpful for sleep)        Other pertinent medications:  -Ambien 5mg at bedtime as needed PRN sleep  -clonazepam 0.5-1mg BID PRN anxiety (uses one full tab in the AM)  -Plavix 75mg every day  -colchincine 0.6mg take 2 tabs at first sign of flare  -pristiq 50mg QD     Previous medication treatments included:  OPIATES: butrans (helpful), hydrocodone (helpful for other issues), Codeine (unsure), oxycodone (unsure)  NSAIDS: cannot use, on Plavix   MUSCLE RELAXANTS: Baclofen (helpful), methocarbamol (not helpful)  ANTI-MIGRAINE MEDS: none  ANTI-DEPRESSANTS: none  SLEEP AIDS: none  ANTI-CONVULSANTS: gabapentin (helpful initially, he did not have increased pain when he tapered off of this medication)  TOPICALS: Voltaren gel (helpful)  ANXIOLYTICS: none  MEDICAL CANNABIS: none  Other meds: Tylenol (helpful)        Other treatments have included:  Lamont Daniels has been seen at a pain clinic in the past.  Previously managed by Dr. Lian Loo   PT: has tried, not helpful  Chiropractic care: no  Acupuncture: no  TENs Unit: no     Injections:  none      THE 4 A's OF OPIOID MAINTENANCE ANALGESIA    Analgesia: butrans is helpful    Activity: he does some stretching at home. Walks in the back yard    Adverse effects: none    Adherence to Rx protocol: yes      Side Effects: Skin irritation at site of Butrans application  Patient is using the medication as " prescribed: YES    Medications:  Current Outpatient Medications   Medication Sig Dispense Refill    Acetaminophen (TYLENOL PO)       allopurinol (ZYLOPRIM) 300 MG tablet TAKE 1 TABLET BY MOUTH EVERY DAY 90 tablet 3    amLODIPine (NORVASC) 5 MG tablet Take 1 tablet (5 mg) by mouth daily for blood pressure 90 tablet 3    aspirin EC 81 MG EC tablet Take 1 tablet (81 mg) by mouth daily (*) 30 tablet 1    atorvastatin (LIPITOR) 80 MG tablet Take 1 tablet (80 mg) by mouth daily 90 tablet 3    bacitracin 500 UNIT/GM external ointment Apply topically 3 times daily 30 g 3    baclofen (LIORESAL) 10 MG tablet Take 1 tablet (10 mg) by mouth 2 times daily as needed for muscle spasms 3 month supply. 180 tablet 1    busPIRone HCl (BUSPAR) 30 MG tablet TAKE 1 TABLET BY MOUTH 2 TIMES DAILY. 180 tablet 3    BUTRANS 20 MCG/HR WK patch Place 1 patch onto the skin every 7 days LISANDRO, name brand. 28 day script for chronic pain. OK to fill 05/20/24 to start 05/22/24 4 patch 0    Cholecalciferol (VITAMIN D) 2000 UNITS tablet TAKE ONE TABLET BY MOUTH ONCE DAILY 100 tablet 1    clobetasol (TEMOVATE) 0.05 % external cream Apply twice daily for 2 weeks, weekends only for 2 weeks, repeat cycle as needed for hands, not face or genitals 60 g 4    clobetasol (TEMOVATE) 0.05 % external solution Apply twice daily to scalp for up to 2 weeks for flares. 60 mL 0    clonazePAM (KLONOPIN) 1 MG tablet TAKE 0.5-1 TABLETS BY MOUTH 2 TIMES DAILY AS NEEDED FOR ANXIETY 90 tablet 0    clopidogrel (PLAVIX) 75 MG tablet TAKE 1 TABLET BY MOUTH EVERY DAY 90 tablet 3    colchicine (COLCRYS) 0.6 MG tablet TAKE 2 TABLETS BY MOUTH AT THE FIRST SIGN OF FLARE, TAKE 1 ADDITIONAL TABLET ONE HOUR LATER. 90 tablet 1    desvenlafaxine (PRISTIQ) 100 MG 24 hr tablet Take 1 tablet (100 mg) by mouth daily 90 tablet 3    diclofenac (VOLTAREN) 1 % topical gel APPLY 4 GRM TOPICALLY 4 TIMES DAILY AS NEEDED FOR MODERATE PAIN NEED TO BE SEEN FOR REFILL 300 g 0    evolocumab (REPATHA  SURECLICK) 140 MG/ML prefilled autoinjector Inject 1 mL (140 mg) Subcutaneous every 14 days 6 mL 3    ezetimibe (ZETIA) 10 MG tablet TAKE 1 TABLET (10 MG) BY MOUTH DAILY. 90 tablet 3    gemfibrozil (LOPID) 600 MG tablet Take 1 tablet (600 mg) by mouth 2 times daily 180 tablet 3    golimumab (SIMPONI) 50 MG/0.5ML auto-injector pen Inject 0.5 mLs (50 mg) Subcutaneous every 28 days . Hold for signs of infection, and seek medical attention. 0.5 mL 4    hydrocortisone (WESTCORT) 0.2 % external cream FOR GENITALS, APPLY TWICE DAILY FOR UP TO 2 WEEKS, TAKE 2 WEEKS OFF THEN REPEAT 60 g 3    hydrocortisone 2.5 % cream FOR FACE, APPLY TWICE DAILY FOR UP TO 2 WEEK FOR FLARES ON THE FACE, TAKE 2 WEEKS OFF 28.35 g 3    Incontinence Supply Disposable (DEPEND UNDERWEAR LARGE) MISC Use twice daily. 60 each 11    leflunomide (ARAVA) 20 MG tablet Take 1 tablet (20 mg) by mouth daily ; Labs required every 8-12 weeks. 90 tablet 2    meclizine (ANTIVERT) 25 MG tablet TAKE 1 TABLET BY MOUTH EVERY 6 HOURS AS NEEDED FOR DIZZINESS. 30 tablet 3    melatonin 3 MG tablet Take 1 tablet (3 mg) by mouth nightly as needed for sleep      naloxone (NARCAN) 4 MG/0.1ML nasal spray Spray 1 spray (4 mg) into one nostril alternating nostrils once as needed for opioid reversal every 2-3 minutes until assistance arrives (Patient not taking: Reported on 5/21/2024) 0.2 mL 0    nitroGLYcerin (NITROSTAT) 0.4 MG sublingual tablet PLACE 1 TAB UNDER THE TONGUE EVERY 5 MINUTES FOR 3 DOSES FOR CHEST PAIN. IF SYMPTOMS PERSIST 5 MINUTES AFTER 1ST DOSE CALL 911. (Patient not taking: Reported on 5/21/2024) 25 tablet 0    order for DME Equipment being ordered: chair lift 1 each 0    order for DME Equipment being ordered: single end cane 1 Units 0    ORDER FOR DME 1.  CPAP pressure 11 cm/H20 with heated humidity.   2.  Provide mask to fit and CPAP supplies.   3.  Length of need lifetime.   4.  If needed please provide a chin strap           pantoprazole (PROTONIX) 40 MG  EC tablet TAKE 1 TABLET BY MOUTH EVERY DAY 90 tablet 2    pimecrolimus (ELIDEL) 1 % external cream APPLY TWICE DAILY FOR FACE AND GENITALS 60 g 1    sildenafil (REVATIO) 20 MG tablet TAKE 2 - 5 TABLETS BY MOUTH AS NEEDED 30 MINUTES BEFORE SEXUAL ACTIVITY. MAX 100MG IN 24 HOURS. (Patient not taking: Reported on 5/21/2024) 450 tablet 3    sulfaSALAzine (AZULFIDINE) 500 MG tablet Take 3 tablets (1,500 mg) by mouth 2 times daily 540 tablet 2    tamsulosin (FLOMAX) 0.4 MG capsule TAKE 2 CAPSULES BY MOUTH EVERY  capsule 3    tenofovir (VIREAD) 300 MG tablet Take 1 tablet (300 mg) by mouth daily 90 tablet 3    triamcinolone (KENALOG) 0.1 % external ointment APPLY TO AFFECTED AREA TOPICALLY 3 TIMES A DAY 80 g 2    triamcinolone (KENALOG) 0.1 % external ointment Apply to trunk and extremities twice daily for up to 2 weeks for flares 80 g 1    zolpidem (AMBIEN) 5 MG tablet TAKE 1 TABLET (5 MG) BY MOUTH NIGHTLY AS NEEDED FOR SLEEP 30 tablet 5       Medical History: any changes in medical history since they were last seen? No    Social History:   Home situation: , lives with adult children  Occupation/Schooling: he is on disability due to heart issues and RA  Tobacco use: none  Alcohol use: none  Drug use: none  History of chemical dependency treatment: none    Is patient a current smoker or tobacco user?  no  If yes, was cessation counseling offered?  no          Physical Exam:  Vital signs: There were no vitals taken for this visit.    Behavioral observations:  Awake, alert. Cooperative.   Pulm: respirations easy and unlabored. Able to speak in full sentences without SOB or cough noted.    Skin: erythema the size and shape of th Butrans patches where he wore them on the chest and the left upper arm. No skin breakdown seen.               Diagnostic tests:  MRI of the Lumbar Spine without contrast     History: Lumbago.     Comparison: MRI thoracic spine 7/18/2014.     Technique: Sagittal T1-weighted, T2-weighted,  STIR, and axial  T2-weighted spin echo and gradient echo images of the lumbar spine  were obtained without intravenous contrast.     Findings: There are 5 lumbar-type vertebrae assumed for the purposes  of this dictation. The tip of the conus medullaris is at L1.  The  lumbar vertebral column appears normally aligned. Straightening of the  normal lumbar lordosis. Mild loss of T2 signal in the L4-5 and L5-S1  intervertebral discs consistent with age-related changes. Type II  Modic degenerative changes in the opposing endplates at L5-S1.     On a level by level basis:     L1-2: Tiny left paracentral focal disc protrusion without spinal canal  or neural foraminal stenosis.     L2-3: No spinal canal or neural foraminal stenosis.     L3-4: Small focal posterior central disc protrusion with annular  fissuring and mild spinal canal narrowing. Also mild bilateral facet  hypertrophy. No neuroforaminal stenosis.     L4-5: Small focal posterior central disc protrusion with annular  fissuring. No spinal canal or neural foraminal stenosis.     L5-S1: Type II Modic degenerative changes of the opposing endplates.  Left greater than right facet hypertrophy. Small focal posterior  central disc protrusion with annular fissuring. No spinal canal  stenosis. Moderate left and mild right neural foraminal narrowing.     The visualized paraspinous tissues anteriorly are unremarkable.     IMPRESSION  Impression: Multilevel degenerative changes. Small disc protrusion at  L3-4 results in in mild spinal canal narrowing. Moderate left and mild  right neuroforaminal narrowing at L5-S1.     HELGA DAVID MD        FOOT BILATERAL THREE OR MORE VIEWS 11/4/2015 4:55 PM      HISTORY: Pain in unspecified joint.     COMPARISON: 8/21/2012.     IMPRESSION  IMPRESSION: Small traction spurs are seen off the Achilles tendon and  plantar fascial attachment sites bilaterally. Joint spaces are  preserved. Osseous structures are intact. Incidental note is  made of  some surgical staples in the soft tissues of the medial distal right  lower extremity.  DANIS ANGLIN MD        RIGHT KNEE THREE VIEWS  5/4/2017 3:16 PM    HISTORY: Pain in right knee.  COMPARISON: None                                                   IMPRESSION: No acute fracture or dislocation. Mild osteoarthritis.  Small joint effusion.     MICHELL TAMAYO MD      MR LUMBAR SPINE WITHOUT CONTRAST  4/4/2023 9:12 AM (reviewed with patient on 6/26/2023)     INDICATION: Low back pain. Rule out spondylolysis. Low back pain with  standing/walking/extension. No known/automatically detected potential  contraindications to CT. Chronic bilateral low back pain with  bilateral sciatica. DDD (degenerative disc disease), lumbar.     TECHNIQUE: MRI images of the lumbar spine without contrast.  CONTRAST:  None.     COMPARISON: Lumbar spine MRI 8/6/2014.     FINDINGS: Nomenclature is based on five lumbar type vertebral bodies.  Normal vertebral body heights. Normal alignment.  No marrow edema. No  pars defect. The conus tip is identified at L1-L2. Unremarkable  paraspinal soft tissues.     T12-L1: Slight loss in disc height and signal. The small left central  protrusion. Minor facet arthropathy. Minor lateral recess and spinal  canal narrowing. No foraminal narrowing. Herniation is new/increased  versus 2014.         L1-L2: Slight loss of disc signal. Normal disc height. Tiny left  paracentral protrusion. Unremarkable facets. Minimal left lateral  recess narrowing. No central stenosis or foraminal narrowing. No  interval change.     L2-L3: Mild loss of disc signal. Minimal loss of disc height. No  herniation. Unremarkable facets. No stenosis.     L3-L4: Moderate loss of disc signal. Minor loss of disc height. Minor  circumferential disc bulge with small central protrusion. Minor facet  arthropathy. Mild lateral recess and spinal canal narrowing,  unchanged. Patent foramina.     L4-L5: Moderate loss of disc signal. Mild  loss of disc height. Central  annular tear and small protrusion is minimally increased in the  interval. Mild facet arthropathy. Minor lateral recess and foraminal  narrowing.     L5-S1: Moderate loss of disc signal. Mild loss of disc height.  Circumferential asymmetric left disc bulge. Mild to moderate left and  mild right facet arthropathy. Moderate left and mild right lateral  recess narrowing. Adequate central canal. Moderate to severe left and  minor right foraminal narrowing. No significant interval change.                                                                      IMPRESSION:  1.  Degenerative changes are relatively stable compared with 2014.  2.  At L5-S1 an asymmetric left disc bulge causes moderate left  lateral recess and moderate to severe foraminal narrowing. Correlate  for any left L5 symptoms. Mild right-sided narrowing. No significant  interval change.  3.  At L4-L5 a small protrusion is minimally increased with minor  lateral recess and foraminal narrowing similar to prior.  4.  At L3-L4 mild lateral recess and spinal canal narrowing is  unchanged.  5.  At T12-L1 a small left-sided herniation is increased and results  in minor lateral recess and spinal canal narrowing.     HORTENCIA DUENAS MD      Other testing (labs, diagnostics):  5/20/2024  Cr. 0.84  Est GFR >90  Liver enzymes WNL        Screening tools:      DIRE Score for ongoing opioid management is calculated as follows:  Diagnosis = 2  Intractability = 2  Risk: Psych = 2  Chem Hlth = 2  Reliability = 3  Social = 2  Efficacy = 2  Total DIRE Score = 15 (14 or higher predicts good candidate for ongoing opioid management; 13 or lower predicts poor candidate for opioid management)        [place]  review:  Reviewed [place]  5/28/2024- no concerning fills.  Ameena LEMUS, RN CNP, FNP  [facility]  [place] location       Assessment:   Rheumatoid arthritis involving multiple joints, unspecified rheumatoid  factor  Primary osteoarthritis of right knee  Facet arthropathy (lumbar)  Lumbar facet joint pain  Chronic pain syndrome  Chronic continuous use of opioids  Skin irritation due to topical agent (Butrans causes a erythematous rash at application site, no skin breakdown)    Encounter for long term use of opiate analgesic  UDT 2/26/2024  Signed CSA 2/26/2024  PMHx includes: Anxiety, coronary artery disease, congestive heart failure (2008), depressive disorder (2008), gout, hepatitis B more than 10 years ago, hypertension, hyperlipidemia, malrotation of intestine, severe obstructive sleep apnea, rheumatoid arthritis (2012) steatohepatitis (2015), history of tuberculosis at age 13, vitamin D deficiency  PSHx includes: Coronary artery bypass graft 6 vessel (2008), abdominal surgery (2014), colonoscopy (2013), combined repair ptosis with blepharoplasty bilateral (2018), head and neck surgery (2011), nasal/sinus polypectomy (2010), orthopedic surgery (2012), repair ptosis bilateral (2019), repair ptosis brow bilateral (2018), thoracic surgery for tuberculosis (1989), tonsillectomy and uvulopalatal pharyngoplasty (2010), coronary artery stent 3 months after CABG (2008).    Plan:  Physical Therapy: none  Continue stretching at home  Clinical Health Psychologist to address issues of relaxation, behavioral change, coping style, and other factors important to improvement: none  Diagnostic Studies: none  Medication Management:   Continue Butrans 20mcg/hr transdermal change every 7 days, filled 5/20/2024 and started 5/22/2024.  continue Baclofen to 10mg up to three times daily as needed for muscle spasms  Continue Voltaren gel as needed  Restart gabapentin 300mg capsules, WEEK 1: take 1 capsule at bedtime for 7 days. WEEK 2: take 1 capsule morning and night for 7 days WEEK 3 on: take 1 capsule three times daily  TRIAL flonase nasal spray apply 1-2 sprays on the skin where you will apply the Butrans patch, allow the spray to dry  and then apply patch, this often will prevent the skin irritation and rash.   IF this is not effective, next script will change to Belbuca 300mcg buccal film and you would use 1 film in the inside of your cheek for 20 minutes every 12 hours.   Further procedures recommended: none  Recommendations/follow-up for PCP:  See above  Release of information: none  Follow up: 12 weeks for in-person or virtual visit.  Please call [phone] to make your follow-up appointment with me.       ASSESSMENT AND PLAN:   (M54.59) Lumbar facet joint pain  (primary encounter diagnosis)  Comment:   Plan: gabapentin (NEURONTIN) 300 MG capsule, Adult         Pain Clinic Follow-Up Order            (M06.9) Rheumatoid arthritis involving multiple sites, unspecified whether rheumatoid factor present (H)  Comment:   Plan: gabapentin (NEURONTIN) 300 MG capsule, Adult         Pain Clinic Follow-Up Order            (M17.11) Primary osteoarthritis of right knee  Comment:   Plan: gabapentin (NEURONTIN) 300 MG capsule, Adult         Pain Clinic Follow-Up Order            (M47.819) Facet arthropathy  Comment:   Plan: gabapentin (NEURONTIN) 300 MG capsule, Adult         Pain Clinic Follow-Up Order            (G89.4) Chronic pain syndrome  Comment:   Plan: gabapentin (NEURONTIN) 300 MG capsule, Adult         Pain Clinic Follow-Up Order            (F11.90) Chronic, continuous use of opioids  Comment:   Plan: gabapentin (NEURONTIN) 300 MG capsule, Adult         Pain Clinic Follow-Up Order          (R23.8,  T49.95XA) Skin irritation due to topical agent  Comment:   Plan: fluticasone (FLONASE) 50 MCG/ACT nasal spray,         Adult Pain Clinic Follow-Up Order              BILLING TIME DOCUMENTATION:   TOTAL TIME includes:   Time spent preparing to see the patient: 3 minutes (reviewing records and tests)  Time spend face to face with the patient: 17 minutes  Time spent ordering tests, medications, procedures and referrals: 0 minutes  Time spent Referring  and communicating with other healthcare professionals: 0 minutes  Documenting clinical information in Epic: 8 minutes    The total TIME spent on this patient on the day of the appointment was 28 minutes.       Ameena LEMUS RN CNP, FNP  [facility]

## 2024-05-30 RX ORDER — GEMFIBROZIL 600 MG/1
600 TABLET, FILM COATED ORAL 2 TIMES DAILY
Qty: 180 TABLET | Refills: 3 | Status: SHIPPED | OUTPATIENT
Start: 2024-05-30

## 2024-05-30 RX ORDER — CLOPIDOGREL BISULFATE 75 MG/1
TABLET ORAL
Qty: 90 TABLET | Refills: 3 | Status: SHIPPED | OUTPATIENT
Start: 2024-05-30

## 2024-05-30 RX ORDER — AMLODIPINE BESYLATE 5 MG/1
5 TABLET ORAL DAILY
Qty: 90 TABLET | Refills: 3 | Status: SHIPPED | OUTPATIENT
Start: 2024-05-30 | End: 2024-06-27

## 2024-05-30 NOTE — TELEPHONE ENCOUNTER
GEMFIBROZIL 600 MG TABLET       Last Written Prescription Date:  6-23-23  Last Fill Quantity: 180,   # refills: 3  Last Office Visit : 5-21-24  Future Office visit:  5-13-25    Routing refill request to provider for review/approval because:  Med not on protocol    CLOPIDOGREL 75 MG TABLET       Last Written Prescription Date:  6-23-23  Last Fill Quantity: 90,   # refills: 3    5-10-24  Hemoglobin  13.3 - 17.7 g/dL 12.4     Routing refill request to provider for review/approval because:  ABN lab: Hgb  Failed protocol: diagnosis    amLODIPine (NORVASC) 5 MG tablet       Last Written Prescription Date:  6-30-23  Last Fill Quantity: 90,   # refills: 3  RF 90t w/3rf

## 2024-06-04 NOTE — TELEPHONE ENCOUNTER
[facility] Call Center    Phone Message    May a detailed message be left on voicemail: yes     Reason for Call: Other: Patient is returning a call to [facility]. Please call back when available.      Action Taken: Other: Pain    Travel Screening: Not Applicable

## 2024-06-04 NOTE — TELEPHONE ENCOUNTER
Outreach X1. Left a  requesting call back. Provided call back number.    ROBERTO BarksdaleN, RN  Care Coordinator  [facility] [place]

## 2024-06-05 NOTE — TELEPHONE ENCOUNTER
This is great!   Any chance we could prep his refill for later this month?     No pressure if we can't.     Ameena LEMUS RN CNP, FNP  [facility]

## 2024-06-05 NOTE — TELEPHONE ENCOUNTER
Please process a refill of Butrans 20 mcg patch to     [id number] - [place], [place] - [address]  [address]  [place] [address]  Phone: [phone] Fax: [phone]

## 2024-06-05 NOTE — TELEPHONE ENCOUNTER
Spoke to patient and the flonase is working great. He also uses after her removes the patch which is very helpful.     ROBERTO BarksdaleN, RN  Care Coordinator  [facility] Pain Management [place]

## 2024-06-06 NOTE — TELEPHONE ENCOUNTER
Received request for a refill(s) of BUTRANS 20 MCG/HR WK patch      Last dispensed from pharmacy on 05/20/24    Patient's last office/virtual visit by prescribing provider on 05/28/24  Next office/virtual appointment scheduled for None    Last urine drug screen date 02/26/24  Current opioid agreement on file (completed within the last year) Yes Date of opioid agreement: 02/26/24    E-prescribe to     [facility] [id number] [facility] - [place] - [address]   [address]   [place] [address]  Phone: [phone] Fax: [phone]    Will route to nursing pool for review and preparation of prescription(s).       Lashonda Vega MA  [facility] Pain Management [place]

## 2024-06-07 RX ORDER — BUPRENORPHINE 20 UG/H
1 PATCH, EXTENDED RELEASE TRANSDERMAL
Qty: 4 PATCH | Refills: 0 | Status: SHIPPED | OUTPATIENT
Start: 2024-06-19 | End: 2024-07-17

## 2024-06-07 NOTE — TELEPHONE ENCOUNTER
Routing to provider to review medication prepped per below      Butrans 20mcg/hr, #4, Refill:no  Sig:LISANDRO, name brand. 28 day script for chronic pain. OK to fill 06/17/24 to start 06/19/24  Last picked up 05/20/24 with start on 05/22/24  Due 06/19/24    Per last OV note 05/28/24:    Continue Butrans 20mcg/hr transdermal change every 7 days, filled 5/20/2024 and started 5/22/2024.     Joselyn MEJIAN, RN Care Coordinator  [facility]  Pain Management

## 2024-06-07 NOTE — TELEPHONE ENCOUNTER
Signed Prescriptions:                        Disp   Refills    BUTRANS 20 MCG/HR WK patch                 4 patch0        Sig: Place 1 patch onto the skin every 7 days LISANDRO, name           brand. 28 day script for chronic pain. OK to fill           06/17/24 to start 06/19/24  Authorizing Provider: AMEENA PAGE        Reviewed [place]  June 7, 2024- no concerning fills.    Ameena LEMUS, RN CNP, FNP  [facility]  [facility]

## 2024-06-11 ENCOUNTER — OFFICE VISIT (OUTPATIENT)
Dept: FAMILY MEDICINE | Facility: CLINIC | Age: 59
End: 2024-06-11
Payer: COMMERCIAL

## 2024-06-11 VITALS
HEIGHT: 62 IN | WEIGHT: 147.6 LBS | SYSTOLIC BLOOD PRESSURE: 117 MMHG | TEMPERATURE: 97.7 F | DIASTOLIC BLOOD PRESSURE: 81 MMHG | BODY MASS INDEX: 27.16 KG/M2 | HEART RATE: 74 BPM | OXYGEN SATURATION: 97 % | RESPIRATION RATE: 16 BRPM

## 2024-06-11 DIAGNOSIS — E78.5 HYPERLIPIDEMIA LDL GOAL <70: ICD-10-CM

## 2024-06-11 DIAGNOSIS — Z00.00 MEDICARE ANNUAL WELLNESS VISIT, SUBSEQUENT: Primary | ICD-10-CM

## 2024-06-11 DIAGNOSIS — R73.09 ELEVATED GLUCOSE: ICD-10-CM

## 2024-06-11 DIAGNOSIS — Z23 ENCOUNTER FOR IMMUNIZATION: ICD-10-CM

## 2024-06-11 DIAGNOSIS — I10 ESSENTIAL HYPERTENSION WITH GOAL BLOOD PRESSURE LESS THAN 140/90: ICD-10-CM

## 2024-06-11 DIAGNOSIS — Z12.5 SCREENING FOR PROSTATE CANCER: ICD-10-CM

## 2024-06-11 LAB — HBA1C MFR BLD: 5.1 % (ref 0–5.6)

## 2024-06-11 PROCEDURE — 82043 UR ALBUMIN QUANTITATIVE: CPT | Performed by: FAMILY MEDICINE

## 2024-06-11 PROCEDURE — 82570 ASSAY OF URINE CREATININE: CPT | Performed by: FAMILY MEDICINE

## 2024-06-11 PROCEDURE — G0439 PPPS, SUBSEQ VISIT: HCPCS | Performed by: FAMILY MEDICINE

## 2024-06-11 PROCEDURE — 80061 LIPID PANEL: CPT | Performed by: FAMILY MEDICINE

## 2024-06-11 PROCEDURE — 83036 HEMOGLOBIN GLYCOSYLATED A1C: CPT | Performed by: FAMILY MEDICINE

## 2024-06-11 PROCEDURE — 90480 ADMN SARSCOV2 VAC 1/ONLY CMP: CPT | Performed by: FAMILY MEDICINE

## 2024-06-11 PROCEDURE — 36415 COLL VENOUS BLD VENIPUNCTURE: CPT | Performed by: FAMILY MEDICINE

## 2024-06-11 PROCEDURE — 99213 OFFICE O/P EST LOW 20 MIN: CPT | Mod: 25 | Performed by: FAMILY MEDICINE

## 2024-06-11 PROCEDURE — G0103 PSA SCREENING: HCPCS | Performed by: FAMILY MEDICINE

## 2024-06-11 PROCEDURE — 91320 SARSCV2 VAC 30MCG TRS-SUC IM: CPT | Performed by: FAMILY MEDICINE

## 2024-06-11 SDOH — HEALTH STABILITY: PHYSICAL HEALTH: ON AVERAGE, HOW MANY DAYS PER WEEK DO YOU ENGAGE IN MODERATE TO STRENUOUS EXERCISE (LIKE A BRISK WALK)?: 0 DAYS

## 2024-06-11 ASSESSMENT — SOCIAL DETERMINANTS OF HEALTH (SDOH): HOW OFTEN DO YOU GET TOGETHER WITH FRIENDS OR RELATIVES?: ONCE A WEEK

## 2024-06-11 ASSESSMENT — PAIN SCALES - GENERAL: PAINLEVEL: MODERATE PAIN (5)

## 2024-06-11 NOTE — PROGRESS NOTES
Preventive Care Visit  [facility]  Jun 11, 2024      Carito Miguel is a 58 year old, presenting for the following:  Physical        6/11/2024     1:19 PM   Additional Questions   Roomed by [facility] Care Directive  Patient does not have a Health Care Directive or Living Will: Discussed advance care planning with patient; information given to patient to review.    HPI        6/11/2024   General Health   How would you rate your overall physical health? (!) POOR   Feel stress (tense, anxious, or unable to sleep) Very much   (!) STRESS CONCERN      6/11/2024   Nutrition   Diet: Low fat/cholesterol         6/11/2024   Exercise   Days per week of moderate/strenous exercise 0 days   (!) EXERCISE CONCERN      6/11/2024   Social Factors   Frequency of gathering with friends or relatives Once a week   Worry food won't last until get money to buy more Patient declined   Food not last or not have enough money for food? Patient declined   Do you have housing?  Yes   Are you worried about losing your housing? No   Lack of transportation? No   Unable to get utilities (heat,electricity)? No         6/11/2024   Fall Risk   Fallen 2 or more times in the past year? No   Trouble with walking or balance? No          6/11/2024   Activities of Daily Living- Home Safety   Needs help with the following daily activites Transportation    Shopping    Preparing meals    Housework    Bathing    Laundry    Medication administration    Toileting    Dressing   Safety concerns in the home None of the above         6/11/2024   Dental   Dentist two times every year? (!) NO         6/11/2024   Hearing Screening   Hearing concerns? None of the above         6/11/2024   Driving Risk Screening   Patient/family members have concerns about driving No         6/11/2024   General Alertness/Fatigue Screening   Have you been more tired than usual lately? (!) YES         6/11/2024    Urinary Incontinence Screening   Bothered by leaking urine in past 6 months Yes            Today's PHQ-9 Score:       4/25/2024    12:26 PM   PHQ-9 SCORE   PHQ-9 Total Score MyChart 15 (Moderately severe depression)   PHQ-9 Total Score 15           6/11/2024   Substance Use   Alcohol more than 3/day or more than 7/wk No   Do you have a current opioid prescription? (!) YES   How severe/bad is pain from 1 to 10? 9/10   Do you use any other substances recreationally? (!) CANNABIS PRODUCTS          No data to display              Low Risk (0-3)  Moderate Risk (4-7)  High Risk (>8)  Social History     Tobacco Use    Smoking status: Never    Smokeless tobacco: Never   Vaping Use    Vaping status: Never Used   Substance Use Topics    Alcohol use: No     Alcohol/week: 0.0 standard drinks of alcohol    Drug use: No       Last PSA:   PSA   Date Value Ref Range Status   03/19/2021 0.90 0 - 4 ug/L Final     Comment:     Assay Method:  Chemiluminescence using Siemens Vista analyzer     Prostate Specific Antigen Screen   Date Value Ref Range Status   06/07/2023 0.39 0.00 - 3.50 ng/mL Final   06/07/2022 0.64 0.00 - 4.00 ug/L Final     ASCVD Risk   The ASCVD Risk score (Eh DK, et al., 2019) failed to calculate for the following reasons:    The valid total cholesterol range is 130 to 320 mg/dL            Reviewed and updated as needed this visit by Provider                    Past Medical History:   Diagnosis Date    Anxiety     CAD (coronary artery disease)     CABG, PCI    Congestive heart failure, unspecified 2008    Depressive disorder 2008    Gout     Hepatitis B > 10 years    HTN (hypertension)     Hyperlipidemia LDL goal < 100     Malrotation of intestine (H28)     Moderate major depression (H)     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) 9/19/2011    Uses CPAP    Rheumatoid arthritis flare (H) 1012    Steatohepatitis 12/15    liver biopsy    Tuberculosis age 13    INH x 9 months     Vitamin D deficiency      Past Surgical History:    Procedure Laterality Date    ABDOMEN SURGERY  2014    BIOPSY  2015    BYPASS GRAFT ARTERY CORONARY  2008    6 vessels    COLONOSCOPY  2/8/2013    Procedure: COLONOSCOPY;  Colonoscopy, blood in stool;  Surgeon: Duane, William Charles, MD;  Location: [facility] OR    COMBINED REPAIR PTOSIS WITH BLEPHAROPLASTY BILATERAL Bilateral 6/25/2018    Procedure: COMBINED REPAIR PTOSIS WITH BLEPHAROPLASTY BILATERAL;  Bilateral upper eyelid blepharoplasty, ptosis repair and brow ptosis repair;  Surgeon: Sandra Borja MD;  Location: [facility] OR    GI SURGERY  2014    HEAD & NECK SURGERY  2011    NASAL/SINUS POLYPECTOMY  2010    ORTHOPEDIC SURGERY  2012    REPAIR PTOSIS      REPAIR PTOSIS BILATERAL Bilateral 7/22/2019    Procedure: REPAIR, PTOSIS, BOTH UPPER brows;  Surgeon: Sandra Borja MD;  Location: [facility] OR    REPAIR PTOSIS BROW BILATERAL Bilateral 6/25/2018    Procedure: REPAIR PTOSIS BROW BILATERAL;;  Surgeon: Sandra Borja MD;  Location:  OR    THORACIC SURGERY  1989    tb    TONSILLECTOMY  2010    UVULOPALATOPHARYNGOPLASTY  2010    VASCULAR SURGERY  2008    [facility] CORONARY STENT CIERA SOTOTL VESSEL  2008    3 months after CABG     Lab work is in process  Labs reviewed in EPIC  BP Readings from Last 3 Encounters:   06/11/24 117/81   05/21/24 127/85   04/25/24 108/70    Wt Readings from Last 3 Encounters:   06/11/24 67 kg (147 lb 9.6 oz)   05/21/24 68.2 kg (150 lb 6.4 oz)   04/25/24 69.4 kg (153 lb)                  Patient Active Problem List   Diagnosis    Coronary atherosclerosis of native coronary artery    Major depressive disorder, recurrent episode, moderate (H)    Anxiety    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)    Chronic viral hepatitis B without delta agent and without coma (H)    Vitamin D deficiency    S/P CABG (coronary artery bypass graft)    Malrotation of intestine (H28)    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    Hyperlipidemia LDL goal <70    Insomnia    Gout    Suicidal ideation    Essential  hypertension    Rheumatoid arthritis involving multiple sites, unspecified rheumatoid factor presence    High risk medications (not anticoagulants) long-term use    Midline low back pain without sciatica    Bilateral low back pain with sciatica, sciatica laterality unspecified    Elevated liver enzymes    On corticosteroid therapy    Essential hypertension with goal blood pressure less than 140/90    Insomnia, unspecified type    Rheumatoid arthritis of multiple sites with negative rheumatoid factor (H)    Benign prostatic hyperplasia with lower urinary tract symptoms, unspecified morphology    Chronic pain syndrome    Syncope, unspecified syncope type    Symptomatic cholelithiasis    H/O: gout    Anxiety and depression    F11.2 - Continuous opioid dependence (H)     Past Surgical History:   Procedure Laterality Date    ABDOMEN SURGERY  2014    BIOPSY  2015    BYPASS GRAFT ARTERY CORONARY  2008    6 vessels    COLONOSCOPY  2/8/2013    Procedure: COLONOSCOPY;  Colonoscopy, blood in stool;  Surgeon: Duane, William Charles, MD;  Location:  OR    COMBINED REPAIR PTOSIS WITH BLEPHAROPLASTY BILATERAL Bilateral 6/25/2018    Procedure: COMBINED REPAIR PTOSIS WITH BLEPHAROPLASTY BILATERAL;  Bilateral upper eyelid blepharoplasty, ptosis repair and brow ptosis repair;  Surgeon: Sandra Borja MD;  Location:  OR    GI SURGERY  2014    HEAD & NECK SURGERY  2011    NASAL/SINUS POLYPECTOMY  2010    ORTHOPEDIC SURGERY  2012    REPAIR PTOSIS      REPAIR PTOSIS BILATERAL Bilateral 7/22/2019    Procedure: REPAIR, PTOSIS, BOTH UPPER brows;  Surgeon: Sandra Borja MD;  Location:  OR    REPAIR PTOSIS BROW BILATERAL Bilateral 6/25/2018    Procedure: REPAIR PTOSIS BROW BILATERAL;;  Surgeon: Sandra Borja MD;  Location:  OR    THORACIC SURGERY  1989    tb    TONSILLECTOMY  2010    UVULOPALATOPHARYNGOPLASTY  2010    VASCULAR SURGERY  2008    [facility] CORONARY STENT CIERA SOTO VESSEL  2008    3 months after CABG        Social History     Tobacco Use    Smoking status: Never    Smokeless tobacco: Never   Substance Use Topics    Alcohol use: No     Alcohol/week: 0.0 standard drinks of alcohol     Family History   Problem Relation Age of Onset    C.A.D. Father     Coronary Artery Disease Father     Hypertension Father     Depression Father     Hypertension Mother     Diabetes Mother     Depression Mother     Mental Illness Mother     Hypertension Maternal Grandfather     Cancer Paternal Grandfather     Other Cancer Paternal Grandfather     Other Cancer Other     Autoimmune Disease No family hx of     Cerebrovascular Disease No family hx of     Thyroid Disease No family hx of     Glaucoma No family hx of     Macular Degeneration No family hx of          Current Outpatient Medications   Medication Sig Dispense Refill    Acetaminophen (TYLENOL PO)       allopurinol (ZYLOPRIM) 300 MG tablet TAKE 1 TABLET BY MOUTH EVERY DAY 90 tablet 3    amLODIPine (NORVASC) 5 MG tablet TAKE 1 TABLET BY MOUTH DAILY FOR BLOOD PRESSURE 90 tablet 3    aspirin EC 81 MG EC tablet Take 1 tablet (81 mg) by mouth daily (*) 30 tablet 1    atorvastatin (LIPITOR) 80 MG tablet Take 1 tablet (80 mg) by mouth daily 90 tablet 3    bacitracin 500 UNIT/GM external ointment Apply topically 3 times daily 30 g 3    baclofen (LIORESAL) 10 MG tablet Take 1 tablet (10 mg) by mouth 2 times daily as needed for muscle spasms 3 month supply. 180 tablet 1    busPIRone HCl (BUSPAR) 30 MG tablet TAKE 1 TABLET BY MOUTH 2 TIMES DAILY. 180 tablet 3    [START ON 6/19/2024] BUTRANS 20 MCG/HR WK patch Place 1 patch onto the skin every 7 days LISANDRO, name brand. 28 day script for chronic pain. OK to fill 06/17/24 to start 06/19/24 4 patch 0    Cholecalciferol (VITAMIN D) 2000 UNITS tablet TAKE ONE TABLET BY MOUTH ONCE DAILY 100 tablet 1    clobetasol (TEMOVATE) 0.05 % external cream Apply twice daily for 2 weeks, weekends only for 2 weeks, repeat cycle as needed for hands, not face or  genitals 60 g 4    clobetasol (TEMOVATE) 0.05 % external solution Apply twice daily to scalp for up to 2 weeks for flares. 60 mL 0    clonazePAM (KLONOPIN) 1 MG tablet TAKE 0.5-1 TABLETS BY MOUTH 2 TIMES DAILY AS NEEDED FOR ANXIETY 90 tablet 0    clopidogrel (PLAVIX) 75 MG tablet TAKE 1 TABLET BY MOUTH EVERY DAY 90 tablet 3    colchicine (COLCRYS) 0.6 MG tablet TAKE 2 TABLETS BY MOUTH AT THE FIRST SIGN OF FLARE, TAKE 1 ADDITIONAL TABLET ONE HOUR LATER. 90 tablet 1    desvenlafaxine (PRISTIQ) 100 MG 24 hr tablet Take 1 tablet (100 mg) by mouth daily 90 tablet 3    diclofenac (VOLTAREN) 1 % topical gel APPLY 4 GRM TOPICALLY 4 TIMES DAILY AS NEEDED FOR MODERATE PAIN NEED TO BE SEEN FOR REFILL 300 g 0    evolocumab (REPATHA SURECLICK) 140 MG/ML prefilled autoinjector Inject 1 mL (140 mg) Subcutaneous every 14 days 6 mL 3    ezetimibe (ZETIA) 10 MG tablet TAKE 1 TABLET (10 MG) BY MOUTH DAILY. 90 tablet 3    fluticasone (FLONASE) 50 MCG/ACT nasal spray Apply 1-2 sprays on the skin and let dry prior to applying Butrans patch. This will likely reduce the skin rash you are getting. 11.1 mL 11    gabapentin (NEURONTIN) 300 MG capsule Take 1 capsule (300 mg) by mouth 3 times daily 90 day supply 270 capsule 3    gemfibrozil (LOPID) 600 MG tablet TAKE 1 TABLET BY MOUTH 2 TIMES DAILY 180 tablet 3    golimumab (SIMPONI) 50 MG/0.5ML auto-injector pen Inject 0.5 mLs (50 mg) Subcutaneous every 28 days . Hold for signs of infection, and seek medical attention. 0.5 mL 4    hydrocortisone (WESTCORT) 0.2 % external cream FOR GENITALS, APPLY TWICE DAILY FOR UP TO 2 WEEKS, TAKE 2 WEEKS OFF THEN REPEAT 60 g 3    hydrocortisone 2.5 % cream FOR FACE, APPLY TWICE DAILY FOR UP TO 2 WEEK FOR FLARES ON THE FACE, TAKE 2 WEEKS OFF 28.35 g 3    Incontinence Supply Disposable (DEPEND UNDERWEAR LARGE) MISC Use twice daily. 60 each 11    leflunomide (ARAVA) 20 MG tablet Take 1 tablet (20 mg) by mouth daily ; Labs required every 8-12 weeks. 90 tablet 2     meclizine (ANTIVERT) 25 MG tablet TAKE 1 TABLET BY MOUTH EVERY 6 HOURS AS NEEDED FOR DIZZINESS. 30 tablet 3    melatonin 3 MG tablet Take 1 tablet (3 mg) by mouth nightly as needed for sleep      naloxone (NARCAN) 4 MG/0.1ML nasal spray Spray 1 spray (4 mg) into one nostril alternating nostrils once as needed for opioid reversal every 2-3 minutes until assistance arrives 0.2 mL 0    order for DME Equipment being ordered: chair lift 1 each 0    order for DME Equipment being ordered: single end cane 1 Units 0    ORDER FOR DME 1.  CPAP pressure 11 cm/H20 with heated humidity.   2.  Provide mask to fit and CPAP supplies.   3.  Length of need lifetime.   4.  If needed please provide a chin strap           pantoprazole (PROTONIX) 40 MG EC tablet TAKE 1 TABLET BY MOUTH EVERY DAY 90 tablet 2    pimecrolimus (ELIDEL) 1 % external cream APPLY TWICE DAILY FOR FACE AND GENITALS 60 g 1    sulfaSALAzine (AZULFIDINE) 500 MG tablet Take 3 tablets (1,500 mg) by mouth 2 times daily 540 tablet 2    tamsulosin (FLOMAX) 0.4 MG capsule TAKE 2 CAPSULES BY MOUTH EVERY  capsule 3    tenofovir (VIREAD) 300 MG tablet Take 1 tablet (300 mg) by mouth daily 90 tablet 3    triamcinolone (KENALOG) 0.1 % external ointment APPLY TO AFFECTED AREA TOPICALLY 3 TIMES A DAY 80 g 2    triamcinolone (KENALOG) 0.1 % external ointment Apply to trunk and extremities twice daily for up to 2 weeks for flares 80 g 1    zolpidem (AMBIEN) 5 MG tablet TAKE 1 TABLET (5 MG) BY MOUTH NIGHTLY AS NEEDED FOR SLEEP 30 tablet 5    nitroGLYcerin (NITROSTAT) 0.4 MG sublingual tablet PLACE 1 TAB UNDER THE TONGUE EVERY 5 MINUTES FOR 3 DOSES FOR CHEST PAIN. IF SYMPTOMS PERSIST 5 MINUTES AFTER 1ST DOSE CALL 911. (Patient not taking: Reported on 5/28/2024) 25 tablet 0    sildenafil (REVATIO) 20 MG tablet TAKE 2 - 5 TABLETS BY MOUTH AS NEEDED 30 MINUTES BEFORE SEXUAL ACTIVITY. MAX 100MG IN 24 HOURS. (Patient not taking: Reported on 5/21/2024) 450 tablet 3     Allergies    Allergen Reactions    Citalopram Itching    Tramadol Itching     Current providers sharing in care for this patient include:  Patient Care Team:  David Chavez MD as PCP - General (Family Practice)  David Chavez MD as Assigned PCP  Ameena Mendez MD as MD (INTERNAL MEDICINE - ENDOCRINOLOGY, DIABETES & METABOLISM)  Fly Travis MD as Assigned Heart and Vascular Provider  Leventhal, Thomas Michael, MD as Assigned Gastroenterology Provider  Sebastián Jenkins MD as Assigned Rheumatology Provider  Ameena Lang APRN CNP as Assigned Neuroscience Provider  Feliberto Jama OD as Assigned Surgical Provider  Ameena Lang APRN CNP as Assigned Pain Medication Provider  Leandra Mcclellan PA-C as Physician Assistant (Gastroenterology)  Tomasz Munoz PA-C as Assigned Sleep Provider    The following health maintenance items are reviewed in Epic and correct as of today:  Health Maintenance   Topic Date Due    HEPATITIS A IMMUNIZATION (1 of 2 - Risk 2-dose series) Never done    COVID-19 Vaccine (8 - 2023-24 season) 02/10/2024    DEPRESSION 12 MO INDEX REPEAT PHQ-9  05/09/2024    ANNUAL REVIEW OF HM ORDERS  06/07/2024    MEDICARE ANNUAL WELLNESS VISIT  06/07/2024    LIPID  12/22/2024    THONG ASSESSMENT  01/09/2025    URINE DRUG SCREEN  02/26/2025    CONTROLLED SUBSTANCE AGREEMENT FOR CHRONIC PAIN MANAGEMENT  02/26/2025    EYE EXAM  03/08/2025    PHQ-9  04/25/2025    COLORECTAL CANCER SCREENING  06/14/2026    GLUCOSE  05/10/2027    ADVANCE CARE PLANNING  06/07/2028    DTAP/TDAP/TD IMMUNIZATION (3 - Td or Tdap) 10/20/2031    HEPATITIS C SCREENING  Completed    HIV SCREENING  Completed    DEPRESSION ACTION PLAN  Completed    INFLUENZA VACCINE  Completed    Pneumococcal Vaccine: Pediatrics (0 to 5 Years) and At-Risk Patients (6 to 64 Years)  Completed    ZOSTER IMMUNIZATION  Completed    IPV IMMUNIZATION  Aged Out    HPV IMMUNIZATION  Aged Out    MENINGITIS IMMUNIZATION  Aged Out    RSV  "MONOCLONAL ANTIBODY  Aged Out         Review of Systems  Constitutional, HEENT, cardiovascular, pulmonary, GI, , musculoskeletal, neuro, skin, endocrine and psych systems are negative, except as otherwise noted.     Objective    Exam  /81 (BP Location: Left arm, Patient Position: Sitting, Cuff Size: Adult Regular)   Pulse 74   Temp 97.7  F (36.5  C) (Temporal)   Resp 16   Ht 1.575 m (5' 2\")   Wt 67 kg (147 lb 9.6 oz)   SpO2 97%   BMI 27.00 kg/m     Estimated body mass index is 27.51 kg/m  as calculated from the following:    Height as of 5/21/24: 1.575 m (5' 2\").    Weight as of 5/21/24: 68.2 kg (150 lb 6.4 oz).    Physical Exam  GENERAL: alert and no distress  NECK: no adenopathy, no asymmetry, masses, or scars  RESP: lungs clear to auscultation - no rales, rhonchi or wheezes  CV: regular rate and rhythm, normal S1 S2, no S3 or S4, no murmur, click or rub, no peripheral edema  ABDOMEN: soft, nontender, no hepatosplenomegaly, no masses and bowel sounds normal  MS: no gross musculoskeletal defects noted, no edema         6/11/2024   Mini Cog   Clock Draw Score 2 Normal   3 Item Recall 2 objects recalled   Mini Cog Total Score 4         A/P:  (Z00.00) Medicare annual wellness visit, subsequent  (primary encounter diagnosis)  Comment:   Plan: as below.    (I10) Essential hypertension with goal blood pressure less than 140/90  Comment:   Plan: Albumin Random Urine Quantitative with Creat         Ratio        Controlled. Recheck in 6 months.    (E78.5) Hyperlipidemia LDL goal <70  Comment:   Plan: Lipid panel reflex to direct LDL Non-fasting        Recheck and adjust if needed.    (R73.09) Elevated glucose  Comment:   Plan: Hemoglobin A1c            (Z12.5) Screening for prostate cancer  Comment:   Plan: PSA, screen            (Z23) Encounter for immunization  Comment:   Plan: COVID-19 12+ (2023-24) (PFIZER)               Signed Electronically by: David Chavez MD, MD    "

## 2024-06-12 LAB
CHOLEST SERPL-MCNC: 121 MG/DL
CREAT UR-MCNC: 214 MG/DL
FASTING STATUS PATIENT QL REPORTED: YES
HDLC SERPL-MCNC: 47 MG/DL
LDLC SERPL CALC-MCNC: 60 MG/DL
MICROALBUMIN UR-MCNC: 40.5 MG/L
MICROALBUMIN/CREAT UR: 18.93 MG/G CR (ref 0–17)
NONHDLC SERPL-MCNC: 74 MG/DL
PSA SERPL DL<=0.01 NG/ML-MCNC: 0.76 NG/ML (ref 0–3.5)
TRIGL SERPL-MCNC: 68 MG/DL

## 2024-06-18 DIAGNOSIS — I10 ESSENTIAL HYPERTENSION WITH GOAL BLOOD PRESSURE LESS THAN 140/90: ICD-10-CM

## 2024-06-18 DIAGNOSIS — M06.9 RHEUMATOID ARTHRITIS INVOLVING MULTIPLE SITES, UNSPECIFIED WHETHER RHEUMATOID FACTOR PRESENT (H): ICD-10-CM

## 2024-06-18 DIAGNOSIS — F33.1 MAJOR DEPRESSIVE DISORDER, RECURRENT EPISODE, MODERATE (H): ICD-10-CM

## 2024-06-18 DIAGNOSIS — E78.5 HYPERLIPIDEMIA LDL GOAL <100: ICD-10-CM

## 2024-06-18 DIAGNOSIS — M17.11 PRIMARY OSTEOARTHRITIS OF RIGHT KNEE: ICD-10-CM

## 2024-06-18 DIAGNOSIS — M10.00 IDIOPATHIC GOUT, UNSPECIFIED CHRONICITY, UNSPECIFIED SITE: ICD-10-CM

## 2024-06-18 RX ORDER — ALLOPURINOL 300 MG/1
TABLET ORAL
Qty: 90 TABLET | Refills: 3 | Status: SHIPPED | OUTPATIENT
Start: 2024-06-18

## 2024-06-18 RX ORDER — BUSPIRONE HYDROCHLORIDE 30 MG/1
30 TABLET ORAL 2 TIMES DAILY
Qty: 180 TABLET | Refills: 3 | Status: SHIPPED | OUTPATIENT
Start: 2024-06-18

## 2024-06-27 ENCOUNTER — MYC REFILL (OUTPATIENT)
Dept: CARDIOLOGY | Facility: CLINIC | Age: 59
End: 2024-06-27
Payer: COMMERCIAL

## 2024-06-27 ENCOUNTER — MYC REFILL (OUTPATIENT)
Dept: FAMILY MEDICINE | Facility: CLINIC | Age: 59
End: 2024-06-27
Payer: COMMERCIAL

## 2024-06-27 DIAGNOSIS — G47.00 INSOMNIA, UNSPECIFIED TYPE: ICD-10-CM

## 2024-06-27 DIAGNOSIS — F41.9 ANXIETY HYPERVENTILATION: ICD-10-CM

## 2024-06-27 DIAGNOSIS — E78.5 HYPERLIPIDEMIA LDL GOAL <100: ICD-10-CM

## 2024-06-27 DIAGNOSIS — F45.8 ANXIETY HYPERVENTILATION: ICD-10-CM

## 2024-06-27 RX ORDER — AMLODIPINE BESYLATE 5 MG/1
5 TABLET ORAL DAILY
Qty: 90 TABLET | Refills: 3 | Status: SHIPPED | OUTPATIENT
Start: 2024-06-27

## 2024-06-27 RX ORDER — ATORVASTATIN CALCIUM 80 MG/1
80 TABLET, FILM COATED ORAL DAILY
Qty: 90 TABLET | Refills: 3 | Status: CANCELLED | OUTPATIENT
Start: 2024-06-27

## 2024-06-27 NOTE — TELEPHONE ENCOUNTER
atorvastatin (LIPITOR) 80 MG     Last Written Prescription Date:  6/30/23  Last Fill Quantity: 90,   # refills: 3  Last Office Visit : 5/21/24  Future Office visit:  5/13/25  Routing refill request to provider for review/approval because:  Drug not on the refill protocol

## 2024-06-28 RX ORDER — ZOLPIDEM TARTRATE 5 MG/1
5 TABLET ORAL
Qty: 30 TABLET | Refills: 5 | Status: SHIPPED | OUTPATIENT
Start: 2024-06-28

## 2024-06-28 RX ORDER — ATORVASTATIN CALCIUM 80 MG/1
80 TABLET, FILM COATED ORAL DAILY
Qty: 90 TABLET | Refills: 3 | Status: SHIPPED | OUTPATIENT
Start: 2024-06-28

## 2024-06-28 RX ORDER — CLONAZEPAM 1 MG/1
TABLET ORAL
Qty: 90 TABLET | Refills: 0 | Status: SHIPPED | OUTPATIENT
Start: 2024-06-28 | End: 2024-09-05

## 2024-06-29 NOTE — TELEPHONE ENCOUNTER
Refill sent for atorvastatin.       Requested Prescriptions   Pending Prescriptions Disp Refills    atorvastatin (LIPITOR) 80 MG tablet [Pharmacy Med Name: ATORVASTATIN 80 MG TABLET] 90 tablet 3     Sig: TAKE 1 TABLET BY MOUTH EVERY DAY       Antihyperlipidemic agents Passed - 6/27/2024 11:18 AM        Passed - LDL on file in the past 12 months        Passed - Medication is active on med list        Passed - Recent (12 mo) or future (90 days) visit within the authorizing provider's specialty     The patient must have completed an in-person or virtual visit within the past 12 months or has a future visit scheduled within the next 90 days with the authorizing provider s specialty.  Urgent care and e-visits do not quality as an office visit for this protocol.          Passed - Patient is age 18 years or older

## 2024-07-01 DIAGNOSIS — M79.18 MYOFASCIAL PAIN: ICD-10-CM

## 2024-07-01 DIAGNOSIS — M62.838 MUSCLE SPASM: ICD-10-CM

## 2024-07-01 DIAGNOSIS — M62.830 SPASM OF BACK MUSCLES: ICD-10-CM

## 2024-07-01 RX ORDER — BACLOFEN 10 MG/1
10 TABLET ORAL 2 TIMES DAILY PRN
Qty: 180 TABLET | Refills: 1 | Status: SHIPPED | OUTPATIENT
Start: 2024-07-01

## 2024-07-01 NOTE — TELEPHONE ENCOUNTER
Signed Prescriptions:                        Disp   Refills    baclofen (LIORESAL) 10 MG tablet           180 ta*1        Sig: Take 1 tablet (10 mg) by mouth 2 times daily as           needed for muscle spasms 3 month supply.  Authorizing Provider: FIDENCIO PAGE, RN CNP, FNP  [facility]  [facility] [place]

## 2024-07-01 NOTE — TELEPHONE ENCOUNTER
Received fax from pharmacy requesting refill(s) for     baclofen (LIORESAL) 10 MG tablet     Last refilled on 4/30/24    Pt last seen on 5/28/24  Next appt scheduled for none    [facility] to:    [id number] - [place], [place] - [address]     Will facilitate refill.

## 2024-07-17 ENCOUNTER — MYC MEDICAL ADVICE (OUTPATIENT)
Dept: PALLIATIVE MEDICINE | Facility: CLINIC | Age: 59
End: 2024-07-17
Payer: COMMERCIAL

## 2024-07-17 DIAGNOSIS — M47.819 FACET ARTHROPATHY: ICD-10-CM

## 2024-07-17 DIAGNOSIS — R21 RASH: ICD-10-CM

## 2024-07-17 DIAGNOSIS — G89.4 CHRONIC PAIN SYNDROME: ICD-10-CM

## 2024-07-17 DIAGNOSIS — M06.9 RHEUMATOID ARTHRITIS INVOLVING MULTIPLE SITES, UNSPECIFIED WHETHER RHEUMATOID FACTOR PRESENT (H): ICD-10-CM

## 2024-07-17 DIAGNOSIS — F11.90 CHRONIC, CONTINUOUS USE OF OPIOIDS: ICD-10-CM

## 2024-07-17 DIAGNOSIS — M10.9 GOUT WITHOUT TOPHUS: ICD-10-CM

## 2024-07-17 DIAGNOSIS — M17.11 PRIMARY OSTEOARTHRITIS OF RIGHT KNEE: ICD-10-CM

## 2024-07-17 RX ORDER — COLCHICINE 0.6 MG/1
TABLET ORAL
Qty: 90 TABLET | Refills: 0 | Status: SHIPPED | OUTPATIENT
Start: 2024-07-17 | End: 2024-08-13

## 2024-07-17 RX ORDER — TRIAMCINOLONE ACETONIDE 1 MG/G
OINTMENT TOPICAL
Qty: 80 G | Refills: 2 | Status: SHIPPED | OUTPATIENT
Start: 2024-07-17

## 2024-07-17 NOTE — TELEPHONE ENCOUNTER
Pt called in regards to this [employer] message.  He is requesting that his pharmacy be changed to [facility] in [place].  Please send Butrans patches to this new pharmacy.  He wants this listed as his new pharmacy.  Thanks.

## 2024-07-17 NOTE — TELEPHONE ENCOUNTER
Received request for a refill(s) of Butrans 20 mcg patch      Last dispensed from pharmacy on 06/24/24    Patient's last office/virtual visit by prescribing provider on 05/28/24  Next office/virtual appointment scheduled for : none    Last urine drug screen date 02/26/24  Current opioid agreement on file (completed within the last year) Yes Date of opioid agreement: 02/26/24    E-prescribe to pharmacy-[facility] [id number] [address] [facility] - [facility] [address] [address] [facility] [address] [address]     Will route to nursing Rosamond for review and preparation of prescription(s).

## 2024-07-17 NOTE — TELEPHONE ENCOUNTER
Please process a refill of Butrans 20 mcg patch to     [facility] [id number] [address] [facility] - [place] - [address]  [address] [facility] [address] [address] [address]  Phone: [phone] Fax: [phone]

## 2024-07-18 RX ORDER — BUPRENORPHINE 20 UG/H
1 PATCH, EXTENDED RELEASE TRANSDERMAL
Qty: 4 PATCH | Refills: 0 | Status: SHIPPED | OUTPATIENT
Start: 2024-07-22 | End: 2024-08-15

## 2024-07-18 NOTE — TELEPHONE ENCOUNTER
Signed Prescriptions:                        Disp   Refills    BUTRANS 20 MCG/HR WK patch                 4 patch0        Sig: Place 1 patch onto the skin every 7 days LISANDRO, name           brand. 28 day script for chronic pain. OK to fill           07/20/24 to start 07/22/24  Authorizing Provider: AMEENA PAGE        Reviewed [place]  July 18, 2024- no concerning fills.    Ameena LEMUS, RN CNP, FNP  [facility]  [facility]

## 2024-07-19 ENCOUNTER — THERAPY VISIT (OUTPATIENT)
Dept: SLEEP MEDICINE | Facility: CLINIC | Age: 59
End: 2024-07-19
Attending: PHYSICIAN ASSISTANT
Payer: COMMERCIAL

## 2024-07-19 DIAGNOSIS — G47.33 OSA (OBSTRUCTIVE SLEEP APNEA): ICD-10-CM

## 2024-07-19 DIAGNOSIS — R06.81 CENTRAL APNEA: ICD-10-CM

## 2024-07-19 PROCEDURE — 95811 POLYSOM 6/>YRS CPAP 4/> PARM: CPT | Performed by: INTERNAL MEDICINE

## 2024-07-20 NOTE — PROGRESS NOTES
Completed a all night titration PSG per provider order.    Preliminary AHI 40.  A final therapeutic PAP pressure was achieved.    Supine REM was seen on therapeutic pressure.    Patient reports feeling refreshed in AM.

## 2024-07-22 ENCOUNTER — TELEPHONE (OUTPATIENT)
Dept: PALLIATIVE MEDICINE | Facility: CLINIC | Age: 59
End: 2024-07-22
Payer: COMMERCIAL

## 2024-07-22 NOTE — TELEPHONE ENCOUNTER
[facility] Call Center    Phone Message    May a detailed message be left on voicemail: yes     Reason for Call: Medication Question or concern regarding medication   Prescription Clarification  Name of Medication:   BUTRANS 20 MCG/HR WK patch       Prescribing Provider:   Ameena Lang APRN CNP        Pharmacy:   [facility] [id number] [facility] [address] [facility]      What on the order needs clarification? Pt is calling and stating that Ameena needs to call pharmacy because they are telling Pt that he is unable to get medication until 8/3/2024 Please call Pt back to discuss.       Action Taken: Message routed to:  Other: Talha Pain    Travel Screening: Not Applicable     Date of Service:

## 2024-07-23 LAB — SLPCOMP: NORMAL

## 2024-07-23 NOTE — TELEPHONE ENCOUNTER
Spoke with the pharmacy and this issue has been resolved. Patient will fill today.    ROBERTO BarksdaleN, RN  Care Coordinator  [facility] [place]

## 2024-07-24 ENCOUNTER — VIRTUAL VISIT (OUTPATIENT)
Dept: RHEUMATOLOGY | Facility: CLINIC | Age: 59
End: 2024-07-24
Payer: COMMERCIAL

## 2024-07-24 DIAGNOSIS — Z79.899 HIGH RISK MEDICATION USE: ICD-10-CM

## 2024-07-24 DIAGNOSIS — M06.09 RHEUMATOID ARTHRITIS OF MULTIPLE SITES WITH NEGATIVE RHEUMATOID FACTOR (H): Primary | ICD-10-CM

## 2024-07-24 PROCEDURE — G2211 COMPLEX E/M VISIT ADD ON: HCPCS | Mod: 95 | Performed by: INTERNAL MEDICINE

## 2024-07-24 PROCEDURE — 99214 OFFICE O/P EST MOD 30 MIN: CPT | Mod: 95 | Performed by: INTERNAL MEDICINE

## 2024-07-24 NOTE — PROGRESS NOTES
Lamont Daniels  is a 58 year old year old who is being evaluated via a billable video visit.      How would you like to obtain your AVS? MyChart  If the video visit is dropped, the invitation should be resent by: Text to cell phone: [phone]   Will anyone else be joining your video visit? No     Rheumatology Video Visit      Lamont Daniels MRN# [id number]   YOB: 1965 Age: 58 year old      Date of visit: 7/24/24   PCP: Dr. David Chavez  Hepatologist: Dr. Thomas Leventhal, [facility]     Chief Complaint   Patient presents with:  Rheumatoid Arthritis    Assessment and Plan   1.  Seropositive nonerosive rheumatoid arthritis (RF negative, CCP low positive): Note that he does have low back pain but without evidence of SI joint irregularity on x-ray.  Initially with morning stiffness all day, gelling phenomenon, and synovitis.   Previously on Humira (partially effective), Enbrel (partially effective), HCQ (cannot safely monitor due to right retinal scaring and bilateral early macular degeneration, per ophthalmology), Orencia (partially effective and could use again in the future if needed).  MTX avoided per Dr. Laboy, hematology, recommendation. Currently on leflunomide 20 mg daily, SSZ 1500mg BID, Simponi 50 mg SQ every 28 days (improved when changed from Orencia to Simponi).  RA controlled on current regimen and he reports that symptoms are stable.  Patient states that is difficult for him to come into the clinic and because his disease has been stable he would like to follow-up in 3 months by video; we discussed the difference between video and in office evaluation and the lack of being able to examine his joints by video is a limitation but I understand transportation can be difficult so we will continue follow-up by video for now, but to come in to clinic if symptoms are worsening.   Chronic illness, stable.    - Continue leflunomide 20 mg daily    - Continue sulfasalazine 1500mg BID  - Continue Simponi 50mg SQ  "every 28 days  - PRN RA flare only: Prednisone 10 mg daily x2 days, then 5 mg daily x5 days, then stop   - Labs in early August: CBC, Creatinine, Hepatic Panel  - Labs in early November: CBC, Creatinine, Hepatic Panel, ESR, CRP     High risk medication requiring intensive toxicity monitoring at least quarterly     2. Lower back pain: Lumbar spine MRI in August 2014 showed multilevel degenerative changes and a small disc protrusion at L3/4 with mild spinal canal narrowing; also moderate left and mild right neural foraminal narrowing at L5-S1. He had a nerve conduction study in 2014 that was normal. He has been worked up by neurology in the past without significant neurologic findings. HLA-B27 negative, SI joint x-ray negative.  Has followed in the pain management clinic where he says the steroid injections were helpful for only 1 month and radiofrequency ablation was being considered.  This was not discussed again to date.      3. Myofascial pain syndrome / chronic pain syndrome: diffuse pain consistent with chronic pain syndrome.  Management per pain clinic and/or PCP.  Generally when he is more physically active he feels better, per patient    4. Chronic Hepatitis B: Managed by Dr. Laboy (hepatology) previously, and now Dr. Thomas Leventhal at the [facility]. Currently on tenofovir at the direction of gastroenterology.     5. Hepatic Steatosis: Based on previous liver biopsy.  Follows with gastroenterology.    6. Positive tuberculosis test, history:   This is noted here for historical significance.  He was evaluated by Dr. Anthony Mendoza, infectious disease. The following telephone note was documented by Dr. Mendoza: \"I tried calling th pt, finally we have the records from [place] . It appears he was treated for latent TB with INH for 1 year in 1980/1981 .Later in 1981 April he was admitted at [facility] in [place], [place] with rt sided pneumonia, TB was suspected but he improved with treatment with " "Penicillin only. Later he was seen  ( likely ID specialist), and was treated with 6 weeks course of Rifampin and Ethambutol pending sputum AFB culture. His AFB cx were negative based on the report we have.\"  \"He recently had QFT -TB test which was reported positive and his CXR was clear. Given his documented hx of completion of treatment for latent TB as above , I do not see any need for further treatment. His tests may remain positive irrespective of treatment status. From my perspective he can be started on DMARDs/Biological as deemed necessary.\"       7. Gout: Managed by his PCP.      8.  Vaccinations:   - Influenza: encouraged yearly vaccination  - [id number]: up to date  - [id number]: up to date  - Shingrix: Up to date  - COVID-19: advise keeping updated, and to hold sulfasalazine and leflunomide for 2 weeks after the COVID-19 vaccination      9.  Historical: Anxiety: When going outside of the house or considering going outside of the house has increased anxiety that results in shortness of breath and this leads to increased anxiety that leads to more shortness of breath, etc.  6/30/2023 cardiology note by Dr. Hardin documents that the dyspnea is stable, normal echo and PFTs, and that his dyspnea is likely related to anxiety.  Considering that anxiety is keeping him from being more active and going outside the house more frequently, I advised that he consider mental health evaluation and he is in agreement with this.  Possibly cognitive behavioral therapy (or other depending on mental health provider's recommendation) could help reduce anxiety.  Mental health referral was placed previously and he had an appointment on 1/9/2024.  This is documented here for historical significance only and was not discussed again today.    Total minutes spent in evaluation with patient, documentation, , and review of pertinent studies and chart notes: 10  The longitudinal plan of care for the rheumatology " problem(s) were addressed during this visit.  Due to added complexity of care, we will continue to support the patient and the subsequent management of this condition with ongoing continuity of care.      Mr. Daniels verbalized agreement with and understanding of the rational for the diagnosis and treatment plan.  All questions were answered to best of my ability and the patient's satisfaction. Mr. Daniels was advised to contact the clinic with any questions that may arise after the clinic visit.      Thank you for involving me in the care of the patient    Return to clinic: 3-4 months      HPI   Lamont Daniels is a 58 year old male with a medical history significant for hypertension, hyperlipidemia, gout, coronary artery disease s/p 6vCABG, vitamin D deficiency, hepatitis B, obstructive sleep apnea (uses CPAP), and RA who presents for follow-up of RA.    9/22/2023: RA controlled.  Diffuse body ache but no specific joint pain.  Joints without swelling, increased warmth, or overlying erythema.  Minimal physical activity because of dyspnea that is not associate with chest pain/pressure, palpitations, lightheadedness, dizziness, or nausea; he says that sometimes even thinking about going to do more walking or other exercises outside resulted in increased anxiety that results in the dyspnea and he has seen his cardiology about this who suspects that anxiety is the cause of the dyspnea.  Has not seen a mental health provider for anxiety management.  Continues to follow with gastroenterology for hepatitis B.    1/5/2024: Reports that his arthritis is stable.  No joint swelling.  Joints without increased warmth or overlying erythema.  Diffuse body ache including the joints that is similar to previous.  Morning stiffness for no more than 20 minutes.  Biggest issue right now is chronic low back pain that improved with steroid injection for no more than 1 month each time and he says that his pain management provider and he discussed  option of radiofrequency ablation for low back pain management and he is considering this option.  He would like to remain on his current regimen for RA because it is effective.    4/24/2024: Reports doing well.  No joint pain.  Morning stiffness for 10-30 minutes.  No joint swelling.  Arthritis does not limit daily activities.  He says that he feels well on most days and plans to be more active now that the weather outside is becoming warmer.  He says he always appears tired during visits because he has increased anxiety with any medical appointment; otherwise he says that his anxiety and depression are well-controlled.    Today, 7/24/2024: Reports that RA is stable.  Morning stiffness may last for 20 minutes or up to 2 hours.  No joint swelling.  Arthritis is not limiting his daily activities.  He says that he does not do much physical activity, leading a sedentary lifestyle.    Denies fevers, chills, nausea, vomiting, constipation, diarrhea. No abdominal pain. Denies chest pain/pressure, palpitations, or shortness of breath.  No oral or nasal sores. No rash. No LE swelling.  Mild dry mouth; he has good dentition and does not feel like he needs to use frequent sips of water; unchanged since last evaluation. No dry eyes. No eye pain or redness.     Tobacco:  None   EtOH:  None  Drugs:  None  Occupation: Retired   Originally from [facility], then lived just north of [place], [place], then lived in [place], [place], then moved to [place] for family    ROS   12 point review of system was completed and negative except as noted in the HPI     Active Problem List     Patient Active Problem List   Diagnosis    Coronary atherosclerosis of native coronary artery    Major depressive disorder, recurrent episode, moderate (H)    Anxiety    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)    Chronic viral hepatitis B without delta agent and without coma (H)    Vitamin D deficiency    S/P CABG (coronary artery bypass graft)    Malrotation of intestine (H28)     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    Hyperlipidemia LDL goal <70    Insomnia    Gout    Suicidal ideation    Essential hypertension    Rheumatoid arthritis involving multiple sites, unspecified rheumatoid factor presence    High risk medications (not anticoagulants) long-term use    Midline low back pain without sciatica    Bilateral low back pain with sciatica, sciatica laterality unspecified    Elevated liver enzymes    On corticosteroid therapy    Essential hypertension with goal blood pressure less than 140/90    Insomnia, unspecified type    Rheumatoid arthritis of multiple sites with negative rheumatoid factor (H)    Benign prostatic hyperplasia with lower urinary tract symptoms, unspecified morphology    Chronic pain syndrome    Syncope, unspecified syncope type    Symptomatic cholelithiasis    H/O: gout    Anxiety and depression    F11.2 - Continuous opioid dependence (H)     Past Medical History     Past Medical History:   Diagnosis Date    Anxiety     CAD (coronary artery disease)     CABG, PCI    Congestive heart failure, unspecified 2008    Depressive disorder 2008    Gout     Hepatitis B > 10 years    HTN (hypertension)     Hyperlipidemia LDL goal < 100     Malrotation of intestine (H28)     Moderate major depression (H)     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) 9/19/2011    Uses CPAP    Rheumatoid arthritis flare (H) 1012    Steatohepatitis 12/15    liver biopsy    Tuberculosis age 13    INH x 9 months     Vitamin D deficiency      Past Surgical History     Past Surgical History:   Procedure Laterality Date    ABDOMEN SURGERY  2014    BIOPSY  2015    BYPASS GRAFT ARTERY CORONARY  2008    6 vessels    COLONOSCOPY  2/8/2013    Procedure: COLONOSCOPY;  Colonoscopy, blood in stool;  Surgeon: Duane, William Charles, MD;  Location: [facility] OR    COMBINED REPAIR PTOSIS WITH BLEPHAROPLASTY BILATERAL Bilateral 6/25/2018    Procedure: COMBINED REPAIR PTOSIS WITH BLEPHAROPLASTY BILATERAL;  Bilateral upper  eyelid blepharoplasty, ptosis repair and brow ptosis repair;  Surgeon: Sandra Borja MD;  Location: [facility] OR    GI SURGERY  2014    HEAD & NECK SURGERY  2011    NASAL/SINUS POLYPECTOMY  2010    ORTHOPEDIC SURGERY  2012    REPAIR PTOSIS      REPAIR PTOSIS BILATERAL Bilateral 7/22/2019    Procedure: REPAIR, PTOSIS, BOTH UPPER brows;  Surgeon: Sandra Borja MD;  Location: [facility] OR    REPAIR PTOSIS BROW BILATERAL Bilateral 6/25/2018    Procedure: REPAIR PTOSIS BROW BILATERAL;;  Surgeon: Sandra Borja MD;  Location: [facility] OR    THORACIC SURGERY  1989    tb    TONSILLECTOMY  2010    UVULOPALATOPHARYNGOPLASTY  2010    VASCULAR SURGERY  2008    [facility] CORONARY STENT PERCUT, EA ADDTL VESSEL  2008    3 months after CABG     Allergy     Allergies   Allergen Reactions    Citalopram Itching    Tramadol Itching     Current Medication List     Current Outpatient Medications   Medication Sig Dispense Refill    Acetaminophen (TYLENOL PO)       allopurinol (ZYLOPRIM) 300 MG tablet TAKE 1 TABLET BY MOUTH EVERY DAY 90 tablet 3    amLODIPine (NORVASC) 5 MG tablet TAKE 1 TABLET BY MOUTH DAILY FOR BLOOD PRESSURE 90 tablet 3    aspirin EC 81 MG EC tablet Take 1 tablet (81 mg) by mouth daily (*) 30 tablet 1    atorvastatin (LIPITOR) 80 MG tablet TAKE 1 TABLET BY MOUTH EVERY DAY 90 tablet 3    bacitracin 500 UNIT/GM external ointment Apply topically 3 times daily 30 g 3    baclofen (LIORESAL) 10 MG tablet Take 1 tablet (10 mg) by mouth 2 times daily as needed for muscle spasms 3 month supply. 180 tablet 1    busPIRone HCl (BUSPAR) 30 MG tablet TAKE 1 TABLET BY MOUTH TWICE A  tablet 3    BUTRANS 20 MCG/HR WK patch Place 1 patch onto the skin every 7 days LISANDRO, name brand. 28 day script for chronic pain. OK to fill 07/20/24 to start 07/22/24 4 patch 0    Cholecalciferol (VITAMIN D) 2000 UNITS tablet TAKE ONE TABLET BY MOUTH ONCE DAILY 100 tablet 1    clobetasol (TEMOVATE) 0.05 % external cream Apply twice daily for 2 weeks, weekends  only for 2 weeks, repeat cycle as needed for hands, not face or genitals 60 g 4    clobetasol (TEMOVATE) 0.05 % external solution Apply twice daily to scalp for up to 2 weeks for flares. 60 mL 0    clonazePAM (KLONOPIN) 1 MG tablet TAKE 0.5-1 TABLETS BY MOUTH 2 TIMES DAILY AS NEEDED FOR ANXIETY 90 tablet 0    clopidogrel (PLAVIX) 75 MG tablet TAKE 1 TABLET BY MOUTH EVERY DAY 90 tablet 3    colchicine (COLCRYS) 0.6 MG tablet TAKE 2 TABLETS BY MOUTH AT THE FIRST SIGN OF FLARE, TAKE 1 ADDITIONAL TABLET ONE HOUR LATER. 90 tablet 0    desvenlafaxine (PRISTIQ) 100 MG 24 hr tablet Take 1 tablet (100 mg) by mouth daily 90 tablet 3    diclofenac (VOLTAREN) 1 % topical gel APPLY 4 GRM TOPICALLY 4 TIMES DAILY AS NEEDED FOR MODERATE PAIN NEED TO BE SEEN FOR REFILL 300 g 1    evolocumab (REPATHA SURECLICK) 140 MG/ML prefilled autoinjector Inject 1 mL (140 mg) Subcutaneous every 14 days 6 mL 3    ezetimibe (ZETIA) 10 MG tablet TAKE 1 TABLET (10 MG) BY MOUTH DAILY. 90 tablet 3    fluticasone (FLONASE) 50 MCG/ACT nasal spray Apply 1-2 sprays on the skin and let dry prior to applying Butrans patch. This will likely reduce the skin rash you are getting. 11.1 mL 11    gabapentin (NEURONTIN) 300 MG capsule Take 1 capsule (300 mg) by mouth 3 times daily 90 day supply 270 capsule 3    gemfibrozil (LOPID) 600 MG tablet TAKE 1 TABLET BY MOUTH 2 TIMES DAILY 180 tablet 3    golimumab (SIMPONI) 50 MG/0.5ML auto-injector pen Inject 0.5 mLs (50 mg) Subcutaneous every 28 days . Hold for signs of infection, and seek medical attention. 0.5 mL 4    hydrocortisone (WESTCORT) 0.2 % external cream FOR GENITALS, APPLY TWICE DAILY FOR UP TO 2 WEEKS, TAKE 2 WEEKS OFF THEN REPEAT 60 g 3    hydrocortisone 2.5 % cream FOR FACE, APPLY TWICE DAILY FOR UP TO 2 WEEK FOR FLARES ON THE FACE, TAKE 2 WEEKS OFF 28.35 g 3    Incontinence Supply Disposable (DEPEND UNDERWEAR LARGE) MISC Use twice daily. 60 each 11    leflunomide (ARAVA) 20 MG tablet Take 1 tablet (20  mg) by mouth daily ; Labs required every 8-12 weeks. 90 tablet 2    meclizine (ANTIVERT) 25 MG tablet TAKE 1 TABLET BY MOUTH EVERY 6 HOURS AS NEEDED FOR DIZZINESS. 30 tablet 3    melatonin 3 MG tablet Take 1 tablet (3 mg) by mouth nightly as needed for sleep      pantoprazole (PROTONIX) 40 MG EC tablet TAKE 1 TABLET BY MOUTH EVERY DAY 90 tablet 2    pimecrolimus (ELIDEL) 1 % external cream APPLY TWICE DAILY FOR FACE AND GENITALS 60 g 1    sulfaSALAzine (AZULFIDINE) 500 MG tablet Take 3 tablets (1,500 mg) by mouth 2 times daily 540 tablet 2    tamsulosin (FLOMAX) 0.4 MG capsule TAKE 2 CAPSULES BY MOUTH EVERY  capsule 3    tenofovir (VIREAD) 300 MG tablet Take 1 tablet (300 mg) by mouth daily 90 tablet 3    triamcinolone (KENALOG) 0.1 % external ointment APPLY TO AFFECTED AREA TOPICALLY 3 TIMES A DAY 80 g 2    triamcinolone (KENALOG) 0.1 % external ointment Apply to trunk and extremities twice daily for up to 2 weeks for flares 80 g 1    zolpidem (AMBIEN) 5 MG tablet Take 1 tablet (5 mg) by mouth nightly as needed for sleep 30 tablet 5    naloxone (NARCAN) 4 MG/0.1ML nasal spray Spray 1 spray (4 mg) into one nostril alternating nostrils once as needed for opioid reversal every 2-3 minutes until assistance arrives 0.2 mL 0    nitroGLYcerin (NITROSTAT) 0.4 MG sublingual tablet PLACE 1 TAB UNDER THE TONGUE EVERY 5 MINUTES FOR 3 DOSES FOR CHEST PAIN. IF SYMPTOMS PERSIST 5 MINUTES AFTER 1ST DOSE CALL 911. (Patient not taking: Reported on 5/28/2024) 25 tablet 0    order for DME Equipment being ordered: chair lift 1 each 0    order for DME Equipment being ordered: single end cane 1 Units 0    ORDER FOR DME 1.  CPAP pressure 11 cm/H20 with heated humidity.   2.  Provide mask to fit and CPAP supplies.   3.  Length of need lifetime.   4.  If needed please provide a chin strap           sildenafil (REVATIO) 20 MG tablet TAKE 2 - 5 TABLETS BY MOUTH AS NEEDED 30 MINUTES BEFORE SEXUAL ACTIVITY. MAX 100MG IN 24 HOURS.  "(Patient not taking: Reported on 5/21/2024) 450 tablet 3     No current facility-administered medications for this visit.       Social History   See HPI    Family History     Family History   Problem Relation Age of Onset    C.A.D. Father     Coronary Artery Disease Father     Hypertension Father     Depression Father     Hypertension Mother     Diabetes Mother     Depression Mother     Mental Illness Mother     Hypertension Maternal Grandfather     Cancer Paternal Grandfather     Other Cancer Paternal Grandfather     Other Cancer Other     Autoimmune Disease No family hx of     Cerebrovascular Disease No family hx of     Thyroid Disease No family hx of     Glaucoma No family hx of     Macular Degeneration No family hx of      No family history of autoimmune disease  No family history of psoriasis     No change in family history since the previous clinic visit.     Physical Exam     Temp Readings from Last 3 Encounters:   06/11/24 97.7  F (36.5  C) (Temporal)   04/25/24 97.4  F (36.3  C) (Tympanic)   12/22/23 (!) 96.5  F (35.8  C) (Tympanic)     BP Readings from Last 5 Encounters:   06/11/24 117/81   05/21/24 127/85   04/25/24 108/70   02/26/24 126/86   12/22/23 129/88     Pulse Readings from Last 1 Encounters:   06/11/24 74     Resp Readings from Last 1 Encounters:   06/11/24 16     Estimated body mass index is 27 kg/m  as calculated from the following:    Height as of 6/11/24: 1.575 m (5' 2\").    Weight as of 6/11/24: 67 kg (147 lb 9.6 oz).    GEN: alert and no distress  PSYCH: Alert; coherent speech, normal rate and volume, able to articulate logical thoughts, able   to abstract reason, no tangential thoughts. Normal affect.   RESP: No cough, no audible wheezing, able to talk in full sentences     Labs     CBC  Recent Labs   Lab Test 05/10/24  1002 04/25/24  1254 12/27/23  1000 09/14/23  1404 08/23/21  1130 03/29/21  1318 02/27/21  1259 01/30/21  1226   WBC 5.4 5.6 6.6 7.0   < > 5.2 4.4 5.5   RBC 4.39* 4.63 4.47 " 4.51   < > 4.72 4.79 5.11   HGB 12.4* 12.9* 12.7* 13.0*   < > 13.6 13.8 14.5   HCT 39.3* 40.6 39.5* 39.9*   < > 42.3 43.1 43.9   MCV 90 88 88 89   < > 90 90 86   RDW 14.9 14.5 13.6 14.3   < > 14.6 14.7 13.5    314 363 325   < > 245 275 296   MCH 28.2 27.9 28.4 28.8   < > 28.8 28.8 28.4   MCHC 31.6 31.8 32.2 32.6   < > 32.2 32.0 33.0   NEUTROPHIL  --  39 50 55   < > 45.1 40.9 50.5   LYMPH  --  36 23 29   < > 35.5 42.0 33.2   MONOCYTE  --  19 22 14   < > 15.3 12.6 12.7   EOSINOPHIL  --  5 4 1   < > 2.9 3.4 2.9   BASOPHIL  --  1 1 1   < > 1.2 1.1 0.7   ANEU  --   --   --   --   --  2.3 1.8 2.7   ALYM  --   --   --   --   --  1.8 1.9 1.8   PRACHI  --   --   --   --   --  0.8 0.6 0.7   AEOS  --   --   --   --   --  0.2 0.2 0.2   ABAS  --   --   --   --   --  0.1 0.1 0.0   ANEUTAUTO  --  2.2 3.3 3.9   < >  --   --   --    ALYMPAUTO  --  2.0 1.5 2.0   < >  --   --   --    AMONOAUTO  --  1.1 1.4* 1.0   < >  --   --   --    AEOSAUTO  --  0.3 0.2 0.1   < >  --   --   --    ABSBASO  --  0.1 0.1 0.1   < >  --   --   --     < > = values in this interval not displayed.     CMP  Recent Labs   Lab Test 05/10/24  1002 04/25/24  1254 12/27/23  1000 12/22/23  1349 08/23/21  1130 03/29/21  1318 02/27/21  1259 01/30/21  1226     --  141 141   < >  --   --   --    POTASSIUM 3.7  --  3.7 3.9   < >  --   --   --    CHLORIDE 108*  --  105 105   < >  --   --   --    CO2 27  --  24 22   < >  --   --   --    ANIONGAP 8  --  12 14   < >  --   --   --    GLC 92  --  107* 111*   < >  --   --   --    BUN 13.5  --  16.3 13.4   < >  --   --   --    CR 0.84 0.84 0.76 0.82   < > 1.01 0.95 0.94   GFRESTIMATED >90 >90 >90 >90   < > 83 89 >90   GFRESTBLACK  --   --   --   --   --  >90 >90 >90   HEMANT 9.3  --  9.2 9.6   < >  --   --   --    BILITOTAL 0.3 0.3 0.4 0.5   < > 0.5 0.4 0.4   ALBUMIN 4.3 4.4 4.1 4.3   < > 3.9 4.1 4.0   PROTTOTAL 7.3 7.3 7.6 7.7   < > 7.6 7.6 7.6   ALKPHOS 93 99 105 115   < > 116 102 114   AST 21 28 16 22   < > 20 23 37  "  ALT <5 19 12 16   < > 31 27 46    < > = values in this interval not displayed.     Calcium/VitaminD  Recent Labs   Lab Test 05/10/24  1002 12/27/23  1000 12/22/23  1349 12/04/17  0924 07/13/17  1246   HEMANT 9.3 9.2 9.6   < >  --    VITDT  --   --   --   --  34    < > = values in this interval not displayed.     ESR/CRP  Recent Labs   Lab Test 04/25/24  1254 12/27/23  1000 09/14/23  1404 06/07/23  0858 12/17/22  1244 09/14/22  1037 07/15/22  1126   SED 16 59* 24*   < > 15 41* 84*   CRP  --   --   --   --  <2.9 5.4 4.7   CRPI <3.00 9.28* <3.00   < >  --   --   --     < > = values in this interval not displayed.     Lipid Panel  Recent Labs   Lab Test 06/11/24  1353 12/22/23  1349 03/22/23  1052   CHOL 121 117 111   TRIG 68 79 125   HDL 47 42 50   LDL 60 59 36   NHDL 74 75 61     Hepatitis B  Recent Labs   Lab Test 05/10/24  1002 12/27/23  1000 03/22/23  1052 10/20/21  1144 10/20/21  1143 03/19/21  0928 03/26/20  1039 10/07/19  0940 12/01/16  1429 10/05/16  0954   AUSAB  --   --   --   --  0.09  --   --   --   --  0.45   HEPBANG  --   --  Reactive*  --   --   --   --   --   --  Reactive*   HBQLOG  --   --   --   --   --  Not Calculated <1.3 Not Calculated   < > 5.1*   HBQRES Not Detected Not Detected Not Detected   < >  --  HBV DNA Not Detected <20* HBV DNA Not Detected   < > 140,872*    < > = values in this interval not displayed.     Hepatitis C  Recent Labs   Lab Test 04/25/24  1254   HCVAB Nonreactive     Lyme ab screening  Recent Labs   Lab Test 07/18/17  1330   LYMEGM 0.19       \"HAND BILATERAL THREE OR MORE VIEWS 11/4/2015 4:55 PM   HISTORY: Pain in unspecified joint.  COMPARISON: None.  IMPRESSION  IMPRESSION: Normal.  DANIS ANGLIN MD\"    \"FOOT BILATERAL THREE OR MORE VIEWS 11/4/2015 4:55 PM   HISTORY: Pain in unspecified joint.  COMPARISON: 8/21/2012.  IMPRESSION  IMPRESSION: Small traction spurs are seen off the Achilles tendon and  plantar fascial attachment sites bilaterally. Joint spaces are  preserved. " "Osseous structures are intact. Incidental note is made of  some surgical staples in the soft tissues of the medial distal right  lower extremity.  DANIS ANGLIN MD\"      Immunization History     Immunization History   Administered Date(s) Administered    COVID-19 12+ (2023-24) (Pfizer) 12/16/2023, 06/11/2024    COVID-19 Bivalent 12+ (Pfizer) 09/15/2022, 08/01/2023    COVID-19 MONOVALENT 12+ (Pfizer) 03/16/2021, 04/06/2021, 10/09/2021, 04/21/2022    Influenza (IIV3) PF 10/11/2011, 09/20/2017, 09/01/2018    Influenza Vaccine 18-64 (Flublok) 09/23/2021    Influenza Vaccine >6 months,quad, PF 09/27/2015, 09/08/2016, 08/08/2018, 09/30/2019, 09/14/2020, 10/19/2022    Influenza Vaccine, 6+MO IM (QUADRIVALENT W/PRESERVATIVES) 11/17/2014    Influenza,INJ,MDCK,PF,Quad >6mo(Flucelvax) 09/14/2023    Pneumo Conj 13-V (2010&after) 04/07/2016    Pneumococcal 20 valent Conjugate (Prevnar 20) 06/07/2023    Pneumococcal 23 valent 12/12/2014    TDAP (Adacel,Boostrix) 10/20/2021    TDAP Vaccine (Adacel) 08/30/2011    Zoster recombinant adjuvanted (SHINGRIX) 07/27/2019, 04/22/2021          Chart documentation done in part with Dragon Voice recognition Software. Although reviewed after completion, some word and grammatical error may remain.        Video-Visit Details    Type of service:  Video Visit    Video Start Time: 2:02 PM    Video End Time:2:07 PM    Originating Location (pt. Location): Home, [place]    Distant Location (provider location):  on site, [facility] [place]    Platform used for Video Visit: Nilsa Jenkins MD    "

## 2024-07-24 NOTE — PATIENT INSTRUCTIONS
RHEUMATOLOGY    [facility]  [address] [place] [address]  [place] [address]    Phone number: [phone]  Fax number: [phone]    If you need a medication refill, please contact us as you may need lab work and/or a follow up visit prior to your refill.      Thank you for choosing [facility]!    Helga Guerrier CMA Rheumatology

## 2024-08-07 ENCOUNTER — LAB (OUTPATIENT)
Dept: LAB | Facility: CLINIC | Age: 59
End: 2024-08-07
Payer: COMMERCIAL

## 2024-08-07 DIAGNOSIS — Z79.899 HIGH RISK MEDICATION USE: ICD-10-CM

## 2024-08-07 DIAGNOSIS — M06.09 RHEUMATOID ARTHRITIS OF MULTIPLE SITES WITH NEGATIVE RHEUMATOID FACTOR (H): ICD-10-CM

## 2024-08-07 LAB
BASOPHILS # BLD AUTO: 0.1 10E3/UL (ref 0–0.2)
BASOPHILS NFR BLD AUTO: 1 %
EOSINOPHIL # BLD AUTO: 0.3 10E3/UL (ref 0–0.7)
EOSINOPHIL NFR BLD AUTO: 4 %
ERYTHROCYTE [DISTWIDTH] IN BLOOD BY AUTOMATED COUNT: 14.7 % (ref 10–15)
ERYTHROCYTE [SEDIMENTATION RATE] IN BLOOD BY WESTERGREN METHOD: 31 MM/HR (ref 0–20)
HCT VFR BLD AUTO: 40.2 % (ref 40–53)
HGB BLD-MCNC: 12.7 G/DL (ref 13.3–17.7)
IMM GRANULOCYTES # BLD: 0 10E3/UL
IMM GRANULOCYTES NFR BLD: 0 %
LYMPHOCYTES # BLD AUTO: 1.7 10E3/UL (ref 0.8–5.3)
LYMPHOCYTES NFR BLD AUTO: 22 %
MCH RBC QN AUTO: 28.9 PG (ref 26.5–33)
MCHC RBC AUTO-ENTMCNC: 31.6 G/DL (ref 31.5–36.5)
MCV RBC AUTO: 92 FL (ref 78–100)
MONOCYTES # BLD AUTO: 1.2 10E3/UL (ref 0–1.3)
MONOCYTES NFR BLD AUTO: 16 %
NEUTROPHILS # BLD AUTO: 4.3 10E3/UL (ref 1.6–8.3)
NEUTROPHILS NFR BLD AUTO: 57 %
PLATELET # BLD AUTO: 306 10E3/UL (ref 150–450)
RBC # BLD AUTO: 4.39 10E6/UL (ref 4.4–5.9)
WBC # BLD AUTO: 7.5 10E3/UL (ref 4–11)

## 2024-08-07 PROCEDURE — 82565 ASSAY OF CREATININE: CPT

## 2024-08-07 PROCEDURE — 86140 C-REACTIVE PROTEIN: CPT

## 2024-08-07 PROCEDURE — 85652 RBC SED RATE AUTOMATED: CPT

## 2024-08-07 PROCEDURE — 80076 HEPATIC FUNCTION PANEL: CPT

## 2024-08-07 PROCEDURE — 36415 COLL VENOUS BLD VENIPUNCTURE: CPT

## 2024-08-07 PROCEDURE — 85025 COMPLETE CBC W/AUTO DIFF WBC: CPT

## 2024-08-08 DIAGNOSIS — R42 VERTIGO: ICD-10-CM

## 2024-08-08 LAB
ALBUMIN SERPL BCG-MCNC: 4.4 G/DL (ref 3.5–5.2)
ALP SERPL-CCNC: 112 U/L (ref 40–150)
ALT SERPL W P-5'-P-CCNC: 12 U/L (ref 0–70)
AST SERPL W P-5'-P-CCNC: 21 U/L (ref 0–45)
BILIRUB DIRECT SERPL-MCNC: <0.2 MG/DL (ref 0–0.3)
BILIRUB SERPL-MCNC: 0.5 MG/DL
CREAT SERPL-MCNC: 0.81 MG/DL (ref 0.67–1.17)
CRP SERPL-MCNC: <3 MG/L
EGFRCR SERPLBLD CKD-EPI 2021: >90 ML/MIN/1.73M2
PROT SERPL-MCNC: 7.7 G/DL (ref 6.4–8.3)

## 2024-08-09 DIAGNOSIS — B18.1 CHRONIC VIRAL HEPATITIS B WITHOUT DELTA AGENT AND WITHOUT COMA (H): ICD-10-CM

## 2024-08-09 RX ORDER — MECLIZINE HYDROCHLORIDE 25 MG/1
TABLET ORAL
Qty: 30 TABLET | Refills: 3 | Status: SHIPPED | OUTPATIENT
Start: 2024-08-09

## 2024-08-09 RX ORDER — TENOFOVIR DISOPROXIL FUMARATE 300 MG/1
300 TABLET, FILM COATED ORAL DAILY
Qty: 90 TABLET | Refills: 0 | Status: SHIPPED | OUTPATIENT
Start: 2024-08-09

## 2024-08-11 DIAGNOSIS — M10.9 GOUT WITHOUT TOPHUS: ICD-10-CM

## 2024-08-13 ENCOUNTER — MYC MEDICAL ADVICE (OUTPATIENT)
Dept: PALLIATIVE MEDICINE | Facility: CLINIC | Age: 59
End: 2024-08-13
Payer: COMMERCIAL

## 2024-08-13 DIAGNOSIS — G89.4 CHRONIC PAIN SYNDROME: ICD-10-CM

## 2024-08-13 DIAGNOSIS — F11.90 CHRONIC, CONTINUOUS USE OF OPIOIDS: ICD-10-CM

## 2024-08-13 DIAGNOSIS — M17.11 PRIMARY OSTEOARTHRITIS OF RIGHT KNEE: ICD-10-CM

## 2024-08-13 DIAGNOSIS — M47.819 FACET ARTHROPATHY: ICD-10-CM

## 2024-08-13 DIAGNOSIS — M06.9 RHEUMATOID ARTHRITIS INVOLVING MULTIPLE SITES, UNSPECIFIED WHETHER RHEUMATOID FACTOR PRESENT (H): ICD-10-CM

## 2024-08-13 RX ORDER — COLCHICINE 0.6 MG/1
TABLET ORAL
Qty: 90 TABLET | Refills: 0 | Status: SHIPPED | OUTPATIENT
Start: 2024-08-13

## 2024-08-15 NOTE — TELEPHONE ENCOUNTER
Received request for a refill(s) of BUTRANS 20 MCG/HR WK patch      Last dispensed from pharmacy on 07/23/24    Patient's last office/virtual visit by prescribing provider on 02/26/24  Next office/virtual appointment scheduled for: none    Last urine drug screen date 02/26/24  Current opioid agreement on file (completed within the last year) Yes Date of opioid agreement: 02/26/24    E-prescribe to pharmacy-[facility] [address] [place] [facility] [place] - [address] [address] [address] [facility] [address]     Will route to nursing Belen for review and preparation of prescription(s).

## 2024-08-15 NOTE — TELEPHONE ENCOUNTER
Please process a refill of Butrans 20 mcg to     [facility] [address] [place] [facility] - [place] - [address] [facility] [address]  [address] [facility] [address]  Phone: [phone] Fax: [phone]

## 2024-08-15 NOTE — TELEPHONE ENCOUNTER
Routing to  to assist with scheduling an in clinic follow up.     Medication refill information reviewed.     Due date for BUTRANS 20 MCG/HR WK patch is 08/19/24     Prescriptions prepped for review.     Will route to provider.

## 2024-08-16 ENCOUNTER — TELEPHONE (OUTPATIENT)
Dept: RHEUMATOLOGY | Facility: CLINIC | Age: 59
End: 2024-08-16
Payer: COMMERCIAL

## 2024-08-16 RX ORDER — BUPRENORPHINE 20 UG/H
1 PATCH, EXTENDED RELEASE TRANSDERMAL
Qty: 4 PATCH | Refills: 0 | Status: SHIPPED | OUTPATIENT
Start: 2024-08-16 | End: 2024-09-17

## 2024-08-16 NOTE — TELEPHONE ENCOUNTER
Signed Prescriptions:                        Disp   Refills    BUTRANS 20 MCG/HR WK patch                 4 patch0        Sig: Place 1 patch onto the skin every 7 days LISANDRO, name           brand. 28 day script for chronic pain. OK to fill           08/17/24 to start 08/19/24  Authorizing Provider: AMEENA PAGE        Reviewed [place]  August 16, 2024- no concerning fills.    Ameena LEMUS, RN CNP, FNP  [facility]  [facility]

## 2024-08-16 NOTE — TELEPHONE ENCOUNTER
PA Initiation    Medication: SIMPONI 50 MG/0.5ML SC SOAJ  Insurance Company: [employer] - Phone [phone] Fax [phone]:  DUAL  Pharmacy Filling the Rx: [facility] [place] [address] [address]   Filling Pharmacy Phone: [phone]  Filling Pharmacy Fax: [phone]  Start Date: 8/16/2024  MEREDITH (Muñoz: [id number])  [place] Case ID #: [id number]        Thank You,     Perla Diez [facility]  [facility] Pharm[facility] Specialty  [email or web address]  [email or web address]  Phone: [phone]  Fax: [phone]

## 2024-08-19 NOTE — TELEPHONE ENCOUNTER
Prior Authorization Approval    Medication: SIMPONI 50 MG/0.5ML SC SOAJ  Authorization Effective Date: 8/17/2024  Authorization Expiration Date: 8/17/2025  Approved Dose/Quantity: 0.5 ML / 28 day  Reference #: [id number] (Muñoz: [id number])   Insurance Company: [employer] - Phone [phone] Fax [phone]:  DUAL  Expected CoPay: $ 0  CoPay Card Available: No    Financial Assistance Needed: N/A - Medicare DUAL  Which Pharmacy is filling the prescription: [facility] - [address]   Pharmacy Notified: renewal  Patient Notified: renewal          Thank You,     Perla Diez [facility]  Specialty Pharmacy Clinic [facility] Specialty  [email or web address]  [email or web address]  Phone: [phone]  Fax: [phone]

## 2024-08-19 NOTE — PROGRESS NOTES
Lamont is a 58 year old who is being evaluated via a billable video visit.    How would you like to obtain your AVS? [employer]  If the video visit is dropped, the invitation should be resent by: [facility]. Cell phone: [phone]  Will anyone else be joining your video visit? No  Is Pt currently in [place]? Yes  Amberly Chawla MA      NOTE:  If Pt is not in [place], Appointment needs to be canceled and rescheduled.      Video-Visit Details    Type of service:  Video Visit   Video start: 0814    Video End Time:0822   Originating Location (pt. Location): Home    Distant Location (provider location):  On-site  Platform used for Video Visit: [facility]    8/20/2024    Chief complaint:   -low back pain, extends into the thighs, bilaterally. Not past the knees.   -multiple joint pain (RA)  -he states he has some dull pain in his jaw on both sides.  He finds the Baclofen is helpful.   He denies any new areas of pain        Interval history:  Lamont Daniels is a 58 year old male is known to me for:  Lumbar facet arthropathy  Rheumatoid arthritis involving multiple joints, unspecified rheumatoid factor  Primary osteoarthritis of right knee  Chronic continuous use of opioids  Chronic pain syndrome  PMHx includes: Anxiety, coronary artery disease, congestive heart failure (2008), depressive disorder (2008), gout, hepatitis B more than 10 years ago, hypertension, hyperlipidemia, malrotation of intestine, severe obstructive sleep apnea, rheumatoid arthritis (2012) steatohepatitis (2015), history of tuberculosis at age 13, vitamin D deficiency  PSHx includes: Coronary artery bypass graft 6 vessel (2008), abdominal surgery (2014), colonoscopy (2013), combined repair ptosis with blepharoplasty bilateral (2018), head and neck surgery (2011), nasal/sinus polypectomy (2010), orthopedic surgery (2012), repair ptosis bilateral (2019), repair ptosis brow bilateral (2018), thoracic surgery for  tuberculosis (1989), tonsillectomy and uvulopalatal pharyngoplasty (2010), coronary artery stent 3 months after CABG (2008).      Recommendations/plan at the last visit on 5/28/2024 included:  Physical Therapy: none  Continue stretching at home  Clinical Health Psychologist to address issues of relaxation, behavioral change, coping style, and other factors important to improvement: none  Diagnostic Studies: none  Medication Management:   Continue Butrans 20mcg/hr transdermal change every 7 days, filled 5/20/2024 and started 5/22/2024.  continue Baclofen to 10mg up to three times daily as needed for muscle spasms  Continue Voltaren gel as needed  Restart gabapentin 300mg capsules, WEEK 1: take 1 capsule at bedtime for 7 days. WEEK 2: take 1 capsule morning and night for 7 days WEEK 3 on: take 1 capsule three times daily  TRIAL flonase nasal spray apply 1-2 sprays on the skin where you will apply the Butrans patch, allow the spray to dry and then apply patch, this often will prevent the skin irritation and rash.   IF this is not effective, next script will change to Belbuca 300mcg buccal film and you would use 1 film in the inside of your cheek for 20 minutes every 12 hours.   Further procedures recommended: none  Recommendations/follow-up for PCP:  See above  Release of information: none  Follow up: 12 weeks for in-person or virtual visit.  Please call [phone] to make your follow-up appointment with me.         Since his last visit, Lamont Daniels reports:    Interval history August 20, 2024  -Lamont reports he is doing well on his current medication regimen. He uses Butrans patch, gabapentin, and baclofen as well as topical diclofenac sodium.   -he continues to have multiple joint pain from RA (this has been somewhat challenging with the rainy weather this summer).   -he also has axial low back pain that goes into the thighs but does not extend past the knees.   -he denies need for any medication changes.   -no  further skin irritation from Butrans  -notes tolerating the gabapentin well.        Interval history May 28, 2024  -continues to have low back pain than can radiate into the thighs but never past the knees.   -multiple joint pain remains from his RA.   -denies any new areas of pain.   -thinks he may need to switch away from the Butrans patch as it is causing some skin irritation.   He has not tried Benadryl spray or Flonase nasal spray to the skin to prevent skin irritation. He will try this and if not helpful, with next script will switch to Belbuca film 300mcg Q 12 hours buccal film.    -wants to restart gabapentin, he used to be on 300mg TID.     Interval history February 26, 2024  -Lamont is doing pretty well on the Butrans patch.   -due for CSA and UDT today  -wearing Butrans patch, he filled the script late in January, picked it up on 1/27/2024. Called pharmacy, they have the script and are ordering it for arrival and fill tomorrow 2/27/2024.      Interval history November 13, 2023  -Lamont states that his low back pain has improved.   -his multiple joint pain from known RA has been stable.   -he continues to find Butrans patch 20mcg/hr helpful in managing his pain.   -he denies any new areas of pain.   -he denies being on any new medications since our last encounter.     Pain history collected at initial evaluation on 5/11/2022  He has had pain for about 10 years. He was diagnosed with Rheumatoid Arthritis. Lamont has pain in multiple joints. He also has chronic low back pain. The low back pain radiates down the posterolateral aspect of the legs to the level of the knee. His back pain was there when he was diagnosed with RA. He feels that he is doing well on the Butrans. Discussed switch to Belbuca if he ever wishes to increase his dosage, prefers to stay with use of the Butrans patch at the present time       At this point, the patient's participation with our multidisciplinary team includes:  The patient has been  "compliant with the program.  PT - not ordered  Health Psych - not ordered      Pain scores:  Pain intensity on average is 5 on a scale of 0-10.    Range is 5-9/10.   Pain right now is 6/10.   Pain is described as \"back pain is dull and his joints are dull/numbness/stiffness.\"    Pain is constant in nature        Current pain relevant medications:   -allopurinol 300mg every day  -baclofen 10mg BID PRN muscle spasms (uses at least once per day, helpful)  -Voltaren gel 1% PRN (helpful)  -naloxone nasal spray PRN opiate reversal   -Butrans 20mcg/hr  TD Q 7 days (helpful)--getting skin rash  -simponi 50mg/0.5ml inject every 28 days   -Arava 20mg every day  -meclizine 25mg Q 6 hours PRN dizziness (helpful)  -Tylenol 1000mg (uses at least once per day, not more than 3 times per day)  -CBD oil at night (helpful for sleep)        Other pertinent medications:  -Ambien 5mg at bedtime as needed PRN sleep  -clonazepam 0.5-1mg BID PRN anxiety (uses one full tab in the AM)  -Plavix 75mg every day  -colchincine 0.6mg take 2 tabs at first sign of flare  -pristiq 50mg QD     Previous medication treatments included:  OPIATES: butrans (helpful), hydrocodone (helpful for other issues), Codeine (unsure), oxycodone (unsure)  NSAIDS: cannot use, on Plavix   MUSCLE RELAXANTS: Baclofen (helpful), methocarbamol (not helpful)  ANTI-MIGRAINE MEDS: none  ANTI-DEPRESSANTS: none  SLEEP AIDS: none  ANTI-CONVULSANTS: gabapentin (helpful initially, he did not have increased pain when he tapered off of this medication)  TOPICALS: Voltaren gel (helpful)  ANXIOLYTICS: none  MEDICAL CANNABIS: none  Other meds: Tylenol (helpful)        Other treatments have included:  Lamont Daniels has been seen at a pain clinic in the past.  Previously managed by Dr. Lian Loo   PT: has tried, not helpful  Chiropractic care: no  Acupuncture: no  TENs Unit: no     Injections:  none      THE 4 A's OF OPIOID MAINTENANCE ANALGESIA    Analgesia: butrans is " helpful    Activity: he does some stretching at home. Walks in the back yard    Adverse effects: none    Adherence to Rx protocol: yes      Side Effects: no  Patient is using the medication as prescribed: YES    Medications:  Current Outpatient Medications   Medication Sig Dispense Refill    Acetaminophen (TYLENOL PO)       allopurinol (ZYLOPRIM) 300 MG tablet TAKE 1 TABLET BY MOUTH EVERY DAY 90 tablet 3    amLODIPine (NORVASC) 5 MG tablet TAKE 1 TABLET BY MOUTH DAILY FOR BLOOD PRESSURE 90 tablet 3    aspirin EC 81 MG EC tablet Take 1 tablet (81 mg) by mouth daily (*) 30 tablet 1    atorvastatin (LIPITOR) 80 MG tablet TAKE 1 TABLET BY MOUTH EVERY DAY 90 tablet 3    bacitracin 500 UNIT/GM external ointment Apply topically 3 times daily 30 g 3    baclofen (LIORESAL) 10 MG tablet Take 1 tablet (10 mg) by mouth 2 times daily as needed for muscle spasms 3 month supply. 180 tablet 1    busPIRone HCl (BUSPAR) 30 MG tablet TAKE 1 TABLET BY MOUTH TWICE A  tablet 3    BUTRANS 20 MCG/HR WK patch Place 1 patch onto the skin every 7 days LISANDRO, name brand. 28 day script for chronic pain. OK to fill 08/17/24 to start 08/19/24 4 patch 0    Cholecalciferol (VITAMIN D) 2000 UNITS tablet TAKE ONE TABLET BY MOUTH ONCE DAILY 100 tablet 1    clobetasol (TEMOVATE) 0.05 % external cream Apply twice daily for 2 weeks, weekends only for 2 weeks, repeat cycle as needed for hands, not face or genitals 60 g 4    clobetasol (TEMOVATE) 0.05 % external solution Apply twice daily to scalp for up to 2 weeks for flares. 60 mL 0    clonazePAM (KLONOPIN) 1 MG tablet TAKE 0.5-1 TABLETS BY MOUTH 2 TIMES DAILY AS NEEDED FOR ANXIETY 90 tablet 0    clopidogrel (PLAVIX) 75 MG tablet TAKE 1 TABLET BY MOUTH EVERY DAY 90 tablet 3    colchicine (COLCRYS) 0.6 MG tablet TAKE 2 TABLETS BY MOUTH AT THE FIRST SIGN OF FLARE, TAKE 1 ADDITIONAL TABLET ONE HOUR LATER. 90 tablet 0    desvenlafaxine (PRISTIQ) 100 MG 24 hr tablet Take 1 tablet (100 mg) by mouth daily  90 tablet 3    diclofenac (VOLTAREN) 1 % topical gel APPLY 4 GRM TOPICALLY 4 TIMES DAILY AS NEEDED FOR MODERATE PAIN NEED TO BE SEEN FOR REFILL 300 g 1    evolocumab (REPATHA SURECLICK) 140 MG/ML prefilled autoinjector Inject 1 mL (140 mg) Subcutaneous every 14 days 6 mL 3    ezetimibe (ZETIA) 10 MG tablet TAKE 1 TABLET (10 MG) BY MOUTH DAILY. 90 tablet 3    fluticasone (FLONASE) 50 MCG/ACT nasal spray Apply 1-2 sprays on the skin and let dry prior to applying Butrans patch. This will likely reduce the skin rash you are getting. 11.1 mL 11    gabapentin (NEURONTIN) 300 MG capsule Take 1 capsule (300 mg) by mouth 3 times daily 90 day supply 270 capsule 3    gemfibrozil (LOPID) 600 MG tablet TAKE 1 TABLET BY MOUTH 2 TIMES DAILY 180 tablet 3    golimumab (SIMPONI) 50 MG/0.5ML auto-injector pen Inject 0.5 mLs (50 mg) subcutaneously every 28 days . Hold for signs of infection, and seek medical attention. 0.5 mL 4    hydrocortisone (WESTCORT) 0.2 % external cream FOR GENITALS, APPLY TWICE DAILY FOR UP TO 2 WEEKS, TAKE 2 WEEKS OFF THEN REPEAT 60 g 3    hydrocortisone 2.5 % cream FOR FACE, APPLY TWICE DAILY FOR UP TO 2 WEEK FOR FLARES ON THE FACE, TAKE 2 WEEKS OFF 28.35 g 3    Incontinence Supply Disposable (DEPEND UNDERWEAR LARGE) MISC Use twice daily. 60 each 11    leflunomide (ARAVA) 20 MG tablet Take 1 tablet (20 mg) by mouth daily ; Labs required every 8-12 weeks. 90 tablet 2    meclizine (ANTIVERT) 25 MG tablet TAKE 1 TABLET BY MOUTH EVERY 6 HOURS AS NEEDED FOR DIZZINESS. 30 tablet 3    melatonin 3 MG tablet Take 1 tablet (3 mg) by mouth nightly as needed for sleep      naloxone (NARCAN) 4 MG/0.1ML nasal spray Spray 1 spray (4 mg) into one nostril alternating nostrils once as needed for opioid reversal every 2-3 minutes until assistance arrives 0.2 mL 0    nitroGLYcerin (NITROSTAT) 0.4 MG sublingual tablet PLACE 1 TAB UNDER THE TONGUE EVERY 5 MINUTES FOR 3 DOSES FOR CHEST PAIN. IF SYMPTOMS PERSIST 5 MINUTES AFTER 1ST  DOSE CALL 911. (Patient not taking: Reported on 5/28/2024) 25 tablet 0    order for DME Equipment being ordered: chair lift 1 each 0    order for DME Equipment being ordered: single end cane 1 Units 0    ORDER FOR DME 1.  CPAP pressure 11 cm/H20 with heated humidity.   2.  Provide mask to fit and CPAP supplies.   3.  Length of need lifetime.   4.  If needed please provide a chin strap           pantoprazole (PROTONIX) 40 MG EC tablet TAKE 1 TABLET BY MOUTH EVERY DAY 90 tablet 2    pimecrolimus (ELIDEL) 1 % external cream APPLY TWICE DAILY FOR FACE AND GENITALS 60 g 1    sildenafil (REVATIO) 20 MG tablet TAKE 2 - 5 TABLETS BY MOUTH AS NEEDED 30 MINUTES BEFORE SEXUAL ACTIVITY. MAX 100MG IN 24 HOURS. (Patient not taking: Reported on 5/21/2024) 450 tablet 3    sulfaSALAzine (AZULFIDINE) 500 MG tablet Take 3 tablets (1,500 mg) by mouth 2 times daily 540 tablet 2    tamsulosin (FLOMAX) 0.4 MG capsule TAKE 2 CAPSULES BY MOUTH EVERY  capsule 3    tenofovir (VIREAD) 300 MG tablet Take 1 tablet (300 mg) by mouth daily 90 tablet 0    triamcinolone (KENALOG) 0.1 % external ointment APPLY TO AFFECTED AREA TOPICALLY 3 TIMES A DAY 80 g 2    triamcinolone (KENALOG) 0.1 % external ointment Apply to trunk and extremities twice daily for up to 2 weeks for flares 80 g 1    zolpidem (AMBIEN) 5 MG tablet Take 1 tablet (5 mg) by mouth nightly as needed for sleep 30 tablet 5       Medical History: any changes in medical history since they were last seen? No    Social History:   Home situation: , lives with adult children  Occupation/Schooling: he is on disability due to heart issues and RA  Tobacco use: none  Alcohol use: none  Drug use: none  History of chemical dependency treatment: none    Is patient a current smoker or tobacco user?  no  If yes, was cessation counseling offered?  no          Physical Exam:  Vital signs: There were no vitals taken for this visit.    Behavioral observations:  Awake, alert. Cooperative.    Pulm: respirations easy and unlabored. Able to speak in full sentences without SOB or cough noted.                Diagnostic tests:  MRI of the Lumbar Spine without contrast     History: Lumbago.     Comparison: MRI thoracic spine 7/18/2014.     Technique: Sagittal T1-weighted, T2-weighted, STIR, and axial  T2-weighted spin echo and gradient echo images of the lumbar spine  were obtained without intravenous contrast.     Findings: There are 5 lumbar-type vertebrae assumed for the purposes  of this dictation. The tip of the conus medullaris is at L1.  The  lumbar vertebral column appears normally aligned. Straightening of the  normal lumbar lordosis. Mild loss of T2 signal in the L4-5 and L5-S1  intervertebral discs consistent with age-related changes. Type II  Modic degenerative changes in the opposing endplates at L5-S1.     On a level by level basis:     L1-2: Tiny left paracentral focal disc protrusion without spinal canal  or neural foraminal stenosis.     L2-3: No spinal canal or neural foraminal stenosis.     L3-4: Small focal posterior central disc protrusion with annular  fissuring and mild spinal canal narrowing. Also mild bilateral facet  hypertrophy. No neuroforaminal stenosis.     L4-5: Small focal posterior central disc protrusion with annular  fissuring. No spinal canal or neural foraminal stenosis.     L5-S1: Type II Modic degenerative changes of the opposing endplates.  Left greater than right facet hypertrophy. Small focal posterior  central disc protrusion with annular fissuring. No spinal canal  stenosis. Moderate left and mild right neural foraminal narrowing.     The visualized paraspinous tissues anteriorly are unremarkable.     IMPRESSION  Impression: Multilevel degenerative changes. Small disc protrusion at  L3-4 results in in mild spinal canal narrowing. Moderate left and mild  right neuroforaminal narrowing at L5-S1.     HELGA DAVID MD        FOOT BILATERAL THREE OR MORE VIEWS  11/4/2015 4:55 PM      HISTORY: Pain in unspecified joint.     COMPARISON: 8/21/2012.     IMPRESSION  IMPRESSION: Small traction spurs are seen off the Achilles tendon and  plantar fascial attachment sites bilaterally. Joint spaces are  preserved. Osseous structures are intact. Incidental note is made of  some surgical staples in the soft tissues of the medial distal right  lower extremity.  DANIS ANGLIN MD        RIGHT KNEE THREE VIEWS  5/4/2017 3:16 PM    HISTORY: Pain in right knee.  COMPARISON: None                                                   IMPRESSION: No acute fracture or dislocation. Mild osteoarthritis.  Small joint effusion.     MICHELL TAMAYO MD      MR LUMBAR SPINE WITHOUT CONTRAST  4/4/2023 9:12 AM (reviewed with patient on 6/26/2023)     INDICATION: Low back pain. Rule out spondylolysis. Low back pain with  standing/walking/extension. No known/automatically detected potential  contraindications to CT. Chronic bilateral low back pain with  bilateral sciatica. DDD (degenerative disc disease), lumbar.     TECHNIQUE: MRI images of the lumbar spine without contrast.  CONTRAST:  None.     COMPARISON: Lumbar spine MRI 8/6/2014.     FINDINGS: Nomenclature is based on five lumbar type vertebral bodies.  Normal vertebral body heights. Normal alignment.  No marrow edema. No  pars defect. The conus tip is identified at L1-L2. Unremarkable  paraspinal soft tissues.     T12-L1: Slight loss in disc height and signal. The small left central  protrusion. Minor facet arthropathy. Minor lateral recess and spinal  canal narrowing. No foraminal narrowing. Herniation is new/increased  versus 2014.         L1-L2: Slight loss of disc signal. Normal disc height. Tiny left  paracentral protrusion. Unremarkable facets. Minimal left lateral  recess narrowing. No central stenosis or foraminal narrowing. No  interval change.     L2-L3: Mild loss of disc signal. Minimal loss of disc height. No  herniation. Unremarkable  facets. No stenosis.     L3-L4: Moderate loss of disc signal. Minor loss of disc height. Minor  circumferential disc bulge with small central protrusion. Minor facet  arthropathy. Mild lateral recess and spinal canal narrowing,  unchanged. Patent foramina.     L4-L5: Moderate loss of disc signal. Mild loss of disc height. Central  annular tear and small protrusion is minimally increased in the  interval. Mild facet arthropathy. Minor lateral recess and foraminal  narrowing.     L5-S1: Moderate loss of disc signal. Mild loss of disc height.  Circumferential asymmetric left disc bulge. Mild to moderate left and  mild right facet arthropathy. Moderate left and mild right lateral  recess narrowing. Adequate central canal. Moderate to severe left and  minor right foraminal narrowing. No significant interval change.                                                                      IMPRESSION:  1.  Degenerative changes are relatively stable compared with 2014.  2.  At L5-S1 an asymmetric left disc bulge causes moderate left  lateral recess and moderate to severe foraminal narrowing. Correlate  for any left L5 symptoms. Mild right-sided narrowing. No significant  interval change.  3.  At L4-L5 a small protrusion is minimally increased with minor  lateral recess and foraminal narrowing similar to prior.  4.  At L3-L4 mild lateral recess and spinal canal narrowing is  unchanged.  5.  At T12-L1 a small left-sided herniation is increased and results  in minor lateral recess and spinal canal narrowing.     HORTENCIA DUENAS MD      Other testing (labs, diagnostics):  5/20/2024  Cr. 0.84  Est GFR >90  Liver enzymes WNL        Screening tools:      DIRE Score for ongoing opioid management is calculated as follows:  Diagnosis = 2  Intractability = 2  Risk: Psych = 2  Chem Hlth = 2  Reliability = 3  Social = 2  Efficacy = 2  Total DIRE Score = 15 (14 or higher predicts good candidate for ongoing opioid management; 13 or lower  predicts poor candidate for opioid management)        [place]  review:  Reviewed [place]  8/20/2024- no concerning fills.  Ameena LEMUS RN CNP, FNP  [facility]  [place] location       Assessment:   Rheumatoid arthritis involving multiple joints, unspecified rheumatoid factor  Primary osteoarthritis of right knee  Multiple joint pain  Facet arthropathy (lumbar)  Lumbar facet joint pain  Chronic pain syndrome  Chronic continuous use of opioids    Encounter for long term use of opiate analgesic  UDT 2/26/2024  Signed CSA 2/26/2024  PMHx includes: Anxiety, coronary artery disease, congestive heart failure (2008), depressive disorder (2008), gout, hepatitis B more than 10 years ago, hypertension, hyperlipidemia, malrotation of intestine, severe obstructive sleep apnea, rheumatoid arthritis (2012) steatohepatitis (2015), history of tuberculosis at age 13, vitamin D deficiency  PSHx includes: Coronary artery bypass graft 6 vessel (2008), abdominal surgery (2014), colonoscopy (2013), combined repair ptosis with blepharoplasty bilateral (2018), head and neck surgery (2011), nasal/sinus polypectomy (2010), orthopedic surgery (2012), repair ptosis bilateral (2019), repair ptosis brow bilateral (2018), thoracic surgery for tuberculosis (1989), tonsillectomy and uvulopalatal pharyngoplasty (2010), coronary artery stent 3 months after CABG (2008).    Plan:  Physical Therapy: none  Continue stretching at home  Clinical Health Psychologist to address issues of relaxation, behavioral change, coping style, and other factors important to improvement: none  Diagnostic Studies: none  Medication Management:   Continue Butrans 20mcg/hr transdermal change every 7 days, he is to fill and start this today 8/20.  continue Baclofen to 10mg up to three times daily as needed for muscle spasms  Continue Voltaren gel as needed  Continue gabapentin 300mg taking 1 capsule three times daily  continue flonase nasal  spray apply 1-2 sprays on the skin where you will apply the Butrans patch, allow the spray to dry and then apply patch, this often will prevent the skin irritation and rash.   Further procedures recommended: none  Recommendations/follow-up for PCP:  See above  Release of information: none  Follow up: 12 weeks for in-person or virtual visit.  Please call [phone] to make your follow-up appointment with me.       ASSESSMENT AND PLAN:  (M06.9) Rheumatoid arthritis involving multiple sites, unspecified whether rheumatoid factor present (H)  (primary encounter diagnosis)  Comment:   Plan: diclofenac (VOLTAREN) 1 % topical gel, Adult         Pain Clinic Follow-Up Order            (M17.11) Primary osteoarthritis of right knee  Comment:   Plan: diclofenac (VOLTAREN) 1 % topical gel, Adult         Pain Clinic Follow-Up Order            (M25.50) Multiple joint pain  Comment:   Plan: diclofenac (VOLTAREN) 1 % topical gel, Adult         Pain Clinic Follow-Up Order            (M47.816) Lumbar facet arthropathy  Comment:   Plan: Adult Pain Clinic Follow-Up Order            (M54.59) Lumbar facet joint pain  Comment:   Plan: Adult Pain Clinic Follow-Up Order            (G89.4) Chronic pain syndrome  Comment:   Plan: Adult Pain Clinic Follow-Up Order            (F11.90) Chronic, continuous use of opioids  Comment:   Plan: Adult Pain Clinic Follow-Up Order              BILLING TIME DOCUMENTATION:   TOTAL TIME includes:   Time spent preparing to see the patient: 2 minutes (reviewing records and tests)  Time spend face to face with the patient: 8 minutes  Time spent ordering tests, medications, procedures and referrals: 0 minutes  Time spent Referring and communicating with other healthcare professionals: 0 minutes  Documenting clinical information in Epic: 4 minutes    The total TIME spent on this patient on the day of the appointment was 14 minutes.                              Ameena LEMUS, VIVIANA CNP, FNP  [facility] [place]  [facility]  [facility] [place]

## 2024-08-20 ENCOUNTER — VIRTUAL VISIT (OUTPATIENT)
Dept: PALLIATIVE MEDICINE | Facility: CLINIC | Age: 59
End: 2024-08-20
Payer: COMMERCIAL

## 2024-08-20 DIAGNOSIS — F11.90 CHRONIC, CONTINUOUS USE OF OPIOIDS: ICD-10-CM

## 2024-08-20 DIAGNOSIS — M47.816 LUMBAR FACET ARTHROPATHY: ICD-10-CM

## 2024-08-20 DIAGNOSIS — M17.11 PRIMARY OSTEOARTHRITIS OF RIGHT KNEE: ICD-10-CM

## 2024-08-20 DIAGNOSIS — M25.50 MULTIPLE JOINT PAIN: ICD-10-CM

## 2024-08-20 DIAGNOSIS — M54.59 LUMBAR FACET JOINT PAIN: ICD-10-CM

## 2024-08-20 DIAGNOSIS — R23.8 SKIN IRRITATION DUE TO TOPICAL AGENT: ICD-10-CM

## 2024-08-20 DIAGNOSIS — M06.9 RHEUMATOID ARTHRITIS INVOLVING MULTIPLE SITES, UNSPECIFIED WHETHER RHEUMATOID FACTOR PRESENT (H): Primary | ICD-10-CM

## 2024-08-20 DIAGNOSIS — G89.4 CHRONIC PAIN SYNDROME: ICD-10-CM

## 2024-08-20 DIAGNOSIS — M47.819 FACET ARTHROPATHY: ICD-10-CM

## 2024-08-20 DIAGNOSIS — T49.95XA SKIN IRRITATION DUE TO TOPICAL AGENT: ICD-10-CM

## 2024-08-20 PROCEDURE — 99214 OFFICE O/P EST MOD 30 MIN: CPT | Mod: 95 | Performed by: NURSE PRACTITIONER

## 2024-08-20 ASSESSMENT — PAIN SCALES - GENERAL: PAINLEVEL: SEVERE PAIN (6)

## 2024-08-20 NOTE — PATIENT INSTRUCTIONS
Plan:  Physical Therapy: none  Continue stretching at home  Clinical Health Psychologist to address issues of relaxation, behavioral change, coping style, and other factors important to improvement: none  Diagnostic Studies: none  Medication Management:   Continue Butrans 20mcg/hr transdermal change every 7 days, he is to fill and start this today 8/20.  continue Baclofen to 10mg up to three times daily as needed for muscle spasms  Continue Voltaren gel as needed  Continue gabapentin 300mg taking 1 capsule three times daily  continue flonase nasal spray apply 1-2 sprays on the skin where you will apply the Butrans patch, allow the spray to dry and then apply patch, this often will prevent the skin irritation and rash.   Further procedures recommended: none  Recommendations/follow-up for PCP:  See above  Release of information: none  Follow up: 12 weeks for in-person or virtual visit.  Please call [phone] to make your follow-up appointment with me.    ----------------------------------------------------------------  Clinic Number:  [phone]   Call with any questions about your care and for scheduling assistance.   Calls are returned Monday through Friday between 8 AM and 4:30 PM. We usually get back to you within 2 business days depending on the issue/request.    If we are prescribing your medications:  For opioid medication refills, call the clinic or send a MyPar[facility] message 7 days in advance.  Please include:  Name of requested medication  Name of the pharmacy.  For non-opioid medications, call your pharmacy directly to request a refill. Please allow 3-4 days to be processed.   Per [place] State Law:  All controlled substance prescriptions must be filled within 30 days of being written.    For those controlled substances allowing refills, pickup must occur within 30 days of last fill.      We believe regular attendance is key to your success in our program!    Any time you are unable to keep your appointment we  ask that you call us at least 24 hours in advance to cancel.This will allow us to offer the appointment time to another patient.   Multiple missed appointments may lead to dismissal from the clinic.

## 2024-08-21 ENCOUNTER — TELEPHONE (OUTPATIENT)
Dept: PALLIATIVE MEDICINE | Facility: CLINIC | Age: 59
End: 2024-08-21
Payer: COMMERCIAL

## 2024-08-21 NOTE — TELEPHONE ENCOUNTER
Retail Pharmacy Prior Authorization Team   Phone: [phone]    Prior Auth - Medication (Prior auth request Butrans 20MCG patch)  (Newest Message First)  View All Conversations on this Encounter  Ameena Archibald routed conversation to [facility] Magnus Ugarte now (10:10 AM)     Genia Archibald minutes ago (9:59 AM)     LT  Preauthorization process started     Prior Authorization Retail Medication Request     Medication/Dose: Prior auth request Butrans 20MCG patch  Diagnosis and ICD code (if different than what is on RX):    Chronic pain syndrome [G89.4]      Facet arthropathy [M47.819]      Primary osteoarthritis of right knee [M17.11]      Chronic, continuous use of opioids [F11.90]      Rheumatoid arthritis involving multiple sites, unspecified whether rheumatoid factor present (H) [M06.9]         New/renewal/insurance change PA/secondary ins. PA:  Previously Tried and Failed:    Rationale:       Insurance   Primary:   Covered  [facility] DUAL  Subscriber:  Lamont Daniels  Subscriber ID:[id number]  Relationship:Self  Member:Lamont Daniels  Member ID:[id number]  LOB:None  Plan year:  1/1/2024 - [place]  Effective dates:  1/1/2023 - [place]  Insurance ID:       Secondary (if applicable):  Insurance ID:       Pharmacy Information (if different than what is on RX)  Name:  [facility]  Phone:    Fax:     Sorry about the cockeyed form. It was sent this way.

## 2024-08-22 NOTE — TELEPHONE ENCOUNTER
Retail Pharmacy Prior Authorization Team   Phone: [phone]    PA Initiation    Medication: BUTRANS 20 MCG/HR TD PTWK  Insurance Company: [employer] - Phone [phone] Fax [phone]  [facility] [id number] [place] [facility] [address] [facility]  Filling Pharmacy Phone: [phone]  Filling Pharmacy Fax:    Start Date: 8/22/2024

## 2024-08-23 NOTE — TELEPHONE ENCOUNTER
Prior Authorization Approval    Medication: BUTRANS 20 MCG/HR TD PTWK  Authorization Effective Date: 8/23/2024  Authorization Expiration Date: 8/22/2025  Insurance Company: [employer] - Phone [phone] Fax [phone]  Which [facility] [id number] [place] [facility] [address] [facility]  Pharmacy Notified: YES  Patient Notified: YES (faxed approval info to pharmacy and notified patient via [employer] message)

## 2024-08-26 NOTE — TELEPHONE ENCOUNTER
Noted. Patient will need to get this out-of-pocket.    David Chavez MD    
PA Initiation    Medication: sildenafil (REVATIO) 20 MG tablet PA INITIATED  Insurance Company: [employer] - Phone [phone] Fax [phone]  Pharmacy Filling the Rx: [id number] - [place] - [address] [facility]  Filling Pharmacy Phone: [phone]  Filling Pharmacy Fax:    Start Date: 1/25/2023    Central Prior Authorization Team   Phone: [phone]        
PRIOR AUTHORIZATION DENIED    Medication: sildenafil (REVATIO) 20 MG tablet PA DENIED    Denial Date: 1/26/2023    Denial Rational:         Appeal Information:                   
Plan does not cover sildenafil (REVATIO) 20 MG tablet.  Please go to [email or web address] to initiate Prior Auth or switch to alternative medication.    Insurance type and ID number: Key:  [id number]      Additional Information:     Melissa Barnes    
Please start PA for patient.    David Chavez MD    
No

## 2024-09-05 DIAGNOSIS — F45.8 ANXIETY HYPERVENTILATION: ICD-10-CM

## 2024-09-05 DIAGNOSIS — F41.9 ANXIETY HYPERVENTILATION: ICD-10-CM

## 2024-09-05 RX ORDER — CLONAZEPAM 1 MG/1
TABLET ORAL
Qty: 90 TABLET | Refills: 0 | Status: SHIPPED | OUTPATIENT
Start: 2024-09-05

## 2024-09-12 ENCOUNTER — VIRTUAL VISIT (OUTPATIENT)
Dept: SLEEP MEDICINE | Facility: CLINIC | Age: 59
End: 2024-09-12
Payer: COMMERCIAL

## 2024-09-12 VITALS — HEIGHT: 62 IN | WEIGHT: 150 LBS | BODY MASS INDEX: 27.6 KG/M2

## 2024-09-12 DIAGNOSIS — G47.63 SLEEP RELATED BRUXISM: ICD-10-CM

## 2024-09-12 DIAGNOSIS — G47.33 OSA (OBSTRUCTIVE SLEEP APNEA): Primary | ICD-10-CM

## 2024-09-12 PROCEDURE — 99214 OFFICE O/P EST MOD 30 MIN: CPT | Mod: 95 | Performed by: INTERNAL MEDICINE

## 2024-09-12 PROCEDURE — G2211 COMPLEX E/M VISIT ADD ON: HCPCS | Mod: 95 | Performed by: INTERNAL MEDICINE

## 2024-09-12 ASSESSMENT — PAIN SCALES - GENERAL: PAINLEVEL: SEVERE PAIN (6)

## 2024-09-12 NOTE — PATIENT INSTRUCTIONS
Equipment Instructions    We will process your PAP order and send it to a Durable Medical Equipment (DME) provider.    The medical equipment company should call you within 7 days.  If you have not heard from the company, please contact them to see if they received your order and are planning to call you.    Please call us at [phone] if you are unable to contact the medical equipment company or if they do not have the order.    If you are starting a new PAP machine, please call us after you use it the first night to let us know how it went. This call also helps us know that you received your equipment and that everything is ready. Please use our central phone number [phone]    Contact information for [employer] company:    [employer] Tel: [phone]    Please consider wearing a mouthguard during sleep  to prevent enamel erosion.

## 2024-09-12 NOTE — PROGRESS NOTES
Virtual Visit Details    Type of service:  Video Visit   Video Start Time: 9:34 AM  Video End Time:9:50 AM  Originating Location (pt. Location): Home  Distant Location (provider location):  Off-site  Platform used for Video Visit: AmWell    Additional 15 minutes on the date of service was spent performing the following:    -Preparing to see the patient  -Obtaining and/or reviewing separately obtained history   -Ordering medications, tests, or procedures   -Documenting clinical information in the electronic or other health record     Thank you for the opportunity to participate in the care of Lamont Daniels.     He is a 58 year old  male patient who comes to the sleep medicine clinic for review of sleep study results. The patient had a sleep study performed on 09/12/2011 (AHI=39.8). The patient underwent a titration study on 07/19/2024 which showed that a CPAP pressure of 14 cwp was optimal. Sleep related bruxism was appreciated.    Assessment and Plan:  In summary Lamont Daniels is a 58 year old year old male here for review of sleep study results.  1. JEROD (obstructive sleep apnea)  I will put in an order for the patient to get a new CPAP machine [facility] as per his request.  - COMPREHENSIVE DME    2. Sleep related bruxism  Recommend the patient wear a mouthguard to prevent enamel erosion in the AVS.    Lab reviewed: Discussed with patient.    Sleep-Wake Cycle:    The patient likes to initiate sleep at around 11-12 PM with a sleep latency of 1-2 hours. The patient has 1-2 nocturnal awakenings. Final wake up time is around 7:30 AM.    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    Hyperlipidemia LDL goal <70    Insomnia    Gout    Suicidal ideation    Essential hypertension    Rheumatoid arthritis involving multiple sites, unspecified rheumatoid factor presence    High risk medications (not anticoagulants) long-term use    Midline low back pain without sciatica    Bilateral low back pain with sciatica, sciatica laterality unspecified    Elevated liver enzymes    On corticosteroid therapy    Essential hypertension with goal blood pressure less than 140/90    Insomnia, unspecified type    Rheumatoid arthritis of multiple sites with negative rheumatoid factor (H)    Benign prostatic hyperplasia with lower urinary tract symptoms, unspecified morphology    Chronic pain syndrome    Syncope, unspecified syncope type    Symptomatic cholelithiasis    H/O: gout    Anxiety and depression    F11.2 - Continuous opioid dependence (H)       Current Outpatient Medications   Medication Sig Dispense Refill    Acetaminophen (TYLENOL PO)       allopurinol (ZYLOPRIM) 300 MG tablet TAKE 1 TABLET BY MOUTH EVERY DAY 90 tablet 3    amLODIPine (NORVASC) 5 MG tablet TAKE 1 TABLET BY MOUTH DAILY FOR BLOOD PRESSURE 90 tablet 3    aspirin EC 81 MG EC tablet Take 1 tablet (81 mg) by mouth daily (*) 30 tablet 1    atorvastatin (LIPITOR) 80 MG tablet TAKE 1 TABLET BY MOUTH EVERY DAY 90 tablet 3    bacitracin 500 UNIT/GM external ointment Apply topically 3 times daily 30 g 3    baclofen (LIORESAL) 10 MG tablet Take 1 tablet (10 mg) by mouth 2 times daily as needed for muscle spasms 3 month supply. 180 tablet 1    busPIRone HCl (BUSPAR) 30 MG tablet TAKE 1 TABLET BY MOUTH TWICE A  tablet 3    BUTRANS 20 MCG/HR WK patch Place 1 patch onto the skin every 7 days LISANDRO, name brand. 28 day script for chronic pain. OK to fill 08/17/24 to start 08/19/24 4 patch 0    Cholecalciferol (VITAMIN D) 2000  UNITS tablet TAKE ONE TABLET BY MOUTH ONCE DAILY 100 tablet 1    clobetasol (TEMOVATE) 0.05 % external cream Apply twice daily for 2 weeks, weekends only for 2 weeks, repeat cycle as needed for hands, not face or genitals 60 g 4    clobetasol (TEMOVATE) 0.05 % external solution Apply twice daily to scalp for up to 2 weeks for flares. 60 mL 0    clonazePAM (KLONOPIN) 1 MG tablet TAKE 0.5-1 TABLETS BY MOUTH 2 TIMES DAILY AS NEEDED FOR ANXIETY 90 tablet 0    clopidogrel (PLAVIX) 75 MG tablet TAKE 1 TABLET BY MOUTH EVERY DAY 90 tablet 3    colchicine (COLCRYS) 0.6 MG tablet TAKE 2 TABLETS BY MOUTH AT THE FIRST SIGN OF FLARE, TAKE 1 ADDITIONAL TABLET ONE HOUR LATER. 90 tablet 0    desvenlafaxine (PRISTIQ) 100 MG 24 hr tablet Take 1 tablet (100 mg) by mouth daily 90 tablet 3    diclofenac (VOLTAREN) 1 % topical gel Apply 2-4 g topically 4 times daily as needed for moderate pain 300 g 11    evolocumab (REPATHA SURECLICK) 140 MG/ML prefilled autoinjector Inject 1 mL (140 mg) Subcutaneous every 14 days 6 mL 3    ezetimibe (ZETIA) 10 MG tablet TAKE 1 TABLET (10 MG) BY MOUTH DAILY. 90 tablet 3    fluticasone (FLONASE) 50 MCG/ACT nasal spray Apply 1-2 sprays on the skin and let dry prior to applying Butrans patch. This will likely reduce the skin rash you are getting. 11.1 mL 11    gabapentin (NEURONTIN) 300 MG capsule Take 1 capsule (300 mg) by mouth 3 times daily 90 day supply 270 capsule 3    gemfibrozil (LOPID) 600 MG tablet TAKE 1 TABLET BY MOUTH 2 TIMES DAILY 180 tablet 3    golimumab (SIMPONI) 50 MG/0.5ML auto-injector pen Inject 0.5 mLs (50 mg) subcutaneously every 28 days . Hold for signs of infection, and seek medical attention. 0.5 mL 4    hydrocortisone (WESTCORT) 0.2 % external cream FOR GENITALS, APPLY TWICE DAILY FOR UP TO 2 WEEKS, TAKE 2 WEEKS OFF THEN REPEAT 60 g 3    hydrocortisone 2.5 % cream FOR FACE, APPLY TWICE DAILY FOR UP TO 2 WEEK FOR FLARES ON THE FACE, TAKE 2 WEEKS OFF 28.35 g 3    Incontinence Supply  Disposable (DEPEND UNDERWEAR LARGE) MISC Use twice daily. 60 each 11    leflunomide (ARAVA) 20 MG tablet Take 1 tablet (20 mg) by mouth daily ; Labs required every 8-12 weeks. 90 tablet 2    meclizine (ANTIVERT) 25 MG tablet TAKE 1 TABLET BY MOUTH EVERY 6 HOURS AS NEEDED FOR DIZZINESS. 30 tablet 3    melatonin 3 MG tablet Take 1 tablet (3 mg) by mouth nightly as needed for sleep      naloxone (NARCAN) 4 MG/0.1ML nasal spray Spray 1 spray (4 mg) into one nostril alternating nostrils once as needed for opioid reversal every 2-3 minutes until assistance arrives 0.2 mL 0    nitroGLYcerin (NITROSTAT) 0.4 MG sublingual tablet PLACE 1 TAB UNDER THE TONGUE EVERY 5 MINUTES FOR 3 DOSES FOR CHEST PAIN. IF SYMPTOMS PERSIST 5 MINUTES AFTER 1ST DOSE CALL 911. (Patient not taking: Reported on 5/28/2024) 25 tablet 0    order for DME Equipment being ordered: chair lift 1 each 0    order for DME Equipment being ordered: single end cane 1 Units 0    ORDER FOR DME 1.  CPAP pressure 11 cm/H20 with heated humidity.   2.  Provide mask to fit and CPAP supplies.   3.  Length of need lifetime.   4.  If needed please provide a chin strap           pantoprazole (PROTONIX) 40 MG EC tablet TAKE 1 TABLET BY MOUTH EVERY DAY 90 tablet 2    pimecrolimus (ELIDEL) 1 % external cream APPLY TWICE DAILY FOR FACE AND GENITALS 60 g 1    sildenafil (REVATIO) 20 MG tablet TAKE 2 - 5 TABLETS BY MOUTH AS NEEDED 30 MINUTES BEFORE SEXUAL ACTIVITY. MAX 100MG IN 24 HOURS. (Patient not taking: Reported on 5/21/2024) 450 tablet 3    sulfaSALAzine (AZULFIDINE) 500 MG tablet Take 3 tablets (1,500 mg) by mouth 2 times daily 540 tablet 2    tamsulosin (FLOMAX) 0.4 MG capsule TAKE 2 CAPSULES BY MOUTH EVERY  capsule 3    tenofovir (VIREAD) 300 MG tablet Take 1 tablet (300 mg) by mouth daily 90 tablet 0    triamcinolone (KENALOG) 0.1 % external ointment APPLY TO AFFECTED AREA TOPICALLY 3 TIMES A DAY 80 g 2    triamcinolone (KENALOG) 0.1 % external ointment Apply to  trunk and extremities twice daily for up to 2 weeks for flares 80 g 1    zolpidem (AMBIEN) 5 MG tablet Take 1 tablet (5 mg) by mouth nightly as needed for sleep 30 tablet 5       Allergies   Allergen Reactions    Citalopram Itching    Tramadol Itching       Visual Exam:  GEN: NAD  Psych: normal mood, normal affect     Labs/Studies:  - We reviewed the results of the overnight study as described on the HPI.     Lab Results   Component Value Date    TSH 2.21 03/22/2023    TSH 3.34 10/25/2016     Lab Results   Component Value Date    GLC 92 05/10/2024     (H) 12/27/2023     Lab Results   Component Value Date    HGB 12.7 (L) 08/07/2024    HGB 12.4 (L) 05/10/2024     Lab Results   Component Value Date    BUN 13.5 05/10/2024    BUN 16.3 12/27/2023    CR 0.81 08/07/2024    CR 0.84 05/10/2024     Lab Results   Component Value Date    AST 21 08/07/2024    AST 21 05/10/2024    ALT 12 08/07/2024    ALT <5 05/10/2024    ALKPHOS 112 08/07/2024    ALKPHOS 93 05/10/2024    BILITOTAL 0.5 08/07/2024    BILITOTAL 0.3 05/10/2024    BILICONJ 0.0 07/07/2014    BILICONJ 0.0 01/27/2014         Recent Labs   Lab Test 08/07/24  1136 05/10/24  1002 04/25/24  1254 12/27/23  1000   NA  --  143  --  141   POTASSIUM  --  3.7  --  3.7   CHLORIDE  --  108*  --  105   CO2  --  27 -- 24   ANIONGAP  --  8  --  12   GLC  --  92  --  107*   BUN  --  13.5  --  16.3   CR 0.81 0.84   < > 0.76   HEMANT  --  9.3  --  9.2    < > = values in this interval not displayed.         Patient verbalized understanding of these issues, agrees with the plan and all questions were answered today. Patient was given an opportuntity to voice any other symptoms or concerns not listed above. Patient did not have any other symptoms or concerns.      Devonte Boyer DO  Board Certified in Internal Medicine and Sleep Medicine      (Note created with Dragon voice recognition and unintended spelling errors and word substitutions may occur)

## 2024-09-16 ENCOUNTER — MYC MEDICAL ADVICE (OUTPATIENT)
Dept: PALLIATIVE MEDICINE | Facility: CLINIC | Age: 59
End: 2024-09-16
Payer: COMMERCIAL

## 2024-09-16 DIAGNOSIS — F11.90 CHRONIC, CONTINUOUS USE OF OPIOIDS: ICD-10-CM

## 2024-09-16 DIAGNOSIS — G89.4 CHRONIC PAIN SYNDROME: ICD-10-CM

## 2024-09-16 DIAGNOSIS — M47.819 FACET ARTHROPATHY: ICD-10-CM

## 2024-09-16 DIAGNOSIS — M17.11 PRIMARY OSTEOARTHRITIS OF RIGHT KNEE: ICD-10-CM

## 2024-09-16 DIAGNOSIS — M06.9 RHEUMATOID ARTHRITIS INVOLVING MULTIPLE SITES, UNSPECIFIED WHETHER RHEUMATOID FACTOR PRESENT (H): ICD-10-CM

## 2024-09-17 NOTE — TELEPHONE ENCOUNTER
Please process a refill of Butrans patch to     [facility] [address] [place] [facility] [place] [address]  [address] [facility] [address]  Phone: [phone] Fax: [phone]

## 2024-09-17 NOTE — TELEPHONE ENCOUNTER
Received call from patient requesting refill(s) of     Butrans patch    Last dispensed from pharmacy on 08.26.2024    Patient's last office/virtual visit by prescribing provider on 08.20.2024    Next office/virtual appointment scheduled for 11.18.2024    UDT/CSA 02.26.2024    E-prescribe to     [facility] [address] [place] [facility] [address] [facility],    Will route to nursing [place] for review and preparation of prescription(s).

## 2024-09-18 DIAGNOSIS — N40.1 BENIGN PROSTATIC HYPERPLASIA WITH WEAK URINARY STREAM: ICD-10-CM

## 2024-09-18 DIAGNOSIS — R39.12 BENIGN PROSTATIC HYPERPLASIA WITH WEAK URINARY STREAM: ICD-10-CM

## 2024-09-18 RX ORDER — TAMSULOSIN HYDROCHLORIDE 0.4 MG/1
CAPSULE ORAL
Qty: 180 CAPSULE | Refills: 3 | Status: SHIPPED | OUTPATIENT
Start: 2024-09-18

## 2024-09-18 NOTE — TELEPHONE ENCOUNTER
Medication refill information reviewed.     Due date for Butrans 20 mcg patch  is 09/23/24     Prescriptions prepped for review.     Will route to provider.

## 2024-09-19 RX ORDER — BUPRENORPHINE 20 UG/H
1 PATCH, EXTENDED RELEASE TRANSDERMAL
Qty: 4 PATCH | Refills: 0 | Status: SHIPPED | OUTPATIENT
Start: 2024-09-19

## 2024-09-20 NOTE — TELEPHONE ENCOUNTER
Signed Prescriptions:                        Disp   Refills    BUTRANS 20 MCG/HR WK patch                 4 patch0        Sig: Place 1 patch onto the skin every 7 days. LISANDRO, name           brand. 28 day script for chronic pain. OK to fill           09/21/24 to start 09/23/24.  Authorizing Provider: AMEENA PAGE        Reviewed [place]  September 19, 2024- no concerning fills.    Ameena ELMUS, RN CNP, FNP  [facility]  [facility]

## 2024-09-23 ENCOUNTER — DOCUMENTATION ONLY (OUTPATIENT)
Dept: SLEEP MEDICINE | Facility: CLINIC | Age: 59
End: 2024-09-23
Payer: COMMERCIAL

## 2024-09-23 DIAGNOSIS — G47.33 OSA (OBSTRUCTIVE SLEEP APNEA): Primary | ICD-10-CM

## 2024-09-23 NOTE — PROGRESS NOTES
Patient was offered choice of vendor and chose [facility].  Patient Lamont Daniels was set up at [place]  on September 23, 2024. Patient received a [employer]med Airsense 10 Pressures were set at  14 cm H2O.   Patient s ramp is 7 cm H2O for Auto and FLEX/EPR is EPR, 2.  Patient received a [employer]med Mask name: QUATRO AIR  Full Face mask size Small, heated tubing and heated humidifier.  Patient has the following compliance requirements: usage only    Jose Hightower

## 2024-10-09 ENCOUNTER — LAB (OUTPATIENT)
Dept: LAB | Facility: CLINIC | Age: 59
End: 2024-10-09
Payer: COMMERCIAL

## 2024-10-09 DIAGNOSIS — E78.5 HYPERLIPIDEMIA LDL GOAL <70: ICD-10-CM

## 2024-10-09 DIAGNOSIS — Z95.1 S/P CABG (CORONARY ARTERY BYPASS GRAFT): ICD-10-CM

## 2024-10-09 PROCEDURE — 80061 LIPID PANEL: CPT

## 2024-10-09 PROCEDURE — 36415 COLL VENOUS BLD VENIPUNCTURE: CPT

## 2024-10-10 LAB
CHOLEST SERPL-MCNC: 126 MG/DL
FASTING STATUS PATIENT QL REPORTED: YES
HDLC SERPL-MCNC: 42 MG/DL
LDLC SERPL CALC-MCNC: 66 MG/DL
NONHDLC SERPL-MCNC: 84 MG/DL
TRIGL SERPL-MCNC: 89 MG/DL

## 2024-10-14 ENCOUNTER — MYC MEDICAL ADVICE (OUTPATIENT)
Dept: PALLIATIVE MEDICINE | Facility: CLINIC | Age: 59
End: 2024-10-14
Payer: COMMERCIAL

## 2024-10-14 DIAGNOSIS — M06.9 RHEUMATOID ARTHRITIS INVOLVING MULTIPLE SITES, UNSPECIFIED WHETHER RHEUMATOID FACTOR PRESENT (H): ICD-10-CM

## 2024-10-14 DIAGNOSIS — F11.90 CHRONIC, CONTINUOUS USE OF OPIOIDS: ICD-10-CM

## 2024-10-14 DIAGNOSIS — G89.4 CHRONIC PAIN SYNDROME: ICD-10-CM

## 2024-10-14 DIAGNOSIS — M17.11 PRIMARY OSTEOARTHRITIS OF RIGHT KNEE: ICD-10-CM

## 2024-10-14 DIAGNOSIS — M47.819 FACET ARTHROPATHY: ICD-10-CM

## 2024-10-16 ENCOUNTER — APPOINTMENT (OUTPATIENT)
Dept: INTERPRETER SERVICES | Facility: CLINIC | Age: 59
End: 2024-10-16
Payer: COMMERCIAL

## 2024-10-16 RX ORDER — BUPRENORPHINE 20 UG/H
1 PATCH, EXTENDED RELEASE TRANSDERMAL
Qty: 4 PATCH | Refills: 0 | Status: SHIPPED | OUTPATIENT
Start: 2024-10-16 | End: 2024-11-12

## 2024-10-16 NOTE — TELEPHONE ENCOUNTER
Received request for a refill(s) of BUTRANS 20 MCG/HR WK patch      Last dispensed from pharmacy on 09/23/24    Patient's last office/virtual visit by prescribing provider on 08/20/24  Next office/virtual appointment scheduled for 11/18/24    Last urine drug screen date 02/26/24  Current opioid agreement on file (completed within the last year) Yes Date of opioid agreement: 02/26/24    E-prescribe to pharmacy-  [facility] [address] [address] [facility] - [place] - [address] [address]     Will route to nursing [place] for review and preparation of prescription(s).

## 2024-10-16 NOTE — TELEPHONE ENCOUNTER
Signed Prescriptions:                        Disp   Refills    BUTRANS 20 MCG/HR WK patch                 4 patch0        Sig: Place 1 patch onto the skin every 7 days. LISANDRO, name           brand. 28 day script for chronic pain. OK to fill           10/19/24 to start 10/21/24.  Authorizing Provider: AMEENA PAGE        Reviewed [place]  October 16, 2024- no concerning fills.    Ameena LEMUS, RN CNP, FNP  [facility]  [facility]

## 2024-10-16 NOTE — TELEPHONE ENCOUNTER
Please process a refill of Butrans patch  to     [facility] [address] [address] [facility] [place] [address] [address] [facility] [address]  [address] [facility] [address]  Phone: [phone] Fax: [phone]

## 2024-10-16 NOTE — TELEPHONE ENCOUNTER
Medication refill information reviewed.     Due date for BUTRANS 20 MCG/HR WK patch is 10/21/24     Prescriptions prepped for review.     Will route to provider.

## 2024-10-22 ENCOUNTER — LAB (OUTPATIENT)
Dept: LAB | Facility: CLINIC | Age: 59
End: 2024-10-22
Payer: COMMERCIAL

## 2024-10-22 DIAGNOSIS — B18.1 CHRONIC VIRAL HEPATITIS B WITHOUT DELTA AGENT AND WITHOUT COMA (H): ICD-10-CM

## 2024-10-22 DIAGNOSIS — M06.09 RHEUMATOID ARTHRITIS OF MULTIPLE SITES WITH NEGATIVE RHEUMATOID FACTOR (H): ICD-10-CM

## 2024-10-22 DIAGNOSIS — Z79.899 HIGH RISK MEDICATIONS (NOT ANTICOAGULANTS) LONG-TERM USE: ICD-10-CM

## 2024-10-22 DIAGNOSIS — Z79.899 HIGH RISK MEDICATION USE: ICD-10-CM

## 2024-10-22 LAB
AFP SERPL-MCNC: <1.8 NG/ML
ALBUMIN SERPL BCG-MCNC: 4.3 G/DL (ref 3.5–5.2)
ALP SERPL-CCNC: 128 U/L (ref 40–150)
ALT SERPL W P-5'-P-CCNC: 15 U/L (ref 0–70)
ANION GAP SERPL CALCULATED.3IONS-SCNC: 12 MMOL/L (ref 7–15)
AST SERPL W P-5'-P-CCNC: 29 U/L (ref 0–45)
BASOPHILS # BLD AUTO: 0.1 10E3/UL (ref 0–0.2)
BASOPHILS NFR BLD AUTO: 1 %
BILIRUB DIRECT SERPL-MCNC: <0.2 MG/DL (ref 0–0.3)
BILIRUB SERPL-MCNC: 0.4 MG/DL
BUN SERPL-MCNC: 13.4 MG/DL (ref 6–20)
CALCIUM SERPL-MCNC: 9.5 MG/DL (ref 8.8–10.4)
CHLORIDE SERPL-SCNC: 107 MMOL/L (ref 98–107)
CREAT SERPL-MCNC: 0.98 MG/DL (ref 0.67–1.17)
CRP SERPL-MCNC: <3 MG/L
EGFRCR SERPLBLD CKD-EPI 2021: 89 ML/MIN/1.73M2
EOSINOPHIL # BLD AUTO: 0.3 10E3/UL (ref 0–0.7)
EOSINOPHIL NFR BLD AUTO: 6 %
ERYTHROCYTE [DISTWIDTH] IN BLOOD BY AUTOMATED COUNT: 14.1 % (ref 10–15)
ERYTHROCYTE [SEDIMENTATION RATE] IN BLOOD BY WESTERGREN METHOD: 27 MM/HR (ref 0–20)
GLUCOSE SERPL-MCNC: 103 MG/DL (ref 70–99)
HCO3 SERPL-SCNC: 22 MMOL/L (ref 22–29)
HCT VFR BLD AUTO: 40.8 % (ref 40–53)
HGB BLD-MCNC: 13 G/DL (ref 13.3–17.7)
IMM GRANULOCYTES # BLD: 0 10E3/UL
IMM GRANULOCYTES NFR BLD: 0 %
INR PPP: 1.11 (ref 0.85–1.15)
LYMPHOCYTES # BLD AUTO: 1.1 10E3/UL (ref 0.8–5.3)
LYMPHOCYTES NFR BLD AUTO: 21 %
MCH RBC QN AUTO: 29 PG (ref 26.5–33)
MCHC RBC AUTO-ENTMCNC: 31.9 G/DL (ref 31.5–36.5)
MCV RBC AUTO: 91 FL (ref 78–100)
MONOCYTES # BLD AUTO: 0.6 10E3/UL (ref 0–1.3)
MONOCYTES NFR BLD AUTO: 13 %
NEUTROPHILS # BLD AUTO: 3 10E3/UL (ref 1.6–8.3)
NEUTROPHILS NFR BLD AUTO: 59 %
PLATELET # BLD AUTO: 277 10E3/UL (ref 150–450)
POTASSIUM SERPL-SCNC: 3.8 MMOL/L (ref 3.4–5.3)
PROT SERPL-MCNC: 7.7 G/DL (ref 6.4–8.3)
RBC # BLD AUTO: 4.48 10E6/UL (ref 4.4–5.9)
SODIUM SERPL-SCNC: 141 MMOL/L (ref 135–145)
WBC # BLD AUTO: 5 10E3/UL (ref 4–11)

## 2024-10-22 PROCEDURE — 85025 COMPLETE CBC W/AUTO DIFF WBC: CPT

## 2024-10-22 PROCEDURE — 82248 BILIRUBIN DIRECT: CPT

## 2024-10-22 PROCEDURE — 86140 C-REACTIVE PROTEIN: CPT

## 2024-10-22 PROCEDURE — 87517 HEPATITIS B DNA QUANT: CPT

## 2024-10-22 PROCEDURE — 85610 PROTHROMBIN TIME: CPT

## 2024-10-22 PROCEDURE — 36415 COLL VENOUS BLD VENIPUNCTURE: CPT

## 2024-10-22 PROCEDURE — 80053 COMPREHEN METABOLIC PANEL: CPT

## 2024-10-22 PROCEDURE — 82105 ALPHA-FETOPROTEIN SERUM: CPT

## 2024-10-22 PROCEDURE — 85652 RBC SED RATE AUTOMATED: CPT

## 2024-10-23 LAB — HBV DNA SERPL NAA+PROBE-ACNC: NOT DETECTED IU/ML

## 2024-11-05 ENCOUNTER — VIRTUAL VISIT (OUTPATIENT)
Dept: GASTROENTEROLOGY | Facility: CLINIC | Age: 59
End: 2024-11-05
Attending: PHYSICIAN ASSISTANT
Payer: COMMERCIAL

## 2024-11-05 DIAGNOSIS — B18.1 CHRONIC VIRAL HEPATITIS B WITHOUT DELTA AGENT AND WITHOUT COMA (H): ICD-10-CM

## 2024-11-05 DIAGNOSIS — M06.09 RHEUMATOID ARTHRITIS OF MULTIPLE SITES WITH NEGATIVE RHEUMATOID FACTOR (H): ICD-10-CM

## 2024-11-05 DIAGNOSIS — E78.5 HYPERLIPIDEMIA LDL GOAL <70: ICD-10-CM

## 2024-11-05 DIAGNOSIS — G47.33 OSA (OBSTRUCTIVE SLEEP APNEA): Primary | ICD-10-CM

## 2024-11-05 DIAGNOSIS — K75.81 NASH (NONALCOHOLIC STEATOHEPATITIS): ICD-10-CM

## 2024-11-05 DIAGNOSIS — I10 ESSENTIAL HYPERTENSION WITH GOAL BLOOD PRESSURE LESS THAN 140/90: ICD-10-CM

## 2024-11-05 PROCEDURE — 99214 OFFICE O/P EST MOD 30 MIN: CPT | Mod: 95 | Performed by: PHYSICIAN ASSISTANT

## 2024-11-05 RX ORDER — TENOFOVIR DISOPROXIL FUMARATE 300 MG/1
300 TABLET, FILM COATED ORAL DAILY
Qty: 90 TABLET | Refills: 3 | Status: SHIPPED | OUTPATIENT
Start: 2024-11-05

## 2024-11-05 NOTE — NURSING NOTE
Current patient location: Patient declined to provide     Is the patient currently in the state of [place]? YES    Visit mode:VIDEO    If the visit is dropped, the patient can be reconnected by: VIDEO VISIT: Text to cell phone:   Telephone Information:   Mobile [phone]       Will anyone else be joining the visit? NO  (If patient encounters technical issues they should call [phone]    Are changes needed to the allergy or medication list? No    Are refills needed on medications prescribed by this physician? NO    Rooming Documentation:  Not applicable    Reason for visit: RECHECK    Iliana MADDEN

## 2024-11-05 NOTE — LETTER
11/5/2024      Lamont Daniels  [address]  [facility] [address]      Dear Colleague,    Thank you for referring your patient, Lamont Daniels, to the [facility] [place]. Please see a copy of my visit note below.    Virtual Visit Details    Type of service:  Video Visit   Joined the call at 11/5/2024, 1:20:09 pm.  Left the call at 11/5/2024, 1:29:37 pm.    Originating Location (pt. Location): Home  Distant Location (provider location):  Off-site  Platform used for Video Visit: [facility]    Hepatology Clinic note  Lamont Daniels   Date of Birth 1965         Assessment/plan:   Lamont Daniels is a 58 year old male  biopsy proven MASH and chronic hepatitis B, e antigen positive and e antibody negative who has been maintained on tenofovir disoproxil/Viread 300 mg daily in the setting of chronic immunosuppressant medications with rheumatoid arthritis treatment.  HBV DNA: Nondetectable on 12/27/2023  ALT/AST: Normal  Fibrosis Assessment: Stage 2 Fibrosis on Liver Biopsy 2015   Liver Function: Normal, including platelets     # Chronic Hepatitis B, suppressed:   - Continue tenofovir disoproxil/Viread 300 mg daily   - Renal labs will be continued to monitor for changes during treatment.  - BMP, Hepatic panel, CBC, INR, AFP and HBV DNA in six months     # History of Stage 2 Fibrosis in 2015:   - Will recommend updated FibroScan in future     # HCC screening, due:   - US abdomen at next convenience; repeat after six months      # MASH:   - Continue to aggressive optimization risk factors including dyslipidemia/HTN   - Recommend yearly screening with HgA1c  - Continue weight maintenance BMI 25.   - Increase physical activity    # Anemia, normocytic anemia:   - No recent iron panel     # Follow-up in clinic in one year     Leandra Mcclellan PA-C   [facility] Hepatology clinic    -----------------------------------------------------       HPI:   Lamont Daniels is a 58 year old male who presents to clinic today  for the following health issues:     Hepatitis B  [facility]: Risk factors: CAD, HTN, HLD, JEROD   E antigen negative  E antibody positive  -Diagnosed: age 43  -History: likely early childhood transmission   -Prior biopsy: 12/2015, Stage 2   -Prior treatments:  - Started tenofovir DF around 2006 in [place]    Patient was last seen by me on 1/3/2024.   No recent hospitalizations or ER visits. Denies new medications.     Appetite is good. Weight has been relatively stable.     Taking Hep B medication regularly. No problems getting medication from pharmacy. No swelling or fluid in legs.     Patient denies jaundice, lower extremity edema, abdominal distension or confusion.  BM - will take prunes as needed to ensure regular bowel movements.      Patient also denies melena, hematochezia or hematemesis. Patient denies weight loss, fevers, sweats or chills.    Hepatitis B:   Lab Results   Component Value Date    HEPBANG Reactive (A) 03/22/2023    HBCAB Positive (A) 04/23/2013    AUSAB 0.09 10/20/2021    HCVAB Nonreactive 04/25/2024    HDAAB Negative 04/01/2019     Lab Results   Component Value Date    HBQRES Not Detected 10/22/2024    HBQRES Not Detected 05/10/2024    HBQRES Not Detected 12/27/2023    HBQRES Not Detected 03/22/2023    HBQRES Not Detected 04/26/2022     Lab Results   Component Value Date    HBEABY Positive (A) 03/22/2023    HBEAGN  10/05/2016     Negative  Reference range: Negative  (Note)  Performed by [facility],  [address] [place] [id number] [phone]  [email or web address], Dashawn Machuca MD, Lab. Director         Recent Labs   Lab Test 10/22/24  0943 08/07/24  1136 05/10/24  1002 04/25/24  1254 12/27/23  1000 12/22/23  1349 09/14/23  1404 06/07/23  0858 03/22/23  1052 12/17/22  1244   ALKPHOS 128 112 93 99 105 115 99 110 121 82   ALT 15 12 <5 19 12 16 19 22 19 23   AST 29 21 21 28 16 22 25 34 18 23        Medical hx Surgical hx   Past Medical History:   Diagnosis Date     Anxiety      CAD (coronary artery  disease)      Congestive heart failure, unspecified 2008     Depressive disorder 2008     Gout      Hepatitis B > 10 years     HTN (hypertension)      Hyperlipidemia LDL goal < 100      Malrotation of intestine (H)      Moderate major depression (H)      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) 9/19/2011     Rheumatoid arthritis flare (H) 1012     Steatohepatitis 12/15     Tuberculosis age 13     Vitamin D deficiency     Past Surgical History:   Procedure Laterality Date     ABDOMEN SURGERY  2014     BIOPSY  2015     BYPASS GRAFT ARTERY CORONARY  2008    6 vessels     COLONOSCOPY  2/8/2013    Procedure: COLONOSCOPY;  Colonoscopy, blood in stool;  Surgeon: Duane, William Charles, MD;  Location:  OR     COMBINED REPAIR PTOSIS WITH BLEPHAROPLASTY BILATERAL Bilateral 6/25/2018    Procedure: COMBINED REPAIR PTOSIS WITH BLEPHAROPLASTY BILATERAL;  Bilateral upper eyelid blepharoplasty, ptosis repair and brow ptosis repair;  Surgeon: Sandra Borja MD;  Location:  OR     GI SURGERY  2014     HEAD & NECK SURGERY  2011     NASAL/SINUS POLYPECTOMY  2010     ORTHOPEDIC SURGERY  2012     REPAIR PTOSIS       REPAIR PTOSIS BILATERAL Bilateral 7/22/2019    Procedure: REPAIR, PTOSIS, BOTH UPPER brows;  Surgeon: Sandra Borja MD;  Location:  OR     REPAIR PTOSIS BROW BILATERAL Bilateral 6/25/2018    Procedure: REPAIR PTOSIS BROW BILATERAL;;  Surgeon: Sandra Borja MD;  Location:  OR     THORACIC SURGERY  1989    tb     TONSILLECTOMY  2010     UVULOPALATOPHARYNGOPLASTY  2010     VASCULAR SURGERY  2008     [facility] CORONARY STENT CIERA OSTOTL VESSEL  2008    3 months after CABG               Physical Exam:   There were no vitals taken for this visit.    GENERAL: healthy, alert and no distress, appears older than stated age.   EYES: Eyes grossly normal to inspection, conjunctivae and sclerae normal  RESP: no audible wheeze, cough, or visible cyanosis.  No visible retractions or increased work of breathing.  Able to speak fully in  complete sentences  NEURO: Cranial nerves grossly intact, mentation intact and speech normal  PSYCH: mentation appears normal, affect normal/bright, judgement and insight intact, normal speech and appearance well-groomed         Data:   Reviewed in person and significant for:    Lab Results   Component Value Date     10/22/2024     03/26/2020      Lab Results   Component Value Date    POTASSIUM 3.8 10/22/2024    POTASSIUM 3.6 03/22/2023    POTASSIUM 3.8 03/26/2020     Lab Results   Component Value Date    CHLORIDE 107 10/22/2024    CHLORIDE 110 03/22/2023    CHLORIDE 109 03/26/2020     Lab Results   Component Value Date    CO2 22 10/22/2024    CO2 30 03/22/2023    CO2 25 03/26/2020     Lab Results   Component Value Date    BUN 13.4 10/22/2024    BUN 13 03/22/2023    BUN 16 03/26/2020     Lab Results   Component Value Date    CR 0.98 10/22/2024    CR 1.01 03/29/2021       Lab Results   Component Value Date    WBC 5.0 10/22/2024    WBC 5.2 03/29/2021     Lab Results   Component Value Date    HGB 13.0 10/22/2024    HGB 13.6 03/29/2021     Lab Results   Component Value Date    HCT 40.8 10/22/2024    HCT 42.3 03/29/2021     Lab Results   Component Value Date    MCV 91 10/22/2024    MCV 90 03/29/2021     Lab Results   Component Value Date     10/22/2024     03/29/2021       Lab Results   Component Value Date    AST 29 10/22/2024    AST 20 03/29/2021     Lab Results   Component Value Date    ALT 15 10/22/2024    ALT 31 03/29/2021     Lab Results   Component Value Date    BILICONJ 0.0 07/07/2014      Lab Results   Component Value Date    BILITOTAL 0.4 10/22/2024    BILITOTAL 0.5 03/29/2021       Lab Results   Component Value Date    ALBUMIN 4.3 10/22/2024    ALBUMIN 3.7 03/22/2023    ALBUMIN 3.9 03/29/2021     Lab Results   Component Value Date    PROTTOTAL 7.7 10/22/2024    PROTTOTAL 7.6 03/29/2021      Lab Results   Component Value Date    ALKPHOS 128 10/22/2024    ALKPHOS 116 03/29/2021        Lab Results   Component Value Date    INR 1.11 10/22/2024    INR 1.08 03/26/2020       EXAMINATION: US ABDOMEN LIMITED, 5/10/2024 9:59 AM      COMPARISON: Ultrasound 12/15/2023     HISTORY:  Chronic viral hepatitis B without delta agent and without coma (H);  High risk medications (not anticoagulants) long-term use     TECHNIQUE: The abdomen was scanned in standard fashion with  specialized ultrasound transducer(s) using both gray-scale and limited  color Doppler techniques.     FINDINGS:  Liver: Mild increased echogenicity.  No focal solid mass.The main  portal vein is patent with antegrade flow, measuring 0.7 cm.     Gallbladder: No wall thickening, pericholecystic fluid, positive  sonographic Bah's sign. Tiny echogenic gallstones.  Bile Ducts: Both the intra- and extrahepatic biliary system are of  normal caliber.  The common bile duct measures 3 mm in diameter.  Pancreas: Visualized portions of the head and body of the pancreas are  unremarkable.   Kidneys: No hydronephrosis.  The craniocaudal dimensions are: right-  11.4 cm.                                                                      IMPRESSION:   1.  Mild increased liver echogenicity, indicative of parenchymal  hepatic disease.  No focal liver lesion.  2.  Cholelithiasis without findings for acute cholecystitis.      Again, thank you for allowing me to participate in the care of your patient.        Sincerely,        Leandra Mcclellan PA-C

## 2024-11-05 NOTE — PROGRESS NOTES
Virtual Visit Details    Type of service:  Video Visit   Joined the call at 11/5/2024, 1:20:09 pm.  Left the call at 11/5/2024, 1:29:37 pm.    Originating Location (pt. Location): Home  Distant Location (provider location):  Off-site  Platform used for Video Visit: [facility]    Hepatology Clinic note  Lamont Daniels   Date of Birth 1965         Assessment/plan:   Lamont Daniels is a 58 year old male  biopsy proven MASH and chronic hepatitis B, e antigen positive and e antibody negative who has been maintained on tenofovir disoproxil/Viread 300 mg daily in the setting of chronic immunosuppressant medications with rheumatoid arthritis treatment.  HBV DNA: Nondetectable on 12/27/2023  ALT/AST: Normal  Fibrosis Assessment: Stage 2 Fibrosis on Liver Biopsy 2015   Liver Function: Normal, including platelets     # Chronic Hepatitis B, suppressed:   - Continue tenofovir disoproxil/Viread 300 mg daily   - Renal labs will be continued to monitor for changes during treatment.  - BMP, Hepatic panel, CBC, INR, AFP and HBV DNA in six months     # History of Stage 2 Fibrosis in 2015:   - Will recommend updated FibroScan in future     # HCC screening, due:   - US abdomen at next convenience; repeat after six months      # MASH:   - Continue to aggressive optimization risk factors including dyslipidemia/HTN   - Recommend yearly screening with HgA1c  - Continue weight maintenance BMI 25.   - Increase physical activity    # Anemia, normocytic anemia:   - No recent iron panel     # Follow-up in clinic in one year     Leandra Mcclellan PA-C   [facility] Hepatology clinic    -----------------------------------------------------       HPI:   Lamont Daniels is a 58 year old male who presents to clinic today for the following health issues:     Hepatitis B  [facility]: Risk factors: CAD, HTN, HLD, JEROD   E antigen negative  E antibody positive  -Diagnosed: age 43  -History: likely early childhood transmission   -Prior biopsy: 12/2015, Stage 2    -Prior treatments:  - Started tenofovir DF around 2006 in [place]    Patient was last seen by me on 1/3/2024.   No recent hospitalizations or ER visits. Denies new medications.     Appetite is good. Weight has been relatively stable.     Taking Hep B medication regularly. No problems getting medication from pharmacy. No swelling or fluid in legs.     Patient denies jaundice, lower extremity edema, abdominal distension or confusion.  BM - will take prunes as needed to ensure regular bowel movements.      Patient also denies melena, hematochezia or hematemesis. Patient denies weight loss, fevers, sweats or chills.    Hepatitis B:   Lab Results   Component Value Date    HEPBANG Reactive (A) 03/22/2023    HBCAB Positive (A) 04/23/2013    AUSAB 0.09 10/20/2021    HCVAB Nonreactive 04/25/2024    HDAAB Negative 04/01/2019     Lab Results   Component Value Date    HBQRES Not Detected 10/22/2024    HBQRES Not Detected 05/10/2024    HBQRES Not Detected 12/27/2023    HBQRES Not Detected 03/22/2023    HBQRES Not Detected 04/26/2022     Lab Results   Component Value Date    HBEABY Positive (A) 03/22/2023    HBEAGN  10/05/2016     Negative  Reference range: Negative  (Note)  Performed by [facility],  [address] [place] [id number] [phone]  [email or web address], Dashawn Machuca MD, Lab. Director         Recent Labs   Lab Test 10/22/24  0943 08/07/24  1136 05/10/24  1002 04/25/24  1254 12/27/23  1000 12/22/23  1349 09/14/23  1404 06/07/23  0858 03/22/23  1052 12/17/22  1244   ALKPHOS 128 112 93 99 105 115 99 110 121 82   ALT 15 12 <5 19 12 16 19 22 19 23   AST 29 21 21 28 16 22 25 34 18 23        Medical hx Surgical hx   Past Medical History:   Diagnosis Date    Anxiety     CAD (coronary artery disease)     Congestive heart failure, unspecified 2008    Depressive disorder 2008    Gout     Hepatitis B > 10 years    HTN (hypertension)     Hyperlipidemia LDL goal < 100     Malrotation of intestine (H)     Moderate major  depression (H)     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) 9/19/2011    Rheumatoid arthritis flare (H) 1012    Steatohepatitis 12/15    Tuberculosis age 13    Vitamin D deficiency     Past Surgical History:   Procedure Laterality Date    ABDOMEN SURGERY  2014    BIOPSY  2015    BYPASS GRAFT ARTERY CORONARY  2008    6 vessels    COLONOSCOPY  2/8/2013    Procedure: COLONOSCOPY;  Colonoscopy, blood in stool;  Surgeon: Duane, William Charles, MD;  Location: [facility] OR    COMBINED REPAIR PTOSIS WITH BLEPHAROPLASTY BILATERAL Bilateral 6/25/2018    Procedure: COMBINED REPAIR PTOSIS WITH BLEPHAROPLASTY BILATERAL;  Bilateral upper eyelid blepharoplasty, ptosis repair and brow ptosis repair;  Surgeon: Sandra Borja MD;  Location: [facility] OR    GI SURGERY  2014    HEAD & NECK SURGERY  2011    NASAL/SINUS POLYPECTOMY  2010    ORTHOPEDIC SURGERY  2012    REPAIR PTOSIS      REPAIR PTOSIS BILATERAL Bilateral 7/22/2019    Procedure: REPAIR, PTOSIS, BOTH UPPER brows;  Surgeon: Sandra Borja MD;  Location: [facility] OR    REPAIR PTOSIS BROW BILATERAL Bilateral 6/25/2018    Procedure: REPAIR PTOSIS BROW BILATERAL;;  Surgeon: Sandra Borja MD;  Location:  OR    THORACIC SURGERY  1989    tb    TONSILLECTOMY  2010    UVULOPALATOPHARYNGOPLASTY  2010    [facility]  [facility] CIERA SOTO ADDTL VESSEL  2008    3 months after CABG               Physical Exam:   There were no vitals taken for this visit.    GENERAL: healthy, alert and no distress, appears older than stated age.   EYES: Eyes grossly normal to inspection, conjunctivae and sclerae normal  RESP: no audible wheeze, cough, or visible cyanosis.  No visible retractions or increased work of breathing.  Able to speak fully in complete sentences  NEURO: Cranial nerves grossly intact, mentation intact and speech normal  PSYCH: mentation appears normal, affect normal/bright, judgement and insight intact, normal speech and appearance well-groomed         Data:   Reviewed in person and  significant for:    Lab Results   Component Value Date     10/22/2024     03/26/2020      Lab Results   Component Value Date    POTASSIUM 3.8 10/22/2024    POTASSIUM 3.6 03/22/2023    POTASSIUM 3.8 03/26/2020     Lab Results   Component Value Date    CHLORIDE 107 10/22/2024    CHLORIDE 110 03/22/2023    CHLORIDE 109 03/26/2020     Lab Results   Component Value Date    CO2 22 10/22/2024    CO2 30 03/22/2023    CO2 25 03/26/2020     Lab Results   Component Value Date    BUN 13.4 10/22/2024    BUN 13 03/22/2023    BUN 16 03/26/2020     Lab Results   Component Value Date    CR 0.98 10/22/2024    CR 1.01 03/29/2021       Lab Results   Component Value Date    WBC 5.0 10/22/2024    WBC 5.2 03/29/2021     Lab Results   Component Value Date    HGB 13.0 10/22/2024    HGB 13.6 03/29/2021     Lab Results   Component Value Date    HCT 40.8 10/22/2024    HCT 42.3 03/29/2021     Lab Results   Component Value Date    MCV 91 10/22/2024    MCV 90 03/29/2021     Lab Results   Component Value Date     10/22/2024     03/29/2021       Lab Results   Component Value Date    AST 29 10/22/2024    AST 20 03/29/2021     Lab Results   Component Value Date    ALT 15 10/22/2024    ALT 31 03/29/2021     Lab Results   Component Value Date    BILICONJ 0.0 07/07/2014      Lab Results   Component Value Date    BILITOTAL 0.4 10/22/2024    BILITOTAL 0.5 03/29/2021       Lab Results   Component Value Date    ALBUMIN 4.3 10/22/2024    ALBUMIN 3.7 03/22/2023    ALBUMIN 3.9 03/29/2021     Lab Results   Component Value Date    PROTTOTAL 7.7 10/22/2024    PROTTOTAL 7.6 03/29/2021      Lab Results   Component Value Date    ALKPHOS 128 10/22/2024    ALKPHOS 116 03/29/2021       Lab Results   Component Value Date    INR 1.11 10/22/2024    INR 1.08 03/26/2020       EXAMINATION: US ABDOMEN LIMITED, 5/10/2024 9:59 AM      COMPARISON: Ultrasound 12/15/2023     HISTORY:  Chronic viral hepatitis B without delta agent and without coma  (H);  High risk medications (not anticoagulants) long-term use     TECHNIQUE: The abdomen was scanned in standard fashion with  specialized ultrasound transducer(s) using both gray-scale and limited  color Doppler techniques.     FINDINGS:  Liver: Mild increased echogenicity.  No focal solid mass.The main  portal vein is patent with antegrade flow, measuring 0.7 cm.     Gallbladder: No wall thickening, pericholecystic fluid, positive  sonographic Bah's sign. Tiny echogenic gallstones.  Bile Ducts: Both the intra- and extrahepatic biliary system are of  normal caliber.  The common bile duct measures 3 mm in diameter.  Pancreas: Visualized portions of the head and body of the pancreas are  unremarkable.   Kidneys: No hydronephrosis.  The craniocaudal dimensions are: right-  11.4 cm.                                                                      IMPRESSION:   1.  Mild increased liver echogenicity, indicative of parenchymal  hepatic disease.  No focal liver lesion.  2.  Cholelithiasis without findings for acute cholecystitis.

## 2024-11-06 NOTE — NURSING NOTE
LC est eta 1230   Patient declined individual allergy and medication review by support staff because - no changes from 8/20/24 visit.       Current patient location: [address] [place] [id number]    Is the patient currently in the state of [place]? YES    Visit mode:VIDEO    If the visit is dropped, the patient can be reconnected by: VIDEO VISIT: Text to cell phone:   Telephone Information:   Mobile [phone]       Will anyone else be joining the visit? NO  (If patient encounters technical issues they should call [phone])    How would you like to obtain your AVS? MyChart    Are changes needed to the allergy or medication list? No    Are refills needed on medications prescribed by this physician? NO    Rooming Documentation:  Questionnaire(s) completed      Reason for visit: RECHECK    Caroline MADDEN

## 2024-11-12 ENCOUNTER — MYC REFILL (OUTPATIENT)
Dept: PALLIATIVE MEDICINE | Facility: CLINIC | Age: 59
End: 2024-11-12
Payer: COMMERCIAL

## 2024-11-12 DIAGNOSIS — M06.9 RHEUMATOID ARTHRITIS INVOLVING MULTIPLE SITES, UNSPECIFIED WHETHER RHEUMATOID FACTOR PRESENT (H): ICD-10-CM

## 2024-11-12 DIAGNOSIS — G89.4 CHRONIC PAIN SYNDROME: ICD-10-CM

## 2024-11-12 DIAGNOSIS — F11.90 CHRONIC, CONTINUOUS USE OF OPIOIDS: ICD-10-CM

## 2024-11-12 DIAGNOSIS — M17.11 PRIMARY OSTEOARTHRITIS OF RIGHT KNEE: ICD-10-CM

## 2024-11-12 DIAGNOSIS — M47.819 FACET ARTHROPATHY: ICD-10-CM

## 2024-11-12 RX ORDER — BUPRENORPHINE 20 UG/H
1 PATCH, EXTENDED RELEASE TRANSDERMAL
Qty: 4 PATCH | Refills: 0 | Status: SHIPPED | OUTPATIENT
Start: 2024-11-12

## 2024-11-12 NOTE — TELEPHONE ENCOUNTER
Refills have been requested for the following medications:         BUTRANS 20 MCG/HR WK patch [Ameena Lang]      Patient Comment: Refill please due 11/17/24.     Preferred pharmacy: [facility] [id number] [place] [facility] [address] [facility]

## 2024-11-12 NOTE — TELEPHONE ENCOUNTER
Received request for a refill(s) of BUTRANS 20 MCG/HR WK patch      Last dispensed from pharmacy on 10/21/24    Patient's last office/virtual visit by prescribing provider on 08/20/24  Next office/virtual appointment scheduled for 11/18/24    Last urine drug screen date 02/26/24  Current opioid agreement on file (completed within the last year) Yes Date of opioid agreement: 02/26/24    E-prescribe to pharmacy-[facility] [id number] [facility] - [place] - [id number] [facility] [address]     Will route to nursing [place] for review and preparation of prescription(s).

## 2024-11-12 NOTE — TELEPHONE ENCOUNTER
Medication refill information reviewed.     Due date for BUTRANS 20 MCG/HR WK patch is 11/18/24     Prescriptions prepped for review.     Will route to provider.          Patient's depression is recurrent and is mild without psychosis. Their depression is currently in full remission and the condition is improving with treatment. This will be reassessed at the next regular appointment. F/U as described:patient will continue current medication therapy.

## 2024-11-13 NOTE — TELEPHONE ENCOUNTER
Signed Prescriptions:                        Disp   Refills    BUTRANS 20 MCG/HR WK patch                 4 patch0        Sig: Place 1 patch onto the skin every 7 days. LISANDRO, name           brand. 28 day script for chronic pain. OK to fill           11/16/24 to start 11/18/24.  Authorizing Provider: AMEENA PAGE        Reviewed [place]  November 12, 2024- no concerning fills.    Ameena LEMUS, RN CNP, FNP  [facility]  [facility]

## 2024-11-18 ENCOUNTER — VIRTUAL VISIT (OUTPATIENT)
Dept: PALLIATIVE MEDICINE | Facility: CLINIC | Age: 59
End: 2024-11-18
Attending: NURSE PRACTITIONER
Payer: COMMERCIAL

## 2024-11-18 DIAGNOSIS — M47.816 LUMBAR FACET ARTHROPATHY: ICD-10-CM

## 2024-11-18 DIAGNOSIS — M62.838 MUSCLE SPASM: ICD-10-CM

## 2024-11-18 DIAGNOSIS — M25.50 MULTIPLE JOINT PAIN: ICD-10-CM

## 2024-11-18 DIAGNOSIS — G89.4 CHRONIC PAIN SYNDROME: ICD-10-CM

## 2024-11-18 DIAGNOSIS — F11.90 CHRONIC, CONTINUOUS USE OF OPIOIDS: ICD-10-CM

## 2024-11-18 DIAGNOSIS — M62.830 SPASM OF BACK MUSCLES: ICD-10-CM

## 2024-11-18 DIAGNOSIS — M17.11 PRIMARY OSTEOARTHRITIS OF RIGHT KNEE: ICD-10-CM

## 2024-11-18 DIAGNOSIS — M54.59 LUMBAR FACET JOINT PAIN: ICD-10-CM

## 2024-11-18 DIAGNOSIS — M79.18 MYOFASCIAL PAIN: ICD-10-CM

## 2024-11-18 DIAGNOSIS — M06.9 RHEUMATOID ARTHRITIS INVOLVING MULTIPLE SITES, UNSPECIFIED WHETHER RHEUMATOID FACTOR PRESENT (H): Primary | ICD-10-CM

## 2024-11-18 RX ORDER — BACLOFEN 10 MG/1
10 TABLET ORAL 2 TIMES DAILY PRN
Qty: 180 TABLET | Refills: 1 | Status: SHIPPED | OUTPATIENT
Start: 2024-11-18

## 2024-11-18 NOTE — PATIENT INSTRUCTIONS
Physical Therapy: none  Continue stretching at home  Clinical Health Psychologist to address issues of relaxation, behavioral change, coping style, and other factors important to improvement: none  Diagnostic Studies: none  Medication Management:   Continue Butrans 20mcg/hr transdermal change every 7 days, he is to fill and start this today 11/18.  continue Baclofen to 10mg up to three times daily as needed for muscle spasms  Continue Voltaren gel as needed  Continue gabapentin 300mg taking 1 capsule three times daily  continue flonase nasal spray apply 1-2 sprays on the skin where you will apply the Butrans patch, allow the spray to dry and then apply patch, this often will prevent the skin irritation and rash.   Further procedures recommended: none  Recommendations/follow-up for PCP:  See above  Release of information: none  Follow up: Video visit Monday December 9th at 11:30 AM to discuss possible switch to his pain meds.   Please call [phone] to make your follow-up appointment with me.

## 2024-11-18 NOTE — PROGRESS NOTES
Lamont is a 59 year old who is being evaluated via a billable video visit.    How would you like to obtain your AVS? MyChart  If the video visit is dropped, the invitation should be resent by: Text to cell phone: [phone]  Will anyone else be joining your video visit? No      Is Pt currently in [place]? Yes    NOTE:  If Pt is not in [place], Appointment needs to be canceled and rescheduled.     [facility] [place]     Video-Visit Details    Type of service:  Video Visit --could not connect with [facility] or [facility], changed to telephone call    Originating Location (pt. Location): Home    Distant Location (provider location):  On-site  Platform used for Video Visit: Telephone   Phone start 1441  Phone end 1457  Phone length 17 minutes    [facility]    11/18/2024    Chief complaint:   -last 2 days has had nausea/vomiting and diarrhea. Denies dizziness.  -low back pain, extends into the thighs, bilaterally. Not past the knees.   -multiple joint pain (RA)   He denies any new areas of pain        Interval history:  Lamont Daniels is a 59 year old male is known to me for:  Lumbar facet arthropathy  Rheumatoid arthritis involving multiple joints, unspecified rheumatoid factor  Primary osteoarthritis of right knee  Chronic continuous use of opioids  Chronic pain syndrome  PMHx includes: Anxiety, coronary artery disease, congestive heart failure (2008), depressive disorder (2008), gout, hepatitis B more than 10 years ago, hypertension, hyperlipidemia, malrotation of intestine, severe obstructive sleep apnea, rheumatoid arthritis (2012) steatohepatitis (2015), history of tuberculosis at age 13, vitamin D deficiency  PSHx includes: Coronary artery bypass graft 6 vessel (2008), abdominal surgery (2014), colonoscopy (2013), combined repair ptosis with blepharoplasty bilateral (2018), head and neck surgery (2011), nasal/sinus polypectomy (2010), orthopedic  surgery (2012), repair ptosis bilateral (2019), repair ptosis brow bilateral (2018), thoracic surgery for tuberculosis (1989), tonsillectomy and uvulopalatal pharyngoplasty (2010), coronary artery stent 3 months after CABG (2008).      Recommendations/plan at the last visit on 8/20/2024 included:  Physical Therapy: none  Continue stretching at home  Clinical Health Psychologist to address issues of relaxation, behavioral change, coping style, and other factors important to improvement: none  Diagnostic Studies: none  Medication Management:   Continue Butrans 20mcg/hr transdermal change every 7 days, he is to fill and start this today 8/20.  continue Baclofen to 10mg up to three times daily as needed for muscle spasms  Continue Voltaren gel as needed  Continue gabapentin 300mg taking 1 capsule three times daily  continue flonase nasal spray apply 1-2 sprays on the skin where you will apply the Butrans patch, allow the spray to dry and then apply patch, this often will prevent the skin irritation and rash.   Further procedures recommended: none  Recommendations/follow-up for PCP:  See above  Release of information: none  Follow up: 12 weeks for in-person or virtual visit.  Please call [phone] to make your follow-up appointment with me.      Since his last visit, Lamont Daniels reports:    Interval history November 18, 2024  -he is to  the Butrans today, this is the day he changes it.   Lamont reports the following pain issues:  -last 2 days has had nausea/vomiting and diarrhea. Denies dizziness.  -low back pain, extends into the thighs, bilaterally. Not past the knees.   -multiple joint pain (RA)   He denies any new areas of pain  -he would like to consider a different option for pain management for next month. Set him up for follow up visit to discuss since Butrans is ready for him at pharmacy and changes may require PA approval.       Interval history August 20, 2024  -Lamont reports he is doing well on his  current medication regimen. He uses Butrans patch, gabapentin, and baclofen as well as topical diclofenac sodium.   -he continues to have multiple joint pain from RA (this has been somewhat challenging with the rainy weather this summer).   -he also has axial low back pain that goes into the thighs but does not extend past the knees.   -he denies need for any medication changes.   -no further skin irritation from Butrans  -notes tolerating the gabapentin well.      Interval history May 28, 2024  -continues to have low back pain than can radiate into the thighs but never past the knees.   -multiple joint pain remains from his RA.   -denies any new areas of pain.   -thinks he may need to switch away from the Butrans patch as it is causing some skin irritation.   He has not tried Benadryl spray or Flonase nasal spray to the skin to prevent skin irritation. He will try this and if not helpful, with next script will switch to Belbuca film 300mcg Q 12 hours buccal film.    -wants to restart gabapentin, he used to be on 300mg TID.     Interval history February 26, 2024  -Lamont is doing pretty well on the Butrans patch.   -due for CSA and UDT today  -wearing Butrans patch, he filled the script late in January, picked it up on 1/27/2024. Called pharmacy, they have the script and are ordering it for arrival and fill tomorrow 2/27/2024.      Pain history collected at initial evaluation on 5/11/2022  He has had pain for about 10 years. He was diagnosed with Rheumatoid Arthritis. Lamont has pain in multiple joints. He also has chronic low back pain. The low back pain radiates down the posterolateral aspect of the legs to the level of the knee. His back pain was there when he was diagnosed with RA. He feels that he is doing well on the Butrans. Discussed switch to Belbuca if he ever wishes to increase his dosage, prefers to stay with use of the Butrans patch at the present time       At this point, the patient's participation with our  "multidisciplinary team includes:  The patient has been compliant with the program.  PT - not ordered  Health Psych - not ordered      Pain scores:  Pain intensity on average is 7 on a scale of 0-10.    Range is 5-9/10.   Pain right now is 5/10.   Pain is described as \"back pain is dull and his joints are dull/numbness/stiffness.\"    Pain is constant in nature        Current pain relevant medications:   -allopurinol 300mg every day  -baclofen 10mg BID PRN muscle spasms (uses at least once per day, helpful)  -Voltaren gel 1% PRN (helpful)  -naloxone nasal spray PRN opiate reversal   -Butrans 20mcg/hr  TD Q 7 days (helpful)--getting skin rash--better when uses Flonase nasal spray on skin first  -simponi 50mg/0.5ml inject every 28 days   -Arava 20mg every day  -meclizine 25mg Q 6 hours PRN dizziness (helpful)  -Tylenol 1000mg (uses at least once per day, not more than 3 times per day)  -CBD oil at night (helpful for sleep)        Other pertinent medications:  -Ambien 5mg at bedtime as needed PRN sleep  -clonazepam 0.5-1mg BID PRN anxiety (uses one full tab in the AM)  -Plavix 75mg every day  -colchincine 0.6mg take 2 tabs at first sign of flare  -pristiq 50mg QD     Previous medication treatments included:  OPIATES: butrans (helpful), hydrocodone (helpful for other issues), Codeine (unsure), oxycodone (unsure)  NSAIDS: cannot use, on Plavix   MUSCLE RELAXANTS: Baclofen (helpful), methocarbamol (not helpful)  ANTI-MIGRAINE MEDS: none  ANTI-DEPRESSANTS: none  SLEEP AIDS: none  ANTI-CONVULSANTS: gabapentin (helpful initially, he did not have increased pain when he tapered off of this medication)  TOPICALS: Voltaren gel (helpful)  ANXIOLYTICS: none  MEDICAL CANNABIS: none  Other meds: Tylenol (helpful)        Other treatments have included:  Lamont Daniels has been seen at a pain clinic in the past.  Previously managed by Dr. Lian Loo   PT: has tried, not helpful  Chiropractic care: no  Acupuncture: no  TENs Unit: no   "   Injections:  none      THE 4 A's OF OPIOID MAINTENANCE ANALGESIA    Analgesia: butrans is helpful    Activity: he does some stretching at home. Walks in the back yard    Adverse effects: none    Adherence to Rx protocol: yes      Side Effects: No  Patient is using the medication as prescribed: YES    Medications:  Current Outpatient Medications   Medication Sig Dispense Refill    Acetaminophen (TYLENOL PO)       allopurinol (ZYLOPRIM) 300 MG tablet TAKE 1 TABLET BY MOUTH EVERY DAY 90 tablet 3    amLODIPine (NORVASC) 5 MG tablet TAKE 1 TABLET BY MOUTH DAILY FOR BLOOD PRESSURE 90 tablet 3    aspirin EC 81 MG EC tablet Take 1 tablet (81 mg) by mouth daily (*) 30 tablet 1    atorvastatin (LIPITOR) 80 MG tablet TAKE 1 TABLET BY MOUTH EVERY DAY 90 tablet 3    bacitracin 500 UNIT/GM external ointment Apply topically 3 times daily 30 g 3    baclofen (LIORESAL) 10 MG tablet Take 1 tablet (10 mg) by mouth 2 times daily as needed for muscle spasms 3 month supply. 180 tablet 1    busPIRone HCl (BUSPAR) 30 MG tablet TAKE 1 TABLET BY MOUTH TWICE A  tablet 3    BUTRANS 20 MCG/HR WK patch Place 1 patch onto the skin every 7 days. LISANDRO, name brand. 28 day script for chronic pain. OK to fill 11/16/24 to start 11/18/24. 4 patch 0    Cholecalciferol (VITAMIN D) 2000 UNITS tablet TAKE ONE TABLET BY MOUTH ONCE DAILY 100 tablet 1    clobetasol (TEMOVATE) 0.05 % external cream Apply twice daily for 2 weeks, weekends only for 2 weeks, repeat cycle as needed for hands, not face or genitals 60 g 4    clobetasol (TEMOVATE) 0.05 % external solution Apply twice daily to scalp for up to 2 weeks for flares. 60 mL 0    clonazePAM (KLONOPIN) 1 MG tablet TAKE 0.5-1 TABLETS BY MOUTH 2 TIMES DAILY AS NEEDED FOR ANXIETY 90 tablet 0    clopidogrel (PLAVIX) 75 MG tablet TAKE 1 TABLET BY MOUTH EVERY DAY 90 tablet 3    colchicine (COLCRYS) 0.6 MG tablet TAKE 2 TABLETS BY MOUTH AT THE FIRST SIGN OF FLARE, TAKE 1 ADDITIONAL TABLET ONE HOUR LATER. 90  tablet 0    desvenlafaxine (PRISTIQ) 100 MG 24 hr tablet Take 1 tablet (100 mg) by mouth daily 90 tablet 3    diclofenac (VOLTAREN) 1 % topical gel Apply 2-4 g topically 4 times daily as needed for moderate pain 300 g 11    evolocumab (REPATHA SURECLICK) 140 MG/ML prefilled autoinjector Inject 1 mL (140 mg) Subcutaneous every 14 days 6 mL 3    ezetimibe (ZETIA) 10 MG tablet TAKE 1 TABLET (10 MG) BY MOUTH DAILY. 90 tablet 3    fluticasone (FLONASE) 50 MCG/ACT nasal spray Apply 1-2 sprays on the skin and let dry prior to applying Butrans patch. This will likely reduce the skin rash you are getting. 11.1 mL 11    gabapentin (NEURONTIN) 300 MG capsule Take 1 capsule (300 mg) by mouth 3 times daily 90 day supply 270 capsule 3    gemfibrozil (LOPID) 600 MG tablet TAKE 1 TABLET BY MOUTH 2 TIMES DAILY 180 tablet 3    golimumab (SIMPONI) 50 MG/0.5ML auto-injector pen Inject 0.5 mLs (50 mg) subcutaneously every 28 days . Hold for signs of infection, and seek medical attention. 0.5 mL 4    hydrocortisone (WESTCORT) 0.2 % external cream FOR GENITALS, APPLY TWICE DAILY FOR UP TO 2 WEEKS, TAKE 2 WEEKS OFF THEN REPEAT 60 g 3    hydrocortisone 2.5 % cream FOR FACE, APPLY TWICE DAILY FOR UP TO 2 WEEK FOR FLARES ON THE FACE, TAKE 2 WEEKS OFF 28.35 g 3    Incontinence Supply Disposable (DEPEND UNDERWEAR LARGE) MISC Use twice daily. 60 each 11    leflunomide (ARAVA) 20 MG tablet Take 1 tablet (20 mg) by mouth daily ; Labs required every 8-12 weeks. 90 tablet 2    meclizine (ANTIVERT) 25 MG tablet TAKE 1 TABLET BY MOUTH EVERY 6 HOURS AS NEEDED FOR DIZZINESS. 30 tablet 3    melatonin 3 MG tablet Take 1 tablet (3 mg) by mouth nightly as needed for sleep      naloxone (NARCAN) 4 MG/0.1ML nasal spray Spray 1 spray (4 mg) into one nostril alternating nostrils once as needed for opioid reversal every 2-3 minutes until assistance arrives 0.2 mL 0    nitroGLYcerin (NITROSTAT) 0.4 MG sublingual tablet PLACE 1 TAB UNDER THE TONGUE EVERY 5 MINUTES  FOR 3 DOSES FOR CHEST PAIN. IF SYMPTOMS PERSIST 5 MINUTES AFTER 1ST DOSE CALL 911. (Patient not taking: Reported on 5/28/2024) 25 tablet 0    order for DME Equipment being ordered: chair lift 1 each 0    order for DME Equipment being ordered: single end cane 1 Units 0    ORDER FOR DME 1.  CPAP pressure 11 cm/H20 with heated humidity.   2.  Provide mask to fit and CPAP supplies.   3.  Length of need lifetime.   4.  If needed please provide a chin strap           pantoprazole (PROTONIX) 40 MG EC tablet TAKE 1 TABLET BY MOUTH EVERY DAY 90 tablet 2    pimecrolimus (ELIDEL) 1 % external cream APPLY TWICE DAILY FOR FACE AND GENITALS 60 g 1    sildenafil (REVATIO) 20 MG tablet TAKE 2 - 5 TABLETS BY MOUTH AS NEEDED 30 MINUTES BEFORE SEXUAL ACTIVITY. MAX 100MG IN 24 HOURS. (Patient not taking: Reported on 5/21/2024) 450 tablet 3    sulfaSALAzine (AZULFIDINE) 500 MG tablet Take 3 tablets (1,500 mg) by mouth 2 times daily 540 tablet 2    tamsulosin (FLOMAX) 0.4 MG capsule TAKE 2 CAPSULES BY MOUTH EVERY  capsule 3    tenofovir (VIREAD) 300 MG tablet Take 1 tablet (300 mg) by mouth daily. 90 tablet 3    triamcinolone (KENALOG) 0.1 % external ointment APPLY TO AFFECTED AREA TOPICALLY 3 TIMES A DAY 80 g 2    triamcinolone (KENALOG) 0.1 % external ointment Apply to trunk and extremities twice daily for up to 2 weeks for flares 80 g 1    zolpidem (AMBIEN) 5 MG tablet Take 1 tablet (5 mg) by mouth nightly as needed for sleep 30 tablet 5       Medical History: any changes in medical history since they were last seen? No    Social History:   Home situation: , lives with adult children  Occupation/Schooling: he is on disability due to heart issues and RA  Tobacco use: none  Alcohol use: none  Drug use: none  History of chemical dependency treatment: none    Is patient a current smoker or tobacco user?  no  If yes, was cessation counseling offered?  no          Physical Exam:  Vital signs: There were no vitals taken for  this visit.    Behavioral observations:  Awake, alert. Cooperative.   Pulm: respirations easy and unlabored. Able to speak in full sentences without SOB or cough noted.                Diagnostic tests:  MRI of the Lumbar Spine without contrast     History: Lumbago.     Comparison: MRI thoracic spine 7/18/2014.     Technique: Sagittal T1-weighted, T2-weighted, STIR, and axial  T2-weighted spin echo and gradient echo images of the lumbar spine  were obtained without intravenous contrast.     Findings: There are 5 lumbar-type vertebrae assumed for the purposes  of this dictation. The tip of the conus medullaris is at L1.  The  lumbar vertebral column appears normally aligned. Straightening of the  normal lumbar lordosis. Mild loss of T2 signal in the L4-5 and L5-S1  intervertebral discs consistent with age-related changes. Type II  Modic degenerative changes in the opposing endplates at L5-S1.     On a level by level basis:     L1-2: Tiny left paracentral focal disc protrusion without spinal canal  or neural foraminal stenosis.     L2-3: No spinal canal or neural foraminal stenosis.     L3-4: Small focal posterior central disc protrusion with annular  fissuring and mild spinal canal narrowing. Also mild bilateral facet  hypertrophy. No neuroforaminal stenosis.     L4-5: Small focal posterior central disc protrusion with annular  fissuring. No spinal canal or neural foraminal stenosis.     L5-S1: Type II Modic degenerative changes of the opposing endplates.  Left greater than right facet hypertrophy. Small focal posterior  central disc protrusion with annular fissuring. No spinal canal  stenosis. Moderate left and mild right neural foraminal narrowing.     The visualized paraspinous tissues anteriorly are unremarkable.     IMPRESSION  Impression: Multilevel degenerative changes. Small disc protrusion at  L3-4 results in in mild spinal canal narrowing. Moderate left and mild  right neuroforaminal narrowing at L5-S1.      HELGA DAVID MD        FOOT BILATERAL THREE OR MORE VIEWS 11/4/2015 4:55 PM      HISTORY: Pain in unspecified joint.     COMPARISON: 8/21/2012.     IMPRESSION  IMPRESSION: Small traction spurs are seen off the Achilles tendon and  plantar fascial attachment sites bilaterally. Joint spaces are  preserved. Osseous structures are intact. Incidental note is made of  some surgical staples in the soft tissues of the medial distal right  lower extremity.  DANIS ANGLIN MD        RIGHT KNEE THREE VIEWS  5/4/2017 3:16 PM    HISTORY: Pain in right knee.  COMPARISON: None                                                   IMPRESSION: No acute fracture or dislocation. Mild osteoarthritis.  Small joint effusion.     MICHELL TAMAYO MD      MR LUMBAR SPINE WITHOUT CONTRAST  4/4/2023 9:12 AM (reviewed with patient on 6/26/2023)     INDICATION: Low back pain. Rule out spondylolysis. Low back pain with  standing/walking/extension. No known/automatically detected potential  contraindications to CT. Chronic bilateral low back pain with  bilateral sciatica. DDD (degenerative disc disease), lumbar.     TECHNIQUE: MRI images of the lumbar spine without contrast.  CONTRAST:  None.     COMPARISON: Lumbar spine MRI 8/6/2014.     FINDINGS: Nomenclature is based on five lumbar type vertebral bodies.  Normal vertebral body heights. Normal alignment.  No marrow edema. No  pars defect. The conus tip is identified at L1-L2. Unremarkable  paraspinal soft tissues.     T12-L1: Slight loss in disc height and signal. The small left central  protrusion. Minor facet arthropathy. Minor lateral recess and spinal  canal narrowing. No foraminal narrowing. Herniation is new/increased  versus 2014.         L1-L2: Slight loss of disc signal. Normal disc height. Tiny left  paracentral protrusion. Unremarkable facets. Minimal left lateral  recess narrowing. No central stenosis or foraminal narrowing. No  interval change.     L2-L3: Mild loss of disc signal.  Minimal loss of disc height. No  herniation. Unremarkable facets. No stenosis.     L3-L4: Moderate loss of disc signal. Minor loss of disc height. Minor  circumferential disc bulge with small central protrusion. Minor facet  arthropathy. Mild lateral recess and spinal canal narrowing,  unchanged. Patent foramina.     L4-L5: Moderate loss of disc signal. Mild loss of disc height. Central  annular tear and small protrusion is minimally increased in the  interval. Mild facet arthropathy. Minor lateral recess and foraminal  narrowing.     L5-S1: Moderate loss of disc signal. Mild loss of disc height.  Circumferential asymmetric left disc bulge. Mild to moderate left and  mild right facet arthropathy. Moderate left and mild right lateral  recess narrowing. Adequate central canal. Moderate to severe left and  minor right foraminal narrowing. No significant interval change.                                                                      IMPRESSION:  1.  Degenerative changes are relatively stable compared with 2014.  2.  At L5-S1 an asymmetric left disc bulge causes moderate left  lateral recess and moderate to severe foraminal narrowing. Correlate  for any left L5 symptoms. Mild right-sided narrowing. No significant  interval change.  3.  At L4-L5 a small protrusion is minimally increased with minor  lateral recess and foraminal narrowing similar to prior.  4.  At L3-L4 mild lateral recess and spinal canal narrowing is  unchanged.  5.  At T12-L1 a small left-sided herniation is increased and results  in minor lateral recess and spinal canal narrowing.     HORTENCIA DUENAS MD      Other testing (labs, diagnostics):  5/20/2024  Cr. 0.84  Est GFR >90  Liver enzymes WNL        Screening tools:      DIRE Score for ongoing opioid management is calculated as follows:  Diagnosis = 2  Intractability = 2  Risk: Psych = 2  Chem Hlth = 2  Reliability = 3  Social = 2  Efficacy = 2  Total DIRE Score = 15 (14 or higher predicts  good candidate for ongoing opioid management; 13 or lower predicts poor candidate for opioid management)        [place]  review:  Reviewed [place]  11/18/2024- no concerning fills.  Ameena LEMUS, RN CNP, FNP  [facility]  [place] location       Assessment:   Rheumatoid arthritis involving multiple joints, unspecified rheumatoid factor  Primary osteoarthritis of right knee  Multiple joint pain  Facet arthropathy (lumbar)  Lumbar facet joint pain  Spasm of back muscles  Muscle spasm  Myofascial pain  Chronic pain syndrome  Chronic continuous use of opioids    Encounter for long term use of opiate analgesic  UDT 2/26/2024  Signed CSA 2/26/2024  PMHx includes: Anxiety, coronary artery disease, congestive heart failure (2008), depressive disorder (2008), gout, hepatitis B more than 10 years ago, hypertension, hyperlipidemia, malrotation of intestine, severe obstructive sleep apnea, rheumatoid arthritis (2012) steatohepatitis (2015), history of tuberculosis at age 13, vitamin D deficiency  PSHx includes: Coronary artery bypass graft 6 vessel (2008), abdominal surgery (2014), colonoscopy (2013), combined repair ptosis with blepharoplasty bilateral (2018), head and neck surgery (2011), nasal/sinus polypectomy (2010), orthopedic surgery (2012), repair ptosis bilateral (2019), repair ptosis brow bilateral (2018), thoracic surgery for tuberculosis (1989), tonsillectomy and uvulopalatal pharyngoplasty (2010), coronary artery stent 3 months after CABG (2008).    Plan:  Physical Therapy: none  Continue stretching at home  Clinical Health Psychologist to address issues of relaxation, behavioral change, coping style, and other factors important to improvement: none  Diagnostic Studies: none  Medication Management:   Continue Butrans 20mcg/hr transdermal change every 7 days, he is to fill and start this today 11/18.  continue Baclofen to 10mg up to three times daily as needed for muscle spasms  Continue  Voltaren gel as needed  Continue gabapentin 300mg taking 1 capsule three times daily  continue flonase nasal spray apply 1-2 sprays on the skin where you will apply the Butrans patch, allow the spray to dry and then apply patch, this often will prevent the skin irritation and rash.   Further procedures recommended: none  Recommendations/follow-up for PCP:  See above  Release of information: none  Follow up: Video visit Monday December 9th at 11:30 AM to discuss possible switch to his pain meds.   Please call [phone] to make your follow-up appointment with me.        ASSESSMENT AND PLAN:   (M06.9) Rheumatoid arthritis involving multiple sites, unspecified whether rheumatoid factor present (H)  (primary encounter diagnosis)  Comment:   Plan: Adult Pain Clinic Follow-Up Order            (M17.11) Primary osteoarthritis of right knee  Comment:   Plan: Adult Pain Clinic Follow-Up Order            (M25.50) Multiple joint pain  Comment:   Plan: Adult Pain Clinic Follow-Up Order            (M47.816) Lumbar facet arthropathy  Comment:   Plan: Adult Pain Clinic Follow-Up Order            (M54.59) Lumbar facet joint pain  Comment:   Plan: Adult Pain Clinic Follow-Up Order            (M62.830) Spasm of back muscles  Comment:   Plan: baclofen (LIORESAL) 10 MG tablet, Adult Pain         Clinic Follow-Up Order            (M62.838) Muscle spasm  Comment:   Plan: baclofen (LIORESAL) 10 MG tablet, Adult Pain         Clinic Follow-Up Order            (M79.18) Myofascial pain  Comment:   Plan: baclofen (LIORESAL) 10 MG tablet, Adult Pain         Clinic Follow-Up Order            (G89.4) Chronic pain syndrome  Comment:   Plan: Adult Pain Clinic Follow-Up Order            (F11.90) Chronic, continuous use of opioids  Comment:   Plan: Adult Pain Clinic Follow-Up Order              BILLING TIME DOCUMENTATION:   TOTAL TIME includes:   Time spent preparing to see the patient: 3 minutes (reviewing records and tests)  Time spend face to  face with the patient: 17 minutes  Time spent ordering tests, medications, procedures and referrals: 0 minutes  Time spent Referring and communicating with other healthcare professionals: 0 minutes  Documenting clinical information in Epic: 6 minutes    The total TIME spent on this patient on the day of the appointment was 26 minutes.                     Ameena LEMUS, RN CNP, FNP  [facility]  [facility] [place]

## 2024-12-03 DIAGNOSIS — F45.8 ANXIETY HYPERVENTILATION: ICD-10-CM

## 2024-12-03 DIAGNOSIS — F41.9 ANXIETY HYPERVENTILATION: ICD-10-CM

## 2024-12-03 RX ORDER — CLONAZEPAM 1 MG/1
TABLET ORAL
Qty: 90 TABLET | Refills: 0 | Status: SHIPPED | OUTPATIENT
Start: 2024-12-03

## 2024-12-09 ENCOUNTER — VIRTUAL VISIT (OUTPATIENT)
Dept: PALLIATIVE MEDICINE | Facility: CLINIC | Age: 59
End: 2024-12-09
Payer: COMMERCIAL

## 2024-12-09 DIAGNOSIS — M47.819 FACET ARTHROPATHY: ICD-10-CM

## 2024-12-09 DIAGNOSIS — F11.90 CHRONIC, CONTINUOUS USE OF OPIOIDS: ICD-10-CM

## 2024-12-09 DIAGNOSIS — M25.50 MULTIPLE JOINT PAIN: ICD-10-CM

## 2024-12-09 DIAGNOSIS — M79.18 MYOFASCIAL PAIN: ICD-10-CM

## 2024-12-09 DIAGNOSIS — G89.4 CHRONIC PAIN SYNDROME: ICD-10-CM

## 2024-12-09 DIAGNOSIS — M62.838 MUSCLE SPASM: ICD-10-CM

## 2024-12-09 DIAGNOSIS — M54.59 LUMBAR FACET JOINT PAIN: ICD-10-CM

## 2024-12-09 DIAGNOSIS — M62.830 SPASM OF BACK MUSCLES: ICD-10-CM

## 2024-12-09 DIAGNOSIS — M06.9 RHEUMATOID ARTHRITIS INVOLVING MULTIPLE SITES, UNSPECIFIED WHETHER RHEUMATOID FACTOR PRESENT (H): Primary | ICD-10-CM

## 2024-12-09 DIAGNOSIS — M17.11 PRIMARY OSTEOARTHRITIS OF RIGHT KNEE: ICD-10-CM

## 2024-12-09 PROCEDURE — 99442 PR PHYSICIAN TELEPHONE EVALUATION 11-20 MIN: CPT | Mod: 93 | Performed by: NURSE PRACTITIONER

## 2024-12-09 RX ORDER — BUPRENORPHINE 20 UG/H
1 PATCH, EXTENDED RELEASE TRANSDERMAL
Qty: 4 PATCH | Refills: 0 | Status: SHIPPED | OUTPATIENT
Start: 2024-12-09

## 2024-12-09 ASSESSMENT — PAIN SCALES - GENERAL: PAINLEVEL_OUTOF10: MODERATE PAIN (5)

## 2024-12-09 NOTE — PATIENT INSTRUCTIONS
Plan:  Physical Therapy: none  Continue stretching at home  Clinical Health Psychologist to address issues of relaxation, behavioral change, coping style, and other factors important to improvement: none  Diagnostic Studies: none  Medication Management:   Continue Butrans 20mcg/hr transdermal change every 7 days, he is to fill 12/13 and start 12/16. Sent to [facility]  continue Baclofen to 10mg up to three times daily as needed for muscle spasms  Continue Voltaren gel as needed  Continue gabapentin 300mg taking 1 capsule three times daily  continue flonase nasal spray apply 1-2 sprays on the skin where you will apply the Butrans patch, allow the spray to dry and then apply patch, this often will prevent the skin irritation and rash.   Further procedures recommended: none  Recommendations/follow-up for PCP:  See above  Release of information: none  Follow up: 3 months with in-person or video visit.   Please call [phone] to make your follow-up appointment with me.     ----------------------------------------------------------------  Clinic Number:  [phone]   Call with any questions about your care and for scheduling assistance.   Calls are returned Monday through Friday between 8 AM and 4:30 PM. We usually get back to you within 2 business days depending on the issue/request.    If we are prescribing your medications:  For opioid medication refills, call the clinic or send a [employer] message 7 days in advance.  Please include:  Name of requested medication  Name of the pharmacy.  For non-opioid medications, call your pharmacy directly to request a refill. Please allow 3-4 days to be processed.   Per [place] State Law:  All controlled substance prescriptions must be filled within 30 days of being written.    For those controlled substances allowing refills, pickup must occur within 30 days of last fill.      We believe regular attendance is key to your success in our program!    Any time you are  unable to keep your appointment we ask that you call us at least 24 hours in advance to cancel.This will allow us to offer the appointment time to another patient.   Multiple missed appointments may lead to dismissal from the clinic.

## 2024-12-09 NOTE — PROGRESS NOTES
Lamont is a 59 year old who is being evaluated via a billable video visit.    How would you like to obtain your AVS? [employer]  If the video visit is dropped, the invitation should be resent by: [facility] waiting room.   Will anyone else be joining your video visit? No  Is Pt currently in [place]? Yes    Amberly Chawla MA      NOTE:  If Pt is not in [place], Appointment needs to be canceled and rescheduled.      Video-Visit Details    Type of service:  Video Visit   Video start: unable to connect  Video end: see above    Originating Location (pt. Location): patient was driving, not able to do video but able to connect with phone    Distant Location (provider location):  On-site  Platform used for Video Visit: Unable to complete video visit--unable to connect using video, changed to telephone call. See details below.     Phone start 1232  Phone end: 1244  Phone length: 12 minutes        [facility]    12/9/2024    Chief complaint:   -low back pain, extends into the thighs, bilaterally. Not past the knees.   -multiple joint pain (RA)           Interval history:  Lamont Daniels is a 59 year old male is known to me for:  Lumbar facet arthropathy  Rheumatoid arthritis involving multiple joints, unspecified rheumatoid factor  Primary osteoarthritis of right knee  Chronic continuous use of opioids  Chronic pain syndrome  PMHx includes: Anxiety, coronary artery disease, congestive heart failure (2008), depressive disorder (2008), gout, hepatitis B more than 10 years ago, hypertension, hyperlipidemia, malrotation of intestine, severe obstructive sleep apnea, rheumatoid arthritis (2012) steatohepatitis (2015), history of tuberculosis at age 13, vitamin D deficiency  PSHx includes: Coronary artery bypass graft 6 vessel (2008), abdominal surgery (2014), colonoscopy (2013), combined repair ptosis with blepharoplasty bilateral (2018), head and neck surgery (2011), nasal/sinus polypectomy (2010), orthopedic surgery  (2012), repair ptosis bilateral (2019), repair ptosis brow bilateral (2018), thoracic surgery for tuberculosis (1989), tonsillectomy and uvulopalatal pharyngoplasty (2010), coronary artery stent 3 months after CABG (2008).      Recommendations/plan at the last visit on 11/18/2024 included:  Physical Therapy: none  Continue stretching at home  Clinical Health Psychologist to address issues of relaxation, behavioral change, coping style, and other factors important to improvement: none  Diagnostic Studies: none  Medication Management:   Continue Butrans 20mcg/hr transdermal change every 7 days, he is to fill and start this today 11/18.  continue Baclofen to 10mg up to three times daily as needed for muscle spasms  Continue Voltaren gel as needed  Continue gabapentin 300mg taking 1 capsule three times daily  continue flonase nasal spray apply 1-2 sprays on the skin where you will apply the Butrans patch, allow the spray to dry and then apply patch, this often will prevent the skin irritation and rash.   Further procedures recommended: none  Recommendations/follow-up for PCP:  See above  Release of information: none  Follow up: Video visit Monday December 9th at 11:30 AM to discuss possible switch to his pain meds.   Please call [phone] to make your follow-up appointment with me.        Since his last visit, Lamont Daniels reports:    Interval history December 9, 2024  -continued low back pain, axial. No radiation to the legs.   -has continued multiple joint pain from RA  -frustrated that he is having issues getting the Butrans from [facility] in a timely manner. Butrans does manage his pain well. Discussed transitioning his Butrans to a [place] pharmacy, patient prefers to use [facility], they can mail his meds.       Interval history November 18, 2024  -he is to  the Butrans today, this is the day he changes it.   Lamont reports the following pain issues:  -last 2 days has had nausea/vomiting and  "diarrhea. Denies dizziness.  -low back pain, extends into the thighs, bilaterally. Not past the knees.   -multiple joint pain (RA)   He denies any new areas of pain  -he would like to consider a different option for pain management for next month. Set him up for follow up visit to discuss since Butrans is ready for him at pharmacy and changes may require PA approval.     Interval history August 20, 2024  -Lamont reports he is doing well on his current medication regimen. He uses Butrans patch, gabapentin, and baclofen as well as topical diclofenac sodium.   -he continues to have multiple joint pain from RA (this has been somewhat challenging with the rainy weather this summer).   -he also has axial low back pain that goes into the thighs but does not extend past the knees.   -he denies need for any medication changes.   -no further skin irritation from Butrans  -notes tolerating the gabapentin well.      Pain history collected at initial evaluation on 5/11/2022  He has had pain for about 10 years. He was diagnosed with Rheumatoid Arthritis. Lamont has pain in multiple joints. He also has chronic low back pain. The low back pain radiates down the posterolateral aspect of the legs to the level of the knee. His back pain was there when he was diagnosed with RA. He feels that he is doing well on the Butrans. Discussed switch to [place] if he ever wishes to increase his dosage, prefers to stay with use of the Butrans patch at the present time       At this point, the patient's participation with our multidisciplinary team includes:  The patient has been compliant with the program.  PT - not ordered  Health Psych - not ordered      Pain scores:  Pain intensity on average is 5 on a scale of 0-10.    Range is 5-8/10.   Pain right now is 5/10.   Pain is described as \"back pain is dull and his joints are dull/numbness/stiffness.\"    Pain is constant in nature        Current pain relevant medications:   -allopurinol 300mg every " day  -baclofen 10mg BID PRN muscle spasms (uses at least once per day, helpful)  -Voltaren gel 1% PRN (helpful)  -naloxone nasal spray PRN opiate reversal   -Butrans 20mcg/hr  TD Q 7 days (helpful)--getting skin rash--better when uses Flonase nasal spray on skin first  -simponi 50mg/0.5ml inject every 28 days   -Arava 20mg every day  -meclizine 25mg Q 6 hours PRN dizziness (helpful)  -Tylenol 1000mg (uses at least once per day, not more than 3 times per day)  -CBD oil at night (helpful for sleep)        Other pertinent medications:  -Ambien 5mg at bedtime as needed PRN sleep  -clonazepam 0.5-1mg BID PRN anxiety (uses one full tab in the AM)  -Plavix 75mg every day  -colchincine 0.6mg take 2 tabs at first sign of flare  -pristiq 50mg QD     Previous medication treatments included:  OPIATES: butrans (helpful), hydrocodone (helpful for other issues), Codeine (unsure), oxycodone (unsure)  NSAIDS: cannot use, on Plavix   MUSCLE RELAXANTS: Baclofen (helpful), methocarbamol (not helpful)  ANTI-MIGRAINE MEDS: none  ANTI-DEPRESSANTS: none  SLEEP AIDS: none  ANTI-CONVULSANTS: gabapentin (helpful initially, he did not have increased pain when he tapered off of this medication)  TOPICALS: Voltaren gel (helpful)  ANXIOLYTICS: none  MEDICAL CANNABIS: none  Other meds: Tylenol (helpful)        Other treatments have included:  Lamont Daniels has been seen at a pain clinic in the past.  Previously managed by Dr. Lian Loo   PT: has tried, not helpful  Chiropractic care: no  Acupuncture: no  TENs Unit: no     Injections:  none      THE 4 A's OF OPIOID MAINTENANCE ANALGESIA    Analgesia: butrans is helpful    Activity: he does some stretching at home. Walks in the back yard    Adverse effects: none    Adherence to Rx protocol: yes      Side Effects: No  Patient is using the medication as prescribed: YES    Medications:  Current Outpatient Medications   Medication Sig Dispense Refill    Acetaminophen (TYLENOL PO)       allopurinol  (ZYLOPRIM) 300 MG tablet Take 1 tablet (300 mg) by mouth daily. 90 tablet 3    amLODIPine (NORVASC) 5 MG tablet TAKE 1 TABLET BY MOUTH DAILY FOR BLOOD PRESSURE 90 tablet 3    aspirin EC 81 MG EC tablet Take 1 tablet (81 mg) by mouth daily (*) 30 tablet 1    atorvastatin (LIPITOR) 80 MG tablet TAKE 1 TABLET BY MOUTH EVERY DAY 90 tablet 3    baclofen (LIORESAL) 10 MG tablet Take 1 tablet (10 mg) by mouth 2 times daily as needed for muscle spasms. 3 month supply. 180 tablet 1    busPIRone HCl (BUSPAR) 30 MG tablet TAKE 1 TABLET BY MOUTH TWICE A  tablet 3    BUTRANS 20 MCG/HR WK patch Place 1 patch onto the skin every 7 days. LISANDRO, name brand. 28 day script for chronic pain. OK to fill 11/16/24 to start 11/18/24. 4 patch 0    Cholecalciferol (VITAMIN D) 2000 UNITS tablet TAKE ONE TABLET BY MOUTH ONCE DAILY 100 tablet 1    clobetasol (TEMOVATE) 0.05 % external cream Apply twice daily for 2 weeks, weekends only for 2 weeks, repeat cycle as needed for hands, not face or genitals 60 g 4    clobetasol (TEMOVATE) 0.05 % external solution Apply twice daily to scalp for up to 2 weeks for flares. 60 mL 0    clonazePAM (KLONOPIN) 1 MG tablet TAKE 0.5-1 TABLETS BY MOUTH 2 TIMES DAILY AS NEEDED FOR ANXIETY 90 tablet 0    clopidogrel (PLAVIX) 75 MG tablet TAKE 1 TABLET BY MOUTH EVERY DAY 90 tablet 3    colchicine (COLCRYS) 0.6 MG tablet TAKE 2 TABLETS BY MOUTH AT THE FIRST SIGN OF FLARE, TAKE 1 ADDITIONAL TABLET ONE HOUR LATER. 90 tablet 0    desvenlafaxine (PRISTIQ) 100 MG 24 hr tablet Take 1 tablet (100 mg) by mouth daily 90 tablet 3    diclofenac (VOLTAREN) 1 % topical gel Apply 2-4 g topically 4 times daily as needed for moderate pain 300 g 11    evolocumab (REPATHA SURECLICK) 140 MG/ML prefilled autoinjector Inject 1 mL (140 mg) Subcutaneous every 14 days 6 mL 3    ezetimibe (ZETIA) 10 MG tablet TAKE 1 TABLET (10 MG) BY MOUTH DAILY. 90 tablet 3    fluticasone (FLONASE) 50 MCG/ACT nasal spray Apply 1-2 sprays on the skin  and let dry prior to applying Butrans patch. This will likely reduce the skin rash you are getting. 11.1 mL 11    gabapentin (NEURONTIN) 300 MG capsule Take 1 capsule (300 mg) by mouth 3 times daily 90 day supply 270 capsule 3    gemfibrozil (LOPID) 600 MG tablet TAKE 1 TABLET BY MOUTH 2 TIMES DAILY 180 tablet 3    golimumab (SIMPONI) 50 MG/0.5ML auto-injector pen Inject 0.5 mLs (50 mg) subcutaneously every 28 days. . Hold for signs of infection, and seek medical attention. 0.5 mL 4    hydrocortisone (WESTCORT) 0.2 % external cream FOR GENITALS, APPLY TWICE DAILY FOR UP TO 2 WEEKS, TAKE 2 WEEKS OFF THEN REPEAT 60 g 3    hydrocortisone 2.5 % cream FOR FACE, APPLY TWICE DAILY FOR UP TO 2 WEEK FOR FLARES ON THE FACE, TAKE 2 WEEKS OFF 28.35 g 3    Incontinence Supply Disposable (DEPEND UNDERWEAR LARGE) MISC Use twice daily. 60 each 11    leflunomide (ARAVA) 20 MG tablet Take 1 tablet (20 mg) by mouth daily. ; Labs required every 8-12 weeks. 90 tablet 2    meclizine (ANTIVERT) 25 MG tablet TAKE 1 TABLET BY MOUTH EVERY 6 HOURS AS NEEDED FOR DIZZINESS. 30 tablet 3    melatonin 3 MG tablet Take 1 tablet (3 mg) by mouth nightly as needed for sleep      naloxone (NARCAN) 4 MG/0.1ML nasal spray Spray 1 spray (4 mg) into one nostril alternating nostrils once as needed for opioid reversal every 2-3 minutes until assistance arrives 0.2 mL 0    nitroGLYcerin (NITROSTAT) 0.4 MG sublingual tablet PLACE 1 TAB UNDER THE TONGUE EVERY 5 MINUTES FOR 3 DOSES FOR CHEST PAIN. IF SYMPTOMS PERSIST 5 MINUTES AFTER 1ST DOSE CALL 911. 25 tablet 0    order for DME Equipment being ordered: chair lift 1 each 0    order for DME Equipment being ordered: single end cane 1 Units 0    ORDER FOR DME 1.  CPAP pressure 11 cm/H20 with heated humidity.   2.  Provide mask to fit and CPAP supplies.   3.  Length of need lifetime.   4.  If needed please provide a chin strap           pantoprazole (PROTONIX) 40 MG EC tablet TAKE 1 TABLET BY MOUTH EVERY DAY 90  tablet 2    pimecrolimus (ELIDEL) 1 % external cream APPLY TWICE DAILY FOR FACE AND GENITALS 60 g 1    sildenafil (REVATIO) 20 MG tablet TAKE 2 - 5 TABLETS BY MOUTH AS NEEDED 30 MINUTES BEFORE SEXUAL ACTIVITY. MAX 100MG IN 24 HOURS. 450 tablet 3    sulfaSALAzine (AZULFIDINE) 500 MG tablet Take 3 tablets (1,500 mg) by mouth 2 times daily. 540 tablet 2    tamsulosin (FLOMAX) 0.4 MG capsule TAKE 2 CAPSULES BY MOUTH EVERY  capsule 3    tenofovir (VIREAD) 300 MG tablet Take 1 tablet (300 mg) by mouth daily. 90 tablet 3    triamcinolone (KENALOG) 0.1 % external ointment APPLY TO AFFECTED AREA TOPICALLY 3 TIMES A DAY 80 g 2    triamcinolone (KENALOG) 0.1 % external ointment Apply to trunk and extremities twice daily for up to 2 weeks for flares 80 g 1    zolpidem (AMBIEN) 5 MG tablet Take 1 tablet (5 mg) by mouth nightly as needed for sleep 30 tablet 5       Medical History: any changes in medical history since they were last seen? No    Social History:   Home situation: , lives with adult children  Occupation/Schooling: he is on disability due to heart issues and RA  Tobacco use: none  Alcohol use: none  Drug use: none  History of chemical dependency treatment: none    Is patient a current smoker or tobacco user?  no  If yes, was cessation counseling offered?  no          Physical Exam:  Vital signs: There were no vitals taken for this visit.    Behavioral observations:  Awake, alert. Cooperative.   Pulm: respirations easy and unlabored. Able to speak in full sentences without SOB or cough noted.                Diagnostic tests:  MRI of the Lumbar Spine without contrast     History: Lumbago.     Comparison: MRI thoracic spine 7/18/2014.     Technique: Sagittal T1-weighted, T2-weighted, STIR, and axial  T2-weighted spin echo and gradient echo images of the lumbar spine  were obtained without intravenous contrast.     Findings: There are 5 lumbar-type vertebrae assumed for the purposes  of this dictation. The  tip of the conus medullaris is at L1.  The  lumbar vertebral column appears normally aligned. Straightening of the  normal lumbar lordosis. Mild loss of T2 signal in the L4-5 and L5-S1  intervertebral discs consistent with age-related changes. Type II  Modic degenerative changes in the opposing endplates at L5-S1.     On a level by level basis:     L1-2: Tiny left paracentral focal disc protrusion without spinal canal  or neural foraminal stenosis.     L2-3: No spinal canal or neural foraminal stenosis.     L3-4: Small focal posterior central disc protrusion with annular  fissuring and mild spinal canal narrowing. Also mild bilateral facet  hypertrophy. No neuroforaminal stenosis.     L4-5: Small focal posterior central disc protrusion with annular  fissuring. No spinal canal or neural foraminal stenosis.     L5-S1: Type II Modic degenerative changes of the opposing endplates.  Left greater than right facet hypertrophy. Small focal posterior  central disc protrusion with annular fissuring. No spinal canal  stenosis. Moderate left and mild right neural foraminal narrowing.     The visualized paraspinous tissues anteriorly are unremarkable.     IMPRESSION  Impression: Multilevel degenerative changes. Small disc protrusion at  L3-4 results in in mild spinal canal narrowing. Moderate left and mild  right neuroforaminal narrowing at L5-S1.     HELGA DAVID MD        FOOT BILATERAL THREE OR MORE VIEWS 11/4/2015 4:55 PM      HISTORY: Pain in unspecified joint.     COMPARISON: 8/21/2012.     IMPRESSION  IMPRESSION: Small traction spurs are seen off the Achilles tendon and  plantar fascial attachment sites bilaterally. Joint spaces are  preserved. Osseous structures are intact. Incidental note is made of  some surgical staples in the soft tissues of the medial distal right  lower extremity.  DANIS ANGLIN MD        RIGHT KNEE THREE VIEWS  5/4/2017 3:16 PM    HISTORY: Pain in right knee.  COMPARISON: None                                                    IMPRESSION: No acute fracture or dislocation. Mild osteoarthritis.  Small joint effusion.     MICHELL TAMAYO MD      MR LUMBAR SPINE WITHOUT CONTRAST  4/4/2023 9:12 AM (reviewed with patient on 6/26/2023)     INDICATION: Low back pain. Rule out spondylolysis. Low back pain with  standing/walking/extension. No known/automatically detected potential  contraindications to CT. Chronic bilateral low back pain with  bilateral sciatica. DDD (degenerative disc disease), lumbar.     TECHNIQUE: MRI images of the lumbar spine without contrast.  CONTRAST:  None.     COMPARISON: Lumbar spine MRI 8/6/2014.     FINDINGS: Nomenclature is based on five lumbar type vertebral bodies.  Normal vertebral body heights. Normal alignment.  No marrow edema. No  pars defect. The conus tip is identified at L1-L2. Unremarkable  paraspinal soft tissues.     T12-L1: Slight loss in disc height and signal. The small left central  protrusion. Minor facet arthropathy. Minor lateral recess and spinal  canal narrowing. No foraminal narrowing. Herniation is new/increased  versus 2014.         L1-L2: Slight loss of disc signal. Normal disc height. Tiny left  paracentral protrusion. Unremarkable facets. Minimal left lateral  recess narrowing. No central stenosis or foraminal narrowing. No  interval change.     L2-L3: Mild loss of disc signal. Minimal loss of disc height. No  herniation. Unremarkable facets. No stenosis.     L3-L4: Moderate loss of disc signal. Minor loss of disc height. Minor  circumferential disc bulge with small central protrusion. Minor facet  arthropathy. Mild lateral recess and spinal canal narrowing,  unchanged. Patent foramina.     L4-L5: Moderate loss of disc signal. Mild loss of disc height. Central  annular tear and small protrusion is minimally increased in the  interval. Mild facet arthropathy. Minor lateral recess and foraminal  narrowing.     L5-S1: Moderate loss of disc signal. Mild loss of  disc height.  Circumferential asymmetric left disc bulge. Mild to moderate left and  mild right facet arthropathy. Moderate left and mild right lateral  recess narrowing. Adequate central canal. Moderate to severe left and  minor right foraminal narrowing. No significant interval change.                                                                      IMPRESSION:  1.  Degenerative changes are relatively stable compared with 2014.  2.  At L5-S1 an asymmetric left disc bulge causes moderate left  lateral recess and moderate to severe foraminal narrowing. Correlate  for any left L5 symptoms. Mild right-sided narrowing. No significant  interval change.  3.  At L4-L5 a small protrusion is minimally increased with minor  lateral recess and foraminal narrowing similar to prior.  4.  At L3-L4 mild lateral recess and spinal canal narrowing is  unchanged.  5.  At T12-L1 a small left-sided herniation is increased and results  in minor lateral recess and spinal canal narrowing.     HORTENCIA DUENAS MD      Other testing (labs, diagnostics):  5/20/2024  Cr. 0.84  Est GFR >90  Liver enzymes WNL        Screening tools:      DIRE Score for ongoing opioid management is calculated as follows:  Diagnosis = 2  Intractability = 2  Risk: Psych = 2  Chem Hlth = 2  Reliability = 3  Social = 2  Efficacy = 2  Total DIRE Score = 15 (14 or higher predicts good candidate for ongoing opioid management; 13 or lower predicts poor candidate for opioid management)        [place]  review:  Reviewed [place]  12/9/2024- no concerning fills.  Ameena LEMUS, RN CNP, FNP  [facility]  [place] location       Assessment:   Rheumatoid arthritis involving multiple joints, unspecified rheumatoid factor  Primary osteoarthritis of right knee  Multiple joint pain  Facet arthropathy (lumbar)  Lumbar facet joint pain  Spasm of back muscles  Muscle spasm  Myofascial pain  Chronic pain syndrome  Chronic continuous use of  opioids    Encounter for long term use of opiate analgesic  UDT 2/26/2024  Signed CSA 2/26/2024  PMHx includes: Anxiety, coronary artery disease, congestive heart failure (2008), depressive disorder (2008), gout, hepatitis B more than 10 years ago, hypertension, hyperlipidemia, malrotation of intestine, severe obstructive sleep apnea, rheumatoid arthritis (2012) steatohepatitis (2015), history of tuberculosis at age 13, vitamin D deficiency  PSHx includes: Coronary artery bypass graft 6 vessel (2008), abdominal surgery (2014), colonoscopy (2013), combined repair ptosis with blepharoplasty bilateral (2018), head and neck surgery (2011), nasal/sinus polypectomy (2010), orthopedic surgery (2012), repair ptosis bilateral (2019), repair ptosis brow bilateral (2018), thoracic surgery for tuberculosis (1989), tonsillectomy and uvulopalatal pharyngoplasty (2010), coronary artery stent 3 months after CABG (2008).    Plan:  Physical Therapy: none  Continue stretching at home  Clinical Health Psychologist to address issues of relaxation, behavioral change, coping style, and other factors important to improvement: none  Diagnostic Studies: none  Medication Management:   Continue Butrans 20mcg/hr transdermal change every 7 days, he is to fill 12/13 and start 12/16. Sent to [facility]  continue Baclofen to 10mg up to three times daily as needed for muscle spasms  Continue Voltaren gel as needed  Continue gabapentin 300mg taking 1 capsule three times daily  continue flonase nasal spray apply 1-2 sprays on the skin where you will apply the Butrans patch, allow the spray to dry and then apply patch, this often will prevent the skin irritation and rash.   Further procedures recommended: none  Recommendations/follow-up for PCP:  See above  Release of information: none  Follow up: 3 months with in-person or video visit.   Please call [phone] to make your follow-up appointment with me.     ASSESSMENT AND  PLAN:  (M06.9) Rheumatoid arthritis involving multiple sites, unspecified whether rheumatoid factor present (H)  (primary encounter diagnosis)  Comment:   Plan: BUTRANS 20 MCG/HR WK patch, [facility]         Follow-Up Order            (M17.11) Primary osteoarthritis of right knee  Comment:   Plan: BUTRANS 20 MCG/HR WK patch, [facility]         Follow-Up Order            (M25.50) Multiple joint pain  Comment:   Plan: [facility] Clinic Follow-Up Order            (M47.819) Facet arthropathy  Comment:   Plan: BUTRANS 20 MCG/HR WK patch, [facility]         Follow-Up Order            (M54.59) Lumbar facet joint pain  Comment:   Plan: Adult [facility] Clinic Follow-Up Order            (M62.830) Spasm of back muscles  Comment:   Plan: Adult [facility] Clinic Follow-Up Order            (M62.838) Muscle spasm  Comment:   Plan: [facility] Clinic Follow-Up Order            (M79.18) Myofascial pain  Comment:   Plan: Adult [facility] Clinic Follow-Up Order            (G89.4) Chronic pain syndrome  Comment:   Plan: BUTRANS 20 MCG/HR WK patch, [facility]         Follow-Up Order            (F11.90) Chronic, continuous use of opioids  Comment:   Plan: BUTRANS 20 MCG/HR WK patch, [facility]         Follow-Up Order              BILLING TIME DOCUMENTATION:   TOTAL TIME includes:   Time spent preparing to see the patient: 4 minutes (reviewing records and tests)  Time spend face to face with the patient: 12 minutes  Time spent ordering tests, medications, procedures and referrals: 0 minutes  Time spent Referring and communicating with other healthcare professionals: 0 minutes  Documenting clinical information in Epic: 5 minutes    The total TIME spent on this patient on the day of the appointment was 21 minutes.               Ameena LEMUS, RN CNP, FNP  [facility]  [facility]

## 2024-12-18 ENCOUNTER — TELEPHONE (OUTPATIENT)
Dept: RHEUMATOLOGY | Facility: CLINIC | Age: 59
End: 2024-12-18
Payer: COMMERCIAL

## 2024-12-18 NOTE — TELEPHONE ENCOUNTER
PA Initiation    Medication: SIMPONI 50 MG/0.5ML SC SOAJ  Insurance Company: [employer] - Phone [phone] Fax [phone]  Pharmacy Filling the Rx: [facility] - [place] [address]  Filling Pharmacy Phone: [phone]  Filling Pharmacy Fax: [phone]  Start Date: 12/18/2024  MEREDITH (Muñoz: [id number])        Thank You,     Perla Diez CPhT  [facility]  [email or web address]  [email or web address]  Phone: [phone]  Fax: [phone]

## 2024-12-18 NOTE — TELEPHONE ENCOUNTER
Letter received from insurance - formulary issue:        Patient has had trial and failure of Humira therefore it is unreasonable to make patient go back on medication that he has trail and failure to.    Thank You,     Perla Diez [facility]  Specialty Pharmacy Clinic [facility] Specialty  [email or web address]  [email or web address]  Phone: [phone]  Fax: [phone]

## 2024-12-23 NOTE — TELEPHONE ENCOUNTER
Per insurance, may request PA for Simponi for continuation of therapy on or after 01/01/2025 either by ePA or by calling [facility] at [phone] and completing a formulary exception request.     Thank You,      Perla Diez [facility]  Specialty Pharmacy Clinic [facility] Specialty  [email or web address]  [email or web address]  Phone: [phone]  Fax: [phone]

## 2024-12-26 DIAGNOSIS — R42 VERTIGO: ICD-10-CM

## 2024-12-26 RX ORDER — MECLIZINE HYDROCHLORIDE 25 MG/1
TABLET ORAL
Qty: 30 TABLET | Refills: 3 | Status: SHIPPED | OUTPATIENT
Start: 2024-12-26

## 2025-01-07 ENCOUNTER — MYC MEDICAL ADVICE (OUTPATIENT)
Dept: PALLIATIVE MEDICINE | Facility: CLINIC | Age: 60
End: 2025-01-07
Payer: COMMERCIAL

## 2025-01-07 DIAGNOSIS — G89.4 CHRONIC PAIN SYNDROME: ICD-10-CM

## 2025-01-07 DIAGNOSIS — F11.90 CHRONIC, CONTINUOUS USE OF OPIOIDS: ICD-10-CM

## 2025-01-07 DIAGNOSIS — M47.819 FACET ARTHROPATHY: ICD-10-CM

## 2025-01-07 DIAGNOSIS — M06.9 RHEUMATOID ARTHRITIS INVOLVING MULTIPLE SITES, UNSPECIFIED WHETHER RHEUMATOID FACTOR PRESENT (H): ICD-10-CM

## 2025-01-07 DIAGNOSIS — M17.11 PRIMARY OSTEOARTHRITIS OF RIGHT KNEE: ICD-10-CM

## 2025-01-07 NOTE — TELEPHONE ENCOUNTER
Please process a refill of Butrans 20 mcg patch  to     [facility] - [place], [place] - [address]  [address] [facility] [address]  Phone: [phone] Fax: [phone]

## 2025-01-08 RX ORDER — BUPRENORPHINE 20 UG/H
1 PATCH, EXTENDED RELEASE TRANSDERMAL
Qty: 4 PATCH | Refills: 0 | Status: SHIPPED | OUTPATIENT
Start: 2025-01-08

## 2025-01-08 NOTE — TELEPHONE ENCOUNTER
Signed Prescriptions:                        Disp   Refills    BUTRANS 20 MCG/HR WK patch                 4 patch0        Sig: Place 1 patch onto the skin every 7 days. LISANDRO, name           brand. OK to fill 01/11/25 to start 01/13/25.  28           day script for chronic pain  Authorizing Provider: AMEENA PAGE        Reviewed [place]  January 8, 2025- no concerning fills.    Ameena LEMUS, RN CNP, FNP  [facility]  [facility]

## 2025-01-08 NOTE — TELEPHONE ENCOUNTER
Medication refill information reviewed.     Due date for BUTRANS 20 MCG/HR WK patch  is 01/13/25     Prescriptions prepped for review.     Will route to provider.

## 2025-01-08 NOTE — TELEPHONE ENCOUNTER
Received request for a refill(s) of BUTRANS 20 MCG/HR WK patch      Last dispensed from pharmacy on 12/12/24    Patient's last office/virtual visit by prescribing provider on 12/09/24  Next office/virtual appointment scheduled for none    Last urine drug screen date 02/26/24  Current opioid agreement on file (completed within the last year) Yes Date of opioid agreement: 02/26/24    E-prescribe to pharmacy-[facility]/Specialty Pharmacy - [place], [place] - [facility] [address]     Will route to nursing [place] for review and preparation of prescription(s).

## 2025-01-15 NOTE — TELEPHONE ENCOUNTER
Prior Authorization Not Needed per Insurance    Medication: SIMPONI 50 MG/0.5ML SC SOAJ  Insurance Company: [facility] - Phone [phone] Fax [phone]  Expected CoPay: $    Pharmacy Filling the Rx: [facility] - [place], [place] - [address]   Pharmacy Notified:   Patient Notified:           Thank You,     Perla Diez [facility]  Specialty Pharmacy Clinic [facility] Specialty  [email or web address]  [email or web address]  Phone: [phone]  Fax: [phone]     none

## 2025-01-23 ENCOUNTER — OFFICE VISIT (OUTPATIENT)
Dept: FAMILY MEDICINE | Facility: CLINIC | Age: 60
End: 2025-01-23
Attending: FAMILY MEDICINE
Payer: COMMERCIAL

## 2025-01-23 VITALS
HEART RATE: 66 BPM | WEIGHT: 145.4 LBS | RESPIRATION RATE: 16 BRPM | DIASTOLIC BLOOD PRESSURE: 81 MMHG | SYSTOLIC BLOOD PRESSURE: 131 MMHG | OXYGEN SATURATION: 100 % | BODY MASS INDEX: 26.76 KG/M2 | HEIGHT: 62 IN | TEMPERATURE: 97.9 F

## 2025-01-23 DIAGNOSIS — Z23 ENCOUNTER FOR IMMUNIZATION: ICD-10-CM

## 2025-01-23 DIAGNOSIS — M10.9 GOUT WITHOUT TOPHUS: ICD-10-CM

## 2025-01-23 DIAGNOSIS — E78.5 HYPERLIPIDEMIA LDL GOAL <70: ICD-10-CM

## 2025-01-23 DIAGNOSIS — R73.09 ELEVATED GLUCOSE: ICD-10-CM

## 2025-01-23 DIAGNOSIS — I10 ESSENTIAL HYPERTENSION WITH GOAL BLOOD PRESSURE LESS THAN 140/90: Primary | ICD-10-CM

## 2025-01-23 ASSESSMENT — ANXIETY QUESTIONNAIRES
7. FEELING AFRAID AS IF SOMETHING AWFUL MIGHT HAPPEN: MORE THAN HALF THE DAYS
6. BECOMING EASILY ANNOYED OR IRRITABLE: MORE THAN HALF THE DAYS
4. TROUBLE RELAXING: NEARLY EVERY DAY
IF YOU CHECKED OFF ANY PROBLEMS ON THIS QUESTIONNAIRE, HOW DIFFICULT HAVE THESE PROBLEMS MADE IT FOR YOU TO DO YOUR WORK, TAKE CARE OF THINGS AT HOME, OR GET ALONG WITH OTHER PEOPLE: EXTREMELY DIFFICULT
8. IF YOU CHECKED OFF ANY PROBLEMS, HOW DIFFICULT HAVE THESE MADE IT FOR YOU TO DO YOUR WORK, TAKE CARE OF THINGS AT HOME, OR GET ALONG WITH OTHER PEOPLE?: EXTREMELY DIFFICULT
5. BEING SO RESTLESS THAT IT IS HARD TO SIT STILL: NEARLY EVERY DAY
7. FEELING AFRAID AS IF SOMETHING AWFUL MIGHT HAPPEN: MORE THAN HALF THE DAYS
GAD7 TOTAL SCORE: 17
GAD7 TOTAL SCORE: 17
1. FEELING NERVOUS, ANXIOUS, OR ON EDGE: MORE THAN HALF THE DAYS
3. WORRYING TOO MUCH ABOUT DIFFERENT THINGS: MORE THAN HALF THE DAYS
2. NOT BEING ABLE TO STOP OR CONTROL WORRYING: NEARLY EVERY DAY
GAD7 TOTAL SCORE: 17

## 2025-01-23 ASSESSMENT — PATIENT HEALTH QUESTIONNAIRE - PHQ9
SUM OF ALL RESPONSES TO PHQ QUESTIONS 1-9: 17
SUM OF ALL RESPONSES TO PHQ QUESTIONS 1-9: 17
10. IF YOU CHECKED OFF ANY PROBLEMS, HOW DIFFICULT HAVE THESE PROBLEMS MADE IT FOR YOU TO DO YOUR WORK, TAKE CARE OF THINGS AT HOME, OR GET ALONG WITH OTHER PEOPLE: EXTREMELY DIFFICULT

## 2025-01-23 ASSESSMENT — PAIN SCALES - GENERAL: PAINLEVEL_OUTOF10: MODERATE PAIN (5)

## 2025-01-23 NOTE — PROGRESS NOTES
"    Carito Miguel is a 59 year old, presenting for the following health issues:  Hypertension and Lipids        1/23/2025     9:50 AM   Additional Questions   Roomed by Randy     History of Present Illness       Reason for visit:  Annual check   He is taking medications regularly.       Hyperlipidemia Follow-Up    Are you regularly taking any medication or supplement to lower your cholesterol?     Are you having muscle aches or other side effects that you think could be caused by your cholesterol lowering medication?  No    Hypertension Follow-up    Do you check your blood pressure regularly outside of the clinic? No   Are you following a low salt diet? No  Are your blood pressures ever more than 140 on the top number (systolic) OR more   than 90 on the bottom number (diastolic), for example 140/90? N/A        Review of Systems  Constitutional, HEENT, cardiovascular, pulmonary, GI, , musculoskeletal, neuro, skin, endocrine and psych systems are negative, except as otherwise noted.      Objective    /81 (BP Location: Left arm, Patient Position: Sitting, Cuff Size: Adult Regular)   Pulse 66   Temp 97.9  F (36.6  C) (Temporal)   Resp 16   Ht 1.575 m (5' 2\")   Wt 66 kg (145 lb 6.4 oz)   SpO2 100%   BMI 26.59 kg/m    Body mass index is 26.59 kg/m .  Physical Exam   GENERAL: alert and no distress  NECK: no adenopathy, no asymmetry, masses, or scars  RESP: lungs clear to auscultation - no rales, rhonchi or wheezes  CV: regular rate and rhythm, normal S1 S2, no S3 or S4, no murmur, click or rub, no peripheral edema  ABDOMEN: soft, nontender, no hepatosplenomegaly, no masses and bowel sounds normal  MS: no gross musculoskeletal defects noted, no edema    A/P:  (I10) Essential hypertension with goal blood pressure less than 140/90  (primary encounter diagnosis)  Comment:   Plan: controlled. Continue with current medications. Renal function and lytes normal. Recheck in 6 months with Medicare Wellness visit " for recheck.    (E78.5) Hyperlipidemia LDL goal <70  Comment:   Plan: controlled.    (R73.09) Elevated glucose  Comment:   Plan: Hemoglobin A1c        Monitor.    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    (Z23) Encounter for immunization  Comment:   Plan: COVID-19 12+ (PFIZER)                    Signed Electronically by: David Chavez MD, MD

## 2025-02-08 ENCOUNTER — MYC MEDICAL ADVICE (OUTPATIENT)
Dept: PALLIATIVE MEDICINE | Facility: CLINIC | Age: 60
End: 2025-02-08
Payer: COMMERCIAL

## 2025-02-08 DIAGNOSIS — M17.11 PRIMARY OSTEOARTHRITIS OF RIGHT KNEE: ICD-10-CM

## 2025-02-08 DIAGNOSIS — M06.9 RHEUMATOID ARTHRITIS INVOLVING MULTIPLE SITES, UNSPECIFIED WHETHER RHEUMATOID FACTOR PRESENT (H): ICD-10-CM

## 2025-02-08 DIAGNOSIS — F11.90 CHRONIC, CONTINUOUS USE OF OPIOIDS: ICD-10-CM

## 2025-02-08 DIAGNOSIS — G89.4 CHRONIC PAIN SYNDROME: ICD-10-CM

## 2025-02-08 DIAGNOSIS — M47.819 FACET ARTHROPATHY: ICD-10-CM

## 2025-02-10 RX ORDER — BUPRENORPHINE 20 UG/H
1 PATCH, EXTENDED RELEASE TRANSDERMAL
Qty: 4 PATCH | Refills: 0 | Status: SHIPPED | OUTPATIENT
Start: 2025-02-10

## 2025-02-10 NOTE — TELEPHONE ENCOUNTER
Received request for a refill(s) of BUTRANS 20 MCG/HR WK patch      Last dispensed from pharmacy on 01/09/25    Patient's last office/virtual visit by prescribing provider on 12/09/24  Next office/virtual appointment scheduled for 03/25/25-virtual visit. Patient has not had an in clinic visit since 06/26/24.    Last urine drug screen date 02/26/24  Current opioid agreement on file (completed within the last year) Yes Date of opioid agreement: 02/26/24    E-prescribe to pharmacy-[facility] [address] [address] [facility] - [facility] [address] [address]     Will route to nursing [place] for review and preparation of prescription(s).

## 2025-02-10 NOTE — TELEPHONE ENCOUNTER
Please process a refill of BUTRANS 20 MCG/HR WK patch  to     [facility] [address] [address] [facility] - [place] - [address]  [address] [place] [place] [address]  Phone: [phone] Fax: [phone]

## 2025-02-10 NOTE — TELEPHONE ENCOUNTER
Medication refill information reviewed.     Due date for  BUTRANS 20 MCG/HR WK patch is 02/14/25     Prescriptions prepped for review.     Will route to provider.

## 2025-02-11 NOTE — TELEPHONE ENCOUNTER
Signed Prescriptions:                        Disp   Refills    BUTRANS 20 MCG/HR WK patch                 4 patch0        Sig: Place 1 patch onto the skin every 7 days. LISANDRO, name           brand. OK to fill 02/11/25 to start 02/11/25.  28           day script for chronic pain  Authorizing Provider: AMEENA PAGE        Reviewed [place]  February 10, 2025- no concerning fills.    Ameena LEMUS, RN CNP, FNP  [facility]  [facility]

## 2025-03-04 ENCOUNTER — TELEPHONE (OUTPATIENT)
Dept: RHEUMATOLOGY | Facility: CLINIC | Age: 60
End: 2025-03-04
Payer: COMMERCIAL

## 2025-03-04 NOTE — TELEPHONE ENCOUNTER
PA Needed    Medication: Simponi 50 mg/0.5ml  QTY/DS: 0.5ml per 28 days  NEW INS:  Insurance Company:    Pharmacy Filling the Rx:    PA :    Date of last fill:25

## 2025-03-05 NOTE — TELEPHONE ENCOUNTER
PA Initiation    Medication: SIMPONI 50 MG/0.5ML SC SOAJ  Insurance Company: [facility] - Phone [phone] Fax [phone]:  DUAL  Pharmacy Filling the Rx: [facility] - [place], [place] - [address]   Filling Pharmacy Phone: [phone]  Filling Pharmacy Fax: [phone]  Start Date: 3/5/2025  MEREDITH (Muñoz: [id number])  PA Case ID #: [id number]        Called [facility] at [phone]  MPA: [phone]  Representative sees active PA on file and is reaching out to supervisor to check why medication as rejecting as not covered  Per representative there were changes to Medicare formulary in 2025 and a new PA is required - unable to submit via CoverMyMeds because of historical approval  PA request being faxed to liaison for completion: [id number]    Thank You,     Perla Diez [facility]  Specialty Pharmacy Clinic [facility] Specialty  [email or web address]  [email or web address]  Phone: [phone]  Fax: [phone]

## 2025-03-07 ENCOUNTER — MYC MEDICAL ADVICE (OUTPATIENT)
Dept: PALLIATIVE MEDICINE | Facility: CLINIC | Age: 60
End: 2025-03-07
Payer: COMMERCIAL

## 2025-03-07 DIAGNOSIS — M17.11 PRIMARY OSTEOARTHRITIS OF RIGHT KNEE: ICD-10-CM

## 2025-03-07 DIAGNOSIS — F11.90 CHRONIC, CONTINUOUS USE OF OPIOIDS: ICD-10-CM

## 2025-03-07 DIAGNOSIS — M06.9 RHEUMATOID ARTHRITIS INVOLVING MULTIPLE SITES, UNSPECIFIED WHETHER RHEUMATOID FACTOR PRESENT (H): ICD-10-CM

## 2025-03-07 DIAGNOSIS — M47.819 FACET ARTHROPATHY: ICD-10-CM

## 2025-03-07 DIAGNOSIS — G89.4 CHRONIC PAIN SYNDROME: ICD-10-CM

## 2025-03-08 DIAGNOSIS — F45.8 ANXIETY HYPERVENTILATION: ICD-10-CM

## 2025-03-08 DIAGNOSIS — G47.00 INSOMNIA, UNSPECIFIED TYPE: ICD-10-CM

## 2025-03-08 DIAGNOSIS — M10.9 GOUT WITHOUT TOPHUS: ICD-10-CM

## 2025-03-08 DIAGNOSIS — F41.9 ANXIETY HYPERVENTILATION: ICD-10-CM

## 2025-03-08 RX ORDER — CLONAZEPAM 1 MG/1
TABLET ORAL
Qty: 90 TABLET | Refills: 0 | Status: SHIPPED | OUTPATIENT
Start: 2025-03-08

## 2025-03-08 RX ORDER — COLCHICINE 0.6 MG/1
TABLET ORAL
Qty: 90 TABLET | Refills: 0 | Status: SHIPPED | OUTPATIENT
Start: 2025-03-08

## 2025-03-08 RX ORDER — ZOLPIDEM TARTRATE 5 MG/1
5 TABLET ORAL
Qty: 30 TABLET | Refills: 0 | Status: SHIPPED | OUTPATIENT
Start: 2025-03-08

## 2025-03-08 NOTE — TELEPHONE ENCOUNTER
Medication Question or Refill        What medication are you calling about (include dose and sig)?: zolpidem 5 MG.     [facility]  Pt may have a liver impairment., is this dose ok?  Pt is on other meds that could be dangerous together. Is PCP fine with this?    Preferred Pharmacy:   [facility] [place], [place] - [address]  [address] [facility] [address]  Phone: [phone] Fax: [phone]        Controlled Substance Agreement on file:   CSA -- Patient Level:     [Media Unavailable] Controlled Substance Agreement - Opioid - Scan on 2/26/2024  2:50 PM   [Media Unavailable] Controlled Substance Agreement - Opioid - Scan on 3/22/2023  1:48 PM   [Media Unavailable] Controlled Substance Agreement - Opioid - Scan on 5/11/2022 12:33 PM   [Media Unavailable] Controlled Substance Agreement - Opioid - Scan on 11/4/2021 11:35 AM   [Media Unavailable] Controlled Substance Agreement - Opioid - Scan on 3/2/2020 11:10 AM   [Media Unavailable] Controlled Substance Agreement - Opioid - Scan on 3/11/2019  9:23 AM       Who prescribed the medication?: PCP    Do you need a refill? Yes    When did you use the medication last?     Patient offered an appointment?     Do you have any questions or concerns?  Yes: see above      Could we send this information to you in [facility] [facility] or would you prefer to receive a phone call?:   Patient would prefer a phone call   Okay to leave a detailed message?: Yes at Other phone number:  [phone] [facility] can talk with anyone

## 2025-03-10 RX ORDER — ZOLPIDEM TARTRATE 5 MG/1
5 TABLET ORAL
Qty: 30 TABLET | Refills: 0 | OUTPATIENT
Start: 2025-03-10

## 2025-03-10 NOTE — TELEPHONE ENCOUNTER
Called [facility] at [phone]     Prior Authorization Approval    Medication: SIMPONI 50 MG/0.5ML SC SOAJ  Authorization Effective Date: 3/5/2025  Authorization Expiration Date: 3/5/2026  Approved Dose/Quantity: 0.5 ML per 28-day supply  Reference #: [id number]   Insurance Company: [employer] - Phone [phone] Fax [phone]:  DUAL  Expected CoPay: $ 0  CoPay Card Available: No    Financial Assistance Needed: N/A - DUAL  Which Pharmacy is filling the prescription: [facility] - [place], [place] - [address] [place] [address]  Pharmacy Notified: yes - refill in process with $0 copay  Patient Notified: pharmacy        Thank You,     Jonathan Diez [facility]  Specialty Pharmacy Clinic [facility] Specialty  [email or web address]  [email or web address]  Phone: [phone]  Fax: [phone]

## 2025-03-10 NOTE — TELEPHONE ENCOUNTER
Please process a refill of BUTRANS 20 MCG/HR WK patch  to     [facility] [address] [address] [facility] - [place] - [address] [facility] [address]  [address] [facility] [address] [address]  Phone: [phone] Fax: [phone]

## 2025-03-11 ENCOUNTER — TELEPHONE (OUTPATIENT)
Dept: FAMILY MEDICINE | Facility: CLINIC | Age: 60
End: 2025-03-11
Payer: COMMERCIAL

## 2025-03-11 RX ORDER — BUPRENORPHINE 20 UG/H
1 PATCH, EXTENDED RELEASE TRANSDERMAL
Qty: 4 PATCH | Refills: 0 | Status: SHIPPED | OUTPATIENT
Start: 2025-03-11

## 2025-03-11 NOTE — TELEPHONE ENCOUNTER
Jessica Arzola to fill these medications. We are monitoring patient closely. Recent hepatic labs were within normal range.    Thanks,  David Chavez MD

## 2025-03-11 NOTE — TELEPHONE ENCOUNTER
3/11/2025 5:14 PM     REFILL REQUEST:  I am signing this on behalf of my colleague MARIAH Kent, CNP who is out of the office. Please see their previous documentation regarding the appropriateness of these prescriptions. Chart reviewed - Request appears appropriate. PDMP reviewed for all controlled substance refills. If any concerns are found, it will be noted.     Refilled for 28 day supply.  Script e-Prescribed to pharmacy    MARIAH Chamberlain, DNP, FNP-C

## 2025-03-11 NOTE — TELEPHONE ENCOUNTER
Medication refill information reviewed.     Due date for  BUTRANS 20 MCG/HR WK patch is 03/11/25     Prescriptions prepped for review.     Will route to provider.

## 2025-03-11 NOTE — TELEPHONE ENCOUNTER
zolpidem (AMBIEN) 5 MG tablet 30 tablet       Script clarification: we have that patient has hepatic impairment (can increase this toxicity). Is  ok with? Also on Clonaz, Butrans, Baclofen-ok with all these? Risk for abuse

## 2025-03-11 NOTE — TELEPHONE ENCOUNTER
Received request for a refill(s) of BUTRANS 20 MCG/HR WK patch     Last dispensed from pharmacy on 2/12/2025    Patient's last office/virtual visit by prescribing provider on 12/9/2024  Next office/virtual appointment scheduled for 3/25/2025    Last urine drug screen date 2/26/2024  Current opioid agreement on file (completed within the last year) No Date of opioid agreement: 2/26/2024    E-prescribe to [facility] [address] [facility] [address] [facility]  pharmacy    Will route to nursing [place] for review and preparation of prescription(s).

## 2025-03-20 ASSESSMENT — PAIN SCALES - PAIN ENJOYMENT GENERAL ACTIVITY SCALE (PEG)
PEG_TOTALSCORE: 5
INTERFERED_GENERAL_ACTIVITY: 6
INTERFERED_ENJOYMENT_LIFE: 4
AVG_PAIN_PASTWEEK: 5

## 2025-03-25 ENCOUNTER — LAB (OUTPATIENT)
Dept: LAB | Facility: CLINIC | Age: 60
End: 2025-03-25
Payer: COMMERCIAL

## 2025-03-25 ENCOUNTER — OFFICE VISIT (OUTPATIENT)
Dept: PALLIATIVE MEDICINE | Facility: CLINIC | Age: 60
End: 2025-03-25
Attending: NURSE PRACTITIONER
Payer: COMMERCIAL

## 2025-03-25 VITALS — DIASTOLIC BLOOD PRESSURE: 71 MMHG | SYSTOLIC BLOOD PRESSURE: 121 MMHG | HEART RATE: 76 BPM

## 2025-03-25 DIAGNOSIS — F11.90 CHRONIC, CONTINUOUS USE OF OPIOIDS: ICD-10-CM

## 2025-03-25 DIAGNOSIS — G89.4 CHRONIC PAIN SYNDROME: ICD-10-CM

## 2025-03-25 DIAGNOSIS — M25.50 MULTIPLE JOINT PAIN: ICD-10-CM

## 2025-03-25 DIAGNOSIS — Z79.891 ENCOUNTER FOR LONG-TERM USE OF OPIATE ANALGESIC: ICD-10-CM

## 2025-03-25 DIAGNOSIS — M62.838 MUSCLE SPASM: ICD-10-CM

## 2025-03-25 DIAGNOSIS — M10.9 GOUT WITHOUT TOPHUS: ICD-10-CM

## 2025-03-25 DIAGNOSIS — M54.59 LUMBAR FACET JOINT PAIN: ICD-10-CM

## 2025-03-25 DIAGNOSIS — M06.9 RHEUMATOID ARTHRITIS INVOLVING MULTIPLE SITES, UNSPECIFIED WHETHER RHEUMATOID FACTOR PRESENT (H): Primary | ICD-10-CM

## 2025-03-25 DIAGNOSIS — M17.11 PRIMARY OSTEOARTHRITIS OF RIGHT KNEE: ICD-10-CM

## 2025-03-25 DIAGNOSIS — M47.819 FACET ARTHROPATHY: ICD-10-CM

## 2025-03-25 DIAGNOSIS — M79.18 MYOFASCIAL PAIN: ICD-10-CM

## 2025-03-25 DIAGNOSIS — R73.09 ELEVATED GLUCOSE: ICD-10-CM

## 2025-03-25 DIAGNOSIS — M62.830 SPASM OF BACK MUSCLES: ICD-10-CM

## 2025-03-25 LAB
EST. AVERAGE GLUCOSE BLD GHB EST-MCNC: 111 MG/DL
HBA1C MFR BLD: 5.5 % (ref 0–5.6)

## 2025-03-25 PROCEDURE — 3078F DIAST BP <80 MM HG: CPT | Performed by: NURSE PRACTITIONER

## 2025-03-25 PROCEDURE — 3074F SYST BP LT 130 MM HG: CPT | Performed by: NURSE PRACTITIONER

## 2025-03-25 PROCEDURE — 1125F AMNT PAIN NOTED PAIN PRSNT: CPT | Performed by: NURSE PRACTITIONER

## 2025-03-25 PROCEDURE — 99000 SPECIMEN HANDLING OFFICE-LAB: CPT

## 2025-03-25 PROCEDURE — 84550 ASSAY OF BLOOD/URIC ACID: CPT

## 2025-03-25 PROCEDURE — 80307 DRUG TEST PRSMV CHEM ANLYZR: CPT | Mod: 90

## 2025-03-25 PROCEDURE — 36415 COLL VENOUS BLD VENIPUNCTURE: CPT

## 2025-03-25 PROCEDURE — G2211 COMPLEX E/M VISIT ADD ON: HCPCS | Performed by: NURSE PRACTITIONER

## 2025-03-25 PROCEDURE — 99214 OFFICE O/P EST MOD 30 MIN: CPT | Performed by: NURSE PRACTITIONER

## 2025-03-25 PROCEDURE — 83036 HEMOGLOBIN GLYCOSYLATED A1C: CPT

## 2025-03-25 PROCEDURE — 80307 DRUG TEST PRSMV CHEM ANLYZR: CPT

## 2025-03-25 RX ORDER — BUPRENORPHINE 20 UG/H
1 PATCH, EXTENDED RELEASE TRANSDERMAL
Qty: 4 PATCH | Refills: 0 | Status: SHIPPED | OUTPATIENT
Start: 2025-03-25

## 2025-03-25 RX ORDER — BACLOFEN 10 MG/1
10 TABLET ORAL 2 TIMES DAILY PRN
Qty: 180 TABLET | Refills: 1 | Status: SHIPPED | OUTPATIENT
Start: 2025-03-25

## 2025-03-25 RX ORDER — GABAPENTIN 300 MG/1
300 CAPSULE ORAL 3 TIMES DAILY
Qty: 270 CAPSULE | Refills: 3 | Status: SHIPPED | OUTPATIENT
Start: 2025-03-25

## 2025-03-25 ASSESSMENT — PAIN SCALES - GENERAL: PAINLEVEL_OUTOF10: MODERATE PAIN (5)

## 2025-03-25 NOTE — PATIENT INSTRUCTIONS
Plan:  Physical Therapy: none  Continue stretching at home  Clinical Health Psychologist to address issues of relaxation, behavioral change, coping style, and other factors important to improvement: none  Diagnostic Studies: none  Medication Management:   Continue Butrans 20mcg/hr transdermal change every 7 days, he is to fill 4/8 and start 4/11. [facility] [place]  continue Baclofen to 10mg up to three times daily as needed for muscle spasms  Continue Voltaren gel as needed  Continue gabapentin 300mg taking 1 capsule three times daily  continue flonase nasal spray apply 1-2 sprays on the skin where you will apply the Butrans patch, allow the spray to dry and then apply patch, this often will prevent the skin irritation and rash.   Further procedures recommended: none  Recommendations/follow-up for PCP:  See above  Release of information: none  Currently wearing Butrans. UDT today  Signed CSA today  Follow up: 3 months with in-person or video visit.   Please call [phone] to make your follow-up appointment with me.     ----------------------------------------------------------------  Clinic Number:  [phone]   Call with any questions about your care and for scheduling assistance.   Calls are returned Monday through Friday between 8 AM and 4:30 PM. We usually get back to you within 2 business days depending on the issue/request.    If we are prescribing your medications:  For opioid medication refills, call the clinic or send a [employer] message 7 days in advance.  Please include:  Name of requested medication  Name of the pharmacy.  For non-opioid medications, call your pharmacy directly to request a refill. Please allow 3-4 days to be processed.   Per [place] State Law:  All controlled substance prescriptions must be filled within 30 days of being written.    For those controlled substances allowing refills, pickup must occur within 30 days of last fill.      We believe regular attendance is key to your  success in our program!    Any time you are unable to keep your appointment we ask that you call us at least 24 hours in advance to cancel.This will allow us to offer the appointment time to another patient.   Multiple missed appointments may lead to dismissal from the clinic.

## 2025-03-25 NOTE — PROGRESS NOTES
"[facility]    3/25/2025    Chief complaint:   -chronic ongoing bilateral low back pain, radiates into the thighs  -multiple joint pain from RA  -\"I am feeling better, it is springtime.\"          Interval history:  Lamont Daniels is a 59 year old male is known to me for:  Lumbar facet arthropathy  Rheumatoid arthritis involving multiple joints, unspecified rheumatoid factor  Primary osteoarthritis of right knee  Chronic continuous use of opioids  Chronic pain syndrome  PMHx includes: Anxiety, coronary artery disease, congestive heart failure (2008), depressive disorder (2008), gout, hepatitis B more than 10 years ago, hypertension, hyperlipidemia, malrotation of intestine, severe obstructive sleep apnea, rheumatoid arthritis (2012) steatohepatitis (2015), history of tuberculosis at age 13, vitamin D deficiency  PSHx includes: Coronary artery bypass graft 6 vessel (2008), abdominal surgery (2014), colonoscopy (2013), combined repair ptosis with blepharoplasty bilateral (2018), head and neck surgery (2011), nasal/sinus polypectomy (2010), orthopedic surgery (2012), repair ptosis bilateral (2019), repair ptosis brow bilateral (2018), thoracic surgery for tuberculosis (1989), tonsillectomy and uvulopalatal pharyngoplasty (2010), coronary artery stent 3 months after CABG (2008).      Recommendations/plan at the last visit on 12/9/2024 included:  Physical Therapy: none  Continue stretching at home  Clinical Health Psychologist to address issues of relaxation, behavioral change, coping style, and other factors important to improvement: none  Diagnostic Studies: none  Medication Management:   Continue Butrans 20mcg/hr transdermal change every 7 days, he is to fill 12/13 and start 12/16. Sent to [facility]  continue Baclofen to 10mg up to three times daily as needed for muscle spasms  Continue Voltaren gel as needed  Continue gabapentin 300mg taking 1 capsule three times " "daily  continue flonase nasal spray apply 1-2 sprays on the skin where you will apply the Butrans patch, allow the spray to dry and then apply patch, this often will prevent the skin irritation and rash.   Further procedures recommended: none  Recommendations/follow-up for PCP:  See above  Release of information: none  Follow up: 3 months with in-person or video visit.   Please call [phone] to make your follow-up appointment with me.         Since his last visit, Lamont PIERRE Jeremiah reports:    Interval history March 25, 2025  -Lamont reports the following:  -chronic ongoing bilateral low back pain, radiates into the thighs  -multiple joint pain from RA  -\"I am feeling better, it is springtime.\"  -he typically feels worse when the weather is cold and damp  -looking forward to getting some noodles (cornelia) for lunch.       Interval history December 9, 2024  -continued low back pain, axial. No radiation to the legs.   -has continued multiple joint pain from RA  -frustrated that he is having issues getting the Butrans from [facility] in a timely manner. Butrans does manage his pain well. Discussed transitioning his Butrans to a [facility] pharmacy, patient prefers to use [facility], they can mail his meds.     Interval history November 18, 2024  -he is to  the Butrans today, this is the day he changes it.   Lamont reports the following pain issues:  -last 2 days has had nausea/vomiting and diarrhea. Denies dizziness.  -low back pain, extends into the thighs, bilaterally. Not past the knees.   -multiple joint pain (RA)   He denies any new areas of pain  -he would like to consider a different option for pain management for next month. Set him up for follow up visit to discuss since Butrans is ready for him at pharmacy and changes may require PA approval.     Interval history August 20, 2024  -Lamont reports he is doing well on his current medication regimen. He uses Butrans patch, gabapentin, and baclofen as well as " "topical diclofenac sodium.   -he continues to have multiple joint pain from RA (this has been somewhat challenging with the rainy weather this summer).   -he also has axial low back pain that goes into the thighs but does not extend past the knees.   -he denies need for any medication changes.   -no further skin irritation from Butrans  -notes tolerating the gabapentin well.      Pain history collected at initial evaluation on 5/11/2022  He has had pain for about 10 years. He was diagnosed with Rheumatoid Arthritis. Lamont has pain in multiple joints. He also has chronic low back pain. The low back pain radiates down the posterolateral aspect of the legs to the level of the knee. His back pain was there when he was diagnosed with RA. He feels that he is doing well on the Butrans. Discussed switch to Belbuca if he ever wishes to increase his dosage, prefers to stay with use of the Butrans patch at the present time       At this point, the patient's participation with our multidisciplinary team includes:  The patient has been compliant with the program.  PT - not ordered  Health Psych - not ordered      Pain scores:  Pain intensity on average is 6 on a scale of 0-10.    Range is 5-9/10.   Pain right now is 5/10.   Pain is described as \"back pain is dull and his joints are dull/numbness/stiffness.\"    Pain is constant in nature        Current pain relevant medications:   -allopurinol 300mg every day  -baclofen 10mg BID PRN muscle spasms (uses at least once per day, helpful)  -Voltaren gel 1% PRN (helpful)  -naloxone nasal spray PRN opiate reversal   -Butrans 20mcg/hr  TD Q 7 days (helpful)--getting skin rash--better when uses Flonase nasal spray on skin first  -simponi 50mg/0.5ml inject every 28 days   -Arava 20mg every day  -meclizine 25mg Q 6 hours PRN dizziness (helpful)  -Tylenol 1000mg (uses at least once per day, not more than 3 times per day)  -CBD oil at night (helpful for sleep)        Other pertinent " medications:  -Ambien 5mg at bedtime as needed PRN sleep  -clonazepam 0.5-1mg BID PRN anxiety (uses one full tab in the AM)  -Plavix 75mg every day  -colchincine 0.6mg take 2 tabs at first sign of flare  -pristiq 50mg QD     Previous medication treatments included:  OPIATES: butrans (helpful), hydrocodone (helpful for other issues), Codeine (unsure), oxycodone (unsure)  NSAIDS: cannot use, on Plavix   MUSCLE RELAXANTS: Baclofen (helpful), methocarbamol (not helpful)  ANTI-MIGRAINE MEDS: none  ANTI-DEPRESSANTS: none  SLEEP AIDS: none  ANTI-CONVULSANTS: gabapentin (helpful initially, he did not have increased pain when he tapered off of this medication)  TOPICALS: Voltaren gel (helpful)  ANXIOLYTICS: none  MEDICAL CANNABIS: none  Other meds: Tylenol (helpful)        Other treatments have included:  Lamont Daniels has been seen at a pain clinic in the past.  Previously managed by Dr. Lian Loo   PT: has tried, not helpful  Chiropractic care: no  Acupuncture: no  TENs Unit: no     Injections:  none      THE 4 A's OF OPIOID MAINTENANCE ANALGESIA    Analgesia: butrans is helpful    Activity: he does some stretching at home. Walks in the back yard    Adverse effects: none    Adherence to Rx protocol: yes      Side Effects: No  Patient is using the medication as prescribed: YES    Medications:  Current Outpatient Medications   Medication Sig Dispense Refill    Acetaminophen (TYLENOL PO)       allopurinol (ZYLOPRIM) 300 MG tablet Take 1 tablet (300 mg) by mouth daily. 90 tablet 3    amLODIPine (NORVASC) 5 MG tablet TAKE 1 TABLET BY MOUTH DAILY FOR BLOOD PRESSURE 90 tablet 3    aspirin EC 81 MG EC tablet Take 1 tablet (81 mg) by mouth daily (*) 30 tablet 1    atorvastatin (LIPITOR) 80 MG tablet TAKE 1 TABLET BY MOUTH EVERY DAY 90 tablet 3    baclofen (LIORESAL) 10 MG tablet Take 1 tablet (10 mg) by mouth 2 times daily as needed for muscle spasms. 3 month supply. 180 tablet 1    busPIRone HCl (BUSPAR) 30 MG tablet TAKE 1  TABLET BY MOUTH TWICE A  tablet 3    BUTRANS 20 MCG/HR WK patch Place 1 patch onto the skin every 7 days. LISANDRO, name brand. OK to fill/start 03/11/25.  28 day script for chronic pain 4 patch 0    Cholecalciferol (VITAMIN D) 2000 UNITS tablet TAKE ONE TABLET BY MOUTH ONCE DAILY 100 tablet 1    clobetasol (TEMOVATE) 0.05 % external cream Apply twice daily for 2 weeks, weekends only for 2 weeks, repeat cycle as needed for hands, not face or genitals 60 g 4    clobetasol (TEMOVATE) 0.05 % external solution Apply twice daily to scalp for up to 2 weeks for flares. 60 mL 0    clonazePAM (KLONOPIN) 1 MG tablet TAKE 0.5-1 TABLETS BY MOUTH 2 TIMES DAILY AS NEEDED FOR ANXIETY 90 tablet 0    clopidogrel (PLAVIX) 75 MG tablet TAKE 1 TABLET BY MOUTH EVERY DAY 90 tablet 3    colchicine (COLCRYS) 0.6 MG tablet TAKE 2 TABLETS BY MOUTH AT THE FIRST SIGN OF FLARE, TAKE 1 ADDITIONAL TABLET ONE HOUR LATER. 90 tablet 0    desvenlafaxine (PRISTIQ) 100 MG 24 hr tablet Take 1 tablet (100 mg) by mouth daily 90 tablet 3    diclofenac (VOLTAREN) 1 % topical gel Apply 2-4 g topically 4 times daily as needed for moderate pain 300 g 11    ezetimibe (ZETIA) 10 MG tablet TAKE 1 TABLET (10 MG) BY MOUTH DAILY. 90 tablet 3    fluticasone (FLONASE) 50 MCG/ACT nasal spray Apply 1-2 sprays on the skin and let dry prior to applying Butrans patch. This will likely reduce the skin rash you are getting. 11.1 mL 11    gabapentin (NEURONTIN) 300 MG capsule Take 1 capsule (300 mg) by mouth 3 times daily 90 day supply 270 capsule 3    gemfibrozil (LOPID) 600 MG tablet TAKE 1 TABLET BY MOUTH 2 TIMES DAILY 180 tablet 3    golimumab (SIMPONI) 50 MG/0.5ML auto-injector pen Inject 0.5 mLs (50 mg) subcutaneously every 28 days. . Hold for signs of infection, and seek medical attention. 0.5 mL 4    hydrocortisone (WESTCORT) 0.2 % external cream FOR GENITALS, APPLY TWICE DAILY FOR UP TO 2 WEEKS, TAKE 2 WEEKS OFF THEN REPEAT 60 g 3    hydrocortisone 2.5 % cream FOR  FACE, APPLY TWICE DAILY FOR UP TO 2 WEEK FOR FLARES ON THE FACE, TAKE 2 WEEKS OFF 28.35 g 3    Incontinence Supply Disposable (DEPEND UNDERWEAR LARGE) MISC Use twice daily. 60 each 11    leflunomide (ARAVA) 20 MG tablet Take 1 tablet (20 mg) by mouth daily. ; Labs required every 8-12 weeks. 90 tablet 2    meclizine (ANTIVERT) 25 MG tablet TAKE 1 TABLET BY MOUTH EVERY 6 HOURS AS NEEDED FOR DIZZINESS. 30 tablet 3    melatonin 3 MG tablet Take 1 tablet (3 mg) by mouth nightly as needed for sleep      naloxone (NARCAN) 4 MG/0.1ML nasal spray Spray 1 spray (4 mg) into one nostril alternating nostrils once as needed for opioid reversal every 2-3 minutes until assistance arrives 0.2 mL 0    nitroGLYcerin (NITROSTAT) 0.4 MG sublingual tablet PLACE 1 TAB UNDER THE TONGUE EVERY 5 MINUTES FOR 3 DOSES FOR CHEST PAIN. IF SYMPTOMS PERSIST 5 MINUTES AFTER 1ST DOSE CALL 911. 25 tablet 0    order for DME Equipment being ordered: chair lift 1 each 0    order for DME Equipment being ordered: single end cane 1 Units 0    ORDER FOR DME 1.  CPAP pressure 11 cm/H20 with heated humidity.   2.  Provide mask to fit and CPAP supplies.   3.  Length of need lifetime.   4.  If needed please provide a chin strap           pantoprazole (PROTONIX) 40 MG EC tablet TAKE 1 TABLET BY MOUTH EVERY DAY 90 tablet 2    pimecrolimus (ELIDEL) 1 % external cream APPLY TWICE DAILY FOR FACE AND GENITALS 60 g 1    sildenafil (REVATIO) 20 MG tablet TAKE 2 - 5 TABLETS BY MOUTH AS NEEDED 30 MINUTES BEFORE SEXUAL ACTIVITY. MAX 100MG IN 24 HOURS. 450 tablet 3    sulfaSALAzine (AZULFIDINE) 500 MG tablet Take 3 tablets (1,500 mg) by mouth 2 times daily. 540 tablet 2    tamsulosin (FLOMAX) 0.4 MG capsule TAKE 2 CAPSULES BY MOUTH EVERY  capsule 3    tenofovir (VIREAD) 300 MG tablet Take 1 tablet (300 mg) by mouth daily. 90 tablet 3    triamcinolone (KENALOG) 0.1 % external ointment APPLY TO AFFECTED AREA TOPICALLY 3 TIMES A DAY 80 g 2    triamcinolone (KENALOG) 0.1  % external ointment Apply to trunk and extremities twice daily for up to 2 weeks for flares 80 g 1    zolpidem (AMBIEN) 5 MG tablet TAKE 1 TABLET (5 MG) BY MOUTH NIGHTLY AS NEEDED FOR SLEEP 30 tablet 0       Medical History: any changes in medical history since they were last seen? No    Social History:   Home situation: , lives with adult children  Occupation/Schooling: he is on disability due to heart issues and RA  Tobacco use: none  Alcohol use: none  Drug use: none  History of chemical dependency treatment: none    Is patient a current smoker or tobacco user?  no  If yes, was cessation counseling offered?  no          Physical Exam:  Vital signs: Blood pressure 121/71, pulse 76.    Behavioral observations:  Awake, alert, conversant and cooperative     Gait:  normal    Musculoskeletal exam:  strength grossly equal throughout. Moves well in exam room    Neuro exam:  deferred    Skin/vascular/autonomic:  No suspicious lesions on exposed skin.     Other:  na            Diagnostic tests:  MRI of the Lumbar Spine without contrast     History: Lumbago.     Comparison: MRI thoracic spine 7/18/2014.     Technique: Sagittal T1-weighted, T2-weighted, STIR, and axial  T2-weighted spin echo and gradient echo images of the lumbar spine  were obtained without intravenous contrast.     Findings: There are 5 lumbar-type vertebrae assumed for the purposes  of this dictation. The tip of the conus medullaris is at L1.  The  lumbar vertebral column appears normally aligned. Straightening of the  normal lumbar lordosis. Mild loss of T2 signal in the L4-5 and L5-S1  intervertebral discs consistent with age-related changes. Type II  Modic degenerative changes in the opposing endplates at L5-S1.     On a level by level basis:     L1-2: Tiny left paracentral focal disc protrusion without spinal canal  or neural foraminal stenosis.     L2-3: No spinal canal or neural foraminal stenosis.     L3-4: Small focal posterior central  disc protrusion with annular  fissuring and mild spinal canal narrowing. Also mild bilateral facet  hypertrophy. No neuroforaminal stenosis.     L4-5: Small focal posterior central disc protrusion with annular  fissuring. No spinal canal or neural foraminal stenosis.     L5-S1: Type II Modic degenerative changes of the opposing endplates.  Left greater than right facet hypertrophy. Small focal posterior  central disc protrusion with annular fissuring. No spinal canal  stenosis. Moderate left and mild right neural foraminal narrowing.     The visualized paraspinous tissues anteriorly are unremarkable.     IMPRESSION  Impression: Multilevel degenerative changes. Small disc protrusion at  L3-4 results in in mild spinal canal narrowing. Moderate left and mild  right neuroforaminal narrowing at L5-S1.     HELGA DAVID MD        FOOT BILATERAL THREE OR MORE VIEWS 11/4/2015 4:55 PM      HISTORY: Pain in unspecified joint.     COMPARISON: 8/21/2012.     IMPRESSION  IMPRESSION: Small traction spurs are seen off the Achilles tendon and  plantar fascial attachment sites bilaterally. Joint spaces are  preserved. Osseous structures are intact. Incidental note is made of  some surgical staples in the soft tissues of the medial distal right  lower extremity.  DANIS ANGLIN MD        RIGHT KNEE THREE VIEWS  5/4/2017 3:16 PM    HISTORY: Pain in right knee.  COMPARISON: None                                                   IMPRESSION: No acute fracture or dislocation. Mild osteoarthritis.  Small joint effusion.     MICHELL TAMAYO MD      MR LUMBAR SPINE WITHOUT CONTRAST  4/4/2023 9:12 AM (reviewed with patient on 6/26/2023)     INDICATION: Low back pain. Rule out spondylolysis. Low back pain with  standing/walking/extension. No known/automatically detected potential  contraindications to CT. Chronic bilateral low back pain with  bilateral sciatica. DDD (degenerative disc disease), lumbar.     TECHNIQUE: MRI images of the lumbar  spine without contrast.  CONTRAST:  None.     COMPARISON: Lumbar spine MRI 8/6/2014.     FINDINGS: Nomenclature is based on five lumbar type vertebral bodies.  Normal vertebral body heights. Normal alignment.  No marrow edema. No  pars defect. The conus tip is identified at L1-L2. Unremarkable  paraspinal soft tissues.     T12-L1: Slight loss in disc height and signal. The small left central  protrusion. Minor facet arthropathy. Minor lateral recess and spinal  canal narrowing. No foraminal narrowing. Herniation is new/increased  versus 2014.         L1-L2: Slight loss of disc signal. Normal disc height. Tiny left  paracentral protrusion. Unremarkable facets. Minimal left lateral  recess narrowing. No central stenosis or foraminal narrowing. No  interval change.     L2-L3: Mild loss of disc signal. Minimal loss of disc height. No  herniation. Unremarkable facets. No stenosis.     L3-L4: Moderate loss of disc signal. Minor loss of disc height. Minor  circumferential disc bulge with small central protrusion. Minor facet  arthropathy. Mild lateral recess and spinal canal narrowing,  unchanged. Patent foramina.     L4-L5: Moderate loss of disc signal. Mild loss of disc height. Central  annular tear and small protrusion is minimally increased in the  interval. Mild facet arthropathy. Minor lateral recess and foraminal  narrowing.     L5-S1: Moderate loss of disc signal. Mild loss of disc height.  Circumferential asymmetric left disc bulge. Mild to moderate left and  mild right facet arthropathy. Moderate left and mild right lateral  recess narrowing. Adequate central canal. Moderate to severe left and  minor right foraminal narrowing. No significant interval change.                                                                      IMPRESSION:  1.  Degenerative changes are relatively stable compared with 2014.  2.  At L5-S1 an asymmetric left disc bulge causes moderate left  lateral recess and moderate to severe  foraminal narrowing. Correlate  for any left L5 symptoms. Mild right-sided narrowing. No significant  interval change.  3.  At L4-L5 a small protrusion is minimally increased with minor  lateral recess and foraminal narrowing similar to prior.  4.  At L3-L4 mild lateral recess and spinal canal narrowing is  unchanged.  5.  At T12-L1 a small left-sided herniation is increased and results  in minor lateral recess and spinal canal narrowing.     HORTENCIA DUENAS MD      Other testing (labs, diagnostics):  5/20/2024  Cr. 0.84  Est GFR >90  Liver enzymes WNL        Screening tools:      DIRE Score for ongoing opioid management is calculated as follows:  Diagnosis = 2  Intractability = 2  Risk: Psych = 2  Chem Hlth = 2  Reliability = 3  Social = 2  Efficacy = 2  Total DIRE Score = 15 (14 or higher predicts good candidate for ongoing opioid management; 13 or lower predicts poor candidate for opioid management)        [place]  review:  Reviewed [place]  3/25/2025- no concerning fills.  Ameena LEMUS, RN CNP, FNP  [facility]  [place] location       Assessment:   Rheumatoid arthritis involving multiple joints, unspecified rheumatoid factor  Primary osteoarthritis of right knee  Multiple joint pain  Facet arthropathy (lumbar)  Lumbar facet joint pain  Spasm of back muscles  Muscle spasm  Myofascial pain  Chronic pain syndrome  Chronic continuous use of opioids  Encounter for long term use of opiate analgesic  UDT 3/25/2025  Signed CSA 3/25/2025  PMHx includes: Anxiety, coronary artery disease, congestive heart failure (2008), depressive disorder (2008), gout, hepatitis B more than 10 years ago, hypertension, hyperlipidemia, malrotation of intestine, severe obstructive sleep apnea, rheumatoid arthritis (2012) steatohepatitis (2015), history of tuberculosis at age 13, vitamin D deficiency  PSHx includes: Coronary artery bypass graft 6 vessel (2008), abdominal surgery (2014), colonoscopy (2013),  combined repair ptosis with blepharoplasty bilateral (2018), head and neck surgery (2011), nasal/sinus polypectomy (2010), orthopedic surgery (2012), repair ptosis bilateral (2019), repair ptosis brow bilateral (2018), thoracic surgery for tuberculosis (1989), tonsillectomy and uvulopalatal pharyngoplasty (2010), coronary artery stent 3 months after CABG (2008).    Plan:  Physical Therapy: none  Continue stretching at home  Clinical Health Psychologist to address issues of relaxation, behavioral change, coping style, and other factors important to improvement: none  Diagnostic Studies: none  Medication Management:   Continue Butrans 20mcg/hr transdermal change every 7 days, he is to fill 4/8 and start 4/11. [facility] [place]  continue Baclofen to 10mg up to three times daily as needed for muscle spasms  Continue Voltaren gel as needed  Continue gabapentin 300mg taking 1 capsule three times daily  continue flonase nasal spray apply 1-2 sprays on the skin where you will apply the Butrans patch, allow the spray to dry and then apply patch, this often will prevent the skin irritation and rash.   Further procedures recommended: none  Recommendations/follow-up for PCP:  See above  Release of information: none  Currently wearing Butrans. UDT today  Signed CSA today  Follow up: 3 months with in-person or video visit.   Please call [phone] to make your follow-up appointment with me.         ASSESSMENT AND PLAN:  (M06.9) Rheumatoid arthritis involving multiple sites, unspecified whether rheumatoid factor present (H)  (primary encounter diagnosis)  Comment:   Plan: BUTRANS 20 MCG/HR WK patch, gabapentin         (NEURONTIN) 300 MG capsule, [facility]         Follow-Up Order            (M17.11) Primary osteoarthritis of right knee  Comment:   Plan: BUTRANS 20 MCG/HR WK patch, gabapentin         (NEURONTIN) 300 MG capsule, [facility]         Follow-Up Order            (M25.50) Multiple joint pain  Comment:    Plan: Adult Pain Clinic Follow-Up Order            (M47.819) Facet arthropathy  Comment:   Plan: BUTRANS 20 MCG/HR WK patch, gabapentin         (NEURONTIN) 300 MG capsule, [facility]         Follow-Up Order            (M54.59) Lumbar facet joint pain  Comment:   Plan: gabapentin (NEURONTIN) 300 MG capsule, Adult         Pain Clinic Follow-Up Order            (M62.830) Spasm of back muscles  Comment:   Plan: baclofen (LIORESAL) 10 MG tablet, Adult Pain         Clinic Follow-Up Order            (M62.838) Muscle spasm  Comment:   Plan: baclofen (LIORESAL) 10 MG tablet, Adult Pain         Clinic Follow-Up Order            (M79.18) Myofascial pain  Comment:   Plan: baclofen (LIORESAL) 10 MG tablet, Adult Pain         Clinic Follow-Up Order            (G89.4) Chronic pain syndrome  Comment:   Plan: BUTRANS 20 MCG/HR WK patch, gabapentin         (NEURONTIN) 300 MG capsule, [facility] Clinic         Follow-Up Order            (F11.90) Chronic, continuous use of opioids  Comment:   Plan: Ethanol urine, Ethyl Glucuronide Screen with         Reflex to Confirmation, Urine, Drug         Confirmation Panel Urine with Creatinine,         BUTRANS 20 MCG/HR WK patch, gabapentin         (NEURONTIN) 300 MG capsule, [facility] Clinic         Follow-Up Order            (Z79.891) Encounter for long-term use of opiate analgesic  Comment:   Plan: Ethanol urine, Ethyl Glucuronide Screen with         Reflex to Confirmation, Urine, Drug         Confirmation Panel Urine with Creatinine, Adult        Pain Clinic Follow-Up Order              BILLING TIME DOCUMENTATION:   TOTAL TIME includes:   Time spent preparing to see the patient: 3 minutes (reviewing records and tests)  Time spend face to face with the patient: 13 minutes  Time spent ordering tests, medications, procedures and referrals: 0 minutes  Time spent Referring and communicating with other healthcare professionals: 0 minutes  Documenting clinical information in Epic: 1  minutes    The total TIME spent on this patient on the day of the appointment was 17 minutes.             Ameena LEMUS RN CNP, FNP  [facility]

## 2025-03-25 NOTE — LETTER

## 2025-03-26 LAB
CREAT UR-MCNC: 121 MG/DL
ETHANOL UR QL SCN: NORMAL
ETHYL GLUCURONIDE UR QL SCN: NEGATIVE NG/ML
URATE SERPL-MCNC: 5.5 MG/DL (ref 3.4–7)

## 2025-04-08 DIAGNOSIS — I25.810 CORONARY ARTERY DISEASE INVOLVING AUTOLOGOUS ARTERY CORONARY BYPASS GRAFT WITHOUT ANGINA PECTORIS: ICD-10-CM

## 2025-04-08 DIAGNOSIS — F33.1 MAJOR DEPRESSIVE DISORDER, RECURRENT EPISODE, MODERATE (H): ICD-10-CM

## 2025-04-08 RX ORDER — DESVENLAFAXINE 100 MG/1
100 TABLET, EXTENDED RELEASE ORAL
Qty: 90 TABLET | Refills: 2 | Status: SHIPPED | OUTPATIENT
Start: 2025-04-08

## 2025-04-08 RX ORDER — EZETIMIBE 10 MG/1
10 TABLET ORAL
Qty: 90 TABLET | Refills: 2 | Status: SHIPPED | OUTPATIENT
Start: 2025-04-08

## 2025-04-11 DIAGNOSIS — I10 ESSENTIAL HYPERTENSION WITH GOAL BLOOD PRESSURE LESS THAN 140/90: ICD-10-CM

## 2025-04-15 DIAGNOSIS — T49.95XA SKIN IRRITATION DUE TO TOPICAL AGENT: ICD-10-CM

## 2025-04-15 DIAGNOSIS — R23.8 SKIN IRRITATION DUE TO TOPICAL AGENT: ICD-10-CM

## 2025-04-15 RX ORDER — FLUTICASONE PROPIONATE 50 MCG
SPRAY, SUSPENSION (ML) NASAL
Qty: 11.1 ML | Refills: 11 | Status: SHIPPED | OUTPATIENT
Start: 2025-04-15

## 2025-04-15 NOTE — TELEPHONE ENCOUNTER
Received fax from pharmacy requesting refill(s) for     fluticasone (FLONASE) 50 MCG/ACT nasal spray    Date last filled 1/17/2025    Last Appt Date:3/25/2025    Next Appt scheduled: None on File    Pharmacy:      [facility] [id number] [place] [facility] [address] [facility] [address]    Will route for processing    SAÚL Torres [facility]

## 2025-04-16 RX ORDER — AMLODIPINE BESYLATE 5 MG/1
5 TABLET ORAL DAILY
Qty: 90 TABLET | Refills: 1 | Status: SHIPPED | OUTPATIENT
Start: 2025-04-16

## 2025-04-16 NOTE — TELEPHONE ENCOUNTER
Refills sent.       Requested Prescriptions   Pending Prescriptions Disp Refills    amLODIPine (NORVASC) 5 MG tablet [Pharmacy Med Name: AMLODIPINE BESYLATE 5 MG TAB] 90 tablet 3     Sig: TAKE 1 TABLET BY MOUTH DAILY FOR BLOOD PRESSURE       Calcium Channel Blockers Protocol  Passed - 4/16/2025  8:39 AM        Passed - Most recent blood pressure under 140/90 in past 12 months     BP Readings from Last 3 Encounters:   03/25/25 121/71   01/23/25 131/81   06/11/24 117/81       No data recorded            Passed - Medication is active on med list and the sig matches. RN to manually verify dose and sig if red X/fail.     If the protocol passes (green check), you do not need to verify med dose and sig.    A prescription matches if they are the same clinical intention.    For Example: once daily and every morning are the same.    The protocol can not identify upper and lower case letters as matching and will fail.     For Example: Take 1 tablet (50 mg) by mouth daily     TAKE 1 TABLET (50 MG) BY MOUTH DAILY    For all fails (red x), verify dose and sig.    If the refill does match what is on file, the RN can still proceed to approve the refill request.       If they do not match, route to the appropriate provider.             Passed - Medication is indicated for associated diagnosis        Passed - GFR is on file in the past 12 months and most recent GFR is normal        Passed - Recent (12 mo) or future (90 days) visit within the authorizing provider's specialty     The patient must have completed an in-person or virtual visit within the past 12 months or has a future visit scheduled within the next 90 days with the authorizing provider s specialty.  Urgent care and e-visits do not qualify as an office visit for this protocol.          Passed - Patient is age 18 or older

## 2025-04-22 DIAGNOSIS — G47.00 INSOMNIA, UNSPECIFIED TYPE: ICD-10-CM

## 2025-04-22 RX ORDER — ZOLPIDEM TARTRATE 5 MG/1
5 TABLET ORAL
Qty: 30 TABLET | Refills: 5 | Status: SHIPPED | OUTPATIENT
Start: 2025-04-22

## 2025-04-30 ENCOUNTER — MYC MEDICAL ADVICE (OUTPATIENT)
Dept: CARDIOLOGY | Facility: CLINIC | Age: 60
End: 2025-04-30
Payer: COMMERCIAL

## 2025-04-30 DIAGNOSIS — E78.5 HYPERLIPIDEMIA LDL GOAL <100: Primary | ICD-10-CM

## 2025-04-30 NOTE — TELEPHONE ENCOUNTER
Patient called back and was offered either a virtual appointment with Dr Guzman ([facility] schedule)  or put on [facility]  wait list  for when new provider templates open up. Patient opted to reschedule with Dr Guzman/blank in May 13/2025 at 12:30 PM, [facility] schedulers have been notified.   Patient was reminded to keep up-coming lab appointment on 5/6 for repeat fasting lab as previously planned.

## 2025-05-02 NOTE — PROGRESS NOTES
Service Date: 2025    David Chavez MD  [address] [place] [address]     RE:  Lamont Daniels   MRN:  [id number]   :  1965       Dear Dr. Chavez:    It was a pleasure participating in the care of your patient, Lamont Daniels .  As you know, he is a 59 year old year old person who I met over a virtual visit today for coronary artery disease, hypertension, lipids, sleep.    Past medical history is significant for the followin.  Hypertension  2.  Hyperlipidemia  3.  Opioid dependence  4.  Gallstones  5.  BPH  6.  Rheumatoid arthritis  7.  Gout  8.  Anxiety/depression/suicidal ideation  9.  Low back pain  10.  Vitamin D deficiency  11.  Hepatitis B  12.  Severe obstructive sleep apnea    Patient's cardiac history significant for known coronary disease and normal LV systolic function.  The patient apparently had multivessel bypass surgery in  with stenting of the circumflex that year as well.  Last coronary angiogram 2016 revealed the following:    Left main normal  LAD  mid  Circumflex severe in-stent restenosis  RCA  proximally  Patent SVG to RV marginal and PDA  Patent LIMA to LAD and diagonal  Occluded radial artery to PRUDENCE    PCI of the in-stent restenosis of the circumflex was performed at that time    Echocardiogram 2018 revealed normal LV systolic function without gross valvular pathology    The patient has been seeing  and LAMONTE Dominguez in the past.  He last saw Gisela Pierre 2024 and was clinically stable at the time.  The patient presents today for continuing care.    From a clinical standpoint, the patient has done well since his last visit.  He does not exercise on a regular basis but is able to walk and accomplish his activities of daily living without any the patient's denies chest pain, shortness of breath, dyspnea on exertion, PND, orthopnea, edema, palpitations, syncope, or near syncope..    He remembers prior to his bypass and stents he had just gone  running and came home and had a syncopal episode.  This stimulated a cardiac workup that included an EKG, stress test and eventually led to coronary angiography.  He has not had any recurrent symptoms of exertional dyspnea or numbness down his left arm since revascularization.    His blood pressures at home and stable at 121/71, and he does use his CPAP on a nightly basis.    The patient otherwise denies gross chest pain, shortness of breath, PND, orthopnea, edema, palpitations, syncope or near syncope.    MEDICATIONS:    1.  Amlodipine 5 mg a day  2.  Aspirin 81 mg daily  3.  Atorvastatin 80 mg a day  4.  Plavix 75 mg daily  5.  Colchicine 0.6 mg as needed  6.  Ezetimibe 10 mg a day  7.  Gemfibrozil 600 mg twice daily  8.  Allopurinol  9.  Buspirone  10.  Vitamin D  11.  Clonazepam  12.  Pristiq  13.  Flonase  14.  Gabapentin  15.  Golimumab  16.  Leflunomide  17.  Sildenafil  18.  Pantoprazole  19.  Sulfasalazine  20.  Tamsulosin 0.8 mg a day  21.  Tenofovir      PHYSICAL EXAM:    Blood pressures currently running 121/71, pulse 76, weight 140 pounds  General:  Patient appears comfortable, well groomed  Psych:  Patient is alert and oriented X 3  Eyes:  No gross erythema, exudate  Respiratory:  Patient is breathing comfortably without gross cough    The remainder of the comprehensive physical exam was deferred secondary to the COVID-19 pandemic and secondary to virtual visit restrictions.    LABS:    Nine 2024: LDL 66    4/25/2025: GFR normal, hemoglobin normal    Echocardiogram 7/2/2018 reveals ejection fraction of 60 to 65% without gross valvular pathology    IMPRESSION:    Lamont is a 59-year-old gentleman whose past medical history significant for anxiety/depression/suicidal ideation, hepatitis B, gout, rheumatoid arthritis, opioid dependence who has several active issues:    1.  Coronary artery disease    The patient is status post multivessel coronary bypass surgery 2008 with stenting of the circumflex coronary  "in 2008 as well with PCI for in-stent restenosis of the circumflex 9/19/2016.  He has normal LV systolic function by echo 7/2/2018.    From a clinical standpoint, he has not had any recurrent symptoms of exertional dyspnea and left arm numbness since his revascularization.  He feels well at a relatively low level of exertion accomplishing his ADLs.  Continue clinical monitoring for now.    2.  Hypertension    Blood pressures currently running 121/71 mmHg, continue to follow    3.  Lipids    Goal LDL less than 70, most recent LDL 5/6/2025 was 81, however he did miss his last dose of Repatha due to the prescription running out.  Will restart Repatha and recheck      PLAN:    1.  Cardiac prescriptions renewed today, specifically restarting evolocumab 140 mg every 2 weeks along wit other cardiac medications    2.  In person follow-up at [facility] in 1 year with labs prior, earlier if needed.    Once again, it was a pleasure participating in the care of your patient, Lamont Daniels .  Please feel free to contact me at any time if there are any questions regarding his care in the future.      Sincerely,          Stephen Guzman M.D.  Cardiovascular Division  [facility]      The patient has been notified of following:     Telemedicine Visit: The patient's condition can be safely assessed and treated via synchronous audio and visual telemedicine encounter.      Consent:  The patient/guardian has verbally consented to: the potential risks and benefits of telemedicine (video visit) versus in person care; bill my insurance or make self-payment for services provided; and responsibility for payment of non-covered services.     \"This video visit will be conducted via a call between you and your physician/provider. We have found that certain health care needs can be provided without the need for an in-person physical exam.  This service lets us provide the care you need with a video conversation.  If a " "prescription is necessary we can send it directly to your pharmacy.  If lab work is needed we can place an order for that and you can then stop by our lab to have the test done at a later time.    Video visits are billed at different rates depending on your insurance coverage.  Please reach out to your insurance provider with any questions.    If during the course of the call the physician/provider feels a video visit is not appropriate, you will not be charged for this service.\"    Patient has given verbal consent for video visit? Yes    How would you like to obtain your AVS? Mail    Video-Visit Details    Type of service:  Video Visit    Video Start Time:1228pm    Video End Time:1244pm    Total visit time including video visit, chart review, charting, coordination of care =58min    Originating Location (pt. Location):patient home      Distant Location (provider location):   Off site home office    Platform used for Video Visit: Lyudmila ESPARZA#: [id number]         "

## 2025-05-05 DIAGNOSIS — R42 VERTIGO: ICD-10-CM

## 2025-05-05 RX ORDER — MECLIZINE HYDROCHLORIDE 25 MG/1
TABLET ORAL
Qty: 30 TABLET | Refills: 3 | Status: SHIPPED | OUTPATIENT
Start: 2025-05-05

## 2025-05-06 ENCOUNTER — LAB (OUTPATIENT)
Dept: LAB | Facility: CLINIC | Age: 60
End: 2025-05-06
Payer: COMMERCIAL

## 2025-05-06 ENCOUNTER — MYC REFILL (OUTPATIENT)
Dept: PALLIATIVE MEDICINE | Facility: CLINIC | Age: 60
End: 2025-05-06

## 2025-05-06 DIAGNOSIS — G89.4 CHRONIC PAIN SYNDROME: ICD-10-CM

## 2025-05-06 DIAGNOSIS — M17.11 PRIMARY OSTEOARTHRITIS OF RIGHT KNEE: ICD-10-CM

## 2025-05-06 DIAGNOSIS — M47.819 FACET ARTHROPATHY: ICD-10-CM

## 2025-05-06 DIAGNOSIS — M06.9 RHEUMATOID ARTHRITIS INVOLVING MULTIPLE SITES, UNSPECIFIED WHETHER RHEUMATOID FACTOR PRESENT (H): ICD-10-CM

## 2025-05-06 DIAGNOSIS — F11.90 CHRONIC, CONTINUOUS USE OF OPIOIDS: ICD-10-CM

## 2025-05-06 DIAGNOSIS — E78.5 HYPERLIPIDEMIA LDL GOAL <100: ICD-10-CM

## 2025-05-06 LAB
CHOLEST SERPL-MCNC: 159 MG/DL
FASTING STATUS PATIENT QL REPORTED: YES
HDLC SERPL-MCNC: 54 MG/DL
LDLC SERPL CALC-MCNC: 81 MG/DL
NONHDLC SERPL-MCNC: 105 MG/DL
TRIGL SERPL-MCNC: 120 MG/DL

## 2025-05-06 PROCEDURE — 36415 COLL VENOUS BLD VENIPUNCTURE: CPT

## 2025-05-06 PROCEDURE — 80061 LIPID PANEL: CPT

## 2025-05-06 NOTE — TELEPHONE ENCOUNTER
Received request for a refill(s) of     BUTRANS 20 MCG/HR WK patch        Last dispensed from pharmacy on 4/9/2025    Patient's last office/virtual visit by prescribing provider on 3/25/2025  Next office/virtual appointment scheduled for 6/23/2025    Last urine drug screen date 3/25/2025  Current opioid agreement on file (completed within the last year) YesDate of opioid agreement: 3/26/2025    E-prescribe to [facility] [address] [place] [facility] [address] [facility]  pharmacy    Will route to nursing [place] for review and preparation of prescription(s).

## 2025-05-07 RX ORDER — BUPRENORPHINE 20 UG/H
1 PATCH, EXTENDED RELEASE TRANSDERMAL
Qty: 4 PATCH | Refills: 0 | Status: SHIPPED | OUTPATIENT
Start: 2025-05-07

## 2025-05-07 NOTE — TELEPHONE ENCOUNTER
Medication refill information reviewed.     Due date for BUTRANS 20 MCG/HR WK patch  is 05/09/25     Prescriptions prepped for review.     Will route to provider.

## 2025-05-07 NOTE — TELEPHONE ENCOUNTER
Signed Prescriptions:                        Disp   Refills    BUTRANS 20 MCG/HR WK patch                 4 patch0        Sig: Place 1 patch onto the skin every 7 days. LISANDRO, name           brand. OK to fill 05/07/25 and start 05/09/25.            28 day script for chronic pain  Authorizing Provider: AMEENA PAGE        Reviewed [place]  May 7, 2025- no concerning fills.    Ameena LEMUS, RN CNP, FNP  [facility]  [facility]

## 2025-05-09 ENCOUNTER — RESULTS FOLLOW-UP (OUTPATIENT)
Dept: RHEUMATOLOGY | Facility: CLINIC | Age: 60
End: 2025-05-09

## 2025-05-13 ENCOUNTER — VIRTUAL VISIT (OUTPATIENT)
Dept: CARDIOLOGY | Facility: OTHER | Age: 60
End: 2025-05-13
Attending: INTERNAL MEDICINE
Payer: COMMERCIAL

## 2025-05-13 VITALS — BODY MASS INDEX: 25.76 KG/M2 | HEIGHT: 62 IN | WEIGHT: 140 LBS

## 2025-05-13 DIAGNOSIS — Z95.1 S/P CABG (CORONARY ARTERY BYPASS GRAFT): ICD-10-CM

## 2025-05-13 DIAGNOSIS — E78.5 DYSLIPIDEMIA: Primary | ICD-10-CM

## 2025-05-13 DIAGNOSIS — I25.719 ATHEROSCLEROSIS OF AUTOLOGOUS VEIN CORONARY ARTERY BYPASS GRAFT WITH ANGINA PECTORIS: ICD-10-CM

## 2025-05-13 DIAGNOSIS — I25.810 CORONARY ARTERY DISEASE INVOLVING AUTOLOGOUS ARTERY CORONARY BYPASS GRAFT WITHOUT ANGINA PECTORIS: ICD-10-CM

## 2025-05-13 DIAGNOSIS — E78.5 HYPERLIPIDEMIA LDL GOAL <70: ICD-10-CM

## 2025-05-13 DIAGNOSIS — I25.119 ATHEROSCLEROSIS OF NATIVE CORONARY ARTERY OF NATIVE HEART WITH ANGINA PECTORIS: ICD-10-CM

## 2025-05-13 DIAGNOSIS — I10 ESSENTIAL HYPERTENSION WITH GOAL BLOOD PRESSURE LESS THAN 140/90: ICD-10-CM

## 2025-05-13 DIAGNOSIS — E78.5 HYPERLIPIDEMIA LDL GOAL <100: ICD-10-CM

## 2025-05-13 RX ORDER — ATORVASTATIN CALCIUM 80 MG/1
80 TABLET, FILM COATED ORAL DAILY
Qty: 90 TABLET | Refills: 3 | Status: SHIPPED | OUTPATIENT
Start: 2025-05-13

## 2025-05-13 RX ORDER — AMLODIPINE BESYLATE 5 MG/1
5 TABLET ORAL DAILY
Qty: 90 TABLET | Refills: 3 | Status: SHIPPED | OUTPATIENT
Start: 2025-05-13

## 2025-05-13 RX ORDER — CLOPIDOGREL BISULFATE 75 MG/1
75 TABLET ORAL DAILY
Qty: 90 TABLET | Refills: 3 | Status: SHIPPED | OUTPATIENT
Start: 2025-05-13

## 2025-05-13 RX ORDER — NITROGLYCERIN 0.4 MG/1
TABLET SUBLINGUAL
Qty: 25 TABLET | Refills: 11 | Status: SHIPPED | OUTPATIENT
Start: 2025-05-13

## 2025-05-13 RX ORDER — GEMFIBROZIL 600 MG/1
600 TABLET, FILM COATED ORAL 2 TIMES DAILY
Qty: 180 TABLET | Refills: 3 | Status: SHIPPED | OUTPATIENT
Start: 2025-05-13

## 2025-05-13 RX ORDER — EZETIMIBE 10 MG/1
10 TABLET ORAL
Qty: 90 TABLET | Refills: 3 | Status: SHIPPED | OUTPATIENT
Start: 2025-05-13

## 2025-05-13 ASSESSMENT — PAIN SCALES - GENERAL: PAINLEVEL_OUTOF10: MODERATE PAIN (5)

## 2025-05-13 NOTE — NURSING NOTE
Current patient location: [address] [place] [id number]    Is the patient currently in the state of [place]? YES    Visit mode: VIDEO    If the visit is dropped, the patient can be reconnected by:VIDEO VISIT: Text to cell phone:   Telephone Information:   Mobile [phone]    and VIDEO VISIT: Send to e-mail at: [email or web address]    Will anyone else be joining the visit? NO  (If patient encounters technical issues they should call [phone]    Are changes needed to the allergy or medication list? No    Patient denies any changes since echeck-in completion and states all information entered during echeck-in remains accurate.    Are refills needed on medications prescribed by this physician? NA    Rooming Documentation:  Questionnaire(s) completed    No other vitals to report today    Reason for visit: Consult    Maria Fernanda Bright MA VVF

## 2025-05-13 NOTE — PATIENT INSTRUCTIONS
1.  Cardiac prescriptions renewed today, specifically restarting evolocumab 140 mg every 2 weeks along with all other cardiac medications    2.  In person follow-up at [facility] in 1 year with labs prior, earlier if needed.

## 2025-06-09 ASSESSMENT — PATIENT HEALTH QUESTIONNAIRE - PHQ9
SUM OF ALL RESPONSES TO PHQ QUESTIONS 1-9: 18
SUM OF ALL RESPONSES TO PHQ QUESTIONS 1-9: 18
10. IF YOU CHECKED OFF ANY PROBLEMS, HOW DIFFICULT HAVE THESE PROBLEMS MADE IT FOR YOU TO DO YOUR WORK, TAKE CARE OF THINGS AT HOME, OR GET ALONG WITH OTHER PEOPLE: EXTREMELY DIFFICULT

## 2025-06-10 ENCOUNTER — OFFICE VISIT (OUTPATIENT)
Dept: FAMILY MEDICINE | Facility: CLINIC | Age: 60
End: 2025-06-10
Attending: FAMILY MEDICINE
Payer: COMMERCIAL

## 2025-06-10 ENCOUNTER — RESULTS FOLLOW-UP (OUTPATIENT)
Dept: FAMILY MEDICINE | Facility: CLINIC | Age: 60
End: 2025-06-10

## 2025-06-10 VITALS
HEART RATE: 79 BPM | HEIGHT: 62 IN | DIASTOLIC BLOOD PRESSURE: 80 MMHG | RESPIRATION RATE: 18 BRPM | SYSTOLIC BLOOD PRESSURE: 131 MMHG | BODY MASS INDEX: 28.16 KG/M2 | TEMPERATURE: 97.6 F | WEIGHT: 153 LBS | OXYGEN SATURATION: 100 %

## 2025-06-10 DIAGNOSIS — Z12.5 SCREENING FOR PROSTATE CANCER: ICD-10-CM

## 2025-06-10 DIAGNOSIS — E78.5 HYPERLIPIDEMIA LDL GOAL <70: ICD-10-CM

## 2025-06-10 DIAGNOSIS — R73.09 ELEVATED GLUCOSE: ICD-10-CM

## 2025-06-10 DIAGNOSIS — K21.9 GASTROESOPHAGEAL REFLUX DISEASE WITHOUT ESOPHAGITIS: ICD-10-CM

## 2025-06-10 DIAGNOSIS — M10.9 GOUT WITHOUT TOPHUS: ICD-10-CM

## 2025-06-10 DIAGNOSIS — Z00.00 ENCOUNTER FOR MEDICARE ANNUAL WELLNESS EXAM: Primary | ICD-10-CM

## 2025-06-10 DIAGNOSIS — I10 ESSENTIAL HYPERTENSION WITH GOAL BLOOD PRESSURE LESS THAN 140/90: ICD-10-CM

## 2025-06-10 DIAGNOSIS — F33.1 MAJOR DEPRESSIVE DISORDER, RECURRENT EPISODE, MODERATE (H): ICD-10-CM

## 2025-06-10 LAB
EST. AVERAGE GLUCOSE BLD GHB EST-MCNC: 128 MG/DL
HBA1C MFR BLD: 6.1 % (ref 0–5.6)
PSA SERPL DL<=0.01 NG/ML-MCNC: 0.49 NG/ML (ref 0–3.5)

## 2025-06-10 PROCEDURE — G0103 PSA SCREENING: HCPCS | Performed by: FAMILY MEDICINE

## 2025-06-10 PROCEDURE — 1125F AMNT PAIN NOTED PAIN PRSNT: CPT | Performed by: FAMILY MEDICINE

## 2025-06-10 PROCEDURE — 3079F DIAST BP 80-89 MM HG: CPT | Performed by: FAMILY MEDICINE

## 2025-06-10 PROCEDURE — 83036 HEMOGLOBIN GLYCOSYLATED A1C: CPT | Performed by: FAMILY MEDICINE

## 2025-06-10 PROCEDURE — G0439 PPPS, SUBSEQ VISIT: HCPCS | Performed by: FAMILY MEDICINE

## 2025-06-10 PROCEDURE — G2211 COMPLEX E/M VISIT ADD ON: HCPCS | Performed by: FAMILY MEDICINE

## 2025-06-10 PROCEDURE — 99214 OFFICE O/P EST MOD 30 MIN: CPT | Mod: 25 | Performed by: FAMILY MEDICINE

## 2025-06-10 PROCEDURE — 36415 COLL VENOUS BLD VENIPUNCTURE: CPT | Performed by: FAMILY MEDICINE

## 2025-06-10 PROCEDURE — 3075F SYST BP GE 130 - 139MM HG: CPT | Performed by: FAMILY MEDICINE

## 2025-06-10 PROCEDURE — 3044F HG A1C LEVEL LT 7.0%: CPT | Performed by: FAMILY MEDICINE

## 2025-06-10 RX ORDER — PANTOPRAZOLE SODIUM 40 MG/1
40 TABLET, DELAYED RELEASE ORAL DAILY
Qty: 90 TABLET | Refills: 3 | Status: SHIPPED | OUTPATIENT
Start: 2025-06-10

## 2025-06-10 RX ORDER — BUSPIRONE HYDROCHLORIDE 30 MG/1
30 TABLET ORAL 2 TIMES DAILY
Qty: 180 TABLET | Refills: 3 | Status: SHIPPED | OUTPATIENT
Start: 2025-06-10

## 2025-06-10 ASSESSMENT — PAIN SCALES - GENERAL: PAINLEVEL_OUTOF10: MODERATE PAIN (5)

## 2025-06-10 ASSESSMENT — ACTIVITIES OF DAILY LIVING (ADL): CURRENT_FUNCTION: NEEDS ASSISTANCE

## 2025-06-10 NOTE — PATIENT INSTRUCTIONS
At [facility], we strive to deliver an exceptional experience to you, every time we see you. If you receive a survey, please let us know what we are doing well and/or what we could improve upon, as we do value your feedback.  If you have MyChart, you can expect to receive results automatically within 24 hours of their completion.  Your provider will send a note interpreting your results as well.   If you do not have MyChart, you should receive your results in about a week by mail.    Your care team:                            Family Medicine Internal Medicine   MD Noam Dorado, MD Zainab Coreas, MD Jason Garibay, MD Niki Smith, PAXavierC MARIAH Woodard, JAMES Chavez, MD Pediatrics   MD Sarika Sanchez, MD Perla Fall, PAISAK Huggins APRN CNP   Mickey Clayton, MD Tiffany Loya, MD Corie Payne, CNP      Same-Day Provider (No follow-up visits)    NATE Rain PA-C     Clinic hours: Monday - Thursday 7 am-6 pm; Fridays 7 am-5 pm.   Urgent care: Monday - Friday 10 am- 8 pm; Saturday and Sunday 9 am-5 pm.    Clinic: [phone]       [facility]: Monday - Thursday 8 am - 7 pm; Friday 8 am - 6 pm  [facility]: [phone]     [facility] Scheduling: Monday - Friday 7 am - 7 pm; Saturday 7 am - 3:30 pm  [phone] [place] : [phone]    Patient Education   Jessica Alden Xeeb Saib Xyuas Kom Tiv Thaiv Tus Kheej  Nov yog ib co jessica alden xeeb uas peb yeej meem muab donovan tib neeg pab lawv noj qab nyob zoo. Everette zaum koj pab pawg saib xyuas yuav muaj ib co jessica alden xeeb uas tshwj xeeb donovan koj nkaus xwb. Thov nrog koj pab pawg saib xyuas sib seth txog everette yam uas koj toob nohemi kom tiv thaiv koj tus kheej.  Grayson Coj Lub Neej  Es xaws xais ntau betsey 150 feeb txhua lub wilson tiam (30 feeb txhua hnub, 5 hnub ib lub wilson tiam).  Ua  "yam pab cov leeg muaj zog tuaj 2 zaug txhua lub wilson tiam. Gucci yam no pab koj tswj hwm koj lub cev qhov hnyav thiab tiv thaiv ntawm kab mob.  Txhob haus luam yeeb.  Pleev tshuaj tiv thaiv tshav kub kom thiaj tsis raug mob khees xaws ntawm nqaij tawv.  Txhua 2 mus donovan 5 xyoos yuav tau kuaj koj lub tsev donovan qhov radon. Radon yog ib kerri melissa uas tsis muaj xim tsis muaj ntxhiab uas mob tau koj cov ntsws. Kom thiaj kawm ntxiv, mus saib [email or web address]. thiab ntaus ntawv \"Radon in Homes.\"  Ceev cov phom kom tsis muaj mos txwv donovan hauv thiab muab xauv sally hauv ib qho chaw nyab xeeb xws li lub txhoj, los yog muab xauv sally thiab muab cov yawm sij zais sally. Muab cov mos txwv xauv donovan hauv lwm qhov chaw nrug cov phom. Kom thiaj kawm ntxiv, mus saib dps.[email or web address] thiab ntaus ntawv \"safe gun storage.\"  Aditya Noj Qab Huv  Noj 5 qho txiv hmab txiv ntoo thiab zaub txhua hnub.  Noj nplem nplej, txhuv xim av thiab cov fawm muaj nplej (los theej nplem dawb, txhuv dawb, thiab fawm dawb).  Ua tib zoo noj calcium thiab vitamin D ntau. Saib cov ntawv lo donovan pob khoom noj thiab siv zog noj kom txog 100% RDA (qhov ntau hauv ib hnub).  Yeej meem kuaj ntsuas  Txhua 6 lub hli mus kuaj hniav thiab muab tu.  Txhua xyoo mus saib koj pab pawg saib xyuas aditya noj qab nyob zoo kom sib seth txog:  Ib yam dab tsi uas hloov ntawm koj txoj aditya noj qab nyob zoo.  Cov tshuaj uas koj pab pawg tau hais kom siv.  Aditya saib xyuas kom tiv thaiv, aditya npaj donovan tsev neeg, thiab yuav ua li umu tiv thaiv ntawm kab mob uas ntev.  Aditya txhaj tshuaj (koob tshuaj tiv thaiv)   Cov koob tshuaj HPV (txog thaum muaj 26 xyoo), yog koj yeej tsis tau txais betsey li.  Cov koob tshuaj Hepatitis B Kab Mob Siab (txog thaum muaj 59 xyoos), yog koj yeej tsis tau txais betsey li.  Koob tshuaj COVID-19: Txais koob tshuaj no thaum txog caij txais.  Koob tshuaj tiv thaiv ntawm mob khaub thuas: Txais txhua xyoo.  Koob tshuaj tiv thaiv mob daig tsaig: Txais txhua 10 xyoo.  Pneumococcal, hepatitis " A, thiab RSV cov koob tshuaj: Nug koj pab pawg saib xyuas marilyn puas tsim nyog donovan koj txais cov no raws li koj qhov pheej hmoo.  Koob tshuaj tiv thaiv ntawm kab mob sawv hlwv (donovan cov muaj 50 xyoo rov michelle).  Aditya kuaj ntsuas donovan aditya noj qab nyob zoo  Kuaj mob ntshav qab zib:  Pib thaum muaj 35 xyoos, Mus kuaj mob ntshav qab zib txhua 3 xyoos los yog ntau zog.  Yog koj tseem tsis tau txog 35 xyoos, nug koj pab pawg saib xyuas marilyn puas tsim nyog donovan koj kuaj mob ntshav qab zib.  Kuaj cholesterol: Thaum muaj 39 xyoo, pib kuaj cholesterol txhua 5 xyoos, los yog ntau betsey ntawd yog kws debbie mob qhia.  Kuaj txha qhov tuab (DEXA): Thaum txog 50 xyoo lawd, nug koj pab pawg saib xyuas marilyn puas tsim nyog donovan koj kuaj txha marilyn puas ruaj.  Kab Mob Siab Hepatitis C: Kuaj ib zaug hauv koj lub neej.  Kuaj Txoj Hlab Ntshav Hauv Plab: Nrog koj tus kws debbie mob seth txog aditya kuaj ntsuas no yog koj:  Tau haus luam yeeb ib zaug li; thiab  Yog txiv neej; thiab  Nruab hnub nyoog 65 thiab 75.  Mob Nohemi Cees (kis mob dhau ntawm aditya sib deev)  Ua ntej muaj 24 xyoos: Nug koj pab pawg saib xyuas marilyn puas tsim nyog kuaj donovan mob nohemi cees.  Shaheed qab muaj 24 xyoos: Mus kuaj donovan mob nohemi cees yog koj muaj aditya pheej hmoo. Koj muaj aditya pheej hmoo donovan mob nohemi cees (suav nrog HIV) yog:  Koj sib deev nrog ntau betsey ib tug neeg.  Koj tsis siv cov hnab looj qau thaum sib deev.  Koj los yog koj tus khub deev kuaj pom tias muaj mob nohemi cees lawm.  Yog koj muaj aditya pheej hmoo donovan mob nohemi cees HIV, nug txog cov tshuaj PrEP kom tiv thaiv ntawm HIV.  Mus kuaj odnovan mob nohemi cees HIV yam ntau betsey ib zaug hauv koj lub neej, txawm yog koj muaj aditya pheej hmoo donovan mob nohemi cees HIV los tsis muaj los xij.  Kuaj donovan mob khees xaws  Kuaj lub ncauj tsev me nyuam donovan mob khees xaws: Yog koj muaj ib lub ncauj tsev me nyuam, tila yuav tau ib sij kuaj lub ncauj tsev me nyuam seb puas muaj mob khees xaws pib thaum muaj 21 xyoos. Neeg feem coob uas ib sij kuaj lub ncauj tsev me nyuam  thiab tsis pom dab tsi txawv lawv tsum tau liliya qab muaj 65 xyoos. Nrog koj tus kws debbie mob sib seth txog qhov no.  Kuaj lub mis donovan mob khees xaws (mammogram): Yog koj tau muaj mis betsey li, yuav tau ib sij kuaj lub mis pib thaum muaj 40 xyoo. Aditya kuaj no yog kom saib seb puas muaj mob khees xaws hauv lub mis.  Kuaj Txoj Hnyuv Donovan Mob Khees Xaws: Yeej tseem ceeb pib kuaj txoj hnyuv donovan mob khees xaws pib thaum muaj 45 xyoos.  Txhua 10 xyoo yuav tau kuaj txoj hnyuv (los yog ntau zog yog koj muaj aditya pheej hmoo) Los sis, nug koj tus kws debbie mob txog aditya kuaj thooj quav FIT txhua xyoo los yog Cologuard txhua 3 xyoos.  Kom thiaj kawm ntxiv txog everette kerri kuaj no, mus saib: [email or web address].  Yog xav tau aditya pab txog qhov no, mus saib: [email or web address].  Kuaj lub romy kua phev (prostate) donovan mob khees xaws: Yog koj muaj ib lub romy kua phev (prostate) thiab nruab hnub nyoog 55 mus donovan 69, nug koj tus kws debbie mob marilyn puas tsim nyog donovan koj kuaj lub romy kua phev.  Kuaj ntsws donovan mob khees xaws: Yog koj haus luam yeeb fallon sim no los yog tau haus yav tas los uas muaj hnub nyoog nruab 50 xyoos mus donovan 80 xyoo, nug koj pab pawg saib xyuas marilyn puas tsim nyog donovan koj yeej meem kuaj lub ntsws donovan mob khees xaws.    Donovan cov kerri phiaj aditya siv ua ntaub ntawv qhia nkaus xwb. Yuav tsis pauv aditya qhia los ntawm koj qhov chaw debbie mob. Copyright (kirk brink)   2023 [facility].   Txhua txoj cuba raug tswj tseg lawm. Tshaj xyuas los ntawm [facility]. [employer] [id number] - REV 04/24.  Learning About Depression Screening  What is depression screening?  Depression screening is a way to see if you have depression symptoms. It may be done by a doctor or counselor. It's often part of a routine checkup. That's because your mental health is just as important as your physical health.  Depression is a mental health condition that affects how you feel, think, and act. You may:  Have less energy.  Lose  "interest in your daily activities.  Feel sad and grouchy for a long time.  Depression is very common. It affects people of all ages.  Many things can lead to depression. Some people become depressed after they have a stroke or find out they have a major illness like cancer or heart disease. The death of a loved one or a breakup may lead to depression. It can run in families. Most experts believe that a combination of inherited genes and stressful life events can cause it.  What happens during screening?  You may be asked to fill out a form about your depression symptoms. You and the doctor will discuss your answers. The doctor may ask you more questions to learn more about how you think, act, and feel.  What happens after screening?  If you have symptoms of depression, your doctor will talk to you about your options.  Doctors usually treat depression with medicines or counseling. Often, combining the two works best. Many people don't get help because they think that they'll get over the depression on their own. But people with depression may not get better unless they get treatment.  The cause of depression is not well understood. There may be many factors involved. But if you have depression, it's not your fault.  A serious symptom of depression is thinking about death or suicide. If you or someone you care about talks about this or about feeling hopeless, get help right away.  It's important to know that depression can be treated. Medicine, counseling, and self-care may help.  Where can you learn more?  Go to https://www.tomoguides.net/patiented  Enter T185 in the search box to learn more about \"Learning About Depression Screening.\"  Current as of: July 31, 2024  Content Version: 14.5    1646-8307 Epic!.   Care instructions adapted under license by your healthcare professional. If you have questions about a medical condition or this instruction, always ask your healthcare professional. [employer] disclaims any warranty or liability for your use of this information.

## 2025-06-10 NOTE — PROGRESS NOTES
"  Assessment & Plan     (Z00.00) Encounter for Medicare annual wellness exam  (primary encounter diagnosis)  Comment:   Plan: as below.    (I10) Essential hypertension with goal blood pressure less than 140/90  Comment:   Plan: controlled. Renal function and lytes has been normal.    (E78.5) Hyperlipidemia LDL goal <70  Comment:   Plan: at goal.    (M10.9) Gout without tophus  Comment:   Plan: uric acid at goal.    (R73.09) Elevated glucose  Comment:   Plan: Hemoglobin A1c        Monitor.    (Z12.5) Screening for prostate cancer  Comment:   Plan: PSA, screen            (F33.1) Major depressive disorder, recurrent episode, moderate (H)  Comment:   Plan: busPIRone HCl (BUSPAR) 30 MG tablet        Stable.    (K21.9) Gastroesophageal reflux disease without esophagitis  Comment:   Plan: pantoprazole (PROTONIX) 40 MG EC tablet        Stable.          BMI  Estimated body mass index is 27.98 kg/m  as calculated from the following:    Height as of this encounter: 1.575 m (5' 2\").    Weight as of this encounter: 69.4 kg (153 lb).       Depression Screening Follow Up        6/9/2025     9:32 AM   PHQ   PHQ-9 Total Score 18    Q9: Thoughts of better off dead/self-harm past 2 weeks Several days   F/U: Thoughts of suicide or self-harm No   F/U: Safety concerns No       Patient-reported         6/9/2025     9:32 AM   Last PHQ-9   1.  Little interest or pleasure in doing things 1   2.  Feeling down, depressed, or hopeless 3   3.  Trouble falling or staying asleep, or sleeping too much 3   4.  Feeling tired or having little energy 3   5.  Poor appetite or overeating 2   6.  Feeling bad about yourself 2   7.  Trouble concentrating 2   8.  Moving slowly or restless 1   Q9: Thoughts of better off dead/self-harm past 2 weeks 1   PHQ-9 Total Score 18    In the past two weeks have you had thoughts of suicide or self harm? No   Do you have concerns about your personal safety or the safety of others? No       Patient-reported              " No data to display                      Follow Up Actions Taken  Crisis resource information provided in the After Visit Summary    Discussed the following ways the patient can remain in a safe environment:  be around others    Follow-up    Follow-up Visit   Expected date:  Dec 10, 2025 (Approximate)      Follow Up Appointment Details:     Follow-up with whom?: Me    Follow-Up for what?: Chronic Disease f/u    Chronic Disease f/u: Hypertension    How?: In Person                 Carito Miguel is a 59 year old, presenting for the following health issues:  Physical        6/10/2025     8:07 AM   Additional Questions   Roomed by Jude   Accompanied by Self         6/10/2025   Forms   Any forms needing to be completed Yes     Healthy Habits:     Taking medications regularly:  0  History of Present Illness       Reason for visit:  Annual visits   He is taking medications regularly.        Annual Wellness Visit     Patient has been advised of split billing requirements and indicates understanding: Yes         Health Care Directive  Patient does not have a Health Care Directive: Discussed advance care planning with patient; information given to patient to review.  In general, how would you rate your overall physical health? (!) POOR   Discussed with patient their rating of physical health; information has been provided.   Do you have a special diet?  Regular (no restrictions)        6/11/2024   Exercise, Social Connection, Stress   Days per week of moderate/strenous exercise 0 days   Frequency of gathering with friends or relatives Once   Feel stress (tense, anxious, or unable to sleep) Very much     Do you see a dentist two times every year?  (!) NO  The patient was instructed to see the dentist every 6 months.   Have you been more tired than usual lately?  No  If you drink alcohol do you typically have >3 drinks per day or >7 drinks per week? No  Do you have a current opioid prescription? (!) YES   How severe is your  pain on a scale from 1-10? 5/10          No data to display              Low Risk (0-3)  Moderate Risk (4-7)  High Risk (>8)  Do you use any other controlled substances or medications that are not prescribed by a provider? None  Social History     Tobacco Use    Smoking status: Never     Passive exposure: Never    Smokeless tobacco: Never   Vaping Use    Vaping status: Never Used   Substance Use Topics    Alcohol use: No     Alcohol/week: 0.0 standard drinks of alcohol    Drug use: No       Needs assistance for the following daily activities: (!) PREPARING MEALS, (!) HOUSEWORK, and (!) DRESSING  Which of the following safety concerns are present in your home?  none identified   Do you (or your family members) have any concerns about your safety while driving?  No  Do you have any of the following hearing concerns?: No hearing concerns  In the past 6 months, have you been bothered by leaking of urine? (!) YES   Information on urinary incontinence and treatment options given to patient.        6/11/2024   Social Factors   Frequency of gathering with friends or relatives Once a week   Worry food won't last until get money to buy more Patient declined   Food not last or not have enough money for food? Patient declined   Do you have housing? (Housing is defined as stable permanent housing and does not include staying outside in a car, in a tent, in an abandoned building, in an overnight shelter, or couch-surfing.) Yes   Are you worried about losing your housing? No   Lack of transportation? No   Unable to get utilities (heat,electricity)? No          6/10/2025   Fall Risk   Fallen 2 or more times in the past year? No   Trouble with walking or balance? No        Today's PHQ-9 Score:       6/9/2025     9:32 AM   PHQ-9 SCORE   PHQ-9 Total Score MyChart 18 (Moderately severe depression)   PHQ-9 Total Score 18        Patient-reported     Last PSA:   PSA   Date Value Ref Range Status   03/19/2021 0.90 0 - 4 ug/L Final      Comment:     Assay Method:  Chemiluminescence using Siemens Vista analyzer     Prostate Specific Antigen Screen   Date Value Ref Range Status   06/11/2024 0.76 0.00 - 3.50 ng/mL Final   06/07/2022 0.64 0.00 - 4.00 ug/L Final     ASCVD Risk   The 10-year ASCVD risk score (Eh VALLADARES, et al., 2019) is: 7.3%    Values used to calculate the score:      Age: 59 years      Sex: Male      Is Non- : No      Diabetic: No      Tobacco smoker: No      Systolic Blood Pressure: 131 mmHg      Is BP treated: Yes      HDL Cholesterol: 54 mg/dL      Total Cholesterol: 159 mg/dL      Reviewed and updated as needed this visit by Provider                    Past Medical History:   Diagnosis Date    Anxiety     CAD (coronary artery disease)     CABG, PCI    Congestive heart failure, unspecified 2008    Depressive disorder 2008    Gout     Hepatitis B > 10 years    HTN (hypertension)     Hyperlipidemia LDL goal < 100     Malrotation of intestine (H)     Moderate major depression (H)     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) 9/19/2011    Uses CPAP    Rheumatoid arthritis flare (H) 1012    Steatohepatitis 12/15    liver biopsy    Tuberculosis age 13    INH x 9 months     Vitamin D deficiency      Past Surgical History:   Procedure Laterality Date    ABDOMEN SURGERY  2014    BIOPSY  2015    BYPASS GRAFT ARTERY CORONARY  2008    6 vessels    COLONOSCOPY  2/8/2013    Procedure: COLONOSCOPY;  Colonoscopy, blood in stool;  Surgeon: Duane, William Charles, MD;  Location: [facility]    COMBINED REPAIR PTOSIS WITH BLEPHAROPLASTY BILATERAL Bilateral 6/25/2018    Procedure: COMBINED REPAIR PTOSIS WITH BLEPHAROPLASTY BILATERAL;  Bilateral upper eyelid blepharoplasty, ptosis repair and brow ptosis repair;  Surgeon: Sandra Borja MD;  Location: [facility] OR    GI SURGERY  2014    HEAD & NECK SURGERY  2011    NASAL/SINUS POLYPECTOMY  2010    ORTHOPEDIC SURGERY  2012    REPAIR PTOSIS      REPAIR PTOSIS BILATERAL Bilateral 7/22/2019    Procedure:  REPAIR, PTOSIS, BOTH UPPER brows;  Surgeon: Sandra Borja MD;  Location: [facility]    REPAIR PTOSIS BROW BILATERAL Bilateral 6/25/2018    Procedure: REPAIR PTOSIS BROW BILATERAL;;  Surgeon: Sandra Borja MD;  Location:  OR    THORACIC SURGERY  1989    tb    TONSILLECTOMY  2010    UVULOPALATOPHARYNGOPLASTY  2010    VASCULAR SURGERY  2008    [facility] CORONARY STENT BRITTANY, CIERA ADDTL VESSEL  2008    3 months after CABG     Lab work is in process  Labs reviewed in EPIC  BP Readings from Last 3 Encounters:   06/10/25 131/80   03/25/25 121/71   01/23/25 131/81    Wt Readings from Last 3 Encounters:   06/10/25 69.4 kg (153 lb)   05/13/25 63.5 kg (140 lb)   01/23/25 66 kg (145 lb 6.4 oz)                  Patient Active Problem List   Diagnosis    Coronary atherosclerosis of native coronary artery    Major depressive disorder, recurrent episode, moderate (H)    Anxiety    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)    Chronic viral hepatitis B without delta agent and without coma (H)    Vitamin D deficiency    S/P CABG (coronary artery bypass graft)    Malrotation of intestine (H)    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    Hyperlipidemia LDL goal <70    Insomnia    Gout    Suicidal ideation    Essential hypertension    Rheumatoid arthritis involving multiple sites, unspecified rheumatoid factor presence    High risk medications (not anticoagulants) long-term use    Midline low back pain without sciatica    Bilateral low back pain with sciatica, sciatica laterality unspecified    Elevated liver enzymes    On corticosteroid therapy    Essential hypertension with goal blood pressure less than 140/90    Insomnia, unspecified type    Rheumatoid arthritis of multiple sites with negative rheumatoid factor (H)    Benign prostatic hyperplasia with lower urinary tract symptoms, unspecified morphology    Chronic pain syndrome    Syncope, unspecified syncope type    Symptomatic cholelithiasis    H/O: gout    Anxiety and depression     F11.2 - Continuous opioid dependence (H)     Past Surgical History:   Procedure Laterality Date    ABDOMEN SURGERY  2014    BIOPSY  2015    BYPASS GRAFT ARTERY CORONARY  2008    6 vessels    COLONOSCOPY  2/8/2013    Procedure: COLONOSCOPY;  Colonoscopy, blood in stool;  Surgeon: Duane, William Charles, MD;  Location: [facility]    COMBINED REPAIR PTOSIS WITH BLEPHAROPLASTY BILATERAL Bilateral 6/25/2018    Procedure: COMBINED REPAIR PTOSIS WITH BLEPHAROPLASTY BILATERAL;  Bilateral upper eyelid blepharoplasty, ptosis repair and brow ptosis repair;  Surgeon: Sandra Borja MD;  Location:  OR    GI SURGERY  2014    HEAD & NECK SURGERY  2011    NASAL/SINUS POLYPECTOMY  2010    ORTHOPEDIC SURGERY  2012    REPAIR PTOSIS      REPAIR PTOSIS BILATERAL Bilateral 7/22/2019    Procedure: REPAIR, PTOSIS, BOTH UPPER brows;  Surgeon: Sandra Borja MD;  Location: [facility]    REPAIR PTOSIS BROW BILATERAL Bilateral 6/25/2018    Procedure: REPAIR PTOSIS BROW BILATERAL;;  Surgeon: Sandra Borja MD;  Location:  OR    THORACIC SURGERY  1989    tb    TONSILLECTOMY  2010    UVULOPALATOPHARYNGOPLASTY  2010    [facility] CI[facility] S[facility]  [facility]    3 months after CABG       Social History     Tobacco Use    Smoking status: Never     Passive exposure: Never    Smokeless tobacco: Never   Substance Use Topics    Alcohol use: No     Alcohol/week: 0.0 standard drinks of alcohol     Family History   Problem Relation Age of Onset    C.A.D. Father     Coronary Artery Disease Father     Hypertension Father     Depression Father     Hypertension Mother     Diabetes Mother     Depression Mother     Mental Illness Mother     Hypertension Maternal Grandfather     Cancer Paternal Grandfather     Other Cancer Paternal Grandfather     Other Cancer Other     Autoimmune Disease No family hx of     Cerebrovascular Disease No family hx of     Thyroid Disease No family hx of     Glaucoma No family hx of      Macular Degeneration No family hx of          Current Outpatient Medications   Medication Sig Dispense Refill    busPIRone HCl (BUSPAR) 30 MG tablet Take 1 tablet (30 mg) by mouth 2 times daily. 180 tablet 3    pantoprazole (PROTONIX) 40 MG EC tablet Take 1 tablet (40 mg) by mouth daily. 90 tablet 3    Acetaminophen (TYLENOL PO)       allopurinol (ZYLOPRIM) 300 MG tablet Take 1 tablet (300 mg) by mouth daily. 90 tablet 3    amLODIPine (NORVASC) 5 MG tablet Take 1 tablet (5 mg) by mouth daily. for blood pressure 90 tablet 3    aspirin EC 81 MG EC tablet Take 1 tablet (81 mg) by mouth daily (*) 30 tablet 1    atorvastatin (LIPITOR) 80 MG tablet Take 1 tablet (80 mg) by mouth daily. 90 tablet 3    baclofen (LIORESAL) 10 MG tablet Take 1 tablet (10 mg) by mouth 2 times daily as needed for muscle spasms. 3 month supply. 180 tablet 1    BUTRANS 20 MCG/HR WK patch Place 1 patch onto the skin every 7 days. LISANDRO, name brand. OK to fill 05/07/25 and start 05/09/25.  28 day script for chronic pain 4 patch 0    Cholecalciferol (VITAMIN D) 2000 UNITS tablet TAKE ONE TABLET BY MOUTH ONCE DAILY 100 tablet 1    clobetasol (TEMOVATE) 0.05 % external cream Apply twice daily for 2 weeks, weekends only for 2 weeks, repeat cycle as needed for hands, not face or genitals 60 g 4    clobetasol (TEMOVATE) 0.05 % external solution Apply twice daily to scalp for up to 2 weeks for flares. 60 mL 0    clonazePAM (KLONOPIN) 1 MG tablet TAKE 0.5-1 TABLETS BY MOUTH 2 TIMES DAILY AS NEEDED FOR ANXIETY 90 tablet 0    clopidogrel (PLAVIX) 75 MG tablet Take 1 tablet (75 mg) by mouth daily. 90 tablet 3    colchicine (COLCRYS) 0.6 MG tablet TAKE 2 TABLETS BY MOUTH AT THE FIRST SIGN OF FLARE, TAKE 1 ADDITIONAL TABLET ONE HOUR LATER. 90 tablet 0    desvenlafaxine (PRISTIQ) 100 MG 24 hr tablet TAKE 1 TABLET BY MOUTH EVERY DAY 90 tablet 2    diclofenac (VOLTAREN) 1 % topical gel Apply 2-4 g topically 4 times daily as needed for moderate pain 300 g 11     evolocumab (REPATHA) 140 MG/ML prefilled syringe Inject 1 mL (140 mg) subcutaneously every 14 days. 6 mL 3    ezetimibe (ZETIA) 10 MG tablet Take 1 tablet (10 mg) by mouth daily at 2 pm. 90 tablet 3    fluticasone (FLONASE) 50 MCG/ACT nasal spray Apply 1-2 sprays on the skin and let dry prior to applying Butrans patch. This will likely reduce the skin rash you are getting. 11.1 mL 11    gabapentin (NEURONTIN) 300 MG capsule Take 1 capsule (300 mg) by mouth 3 times daily. 90 day supply 270 capsule 3    gemfibrozil (LOPID) 600 MG tablet Take 1 tablet (600 mg) by mouth 2 times daily. 180 tablet 3    golimumab (SIMPONI) 50 MG/0.5ML auto-injector pen Inject 0.5 mLs (50 mg) subcutaneously every 28 days. Hold for signs of infection, and seek medical attention. 0.5 mL 7    hydrocortisone (WESTCORT) 0.2 % external cream FOR GENITALS, APPLY TWICE DAILY FOR UP TO 2 WEEKS, TAKE 2 WEEKS OFF THEN REPEAT 60 g 3    hydrocortisone 2.5 % cream FOR FACE, APPLY TWICE DAILY FOR UP TO 2 WEEK FOR FLARES ON THE FACE, TAKE 2 WEEKS OFF 28.35 g 3    Incontinence Supply Disposable (DEPEND UNDERWEAR LARGE) MISC Use twice daily. 60 each 11    leflunomide (ARAVA) 20 MG tablet Take 1 tablet (20 mg) by mouth daily. ; Labs required every 8-12 weeks. 90 tablet 2    meclizine (ANTIVERT) 25 MG tablet TAKE 1 TABLET BY MOUTH EVERY 6 HOURS AS NEEDED FOR DIZZINESS. 30 tablet 3    melatonin 3 MG tablet Take 1 tablet (3 mg) by mouth nightly as needed for sleep      naloxone (NARCAN) 4 MG/0.1ML nasal spray Spray 1 spray (4 mg) into one nostril alternating nostrils once as needed for opioid reversal every 2-3 minutes until assistance arrives 0.2 mL 0    nitroGLYcerin (NITROSTAT) 0.4 MG sublingual tablet PLACE 1 TAB UNDER THE TONGUE EVERY 5 MINUTES FOR 3 DOSES FOR CHEST PAIN. IF SYMPTOMS PERSIST 5 MINUTES AFTER 1ST DOSE CALL 911. 25 tablet 11    order for DME Equipment being ordered: chair lift 1 each 0    order for DME Equipment being ordered: single end cane  1 Units 0    ORDER FOR DME 1.  CPAP pressure 11 cm/H20 with heated humidity.   2.  Provide mask to fit and CPAP supplies.   3.  Length of need lifetime.   4.  If needed please provide a chin strap           pimecrolimus (ELIDEL) 1 % external cream APPLY TWICE DAILY FOR FACE AND GENITALS 60 g 1    sildenafil (REVATIO) 20 MG tablet TAKE 2 - 5 TABLETS BY MOUTH AS NEEDED 30 MINUTES BEFORE SEXUAL ACTIVITY. MAX 100MG IN 24 HOURS. 450 tablet 3    sulfaSALAzine (AZULFIDINE) 500 MG tablet Take 3 tablets (1,500 mg) by mouth 2 times daily. 540 tablet 2    tamsulosin (FLOMAX) 0.4 MG capsule TAKE 2 CAPSULES BY MOUTH EVERY  capsule 3    tenofovir (VIREAD) 300 MG tablet Take 1 tablet (300 mg) by mouth daily. 90 tablet 3    triamcinolone (KENALOG) 0.1 % external ointment APPLY TO AFFECTED AREA TOPICALLY 3 TIMES A DAY 80 g 2    triamcinolone (KENALOG) 0.1 % external ointment Apply to trunk and extremities twice daily for up to 2 weeks for flares 80 g 1    zolpidem (AMBIEN) 5 MG tablet TAKE 1 TABLET (5 MG) BY MOUTH NIGHTLY AS NEEDED FOR SLEEP 30 tablet 5     Allergies   Allergen Reactions    Citalopram Itching    Tramadol Itching     Recent Labs   Lab Test 06/10/25  0830 05/06/25  0919 04/25/25  1125 03/25/25  1407 10/22/24  0943 10/09/24  1133 08/07/24  1136 06/11/24  1353 05/10/24  1002 06/07/23  0858 03/22/23  1052 08/23/21  1130 03/29/21  1318 03/19/21  0928 02/27/21  1259   A1C 6.1*  --   --  5.5  --   --   --  5.1  --   --   --   --   --   --   --    LDL  --  81  --   --   --  66  --  60  --    < > 36   < >  --    < >  --    HDL  --  54  --   --   --  42  --  47  --    < > 50   < >  --    < >  --    TRIG  --  120  --   --   --  89  --  68  --    < > 125   < >  --    < >  --    ALT  --   --  18  --  15  --  12  --  <5   < > 19   < > 31  --  27   CR  --   --  0.77  --  0.98  --  0.81  --  0.84   < > 0.88   < > 1.01  --  0.95   GFRESTIMATED  --   --  >90  --  89  --  >90  --  >90   < > >90   < > 83  --  89   GFRESTBLACK  --    --   --   --   --   --   --   --   --   --   --   --  >90  --  >90   POTASSIUM  --   --   --   --  3.8  --   --   --  3.7   < > 3.6   < >  --   --   --    TSH  --   --   --   --   --   --   --   --   --   --  2.21  --   --   --   --     < > = values in this interval not displayed.        Current providers sharing in care for this patient include:  Patient Care Team:  David Chavez MD as PCP - General (Family Practice)  David Chavez MD as Assigned PCP  Ameena Mendez MD as MD (INTERNAL MEDICINE - ENDOCRINOLOGY, DIABETES & METABOLISM)  Sebastián Jenkins MD as Assigned Rheumatology Provider  Ameena Lang APRN CNP as Assigned Neuroscience Provider  Feliberto Jama OD as Assigned Surgical Provider  Ameena Lang APRN CNP as Assigned Pain Medication Provider  Leandra Mcclellan PA-C as Physician Assistant (Gastroenterology)  Devonte Boyer DO as Assigned Sleep Provider  Stephen Guzman MD as Assigned Heart and Vascular Provider  Leandra Mcclellan PA-C as Assigned Gastroenterology Provider    The following health maintenance items are reviewed in Epic and correct as of today:  Health Maintenance   Topic Date Due    Medicare Annual MTM Pharmacist Visit (once per calendar year)  Never done    COVID-19 VACCINE (10 - Pfizer risk 2024-25 season) 07/23/2025    INFLUENZA VACCINE (Season Ended) 09/01/2025    BMP  10/22/2025    THONG ASSESSMENT  01/23/2026    ANNUAL REVIEW OF HM ORDERS  01/23/2026    EYE EXAM  03/14/2026    URINE DRUG SCREEN  03/25/2026    URIC ACID  03/25/2026    CONTROLLED SUBSTANCE AGREEMENT FOR CHRONIC PAIN MANAGEMENT  03/25/2026    LIPID  05/06/2026    MEDICARE ANNUAL WELLNESS VISIT  06/10/2026    PHQ-9  06/10/2026    COLORECTAL CANCER SCREENING  06/14/2026    DIABETES SCREENING  03/25/2028    ADVANCE CARE PLANNING  06/11/2029    DTAP/TDAP/TD VACCINE (3 - Td or Tdap) 10/20/2031    HEPATITIS C SCREENING  Completed    HIV SCREENING  Completed    DEPRESSION ACTION PLAN  Completed  "   PNEUMOCOCCAL VACCINE 50+ YEARS  Completed    ZOSTER VACCINE  Completed    HEPATITIS A VACCINE  Completed    HPV VACCINE  Aged Out    MENINGITIS VACCINE  Aged Out       Appropriate preventive services were discussed with this patient, including applicable screening as appropriate for fall prevention, nutrition, physical activity, Tobacco-use cessation, weight loss and cognition.  Checklist reviewing preventive services available has been given to the patient.            6/10/2025   Mini Cog   Clock Draw Score 2 Normal   3 Item Recall 3 objects recalled   Mini Cog Total Score 5           Review of Systems  Constitutional, HEENT, cardiovascular, pulmonary, GI, , musculoskeletal, neuro, skin, endocrine and psych systems are negative, except as otherwise noted.      Objective    /80 (BP Location: Left arm, Patient Position: Chair, Cuff Size: Adult Regular)   Pulse 79   Temp 97.6  F (36.4  C) (Temporal)   Resp 18   Ht 1.575 m (5' 2\")   Wt 69.4 kg (153 lb)   SpO2 100%   BMI 27.98 kg/m    Body mass index is 27.98 kg/m .  Physical Exam   GENERAL: alert and no distress  NECK: no adenopathy, no asymmetry, masses, or scars  RESP: lungs clear to auscultation - no rales, rhonchi or wheezes  CV: regular rate and rhythm, normal S1 S2, no S3 or S4, no murmur, click or rub, no peripheral edema  ABDOMEN: soft, nontender, no hepatosplenomegaly, no masses and bowel sounds normal  MS: no gross musculoskeletal defects noted, no edema          Signed Electronically by: David Chavez MD, MD    "

## 2025-06-18 NOTE — NURSING NOTE
"Chief Complaint   Patient presents with     Pain       Initial /77 (BP Location: Left arm, Patient Position: Chair, Cuff Size: Adult Small)  Pulse 62  Resp 16  Wt 81.6 kg (180 lb)  BMI 32.92 kg/m2 Estimated body mass index is 32.92 kg/(m^2) as calculated from the following:    Height as of 3/20/18: 1.575 m (5' 2\").    Weight as of this encounter: 81.6 kg (180 lb).  Medication Reconciliation: complete       Sascha Isaac MA  [facility]      "
No

## 2025-06-21 DIAGNOSIS — R21 RASH: ICD-10-CM

## 2025-06-23 ENCOUNTER — TELEPHONE (OUTPATIENT)
Dept: PALLIATIVE MEDICINE | Facility: CLINIC | Age: 60
End: 2025-06-23

## 2025-06-23 DIAGNOSIS — F41.9 ANXIETY HYPERVENTILATION: ICD-10-CM

## 2025-06-23 DIAGNOSIS — F45.8 ANXIETY HYPERVENTILATION: ICD-10-CM

## 2025-06-23 RX ORDER — TRIAMCINOLONE ACETONIDE 1 MG/G
OINTMENT TOPICAL
Qty: 80 G | Refills: 2 | Status: SHIPPED | OUTPATIENT
Start: 2025-06-23

## 2025-06-23 RX ORDER — CLONAZEPAM 1 MG/1
TABLET ORAL
Qty: 90 TABLET | Refills: 0 | Status: SHIPPED | OUTPATIENT
Start: 2025-06-23

## 2025-06-23 NOTE — TELEPHONE ENCOUNTER
Okay to use the TO spot on 06/30/25. Routing to  to assist with scheduling.    ROBERTO BarksdaleN, RN  Care Coordinator  [facility] [place]

## 2025-06-23 NOTE — TELEPHONE ENCOUNTER
[facility] Call Center    Phone Message    May a detailed message be left on voicemail: yes     Reason for Call: Other: Pt appt was canceled on 6/23 due to provider absence. Pt is working on transitioning insurance and to avoid complications pt was hoping to see provider before 7/1 when insurance plan is up for renewal. Please review and assist pt if able to get in before 7/1. Pt rescheduled and placed on wait list for now. Please contact when able.       Action Taken: Other: BG PAIN     Travel Screening: Not Applicable     Date of Service:

## 2025-06-30 ENCOUNTER — VIRTUAL VISIT (OUTPATIENT)
Dept: PALLIATIVE MEDICINE | Facility: CLINIC | Age: 60
End: 2025-06-30
Payer: COMMERCIAL

## 2025-06-30 DIAGNOSIS — M25.50 MULTIPLE JOINT PAIN: ICD-10-CM

## 2025-06-30 DIAGNOSIS — M06.9 RHEUMATOID ARTHRITIS INVOLVING MULTIPLE SITES, UNSPECIFIED WHETHER RHEUMATOID FACTOR PRESENT (H): ICD-10-CM

## 2025-06-30 DIAGNOSIS — M17.11 PRIMARY OSTEOARTHRITIS OF RIGHT KNEE: ICD-10-CM

## 2025-06-30 PROCEDURE — 98005 SYNCH AUDIO-VIDEO EST LOW 20: CPT | Performed by: NURSE PRACTITIONER

## 2025-06-30 NOTE — PROGRESS NOTES
Lamont is a 59 year old who is being evaluated via a billable video visit.    How would you like to obtain your AVS? [email or web address]  If the video visit is dropped, the invitation should be resent by: Other e-mail: [employer] [place]  Will anyone else be joining your video visit? No  Is Pt currently in [place]? Yes    NOTE:  If Pt is not in [place], Appointment needs to be canceled and rescheduled.      Video-Visit Details    Type of service:  Video Visit   Video start: 1553  Video end: 1607    Originating Location (pt. Location): Home    Distant Location (provider location):  On-site  Platform used for Video Visit: [facility]    6/30/2025    Chief complaint:   -he has ongoing low back pain that radiates into both thighs,   -multiple joint pain from RA            Interval history:  Lamont Daniels is a 59 year old male is known to me for:  Lumbar facet arthropathy  Rheumatoid arthritis involving multiple joints, unspecified rheumatoid factor  Primary osteoarthritis of right knee  Chronic continuous use of opioids  Chronic pain syndrome  PMHx includes: Anxiety, coronary artery disease, congestive heart failure (2008), depressive disorder (2008), gout, hepatitis B more than 10 years ago, hypertension, hyperlipidemia, malrotation of intestine, severe obstructive sleep apnea, rheumatoid arthritis (2012) steatohepatitis (2015), history of tuberculosis at age 13, vitamin D deficiency  PSHx includes: Coronary artery bypass graft 6 vessel (2008), abdominal surgery (2014), colonoscopy (2013), combined repair ptosis with blepharoplasty bilateral (2018), head and neck surgery (2011), nasal/sinus polypectomy (2010), orthopedic surgery (2012), repair ptosis bilateral (2019), repair ptosis brow bilateral (2018), thoracic surgery for tuberculosis (1989), tonsillectomy and uvulopalatal pharyngoplasty (2010), coronary artery stent 3 months after CABG (2008).      Recommendations/plan at the last visit on 3/25/2025  "included:  Physical Therapy: none  Continue stretching at home  Clinical Health Psychologist to address issues of relaxation, behavioral change, coping style, and other factors important to improvement: none  Diagnostic Studies: none  Medication Management:   Continue Butrans 20mcg/hr transdermal change every 7 days, he is to fill 4/8 and start 4/11. [facility] in [place]  continue Baclofen to 10mg up to three times daily as needed for muscle spasms  Continue Voltaren gel as needed  Continue gabapentin 300mg taking 1 capsule three times daily  continue flonase nasal spray apply 1-2 sprays on the skin where you will apply the Butrans patch, allow the spray to dry and then apply patch, this often will prevent the skin irritation and rash.   Further procedures recommended: none  Recommendations/follow-up for PCP:  See above  Release of information: none  Currently wearing Butrans. UDT today  Signed CSA today  Follow up: 3 months with in-person or video visit.   Please call [phone] to make your follow-up appointment with me.         Since his last visit, Lamont Daniels reports:    Interval history June 30, 2025  -he has ongoing low back pain that radiates into both thighs,   -multiple joint pain from RA  -Feels a little bit worse  -he has decided that he doesn't want to be on the butrans patch any longer  -he feels it was too complicated to continue to get refills and withdrawal in between scripts was difficult        Interval history March 25, 2025  -Lamont reports the following:  -chronic ongoing bilateral low back pain, radiates into the thighs  -multiple joint pain from RA  -\"I am feeling better, it is springtime.\"  -he typically feels worse when the weather is cold and damp  -looking forward to getting some noodles (cornelia) for lunch.     Interval history December 9, 2024  -continued low back pain, axial. No radiation to the legs.   -has continued multiple joint pain from RA  -frustrated that he is having issues " getting the Butrans from [facility] in a timely manner. Butrans does manage his pain well. Discussed transitioning his Butrans to a [place] pharmacy, patient prefers to use [facility], they can mail his meds.     Interval history November 18, 2024  -he is to  the Butrans today, this is the day he changes it.   Lamont reports the following pain issues:  -last 2 days has had nausea/vomiting and diarrhea. Denies dizziness.  -low back pain, extends into the thighs, bilaterally. Not past the knees.   -multiple joint pain (RA)   He denies any new areas of pain  -he would like to consider a different option for pain management for next month. Set him up for follow up visit to discuss since Butrans is ready for him at pharmacy and changes may require PA approval.     Interval history August 20, 2024  -Lamont reports he is doing well on his current medication regimen. He uses Butrans patch, gabapentin, and baclofen as well as topical diclofenac sodium.   -he continues to have multiple joint pain from RA (this has been somewhat challenging with the rainy weather this summer).   -he also has axial low back pain that goes into the thighs but does not extend past the knees.   -he denies need for any medication changes.   -no further skin irritation from Butrans  -notes tolerating the gabapentin well.      Pain history collected at initial evaluation on 5/11/2022  He has had pain for about 10 years. He was diagnosed with Rheumatoid Arthritis. Lamont has pain in multiple joints. He also has chronic low back pain. The low back pain radiates down the posterolateral aspect of the legs to the level of the knee. His back pain was there when he was diagnosed with RA. He feels that he is doing well on the Butrans. Discussed switch to [place] if he ever wishes to increase his dosage, prefers to stay with use of the Butrans patch at the present time       At this point, the patient's participation with our multidisciplinary team  "includes:  The patient has been compliant with the program.  PT - not ordered  Health Psych - not ordered      Pain scores:  Pain intensity on average is 7-8 on a scale of 0-10.    Range is 5-9/10.   Pain right now is 7/10.   Pain is described as \"back pain is dull and his joints are dull/numbness/stiffness.\"    Pain is constant in nature        Current pain relevant medications:   -allopurinol 300mg every day  -baclofen 10mg BID PRN muscle spasms (uses at least once per day, helpful)  -Voltaren gel 1% PRN (helpful)  -naloxone nasal spray PRN opiate reversal   -simponi 50mg/0.5ml inject every 28 days   -Arava 20mg every day  -meclizine 25mg Q 6 hours PRN dizziness (helpful)  -Tylenol 1000mg (uses at least once per day, not more than 3 times per day)  -CBD oil at night (helpful for sleep)        Other pertinent medications:  -Ambien 5mg at bedtime as needed PRN sleep  -clonazepam 0.5-1mg BID PRN anxiety (uses one full tab in the AM)  -Plavix 75mg every day  -colchincine 0.6mg take 2 tabs at first sign of flare  -pristiq 50mg QD     Previous medication treatments included:  OPIATES: butrans (helpful), hydrocodone (helpful for other issues), Codeine (unsure), oxycodone (unsure)  NSAIDS: cannot use, on Plavix   MUSCLE RELAXANTS: Baclofen (helpful), methocarbamol (not helpful)  ANTI-MIGRAINE MEDS: none  ANTI-DEPRESSANTS: none  SLEEP AIDS: none  ANTI-CONVULSANTS: gabapentin (helpful initially, he did not have increased pain when he tapered off of this medication)  TOPICALS: Voltaren gel (helpful)  ANXIOLYTICS: none  MEDICAL CANNABIS: none  Other meds: Tylenol (helpful)        Other treatments have included:  Lamont Daniels has been seen at a pain clinic in the past.  Previously managed by Dr. Lian Loo   PT: has tried, not helpful  Chiropractic care: no  Acupuncture: no  TENs Unit: no     Injections:  none      THE 4 A's OF OPIOID MAINTENANCE ANALGESIA    Analgesia: butrans was helpful but he stopped taking " it    Activity: he does some stretching at home. Walks in the back yard    Adverse effects: none    Adherence to Rx protocol: yes      Side Effects: No  Patient is using the medication as prescribed: YES    Medications:  Current Outpatient Medications   Medication Sig Dispense Refill    Acetaminophen (TYLENOL PO)       allopurinol (ZYLOPRIM) 300 MG tablet Take 1 tablet (300 mg) by mouth daily. 90 tablet 3    amLODIPine (NORVASC) 5 MG tablet Take 1 tablet (5 mg) by mouth daily. for blood pressure 90 tablet 3    aspirin EC 81 MG EC tablet Take 1 tablet (81 mg) by mouth daily (*) 30 tablet 1    atorvastatin (LIPITOR) 80 MG tablet Take 1 tablet (80 mg) by mouth daily. 90 tablet 3    baclofen (LIORESAL) 10 MG tablet Take 1 tablet (10 mg) by mouth 2 times daily as needed for muscle spasms. 3 month supply. 180 tablet 1    busPIRone HCl (BUSPAR) 30 MG tablet Take 1 tablet (30 mg) by mouth 2 times daily. 180 tablet 3    BUTRANS 20 MCG/HR WK patch Place 1 patch onto the skin every 7 days. LISANDRO, name brand. OK to fill 05/07/25 and start 05/09/25.  28 day script for chronic pain 4 patch 0    Cholecalciferol (VITAMIN D) 2000 UNITS tablet TAKE ONE TABLET BY MOUTH ONCE DAILY 100 tablet 1    clobetasol (TEMOVATE) 0.05 % external cream Apply twice daily for 2 weeks, weekends only for 2 weeks, repeat cycle as needed for hands, not face or genitals 60 g 4    clobetasol (TEMOVATE) 0.05 % external solution Apply twice daily to scalp for up to 2 weeks for flares. 60 mL 0    clonazePAM (KLONOPIN) 1 MG tablet TAKE 0.5-1 TABLETS BY MOUTH 2 TIMES DAILY AS NEEDED FOR ANXIETY 90 tablet 0    clopidogrel (PLAVIX) 75 MG tablet Take 1 tablet (75 mg) by mouth daily. 90 tablet 3    colchicine (COLCRYS) 0.6 MG tablet TAKE 2 TABLETS BY MOUTH AT THE FIRST SIGN OF FLARE, TAKE 1 ADDITIONAL TABLET ONE HOUR LATER. 90 tablet 0    desvenlafaxine (PRISTIQ) 100 MG 24 hr tablet TAKE 1 TABLET BY MOUTH EVERY DAY 90 tablet 2    diclofenac (VOLTAREN) 1 % topical gel  Apply 2-4 g topically 4 times daily as needed for moderate pain 300 g 11    evolocumab (REPATHA) 140 MG/ML prefilled syringe Inject 1 mL (140 mg) subcutaneously every 14 days. 6 mL 3    ezetimibe (ZETIA) 10 MG tablet Take 1 tablet (10 mg) by mouth daily at 2 pm. 90 tablet 3    fluticasone (FLONASE) 50 MCG/ACT nasal spray Apply 1-2 sprays on the skin and let dry prior to applying Butrans patch. This will likely reduce the skin rash you are getting. 11.1 mL 11    gabapentin (NEURONTIN) 300 MG capsule Take 1 capsule (300 mg) by mouth 3 times daily. 90 day supply 270 capsule 3    gemfibrozil (LOPID) 600 MG tablet Take 1 tablet (600 mg) by mouth 2 times daily. 180 tablet 3    golimumab (SIMPONI) 50 MG/0.5ML auto-injector pen Inject 0.5 mLs (50 mg) subcutaneously every 28 days. Hold for signs of infection, and seek medical attention. 0.5 mL 7    hydrocortisone (WESTCORT) 0.2 % external cream FOR GENITALS, APPLY TWICE DAILY FOR UP TO 2 WEEKS, TAKE 2 WEEKS OFF THEN REPEAT 60 g 3    hydrocortisone 2.5 % cream FOR FACE, APPLY TWICE DAILY FOR UP TO 2 WEEK FOR FLARES ON THE FACE, TAKE 2 WEEKS OFF 28.35 g 3    Incontinence Supply Disposable (DEPEND UNDERWEAR LARGE) MISC Use twice daily. 60 each 11    leflunomide (ARAVA) 20 MG tablet Take 1 tablet (20 mg) by mouth daily. ; Labs required every 8-12 weeks. 90 tablet 2    meclizine (ANTIVERT) 25 MG tablet TAKE 1 TABLET BY MOUTH EVERY 6 HOURS AS NEEDED FOR DIZZINESS. 30 tablet 3    melatonin 3 MG tablet Take 1 tablet (3 mg) by mouth nightly as needed for sleep      naloxone (NARCAN) 4 MG/0.1ML nasal spray Spray 1 spray (4 mg) into one nostril alternating nostrils once as needed for opioid reversal every 2-3 minutes until assistance arrives 0.2 mL 0    nitroGLYcerin (NITROSTAT) 0.4 MG sublingual tablet PLACE 1 TAB UNDER THE TONGUE EVERY 5 MINUTES FOR 3 DOSES FOR CHEST PAIN. IF SYMPTOMS PERSIST 5 MINUTES AFTER 1ST DOSE CALL 911. 25 tablet 11    order for DME Equipment being ordered:  chair lift 1 each 0    order for DME Equipment being ordered: single end cane 1 Units 0    ORDER FOR DME 1.  CPAP pressure 11 cm/H20 with heated humidity.   2.  Provide mask to fit and CPAP supplies.   3.  Length of need lifetime.   4.  If needed please provide a chin strap           pantoprazole (PROTONIX) 40 MG EC tablet Take 1 tablet (40 mg) by mouth daily. 90 tablet 3    pimecrolimus (ELIDEL) 1 % external cream APPLY TWICE DAILY FOR FACE AND GENITALS 60 g 1    sildenafil (REVATIO) 20 MG tablet TAKE 2 - 5 TABLETS BY MOUTH AS NEEDED 30 MINUTES BEFORE SEXUAL ACTIVITY. MAX 100MG IN 24 HOURS. 450 tablet 3    sulfaSALAzine (AZULFIDINE) 500 MG tablet Take 3 tablets (1,500 mg) by mouth 2 times daily. 540 tablet 2    tamsulosin (FLOMAX) 0.4 MG capsule TAKE 2 CAPSULES BY MOUTH EVERY  capsule 3    tenofovir (VIREAD) 300 MG tablet Take 1 tablet (300 mg) by mouth daily. 90 tablet 3    triamcinolone (KENALOG) 0.1 % external ointment APPLY TO AFFECTED AREA TOPICALLY 3 TIMES A DAY 80 g 2    triamcinolone (KENALOG) 0.1 % external ointment Apply to trunk and extremities twice daily for up to 2 weeks for flares 80 g 1    zolpidem (AMBIEN) 5 MG tablet TAKE 1 TABLET (5 MG) BY MOUTH NIGHTLY AS NEEDED FOR SLEEP 30 tablet 5       Medical History: any changes in medical history since they were last seen? No    Social History:   Home situation: , lives with adult children  Occupation/Schooling: he is on disability due to heart issues and RA  Tobacco use: none  Alcohol use: none  Drug use: none  History of chemical dependency treatment: none    Is patient a current smoker or tobacco user?  no  If yes, was cessation counseling offered?  no          Physical Exam:  Vital signs: There were no vitals taken for this visit.    Behavioral observations:  Awake, alert. Cooperative.   Pulm: respirations easy and unlabored. Able to speak in full sentences without SOB or cough noted.            Diagnostic tests:  MRI of the Lumbar  Spine without contrast     History: Lumbago.     Comparison: MRI thoracic spine 7/18/2014.     Technique: Sagittal T1-weighted, T2-weighted, STIR, and axial  T2-weighted spin echo and gradient echo images of the lumbar spine  were obtained without intravenous contrast.     Findings: There are 5 lumbar-type vertebrae assumed for the purposes  of this dictation. The tip of the conus medullaris is at L1.  The  lumbar vertebral column appears normally aligned. Straightening of the  normal lumbar lordosis. Mild loss of T2 signal in the L4-5 and L5-S1  intervertebral discs consistent with age-related changes. Type II  Modic degenerative changes in the opposing endplates at L5-S1.     On a level by level basis:     L1-2: Tiny left paracentral focal disc protrusion without spinal canal  or neural foraminal stenosis.     L2-3: No spinal canal or neural foraminal stenosis.     L3-4: Small focal posterior central disc protrusion with annular  fissuring and mild spinal canal narrowing. Also mild bilateral facet  hypertrophy. No neuroforaminal stenosis.     L4-5: Small focal posterior central disc protrusion with annular  fissuring. No spinal canal or neural foraminal stenosis.     L5-S1: Type II Modic degenerative changes of the opposing endplates.  Left greater than right facet hypertrophy. Small focal posterior  central disc protrusion with annular fissuring. No spinal canal  stenosis. Moderate left and mild right neural foraminal narrowing.     The visualized paraspinous tissues anteriorly are unremarkable.     IMPRESSION  Impression: Multilevel degenerative changes. Small disc protrusion at  L3-4 results in in mild spinal canal narrowing. Moderate left and mild  right neuroforaminal narrowing at L5-S1.     HELGA DAVID MD        FOOT BILATERAL THREE OR MORE VIEWS 11/4/2015 4:55 PM      HISTORY: Pain in unspecified joint.     COMPARISON: 8/21/2012.     IMPRESSION  IMPRESSION: Small traction spurs are seen off the Achilles  tendon and  plantar fascial attachment sites bilaterally. Joint spaces are  preserved. Osseous structures are intact. Incidental note is made of  some surgical staples in the soft tissues of the medial distal right  lower extremity.  DANIS ANGLIN MD        RIGHT KNEE THREE VIEWS  5/4/2017 3:16 PM    HISTORY: Pain in right knee.  COMPARISON: None                                                   IMPRESSION: No acute fracture or dislocation. Mild osteoarthritis.  Small joint effusion.     MICHELL TAMAYO MD      MR LUMBAR SPINE WITHOUT CONTRAST  4/4/2023 9:12 AM (reviewed with patient on 6/26/2023)     INDICATION: Low back pain. Rule out spondylolysis. Low back pain with  standing/walking/extension. No known/automatically detected potential  contraindications to CT. Chronic bilateral low back pain with  bilateral sciatica. DDD (degenerative disc disease), lumbar.     TECHNIQUE: MRI images of the lumbar spine without contrast.  CONTRAST:  None.     COMPARISON: Lumbar spine MRI 8/6/2014.     FINDINGS: Nomenclature is based on five lumbar type vertebral bodies.  Normal vertebral body heights. Normal alignment.  No marrow edema. No  pars defect. The conus tip is identified at L1-L2. Unremarkable  paraspinal soft tissues.     T12-L1: Slight loss in disc height and signal. The small left central  protrusion. Minor facet arthropathy. Minor lateral recess and spinal  canal narrowing. No foraminal narrowing. Herniation is new/increased  versus 2014.         L1-L2: Slight loss of disc signal. Normal disc height. Tiny left  paracentral protrusion. Unremarkable facets. Minimal left lateral  recess narrowing. No central stenosis or foraminal narrowing. No  interval change.     L2-L3: Mild loss of disc signal. Minimal loss of disc height. No  herniation. Unremarkable facets. No stenosis.     L3-L4: Moderate loss of disc signal. Minor loss of disc height. Minor  circumferential disc bulge with small central protrusion. Minor  facet  arthropathy. Mild lateral recess and spinal canal narrowing,  unchanged. Patent foramina.     L4-L5: Moderate loss of disc signal. Mild loss of disc height. Central  annular tear and small protrusion is minimally increased in the  interval. Mild facet arthropathy. Minor lateral recess and foraminal  narrowing.     L5-S1: Moderate loss of disc signal. Mild loss of disc height.  Circumferential asymmetric left disc bulge. Mild to moderate left and  mild right facet arthropathy. Moderate left and mild right lateral  recess narrowing. Adequate central canal. Moderate to severe left and  minor right foraminal narrowing. No significant interval change.                                                                      IMPRESSION:  1.  Degenerative changes are relatively stable compared with 2014.  2.  At L5-S1 an asymmetric left disc bulge causes moderate left  lateral recess and moderate to severe foraminal narrowing. Correlate  for any left L5 symptoms. Mild right-sided narrowing. No significant  interval change.  3.  At L4-L5 a small protrusion is minimally increased with minor  lateral recess and foraminal narrowing similar to prior.  4.  At L3-L4 mild lateral recess and spinal canal narrowing is  unchanged.  5.  At T12-L1 a small left-sided herniation is increased and results  in minor lateral recess and spinal canal narrowing.     HORTENCIA DUENAS MD      Other testing (labs, diagnostics):  4.25.2025  Cr. 0.77  Est GFR >90  Liver enzymes WNL        Screening tools:      DIRE Score for ongoing opioid management is calculated as follows:  Diagnosis = 2  Intractability = 2  Risk: Psych = 2  Chem Hlth = 2  Reliability = 3  Social = 2  Efficacy = 2  Total DIRE Score = 15 (14 or higher predicts good candidate for ongoing opioid management; 13 or lower predicts poor candidate for opioid management)        [place]  review:  Reviewed [place]  6/30/2025- no concerning fills.  Ameena LEMUS, RN CNP, FNP  [facility]  [facility]  [place] location       Assessment:   Rheumatoid arthritis involving multiple joints, unspecified rheumatoid factor  Primary osteoarthritis of right knee  Multiple joint pain    Facet arthropathy (lumbar)  Lumbar facet joint pain  Spasm of back muscles  Muscle spasm  Myofascial pain  Chronic pain syndrome  Chronic continuous use of opioids  Encounter for long term use of opiate analgesic  UDT 3/25/2025  Signed CSA 3/25/2025  PMHx includes: Anxiety, coronary artery disease, congestive heart failure (2008), depressive disorder (2008), gout, hepatitis B more than 10 years ago, hypertension, hyperlipidemia, malrotation of intestine, severe obstructive sleep apnea, rheumatoid arthritis (2012) steatohepatitis (2015), history of tuberculosis at age 13, vitamin D deficiency  PSHx includes: Coronary artery bypass graft 6 vessel (2008), abdominal surgery (2014), colonoscopy (2013), combined repair ptosis with blepharoplasty bilateral (2018), head and neck surgery (2011), nasal/sinus polypectomy (2010), orthopedic surgery (2012), repair ptosis bilateral (2019), repair ptosis brow bilateral (2018), thoracic surgery for tuberculosis (1989), tonsillectomy and uvulopalatal pharyngoplasty (2010), coronary artery stent 3 months after CABG (2008).    Plan:  Physical Therapy: none  Continue stretching at home  Clinical Health Psychologist to address issues of relaxation, behavioral change, coping style, and other factors important to improvement: none  Diagnostic Studies: none  Medication Management:   continue Baclofen to 10mg up to three times daily as needed for muscle spasms  Continue Voltaren gel as needed  Continue gabapentin 300mg taking 1 capsule three times daily  Further procedures recommended: none  Recommendations/follow-up for PCP:  See above  Release of information: none  Follow up: 4 months with in-person  visit.   Please call [phone] to make your follow-up appointment with me.          ASSESSMENT AND PLAN:  (M06.9) Rheumatoid arthritis involving multiple sites, unspecified whether rheumatoid factor present (H)  Comment:   Plan: diclofenac (VOLTAREN) 1 % topical gel, Adult         Pain Clinic Follow-Up Order            (M17.11) Primary osteoarthritis of right knee  Comment:   Plan: diclofenac (VOLTAREN) 1 % topical gel, Adult         Pain Clinic Follow-Up Order            (M25.50) Multiple joint pain  Comment:   Plan: diclofenac (VOLTAREN) 1 % topical gel, Adult         Pain Clinic Follow-Up Order              BILLING TIME DOCUMENTATION:   TOTAL TIME includes:   Time spent preparing to see the patient: 6 minutes (reviewing records and tests)  Time spend face to face with the patient: 14 minutes  Time spent ordering tests, medications, procedures and referrals: 0 minutes  Time spent Referring and communicating with other healthcare professionals: 0 minutes  Documenting clinical information in Epic: 3 minutes    The total TIME spent on this patient on the day of the appointment was 23 minutes.                        Ameena LEMUS, RN CNP, FNP  [facility]  [facility]

## 2025-06-30 NOTE — PATIENT INSTRUCTIONS
Clinic Number:  [phone]  Call with any questions about your care and for scheduling assistance.   Calls are returned Monday through Friday between 8 AM and 4:30 PM. We usually get back to you within 2 business days depending on the issue/request.    If we are prescribing your medications:  For opioid medication refills, call the clinic or send a [employer] message 7 days in advance.  Please include:  Name of requested medication  Name of the pharmacy.  For non-opioid medications, call your pharmacy directly to request a refill. Please allow 3-4 days to be processed.   Per [place] State Law:  All controlled substance prescriptions must be filled within 30 days of being written.    For those controlled substances allowing refills, pickup must occur within 30 days of last fill.      We believe regular attendance is key to your success in our program!    Any time you are unable to keep your appointment we ask that you call us at least 24 hours in advance to cancel.This will allow us to offer the appointment time to another patient.   Multiple missed appointments may lead to dismissal from the clinic.

## 2025-06-30 NOTE — PROGRESS NOTES
Writer, with the assistant of the language line, unable to leave message due to pt's voicemail box is not set up.     Autumn Duarte CMA  [facility]

## 2025-06-30 NOTE — PROGRESS NOTES
Writer, with the assistant of the language line, unable to leave message due to pt's voicemail box is not set up.      Autumn Duarte CMA  [facility]

## 2025-07-09 ENCOUNTER — MYC REFILL (OUTPATIENT)
Dept: DERMATOLOGY | Facility: CLINIC | Age: 60
End: 2025-07-09
Payer: COMMERCIAL

## 2025-07-09 ENCOUNTER — MYC REFILL (OUTPATIENT)
Dept: FAMILY MEDICINE | Facility: CLINIC | Age: 60
End: 2025-07-09
Payer: COMMERCIAL

## 2025-07-09 ENCOUNTER — MYC REFILL (OUTPATIENT)
Dept: RHEUMATOLOGY | Facility: CLINIC | Age: 60
End: 2025-07-09
Payer: COMMERCIAL

## 2025-07-09 ENCOUNTER — MYC REFILL (OUTPATIENT)
Dept: CARDIOLOGY | Facility: OTHER | Age: 60
End: 2025-07-09
Payer: COMMERCIAL

## 2025-07-09 DIAGNOSIS — M06.09 RHEUMATOID ARTHRITIS OF MULTIPLE SITES WITH NEGATIVE RHEUMATOID FACTOR (H): ICD-10-CM

## 2025-07-09 DIAGNOSIS — M10.00 IDIOPATHIC GOUT, UNSPECIFIED CHRONICITY, UNSPECIFIED SITE: ICD-10-CM

## 2025-07-09 DIAGNOSIS — M10.9 GOUT WITHOUT TOPHUS: ICD-10-CM

## 2025-07-09 DIAGNOSIS — I25.810 CORONARY ARTERY DISEASE INVOLVING AUTOLOGOUS ARTERY CORONARY BYPASS GRAFT WITHOUT ANGINA PECTORIS: ICD-10-CM

## 2025-07-09 DIAGNOSIS — L40.9 PSORIASIS: ICD-10-CM

## 2025-07-09 DIAGNOSIS — R42 VERTIGO: ICD-10-CM

## 2025-07-09 DIAGNOSIS — E78.5 HYPERLIPIDEMIA LDL GOAL <70: ICD-10-CM

## 2025-07-09 DIAGNOSIS — E78.5 HYPERLIPIDEMIA LDL GOAL <100: ICD-10-CM

## 2025-07-09 DIAGNOSIS — I10 ESSENTIAL HYPERTENSION WITH GOAL BLOOD PRESSURE LESS THAN 140/90: ICD-10-CM

## 2025-07-09 RX ORDER — EZETIMIBE 10 MG/1
10 TABLET ORAL
Qty: 90 TABLET | Refills: 3 | Status: CANCELLED | OUTPATIENT
Start: 2025-07-09

## 2025-07-09 RX ORDER — ATORVASTATIN CALCIUM 80 MG/1
80 TABLET, FILM COATED ORAL DAILY
Qty: 90 TABLET | Refills: 3 | Status: CANCELLED | OUTPATIENT
Start: 2025-07-09

## 2025-07-09 RX ORDER — AMLODIPINE BESYLATE 5 MG/1
5 TABLET ORAL DAILY
Qty: 90 TABLET | Refills: 3 | Status: CANCELLED | OUTPATIENT
Start: 2025-07-09

## 2025-07-09 RX ORDER — GEMFIBROZIL 600 MG/1
600 TABLET, FILM COATED ORAL 2 TIMES DAILY
Qty: 180 TABLET | Refills: 3 | Status: CANCELLED | OUTPATIENT
Start: 2025-07-09

## 2025-07-10 RX ORDER — HYDROCORTISONE VALERATE CREAM 2 MG/G
CREAM TOPICAL
Qty: 60 G | Refills: 3 | OUTPATIENT
Start: 2025-07-10

## 2025-07-10 RX ORDER — COLCHICINE 0.6 MG/1
TABLET ORAL
Qty: 90 TABLET | Refills: 0 | Status: SHIPPED | OUTPATIENT
Start: 2025-07-10

## 2025-07-10 RX ORDER — COLCHICINE 0.6 MG/1
TABLET ORAL
Qty: 90 TABLET | Refills: 0 | OUTPATIENT
Start: 2025-07-10

## 2025-07-10 RX ORDER — MECLIZINE HYDROCHLORIDE 25 MG/1
25 TABLET ORAL EVERY 6 HOURS PRN
Qty: 30 TABLET | Refills: 3 | Status: SHIPPED | OUTPATIENT
Start: 2025-07-10

## 2025-07-10 NOTE — TELEPHONE ENCOUNTER
Requested Prescriptions   Pending Prescriptions Disp Refills    ezetimibe (ZETIA) 10 MG tablet 90 tablet 3     Sig: Take 1 tablet (10 mg) by mouth daily at 2 pm.       Antihyperlipidemic agents Passed - 7/10/2025  9:29 AM        Passed - LDL on file in the past 12 months        Passed - Medication is active on med list and the sig matches. RN to manually verify dose and sig if red X/fail.     If the protocol passes (green check), you do not need to verify med dose and sig.    A prescription matches if they are the same clinical intention.    For Example: once daily and every morning are the same.    The protocol can not identify upper and lower case letters as matching and will fail.     For Example: Take 1 tablet (50 mg) by mouth daily     TAKE 1 TABLET (50 MG) BY MOUTH DAILY    For all fails (red x), verify dose and sig.    If the refill does match what is on file, the RN can still proceed to approve the refill request.     If they do not match, route to the appropriate provider.        Passed - Recent (12 month) or future (90 days) visit with authorizing provider's specialty (provided they have been seen in the past 15 months)     The patient must have completed an in-person or virtual visit within the past 12 months or has a future visit scheduled within the next 90 days with the authorizing provider s specialty.  Urgent care and e-visits do not qualify as an office visit for this protocol.        Passed - Patient is age 18 years or older     Last Written Prescription Date:  5/13/25 to [facility] in [place]  Last Fill Quantity: 90,   # refills: 3              amLODIPine (NORVASC) 5 MG tablet 90 tablet 3     Sig: Take 1 tablet (5 mg) by mouth daily. for blood pressure       Calcium Channel Blockers Protocol  Passed - 7/10/2025  9:29 AM        Passed - Medication is active on med list and the sig matches. RN to manually verify dose and sig if red X/fail.     If the protocol passes (green check), you do not need  to verify med dose and sig.    A prescription matches if they are the same clinical intention.    For Example: once daily and every morning are the same.    The protocol can not identify upper and lower case letters as matching and will fail.     For Example: Take 1 tablet (50 mg) by mouth daily     TAKE 1 TABLET (50 MG) BY MOUTH DAILY    For all fails (red x), verify dose and sig.    If the refill does match what is on file, the RN can still proceed to approve the refill request.     If they do not match, route to the appropriate provider.        Passed - Medication is indicated for associated diagnosis        Passed - GFR on file in the past 12 months     Recent Labs   Lab Test 04/25/25  1125 08/23/21  1130 03/29/21  1318   GFRESTIMATED >90   < > 83   GFRESTBLACK  --   --  >90    < > = values in this interval not displayed.           Passed - Blood pressure on file in past 12 months     BP Readings from Last 3 Encounters:   06/10/25 131/80   03/25/25 121/71   01/23/25 131/81   No data recorded        Passed - Recent (12 month) or future (90 days) visit with authorizing provider's specialty (provided they have been seen in the past 15 months)     The patient must have completed an in-person or virtual visit within the past 12 months or has a future visit scheduled within the next 90 days with the authorizing provider s specialty.  Urgent care and e-visits do not qualify as an office visit for this protocol.        Passed - Patient is age 18 or older     Last Written Prescription Date:  5/13/25 to [facility] in [place]  Last Fill Quantity: 90,   # refills: 3              gemfibrozil (LOPID) 600 MG tablet 180 tablet 3     Sig: Take 1 tablet (600 mg) by mouth 2 times daily.       There is no refill protocol information for this order     Last Written Prescription Date:  5/13/25 to [facility] in [place]  Last Fill Quantity: 180,   # refills: 3              atorvastatin (LIPITOR) 80 MG tablet 90 tablet 3     Sig: Take  1 tablet (80 mg) by mouth daily.       Antihyperlipidemic agents Passed - 7/10/2025  9:29 AM        Passed - LDL on file in the past 12 months        Passed - Medication is active on med list and the sig matches. RN to manually verify dose and sig if red X/fail.     If the protocol passes (green check), you do not need to verify med dose and sig.    A prescription matches if they are the same clinical intention.    For Example: once daily and every morning are the same.    The protocol can not identify upper and lower case letters as matching and will fail.     For Example: Take 1 tablet (50 mg) by mouth daily     TAKE 1 TABLET (50 MG) BY MOUTH DAILY    For all fails (red x), verify dose and sig.    If the refill does match what is on file, the RN can still proceed to approve the refill request.     If they do not match, route to the appropriate provider.        Passed - Recent (12 month) or future (90 days) visit with authorizing provider's specialty (provided they have been seen in the past 15 months)     The patient must have completed an in-person or virtual visit within the past 12 months or has a future visit scheduled within the next 90 days with the authorizing provider s specialty.  Urgent care and e-visits do not qualify as an office visit for this protocol.        Passed - Patient is age 18 years or older     Last Written Prescription Date:  5/13/25 to [facility] in [place]  Last Fill Quantity: 90,   # refills: 3    Last Office Visit: 5/13/25  Future Office visit:   none    Refills removed as patient needs to contact his pharmacy.  [employer] message sent.  Aurea Freire RN ....................  7/10/2025   9:30 AM

## 2025-07-10 NOTE — TELEPHONE ENCOUNTER
Refill denied, Provider no longer at clinic and pt has not been seen in over 4 years. MYC sent.  Judith Cadena RN on 7/10/2025 at 8:41 AM

## 2025-07-14 RX ORDER — ALLOPURINOL 300 MG/1
1 TABLET ORAL DAILY
Qty: 90 TABLET | Refills: 1 | Status: SHIPPED | OUTPATIENT
Start: 2025-07-14

## 2025-07-14 RX ORDER — SULFASALAZINE 500 MG/1
1500 TABLET ORAL 2 TIMES DAILY
Qty: 540 TABLET | Refills: 1 | Status: SHIPPED | OUTPATIENT
Start: 2025-07-14

## 2025-07-14 RX ORDER — LEFLUNOMIDE 20 MG/1
20 TABLET ORAL DAILY
Qty: 90 TABLET | Refills: 1 | Status: SHIPPED | OUTPATIENT
Start: 2025-07-14

## 2025-08-05 DIAGNOSIS — M10.9 GOUT WITHOUT TOPHUS: ICD-10-CM

## 2025-08-05 RX ORDER — COLCHICINE 0.6 MG/1
TABLET ORAL
Qty: 90 TABLET | Refills: 0 | OUTPATIENT
Start: 2025-08-05

## (undated) DEVICE — PACK MINOR EYE

## (undated) DEVICE — ESU EYE HIGH TEMP 65410-183

## (undated) DEVICE — MARKER SKIN DOUBLE TIP W/FLEXI-RULER W/LABELS

## (undated) DEVICE — SU PLAIN 6-0 G-1DA 18" 770G

## (undated) DEVICE — SYR 03ML LL W/O NDL

## (undated) DEVICE — NDL 30GA 0.5" 305106

## (undated) DEVICE — ESU PENCIL W/HOLSTER

## (undated) DEVICE — SU CHROMIC 5-0 P-3 18" 687G

## (undated) DEVICE — NDL 30GA 1" 305128

## (undated) DEVICE — SU PROLENE 4-0 P-3 18" 8699G

## (undated) DEVICE — SU MONOCRYL 5-0 P-3 18" UND Y493G

## (undated) DEVICE — SU SILK 4-0 P-3 8" 641G

## (undated) DEVICE — GLOVE PROTEXIS W/NEU-THERA 7.5  2D73TE75

## (undated) DEVICE — SU ETHILON 5-0 P-3 18" CLEAR 690

## (undated) DEVICE — BLADE KNIFE SURG 15 371115

## (undated) DEVICE — SU SILK 6-0 S-14DA 18" 1780G

## (undated) DEVICE — SOL WATER IRRIG 1000ML BOTTLE 07139-09

## (undated) DEVICE — ESU ELEC NDL 1" COATED/INSULATED E1465

## (undated) RX ORDER — ACETAMINOPHEN 325 MG/1
TABLET ORAL
Status: DISPENSED
Start: 2019-07-22

## (undated) RX ORDER — OXYCODONE HYDROCHLORIDE 5 MG/1
TABLET ORAL
Status: DISPENSED
Start: 2019-07-22